# Patient Record
Sex: FEMALE | Race: WHITE | NOT HISPANIC OR LATINO | Employment: OTHER
[De-identification: names, ages, dates, MRNs, and addresses within clinical notes are randomized per-mention and may not be internally consistent; named-entity substitution may affect disease eponyms.]

---

## 2017-02-15 ENCOUNTER — RECORDS - HEALTHEAST (OUTPATIENT)
Dept: ADMINISTRATIVE | Facility: OTHER | Age: 72
End: 2017-02-15

## 2017-02-16 ENCOUNTER — HOSPITAL ENCOUNTER (OUTPATIENT)
Dept: INTERVENTIONAL RADIOLOGY/VASCULAR | Facility: HOSPITAL | Age: 72
Discharge: HOME | End: 2017-02-16

## 2017-02-16 DIAGNOSIS — C64.9 RENAL CANCER (H): ICD-10-CM

## 2017-02-16 ASSESSMENT — MIFFLIN-ST. JEOR: SCORE: 1283.79

## 2017-02-17 ENCOUNTER — COMMUNICATION - HEALTHEAST (OUTPATIENT)
Dept: INTERVENTIONAL RADIOLOGY/VASCULAR | Facility: HOSPITAL | Age: 72
End: 2017-02-17

## 2017-03-10 ENCOUNTER — COMMUNICATION - HEALTHEAST (OUTPATIENT)
Dept: ADMINISTRATIVE | Facility: CLINIC | Age: 72
End: 2017-03-10

## 2017-04-05 ENCOUNTER — AMBULATORY - HEALTHEAST (OUTPATIENT)
Dept: CARDIOLOGY | Facility: CLINIC | Age: 72
End: 2017-04-05

## 2017-04-05 ENCOUNTER — OFFICE VISIT - HEALTHEAST (OUTPATIENT)
Dept: CARDIOLOGY | Facility: CLINIC | Age: 72
End: 2017-04-05

## 2017-04-05 DIAGNOSIS — I48.0 PAROXYSMAL ATRIAL FIBRILLATION (H): ICD-10-CM

## 2017-04-05 DIAGNOSIS — I50.33 ACUTE ON CHRONIC DIASTOLIC CHF (CONGESTIVE HEART FAILURE), NYHA CLASS 3 (H): ICD-10-CM

## 2017-04-05 LAB
ATRIAL RATE - MUSE: NORMAL BPM
DIASTOLIC BLOOD PRESSURE - MUSE: NORMAL MMHG
INTERPRETATION ECG - MUSE: NORMAL
P AXIS - MUSE: NORMAL DEGREES
PR INTERVAL - MUSE: NORMAL MS
QRS DURATION - MUSE: 92 MS
QT - MUSE: 386 MS
QTC - MUSE: 500 MS
R AXIS - MUSE: 23 DEGREES
SYSTOLIC BLOOD PRESSURE - MUSE: NORMAL MMHG
T AXIS - MUSE: 130 DEGREES
VENTRICULAR RATE- MUSE: 101 BPM

## 2017-04-05 ASSESSMENT — MIFFLIN-ST. JEOR: SCORE: 1395.38

## 2017-04-14 ENCOUNTER — OFFICE VISIT - HEALTHEAST (OUTPATIENT)
Dept: GERIATRICS | Facility: CLINIC | Age: 72
End: 2017-04-14

## 2017-04-14 DIAGNOSIS — R53.1 GENERAL WEAKNESS: ICD-10-CM

## 2017-04-14 DIAGNOSIS — I50.33 ACUTE ON CHRONIC DIASTOLIC CONGESTIVE HEART FAILURE (H): ICD-10-CM

## 2017-04-14 DIAGNOSIS — G47.33 OSA (OBSTRUCTIVE SLEEP APNEA): ICD-10-CM

## 2017-04-14 DIAGNOSIS — N18.6 ESRD (END STAGE RENAL DISEASE) (H): ICD-10-CM

## 2017-04-14 DIAGNOSIS — K21.9 GERD (GASTROESOPHAGEAL REFLUX DISEASE): ICD-10-CM

## 2017-04-18 ENCOUNTER — OFFICE VISIT - HEALTHEAST (OUTPATIENT)
Dept: GERIATRICS | Facility: CLINIC | Age: 72
End: 2017-04-18

## 2017-04-18 DIAGNOSIS — R53.1 GENERAL WEAKNESS: ICD-10-CM

## 2017-04-18 DIAGNOSIS — G47.33 OSA (OBSTRUCTIVE SLEEP APNEA): ICD-10-CM

## 2017-04-18 DIAGNOSIS — I50.33 ACUTE ON CHRONIC DIASTOLIC CONGESTIVE HEART FAILURE (H): ICD-10-CM

## 2017-04-18 DIAGNOSIS — N18.6 ESRD (END STAGE RENAL DISEASE) (H): ICD-10-CM

## 2017-04-18 DIAGNOSIS — I10 HTN (HYPERTENSION): ICD-10-CM

## 2017-04-18 DIAGNOSIS — R53.1 WEAKNESS: ICD-10-CM

## 2017-04-20 ENCOUNTER — OFFICE VISIT - HEALTHEAST (OUTPATIENT)
Dept: GERIATRICS | Facility: CLINIC | Age: 72
End: 2017-04-20

## 2017-04-20 DIAGNOSIS — I50.33 ACUTE ON CHRONIC DIASTOLIC CONGESTIVE HEART FAILURE (H): ICD-10-CM

## 2017-04-20 DIAGNOSIS — R29.898 LEG WEAKNESS: ICD-10-CM

## 2017-04-20 DIAGNOSIS — I48.91 ATRIAL FIBRILLATION WITH RVR (H): ICD-10-CM

## 2017-04-20 DIAGNOSIS — I10 ESSENTIAL HYPERTENSION: ICD-10-CM

## 2017-04-25 ENCOUNTER — OFFICE VISIT - HEALTHEAST (OUTPATIENT)
Dept: GERIATRICS | Facility: CLINIC | Age: 72
End: 2017-04-25

## 2017-04-25 DIAGNOSIS — I50.33 ACUTE ON CHRONIC DIASTOLIC CONGESTIVE HEART FAILURE (H): ICD-10-CM

## 2017-04-25 DIAGNOSIS — R29.898 LEG WEAKNESS: ICD-10-CM

## 2017-04-25 DIAGNOSIS — I10 ESSENTIAL HYPERTENSION: ICD-10-CM

## 2017-04-25 DIAGNOSIS — I48.91 ATRIAL FIBRILLATION WITH RVR (H): ICD-10-CM

## 2017-04-28 ENCOUNTER — COMMUNICATION - HEALTHEAST (OUTPATIENT)
Dept: ADMINISTRATIVE | Facility: CLINIC | Age: 72
End: 2017-04-28

## 2017-05-02 ENCOUNTER — OFFICE VISIT - HEALTHEAST (OUTPATIENT)
Dept: GERIATRICS | Facility: CLINIC | Age: 72
End: 2017-05-02

## 2017-05-02 DIAGNOSIS — I50.33 ACUTE ON CHRONIC DIASTOLIC CONGESTIVE HEART FAILURE (H): ICD-10-CM

## 2017-05-02 DIAGNOSIS — R29.898 LEG WEAKNESS: ICD-10-CM

## 2017-05-02 DIAGNOSIS — I10 ESSENTIAL HYPERTENSION: ICD-10-CM

## 2017-05-02 DIAGNOSIS — G47.33 OSA (OBSTRUCTIVE SLEEP APNEA): ICD-10-CM

## 2017-05-05 ENCOUNTER — AMBULATORY - HEALTHEAST (OUTPATIENT)
Dept: GERIATRICS | Facility: CLINIC | Age: 72
End: 2017-05-05

## 2017-05-12 ENCOUNTER — AMBULATORY - HEALTHEAST (OUTPATIENT)
Dept: CARDIOLOGY | Facility: CLINIC | Age: 72
End: 2017-05-12

## 2017-05-12 DIAGNOSIS — I50.33 ACUTE ON CHRONIC DIASTOLIC CONGESTIVE HEART FAILURE (H): ICD-10-CM

## 2017-05-15 ENCOUNTER — OFFICE VISIT - HEALTHEAST (OUTPATIENT)
Dept: FAMILY MEDICINE | Facility: CLINIC | Age: 72
End: 2017-05-15

## 2017-05-15 DIAGNOSIS — I50.33 ACUTE ON CHRONIC DIASTOLIC CONGESTIVE HEART FAILURE (H): ICD-10-CM

## 2017-05-15 DIAGNOSIS — D63.1 ANEMIA OF CHRONIC RENAL FAILURE, STAGE 5 (H): ICD-10-CM

## 2017-05-15 DIAGNOSIS — Z79.01 ANTICOAGULATED ON WARFARIN: ICD-10-CM

## 2017-05-15 DIAGNOSIS — R29.898 LEG WEAKNESS, BILATERAL: ICD-10-CM

## 2017-05-15 DIAGNOSIS — N18.6 ESRD (END STAGE RENAL DISEASE) (H): ICD-10-CM

## 2017-05-15 DIAGNOSIS — G47.33 OSA (OBSTRUCTIVE SLEEP APNEA): ICD-10-CM

## 2017-05-15 DIAGNOSIS — M21.371 RIGHT FOOT DROP: ICD-10-CM

## 2017-05-15 DIAGNOSIS — N18.5 ANEMIA OF CHRONIC RENAL FAILURE, STAGE 5 (H): ICD-10-CM

## 2017-05-15 DIAGNOSIS — E78.00 PURE HYPERCHOLESTEROLEMIA: ICD-10-CM

## 2017-05-15 DIAGNOSIS — I48.19 PERSISTENT ATRIAL FIBRILLATION (H): ICD-10-CM

## 2017-05-15 DIAGNOSIS — I71.03 DISSECTION OF THORACOABDOMINAL AORTA (H): ICD-10-CM

## 2017-05-15 DIAGNOSIS — J44.9 CHRONIC OBSTRUCTIVE PULMONARY DISEASE, UNSPECIFIED COPD TYPE (H): ICD-10-CM

## 2017-05-18 ENCOUNTER — AMBULATORY - HEALTHEAST (OUTPATIENT)
Dept: CARDIOLOGY | Facility: CLINIC | Age: 72
End: 2017-05-18

## 2017-05-18 DIAGNOSIS — I50.33 ACUTE ON CHRONIC DIASTOLIC CONGESTIVE HEART FAILURE (H): ICD-10-CM

## 2017-05-19 ENCOUNTER — OFFICE VISIT - HEALTHEAST (OUTPATIENT)
Dept: CARDIOLOGY | Facility: CLINIC | Age: 72
End: 2017-05-19

## 2017-05-19 DIAGNOSIS — I49.5 TACHYCARDIA-BRADYCARDIA SYNDROME (H): ICD-10-CM

## 2017-05-19 DIAGNOSIS — I48.19 PERSISTENT ATRIAL FIBRILLATION (H): ICD-10-CM

## 2017-05-19 DIAGNOSIS — I10 ESSENTIAL HYPERTENSION WITH GOAL BLOOD PRESSURE LESS THAN 140/90: ICD-10-CM

## 2017-05-19 ASSESSMENT — MIFFLIN-ST. JEOR: SCORE: 1357.5

## 2017-05-24 ENCOUNTER — OFFICE VISIT - HEALTHEAST (OUTPATIENT)
Dept: PHYSICAL THERAPY | Facility: REHABILITATION | Age: 72
End: 2017-05-24

## 2017-05-24 DIAGNOSIS — R29.818 DIFFICULTY BALANCING: ICD-10-CM

## 2017-05-24 DIAGNOSIS — M62.81 MUSCLE WEAKNESS (GENERALIZED): ICD-10-CM

## 2017-05-25 ENCOUNTER — AMBULATORY - HEALTHEAST (OUTPATIENT)
Dept: CARDIOLOGY | Facility: CLINIC | Age: 72
End: 2017-05-25

## 2017-05-25 DIAGNOSIS — I50.33 ACUTE ON CHRONIC DIASTOLIC CONGESTIVE HEART FAILURE (H): ICD-10-CM

## 2017-05-26 ENCOUNTER — COMMUNICATION - HEALTHEAST (OUTPATIENT)
Dept: CARDIOLOGY | Facility: CLINIC | Age: 72
End: 2017-05-26

## 2017-05-31 ENCOUNTER — OFFICE VISIT - HEALTHEAST (OUTPATIENT)
Dept: PHYSICAL THERAPY | Facility: REHABILITATION | Age: 72
End: 2017-05-31

## 2017-05-31 ENCOUNTER — COMMUNICATION - HEALTHEAST (OUTPATIENT)
Dept: PHYSICAL THERAPY | Facility: REHABILITATION | Age: 72
End: 2017-05-31

## 2017-05-31 DIAGNOSIS — I50.31 ACUTE DIASTOLIC CHF (CONGESTIVE HEART FAILURE) (H): ICD-10-CM

## 2017-05-31 DIAGNOSIS — M48.061 LUMBAR SPINAL STENOSIS: ICD-10-CM

## 2017-05-31 DIAGNOSIS — I49.5 TACHY-BRADY SYNDROME (H): ICD-10-CM

## 2017-05-31 DIAGNOSIS — I48.0 PAROXYSMAL ATRIAL FIBRILLATION (H): ICD-10-CM

## 2017-05-31 DIAGNOSIS — I71.00 AORTIC DISSECTION (H): ICD-10-CM

## 2017-05-31 DIAGNOSIS — I50.33 ACUTE ON CHRONIC DIASTOLIC CONGESTIVE HEART FAILURE (H): ICD-10-CM

## 2017-05-31 DIAGNOSIS — M48.062 LUMBAR STENOSIS WITH NEUROGENIC CLAUDICATION: ICD-10-CM

## 2017-05-31 DIAGNOSIS — I71.019 CHRONIC THORACIC AORTIC DISSECTION (H): ICD-10-CM

## 2017-05-31 DIAGNOSIS — R29.818 DIFFICULTY BALANCING: ICD-10-CM

## 2017-05-31 DIAGNOSIS — R00.1 SINUS BRADYCARDIA: ICD-10-CM

## 2017-05-31 DIAGNOSIS — M54.16 LUMBAR RADICULITIS: ICD-10-CM

## 2017-05-31 DIAGNOSIS — M62.81 MUSCLE WEAKNESS (GENERALIZED): ICD-10-CM

## 2017-05-31 DIAGNOSIS — N18.6 ESRD (END STAGE RENAL DISEASE) (H): ICD-10-CM

## 2017-05-31 DIAGNOSIS — I71.8: ICD-10-CM

## 2017-06-02 ENCOUNTER — OFFICE VISIT - HEALTHEAST (OUTPATIENT)
Dept: PHYSICAL THERAPY | Facility: REHABILITATION | Age: 72
End: 2017-06-02

## 2017-06-02 DIAGNOSIS — M48.062 LUMBAR STENOSIS WITH NEUROGENIC CLAUDICATION: ICD-10-CM

## 2017-06-02 DIAGNOSIS — I71.00 AORTIC DISSECTION (H): ICD-10-CM

## 2017-06-02 DIAGNOSIS — I71.019 CHRONIC THORACIC AORTIC DISSECTION (H): ICD-10-CM

## 2017-06-02 DIAGNOSIS — M48.061 LUMBAR SPINAL STENOSIS: ICD-10-CM

## 2017-06-02 DIAGNOSIS — M54.16 LUMBAR RADICULITIS: ICD-10-CM

## 2017-06-02 DIAGNOSIS — N18.6 ESRD (END STAGE RENAL DISEASE) (H): ICD-10-CM

## 2017-06-02 DIAGNOSIS — M62.81 MUSCLE WEAKNESS (GENERALIZED): ICD-10-CM

## 2017-06-02 DIAGNOSIS — R00.1 SINUS BRADYCARDIA: ICD-10-CM

## 2017-06-02 DIAGNOSIS — I50.31 ACUTE DIASTOLIC CHF (CONGESTIVE HEART FAILURE) (H): ICD-10-CM

## 2017-06-02 DIAGNOSIS — R29.818 DIFFICULTY BALANCING: ICD-10-CM

## 2017-06-02 DIAGNOSIS — I71.8: ICD-10-CM

## 2017-06-02 DIAGNOSIS — I49.5 TACHY-BRADY SYNDROME (H): ICD-10-CM

## 2017-06-02 DIAGNOSIS — I48.0 PAROXYSMAL ATRIAL FIBRILLATION (H): ICD-10-CM

## 2017-06-02 DIAGNOSIS — I50.33 ACUTE ON CHRONIC DIASTOLIC CONGESTIVE HEART FAILURE (H): ICD-10-CM

## 2017-06-05 ASSESSMENT — MIFFLIN-ST. JEOR
SCORE: 1374.51
SCORE: 1365.44

## 2017-06-07 ENCOUNTER — SURGERY - HEALTHEAST (OUTPATIENT)
Dept: GASTROENTEROLOGY | Facility: CLINIC | Age: 72
End: 2017-06-07

## 2017-06-07 ASSESSMENT — MIFFLIN-ST. JEOR
SCORE: 1372.7
SCORE: 1372.7

## 2017-06-08 ENCOUNTER — HOME CARE/HOSPICE - HEALTHEAST (OUTPATIENT)
Dept: HOME HEALTH SERVICES | Facility: HOME HEALTH | Age: 72
End: 2017-06-08

## 2017-06-08 ASSESSMENT — MIFFLIN-ST. JEOR: SCORE: 1383.58

## 2017-06-10 ENCOUNTER — HOME CARE/HOSPICE - HEALTHEAST (OUTPATIENT)
Dept: HOME HEALTH SERVICES | Facility: HOME HEALTH | Age: 72
End: 2017-06-10

## 2017-06-11 ENCOUNTER — HOME CARE/HOSPICE - HEALTHEAST (OUTPATIENT)
Dept: HOME HEALTH SERVICES | Facility: HOME HEALTH | Age: 72
End: 2017-06-11

## 2017-06-12 ENCOUNTER — COMMUNICATION - HEALTHEAST (OUTPATIENT)
Dept: FAMILY MEDICINE | Facility: CLINIC | Age: 72
End: 2017-06-12

## 2017-06-12 ENCOUNTER — SURGERY - HEALTHEAST (OUTPATIENT)
Dept: CARDIOLOGY | Facility: CLINIC | Age: 72
End: 2017-06-12

## 2017-06-12 ENCOUNTER — AMBULATORY - HEALTHEAST (OUTPATIENT)
Dept: CARDIOLOGY | Facility: CLINIC | Age: 72
End: 2017-06-12

## 2017-06-12 ENCOUNTER — HOME CARE/HOSPICE - HEALTHEAST (OUTPATIENT)
Dept: HOME HEALTH SERVICES | Facility: HOME HEALTH | Age: 72
End: 2017-06-12

## 2017-06-12 DIAGNOSIS — I50.33 ACUTE ON CHRONIC DIASTOLIC CONGESTIVE HEART FAILURE (H): ICD-10-CM

## 2017-06-12 DIAGNOSIS — I49.5 TACHY-BRADY SYNDROME (H): ICD-10-CM

## 2017-06-13 ENCOUNTER — COMMUNICATION - HEALTHEAST (OUTPATIENT)
Dept: HOME HEALTH SERVICES | Facility: HOME HEALTH | Age: 72
End: 2017-06-13

## 2017-06-13 ENCOUNTER — HOME CARE/HOSPICE - HEALTHEAST (OUTPATIENT)
Dept: HOME HEALTH SERVICES | Facility: HOME HEALTH | Age: 72
End: 2017-06-13

## 2017-06-13 DIAGNOSIS — M62.81 MUSCLE WEAKNESS: ICD-10-CM

## 2017-06-13 DIAGNOSIS — R29.898 WEAKNESS OF BOTH LOWER EXTREMITIES: ICD-10-CM

## 2017-06-13 DIAGNOSIS — N18.6 ESRD (END STAGE RENAL DISEASE) ON DIALYSIS (H): ICD-10-CM

## 2017-06-13 DIAGNOSIS — I48.91 ATRIAL FIBRILLATION (H): ICD-10-CM

## 2017-06-13 DIAGNOSIS — Z99.2 ESRD (END STAGE RENAL DISEASE) ON DIALYSIS (H): ICD-10-CM

## 2017-06-15 ENCOUNTER — AMBULATORY - HEALTHEAST (OUTPATIENT)
Dept: CARDIOLOGY | Facility: CLINIC | Age: 72
End: 2017-06-15

## 2017-06-15 ENCOUNTER — HOME CARE/HOSPICE - HEALTHEAST (OUTPATIENT)
Dept: HOME HEALTH SERVICES | Facility: HOME HEALTH | Age: 72
End: 2017-06-15

## 2017-06-15 DIAGNOSIS — I50.33 ACUTE ON CHRONIC DIASTOLIC CONGESTIVE HEART FAILURE (H): ICD-10-CM

## 2017-06-16 ENCOUNTER — OFFICE VISIT - HEALTHEAST (OUTPATIENT)
Dept: FAMILY MEDICINE | Facility: CLINIC | Age: 72
End: 2017-06-16

## 2017-06-16 ENCOUNTER — RECORDS - HEALTHEAST (OUTPATIENT)
Dept: GENERAL RADIOLOGY | Facility: CLINIC | Age: 72
End: 2017-06-16

## 2017-06-16 ENCOUNTER — COMMUNICATION - HEALTHEAST (OUTPATIENT)
Dept: FAMILY MEDICINE | Facility: CLINIC | Age: 72
End: 2017-06-16

## 2017-06-16 DIAGNOSIS — D64.9 CHRONIC ANEMIA: ICD-10-CM

## 2017-06-16 DIAGNOSIS — K57.91 GASTROINTESTINAL HEMORRHAGE ASSOCIATED WITH INTESTINAL DIVERTICULOSIS: ICD-10-CM

## 2017-06-16 DIAGNOSIS — Z79.01 ANTICOAGULANT LONG-TERM USE: ICD-10-CM

## 2017-06-16 DIAGNOSIS — Z87.81 HISTORY OF COMPRESSION FRACTURE OF SPINE: ICD-10-CM

## 2017-06-16 DIAGNOSIS — N18.6 ESRD (END STAGE RENAL DISEASE) (H): ICD-10-CM

## 2017-06-16 DIAGNOSIS — M54.41 LUMBAGO WITH SCIATICA, RIGHT SIDE: ICD-10-CM

## 2017-06-16 DIAGNOSIS — M54.41 ACUTE MIDLINE LOW BACK PAIN WITH RIGHT-SIDED SCIATICA: ICD-10-CM

## 2017-06-19 ENCOUNTER — COMMUNICATION - HEALTHEAST (OUTPATIENT)
Dept: FAMILY MEDICINE | Facility: CLINIC | Age: 72
End: 2017-06-19

## 2017-06-20 ENCOUNTER — AMBULATORY - HEALTHEAST (OUTPATIENT)
Dept: CARDIOLOGY | Facility: CLINIC | Age: 72
End: 2017-06-20

## 2017-06-20 ENCOUNTER — HOME CARE/HOSPICE - HEALTHEAST (OUTPATIENT)
Dept: HOME HEALTH SERVICES | Facility: HOME HEALTH | Age: 72
End: 2017-06-20

## 2017-06-20 DIAGNOSIS — I50.33 ACUTE ON CHRONIC DIASTOLIC CONGESTIVE HEART FAILURE (H): ICD-10-CM

## 2017-06-22 ENCOUNTER — HOME CARE/HOSPICE - HEALTHEAST (OUTPATIENT)
Dept: HOME HEALTH SERVICES | Facility: HOME HEALTH | Age: 72
End: 2017-06-22

## 2017-06-23 ENCOUNTER — OFFICE VISIT - HEALTHEAST (OUTPATIENT)
Dept: FAMILY MEDICINE | Facility: CLINIC | Age: 72
End: 2017-06-23

## 2017-06-23 ENCOUNTER — COMMUNICATION - HEALTHEAST (OUTPATIENT)
Dept: CARDIOLOGY | Facility: CLINIC | Age: 72
End: 2017-06-23

## 2017-06-23 ENCOUNTER — COMMUNICATION - HEALTHEAST (OUTPATIENT)
Dept: HOME HEALTH SERVICES | Facility: HOME HEALTH | Age: 72
End: 2017-06-23

## 2017-06-23 ENCOUNTER — AMBULATORY - HEALTHEAST (OUTPATIENT)
Dept: CARDIOLOGY | Facility: CLINIC | Age: 72
End: 2017-06-23

## 2017-06-23 DIAGNOSIS — Z79.01 ANTICOAGULANT LONG-TERM USE: ICD-10-CM

## 2017-06-23 DIAGNOSIS — Z01.818 PRE-OP EVALUATION: ICD-10-CM

## 2017-06-23 DIAGNOSIS — I50.33 ACUTE ON CHRONIC DIASTOLIC CONGESTIVE HEART FAILURE (H): ICD-10-CM

## 2017-06-23 DIAGNOSIS — D64.9 CHRONIC ANEMIA: ICD-10-CM

## 2017-06-23 DIAGNOSIS — I10 ESSENTIAL HYPERTENSION WITH GOAL BLOOD PRESSURE LESS THAN 140/90: ICD-10-CM

## 2017-06-23 DIAGNOSIS — N18.6 ESRD (END STAGE RENAL DISEASE) (H): ICD-10-CM

## 2017-06-23 DIAGNOSIS — I48.91 ATRIAL FIBRILLATION (H): ICD-10-CM

## 2017-06-23 ASSESSMENT — MIFFLIN-ST. JEOR: SCORE: 1359.54

## 2017-06-27 ENCOUNTER — HOME CARE/HOSPICE - HEALTHEAST (OUTPATIENT)
Dept: HOME HEALTH SERVICES | Facility: HOME HEALTH | Age: 72
End: 2017-06-27

## 2017-06-27 ENCOUNTER — AMBULATORY - HEALTHEAST (OUTPATIENT)
Dept: CARDIOLOGY | Facility: CLINIC | Age: 72
End: 2017-06-27

## 2017-06-27 DIAGNOSIS — I50.33 ACUTE ON CHRONIC DIASTOLIC CONGESTIVE HEART FAILURE (H): ICD-10-CM

## 2017-06-29 ENCOUNTER — HOME CARE/HOSPICE - HEALTHEAST (OUTPATIENT)
Dept: HOME HEALTH SERVICES | Facility: HOME HEALTH | Age: 72
End: 2017-06-29

## 2017-06-30 ENCOUNTER — AMBULATORY - HEALTHEAST (OUTPATIENT)
Dept: CARDIOLOGY | Facility: CLINIC | Age: 72
End: 2017-06-30

## 2017-06-30 ENCOUNTER — HOME CARE/HOSPICE - HEALTHEAST (OUTPATIENT)
Dept: HOME HEALTH SERVICES | Facility: HOME HEALTH | Age: 72
End: 2017-06-30

## 2017-06-30 DIAGNOSIS — I50.33 ACUTE ON CHRONIC DIASTOLIC CONGESTIVE HEART FAILURE (H): ICD-10-CM

## 2017-07-03 ENCOUNTER — HOME CARE/HOSPICE - HEALTHEAST (OUTPATIENT)
Dept: HOME HEALTH SERVICES | Facility: HOME HEALTH | Age: 72
End: 2017-07-03

## 2017-07-03 ENCOUNTER — AMBULATORY - HEALTHEAST (OUTPATIENT)
Dept: CARDIOLOGY | Facility: CLINIC | Age: 72
End: 2017-07-03

## 2017-07-03 DIAGNOSIS — I50.33 ACUTE ON CHRONIC DIASTOLIC CONGESTIVE HEART FAILURE (H): ICD-10-CM

## 2017-07-05 ENCOUNTER — HOME CARE/HOSPICE - HEALTHEAST (OUTPATIENT)
Dept: HOME HEALTH SERVICES | Facility: HOME HEALTH | Age: 72
End: 2017-07-05

## 2017-07-06 ENCOUNTER — HOME CARE/HOSPICE - HEALTHEAST (OUTPATIENT)
Dept: HOME HEALTH SERVICES | Facility: HOME HEALTH | Age: 72
End: 2017-07-06

## 2017-07-06 ENCOUNTER — COMMUNICATION - HEALTHEAST (OUTPATIENT)
Dept: HOME HEALTH SERVICES | Facility: HOME HEALTH | Age: 72
End: 2017-07-06

## 2017-07-06 ENCOUNTER — AMBULATORY - HEALTHEAST (OUTPATIENT)
Dept: CARDIOLOGY | Facility: CLINIC | Age: 72
End: 2017-07-06

## 2017-07-06 ENCOUNTER — HOSPITAL ENCOUNTER (OUTPATIENT)
Dept: PHYSICAL MEDICINE AND REHAB | Facility: CLINIC | Age: 72
Discharge: HOME | End: 2017-07-06
Attending: NURSE PRACTITIONER

## 2017-07-06 DIAGNOSIS — M48.062 LUMBAR STENOSIS WITH NEUROGENIC CLAUDICATION: ICD-10-CM

## 2017-07-06 DIAGNOSIS — S32.030A COMPRESSION FRACTURE OF L3 LUMBAR VERTEBRA: ICD-10-CM

## 2017-07-06 DIAGNOSIS — I50.33 ACUTE ON CHRONIC DIASTOLIC CONGESTIVE HEART FAILURE (H): ICD-10-CM

## 2017-07-06 DIAGNOSIS — M43.17 SPONDYLOLISTHESIS AT L5-S1 LEVEL: ICD-10-CM

## 2017-07-06 DIAGNOSIS — M54.50 ACUTE MIDLINE LOW BACK PAIN WITHOUT SCIATICA: ICD-10-CM

## 2017-07-06 DIAGNOSIS — N18.6 ESRD (END STAGE RENAL DISEASE) (H): ICD-10-CM

## 2017-07-06 DIAGNOSIS — R26.89 BALANCE PROBLEM: ICD-10-CM

## 2017-07-06 ASSESSMENT — MIFFLIN-ST. JEOR: SCORE: 1379.05

## 2017-07-07 ENCOUNTER — HOME CARE/HOSPICE - HEALTHEAST (OUTPATIENT)
Dept: HOME HEALTH SERVICES | Facility: HOME HEALTH | Age: 72
End: 2017-07-07

## 2017-07-11 ENCOUNTER — HOME CARE/HOSPICE - HEALTHEAST (OUTPATIENT)
Dept: HOME HEALTH SERVICES | Facility: HOME HEALTH | Age: 72
End: 2017-07-11

## 2017-07-12 ENCOUNTER — COMMUNICATION - HEALTHEAST (OUTPATIENT)
Dept: FAMILY MEDICINE | Facility: CLINIC | Age: 72
End: 2017-07-12

## 2017-07-12 ENCOUNTER — HOME CARE/HOSPICE - HEALTHEAST (OUTPATIENT)
Dept: HOME HEALTH SERVICES | Facility: HOME HEALTH | Age: 72
End: 2017-07-12

## 2017-07-13 ENCOUNTER — COMMUNICATION - HEALTHEAST (OUTPATIENT)
Dept: CARDIOLOGY | Facility: CLINIC | Age: 72
End: 2017-07-13

## 2017-07-13 ENCOUNTER — AMBULATORY - HEALTHEAST (OUTPATIENT)
Dept: CARDIOLOGY | Facility: CLINIC | Age: 72
End: 2017-07-13

## 2017-07-13 ENCOUNTER — HOME CARE/HOSPICE - HEALTHEAST (OUTPATIENT)
Dept: HOME HEALTH SERVICES | Facility: HOME HEALTH | Age: 72
End: 2017-07-13

## 2017-07-13 DIAGNOSIS — I50.33 ACUTE ON CHRONIC DIASTOLIC CONGESTIVE HEART FAILURE (H): ICD-10-CM

## 2017-07-13 DIAGNOSIS — N18.6 ESRD (END STAGE RENAL DISEASE) (H): ICD-10-CM

## 2017-07-14 ENCOUNTER — HOSPITAL ENCOUNTER (OUTPATIENT)
Dept: MRI IMAGING | Facility: HOSPITAL | Age: 72
Discharge: HOME | End: 2017-07-14

## 2017-07-14 DIAGNOSIS — M54.50 ACUTE MIDLINE LOW BACK PAIN WITHOUT SCIATICA: ICD-10-CM

## 2017-07-14 DIAGNOSIS — M43.17 SPONDYLOLISTHESIS AT L5-S1 LEVEL: ICD-10-CM

## 2017-07-14 DIAGNOSIS — M48.062 LUMBAR STENOSIS WITH NEUROGENIC CLAUDICATION: ICD-10-CM

## 2017-07-14 DIAGNOSIS — M48.061 SPINAL STENOSIS OF LUMBAR REGION: ICD-10-CM

## 2017-07-15 ENCOUNTER — HOME CARE/HOSPICE - HEALTHEAST (OUTPATIENT)
Dept: HOME HEALTH SERVICES | Facility: HOME HEALTH | Age: 72
End: 2017-07-15

## 2017-07-17 ENCOUNTER — HOSPITAL ENCOUNTER (OUTPATIENT)
Dept: PHYSICAL MEDICINE AND REHAB | Facility: CLINIC | Age: 72
Discharge: HOME | End: 2017-07-17
Attending: NURSE PRACTITIONER

## 2017-07-17 ENCOUNTER — AMBULATORY - HEALTHEAST (OUTPATIENT)
Dept: PHYSICAL MEDICINE AND REHAB | Facility: CLINIC | Age: 72
End: 2017-07-17

## 2017-07-17 DIAGNOSIS — R93.89 ABNORMAL FINDING ON IMAGING: ICD-10-CM

## 2017-07-17 DIAGNOSIS — F40.240 CLAUSTROPHOBIA: ICD-10-CM

## 2017-07-17 DIAGNOSIS — M43.17 SPONDYLOLISTHESIS AT L5-S1 LEVEL: ICD-10-CM

## 2017-07-17 DIAGNOSIS — M48.062 LUMBAR STENOSIS WITH NEUROGENIC CLAUDICATION: ICD-10-CM

## 2017-07-17 DIAGNOSIS — M54.50 ACUTE MIDLINE LOW BACK PAIN WITHOUT SCIATICA: ICD-10-CM

## 2017-07-18 ENCOUNTER — HOME CARE/HOSPICE - HEALTHEAST (OUTPATIENT)
Dept: HOME HEALTH SERVICES | Facility: HOME HEALTH | Age: 72
End: 2017-07-18

## 2017-07-19 ENCOUNTER — HOME CARE/HOSPICE - HEALTHEAST (OUTPATIENT)
Dept: HOME HEALTH SERVICES | Facility: HOME HEALTH | Age: 72
End: 2017-07-19

## 2017-07-19 ENCOUNTER — AMBULATORY - HEALTHEAST (OUTPATIENT)
Dept: CARDIOLOGY | Facility: CLINIC | Age: 72
End: 2017-07-19

## 2017-07-19 DIAGNOSIS — I50.33 ACUTE ON CHRONIC DIASTOLIC CONGESTIVE HEART FAILURE (H): ICD-10-CM

## 2017-07-20 ENCOUNTER — AMBULATORY - HEALTHEAST (OUTPATIENT)
Dept: PHYSICAL MEDICINE AND REHAB | Facility: CLINIC | Age: 72
End: 2017-07-20

## 2017-07-20 ENCOUNTER — COMMUNICATION - HEALTHEAST (OUTPATIENT)
Dept: PHYSICAL MEDICINE AND REHAB | Facility: CLINIC | Age: 72
End: 2017-07-20

## 2017-07-20 ENCOUNTER — HOME CARE/HOSPICE - HEALTHEAST (OUTPATIENT)
Dept: HOME HEALTH SERVICES | Facility: HOME HEALTH | Age: 72
End: 2017-07-20

## 2017-07-20 DIAGNOSIS — M43.17 SPONDYLOLISTHESIS AT L5-S1 LEVEL: ICD-10-CM

## 2017-07-20 DIAGNOSIS — R93.89 ABNORMAL FINDING ON IMAGING: ICD-10-CM

## 2017-07-20 DIAGNOSIS — M48.062 LUMBAR STENOSIS WITH NEUROGENIC CLAUDICATION: ICD-10-CM

## 2017-07-21 ENCOUNTER — COMMUNICATION - HEALTHEAST (OUTPATIENT)
Dept: HOME HEALTH SERVICES | Facility: HOME HEALTH | Age: 72
End: 2017-07-21

## 2017-07-21 ENCOUNTER — COMMUNICATION - HEALTHEAST (OUTPATIENT)
Dept: PHYSICAL MEDICINE AND REHAB | Facility: CLINIC | Age: 72
End: 2017-07-21

## 2017-07-24 ENCOUNTER — COMMUNICATION - HEALTHEAST (OUTPATIENT)
Dept: FAMILY MEDICINE | Facility: CLINIC | Age: 72
End: 2017-07-24

## 2017-07-24 DIAGNOSIS — M10.9 GOUT: ICD-10-CM

## 2017-07-25 ENCOUNTER — HOSPITAL ENCOUNTER (OUTPATIENT)
Dept: NUCLEAR MEDICINE | Facility: HOSPITAL | Age: 72
Discharge: HOME | End: 2017-07-25

## 2017-07-25 ENCOUNTER — AMBULATORY - HEALTHEAST (OUTPATIENT)
Dept: LAB | Facility: HOSPITAL | Age: 72
End: 2017-07-25

## 2017-07-25 DIAGNOSIS — R93.89 ABNORMAL FINDING ON IMAGING: ICD-10-CM

## 2017-07-25 DIAGNOSIS — M43.17 SPONDYLOLISTHESIS AT L5-S1 LEVEL: ICD-10-CM

## 2017-07-25 DIAGNOSIS — M54.50 ACUTE MIDLINE LOW BACK PAIN WITHOUT SCIATICA: ICD-10-CM

## 2017-07-25 DIAGNOSIS — M48.062 LUMBAR STENOSIS WITH NEUROGENIC CLAUDICATION: ICD-10-CM

## 2017-07-25 DIAGNOSIS — M48.061 SPINAL STENOSIS OF LUMBAR REGION: ICD-10-CM

## 2017-07-26 ENCOUNTER — AMBULATORY - HEALTHEAST (OUTPATIENT)
Dept: CARDIOLOGY | Facility: CLINIC | Age: 72
End: 2017-07-26

## 2017-07-26 ENCOUNTER — AMBULATORY - HEALTHEAST (OUTPATIENT)
Dept: PHYSICAL MEDICINE AND REHAB | Facility: CLINIC | Age: 72
End: 2017-07-26

## 2017-07-26 ENCOUNTER — COMMUNICATION - HEALTHEAST (OUTPATIENT)
Dept: PHYSICAL MEDICINE AND REHAB | Facility: CLINIC | Age: 72
End: 2017-07-26

## 2017-07-26 ENCOUNTER — HOME CARE/HOSPICE - HEALTHEAST (OUTPATIENT)
Dept: HOME HEALTH SERVICES | Facility: HOME HEALTH | Age: 72
End: 2017-07-26

## 2017-07-26 DIAGNOSIS — M54.50 ACUTE MIDLINE LOW BACK PAIN WITHOUT SCIATICA: ICD-10-CM

## 2017-07-26 DIAGNOSIS — I50.33 ACUTE ON CHRONIC DIASTOLIC CONGESTIVE HEART FAILURE (H): ICD-10-CM

## 2017-07-26 DIAGNOSIS — M43.17 SPONDYLOLISTHESIS AT L5-S1 LEVEL: ICD-10-CM

## 2017-07-26 DIAGNOSIS — M48.062 LUMBAR STENOSIS WITH NEUROGENIC CLAUDICATION: ICD-10-CM

## 2017-07-26 DIAGNOSIS — M54.16 LUMBAR RADICULITIS: ICD-10-CM

## 2017-07-27 ENCOUNTER — OFFICE VISIT - HEALTHEAST (OUTPATIENT)
Dept: CARDIOLOGY | Facility: CLINIC | Age: 72
End: 2017-07-27

## 2017-07-27 ENCOUNTER — COMMUNICATION - HEALTHEAST (OUTPATIENT)
Dept: FAMILY MEDICINE | Facility: CLINIC | Age: 72
End: 2017-07-27

## 2017-07-27 DIAGNOSIS — M54.9 BACK PAIN: ICD-10-CM

## 2017-07-27 DIAGNOSIS — I48.19 PERSISTENT ATRIAL FIBRILLATION (H): ICD-10-CM

## 2017-07-27 DIAGNOSIS — E78.5 HYPERLIPIDEMIA: ICD-10-CM

## 2017-07-27 DIAGNOSIS — I49.5 TACHYCARDIA-BRADYCARDIA SYNDROME (H): ICD-10-CM

## 2017-07-27 DIAGNOSIS — D64.9 CHRONIC ANEMIA: ICD-10-CM

## 2017-07-27 DIAGNOSIS — Z87.81 HISTORY OF COMPRESSION FRACTURE OF SPINE: ICD-10-CM

## 2017-07-27 DIAGNOSIS — N18.6 ESRD (END STAGE RENAL DISEASE) (H): ICD-10-CM

## 2017-07-27 ASSESSMENT — MIFFLIN-ST. JEOR: SCORE: 1388.12

## 2017-07-28 ENCOUNTER — COMMUNICATION - HEALTHEAST (OUTPATIENT)
Dept: FAMILY MEDICINE | Facility: CLINIC | Age: 72
End: 2017-07-28

## 2017-07-28 ENCOUNTER — HOSPITAL ENCOUNTER (OUTPATIENT)
Dept: CARDIOLOGY | Facility: HOSPITAL | Age: 72
Discharge: HOME | End: 2017-07-28
Attending: INTERNAL MEDICINE

## 2017-07-28 DIAGNOSIS — M54.9 BACK PAIN: ICD-10-CM

## 2017-07-31 ENCOUNTER — OFFICE VISIT - HEALTHEAST (OUTPATIENT)
Dept: FAMILY MEDICINE | Facility: CLINIC | Age: 72
End: 2017-07-31

## 2017-07-31 DIAGNOSIS — L02.91 ABSCESS: ICD-10-CM

## 2017-07-31 DIAGNOSIS — L03.90 CELLULITIS: ICD-10-CM

## 2017-08-01 ENCOUNTER — AMBULATORY - HEALTHEAST (OUTPATIENT)
Dept: CARDIOLOGY | Facility: CLINIC | Age: 72
End: 2017-08-01

## 2017-08-01 DIAGNOSIS — I50.33 ACUTE ON CHRONIC DIASTOLIC CONGESTIVE HEART FAILURE (H): ICD-10-CM

## 2017-08-02 ENCOUNTER — OFFICE VISIT - HEALTHEAST (OUTPATIENT)
Dept: INTERNAL MEDICINE | Facility: CLINIC | Age: 72
End: 2017-08-02

## 2017-08-02 DIAGNOSIS — L02.411 ABSCESS OF RIGHT AXILLA: ICD-10-CM

## 2017-08-04 ENCOUNTER — COMMUNICATION - HEALTHEAST (OUTPATIENT)
Dept: CARDIOLOGY | Facility: CLINIC | Age: 72
End: 2017-08-04

## 2017-08-04 ENCOUNTER — HOME CARE/HOSPICE - HEALTHEAST (OUTPATIENT)
Dept: HOME HEALTH SERVICES | Facility: HOME HEALTH | Age: 72
End: 2017-08-04

## 2017-08-07 ENCOUNTER — HOME CARE/HOSPICE - HEALTHEAST (OUTPATIENT)
Dept: ADMINISTRATIVE | Facility: OTHER | Age: 72
End: 2017-08-07

## 2017-08-08 ENCOUNTER — AMBULATORY - HEALTHEAST (OUTPATIENT)
Dept: CARDIOLOGY | Facility: CLINIC | Age: 72
End: 2017-08-08

## 2017-08-08 DIAGNOSIS — I50.33 ACUTE ON CHRONIC DIASTOLIC CONGESTIVE HEART FAILURE (H): ICD-10-CM

## 2017-08-15 ENCOUNTER — COMMUNICATION - HEALTHEAST (OUTPATIENT)
Dept: FAMILY MEDICINE | Facility: CLINIC | Age: 72
End: 2017-08-15

## 2017-08-15 DIAGNOSIS — N18.6 ESRD (END STAGE RENAL DISEASE) (H): ICD-10-CM

## 2017-08-17 ENCOUNTER — COMMUNICATION - HEALTHEAST (OUTPATIENT)
Dept: CARDIOLOGY | Facility: CLINIC | Age: 72
End: 2017-08-17

## 2017-08-17 DIAGNOSIS — N18.6 ESRD (END STAGE RENAL DISEASE) (H): ICD-10-CM

## 2017-08-17 DIAGNOSIS — I50.33 ACUTE ON CHRONIC DIASTOLIC CONGESTIVE HEART FAILURE (H): ICD-10-CM

## 2017-08-18 ENCOUNTER — COMMUNICATION - HEALTHEAST (OUTPATIENT)
Dept: CARDIOLOGY | Facility: CLINIC | Age: 72
End: 2017-08-18

## 2017-08-18 DIAGNOSIS — I48.91 A-FIB (H): ICD-10-CM

## 2017-08-22 ENCOUNTER — AMBULATORY - HEALTHEAST (OUTPATIENT)
Dept: CARDIOLOGY | Facility: CLINIC | Age: 72
End: 2017-08-22

## 2017-08-22 DIAGNOSIS — I50.33 ACUTE ON CHRONIC DIASTOLIC CONGESTIVE HEART FAILURE (H): ICD-10-CM

## 2017-08-25 ENCOUNTER — AMBULATORY - HEALTHEAST (OUTPATIENT)
Dept: CARDIOLOGY | Facility: CLINIC | Age: 72
End: 2017-08-25

## 2017-09-05 ENCOUNTER — AMBULATORY - HEALTHEAST (OUTPATIENT)
Dept: CARDIOLOGY | Facility: CLINIC | Age: 72
End: 2017-09-05

## 2017-09-05 DIAGNOSIS — I50.33 ACUTE ON CHRONIC DIASTOLIC CONGESTIVE HEART FAILURE (H): ICD-10-CM

## 2017-09-12 ENCOUNTER — AMBULATORY - HEALTHEAST (OUTPATIENT)
Dept: CARDIOLOGY | Facility: CLINIC | Age: 72
End: 2017-09-12

## 2017-09-12 DIAGNOSIS — I50.33 ACUTE ON CHRONIC DIASTOLIC CONGESTIVE HEART FAILURE (H): ICD-10-CM

## 2017-09-19 ENCOUNTER — AMBULATORY - HEALTHEAST (OUTPATIENT)
Dept: CARDIOLOGY | Facility: CLINIC | Age: 72
End: 2017-09-19

## 2017-09-19 DIAGNOSIS — I50.33 ACUTE ON CHRONIC DIASTOLIC CONGESTIVE HEART FAILURE (H): ICD-10-CM

## 2017-09-25 ENCOUNTER — OFFICE VISIT - HEALTHEAST (OUTPATIENT)
Dept: PODIATRY | Facility: CLINIC | Age: 72
End: 2017-09-25

## 2017-09-25 DIAGNOSIS — L60.2 ONYCHAUXIS: ICD-10-CM

## 2017-09-25 DIAGNOSIS — B35.1 NAIL FUNGUS: ICD-10-CM

## 2017-09-27 ENCOUNTER — AMBULATORY - HEALTHEAST (OUTPATIENT)
Dept: CARDIOLOGY | Facility: CLINIC | Age: 72
End: 2017-09-27

## 2017-09-27 DIAGNOSIS — I50.33 ACUTE ON CHRONIC DIASTOLIC CONGESTIVE HEART FAILURE (H): ICD-10-CM

## 2017-10-03 ENCOUNTER — AMBULATORY - HEALTHEAST (OUTPATIENT)
Dept: CARDIOLOGY | Facility: CLINIC | Age: 72
End: 2017-10-03

## 2017-10-03 DIAGNOSIS — I50.33 ACUTE ON CHRONIC DIASTOLIC CONGESTIVE HEART FAILURE (H): ICD-10-CM

## 2017-10-13 ENCOUNTER — COMMUNICATION - HEALTHEAST (OUTPATIENT)
Dept: CARDIOLOGY | Facility: CLINIC | Age: 72
End: 2017-10-13

## 2017-10-24 ENCOUNTER — AMBULATORY - HEALTHEAST (OUTPATIENT)
Dept: CARDIOLOGY | Facility: CLINIC | Age: 72
End: 2017-10-24

## 2017-10-27 ENCOUNTER — OFFICE VISIT - HEALTHEAST (OUTPATIENT)
Dept: FAMILY MEDICINE | Facility: CLINIC | Age: 72
End: 2017-10-27

## 2017-10-27 DIAGNOSIS — N18.6 ESRD (END STAGE RENAL DISEASE) ON DIALYSIS (H): ICD-10-CM

## 2017-10-27 DIAGNOSIS — I10 ESSENTIAL HYPERTENSION WITH GOAL BLOOD PRESSURE LESS THAN 140/90: ICD-10-CM

## 2017-10-27 DIAGNOSIS — I49.5 TACHYCARDIA-BRADYCARDIA SYNDROME (H): ICD-10-CM

## 2017-10-27 DIAGNOSIS — Z12.31 VISIT FOR SCREENING MAMMOGRAM: ICD-10-CM

## 2017-10-27 DIAGNOSIS — Z99.2 ESRD (END STAGE RENAL DISEASE) ON DIALYSIS (H): ICD-10-CM

## 2017-10-27 DIAGNOSIS — Z23 NEED FOR TD VACCINE: ICD-10-CM

## 2017-10-27 DIAGNOSIS — I48.19 PERSISTENT ATRIAL FIBRILLATION (H): ICD-10-CM

## 2017-10-27 DIAGNOSIS — Z53.20 MEDICATION THERAPY MANAGEMENT RECOMMENDATION REFUSED BY PATIENT: ICD-10-CM

## 2017-10-27 DIAGNOSIS — Z23 NEED FOR VACCINATION: ICD-10-CM

## 2017-10-27 ASSESSMENT — MIFFLIN-ST. JEOR: SCORE: 1354.55

## 2017-11-01 ENCOUNTER — COMMUNICATION - HEALTHEAST (OUTPATIENT)
Dept: FAMILY MEDICINE | Facility: CLINIC | Age: 72
End: 2017-11-01

## 2017-11-01 DIAGNOSIS — I48.19 PERSISTENT ATRIAL FIBRILLATION (H): ICD-10-CM

## 2017-11-02 ENCOUNTER — OFFICE VISIT - HEALTHEAST (OUTPATIENT)
Dept: FAMILY MEDICINE | Facility: CLINIC | Age: 72
End: 2017-11-02

## 2017-11-02 DIAGNOSIS — I48.19 PERSISTENT ATRIAL FIBRILLATION (H): ICD-10-CM

## 2017-11-02 DIAGNOSIS — Z79.01 ANTICOAGULANT LONG-TERM USE: ICD-10-CM

## 2017-11-02 DIAGNOSIS — N18.6 ESRD ON DIALYSIS (H): ICD-10-CM

## 2017-11-02 DIAGNOSIS — Z99.2 ESRD ON DIALYSIS (H): ICD-10-CM

## 2017-11-02 ASSESSMENT — MIFFLIN-ST. JEOR: SCORE: 1353.65

## 2017-11-06 ENCOUNTER — COMMUNICATION - HEALTHEAST (OUTPATIENT)
Dept: FAMILY MEDICINE | Facility: CLINIC | Age: 72
End: 2017-11-06

## 2017-11-07 ENCOUNTER — RECORDS - HEALTHEAST (OUTPATIENT)
Dept: ADMINISTRATIVE | Facility: OTHER | Age: 72
End: 2017-11-07

## 2017-11-14 ENCOUNTER — RECORDS - HEALTHEAST (OUTPATIENT)
Dept: MAMMOGRAPHY | Facility: CLINIC | Age: 72
End: 2017-11-14

## 2017-11-14 DIAGNOSIS — Z12.31 ENCOUNTER FOR SCREENING MAMMOGRAM FOR MALIGNANT NEOPLASM OF BREAST: ICD-10-CM

## 2017-11-15 ENCOUNTER — RECORDS - HEALTHEAST (OUTPATIENT)
Dept: ADMINISTRATIVE | Facility: OTHER | Age: 72
End: 2017-11-15

## 2017-12-06 ENCOUNTER — COMMUNICATION - HEALTHEAST (OUTPATIENT)
Dept: FAMILY MEDICINE | Facility: CLINIC | Age: 72
End: 2017-12-06

## 2017-12-06 DIAGNOSIS — N18.6 ESRD (END STAGE RENAL DISEASE) (H): ICD-10-CM

## 2017-12-12 ENCOUNTER — COMMUNICATION - HEALTHEAST (OUTPATIENT)
Dept: CARDIOLOGY | Facility: CLINIC | Age: 72
End: 2017-12-12

## 2017-12-14 ENCOUNTER — SURGERY - HEALTHEAST (OUTPATIENT)
Dept: CARDIOLOGY | Facility: CLINIC | Age: 72
End: 2017-12-14

## 2017-12-14 ENCOUNTER — AMBULATORY - HEALTHEAST (OUTPATIENT)
Dept: CARDIOLOGY | Facility: CLINIC | Age: 72
End: 2017-12-14

## 2017-12-14 DIAGNOSIS — I48.91 A-FIB (H): ICD-10-CM

## 2017-12-15 ENCOUNTER — AMBULATORY - HEALTHEAST (OUTPATIENT)
Dept: CARDIOLOGY | Facility: CLINIC | Age: 72
End: 2017-12-15

## 2017-12-15 DIAGNOSIS — I48.91 A-FIB (H): ICD-10-CM

## 2017-12-20 ENCOUNTER — OFFICE VISIT - HEALTHEAST (OUTPATIENT)
Dept: INTERNAL MEDICINE | Facility: CLINIC | Age: 72
End: 2017-12-20

## 2017-12-20 DIAGNOSIS — K57.91 GASTROINTESTINAL HEMORRHAGE ASSOCIATED WITH INTESTINAL DIVERTICULOSIS: ICD-10-CM

## 2017-12-20 DIAGNOSIS — M21.371 RIGHT FOOT DROP: ICD-10-CM

## 2017-12-20 DIAGNOSIS — N18.6 ESRD (END STAGE RENAL DISEASE) (H): ICD-10-CM

## 2017-12-20 DIAGNOSIS — N18.5 ANEMIA OF CHRONIC RENAL FAILURE, STAGE 5 (H): ICD-10-CM

## 2017-12-20 DIAGNOSIS — D63.1 ANEMIA OF CHRONIC RENAL FAILURE, STAGE 5 (H): ICD-10-CM

## 2017-12-20 DIAGNOSIS — I48.19 PERSISTENT ATRIAL FIBRILLATION (H): ICD-10-CM

## 2017-12-20 DIAGNOSIS — I10 ESSENTIAL HYPERTENSION: ICD-10-CM

## 2018-01-04 ENCOUNTER — COMMUNICATION - HEALTHEAST (OUTPATIENT)
Dept: CARDIOLOGY | Facility: CLINIC | Age: 73
End: 2018-01-04

## 2018-01-04 ENCOUNTER — AMBULATORY - HEALTHEAST (OUTPATIENT)
Dept: CARDIOLOGY | Facility: CLINIC | Age: 73
End: 2018-01-04

## 2018-01-04 ENCOUNTER — OFFICE VISIT - HEALTHEAST (OUTPATIENT)
Dept: CARDIOLOGY | Facility: CLINIC | Age: 73
End: 2018-01-04

## 2018-01-04 DIAGNOSIS — I48.19 PERSISTENT ATRIAL FIBRILLATION (H): ICD-10-CM

## 2018-01-04 DIAGNOSIS — I50.33 ACUTE ON CHRONIC DIASTOLIC CONGESTIVE HEART FAILURE (H): ICD-10-CM

## 2018-01-04 DIAGNOSIS — I10 ESSENTIAL HYPERTENSION WITH GOAL BLOOD PRESSURE LESS THAN 140/90: ICD-10-CM

## 2018-01-04 DIAGNOSIS — N18.6 ESRD (END STAGE RENAL DISEASE) (H): ICD-10-CM

## 2018-01-04 ASSESSMENT — MIFFLIN-ST. JEOR: SCORE: 1375.87

## 2018-01-05 ENCOUNTER — COMMUNICATION - HEALTHEAST (OUTPATIENT)
Dept: CARDIOLOGY | Facility: CLINIC | Age: 73
End: 2018-01-05

## 2018-01-16 ENCOUNTER — COMMUNICATION - HEALTHEAST (OUTPATIENT)
Dept: CARDIOLOGY | Facility: CLINIC | Age: 73
End: 2018-01-16

## 2018-01-16 DIAGNOSIS — I48.91 A-FIB (H): ICD-10-CM

## 2018-01-31 ENCOUNTER — COMMUNICATION - HEALTHEAST (OUTPATIENT)
Dept: CARDIOLOGY | Facility: CLINIC | Age: 73
End: 2018-01-31

## 2018-01-31 ENCOUNTER — COMMUNICATION - HEALTHEAST (OUTPATIENT)
Dept: SLEEP MEDICINE | Facility: CLINIC | Age: 73
End: 2018-01-31

## 2018-02-01 ENCOUNTER — SURGERY - HEALTHEAST (OUTPATIENT)
Dept: CARDIOLOGY | Facility: CLINIC | Age: 73
End: 2018-02-01

## 2018-02-01 ENCOUNTER — AMBULATORY - HEALTHEAST (OUTPATIENT)
Dept: OTHER | Facility: CLINIC | Age: 73
End: 2018-02-01

## 2018-02-01 ENCOUNTER — AMBULATORY - HEALTHEAST (OUTPATIENT)
Dept: CARDIOLOGY | Facility: CLINIC | Age: 73
End: 2018-02-01

## 2018-02-01 DIAGNOSIS — I48.91 A-FIB (H): ICD-10-CM

## 2018-02-02 ENCOUNTER — AMBULATORY - HEALTHEAST (OUTPATIENT)
Dept: CARDIOLOGY | Facility: CLINIC | Age: 73
End: 2018-02-02

## 2018-02-06 ENCOUNTER — SURGERY - HEALTHEAST (OUTPATIENT)
Dept: CARDIOLOGY | Facility: CLINIC | Age: 73
End: 2018-02-06

## 2018-02-10 ENCOUNTER — COMMUNICATION - HEALTHEAST (OUTPATIENT)
Dept: SCHEDULING | Facility: CLINIC | Age: 73
End: 2018-02-10

## 2018-02-12 ENCOUNTER — OFFICE VISIT - HEALTHEAST (OUTPATIENT)
Dept: GERIATRICS | Facility: CLINIC | Age: 73
End: 2018-02-12

## 2018-02-12 DIAGNOSIS — D64.9 CHRONIC ANEMIA: ICD-10-CM

## 2018-02-12 DIAGNOSIS — D63.1 ANEMIA OF CHRONIC RENAL FAILURE, STAGE 5 (H): ICD-10-CM

## 2018-02-12 DIAGNOSIS — E87.70 HYPERVOLEMIA, UNSPECIFIED HYPERVOLEMIA TYPE: ICD-10-CM

## 2018-02-12 DIAGNOSIS — J96.02 ACUTE RESPIRATORY FAILURE WITH HYPOXIA AND HYPERCAPNIA (H): ICD-10-CM

## 2018-02-12 DIAGNOSIS — I10 ESSENTIAL HYPERTENSION WITH GOAL BLOOD PRESSURE LESS THAN 140/90: ICD-10-CM

## 2018-02-12 DIAGNOSIS — N18.5 ANEMIA OF CHRONIC RENAL FAILURE, STAGE 5 (H): ICD-10-CM

## 2018-02-12 DIAGNOSIS — I48.19 PERSISTENT ATRIAL FIBRILLATION (H): ICD-10-CM

## 2018-02-12 DIAGNOSIS — G47.33 OSA (OBSTRUCTIVE SLEEP APNEA): ICD-10-CM

## 2018-02-12 DIAGNOSIS — N18.6 ESRD (END STAGE RENAL DISEASE) (H): ICD-10-CM

## 2018-02-12 DIAGNOSIS — J96.01 ACUTE RESPIRATORY FAILURE WITH HYPOXIA AND HYPERCAPNIA (H): ICD-10-CM

## 2018-02-13 ENCOUNTER — COMMUNICATION - HEALTHEAST (OUTPATIENT)
Dept: GERIATRICS | Facility: CLINIC | Age: 73
End: 2018-02-13

## 2018-02-13 DIAGNOSIS — R52 PAIN: ICD-10-CM

## 2018-02-19 ENCOUNTER — COMMUNICATION - HEALTHEAST (OUTPATIENT)
Dept: GERIATRICS | Facility: CLINIC | Age: 73
End: 2018-02-19

## 2018-02-19 ENCOUNTER — OFFICE VISIT - HEALTHEAST (OUTPATIENT)
Dept: GERIATRICS | Facility: CLINIC | Age: 73
End: 2018-02-19

## 2018-02-19 DIAGNOSIS — N18.5 ANEMIA OF CHRONIC RENAL FAILURE, STAGE 5 (H): ICD-10-CM

## 2018-02-19 DIAGNOSIS — N18.6 ESRD (END STAGE RENAL DISEASE) (H): ICD-10-CM

## 2018-02-19 DIAGNOSIS — I10 ESSENTIAL HYPERTENSION WITH GOAL BLOOD PRESSURE LESS THAN 140/90: ICD-10-CM

## 2018-02-19 DIAGNOSIS — Z87.09 HISTORY OF ACUTE RESPIRATORY FAILURE: ICD-10-CM

## 2018-02-19 DIAGNOSIS — I48.19 PERSISTENT ATRIAL FIBRILLATION (H): ICD-10-CM

## 2018-02-19 DIAGNOSIS — R52 PAIN: ICD-10-CM

## 2018-02-19 DIAGNOSIS — J43.9 PULMONARY EMPHYSEMA, UNSPECIFIED EMPHYSEMA TYPE (H): ICD-10-CM

## 2018-02-19 DIAGNOSIS — D63.1 ANEMIA OF CHRONIC RENAL FAILURE, STAGE 5 (H): ICD-10-CM

## 2018-02-19 ASSESSMENT — MIFFLIN-ST. JEOR: SCORE: 1339.13

## 2018-02-22 ENCOUNTER — OFFICE VISIT - HEALTHEAST (OUTPATIENT)
Dept: GERIATRICS | Facility: CLINIC | Age: 73
End: 2018-02-22

## 2018-02-22 DIAGNOSIS — D63.1 ANEMIA OF CHRONIC RENAL FAILURE, STAGE 5 (H): ICD-10-CM

## 2018-02-22 DIAGNOSIS — I48.19 PERSISTENT ATRIAL FIBRILLATION (H): ICD-10-CM

## 2018-02-22 DIAGNOSIS — J43.9 PULMONARY EMPHYSEMA, UNSPECIFIED EMPHYSEMA TYPE (H): ICD-10-CM

## 2018-02-22 DIAGNOSIS — N18.6 ESRD (END STAGE RENAL DISEASE) (H): ICD-10-CM

## 2018-02-22 DIAGNOSIS — Z87.09 HISTORY OF ACUTE RESPIRATORY FAILURE: ICD-10-CM

## 2018-02-22 DIAGNOSIS — I10 ESSENTIAL HYPERTENSION WITH GOAL BLOOD PRESSURE LESS THAN 140/90: ICD-10-CM

## 2018-02-22 DIAGNOSIS — N18.5 ANEMIA OF CHRONIC RENAL FAILURE, STAGE 5 (H): ICD-10-CM

## 2018-02-22 ASSESSMENT — MIFFLIN-ST. JEOR: SCORE: 1342.76

## 2018-02-26 ENCOUNTER — COMMUNICATION - HEALTHEAST (OUTPATIENT)
Dept: ADMINISTRATIVE | Facility: CLINIC | Age: 73
End: 2018-02-26

## 2018-02-26 ENCOUNTER — OFFICE VISIT - HEALTHEAST (OUTPATIENT)
Dept: GERIATRICS | Facility: CLINIC | Age: 73
End: 2018-02-26

## 2018-02-26 DIAGNOSIS — D63.1 ANEMIA OF CHRONIC RENAL FAILURE, STAGE 5 (H): ICD-10-CM

## 2018-02-26 DIAGNOSIS — I48.19 PERSISTENT ATRIAL FIBRILLATION (H): ICD-10-CM

## 2018-02-26 DIAGNOSIS — J43.9 PULMONARY EMPHYSEMA, UNSPECIFIED EMPHYSEMA TYPE (H): ICD-10-CM

## 2018-02-26 DIAGNOSIS — I10 ESSENTIAL HYPERTENSION WITH GOAL BLOOD PRESSURE LESS THAN 140/90: ICD-10-CM

## 2018-02-26 DIAGNOSIS — Z87.09 HISTORY OF ACUTE RESPIRATORY FAILURE: ICD-10-CM

## 2018-02-26 DIAGNOSIS — N18.5 ANEMIA OF CHRONIC RENAL FAILURE, STAGE 5 (H): ICD-10-CM

## 2018-02-26 DIAGNOSIS — N18.6 ESRD (END STAGE RENAL DISEASE) (H): ICD-10-CM

## 2018-02-26 ASSESSMENT — MIFFLIN-ST. JEOR: SCORE: 1330.06

## 2018-03-01 ENCOUNTER — OFFICE VISIT - HEALTHEAST (OUTPATIENT)
Dept: GERIATRICS | Facility: CLINIC | Age: 73
End: 2018-03-01

## 2018-03-01 DIAGNOSIS — I10 ESSENTIAL HYPERTENSION WITH GOAL BLOOD PRESSURE LESS THAN 140/90: ICD-10-CM

## 2018-03-01 DIAGNOSIS — I48.19 PERSISTENT ATRIAL FIBRILLATION (H): ICD-10-CM

## 2018-03-01 DIAGNOSIS — D63.1 ANEMIA OF CHRONIC RENAL FAILURE, STAGE 5 (H): ICD-10-CM

## 2018-03-01 DIAGNOSIS — N18.5 ANEMIA OF CHRONIC RENAL FAILURE, STAGE 5 (H): ICD-10-CM

## 2018-03-01 DIAGNOSIS — J43.9 PULMONARY EMPHYSEMA, UNSPECIFIED EMPHYSEMA TYPE (H): ICD-10-CM

## 2018-03-01 DIAGNOSIS — G47.33 OSA (OBSTRUCTIVE SLEEP APNEA): ICD-10-CM

## 2018-03-01 DIAGNOSIS — J96.01 ACUTE RESPIRATORY FAILURE WITH HYPOXIA (H): ICD-10-CM

## 2018-03-02 ENCOUNTER — OFFICE VISIT - HEALTHEAST (OUTPATIENT)
Dept: GERIATRICS | Facility: CLINIC | Age: 73
End: 2018-03-02

## 2018-03-02 DIAGNOSIS — J96.11 CHRONIC RESPIRATORY FAILURE WITH HYPOXIA (H): ICD-10-CM

## 2018-03-02 DIAGNOSIS — N18.6 ESRD (END STAGE RENAL DISEASE) (H): ICD-10-CM

## 2018-03-02 DIAGNOSIS — I48.19 PERSISTENT ATRIAL FIBRILLATION (H): ICD-10-CM

## 2018-03-02 DIAGNOSIS — J43.9 PULMONARY EMPHYSEMA, UNSPECIFIED EMPHYSEMA TYPE (H): ICD-10-CM

## 2018-03-02 DIAGNOSIS — G47.33 OSA (OBSTRUCTIVE SLEEP APNEA): ICD-10-CM

## 2018-03-02 DIAGNOSIS — N18.5 ANEMIA OF CHRONIC RENAL FAILURE, STAGE 5 (H): ICD-10-CM

## 2018-03-02 DIAGNOSIS — D63.1 ANEMIA OF CHRONIC RENAL FAILURE, STAGE 5 (H): ICD-10-CM

## 2018-03-02 DIAGNOSIS — I10 ESSENTIAL HYPERTENSION WITH GOAL BLOOD PRESSURE LESS THAN 140/90: ICD-10-CM

## 2018-03-02 ASSESSMENT — MIFFLIN-ST. JEOR: SCORE: 1320.08

## 2018-03-05 ENCOUNTER — COMMUNICATION - HEALTHEAST (OUTPATIENT)
Dept: GERIATRICS | Facility: CLINIC | Age: 73
End: 2018-03-05

## 2018-03-05 ENCOUNTER — AMBULATORY - HEALTHEAST (OUTPATIENT)
Dept: GERIATRICS | Facility: CLINIC | Age: 73
End: 2018-03-05

## 2018-03-05 ENCOUNTER — COMMUNICATION - HEALTHEAST (OUTPATIENT)
Dept: CARDIOLOGY | Facility: CLINIC | Age: 73
End: 2018-03-05

## 2018-03-07 ENCOUNTER — HOSPITAL ENCOUNTER (OUTPATIENT)
Dept: LAB | Age: 73
Setting detail: SPECIMEN
Discharge: HOME | End: 2018-03-07

## 2018-03-07 ENCOUNTER — OFFICE VISIT - HEALTHEAST (OUTPATIENT)
Dept: INTERNAL MEDICINE | Facility: CLINIC | Age: 73
End: 2018-03-07

## 2018-03-07 DIAGNOSIS — I48.19 PERSISTENT ATRIAL FIBRILLATION (H): ICD-10-CM

## 2018-03-07 DIAGNOSIS — N18.6 ESRD (END STAGE RENAL DISEASE) (H): ICD-10-CM

## 2018-03-07 DIAGNOSIS — M10.9 GOUT: ICD-10-CM

## 2018-03-07 DIAGNOSIS — J96.02 ACUTE RESPIRATORY FAILURE WITH HYPOXIA AND HYPERCAPNIA (H): ICD-10-CM

## 2018-03-07 DIAGNOSIS — Z01.818 ENCOUNTER FOR PREOPERATIVE EXAMINATION FOR GENERAL SURGICAL PROCEDURE: ICD-10-CM

## 2018-03-07 DIAGNOSIS — J96.01 ACUTE RESPIRATORY FAILURE WITH HYPOXIA AND HYPERCAPNIA (H): ICD-10-CM

## 2018-03-07 LAB
ANION GAP SERPL CALCULATED.3IONS-SCNC: 10 MMOL/L (ref 5–18)
BUN SERPL-MCNC: 12 MG/DL (ref 8–28)
CALCIUM SERPL-MCNC: 8.9 MG/DL (ref 8.5–10.5)
CHLORIDE BLD-SCNC: 100 MMOL/L (ref 98–107)
CO2 SERPL-SCNC: 27 MMOL/L (ref 22–31)
CREAT SERPL-MCNC: 3.64 MG/DL (ref 0.6–1.1)
ERYTHROCYTE [DISTWIDTH] IN BLOOD BY AUTOMATED COUNT: 13.1 % (ref 11–14.5)
GFR SERPL CREATININE-BSD FRML MDRD: 12 ML/MIN/1.73M2
GLUCOSE BLD-MCNC: 122 MG/DL (ref 70–125)
HCT VFR BLD AUTO: 29.7 % (ref 35–47)
HGB BLD-MCNC: 10.3 G/DL (ref 12–16)
MCH RBC QN AUTO: 33.7 PG (ref 27–34)
MCHC RBC AUTO-ENTMCNC: 34.7 G/DL (ref 32–36)
MCV RBC AUTO: 97 FL (ref 80–100)
PLATELET # BLD AUTO: 143 THOU/UL (ref 140–440)
PMV BLD AUTO: 6.9 FL (ref 7–10)
POTASSIUM BLD-SCNC: 4.2 MMOL/L (ref 3.5–5)
RBC # BLD AUTO: 3.06 MILL/UL (ref 3.8–5.4)
SODIUM SERPL-SCNC: 137 MMOL/L (ref 136–145)
WBC: 7.5 THOU/UL (ref 4–11)

## 2018-03-08 ENCOUNTER — COMMUNICATION - HEALTHEAST (OUTPATIENT)
Dept: INTERNAL MEDICINE | Facility: CLINIC | Age: 73
End: 2018-03-08

## 2018-03-20 ENCOUNTER — COMMUNICATION - HEALTHEAST (OUTPATIENT)
Dept: INTERNAL MEDICINE | Facility: CLINIC | Age: 73
End: 2018-03-20

## 2018-03-20 DIAGNOSIS — I49.5 TACHYCARDIA-BRADYCARDIA SYNDROME (H): ICD-10-CM

## 2018-03-20 DIAGNOSIS — I48.19 PERSISTENT ATRIAL FIBRILLATION (H): ICD-10-CM

## 2018-03-27 ENCOUNTER — AMBULATORY - HEALTHEAST (OUTPATIENT)
Dept: CARDIOLOGY | Facility: CLINIC | Age: 73
End: 2018-03-27

## 2018-03-27 ENCOUNTER — AMBULATORY - HEALTHEAST (OUTPATIENT)
Dept: OTHER | Facility: CLINIC | Age: 73
End: 2018-03-27

## 2018-03-27 ENCOUNTER — SURGERY - HEALTHEAST (OUTPATIENT)
Dept: CARDIOLOGY | Facility: CLINIC | Age: 73
End: 2018-03-27

## 2018-03-27 DIAGNOSIS — I48.91 A-FIB (H): ICD-10-CM

## 2018-04-03 ENCOUNTER — AMBULATORY - HEALTHEAST (OUTPATIENT)
Dept: CARDIOLOGY | Facility: CLINIC | Age: 73
End: 2018-04-03

## 2018-04-03 DIAGNOSIS — I48.91 ATRIAL FIBRILLATION (H): ICD-10-CM

## 2018-04-03 LAB — INTERNATIONAL NORMALIZATION RATIO, POC - HISTORICAL: 2.1

## 2018-04-05 ENCOUNTER — SURGERY - HEALTHEAST (OUTPATIENT)
Dept: CARDIOLOGY | Facility: CLINIC | Age: 73
End: 2018-04-05

## 2018-04-05 ENCOUNTER — ANESTHESIA - HEALTHEAST (OUTPATIENT)
Dept: CARDIOLOGY | Facility: CLINIC | Age: 73
End: 2018-04-05

## 2018-04-05 ASSESSMENT — MIFFLIN-ST. JEOR
SCORE: 1341.75
SCORE: 1343.75

## 2018-04-06 ASSESSMENT — MIFFLIN-ST. JEOR
SCORE: 1305.75
SCORE: 1305.75
SCORE: 1285.75

## 2018-04-10 ENCOUNTER — AMBULATORY - HEALTHEAST (OUTPATIENT)
Dept: CARDIOLOGY | Facility: CLINIC | Age: 73
End: 2018-04-10

## 2018-04-10 ENCOUNTER — OFFICE VISIT - HEALTHEAST (OUTPATIENT)
Dept: CARDIOLOGY | Facility: CLINIC | Age: 73
End: 2018-04-10

## 2018-04-10 DIAGNOSIS — I48.91 ATRIAL FIBRILLATION (H): ICD-10-CM

## 2018-04-10 DIAGNOSIS — I71.03 DISSECTION OF THORACOABDOMINAL AORTA (H): ICD-10-CM

## 2018-04-10 DIAGNOSIS — I48.19 PERSISTENT ATRIAL FIBRILLATION (H): ICD-10-CM

## 2018-04-10 DIAGNOSIS — Z95.9 CARDIAC DEVICE IN SITU: ICD-10-CM

## 2018-04-10 LAB — INTERNATIONAL NORMALIZATION RATIO, POC - HISTORICAL: 2.1

## 2018-04-10 ASSESSMENT — MIFFLIN-ST. JEOR
SCORE: 1356.37
SCORE: 1360.9

## 2018-04-11 ENCOUNTER — COMMUNICATION - HEALTHEAST (OUTPATIENT)
Dept: INTERNAL MEDICINE | Facility: CLINIC | Age: 73
End: 2018-04-11

## 2018-04-11 ENCOUNTER — SURGERY - HEALTHEAST (OUTPATIENT)
Dept: CARDIOLOGY | Facility: CLINIC | Age: 73
End: 2018-04-11

## 2018-04-18 ENCOUNTER — AMBULATORY - HEALTHEAST (OUTPATIENT)
Dept: CARDIOLOGY | Facility: CLINIC | Age: 73
End: 2018-04-18

## 2018-04-18 DIAGNOSIS — I48.91 ATRIAL FIBRILLATION (H): ICD-10-CM

## 2018-04-18 LAB — INR PPP: 3.6 (ref 0.9–1.1)

## 2018-04-24 ENCOUNTER — AMBULATORY - HEALTHEAST (OUTPATIENT)
Dept: CARDIOLOGY | Facility: CLINIC | Age: 73
End: 2018-04-24

## 2018-04-24 DIAGNOSIS — I48.91 ATRIAL FIBRILLATION (H): ICD-10-CM

## 2018-04-24 LAB — INR PPP: 2.6 (ref 0.9–1.1)

## 2018-05-02 ENCOUNTER — AMBULATORY - HEALTHEAST (OUTPATIENT)
Dept: CARDIOLOGY | Facility: CLINIC | Age: 73
End: 2018-05-02

## 2018-05-02 DIAGNOSIS — I48.91 ATRIAL FIBRILLATION (H): ICD-10-CM

## 2018-05-02 LAB — INR PPP: 2.2 (ref 0.9–1.1)

## 2018-05-08 ENCOUNTER — AMBULATORY - HEALTHEAST (OUTPATIENT)
Dept: CARDIOLOGY | Facility: CLINIC | Age: 73
End: 2018-05-08

## 2018-05-08 DIAGNOSIS — I48.91 A-FIB (H): ICD-10-CM

## 2018-05-08 DIAGNOSIS — I48.91 ATRIAL FIBRILLATION (H): ICD-10-CM

## 2018-05-08 LAB — INR PPP: 3.1 (ref 0.9–1.1)

## 2018-05-15 ENCOUNTER — AMBULATORY - HEALTHEAST (OUTPATIENT)
Dept: CARDIOLOGY | Facility: CLINIC | Age: 73
End: 2018-05-15

## 2018-05-15 ENCOUNTER — OFFICE VISIT - HEALTHEAST (OUTPATIENT)
Dept: CARDIOLOGY | Facility: CLINIC | Age: 73
End: 2018-05-15

## 2018-05-15 ENCOUNTER — COMMUNICATION - HEALTHEAST (OUTPATIENT)
Dept: CARDIOLOGY | Facility: CLINIC | Age: 73
End: 2018-05-15

## 2018-05-15 DIAGNOSIS — Z95.9 CARDIAC DEVICE IN SITU: ICD-10-CM

## 2018-05-15 DIAGNOSIS — N18.6 ESRD (END STAGE RENAL DISEASE) (H): ICD-10-CM

## 2018-05-15 DIAGNOSIS — I10 ESSENTIAL HYPERTENSION WITH GOAL BLOOD PRESSURE LESS THAN 140/90: ICD-10-CM

## 2018-05-15 DIAGNOSIS — I48.19 PERSISTENT ATRIAL FIBRILLATION (H): ICD-10-CM

## 2018-05-15 ASSESSMENT — MIFFLIN-ST. JEOR: SCORE: 1365.44

## 2018-05-24 ENCOUNTER — COMMUNICATION - HEALTHEAST (OUTPATIENT)
Dept: CARDIOLOGY | Facility: CLINIC | Age: 73
End: 2018-05-24

## 2018-05-24 DIAGNOSIS — I48.91 A-FIB (H): ICD-10-CM

## 2018-06-14 ENCOUNTER — COMMUNICATION - HEALTHEAST (OUTPATIENT)
Dept: INTERNAL MEDICINE | Facility: CLINIC | Age: 73
End: 2018-06-14

## 2018-06-14 ENCOUNTER — OFFICE VISIT - HEALTHEAST (OUTPATIENT)
Dept: PODIATRY | Facility: CLINIC | Age: 73
End: 2018-06-14

## 2018-06-14 DIAGNOSIS — B35.1 NAIL FUNGUS: ICD-10-CM

## 2018-06-14 DIAGNOSIS — I49.5 TACHYCARDIA-BRADYCARDIA SYNDROME (H): ICD-10-CM

## 2018-06-14 DIAGNOSIS — L60.2 ONYCHAUXIS: ICD-10-CM

## 2018-06-14 DIAGNOSIS — I48.19 PERSISTENT ATRIAL FIBRILLATION (H): ICD-10-CM

## 2018-07-19 ENCOUNTER — AMBULATORY - HEALTHEAST (OUTPATIENT)
Dept: CARDIOLOGY | Facility: CLINIC | Age: 73
End: 2018-07-19

## 2018-07-19 DIAGNOSIS — I48.19 PERSISTENT ATRIAL FIBRILLATION (H): ICD-10-CM

## 2018-07-20 ENCOUNTER — COMMUNICATION - HEALTHEAST (OUTPATIENT)
Dept: ADMINISTRATIVE | Facility: CLINIC | Age: 73
End: 2018-07-20

## 2018-07-23 ENCOUNTER — AMBULATORY - HEALTHEAST (OUTPATIENT)
Dept: OTHER | Facility: CLINIC | Age: 73
End: 2018-07-23

## 2018-08-10 ENCOUNTER — COMMUNICATION - HEALTHEAST (OUTPATIENT)
Dept: INTERNAL MEDICINE | Facility: CLINIC | Age: 73
End: 2018-08-10

## 2018-08-14 ENCOUNTER — COMMUNICATION - HEALTHEAST (OUTPATIENT)
Dept: CARE COORDINATION | Facility: CLINIC | Age: 73
End: 2018-08-14

## 2018-08-15 ENCOUNTER — OFFICE VISIT - HEALTHEAST (OUTPATIENT)
Dept: INTERNAL MEDICINE | Facility: CLINIC | Age: 73
End: 2018-08-15

## 2018-08-15 DIAGNOSIS — N18.6 ESRD ON DIALYSIS (H): ICD-10-CM

## 2018-08-15 DIAGNOSIS — I10 ESSENTIAL HYPERTENSION WITH GOAL BLOOD PRESSURE LESS THAN 140/90: ICD-10-CM

## 2018-08-15 DIAGNOSIS — R13.19 OTHER DYSPHAGIA: ICD-10-CM

## 2018-08-15 DIAGNOSIS — G47.33 OSA (OBSTRUCTIVE SLEEP APNEA): ICD-10-CM

## 2018-08-15 DIAGNOSIS — E21.3 HYPERPARATHYROIDISM (H): ICD-10-CM

## 2018-08-15 DIAGNOSIS — Z99.2 ESRD ON DIALYSIS (H): ICD-10-CM

## 2018-08-15 ASSESSMENT — MIFFLIN-ST. JEOR: SCORE: 1208.95

## 2018-08-16 ENCOUNTER — COMMUNICATION - HEALTHEAST (OUTPATIENT)
Dept: FAMILY MEDICINE | Facility: CLINIC | Age: 73
End: 2018-08-16

## 2018-08-16 ENCOUNTER — AMBULATORY - HEALTHEAST (OUTPATIENT)
Dept: OTHER | Facility: CLINIC | Age: 73
End: 2018-08-16

## 2018-08-21 ENCOUNTER — HOSPITAL ENCOUNTER (OUTPATIENT)
Dept: CARDIOLOGY | Facility: HOSPITAL | Age: 73
Discharge: HOME | End: 2018-08-21
Attending: INTERNAL MEDICINE

## 2018-08-21 DIAGNOSIS — I48.19 PERSISTENT ATRIAL FIBRILLATION (H): ICD-10-CM

## 2018-08-31 ENCOUNTER — OFFICE VISIT - HEALTHEAST (OUTPATIENT)
Dept: CARDIOLOGY | Facility: CLINIC | Age: 73
End: 2018-08-31

## 2018-08-31 DIAGNOSIS — I48.19 PERSISTENT ATRIAL FIBRILLATION (H): ICD-10-CM

## 2018-08-31 DIAGNOSIS — R06.02 SOB (SHORTNESS OF BREATH): ICD-10-CM

## 2018-08-31 DIAGNOSIS — R06.03 RESPIRATORY DISTRESS: ICD-10-CM

## 2018-08-31 DIAGNOSIS — N18.6 ESRD (END STAGE RENAL DISEASE) (H): ICD-10-CM

## 2018-08-31 ASSESSMENT — MIFFLIN-ST. JEOR: SCORE: 1294.56

## 2018-09-02 ENCOUNTER — COMMUNICATION - HEALTHEAST (OUTPATIENT)
Dept: INTERNAL MEDICINE | Facility: CLINIC | Age: 73
End: 2018-09-02

## 2018-09-02 ENCOUNTER — COMMUNICATION - HEALTHEAST (OUTPATIENT)
Dept: FAMILY MEDICINE | Facility: CLINIC | Age: 73
End: 2018-09-02

## 2018-09-02 DIAGNOSIS — I48.19 PERSISTENT ATRIAL FIBRILLATION (H): ICD-10-CM

## 2018-09-02 DIAGNOSIS — I49.5 TACHYCARDIA-BRADYCARDIA SYNDROME (H): ICD-10-CM

## 2018-09-03 ENCOUNTER — COMMUNICATION - HEALTHEAST (OUTPATIENT)
Dept: INTERNAL MEDICINE | Facility: CLINIC | Age: 73
End: 2018-09-03

## 2018-09-12 ENCOUNTER — COMMUNICATION - HEALTHEAST (OUTPATIENT)
Dept: PHARMACY | Facility: CLINIC | Age: 73
End: 2018-09-12

## 2018-09-13 ENCOUNTER — OFFICE VISIT - HEALTHEAST (OUTPATIENT)
Dept: GERIATRICS | Facility: CLINIC | Age: 73
End: 2018-09-13

## 2018-09-13 DIAGNOSIS — J96.01 ACUTE RESPIRATORY FAILURE WITH HYPOXIA AND HYPERCAPNIA (H): ICD-10-CM

## 2018-09-13 DIAGNOSIS — J81.1 CHRONIC PULMONARY EDEMA: ICD-10-CM

## 2018-09-13 DIAGNOSIS — N18.6 ESRD ON DIALYSIS (H): ICD-10-CM

## 2018-09-13 DIAGNOSIS — J43.9 PULMONARY EMPHYSEMA, UNSPECIFIED EMPHYSEMA TYPE (H): ICD-10-CM

## 2018-09-13 DIAGNOSIS — D64.9 CHRONIC ANEMIA: ICD-10-CM

## 2018-09-13 DIAGNOSIS — I50.33 ACUTE ON CHRONIC DIASTOLIC CONGESTIVE HEART FAILURE (H): ICD-10-CM

## 2018-09-13 DIAGNOSIS — J96.02 ACUTE RESPIRATORY FAILURE WITH HYPOXIA AND HYPERCAPNIA (H): ICD-10-CM

## 2018-09-13 DIAGNOSIS — I48.91 ATRIAL FIBRILLATION WITH RVR (H): ICD-10-CM

## 2018-09-13 DIAGNOSIS — Z99.2 ESRD ON DIALYSIS (H): ICD-10-CM

## 2018-09-13 DIAGNOSIS — T82.7XXD INFECTION OF HEMODIALYSIS CATHETER, SUBSEQUENT ENCOUNTER: ICD-10-CM

## 2018-09-19 ENCOUNTER — COMMUNICATION - HEALTHEAST (OUTPATIENT)
Dept: GERIATRICS | Facility: CLINIC | Age: 73
End: 2018-09-19

## 2018-09-20 ENCOUNTER — OFFICE VISIT - HEALTHEAST (OUTPATIENT)
Dept: GERIATRICS | Facility: CLINIC | Age: 73
End: 2018-09-20

## 2018-09-20 DIAGNOSIS — Z99.2 ESRD ON DIALYSIS (H): ICD-10-CM

## 2018-09-20 DIAGNOSIS — J81.1 CHRONIC PULMONARY EDEMA: ICD-10-CM

## 2018-09-20 DIAGNOSIS — I48.91 ATRIAL FIBRILLATION WITH RVR (H): ICD-10-CM

## 2018-09-20 DIAGNOSIS — N18.6 ESRD ON DIALYSIS (H): ICD-10-CM

## 2018-09-20 DIAGNOSIS — I50.33 ACUTE ON CHRONIC DIASTOLIC CONGESTIVE HEART FAILURE (H): ICD-10-CM

## 2018-09-24 ENCOUNTER — AMBULATORY - HEALTHEAST (OUTPATIENT)
Dept: GERIATRICS | Facility: CLINIC | Age: 73
End: 2018-09-24

## 2018-09-27 ENCOUNTER — OFFICE VISIT - HEALTHEAST (OUTPATIENT)
Dept: GERIATRICS | Facility: CLINIC | Age: 73
End: 2018-09-27

## 2018-09-27 DIAGNOSIS — J43.9 PULMONARY EMPHYSEMA, UNSPECIFIED EMPHYSEMA TYPE (H): ICD-10-CM

## 2018-09-27 DIAGNOSIS — Z99.2 ESRD ON DIALYSIS (H): ICD-10-CM

## 2018-09-27 DIAGNOSIS — N18.6 ESRD ON DIALYSIS (H): ICD-10-CM

## 2018-09-27 DIAGNOSIS — I50.33 ACUTE ON CHRONIC DIASTOLIC CONGESTIVE HEART FAILURE (H): ICD-10-CM

## 2018-09-27 DIAGNOSIS — I48.91 ATRIAL FIBRILLATION WITH RVR (H): ICD-10-CM

## 2018-10-01 ENCOUNTER — COMMUNICATION - HEALTHEAST (OUTPATIENT)
Dept: GERIATRICS | Facility: CLINIC | Age: 73
End: 2018-10-01

## 2018-10-02 ENCOUNTER — OFFICE VISIT - HEALTHEAST (OUTPATIENT)
Dept: GERIATRICS | Facility: CLINIC | Age: 73
End: 2018-10-02

## 2018-10-02 DIAGNOSIS — J44.9 COPD (CHRONIC OBSTRUCTIVE PULMONARY DISEASE) (H): ICD-10-CM

## 2018-10-02 DIAGNOSIS — D63.1 ANEMIA OF CHRONIC RENAL FAILURE, STAGE 5 (H): ICD-10-CM

## 2018-10-02 DIAGNOSIS — I50.33 ACUTE ON CHRONIC DIASTOLIC CONGESTIVE HEART FAILURE (H): ICD-10-CM

## 2018-10-02 DIAGNOSIS — I48.19 PERSISTENT ATRIAL FIBRILLATION (H): ICD-10-CM

## 2018-10-02 DIAGNOSIS — Z99.2 ESRD ON DIALYSIS (H): ICD-10-CM

## 2018-10-02 DIAGNOSIS — N18.6 ESRD ON DIALYSIS (H): ICD-10-CM

## 2018-10-02 DIAGNOSIS — J96.01 ACUTE RESPIRATORY FAILURE WITH HYPOXIA (H): ICD-10-CM

## 2018-10-02 DIAGNOSIS — M21.371 RIGHT FOOT DROP: ICD-10-CM

## 2018-10-02 DIAGNOSIS — G47.33 OSA (OBSTRUCTIVE SLEEP APNEA): ICD-10-CM

## 2018-10-02 DIAGNOSIS — N18.5 ANEMIA OF CHRONIC RENAL FAILURE, STAGE 5 (H): ICD-10-CM

## 2018-10-02 DIAGNOSIS — I48.91 ATRIAL FIBRILLATION WITH RVR (H): ICD-10-CM

## 2018-10-04 ENCOUNTER — OFFICE VISIT - HEALTHEAST (OUTPATIENT)
Dept: GERIATRICS | Facility: CLINIC | Age: 73
End: 2018-10-04

## 2018-10-04 DIAGNOSIS — I50.33 ACUTE ON CHRONIC DIASTOLIC CONGESTIVE HEART FAILURE (H): ICD-10-CM

## 2018-10-04 DIAGNOSIS — Z99.2 ESRD ON DIALYSIS (H): ICD-10-CM

## 2018-10-04 DIAGNOSIS — J43.9 PULMONARY EMPHYSEMA, UNSPECIFIED EMPHYSEMA TYPE (H): ICD-10-CM

## 2018-10-04 DIAGNOSIS — N18.6 ESRD ON DIALYSIS (H): ICD-10-CM

## 2018-10-04 DIAGNOSIS — I10 ESSENTIAL HYPERTENSION WITH GOAL BLOOD PRESSURE LESS THAN 140/90: ICD-10-CM

## 2018-10-04 DIAGNOSIS — D64.9 CHRONIC ANEMIA: ICD-10-CM

## 2018-10-11 ENCOUNTER — OFFICE VISIT - HEALTHEAST (OUTPATIENT)
Dept: GERIATRICS | Facility: CLINIC | Age: 73
End: 2018-10-11

## 2018-10-11 DIAGNOSIS — J43.9 PULMONARY EMPHYSEMA, UNSPECIFIED EMPHYSEMA TYPE (H): ICD-10-CM

## 2018-10-11 DIAGNOSIS — N18.5 ANEMIA OF CHRONIC RENAL FAILURE, STAGE 5 (H): ICD-10-CM

## 2018-10-11 DIAGNOSIS — N18.6 ESRD ON DIALYSIS (H): ICD-10-CM

## 2018-10-11 DIAGNOSIS — I50.33 ACUTE ON CHRONIC DIASTOLIC CONGESTIVE HEART FAILURE (H): ICD-10-CM

## 2018-10-11 DIAGNOSIS — D63.1 ANEMIA OF CHRONIC RENAL FAILURE, STAGE 5 (H): ICD-10-CM

## 2018-10-11 DIAGNOSIS — R53.1 WEAKNESS: ICD-10-CM

## 2018-10-11 DIAGNOSIS — Z99.2 ESRD ON DIALYSIS (H): ICD-10-CM

## 2018-10-16 ENCOUNTER — OFFICE VISIT - HEALTHEAST (OUTPATIENT)
Dept: GERIATRICS | Facility: CLINIC | Age: 73
End: 2018-10-16

## 2018-10-16 DIAGNOSIS — I10 ESSENTIAL HYPERTENSION WITH GOAL BLOOD PRESSURE LESS THAN 140/90: ICD-10-CM

## 2018-10-16 DIAGNOSIS — M21.371 RIGHT FOOT DROP: ICD-10-CM

## 2018-10-16 DIAGNOSIS — J44.9 COPD (CHRONIC OBSTRUCTIVE PULMONARY DISEASE) (H): ICD-10-CM

## 2018-10-16 DIAGNOSIS — Z99.2 ESRD ON DIALYSIS (H): ICD-10-CM

## 2018-10-16 DIAGNOSIS — I48.19 PERSISTENT ATRIAL FIBRILLATION (H): ICD-10-CM

## 2018-10-16 DIAGNOSIS — N18.6 ESRD ON DIALYSIS (H): ICD-10-CM

## 2018-10-16 DIAGNOSIS — I50.33 ACUTE ON CHRONIC DIASTOLIC CONGESTIVE HEART FAILURE (H): ICD-10-CM

## 2018-10-17 ENCOUNTER — RECORDS - HEALTHEAST (OUTPATIENT)
Dept: ADMINISTRATIVE | Facility: OTHER | Age: 73
End: 2018-10-17

## 2018-10-19 ENCOUNTER — AMBULATORY - HEALTHEAST (OUTPATIENT)
Dept: GERIATRICS | Facility: CLINIC | Age: 73
End: 2018-10-19

## 2018-10-19 ENCOUNTER — COMMUNICATION - HEALTHEAST (OUTPATIENT)
Dept: INTERNAL MEDICINE | Facility: CLINIC | Age: 73
End: 2018-10-19

## 2018-10-19 DIAGNOSIS — N18.6 ESRD (END STAGE RENAL DISEASE) (H): ICD-10-CM

## 2018-10-22 ENCOUNTER — COMMUNICATION - HEALTHEAST (OUTPATIENT)
Dept: CARE COORDINATION | Facility: CLINIC | Age: 73
End: 2018-10-22

## 2018-10-30 ENCOUNTER — OFFICE VISIT - HEALTHEAST (OUTPATIENT)
Dept: CARDIOLOGY | Facility: CLINIC | Age: 73
End: 2018-10-30

## 2018-10-30 ENCOUNTER — AMBULATORY - HEALTHEAST (OUTPATIENT)
Dept: CARDIOLOGY | Facility: CLINIC | Age: 73
End: 2018-10-30

## 2018-10-30 DIAGNOSIS — Z99.2 ESRD ON DIALYSIS (H): ICD-10-CM

## 2018-10-30 DIAGNOSIS — Z95.9 CARDIAC DEVICE IN SITU: ICD-10-CM

## 2018-10-30 DIAGNOSIS — N18.6 ESRD ON DIALYSIS (H): ICD-10-CM

## 2018-10-30 DIAGNOSIS — I48.19 PERSISTENT ATRIAL FIBRILLATION (H): ICD-10-CM

## 2018-10-30 ASSESSMENT — MIFFLIN-ST. JEOR: SCORE: 1288.33

## 2018-11-14 ENCOUNTER — AMBULATORY - HEALTHEAST (OUTPATIENT)
Dept: CARDIOLOGY | Facility: CLINIC | Age: 73
End: 2018-11-14

## 2018-11-14 ENCOUNTER — OFFICE VISIT - HEALTHEAST (OUTPATIENT)
Dept: CARDIOLOGY | Facility: CLINIC | Age: 73
End: 2018-11-14

## 2018-11-14 DIAGNOSIS — G47.33 OSA (OBSTRUCTIVE SLEEP APNEA): ICD-10-CM

## 2018-11-14 DIAGNOSIS — N18.6 ESRD ON DIALYSIS (H): ICD-10-CM

## 2018-11-14 DIAGNOSIS — Z99.2 ESRD ON DIALYSIS (H): ICD-10-CM

## 2018-11-14 DIAGNOSIS — I50.32 CHRONIC DIASTOLIC HEART FAILURE (H): ICD-10-CM

## 2018-11-14 DIAGNOSIS — I10 ESSENTIAL HYPERTENSION WITH GOAL BLOOD PRESSURE LESS THAN 140/90: ICD-10-CM

## 2018-11-14 ASSESSMENT — MIFFLIN-ST. JEOR: SCORE: 1275.85

## 2018-11-20 ENCOUNTER — HOSPITAL ENCOUNTER (OUTPATIENT)
Dept: INTERVENTIONAL RADIOLOGY/VASCULAR | Facility: CLINIC | Age: 73
Discharge: HOME | End: 2018-11-20
Attending: RADIOLOGY

## 2018-11-20 ENCOUNTER — HOSPITAL ENCOUNTER (OUTPATIENT)
Dept: INTERVENTIONAL RADIOLOGY/VASCULAR | Facility: CLINIC | Age: 73
Discharge: HOME | End: 2018-11-20
Attending: INTERNAL MEDICINE | Admitting: RADIOLOGY

## 2018-11-20 DIAGNOSIS — T82.9XXA DIALYSIS COMPLICATION: ICD-10-CM

## 2018-11-20 DIAGNOSIS — N18.6 ESRD (END STAGE RENAL DISEASE) (H): ICD-10-CM

## 2018-11-20 LAB
CREAT SERPL-MCNC: 8.16 MG/DL (ref 0.6–1.1)
GFR SERPL CREATININE-BSD FRML MDRD: 5 ML/MIN/1.73M2
HGB BLD-MCNC: 12.2 G/DL (ref 12–16)
INR PPP: 1.09 (ref 0.9–1.1)
PLATELET # BLD AUTO: 117 THOU/UL (ref 140–440)
POTASSIUM BLD-SCNC: 5.3 MMOL/L (ref 3.5–5)

## 2018-11-20 ASSESSMENT — MIFFLIN-ST. JEOR: SCORE: 1268.77

## 2019-01-02 ENCOUNTER — COMMUNICATION - HEALTHEAST (OUTPATIENT)
Dept: INTERNAL MEDICINE | Facility: CLINIC | Age: 74
End: 2019-01-02

## 2019-01-02 DIAGNOSIS — I48.19 PERSISTENT ATRIAL FIBRILLATION (H): ICD-10-CM

## 2019-01-02 DIAGNOSIS — I49.5 TACHYCARDIA-BRADYCARDIA SYNDROME (H): ICD-10-CM

## 2019-01-09 ENCOUNTER — COMMUNICATION - HEALTHEAST (OUTPATIENT)
Dept: CARE COORDINATION | Facility: CLINIC | Age: 74
End: 2019-01-09

## 2019-01-10 ENCOUNTER — RECORDS - HEALTHEAST (OUTPATIENT)
Dept: ADMINISTRATIVE | Facility: OTHER | Age: 74
End: 2019-01-10

## 2019-01-10 ENCOUNTER — COMMUNICATION - HEALTHEAST (OUTPATIENT)
Dept: PHARMACY | Facility: CLINIC | Age: 74
End: 2019-01-10

## 2019-01-10 DIAGNOSIS — J44.1 COPD EXACERBATION (H): ICD-10-CM

## 2019-01-10 DIAGNOSIS — Z99.2 ESRD ON DIALYSIS (H): ICD-10-CM

## 2019-01-10 DIAGNOSIS — N18.6 ESRD ON DIALYSIS (H): ICD-10-CM

## 2019-01-14 ENCOUNTER — OFFICE VISIT - HEALTHEAST (OUTPATIENT)
Dept: INTERNAL MEDICINE | Facility: CLINIC | Age: 74
End: 2019-01-14

## 2019-01-14 DIAGNOSIS — Z99.2 ESRD ON DIALYSIS (H): ICD-10-CM

## 2019-01-14 DIAGNOSIS — J43.9 PULMONARY EMPHYSEMA, UNSPECIFIED EMPHYSEMA TYPE (H): ICD-10-CM

## 2019-01-14 DIAGNOSIS — N18.6 ESRD ON DIALYSIS (H): ICD-10-CM

## 2019-01-14 DIAGNOSIS — J44.1 COPD EXACERBATION (H): ICD-10-CM

## 2019-01-14 ASSESSMENT — MIFFLIN-ST. JEOR: SCORE: 1260.66

## 2019-01-23 ENCOUNTER — OFFICE VISIT - HEALTHEAST (OUTPATIENT)
Dept: CARDIOLOGY | Facility: CLINIC | Age: 74
End: 2019-01-23

## 2019-01-23 DIAGNOSIS — Z99.2 ESRD ON DIALYSIS (H): ICD-10-CM

## 2019-01-23 DIAGNOSIS — G47.33 OSA (OBSTRUCTIVE SLEEP APNEA): ICD-10-CM

## 2019-01-23 DIAGNOSIS — I50.32 CHRONIC DIASTOLIC CONGESTIVE HEART FAILURE (H): ICD-10-CM

## 2019-01-23 DIAGNOSIS — I48.20 CHRONIC ATRIAL FIBRILLATION (H): ICD-10-CM

## 2019-01-23 DIAGNOSIS — N18.6 ESRD ON DIALYSIS (H): ICD-10-CM

## 2019-01-23 DIAGNOSIS — Z95.9 CARDIAC DEVICE IN SITU: ICD-10-CM

## 2019-01-23 DIAGNOSIS — I10 ESSENTIAL HYPERTENSION WITH GOAL BLOOD PRESSURE LESS THAN 140/90: ICD-10-CM

## 2019-01-23 ASSESSMENT — MIFFLIN-ST. JEOR: SCORE: 1249.2

## 2019-02-03 ENCOUNTER — COMMUNICATION - HEALTHEAST (OUTPATIENT)
Dept: SCHEDULING | Facility: CLINIC | Age: 74
End: 2019-02-03

## 2019-02-04 ENCOUNTER — COMMUNICATION - HEALTHEAST (OUTPATIENT)
Dept: NURSING | Facility: CLINIC | Age: 74
End: 2019-02-04

## 2019-02-04 ENCOUNTER — COMMUNICATION - HEALTHEAST (OUTPATIENT)
Dept: CARDIOLOGY | Facility: CLINIC | Age: 74
End: 2019-02-04

## 2019-02-04 ENCOUNTER — AMBULATORY - HEALTHEAST (OUTPATIENT)
Dept: CARE COORDINATION | Facility: CLINIC | Age: 74
End: 2019-02-04

## 2019-02-04 DIAGNOSIS — R00.1 BRADYCARDIA: ICD-10-CM

## 2019-02-04 DIAGNOSIS — D63.1 ANEMIA OF CHRONIC RENAL FAILURE, STAGE 5 (H): ICD-10-CM

## 2019-02-04 DIAGNOSIS — I48.20 CHRONIC ATRIAL FIBRILLATION (H): ICD-10-CM

## 2019-02-04 DIAGNOSIS — N18.5 ANEMIA OF CHRONIC RENAL FAILURE, STAGE 5 (H): ICD-10-CM

## 2019-02-04 DIAGNOSIS — I10 ESSENTIAL HYPERTENSION WITH GOAL BLOOD PRESSURE LESS THAN 140/90: ICD-10-CM

## 2019-02-04 DIAGNOSIS — I50.31 ACUTE DIASTOLIC CHF (CONGESTIVE HEART FAILURE) (H): ICD-10-CM

## 2019-02-05 ENCOUNTER — COMMUNICATION - HEALTHEAST (OUTPATIENT)
Dept: CARE COORDINATION | Facility: CLINIC | Age: 74
End: 2019-02-05

## 2019-02-11 ENCOUNTER — OFFICE VISIT - HEALTHEAST (OUTPATIENT)
Dept: INTERNAL MEDICINE | Facility: CLINIC | Age: 74
End: 2019-02-11

## 2019-02-11 ENCOUNTER — COMMUNICATION - HEALTHEAST (OUTPATIENT)
Dept: INTERNAL MEDICINE | Facility: CLINIC | Age: 74
End: 2019-02-11

## 2019-02-11 DIAGNOSIS — D63.1 ANEMIA OF CHRONIC RENAL FAILURE, STAGE 5 (H): ICD-10-CM

## 2019-02-11 DIAGNOSIS — N18.5 ANEMIA OF CHRONIC RENAL FAILURE, STAGE 5 (H): ICD-10-CM

## 2019-02-11 DIAGNOSIS — I48.20 CHRONIC ATRIAL FIBRILLATION (H): ICD-10-CM

## 2019-02-11 DIAGNOSIS — M54.41 ACUTE MIDLINE LOW BACK PAIN WITH RIGHT-SIDED SCIATICA: ICD-10-CM

## 2019-02-11 DIAGNOSIS — I50.32 CHRONIC DIASTOLIC CONGESTIVE HEART FAILURE (H): ICD-10-CM

## 2019-02-11 ASSESSMENT — MIFFLIN-ST. JEOR: SCORE: 1259.3

## 2019-02-12 ENCOUNTER — HOSPITAL ENCOUNTER (OUTPATIENT)
Dept: CARDIOLOGY | Facility: HOSPITAL | Age: 74
Discharge: HOME | End: 2019-02-12
Attending: INTERNAL MEDICINE

## 2019-02-12 DIAGNOSIS — I48.20 CHRONIC ATRIAL FIBRILLATION (H): ICD-10-CM

## 2019-02-12 DIAGNOSIS — R00.1 BRADYCARDIA: ICD-10-CM

## 2019-02-19 ENCOUNTER — OFFICE VISIT - HEALTHEAST (OUTPATIENT)
Dept: CARDIOLOGY | Facility: CLINIC | Age: 74
End: 2019-02-19

## 2019-02-19 DIAGNOSIS — N18.6 ESRD ON DIALYSIS (H): ICD-10-CM

## 2019-02-19 DIAGNOSIS — I50.32 CHRONIC DIASTOLIC CONGESTIVE HEART FAILURE (H): ICD-10-CM

## 2019-02-19 DIAGNOSIS — Z99.2 ESRD ON DIALYSIS (H): ICD-10-CM

## 2019-02-19 DIAGNOSIS — I48.20 CHRONIC ATRIAL FIBRILLATION WITH RVR (H): ICD-10-CM

## 2019-02-19 DIAGNOSIS — I10 ESSENTIAL HYPERTENSION WITH GOAL BLOOD PRESSURE LESS THAN 140/90: ICD-10-CM

## 2019-03-03 ASSESSMENT — MIFFLIN-ST. JEOR
SCORE: 1273.37
SCORE: 1235.88
SCORE: 1273.37

## 2019-03-04 ASSESSMENT — MIFFLIN-ST. JEOR
SCORE: 1273.82
SCORE: 1273.88
SCORE: 1243.88

## 2019-03-05 ASSESSMENT — MIFFLIN-ST. JEOR: SCORE: 1253.86

## 2019-03-06 ASSESSMENT — MIFFLIN-ST. JEOR: SCORE: 1261.12

## 2019-03-07 ENCOUNTER — SURGERY - HEALTHEAST (OUTPATIENT)
Dept: CARDIOLOGY | Facility: CLINIC | Age: 74
End: 2019-03-07

## 2019-03-07 ASSESSMENT — MIFFLIN-ST. JEOR: SCORE: 1216.21

## 2019-03-08 ASSESSMENT — MIFFLIN-ST. JEOR: SCORE: 1238.44

## 2019-03-12 ENCOUNTER — OFFICE VISIT - HEALTHEAST (OUTPATIENT)
Dept: GERIATRICS | Facility: CLINIC | Age: 74
End: 2019-03-12

## 2019-03-12 DIAGNOSIS — I50.32 CHRONIC DIASTOLIC CONGESTIVE HEART FAILURE (H): ICD-10-CM

## 2019-03-12 DIAGNOSIS — N18.6 ESRD (END STAGE RENAL DISEASE) ON DIALYSIS (H): ICD-10-CM

## 2019-03-12 DIAGNOSIS — Z99.2 ESRD (END STAGE RENAL DISEASE) ON DIALYSIS (H): ICD-10-CM

## 2019-03-12 DIAGNOSIS — I48.20 CHRONIC ATRIAL FIBRILLATION (H): ICD-10-CM

## 2019-03-15 ENCOUNTER — OFFICE VISIT - HEALTHEAST (OUTPATIENT)
Dept: GERIATRICS | Facility: CLINIC | Age: 74
End: 2019-03-15

## 2019-03-15 DIAGNOSIS — I50.32 CHRONIC DIASTOLIC CONGESTIVE HEART FAILURE (H): ICD-10-CM

## 2019-03-15 DIAGNOSIS — Z99.2 ESRD (END STAGE RENAL DISEASE) ON DIALYSIS (H): ICD-10-CM

## 2019-03-15 DIAGNOSIS — N18.6 ESRD (END STAGE RENAL DISEASE) ON DIALYSIS (H): ICD-10-CM

## 2019-03-15 DIAGNOSIS — I48.20 CHRONIC ATRIAL FIBRILLATION (H): ICD-10-CM

## 2019-03-19 ENCOUNTER — OFFICE VISIT - HEALTHEAST (OUTPATIENT)
Dept: GERIATRICS | Facility: CLINIC | Age: 74
End: 2019-03-19

## 2019-03-19 ENCOUNTER — AMBULATORY - HEALTHEAST (OUTPATIENT)
Dept: CARDIOLOGY | Facility: CLINIC | Age: 74
End: 2019-03-19

## 2019-03-19 DIAGNOSIS — I48.20 CHRONIC ATRIAL FIBRILLATION (H): ICD-10-CM

## 2019-03-19 DIAGNOSIS — Z95.0 CARDIAC PACEMAKER IN SITU: ICD-10-CM

## 2019-03-19 DIAGNOSIS — I50.32 CHRONIC DIASTOLIC CONGESTIVE HEART FAILURE (H): ICD-10-CM

## 2019-03-19 DIAGNOSIS — N18.6 ESRD (END STAGE RENAL DISEASE) ON DIALYSIS (H): ICD-10-CM

## 2019-03-19 DIAGNOSIS — J43.9 PULMONARY EMPHYSEMA, UNSPECIFIED EMPHYSEMA TYPE (H): ICD-10-CM

## 2019-03-19 DIAGNOSIS — Z99.2 ESRD (END STAGE RENAL DISEASE) ON DIALYSIS (H): ICD-10-CM

## 2019-03-19 LAB
HCC DEVICE COMMENTS: NORMAL
HCC DEVICE IMPLANTING PROVIDER: NORMAL
HCC DEVICE MANUFACTURE: NORMAL
HCC DEVICE MODEL: NORMAL
HCC DEVICE SERIAL NUMBER: NORMAL
HCC DEVICE TYPE: NORMAL

## 2019-03-19 ASSESSMENT — MIFFLIN-ST. JEOR: SCORE: 1254.32

## 2019-03-22 ENCOUNTER — OFFICE VISIT - HEALTHEAST (OUTPATIENT)
Dept: GERIATRICS | Facility: CLINIC | Age: 74
End: 2019-03-22

## 2019-03-22 DIAGNOSIS — J44.1 CHRONIC OBSTRUCTIVE PULMONARY DISEASE WITH ACUTE EXACERBATION (H): ICD-10-CM

## 2019-03-22 DIAGNOSIS — J96.01 ACUTE RESPIRATORY FAILURE WITH HYPOXIA (H): ICD-10-CM

## 2019-03-22 DIAGNOSIS — I10 ESSENTIAL HYPERTENSION WITH GOAL BLOOD PRESSURE LESS THAN 140/90: ICD-10-CM

## 2019-03-22 DIAGNOSIS — I48.20 CHRONIC ATRIAL FIBRILLATION (H): ICD-10-CM

## 2019-03-22 DIAGNOSIS — I50.33 ACUTE ON CHRONIC DIASTOLIC CONGESTIVE HEART FAILURE (H): ICD-10-CM

## 2019-03-22 DIAGNOSIS — N18.6 ESRD (END STAGE RENAL DISEASE) ON DIALYSIS (H): ICD-10-CM

## 2019-03-22 DIAGNOSIS — Z99.2 ESRD ON DIALYSIS (H): ICD-10-CM

## 2019-03-22 DIAGNOSIS — N18.5 ANEMIA OF CHRONIC RENAL FAILURE, STAGE 5 (H): ICD-10-CM

## 2019-03-22 DIAGNOSIS — J43.9 PULMONARY EMPHYSEMA, UNSPECIFIED EMPHYSEMA TYPE (H): ICD-10-CM

## 2019-03-22 DIAGNOSIS — Z99.2 ESRD (END STAGE RENAL DISEASE) ON DIALYSIS (H): ICD-10-CM

## 2019-03-22 DIAGNOSIS — I50.32 CHRONIC DIASTOLIC CONGESTIVE HEART FAILURE (H): ICD-10-CM

## 2019-03-22 DIAGNOSIS — I48.19 PERSISTENT ATRIAL FIBRILLATION (H): ICD-10-CM

## 2019-03-22 DIAGNOSIS — J81.1 CHRONIC PULMONARY EDEMA: ICD-10-CM

## 2019-03-22 DIAGNOSIS — G47.33 OSA (OBSTRUCTIVE SLEEP APNEA): ICD-10-CM

## 2019-03-22 DIAGNOSIS — N18.6 ESRD ON DIALYSIS (H): ICD-10-CM

## 2019-03-22 DIAGNOSIS — D63.1 ANEMIA OF CHRONIC RENAL FAILURE, STAGE 5 (H): ICD-10-CM

## 2019-03-25 ENCOUNTER — AMBULATORY - HEALTHEAST (OUTPATIENT)
Dept: GERIATRICS | Facility: CLINIC | Age: 74
End: 2019-03-25

## 2019-03-25 ENCOUNTER — COMMUNICATION - HEALTHEAST (OUTPATIENT)
Dept: GERIATRICS | Facility: CLINIC | Age: 74
End: 2019-03-25

## 2019-03-26 ENCOUNTER — OFFICE VISIT - HEALTHEAST (OUTPATIENT)
Dept: INTERNAL MEDICINE | Facility: CLINIC | Age: 74
End: 2019-03-26

## 2019-03-26 DIAGNOSIS — I49.5 TACHYCARDIA-BRADYCARDIA SYNDROME (H): ICD-10-CM

## 2019-03-26 DIAGNOSIS — Z53.09 CONTRAINDICATION TO ANTICOAGULATION THERAPY: ICD-10-CM

## 2019-03-26 DIAGNOSIS — I48.91 ATRIAL FIBRILLATION, UNSPECIFIED TYPE (H): ICD-10-CM

## 2019-03-26 DIAGNOSIS — F51.01 PRIMARY INSOMNIA: ICD-10-CM

## 2019-03-26 DIAGNOSIS — Z95.818 PRESENCE OF LEFT ATRIAL APPENDAGE CLOSURE DEVICE COMPOSED OF NICKEL-TITANIUM ALLOY WITH POLYETHYLENE TEREPHTHALATE MEMBRANE: ICD-10-CM

## 2019-03-26 DIAGNOSIS — I50.32 CHRONIC DIASTOLIC CONGESTIVE HEART FAILURE (H): ICD-10-CM

## 2019-03-26 ASSESSMENT — MIFFLIN-ST. JEOR: SCORE: 1253.41

## 2019-03-28 ENCOUNTER — COMMUNICATION - HEALTHEAST (OUTPATIENT)
Dept: INTERNAL MEDICINE | Facility: CLINIC | Age: 74
End: 2019-03-28

## 2019-03-28 DIAGNOSIS — N18.6 ESRD (END STAGE RENAL DISEASE) (H): ICD-10-CM

## 2019-04-02 ENCOUNTER — AMBULATORY - HEALTHEAST (OUTPATIENT)
Dept: CARDIOLOGY | Facility: CLINIC | Age: 74
End: 2019-04-02

## 2019-04-02 DIAGNOSIS — Z95.0 CARDIAC PACEMAKER IN SITU: ICD-10-CM

## 2019-04-03 ENCOUNTER — COMMUNICATION - HEALTHEAST (OUTPATIENT)
Dept: CARDIOLOGY | Facility: CLINIC | Age: 74
End: 2019-04-03

## 2019-04-03 ENCOUNTER — AMBULATORY - HEALTHEAST (OUTPATIENT)
Dept: CARDIOLOGY | Facility: CLINIC | Age: 74
End: 2019-04-03

## 2019-04-03 DIAGNOSIS — Z95.0 CARDIAC PACEMAKER IN SITU: ICD-10-CM

## 2019-04-03 ASSESSMENT — MIFFLIN-ST. JEOR: SCORE: 1258.85

## 2019-04-11 ENCOUNTER — AMBULATORY - HEALTHEAST (OUTPATIENT)
Dept: CARDIOLOGY | Facility: CLINIC | Age: 74
End: 2019-04-11

## 2019-04-11 DIAGNOSIS — Z95.0 CARDIAC PACEMAKER IN SITU: ICD-10-CM

## 2019-04-12 ENCOUNTER — AMBULATORY - HEALTHEAST (OUTPATIENT)
Dept: CARDIOLOGY | Facility: CLINIC | Age: 74
End: 2019-04-12

## 2019-04-12 ENCOUNTER — OFFICE VISIT - HEALTHEAST (OUTPATIENT)
Dept: CARDIOLOGY | Facility: CLINIC | Age: 74
End: 2019-04-12

## 2019-04-12 DIAGNOSIS — N18.6 ESRD ON DIALYSIS (H): ICD-10-CM

## 2019-04-12 DIAGNOSIS — I48.20 CHRONIC ATRIAL FIBRILLATION (H): ICD-10-CM

## 2019-04-12 DIAGNOSIS — Z99.2 ESRD ON DIALYSIS (H): ICD-10-CM

## 2019-04-12 DIAGNOSIS — Z95.818 PRESENCE OF LEFT ATRIAL APPENDAGE CLOSURE DEVICE COMPOSED OF NICKEL-TITANIUM ALLOY WITH POLYETHYLENE TEREPHTHALATE MEMBRANE: ICD-10-CM

## 2019-04-12 ASSESSMENT — MIFFLIN-ST. JEOR: SCORE: 1258.85

## 2019-04-23 ENCOUNTER — OFFICE VISIT - HEALTHEAST (OUTPATIENT)
Dept: INTERNAL MEDICINE | Facility: CLINIC | Age: 74
End: 2019-04-23

## 2019-04-23 DIAGNOSIS — I48.20 CHRONIC ATRIAL FIBRILLATION (H): ICD-10-CM

## 2019-04-23 DIAGNOSIS — N18.6 ESRD ON DIALYSIS (H): ICD-10-CM

## 2019-04-23 DIAGNOSIS — Z99.2 ESRD ON DIALYSIS (H): ICD-10-CM

## 2019-04-23 DIAGNOSIS — I10 ESSENTIAL HYPERTENSION WITH GOAL BLOOD PRESSURE LESS THAN 140/90: ICD-10-CM

## 2019-04-23 DIAGNOSIS — I49.5 TACHYCARDIA-BRADYCARDIA SYNDROME (H): ICD-10-CM

## 2019-04-23 DIAGNOSIS — Z95.818 PRESENCE OF LEFT ATRIAL APPENDAGE CLOSURE DEVICE COMPOSED OF NICKEL-TITANIUM ALLOY WITH POLYETHYLENE TEREPHTHALATE MEMBRANE: ICD-10-CM

## 2019-04-23 ASSESSMENT — MIFFLIN-ST. JEOR: SCORE: 1274.27

## 2019-04-26 ENCOUNTER — COMMUNICATION - HEALTHEAST (OUTPATIENT)
Dept: ANTICOAGULATION | Facility: CLINIC | Age: 74
End: 2019-04-26

## 2019-05-16 ENCOUNTER — RECORDS - HEALTHEAST (OUTPATIENT)
Dept: ADMINISTRATIVE | Facility: OTHER | Age: 74
End: 2019-05-16

## 2019-06-14 ENCOUNTER — AMBULATORY - HEALTHEAST (OUTPATIENT)
Dept: CARDIOLOGY | Facility: CLINIC | Age: 74
End: 2019-06-14

## 2019-06-14 ENCOUNTER — OFFICE VISIT - HEALTHEAST (OUTPATIENT)
Dept: CARDIOLOGY | Facility: CLINIC | Age: 74
End: 2019-06-14

## 2019-06-14 DIAGNOSIS — Z95.0 CARDIAC PACEMAKER IN SITU: ICD-10-CM

## 2019-06-14 DIAGNOSIS — I48.21 PERMANENT ATRIAL FIBRILLATION (H): ICD-10-CM

## 2019-06-14 ASSESSMENT — MIFFLIN-ST. JEOR: SCORE: 1245.24

## 2019-06-18 ENCOUNTER — COMMUNICATION - HEALTHEAST (OUTPATIENT)
Dept: CARDIOLOGY | Facility: CLINIC | Age: 74
End: 2019-06-18

## 2019-06-18 ENCOUNTER — AMBULATORY - HEALTHEAST (OUTPATIENT)
Dept: CARDIOLOGY | Facility: CLINIC | Age: 74
End: 2019-06-18

## 2019-06-18 DIAGNOSIS — Z95.0 CARDIAC PACEMAKER IN SITU: ICD-10-CM

## 2019-06-22 ASSESSMENT — MIFFLIN-ST. JEOR: SCORE: 1254.32

## 2019-06-23 ENCOUNTER — ANESTHESIA - HEALTHEAST (OUTPATIENT)
Dept: SURGERY | Facility: CLINIC | Age: 74
End: 2019-06-23

## 2019-06-23 ENCOUNTER — SURGERY - HEALTHEAST (OUTPATIENT)
Dept: SURGERY | Facility: CLINIC | Age: 74
End: 2019-06-23

## 2019-06-25 ASSESSMENT — MIFFLIN-ST. JEOR: SCORE: 1296.95

## 2019-06-26 ASSESSMENT — MIFFLIN-ST. JEOR
SCORE: 1273.88
SCORE: 1274.27
SCORE: 1260.88

## 2019-06-27 ASSESSMENT — MIFFLIN-ST. JEOR: SCORE: 1276.99

## 2019-06-28 ENCOUNTER — OFFICE VISIT - HEALTHEAST (OUTPATIENT)
Dept: GERIATRICS | Facility: CLINIC | Age: 74
End: 2019-06-28

## 2019-06-28 DIAGNOSIS — I48.20 CHRONIC ATRIAL FIBRILLATION (H): ICD-10-CM

## 2019-06-28 DIAGNOSIS — Z99.2 ESRD (END STAGE RENAL DISEASE) ON DIALYSIS (H): ICD-10-CM

## 2019-06-28 DIAGNOSIS — S72.002D CLOSED FRACTURE OF LEFT HIP WITH ROUTINE HEALING, SUBSEQUENT ENCOUNTER: ICD-10-CM

## 2019-06-28 DIAGNOSIS — N18.6 ESRD (END STAGE RENAL DISEASE) ON DIALYSIS (H): ICD-10-CM

## 2019-06-28 DIAGNOSIS — D64.9 CHRONIC ANEMIA: ICD-10-CM

## 2019-07-01 ENCOUNTER — OFFICE VISIT - HEALTHEAST (OUTPATIENT)
Dept: GERIATRICS | Facility: CLINIC | Age: 74
End: 2019-07-01

## 2019-07-01 DIAGNOSIS — N18.6 ESRD (END STAGE RENAL DISEASE) ON DIALYSIS (H): ICD-10-CM

## 2019-07-01 DIAGNOSIS — R52 PAIN MANAGEMENT: ICD-10-CM

## 2019-07-01 DIAGNOSIS — I50.32 CHRONIC DIASTOLIC CONGESTIVE HEART FAILURE (H): ICD-10-CM

## 2019-07-01 DIAGNOSIS — Z98.890 S/P ORIF (OPEN REDUCTION INTERNAL FIXATION) FRACTURE: ICD-10-CM

## 2019-07-01 DIAGNOSIS — Z99.2 ESRD (END STAGE RENAL DISEASE) ON DIALYSIS (H): ICD-10-CM

## 2019-07-01 DIAGNOSIS — D64.9 CHRONIC ANEMIA: ICD-10-CM

## 2019-07-01 DIAGNOSIS — Z87.81 S/P ORIF (OPEN REDUCTION INTERNAL FIXATION) FRACTURE: ICD-10-CM

## 2019-07-01 DIAGNOSIS — J42 CHRONIC BRONCHITIS, UNSPECIFIED CHRONIC BRONCHITIS TYPE (H): ICD-10-CM

## 2019-07-02 ENCOUNTER — OFFICE VISIT - HEALTHEAST (OUTPATIENT)
Dept: GERIATRICS | Facility: CLINIC | Age: 74
End: 2019-07-02

## 2019-07-02 ENCOUNTER — RECORDS - HEALTHEAST (OUTPATIENT)
Dept: LAB | Facility: CLINIC | Age: 74
End: 2019-07-02

## 2019-07-02 DIAGNOSIS — I10 ESSENTIAL HYPERTENSION: ICD-10-CM

## 2019-07-02 DIAGNOSIS — D64.9 ANEMIA, UNSPECIFIED TYPE: ICD-10-CM

## 2019-07-02 DIAGNOSIS — N18.6 ESRD (END STAGE RENAL DISEASE) ON DIALYSIS (H): ICD-10-CM

## 2019-07-02 DIAGNOSIS — S72.142D CLOSED DISPLACED INTERTROCHANTERIC FRACTURE OF LEFT FEMUR WITH ROUTINE HEALING, SUBSEQUENT ENCOUNTER: ICD-10-CM

## 2019-07-02 DIAGNOSIS — Z99.2 ESRD (END STAGE RENAL DISEASE) ON DIALYSIS (H): ICD-10-CM

## 2019-07-02 LAB
ERYTHROCYTE [DISTWIDTH] IN BLOOD BY AUTOMATED COUNT: 21.1 % (ref 11–14.5)
HCT VFR BLD AUTO: 24 % (ref 35–47)
HGB BLD-MCNC: 7.4 G/DL (ref 12–16)
MCH RBC QN AUTO: 32.2 PG (ref 27–34)
MCHC RBC AUTO-ENTMCNC: 30.8 G/DL (ref 32–36)
MCV RBC AUTO: 104 FL (ref 80–100)
PLATELET # BLD AUTO: 139 THOU/UL (ref 140–440)
PMV BLD AUTO: 11.5 FL (ref 8.5–12.5)
RBC # BLD AUTO: 2.3 MILL/UL (ref 3.8–5.4)
WBC: 3.9 THOU/UL (ref 4–11)

## 2019-07-05 ENCOUNTER — OFFICE VISIT - HEALTHEAST (OUTPATIENT)
Dept: GERIATRICS | Facility: CLINIC | Age: 74
End: 2019-07-05

## 2019-07-05 ENCOUNTER — RECORDS - HEALTHEAST (OUTPATIENT)
Dept: LAB | Facility: CLINIC | Age: 74
End: 2019-07-05

## 2019-07-05 DIAGNOSIS — Z98.890 S/P ORIF (OPEN REDUCTION INTERNAL FIXATION) FRACTURE: ICD-10-CM

## 2019-07-05 DIAGNOSIS — R52 PAIN MANAGEMENT: ICD-10-CM

## 2019-07-05 DIAGNOSIS — D64.9 ANEMIA, UNSPECIFIED TYPE: ICD-10-CM

## 2019-07-05 DIAGNOSIS — Z87.81 S/P ORIF (OPEN REDUCTION INTERNAL FIXATION) FRACTURE: ICD-10-CM

## 2019-07-05 DIAGNOSIS — K62.5 BRBPR (BRIGHT RED BLOOD PER RECTUM): ICD-10-CM

## 2019-07-05 LAB
ERYTHROCYTE [DISTWIDTH] IN BLOOD BY AUTOMATED COUNT: 21.8 % (ref 11–14.5)
HCT VFR BLD AUTO: 28.3 % (ref 35–47)
HGB BLD-MCNC: 8.9 G/DL (ref 12–16)
MCH RBC QN AUTO: 32.5 PG (ref 27–34)
MCHC RBC AUTO-ENTMCNC: 31.4 G/DL (ref 32–36)
MCV RBC AUTO: 103 FL (ref 80–100)
PLATELET # BLD AUTO: 208 THOU/UL (ref 140–440)
PMV BLD AUTO: 9.5 FL (ref 8.5–12.5)
RBC # BLD AUTO: 2.74 MILL/UL (ref 3.8–5.4)
WBC: 5.5 THOU/UL (ref 4–11)

## 2019-07-08 ENCOUNTER — RECORDS - HEALTHEAST (OUTPATIENT)
Dept: LAB | Facility: CLINIC | Age: 74
End: 2019-07-08

## 2019-07-08 ENCOUNTER — OFFICE VISIT - HEALTHEAST (OUTPATIENT)
Dept: GERIATRICS | Facility: CLINIC | Age: 74
End: 2019-07-08

## 2019-07-08 DIAGNOSIS — I10 ESSENTIAL HYPERTENSION: ICD-10-CM

## 2019-07-08 DIAGNOSIS — N18.6 ESRD (END STAGE RENAL DISEASE) ON DIALYSIS (H): ICD-10-CM

## 2019-07-08 DIAGNOSIS — Z87.81 S/P ORIF (OPEN REDUCTION INTERNAL FIXATION) FRACTURE: ICD-10-CM

## 2019-07-08 DIAGNOSIS — Z99.2 ESRD (END STAGE RENAL DISEASE) ON DIALYSIS (H): ICD-10-CM

## 2019-07-08 DIAGNOSIS — I48.20 CHRONIC ATRIAL FIBRILLATION (H): ICD-10-CM

## 2019-07-08 DIAGNOSIS — Z98.890 S/P ORIF (OPEN REDUCTION INTERNAL FIXATION) FRACTURE: ICD-10-CM

## 2019-07-08 LAB
ERYTHROCYTE [DISTWIDTH] IN BLOOD BY AUTOMATED COUNT: 22.6 % (ref 11–14.5)
HCT VFR BLD AUTO: 25.8 % (ref 35–47)
HGB BLD-MCNC: 7.9 G/DL (ref 12–16)
MCH RBC QN AUTO: 32.9 PG (ref 27–34)
MCHC RBC AUTO-ENTMCNC: 30.6 G/DL (ref 32–36)
MCV RBC AUTO: 108 FL (ref 80–100)
PLATELET # BLD AUTO: 188 THOU/UL (ref 140–440)
PMV BLD AUTO: 9.9 FL (ref 8.5–12.5)
RBC # BLD AUTO: 2.4 MILL/UL (ref 3.8–5.4)
WBC: 4.5 THOU/UL (ref 4–11)

## 2019-07-09 ENCOUNTER — RECORDS - HEALTHEAST (OUTPATIENT)
Dept: LAB | Facility: CLINIC | Age: 74
End: 2019-07-09

## 2019-07-09 LAB — HEMOCCULT STL QL: NEGATIVE

## 2019-07-12 ENCOUNTER — RECORDS - HEALTHEAST (OUTPATIENT)
Dept: LAB | Facility: CLINIC | Age: 74
End: 2019-07-12

## 2019-07-12 LAB
HEMOCCULT STL QL: NEGATIVE
HEMOCCULT STL QL: NEGATIVE

## 2019-07-15 ENCOUNTER — OFFICE VISIT - HEALTHEAST (OUTPATIENT)
Dept: GERIATRICS | Facility: CLINIC | Age: 74
End: 2019-07-15

## 2019-07-15 ENCOUNTER — RECORDS - HEALTHEAST (OUTPATIENT)
Dept: LAB | Facility: CLINIC | Age: 74
End: 2019-07-15

## 2019-07-15 DIAGNOSIS — I10 ESSENTIAL HYPERTENSION: ICD-10-CM

## 2019-07-15 DIAGNOSIS — R52 PAIN MANAGEMENT: ICD-10-CM

## 2019-07-15 DIAGNOSIS — S72.142D CLOSED DISPLACED INTERTROCHANTERIC FRACTURE OF LEFT FEMUR WITH ROUTINE HEALING, SUBSEQUENT ENCOUNTER: ICD-10-CM

## 2019-07-15 DIAGNOSIS — I48.20 CHRONIC ATRIAL FIBRILLATION (H): ICD-10-CM

## 2019-07-15 LAB — HGB BLD-MCNC: 8.9 G/DL (ref 12–16)

## 2019-07-16 ENCOUNTER — OFFICE VISIT - HEALTHEAST (OUTPATIENT)
Dept: GERIATRICS | Facility: CLINIC | Age: 74
End: 2019-07-16

## 2019-07-16 DIAGNOSIS — K62.5 RECTAL BLEEDING: ICD-10-CM

## 2019-07-17 ENCOUNTER — RECORDS - HEALTHEAST (OUTPATIENT)
Dept: LAB | Facility: CLINIC | Age: 74
End: 2019-07-17

## 2019-07-18 ENCOUNTER — OFFICE VISIT - HEALTHEAST (OUTPATIENT)
Dept: GERIATRICS | Facility: CLINIC | Age: 74
End: 2019-07-18

## 2019-07-18 DIAGNOSIS — S72.142D CLOSED DISPLACED INTERTROCHANTERIC FRACTURE OF LEFT FEMUR WITH ROUTINE HEALING, SUBSEQUENT ENCOUNTER: ICD-10-CM

## 2019-07-18 DIAGNOSIS — Z79.01 ANTICOAGULATION GOAL OF INR 2 TO 3: ICD-10-CM

## 2019-07-18 DIAGNOSIS — Z51.81 ANTICOAGULATION GOAL OF INR 2 TO 3: ICD-10-CM

## 2019-07-18 DIAGNOSIS — N18.6 ESRD (END STAGE RENAL DISEASE) (H): ICD-10-CM

## 2019-07-18 LAB — INR PPP: 1.57 (ref 0.9–1.1)

## 2019-07-19 ENCOUNTER — RECORDS - HEALTHEAST (OUTPATIENT)
Dept: LAB | Facility: CLINIC | Age: 74
End: 2019-07-19

## 2019-07-22 ENCOUNTER — OFFICE VISIT - HEALTHEAST (OUTPATIENT)
Dept: GERIATRICS | Facility: CLINIC | Age: 74
End: 2019-07-22

## 2019-07-22 DIAGNOSIS — N18.6 ESRD (END STAGE RENAL DISEASE) (H): ICD-10-CM

## 2019-07-22 LAB — INR PPP: 4.46 (ref 0.9–1.1)

## 2019-07-23 ENCOUNTER — RECORDS - HEALTHEAST (OUTPATIENT)
Dept: LAB | Facility: CLINIC | Age: 74
End: 2019-07-23

## 2019-07-23 ENCOUNTER — OFFICE VISIT - HEALTHEAST (OUTPATIENT)
Dept: GERIATRICS | Facility: CLINIC | Age: 74
End: 2019-07-23

## 2019-07-23 ENCOUNTER — COMMUNICATION - HEALTHEAST (OUTPATIENT)
Dept: GERIATRICS | Facility: CLINIC | Age: 74
End: 2019-07-23

## 2019-07-23 DIAGNOSIS — N18.6 ESRD (END STAGE RENAL DISEASE) (H): ICD-10-CM

## 2019-07-23 DIAGNOSIS — I10 ESSENTIAL HYPERTENSION: ICD-10-CM

## 2019-07-23 DIAGNOSIS — S72.142D CLOSED DISPLACED INTERTROCHANTERIC FRACTURE OF LEFT FEMUR WITH ROUTINE HEALING, SUBSEQUENT ENCOUNTER: ICD-10-CM

## 2019-07-23 DIAGNOSIS — S72.142A CLOSED INTERTROCHANTERIC FRACTURE OF HIP, LEFT, INITIAL ENCOUNTER (H): ICD-10-CM

## 2019-07-23 DIAGNOSIS — R52 PAIN MANAGEMENT: ICD-10-CM

## 2019-07-23 LAB
HGB BLD-MCNC: 8.9 G/DL (ref 12–16)
INR PPP: 3.45 (ref 0.9–1.1)
INR PPP: 3.45 (ref 0.9–1.1)

## 2019-07-24 ENCOUNTER — RECORDS - HEALTHEAST (OUTPATIENT)
Dept: LAB | Facility: CLINIC | Age: 74
End: 2019-07-24

## 2019-07-24 LAB — INR PPP: 2.44 (ref 0.9–1.1)

## 2019-07-25 ENCOUNTER — RECORDS - HEALTHEAST (OUTPATIENT)
Dept: LAB | Facility: CLINIC | Age: 74
End: 2019-07-25

## 2019-07-25 ENCOUNTER — RECORDS - HEALTHEAST (OUTPATIENT)
Dept: ADMINISTRATIVE | Facility: OTHER | Age: 74
End: 2019-07-25

## 2019-07-26 LAB — INR PPP: 2.11 (ref 0.9–1.1)

## 2019-07-30 ENCOUNTER — OFFICE VISIT - HEALTHEAST (OUTPATIENT)
Dept: GERIATRICS | Facility: CLINIC | Age: 74
End: 2019-07-30

## 2019-07-30 DIAGNOSIS — S72.142D CLOSED DISPLACED INTERTROCHANTERIC FRACTURE OF LEFT FEMUR WITH ROUTINE HEALING, SUBSEQUENT ENCOUNTER: ICD-10-CM

## 2019-07-30 DIAGNOSIS — Z51.81 ANTICOAGULATION GOAL OF INR 2 TO 3: ICD-10-CM

## 2019-07-30 DIAGNOSIS — R52 PAIN MANAGEMENT: ICD-10-CM

## 2019-07-30 DIAGNOSIS — N18.6 ESRD (END STAGE RENAL DISEASE) (H): ICD-10-CM

## 2019-07-30 DIAGNOSIS — Z79.01 ANTICOAGULATION GOAL OF INR 2 TO 3: ICD-10-CM

## 2019-07-31 ENCOUNTER — RECORDS - HEALTHEAST (OUTPATIENT)
Dept: LAB | Facility: CLINIC | Age: 74
End: 2019-07-31

## 2019-08-01 LAB — INR PPP: 2.29 (ref 0.9–1.1)

## 2019-08-07 ENCOUNTER — RECORDS - HEALTHEAST (OUTPATIENT)
Dept: LAB | Facility: CLINIC | Age: 74
End: 2019-08-07

## 2019-08-08 ENCOUNTER — OFFICE VISIT - HEALTHEAST (OUTPATIENT)
Dept: GERIATRICS | Facility: CLINIC | Age: 74
End: 2019-08-08

## 2019-08-08 DIAGNOSIS — Z87.81 HISTORY OF FEMUR FRACTURE: ICD-10-CM

## 2019-08-08 DIAGNOSIS — Z99.2 ESRD (END STAGE RENAL DISEASE) ON DIALYSIS (H): ICD-10-CM

## 2019-08-08 DIAGNOSIS — N18.6 ESRD (END STAGE RENAL DISEASE) ON DIALYSIS (H): ICD-10-CM

## 2019-08-08 DIAGNOSIS — I10 ESSENTIAL HYPERTENSION: ICD-10-CM

## 2019-08-08 DIAGNOSIS — D64.9 ANEMIA, UNSPECIFIED TYPE: ICD-10-CM

## 2019-08-08 LAB — INR PPP: 2.06 (ref 0.9–1.1)

## 2019-08-13 ENCOUNTER — OFFICE VISIT - HEALTHEAST (OUTPATIENT)
Dept: GERIATRICS | Facility: CLINIC | Age: 74
End: 2019-08-13

## 2019-08-13 DIAGNOSIS — Z99.2 ESRD (END STAGE RENAL DISEASE) ON DIALYSIS (H): ICD-10-CM

## 2019-08-13 DIAGNOSIS — N18.6 ESRD (END STAGE RENAL DISEASE) ON DIALYSIS (H): ICD-10-CM

## 2019-08-13 DIAGNOSIS — D64.9 CHRONIC ANEMIA: ICD-10-CM

## 2019-08-13 DIAGNOSIS — S72.142D CLOSED DISPLACED INTERTROCHANTERIC FRACTURE OF LEFT FEMUR WITH ROUTINE HEALING, SUBSEQUENT ENCOUNTER: ICD-10-CM

## 2019-08-13 DIAGNOSIS — R52 PAIN MANAGEMENT: ICD-10-CM

## 2019-08-14 ENCOUNTER — RECORDS - HEALTHEAST (OUTPATIENT)
Dept: LAB | Facility: CLINIC | Age: 74
End: 2019-08-14

## 2019-08-15 LAB — INR PPP: 1.8 (ref 0.9–1.1)

## 2019-08-19 ENCOUNTER — OFFICE VISIT - HEALTHEAST (OUTPATIENT)
Dept: GERIATRICS | Facility: CLINIC | Age: 74
End: 2019-08-19

## 2019-08-19 ENCOUNTER — RECORDS - HEALTHEAST (OUTPATIENT)
Dept: LAB | Facility: CLINIC | Age: 74
End: 2019-08-19

## 2019-08-19 DIAGNOSIS — Z99.2 ESRD (END STAGE RENAL DISEASE) ON DIALYSIS (H): ICD-10-CM

## 2019-08-19 DIAGNOSIS — N18.6 ESRD (END STAGE RENAL DISEASE) ON DIALYSIS (H): ICD-10-CM

## 2019-08-19 DIAGNOSIS — D64.9 CHRONIC ANEMIA: ICD-10-CM

## 2019-08-19 DIAGNOSIS — R52 PAIN MANAGEMENT: ICD-10-CM

## 2019-08-19 DIAGNOSIS — I50.32 CHRONIC DIASTOLIC CONGESTIVE HEART FAILURE (H): ICD-10-CM

## 2019-08-20 LAB — INR PPP: 2.57 (ref 0.9–1.1)

## 2019-08-21 ENCOUNTER — COMMUNICATION - HEALTHEAST (OUTPATIENT)
Dept: GERIATRICS | Facility: CLINIC | Age: 74
End: 2019-08-21

## 2019-08-21 ENCOUNTER — AMBULATORY - HEALTHEAST (OUTPATIENT)
Dept: GERIATRICS | Facility: CLINIC | Age: 74
End: 2019-08-21

## 2019-08-22 ENCOUNTER — COMMUNICATION - HEALTHEAST (OUTPATIENT)
Dept: INTERNAL MEDICINE | Facility: CLINIC | Age: 74
End: 2019-08-22

## 2019-08-22 DIAGNOSIS — I82.409 DVT (DEEP VENOUS THROMBOSIS) (H): ICD-10-CM

## 2019-08-22 LAB — INR PPP: 1.4 (ref 0.9–1.1)

## 2019-08-23 ENCOUNTER — COMMUNICATION - HEALTHEAST (OUTPATIENT)
Dept: ANTICOAGULATION | Facility: CLINIC | Age: 74
End: 2019-08-23

## 2019-08-23 ENCOUNTER — COMMUNICATION - HEALTHEAST (OUTPATIENT)
Dept: INTERNAL MEDICINE | Facility: CLINIC | Age: 74
End: 2019-08-23

## 2019-08-23 DIAGNOSIS — I82.409 DVT (DEEP VENOUS THROMBOSIS) (H): ICD-10-CM

## 2019-08-26 ENCOUNTER — COMMUNICATION - HEALTHEAST (OUTPATIENT)
Dept: INTERNAL MEDICINE | Facility: CLINIC | Age: 74
End: 2019-08-26

## 2019-08-26 DIAGNOSIS — I82.409 DVT (DEEP VENOUS THROMBOSIS) (H): ICD-10-CM

## 2019-08-26 LAB — INR PPP: 2.4 (ref 0.9–1.1)

## 2019-08-27 ENCOUNTER — COMMUNICATION - HEALTHEAST (OUTPATIENT)
Dept: ANTICOAGULATION | Facility: CLINIC | Age: 74
End: 2019-08-27

## 2019-08-27 DIAGNOSIS — I82.409 DVT (DEEP VENOUS THROMBOSIS) (H): ICD-10-CM

## 2019-08-28 ENCOUNTER — COMMUNICATION - HEALTHEAST (OUTPATIENT)
Dept: ANTICOAGULATION | Facility: CLINIC | Age: 74
End: 2019-08-28

## 2019-08-28 ENCOUNTER — RECORDS - HEALTHEAST (OUTPATIENT)
Dept: ADMINISTRATIVE | Facility: OTHER | Age: 74
End: 2019-08-28

## 2019-08-28 DIAGNOSIS — I82.409 DVT (DEEP VENOUS THROMBOSIS) (H): ICD-10-CM

## 2019-08-28 LAB — INR PPP: 2.5 (ref 0.9–1.1)

## 2019-08-30 ENCOUNTER — OFFICE VISIT - HEALTHEAST (OUTPATIENT)
Dept: GERIATRICS | Facility: CLINIC | Age: 74
End: 2019-08-30

## 2019-08-30 DIAGNOSIS — I10 ESSENTIAL HYPERTENSION: ICD-10-CM

## 2019-08-30 DIAGNOSIS — D64.9 CHRONIC ANEMIA: ICD-10-CM

## 2019-08-30 DIAGNOSIS — N18.6 END STAGE KIDNEY DISEASE (H): ICD-10-CM

## 2019-08-30 DIAGNOSIS — M25.552 LEFT HIP PAIN: ICD-10-CM

## 2019-08-30 DIAGNOSIS — I50.32 CHRONIC DIASTOLIC CONGESTIVE HEART FAILURE (H): ICD-10-CM

## 2019-08-30 DIAGNOSIS — R52 PAIN MANAGEMENT: ICD-10-CM
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
## 2019-09-02 ENCOUNTER — OFFICE VISIT - HEALTHEAST (OUTPATIENT)
Dept: GERIATRICS | Facility: CLINIC | Age: 74
End: 2019-09-02

## 2019-09-02 DIAGNOSIS — I10 ESSENTIAL HYPERTENSION WITH GOAL BLOOD PRESSURE LESS THAN 140/90: ICD-10-CM

## 2019-09-02 DIAGNOSIS — N18.6 ESRD ON DIALYSIS (H): ICD-10-CM

## 2019-09-02 DIAGNOSIS — S72.002D CLOSED FRACTURE OF LEFT HIP WITH ROUTINE HEALING, SUBSEQUENT ENCOUNTER: ICD-10-CM

## 2019-09-02 DIAGNOSIS — Z99.2 ESRD ON DIALYSIS (H): ICD-10-CM

## 2019-09-02 DIAGNOSIS — I50.32 CHRONIC DIASTOLIC CONGESTIVE HEART FAILURE (H): ICD-10-CM

## 2019-09-04 ENCOUNTER — COMMUNICATION - HEALTHEAST (OUTPATIENT)
Dept: ANTICOAGULATION | Facility: CLINIC | Age: 74
End: 2019-09-04

## 2019-09-04 ENCOUNTER — OFFICE VISIT - HEALTHEAST (OUTPATIENT)
Dept: GERIATRICS | Facility: CLINIC | Age: 74
End: 2019-09-04

## 2019-09-04 DIAGNOSIS — I82.409 DVT (DEEP VENOUS THROMBOSIS) (H): ICD-10-CM

## 2019-09-04 DIAGNOSIS — N18.6 ESRD ON DIALYSIS (H): ICD-10-CM

## 2019-09-04 DIAGNOSIS — I50.32 CHRONIC DIASTOLIC CONGESTIVE HEART FAILURE (H): ICD-10-CM

## 2019-09-04 DIAGNOSIS — Z99.2 ESRD ON DIALYSIS (H): ICD-10-CM

## 2019-09-04 DIAGNOSIS — S72.142D CLOSED DISPLACED INTERTROCHANTERIC FRACTURE OF LEFT FEMUR WITH ROUTINE HEALING, SUBSEQUENT ENCOUNTER: ICD-10-CM

## 2019-09-04 DIAGNOSIS — I10 ESSENTIAL HYPERTENSION WITH GOAL BLOOD PRESSURE LESS THAN 140/90: ICD-10-CM

## 2019-09-04 LAB — INR PPP: 3.1 (ref 0.9–1.1)

## 2019-09-09 ENCOUNTER — OFFICE VISIT - HEALTHEAST (OUTPATIENT)
Dept: GERIATRICS | Facility: CLINIC | Age: 74
End: 2019-09-09

## 2019-09-09 DIAGNOSIS — J43.9 PULMONARY EMPHYSEMA, UNSPECIFIED EMPHYSEMA TYPE (H): ICD-10-CM

## 2019-09-09 DIAGNOSIS — S72.142D CLOSED DISPLACED INTERTROCHANTERIC FRACTURE OF LEFT FEMUR WITH ROUTINE HEALING, SUBSEQUENT ENCOUNTER: ICD-10-CM

## 2019-09-09 DIAGNOSIS — I10 ESSENTIAL HYPERTENSION WITH GOAL BLOOD PRESSURE LESS THAN 140/90: ICD-10-CM

## 2019-09-11 ENCOUNTER — OFFICE VISIT - HEALTHEAST (OUTPATIENT)
Dept: GERIATRICS | Facility: CLINIC | Age: 74
End: 2019-09-11

## 2019-09-11 ENCOUNTER — COMMUNICATION - HEALTHEAST (OUTPATIENT)
Dept: ANTICOAGULATION | Facility: CLINIC | Age: 74
End: 2019-09-11

## 2019-09-11 DIAGNOSIS — I82.409 DVT (DEEP VENOUS THROMBOSIS) (H): ICD-10-CM

## 2019-09-11 DIAGNOSIS — N18.6 ESRD ON DIALYSIS (H): ICD-10-CM

## 2019-09-11 DIAGNOSIS — G47.33 OSA (OBSTRUCTIVE SLEEP APNEA): ICD-10-CM

## 2019-09-11 DIAGNOSIS — I10 ESSENTIAL HYPERTENSION WITH GOAL BLOOD PRESSURE LESS THAN 140/90: ICD-10-CM

## 2019-09-11 DIAGNOSIS — R29.898 LEG WEAKNESS, BILATERAL: ICD-10-CM

## 2019-09-11 DIAGNOSIS — Z99.2 ESRD ON DIALYSIS (H): ICD-10-CM

## 2019-09-11 LAB — INR PPP: 2 (ref 0.9–1.1)

## 2019-09-13 ENCOUNTER — OFFICE VISIT - HEALTHEAST (OUTPATIENT)
Dept: GERIATRICS | Facility: CLINIC | Age: 74
End: 2019-09-13

## 2019-09-13 ENCOUNTER — AMBULATORY - HEALTHEAST (OUTPATIENT)
Dept: GERIATRICS | Facility: CLINIC | Age: 74
End: 2019-09-13

## 2019-09-13 DIAGNOSIS — Z99.2 ESRD ON DIALYSIS (H): ICD-10-CM

## 2019-09-13 DIAGNOSIS — J43.9 PULMONARY EMPHYSEMA, UNSPECIFIED EMPHYSEMA TYPE (H): ICD-10-CM

## 2019-09-13 DIAGNOSIS — S72.142D CLOSED DISPLACED INTERTROCHANTERIC FRACTURE OF LEFT FEMUR WITH ROUTINE HEALING, SUBSEQUENT ENCOUNTER: ICD-10-CM

## 2019-09-13 DIAGNOSIS — N18.6 ESRD ON DIALYSIS (H): ICD-10-CM

## 2019-09-13 DIAGNOSIS — E21.3 HYPERPARATHYROIDISM (H): ICD-10-CM

## 2019-09-16 ENCOUNTER — COMMUNICATION - HEALTHEAST (OUTPATIENT)
Dept: GERIATRICS | Facility: CLINIC | Age: 74
End: 2019-09-16

## 2019-09-16 ENCOUNTER — COMMUNICATION - HEALTHEAST (OUTPATIENT)
Dept: INTERNAL MEDICINE | Facility: CLINIC | Age: 74
End: 2019-09-16

## 2019-09-17 ENCOUNTER — RECORDS - HEALTHEAST (OUTPATIENT)
Dept: ADMINISTRATIVE | Facility: OTHER | Age: 74
End: 2019-09-17

## 2019-09-18 ENCOUNTER — COMMUNICATION - HEALTHEAST (OUTPATIENT)
Dept: INTERNAL MEDICINE | Facility: CLINIC | Age: 74
End: 2019-09-18

## 2019-09-18 ENCOUNTER — OFFICE VISIT - HEALTHEAST (OUTPATIENT)
Dept: INTERNAL MEDICINE | Facility: CLINIC | Age: 74
End: 2019-09-18

## 2019-09-18 DIAGNOSIS — Z98.890 S/P AV (ATRIOVENTRICULAR) NODAL ABLATION: ICD-10-CM

## 2019-09-18 DIAGNOSIS — Z99.2 ESRD ON DIALYSIS (H): ICD-10-CM

## 2019-09-18 DIAGNOSIS — N18.6 ESRD ON DIALYSIS (H): ICD-10-CM

## 2019-09-18 DIAGNOSIS — Z12.31 VISIT FOR SCREENING MAMMOGRAM: ICD-10-CM

## 2019-09-18 DIAGNOSIS — I82.409 DVT (DEEP VENOUS THROMBOSIS) (H): ICD-10-CM

## 2019-09-18 DIAGNOSIS — S72.142D CLOSED DISPLACED INTERTROCHANTERIC FRACTURE OF LEFT FEMUR WITH ROUTINE HEALING, SUBSEQUENT ENCOUNTER: ICD-10-CM

## 2019-09-18 DIAGNOSIS — Z95.818 PRESENCE OF WATCHMAN LEFT ATRIAL APPENDAGE CLOSURE DEVICE: ICD-10-CM

## 2019-09-18 DIAGNOSIS — Z96.649 S/P TOTAL HIP ARTHROPLASTY: ICD-10-CM

## 2019-09-18 LAB — INR PPP: 1.8 (ref 0.9–1.1)

## 2019-09-19 ENCOUNTER — COMMUNICATION - HEALTHEAST (OUTPATIENT)
Dept: INTERNAL MEDICINE | Facility: CLINIC | Age: 74
End: 2019-09-19

## 2019-09-19 ENCOUNTER — AMBULATORY - HEALTHEAST (OUTPATIENT)
Dept: CARDIOLOGY | Facility: CLINIC | Age: 74
End: 2019-09-19

## 2019-09-19 DIAGNOSIS — I10 ESSENTIAL HYPERTENSION WITH GOAL BLOOD PRESSURE LESS THAN 140/90: ICD-10-CM

## 2019-09-19 DIAGNOSIS — Z95.0 CARDIAC PACEMAKER IN SITU: ICD-10-CM

## 2019-09-19 DIAGNOSIS — I48.20 CHRONIC ATRIAL FIBRILLATION (H): ICD-10-CM

## 2019-09-19 ASSESSMENT — MIFFLIN-ST. JEOR: SCORE: 1231.64

## 2019-09-26 ENCOUNTER — COMMUNICATION - HEALTHEAST (OUTPATIENT)
Dept: INTERNAL MEDICINE | Facility: CLINIC | Age: 74
End: 2019-09-26

## 2019-09-26 DIAGNOSIS — I82.409 DVT (DEEP VENOUS THROMBOSIS) (H): ICD-10-CM

## 2019-09-26 LAB — INR PPP: 1.6 (ref 0.9–1.1)

## 2019-10-03 ENCOUNTER — COMMUNICATION - HEALTHEAST (OUTPATIENT)
Dept: INTERNAL MEDICINE | Facility: CLINIC | Age: 74
End: 2019-10-03

## 2019-10-03 ENCOUNTER — COMMUNICATION - HEALTHEAST (OUTPATIENT)
Dept: ANTICOAGULATION | Facility: CLINIC | Age: 74
End: 2019-10-03

## 2019-10-03 DIAGNOSIS — I82.409 DVT (DEEP VENOUS THROMBOSIS) (H): ICD-10-CM

## 2019-10-03 LAB — INR PPP: 3 (ref 0.9–1.1)

## 2019-10-09 ENCOUNTER — RECORDS - HEALTHEAST (OUTPATIENT)
Dept: ANTICOAGULATION | Facility: CLINIC | Age: 74
End: 2019-10-09

## 2019-10-17 ENCOUNTER — COMMUNICATION - HEALTHEAST (OUTPATIENT)
Dept: INTERNAL MEDICINE | Facility: CLINIC | Age: 74
End: 2019-10-17

## 2019-10-17 DIAGNOSIS — G47.00 INSOMNIA, UNSPECIFIED TYPE: ICD-10-CM

## 2019-10-22 ENCOUNTER — COMMUNICATION - HEALTHEAST (OUTPATIENT)
Dept: INTERNAL MEDICINE | Facility: CLINIC | Age: 74
End: 2019-10-22

## 2019-10-22 DIAGNOSIS — R26.81 GAIT INSTABILITY: ICD-10-CM

## 2019-10-24 ENCOUNTER — COMMUNICATION - HEALTHEAST (OUTPATIENT)
Dept: INTERNAL MEDICINE | Facility: CLINIC | Age: 74
End: 2019-10-24

## 2019-10-28 ENCOUNTER — COMMUNICATION - HEALTHEAST (OUTPATIENT)
Dept: INTERNAL MEDICINE | Facility: CLINIC | Age: 74
End: 2019-10-28

## 2019-11-06 ENCOUNTER — COMMUNICATION - HEALTHEAST (OUTPATIENT)
Dept: INTERNAL MEDICINE | Facility: CLINIC | Age: 74
End: 2019-11-06

## 2019-11-06 DIAGNOSIS — I48.20 CHRONIC ATRIAL FIBRILLATION (H): ICD-10-CM

## 2019-11-19 ENCOUNTER — OFFICE VISIT - HEALTHEAST (OUTPATIENT)
Dept: INTERNAL MEDICINE | Facility: CLINIC | Age: 74
End: 2019-11-19

## 2019-11-19 DIAGNOSIS — K21.9 GASTROESOPHAGEAL REFLUX DISEASE WITHOUT ESOPHAGITIS: ICD-10-CM

## 2019-11-19 DIAGNOSIS — E78.00 PURE HYPERCHOLESTEROLEMIA: ICD-10-CM

## 2019-11-19 DIAGNOSIS — Z12.31 VISIT FOR SCREENING MAMMOGRAM: ICD-10-CM

## 2019-11-19 LAB
CHOLEST SERPL-MCNC: 116 MG/DL
FASTING STATUS PATIENT QL REPORTED: NO
HDLC SERPL-MCNC: 57 MG/DL
LDLC SERPL CALC-MCNC: 35 MG/DL
TRIGL SERPL-MCNC: 122 MG/DL

## 2019-11-19 ASSESSMENT — MIFFLIN-ST. JEOR: SCORE: 1215.88

## 2019-11-20 ENCOUNTER — COMMUNICATION - HEALTHEAST (OUTPATIENT)
Dept: INTERNAL MEDICINE | Facility: CLINIC | Age: 74
End: 2019-11-20

## 2019-11-26 ENCOUNTER — COMMUNICATION - HEALTHEAST (OUTPATIENT)
Dept: INTERNAL MEDICINE | Facility: CLINIC | Age: 74
End: 2019-11-26

## 2019-11-26 DIAGNOSIS — G47.00 INSOMNIA, UNSPECIFIED TYPE: ICD-10-CM

## 2019-12-17 ENCOUNTER — AMBULATORY - HEALTHEAST (OUTPATIENT)
Dept: CARDIOLOGY | Facility: CLINIC | Age: 74
End: 2019-12-17

## 2019-12-17 DIAGNOSIS — Z95.0 CARDIAC PACEMAKER IN SITU: ICD-10-CM

## 2019-12-17 DIAGNOSIS — I48.20 CHRONIC ATRIAL FIBRILLATION (H): ICD-10-CM

## 2019-12-20 ENCOUNTER — RECORDS - HEALTHEAST (OUTPATIENT)
Dept: MAMMOGRAPHY | Facility: CLINIC | Age: 74
End: 2019-12-20

## 2019-12-20 DIAGNOSIS — Z12.31 ENCOUNTER FOR SCREENING MAMMOGRAM FOR MALIGNANT NEOPLASM OF BREAST: ICD-10-CM

## 2020-02-04 ENCOUNTER — OFFICE VISIT - HEALTHEAST (OUTPATIENT)
Dept: INTERNAL MEDICINE | Facility: CLINIC | Age: 75
End: 2020-02-04

## 2020-02-04 ENCOUNTER — COMMUNICATION - HEALTHEAST (OUTPATIENT)
Dept: INTERNAL MEDICINE | Facility: CLINIC | Age: 75
End: 2020-02-04

## 2020-02-04 ENCOUNTER — COMMUNICATION - HEALTHEAST (OUTPATIENT)
Dept: SCHEDULING | Facility: CLINIC | Age: 75
End: 2020-02-04

## 2020-02-04 DIAGNOSIS — R05.9 COUGH: ICD-10-CM

## 2020-02-04 DIAGNOSIS — D69.6 THROMBOCYTOPENIA (H): ICD-10-CM

## 2020-02-04 DIAGNOSIS — E21.3 HYPERPARATHYROIDISM (H): ICD-10-CM

## 2020-02-04 DIAGNOSIS — I71.019 DISSECTION OF THORACIC AORTA (H): ICD-10-CM

## 2020-02-04 DIAGNOSIS — I48.20 CHRONIC ATRIAL FIBRILLATION (H): ICD-10-CM

## 2020-02-04 DIAGNOSIS — I50.32 CHRONIC DIASTOLIC CONGESTIVE HEART FAILURE (H): ICD-10-CM

## 2020-02-04 DIAGNOSIS — J20.9 ACUTE BRONCHITIS, UNSPECIFIED ORGANISM: ICD-10-CM

## 2020-02-04 DIAGNOSIS — Z99.2 ESRD ON DIALYSIS (H): ICD-10-CM

## 2020-02-04 DIAGNOSIS — N18.6 ESRD ON DIALYSIS (H): ICD-10-CM

## 2020-02-04 DIAGNOSIS — J43.9 PULMONARY EMPHYSEMA, UNSPECIFIED EMPHYSEMA TYPE (H): ICD-10-CM

## 2020-02-04 DIAGNOSIS — G56.02 CARPAL TUNNEL SYNDROME OF LEFT WRIST: ICD-10-CM

## 2020-02-04 DIAGNOSIS — J81.0 ACUTE PULMONARY EDEMA (H): ICD-10-CM

## 2020-02-04 ASSESSMENT — MIFFLIN-ST. JEOR: SCORE: 1213.49

## 2020-02-10 ENCOUNTER — OFFICE VISIT - HEALTHEAST (OUTPATIENT)
Dept: INTERNAL MEDICINE | Facility: CLINIC | Age: 75
End: 2020-02-10

## 2020-02-10 DIAGNOSIS — R05.9 COUGH: ICD-10-CM

## 2020-02-10 DIAGNOSIS — J43.9 PULMONARY EMPHYSEMA, UNSPECIFIED EMPHYSEMA TYPE (H): ICD-10-CM

## 2020-02-10 DIAGNOSIS — I50.20 SYSTOLIC CONGESTIVE HEART FAILURE, UNSPECIFIED HF CHRONICITY (H): ICD-10-CM

## 2020-02-10 DIAGNOSIS — R09.82 POST-NASAL DRAINAGE: ICD-10-CM

## 2020-02-10 ASSESSMENT — MIFFLIN-ST. JEOR: SCORE: 1219.88

## 2020-02-11 ENCOUNTER — COMMUNICATION - HEALTHEAST (OUTPATIENT)
Dept: INTERNAL MEDICINE | Facility: CLINIC | Age: 75
End: 2020-02-11

## 2020-02-13 ENCOUNTER — COMMUNICATION - HEALTHEAST (OUTPATIENT)
Dept: SCHEDULING | Facility: CLINIC | Age: 75
End: 2020-02-13

## 2020-03-11 ENCOUNTER — RECORDS - HEALTHEAST (OUTPATIENT)
Dept: ADMINISTRATIVE | Facility: OTHER | Age: 75
End: 2020-03-11

## 2020-03-18 ENCOUNTER — AMBULATORY - HEALTHEAST (OUTPATIENT)
Dept: CARDIOLOGY | Facility: CLINIC | Age: 75
End: 2020-03-18

## 2020-03-18 DIAGNOSIS — Z95.0 CARDIAC PACEMAKER IN SITU: ICD-10-CM

## 2020-03-18 DIAGNOSIS — I48.20 CHRONIC ATRIAL FIBRILLATION (H): ICD-10-CM

## 2020-03-23 ENCOUNTER — RECORDS - HEALTHEAST (OUTPATIENT)
Dept: ADMINISTRATIVE | Facility: OTHER | Age: 75
End: 2020-03-23

## 2020-04-20 ENCOUNTER — RECORDS - HEALTHEAST (OUTPATIENT)
Dept: ADMINISTRATIVE | Facility: OTHER | Age: 75
End: 2020-04-20

## 2020-04-26 ENCOUNTER — COMMUNICATION - HEALTHEAST (OUTPATIENT)
Dept: INTERNAL MEDICINE | Facility: CLINIC | Age: 75
End: 2020-04-26

## 2020-04-26 DIAGNOSIS — I10 HYPERTENSION, UNSPECIFIED TYPE: ICD-10-CM

## 2020-05-10 ENCOUNTER — COMMUNICATION - HEALTHEAST (OUTPATIENT)
Dept: INTERNAL MEDICINE | Facility: CLINIC | Age: 75
End: 2020-05-10

## 2020-05-10 DIAGNOSIS — R52 PAIN: ICD-10-CM

## 2020-06-08 ENCOUNTER — COMMUNICATION - HEALTHEAST (OUTPATIENT)
Dept: INTERNAL MEDICINE | Facility: CLINIC | Age: 75
End: 2020-06-08

## 2020-06-08 ENCOUNTER — OFFICE VISIT - HEALTHEAST (OUTPATIENT)
Dept: INTERNAL MEDICINE | Facility: CLINIC | Age: 75
End: 2020-06-08

## 2020-06-08 ENCOUNTER — RECORDS - HEALTHEAST (OUTPATIENT)
Dept: ADMINISTRATIVE | Facility: OTHER | Age: 75
End: 2020-06-08

## 2020-06-08 DIAGNOSIS — R63.4 ABNORMAL WEIGHT LOSS: ICD-10-CM

## 2020-06-08 DIAGNOSIS — I10 ESSENTIAL HYPERTENSION WITH GOAL BLOOD PRESSURE LESS THAN 140/90: ICD-10-CM

## 2020-06-08 DIAGNOSIS — R53.83 FATIGUE, UNSPECIFIED TYPE: ICD-10-CM

## 2020-06-08 DIAGNOSIS — R68.89 COLD INTOLERANCE: ICD-10-CM

## 2020-06-12 ENCOUNTER — RECORDS - HEALTHEAST (OUTPATIENT)
Dept: ADMINISTRATIVE | Facility: OTHER | Age: 75
End: 2020-06-12

## 2020-06-17 ENCOUNTER — COMMUNICATION - HEALTHEAST (OUTPATIENT)
Dept: INTERNAL MEDICINE | Facility: CLINIC | Age: 75
End: 2020-06-17

## 2020-06-17 ENCOUNTER — AMBULATORY - HEALTHEAST (OUTPATIENT)
Dept: CARDIOLOGY | Facility: CLINIC | Age: 75
End: 2020-06-17

## 2020-06-17 DIAGNOSIS — I49.5 TACHYCARDIA-BRADYCARDIA SYNDROME (H): ICD-10-CM

## 2020-06-17 DIAGNOSIS — I48.20 CHRONIC ATRIAL FIBRILLATION (H): ICD-10-CM

## 2020-06-17 DIAGNOSIS — Z95.0 CARDIAC PACEMAKER IN SITU: ICD-10-CM

## 2020-07-22 ENCOUNTER — RECORDS - HEALTHEAST (OUTPATIENT)
Dept: LAB | Facility: CLINIC | Age: 75
End: 2020-07-22

## 2020-07-23 ENCOUNTER — HOME CARE/HOSPICE - HEALTHEAST (OUTPATIENT)
Dept: HOSPICE | Facility: HOSPICE | Age: 75
End: 2020-07-23

## 2020-07-23 ENCOUNTER — OFFICE VISIT - HEALTHEAST (OUTPATIENT)
Dept: GERIATRICS | Facility: CLINIC | Age: 75
End: 2020-07-23

## 2020-07-23 DIAGNOSIS — I50.33 ACUTE ON CHRONIC DIASTOLIC CONGESTIVE HEART FAILURE (H): ICD-10-CM

## 2020-07-23 DIAGNOSIS — R53.81 PHYSICAL DECONDITIONING: ICD-10-CM

## 2020-07-23 DIAGNOSIS — M79.2 NEUROPATHIC PAIN: ICD-10-CM

## 2020-07-23 DIAGNOSIS — Z99.2 ESRD ON DIALYSIS (H): ICD-10-CM

## 2020-07-23 DIAGNOSIS — N18.6 ESRD ON DIALYSIS (H): ICD-10-CM

## 2020-07-23 LAB
ANION GAP SERPL CALCULATED.3IONS-SCNC: 9 MMOL/L (ref 5–18)
BUN SERPL-MCNC: 17 MG/DL (ref 8–28)
CALCIUM SERPL-MCNC: 9.6 MG/DL (ref 8.5–10.5)
CHLORIDE BLD-SCNC: 105 MMOL/L (ref 98–107)
CO2 SERPL-SCNC: 21 MMOL/L (ref 22–31)
CREAT SERPL-MCNC: 3.89 MG/DL (ref 0.6–1.1)
ERYTHROCYTE [DISTWIDTH] IN BLOOD BY AUTOMATED COUNT: 14.5 % (ref 11–14.5)
GFR SERPL CREATININE-BSD FRML MDRD: 11 ML/MIN/1.73M2
GLUCOSE BLD-MCNC: 95 MG/DL (ref 70–125)
HCT VFR BLD AUTO: 36.8 % (ref 35–47)
HGB BLD-MCNC: 10.5 G/DL (ref 12–16)
MAGNESIUM SERPL-MCNC: 2.1 MG/DL (ref 1.8–2.6)
MCH RBC QN AUTO: 33.8 PG (ref 27–34)
MCHC RBC AUTO-ENTMCNC: 28.5 G/DL (ref 32–36)
MCV RBC AUTO: 118 FL (ref 80–100)
PLATELET # BLD AUTO: 122 THOU/UL (ref 140–440)
PMV BLD AUTO: 11.5 FL (ref 8.5–12.5)
POTASSIUM BLD-SCNC: 4.2 MMOL/L (ref 3.5–5)
RBC # BLD AUTO: 3.11 MILL/UL (ref 3.8–5.4)
SODIUM SERPL-SCNC: 135 MMOL/L (ref 136–145)
WBC: 3.8 THOU/UL (ref 4–11)

## 2020-07-24 ENCOUNTER — HOME CARE/HOSPICE - HEALTHEAST (OUTPATIENT)
Dept: HOSPICE | Facility: HOSPICE | Age: 75
End: 2020-07-24

## 2020-07-27 ENCOUNTER — OFFICE VISIT - HEALTHEAST (OUTPATIENT)
Dept: GERIATRICS | Facility: CLINIC | Age: 75
End: 2020-07-27

## 2020-07-27 ENCOUNTER — HOME CARE/HOSPICE - HEALTHEAST (OUTPATIENT)
Dept: HOSPICE | Facility: HOSPICE | Age: 75
End: 2020-07-27

## 2020-07-27 DIAGNOSIS — R53.81 PHYSICAL DECONDITIONING: ICD-10-CM

## 2020-07-27 DIAGNOSIS — F43.21 ADJUSTMENT DISORDER WITH DEPRESSED MOOD: ICD-10-CM

## 2020-07-27 DIAGNOSIS — M79.2 NEUROPATHIC PAIN: ICD-10-CM

## 2020-07-27 DIAGNOSIS — Z51.5 PALLIATIVE CARE ENCOUNTER: ICD-10-CM

## 2020-07-27 DIAGNOSIS — R05.9 COUGH: ICD-10-CM

## 2020-07-28 ENCOUNTER — AMBULATORY - HEALTHEAST (OUTPATIENT)
Dept: GERIATRICS | Facility: CLINIC | Age: 75
End: 2020-07-28

## 2020-07-28 ENCOUNTER — OFFICE VISIT - HEALTHEAST (OUTPATIENT)
Dept: GERIATRICS | Facility: CLINIC | Age: 75
End: 2020-07-28

## 2020-07-28 DIAGNOSIS — Z99.2 ESRD ON DIALYSIS (H): ICD-10-CM

## 2020-07-28 DIAGNOSIS — I50.33 ACUTE ON CHRONIC DIASTOLIC CONGESTIVE HEART FAILURE (H): ICD-10-CM

## 2020-07-28 DIAGNOSIS — I71.03 DISSECTION OF THORACOABDOMINAL AORTA (H): ICD-10-CM

## 2020-07-28 DIAGNOSIS — Z95.0 CARDIAC PACEMAKER IN SITU: ICD-10-CM

## 2020-07-28 DIAGNOSIS — N18.6 ESRD ON DIALYSIS (H): ICD-10-CM

## 2020-07-28 DIAGNOSIS — J43.9 PULMONARY EMPHYSEMA, UNSPECIFIED EMPHYSEMA TYPE (H): ICD-10-CM

## 2020-07-30 ENCOUNTER — OFFICE VISIT - HEALTHEAST (OUTPATIENT)
Dept: GERIATRICS | Facility: CLINIC | Age: 75
End: 2020-07-30

## 2020-07-30 DIAGNOSIS — R05.9 COUGH: ICD-10-CM

## 2020-07-30 DIAGNOSIS — M79.2 NEUROPATHIC PAIN: ICD-10-CM

## 2020-07-30 DIAGNOSIS — R53.81 PHYSICAL DECONDITIONING: ICD-10-CM

## 2020-08-04 ENCOUNTER — OFFICE VISIT - HEALTHEAST (OUTPATIENT)
Dept: GERIATRICS | Facility: CLINIC | Age: 75
End: 2020-08-04

## 2020-08-04 DIAGNOSIS — M10.9 GOUT, UNSPECIFIED CAUSE, UNSPECIFIED CHRONICITY, UNSPECIFIED SITE: ICD-10-CM

## 2020-08-04 DIAGNOSIS — J81.0 ACUTE PULMONARY EDEMA (H): ICD-10-CM

## 2020-08-04 DIAGNOSIS — I50.33 ACUTE ON CHRONIC DIASTOLIC CONGESTIVE HEART FAILURE (H): ICD-10-CM

## 2020-08-04 DIAGNOSIS — Z95.818 PRESENCE OF WATCHMAN LEFT ATRIAL APPENDAGE CLOSURE DEVICE: ICD-10-CM

## 2020-08-04 DIAGNOSIS — J43.9 PULMONARY EMPHYSEMA, UNSPECIFIED EMPHYSEMA TYPE (H): ICD-10-CM

## 2020-08-06 ENCOUNTER — COMMUNICATION - HEALTHEAST (OUTPATIENT)
Dept: GERIATRICS | Facility: CLINIC | Age: 75
End: 2020-08-06

## 2020-08-10 ENCOUNTER — OFFICE VISIT - HEALTHEAST (OUTPATIENT)
Dept: GERIATRICS | Facility: CLINIC | Age: 75
End: 2020-08-10

## 2020-08-10 DIAGNOSIS — M79.2 NEUROPATHIC PAIN: ICD-10-CM

## 2020-08-10 DIAGNOSIS — R53.81 PHYSICAL DECONDITIONING: ICD-10-CM

## 2020-08-10 DIAGNOSIS — R05.9 COUGH: ICD-10-CM

## 2020-08-13 ENCOUNTER — HOME CARE/HOSPICE - HEALTHEAST (OUTPATIENT)
Dept: HOSPICE | Facility: HOSPICE | Age: 75
End: 2020-08-13

## 2020-08-18 ENCOUNTER — COMMUNICATION - HEALTHEAST (OUTPATIENT)
Dept: INTERNAL MEDICINE | Facility: CLINIC | Age: 75
End: 2020-08-18

## 2020-08-18 ENCOUNTER — OFFICE VISIT - HEALTHEAST (OUTPATIENT)
Dept: GERIATRICS | Facility: CLINIC | Age: 75
End: 2020-08-18

## 2020-08-18 DIAGNOSIS — N18.6 ESRD ON DIALYSIS (H): ICD-10-CM

## 2020-08-18 DIAGNOSIS — Z99.2 ESRD ON DIALYSIS (H): ICD-10-CM

## 2020-08-24 ENCOUNTER — COMMUNICATION - HEALTHEAST (OUTPATIENT)
Dept: GERIATRICS | Facility: CLINIC | Age: 75
End: 2020-08-24

## 2020-08-24 ENCOUNTER — AMBULATORY - HEALTHEAST (OUTPATIENT)
Dept: GERIATRICS | Facility: CLINIC | Age: 75
End: 2020-08-24

## 2020-09-14 ENCOUNTER — OFFICE VISIT - HEALTHEAST (OUTPATIENT)
Dept: PEDIATRICS | Facility: CLINIC | Age: 75
End: 2020-09-14

## 2020-09-14 DIAGNOSIS — G47.33 OSA (OBSTRUCTIVE SLEEP APNEA): ICD-10-CM

## 2020-09-14 DIAGNOSIS — I48.20 CHRONIC ATRIAL FIBRILLATION (H): ICD-10-CM

## 2020-09-14 DIAGNOSIS — Z23 ENCOUNTER FOR IMMUNIZATION: ICD-10-CM

## 2020-09-14 DIAGNOSIS — Z99.2 ESRD ON DIALYSIS (H): ICD-10-CM

## 2020-09-14 DIAGNOSIS — I50.9 CONGESTIVE HEART FAILURE, UNSPECIFIED HF CHRONICITY, UNSPECIFIED HEART FAILURE TYPE (H): ICD-10-CM

## 2020-09-14 DIAGNOSIS — F32.9 MAJOR DEPRESSIVE DISORDER, REMISSION STATUS UNSPECIFIED, UNSPECIFIED WHETHER RECURRENT: ICD-10-CM

## 2020-09-14 DIAGNOSIS — N18.5 ANEMIA OF CHRONIC RENAL FAILURE, STAGE 5 (H): ICD-10-CM

## 2020-09-14 DIAGNOSIS — N18.6 ESRD ON DIALYSIS (H): ICD-10-CM

## 2020-09-14 DIAGNOSIS — D63.1 ANEMIA OF CHRONIC RENAL FAILURE, STAGE 5 (H): ICD-10-CM

## 2020-09-14 DIAGNOSIS — Z95.818 PRESENCE OF WATCHMAN LEFT ATRIAL APPENDAGE CLOSURE DEVICE: ICD-10-CM

## 2020-09-14 ASSESSMENT — MIFFLIN-ST. JEOR: SCORE: 1175.18

## 2020-10-05 ENCOUNTER — DOCUMENTATION ONLY (OUTPATIENT)
Dept: OTHER | Facility: CLINIC | Age: 75
End: 2020-10-05

## 2020-10-05 ENCOUNTER — RECORDS - HEALTHEAST (OUTPATIENT)
Dept: ADMINISTRATIVE | Facility: OTHER | Age: 75
End: 2020-10-05

## 2020-10-05 ENCOUNTER — AMBULATORY - HEALTHEAST (OUTPATIENT)
Dept: OTHER | Facility: CLINIC | Age: 75
End: 2020-10-05

## 2020-10-05 ENCOUNTER — COMMUNICATION - HEALTHEAST (OUTPATIENT)
Dept: INTERNAL MEDICINE | Facility: CLINIC | Age: 75
End: 2020-10-05

## 2020-10-06 ENCOUNTER — COMMUNICATION - HEALTHEAST (OUTPATIENT)
Dept: SCHEDULING | Facility: CLINIC | Age: 75
End: 2020-10-06

## 2020-10-15 ENCOUNTER — OFFICE VISIT - HEALTHEAST (OUTPATIENT)
Dept: GERIATRICS | Facility: CLINIC | Age: 75
End: 2020-10-15

## 2020-10-15 DIAGNOSIS — N18.6 ESRD ON DIALYSIS (H): ICD-10-CM

## 2020-10-15 DIAGNOSIS — R53.81 PHYSICAL DECONDITIONING: ICD-10-CM

## 2020-10-15 DIAGNOSIS — Z95.818 PRESENCE OF WATCHMAN LEFT ATRIAL APPENDAGE CLOSURE DEVICE: ICD-10-CM

## 2020-10-15 DIAGNOSIS — J43.9 PULMONARY EMPHYSEMA, UNSPECIFIED EMPHYSEMA TYPE (H): ICD-10-CM

## 2020-10-15 DIAGNOSIS — I50.33 ACUTE ON CHRONIC DIASTOLIC CONGESTIVE HEART FAILURE (H): ICD-10-CM

## 2020-10-15 DIAGNOSIS — M79.2 NEUROPATHIC PAIN: ICD-10-CM

## 2020-10-15 DIAGNOSIS — Z99.2 ESRD ON DIALYSIS (H): ICD-10-CM

## 2020-10-15 DIAGNOSIS — J81.0 ACUTE PULMONARY EDEMA (H): ICD-10-CM

## 2020-10-16 ENCOUNTER — COMMUNICATION - HEALTHEAST (OUTPATIENT)
Dept: VASCULAR SURGERY | Facility: CLINIC | Age: 75
End: 2020-10-16

## 2020-10-20 ENCOUNTER — OFFICE VISIT - HEALTHEAST (OUTPATIENT)
Dept: GERIATRICS | Facility: CLINIC | Age: 75
End: 2020-10-20

## 2020-10-20 DIAGNOSIS — R53.81 PHYSICAL DECONDITIONING: ICD-10-CM

## 2020-10-20 DIAGNOSIS — M79.2 NEUROPATHIC PAIN: ICD-10-CM

## 2020-10-20 DIAGNOSIS — J81.0 ACUTE PULMONARY EDEMA (H): ICD-10-CM

## 2020-10-20 DIAGNOSIS — Z99.2 ESRD ON DIALYSIS (H): ICD-10-CM

## 2020-10-20 DIAGNOSIS — M54.50 SEVERE LOW BACK PAIN: ICD-10-CM

## 2020-10-20 DIAGNOSIS — N18.6 ESRD ON DIALYSIS (H): ICD-10-CM

## 2020-10-20 DIAGNOSIS — J43.9 PULMONARY EMPHYSEMA, UNSPECIFIED EMPHYSEMA TYPE (H): ICD-10-CM

## 2020-10-21 ENCOUNTER — COMMUNICATION - HEALTHEAST (OUTPATIENT)
Dept: INTERNAL MEDICINE | Facility: CLINIC | Age: 75
End: 2020-10-21

## 2020-10-21 ENCOUNTER — OFFICE VISIT - HEALTHEAST (OUTPATIENT)
Dept: GERIATRICS | Facility: CLINIC | Age: 75
End: 2020-10-21

## 2020-10-21 DIAGNOSIS — Z95.818 PRESENCE OF WATCHMAN LEFT ATRIAL APPENDAGE CLOSURE DEVICE: ICD-10-CM

## 2020-10-21 DIAGNOSIS — Z95.0 CARDIAC PACEMAKER IN SITU: ICD-10-CM

## 2020-10-21 DIAGNOSIS — Z99.2 ESRD ON DIALYSIS (H): ICD-10-CM

## 2020-10-21 DIAGNOSIS — I73.9 PERIPHERAL ARTERIAL DISEASE (H): ICD-10-CM

## 2020-10-21 DIAGNOSIS — N18.6 ESRD ON DIALYSIS (H): ICD-10-CM

## 2020-10-21 DIAGNOSIS — I50.33 ACUTE ON CHRONIC DIASTOLIC CONGESTIVE HEART FAILURE (H): ICD-10-CM

## 2020-10-21 DIAGNOSIS — J43.9 PULMONARY EMPHYSEMA, UNSPECIFIED EMPHYSEMA TYPE (H): ICD-10-CM

## 2020-10-21 DIAGNOSIS — R53.81 PHYSICAL DECONDITIONING: ICD-10-CM

## 2020-10-21 DIAGNOSIS — M54.50 SEVERE LOW BACK PAIN: ICD-10-CM

## 2020-10-21 DIAGNOSIS — M79.2 NEUROPATHIC PAIN: ICD-10-CM

## 2020-10-23 ENCOUNTER — HOSPITAL ENCOUNTER (OUTPATIENT)
Dept: INTERVENTIONAL RADIOLOGY/VASCULAR | Facility: HOSPITAL | Age: 75
Discharge: HOME | End: 2020-10-23
Attending: INTERNAL MEDICINE | Admitting: RADIOLOGY

## 2020-10-23 ENCOUNTER — AMBULATORY - HEALTHEAST (OUTPATIENT)
Dept: GERIATRICS | Facility: CLINIC | Age: 75
End: 2020-10-23

## 2020-10-23 ENCOUNTER — COMMUNICATION - HEALTHEAST (OUTPATIENT)
Dept: GERIATRICS | Facility: CLINIC | Age: 75
End: 2020-10-23

## 2020-10-23 DIAGNOSIS — E87.5 HYPERKALEMIA: ICD-10-CM

## 2020-10-23 DIAGNOSIS — N18.6 END STAGE RENAL DISEASE (H): ICD-10-CM

## 2020-10-23 ASSESSMENT — MIFFLIN-ST. JEOR: SCORE: 1188.54

## 2020-10-27 ENCOUNTER — COMMUNICATION - HEALTHEAST (OUTPATIENT)
Dept: INTERNAL MEDICINE | Facility: CLINIC | Age: 75
End: 2020-10-27

## 2020-11-04 ENCOUNTER — COMMUNICATION - HEALTHEAST (OUTPATIENT)
Dept: SCHEDULING | Facility: CLINIC | Age: 75
End: 2020-11-04

## 2020-11-12 ENCOUNTER — OFFICE VISIT - HEALTHEAST (OUTPATIENT)
Dept: GERIATRICS | Facility: CLINIC | Age: 75
End: 2020-11-12

## 2020-11-12 DIAGNOSIS — N18.6 ESRD ON DIALYSIS (H): ICD-10-CM

## 2020-11-12 DIAGNOSIS — J43.9 PULMONARY EMPHYSEMA, UNSPECIFIED EMPHYSEMA TYPE (H): ICD-10-CM

## 2020-11-12 DIAGNOSIS — Z99.2 ESRD ON DIALYSIS (H): ICD-10-CM

## 2020-11-12 DIAGNOSIS — I48.0 PAROXYSMAL ATRIAL FIBRILLATION (H): ICD-10-CM

## 2020-11-12 DIAGNOSIS — M79.2 NEUROPATHIC PAIN: ICD-10-CM

## 2020-11-12 DIAGNOSIS — M54.50 SEVERE LOW BACK PAIN: ICD-10-CM

## 2020-11-12 DIAGNOSIS — I50.33 ACUTE ON CHRONIC DIASTOLIC CONGESTIVE HEART FAILURE (H): ICD-10-CM

## 2020-11-16 ENCOUNTER — RECORDS - HEALTHEAST (OUTPATIENT)
Dept: ADMINISTRATIVE | Facility: OTHER | Age: 75
End: 2020-11-16

## 2020-11-17 ENCOUNTER — OFFICE VISIT - HEALTHEAST (OUTPATIENT)
Dept: GERIATRICS | Facility: CLINIC | Age: 75
End: 2020-11-17

## 2020-11-17 DIAGNOSIS — N18.6 ESRD ON DIALYSIS (H): ICD-10-CM

## 2020-11-17 DIAGNOSIS — R53.81 PHYSICAL DECONDITIONING: ICD-10-CM

## 2020-11-17 DIAGNOSIS — M54.50 SEVERE LOW BACK PAIN: ICD-10-CM

## 2020-11-17 DIAGNOSIS — I50.33 ACUTE ON CHRONIC DIASTOLIC CONGESTIVE HEART FAILURE (H): ICD-10-CM

## 2020-11-17 DIAGNOSIS — Z95.818 PRESENCE OF WATCHMAN LEFT ATRIAL APPENDAGE CLOSURE DEVICE: ICD-10-CM

## 2020-11-17 DIAGNOSIS — M79.2 NEUROPATHIC PAIN: ICD-10-CM

## 2020-11-17 DIAGNOSIS — Z99.2 ESRD ON DIALYSIS (H): ICD-10-CM

## 2020-11-17 DIAGNOSIS — J43.9 PULMONARY EMPHYSEMA, UNSPECIFIED EMPHYSEMA TYPE (H): ICD-10-CM

## 2020-11-17 DIAGNOSIS — I48.0 PAROXYSMAL ATRIAL FIBRILLATION (H): ICD-10-CM

## 2020-11-19 ENCOUNTER — OFFICE VISIT - HEALTHEAST (OUTPATIENT)
Dept: GERIATRICS | Facility: CLINIC | Age: 75
End: 2020-11-19

## 2020-11-19 DIAGNOSIS — Z99.2 ESRD ON DIALYSIS (H): ICD-10-CM

## 2020-11-19 DIAGNOSIS — I48.0 PAROXYSMAL ATRIAL FIBRILLATION (H): ICD-10-CM

## 2020-11-19 DIAGNOSIS — J43.9 PULMONARY EMPHYSEMA, UNSPECIFIED EMPHYSEMA TYPE (H): ICD-10-CM

## 2020-11-19 DIAGNOSIS — N18.6 ESRD ON DIALYSIS (H): ICD-10-CM

## 2020-11-19 DIAGNOSIS — M54.50 SEVERE LOW BACK PAIN: ICD-10-CM

## 2020-11-19 DIAGNOSIS — I50.33 ACUTE ON CHRONIC DIASTOLIC CONGESTIVE HEART FAILURE (H): ICD-10-CM

## 2020-11-22 ENCOUNTER — RECORDS - HEALTHEAST (OUTPATIENT)
Dept: LAB | Facility: CLINIC | Age: 75
End: 2020-11-22

## 2020-11-23 ENCOUNTER — RECORDS - HEALTHEAST (OUTPATIENT)
Dept: LAB | Facility: CLINIC | Age: 75
End: 2020-11-23

## 2020-11-23 LAB
ANION GAP SERPL CALCULATED.3IONS-SCNC: 10 MMOL/L (ref 5–18)
BUN SERPL-MCNC: 52 MG/DL (ref 8–28)
CALCIUM SERPL-MCNC: 8.7 MG/DL (ref 8.5–10.5)
CHLORIDE BLD-SCNC: 105 MMOL/L (ref 98–107)
CO2 SERPL-SCNC: 17 MMOL/L (ref 22–31)
CREAT SERPL-MCNC: 6.83 MG/DL (ref 0.6–1.1)
ERYTHROCYTE [DISTWIDTH] IN BLOOD BY AUTOMATED COUNT: 14.6 % (ref 11–14.5)
GFR SERPL CREATININE-BSD FRML MDRD: 6 ML/MIN/1.73M2
GLUCOSE BLD-MCNC: 76 MG/DL (ref 70–125)
HCT VFR BLD AUTO: 26.5 % (ref 35–47)
HGB BLD-MCNC: 8.1 G/DL (ref 12–16)
MCH RBC QN AUTO: 34 PG (ref 27–34)
MCHC RBC AUTO-ENTMCNC: 30.6 G/DL (ref 32–36)
MCV RBC AUTO: 111 FL (ref 80–100)
PLATELET # BLD AUTO: 75 THOU/UL (ref 140–440)
PMV BLD AUTO: 12 FL (ref 8.5–12.5)
POTASSIUM BLD-SCNC: 5.3 MMOL/L (ref 3.5–5)
POTASSIUM BLD-SCNC: 6.7 MMOL/L (ref 3.5–5)
RBC # BLD AUTO: 2.38 MILL/UL (ref 3.8–5.4)
SODIUM SERPL-SCNC: 132 MMOL/L (ref 136–145)
WBC: 4.4 THOU/UL (ref 4–11)

## 2020-11-24 ENCOUNTER — OFFICE VISIT - HEALTHEAST (OUTPATIENT)
Dept: GERIATRICS | Facility: CLINIC | Age: 75
End: 2020-11-24

## 2020-11-24 DIAGNOSIS — M54.50 SEVERE LOW BACK PAIN: ICD-10-CM

## 2020-11-24 DIAGNOSIS — N18.6 ESRD ON DIALYSIS (H): ICD-10-CM

## 2020-11-24 DIAGNOSIS — Z99.2 ESRD ON DIALYSIS (H): ICD-10-CM

## 2020-11-24 DIAGNOSIS — I50.33 ACUTE ON CHRONIC DIASTOLIC CONGESTIVE HEART FAILURE (H): ICD-10-CM

## 2020-11-24 DIAGNOSIS — R60.0 EDEMA OF RIGHT LOWER EXTREMITY: ICD-10-CM

## 2020-11-24 DIAGNOSIS — M79.2 NEUROPATHIC PAIN: ICD-10-CM

## 2020-11-24 DIAGNOSIS — I48.0 PAROXYSMAL ATRIAL FIBRILLATION (H): ICD-10-CM

## 2020-11-24 DIAGNOSIS — J43.9 PULMONARY EMPHYSEMA, UNSPECIFIED EMPHYSEMA TYPE (H): ICD-10-CM

## 2020-11-26 ENCOUNTER — OFFICE VISIT - HEALTHEAST (OUTPATIENT)
Dept: GERIATRICS | Facility: CLINIC | Age: 75
End: 2020-11-26

## 2020-11-26 DIAGNOSIS — I48.0 PAROXYSMAL ATRIAL FIBRILLATION (H): ICD-10-CM

## 2020-11-26 DIAGNOSIS — M79.2 NEUROPATHIC PAIN: ICD-10-CM

## 2020-11-26 DIAGNOSIS — Z99.2 ESRD ON DIALYSIS (H): ICD-10-CM

## 2020-11-26 DIAGNOSIS — I73.9 PERIPHERAL ARTERIAL DISEASE (H): ICD-10-CM

## 2020-11-26 DIAGNOSIS — N18.6 ESRD ON DIALYSIS (H): ICD-10-CM

## 2020-11-26 DIAGNOSIS — J43.9 PULMONARY EMPHYSEMA, UNSPECIFIED EMPHYSEMA TYPE (H): ICD-10-CM

## 2020-11-26 DIAGNOSIS — R60.0 EDEMA OF RIGHT LOWER EXTREMITY: ICD-10-CM

## 2020-11-26 DIAGNOSIS — I50.33 ACUTE ON CHRONIC DIASTOLIC CONGESTIVE HEART FAILURE (H): ICD-10-CM

## 2020-11-30 ENCOUNTER — AMBULATORY - HEALTHEAST (OUTPATIENT)
Dept: GERIATRICS | Facility: CLINIC | Age: 75
End: 2020-11-30

## 2020-12-01 ENCOUNTER — COMMUNICATION - HEALTHEAST (OUTPATIENT)
Dept: GERIATRICS | Facility: CLINIC | Age: 75
End: 2020-12-01

## 2020-12-10 ENCOUNTER — COMMUNICATION - HEALTHEAST (OUTPATIENT)
Dept: SCHEDULING | Facility: CLINIC | Age: 75
End: 2020-12-10

## 2020-12-14 ENCOUNTER — COMMUNICATION - HEALTHEAST (OUTPATIENT)
Dept: SCHEDULING | Facility: CLINIC | Age: 75
End: 2020-12-14

## 2020-12-14 ENCOUNTER — COMMUNICATION - HEALTHEAST (OUTPATIENT)
Dept: NURSING | Facility: CLINIC | Age: 75
End: 2020-12-14

## 2020-12-17 ENCOUNTER — OFFICE VISIT - HEALTHEAST (OUTPATIENT)
Dept: INTERNAL MEDICINE | Facility: CLINIC | Age: 75
End: 2020-12-17

## 2020-12-17 DIAGNOSIS — R52 PAIN: ICD-10-CM

## 2020-12-17 DIAGNOSIS — Z99.2 ESRD ON HEMODIALYSIS (H): ICD-10-CM

## 2020-12-17 DIAGNOSIS — N18.6 ESRD ON HEMODIALYSIS (H): ICD-10-CM

## 2020-12-17 DIAGNOSIS — I73.9 PVD (PERIPHERAL VASCULAR DISEASE) (H): ICD-10-CM

## 2020-12-17 DIAGNOSIS — J41.1 MUCOPURULENT CHRONIC BRONCHITIS (H): ICD-10-CM

## 2020-12-17 DIAGNOSIS — Z09 HOSPITAL DISCHARGE FOLLOW-UP: ICD-10-CM

## 2021-01-01 ENCOUNTER — APPOINTMENT (OUTPATIENT)
Dept: RADIOLOGY | Facility: HOSPITAL | Age: 76
DRG: 640 | End: 2021-01-01
Attending: EMERGENCY MEDICINE
Payer: MEDICARE

## 2021-01-01 ENCOUNTER — RECORDS - HEALTHEAST (OUTPATIENT)
Dept: ADMINISTRATIVE | Facility: CLINIC | Age: 76
End: 2021-01-01

## 2021-01-01 ENCOUNTER — COMMUNICATION - HEALTHEAST (OUTPATIENT)
Dept: NURSING | Facility: CLINIC | Age: 76
End: 2021-01-01

## 2021-01-01 ENCOUNTER — AMBULATORY - HEALTHEAST (OUTPATIENT)
Dept: GERIATRICS | Facility: CLINIC | Age: 76
End: 2021-01-01

## 2021-01-01 ENCOUNTER — APPOINTMENT (OUTPATIENT)
Dept: PHYSICAL THERAPY | Facility: CLINIC | Age: 76
DRG: 291 | End: 2021-01-01
Attending: PHYSICIAN ASSISTANT
Payer: MEDICARE

## 2021-01-01 ENCOUNTER — ANCILLARY PROCEDURE (OUTPATIENT)
Dept: CARDIOLOGY | Facility: CLINIC | Age: 76
End: 2021-01-01
Attending: INTERNAL MEDICINE
Payer: MEDICARE

## 2021-01-01 ENCOUNTER — COMMUNICATION - HEALTHEAST (OUTPATIENT)
Dept: CARDIOLOGY | Facility: CLINIC | Age: 76
End: 2021-01-01

## 2021-01-01 ENCOUNTER — COMMUNICATION - HEALTHEAST (OUTPATIENT)
Dept: GERIATRICS | Facility: CLINIC | Age: 76
End: 2021-01-01

## 2021-01-01 ENCOUNTER — COMMUNICATION - HEALTHEAST (OUTPATIENT)
Dept: INTERNAL MEDICINE | Facility: CLINIC | Age: 76
End: 2021-01-01

## 2021-01-01 ENCOUNTER — COMMUNICATION - HEALTHEAST (OUTPATIENT)
Dept: SCHEDULING | Facility: CLINIC | Age: 76
End: 2021-01-01

## 2021-01-01 ENCOUNTER — HOSPITAL ENCOUNTER (EMERGENCY)
Facility: HOSPITAL | Age: 76
Discharge: HOME | End: 2021-07-31
Attending: EMERGENCY MEDICINE | Admitting: EMERGENCY MEDICINE
Payer: MEDICARE

## 2021-01-01 ENCOUNTER — TELEPHONE (OUTPATIENT)
Dept: INTERNAL MEDICINE | Facility: CLINIC | Age: 76
End: 2021-01-01

## 2021-01-01 ENCOUNTER — OFFICE VISIT - HEALTHEAST (OUTPATIENT)
Dept: GERIATRICS | Facility: CLINIC | Age: 76
End: 2021-01-01

## 2021-01-01 ENCOUNTER — AMBULATORY - HEALTHEAST (OUTPATIENT)
Dept: CARDIOLOGY | Facility: CLINIC | Age: 76
End: 2021-01-01

## 2021-01-01 ENCOUNTER — APPOINTMENT (OUTPATIENT)
Dept: CT IMAGING | Facility: HOSPITAL | Age: 76
DRG: 640 | End: 2021-01-01
Attending: FAMILY MEDICINE
Payer: MEDICARE

## 2021-01-01 ENCOUNTER — COMMUNICATION - HEALTHEAST (OUTPATIENT)
Dept: ADMINISTRATIVE | Facility: CLINIC | Age: 76
End: 2021-01-01

## 2021-01-01 ENCOUNTER — PATIENT OUTREACH (OUTPATIENT)
Dept: CARE COORDINATION | Facility: CLINIC | Age: 76
End: 2021-01-01

## 2021-01-01 ENCOUNTER — HOSPITAL ENCOUNTER (INPATIENT)
Dept: INTENSIVE CARE | Facility: CLINIC | Age: 76
LOS: 4 days | Discharge: HOME HEALTH | DRG: 202 | End: 2021-07-15
Attending: INTERNAL MEDICINE | Admitting: INTERNAL MEDICINE
Payer: MEDICARE

## 2021-01-01 ENCOUNTER — RECORDS - HEALTHEAST (OUTPATIENT)
Dept: ADMINISTRATIVE | Facility: OTHER | Age: 76
End: 2021-01-01

## 2021-01-01 ENCOUNTER — DOCUMENTATION ONLY (OUTPATIENT)
Dept: OTHER | Facility: CLINIC | Age: 76
End: 2021-01-01

## 2021-01-01 ENCOUNTER — OFFICE VISIT (OUTPATIENT)
Dept: INTERNAL MEDICINE | Facility: CLINIC | Age: 76
End: 2021-01-01
Payer: MEDICARE

## 2021-01-01 ENCOUNTER — APPOINTMENT (OUTPATIENT)
Dept: PHYSICAL THERAPY | Facility: CLINIC | Age: 76
DRG: 202 | End: 2021-01-01
Attending: INTERNAL MEDICINE
Payer: MEDICARE

## 2021-01-01 ENCOUNTER — MEDICAL CORRESPONDENCE (OUTPATIENT)
Dept: HEALTH INFORMATION MANAGEMENT | Facility: CLINIC | Age: 76
End: 2021-01-01

## 2021-01-01 ENCOUNTER — AMBULATORY - HEALTHEAST (OUTPATIENT)
Dept: CARE COORDINATION | Facility: CLINIC | Age: 76
End: 2021-01-01

## 2021-01-01 ENCOUNTER — MEDICAL CORRESPONDENCE (OUTPATIENT)
Dept: HEALTH INFORMATION MANAGEMENT | Facility: CLINIC | Age: 76
End: 2021-01-01
Payer: MEDICARE

## 2021-01-01 ENCOUNTER — TELEPHONE (OUTPATIENT)
Dept: INTERNAL MEDICINE | Facility: CLINIC | Age: 76
End: 2021-01-01
Payer: MEDICARE

## 2021-01-01 ENCOUNTER — HOSPITAL ENCOUNTER (OUTPATIENT)
Facility: HOSPITAL | Age: 76
Discharge: HOME | End: 2021-09-10
Attending: EMERGENCY MEDICINE | Admitting: INTERNAL MEDICINE
Payer: MEDICARE

## 2021-01-01 ENCOUNTER — APPOINTMENT (OUTPATIENT)
Dept: OCCUPATIONAL THERAPY | Facility: CLINIC | Age: 76
DRG: 202 | End: 2021-01-01
Attending: INTERNAL MEDICINE
Payer: MEDICARE

## 2021-01-01 ENCOUNTER — HOSPITAL ENCOUNTER (INPATIENT)
Facility: CLINIC | Age: 76
LOS: 2 days | Discharge: HOME HEALTH | DRG: 291 | End: 2021-04-02
Attending: INTERNAL MEDICINE | Admitting: INTERNAL MEDICINE
Payer: MEDICARE

## 2021-01-01 ENCOUNTER — HOSPITAL ENCOUNTER (INPATIENT)
Facility: HOSPITAL | Age: 76
LOS: 4 days | Discharge: HOME | DRG: 640 | End: 2021-08-20
Attending: EMERGENCY MEDICINE | Admitting: NEUROLOGICAL SURGERY
Payer: MEDICARE

## 2021-01-01 ENCOUNTER — OFFICE VISIT - HEALTHEAST (OUTPATIENT)
Dept: PHARMACY | Facility: CLINIC | Age: 76
End: 2021-01-01

## 2021-01-01 ENCOUNTER — OFFICE VISIT - HEALTHEAST (OUTPATIENT)
Dept: INTERNAL MEDICINE | Facility: CLINIC | Age: 76
End: 2021-01-01

## 2021-01-01 ENCOUNTER — APPOINTMENT (OUTPATIENT)
Dept: PHYSICAL THERAPY | Facility: CLINIC | Age: 76
DRG: 291 | End: 2021-01-01
Attending: INTERNAL MEDICINE
Payer: MEDICARE

## 2021-01-01 ENCOUNTER — TELEPHONE (OUTPATIENT)
Dept: PHARMACY | Facility: OTHER | Age: 76
End: 2021-01-01

## 2021-01-01 ENCOUNTER — APPOINTMENT (OUTPATIENT)
Dept: RADIOLOGY | Facility: HOSPITAL | Age: 76
End: 2021-01-01
Attending: EMERGENCY MEDICINE
Payer: MEDICARE

## 2021-01-01 VITALS
SYSTOLIC BLOOD PRESSURE: 96 MMHG | TEMPERATURE: 97.6 F | HEART RATE: 78 BPM | WEIGHT: 148.9 LBS | OXYGEN SATURATION: 94 % | BODY MASS INDEX: 25.42 KG/M2 | RESPIRATION RATE: 18 BRPM | DIASTOLIC BLOOD PRESSURE: 53 MMHG | HEIGHT: 64 IN

## 2021-01-01 VITALS — WEIGHT: 162.6 LBS | BODY MASS INDEX: 26.13 KG/M2 | HEIGHT: 66 IN

## 2021-01-01 VITALS
OXYGEN SATURATION: 94 % | RESPIRATION RATE: 18 BRPM | SYSTOLIC BLOOD PRESSURE: 98 MMHG | TEMPERATURE: 98.5 F | DIASTOLIC BLOOD PRESSURE: 56 MMHG | HEART RATE: 71 BPM

## 2021-01-01 VITALS
DIASTOLIC BLOOD PRESSURE: 65 MMHG | OXYGEN SATURATION: 97 % | SYSTOLIC BLOOD PRESSURE: 99 MMHG | TEMPERATURE: 97.2 F | RESPIRATION RATE: 16 BRPM | HEART RATE: 67 BPM

## 2021-01-01 VITALS
WEIGHT: 158.8 LBS | OXYGEN SATURATION: 98 % | TEMPERATURE: 98 F | BODY MASS INDEX: 25.63 KG/M2 | HEART RATE: 80 BPM | SYSTOLIC BLOOD PRESSURE: 110 MMHG | RESPIRATION RATE: 18 BRPM | DIASTOLIC BLOOD PRESSURE: 70 MMHG

## 2021-01-01 VITALS — HEIGHT: 66 IN | BODY MASS INDEX: 30.37 KG/M2 | WEIGHT: 189 LBS

## 2021-01-01 VITALS — WEIGHT: 163 LBS | BODY MASS INDEX: 27.12 KG/M2

## 2021-01-01 VITALS
BODY MASS INDEX: 24.58 KG/M2 | HEIGHT: 65 IN | OXYGEN SATURATION: 94 % | DIASTOLIC BLOOD PRESSURE: 58 MMHG | HEART RATE: 73 BPM | RESPIRATION RATE: 16 BRPM | WEIGHT: 147.55 LBS | SYSTOLIC BLOOD PRESSURE: 90 MMHG

## 2021-01-01 VITALS
OXYGEN SATURATION: 97 % | HEART RATE: 72 BPM | SYSTOLIC BLOOD PRESSURE: 122 MMHG | DIASTOLIC BLOOD PRESSURE: 71 MMHG | RESPIRATION RATE: 20 BRPM | TEMPERATURE: 97.1 F

## 2021-01-01 VITALS
DIASTOLIC BLOOD PRESSURE: 62 MMHG | HEART RATE: 76 BPM | WEIGHT: 147 LBS | BODY MASS INDEX: 25.1 KG/M2 | OXYGEN SATURATION: 97 % | HEIGHT: 64 IN | RESPIRATION RATE: 16 BRPM | TEMPERATURE: 98.3 F | SYSTOLIC BLOOD PRESSURE: 97 MMHG

## 2021-01-01 VITALS
HEART RATE: 75 BPM | DIASTOLIC BLOOD PRESSURE: 56 MMHG | SYSTOLIC BLOOD PRESSURE: 88 MMHG | TEMPERATURE: 98.3 F | OXYGEN SATURATION: 96 % | RESPIRATION RATE: 16 BRPM | BODY MASS INDEX: 25.31 KG/M2 | WEIGHT: 156.8 LBS

## 2021-01-01 VITALS
HEART RATE: 70 BPM | SYSTOLIC BLOOD PRESSURE: 130 MMHG | OXYGEN SATURATION: 93 % | DIASTOLIC BLOOD PRESSURE: 62 MMHG | TEMPERATURE: 98.4 F

## 2021-01-01 VITALS — BODY MASS INDEX: 28.12 KG/M2 | WEIGHT: 169 LBS

## 2021-01-01 VITALS
HEART RATE: 78 BPM | WEIGHT: 177.1 LBS | BODY MASS INDEX: 29.47 KG/M2 | OXYGEN SATURATION: 95 % | TEMPERATURE: 97.7 F | RESPIRATION RATE: 18 BRPM | DIASTOLIC BLOOD PRESSURE: 65 MMHG | SYSTOLIC BLOOD PRESSURE: 109 MMHG

## 2021-01-01 VITALS — OXYGEN SATURATION: 95 % | SYSTOLIC BLOOD PRESSURE: 94 MMHG | DIASTOLIC BLOOD PRESSURE: 56 MMHG | HEART RATE: 72 BPM

## 2021-01-01 VITALS — WEIGHT: 186 LBS | BODY MASS INDEX: 30.02 KG/M2

## 2021-01-01 VITALS — HEIGHT: 66 IN | WEIGHT: 185 LBS | BODY MASS INDEX: 29.73 KG/M2

## 2021-01-01 VITALS — WEIGHT: 169 LBS | BODY MASS INDEX: 27.28 KG/M2

## 2021-01-01 VITALS — HEIGHT: 65 IN | BODY MASS INDEX: 27.66 KG/M2 | WEIGHT: 166 LBS

## 2021-01-01 VITALS
TEMPERATURE: 97.8 F | WEIGHT: 152.12 LBS | HEART RATE: 78 BPM | BODY MASS INDEX: 25.97 KG/M2 | HEIGHT: 64 IN | SYSTOLIC BLOOD PRESSURE: 96 MMHG | DIASTOLIC BLOOD PRESSURE: 56 MMHG

## 2021-01-01 VITALS
TEMPERATURE: 98.3 F | BODY MASS INDEX: 23.73 KG/M2 | SYSTOLIC BLOOD PRESSURE: 86 MMHG | DIASTOLIC BLOOD PRESSURE: 54 MMHG | HEART RATE: 70 BPM | OXYGEN SATURATION: 99 % | WEIGHT: 147 LBS | RESPIRATION RATE: 16 BRPM

## 2021-01-01 VITALS
SYSTOLIC BLOOD PRESSURE: 102 MMHG | HEIGHT: 65 IN | WEIGHT: 156.53 LBS | HEART RATE: 70 BPM | DIASTOLIC BLOOD PRESSURE: 54 MMHG | BODY MASS INDEX: 26.08 KG/M2 | OXYGEN SATURATION: 92 %

## 2021-01-01 VITALS
RESPIRATION RATE: 18 BRPM | HEART RATE: 70 BPM | OXYGEN SATURATION: 98 % | BODY MASS INDEX: 26.92 KG/M2 | SYSTOLIC BLOOD PRESSURE: 108 MMHG | WEIGHT: 161.8 LBS | DIASTOLIC BLOOD PRESSURE: 68 MMHG | TEMPERATURE: 97.9 F

## 2021-01-01 VITALS
WEIGHT: 177.1 LBS | HEART RATE: 71 BPM | DIASTOLIC BLOOD PRESSURE: 64 MMHG | OXYGEN SATURATION: 97 % | RESPIRATION RATE: 16 BRPM | BODY MASS INDEX: 29.47 KG/M2 | SYSTOLIC BLOOD PRESSURE: 106 MMHG | TEMPERATURE: 97.3 F

## 2021-01-01 VITALS
TEMPERATURE: 97.6 F | WEIGHT: 160 LBS | HEART RATE: 72 BPM | RESPIRATION RATE: 18 BRPM | BODY MASS INDEX: 25.82 KG/M2 | OXYGEN SATURATION: 94 % | SYSTOLIC BLOOD PRESSURE: 101 MMHG | DIASTOLIC BLOOD PRESSURE: 60 MMHG

## 2021-01-01 VITALS
RESPIRATION RATE: 18 BRPM | HEART RATE: 70 BPM | TEMPERATURE: 98.2 F | DIASTOLIC BLOOD PRESSURE: 54 MMHG | SYSTOLIC BLOOD PRESSURE: 92 MMHG | WEIGHT: 153.6 LBS | OXYGEN SATURATION: 98 % | BODY MASS INDEX: 24.79 KG/M2

## 2021-01-01 VITALS — BODY MASS INDEX: 28.02 KG/M2 | WEIGHT: 168.19 LBS | HEIGHT: 65 IN

## 2021-01-01 VITALS — WEIGHT: 176.6 LBS | BODY MASS INDEX: 29.39 KG/M2

## 2021-01-01 VITALS — BODY MASS INDEX: 27.74 KG/M2 | WEIGHT: 166.7 LBS

## 2021-01-01 VITALS — BODY MASS INDEX: 30.57 KG/M2 | WEIGHT: 189.4 LBS

## 2021-01-01 VITALS
DIASTOLIC BLOOD PRESSURE: 54 MMHG | SYSTOLIC BLOOD PRESSURE: 118 MMHG | HEART RATE: 66 BPM | OXYGEN SATURATION: 95 % | WEIGHT: 151.31 LBS | BODY MASS INDEX: 25.18 KG/M2

## 2021-01-01 VITALS — WEIGHT: 171 LBS | BODY MASS INDEX: 28.46 KG/M2

## 2021-01-01 VITALS
HEART RATE: 70 BPM | BODY MASS INDEX: 26.06 KG/M2 | RESPIRATION RATE: 18 BRPM | TEMPERATURE: 97.4 F | WEIGHT: 156.6 LBS | OXYGEN SATURATION: 100 % | DIASTOLIC BLOOD PRESSURE: 68 MMHG | SYSTOLIC BLOOD PRESSURE: 113 MMHG

## 2021-01-01 VITALS — WEIGHT: 165 LBS | BODY MASS INDEX: 27.49 KG/M2 | BODY MASS INDEX: 26.79 KG/M2 | WEIGHT: 161 LBS | HEIGHT: 65 IN

## 2021-01-01 VITALS
HEART RATE: 82 BPM | SYSTOLIC BLOOD PRESSURE: 120 MMHG | OXYGEN SATURATION: 98 % | DIASTOLIC BLOOD PRESSURE: 70 MMHG | RESPIRATION RATE: 16 BRPM | TEMPERATURE: 97.7 F

## 2021-01-01 VITALS
HEART RATE: 76 BPM | SYSTOLIC BLOOD PRESSURE: 96 MMHG | TEMPERATURE: 98.6 F | DIASTOLIC BLOOD PRESSURE: 56 MMHG | OXYGEN SATURATION: 93 %

## 2021-01-01 VITALS
WEIGHT: 157 LBS | HEART RATE: 79 BPM | SYSTOLIC BLOOD PRESSURE: 96 MMHG | BODY MASS INDEX: 25.34 KG/M2 | DIASTOLIC BLOOD PRESSURE: 55 MMHG | OXYGEN SATURATION: 95 % | TEMPERATURE: 98.2 F | RESPIRATION RATE: 18 BRPM

## 2021-01-01 VITALS
DIASTOLIC BLOOD PRESSURE: 60 MMHG | TEMPERATURE: 97.7 F | SYSTOLIC BLOOD PRESSURE: 116 MMHG | HEIGHT: 65 IN | OXYGEN SATURATION: 96 % | BODY MASS INDEX: 26.23 KG/M2 | WEIGHT: 157.41 LBS | HEART RATE: 70 BPM

## 2021-01-01 VITALS — HEIGHT: 66 IN | WEIGHT: 169 LBS | BODY MASS INDEX: 27.16 KG/M2

## 2021-01-01 VITALS — BODY MASS INDEX: 30.18 KG/M2 | WEIGHT: 187 LBS

## 2021-01-01 VITALS
SYSTOLIC BLOOD PRESSURE: 150 MMHG | HEART RATE: 60 BPM | BODY MASS INDEX: 26.66 KG/M2 | RESPIRATION RATE: 14 BRPM | DIASTOLIC BLOOD PRESSURE: 76 MMHG | HEIGHT: 65 IN | WEIGHT: 160 LBS

## 2021-01-01 VITALS
RESPIRATION RATE: 18 BRPM | SYSTOLIC BLOOD PRESSURE: 99 MMHG | TEMPERATURE: 98.3 F | BODY MASS INDEX: 24.53 KG/M2 | DIASTOLIC BLOOD PRESSURE: 56 MMHG | WEIGHT: 152 LBS | OXYGEN SATURATION: 94 % | HEART RATE: 74 BPM

## 2021-01-01 VITALS — WEIGHT: 164.5 LBS | BODY MASS INDEX: 27.37 KG/M2

## 2021-01-01 VITALS
WEIGHT: 157 LBS | TEMPERATURE: 98.2 F | BODY MASS INDEX: 28.96 KG/M2 | DIASTOLIC BLOOD PRESSURE: 73 MMHG | BODY MASS INDEX: 25.34 KG/M2 | SYSTOLIC BLOOD PRESSURE: 117 MMHG | WEIGHT: 174 LBS | OXYGEN SATURATION: 97 % | RESPIRATION RATE: 16 BRPM | HEART RATE: 76 BPM

## 2021-01-01 VITALS
SYSTOLIC BLOOD PRESSURE: 92 MMHG | BODY MASS INDEX: 25.99 KG/M2 | DIASTOLIC BLOOD PRESSURE: 58 MMHG | HEIGHT: 65 IN | OXYGEN SATURATION: 91 % | HEART RATE: 72 BPM | WEIGHT: 156 LBS

## 2021-01-01 VITALS — BODY MASS INDEX: 28.96 KG/M2 | WEIGHT: 174 LBS

## 2021-01-01 VITALS
SYSTOLIC BLOOD PRESSURE: 116 MMHG | BODY MASS INDEX: 24.55 KG/M2 | DIASTOLIC BLOOD PRESSURE: 64 MMHG | OXYGEN SATURATION: 96 % | WEIGHT: 143 LBS | HEART RATE: 70 BPM

## 2021-01-01 VITALS
RESPIRATION RATE: 16 BRPM | WEIGHT: 153.8 LBS | DIASTOLIC BLOOD PRESSURE: 59 MMHG | HEIGHT: 64 IN | BODY MASS INDEX: 26.26 KG/M2 | HEART RATE: 70 BPM | OXYGEN SATURATION: 99 % | TEMPERATURE: 98.1 F | SYSTOLIC BLOOD PRESSURE: 106 MMHG

## 2021-01-01 VITALS
DIASTOLIC BLOOD PRESSURE: 68 MMHG | WEIGHT: 156.6 LBS | SYSTOLIC BLOOD PRESSURE: 113 MMHG | OXYGEN SATURATION: 100 % | RESPIRATION RATE: 18 BRPM | TEMPERATURE: 97.4 F | BODY MASS INDEX: 26.06 KG/M2 | HEART RATE: 70 BPM

## 2021-01-01 VITALS — BODY MASS INDEX: 28.61 KG/M2 | WEIGHT: 171.9 LBS

## 2021-01-01 VITALS — HEIGHT: 66 IN | BODY MASS INDEX: 29.47 KG/M2 | WEIGHT: 183.4 LBS

## 2021-01-01 VITALS — WEIGHT: 154 LBS | HEIGHT: 64 IN | BODY MASS INDEX: 26.29 KG/M2

## 2021-01-01 VITALS — HEIGHT: 65 IN | WEIGHT: 164.8 LBS | BODY MASS INDEX: 27.46 KG/M2

## 2021-01-01 VITALS
TEMPERATURE: 97.3 F | OXYGEN SATURATION: 97 % | HEART RATE: 74 BPM | DIASTOLIC BLOOD PRESSURE: 64 MMHG | HEIGHT: 64 IN | WEIGHT: 160.5 LBS | BODY MASS INDEX: 27.4 KG/M2 | RESPIRATION RATE: 18 BRPM | SYSTOLIC BLOOD PRESSURE: 116 MMHG

## 2021-01-01 VITALS — HEIGHT: 65 IN | BODY MASS INDEX: 27.16 KG/M2 | WEIGHT: 163 LBS

## 2021-01-01 VITALS — BODY MASS INDEX: 28.64 KG/M2 | HEIGHT: 66 IN | WEIGHT: 178.2 LBS

## 2021-01-01 VITALS — WEIGHT: 168 LBS | HEIGHT: 66 IN | BODY MASS INDEX: 27 KG/M2

## 2021-01-01 VITALS — BODY MASS INDEX: 28.29 KG/M2 | WEIGHT: 170 LBS

## 2021-01-01 VITALS — WEIGHT: 192.6 LBS | BODY MASS INDEX: 30.95 KG/M2 | HEIGHT: 66 IN

## 2021-01-01 VITALS
DIASTOLIC BLOOD PRESSURE: 68 MMHG | SYSTOLIC BLOOD PRESSURE: 113 MMHG | HEART RATE: 70 BPM | OXYGEN SATURATION: 100 % | RESPIRATION RATE: 18 BRPM | WEIGHT: 156.6 LBS | BODY MASS INDEX: 26.06 KG/M2 | TEMPERATURE: 97.4 F

## 2021-01-01 VITALS
OXYGEN SATURATION: 95 % | RESPIRATION RATE: 16 BRPM | HEART RATE: 73 BPM | DIASTOLIC BLOOD PRESSURE: 73 MMHG | TEMPERATURE: 97 F | SYSTOLIC BLOOD PRESSURE: 128 MMHG

## 2021-01-01 VITALS — BODY MASS INDEX: 29.59 KG/M2 | WEIGHT: 183.3 LBS

## 2021-01-01 VITALS — WEIGHT: 187 LBS | BODY MASS INDEX: 30.18 KG/M2

## 2021-01-01 VITALS — BODY MASS INDEX: 28.49 KG/M2 | WEIGHT: 171.2 LBS

## 2021-01-01 VITALS — BODY MASS INDEX: 26.41 KG/M2 | HEIGHT: 66 IN | WEIGHT: 164.3 LBS

## 2021-01-01 VITALS — BODY MASS INDEX: 29.09 KG/M2 | WEIGHT: 181 LBS | HEIGHT: 66 IN

## 2021-01-01 VITALS — HEIGHT: 66 IN | WEIGHT: 183.6 LBS | BODY MASS INDEX: 29.51 KG/M2

## 2021-01-01 VITALS — BODY MASS INDEX: 27.07 KG/M2 | WEIGHT: 168.43 LBS | HEIGHT: 66 IN

## 2021-01-01 VITALS — HEIGHT: 66 IN | WEIGHT: 184.7 LBS | BODY MASS INDEX: 29.68 KG/M2

## 2021-01-01 VITALS — HEIGHT: 66 IN | BODY MASS INDEX: 30.26 KG/M2 | WEIGHT: 188.3 LBS

## 2021-01-01 VITALS
SYSTOLIC BLOOD PRESSURE: 98 MMHG | OXYGEN SATURATION: 97 % | BODY MASS INDEX: 28.46 KG/M2 | WEIGHT: 177.1 LBS | TEMPERATURE: 96.9 F | DIASTOLIC BLOOD PRESSURE: 61 MMHG | HEIGHT: 66 IN | RESPIRATION RATE: 16 BRPM | HEART RATE: 69 BPM

## 2021-01-01 VITALS — WEIGHT: 170 LBS | BODY MASS INDEX: 28.32 KG/M2 | HEIGHT: 65 IN

## 2021-01-01 VITALS — HEIGHT: 65 IN | BODY MASS INDEX: 28.82 KG/M2 | WEIGHT: 173 LBS

## 2021-01-01 VITALS — WEIGHT: 168 LBS | BODY MASS INDEX: 27 KG/M2 | HEIGHT: 66 IN

## 2021-01-01 VITALS — BODY MASS INDEX: 28.96 KG/M2 | HEIGHT: 66 IN | WEIGHT: 180.2 LBS

## 2021-01-01 VITALS — BODY MASS INDEX: 28.22 KG/M2 | HEIGHT: 65 IN | WEIGHT: 169.4 LBS

## 2021-01-01 VITALS — WEIGHT: 186 LBS | HEIGHT: 66 IN | BODY MASS INDEX: 29.89 KG/M2

## 2021-01-01 VITALS — WEIGHT: 177 LBS | BODY MASS INDEX: 29.45 KG/M2

## 2021-01-01 VITALS — WEIGHT: 176 LBS | HEIGHT: 66 IN | BODY MASS INDEX: 28.28 KG/M2

## 2021-01-01 VITALS — HEIGHT: 66 IN | WEIGHT: 184 LBS | BODY MASS INDEX: 29.57 KG/M2

## 2021-01-01 VITALS — WEIGHT: 172.5 LBS | BODY MASS INDEX: 33.86 KG/M2 | HEIGHT: 60 IN

## 2021-01-01 VITALS — HEIGHT: 66 IN | WEIGHT: 191 LBS | BODY MASS INDEX: 30.7 KG/M2

## 2021-01-01 VITALS — BODY MASS INDEX: 27.06 KG/M2 | WEIGHT: 162.6 LBS

## 2021-01-01 VITALS — BODY MASS INDEX: 26.91 KG/M2 | HEIGHT: 65 IN | WEIGHT: 161.5 LBS

## 2021-01-01 VITALS — WEIGHT: 190 LBS | HEIGHT: 66 IN | BODY MASS INDEX: 30.53 KG/M2

## 2021-01-01 VITALS — WEIGHT: 172.1 LBS | BODY MASS INDEX: 28.64 KG/M2

## 2021-01-01 VITALS — WEIGHT: 180.38 LBS | BODY MASS INDEX: 29.11 KG/M2

## 2021-01-01 DIAGNOSIS — Z95.0 CARDIAC PACEMAKER IN SITU: ICD-10-CM

## 2021-01-01 DIAGNOSIS — J43.9 PULMONARY EMPHYSEMA, UNSPECIFIED EMPHYSEMA TYPE (H): ICD-10-CM

## 2021-01-01 DIAGNOSIS — J96.11 CHRONIC RESPIRATORY FAILURE WITH HYPOXIA (H): ICD-10-CM

## 2021-01-01 DIAGNOSIS — D63.1 ANEMIA OF CHRONIC RENAL FAILURE, STAGE 5 (H): ICD-10-CM

## 2021-01-01 DIAGNOSIS — F33.0 MILD EPISODE OF RECURRENT MAJOR DEPRESSIVE DISORDER (H): ICD-10-CM

## 2021-01-01 DIAGNOSIS — J44.1 COPD EXACERBATION (H): ICD-10-CM

## 2021-01-01 DIAGNOSIS — N18.6 ESRD ON HEMODIALYSIS (H): ICD-10-CM

## 2021-01-01 DIAGNOSIS — J96.01 ACUTE RESPIRATORY FAILURE WITH HYPOXIA AND HYPERCARBIA (H): ICD-10-CM

## 2021-01-01 DIAGNOSIS — E87.70 HYPERVOLEMIA, UNSPECIFIED HYPERVOLEMIA TYPE: ICD-10-CM

## 2021-01-01 DIAGNOSIS — F51.01 PRIMARY INSOMNIA: ICD-10-CM

## 2021-01-01 DIAGNOSIS — J44.1 COPD EXACERBATION (H): Primary | ICD-10-CM

## 2021-01-01 DIAGNOSIS — G47.00 INSOMNIA, UNSPECIFIED TYPE: ICD-10-CM

## 2021-01-01 DIAGNOSIS — E86.1 HYPOTENSION DUE TO HYPOVOLEMIA: ICD-10-CM

## 2021-01-01 DIAGNOSIS — I49.5 SICK SINUS SYNDROME (H): ICD-10-CM

## 2021-01-01 DIAGNOSIS — G62.9 NEUROPATHY: ICD-10-CM

## 2021-01-01 DIAGNOSIS — I48.20 CHRONIC ATRIAL FIBRILLATION (H): ICD-10-CM

## 2021-01-01 DIAGNOSIS — Z71.89 ENCOUNTER FOR HERB AND VITAMIN SUPPLEMENT MANAGEMENT: ICD-10-CM

## 2021-01-01 DIAGNOSIS — K21.9 GASTROESOPHAGEAL REFLUX DISEASE WITHOUT ESOPHAGITIS: ICD-10-CM

## 2021-01-01 DIAGNOSIS — R29.0 CARPOPEDAL SPASM: ICD-10-CM

## 2021-01-01 DIAGNOSIS — Z99.2 ESRD ON HEMODIALYSIS (H): ICD-10-CM

## 2021-01-01 DIAGNOSIS — J96.21 ACUTE ON CHRONIC RESPIRATORY FAILURE WITH HYPOXIA (H): ICD-10-CM

## 2021-01-01 DIAGNOSIS — G43.809 OTHER MIGRAINE WITHOUT STATUS MIGRAINOSUS, NOT INTRACTABLE: ICD-10-CM

## 2021-01-01 DIAGNOSIS — J96.02 ACUTE RESPIRATORY FAILURE WITH HYPOXIA AND HYPERCARBIA (H): ICD-10-CM

## 2021-01-01 DIAGNOSIS — I89.0 CHRONIC ACQUIRED LYMPHEDEMA: ICD-10-CM

## 2021-01-01 DIAGNOSIS — J30.1 SEASONAL ALLERGIC RHINITIS DUE TO POLLEN: ICD-10-CM

## 2021-01-01 DIAGNOSIS — Z95.818 PRESENCE OF WATCHMAN LEFT ATRIAL APPENDAGE CLOSURE DEVICE: ICD-10-CM

## 2021-01-01 DIAGNOSIS — I50.32 CHRONIC DIASTOLIC HEART FAILURE (H): ICD-10-CM

## 2021-01-01 DIAGNOSIS — Z09 HOSPITAL DISCHARGE FOLLOW-UP: Primary | ICD-10-CM

## 2021-01-01 DIAGNOSIS — J96.22 ACUTE ON CHRONIC RESPIRATORY FAILURE WITH HYPERCAPNIA (H): ICD-10-CM

## 2021-01-01 DIAGNOSIS — N18.5 ANEMIA OF CHRONIC RENAL FAILURE, STAGE 5 (H): ICD-10-CM

## 2021-01-01 DIAGNOSIS — J96.21 ACUTE ON CHRONIC RESPIRATORY FAILURE WITH HYPOXIA (H): Primary | ICD-10-CM

## 2021-01-01 DIAGNOSIS — D69.6 THROMBOCYTOPENIA (H): ICD-10-CM

## 2021-01-01 DIAGNOSIS — N18.6 ESRD (END STAGE RENAL DISEASE) ON DIALYSIS (H): ICD-10-CM

## 2021-01-01 DIAGNOSIS — Z71.89 OTHER SPECIFIED COUNSELING: ICD-10-CM

## 2021-01-01 DIAGNOSIS — M79.632 PAIN OF LEFT FOREARM: ICD-10-CM

## 2021-01-01 DIAGNOSIS — Z99.2 ESRD (END STAGE RENAL DISEASE) ON DIALYSIS (H): ICD-10-CM

## 2021-01-01 DIAGNOSIS — I73.9 PVD (PERIPHERAL VASCULAR DISEASE) (H): ICD-10-CM

## 2021-01-01 DIAGNOSIS — B35.6 TINEA CRURIS: ICD-10-CM

## 2021-01-01 DIAGNOSIS — I10 ESSENTIAL HYPERTENSION: ICD-10-CM

## 2021-01-01 DIAGNOSIS — Z99.2 ESRD (END STAGE RENAL DISEASE) ON DIALYSIS (H): Primary | ICD-10-CM

## 2021-01-01 DIAGNOSIS — J44.9 CHRONIC OBSTRUCTIVE PULMONARY DISEASE, UNSPECIFIED COPD TYPE (H): ICD-10-CM

## 2021-01-01 DIAGNOSIS — K59.00 CONSTIPATION, UNSPECIFIED CONSTIPATION TYPE: ICD-10-CM

## 2021-01-01 DIAGNOSIS — E78.00 PURE HYPERCHOLESTEROLEMIA: ICD-10-CM

## 2021-01-01 DIAGNOSIS — Z95.0 PACEMAKER: ICD-10-CM

## 2021-01-01 DIAGNOSIS — Z53.9 DIAGNOSIS NOT YET DEFINED: Primary | ICD-10-CM

## 2021-01-01 DIAGNOSIS — F32.A DEPRESSION, UNSPECIFIED DEPRESSION TYPE: ICD-10-CM

## 2021-01-01 DIAGNOSIS — K21.9 GASTROESOPHAGEAL REFLUX DISEASE, UNSPECIFIED WHETHER ESOPHAGITIS PRESENT: Primary | ICD-10-CM

## 2021-01-01 DIAGNOSIS — Z99.2 ESRD ON DIALYSIS (H): ICD-10-CM

## 2021-01-01 DIAGNOSIS — N18.6 ESRD (END STAGE RENAL DISEASE) ON DIALYSIS (H): Primary | ICD-10-CM

## 2021-01-01 DIAGNOSIS — R07.9 CHEST PAIN, UNSPECIFIED TYPE: ICD-10-CM

## 2021-01-01 DIAGNOSIS — N18.6 ESRD ON DIALYSIS (H): ICD-10-CM

## 2021-01-01 DIAGNOSIS — E21.3 HYPERPARATHYROIDISM (H): ICD-10-CM

## 2021-01-01 DIAGNOSIS — Z71.89 GOALS OF CARE, COUNSELING/DISCUSSION: ICD-10-CM

## 2021-01-01 LAB
ALBUMIN SERPL-MCNC: 3.3 G/DL (ref 3.5–5)
ALBUMIN SERPL-MCNC: 3.3 G/DL (ref 3.5–5)
ALP SERPL-CCNC: 107 U/L (ref 45–120)
ALP SERPL-CCNC: 88 U/L (ref 45–120)
ALT SERPL W P-5'-P-CCNC: 11 U/L (ref 0–45)
ALT SERPL W P-5'-P-CCNC: 9 U/L (ref 0–45)
ANION GAP SERPL CALCULATED.3IONS-SCNC: 10 MMOL/L (ref 5–18)
ANION GAP SERPL CALCULATED.3IONS-SCNC: 12 MMOL/L (ref 5–18)
ANION GAP SERPL CALCULATED.3IONS-SCNC: 5 MMOL/L (ref 3–14)
ANION GAP SERPL CALCULATED.3IONS-SCNC: 6 MMOL/L (ref 3–14)
ANION GAP SERPL CALCULATED.3IONS-SCNC: 8 MMOL/L (ref 5–18)
ANION GAP SERPL CALCULATED.3IONS-SCNC: 9 MMOL/L (ref 5–18)
AST SERPL W P-5'-P-CCNC: 12 U/L (ref 0–40)
AST SERPL W P-5'-P-CCNC: 15 U/L (ref 0–40)
ATRIAL RATE - MUSE: 40 BPM
ATRIAL RATE - MUSE: 59 BPM
ATRIAL RATE - MUSE: 94 BPM
BASE EXCESS BLDV CALC-SCNC: -3.1 MMOL/L
BASE EXCESS BLDV CALC-SCNC: 2.9 MMOL/L
BASE EXCESS BLDV CALC-SCNC: 4.7 MMOL/L
BASE EXCESS BLDV CALC-SCNC: 5.6 MMOL/L
BASOPHILS # BLD AUTO: 0 10E3/UL (ref 0–0.2)
BASOPHILS # BLD AUTO: 0 10E3/UL (ref 0–0.2)
BASOPHILS # BLD AUTO: 0 THOU/UL (ref 0–0.2)
BASOPHILS NFR BLD AUTO: 0 % (ref 0–2)
BASOPHILS NFR BLD AUTO: 1 %
BASOPHILS NFR BLD AUTO: 1 %
BILIRUB SERPL-MCNC: 0.3 MG/DL (ref 0–1)
BILIRUB SERPL-MCNC: 0.4 MG/DL (ref 0–1)
BNP SERPL-MCNC: 630 PG/ML (ref 0–133)
BNP SERPL-MCNC: 960 PG/ML (ref 0–133)
BUN SERPL-MCNC: 12 MG/DL (ref 8–28)
BUN SERPL-MCNC: 13 MG/DL (ref 8–28)
BUN SERPL-MCNC: 16 MG/DL (ref 8–28)
BUN SERPL-MCNC: 17 MG/DL (ref 8–28)
BUN SERPL-MCNC: 17 MG/DL (ref 8–28)
BUN SERPL-MCNC: 19 MG/DL (ref 8–28)
BUN SERPL-MCNC: 20 MG/DL (ref 7–30)
BUN SERPL-MCNC: 30 MG/DL (ref 8–28)
BUN SERPL-MCNC: 40 MG/DL (ref 7–30)
BUN SERPL-MCNC: 42 MG/DL (ref 8–28)
BUN SERPL-MCNC: 42 MG/DL (ref 8–28)
BUN SERPL-MCNC: 8 MG/DL (ref 8–28)
CALCIUM SERPL-MCNC: 8 MG/DL (ref 8.5–10.5)
CALCIUM SERPL-MCNC: 8.3 MG/DL (ref 8.5–10.5)
CALCIUM SERPL-MCNC: 8.6 MG/DL (ref 8.5–10.5)
CALCIUM SERPL-MCNC: 8.8 MG/DL (ref 8.5–10.1)
CALCIUM SERPL-MCNC: 8.9 MG/DL (ref 8.5–10.5)
CALCIUM SERPL-MCNC: 8.9 MG/DL (ref 8.5–10.5)
CALCIUM SERPL-MCNC: 9 MG/DL (ref 8.5–10.1)
CALCIUM SERPL-MCNC: 9.1 MG/DL (ref 8.5–10.5)
CHLORIDE BLD-SCNC: 101 MMOL/L (ref 98–107)
CHLORIDE BLD-SCNC: 101 MMOL/L (ref 98–107)
CHLORIDE BLD-SCNC: 102 MMOL/L (ref 98–107)
CHLORIDE BLD-SCNC: 103 MMOL/L (ref 98–107)
CHLORIDE BLD-SCNC: 98 MMOL/L (ref 98–107)
CHLORIDE BLD-SCNC: 98 MMOL/L (ref 98–107)
CHLORIDE BLD-SCNC: 99 MMOL/L (ref 98–107)
CHLORIDE BLD-SCNC: 99 MMOL/L (ref 98–107)
CHLORIDE SERPL-SCNC: 104 MMOL/L (ref 94–109)
CHLORIDE SERPL-SCNC: 105 MMOL/L (ref 94–109)
CO2 SERPL-SCNC: 20 MMOL/L (ref 22–31)
CO2 SERPL-SCNC: 25 MMOL/L (ref 22–31)
CO2 SERPL-SCNC: 26 MMOL/L (ref 22–31)
CO2 SERPL-SCNC: 26 MMOL/L (ref 22–31)
CO2 SERPL-SCNC: 27 MMOL/L (ref 22–31)
CO2 SERPL-SCNC: 28 MMOL/L (ref 20–32)
CO2 SERPL-SCNC: 29 MMOL/L (ref 20–32)
CO2 SERPL-SCNC: 29 MMOL/L (ref 22–31)
CO2 SERPL-SCNC: 29 MMOL/L (ref 22–31)
CREAT SERPL-MCNC: 2.12 MG/DL (ref 0.6–1.1)
CREAT SERPL-MCNC: 2.16 MG/DL (ref 0.6–1.1)
CREAT SERPL-MCNC: 2.49 MG/DL (ref 0.6–1.1)
CREAT SERPL-MCNC: 2.85 MG/DL (ref 0.6–1.1)
CREAT SERPL-MCNC: 3.17 MG/DL (ref 0.6–1.1)
CREAT SERPL-MCNC: 3.69 MG/DL (ref 0.52–1.04)
CREAT SERPL-MCNC: 3.87 MG/DL (ref 0.6–1.1)
CREAT SERPL-MCNC: 4.15 MG/DL (ref 0.6–1.1)
CREAT SERPL-MCNC: 4.4 MG/DL (ref 0.6–1.1)
CREAT SERPL-MCNC: 4.45 MG/DL (ref 0.6–1.1)
CREAT SERPL-MCNC: 5.02 MG/DL (ref 0.6–1.1)
CREAT SERPL-MCNC: 5.08 MG/DL (ref 0.52–1.04)
DIASTOLIC BLOOD PRESSURE - MUSE: 47 MMHG
DIASTOLIC BLOOD PRESSURE - MUSE: NORMAL MMHG
DIASTOLIC BLOOD PRESSURE - MUSE: NORMAL MMHG
EOSINOPHIL # BLD AUTO: 0.2 10E3/UL (ref 0–0.7)
EOSINOPHIL # BLD AUTO: 0.2 10E3/UL (ref 0–0.7)
EOSINOPHIL COUNT (ABSOLUTE): 0 THOU/UL (ref 0–0.4)
EOSINOPHIL NFR BLD AUTO: 0 % (ref 0–6)
EOSINOPHIL NFR BLD AUTO: 3 %
EOSINOPHIL NFR BLD AUTO: 3 %
ERYTHROCYTE [DISTWIDTH] IN BLOOD BY AUTOMATED COUNT: 14.5 % (ref 10–15)
ERYTHROCYTE [DISTWIDTH] IN BLOOD BY AUTOMATED COUNT: 14.6 % (ref 10–15)
ERYTHROCYTE [DISTWIDTH] IN BLOOD BY AUTOMATED COUNT: 15.1 % (ref 10–15)
ERYTHROCYTE [DISTWIDTH] IN BLOOD BY AUTOMATED COUNT: 15.3 % (ref 10–15)
ERYTHROCYTE [DISTWIDTH] IN BLOOD BY AUTOMATED COUNT: 15.3 % (ref 10–15)
ERYTHROCYTE [DISTWIDTH] IN BLOOD BY AUTOMATED COUNT: 15.4 % (ref 10–15)
ERYTHROCYTE [DISTWIDTH] IN BLOOD BY AUTOMATED COUNT: 15.6 % (ref 11–14.5)
ERYTHROCYTE [DISTWIDTH] IN BLOOD BY AUTOMATED COUNT: 16.4 % (ref 10–15)
ERYTHROCYTE [DISTWIDTH] IN BLOOD BY AUTOMATED COUNT: 16.5 % (ref 10–15)
ERYTHROCYTE [DISTWIDTH] IN BLOOD BY AUTOMATED COUNT: 16.8 % (ref 10–15)
ERYTHROCYTE [DISTWIDTH] IN BLOOD BY AUTOMATED COUNT: 16.9 % (ref 10–15)
GFR SERPL CREATININE-BSD FRML MDRD: 10 ML/MIN/1.73M2
GFR SERPL CREATININE-BSD FRML MDRD: 11 ML/MIN/1.73M2
GFR SERPL CREATININE-BSD FRML MDRD: 11 ML/MIN/{1.73_M2}
GFR SERPL CREATININE-BSD FRML MDRD: 14 ML/MIN/1.73M2
GFR SERPL CREATININE-BSD FRML MDRD: 16 ML/MIN/1.73M2
GFR SERPL CREATININE-BSD FRML MDRD: 18 ML/MIN/1.73M2
GFR SERPL CREATININE-BSD FRML MDRD: 22 ML/MIN/1.73M2
GFR SERPL CREATININE-BSD FRML MDRD: 22 ML/MIN/1.73M2
GFR SERPL CREATININE-BSD FRML MDRD: 8 ML/MIN/1.73M2
GFR SERPL CREATININE-BSD FRML MDRD: 8 ML/MIN/{1.73_M2}
GFR SERPL CREATININE-BSD FRML MDRD: 9 ML/MIN/1.73M2
GFR SERPL CREATININE-BSD FRML MDRD: 9 ML/MIN/1.73M2
GLUCOSE BLD-MCNC: 107 MG/DL (ref 70–125)
GLUCOSE BLD-MCNC: 115 MG/DL (ref 70–125)
GLUCOSE BLD-MCNC: 125 MG/DL (ref 70–125)
GLUCOSE BLD-MCNC: 170 MG/DL (ref 70–125)
GLUCOSE BLD-MCNC: 79 MG/DL (ref 70–125)
GLUCOSE BLD-MCNC: 84 MG/DL (ref 70–125)
GLUCOSE BLD-MCNC: 89 MG/DL (ref 70–125)
GLUCOSE BLD-MCNC: 93 MG/DL (ref 70–125)
GLUCOSE BLD-MCNC: 96 MG/DL (ref 70–125)
GLUCOSE BLD-MCNC: 97 MG/DL (ref 70–125)
GLUCOSE SERPL-MCNC: 92 MG/DL (ref 70–99)
GLUCOSE SERPL-MCNC: 96 MG/DL (ref 70–99)
HBV SURFACE AB SERPL IA-ACNC: 0.08 M[IU]/ML
HBV SURFACE AB SERPLBLD QL IA.RAPID: NEGATIVE
HBV SURFACE AG SERPL QL IA: NONREACTIVE
HBV SURFACE AG SERPL QL IA: NONREACTIVE
HCC DEVICE COMMENTS: NORMAL
HCC DEVICE IMPLANTING PROVIDER: NORMAL
HCC DEVICE MANUFACTURE: NORMAL
HCC DEVICE MODEL: NORMAL
HCC DEVICE SERIAL NUMBER: NORMAL
HCC DEVICE TYPE: NORMAL
HCO3 BLDV-SCNC: 26 MMOL/L (ref 24–30)
HCO3 BLDV-SCNC: 27 MMOL/L (ref 24–30)
HCO3, VENOUS, CALC - HISTORICAL: 19.7 MMOL/L (ref 24–30)
HCO3, VENOUS, CALC - HISTORICAL: 26.6 MMOL/L (ref 24–30)
HCT VFR BLD AUTO: 32.1 % (ref 35–47)
HCT VFR BLD AUTO: 32.7 % (ref 35–47)
HCT VFR BLD AUTO: 33.1 % (ref 35–47)
HCT VFR BLD AUTO: 33.2 % (ref 35–47)
HCT VFR BLD AUTO: 33.5 % (ref 35–47)
HCT VFR BLD AUTO: 35.6 % (ref 35–47)
HCT VFR BLD AUTO: 36.3 % (ref 35–47)
HCT VFR BLD AUTO: 37.2 % (ref 35–47)
HCT VFR BLD AUTO: 38.4 % (ref 35–47)
HCT VFR BLD AUTO: 39.4 % (ref 35–47)
HCT VFR BLD AUTO: 40.8 % (ref 35–47)
HGB BLD-MCNC: 10 G/DL (ref 11.7–15.7)
HGB BLD-MCNC: 10.6 G/DL (ref 11.7–15.7)
HGB BLD-MCNC: 10.8 G/DL (ref 11.7–15.7)
HGB BLD-MCNC: 11.4 G/DL (ref 11.7–15.7)
HGB BLD-MCNC: 11.5 G/DL (ref 11.7–15.7)
HGB BLD-MCNC: 11.8 G/DL (ref 12–16)
HGB BLD-MCNC: 12.3 G/DL (ref 11.7–15.7)
HGB BLD-MCNC: 9.6 G/DL (ref 11.7–15.7)
HGB BLD-MCNC: 9.7 G/DL (ref 11.7–15.7)
HGB BLD-MCNC: 9.8 G/DL (ref 11.7–15.7)
HGB BLD-MCNC: 9.8 G/DL (ref 11.7–15.7)
IMM GRANULOCYTES # BLD: 0.1 10E3/UL
IMM GRANULOCYTES # BLD: 0.1 10E3/UL
IMM GRANULOCYTES NFR BLD: 1 %
IMM GRANULOCYTES NFR BLD: 1 %
IMMATURE GRANULOCYTE % - MAN (DIFF): 1 %
IMMATURE GRANULOCYTE ABSOLUTE - MAN (DIFF): 0 THOU/UL
INR PPP: 1.02 (ref 0.9–1.15)
INTERPRETATION ECG - MUSE: NORMAL
LACTATE SERPL-SCNC: 3.5 MMOL/L (ref 0.7–2)
LYMPHOCYTES # BLD AUTO: 0.3 THOU/UL (ref 0.8–4.4)
LYMPHOCYTES # BLD AUTO: 0.5 10E3/UL (ref 0.8–5.3)
LYMPHOCYTES # BLD AUTO: 0.6 10E3/UL (ref 0.8–5.3)
LYMPHOCYTES NFR BLD AUTO: 12 %
LYMPHOCYTES NFR BLD AUTO: 13 % (ref 20–40)
LYMPHOCYTES NFR BLD AUTO: 9 %
MAGNESIUM SERPL-MCNC: 1.8 MG/DL (ref 1.8–2.6)
MAGNESIUM SERPL-MCNC: 1.9 MG/DL (ref 1.8–2.6)
MCH RBC QN AUTO: 32.6 PG (ref 26.5–33)
MCH RBC QN AUTO: 32.8 PG (ref 26.5–33)
MCH RBC QN AUTO: 32.9 PG (ref 26.5–33)
MCH RBC QN AUTO: 32.9 PG (ref 26.5–33)
MCH RBC QN AUTO: 33 PG (ref 26.5–33)
MCH RBC QN AUTO: 33.1 PG (ref 26.5–33)
MCH RBC QN AUTO: 33.1 PG (ref 26.5–33)
MCH RBC QN AUTO: 33.2 PG (ref 26.5–33)
MCH RBC QN AUTO: 33.5 PG (ref 26.5–33)
MCH RBC QN AUTO: 33.6 PG (ref 26.5–33)
MCH RBC QN AUTO: 33.8 PG (ref 27–34)
MCHC RBC AUTO-ENTMCNC: 29.2 G/DL (ref 31.5–36.5)
MCHC RBC AUTO-ENTMCNC: 29.4 G/DL (ref 31.5–36.5)
MCHC RBC AUTO-ENTMCNC: 29.6 G/DL (ref 31.5–36.5)
MCHC RBC AUTO-ENTMCNC: 29.8 G/DL (ref 31.5–36.5)
MCHC RBC AUTO-ENTMCNC: 29.8 G/DL (ref 31.5–36.5)
MCHC RBC AUTO-ENTMCNC: 29.9 G/DL (ref 31.5–36.5)
MCHC RBC AUTO-ENTMCNC: 29.9 G/DL (ref 31.5–36.5)
MCHC RBC AUTO-ENTMCNC: 29.9 G/DL (ref 32–36)
MCHC RBC AUTO-ENTMCNC: 30.1 G/DL (ref 31.5–36.5)
MCHC RBC AUTO-ENTMCNC: 30.5 G/DL (ref 31.5–36.5)
MCHC RBC AUTO-ENTMCNC: 30.6 G/DL (ref 31.5–36.5)
MCV RBC AUTO: 108 FL (ref 78–100)
MCV RBC AUTO: 110 FL (ref 78–100)
MCV RBC AUTO: 111 FL (ref 78–100)
MCV RBC AUTO: 112 FL (ref 78–100)
MCV RBC AUTO: 113 FL (ref 80–100)
MCV RBC AUTO: 114 FL (ref 78–100)
MDC_IDC_EPISODE_DTM: NORMAL
MDC_IDC_EPISODE_ID: NORMAL
MDC_IDC_EPISODE_TYPE: NORMAL
MDC_IDC_LEAD_IMPLANT_DT: NORMAL
MDC_IDC_LEAD_IMPLANT_DT: NORMAL
MDC_IDC_LEAD_LOCATION: NORMAL
MDC_IDC_LEAD_LOCATION: NORMAL
MDC_IDC_LEAD_LOCATION_DETAIL_1: NORMAL
MDC_IDC_LEAD_LOCATION_DETAIL_1: NORMAL
MDC_IDC_LEAD_MFG: NORMAL
MDC_IDC_LEAD_MFG: NORMAL
MDC_IDC_LEAD_MODEL: NORMAL
MDC_IDC_LEAD_MODEL: NORMAL
MDC_IDC_LEAD_POLARITY_TYPE: NORMAL
MDC_IDC_LEAD_POLARITY_TYPE: NORMAL
MDC_IDC_LEAD_SERIAL: NORMAL
MDC_IDC_LEAD_SERIAL: NORMAL
MDC_IDC_MSMT_BATTERY_DTM: NORMAL
MDC_IDC_MSMT_BATTERY_DTM: NORMAL
MDC_IDC_MSMT_BATTERY_REMAINING_LONGEVITY: 84 MO
MDC_IDC_MSMT_BATTERY_REMAINING_LONGEVITY: 84 MO
MDC_IDC_MSMT_BATTERY_REMAINING_PERCENTAGE: 100 %
MDC_IDC_MSMT_BATTERY_REMAINING_PERCENTAGE: 100 %
MDC_IDC_MSMT_BATTERY_STATUS: NORMAL
MDC_IDC_MSMT_BATTERY_STATUS: NORMAL
MDC_IDC_MSMT_LEADCHNL_RV_IMPEDANCE_VALUE: 539 OHM
MDC_IDC_MSMT_LEADCHNL_RV_IMPEDANCE_VALUE: 549 OHM
MDC_IDC_MSMT_LEADCHNL_RV_PACING_THRESHOLD_AMPLITUDE: 0.5 V
MDC_IDC_MSMT_LEADCHNL_RV_PACING_THRESHOLD_AMPLITUDE: 0.5 V
MDC_IDC_MSMT_LEADCHNL_RV_PACING_THRESHOLD_PULSEWIDTH: 0.4 MS
MDC_IDC_MSMT_LEADCHNL_RV_PACING_THRESHOLD_PULSEWIDTH: 0.4 MS
MDC_IDC_PG_IMPLANT_DTM: NORMAL
MDC_IDC_PG_IMPLANT_DTM: NORMAL
MDC_IDC_PG_MFG: NORMAL
MDC_IDC_PG_MFG: NORMAL
MDC_IDC_PG_MODEL: NORMAL
MDC_IDC_PG_MODEL: NORMAL
MDC_IDC_PG_SERIAL: NORMAL
MDC_IDC_PG_SERIAL: NORMAL
MDC_IDC_PG_TYPE: NORMAL
MDC_IDC_PG_TYPE: NORMAL
MDC_IDC_SESS_CLINIC_NAME: NORMAL
MDC_IDC_SESS_CLINIC_NAME: NORMAL
MDC_IDC_SESS_DTM: NORMAL
MDC_IDC_SESS_DTM: NORMAL
MDC_IDC_SESS_TYPE: NORMAL
MDC_IDC_SESS_TYPE: NORMAL
MDC_IDC_SET_BRADY_LOWRATE: 70 {BEATS}/MIN
MDC_IDC_SET_BRADY_LOWRATE: 70 {BEATS}/MIN
MDC_IDC_SET_BRADY_MAX_SENSOR_RATE: 130 {BEATS}/MIN
MDC_IDC_SET_BRADY_MAX_SENSOR_RATE: 130 {BEATS}/MIN
MDC_IDC_SET_BRADY_MODE: NORMAL
MDC_IDC_SET_BRADY_MODE: NORMAL
MDC_IDC_SET_LEADCHNL_RV_PACING_AMPLITUDE: 1 V
MDC_IDC_SET_LEADCHNL_RV_PACING_AMPLITUDE: 1.1 V
MDC_IDC_SET_LEADCHNL_RV_PACING_CAPTURE_MODE: NORMAL
MDC_IDC_SET_LEADCHNL_RV_PACING_CAPTURE_MODE: NORMAL
MDC_IDC_SET_LEADCHNL_RV_PACING_POLARITY: NORMAL
MDC_IDC_SET_LEADCHNL_RV_PACING_POLARITY: NORMAL
MDC_IDC_SET_LEADCHNL_RV_PACING_PULSEWIDTH: 0.4 MS
MDC_IDC_SET_LEADCHNL_RV_PACING_PULSEWIDTH: 0.4 MS
MDC_IDC_SET_LEADCHNL_RV_SENSING_ADAPTATION_MODE: NORMAL
MDC_IDC_SET_LEADCHNL_RV_SENSING_ADAPTATION_MODE: NORMAL
MDC_IDC_SET_LEADCHNL_RV_SENSING_POLARITY: NORMAL
MDC_IDC_SET_LEADCHNL_RV_SENSING_POLARITY: NORMAL
MDC_IDC_SET_LEADCHNL_RV_SENSING_SENSITIVITY: 1.5 MV
MDC_IDC_SET_LEADCHNL_RV_SENSING_SENSITIVITY: 1.5 MV
MDC_IDC_SET_ZONE_DETECTION_INTERVAL: 375 MS
MDC_IDC_SET_ZONE_DETECTION_INTERVAL: 375 MS
MDC_IDC_SET_ZONE_TYPE: NORMAL
MDC_IDC_SET_ZONE_TYPE: NORMAL
MDC_IDC_SET_ZONE_VENDOR_TYPE: NORMAL
MDC_IDC_SET_ZONE_VENDOR_TYPE: NORMAL
MDC_IDC_STAT_BRADY_DTM_END: NORMAL
MDC_IDC_STAT_BRADY_DTM_END: NORMAL
MDC_IDC_STAT_BRADY_DTM_START: NORMAL
MDC_IDC_STAT_BRADY_DTM_START: NORMAL
MDC_IDC_STAT_BRADY_RV_PERCENT_PACED: 100 %
MDC_IDC_STAT_BRADY_RV_PERCENT_PACED: 100 %
MDC_IDC_STAT_EPISODE_RECENT_COUNT: 0
MDC_IDC_STAT_EPISODE_RECENT_COUNT_DTM_END: NORMAL
MDC_IDC_STAT_EPISODE_RECENT_COUNT_DTM_START: NORMAL
MDC_IDC_STAT_EPISODE_TYPE: NORMAL
MDC_IDC_STAT_EPISODE_VENDOR_TYPE: NORMAL
MONOCYTES # BLD AUTO: 0 THOU/UL (ref 0–0.9)
MONOCYTES # BLD AUTO: 0.5 10E3/UL (ref 0–1.3)
MONOCYTES # BLD AUTO: 0.7 10E3/UL (ref 0–1.3)
MONOCYTES NFR BLD AUTO: 1 % (ref 2–10)
MONOCYTES NFR BLD AUTO: 12 %
MONOCYTES NFR BLD AUTO: 12 %
MYELOCYTES (ABSOLUTE): 0 THOU/UL
MYELOCYTES NFR BLD MANUAL: 1 %
NEUTROPHILS # BLD AUTO: 3.4 10E3/UL (ref 1.6–8.3)
NEUTROPHILS # BLD AUTO: 4 10E3/UL (ref 1.6–8.3)
NEUTROPHILS NFR BLD AUTO: 71 %
NEUTROPHILS NFR BLD AUTO: 74 %
NRBC # BLD AUTO: 0 10E3/UL
NRBC # BLD AUTO: 0 10E3/UL
NRBC BLD AUTO-RTO: 0 /100
NRBC BLD AUTO-RTO: 0 /100
OVALOCYTES: ABNORMAL
OXYHEMOGLOBIN (VBG) - HISTORICAL: 26 % (ref 70–75)
OXYHEMOGLOBIN (VBG) - HISTORICAL: 54 % (ref 70–75)
OXYHGB MFR BLDV: 26.5 % (ref 70–75)
OXYHGB MFR BLDV: 43.1 % (ref 70–75)
OXYHGB MFR BLDV: 55.1 % (ref 70–75)
OXYHGB MFR BLDV: 68.5 % (ref 70–75)
P AXIS - MUSE: NORMAL DEGREES
PCO2 BLDV: 52 MM HG (ref 35–50)
PCO2 BLDV: 61 MM HG (ref 35–50)
PCO2 BLDV: 64 MM HG (ref 35–50)
PCO2 BLDV: 75 MM HG (ref 35–50)
PH BLDV: 7.2 [PH] (ref 7.35–7.45)
PH BLDV: 7.25 [PH] (ref 7.35–7.45)
PH BLDV: 7.29 [PH] (ref 7.35–7.45)
PH BLDV: 7.37 [PH] (ref 7.35–7.45)
PLAT MORPH BLD: ABNORMAL
PLATELET # BLD AUTO: 102 10E9/L (ref 150–450)
PLATELET # BLD AUTO: 109 10E9/L (ref 150–450)
PLATELET # BLD AUTO: 115 10E3/UL (ref 150–450)
PLATELET # BLD AUTO: 129 10E3/UL (ref 150–450)
PLATELET # BLD AUTO: 142 10E3/UL (ref 150–450)
PLATELET # BLD AUTO: 145 10E3/UL (ref 150–450)
PLATELET # BLD AUTO: 167 10E3/UL (ref 150–450)
PLATELET # BLD AUTO: 74 THOU/UL (ref 140–440)
PLATELET # BLD AUTO: 90 10E3/UL (ref 150–450)
PLATELET # BLD AUTO: 96 10E3/UL (ref 150–450)
PLATELET # BLD AUTO: 96 10E3/UL (ref 150–450)
PMV BLD AUTO: 10.6 FL (ref 8.5–12.5)
PO2 BLDV: 20 MM HG (ref 25–47)
PO2 BLDV: 26 MM HG (ref 25–47)
PO2 BLDV: 28 MM HG (ref 25–47)
PO2 BLDV: 36 MM HG (ref 25–47)
POC TROPONIN I - HISTORICAL: 0.04 NG/ML
POTASSIUM BLD-SCNC: 3.6 MMOL/L (ref 3.5–5)
POTASSIUM BLD-SCNC: 3.8 MMOL/L (ref 3.5–5)
POTASSIUM BLD-SCNC: 3.9 MMOL/L (ref 3.5–5)
POTASSIUM BLD-SCNC: 4 MMOL/L (ref 3.5–5)
POTASSIUM BLD-SCNC: 4.1 MMOL/L (ref 3.5–5)
POTASSIUM BLD-SCNC: 4.6 MMOL/L (ref 3.5–5)
POTASSIUM BLD-SCNC: 4.7 MMOL/L (ref 3.5–5)
POTASSIUM BLD-SCNC: 4.8 MMOL/L (ref 3.5–5)
POTASSIUM BLD-SCNC: 5 MMOL/L (ref 3.5–5)
POTASSIUM BLD-SCNC: 5.7 MMOL/L (ref 3.5–5)
POTASSIUM SERPL-SCNC: 4.5 MMOL/L (ref 3.4–5.3)
POTASSIUM SERPL-SCNC: 4.7 MMOL/L (ref 3.4–5.3)
PR INTERVAL - MUSE: NORMAL MS
PROCALCITONIN SERPL-MCNC: 0.27 NG/ML (ref 0–0.49)
PROT SERPL-MCNC: 5.3 G/DL (ref 6–8)
PROT SERPL-MCNC: 5.7 G/DL (ref 6–8)
QRS DURATION - MUSE: 130 MS
QRS DURATION - MUSE: 130 MS
QRS DURATION - MUSE: 138 MS
QT - MUSE: 418 MS
QT - MUSE: 428 MS
QT - MUSE: 454 MS
QTC - MUSE: 451 MS
QTC - MUSE: 462 MS
QTC - MUSE: 490 MS
R AXIS - MUSE: 105 DEGREES
R AXIS - MUSE: 117 DEGREES
R AXIS - MUSE: 96 DEGREES
RBC # BLD AUTO: 2.92 10E12/L (ref 3.8–5.2)
RBC # BLD AUTO: 2.92 10E6/UL (ref 3.8–5.2)
RBC # BLD AUTO: 2.96 10E6/UL (ref 3.8–5.2)
RBC # BLD AUTO: 2.99 10E12/L (ref 3.8–5.2)
RBC # BLD AUTO: 3.04 10E6/UL (ref 3.8–5.2)
RBC # BLD AUTO: 3.25 10E6/UL (ref 3.8–5.2)
RBC # BLD AUTO: 3.26 10E6/UL (ref 3.8–5.2)
RBC # BLD AUTO: 3.39 10E6/UL (ref 3.8–5.2)
RBC # BLD AUTO: 3.48 10E6/UL (ref 3.8–5.2)
RBC # BLD AUTO: 3.49 MILL/UL (ref 3.8–5.4)
RBC # BLD AUTO: 3.67 10E6/UL (ref 3.8–5.2)
SAO2 % BLDV: 44.1 % (ref 70–75)
SAO2 % BLDV: 70 % (ref 70–75)
SARS-COV-2 RNA RESP QL NAA+PROBE: NEGATIVE
SARS-COV-2 RNA RESP QL NAA+PROBE: NEGATIVE
SODIUM SERPL-SCNC: 133 MMOL/L (ref 136–145)
SODIUM SERPL-SCNC: 134 MMOL/L (ref 136–145)
SODIUM SERPL-SCNC: 135 MMOL/L (ref 136–145)
SODIUM SERPL-SCNC: 135 MMOL/L (ref 136–145)
SODIUM SERPL-SCNC: 136 MMOL/L (ref 136–145)
SODIUM SERPL-SCNC: 136 MMOL/L (ref 136–145)
SODIUM SERPL-SCNC: 137 MMOL/L (ref 136–145)
SODIUM SERPL-SCNC: 137 MMOL/L (ref 136–145)
SODIUM SERPL-SCNC: 138 MMOL/L (ref 133–144)
SODIUM SERPL-SCNC: 139 MMOL/L (ref 133–144)
SODIUM SERPL-SCNC: 139 MMOL/L (ref 136–145)
SODIUM SERPL-SCNC: 141 MMOL/L (ref 136–145)
SYSTOLIC BLOOD PRESSURE - MUSE: 92 MMHG
SYSTOLIC BLOOD PRESSURE - MUSE: NORMAL MMHG
SYSTOLIC BLOOD PRESSURE - MUSE: NORMAL MMHG
T AXIS - MUSE: 86 DEGREES
T AXIS - MUSE: 90 DEGREES
T AXIS - MUSE: 91 DEGREES
TEAR DROP: ABNORMAL
TOTAL NEUTROPHILS-ABS(DIFF): 1.8 THOU/UL (ref 2–7.7)
TOTAL NEUTROPHILS-REL(DIFF): 85 % (ref 50–70)
TROPONIN I SERPL-MCNC: 0.03 NG/ML (ref 0–0.29)
TROPONIN I SERPL-MCNC: 0.03 NG/ML (ref 0–0.29)
TROPONIN I SERPL-MCNC: 0.04 NG/ML (ref 0–0.29)
TROPONIN I SERPL-MCNC: 0.04 NG/ML (ref 0–0.29)
TROPONIN I SERPL-MCNC: 0.05 NG/ML (ref 0–0.29)
TROPONIN I SERPL-MCNC: 0.05 NG/ML (ref 0–0.29)
VENTRICULAR RATE- MUSE: 70 BPM
WBC # BLD AUTO: 3.6 10E3/UL (ref 4–11)
WBC # BLD AUTO: 3.7 10E3/UL (ref 4–11)
WBC # BLD AUTO: 3.8 10E3/UL (ref 4–11)
WBC # BLD AUTO: 3.9 10E3/UL (ref 4–11)
WBC # BLD AUTO: 4 10E3/UL (ref 4–11)
WBC # BLD AUTO: 4.2 10E3/UL (ref 4–11)
WBC # BLD AUTO: 4.3 10E9/L (ref 4–11)
WBC # BLD AUTO: 4.6 10E3/UL (ref 4–11)
WBC # BLD AUTO: 4.9 10E9/L (ref 4–11)
WBC # BLD AUTO: 5.4 10E3/UL (ref 4–11)
WBC: 2.1 THOU/UL (ref 4–11)

## 2021-01-01 PROCEDURE — 99232 SBSQ HOSP IP/OBS MODERATE 35: CPT | Performed by: PHYSICIAN ASSISTANT

## 2021-01-01 PROCEDURE — 90937 HEMODIALYSIS REPEATED EVAL: CPT

## 2021-01-01 PROCEDURE — 85027 COMPLETE CBC AUTOMATED: CPT | Performed by: FAMILY MEDICINE

## 2021-01-01 PROCEDURE — 99233 SBSQ HOSP IP/OBS HIGH 50: CPT | Performed by: FAMILY MEDICINE

## 2021-01-01 PROCEDURE — 999N000074 HC STATISTIC HEMODIALYSIS > 4 HR PER HR

## 2021-01-01 PROCEDURE — 250N000011 HC RX IP 250 OP 636: Performed by: INTERNAL MEDICINE

## 2021-01-01 PROCEDURE — G0378 HOSPITAL OBSERVATION PER HR: HCPCS

## 2021-01-01 PROCEDURE — 96361 HYDRATE IV INFUSION ADD-ON: CPT

## 2021-01-01 PROCEDURE — C9803 HOPD COVID-19 SPEC COLLECT: HCPCS

## 2021-01-01 PROCEDURE — 80048 BASIC METABOLIC PNL TOTAL CA: CPT | Performed by: FAMILY MEDICINE

## 2021-01-01 PROCEDURE — 99223 1ST HOSP IP/OBS HIGH 75: CPT | Performed by: INTERNAL MEDICINE

## 2021-01-01 PROCEDURE — 94640 AIRWAY INHALATION TREATMENT: CPT

## 2021-01-01 PROCEDURE — 36415 COLL VENOUS BLD VENIPUNCTURE: CPT | Performed by: EMERGENCY MEDICINE

## 2021-01-01 PROCEDURE — 99239 HOSP IP/OBS DSCHRG MGMT >30: CPT | Performed by: INTERNAL MEDICINE

## 2021-01-01 PROCEDURE — 999N000157 HC STATISTIC RCP TIME EA 10 MIN

## 2021-01-01 PROCEDURE — 634N000001 HC RX 634: Performed by: INTERNAL MEDICINE

## 2021-01-01 PROCEDURE — 83880 ASSAY OF NATRIURETIC PEPTIDE: CPT | Performed by: EMERGENCY MEDICINE

## 2021-01-01 PROCEDURE — 82805 BLOOD GASES W/O2 SATURATION: CPT | Performed by: FAMILY MEDICINE

## 2021-01-01 PROCEDURE — 80053 COMPREHEN METABOLIC PANEL: CPT | Performed by: INTERNAL MEDICINE

## 2021-01-01 PROCEDURE — 80053 COMPREHEN METABOLIC PANEL: CPT

## 2021-01-01 PROCEDURE — 250N000013 HC RX MED GY IP 250 OP 250 PS 637

## 2021-01-01 PROCEDURE — 250N000013 HC RX MED GY IP 250 OP 250 PS 637: Performed by: INTERNAL MEDICINE

## 2021-01-01 PROCEDURE — 99232 SBSQ HOSP IP/OBS MODERATE 35: CPT | Mod: GC | Performed by: INTERNAL MEDICINE

## 2021-01-01 PROCEDURE — G0463 HOSPITAL OUTPT CLINIC VISIT: HCPCS

## 2021-01-01 PROCEDURE — 250N000009 HC RX 250

## 2021-01-01 PROCEDURE — 80048 BASIC METABOLIC PNL TOTAL CA: CPT | Performed by: PHYSICIAN ASSISTANT

## 2021-01-01 PROCEDURE — G0180 MD CERTIFICATION HHA PATIENT: HCPCS | Performed by: PHARMACIST

## 2021-01-01 PROCEDURE — 99233 SBSQ HOSP IP/OBS HIGH 50: CPT | Performed by: INTERNAL MEDICINE

## 2021-01-01 PROCEDURE — 250N000013 HC RX MED GY IP 250 OP 250 PS 637: Performed by: FAMILY MEDICINE

## 2021-01-01 PROCEDURE — 250N000009 HC RX 250: Performed by: EMERGENCY MEDICINE

## 2021-01-01 PROCEDURE — 250N000013 HC RX MED GY IP 250 OP 250 PS 637: Performed by: EMERGENCY MEDICINE

## 2021-01-01 PROCEDURE — 93296 REM INTERROG EVL PM/IDS: CPT | Performed by: INTERNAL MEDICINE

## 2021-01-01 PROCEDURE — 120N000001 HC R&B MED SURG/OB

## 2021-01-01 PROCEDURE — 99223 1ST HOSP IP/OBS HIGH 75: CPT | Performed by: NURSE PRACTITIONER

## 2021-01-01 PROCEDURE — 94640 AIRWAY INHALATION TREATMENT: CPT | Mod: 76

## 2021-01-01 PROCEDURE — 99232 SBSQ HOSP IP/OBS MODERATE 35: CPT | Performed by: NURSE PRACTITIONER

## 2021-01-01 PROCEDURE — 36415 COLL VENOUS BLD VENIPUNCTURE: CPT

## 2021-01-01 PROCEDURE — 36415 COLL VENOUS BLD VENIPUNCTURE: CPT | Performed by: INTERNAL MEDICINE

## 2021-01-01 PROCEDURE — 250N000009 HC RX 250: Performed by: INTERNAL MEDICINE

## 2021-01-01 PROCEDURE — 71045 X-RAY EXAM CHEST 1 VIEW: CPT

## 2021-01-01 PROCEDURE — 200N000001 HC R&B ICU

## 2021-01-01 PROCEDURE — 99207 PR CDG-CODE CATEGORY CHANGED: CPT | Performed by: INTERNAL MEDICINE

## 2021-01-01 PROCEDURE — 258N000003 HC RX IP 258 OP 636: Performed by: INTERNAL MEDICINE

## 2021-01-01 PROCEDURE — 250N000009 HC RX 250: Performed by: PHYSICIAN ASSISTANT

## 2021-01-01 PROCEDURE — 85027 COMPLETE CBC AUTOMATED: CPT | Performed by: INTERNAL MEDICINE

## 2021-01-01 PROCEDURE — 85610 PROTHROMBIN TIME: CPT | Performed by: EMERGENCY MEDICINE

## 2021-01-01 PROCEDURE — 82805 BLOOD GASES W/O2 SATURATION: CPT | Performed by: EMERGENCY MEDICINE

## 2021-01-01 PROCEDURE — 86706 HEP B SURFACE ANTIBODY: CPT | Performed by: INTERNAL MEDICINE

## 2021-01-01 PROCEDURE — 85025 COMPLETE CBC W/AUTO DIFF WBC: CPT | Performed by: EMERGENCY MEDICINE

## 2021-01-01 PROCEDURE — 83735 ASSAY OF MAGNESIUM: CPT | Performed by: EMERGENCY MEDICINE

## 2021-01-01 PROCEDURE — 84484 ASSAY OF TROPONIN QUANT: CPT | Performed by: EMERGENCY MEDICINE

## 2021-01-01 PROCEDURE — 84484 ASSAY OF TROPONIN QUANT: CPT

## 2021-01-01 PROCEDURE — 84145 PROCALCITONIN (PCT): CPT

## 2021-01-01 PROCEDURE — 250N000012 HC RX MED GY IP 250 OP 636 PS 637: Performed by: INTERNAL MEDICINE

## 2021-01-01 PROCEDURE — 99233 SBSQ HOSP IP/OBS HIGH 50: CPT | Performed by: NURSE PRACTITIONER

## 2021-01-01 PROCEDURE — 36415 COLL VENOUS BLD VENIPUNCTURE: CPT | Performed by: FAMILY MEDICINE

## 2021-01-01 PROCEDURE — 99232 SBSQ HOSP IP/OBS MODERATE 35: CPT | Performed by: INTERNAL MEDICINE

## 2021-01-01 PROCEDURE — 210N000001 HC R&B IMCU HEART CARE

## 2021-01-01 PROCEDURE — 250N000011 HC RX IP 250 OP 636: Performed by: NURSE PRACTITIONER

## 2021-01-01 PROCEDURE — 85025 COMPLETE CBC W/AUTO DIFF WBC: CPT

## 2021-01-01 PROCEDURE — 250N000011 HC RX IP 250 OP 636

## 2021-01-01 PROCEDURE — 999N001212 HC REV CODE 9999 CHARGE CONVERSION OPNP: Mod: XU

## 2021-01-01 PROCEDURE — 99207 PR CDG-CUT & PASTE-POTENTIAL IMPACT ON LEVEL: CPT | Performed by: FAMILY MEDICINE

## 2021-01-01 PROCEDURE — 96374 THER/PROPH/DIAG INJ IV PUSH: CPT | Mod: XU

## 2021-01-01 PROCEDURE — 99223 1ST HOSP IP/OBS HIGH 75: CPT | Mod: AI | Performed by: INTERNAL MEDICINE

## 2021-01-01 PROCEDURE — 70450 CT HEAD/BRAIN W/O DYE: CPT

## 2021-01-01 PROCEDURE — 87340 HEPATITIS B SURFACE AG IA: CPT | Mod: GZ | Performed by: INTERNAL MEDICINE

## 2021-01-01 PROCEDURE — 90935 HEMODIALYSIS ONE EVALUATION: CPT | Performed by: INTERNAL MEDICINE

## 2021-01-01 PROCEDURE — 93294 REM INTERROG EVL PM/LDLS PM: CPT | Performed by: INTERNAL MEDICINE

## 2021-01-01 PROCEDURE — 36415 COLL VENOUS BLD VENIPUNCTURE: CPT | Performed by: PHYSICIAN ASSISTANT

## 2021-01-01 PROCEDURE — 5A1D70Z PERFORMANCE OF URINARY FILTRATION, INTERMITTENT, LESS THAN 6 HOURS PER DAY: ICD-10-PCS | Performed by: NURSE PRACTITIONER

## 2021-01-01 PROCEDURE — 250N000009 HC RX 250: Performed by: FAMILY MEDICINE

## 2021-01-01 PROCEDURE — 80048 BASIC METABOLIC PNL TOTAL CA: CPT | Performed by: EMERGENCY MEDICINE

## 2021-01-01 PROCEDURE — 97166 OT EVAL MOD COMPLEX 45 MIN: CPT | Mod: GO

## 2021-01-01 PROCEDURE — 82805 BLOOD GASES W/O2 SATURATION: CPT

## 2021-01-01 PROCEDURE — 99605 MTMS BY PHARM NP 15 MIN: CPT | Performed by: PHARMACIST

## 2021-01-01 PROCEDURE — 96374 THER/PROPH/DIAG INJ IV PUSH: CPT

## 2021-01-01 PROCEDURE — 99226 PR SUBSEQUENT OBSERVATION CARE,LEVEL III: CPT | Performed by: INTERNAL MEDICINE

## 2021-01-01 PROCEDURE — 99291 CRITICAL CARE FIRST HOUR: CPT | Mod: 25

## 2021-01-01 PROCEDURE — 99225 PR SUBSEQUENT OBSERVATION CARE,LEVEL II: CPT | Performed by: INTERNAL MEDICINE

## 2021-01-01 PROCEDURE — 99495 TRANSJ CARE MGMT MOD F2F 14D: CPT | Performed by: NURSE PRACTITIONER

## 2021-01-01 PROCEDURE — 94761 N-INVAS EAR/PLS OXIMETRY MLT: CPT

## 2021-01-01 PROCEDURE — G0257 UNSCHED DIALYSIS ESRD PT HOS: HCPCS

## 2021-01-01 PROCEDURE — 250N000013 HC RX MED GY IP 250 OP 250 PS 637: Performed by: PHYSICIAN ASSISTANT

## 2021-01-01 PROCEDURE — 96375 TX/PRO/DX INJ NEW DRUG ADDON: CPT | Mod: XU

## 2021-01-01 PROCEDURE — 93005 ELECTROCARDIOGRAM TRACING: CPT | Performed by: EMERGENCY MEDICINE

## 2021-01-01 PROCEDURE — 99285 EMERGENCY DEPT VISIT HI MDM: CPT | Mod: 25

## 2021-01-01 PROCEDURE — 97530 THERAPEUTIC ACTIVITIES: CPT | Mod: GP | Performed by: PHYSICAL THERAPIST

## 2021-01-01 PROCEDURE — 99239 HOSP IP/OBS DSCHRG MGMT >30: CPT | Performed by: FAMILY MEDICINE

## 2021-01-01 PROCEDURE — 99214 OFFICE O/P EST MOD 30 MIN: CPT | Performed by: NURSE PRACTITIONER

## 2021-01-01 PROCEDURE — 5A1D70Z PERFORMANCE OF URINARY FILTRATION, INTERMITTENT, LESS THAN 6 HOURS PER DAY: ICD-10-PCS | Performed by: INTERNAL MEDICINE

## 2021-01-01 PROCEDURE — G0379 DIRECT REFER HOSPITAL OBSERV: HCPCS

## 2021-01-01 PROCEDURE — 250N000012 HC RX MED GY IP 250 OP 636 PS 637: Mod: GY | Performed by: INTERNAL MEDICINE

## 2021-01-01 PROCEDURE — 97116 GAIT TRAINING THERAPY: CPT | Mod: GP

## 2021-01-01 PROCEDURE — 85027 COMPLETE CBC AUTOMATED: CPT | Performed by: PHYSICIAN ASSISTANT

## 2021-01-01 PROCEDURE — 99284 EMERGENCY DEPT VISIT MOD MDM: CPT

## 2021-01-01 PROCEDURE — 250N000013 HC RX MED GY IP 250 OP 250 PS 637: Mod: GY | Performed by: INTERNAL MEDICINE

## 2021-01-01 PROCEDURE — 99233 SBSQ HOSP IP/OBS HIGH 50: CPT | Performed by: PHYSICIAN ASSISTANT

## 2021-01-01 PROCEDURE — 83605 ASSAY OF LACTIC ACID: CPT

## 2021-01-01 PROCEDURE — 99220 PR INITIAL OBSERVATION CARE,LEVEL III: CPT | Performed by: INTERNAL MEDICINE

## 2021-01-01 PROCEDURE — 999N000156 HC STATISTIC RCP CONSULT EA 30 MIN

## 2021-01-01 PROCEDURE — 96372 THER/PROPH/DIAG INJ SC/IM: CPT | Performed by: INTERNAL MEDICINE

## 2021-01-01 PROCEDURE — 85004 AUTOMATED DIFF WBC COUNT: CPT | Performed by: EMERGENCY MEDICINE

## 2021-01-01 PROCEDURE — 87635 SARS-COV-2 COVID-19 AMP PRB: CPT | Performed by: FAMILY MEDICINE

## 2021-01-01 PROCEDURE — 250N000011 HC RX IP 250 OP 636: Performed by: EMERGENCY MEDICINE

## 2021-01-01 PROCEDURE — 93005 ELECTROCARDIOGRAM TRACING: CPT

## 2021-01-01 PROCEDURE — 87635 SARS-COV-2 COVID-19 AMP PRB: CPT | Performed by: EMERGENCY MEDICINE

## 2021-01-01 PROCEDURE — 99217 PR OBSERVATION CARE DISCHARGE: CPT | Performed by: INTERNAL MEDICINE

## 2021-01-01 PROCEDURE — 97535 SELF CARE MNGMENT TRAINING: CPT | Mod: GO

## 2021-01-01 PROCEDURE — 999N000127 HC STATISTIC PERIPHERAL IV START W US GUIDANCE

## 2021-01-01 PROCEDURE — 97161 PT EVAL LOW COMPLEX 20 MIN: CPT | Mod: GP | Performed by: PHYSICAL THERAPIST

## 2021-01-01 PROCEDURE — 80048 BASIC METABOLIC PNL TOTAL CA: CPT | Performed by: INTERNAL MEDICINE

## 2021-01-01 PROCEDURE — 99607 MTMS BY PHARM ADDL 15 MIN: CPT | Performed by: PHARMACIST

## 2021-01-01 PROCEDURE — 97162 PT EVAL MOD COMPLEX 30 MIN: CPT | Mod: GP

## 2021-01-01 PROCEDURE — 83735 ASSAY OF MAGNESIUM: CPT | Performed by: FAMILY MEDICINE

## 2021-01-01 PROCEDURE — 87340 HEPATITIS B SURFACE AG IA: CPT | Performed by: INTERNAL MEDICINE

## 2021-01-01 PROCEDURE — 99207 PR CONSULT E&M CHANGED TO INITIAL LEVEL: CPT | Performed by: NURSE PRACTITIONER

## 2021-01-01 RX ORDER — LATANOPROST 50 UG/ML
1 SOLUTION/ DROPS OPHTHALMIC AT BEDTIME
COMMUNITY
Start: 2020-08-10 | End: 2021-01-01

## 2021-01-01 RX ORDER — OXYCODONE HYDROCHLORIDE 5 MG/1
5 TABLET ORAL EVERY 6 HOURS PRN
Qty: 8 TABLET | Refills: 0 | Status: SHIPPED | OUTPATIENT
Start: 2021-01-01 | End: 2022-01-01

## 2021-01-01 RX ORDER — ACETAMINOPHEN 325 MG/1
650 TABLET ORAL ONCE
Status: COMPLETED | OUTPATIENT
Start: 2021-01-01 | End: 2021-01-01

## 2021-01-01 RX ORDER — MIDODRINE HYDROCHLORIDE 5 MG/1
10 TABLET ORAL
Status: DISCONTINUED | OUTPATIENT
Start: 2021-01-01 | End: 2021-01-01 | Stop reason: HOSPADM

## 2021-01-01 RX ORDER — ONDANSETRON 4 MG/1
4 TABLET, FILM COATED ORAL EVERY 8 HOURS PRN
COMMUNITY
End: 2022-01-01

## 2021-01-01 RX ORDER — LANOLIN ALCOHOL/MO/W.PET/CERES
3 CREAM (GRAM) TOPICAL AT BEDTIME
Status: DISCONTINUED | OUTPATIENT
Start: 2021-01-01 | End: 2021-01-01 | Stop reason: HOSPADM

## 2021-01-01 RX ORDER — DOXYCYCLINE 100 MG/10ML
100 INJECTION, POWDER, LYOPHILIZED, FOR SOLUTION INTRAVENOUS EVERY 12 HOURS
Status: DISCONTINUED | OUTPATIENT
Start: 2021-01-01 | End: 2021-01-01 | Stop reason: ALTCHOICE

## 2021-01-01 RX ORDER — ATORVASTATIN CALCIUM 10 MG/1
10 TABLET, FILM COATED ORAL AT BEDTIME
Status: DISCONTINUED | OUTPATIENT
Start: 2021-01-01 | End: 2021-01-01 | Stop reason: HOSPADM

## 2021-01-01 RX ORDER — MULTIVIT-MIN/IRON/FOLIC ACID/K 18-600-40
2000 CAPSULE ORAL
COMMUNITY

## 2021-01-01 RX ORDER — ACETAMINOPHEN 500 MG
500-1000 TABLET ORAL EVERY 4 HOURS PRN
Status: DISCONTINUED | OUTPATIENT
Start: 2021-01-01 | End: 2021-01-01 | Stop reason: HOSPADM

## 2021-01-01 RX ORDER — NEOMYCIN/BACITRACIN/POLYMYXINB 3.5-400-5K
OINTMENT (GRAM) TOPICAL EVERY 6 HOURS PRN
Status: DISCONTINUED | OUTPATIENT
Start: 2021-01-01 | End: 2021-01-01 | Stop reason: HOSPADM

## 2021-01-01 RX ORDER — CINACALCET 30 MG/1
30 TABLET, FILM COATED ORAL
Status: DISCONTINUED | OUTPATIENT
Start: 2021-01-01 | End: 2021-01-01 | Stop reason: HOSPADM

## 2021-01-01 RX ORDER — MIDODRINE HYDROCHLORIDE 5 MG/1
15 TABLET ORAL ONCE
Status: COMPLETED | OUTPATIENT
Start: 2021-01-01 | End: 2021-01-01

## 2021-01-01 RX ORDER — BUDESONIDE AND FORMOTEROL FUMARATE DIHYDRATE 80; 4.5 UG/1; UG/1
2 AEROSOL RESPIRATORY (INHALATION) 2 TIMES DAILY
Status: DISCONTINUED | OUTPATIENT
Start: 2021-01-01 | End: 2021-01-01 | Stop reason: HOSPADM

## 2021-01-01 RX ORDER — ALBUTEROL SULFATE 5 MG/ML
2.5 SOLUTION RESPIRATORY (INHALATION)
Status: DISCONTINUED | OUTPATIENT
Start: 2021-01-01 | End: 2021-01-01 | Stop reason: HOSPADM

## 2021-01-01 RX ORDER — ACETAMINOPHEN 650 MG/1
650 SUPPOSITORY RECTAL EVERY 6 HOURS PRN
Status: DISCONTINUED | OUTPATIENT
Start: 2021-01-01 | End: 2021-01-01 | Stop reason: HOSPADM

## 2021-01-01 RX ORDER — DOXYCYCLINE 50 MG/1
100 CAPSULE ORAL 2 TIMES DAILY
Qty: 8 CAPSULE | Refills: 0 | Status: SHIPPED | OUTPATIENT
Start: 2021-01-01 | End: 2021-01-01

## 2021-01-01 RX ORDER — CINACALCET 30 MG/1
30 TABLET, FILM COATED ORAL
COMMUNITY

## 2021-01-01 RX ORDER — LACTOBACILLUS RHAMNOSUS GG 10B CELL
1 CAPSULE ORAL
Status: DISCONTINUED | OUTPATIENT
Start: 2021-01-01 | End: 2021-01-01 | Stop reason: HOSPADM

## 2021-01-01 RX ORDER — ALBUTEROL SULFATE 0.83 MG/ML
2.5 SOLUTION RESPIRATORY (INHALATION) EVERY 4 HOURS PRN
Status: DISCONTINUED | OUTPATIENT
Start: 2021-01-01 | End: 2021-01-01 | Stop reason: HOSPADM

## 2021-01-01 RX ORDER — DOXYCYCLINE HYCLATE 20 MG
20 TABLET ORAL EVERY 12 HOURS SCHEDULED
Status: DISCONTINUED | OUTPATIENT
Start: 2021-01-01 | End: 2021-01-01 | Stop reason: DRUGHIGH

## 2021-01-01 RX ORDER — OXYCODONE HYDROCHLORIDE 5 MG/1
5 TABLET ORAL EVERY 6 HOURS PRN
Qty: 8 TABLET | Refills: 0 | Status: SHIPPED | OUTPATIENT
Start: 2021-01-01 | End: 2021-01-01

## 2021-01-01 RX ORDER — OXYCODONE HYDROCHLORIDE 5 MG/1
5 TABLET ORAL EVERY 6 HOURS PRN
Status: DISCONTINUED | OUTPATIENT
Start: 2021-01-01 | End: 2021-01-01 | Stop reason: HOSPADM

## 2021-01-01 RX ORDER — MIDODRINE HYDROCHLORIDE 10 MG/1
15 TABLET ORAL
COMMUNITY

## 2021-01-01 RX ORDER — METOPROLOL SUCCINATE 25 MG/1
25 TABLET, EXTENDED RELEASE ORAL DAILY
Status: ON HOLD | COMMUNITY
End: 2021-01-01

## 2021-01-01 RX ORDER — GABAPENTIN 100 MG/1
100 CAPSULE ORAL 3 TIMES DAILY
Status: DISCONTINUED | OUTPATIENT
Start: 2021-01-01 | End: 2021-01-01 | Stop reason: HOSPADM

## 2021-01-01 RX ORDER — ALBUTEROL SULFATE 0.83 MG/ML
2.5 SOLUTION RESPIRATORY (INHALATION) 4 TIMES DAILY
Status: DISCONTINUED | OUTPATIENT
Start: 2021-01-01 | End: 2021-01-01 | Stop reason: HOSPADM

## 2021-01-01 RX ORDER — TRAZODONE HYDROCHLORIDE 50 MG/1
50 TABLET, FILM COATED ORAL
Status: DISCONTINUED | OUTPATIENT
Start: 2021-01-01 | End: 2021-01-01 | Stop reason: HOSPADM

## 2021-01-01 RX ORDER — NALOXONE HYDROCHLORIDE 0.4 MG/ML
0.4 INJECTION, SOLUTION INTRAMUSCULAR; INTRAVENOUS; SUBCUTANEOUS
Status: DISCONTINUED | OUTPATIENT
Start: 2021-01-01 | End: 2021-01-01 | Stop reason: HOSPADM

## 2021-01-01 RX ORDER — SEVELAMER CARBONATE 800 MG/1
1600 TABLET, FILM COATED ORAL 2 TIMES DAILY PRN
Status: DISCONTINUED | OUTPATIENT
Start: 2021-01-01 | End: 2021-01-01

## 2021-01-01 RX ORDER — MIDODRINE HYDROCHLORIDE 10 MG/1
20 TABLET ORAL
Status: ON HOLD | COMMUNITY
End: 2021-01-01

## 2021-01-01 RX ORDER — ALBUTEROL SULFATE 90 UG/1
2 AEROSOL, METERED RESPIRATORY (INHALATION) EVERY 6 HOURS PRN
Qty: 6.7 G | Refills: 0 | Status: SHIPPED | OUTPATIENT
Start: 2021-01-01 | End: 2021-01-01

## 2021-01-01 RX ORDER — SIMETHICONE 80 MG
80 TABLET,CHEWABLE ORAL EVERY 6 HOURS PRN
Status: DISCONTINUED | OUTPATIENT
Start: 2021-01-01 | End: 2021-01-01 | Stop reason: HOSPADM

## 2021-01-01 RX ORDER — GABAPENTIN 100 MG/1
100 CAPSULE ORAL 2 TIMES DAILY
Status: DISCONTINUED | OUTPATIENT
Start: 2021-01-01 | End: 2021-01-01

## 2021-01-01 RX ORDER — TRAZODONE HYDROCHLORIDE 150 MG/1
150 TABLET ORAL AT BEDTIME
Status: DISCONTINUED | OUTPATIENT
Start: 2021-01-01 | End: 2021-01-01 | Stop reason: HOSPADM

## 2021-01-01 RX ORDER — ALBUTEROL SULFATE 5 MG/ML
2.5 SOLUTION RESPIRATORY (INHALATION) EVERY 6 HOURS PRN
Status: DISCONTINUED | OUTPATIENT
Start: 2021-01-01 | End: 2021-01-01

## 2021-01-01 RX ORDER — VIT B COMP NO.3/FOLIC/C/BIOTIN 1 MG-60 MG
1 TABLET ORAL
Status: DISCONTINUED | OUTPATIENT
Start: 2021-01-01 | End: 2021-01-01 | Stop reason: HOSPADM

## 2021-01-01 RX ORDER — DOXYCYCLINE 50 MG/1
100 CAPSULE ORAL 2 TIMES DAILY
Qty: 4 CAPSULE | Refills: 0 | Status: SHIPPED | OUTPATIENT
Start: 2021-01-01 | End: 2021-01-01

## 2021-01-01 RX ORDER — IPRATROPIUM BROMIDE AND ALBUTEROL SULFATE 2.5; .5 MG/3ML; MG/3ML
3 SOLUTION RESPIRATORY (INHALATION) EVERY 4 HOURS PRN
Status: DISCONTINUED | OUTPATIENT
Start: 2021-01-01 | End: 2021-01-01 | Stop reason: HOSPADM

## 2021-01-01 RX ORDER — ACETAMINOPHEN 500 MG
1000 TABLET ORAL EVERY 6 HOURS PRN
Status: DISCONTINUED | OUTPATIENT
Start: 2021-01-01 | End: 2021-01-01

## 2021-01-01 RX ORDER — LANOLIN ALCOHOL/MO/W.PET/CERES
3 CREAM (GRAM) TOPICAL AT BEDTIME
COMMUNITY

## 2021-01-01 RX ORDER — CINACALCET 30 MG/1
30 TABLET, FILM COATED ORAL PRN
COMMUNITY
Start: 2020-08-12 | End: 2021-01-01

## 2021-01-01 RX ORDER — ALBUTEROL SULFATE 0.83 MG/ML
2.5 SOLUTION RESPIRATORY (INHALATION) 4 TIMES DAILY
Status: DISCONTINUED | OUTPATIENT
Start: 2021-01-01 | End: 2021-01-01

## 2021-01-01 RX ORDER — BUPROPION HYDROCHLORIDE 150 MG/1
150 TABLET ORAL
Status: DISCONTINUED | OUTPATIENT
Start: 2021-01-01 | End: 2021-01-01 | Stop reason: HOSPADM

## 2021-01-01 RX ORDER — VITAMIN B COMPLEX
2000 TABLET ORAL
Status: DISCONTINUED | OUTPATIENT
Start: 2021-01-01 | End: 2021-01-01 | Stop reason: HOSPADM

## 2021-01-01 RX ORDER — TIMOLOL MALEATE 5 MG/ML
1 SOLUTION/ DROPS OPHTHALMIC 2 TIMES DAILY
Status: DISCONTINUED | OUTPATIENT
Start: 2021-01-01 | End: 2021-01-01 | Stop reason: HOSPADM

## 2021-01-01 RX ORDER — ALBUTEROL SULFATE 90 UG/1
2 AEROSOL, METERED RESPIRATORY (INHALATION) EVERY 4 HOURS PRN
Status: DISCONTINUED | OUTPATIENT
Start: 2021-01-01 | End: 2021-01-01 | Stop reason: HOSPADM

## 2021-01-01 RX ORDER — LANOLIN ALCOHOL/MO/W.PET/CERES
3 CREAM (GRAM) TOPICAL
Status: DISCONTINUED | OUTPATIENT
Start: 2021-01-01 | End: 2021-01-01 | Stop reason: HOSPADM

## 2021-01-01 RX ORDER — SUMATRIPTAN 25 MG/1
25 TABLET, FILM COATED ORAL ONCE
Status: COMPLETED | OUTPATIENT
Start: 2021-01-01 | End: 2021-01-01

## 2021-01-01 RX ORDER — LIDOCAINE 40 MG/G
CREAM TOPICAL
Status: DISCONTINUED | OUTPATIENT
Start: 2021-01-01 | End: 2021-01-01 | Stop reason: HOSPADM

## 2021-01-01 RX ORDER — AMOXICILLIN 250 MG
1 CAPSULE ORAL EVERY OTHER DAY
Status: DISCONTINUED | OUTPATIENT
Start: 2021-01-01 | End: 2021-01-01 | Stop reason: HOSPADM

## 2021-01-01 RX ORDER — MIDODRINE HYDROCHLORIDE 5 MG/1
10 TABLET ORAL EVERY 6 HOURS PRN
Status: DISCONTINUED | OUTPATIENT
Start: 2021-01-01 | End: 2021-01-01 | Stop reason: HOSPADM

## 2021-01-01 RX ORDER — DEXAMETHASONE SODIUM PHOSPHATE 10 MG/ML
10 INJECTION, SOLUTION INTRAMUSCULAR; INTRAVENOUS ONCE
Status: COMPLETED | OUTPATIENT
Start: 2021-01-01 | End: 2021-01-01

## 2021-01-01 RX ORDER — HEPARIN SODIUM 1000 [USP'U]/ML
500 INJECTION, SOLUTION INTRAVENOUS; SUBCUTANEOUS CONTINUOUS
Status: DISCONTINUED | OUTPATIENT
Start: 2021-01-01 | End: 2021-01-01

## 2021-01-01 RX ORDER — PREDNISONE 20 MG/1
40 TABLET ORAL DAILY
Status: DISCONTINUED | OUTPATIENT
Start: 2021-01-01 | End: 2021-01-01 | Stop reason: HOSPADM

## 2021-01-01 RX ORDER — FAMOTIDINE 10 MG
10 TABLET ORAL 2 TIMES DAILY
COMMUNITY
Start: 2020-07-23 | End: 2021-01-01

## 2021-01-01 RX ORDER — ALBUTEROL SULFATE 0.83 MG/ML
2.5 SOLUTION RESPIRATORY (INHALATION) EVERY 4 HOURS PRN
Status: ON HOLD | COMMUNITY
End: 2021-01-01

## 2021-01-01 RX ORDER — POLYVINYL ALCOHOL 14 MG/ML
1 SOLUTION/ DROPS OPHTHALMIC
Status: DISCONTINUED | OUTPATIENT
Start: 2021-01-01 | End: 2021-01-01 | Stop reason: HOSPADM

## 2021-01-01 RX ORDER — MIDODRINE HYDROCHLORIDE 5 MG/1
5 TABLET ORAL
Status: COMPLETED | OUTPATIENT
Start: 2021-01-01 | End: 2021-01-01

## 2021-01-01 RX ORDER — SEVELAMER CARBONATE 800 MG/1
1600 TABLET, FILM COATED ORAL 3 TIMES DAILY
Status: DISCONTINUED | OUTPATIENT
Start: 2021-01-01 | End: 2021-01-01 | Stop reason: HOSPADM

## 2021-01-01 RX ORDER — BUDESONIDE AND FORMOTEROL FUMARATE DIHYDRATE 80; 4.5 UG/1; UG/1
2 AEROSOL RESPIRATORY (INHALATION) 2 TIMES DAILY
Status: ON HOLD | COMMUNITY
End: 2021-01-01

## 2021-01-01 RX ORDER — ACETAMINOPHEN 325 MG/1
650 TABLET ORAL EVERY 6 HOURS PRN
Status: DISCONTINUED | OUTPATIENT
Start: 2021-01-01 | End: 2021-01-01 | Stop reason: HOSPADM

## 2021-01-01 RX ORDER — ALBUMIN (HUMAN) 12.5 G/50ML
50 SOLUTION INTRAVENOUS
Status: DISCONTINUED | OUTPATIENT
Start: 2021-01-01 | End: 2021-01-01

## 2021-01-01 RX ORDER — BUPRENORPHINE 5 UG/H
1 PATCH TRANSDERMAL
Qty: 4 PATCH | Refills: 1 | Status: SHIPPED | OUTPATIENT
Start: 2021-01-01 | End: 2022-01-01

## 2021-01-01 RX ORDER — ALBUTEROL SULFATE 0.83 MG/ML
2.5 SOLUTION RESPIRATORY (INHALATION) 3 TIMES DAILY
COMMUNITY
End: 2021-01-01

## 2021-01-01 RX ORDER — FAMOTIDINE 20 MG/1
20 TABLET, FILM COATED ORAL AT BEDTIME
Status: DISCONTINUED | OUTPATIENT
Start: 2021-01-01 | End: 2021-01-01 | Stop reason: HOSPADM

## 2021-01-01 RX ORDER — CHLORPHENIRAMINE MALEATE AND DEXTROMETHORPHAN HYDROBROMIDE 4; 30 MG/1; MG/1
1 TABLET, FILM COATED ORAL EVERY 6 HOURS PRN
COMMUNITY
End: 2022-01-01

## 2021-01-01 RX ORDER — LORATADINE 10 MG/1
10 TABLET ORAL DAILY PRN
Status: DISCONTINUED | OUTPATIENT
Start: 2021-01-01 | End: 2021-01-01 | Stop reason: HOSPADM

## 2021-01-01 RX ORDER — MIDODRINE HYDROCHLORIDE 5 MG/1
20 TABLET ORAL
Status: DISCONTINUED | OUTPATIENT
Start: 2021-01-01 | End: 2021-01-01

## 2021-01-01 RX ORDER — LORATADINE 10 MG/1
10 CAPSULE, LIQUID FILLED ORAL PRN
COMMUNITY
Start: 2020-07-20 | End: 2022-01-01

## 2021-01-01 RX ORDER — HEPARIN SODIUM 1000 [USP'U]/ML
500 INJECTION, SOLUTION INTRAVENOUS; SUBCUTANEOUS
Status: DISCONTINUED | OUTPATIENT
Start: 2021-01-01 | End: 2021-01-01

## 2021-01-01 RX ORDER — ASPIRIN 81 MG/1
81 TABLET ORAL
Status: DISCONTINUED | OUTPATIENT
Start: 2021-01-01 | End: 2021-01-01 | Stop reason: HOSPADM

## 2021-01-01 RX ORDER — HEPARIN SODIUM 5000 [USP'U]/.5ML
5000 INJECTION, SOLUTION INTRAVENOUS; SUBCUTANEOUS EVERY 12 HOURS
Status: DISCONTINUED | OUTPATIENT
Start: 2021-01-01 | End: 2021-01-01 | Stop reason: HOSPADM

## 2021-01-01 RX ORDER — HEPARIN SODIUM 1000 [USP'U]/ML
3 INJECTION, SOLUTION INTRAVENOUS; SUBCUTANEOUS
Status: DISCONTINUED | OUTPATIENT
Start: 2021-01-01 | End: 2021-01-01 | Stop reason: HOSPADM

## 2021-01-01 RX ORDER — ALBUTEROL SULFATE 0.83 MG/ML
SOLUTION RESPIRATORY (INHALATION)
Qty: 90 ML | Refills: 0 | Status: SHIPPED | OUTPATIENT
Start: 2021-01-01 | End: 2022-01-01

## 2021-01-01 RX ORDER — GABAPENTIN 100 MG/1
100 CAPSULE ORAL DAILY
COMMUNITY
End: 2022-01-01

## 2021-01-01 RX ORDER — ALBUTEROL SULFATE 5 MG/ML
2.5 SOLUTION RESPIRATORY (INHALATION) EVERY 6 HOURS PRN
Status: DISCONTINUED | OUTPATIENT
Start: 2021-01-01 | End: 2021-01-01 | Stop reason: HOSPADM

## 2021-01-01 RX ORDER — TIMOLOL MALEATE 5 MG/ML
1 SOLUTION/ DROPS OPHTHALMIC 2 TIMES DAILY
Status: DISCONTINUED | OUTPATIENT
Start: 2021-01-01 | End: 2021-01-01

## 2021-01-01 RX ORDER — PREDNISONE 20 MG/1
40 TABLET ORAL DAILY
Qty: 6 TABLET | Refills: 0 | Status: SHIPPED | OUTPATIENT
Start: 2021-01-01 | End: 2021-01-01

## 2021-01-01 RX ORDER — FOLIC ACID 1 MG/1
1 TABLET ORAL
Status: DISCONTINUED | OUTPATIENT
Start: 2021-01-01 | End: 2021-01-01 | Stop reason: HOSPADM

## 2021-01-01 RX ORDER — LORATADINE 10 MG/1
10 TABLET ORAL DAILY PRN
COMMUNITY
End: 2021-01-01

## 2021-01-01 RX ORDER — FAMOTIDINE 20 MG/1
20 TABLET, FILM COATED ORAL AT BEDTIME
COMMUNITY
End: 2022-01-01

## 2021-01-01 RX ORDER — ASCORBIC ACID, THIAMINE, RIBOFLAVIN, NIACINAMIDE, PYRIDOXINE, FOLIC ACID, COBALAMIN, BIOTIN, PANTOTHENIC ACID 100; 1.5; 1.7; 20; 10; 1; 6; 300; 1 MG/1; MG/1; MG/1; MG/1; MG/1; MG/1; UG/1; UG/1; MG/1
1 TABLET, COATED ORAL DAILY
Qty: 90 TABLET | Refills: 3 | Status: SHIPPED | OUTPATIENT
Start: 2021-01-01

## 2021-01-01 RX ORDER — ALBUTEROL SULFATE 90 UG/1
2 AEROSOL, METERED RESPIRATORY (INHALATION) 4 TIMES DAILY PRN
Status: DISCONTINUED | OUTPATIENT
Start: 2021-01-01 | End: 2021-01-01 | Stop reason: HOSPADM

## 2021-01-01 RX ORDER — FOLIC ACID 1 MG/1
1 TABLET ORAL
Status: ON HOLD | COMMUNITY
End: 2021-01-01

## 2021-01-01 RX ORDER — LACTOBACILLUS RHAMNOSUS GG 10B CELL
1 CAPSULE ORAL
COMMUNITY
End: 2021-01-01

## 2021-01-01 RX ORDER — ALBUTEROL SULFATE 90 UG/1
2 AEROSOL, METERED RESPIRATORY (INHALATION) 4 TIMES DAILY
Status: DISCONTINUED | OUTPATIENT
Start: 2021-01-01 | End: 2021-01-01 | Stop reason: HOSPADM

## 2021-01-01 RX ORDER — ASPIRIN 81 MG/1
81 TABLET ORAL
COMMUNITY

## 2021-01-01 RX ORDER — BISACODYL 10 MG
10 SUPPOSITORY, RECTAL RECTAL DAILY PRN
Status: DISCONTINUED | OUTPATIENT
Start: 2021-01-01 | End: 2021-01-01 | Stop reason: HOSPADM

## 2021-01-01 RX ORDER — METOPROLOL SUCCINATE 25 MG/1
1 TABLET, EXTENDED RELEASE ORAL DAILY
COMMUNITY
Start: 2021-02-21 | End: 2021-01-01

## 2021-01-01 RX ORDER — OXYCODONE HYDROCHLORIDE 5 MG/1
5 TABLET ORAL EVERY 6 HOURS PRN
Qty: 20 TABLET | Refills: 0 | Status: ON HOLD | OUTPATIENT
Start: 2021-01-01 | End: 2021-01-01

## 2021-01-01 RX ORDER — SUMATRIPTAN 50 MG/1
50 TABLET, FILM COATED ORAL DAILY PRN
Status: DISCONTINUED | OUTPATIENT
Start: 2021-01-01 | End: 2021-01-01 | Stop reason: HOSPADM

## 2021-01-01 RX ORDER — AMOXICILLIN 250 MG
1 CAPSULE ORAL 2 TIMES DAILY
COMMUNITY

## 2021-01-01 RX ORDER — MIDODRINE HYDROCHLORIDE 10 MG/1
10 TABLET ORAL WEEKLY
COMMUNITY

## 2021-01-01 RX ORDER — ASPIRIN 81 MG/1
81 TABLET ORAL DAILY
Status: DISCONTINUED | OUTPATIENT
Start: 2021-01-01 | End: 2021-01-01 | Stop reason: HOSPADM

## 2021-01-01 RX ORDER — ACETAMINOPHEN 500 MG
1000 TABLET ORAL 3 TIMES DAILY PRN
COMMUNITY

## 2021-01-01 RX ORDER — DOXERCALCIFEROL 4 UG/2ML
4 INJECTION INTRAVENOUS
Status: COMPLETED | OUTPATIENT
Start: 2021-01-01 | End: 2021-01-01

## 2021-01-01 RX ORDER — AMOXICILLIN 250 MG
1 CAPSULE ORAL 2 TIMES DAILY PRN
Status: DISCONTINUED | OUTPATIENT
Start: 2021-01-01 | End: 2021-01-01 | Stop reason: HOSPADM

## 2021-01-01 RX ORDER — SEVELAMER CARBONATE 800 MG/1
1600 TABLET, FILM COATED ORAL
Status: DISCONTINUED | OUTPATIENT
Start: 2021-01-01 | End: 2021-01-01 | Stop reason: HOSPADM

## 2021-01-01 RX ORDER — METOPROLOL SUCCINATE 25 MG/1
25 TABLET, EXTENDED RELEASE ORAL DAILY
Status: DISCONTINUED | OUTPATIENT
Start: 2021-01-01 | End: 2021-01-01 | Stop reason: HOSPADM

## 2021-01-01 RX ORDER — MIDODRINE HYDROCHLORIDE 5 MG/1
5 TABLET ORAL DAILY PRN
Status: DISCONTINUED | OUTPATIENT
Start: 2021-01-01 | End: 2021-01-01 | Stop reason: HOSPADM

## 2021-01-01 RX ORDER — SEVELAMER CARBONATE 800 MG/1
800 TABLET, FILM COATED ORAL
COMMUNITY

## 2021-01-01 RX ORDER — SEVELAMER CARBONATE 800 MG/1
1600 TABLET, FILM COATED ORAL 2 TIMES DAILY PRN
Status: DISCONTINUED | OUTPATIENT
Start: 2021-01-01 | End: 2021-01-01 | Stop reason: HOSPADM

## 2021-01-01 RX ORDER — ATORVASTATIN CALCIUM 10 MG/1
10 TABLET, FILM COATED ORAL AT BEDTIME
COMMUNITY

## 2021-01-01 RX ORDER — ACETAMINOPHEN 325 MG/1
650 TABLET ORAL EVERY 4 HOURS PRN
Status: DISCONTINUED | OUTPATIENT
Start: 2021-01-01 | End: 2021-01-01 | Stop reason: HOSPADM

## 2021-01-01 RX ORDER — BUPROPION HYDROCHLORIDE 150 MG/1
150 TABLET ORAL
COMMUNITY
End: 2022-01-01

## 2021-01-01 RX ORDER — POLYVINYL ALCOHOL 14 MG/ML
1 SOLUTION/ DROPS OPHTHALMIC 3 TIMES DAILY
COMMUNITY

## 2021-01-01 RX ORDER — PREDNISONE 10 MG/1
TABLET ORAL
Qty: 22 TABLET | Refills: 0 | Status: SHIPPED | OUTPATIENT
Start: 2021-01-01 | End: 2021-01-01

## 2021-01-01 RX ORDER — NALOXONE HYDROCHLORIDE 0.4 MG/ML
0.2 INJECTION, SOLUTION INTRAMUSCULAR; INTRAVENOUS; SUBCUTANEOUS
Status: DISCONTINUED | OUTPATIENT
Start: 2021-01-01 | End: 2021-01-01 | Stop reason: HOSPADM

## 2021-01-01 RX ORDER — SIMETHICONE 80 MG
80 TABLET,CHEWABLE ORAL EVERY 6 HOURS PRN
Qty: 30 TABLET | Refills: 0 | Status: SHIPPED | OUTPATIENT
Start: 2021-01-01 | End: 2022-01-01

## 2021-01-01 RX ORDER — CEFTRIAXONE 1 G/1
1 INJECTION, POWDER, FOR SOLUTION INTRAMUSCULAR; INTRAVENOUS EVERY 24 HOURS
Status: DISCONTINUED | OUTPATIENT
Start: 2021-01-01 | End: 2021-01-01 | Stop reason: ALTCHOICE

## 2021-01-01 RX ORDER — MIDODRINE HYDROCHLORIDE 5 MG/1
15 TABLET ORAL DAILY PRN
Status: DISCONTINUED | OUTPATIENT
Start: 2021-01-01 | End: 2021-01-01 | Stop reason: HOSPADM

## 2021-01-01 RX ORDER — DOXYCYCLINE 50 MG/1
100 CAPSULE ORAL EVERY 12 HOURS SCHEDULED
Status: DISCONTINUED | OUTPATIENT
Start: 2021-01-01 | End: 2021-01-01 | Stop reason: HOSPADM

## 2021-01-01 RX ORDER — MIDODRINE HYDROCHLORIDE 5 MG/1
20 TABLET ORAL
Status: DISCONTINUED | OUTPATIENT
Start: 2021-01-01 | End: 2021-01-01 | Stop reason: HOSPADM

## 2021-01-01 RX ORDER — MINERAL OIL/HYDROPHIL PETROLAT
OINTMENT (GRAM) TOPICAL
Status: DISCONTINUED | OUTPATIENT
Start: 2021-01-01 | End: 2021-01-01 | Stop reason: HOSPADM

## 2021-01-01 RX ORDER — SUMATRIPTAN 50 MG/1
50 TABLET, FILM COATED ORAL DAILY PRN
Qty: 30 TABLET | Refills: 0 | Status: SHIPPED | OUTPATIENT
Start: 2021-01-01 | End: 2022-01-01

## 2021-01-01 RX ORDER — ONDANSETRON 4 MG/1
4 TABLET, ORALLY DISINTEGRATING ORAL EVERY 6 HOURS PRN
Status: DISCONTINUED | OUTPATIENT
Start: 2021-01-01 | End: 2021-01-01 | Stop reason: HOSPADM

## 2021-01-01 RX ORDER — ONDANSETRON 2 MG/ML
4 INJECTION INTRAMUSCULAR; INTRAVENOUS EVERY 6 HOURS PRN
Status: DISCONTINUED | OUTPATIENT
Start: 2021-01-01 | End: 2021-01-01 | Stop reason: HOSPADM

## 2021-01-01 RX ORDER — SEVELAMER CARBONATE 800 MG/1
1600 TABLET, FILM COATED ORAL
COMMUNITY
Start: 2020-07-22

## 2021-01-01 RX ORDER — AMOXICILLIN 250 MG
1 CAPSULE ORAL AT BEDTIME
Status: DISCONTINUED | OUTPATIENT
Start: 2021-01-01 | End: 2021-01-01 | Stop reason: HOSPADM

## 2021-01-01 RX ORDER — AMOXICILLIN 250 MG
2 CAPSULE ORAL 2 TIMES DAILY PRN
Status: DISCONTINUED | OUTPATIENT
Start: 2021-01-01 | End: 2021-01-01 | Stop reason: HOSPADM

## 2021-01-01 RX ORDER — SEVELAMER CARBONATE 800 MG/1
800 TABLET, FILM COATED ORAL
Status: DISCONTINUED | OUTPATIENT
Start: 2021-01-01 | End: 2021-01-01 | Stop reason: HOSPADM

## 2021-01-01 RX ORDER — DOXYCYCLINE 100 MG/1
1 CAPSULE ORAL 2 TIMES DAILY
COMMUNITY
Start: 2021-01-01 | End: 2021-01-01

## 2021-01-01 RX ORDER — CARBOXYMETHYLCELLULOSE SODIUM 10 MG/ML
1 GEL OPHTHALMIC
Status: DISCONTINUED | OUTPATIENT
Start: 2021-01-01 | End: 2021-01-01 | Stop reason: HOSPADM

## 2021-01-01 RX ORDER — OXYCODONE HYDROCHLORIDE 5 MG/1
5 TABLET ORAL EVERY 6 HOURS PRN
Status: ON HOLD | COMMUNITY
End: 2021-01-01

## 2021-01-01 RX ORDER — TRAZODONE HYDROCHLORIDE 50 MG/1
150 TABLET, FILM COATED ORAL AT BEDTIME
Status: DISCONTINUED | OUTPATIENT
Start: 2021-01-01 | End: 2021-01-01 | Stop reason: HOSPADM

## 2021-01-01 RX ORDER — GABAPENTIN 100 MG/1
300 CAPSULE ORAL 3 TIMES DAILY
COMMUNITY
Start: 2020-07-23 | End: 2021-01-01

## 2021-01-01 RX ORDER — AMOXICILLIN 250 MG
1 CAPSULE ORAL EVERY OTHER DAY
Status: DISCONTINUED | OUTPATIENT
Start: 2021-01-01 | End: 2021-01-01

## 2021-01-01 RX ORDER — SUMATRIPTAN 20 MG/1
20 SPRAY NASAL ONCE
Status: DISCONTINUED | OUTPATIENT
Start: 2021-01-01 | End: 2021-01-01

## 2021-01-01 RX ORDER — MIDODRINE HYDROCHLORIDE 5 MG/1
15 TABLET ORAL
Status: DISCONTINUED | OUTPATIENT
Start: 2021-01-01 | End: 2021-01-01 | Stop reason: HOSPADM

## 2021-01-01 RX ORDER — TRAZODONE HYDROCHLORIDE 150 MG/1
150 TABLET ORAL AT BEDTIME
COMMUNITY
End: 2021-01-01

## 2021-01-01 RX ORDER — SEVELAMER CARBONATE 800 MG/1
2400 TABLET, FILM COATED ORAL 3 TIMES DAILY
Status: DISCONTINUED | OUTPATIENT
Start: 2021-01-01 | End: 2021-01-01

## 2021-01-01 RX ORDER — ACETAMINOPHEN 650 MG/1
650 SUPPOSITORY RECTAL EVERY 4 HOURS PRN
Status: DISCONTINUED | OUTPATIENT
Start: 2021-01-01 | End: 2021-01-01 | Stop reason: HOSPADM

## 2021-01-01 RX ORDER — OXYCODONE HYDROCHLORIDE 5 MG/1
5 TABLET ORAL EVERY 6 HOURS PRN
Qty: 20 TABLET | Refills: 0 | Status: SHIPPED | OUTPATIENT
Start: 2021-01-01 | End: 2021-01-01

## 2021-01-01 RX ORDER — POLYETHYLENE GLYCOL 3350 17 G/17G
17 POWDER, FOR SOLUTION ORAL DAILY PRN
Status: DISCONTINUED | OUTPATIENT
Start: 2021-01-01 | End: 2021-01-01 | Stop reason: HOSPADM

## 2021-01-01 RX ORDER — SEVELAMER CARBONATE 800 MG/1
1600 TABLET, FILM COATED ORAL
Status: DISCONTINUED | OUTPATIENT
Start: 2021-01-01 | End: 2021-01-01

## 2021-01-01 RX ORDER — ALBUTEROL SULFATE 0.83 MG/ML
2.5 SOLUTION RESPIRATORY (INHALATION) 4 TIMES DAILY
Qty: 300 ML | Refills: 1 | Status: SHIPPED | OUTPATIENT
Start: 2021-01-01 | End: 2021-01-01

## 2021-01-01 RX ORDER — NYSTATIN 100000 [USP'U]/G
POWDER TOPICAL 2 TIMES DAILY
Qty: 60 G | Refills: 0 | Status: SHIPPED | OUTPATIENT
Start: 2021-01-01 | End: 2022-01-01

## 2021-01-01 RX ORDER — BUDESONIDE AND FORMOTEROL FUMARATE DIHYDRATE 80; 4.5 UG/1; UG/1
2 AEROSOL RESPIRATORY (INHALATION) 2 TIMES DAILY
Qty: 10.2 G | Refills: 1 | Status: SHIPPED | OUTPATIENT
Start: 2021-01-01 | End: 2022-01-01

## 2021-01-01 RX ORDER — ALBUTEROL SULFATE 0.83 MG/ML
2.5 SOLUTION RESPIRATORY (INHALATION)
Status: DISCONTINUED | OUTPATIENT
Start: 2021-01-01 | End: 2021-01-01

## 2021-01-01 RX ORDER — VIT B COMP NO.3/FOLIC/C/BIOTIN 1 MG-60 MG
1 TABLET ORAL DAILY
Status: DISCONTINUED | OUTPATIENT
Start: 2021-01-01 | End: 2021-01-01

## 2021-01-01 RX ORDER — TRAZODONE HYDROCHLORIDE 150 MG/1
150 TABLET ORAL AT BEDTIME
Qty: 90 TABLET | Refills: 3 | Status: SHIPPED | OUTPATIENT
Start: 2021-01-01 | End: 2022-01-01

## 2021-01-01 RX ORDER — SEVELAMER CARBONATE 800 MG/1
1600 TABLET, FILM COATED ORAL
COMMUNITY
End: 2021-01-01

## 2021-01-01 RX ORDER — TIMOLOL MALEATE 5 MG/ML
1 SOLUTION/ DROPS OPHTHALMIC 2 TIMES DAILY
COMMUNITY

## 2021-01-01 RX ORDER — ALBUTEROL SULFATE 0.83 MG/ML
2.5 SOLUTION RESPIRATORY (INHALATION) 3 TIMES DAILY
Status: DISCONTINUED | OUTPATIENT
Start: 2021-01-01 | End: 2021-01-01

## 2021-01-01 RX ORDER — MIDODRINE HYDROCHLORIDE 5 MG/1
15 TABLET ORAL
Status: DISCONTINUED | OUTPATIENT
Start: 2021-01-01 | End: 2021-01-01

## 2021-01-01 RX ORDER — AMOXICILLIN 250 MG
1 CAPSULE ORAL
Status: DISCONTINUED | OUTPATIENT
Start: 2021-01-01 | End: 2021-01-01 | Stop reason: HOSPADM

## 2021-01-01 RX ORDER — FOLIC ACID 1 MG/1
1 TABLET ORAL DAILY
Status: DISCONTINUED | OUTPATIENT
Start: 2021-01-01 | End: 2021-01-01 | Stop reason: HOSPADM

## 2021-01-01 RX ORDER — ASPIRIN 81 MG/1
162 TABLET, CHEWABLE ORAL ONCE
Status: COMPLETED | OUTPATIENT
Start: 2021-01-01 | End: 2021-01-01

## 2021-01-01 RX ADMIN — ONDANSETRON 4 MG: 4 TABLET, ORALLY DISINTEGRATING ORAL at 11:13

## 2021-01-01 RX ADMIN — OXYCODONE HYDROCHLORIDE 5 MG: 5 TABLET ORAL at 15:48

## 2021-01-01 RX ADMIN — PREDNISONE 40 MG: 20 TABLET ORAL at 09:04

## 2021-01-01 RX ADMIN — SEVELAMER CARBONATE 1600 MG: 800 TABLET, FILM COATED ORAL at 17:52

## 2021-01-01 RX ADMIN — HEPARIN SODIUM 3000 UNITS: 1000 INJECTION INTRAVENOUS; SUBCUTANEOUS at 11:48

## 2021-01-01 RX ADMIN — ACETAMINOPHEN 650 MG: 325 TABLET ORAL at 08:19

## 2021-01-01 RX ADMIN — ALBUTEROL SULFATE 2 PUFF: 90 AEROSOL, METERED RESPIRATORY (INHALATION) at 21:47

## 2021-01-01 RX ADMIN — Medication 1 MG: at 21:34

## 2021-01-01 RX ADMIN — TRAZODONE HYDROCHLORIDE 150 MG: 50 TABLET ORAL at 21:39

## 2021-01-01 RX ADMIN — ALBUTEROL SULFATE 2 PUFF: 90 AEROSOL, METERED RESPIRATORY (INHALATION) at 13:27

## 2021-01-01 RX ADMIN — ALBUTEROL SULFATE 2 PUFF: 90 AEROSOL, METERED RESPIRATORY (INHALATION) at 17:12

## 2021-01-01 RX ADMIN — BUDESONIDE AND FORMOTEROL FUMARATE DIHYDRATE 2 PUFF: 80; 4.5 AEROSOL RESPIRATORY (INHALATION) at 10:30

## 2021-01-01 RX ADMIN — SODIUM CHLORIDE 250 ML: 9 INJECTION, SOLUTION INTRAVENOUS at 08:34

## 2021-01-01 RX ADMIN — ATORVASTATIN CALCIUM 10 MG: 10 TABLET, FILM COATED ORAL at 20:37

## 2021-01-01 RX ADMIN — BUDESONIDE AND FORMOTEROL FUMARATE DIHYDRATE 2 PUFF: 80; 4.5 AEROSOL RESPIRATORY (INHALATION) at 14:46

## 2021-01-01 RX ADMIN — Medication 1 CAPSULE: at 18:23

## 2021-01-01 RX ADMIN — HEPARIN SODIUM 2100 UNITS: 1000 INJECTION, SOLUTION INTRAVENOUS; SUBCUTANEOUS at 15:23

## 2021-01-01 RX ADMIN — ATORVASTATIN CALCIUM 10 MG: 10 TABLET, FILM COATED ORAL at 20:44

## 2021-01-01 RX ADMIN — ALBUTEROL SULFATE 2 PUFF: 90 AEROSOL, METERED RESPIRATORY (INHALATION) at 09:54

## 2021-01-01 RX ADMIN — GABAPENTIN 100 MG: 100 CAPSULE ORAL at 22:17

## 2021-01-01 RX ADMIN — SEVELAMER CARBONATE 1600 MG: 800 TABLET, FILM COATED ORAL at 16:04

## 2021-01-01 RX ADMIN — SEVELAMER CARBONATE 1600 MG: 800 TABLET, FILM COATED ORAL at 11:13

## 2021-01-01 RX ADMIN — Medication 2000 UNITS: at 13:23

## 2021-01-01 RX ADMIN — OXYCODONE HYDROCHLORIDE 5 MG: 5 TABLET ORAL at 19:57

## 2021-01-01 RX ADMIN — Medication 2000 UNITS: at 13:00

## 2021-01-01 RX ADMIN — HEPARIN SODIUM 4500 UNITS: 1000 INJECTION, SOLUTION INTRAVENOUS; SUBCUTANEOUS at 12:51

## 2021-01-01 RX ADMIN — SEVELAMER CARBONATE 1600 MG: 800 TABLET, FILM COATED ORAL at 08:13

## 2021-01-01 RX ADMIN — DOXYCYCLINE 100 MG: 50 CAPSULE ORAL at 09:21

## 2021-01-01 RX ADMIN — SEVELAMER CARBONATE 1600 MG: 800 TABLET, FILM COATED ORAL at 09:19

## 2021-01-01 RX ADMIN — Medication 2000 UNITS: at 13:18

## 2021-01-01 RX ADMIN — BUDESONIDE AND FORMOTEROL FUMARATE DIHYDRATE 2 PUFF: 80; 4.5 AEROSOL RESPIRATORY (INHALATION) at 22:20

## 2021-01-01 RX ADMIN — Medication 2.5 MG: at 23:51

## 2021-01-01 RX ADMIN — ACETAMINOPHEN 650 MG: 325 TABLET ORAL at 03:59

## 2021-01-01 RX ADMIN — HEPARIN SODIUM 2200 UNITS: 1000 INJECTION, SOLUTION INTRAVENOUS; SUBCUTANEOUS at 15:23

## 2021-01-01 RX ADMIN — FOLIC ACID 1 MG: 1 TABLET ORAL at 13:07

## 2021-01-01 RX ADMIN — TIMOLOL MALEATE 1 DROP: 5 SOLUTION OPHTHALMIC at 08:44

## 2021-01-01 RX ADMIN — Medication 3 MG: at 20:34

## 2021-01-01 RX ADMIN — TIMOLOL MALEATE 1 DROP: 5 SOLUTION/ DROPS OPHTHALMIC at 20:25

## 2021-01-01 RX ADMIN — OXYCODONE HYDROCHLORIDE 5 MG: 5 TABLET ORAL at 22:18

## 2021-01-01 RX ADMIN — Medication 2000 UNITS: at 12:19

## 2021-01-01 RX ADMIN — SEVELAMER CARBONATE 1600 MG: 800 TABLET, FILM COATED ORAL at 13:06

## 2021-01-01 RX ADMIN — ALBUTEROL SULFATE 2 PUFF: 90 AEROSOL, METERED RESPIRATORY (INHALATION) at 19:32

## 2021-01-01 RX ADMIN — ACETAMINOPHEN 650 MG: 325 TABLET ORAL at 15:56

## 2021-01-01 RX ADMIN — HEPARIN SODIUM 500 UNITS/HR: 1000 INJECTION INTRAVENOUS; SUBCUTANEOUS at 17:23

## 2021-01-01 RX ADMIN — SEVELAMER CARBONATE 1600 MG: 800 TABLET, FILM COATED ORAL at 17:09

## 2021-01-01 RX ADMIN — OXYCODONE HYDROCHLORIDE 5 MG: 5 TABLET ORAL at 08:36

## 2021-01-01 RX ADMIN — ASPIRIN 81 MG: 81 TABLET ORAL at 18:23

## 2021-01-01 RX ADMIN — GABAPENTIN 100 MG: 100 CAPSULE ORAL at 08:24

## 2021-01-01 RX ADMIN — MIDODRINE HYDROCHLORIDE 10 MG: 5 TABLET ORAL at 00:12

## 2021-01-01 RX ADMIN — ALBUTEROL SULFATE 2.5 MG: 2.5 SOLUTION RESPIRATORY (INHALATION) at 15:46

## 2021-01-01 RX ADMIN — BUPROPION HYDROCHLORIDE 150 MG: 150 TABLET, EXTENDED RELEASE ORAL at 09:19

## 2021-01-01 RX ADMIN — SEVELAMER CARBONATE 1600 MG: 800 TABLET, FILM COATED ORAL at 12:58

## 2021-01-01 RX ADMIN — SODIUM CHLORIDE 250 ML: 9 INJECTION, SOLUTION INTRAVENOUS at 12:43

## 2021-01-01 RX ADMIN — TRAZODONE HYDROCHLORIDE 150 MG: 50 TABLET ORAL at 22:12

## 2021-01-01 RX ADMIN — SEVELAMER CARBONATE 1600 MG: 800 TABLET, FILM COATED ORAL at 17:10

## 2021-01-01 RX ADMIN — FOLIC ACID 1 MG: 1 TABLET ORAL at 14:39

## 2021-01-01 RX ADMIN — ASPIRIN 81 MG: 81 TABLET, COATED ORAL at 13:22

## 2021-01-01 RX ADMIN — ALBUTEROL SULFATE 2.5 MG: 2.5 SOLUTION RESPIRATORY (INHALATION) at 11:50

## 2021-01-01 RX ADMIN — ACETAMINOPHEN 650 MG: 325 TABLET, FILM COATED ORAL at 08:24

## 2021-01-01 RX ADMIN — ASPIRIN 81 MG: 81 TABLET, COATED ORAL at 18:27

## 2021-01-01 RX ADMIN — METOPROLOL SUCCINATE 25 MG: 25 TABLET, EXTENDED RELEASE ORAL at 13:06

## 2021-01-01 RX ADMIN — ATORVASTATIN CALCIUM 10 MG: 10 TABLET, FILM COATED ORAL at 21:39

## 2021-01-01 RX ADMIN — GABAPENTIN 100 MG: 100 CAPSULE ORAL at 16:50

## 2021-01-01 RX ADMIN — TIMOLOL MALEATE 1 DROP: 5 SOLUTION OPHTHALMIC at 07:48

## 2021-01-01 RX ADMIN — GABAPENTIN 100 MG: 100 CAPSULE ORAL at 14:24

## 2021-01-01 RX ADMIN — PREDNISONE 40 MG: 20 TABLET ORAL at 13:24

## 2021-01-01 RX ADMIN — OXYCODONE HYDROCHLORIDE 5 MG: 5 TABLET ORAL at 13:27

## 2021-01-01 RX ADMIN — Medication 1 CAPSULE: at 13:20

## 2021-01-01 RX ADMIN — GABAPENTIN 100 MG: 100 CAPSULE ORAL at 22:58

## 2021-01-01 RX ADMIN — TIMOLOL MALEATE 1 DROP: 5 SOLUTION OPHTHALMIC at 08:42

## 2021-01-01 RX ADMIN — GABAPENTIN 100 MG: 100 CAPSULE ORAL at 08:52

## 2021-01-01 RX ADMIN — ALBUTEROL SULFATE 2.5 MG: 2.5 SOLUTION RESPIRATORY (INHALATION) at 12:21

## 2021-01-01 RX ADMIN — SEVELAMER CARBONATE 1600 MG: 800 TABLET, FILM COATED ORAL at 13:01

## 2021-01-01 RX ADMIN — GABAPENTIN 100 MG: 100 CAPSULE ORAL at 09:45

## 2021-01-01 RX ADMIN — TIMOLOL MALEATE 1 DROP: 5 SOLUTION OPHTHALMIC at 12:58

## 2021-01-01 RX ADMIN — SEVELAMER CARBONATE 1600 MG: 800 TABLET, FILM COATED ORAL at 08:12

## 2021-01-01 RX ADMIN — BUDESONIDE AND FORMOTEROL FUMARATE DIHYDRATE 2 PUFF: 80; 4.5 AEROSOL RESPIRATORY (INHALATION) at 21:48

## 2021-01-01 RX ADMIN — FOLIC ACID 1 MG: 1 TABLET ORAL at 08:38

## 2021-01-01 RX ADMIN — OXYCODONE HYDROCHLORIDE 5 MG: 5 TABLET ORAL at 05:41

## 2021-01-01 RX ADMIN — SEVELAMER CARBONATE 1600 MG: 800 TABLET, FILM COATED ORAL at 13:19

## 2021-01-01 RX ADMIN — Medication 2000 UNITS: at 14:20

## 2021-01-01 RX ADMIN — GABAPENTIN 100 MG: 100 CAPSULE ORAL at 09:19

## 2021-01-01 RX ADMIN — ALBUTEROL SULFATE 2 PUFF: 90 AEROSOL, METERED RESPIRATORY (INHALATION) at 17:03

## 2021-01-01 RX ADMIN — PREDNISONE 40 MG: 20 TABLET ORAL at 09:17

## 2021-01-01 RX ADMIN — DOXYCYCLINE 100 MG: 50 CAPSULE ORAL at 12:53

## 2021-01-01 RX ADMIN — TIMOLOL MALEATE 1 DROP: 5 SOLUTION OPHTHALMIC at 22:20

## 2021-01-01 RX ADMIN — PREDNISONE 40 MG: 20 TABLET ORAL at 08:09

## 2021-01-01 RX ADMIN — GABAPENTIN 100 MG: 100 CAPSULE ORAL at 14:21

## 2021-01-01 RX ADMIN — ACETAMINOPHEN 650 MG: 325 TABLET ORAL at 04:32

## 2021-01-01 RX ADMIN — DOXERCALCIFEROL 4 MCG: 4 INJECTION INTRAVENOUS at 11:47

## 2021-01-01 RX ADMIN — Medication 1000 UNITS: at 13:22

## 2021-01-01 RX ADMIN — FOLIC ACID 1 MG: 1 TABLET ORAL at 13:20

## 2021-01-01 RX ADMIN — MIDODRINE HYDROCHLORIDE 15 MG: 5 TABLET ORAL at 12:08

## 2021-01-01 RX ADMIN — ASPIRIN 81 MG: 81 TABLET ORAL at 13:07

## 2021-01-01 RX ADMIN — TIMOLOL MALEATE 1 DROP: 5 SOLUTION/ DROPS OPHTHALMIC at 20:33

## 2021-01-01 RX ADMIN — BUDESONIDE AND FORMOTEROL FUMARATE DIHYDRATE 2 PUFF: 80; 4.5 AEROSOL RESPIRATORY (INHALATION) at 10:39

## 2021-01-01 RX ADMIN — ALBUTEROL SULFATE 2 PUFF: 90 AEROSOL, METERED RESPIRATORY (INHALATION) at 16:04

## 2021-01-01 RX ADMIN — FAMOTIDINE 20 MG: 20 TABLET ORAL at 20:54

## 2021-01-01 RX ADMIN — GABAPENTIN 100 MG: 100 CAPSULE ORAL at 15:00

## 2021-01-01 RX ADMIN — ALBUTEROL SULFATE 2.5 MG: 2.5 SOLUTION RESPIRATORY (INHALATION) at 20:22

## 2021-01-01 RX ADMIN — ALBUTEROL SULFATE 2.5 MG: 2.5 SOLUTION RESPIRATORY (INHALATION) at 07:32

## 2021-01-01 RX ADMIN — BUDESONIDE AND FORMOTEROL FUMARATE DIHYDRATE 2 PUFF: 80; 4.5 AEROSOL RESPIRATORY (INHALATION) at 19:59

## 2021-01-01 RX ADMIN — GABAPENTIN 100 MG: 100 CAPSULE ORAL at 18:15

## 2021-01-01 RX ADMIN — SEVELAMER CARBONATE 1600 MG: 800 TABLET, FILM COATED ORAL at 13:20

## 2021-01-01 RX ADMIN — BUDESONIDE AND FORMOTEROL FUMARATE DIHYDRATE 2 PUFF: 80; 4.5 AEROSOL RESPIRATORY (INHALATION) at 21:42

## 2021-01-01 RX ADMIN — MIDODRINE HYDROCHLORIDE 5 MG: 5 TABLET ORAL at 23:10

## 2021-01-01 RX ADMIN — TIMOLOL MALEATE 1 DROP: 5 SOLUTION/ DROPS OPHTHALMIC at 20:39

## 2021-01-01 RX ADMIN — OXYCODONE HYDROCHLORIDE 5 MG: 5 TABLET ORAL at 21:42

## 2021-01-01 RX ADMIN — ALBUTEROL SULFATE 2.5 MG: 2.5 SOLUTION RESPIRATORY (INHALATION) at 17:17

## 2021-01-01 RX ADMIN — Medication 2.5 MG: at 13:07

## 2021-01-01 RX ADMIN — MIDODRINE HYDROCHLORIDE 10 MG: 5 TABLET ORAL at 10:46

## 2021-01-01 RX ADMIN — SODIUM CHLORIDE 300 ML: 9 INJECTION, SOLUTION INTRAVENOUS at 17:24

## 2021-01-01 RX ADMIN — SEVELAMER CARBONATE 1600 MG: 800 TABLET, FILM COATED ORAL at 13:18

## 2021-01-01 RX ADMIN — TIMOLOL MALEATE 1 DROP: 5 SOLUTION OPHTHALMIC at 20:44

## 2021-01-01 RX ADMIN — ASPIRIN 81 MG: 81 TABLET, COATED ORAL at 12:19

## 2021-01-01 RX ADMIN — TIMOLOL MALEATE 1 DROP: 5 SOLUTION/ DROPS OPHTHALMIC at 09:21

## 2021-01-01 RX ADMIN — GABAPENTIN 100 MG: 100 CAPSULE ORAL at 09:05

## 2021-01-01 RX ADMIN — CINACALCET 30 MG: 30 TABLET, FILM COATED ORAL at 14:13

## 2021-01-01 RX ADMIN — GABAPENTIN 100 MG: 100 CAPSULE ORAL at 13:22

## 2021-01-01 RX ADMIN — TRAZODONE HYDROCHLORIDE 150 MG: 100 TABLET ORAL at 21:57

## 2021-01-01 RX ADMIN — TIMOLOL MALEATE 1 DROP: 5 SOLUTION OPHTHALMIC at 08:09

## 2021-01-01 RX ADMIN — OXYCODONE HYDROCHLORIDE 5 MG: 5 TABLET ORAL at 13:20

## 2021-01-01 RX ADMIN — SEVELAMER CARBONATE 1600 MG: 800 TABLET, FILM COATED ORAL at 18:24

## 2021-01-01 RX ADMIN — GABAPENTIN 100 MG: 100 CAPSULE ORAL at 14:52

## 2021-01-01 RX ADMIN — DOXYCYCLINE 100 MG: 50 CAPSULE ORAL at 08:51

## 2021-01-01 RX ADMIN — HEPARIN SODIUM 3000 UNITS: 1000 INJECTION, SOLUTION INTRAVENOUS; SUBCUTANEOUS at 14:27

## 2021-01-01 RX ADMIN — TIMOLOL MALEATE 1 DROP: 5 SOLUTION/ DROPS OPHTHALMIC at 21:50

## 2021-01-01 RX ADMIN — MIDODRINE HYDROCHLORIDE 10 MG: 5 TABLET ORAL at 13:19

## 2021-01-01 RX ADMIN — Medication 1 CAPSULE: at 11:12

## 2021-01-01 RX ADMIN — GABAPENTIN 100 MG: 100 CAPSULE ORAL at 18:27

## 2021-01-01 RX ADMIN — ACETAMINOPHEN 1000 MG: 500 TABLET, FILM COATED ORAL at 08:34

## 2021-01-01 RX ADMIN — DOXYCYCLINE 100 MG: 50 CAPSULE ORAL at 20:34

## 2021-01-01 RX ADMIN — ASPIRIN 81 MG: 81 TABLET, DELAYED RELEASE ORAL at 12:17

## 2021-01-01 RX ADMIN — ASPIRIN 81 MG: 81 TABLET, COATED ORAL at 14:40

## 2021-01-01 RX ADMIN — FOLIC ACID 1 MG: 1 TABLET ORAL at 18:23

## 2021-01-01 RX ADMIN — TIMOLOL MALEATE 1 DROP: 5 SOLUTION OPHTHALMIC at 21:48

## 2021-01-01 RX ADMIN — HEPARIN SODIUM 3000 UNITS: 1000 INJECTION INTRAVENOUS; SUBCUTANEOUS at 17:24

## 2021-01-01 RX ADMIN — TRAZODONE HYDROCHLORIDE 150 MG: 100 TABLET ORAL at 21:51

## 2021-01-01 RX ADMIN — ALBUTEROL SULFATE 2.5 MG: 2.5 SOLUTION RESPIRATORY (INHALATION) at 08:28

## 2021-01-01 RX ADMIN — Medication 1 CAPSULE: at 13:06

## 2021-01-01 RX ADMIN — SODIUM CHLORIDE 250 ML: 9 INJECTION, SOLUTION INTRAVENOUS at 17:25

## 2021-01-01 RX ADMIN — ACETAMINOPHEN 650 MG: 325 TABLET ORAL at 18:16

## 2021-01-01 RX ADMIN — OXYCODONE HYDROCHLORIDE 5 MG: 5 TABLET ORAL at 12:45

## 2021-01-01 RX ADMIN — SEVELAMER CARBONATE 1600 MG: 800 TABLET, FILM COATED ORAL at 09:40

## 2021-01-01 RX ADMIN — ACETAMINOPHEN 650 MG: 325 TABLET ORAL at 08:01

## 2021-01-01 RX ADMIN — FAMOTIDINE 20 MG: 20 TABLET, FILM COATED ORAL at 22:12

## 2021-01-01 RX ADMIN — FAMOTIDINE 20 MG: 20 TABLET, FILM COATED ORAL at 22:17

## 2021-01-01 RX ADMIN — BUDESONIDE AND FORMOTEROL FUMARATE DIHYDRATE 2 PUFF: 80; 4.5 AEROSOL RESPIRATORY (INHALATION) at 11:57

## 2021-01-01 RX ADMIN — ALBUTEROL SULFATE 2.5 MG: 2.5 SOLUTION RESPIRATORY (INHALATION) at 12:23

## 2021-01-01 RX ADMIN — SEVELAMER CARBONATE 1600 MG: 800 TABLET, FILM COATED ORAL at 13:23

## 2021-01-01 RX ADMIN — GABAPENTIN 100 MG: 100 CAPSULE ORAL at 21:32

## 2021-01-01 RX ADMIN — ACETAMINOPHEN 650 MG: 325 TABLET ORAL at 16:07

## 2021-01-01 RX ADMIN — TIMOLOL MALEATE 1 DROP: 5 SOLUTION OPHTHALMIC at 08:15

## 2021-01-01 RX ADMIN — Medication 2000 UNITS: at 13:01

## 2021-01-01 RX ADMIN — TIMOLOL MALEATE 1 DROP: 5 SOLUTION OPHTHALMIC at 22:15

## 2021-01-01 RX ADMIN — Medication 1 MG: at 21:43

## 2021-01-01 RX ADMIN — FOLIC ACID 1 MG: 1 TABLET ORAL at 13:00

## 2021-01-01 RX ADMIN — TRAZODONE HYDROCHLORIDE 150 MG: 150 TABLET ORAL at 20:54

## 2021-01-01 RX ADMIN — ALBUTEROL SULFATE 2.5 MG: 2.5 SOLUTION RESPIRATORY (INHALATION) at 17:00

## 2021-01-01 RX ADMIN — ALBUTEROL SULFATE 2.5 MG: 2.5 SOLUTION RESPIRATORY (INHALATION) at 19:24

## 2021-01-01 RX ADMIN — PREDNISONE 40 MG: 20 TABLET ORAL at 08:39

## 2021-01-01 RX ADMIN — OXYCODONE HYDROCHLORIDE 5 MG: 5 TABLET ORAL at 12:57

## 2021-01-01 RX ADMIN — FAMOTIDINE 20 MG: 20 TABLET, FILM COATED ORAL at 21:34

## 2021-01-01 RX ADMIN — B-COMPLEX W/ C & FOLIC ACID TAB 1 MG 1 TABLET: 1 TAB at 18:16

## 2021-01-01 RX ADMIN — GABAPENTIN 100 MG: 100 CAPSULE ORAL at 21:43

## 2021-01-01 RX ADMIN — OXYCODONE HYDROCHLORIDE 5 MG: 5 TABLET ORAL at 03:07

## 2021-01-01 RX ADMIN — OXYCODONE HYDROCHLORIDE 5 MG: 5 TABLET ORAL at 17:37

## 2021-01-01 RX ADMIN — GABAPENTIN 100 MG: 100 CAPSULE ORAL at 22:12

## 2021-01-01 RX ADMIN — FOLIC ACID 1 MG: 1 TABLET ORAL at 18:28

## 2021-01-01 RX ADMIN — GABAPENTIN 100 MG: 100 CAPSULE ORAL at 12:16

## 2021-01-01 RX ADMIN — PREDNISONE 40 MG: 20 TABLET ORAL at 12:58

## 2021-01-01 RX ADMIN — ALBUTEROL SULFATE 2.5 MG: 2.5 SOLUTION RESPIRATORY (INHALATION) at 12:24

## 2021-01-01 RX ADMIN — TIMOLOL MALEATE 1 DROP: 5 SOLUTION/ DROPS OPHTHALMIC at 08:27

## 2021-01-01 RX ADMIN — GABAPENTIN 100 MG: 100 CAPSULE ORAL at 13:35

## 2021-01-01 RX ADMIN — Medication 2000 UNITS: at 12:53

## 2021-01-01 RX ADMIN — SEVELAMER CARBONATE 1600 MG: 800 TABLET, FILM COATED ORAL at 19:34

## 2021-01-01 RX ADMIN — FAMOTIDINE 20 MG: 20 TABLET ORAL at 20:37

## 2021-01-01 RX ADMIN — ALBUTEROL SULFATE 2.5 MG: 2.5 SOLUTION RESPIRATORY (INHALATION) at 13:19

## 2021-01-01 RX ADMIN — ATORVASTATIN CALCIUM 10 MG: 10 TABLET, FILM COATED ORAL at 22:12

## 2021-01-01 RX ADMIN — ATORVASTATIN CALCIUM 10 MG: 10 TABLET, FILM COATED ORAL at 20:54

## 2021-01-01 RX ADMIN — Medication 3 MG: at 20:54

## 2021-01-01 RX ADMIN — SEVELAMER CARBONATE 1600 MG: 800 TABLET, FILM COATED ORAL at 08:52

## 2021-01-01 RX ADMIN — FOLIC ACID 1 MG: 1 TABLET ORAL at 08:58

## 2021-01-01 RX ADMIN — ONDANSETRON 4 MG: 4 TABLET, ORALLY DISINTEGRATING ORAL at 22:18

## 2021-01-01 RX ADMIN — FOLIC ACID 1 MG: 1 TABLET ORAL at 11:12

## 2021-01-01 RX ADMIN — TIMOLOL MALEATE 1 DROP: 5 SOLUTION OPHTHALMIC at 21:35

## 2021-01-01 RX ADMIN — OXYCODONE HYDROCHLORIDE 5 MG: 5 TABLET ORAL at 21:20

## 2021-01-01 RX ADMIN — FOLIC ACID 1 MG: 1 TABLET ORAL at 13:24

## 2021-01-01 RX ADMIN — BUDESONIDE AND FORMOTEROL FUMARATE DIHYDRATE 2 PUFF: 80; 4.5 AEROSOL RESPIRATORY (INHALATION) at 20:54

## 2021-01-01 RX ADMIN — Medication 2000 UNITS: at 14:40

## 2021-01-01 RX ADMIN — TRAZODONE HYDROCHLORIDE 150 MG: 150 TABLET ORAL at 21:31

## 2021-01-01 RX ADMIN — GABAPENTIN 100 MG: 100 CAPSULE ORAL at 14:40

## 2021-01-01 RX ADMIN — MIDODRINE HYDROCHLORIDE 15 MG: 5 TABLET ORAL at 08:42

## 2021-01-01 RX ADMIN — ALBUTEROL SULFATE 2 PUFF: 90 AEROSOL, METERED RESPIRATORY (INHALATION) at 09:21

## 2021-01-01 RX ADMIN — IPRATROPIUM BROMIDE AND ALBUTEROL SULFATE 3 ML: .5; 3 SOLUTION RESPIRATORY (INHALATION) at 13:59

## 2021-01-01 RX ADMIN — SEVELAMER CARBONATE 1600 MG: 800 TABLET, FILM COATED ORAL at 09:02

## 2021-01-01 RX ADMIN — OXYCODONE HYDROCHLORIDE 5 MG: 5 TABLET ORAL at 04:56

## 2021-01-01 RX ADMIN — GABAPENTIN 100 MG: 100 CAPSULE ORAL at 09:01

## 2021-01-01 RX ADMIN — GABAPENTIN 100 MG: 100 CAPSULE ORAL at 21:35

## 2021-01-01 RX ADMIN — Medication 2.5 MG: at 21:52

## 2021-01-01 RX ADMIN — DOCUSATE SODIUM 50 MG AND SENNOSIDES 8.6 MG 1 TABLET: 8.6; 5 TABLET, FILM COATED ORAL at 21:57

## 2021-01-01 RX ADMIN — BUDESONIDE AND FORMOTEROL FUMARATE DIHYDRATE 2 PUFF: 80; 4.5 AEROSOL RESPIRATORY (INHALATION) at 14:10

## 2021-01-01 RX ADMIN — SEVELAMER CARBONATE 1600 MG: 800 TABLET, FILM COATED ORAL at 18:15

## 2021-01-01 RX ADMIN — GABAPENTIN 100 MG: 100 CAPSULE ORAL at 19:58

## 2021-01-01 RX ADMIN — ATORVASTATIN CALCIUM 10 MG: 10 TABLET, FILM COATED ORAL at 22:58

## 2021-01-01 RX ADMIN — BUDESONIDE AND FORMOTEROL FUMARATE DIHYDRATE 2 PUFF: 80; 4.5 AEROSOL RESPIRATORY (INHALATION) at 20:44

## 2021-01-01 RX ADMIN — BUDESONIDE AND FORMOTEROL FUMARATE DIHYDRATE 2 PUFF: 80; 4.5 AEROSOL RESPIRATORY (INHALATION) at 07:53

## 2021-01-01 RX ADMIN — MIDODRINE HYDROCHLORIDE 5 MG: 5 TABLET ORAL at 14:36

## 2021-01-01 RX ADMIN — TRAZODONE HYDROCHLORIDE 150 MG: 50 TABLET ORAL at 21:42

## 2021-01-01 RX ADMIN — MIDODRINE HYDROCHLORIDE 15 MG: 5 TABLET ORAL at 10:56

## 2021-01-01 RX ADMIN — ATORVASTATIN CALCIUM 10 MG: 10 TABLET, FILM COATED ORAL at 22:22

## 2021-01-01 RX ADMIN — ALBUTEROL SULFATE 2.5 MG: 2.5 SOLUTION RESPIRATORY (INHALATION) at 15:59

## 2021-01-01 RX ADMIN — TRAZODONE HYDROCHLORIDE 150 MG: 50 TABLET ORAL at 21:34

## 2021-01-01 RX ADMIN — ACETAMINOPHEN 650 MG: 325 TABLET, FILM COATED ORAL at 17:00

## 2021-01-01 RX ADMIN — ACETAMINOPHEN 650 MG: 325 TABLET, FILM COATED ORAL at 18:22

## 2021-01-01 RX ADMIN — HEPARIN SODIUM 3000 UNITS: 1000 INJECTION INTRAVENOUS; SUBCUTANEOUS at 17:23

## 2021-01-01 RX ADMIN — SEVELAMER CARBONATE 1600 MG: 800 TABLET, FILM COATED ORAL at 18:23

## 2021-01-01 RX ADMIN — GABAPENTIN 100 MG: 100 CAPSULE ORAL at 21:58

## 2021-01-01 RX ADMIN — ASPIRIN 81 MG: 81 TABLET, COATED ORAL at 13:00

## 2021-01-01 RX ADMIN — BUPROPION HYDROCHLORIDE 150 MG: 150 TABLET, EXTENDED RELEASE ORAL at 09:04

## 2021-01-01 RX ADMIN — DOXYCYCLINE 100 MG: 50 CAPSULE ORAL at 21:42

## 2021-01-01 RX ADMIN — ATORVASTATIN CALCIUM 10 MG: 10 TABLET, FILM COATED ORAL at 22:17

## 2021-01-01 RX ADMIN — GABAPENTIN 100 MG: 100 CAPSULE ORAL at 08:39

## 2021-01-01 RX ADMIN — GABAPENTIN 100 MG: 100 CAPSULE ORAL at 09:17

## 2021-01-01 RX ADMIN — GABAPENTIN 100 MG: 100 CAPSULE ORAL at 20:37

## 2021-01-01 RX ADMIN — MIDODRINE HYDROCHLORIDE 15 MG: 5 TABLET ORAL at 08:34

## 2021-01-01 RX ADMIN — Medication 3 MG: at 21:31

## 2021-01-01 RX ADMIN — CINACALCET 30 MG: 30 TABLET ORAL at 18:22

## 2021-01-01 RX ADMIN — ASPIRIN 162 MG: 81 TABLET, CHEWABLE ORAL at 00:50

## 2021-01-01 RX ADMIN — ATORVASTATIN CALCIUM 10 MG: 10 TABLET, FILM COATED ORAL at 21:43

## 2021-01-01 RX ADMIN — ATORVASTATIN CALCIUM 10 MG: 10 TABLET, FILM COATED ORAL at 20:33

## 2021-01-01 RX ADMIN — SEVELAMER CARBONATE 1600 MG: 800 TABLET, FILM COATED ORAL at 14:08

## 2021-01-01 RX ADMIN — DOCUSATE SODIUM 50MG AND SENNOSIDES 8.6MG 1 TABLET: 8.6; 5 TABLET, FILM COATED ORAL at 12:53

## 2021-01-01 RX ADMIN — ALBUTEROL SULFATE 2.5 MG: 2.5 SOLUTION RESPIRATORY (INHALATION) at 21:49

## 2021-01-01 RX ADMIN — SUMATRIPTAN SUCCINATE 25 MG: 25 TABLET ORAL at 20:14

## 2021-01-01 RX ADMIN — GABAPENTIN 100 MG: 100 CAPSULE ORAL at 08:15

## 2021-01-01 RX ADMIN — ALBUTEROL SULFATE 2.5 MG: 2.5 SOLUTION RESPIRATORY (INHALATION) at 16:20

## 2021-01-01 RX ADMIN — Medication 3 MG: at 21:42

## 2021-01-01 RX ADMIN — TIMOLOL MALEATE 1 DROP: 5 SOLUTION OPHTHALMIC at 09:18

## 2021-01-01 RX ADMIN — SEVELAMER CARBONATE 1600 MG: 800 TABLET, FILM COATED ORAL at 07:47

## 2021-01-01 RX ADMIN — MIDODRINE HYDROCHLORIDE 15 MG: 5 TABLET ORAL at 09:48

## 2021-01-01 RX ADMIN — TIMOLOL MALEATE 1 DROP: 5 SOLUTION OPHTHALMIC at 21:37

## 2021-01-01 RX ADMIN — FAMOTIDINE 20 MG: 20 TABLET ORAL at 21:52

## 2021-01-01 RX ADMIN — BUDESONIDE AND FORMOTEROL FUMARATE DIHYDRATE 2 PUFF: 80; 4.5 AEROSOL RESPIRATORY (INHALATION) at 08:05

## 2021-01-01 RX ADMIN — SODIUM CHLORIDE 300 ML: 9 INJECTION, SOLUTION INTRAVENOUS at 14:30

## 2021-01-01 RX ADMIN — IPRATROPIUM BROMIDE AND ALBUTEROL SULFATE 3 ML: .5; 3 SOLUTION RESPIRATORY (INHALATION) at 19:36

## 2021-01-01 RX ADMIN — DOXYCYCLINE 100 MG: 50 CAPSULE ORAL at 21:30

## 2021-01-01 RX ADMIN — TRAZODONE HYDROCHLORIDE 150 MG: 50 TABLET ORAL at 22:17

## 2021-01-01 RX ADMIN — TIMOLOL MALEATE 1 DROP: 5 SOLUTION/ DROPS OPHTHALMIC at 21:32

## 2021-01-01 RX ADMIN — ALBUTEROL SULFATE 2.5 MG: 2.5 SOLUTION RESPIRATORY (INHALATION) at 08:44

## 2021-01-01 RX ADMIN — ATORVASTATIN CALCIUM 10 MG: 10 TABLET, FILM COATED ORAL at 21:52

## 2021-01-01 RX ADMIN — ACETAMINOPHEN 650 MG: 325 TABLET ORAL at 13:37

## 2021-01-01 RX ADMIN — TIMOLOL MALEATE 1 DROP: 5 SOLUTION/ DROPS OPHTHALMIC at 08:34

## 2021-01-01 RX ADMIN — SEVELAMER CARBONATE 1600 MG: 800 TABLET, FILM COATED ORAL at 18:48

## 2021-01-01 RX ADMIN — SEVELAMER CARBONATE 1600 MG: 800 TABLET, FILM COATED ORAL at 09:54

## 2021-01-01 RX ADMIN — OXYCODONE HYDROCHLORIDE 5 MG: 5 TABLET ORAL at 20:35

## 2021-01-01 RX ADMIN — ALBUTEROL SULFATE 2 PUFF: 90 AEROSOL, METERED RESPIRATORY (INHALATION) at 09:23

## 2021-01-01 RX ADMIN — OXYCODONE HYDROCHLORIDE 5 MG: 5 TABLET ORAL at 22:22

## 2021-01-01 RX ADMIN — DOCUSATE SODIUM 50 MG AND SENNOSIDES 8.6 MG 1 TABLET: 8.6; 5 TABLET, FILM COATED ORAL at 21:52

## 2021-01-01 RX ADMIN — OXYCODONE HYDROCHLORIDE 5 MG: 5 TABLET ORAL at 04:00

## 2021-01-01 RX ADMIN — GABAPENTIN 100 MG: 100 CAPSULE ORAL at 08:13

## 2021-01-01 RX ADMIN — EPOETIN ALFA-EPBX 4000 UNITS: 4000 INJECTION, SOLUTION INTRAVENOUS; SUBCUTANEOUS at 17:25

## 2021-01-01 RX ADMIN — MIDODRINE HYDROCHLORIDE 20 MG: 5 TABLET ORAL at 13:49

## 2021-01-01 RX ADMIN — MIDODRINE HYDROCHLORIDE 15 MG: 5 TABLET ORAL at 08:30

## 2021-01-01 RX ADMIN — TIMOLOL MALEATE 1 DROP: 5 SOLUTION/ DROPS OPHTHALMIC at 10:40

## 2021-01-01 RX ADMIN — ACETAMINOPHEN 650 MG: 325 TABLET ORAL at 09:17

## 2021-01-01 RX ADMIN — GABAPENTIN 100 MG: 100 CAPSULE ORAL at 20:54

## 2021-01-01 RX ADMIN — ALBUTEROL SULFATE 2.5 MG: 2.5 SOLUTION RESPIRATORY (INHALATION) at 20:42

## 2021-01-01 RX ADMIN — PREDNISONE 40 MG: 20 TABLET ORAL at 08:42

## 2021-01-01 RX ADMIN — MIDODRINE HYDROCHLORIDE 15 MG: 5 TABLET ORAL at 14:48

## 2021-01-01 RX ADMIN — TIMOLOL MALEATE 1 DROP: 5 SOLUTION/ DROPS OPHTHALMIC at 14:10

## 2021-01-01 RX ADMIN — ASPIRIN 81 MG: 81 TABLET, COATED ORAL at 11:13

## 2021-01-01 RX ADMIN — ALBUTEROL SULFATE 2.5 MG: 2.5 SOLUTION RESPIRATORY (INHALATION) at 12:02

## 2021-01-01 RX ADMIN — BUDESONIDE AND FORMOTEROL FUMARATE DIHYDRATE 2 PUFF: 80; 4.5 AEROSOL RESPIRATORY (INHALATION) at 09:23

## 2021-01-01 RX ADMIN — CINACALCET 30 MG: 30 TABLET, FILM COATED ORAL at 14:39

## 2021-01-01 RX ADMIN — OXYCODONE HYDROCHLORIDE 5 MG: 5 TABLET ORAL at 08:50

## 2021-01-01 RX ADMIN — SEVELAMER CARBONATE 1600 MG: 800 TABLET, FILM COATED ORAL at 12:18

## 2021-01-01 RX ADMIN — Medication 1 MG: at 22:25

## 2021-01-01 RX ADMIN — GABAPENTIN 100 MG: 100 CAPSULE ORAL at 08:43

## 2021-01-01 RX ADMIN — Medication 1 CAPSULE: at 13:07

## 2021-01-01 RX ADMIN — ALBUTEROL SULFATE 2 PUFF: 90 AEROSOL, METERED RESPIRATORY (INHALATION) at 08:16

## 2021-01-01 RX ADMIN — Medication 2000 UNITS: at 14:08

## 2021-01-01 RX ADMIN — ASPIRIN 81 MG: 81 TABLET ORAL at 08:21

## 2021-01-01 RX ADMIN — ASPIRIN 81 MG: 81 TABLET, DELAYED RELEASE ORAL at 14:09

## 2021-01-01 RX ADMIN — FAMOTIDINE 20 MG: 20 TABLET ORAL at 21:58

## 2021-01-01 RX ADMIN — GABAPENTIN 100 MG: 100 CAPSULE ORAL at 21:52

## 2021-01-01 RX ADMIN — Medication 2000 UNITS: at 18:16

## 2021-01-01 RX ADMIN — B-COMPLEX W/ C & FOLIC ACID TAB 1 MG 1 TABLET: 1 TAB at 21:39

## 2021-01-01 RX ADMIN — TRAZODONE HYDROCHLORIDE 150 MG: 150 TABLET ORAL at 21:42

## 2021-01-01 RX ADMIN — Medication 2000 UNITS: at 08:38

## 2021-01-01 RX ADMIN — OXYCODONE HYDROCHLORIDE 5 MG: 5 TABLET ORAL at 22:58

## 2021-01-01 RX ADMIN — DOXERCALCIFEROL 4 MCG: 4 INJECTION INTRAVENOUS at 17:26

## 2021-01-01 RX ADMIN — Medication 1 CAPSULE: at 08:21

## 2021-01-01 RX ADMIN — ALBUTEROL SULFATE 2.5 MG: 2.5 SOLUTION RESPIRATORY (INHALATION) at 11:40

## 2021-01-01 RX ADMIN — FAMOTIDINE 20 MG: 20 TABLET, FILM COATED ORAL at 22:58

## 2021-01-01 RX ADMIN — BUDESONIDE AND FORMOTEROL FUMARATE DIHYDRATE 2 PUFF: 80; 4.5 AEROSOL RESPIRATORY (INHALATION) at 08:16

## 2021-01-01 RX ADMIN — HEPARIN SODIUM 500 UNITS/HR: 1000 INJECTION INTRAVENOUS; SUBCUTANEOUS at 11:48

## 2021-01-01 RX ADMIN — ALBUTEROL SULFATE 2 PUFF: 90 AEROSOL, METERED RESPIRATORY (INHALATION) at 07:51

## 2021-01-01 RX ADMIN — B-COMPLEX W/ C & FOLIC ACID TAB 1 MG 1 TABLET: 1 TAB at 18:24

## 2021-01-01 RX ADMIN — SEVELAMER CARBONATE 1600 MG: 800 TABLET, FILM COATED ORAL at 18:07

## 2021-01-01 RX ADMIN — ALBUTEROL SULFATE 2.5 MG: 2.5 SOLUTION RESPIRATORY (INHALATION) at 08:40

## 2021-01-01 RX ADMIN — GABAPENTIN 100 MG: 100 CAPSULE ORAL at 08:01

## 2021-01-01 RX ADMIN — OXYCODONE HYDROCHLORIDE 5 MG: 5 TABLET ORAL at 05:04

## 2021-01-01 RX ADMIN — MIDODRINE HYDROCHLORIDE 10 MG: 5 TABLET ORAL at 09:27

## 2021-01-01 RX ADMIN — MIDODRINE HYDROCHLORIDE 15 MG: 5 TABLET ORAL at 10:48

## 2021-01-01 RX ADMIN — BUDESONIDE AND FORMOTEROL FUMARATE DIHYDRATE 2 PUFF: 80; 4.5 AEROSOL RESPIRATORY (INHALATION) at 09:19

## 2021-01-01 RX ADMIN — OXYCODONE HYDROCHLORIDE 5 MG: 5 TABLET ORAL at 20:43

## 2021-01-01 RX ADMIN — TIMOLOL MALEATE 1 DROP: 5 SOLUTION/ DROPS OPHTHALMIC at 08:52

## 2021-01-01 RX ADMIN — BUDESONIDE AND FORMOTEROL FUMARATE DIHYDRATE 2 PUFF: 80; 4.5 AEROSOL RESPIRATORY (INHALATION) at 08:47

## 2021-01-01 RX ADMIN — FAMOTIDINE 20 MG: 20 TABLET, FILM COATED ORAL at 21:39

## 2021-01-01 RX ADMIN — ACETAMINOPHEN 650 MG: 325 TABLET ORAL at 16:09

## 2021-01-01 RX ADMIN — GABAPENTIN 100 MG: 100 CAPSULE ORAL at 08:21

## 2021-01-01 RX ADMIN — ALBUTEROL SULFATE 2.5 MG: 2.5 SOLUTION RESPIRATORY (INHALATION) at 07:57

## 2021-01-01 RX ADMIN — SEVELAMER CARBONATE 1600 MG: 800 TABLET, FILM COATED ORAL at 08:15

## 2021-01-01 RX ADMIN — TRAZODONE HYDROCHLORIDE 150 MG: 50 TABLET ORAL at 20:44

## 2021-01-01 RX ADMIN — CINACALCET 30 MG: 30 TABLET, FILM COATED ORAL at 08:42

## 2021-01-01 RX ADMIN — OXYCODONE HYDROCHLORIDE 5 MG: 5 TABLET ORAL at 18:15

## 2021-01-01 RX ADMIN — BUDESONIDE AND FORMOTEROL FUMARATE DIHYDRATE 2 PUFF: 80; 4.5 AEROSOL RESPIRATORY (INHALATION) at 09:55

## 2021-01-01 RX ADMIN — SEVELAMER CARBONATE 1600 MG: 800 TABLET, FILM COATED ORAL at 09:45

## 2021-01-01 RX ADMIN — FAMOTIDINE 20 MG: 20 TABLET ORAL at 21:32

## 2021-01-01 RX ADMIN — SEVELAMER CARBONATE 1600 MG: 800 TABLET, FILM COATED ORAL at 12:15

## 2021-01-01 RX ADMIN — SEVELAMER CARBONATE 1600 MG: 800 TABLET, FILM COATED ORAL at 09:55

## 2021-01-01 RX ADMIN — GABAPENTIN 100 MG: 100 CAPSULE ORAL at 08:10

## 2021-01-01 RX ADMIN — SEVELAMER CARBONATE 1600 MG: 800 TABLET, FILM COATED ORAL at 13:27

## 2021-01-01 RX ADMIN — MIDODRINE HYDROCHLORIDE 15 MG: 5 TABLET ORAL at 10:33

## 2021-01-01 RX ADMIN — SEVELAMER CARBONATE 1600 MG: 800 TABLET, FILM COATED ORAL at 08:43

## 2021-01-01 RX ADMIN — SEVELAMER CARBONATE 1600 MG: 800 TABLET, FILM COATED ORAL at 09:17

## 2021-01-01 RX ADMIN — ALBUTEROL SULFATE 2.5 MG: 2.5 SOLUTION RESPIRATORY (INHALATION) at 06:09

## 2021-01-01 RX ADMIN — Medication 1 CAPSULE: at 18:14

## 2021-01-01 RX ADMIN — ATORVASTATIN CALCIUM 10 MG: 10 TABLET, FILM COATED ORAL at 21:31

## 2021-01-01 RX ADMIN — SODIUM CHLORIDE 300 ML: 9 INJECTION, SOLUTION INTRAVENOUS at 15:21

## 2021-01-01 RX ADMIN — GABAPENTIN 100 MG: 100 CAPSULE ORAL at 14:33

## 2021-01-01 RX ADMIN — BUDESONIDE AND FORMOTEROL FUMARATE DIHYDRATE 2 PUFF: 80; 4.5 AEROSOL RESPIRATORY (INHALATION) at 21:38

## 2021-01-01 RX ADMIN — METOPROLOL SUCCINATE 25 MG: 25 TABLET, EXTENDED RELEASE ORAL at 18:22

## 2021-01-01 RX ADMIN — CINACALCET 30 MG: 30 TABLET, FILM COATED ORAL at 12:59

## 2021-01-01 RX ADMIN — GABAPENTIN 100 MG: 100 CAPSULE ORAL at 08:34

## 2021-01-01 RX ADMIN — TIMOLOL MALEATE 1 DROP: 5 SOLUTION/ DROPS OPHTHALMIC at 20:54

## 2021-01-01 RX ADMIN — TRAZODONE HYDROCHLORIDE 150 MG: 150 TABLET ORAL at 20:33

## 2021-01-01 RX ADMIN — ALBUTEROL SULFATE 2.5 MG: 2.5 SOLUTION RESPIRATORY (INHALATION) at 07:09

## 2021-01-01 RX ADMIN — DOXYCYCLINE 100 MG: 50 CAPSULE ORAL at 20:53

## 2021-01-01 RX ADMIN — DEXAMETHASONE SODIUM PHOSPHATE 10 MG: 10 INJECTION, SOLUTION INTRAMUSCULAR; INTRAVENOUS at 15:55

## 2021-01-01 RX ADMIN — Medication 2000 UNITS: at 18:23

## 2021-01-01 RX ADMIN — ASPIRIN 81 MG: 81 TABLET, DELAYED RELEASE ORAL at 13:18

## 2021-01-01 RX ADMIN — ALBUTEROL SULFATE 2.5 MG: 2.5 SOLUTION RESPIRATORY (INHALATION) at 15:15

## 2021-01-01 RX ADMIN — ALBUTEROL SULFATE 2.5 MG: 2.5 SOLUTION RESPIRATORY (INHALATION) at 16:44

## 2021-01-01 RX ADMIN — BUPROPION HYDROCHLORIDE 150 MG: 150 TABLET, EXTENDED RELEASE ORAL at 21:38

## 2021-01-01 RX ADMIN — Medication 1 CAPSULE: at 14:39

## 2021-01-01 RX ADMIN — MIDODRINE HYDROCHLORIDE 15 MG: 5 TABLET ORAL at 08:41

## 2021-01-01 RX ADMIN — EPOETIN ALFA-EPBX 4000 UNITS: 4000 INJECTION, SOLUTION INTRAVENOUS; SUBCUTANEOUS at 11:47

## 2021-01-01 RX ADMIN — GABAPENTIN 100 MG: 100 CAPSULE ORAL at 20:44

## 2021-01-01 RX ADMIN — TRAZODONE HYDROCHLORIDE 150 MG: 50 TABLET ORAL at 22:58

## 2021-01-01 RX ADMIN — HEPARIN SODIUM 5000 UNITS: 5000 INJECTION, SOLUTION INTRAVENOUS; SUBCUTANEOUS at 12:55

## 2021-01-01 RX ADMIN — OXYCODONE HYDROCHLORIDE 5 MG: 5 TABLET ORAL at 01:09

## 2021-01-01 RX ADMIN — ONDANSETRON 4 MG: 2 INJECTION INTRAMUSCULAR; INTRAVENOUS at 16:29

## 2021-01-01 RX ADMIN — BUDESONIDE AND FORMOTEROL FUMARATE DIHYDRATE 2 PUFF: 80; 4.5 AEROSOL RESPIRATORY (INHALATION) at 08:11

## 2021-01-01 RX ADMIN — SODIUM CHLORIDE 300 ML: 9 INJECTION, SOLUTION INTRAVENOUS at 23:49

## 2021-01-01 RX ADMIN — FAMOTIDINE 20 MG: 20 TABLET ORAL at 20:33

## 2021-01-01 RX ADMIN — ALBUTEROL SULFATE 2.5 MG: 2.5 SOLUTION RESPIRATORY (INHALATION) at 20:09

## 2021-01-01 RX ADMIN — CINACALCET 30 MG: 30 TABLET, FILM COATED ORAL at 13:24

## 2021-01-01 RX ADMIN — ASPIRIN 81 MG: 81 TABLET, COATED ORAL at 13:01

## 2021-01-01 RX ADMIN — ACETAMINOPHEN 1000 MG: 500 TABLET, FILM COATED ORAL at 12:53

## 2021-01-01 RX ADMIN — BUDESONIDE AND FORMOTEROL FUMARATE DIHYDRATE 2 PUFF: 80; 4.5 AEROSOL RESPIRATORY (INHALATION) at 08:52

## 2021-01-01 RX ADMIN — PREDNISONE 40 MG: 20 TABLET ORAL at 09:01

## 2021-01-01 RX ADMIN — SODIUM CHLORIDE 250 ML: 9 INJECTION, SOLUTION INTRAVENOUS at 11:49

## 2021-01-01 RX ADMIN — FAMOTIDINE 20 MG: 20 TABLET, FILM COATED ORAL at 20:44

## 2021-01-01 RX ADMIN — OXYCODONE HYDROCHLORIDE 5 MG: 5 TABLET ORAL at 00:42

## 2021-01-01 RX ADMIN — ALBUTEROL SULFATE 2.5 MG: 2.5 SOLUTION RESPIRATORY (INHALATION) at 12:47

## 2021-01-01 RX ADMIN — GABAPENTIN 100 MG: 100 CAPSULE ORAL at 14:46

## 2021-01-01 RX ADMIN — ALBUTEROL SULFATE 2 PUFF: 90 AEROSOL, METERED RESPIRATORY (INHALATION) at 09:22

## 2021-01-01 RX ADMIN — ALBUTEROL SULFATE 2.5 MG: 2.5 SOLUTION RESPIRATORY (INHALATION) at 15:08

## 2021-01-01 RX ADMIN — ASPIRIN 81 MG: 81 TABLET, DELAYED RELEASE ORAL at 12:53

## 2021-01-01 RX ADMIN — BUDESONIDE AND FORMOTEROL FUMARATE DIHYDRATE 2 PUFF: 80; 4.5 AEROSOL RESPIRATORY (INHALATION) at 22:11

## 2021-01-01 RX ADMIN — SEVELAMER CARBONATE 1600 MG: 800 TABLET, FILM COATED ORAL at 18:43

## 2021-01-01 RX ADMIN — ALBUTEROL SULFATE 2.5 MG: 2.5 SOLUTION RESPIRATORY (INHALATION) at 07:07

## 2021-01-01 RX ADMIN — SODIUM CHLORIDE 300 ML: 9 INJECTION, SOLUTION INTRAVENOUS at 11:49

## 2021-01-01 RX ADMIN — GABAPENTIN 100 MG: 100 CAPSULE ORAL at 14:08

## 2021-01-01 RX ADMIN — Medication 2.5 MG: at 09:44

## 2021-01-01 RX ADMIN — FAMOTIDINE 20 MG: 20 TABLET, FILM COATED ORAL at 21:43

## 2021-01-01 RX ADMIN — ATORVASTATIN CALCIUM 10 MG: 10 TABLET, FILM COATED ORAL at 21:57

## 2021-01-01 RX ADMIN — HEPARIN SODIUM 5000 UNITS: 10000 INJECTION, SOLUTION INTRAVENOUS; SUBCUTANEOUS at 13:25

## 2021-01-01 RX ADMIN — MIDODRINE HYDROCHLORIDE 20 MG: 5 TABLET ORAL at 08:24

## 2021-01-01 RX ADMIN — GABAPENTIN 100 MG: 100 CAPSULE ORAL at 20:33

## 2021-01-01 RX ADMIN — BUPROPION HYDROCHLORIDE 150 MG: 150 TABLET, FILM COATED, EXTENDED RELEASE ORAL at 08:27

## 2021-01-01 RX ADMIN — Medication 1 CAPSULE: at 18:22

## 2021-01-01 RX ADMIN — Medication 2.5 MG: at 08:27

## 2021-01-01 RX ADMIN — Medication 2000 UNITS: at 13:07

## 2021-01-01 RX ADMIN — SEVELAMER CARBONATE 1600 MG: 800 TABLET, FILM COATED ORAL at 09:01

## 2021-01-01 RX ADMIN — ALBUTEROL SULFATE 2 PUFF: 90 AEROSOL, METERED RESPIRATORY (INHALATION) at 21:36

## 2021-01-01 RX ADMIN — ALBUTEROL SULFATE 2.5 MG: 2.5 SOLUTION RESPIRATORY (INHALATION) at 20:10

## 2021-01-01 RX ADMIN — DOXYCYCLINE 100 MG: 100 INJECTION, POWDER, LYOPHILIZED, FOR SOLUTION INTRAVENOUS at 14:17

## 2021-01-01 ASSESSMENT — ACTIVITIES OF DAILY LIVING (ADL)
ADLS_ACUITY_SCORE: 17
DEPENDENT_IADLS:: LAUNDRY;SHOPPING;TRANSPORTATION
ADLS_ACUITY_SCORE: 17
ADLS_ACUITY_SCORE: 19
ADLS_ACUITY_SCORE: 17
ADLS_ACUITY_SCORE: 19
ADLS_ACUITY_SCORE: 17
ADLS_ACUITY_SCORE: 19
ADLS_ACUITY_SCORE: 17
ADLS_ACUITY_SCORE: 19
ADLS_ACUITY_SCORE: 17
ADLS_ACUITY_SCORE: 19

## 2021-01-01 ASSESSMENT — MIFFLIN-ST. JEOR
SCORE: 1210.31
SCORE: 1182.63
SCORE: 1215.76
SCORE: 1203
SCORE: 1248.87
SCORE: 1151.79
SCORE: 1210
SCORE: 1176
SCORE: 1215.76
SCORE: 1131.38
SCORE: 1165.41
SCORE: 1180
SCORE: 1218
SCORE: 1167.66
SCORE: 1159
SCORE: 1133.19
SCORE: 1218.02
SCORE: 1185.36
SCORE: 1235.25
SCORE: 1206.23
SCORE: 1206.22
SCORE: 1206.22
SCORE: 1160
SCORE: 1184

## 2021-01-26 ENCOUNTER — OFFICE VISIT - HEALTHEAST (OUTPATIENT)
Dept: INTERNAL MEDICINE | Facility: CLINIC | Age: 76
End: 2021-01-26

## 2021-01-26 ENCOUNTER — AMBULATORY - HEALTHEAST (OUTPATIENT)
Dept: CARDIOLOGY | Facility: CLINIC | Age: 76
End: 2021-01-26

## 2021-01-26 DIAGNOSIS — R06.02 SHORTNESS OF BREATH: ICD-10-CM

## 2021-01-26 DIAGNOSIS — I48.20 CHRONIC ATRIAL FIBRILLATION (H): ICD-10-CM

## 2021-01-26 DIAGNOSIS — Z95.0 CARDIAC PACEMAKER IN SITU: ICD-10-CM

## 2021-01-27 ENCOUNTER — COMMUNICATION - HEALTHEAST (OUTPATIENT)
Dept: SCHEDULING | Facility: CLINIC | Age: 76
End: 2021-01-27

## 2021-02-01 ENCOUNTER — COMMUNICATION - HEALTHEAST (OUTPATIENT)
Dept: NURSING | Facility: CLINIC | Age: 76
End: 2021-02-01

## 2021-02-02 ENCOUNTER — OFFICE VISIT - HEALTHEAST (OUTPATIENT)
Dept: GERIATRICS | Facility: CLINIC | Age: 76
End: 2021-02-02

## 2021-02-02 DIAGNOSIS — J42 CHRONIC BRONCHITIS, UNSPECIFIED CHRONIC BRONCHITIS TYPE (H): ICD-10-CM

## 2021-02-02 DIAGNOSIS — R60.0 EDEMA OF RIGHT LOWER EXTREMITY: ICD-10-CM

## 2021-02-02 DIAGNOSIS — E87.70 HYPERVOLEMIA, UNSPECIFIED HYPERVOLEMIA TYPE: ICD-10-CM

## 2021-02-02 DIAGNOSIS — N18.6 ESRD ON DIALYSIS (H): ICD-10-CM

## 2021-02-02 DIAGNOSIS — R53.81 PHYSICAL DECONDITIONING: ICD-10-CM

## 2021-02-02 DIAGNOSIS — I50.33 ACUTE ON CHRONIC DIASTOLIC CONGESTIVE HEART FAILURE (H): ICD-10-CM

## 2021-02-02 DIAGNOSIS — Z99.2 ESRD ON DIALYSIS (H): ICD-10-CM

## 2021-02-02 DIAGNOSIS — M79.2 NEUROPATHIC PAIN: ICD-10-CM

## 2021-02-04 ENCOUNTER — OFFICE VISIT - HEALTHEAST (OUTPATIENT)
Dept: GERIATRICS | Facility: CLINIC | Age: 76
End: 2021-02-04

## 2021-02-04 DIAGNOSIS — I48.0 PAROXYSMAL ATRIAL FIBRILLATION (H): ICD-10-CM

## 2021-02-04 DIAGNOSIS — M79.2 NEUROPATHIC PAIN: ICD-10-CM

## 2021-02-04 DIAGNOSIS — N18.6 ESRD ON DIALYSIS (H): ICD-10-CM

## 2021-02-04 DIAGNOSIS — I50.33 ACUTE ON CHRONIC DIASTOLIC CONGESTIVE HEART FAILURE (H): ICD-10-CM

## 2021-02-04 DIAGNOSIS — Z99.2 ESRD ON DIALYSIS (H): ICD-10-CM

## 2021-02-04 DIAGNOSIS — M54.50 SEVERE LOW BACK PAIN: ICD-10-CM

## 2021-02-09 ENCOUNTER — OFFICE VISIT - HEALTHEAST (OUTPATIENT)
Dept: GERIATRICS | Facility: CLINIC | Age: 76
End: 2021-02-09

## 2021-02-09 DIAGNOSIS — M79.2 NEUROPATHIC PAIN: ICD-10-CM

## 2021-02-09 DIAGNOSIS — M54.50 SEVERE LOW BACK PAIN: ICD-10-CM

## 2021-02-09 DIAGNOSIS — N18.6 ESRD ON DIALYSIS (H): ICD-10-CM

## 2021-02-09 DIAGNOSIS — Z99.2 ESRD ON DIALYSIS (H): ICD-10-CM

## 2021-02-09 DIAGNOSIS — I48.0 PAROXYSMAL ATRIAL FIBRILLATION (H): ICD-10-CM

## 2021-02-09 DIAGNOSIS — I50.33 ACUTE ON CHRONIC DIASTOLIC CONGESTIVE HEART FAILURE (H): ICD-10-CM

## 2021-02-11 ENCOUNTER — OFFICE VISIT - HEALTHEAST (OUTPATIENT)
Dept: GERIATRICS | Facility: CLINIC | Age: 76
End: 2021-02-11

## 2021-02-11 DIAGNOSIS — J43.9 PULMONARY EMPHYSEMA, UNSPECIFIED EMPHYSEMA TYPE (H): ICD-10-CM

## 2021-02-11 DIAGNOSIS — E87.70 HYPERVOLEMIA, UNSPECIFIED HYPERVOLEMIA TYPE: ICD-10-CM

## 2021-02-11 DIAGNOSIS — I48.0 PAROXYSMAL ATRIAL FIBRILLATION (H): ICD-10-CM

## 2021-02-11 DIAGNOSIS — N18.6 ESRD ON DIALYSIS (H): ICD-10-CM

## 2021-02-11 DIAGNOSIS — M79.2 NEUROPATHIC PAIN: ICD-10-CM

## 2021-02-11 DIAGNOSIS — I50.33 ACUTE ON CHRONIC DIASTOLIC CONGESTIVE HEART FAILURE (H): ICD-10-CM

## 2021-02-11 DIAGNOSIS — Z99.2 ESRD ON DIALYSIS (H): ICD-10-CM

## 2021-02-11 DIAGNOSIS — M54.50 SEVERE LOW BACK PAIN: ICD-10-CM

## 2021-02-15 ENCOUNTER — RECORDS - HEALTHEAST (OUTPATIENT)
Dept: LAB | Facility: CLINIC | Age: 76
End: 2021-02-15

## 2021-02-15 LAB — URATE SERPL-MCNC: 2.7 MG/DL (ref 2–7.5)

## 2021-02-16 ENCOUNTER — OFFICE VISIT - HEALTHEAST (OUTPATIENT)
Dept: GERIATRICS | Facility: CLINIC | Age: 76
End: 2021-02-16

## 2021-02-16 ENCOUNTER — RECORDS - HEALTHEAST (OUTPATIENT)
Dept: LAB | Facility: CLINIC | Age: 76
End: 2021-02-16

## 2021-02-16 DIAGNOSIS — M79.2 NEUROPATHIC PAIN: ICD-10-CM

## 2021-02-16 DIAGNOSIS — Z99.2 ESRD ON DIALYSIS (H): ICD-10-CM

## 2021-02-16 DIAGNOSIS — N18.6 ESRD ON DIALYSIS (H): ICD-10-CM

## 2021-02-16 DIAGNOSIS — I48.0 PAROXYSMAL ATRIAL FIBRILLATION (H): ICD-10-CM

## 2021-02-16 DIAGNOSIS — I50.33 ACUTE ON CHRONIC DIASTOLIC CONGESTIVE HEART FAILURE (H): ICD-10-CM

## 2021-02-16 DIAGNOSIS — J43.9 PULMONARY EMPHYSEMA, UNSPECIFIED EMPHYSEMA TYPE (H): ICD-10-CM

## 2021-02-16 LAB
SARS-COV-2 PCR COMMENT: NORMAL
SARS-COV-2 RNA SPEC QL NAA+PROBE: NEGATIVE
SARS-COV-2 VIRUS SPECIMEN SOURCE: NORMAL

## 2021-02-18 ENCOUNTER — COMMUNICATION - HEALTHEAST (OUTPATIENT)
Dept: SCHEDULING | Facility: CLINIC | Age: 76
End: 2021-02-18

## 2021-02-18 ENCOUNTER — AMBULATORY - HEALTHEAST (OUTPATIENT)
Dept: GERIATRICS | Facility: CLINIC | Age: 76
End: 2021-02-18

## 2021-02-22 ENCOUNTER — OFFICE VISIT - HEALTHEAST (OUTPATIENT)
Dept: GERIATRICS | Facility: CLINIC | Age: 76
End: 2021-02-22

## 2021-02-22 DIAGNOSIS — Z99.2 ESRD ON HEMODIALYSIS (H): ICD-10-CM

## 2021-02-22 DIAGNOSIS — I10 ESSENTIAL HYPERTENSION: ICD-10-CM

## 2021-02-22 DIAGNOSIS — N18.5 ANEMIA OF CHRONIC RENAL FAILURE, STAGE 5 (H): ICD-10-CM

## 2021-02-22 DIAGNOSIS — I48.20 CHRONIC ATRIAL FIBRILLATION (H): ICD-10-CM

## 2021-02-22 DIAGNOSIS — N18.6 ESRD ON HEMODIALYSIS (H): ICD-10-CM

## 2021-02-22 DIAGNOSIS — J43.9 PULMONARY EMPHYSEMA, UNSPECIFIED EMPHYSEMA TYPE (H): ICD-10-CM

## 2021-02-22 DIAGNOSIS — J44.1 COPD EXACERBATION (H): ICD-10-CM

## 2021-02-22 DIAGNOSIS — D63.1 ANEMIA OF CHRONIC RENAL FAILURE, STAGE 5 (H): ICD-10-CM

## 2021-02-22 DIAGNOSIS — J96.11 CHRONIC RESPIRATORY FAILURE WITH HYPOXIA (H): ICD-10-CM

## 2021-02-23 ENCOUNTER — COMMUNICATION - HEALTHEAST (OUTPATIENT)
Dept: NURSING | Facility: CLINIC | Age: 76
End: 2021-02-23

## 2021-02-24 ENCOUNTER — OFFICE VISIT - HEALTHEAST (OUTPATIENT)
Dept: GERIATRICS | Facility: CLINIC | Age: 76
End: 2021-02-24

## 2021-02-24 DIAGNOSIS — I89.0 CHRONIC ACQUIRED LYMPHEDEMA: ICD-10-CM

## 2021-02-24 DIAGNOSIS — I48.20 CHRONIC ATRIAL FIBRILLATION (H): ICD-10-CM

## 2021-02-24 DIAGNOSIS — N18.6 ESRD ON HEMODIALYSIS (H): ICD-10-CM

## 2021-02-24 DIAGNOSIS — J96.11 CHRONIC RESPIRATORY FAILURE WITH HYPOXIA (H): ICD-10-CM

## 2021-02-24 DIAGNOSIS — J44.1 COPD EXACERBATION (H): ICD-10-CM

## 2021-02-24 DIAGNOSIS — D63.1 ANEMIA OF CHRONIC RENAL FAILURE, STAGE 5 (H): ICD-10-CM

## 2021-02-24 DIAGNOSIS — J43.9 PULMONARY EMPHYSEMA, UNSPECIFIED EMPHYSEMA TYPE (H): ICD-10-CM

## 2021-02-24 DIAGNOSIS — N18.5 ANEMIA OF CHRONIC RENAL FAILURE, STAGE 5 (H): ICD-10-CM

## 2021-02-24 DIAGNOSIS — Z99.2 ESRD ON HEMODIALYSIS (H): ICD-10-CM

## 2021-02-24 DIAGNOSIS — I10 ESSENTIAL HYPERTENSION: ICD-10-CM

## 2021-02-25 ENCOUNTER — OFFICE VISIT - HEALTHEAST (OUTPATIENT)
Dept: GERIATRICS | Facility: CLINIC | Age: 76
End: 2021-02-25

## 2021-02-25 DIAGNOSIS — D63.1 ANEMIA OF CHRONIC RENAL FAILURE, STAGE 5 (H): ICD-10-CM

## 2021-02-25 DIAGNOSIS — Z99.2 ESRD ON HEMODIALYSIS (H): ICD-10-CM

## 2021-02-25 DIAGNOSIS — I48.20 CHRONIC ATRIAL FIBRILLATION (H): ICD-10-CM

## 2021-02-25 DIAGNOSIS — I10 ESSENTIAL HYPERTENSION: ICD-10-CM

## 2021-02-25 DIAGNOSIS — N18.6 ESRD ON HEMODIALYSIS (H): ICD-10-CM

## 2021-02-25 DIAGNOSIS — I89.0 CHRONIC ACQUIRED LYMPHEDEMA: ICD-10-CM

## 2021-02-25 DIAGNOSIS — J96.11 CHRONIC RESPIRATORY FAILURE WITH HYPOXIA (H): ICD-10-CM

## 2021-02-25 DIAGNOSIS — J43.9 PULMONARY EMPHYSEMA, UNSPECIFIED EMPHYSEMA TYPE (H): ICD-10-CM

## 2021-02-25 DIAGNOSIS — N18.5 ANEMIA OF CHRONIC RENAL FAILURE, STAGE 5 (H): ICD-10-CM

## 2021-02-25 ASSESSMENT — MIFFLIN-ST. JEOR: SCORE: 1325.07

## 2021-02-26 ENCOUNTER — COMMUNICATION - HEALTHEAST (OUTPATIENT)
Dept: GERIATRICS | Facility: CLINIC | Age: 76
End: 2021-02-26

## 2021-02-26 DIAGNOSIS — J44.1 COPD EXACERBATION (H): ICD-10-CM

## 2021-03-01 ENCOUNTER — OFFICE VISIT - HEALTHEAST (OUTPATIENT)
Dept: GERIATRICS | Facility: CLINIC | Age: 76
End: 2021-03-01

## 2021-03-01 DIAGNOSIS — N18.6 ESRD ON HEMODIALYSIS (H): ICD-10-CM

## 2021-03-01 DIAGNOSIS — Z99.2 ESRD ON HEMODIALYSIS (H): ICD-10-CM

## 2021-03-01 DIAGNOSIS — I50.32 CHRONIC DIASTOLIC HEART FAILURE (H): ICD-10-CM

## 2021-03-01 DIAGNOSIS — E86.1 HYPOTENSION DUE TO HYPOVOLEMIA: ICD-10-CM

## 2021-03-01 DIAGNOSIS — K21.9 GASTROESOPHAGEAL REFLUX DISEASE WITHOUT ESOPHAGITIS: ICD-10-CM

## 2021-03-01 DIAGNOSIS — J96.11 CHRONIC RESPIRATORY FAILURE WITH HYPOXIA (H): ICD-10-CM

## 2021-03-31 PROBLEM — J44.1 COPD EXACERBATION (H): Status: ACTIVE | Noted: 2021-01-01

## 2021-03-31 NOTE — PLAN OF CARE
Direct admit from [facility] this a.m. On her baseline 2.5L oxygen per nasal canula. Crackles left lower. Enc IS and CDB. Wheelchair bound at baseline and able to pivot transfer well and using the ilda steady. Foot drop on right. Skin intact. Having her dialysis run this afternoon.

## 2021-03-31 NOTE — PHARMACY-ADMISSION MEDICATION HISTORY
Admission medication history interview status for this patient is complete. See [facility] admission navigator for allergy information, prior to admission medications and immunization status.     Medication history interview done, indicate source(s): Patient  Medication history resources (including written lists, pill bottles, clinic record):Care everywhere list  Pharmacy: [facility] in [place]    Changes made to PTA medication list:  Added: All  Deleted:    Changed:      Pt states hasn't taken metoprolol for 2 weeks, has more issues with low BP; pharmacy states last filled metoprolol April 2020.  Oxycodone last filled in December for 10 tablets.    Actions taken by pharmacist (provider contacted, etc): Called Shelia     Additional medication history information:None    Medication reconciliation/reorder completed by provider prior to medication history?  Y   (Y/N)         Prior to Admission medications    Medication Sig Last Dose Taking? Auth Provider   acetaminophen (TYLENOL) 500 MG tablet Take 1,000 mg by mouth every 6 hours as needed for mild pain 3/30/2021 at Unknown time Yes Unknown, Entered By History   albuterol (PROVENTIL) (2.5 MG/3ML) 0.083% neb solution Take 2.5 mg by nebulization every 4 hours as needed for wheezing  Yes Unknown, Entered By History   aspirin 81 MG EC tablet Take 81 mg by mouth daily 3/30/2021 at Unknown time Yes Unknown, Entered By History   atorvastatin (LIPITOR) 10 MG tablet Take 10 mg by mouth At Bedtime 3/29/2021 at Unknown time Yes Unknown, Entered By History   B Complex-C-Folic Acid (DIALYVITE PO) Take 1 tablet by mouth daily. 3/29/2021 at Unknown time Yes Unknown, Entered By History   buPROPion (WELLBUTRIN XL) 150 MG 24 hr tablet Take 150 mg by mouth twice a week Tue & Sat 3/27/2021 at Unknown time Yes Unknown, Entered By History   Chlorpheniramine-DM (CORICIDIN HBP COUGH/COLD PO) Take by mouth as needed 3/30/2021 at AM Yes Unknown, Entered By History   cinacalcet (SENSIPAR) 30 MG tablet  Take 30 mg by mouth Every Mon, Wed, Fri Morning . At dialysis. 3/29/2021 at Unknown time Yes Unknown, Entered By History   famotidine (PEPCID) 20 MG tablet Take 20 mg by mouth At Bedtime 3/29/2021 at HS Yes Unknown, Entered By History   folic acid (FOLVITE) 1 MG tablet Take 1 mg by mouth daily (with lunch) 3/29/2021 at Unknown time Yes Unknown, Entered By History   gabapentin (NEURONTIN) 100 MG capsule Take 100 mg by mouth 3 times daily 3/29/2021 at Unknown time Yes Unknown, Entered By History   lactobacillus rhamnosus, GG, (CULTURELL) capsule Take 1 capsule by mouth daily (with lunch) 3/29/2021 at Unknown time Yes Unknown, Entered By History   loratadine (CLARITIN) 10 MG tablet Take 10 mg by mouth daily as needed for allergies  Yes Unknown, Entered By History   metoprolol succinate ER (TOPROL-XL) 25 MG 24 hr tablet Take 25 mg by mouth daily Past Month at Unknown time Yes Unknown, Entered By History   midodrine (PROAMATINE) 10 MG tablet Take 20 mg by mouth Every Mon, Wed, Fri Morning . Before dialysis. 3/29/2021 at Unknown time Yes Unknown, Entered By History   midodrine (PROAMATINE) 10 MG tablet Take 10 mg by mouth See Admin Instructions Give additional 10 mg dose PRN at dialylsis.  Yes Unknown, Entered By History   midodrine (PROAMATINE) 10 MG tablet Take 15 mg by mouth four times a week Take once daily on non-dialysis days ([facility]) IF NEEDED for BP < 106.  Yes Unknown, Entered By History   oxyCODONE (ROXICODONE) 5 MG tablet Take 2.5 mg by mouth every 6 hours as needed for severe pain  Yes Unknown, Entered By History   polyvinyl alcohol (LIQUIFILM TEARS) 1.4 % ophthalmic solution Place 1 drop into both eyes as needed for dry eyes  Yes Unknown, Entered By History   senna-docusate (SENOKOT-S/PERICOLACE) 8.6-50 MG tablet Take 1 tablet by mouth At Bedtime 3/29/2021 at Unknown time Yes Unknown, Entered By History   sevelamer carbonate (RENVELA) 800 MG tablet Take 1,600 mg by mouth 3 times daily (with meals) 3/30/2021  at Unknown time Yes Unknown, Entered By History   sevelamer carbonate (RENVELA) 800 MG tablet Take 1600 mg by mouth 2 times daily as needed With snacks.  Yes Unknown, Entered By History   sodium-potassium bicarbonate (EDI-SELTZER GOLD) TBEF solu-tab Take 1 tablet by mouth daily as needed for heartburn  Yes Unknown, Entered By History   timolol maleate (TIMOPTIC) 0.5 % ophthalmic solution Place 1 drop into both eyes 2 times daily 3/30/2021 at Unknown time Yes Unknown, Entered By History   traZODone (DESYREL) 150 MG tablet Take 150 mg by mouth At Bedtime 3/29/2021 at Unknown time Yes Unknown, Entered By History   Vitamin D, Cholecalciferol, 25 MCG (1000 UT) TABS Take 2,000 Units by mouth daily (with lunch) 3/29/2021 at Unknown time Yes Unknown, Entered By History

## 2021-03-31 NOTE — PLAN OF CARE
Arrived to floor at 0655; direct admit from [place]. AxOx4. VSS; 2 L O2 NC. Denies CP, SOB, pain, N/V, dizziness. Cristopher cath to R. Chest; dressing CDI. Plan for nephrology to see pt today to determine HD schedule. Alarms on for safety. Will continue with POC.

## 2021-03-31 NOTE — CONSULTS
Care Management Initial Consult    General Information  Assessment completed with: Patient,    Type of CM/SW Visit: Initial Assessment    Primary Care Provider verified and updated as needed: Yes   Readmission within the last 30 days: no previous admission in last 30 days      Reason for Consult: care coordination/care conference, discharge planning  Advance Care Planning:  DNR/DNI status, bedside RN looking into HCD          Communication Assessment  Patient's communication style: spoken language (English or Bilingual)    Hearing Difficulty or Deaf: no   Wear Glasses or Blind: yes    Cognitive  Cognitive/Neuro/Behavioral: WDL                      Living Environment:   People in home: spouse     Current living Arrangements: house      Able to return to prior arrangements: yes       Family/Social Support:  Care provided by:  self  Provides care for:  No one  Marital Status:   , Children          Description of Support System: Supportive, Involved    Support Assessment: Adequate family and caregiver support    Current Resources:   Patient receiving home care services:  Pt was supposed to start [facility]  RN/PT/OT/SW services     Community Resources:  Home O2 through [facility]  Equipment currently used at home: walker, standard, walker, rolling, wheelchair, manual  Supplies currently used at home:  Nebulizer          Lifestyle & Psychosocial Needs:        Socioeconomic History     Marital status:      Spouse name: Not on file     Number of children: Not on file     Years of education: Not on file     Highest education level: Not on file            Additional Information:  SW consulted for discharge planning. Pt was admitted yesterday for COPD exacerbation. She has had frequent hospital and TCU stays since the beginning of the year. She was hospitalized at [facility] from 1/26-1/29 and discharged to [facility] TCU. She was again admitted to [facility] from 2/18-2/20 and discharged to [facility] TCU. She  was discharged home with her spouse from the TCU on 3/30. Met with pt at bedside to discuss plan of care. She states she got home from the TCU with a new nebulizer machine. She became very short of breath and anxious and was not able to put together her neb machine and called 911. She states the ambulance staff were able to put together her own nebulizer and helped her use it. She was still SOB and then was admitted to the hospital. She is being treated with O2, nebs and steroids.     She verifies that she lives at home with her spouse in [place]. She uses home O2 through [facility]. She had been mainly using it only at night at 2-2.5L but the last 6 weeks has been needing it continuously. She gets her concentrator and portable tanks from [facility] but also pays privately for another concentrator at home. She was supposed to start home care services through [facility] for RN/PT/OT/SW. Call placed to [facility] [phone] to verify services. They stated that pt had not yet established services prior to her admission so if she needs HC on discharge it would be a new referral and they will need to be called to see if they will be able to accept her for services. Pt has a stair lift at home and sleeps in her lift recliner chair at night. She states she has a supportive  and equipment she needs at home. She is wheelchair bound and pivot transfers. She hopes to discharge back home with home care services on discharge. She feels she will be able to manage her nebulizer since it is now assembled.     Pt goes to [facility] in [place] on MWF schedule from 930-1330. Her  transports her to dialysis.     She would like assistance with scheduling follow up with her PCP Dr Kali Galan at [facility] on discharge. [facility] request sent for scheduling follow up. Due to her dialysis schedule, pt will need her appt on Tues/Thurs or after 1500 on MWF, this was indicated in the appt request.     Pt would  benefit from PT/OT recommendations during hospitalization. Her  will be available to transport her home.     Will cont to follow for discharge recommendations and plan of care.         Trupti Cuenca RN BSN CM  Inpatient Care Coordination  [facility]  [phone]

## 2021-03-31 NOTE — PROGRESS NOTES
SPIRITUAL HEALTH SERVICES  Progress Note  [facility]  303    Attempted visit 3 times per  request but pt was not in room.        Katty Guallpa    Pager [phone]

## 2021-03-31 NOTE — H&P
History and Physical     Belia Rodriguez MRN# [id number]   YOB: 1945 Age: 75 year old      Date of Admission:  3/31/2021    Primary care provider: Giovanni Lindsey          Assessment and Plan:   Belia Rodriguez is a 75 year old female with a PMH significant for end stage renal disease on dialysis MWF, COPD and CHF chronically on 2 litres, ZULEIKA intolerant to CPAP, a fib and SSS with pacemaker in place and watchmen device, CVA, gastrointestinal hemorrhage, chronic anemia, chronic pain, HLP, HTN who presents with acute onset of shortness of breath.     Patient was discussed with ED provider at [facility] by Dr. Franklin. Chart and work up was reviewed by myself.     Multiple hospital admissions and TCU stays since January  -[facility] from 1/26/2021-1/29/2021 with acute on chronic respiratory failure due to fluid overload and COPD exacerbation  -[facility] TCU from 1/29/2021 until 2/16/2021  -[facility] 2/18/2021 until 2/20/2021 with acute on chronic respiratory failure due to combination of fluid overload and COPD exacerbation  -[facility] TCU 2/20/2021 through 3/30/2021    #Acute on chronic respiratory failure likely due to COPD exacerbation and fluid overload  Discharge from rehab on 3/30 feeling well without respiratory complaints but acutely developed shortness of breath and wheezing in the early hours of 3/31.  Unfortunately was unable to put together a new nebulizer and EMS called.  Received 3 nebulizer treatments, Solu-Medrol and magnesium sulfate with improvement of respiratory status.  Work-up shows slightly elevated BNP and possibly pulmonary congestion.  No evidence of infection with normal WBC and absence of fever.  On exam she is breathing well on her chronic 2 L without wheezes or rhonchi.  -We will avoid further steroids at this point  -No signs of infection so we will not start antibiotics  -Continue duo nebs as needed  -Will need  dialysis treatment today which I suspect will improve her breathing status further  -Continue her baseline oxygen support  -Has sleep apnea but is intolerant to CPAP  -Previous notes has declined any further BiPAP but would need to discuss further if this was needed.    #End-stage renal disease on dialysis  Dialyzes Mondays, Wednesdays and Fridays.  Does not create any urine  -Nephrology consult  -Continue Sensipar    #Hyperkalemia  K 5.5 at emergency room.  ECG is nonconcerning.  She does not create urine.  Will need dialysis today.  -Monitor on telemetry  -Dialysis likely today    #Acute on chronic diastolic congestive heart failure  Chest x-ray shows some signs of pulmonary congestion with BNP over 1000.  -Dialysis today    #Anemia of chronic disease  #History of gastrointestinal bleeding  No active signs of bleeding  -Continue Pepcid    #Chronic thrombocytopenia  -Continue to monitor counts with no active bleeding    #Paroxysmal atrial fibrillation and sick sinus syndrome  Not on anticoagulation but does have watchman device in place and history of pacemaker placement.    #Lymphedema  We will order PT for lymphedema wraps    #Goals of care  Patient is not ready for hospice as she would have to give up dialysis and likely die in a short period of time.  She has been following with palliative care while in rehab but is uncertain if this will continue as an outpatient.  She did find this to be very helpful.  -Likely need discharge with home services  -Confirm that she has plans to meet with palliative care as outpatient              Social: Lives at home with   Code: Discussed with patient and they have chosen DNR/DNI  VTE prophylaxis: Heparin SQ  Disposition: Inpatient                    Chief Complaint:   Shortness of breath         History of Present Illness:   Belia Rodriguez is a 75 year old female who presents from [facility] with acute onset of shortness of breath.  She just returned home  from a prolonged stay at [facility] on 3/30.  She reports that she was doing well at home and went out to lunch with her  and had a good dinner with an alcoholic beverage which she was craving.  That night she went to bed at approximately 830 because she was feeling tired and then woke up just after midnight with acute onset of shortness of breath.  She had been discharged with a new nebulizer that she did not know how to assemble and given her acute symptoms felt that EMS needed to be called.  After receiving 3 nebulizer treatments and steroids she is feeling almost back to baseline but now feels tired and slightly short of breath.             Past Medical History:   CHF, atrial fibrillation, sick sinus syndrome, chronic anemia, end-stage renal disease on dialysis, COPD, CVA, HLP, HTN, gastrointestinal hemorrhage, L3 vertebral fracture, obstructive sleep apnea          Past Surgical History:   History of back surgery, history of AV node ablation, watchman device placement, pacemaker insertion,            Social History:     Social History     Socioeconomic History     Marital status:      Spouse name: Not on file     Number of children: Not on file     Years of education: Not on file     Highest education level: Not on file   Occupational History     Not on file   Social Needs     Financial resource strain: Not on file     Food insecurity     Worry: Not on file     Inability: Not on file     Transportation needs     Medical: Not on file     Non-medical: Not on file   Tobacco Use     Smoking status: Not on file   Substance and Sexual Activity     Alcohol use: Not on file     Drug use: Not on file     Sexual activity: Not on file   Lifestyle     Physical activity     Days per week: Not on file     Minutes per session: Not on file     Stress: Not on file   Relationships     Social connections     Talks on phone: Not on file     Gets together: Not on file     Attends Sikh service: Not on file     Active  "member of club or organization: Not on file     Attends meetings of clubs or organizations: Not on file     Relationship status: Not on file     Intimate partner violence     Fear of current or ex partner: Not on file     Emotionally abused: Not on file     Physically abused: Not on file     Forced sexual activity: Not on file   Other Topics Concern     Not on file   Social History Narrative     Not on file               Family History:   No family history on file.  Family history reviewed and is non contributory         Allergies:      Allergies   Allergen Reactions     Ace Inhibitors                Medications:     Prior to Admission medications    Not on File              Review of Systems:   A Comprehensive greater than 10 system review of systems was carried out.  Pertinent positives and negatives are noted above.  Otherwise negative for contributory information.            Physical Exam:   Blood pressure 120/81, pulse 74, temperature 98.2  F (36.8  C), temperature source Oral, resp. rate 20, height 1.626 m (5' 4\"), weight 72.6 kg (160 lb), SpO2 93 %.  Exam:  GENERAL:  Comfortable.  PSYCH: pleasant, oriented, No acute distress.  HEENT:  PERRLA. Normal conjunctiva, normal hearing, nasal mucosa and Oropharynx are normal.  NECK:  Supple, no neck vein distention, adenopathy or bruits, normal thyroid.  HEART:  Normal S1, S2 with no murmur, no pericardial rub, gallops or S3 or S4.  LUNGS:  Poor air movement bilaterally but no wheezes or rhonchi  ABDOMEN:  Soft, no hepatosplenomegaly, normal bowel sounds. Non-tender, non distended.   EXTREMITIES:  Bilateral pedal edema 1-2 +  SKIN:  Dry to touch, No rash, wound or ulcerations.  NEUROLOGIC:  CN 2-12 intact,  sensation is intact with no focal deficits.               Data:     Lab work:  --Sodium 138, potassium 5.5, magnesium 3.3, chloride 101, CO2 23, anion gap 14, glucose 110, calcium 9.4, BUN 28, creatinine 5.92.  --Troponin 0.0 3, BNP 1013, lactic acid 1.1, INR " 1.05.  --WBC 6.7, hemoglobin 10.7, platelets 106    Covid negative    ECG is read as a rate of 70 bpm with nonspecific intraventricular conduction block.      Imaging:   Chest x-ray shows stable cardiomediastinal contours with calcified thoracic aorta and left atrial appendage occlusion device.  Left-sided single lead cardiac pacemaker.  Right IJ dual lumen central venous catheter in stable position.  Increased prominence of the interstitium could represent fluid overload or inflammatory change.  No focal airspace infiltrate.  No visible pneumothorax or pleural effusion.      Ericka Benítez PA-C    This patient was seen and discussed with Dr. Yanez who agrees with the current plans as outlined above.

## 2021-03-31 NOTE — PROGRESS NOTES
Potassium   Date Value Ref Range Status   06/29/2014 5.4 (H) 3.4 - 5.3 mmol/L Final     Hemoglobin   Date Value Ref Range Status   06/29/2014 9.7 (L) 11.7 - 15.7 g/dL Final     Creatinine   Date Value Ref Range Status   06/29/2014 4.60 (H) 0.52 - 1.04 mg/dL Final     Urea Nitrogen   Date Value Ref Range Status   06/29/2014 37 (H) 7 - 30 mg/dL Final     Sodium   Date Value Ref Range Status   06/29/2014 137 133 - 144 mmol/L Final     No results found for: INR    DIALYSIS PROCEDURE NOTE  Hepatitis status of previous patient on machine log was checked and verified ok to use with this patients hepatitis status.  Patient dialyzed for 3.15 hrs. on a 2 K bath with a net fluid removal of  2L.  A BFR of 400 ml/min was obtained via a right tunneled catheter.      The treatment plan was discussed with Dr. Crockett during the treatment.    Total heparin received during the treatment: 2200 units.     Line flushed, clamped and capped with heparin 1:1000 2.3 mL (2300 units) per lumen    Meds  given: Epogen and Hectoral  Complications: none      Person educated: patient. Knowledge base substantial. Barriers to learning: none. Educated on procedure via verbal mode. Patient verbalized understanding. Pt prefers verbal education style.     ICEBOAT? Timeout performed pre-treatment  I: Patient was identified using 2 identifiers  C:  Consent Signed Yes  E: Equipment preventative maintenance is current and dialysis delivery system OK to use  B: Hepatitis B Surface Antigen: unknown; Draw Date: 3/31/21      Hepatitis B Surface Antibody: unknown; Draw Date: 3/31/21  O: Dialysis orders present and complete prior to treatment  A: Vascular access verified and assessed prior to treatment  T: Treatment was performed at a clinically appropriate time  ?: Patient was allowed to ask questions and address concerns prior to treatment  See flowsheet in EPIC for further details and post assessment.  Machine water alarm in place and functioning. Transducer  pods intact and checked every 15min.   Pt returned via wheelchair  Chlorine/Chloramine water system checked every 4 hours.  Outpatient Dialysis at [facility]

## 2021-03-31 NOTE — CONSULTS
"         Assessment and Plan:   ESRD: Runs at the [facility] MWF. Seen today on dialysis. Planning R CVC with 400 BFR. 2L UF, low dose heparin. 2K 35 HCO3 140 Na, Hectorol and EPO on the run. Will run for 3.25 h.     She is on sensipar and renvela for MBD. We will use midodrine on the run for low BP.             Interval History:   COPD: ZULEIKA. Frequent adm for SOB.   CHF: pacer, afib  CVA  GI bleed  Anemia due to GI bleed and ESRD.                  Review of Systems:   No recent problems on dialysis except for hypotension.          Medications:       cinacalcet  30 mg Oral Q  AM     gabapentin  100 mg Oral BID     midodrine  20 mg Oral Q  AM     sevelamer carbonate  1,600 mg Oral TID w/meals     sodium chloride (PF)  3 mL Intracatheter Q8H         Current active medications and PTA medications reviewed, see medication list for details.            Physical Exam:   Vitals were reviewed  Patient Vitals for the past 24 hrs:   BP Temp Temp src Pulse Resp SpO2 Height Weight   21 0736 -- -- -- -- -- 93 % -- --   21 0656 120/81 98.2  F (36.8  C) Oral 74 20 95 % 1.626 m (5' 4\") 72.6 kg (160 lb)       Temp:  [98.2  F (36.8  C)] 98.2  F (36.8  C)  Pulse:  [74] 74  Resp:  [20] 20  BP: (120)/(81) 120/81  SpO2:  [93 %-95 %] 93 %    Temperatures:  Current - Temp: 98.2  F (36.8  C); Max - Temp  Av.2  F (36.8  C)  Min: 98.2  F (36.8  C)  Max: 98.2  F (36.8  C)  Respiration range: Resp  Av  Min: 20  Max: 20  Pulse range: Pulse  Av  Min: 74  Max: 74  Blood pressure range: Systolic (24hrs), Av , Min:120 , Max:120   ; Diastolic (24hrs), Av, Min:81, Max:81    Pulse oximetry range: SpO2  Av %  Min: 93 %  Max: 95 %    No intake/output data recorded.    No intake or output data in the 24 hours ending 21 1054    Alert and responsive, NC O2  Skin with multiple ecchymosis  Lungs faint bibasilar rales, no wheezing  Cor RRR nl S1 S2 no M, Pacer in left " infraclavicular region  LE 1+ edema       Wt Readings from Last 4 Encounters:   03/31/21 72.6 kg (160 lb)          Data:          Lab Results   Component Value Date     06/29/2014    Lab Results   Component Value Date    CHLORIDE 100 06/29/2014    Lab Results   Component Value Date    BUN 37 06/29/2014      Lab Results   Component Value Date    POTASSIUM 5.4 06/29/2014    Lab Results   Component Value Date    CO2 21 06/29/2014    Lab Results   Component Value Date    CR 4.60 06/29/2014        Recent Labs   Lab Test 06/29/14  1310   WBC 10.3   HGB 9.7*   HCT 31.0*   *        Recent Labs   Lab Test 06/29/14  1310   AST 20   ALT 23   ALKPHOS 110   BILITOTAL 0.5       No results for input(s): MAG in the last 73147 hours.  No results for input(s): PHOS in the last 15695 hours.  Recent Labs   Lab Test 06/29/14  1310   RONALDO 8.9       Lab Results   Component Value Date    RONALDO 8.9 06/29/2014     Lab Results   Component Value Date    WBC 10.3 06/29/2014    HGB 9.7 (L) 06/29/2014    HCT 31.0 (L) 06/29/2014     (H) 06/29/2014     06/29/2014     Lab Results   Component Value Date     06/29/2014    POTASSIUM 5.4 (H) 06/29/2014    CHLORIDE 100 06/29/2014    CO2 21 06/29/2014     (H) 06/29/2014     Lab Results   Component Value Date    BUN 37 (H) 06/29/2014    CR 4.60 (H) 06/29/2014     No results found for: MAG  No results found for: PHOS    Creatinine   Date Value Ref Range Status   06/29/2014 4.60 (H) 0.52 - 1.04 mg/dL Final       Attestation:  I have reviewed today's vital signs, notes, medications, labs and imaging.  Seen on dialysis.      Bhupendra Crockett MD

## 2021-03-31 NOTE — PROGRESS NOTES
3-Hospitalist Huddle Documentation    Acuity/Preferred Bed Type: telemetry  Infection Concerns: none  Additional Specialist Needed Or Specialist Already Contacted:   None  Timely Treatments Needed: Yes: nebs  Important things to know/address during hospitalization: sitter not needed and none     Signout received from Dr. Hernandez at [facility].  Requested cardiac telemetry inpatient bed for COPD exacerbation and respiratory failure.    Patient has a history of COPD and is chronically on 2 L O2.  She also has ESRD dialyzing every MWF with last run on Monday, DVT, HTN, osteoporosis with previous compression fractures, lower GI bleed, aortic dissection, anemia, gout, glaucoma, GERD, ZULEIKA, tachybradycardia syndrome, SSS, A. fib now s/p AV node ablation with pacemaker placement and watchman device who was just hospitalized for COPD exacerbation and discharged to TCU who then returned home yesterday on 3/30.  In the evening she developed severe sudden onset shortness of breath.  EMS was called.  She was hypoxic into the 70s and appeared tachypneic for EMS.  She received a neb treatment in route.  In the ER she received 3 albuterol nebs, ipratropium neb, 2 g magnesium, and methylprednisolone.  Respiratory status looks much improved per Dr. Hernandez and she is back on 2 L via nasal cannula.      Influenza/COVID-19 PCR negative  WC 6.7, hemoglobin 10.7, platelet count 106  Creatinine at 5 potassium is 5.5  BNP is 1013.    Chest x-ray shows interstitial prominence, but no focal pneumonia    Assessment/plan:  Admit inpatient cardiac telemetry once arrives.  Consult nephrology for hemodialysis today.  IV Solu-Medrol and neb treatment.  Consideration for procalcitonin and Z-James versus doxycycline, depending on if she got either at her more recent hospitalization.  Need to obtain further records.

## 2021-04-01 NOTE — PLAN OF CARE
Please see flowsheets for detailed vital signs and assessments.   Neuro: A/O x 4.   Vital Signs: stable on 2L   Pain: Complained of bilateral foot pain rated 9/10. PRN oxycodone 2.5 mg at 2152. Pt sleeping for pain reassessment. [place] heating device over feet, per pt's request.   Tele: A-fib, CVR, PVC's.  Respiratory: BLS: diminished. Pt states she has SOB at rest.   GI: WDL  : Does not void. Pt on hemodialysis. No AV fistula or graft. Has CVC dialysis access. Dressing CDI.   IVF: saline locked  Consults: PT  Discharge: TBD. Will continue with POC.

## 2021-04-01 NOTE — UTILIZATION REVIEW
Admission Status; Secondary Review Determination       Under the authority of the Utilization Management Committee, the utilization review process indicated a secondary review on the above patient. The review outcome is based on review of the medical records, discussions with staff, and applying clinical experience noted on the date of the review.     (x) Inpatient Status Appropriate - This patient's medical care is consistent with medical management for inpatient care and reasonable inpatient medical practice.     RATIONALE FOR DETERMINATION   75 year old female with complicated medical history such as ESRD on maintenance hemodialysis, CHF, COPD, chronic hypoxia on 2 L of oxygen by nasal cannula, prior A. fib with GI bleeding, not on chronic anticoagulation with the watchman device, CVA, chronic anemia, hypertension, dyslipidemia, chronic pain who was discharged recently from a TCU setting as she has been staying in and out of the hospital in TCU setting at least for the last several months and asked to be admitted on 3/31/2021 with increasing shortness of breath.   1. Acute on chronic hypoxic respiratory failure secondary to COPD exacerbation, versus of flash pulmonary edema  2. ESRD on hemodialysis  3. Hyperkalemia-resolved  4. Acute on chronic diastolic congestive heart failure in mild exacerbation  5. Stable anemia of chronic disease with prior history of GI bleeding  6. History of lymphedema  7. Paroxysmal A. fib with history of SSS prior history of pacer and watchman device, not on chronic anticoagulation  The expected length of stay at the time of admission was more than 2 nights because of the severity of illness, intensity of service provided, and risk for adverse outcome. Inpatient admission is appropriate.     This document was produced using voice recognition software       The information on this document is developed by the utilization review team in order for the business office to ensure  compliance. This only denotes the appropriateness of proper admission status and does not reflect the quality of care rendered.   The definitions of Inpatient Status and Observation Status used in making the determination above are those provided in the CMS Coverage Manual, Chapter 1 and Chapter 6, section 70.4.   Sincerely,   DOMINICK BROWN MD   System Medical Director   Utilization Management   [facility].

## 2021-04-01 NOTE — PROGRESS NOTES
[facility]  Hospitalist Progress Note  Enrike Yanez MD, MD 04/01/2021  (Text Page)  Reason for Stay (Diagnosis): Acute on chronic respiratory failure secondary to mild COPD exacerbation and fluid overload         Assessment and Plan:      Summary of Stay: Belia Rodriguez is a 75 year old female with complicated medical history such as ESRD on maintenance hemodialysis, CHF, COPD, chronic hypoxia on 2 L of oxygen by nasal cannula, prior A. fib with GI bleeding, not on chronic anticoagulation with the watchman device, CVA, chronic anemia, hypertension, dyslipidemia, chronic pain who was discharged recently from a TCU setting as she has been staying in and out of the hospital in TCU setting at least for the last several months and asked to be admitted on 3/31/2021 with increasing shortness of breath.    Problem List:   1. Acute on chronic hypoxic respiratory failure secondary to COPD exacerbation, versus of flash pulmonary edema  2. ESRD on hemodialysis  3. Hyperkalemia-resolved  4. Acute on chronic diastolic congestive heart failure in mild exacerbation  5. Stable anemia of chronic disease with prior history of GI bleeding  6. History of lymphedema  7. Paroxysmal A. fib with history of SSS prior history of pacer and watchman device, not on chronic anticoagulation    Continue current care.  Currently on oxygen supplementation as per home settings.  Patient still little bit anxious regarding the hospital disposition as she was just discharged from a TCU and stayed at home for less than a day and has not become mean in the emergency room for shortness of breath.  We will continue to monitor.  I do not see any signs and evidence of ongoing infectious process.  She tolerated hemodialysis earlier.  Significantly improve clinically and currently tolerating oral diet.  However she wants to be discharged going home when she is medically optimized.  As she has been tired of being in the hospital in TCU  "setting basically the whole of 2021.    DVT Prophylaxis: Pneumatic Compression Devices  Code Status: DNR with prearrest intubation okay  Discharge Dispo: home  Estimated Disch Date / # of Days until Disch: 24 hours        Interval History (Subjective):      Continuing care today.  Seen and examined.  Chart reviewed.  Case discussed with nursing service.  I found Ms. Robbins and good spirits as she is feeling much better, no further recurrence of any shortness of breath overnight.  Tolerated hemodialysis yesterday with no complications of significant hypotension.  Remained afebrile.  Still on oxygen as per home settings.  Tolerating oral diet.     # Pain Assessment:  Current Pain Score 3/31/2021   Patient currently in pain? sleeping, patient not able to self report   Pain score (0-10) -   Belia bishop pain level was assessed and she currently denies pain.                  Physical Exam:      Last Vital Signs:  BP 91/56 (BP Location: Left arm)   Pulse 75   Temp 98.1  F (36.7  C) (Oral)   Resp 20   Ht 1.626 m (5' 4\")   Wt 75.9 kg (167 lb 4.8 oz)   SpO2 96%   BMI 28.72 kg/m      I/O last 3 completed shifts:  In: 643 [P.O.:640; I.V.:3]  Out: 2000 [Other:2000]  Wt Readings from Last 1 Encounters:   04/01/21 75.9 kg (167 lb 4.8 oz)     Vitals:    03/31/21 0656 03/31/21 1400 04/01/21 0644   Weight: 72.6 kg (160 lb) 72.6 kg (160 lb) 75.9 kg (167 lb 4.8 oz)       Constitutional: Awake, alert, cooperative, no apparent distress   Respiratory: Clear to auscultation bilaterally, no crackles or wheezing   Cardiovascular: Regular rate and rhythm, normal S1 and S2, and no murmur noted   Abdomen: Normal bowel sounds, soft, non-distended, non-tender   Skin: No rashes, no cyanosis, dry to touch   Neuro: Alert and oriented x3, no weakness, spontaneous and coherent speech   Extremities: Non pitting bilateral lower extremity edema, normal range of motion   Other(s): Euthymic mood, not agitated       All other systems: Negative         "  Medications:      All current medications were reviewed with changes reflected in problem list.         Data:      All new lab and imaging data was reviewed.   Labs:  No results for input(s): CULT in the last 168 hours.  Recent Labs   Lab 04/01/21  0654   WBC 4.3   HGB 9.8*   HCT 33.1*   *   *     Recent Labs   Lab 04/01/21  0654      POTASSIUM 4.5   CHLORIDE 105   CO2 29   ANIONGAP 5   GLC 96   BUN 20   CR 3.69*   GFRESTIMATED 11*   GFRESTBLACK 13*   RONALDO 9.0     No results for input(s): SED, CRP in the last 168 hours.  Recent Labs   Lab 04/01/21  0654   GLC 96     No results for input(s): INR in the last 168 hours.  No results for input(s): TROPONIN, TROPI, TROPR in the last 168 hours.    Invalid input(s): TROP, TROPONINIES  No results for input(s): COLOR, APPEARANCE, URINEGLC, URINEBILI, URINEKETONE, SG, UBLD, URINEPH, PROTEIN, UROBILINOGEN, NITRITE, LEUKEST, RBCU, WBCU in the last 168 hours.   Imaging:   Results for orders placed or performed during the hospital encounter of 06/29/14   Chest  XR, 1 view portable    Narrative    XR CHEST PORT 1 VW  6/29/2014 1:23 PM    HISTORY:  Shortness of breath.    COMPARISON:  None.      Impression    IMPRESSION:  Right IJ line tip at the cavoatrial junction region.  Patchy right lower lung infiltrate.     OSVALDO LOPEZ MD

## 2021-04-01 NOTE — PROGRESS NOTES
Assessment and Plan:   ESRD: HD yesterday with 3.25 h, R CVC, 2K, 2L UF.  Labs reviewed and orders place for 4/2 run.   On sensipar, vit D and renvela. Midodrine pre-run.     Plans dialysis in am and then discharge per IM.            Interval History:   Resp distress: COPD, ? Fluid overload        CHF: pacer, afib  CVA  GI bleed  Anemia due to GI bleed and ESRD.            Review of Systems:   Some SOB with exertion. Walking in halls. Comfortable at rest. On O2 NC.           Medications:       - MEDICATION INSTRUCTIONS for Dialysis Patients -   Does not apply See Admin Instructions     aspirin  81 mg Oral Daily     atorvastatin  10 mg Oral At Bedtime     buPROPion  150 mg Oral Once per day on Mon Thu     cinacalcet  30 mg Oral Q Mon Wed Fri AM     famotidine  20 mg Oral At Bedtime     folic acid  1 mg Oral Daily with lunch     gabapentin  100 mg Oral TID     lactobacillus rhamnosus (GG)  1 capsule Oral Daily with lunch     metoprolol succinate ER  25 mg Oral Daily     midodrine  20 mg Oral Q Mon Wed Fri AM     multivitamin RENAL  1 capsule Oral Daily     senna-docusate  1 tablet Oral At Bedtime     sevelamer carbonate  1,600 mg Oral TID w/meals     sodium chloride (PF)  3 mL Intracatheter Q8H     timolol maleate  1 drop Both Eyes BID     traZODone  150 mg Oral At Bedtime     Vitamin D3  2,000 Units Oral Daily with lunch         Current active medications and PTA medications reviewed, see medication list for details.            Physical Exam:   Vitals were reviewed  Patient Vitals for the past 24 hrs:   BP Temp Temp src Pulse Resp SpO2 Weight   04/01/21 1037 91/56 -- -- -- -- -- --   04/01/21 0819 100/70 98.1  F (36.7  C) Oral 75 20 96 % --   04/01/21 0644 -- -- -- -- -- -- 75.9 kg (167 lb 4.8 oz)   03/31/21 2335 110/79 97.7  F (36.5  C) Oral 70 18 97 % --   03/31/21 2237 -- -- -- -- 16 -- --   03/31/21 2147 -- -- -- -- 16 -- --   03/31/21 1936 -- -- -- 90 16 100 % --   03/31/21 1822 -- -- -- 69 -- -- --    21 1745 (!) 142/78 98  F (36.7  C) Oral 76 22 100 % --   21 1730 134/82 -- -- 75 18 100 % --   21 1715 123/76 -- -- 78 18 100 % --   21 1700 122/82 -- -- 71 18 100 % --   21 1645 124/78 -- -- 76 18 100 % --   21 1630 115/68 -- -- 74 18 100 % --   21 1615 126/73 -- -- 76 18 100 % --   21 1600 119/68 -- -- 72 18 100 % --   21 1545 114/78 -- -- 77 18 100 % --   21 1530 112/70 -- -- 70 18 100 % --   21 1515 112/63 -- -- 70 18 100 % --   21 1500 117/74 -- -- 70 18 100 % --   21 1445 119/70 -- -- 70 18 100 % --   21 1430 128/73 -- -- 70 18 100 % --   21 1415 124/73 98  F (36.7  C) Oral 72 18 100 % --   21 1400 -- -- -- -- -- -- 72.6 kg (160 lb)   21 1347 120/73 -- -- -- -- 95 % --       Temp:  [97.7  F (36.5  C)-98.1  F (36.7  C)] 98.1  F (36.7  C)  Pulse:  [69-90] 75  Resp:  [16-22] 20  BP: ()/(56-82) 91/56  SpO2:  [95 %-100 %] 96 %    Temperatures:  Current - Temp: 98.1  F (36.7  C); Max - Temp  Av  F (36.7  C)  Min: 97.7  F (36.5  C)  Max: 98.1  F (36.7  C)  Respiration range: Resp  Av  Min: 16  Max: 22  Pulse range: Pulse  Av.7  Min: 69  Max: 90  Blood pressure range: Systolic (24hrs), Av , Min:91 , Max:142   ; Diastolic (24hrs), Av, Min:56, Max:82    Pulse oximetry range: SpO2  Av.4 %  Min: 95 %  Max: 100 %    I/O last 3 completed shifts:  In: 643 [P.O.:640; I.V.:3]  Out: 2000 [Other:2000]      Intake/Output Summary (Last 24 hours) at 2021 1129  Last data filed at 2021 1046  Gross per 24 hour   Intake 843 ml   Output 2000 ml   Net -1157 ml       Alert and responsive  Lungs with diminished BS in bases, R basilar rales  Cor nl S1 S2 no M, + JVD  LE wrapped       Wt Readings from Last 4 Encounters:   21 75.9 kg (167 lb 4.8 oz)          Data:          Lab Results   Component Value Date     2021     2014    Lab Results   Component Value Date     CHLORIDE 105 04/01/2021    CHLORIDE 100 06/29/2014    Lab Results   Component Value Date    BUN 20 04/01/2021    BUN 37 06/29/2014      Lab Results   Component Value Date    POTASSIUM 4.5 04/01/2021    POTASSIUM 5.4 06/29/2014    Lab Results   Component Value Date    CO2 29 04/01/2021    CO2 21 06/29/2014    Lab Results   Component Value Date    CR 3.69 04/01/2021    CR 4.60 06/29/2014        Recent Labs   Lab Test 04/01/21  0654 06/29/14  1310   WBC 4.3 10.3   HGB 9.8* 9.7*   HCT 33.1* 31.0*   * 102*   * 213     Recent Labs   Lab Test 06/29/14  1310   AST 20   ALT 23   ALKPHOS 110   BILITOTAL 0.5       No results for input(s): MAG in the last 69306 hours.  No results for input(s): PHOS in the last 83123 hours.  Recent Labs   Lab Test 04/01/21  0654 06/29/14  1310   RONALDO 9.0 8.9       Lab Results   Component Value Date    RONALDO 9.0 04/01/2021     Lab Results   Component Value Date    WBC 4.3 04/01/2021    HGB 9.8 (L) 04/01/2021    HCT 33.1 (L) 04/01/2021     (H) 04/01/2021     (L) 04/01/2021     Lab Results   Component Value Date     04/01/2021    POTASSIUM 4.5 04/01/2021    CHLORIDE 105 04/01/2021    CO2 29 04/01/2021    GLC 96 04/01/2021     Lab Results   Component Value Date    BUN 20 04/01/2021    CR 3.69 (H) 04/01/2021     No results found for: MAG  No results found for: PHOS    Creatinine   Date Value Ref Range Status   04/01/2021 3.69 (H) 0.52 - 1.04 mg/dL Final   06/29/2014 4.60 (H) 0.52 - 1.04 mg/dL Final       Attestation:  I have reviewed today's vital signs, notes, medications, labs and imaging.     Bhupendra Crockett MD

## 2021-04-01 NOTE — PLAN OF CARE
A/O. Reports intermittent pain to feet, PRN oxy given. 2 LPM nasal cannula. Tele: A-fib, CVR, PVCs. Dialysis diet. Lymph wraps to BLE. Up with A x 1, gait belt and walker.   PRN midodrine given today for lower BP and pt. Request.

## 2021-04-01 NOTE — PLAN OF CARE
DNR with prearrest intubation. VSS, Lung sounds diminished bilaterally. PT IP consult, CHG wipes completed. Compression stockings on. +2 BLE and +1 both ankles. Ric on right chest. Skin- moist and redness to casi-area and scattered bruises.Tele: Afib CVR BBB with demand V pacing. Pt complained to pain to both feet, tylenol given, pain relieved. Plan to discharge after HD run in AM.

## 2021-04-01 NOTE — PROGRESS NOTES
"SPIRITUAL HEALTH SERVICES  SPIRITUAL ASSESSMENT Progress Note  FR Med surg 3    PRIMARY FOCUS:     Symptom/pain management     Emotional/spiritual/Hinduism distress    Support for coping    ILLNESS CIRCUMSTANCES:   Reviewed documentation. Reflective conversation shared with Itzel which integrated elements of illness and family narratives.     Context of Serious Illness/Symptom(s) - Itzel says she anticipates she is going to die soon from her diseases. She expresses several concerns in the face of this EOL.    Resources for Support - Itzel says she has no emotional support from her spouse and feels abandoned. She says he won't even bring her toothbrush to the hospital for her. She denies having any support network.    DISTRESS:     Emotional/Existential/Relational Distress - Itzel names her financial/estate concerns as one of her prime stresses.  She says her  is not prepared to succeed when she dies.    Spiritual/Spiritism Distress - Itzel says she is estranged from the  Yazidi from youth but has picked up a pastoral relationship with a [employer]ational . She names \"being forgiven\" as another of her primary concerns. She welcomed long conversation about this.     Social/Cultural/Economic Distress - Itzel names family stress as the third major concern with communication and personality conflicts causing her anxiety.    SPIRITUAL/Tenriism COPING:     Mosque/Alicia - Itzel does not have a Yazidi home but has had some positive contact with a / at a rehab center she stayed at.    Spiritual Practice(s) - Itzel received a hand out from that / with prayers and verses that she says she finds comfort in.    Emotional/Existential/Relational Connections - Itzel names the relationships with her family as one of the major stresses in her life.    GOALS OF CARE:    Goals of Care - ACD on file. She wants EOL it to be as peaceable and painless as possible    Meaning/Sense-Making - Itzel says " she recognizes she is likely not going to resolve some of the issues causing her the most stress and she must work on accepting this.     PLAN: [facility] remains available for further emotional/spiritual support as needed.      Jaden Clemons   Intern

## 2021-04-01 NOTE — PROGRESS NOTES
04/01/21 0826   Quick Adds   Quick Adds Edema Eval   Type of Visit Initial PT Evaluation   Living Environment   Current Living Arrangements house   Home Accessibility stairs within home;stairs to enter home   Number of Stairs, Main Entrance   (ramp entry)   Number of Stairs, Within Home, Primary other (see comments)   Transportation Anticipated family or friend will provide   Self-Care   Equipment Currently Used at Home walker, standard;walker, rolling;wheelchair, manual;shower chair   Activity/Exercise/Self-Care Comment Pt does own dressing besides compression socks, does own shower besides transfer out of shower has  present. Pt has a lift chair that she sleeps in. Limited ambulation at home, uses 4ww for ambulation   Disability/Function   Fall history within last six months no   General Information   Onset of Illness/Injury or Date of Surgery 03/31/21   Referring Physician Dr. Ericka Benítez   Patient/Family Therapy Goals Statement (PT) Pt would like to DC HOME.   Pertinent History of Current Problem (include personal factors and/or comorbidities that impact the POC) Belia Rodriguez is a 75 year old female with a PMH significant for end stage renal disease on dialysis MWF, COPD and CHF chronically on 2 litres, ZULEIKA intolerant to CPAP, a fib and SSS with pacemaker in place and watchmen device, CVA, gastrointestinal hemorrhage, chronic anemia, chronic pain, HLP, HTN who presents with acute onset of shortness of breath.    Existing Precautions/Restrictions oxygen therapy device and L/min  (at baseline)   Edema General Information   Onset of Edema   (over 20 years)   Affected Body Part(s) Left LE;Right LE   Edema Etiology Chronic Venous Insfficiency  (CVA)   Etiology Comments Pt has decreased DF strength in the R.   Edema Precautions Cardiac Edema/CHF   Edema Precaution Comments Pt has CKD with dialyisis and also has CHF. Both are stable at this time   General Comments/Previous Edema Treatment/Edema  Equipment Pt has compression socks at home that her  assists donning. Pt has not been to a OP edema clinic in past. Pt has had edema wrapping in the past.   Edema Examination/Assessment   Skin Condition Pitting   Ulcerations Yes   Edema Assessment Comments Pt has caullous on base of B hallux R bigger than L   Cognition   Orientation Status (Cognition) oriented x 3   Integumentary/Edema   Integumentary/Edema Comments Edema in B Feet, otherwise WFL   Strength   Strength Comments limited strength in the R DF 2-/5   Bed Mobility   Comment (Bed Mobility) inde   Transfers   Transfer Safety Comments mod I with FWW   Gait/Stairs (Locomotion)   Comment (Gait/Stairs) SBA wiht FWW, increased hip flexion to account for foot drop on the R.    Balance   Balance Comments fall risk due to R foot drop   Clinical Impression   Criteria for Skilled Therapeutic Intervention yes, treatment indicated   PT Diagnosis (PT) increased edema in B feet   Edema: Patient Presentation Edema;Phlebolymphedema   Influenced by the following impairments decreased strength   Functional limitations due to impairments decreased safe ambulation   Clinical Presentation Stable/Uncomplicated   Clinical Presentation Rationale improving   Clinical Decision Making (Complexity) low complexity   Therapy Frequency (PT) Daily   Predicted Duration of Therapy Intervention (days/wks) 2 days   Planned Therapy Interventions (PT) postural re-education;patient/family education;transfer training;gait training   Edema: Planned Interventions Manual lymph drainage;Edema exercises;Education;Precautions to prevent infection/exacerbation;Manual therapy   Risk & Benefits of therapy have been explained evaluation/treatment results reviewed;care plan/treatment goals reviewed;risks/benefits reviewed;current/potential barriers reviewed;participants voiced agreement with care plan;participants included;patient   Clinical Impression Comments Pt would benefit from edema management  at this time due to pitting edema in B feet. Pt swelling is localized to feet. Pt would benefit from trial of spandigrip wihile her to promote good skin health and improve fit of compression socks at home. Pt would also benefit from other alternatives to compression.    PT Discharge Planning    PT Discharge Recommendation (DC Rec) home with assist;home with home care physical therapy   PT Rationale for DC Rec Pt close to baseline with mobility tasks. Pt has  to assist with compressing donning at home and to continue to assist with IADLs and ADLs at home.    PT Brief overview of current status  Pt inde wiht bed mob, mod I with transfers, SBA with ambulation   Total Evaluation Time   Total Evaluation Time (Minutes) 10

## 2021-04-01 NOTE — PLAN OF CARE
"[id number]  HD run today, 2L removed.    VS BP (!) 142/78   Pulse 69   Temp 98  F (36.7  C) (Oral)   Resp 22   Ht 1.626 m (5' 4\")   Wt 72.6 kg (160 lb)   SpO2 100%   BMI 27.46 kg/m    O2 2.5L nasal cannula  Tele SR, demand pacing  Tolerating dialysis diet  IVF saline locked  CMS intact  Activity up with Rebecca Raciel  Pain tylenol given PRN for chronic pain       "

## 2021-04-02 NOTE — PROGRESS NOTES
Potassium   Date Value Ref Range Status   04/02/2021 4.7 3.4 - 5.3 mmol/L Final     Hemoglobin   Date Value Ref Range Status   04/02/2021 9.6 (L) 11.7 - 15.7 g/dL Final     Creatinine   Date Value Ref Range Status   04/02/2021 5.08 (H) 0.52 - 1.04 mg/dL Final     Urea Nitrogen   Date Value Ref Range Status   04/02/2021 40 (H) 7 - 30 mg/dL Final     Sodium   Date Value Ref Range Status   04/02/2021 138 133 - 144 mmol/L Final     No results found for: INR    DIALYSIS PROCEDURE NOTE  Hepatitis status of previous patient on machine log was checked and verified ok to use with this patients hepatitis status.  Patient dialyzed for 3.25 hrs. on a 2 K bath with a net fluid removal of  2.75L.  A BFR of 400 ml/min was obtained via a right tunneled catheter.      The treatment plan was discussed with Dr. Crockett during the treatment.    Total heparin received during the treatment: 2200   units.     Line flushed, clamped and capped with heparin 1:1000 2.2 mL (2200 units) per lumen    Meds  given: Epogen and Hectoral   Complications: none      Person educated: patient. Knowledge base substantial. Barriers to learning: none. Educated on procedure via verbal mode. Patient verbalized understanding. Pt prefers verbal education style.     ICEBOAT? Timeout performed pre-treatment  I: Patient was identified using 2 identifiers  C:  Consent Signed Yes  E: Equipment preventative maintenance is current and dialysis delivery system OK to use  B: Hepatitis B Surface Antigen: negative; Draw Date: 3/31/21      Hepatitis B Surface Antibody: susceptible; Draw Date: 3/31/21  O: Dialysis orders present and complete prior to treatment  A: Vascular access verified and assessed prior to treatment  T: Treatment was performed at a clinically appropriate time  ?: Patient was allowed to ask questions and address concerns prior to treatment  See flowsheet in EPIC for further details and post assessment.  Machine water alarm in place and functioning.  Transducer pods intact and checked every 15min.   Pt returned via wheelchair.  Chlorine/Chloramine water system checked every 4 hours.  Outpatient Dialysis at [facility]

## 2021-04-02 NOTE — PROGRESS NOTES
Assessment and Plan:   ESRD: seen on dialysis. Running 3.25 h, 3 KG UF,  R  BFR. 2K, 33 HCO3, 140 Na. Hectorol and EPO on the run. Low dose heparin. She is discharging today and can go back to her home outpt unit.             Interval History:   Resp distress: COPD, will UF today to see if improves.      CHF: pacer, afib  CVA  GI bleed  Anemia due to GI bleed and ESRD.                        Review of Systems:   C/O SOB, WAGNER. No cramps or dizziness on dialysis.           Medications:       - MEDICATION INSTRUCTIONS for Dialysis Patients -   Does not apply See Admin Instructions     sodium chloride 0.9%  250 mL Intravenous Once in dialysis     sodium chloride 0.9%  300 mL Hemodialysis Machine Once     aspirin  81 mg Oral Daily     atorvastatin  10 mg Oral At Bedtime     buPROPion  150 mg Oral Once per day on Mon Thu     cinacalcet  30 mg Oral Q Mon Wed Fri AM     doxercalciferol  4 mcg Intravenous Once in dialysis     epoetin yehuda-epbx (RETACRIT) inj ESRD  4,000 Units Intravenous Once in dialysis     famotidine  20 mg Oral At Bedtime     folic acid  1 mg Oral Daily with lunch     gabapentin  100 mg Oral TID     heparin (porcine)  500 Units Hemodialysis Machine Once in dialysis     heparin  3 mL Intracatheter During Hemodialysis (from stock)     heparin  3 mL Intracatheter During Hemodialysis (from stock)     lactobacillus rhamnosus (GG)  1 capsule Oral Daily with lunch     metoprolol succinate ER  25 mg Oral Daily     midodrine  20 mg Oral Q Mon Wed Fri AM     multivitamin RENAL  1 capsule Oral Daily     senna-docusate  1 tablet Oral At Bedtime     sevelamer carbonate  1,600 mg Oral TID w/meals     sodium chloride (PF)  3 mL Intracatheter Q8H     timolol maleate  1 drop Both Eyes BID     traZODone  150 mg Oral At Bedtime     Vitamin D3  2,000 Units Oral Daily with lunch       heparin (porcine)       Current active medications and PTA medications reviewed, see medication list for details.             Physical Exam:   Vitals were reviewed  Patient Vitals for the past 24 hrs:   BP Temp Temp src Pulse Resp SpO2 Weight   21 1030 124/76 -- -- 72 20 96 % --   21 1015 116/62 -- -- 70 18 96 % --   21 1000 119/69 -- -- 71 18 97 % --   21 0945 118/72 -- -- 70 -- 98 % --   21 0930 115/67 -- -- 71 20 98 % --   21 0915 119/80 -- -- 75 20 97 % --   21 0900 117/82 98.4  F (36.9  C) Oral 74 20 95 % 72.8 kg (160 lb 7.9 oz)   21 0828 116/65 97.9  F (36.6  C) Oral 76 16 90 % --   21 0607 -- -- -- -- -- -- 72.8 kg (160 lb 8 oz)   21 0041 -- -- -- -- 18 -- --   21 2350 -- -- -- -- 20 -- --   21 2334 107/63 97.3  F (36.3  C) Oral 70 20 100 % --   21 1649 -- -- -- -- -- 99 % --   21 1541 106/69 98.3  F (36.8  C) Oral 71 20 99 % --   21 1237 107/69 -- -- 70 20 97 % --   21 1037 91/56 -- -- -- -- -- --       Temp:  [97.3  F (36.3  C)-98.4  F (36.9  C)] 98.4  F (36.9  C)  Pulse:  [70-76] 72  Resp:  [16-20] 20  BP: ()/(56-82) 124/76  SpO2:  [90 %-100 %] 96 %    Temperatures:  Current - Temp: 98.4  F (36.9  C); Max - Temp  Av  F (36.7  C)  Min: 97.3  F (36.3  C)  Max: 98.4  F (36.9  C)  Respiration range: Resp  Av.2  Min: 16  Max: 20  Pulse range: Pulse  Av.8  Min: 70  Max: 76  Blood pressure range: Systolic (24hrs), Av , Min:91 , Max:124   ; Diastolic (24hrs), Av, Min:56, Max:82    Pulse oximetry range: SpO2  Av.8 %  Min: 90 %  Max: 100 %    I/O last 3 completed shifts:  In: 1170 [P.O.:1160; I.V.:10]  Out: -       Intake/Output Summary (Last 24 hours) at 2021 1033  Last data filed at 2021 1843  Gross per 24 hour   Intake 1170 ml   Output --   Net 1170 ml       Alert, no distress, NC O2  Lungs with clear ant BS  Cor RRR nl S1 S2 no M  LE no edema  R CVC with no redness or tenderness       Wt Readings from Last 4 Encounters:   21 72.8 kg (160 lb 7.9 oz)          Data:          Lab Results    Component Value Date     04/02/2021     04/01/2021     06/29/2014      Lab Results   Component Value Date    CHLORIDE 104 04/02/2021    CHLORIDE 105 04/01/2021    CHLORIDE 100 06/29/2014    Lab Results   Component Value Date    BUN 40 04/02/2021    BUN 20 04/01/2021    BUN 37 06/29/2014      Lab Results   Component Value Date    POTASSIUM 4.7 04/02/2021    POTASSIUM 4.5 04/01/2021    POTASSIUM 5.4 06/29/2014    Lab Results   Component Value Date    CO2 28 04/02/2021    CO2 29 04/01/2021    CO2 21 06/29/2014    Lab Results   Component Value Date    CR 5.08 04/02/2021    CR 3.69 04/01/2021    CR 4.60 06/29/2014        Recent Labs   Lab Test 04/02/21  0707 04/01/21  0654 06/29/14  1310   WBC 4.9 4.3 10.3   HGB 9.6* 9.8* 9.7*   HCT 32.7* 33.1* 31.0*   * 111* 102*   * 102* 213     Recent Labs   Lab Test 06/29/14  1310   AST 20   ALT 23   ALKPHOS 110   BILITOTAL 0.5       No results for input(s): MAG in the last 54556 hours.  No results for input(s): PHOS in the last 76825 hours.  Recent Labs   Lab Test 04/02/21  0707 04/01/21  0654 06/29/14  1310   RONALDO 8.8 9.0 8.9     Lab Results   Component Value Date    RONALDO 8.8 04/02/2021     Lab Results   Component Value Date    WBC 4.9 04/02/2021    HGB 9.6 (L) 04/02/2021    HCT 32.7 (L) 04/02/2021     (H) 04/02/2021     (L) 04/02/2021     Lab Results   Component Value Date     04/02/2021    POTASSIUM 4.7 04/02/2021    CHLORIDE 104 04/02/2021    CO2 28 04/02/2021    GLC 92 04/02/2021     Lab Results   Component Value Date    BUN 40 (H) 04/02/2021    CR 5.08 (H) 04/02/2021     No results found for: MAG  No results found for: PHOS    Creatinine   Date Value Ref Range Status   04/02/2021 5.08 (H) 0.52 - 1.04 mg/dL Final   04/01/2021 3.69 (H) 0.52 - 1.04 mg/dL Final   06/29/2014 4.60 (H) 0.52 - 1.04 mg/dL Final       Attestation:  I have reviewed today's vital signs, notes, medications, labs and imaging.  Seen on dialysis.       Bhupendra Crockett MD

## 2021-04-02 NOTE — PROGRESS NOTES
Care Management Discharge Note    Discharge Date: 04/02/21       Discharge Disposition: Home, Home Care    Discharge Services: [facility] in [place]     Discharge DME: Oxygen thru [facility]    Discharge Transportation: family or friend will provide    Private pay costs discussed: Not applicable    PAS Confirmation Code:    Patient/family educated on Medicare website which has current facility and service quality ratings: no    Education Provided on the Discharge Plan:  yes  Persons Notified of Discharge Plans: patient  Patient/Family in Agreement with the Plan:  yes    Additional Information:  Patient has discharge orders for home with home care services. Patient was to be started with [facility] prior to this admission. Patient is agreeable to continuing with [facility]. Spoke with Raysa at [facility] ph#[phone], fax#[phone]. [facility] HC referral sent to [facility] states they would like to review referral and will call CC back if they are able to accept patient.     [facility], Raysa, called back stating they can accept patient for HC services RN PT OT SW. Discharge instructions sent to [facility] to follow when discharged.     Spoke with Marjan, [facility] in [place] ph#[phone], fax#[phone], notified of patient discharging to follow for outpt dialysis.     Patient home oxygen and nebulizer supplied by [facility].  Patient requesting more information on using home nebulizer. CC called DME provider[facility]anti at [facility] ph#[phone], fax#[phone]. Discharge O2 orders faxed to [facility] to follow for home DME education and continue support.    Pt family will provide transportation at discharge.     Cassandra Shaikh RN Case Manager  Inpatient Care Coordination   [facility]   [phone]    Cassandra Shaikh RN

## 2021-04-02 NOTE — PLAN OF CARE
Physical Therapy Discharge Summary    Reason for therapy discharge:    Discharged to home with home therapy.    Progress towards therapy goal(s). See goals on Care Plan in [facility] electronic health record for goal details.  Goals met    Therapy recommendation(s):    Continued therapy is recommended.  Rationale/Recommendations: Pt is below baseline for functional mobility, ROM and strength. Pt would benefit from continued skilled therapy to address these deficits.

## 2021-04-02 NOTE — PLAN OF CARE
VSS on 2 LPM baseline, A/OX4, WAGNER, LS dim w/ fine crackles, +1/+2 BLE edema, A-Fib CVR w/ demand V-Pacing, tylenol and oxy both given once for bilateral foot pain 6/10, pt had dialysis this morning, pt will discharge this afternoon once  arrives, reviewed paperwork w/ pt, pt verbalized understanding.

## 2021-04-02 NOTE — DISCHARGE INSTRUCTIONS
Your home care referral was sent to [facility]  If you haven't heard from them within the next 24-48 hours,  Please call them at [phone].

## 2021-04-02 NOTE — DISCHARGE SUMMARY
[facility]  Discharge Summary  Name: Belia Rodriguez    MRN: [id number]  YOB: 1945    Age: 75 year old  Date of Discharge:  4/2/2021  Date of Admission: 3/31/2021  Primary Care Provider: Giovanni Lindsey  Discharge Physician:  Enrike Yanez MD  Discharging Service:  Hospitalist      Discharge Diagnosis:  1. Acute on chronic hypoxic respiratory failure secondary to COPD exacerbation, versus of flash pulmonary edema  2. ESRD on hemodialysis  3. Hyperkalemia-resolved  4. Acute on chronic diastolic congestive heart failure in mild exacerbation  5. Stable anemia of chronic disease with prior history of GI bleeding  6. History of lymphedema  7. Paroxysmal A. fib with history of SSS prior history of pacer and watchman device, not on chronic anticoagulation       Oxygen Documentation:   I certify that this patient, Belia Rodriguze has been under my care (or a nurse practitioner or physican's assistant working with me). This is the face-to-face encounter for oxygen medical necessity.      Belia Rodriguez is now in a chronic stable state and continues to require supplemental oxygen. Patient has continued oxygen desaturation due to Chronic Respiratory Failure with Hypoxia J93.11.    Alternative treatment(s) tried or considered and deemed clinically infective for treatment of Chronic Respiratory Failure with Hypoxia J93.11 include inhalers and pulmonary toileting.  If portability is ordered, is the patient mobile within the home? yes    **Patients who qualify for home O2 coverage under the CMS guidelines require ABG tests or O2 sat readings obtained closest to, but no earlier than 2 days prior to the discharge, as evidence of the need for home oxygen therapy. Testing must be performed while patient is in the chronic stable state. See notes for O2 sats.**            Other Diagnosis:  As listed above     Discharge Disposition:  Discharged to home     Allergies:  Allergies   Allergen Reactions      Ace Inhibitors Cough     Atrovent [Ipratropium] Headache     Fosinopril Cough     Zolpidem Other (See Comments)     Hallucinations        Discharge Medications:   Current Discharge Medication List      CONTINUE these medications which have NOT CHANGED    Details   acetaminophen (TYLENOL) 500 MG tablet Take 1,000 mg by mouth every 6 hours as needed for mild pain      albuterol (PROVENTIL) (2.5 MG/3ML) 0.083% neb solution Take 2.5 mg by nebulization every 4 hours as needed for wheezing      aspirin 81 MG EC tablet Take 81 mg by mouth daily      atorvastatin (LIPITOR) 10 MG tablet Take 10 mg by mouth At Bedtime      B Complex-C-Folic Acid (DIALYVITE PO) Take 1 tablet by mouth daily       buPROPion (WELLBUTRIN XL) 150 MG 24 hr tablet Take 150 mg by mouth twice a week Tue & Sat      Chlorpheniramine-DM (CORICIDIN HBP COUGH/COLD PO) Take by mouth as needed      cinacalcet (SENSIPAR) 30 MG tablet Take 30 mg by mouth Every Mon, Wed, Fri Morning . At dialysis.      famotidine (PEPCID) 20 MG tablet Take 20 mg by mouth At Bedtime      folic acid (FOLVITE) 1 MG tablet Take 1 mg by mouth daily (with lunch)      gabapentin (NEURONTIN) 100 MG capsule Take 100 mg by mouth 3 times daily      lactobacillus rhamnosus, GG, (CULTURELL) capsule Take 1 capsule by mouth daily (with lunch)      loratadine (CLARITIN) 10 MG tablet Take 10 mg by mouth daily as needed for allergies      metoprolol succinate ER (TOPROL-XL) 25 MG 24 hr tablet Take 25 mg by mouth daily      !! midodrine (PROAMATINE) 10 MG tablet Take 20 mg by mouth Every Mon, Wed, Fri Morning . Before dialysis.      !! midodrine (PROAMATINE) 10 MG tablet Take 10 mg by mouth See Admin Instructions Give additional 10 mg dose PRN at dialylsis.      !! midodrine (PROAMATINE) 10 MG tablet Take 15 mg by mouth as needed Take once daily prn on non-dialysis days (TuThSaSu) IF NEEDED for BP < 106.      oxyCODONE (ROXICODONE) 5 MG tablet Take 2.5 mg by mouth every 6 hours as needed for  "severe pain      polyvinyl alcohol (LIQUIFILM TEARS) 1.4 % ophthalmic solution Place 1 drop into both eyes as needed for dry eyes      senna-docusate (SENOKOT-S/PERICOLACE) 8.6-50 MG tablet Take 1 tablet by mouth At Bedtime      !! sevelamer carbonate (RENVELA) 800 MG tablet Take 1,600 mg by mouth 3 times daily (with meals)      !! sevelamer carbonate (RENVELA) 800 MG tablet Take 1,600 mg by mouth 2 times daily as needed With snacks.       sodium-potassium bicarbonate (EDI-SELTZER GOLD) TBEF solu-tab Take 1 tablet by mouth daily as needed for heartburn      timolol maleate (TIMOPTIC) 0.5 % ophthalmic solution Place 1 drop into both eyes 2 times daily      traZODone (DESYREL) 150 MG tablet Take 150 mg by mouth At Bedtime      Vitamin D, Cholecalciferol, 25 MCG (1000 UT) TABS Take 2,000 Units by mouth daily (with lunch)       !! - Potential duplicate medications found. Please discuss with provider.           Condition on Discharge:  Discharge condition: Stable   Discharge vitals: Blood pressure 116/65, pulse 76, temperature 97.9  F (36.6  C), temperature source Oral, resp. rate 16, height 1.626 m (5' 4\"), weight 72.8 kg (160 lb 8 oz), SpO2 90 %.   Code status on discharge: DNR with prearrest intubation okay     History of Present Illness:  See detailed admission note for full details.        Significant Physical Exam Findings Day of Discharge:  HEENT; Atraumatic, normocephalic, pinkish conjuctiva, pupils bilateral reactive   Skin: warm and moist, no rashes  Lungs: equal chest expansion, clear to auscultation, no wheezes, no stridor, no crackles,   Heart: normal rate, normal rhythm, no rubs or gallops.   Abdomen: normal bowel sounds, no tenderness, no peritoneal signs, no guarding  Extremities: no deformities, lateral lower extremity edema with wraps  Neuro; follow commands, alert and oriented x3, spontaneous speech, coherent, moves all extremities spontaneously  Psych; no hallucination, euthymic mood, not " agitated        Procedures other than Imaging:  None, continued on maintenance hemodialysis here     Imaging:  Results for orders placed or performed during the hospital encounter of 06/29/14   Chest  XR, 1 view portable    Narrative    XR CHEST PORT 1 VW  6/29/2014 1:23 PM    HISTORY:  Shortness of breath.    COMPARISON:  None.      Impression    IMPRESSION:  Right IJ line tip at the cavoatrial junction region.  Patchy right lower lung infiltrate.     OSVALDO LOPEZ MD        Consultations:  Consultation during this admission received from nephrology.     Recent Lab Results:  Recent Labs   Lab 04/02/21  0707 04/01/21  0654   WBC 4.9 4.3   HGB 9.6* 9.8*   HCT 32.7* 33.1*   * 111*   * 102*     No results for input(s): CULT in the last 168 hours.  Recent Labs   Lab 04/02/21  0707 04/01/21  0654    139   POTASSIUM 4.7 4.5   CHLORIDE 104 105   CO2 28 29   ANIONGAP 6 5   GLC 92 96   BUN 40* 20   CR 5.08* 3.69*   GFRESTIMATED 8* 11*   GFRESTBLACK 9* 13*   RONALDO 8.8 9.0     Recent Labs   Lab 04/02/21  0707 04/01/21  0654   GLC 92 96     No results for input(s): LACT in the last 168 hours.  No results for input(s): TROPONIN, TROPI, TROPR in the last 168 hours.    Invalid input(s): TROP, TROPONINIES  No results for input(s): COLOR, APPEARANCE, URINEGLC, URINEBILI, URINEKETONE, SG, UBLD, URINEPH, PROTEIN, UROBILINOGEN, NITRITE, LEUKEST, RBCU, WBCU in the last 168 hours.       Pending Results:    Unresulted Labs Ordered in the Past 30 Days of this Admission     No orders found from 3/1/2021 to 4/1/2021.           Discharge Instructions and Follow-Up:   Discharge diet: Orders Placed This Encounter      Combination Diet Dialysis Diet     Discharge activity: Activity as tolerated   Discharge follow-up: 1-2 weeks with PCP  Nephrology for hemodialysis   Outpatient therapy: None    Other instructions: None      Hospital Course:  Deeming care for this a very pleasant 75-year-old  lady who initially  presented here with increasing sensation of shortness of breath.  Itzel benavidez has a complicated medical history and was recently discharged from the TCU setting and has been in and out of medical setting for hospitalization and TCU optimization at least in the past 3 to 4 months now.  After being discharged to TCU she only stayed at home less than a day as she has to presents back in the emergency room for sensation of increasing shortness of breath.  Fortunately she had no obvious signs and symptoms of underlying infectious process.  No clear evidence of infiltrates or pneumonia on her imaging.  She remained afebrile with no leukocytosis.  Felt like that she might had a flash pulmonary edema that led to that sensation of shortness of breath.  No overt wheezing and no corticosteroids was started.  She was closely monitored and followed here with continuation of her home oxygen support settings.  Underwent hemodialysis session that also improved her symptomatology.  No significant reported events overnight.  Feels much better.  Tolerating oral diet.  Slept decent last night.  No mental status changes.  Plans for home discharge later in the day.    I will refer you to excerpts of my prior progress notes as listed below for other details of her hospitalization.    Summary of Stay: Belia Rodriguez is a 75 year old female with complicated medical history such as ESRD on maintenance hemodialysis, CHF, COPD, chronic hypoxia on 2 L of oxygen by nasal cannula, prior A. fib with GI bleeding, not on chronic anticoagulation with the watchman device, CVA, chronic anemia, hypertension, dyslipidemia, chronic pain who was discharged recently from a TCU setting as she has been staying in and out of the hospital in TCU setting at least for the last several months and asked to be admitted on 3/31/2021 with increasing shortness of breath.     Problem List:   Acute on chronic hypoxic respiratory failure secondary to COPD  exacerbation, versus of flash pulmonary edema  ESRD on hemodialysis  Hyperkalemia-resolved  Acute on chronic diastolic congestive heart failure in mild exacerbation  Stable anemia of chronic disease with prior history of GI bleeding  History of lymphedema  Paroxysmal A. fib with history of SSS prior history of pacer and watchman device, not on chronic anticoagulation     Continue current care.  Currently on oxygen supplementation as per home settings.  Patient still little bit anxious regarding the hospital disposition as she was just discharged from a TCU and stayed at home for less than a day and has not become mean in the emergency room for shortness of breath.  We will continue to monitor.  I do not see any signs and evidence of ongoing infectious process.  She tolerated hemodialysis earlier.  Significantly improve clinically and currently tolerating oral diet.  However she wants to be discharged going home when she is medically optimized.  As she has been tired of being in the hospital in TCU setting basically the whole of 2021.     DVT Prophylaxis: Pneumatic Compression Devices  Code Status: DNR with prearrest intubation okay  Discharge Dispo: home  Estimated Disch Date / # of Days until Disch: 24 hours        Total time spent in face to face contact with the patient and coordinating discharge was:  > 30 Minutes.

## 2021-04-02 NOTE — PLAN OF CARE
A/O x 4. VSS on 2 L. Tele: A-fib, CVR, demand v-paced. Dressing to right CVC is CDI. Complained of bilateral foot aching rated 9/10. PRN oxycodone given at 2351. Sleeping for pain reassessment. Plan for discharge today after dialysis.

## 2021-05-26 NOTE — PROGRESS NOTES
Code Status:  FULL CODE  Visit Type: Discharge Summary     Facility:  [facility] [[id number]]          PCP:  John Escoto DO  [phone]       Admission Date to our Facility: March 8, 2019 Hospital on March 3, 2019.  Discharge Date from our Facility: March 23, 2019    Discharge Diagnosis:    1. Chronic atrial fibrillation (H)     2. Chronic diastolic congestive heart failure (H)     3. ESRD (end stage renal disease) on dialysis (H)     4. Pulmonary emphysema, unspecified emphysema type (H)     5. Acute on chronic diastolic congestive heart failure (H)     6. ESRD on dialysis (H)     7. Essential hypertension with goal blood pressure less than 140/90     8. Persistent atrial fibrillation (H)     9. Chronic obstructive pulmonary disease with acute exacerbation (H)     10. Anemia of chronic renal failure, stage 5 (H)     11. ZULEIKA (obstructive sleep apnea), severe     12. Acute respiratory failure with hypoxia (H)     13. Chronic pulmonary edema          History of Present Illness: Belia Rodriguez is a 73 y.o. female     Skilled Nursing Facility Course: Patient initially is a dialysis patient with underlying COPD and heart failure with preserved ejection fraction atrial fibrillation who had had a L AAA O in April 2018 and this year AV jonn ablation and pacemaker placement who has underlying both obstructive sleep apnea and has been intolerant of CPAP and BiPAP.  She was hospitalized for dyspnea is found to be in atrial fibrillation with rapid ventricular response.  This was treated with IV diltiazem.  On the seventh this was when she had her AV jonn ablation and single-chamber right ventricular pacemaker placed.  Otherwise she came to us if her rehabilitation.    Our facility course she made a gradual improvement and at the time of discharge she is up to her ambulatory activity that that she had to achieve day prior to the hospitalization.  She is greater overall capacity can walk to  therapy and back independently on her own.  She has been following up regularly with the heart clinic and had a specific instructions for that.  During her stay we focused on therapeutic interventions and there were no med changes during this time.    Discharge Medications: No changes to above list  Current Outpatient Medications   Medication Sig Dispense Refill     acetaminophen (TYLENOL) 500 MG tablet Take 500 mg by mouth every 6 (six) hours as needed for pain.       albuterol (PROAIR HFA;PROVENTIL HFA;VENTOLIN HFA) 90 mcg/actuation inhaler Inhale 2 puffs every 4 (four) hours as needed for wheezing. 8 g 5     albuterol (PROVENTIL) 2.5 mg /3 mL (0.083 %) nebulizer solution Take 3 mL (2.5 mg total) by nebulization every 4 (four) hours as needed for wheezing or shortness of breath. 30 vial 5     aspirin 81 MG EC tablet Take 81 mg by mouth daily with lunch.        atorvastatin (LIPITOR) 10 MG tablet Take 10 mg by mouth at bedtime.       calcium, as carbonate, (TUMS) 200 mg calcium (500 mg) chewable tablet Chew 1 tablet (200 mg total) 4 (four) times a day as needed.  0     CHLORPHENIRAMINE/DEXTROMETHORP (CORICIDIN HBP COUGH AND COLD ORAL) Take 1 tablet by mouth daily as needed.        cholecalciferol, vitamin D3, 2,000 unit cap Take 2,000 Units by mouth daily with lunch.        cinacalcet (SENSIPAR) 30 MG tablet Take 30 mg by mouth 3 times weekly with dialysis             DIALYVITE 100-1 mg Tab TAKE ONE TABLET BY MOUTH DAILY IN THE EVENING 90 tablet 0     folic acid (FOLVITE) 1 MG tablet TAKE ONE TABLET BY MOUTH ONCE DAILY 90 tablet 3     gabapentin (NEURONTIN) 100 MG capsule TAKE 1 CAPSULE BY MOUTH THREE TIMES DAILY 270 capsule 3     Lactobacillus rhamnosus GG (CULTURELLE) 10-15 Billion cell capsule Take 1 capsule by mouth daily with lunch.       metoprolol succinate (TOPROL XL) 25 MG Take 1 tablet (25 mg total) by mouth daily. 90 tablet 3     midodrine (PROAMATINE) 5 MG tablet Take 3 tablets (15 mg total) by mouth 3  (three) times a week. Take every Monday, Wednesday, and Friday in the morning. 36 tablet 11     polyvinyl alcohol (LIQUIFILM TEARS) 1.4 % ophthalmic solution Apply 1 drop to eye as needed for dry eyes.       ranitidine (ZANTAC) 150 MG tablet Take 150 mg by mouth at bedtime.       senna-docusate (SENNOSIDES-DOCUSATE SODIUM) 8.6-50 mg tablet Take 1 tablet by mouth at bedtime.        sevelamer carbonate (RENVELA) 800 mg tablet Take 1,600 mg by mouth 3 (three) times a day with meals.       sevelamer carbonate (RENVELA) 800 mg tablet Take 1,600 mg by mouth 2 (two) times a day as needed (with snacks).       timolol maleate (TIMOPTIC) 0.5 % ophthalmic solution Administer 1 drop to both eyes 2 (two) times a day.        traZODone (DESYREL) 50 MG tablet Take 1 tablet (50 mg total) by mouth at bedtime as needed for sleep.  0     No current facility-administered medications for this visit.        For most current and accurate medication list, please contact the skilled nursing facility that this patient visit took place at.      Discharge Plan: Patient is to go home on March 23 independent living no him home care services  Review of Systems     Physical Exam   Constitutional:   Patient doing fabulous with good attitude O2 sats normal and no heart rate elevations with her ambulatory activities   Vitals reviewed.      Labs:  All labs reviewed in the nursing home record.    Assessment:  1. Chronic atrial fibrillation (H)     2. Chronic diastolic congestive heart failure (H)     3. ESRD (end stage renal disease) on dialysis (H)     4. Pulmonary emphysema, unspecified emphysema type (H)     5. Acute on chronic diastolic congestive heart failure (H)     6. ESRD on dialysis (H)     7. Essential hypertension with goal blood pressure less than 140/90     8. Persistent atrial fibrillation (H)     9. Chronic obstructive pulmonary disease with acute exacerbation (H)     10. Anemia of chronic renal failure, stage 5 (H)     11. ZULEIKA  (obstructive sleep apnea), severe     12. Acute respiratory failure with hypoxia (H)     13. Chronic pulmonary edema         MEDICAL EQUIPMENT NEEDS:  None      DISCHARGE PLAN/FACE TO FACE:  I certify that services are/were furnished while this patient was under the care of a physician and that a physician or an allowed non-physician practitioner (NPP), had a face-to-face encounter that meets the physician face-to-face encounter requirements. The encounter was in whole, or in part, related to the primary reason for home health. The patient is confined to his/her home and needs intermittent skilled nursing, physical therapy, speech-language pathology, or the continued need for occupational therapy. A plan of care has been established by a physician and is periodically reviewed by a physician.    I certify that this patient is under my care and that I, or a nurse practitioner or physician's assistant working with me, had a face-to-face encounter that meets the physician face-to-face encounter requirements with this patient.   Date of Face-to-Face Encounter: March 22, 2019    I certify that, based on my findings, the following services are medically necessary home health services: No services needed as she is independent at this juncture    My clinical findings support the need for the above skilled services because: (Please write a brief narrative summary that describes what the RN, PT, SLP, or other services will be doing in the home. A list of diagnoses in this section does not meet the CMS requirements.)     This patient is homebound because: (Please write a brief narrative summary describing the functional limitations as to why this patient is homebound and specifically what makes this patient homebound.)     The patient is, or has been, under my care and I have initiated the establishment of the plan of care. This patient will be followed by a physician who will periodically review the plan of care.    45 minutes  total time of which 65% was in face to face communication with patient about above plan of care.    Electronically signed by: Giovanni Santamaria MD

## 2021-05-27 NOTE — PROGRESS NOTES
[facility] Note    Belia Rodrgiuez   73 y.o. female    Date of Visit: 3/26/2019  Chief Complaint   Patient presents with     Hospital Visit Follow Up     [facility]'s       ASSESSMENT/PLAN  1. Tachycardia-bradycardia syndrome (H)     2. left atrial appendage closure (WATCHMAN)     3. Atrial fibrillation, unspecified type (H)     4. Chronic diastolic congestive heart failure (H)     5. Primary insomnia  traZODone (DESYREL) 50 MG tablet   6. Contraindication to anticoagulation therapy       ---------------------------------------------    1.  Tachycardia/bradycardia syndrome status post AV jonn ablation and placement of permanent pacemaker.  She has noticed an increase in her energy level, seems to be tolerating this new method of pacing for her.  She was put on metoprolol 25 mg in the succinate formulation at the time of discharge, primarily for blood pressure management.  Blood pressure acceptably controlled currently.  Wound healing well    2.  Noted presence of watchman device, does not need anticoagulation    3.  See above    4.  Well compensated, dry weight is about 73 kg, managed primarily through fluid and sodium restriction as well as hemodialysis on Tuesday, Thursday, Saturday    5.  Refill trazodone, this seems to work well for her when taken on occasion    6.  See #2    Return in about 1 month (around 4/26/2019) for Recheck.      SUBJECTIVE  Belia Rodriguez is a 73-year-old woman with history of diastolic congestive heart failure, chronotropic insufficiency, atrial fibrillation status post watchman device, and end-stage renal disease on hemodialysis who presents for TCU follow-up after hospitalization from March 3-8.    She was admitted for atrial fibrillation with rapid ventricular response.  After consulting with cardiology, decision was made to proceed with AV ablation and pacemaker placement.  She was having episodes of tachycardia and bradycardia, which led to this decision.    Ever  since she has had this done, she has felt more energy.  She alludes to times where she was sleeping all day or had to crawl up the stairs because she had such little energy.    She was discharged from the TCU 4 days ago, she was at [facility].  She slowly improved her strength to the point where she was able to return home.    She is here with her  any.  He is happy with how she is doing as well.    Her dry weight is about 72-73 kg.  She is at this weight and is maintaining this.  She is careful about the low-sodium diet and fluid restriction.  She has dialysis on Tuesday, Thursday, Saturday.  She is not having her symptoms oforthopnea.    ROS A comprehensive review of systems was performed and was otherwise negative    All medications before and after hospitalization including the changes were reconciled and reviewed with the patient.       Medications, allergies, and problem list were reviewed and updated    Patient Active Problem List   Diagnosis     Tachycardia-bradycardia syndrome (H)     Essential hypertension with goal blood pressure less than 140/90     Hyperlipidemia     ZULEIKA (obstructive sleep apnea), severe     Anemia of chronic renal failure, stage 5 (H)     Dissection of thoracoabdominal aorta (H)     Gout     GERD (gastroesophageal reflux disease)     Right foot drop     Acute midline low back pain with right-sided sciatica     History of compression fracture of spine     Pulmonary emphysema (H)     left atrial appendage closure (WATCHMAN)     Other dysphagia     Borderline glaucoma with ocular hypertension     Hyperparathyroidism (H)     Osteoporosis     Venous tributary (branch) occlusion of retina     ESRD on dialysis (H)     Chronic atrial fibrillation (H)     Chronic diastolic congestive heart failure (H)     Thrombocytopenia (H)     Contraindication to anticoagulation therapy     Past Medical History:   Diagnosis Date     Arthritis      CHF (congestive heart failure) (H)      Chronic  anemia 6/1/2014     Chronic kidney disease      Chronic thoracic aortic dissection (H) 10/7/2015    Descending thoracic aorta; treated medically per notes of Dragan Singh and Jennifer.     COPD (chronic obstructive pulmonary disease) (H)      CVA (cerebral infarction)      Disease of thyroid gland      Dyslipidemia      ESRD (end stage renal disease) (H) 06/03/2009    on dialysis with Dr. Mitchell     Essential hypertension 6/30/2014     Gastrointestinal hemorrhage, unspecified gastrointestinal hemorrhage type 6/5/2017     GI (gastrointestinal bleed)      GI bleeding 6/5/2017     Gout      L3 vertebral fracture (H) 11/16/2015     Left Atrial Appendage Occlusion (WATCHMAN) 4/5/2018    LAAO April 5, 2018 (30 mm WATCHMAN)     Obesity      ZULEIKA (obstructive sleep apnea), severe, intolerant of CPAP 10/22/2015     Pneumonia 9-7-2015     Right foot drop      Spinal stenosis 3/28/2016     Stroke (H) 3/24/2016     Past Surgical History:   Procedure Laterality Date     BACK SURGERY      [facility]     COLONOSCOPY N/A 3/23/2016    Procedure: COLONOSCOPY;  Surgeon: Ruddy Tejada MD;  Location: [facility];  Service:      DILATION AND CURETTAGE OF UTERUS       EP ABLATION AV NODE N/A 3/7/2019    Procedure: EP Ablation AV Node;  Surgeon: Derick Duarte MD;  Location: [facility];  Service: Cardiology     EP NEGRA CLOSURE N/A 4/5/2018    Procedure: EP NEGRA Closure;  Surgeon: Derick Duarte MD;  Location: [facility];  Service:      EP PACEMAKER INSERT N/A 3/7/2019    Procedure: EP Pacemaker Insertion;  Surgeon: Derick Duarte MD;  Location: [facility];  Service: Cardiology     EYE SURGERY       HERNIA REPAIR       IR TUNNELED CATHETER INSERT  11/20/2018     IR TUNNELED CATHETER REMOVAL  11/20/2018     NM COLSC FLEXIBLE W/CONTROL BLEEDING ANY METHOD N/A 6/7/2017    Procedure: COLONOSCOPY;  Surgeon: Luis Mckeon MD;  Location: [facility];  Service: Gastroenterology     TONSILLECTOMY        Social History     Socioeconomic History     Marital status:      Spouse name: Cayden     Number of children: 2     Years of education: Not on file     Highest education level: Not on file   Occupational History     Employer: RETIRED   Social Needs     Financial resource strain: Not on file     Food insecurity:     Worry: Not on file     Inability: Not on file     Transportation needs:     Medical: Not on file     Non-medical: Not on file   Tobacco Use     Smoking status: Former Smoker     Packs/day: 1.50     Years: 37.00     Pack years: 55.50     Types: Cigarettes     Last attempt to quit: 1/1/2009     Years since quitting: 10.2     Smokeless tobacco: Never Used   Substance and Sexual Activity     Alcohol use: No     Alcohol/week: 7.0 oz     Types: 14 Standard drinks or equivalent per week     Comment: 14 mixed drinks per week     Drug use: No     Sexual activity: No     Partners: Male   Lifestyle     Physical activity:     Days per week: Not on file     Minutes per session: Not on file     Stress: Not on file   Relationships     Social connections:     Talks on phone: Not on file     Gets together: Not on file     Attends [employer] service: Not on file     Active member of club or organization: Not on file     Attends meetings of clubs or organizations: Not on file     Relationship status: Not on file     Intimate partner violence:     Fear of current or ex partner: Not on file     Emotionally abused: Not on file     Physically abused: Not on file     Forced sexual activity: Not on file   Other Topics Concern     Not on file   Social History Narrative    Lives with her . Daughter in [place] and daughter in [place].     Family History   Problem Relation Age of Onset     Dementia Mother      Diabetes Mother      Arthritis Mother      Cancer Mother      Depression Mother      Heart disease Mother      Vision loss Mother      Stroke Father      Heart disease Father      Breast cancer Neg Hx         Current Outpatient Medications   Medication Sig Dispense Refill     acetaminophen (TYLENOL) 500 MG tablet Take 500 mg by mouth every 6 (six) hours as needed for pain.       aspirin 81 MG EC tablet Take 81 mg by mouth daily with lunch.        atorvastatin (LIPITOR) 10 MG tablet Take 10 mg by mouth at bedtime.       calcium, as carbonate, (TUMS) 200 mg calcium (500 mg) chewable tablet Chew 1 tablet (200 mg total) 4 (four) times a day as needed.  0     CHLORPHENIRAMINE/DEXTROMETHORP (CORICIDIN HBP COUGH AND COLD ORAL) Take 1 tablet by mouth daily as needed.        cholecalciferol, vitamin D3, 2,000 unit cap Take 2,000 Units by mouth daily with lunch.        cinacalcet (SENSIPAR) 30 MG tablet Take 30 mg by mouth 3 times weekly with dialysis             DIALYVITE 100-1 mg Tab TAKE ONE TABLET BY MOUTH DAILY IN THE EVENING 90 tablet 0     folic acid (FOLVITE) 1 MG tablet TAKE ONE TABLET BY MOUTH ONCE DAILY 90 tablet 3     gabapentin (NEURONTIN) 100 MG capsule TAKE 1 CAPSULE BY MOUTH THREE TIMES DAILY 270 capsule 3     Lactobacillus rhamnosus GG (CULTURELLE) 10-15 Billion cell capsule Take 1 capsule by mouth daily with lunch.       metoprolol succinate (TOPROL XL) 25 MG Take 1 tablet (25 mg total) by mouth daily. 90 tablet 3     midodrine (PROAMATINE) 5 MG tablet Take 3 tablets (15 mg total) by mouth 3 (three) times a week. Take every Monday, Wednesday, and Friday in the morning. 36 tablet 11     polyvinyl alcohol (LIQUIFILM TEARS) 1.4 % ophthalmic solution Apply 1 drop to eye as needed for dry eyes.       ranitidine (ZANTAC) 150 MG tablet Take 150 mg by mouth at bedtime.       senna-docusate (SENNOSIDES-DOCUSATE SODIUM) 8.6-50 mg tablet Take 1 tablet by mouth at bedtime.        sevelamer carbonate (RENVELA) 800 mg tablet Take 1,600 mg by mouth 3 (three) times a day with meals.       sevelamer carbonate (RENVELA) 800 mg tablet Take 1,600 mg by mouth 2 (two) times a day as needed (with snacks).       timolol maleate  "(TIMOPTIC) 0.5 % ophthalmic solution Administer 1 drop to both eyes 2 (two) times a day.        traZODone (DESYREL) 50 MG tablet Take 1 tablet (50 mg total) by mouth at bedtime as needed for sleep. 30 tablet 11     No current facility-administered medications for this visit.        Allergies   Allergen Reactions     Ace Inhibitors Cough     Atrovent [Ipratropium Bromide] Headache     Fosinopril Sodium Cough     Zolpidem      hallucinations       EXAM  Vitals:    03/26/19 1452   BP: 138/74   Patient Site: Left Arm   Patient Position: Sitting   Cuff Size: Adult Regular   Pulse: 71   SpO2: (!) 88%   Weight: 164 lb 12.8 oz (74.8 kg)   Height: 5' 5\" (1.651 m)         General: alert, no distress  HEENT: sclerae anicteric, moist oral mucosa  Heart: Regular rate and rhythm, no murmurs.  Mild pedal edema.  Warm extremities  Lungs: Clear to auscultation bilat, distant breath sounds, trace crackle at right lower lung which is baseline  Gastrointestinal: abdomen is soft, non-tender, non-distended.    Skin: warm/dry, no rashes, scar over left upper chest healing well  Neuro: no gross abnormalities  Psychiatric: Pleasant affect      RESULTS REVIEWED:     ANALYSIS AND SUMMARY OF OLD RECORDS, NOTES AND CONSULTS (2): Viewed hospital discharge summary, summarized above    RECORDS REQUESTED (1): None.     OTHER HISTORY SUMMARIZED (from nursing staff, family, friends) (2):     RADIOLOGY TESTS SUMMARIZED or REQUESTED (XRAY/CT/MRI/DXA) (1):   Xr Chest 2 Views    Result Date: 3/8/2019  XR CHEST 2 VIEWS 3/8/2019 7:06 AM INDICATION: To assess lead position COMPARISON: 03/03/2019 and older studies FINDINGS: Patient has had interval placement of a single lead left subclavian venous pacer. Right IJ dialysis catheter is unchanged. Left atrial occlusion device again noted. No pneumothorax. Cardiomegaly, trace effusion and mild changes of failure are unchanged. Curvilinear C-shaped calcification in the right medial costophrenic angle reflects a " portion of the calcified, distal thoracic aorta aneurysm.    Xr Chest 2 Views    Result Date: 3/3/2019  XR CHEST 2 VIEWS 3/3/2019 1:39 PM INDICATION: Shortness of breath. COMPARISON: 1/29/2019 FINDINGS: Right IJ tunnel dialysis catheter tip in high right atrium. Metallic cage device left atrial appendage redemonstrated. Stable cardiomegaly. Vascular congestion with interstitial prominence and trace effusions suggesting fluid overload/mild congestive heart failure. No focal infiltrate nor pneumothorax.      MEDICINE TESTS SUMMARIZED or REQUESTED (EKG/ECHO/COLONOSCOPY/EGD) (1): None    INDEPENDENT REVIEW OF EKG OR X-RAY (2): None.    Lab Results   Component Value Date    WBC 4.3 03/03/2019    HGB 10.1 (L) 03/08/2019    HCT 30.1 (L) 03/03/2019     (H) 03/03/2019     (L) 03/03/2019        Data points  4     John Escoto DO  Internal Medicine  [facility]

## 2021-05-27 NOTE — TELEPHONE ENCOUNTER
RN cannot approve Refill Request    RN can NOT refill this medication med is not covered by policy/route to provider.     Last office visit: 3/26/2019 John Escoto DO Last Physical: Visit date not found Last MTM visit: Visit date not found Last visit same specialty: 3/26/2019 John Escoto DO.  Next visit within 3 mo: Visit date not found  Next physical within 3 mo: Visit date not found      Ileana Victor, [facility] Triage/Med Refill 3/28/2019    Requested Prescriptions   Pending Prescriptions Disp Refills     midodrine (PROAMATINE) 5 MG tablet [Pharmacy Med Name: MIDODRINE HCL 5 MG TABLET] 36 tablet 0     Sig: TAKE 3 TABLETS BY MOUTH EVERY MON, WED, AND FRI MORNING    There is no refill protocol information for this order

## 2021-05-27 NOTE — PROGRESS NOTES
"In clinic device check.  Please see link for full device report.  Patient was informed of results and next follow up during today's visit.       Patient reports severe fatigue since pacemaker programming changes that occurred at 1 week PO visit. Programming changes made at that time are as follow: LRL decreased to 70bpm from 80bpm per AVNA protocol. Mode programmed to VVIR from VVI. MV programmed to sedentary from active. Patient reports that she was feeling a lot more \"active\" prior to programming changes.  Patient was adamant about increasing her LRL back to 80bpm; this was completed in clinic today. Will update Dr. Duarte.     Nina Bai, RN BSN  Device Nurse    "

## 2021-05-27 NOTE — PATIENT INSTRUCTIONS - HE
Belia Rodriguez,    It was a pleasure to see you today at the [facility].     My recommendations after this visit include:    - no medications changes at this time.     - I'll see you in another year    - you should see Dr. Bentíez for regular follow up in the next 1-2 months      Alyson Hernandez PA-C, MPAS      If you have questions or concerns, please call using the numbers below:    Danielle Levine RN  Watchman coordinator  [phone]    After Hours/Scheduling  [phone]

## 2021-05-27 NOTE — PROGRESS NOTES
[facility] Patient Outreach:     Discharge Date::  3/23/19      Reason for TCU stay (discharge diagnosis)::  A-fib, CHF, ESRD, pulmonary emphysema, COPD      Are you feeling better, the same or worse since your discharge?:  Patient is feeling better          As part of your discharge plan, did they discuss home care with you?: No            Did you receive any new medications, or was there a change to your medications?: No (same meds per TCU)            Do you have any follow up visits scheduled with your PCP or Specialist?:  Yes, with PCP      (RN) Is it scheduled soon enough (3-5 days)?: Yes

## 2021-05-27 NOTE — PROGRESS NOTES
MODIFIED KENIA SCALE   Follow up (post Watchman Implant)  Timepoint: 1 year    Previous score: 0    Score Description   0 No symptoms at all   1 No significant disability despite symptoms; able to carry out all usual duties and activities   2 Slight disability; unable to carry out all previous activities, but able to look after own affairs without assistance   3 Moderate disability; requiring some help, but able to walk without assistance   4 Moderately severe disability; unable to walk without assistance and unable to attend to own bodily needs without assistance   5 Severe disability; bedridden, incontinent and requiring constant nursing care and attention   6 Dead    Total score (0 - 6):  0  Change in score? none  If yes, notify implanting cardiologist.    Danielle Levine RN

## 2021-05-28 NOTE — TELEPHONE ENCOUNTER
Device check reviewed.  Reprogramming of the patient's lower pacing rate to 80 bpm is reasonable given the patient's symptoms.  We will ask patient to follow-up with the EP nurse practitioner in the device clinic in 3 months.

## 2021-05-28 NOTE — PROGRESS NOTES
"[facility] Note    Belia Rodriguez   73 y.o. female    Date of Visit: 4/23/2019  Chief Complaint   Patient presents with     Follow-up       ASSESSMENT/PLAN  1. ESRD on dialysis (H)     2. Essential hypertension with goal blood pressure less than 140/90     3. Tachycardia-bradycardia syndrome (H)     4. left atrial appendage closure (WATCHMAN)     5. Chronic atrial fibrillation (H)       ---------------------------------------------    1.  Continue regular dialysis, her weight is up but this might be increase in muscle/fat as opposed to fluid.  She should continue to be very cautious with sodium intake.  Eating take out can be hazardous due to the sodium content.    2.  Blood pressure well controlled, no changes    3.  Heart rate set at 80, which seems to be a good balance for her    4.  No longer needing anticoagulation.  Okay to hold aspirin 1 week prior to blepharoplasty as requested by ophthalmology.    5.  Noted    Return in about 4 months (around 8/23/2019) for Recheck.      SUBJECTIVE    Belia Rodriguez \"Itzel\" is a 73-year-old woman with history of end-stage renal disease dialysis here with her  Prashant for follow-up.    I wanted to follow-up with her in a month because of her string of recent hospitalizations.    Since she had the pacemaker placed for tachycardia/bradycardia syndrome, she has noticed much more energy, she is cooking which she has not done in years, and then he notices that she is back to her normal self.  The heart rate was decreased from 80 down to 70 bpm at 1 of the pacemaker follow-up sessions after the hospital, but she had a lot more fatigue after that, and is doing better after it was increased to 80 again.    Her weight is up to presently to 76 kg or so, up from dry weight of about 73 kg.  She has not noticed any orthopnea/PND.  She still feels very well.  She thinks she is eating better since she has not been in the hospital and the weight might not be fluid " weight.  She typically goes to hemodialysis on Tuesday, Thursday, and Saturday.  Sessions have been going well.    She has ordered takeout with her  a few times, acknowledges the increased sodium content.  She is following the 24 ounce fluid limit which is about 700 mL/day.  She often does not get to this fluid intake, however.    She still has a runny nose, got a headache with Atrovent, so she is just dealing with it for now.    She has follow-up with cardiology in June.    She is thankful for having the watchman device, she does not miss being off of anticoagulation.  She asked if she could come off of aspirin in 1 week for a blepharoplasty due to droopy eyelids impairing her vision.      ROS:   Per HPI, all other systems negative     Medications, allergies, and problem list were reviewed and updated    Patient Active Problem List   Diagnosis     Tachycardia-bradycardia syndrome (H)     Essential hypertension with goal blood pressure less than 140/90     Hyperlipidemia     ZULEIKA (obstructive sleep apnea), severe     Anemia of chronic renal failure, stage 5 (H)     Dissection of thoracoabdominal aorta (H)     Gout     GERD (gastroesophageal reflux disease)     Right foot drop     Acute midline low back pain with right-sided sciatica     History of compression fracture of spine     Pulmonary emphysema (H)     left atrial appendage closure (WATCHMAN)     Other dysphagia     Borderline glaucoma with ocular hypertension     Hyperparathyroidism (H)     Osteoporosis     Venous tributary (branch) occlusion of retina     ESRD on dialysis (H)     Chronic atrial fibrillation (H)     Chronic diastolic congestive heart failure (H)     Thrombocytopenia (H)     Contraindication to anticoagulation therapy     Past Medical History:   Diagnosis Date     Arthritis      CHF (congestive heart failure) (H)      Chronic anemia 6/1/2014     Chronic kidney disease      Chronic thoracic aortic dissection (H) 10/7/2015    Descending  thoracic aorta; treated medically per notes of Dragan Singh and Jennifer.     COPD (chronic obstructive pulmonary disease) (H)      CVA (cerebral infarction)      Disease of thyroid gland      Dyslipidemia      ESRD (end stage renal disease) (H) 06/03/2009    on dialysis with Dr. Mitchell     Essential hypertension 6/30/2014     Gastrointestinal hemorrhage, unspecified gastrointestinal hemorrhage type 6/5/2017     GI (gastrointestinal bleed)      GI bleeding 6/5/2017     Gout      L3 vertebral fracture (H) 11/16/2015     Left Atrial Appendage Occlusion (WATCHMAN) 4/5/2018    LAAO April 5, 2018 (30 mm WATCHMAN)     Obesity      ZULEIKA (obstructive sleep apnea), severe, intolerant of CPAP 10/22/2015     Pneumonia 9-7-2015     Right foot drop      Spinal stenosis 3/28/2016     Stroke (H) 3/24/2016     Current Outpatient Medications   Medication Sig Dispense Refill     acetaminophen (TYLENOL) 500 MG tablet Take 500 mg by mouth every 6 (six) hours as needed for pain.       aspirin 81 MG EC tablet Take 81 mg by mouth daily with lunch.        atorvastatin (LIPITOR) 10 MG tablet Take 10 mg by mouth at bedtime.       cholecalciferol, vitamin D3, 2,000 unit cap Take 2,000 Units by mouth daily with lunch.        cinacalcet (SENSIPAR) 30 MG tablet Take 30 mg by mouth 3 times weekly with dialysis             DIALYVITE 100-1 mg Tab TAKE ONE TABLET BY MOUTH DAILY IN THE EVENING 90 tablet 0     folic acid (FOLVITE) 1 MG tablet TAKE ONE TABLET BY MOUTH ONCE DAILY 90 tablet 3     gabapentin (NEURONTIN) 100 MG capsule TAKE 1 CAPSULE BY MOUTH THREE TIMES DAILY 270 capsule 3     Lactobacillus rhamnosus GG (CULTURELLE) 10-15 Billion cell capsule Take 1 capsule by mouth daily with lunch.       metoprolol succinate (TOPROL XL) 25 MG Take 1 tablet (25 mg total) by mouth daily. 90 tablet 3     midodrine (PROAMATINE) 5 MG tablet TAKE 3 TABLETS BY MOUTH EVERY MON, WED, AND FRI MORNING 36 tablet 0     polyvinyl alcohol (LIQUIFILM TEARS) 1.4 %  "ophthalmic solution Apply 1 drop to eye as needed for dry eyes.       ranitidine (ZANTAC) 150 MG tablet Take 150 mg by mouth at bedtime.       senna-docusate (SENNOSIDES-DOCUSATE SODIUM) 8.6-50 mg tablet Take 1 tablet by mouth at bedtime.        sevelamer carbonate (RENVELA) 800 mg tablet Take 1,600 mg by mouth 3 (three) times a day with meals.       timolol maleate (TIMOPTIC) 0.5 % ophthalmic solution Administer 1 drop to both eyes 2 (two) times a day.        traZODone (DESYREL) 50 MG tablet Take 1 tablet (50 mg total) by mouth at bedtime as needed for sleep. 30 tablet 11     No current facility-administered medications for this visit.      Allergies   Allergen Reactions     Ace Inhibitors Cough     Atrovent [Ipratropium Bromide] Headache     Fosinopril Sodium Cough     Zolpidem      hallucinations       EXAM  Vitals:    04/23/19 1151   BP: 116/66   Patient Site: Left Arm   Patient Position: Sitting   Cuff Size: Adult Regular   Pulse: 86   SpO2: 90%   Weight: 169 lb 6.4 oz (76.8 kg)   Height: 5' 5\" (1.651 m)         General: Alert, no distress  Psychiatric: Pleasant affect  Neurologic: No gross abnormalities    Results reviewed: Reviewed recent cardiology notes and the adjustment of the back of heart rate    Data points: 2    John Escoto, DO  Internal Medicine  [facility]    "

## 2021-05-29 NOTE — TELEPHONE ENCOUNTER
Pt contacted with recommendation to hold her Plavix for 5 days prior to her surgery and to resume following procedure asap.

## 2021-05-29 NOTE — PROGRESS NOTES
In clinic device check with Dr. Benítez.  Please see link for full device report.  Patient was informed of results and next follow up during today's visit.

## 2021-05-29 NOTE — ANESTHESIA PREPROCEDURE EVALUATION
"Anesthesia Evaluation      Patient summary reviewed   No history of anesthetic complications     Airway   Mallampati: III  Neck ROM: full   Pulmonary - normal exam   (+) COPD (Emphysema, patient says this is mild, no treatment), shortness of breath (Chronic with exertion), sleep apnea (uses O2 nasal cannula during sleep, does not tolerate CPAP), a smoker (Former, 55 pack years)                         Cardiovascular - normal exam  Exercise tolerance: > or = 4 METS  (+) pacemaker (   [employer] pacemaker, SSS, interogated 6/18/19, normal battery and magnet function, Magnet  mode 100 bpm, ventricular paced at 70), hypertension, dysrhythmias (a.fib, s/p Watchman device to NEGRA, SSS, tachy-amanda syndrome), CHF (Diastolic), , hypercholesterolemia, PVD (Chronic thoracic aortic dissection, medically managed)    (-) angina  ECG reviewed     ROS comment: Able to walk very slowly up a flight of stairs without shortness of breath, but does get shortness of breath with exertion     Neuro/Psych    (+) CVA (mild, no residuals) ,     Endo/Other       Comments: Chronic anemia  Glaucoma  Hyperparathyroidism  Thrombocytopenia  Platelet dysfunction, pt states that she tends to have difficulty clotting.    GI/Hepatic/Renal    (+) GERD,   chronic renal disease ESRD and dialysis, last dialysis date: 6/21/2019,      Other findings: 1/5/19 Echo  Summary       When compared to the previous study dated 5/24/2018, no significant change.    Normal left ventricular size, wall motion and function. Significantly elevated left ventricular filling pressure E/e\" >15. Left ventricle ejection fraction is normal. The calculated left ventricular ejection fraction is 62%.    Mildly enlarged right ventricle with mildly reduced right ventricular systolic performance.    Moderately enlarged left atrium.    No obvious valvular disease          Dental    (+) poor dentition    Comment: Missing several teeth                       Anesthesia Plan  Planned " anesthetic: general endotracheal  Because of platelet dysfunction, thrombocytopenia and poor clotting by h/o, plan GETA.     Ketamine 25 mcg  Phenylephrine inline for induction    Patient was transfused one 6 pack of platelets today      ASA 4   Induction: intravenous   Anesthetic plan and risks discussed with: patient  Anesthesia plan special considerations: antiemetics,   Post-op plan: routine recovery

## 2021-05-29 NOTE — TELEPHONE ENCOUNTER
----- Message from Derick Duarte MD sent at 6/18/2019  7:36 AM CDT -----  Regarding: RE: ASA hold   Danielle,  Okay to hold ASA x5 days.  Patient to resume ASA 81 mg daily postoperatively.    ----- Message -----  From: Danielle Levine RN  Sent: 6/17/2019  10:14 AM  To: Derick Duarte MD, #  Subject: ASA hold                                         Good Morning,    Pt now 1 year post LAAO implant.  Having eyelid surgery in the future. They are requesting a 5 day hold on her 81 mg ASA.  Please advise?      Thank you

## 2021-05-29 NOTE — ANESTHESIA POSTPROCEDURE EVALUATION
Patient: Belia Rodriguez  INTERNAL FIXATION, FRACTURE, TROCHANTERIC, HIP, USING INTERMEDULLARY NAIL  Anesthesia type: general    Patient location: PACU  Last vitals:   Vitals Value Taken Time   /64 6/24/2019 12:00 AM   Temp 37.1  C (98.8  F) 6/23/2019 11:38 PM   Pulse 70 6/24/2019 12:09 AM   Resp 20 6/24/2019 12:09 AM   SpO2 96 % 6/24/2019 12:09 AM   Vitals shown include unvalidated device data.  Post vital signs: stable  Level of consciousness: awake and responds to simple questions  Post-anesthesia pain: pain controlled  Post-anesthesia nausea and vomiting: no  Pulmonary: unassisted, face mask  Cardiovascular: stable and blood pressure at baseline  Hydration: adequate  Anesthetic events: no    QCDR Measures:  ASA# 11 - Kaylan-op Cardiac Arrest: ASA11B - Patient did NOT experience unanticipated cardiac arrest  ASA# 12 - Kaylan-op Mortality Rate: ASA12B - Patient did NOT die  ASA# 13 - PACU Re-Intubation Rate: ASA13B - Patient did NOT require a new airway mgmt  ASA# 10 - Composite Anes Safety: ASA10A - No serious adverse event    Additional Notes:

## 2021-05-29 NOTE — ANESTHESIA CARE TRANSFER NOTE
Last vitals:   Vitals:    06/23/19 2338   BP: 124/60   Pulse: 80   Resp: 16   Temp:    SpO2: 100%     Patient's level of consciousness is drowsy  Spontaneous respirations: yes  Maintains airway independently: yes  Dentition unchanged: yes  Oropharynx: oropharynx clear of all foreign objects    QCDR Measures:  ASA# 20 - Surgical Safety Checklist: WHO surgical safety checklist completed prior to induction    PQRS# 430 - Adult PONV Prevention: 4558F - Pt received => 2 anti-emetic agents (different classes) preop & intraop  ASA# 8 - Peds PONV Prevention: NA - Not pediatric patient, not GA or 2 or more risk factors NOT present  PQRS# 424 - Kaylan-op Temp Management: 4559F - At least one body temp DOCUMENTED => 35.5C or 95.9F within required timeframe  PQRS# 426 - PACU Transfer Protocol: - Transfer of care checklist used  ASA# 14 - Acute Post-op Pain: ASA14B - Patient did NOT experience pain >= 7 out of 10

## 2021-05-30 NOTE — PROGRESS NOTES
[facility]    Facility:   [facility] [[id number]]   Code Status: FULL CODE      CHIEF COMPLAINT/REASON FOR VISIT:  Chief Complaint   Patient presents with     Review Of Multiple Medical Conditions       HISTORY:      HPI: Belia is a 73 y.o. female undergoing physical and  occupational therapy at [facility]. with history of ESRD on hemodialysis M/W/F, chronic thrombocytopenia, chronic atrial fibrillation, sick sinus syndrome, status post watchman device, status post pacemaker, diastolic CHF, hypertension, ZULEIKA, anemia of chronic kidney disease, dyslipidemia who was brought to the emergency department for evaluation after a fall.  The patient uses a walker for ambulation due to right foot drop and while at home tripped on the kitchen floor mat when she turned and fell.  She denies head trauma or loss of consciousness but complained of left hip pain.  In the ED, she was hemodynamically stable.  X-rays showed a displaced, comminuted intertrochanteric fracture of left femur. She underwent a left intramedullary nailing.     Today she is seen for a routine  visit to review multiple medical issues. She denied CP. She does have shortness of breath with activity. She is on 2 L NC at night due to unable to tolerate C- Pap after many tries She occasionally will wear 1L NC  during the day.  She  is with right foot drop with shoes on order per her report. . Her HGB was noted to be 7.4 on 7/2, came up to  8.9 on 7/5 and on 7/8 dropped to 7.9.   She denied any active bleeding but is with multiple bruising bilateral upper extremities. However last 3 occult stools were negative but she had a large amount of bleeding that was noted by staff in the toilet. Her weight is up 3 pounds and she will have a dialysis run today.     Past Medical History:   Diagnosis Date     Arthritis      CHF (congestive heart failure) (H)      Chronic anemia 6/1/2014     Chronic kidney  disease      Chronic thoracic aortic dissection (H) 10/7/2015    Descending thoracic aorta; treated medically per notes of Dragan Singh and Jennifer.     COPD (chronic obstructive pulmonary disease) (H)      CVA (cerebral infarction)      Disease of thyroid gland      Dyslipidemia      ESRD (end stage renal disease) (H) 06/03/2009    on dialysis with Dr. Mitchell     Essential hypertension 6/30/2014     Gastrointestinal hemorrhage, unspecified gastrointestinal hemorrhage type 6/5/2017     GI (gastrointestinal bleed)      GI bleeding 6/5/2017     Gout      L3 vertebral fracture (H) 11/16/2015     Left Atrial Appendage Occlusion (WATCHMAN) 4/5/2018    LAAO April 5, 2018 (30 mm WATCHMAN)     Obesity      ZULEIKA (obstructive sleep apnea), severe, intolerant of CPAP 10/22/2015     Pneumonia 9-7-2015     Right foot drop      Spinal stenosis 3/28/2016     Stroke (H) 3/24/2016             Family History   Problem Relation Age of Onset     Dementia Mother      Diabetes Mother      Arthritis Mother      Cancer Mother      Depression Mother      Heart disease Mother      Vision loss Mother      Stroke Father      Heart disease Father      Breast cancer Neg Hx      Social History     Socioeconomic History     Marital status:      Spouse name: Cayden     Number of children: 2     Years of education: Not on file     Highest education level: Not on file   Occupational History     Employer: RETIRED   Social Needs     Financial resource strain: Not on file     Food insecurity:     Worry: Not on file     Inability: Not on file     Transportation needs:     Medical: Not on file     Non-medical: Not on file   Tobacco Use     Smoking status: Former Smoker     Packs/day: 1.50     Years: 37.00     Pack years: 55.50     Types: Cigarettes     Last attempt to quit: 1/1/2009     Years since quitting: 10.5     Smokeless tobacco: Never Used   Substance and Sexual Activity     Alcohol use: No     Alcohol/week: 7.0 oz     Types: 14 Standard  "drinks or equivalent per week     Comment: 14 mixed drinks per week     Drug use: No     Sexual activity: Never     Partners: Male   Lifestyle     Physical activity:     Days per week: Not on file     Minutes per session: Not on file     Stress: Not on file   Relationships     Social connections:     Talks on phone: Not on file     Gets together: Not on file     Attends Worship service: Not on file     Active member of club or organization: Not on file     Attends meetings of clubs or organizations: Not on file     Relationship status: Not on file     Intimate partner violence:     Fear of current or ex partner: Not on file     Emotionally abused: Not on file     Physically abused: Not on file     Forced sexual activity: Not on file   Other Topics Concern     Not on file   Social History Narrative    Lives with her . Daughter in [place] and daughter in [place].         Review of Systems   Constitutional: Negative for activity change, appetite change, fatigue and fever.   HENT: Negative for congestion.    Respiratory: Positive for shortness of breath. Negative for cough and wheezing.         Dialysis port R upper chest    Cardiovascular: Negative for chest pain and leg swelling.   Gastrointestinal: Negative for abdominal distention, abdominal pain, constipation, diarrhea and nausea.   Genitourinary: Negative for dysuria.        She reported she voids, \"a little, enough to keep me from getting a UTI.\"   Musculoskeletal: Negative for arthralgias and back pain.   Skin: Positive for wound. Negative for color change.   Neurological: Negative for dizziness.   Psychiatric/Behavioral: Positive for sleep disturbance. Negative for agitation, behavioral problems and confusion.         On trazodone        .  Vitals:    07/15/19 0822   BP: 109/64   Pulse: 82   Resp: 20   Temp: 97.7  F (36.5  C)   SpO2: 96%   Weight: 177 lb (80.3 kg)       Physical Exam   Constitutional: She is oriented to person, place, and time. She " appears well-developed and well-nourished.   pleasant woman in no acute distress   HENT:   Head: Normocephalic and atraumatic.   Eyes: Pupils are equal, round, and reactive to light. Conjunctivae are normal.   Neck: Normal range of motion. Neck supple.   Cardiovascular: Normal rate, regular rhythm and normal heart sounds.   No murmur heard.  Pulmonary/Chest: Effort normal and breath sounds normal. She has no wheezes. She has no rales.   Abdominal: Soft. Bowel sounds are normal. She exhibits no distension. There is no tenderness.   Musculoskeletal: Normal range of motion. She exhibits edema.   Right foot drop  2+ edema left leg.    Neurological: She is alert and oriented to person, place, and time.   Neuropathy right foot and decreased sensation bottom of left foot.     Skin: Skin is warm and dry.   Left hip wound    Psychiatric: She has a normal mood and affect. Her behavior is normal.         LABS:   Recent Results (from the past 240 hour(s))   HM2(CBC w/o Differential)   Result Value Ref Range    WBC 5.5 4.0 - 11.0 thou/uL    RBC 2.74 (L) 3.80 - 5.40 mill/uL    Hemoglobin 8.9 (L) 12.0 - 16.0 g/dL    Hematocrit 28.3 (L) 35.0 - 47.0 %     (H) 80 - 100 fL    MCH 32.5 27.0 - 34.0 pg    MCHC 31.4 (L) 32.0 - 36.0 g/dL    RDW 21.8 (H) 11.0 - 14.5 %    Platelets 208 140 - 440 thou/uL    MPV 9.5 8.5 - 12.5 fL   HM2(CBC w/o Differential)   Result Value Ref Range    WBC 4.5 4.0 - 11.0 thou/uL    RBC 2.40 (L) 3.80 - 5.40 mill/uL    Hemoglobin 7.9 (L) 12.0 - 16.0 g/dL    Hematocrit 25.8 (L) 35.0 - 47.0 %     (H) 80 - 100 fL    MCH 32.9 27.0 - 34.0 pg    MCHC 30.6 (L) 32.0 - 36.0 g/dL    RDW 22.6 (H) 11.0 - 14.5 %    Platelets 188 140 - 440 thou/uL    MPV 9.9 8.5 - 12.5 fL   Occult Blood, Fecal   Result Value Ref Range    Occult Blood, Stool #1 Negative Negative   Occult Blood, Fecal   Result Value Ref Range    Occult Blood, Stool #1 Negative Negative   Occult Blood, Fecal   Result Value Ref Range    Occult Blood,  Stool #1 Negative Negative     Current Outpatient Medications   Medication Sig Note     acetaminophen (TYLENOL) 500 MG tablet Take 2 tablets (1,000 mg total) by mouth 3 (three) times a day.      aspirin 325 MG tablet Take 1 tablet (325 mg total) by mouth 2 (two) times a day.      atorvastatin (LIPITOR) 10 MG tablet Take 10 mg by mouth at bedtime.      B complex-vitamin C-folic acid (DIALYVITE) 100-1 mg Tab Take 1 tablet by mouth daily.      chlorpheniramine/dextromethorp (CORICIDIN HBP COUGH AND COLD ORAL) Take 1 tablet by mouth every 6 (six) hours as needed.      cholecalciferol, vitamin D3, 2,000 unit cap Take 2,000 Units by mouth daily with lunch.       cinacalcet (SENSIPAR) 30 MG tablet Take 30 mg by mouth 3 times weekly with dialysis            diphenhydrAMINE (BENADRYL) 25 mg tablet Take 50 mg by mouth at bedtime as needed for sleep.      folic acid (FOLVITE) 1 MG tablet Take 1 mg by mouth daily.      gabapentin (NEURONTIN) 100 MG capsule Take 100 mg by mouth 3 (three) times a day.      Lactobacillus rhamnosus GG (CULTURELLE) 10-15 Billion cell capsule Take 1 capsule by mouth daily with lunch.      metoprolol succinate (TOPROL-XL) 25 MG Take 25 mg by mouth daily.      midodrine HCl (MIDODRINE ORAL) Take 15 mg by mouth 3 (three) times a week. Three times weekly before dialysis.             oxyCODONE (ROXICODONE) 5 MG immediate release tablet Take 0.5-1 tablets (2.5-5 mg total) by mouth every 4 (four) hours as needed.      polyvinyl alcohol (LIQUIFILM TEARS) 1.4 % ophthalmic solution Apply 1 drop to eye as needed for dry eyes.      ranitidine (ZANTAC) 150 MG tablet Take 150 mg by mouth at bedtime.      senna-docusate (SENNOSIDES-DOCUSATE SODIUM) 8.6-50 mg tablet Take 1 tablet by mouth at bedtime.       sevelamer carbonate (RENVELA) 800 mg tablet Take 2,400 mg by mouth 3 (three) times a day with meals.             timolol maleate (TIMOPTIC) 0.5 % ophthalmic solution Administer 1 drop to both eyes 2 (two) times a  day.       traZODone (DESYREL) 50 MG tablet Take 50 mg by mouth at bedtime. 6/23/2019: Patient states she uses this almost every night.      ASSESSMENT:      ICD-10-CM    1. Chronic atrial fibrillation (H) I48.2    2. Essential hypertension I10    3. Pain management R52    4. Closed displaced intertrochanteric fracture of left femur with routine healing, subsequent encounter S72.142D        PLAN:    S/P internal fixation using intramedullary nail. PT/OT/pain  Control, monitor incision for S/S infection   End-stage renal disease. Dialysis as scheduled.  M/W/F   Hypertension on Metoprolol Succinate   Chronic atrial fibrillation- on coumadin , metoprolol  Pain control Tylenol and Oxycodone as scheduled  check hip  x-ray due to increased pain.   Hyperkalemia.  Potassium was occasionally greater than 6 and was treated with dialysis 6/24 potassium 4.5  Anemia.  Chronic anemia was worsened with acute blood loss.  She received 1 unit of packed red blood cells HGB 7.9 on 6/27/19, dropped to 7.4 came up to 8.9 and 7.9 on 7/8. Monitor labs. Check occult stool  Insomnia-trazadone  75 mg HS  Constipation resolved continue  senna s to 2 tabs two times a day, Biscodyl suppository 10 mg IA q 3 days PRN if no BM   GI bleeding - occults negative, will check a stat HGB when pt returns from appointment. Currently she is on  mg two times a day            Electronically signed by: Le Flynn CNP

## 2021-05-30 NOTE — PROGRESS NOTES
Code Status:  FULL CODE  Visit Type: Problem Visit     Facility:  [facility] [[id number]]        Facility Type: SNF (Skilled Nursing Facility, TCU)    History of Present Illness: Belia Rodriguez is a 73 y.o. female seen for TCU follow-up visit today.  She has a past medical history for ESRD on HD Monday Wednesday Friday, chronic thrombocytopenia, atrial fib, sick sinus syndrome, status post watchman, status post pacer, diastolic CHF, hypertension, ZULEIKA, anemia, CKD, HLD, with chronic right foot drop.  She is here for rehab after hospitalization following a fall in which he sustained a displaced comminuted intertrochanteric fracture of her left femur.  She underwent left intramedullary nailing.  She did have trouble with hypoxia especially at night and she cannot tolerate CPAP and so she was discharged with nasal cannula oxygen 2 L at night.    Today, nursing requests that I see her due to increased knee pain and LE edema.  They also report that she has had 2 incidences of blood in stool.  They are unable to tell me if the blood was in the stool, toilet or just on the toilet paper. She does have a history of a low hgb with last hgb in the 7s.  She denies any dizziness, increased fatigue or shortness of breath. She reports here stools require her to strain and she does have some pain that resolves after the bowel movement.     The patient states that she is having more pain in her left leg.  Her lower extremities are wraps with ace wraps and she has 1+ pitting edema in feet but lower legs have no edema.  It appears that there are 2 aces on each leg and the swelling is below the cut off from the lower ace and the start of the upper ace.  She is only using oxycodone 2x/day.  And she reports she was not aware of how often she was taking it.        Review of Systems   Patient denies fever, chills, headache, lightheadedness, dizziness, rhinorrhea, cough, congestion, shortness of breath, chest pain,  palpitations, abdominal pain, n/v, diarrhea, constipation, change in appetite, dysuria, frequency, burning or pain with urination.  Other than stated in HPI all other review of systems is negative.         Physical Exam   Vital signs: VSS reviewed in the TCU record  GENERAL APPEARANCE: Well developed, well nourished, in no acute distress.  HEENT: normocephalic, atraumatic  PERRL, sclerae anicteric, conjunctivae clear and moist, EOM intact  LUNGS: Lung sounds CTA, no adventitious sounds, respiratory effort decreased  CARD: RRR, S1, S2, without murmurs, gallops, rubs, no JVD  ABD: Soft and nontender with normal bowel sounds.   MSK: Muscle strength and tone were normal.  EXTREMITIES:+1 pitting edema to feet bilaterally  NEURO: Alert and oriented x 3.  Face is symmetric.  SKIN: Inspection of the skin reveals no rashes, ulcerations or petechiae.  PSYCH: euthymic          Labs:    Recent Results (from the past 240 hour(s))   Hemoglobin   Result Value Ref Range    Hemoglobin 8.0 (L) 12.0 - 16.0 g/dL   Crossmatch   Result Value Ref Range    Crossmatch COMPATIBLE     Blood Expiration Date [id number]     Unit Type O Pos     Unit Number [id number]     Status Transfused     Component Red Blood Cells     PRODUCT CODE F4732V04     Issue Date and Time [id number]     Blood Type 5100     CODING SYSTEM [id number]    Crossmatch   Result Value Ref Range    Crossmatch COMPATIBLE     Blood Expiration Date [id number]     Unit Type O Pos     Unit Number [id number]     Status Released     Component Red Blood Cells     PRODUCT CODE R8736V11     Blood Type 5100     CODING SYSTEM [id number]    APTT  (if on Coumadin)   Result Value Ref Range    PTT 52 (H) 24 - 37 seconds   Hemoglobin   Result Value Ref Range    Hemoglobin 7.9 (L) 12.0 - 16.0 g/dL   HM2(CBC w/o Differential)   Result Value Ref Range    WBC 3.9 (L) 4.0 - 11.0 thou/uL    RBC 2.30 (L) 3.80 - 5.40 mill/uL    Hemoglobin 7.4 (L) 12.0 - 16.0 g/dL    Hematocrit 24.0 (L) 35.0  - 47.0 %     (H) 80 - 100 fL    MCH 32.2 27.0 - 34.0 pg    MCHC 30.8 (L) 32.0 - 36.0 g/dL    RDW 21.1 (H) 11.0 - 14.5 %    Platelets 139 (L) 140 - 440 thou/uL    MPV 11.5 8.5 - 12.5 fL   HM2(CBC w/o Differential)   Result Value Ref Range    WBC 5.5 4.0 - 11.0 thou/uL    RBC 2.74 (L) 3.80 - 5.40 mill/uL    Hemoglobin 8.9 (L) 12.0 - 16.0 g/dL    Hematocrit 28.3 (L) 35.0 - 47.0 %     (H) 80 - 100 fL    MCH 32.5 27.0 - 34.0 pg    MCHC 31.4 (L) 32.0 - 36.0 g/dL    RDW 21.8 (H) 11.0 - 14.5 %    Platelets 208 140 - 440 thou/uL    MPV 9.5 8.5 - 12.5 fL         Assessment:  1. BRBPR (bright red blood per rectum)     2. Anemia, unspecified type     3. S/P ORIF (open reduction internal fixation) fracture     4. Pain management         Plan:  Rectal bleeding: GI bleed vs hemorrhoids.  Will check CBC STAT if hgb greater than 7.0 then will start on hemorrhoidal suppositories and observe for other bleeding symptoms.  If less then 7.0 then will send into the ER for further evaluation of bleeding and the need for transfusion.      Update:  Hgb returned and was 8.9 up from 7.4 earlier this week.  Will start hemorrhoid suppositories and observe.  Nursing to be descriptive in assessing any ongoing bleeding.  If continues will consider abdominal CT.  If becomes symptomatic will send to ER. Coordinated plan with nursing regarding treatment and plan for the weekend.     Anemia: improving, continue to monitor    S/P ORIF: continue with therapy, will have therapy provide US and warm packs with stretching for pain. Counseled patient to use oxycodone more often to get ahead of her pain and when she is more stretched out then could back off on pain medications.               Electronically signed by: Andria Woods, AYAH

## 2021-05-30 NOTE — PROGRESS NOTES
[facility]    Facility:   [facility] [[id number]]   Code Status: FULL CODE      CHIEF COMPLAINT/REASON FOR VISIT:  Chief Complaint   Patient presents with     Review Of Multiple Medical Conditions       HISTORY:      HPI: Belia is a 73 y.o. female undergoing physical and  occupational therapy at [facility]. with history of ESRD on hemodialysis M/W/F, chronic thrombocytopenia, chronic atrial fibrillation, sick sinus syndrome, status post watchman device, status post pacemaker, diastolic CHF, hypertension, ZULEIKA, anemia of chronic kidney disease, dyslipidemia who was brought to the emergency department for evaluation after a fall.  The patient uses a walker for ambulation due to right foot drop and while at home tripped on the kitchen floor mat when she turned and fell.  She denies head trauma or loss of consciousness but complained of left hip pain.  In the ED, she was hemodynamically stable.  X-rays showed a displaced, comminuted intertrochanteric fracture of left femur. She underwent a left intramedullary nailing.     Today she is seen for a routine  visit to review multiple medical issues. She denied CP. She does have shortness of breath with activity. She is on 2 L NC at night due to unable to tolerate C- Pap after many tries She occasionally will wear 1L NC  during the day.  She  is with right foot drop and previously has told me  she has shoes ordered and in the process of getting a brace. Her HGB was noted to be 7.4 on 7/2, came up to  8.9 on 7/5 and on 7/8 dropped to 7.9.   She denied any active bleeding but is with multiple bruising bilateral upper extremities. I will check occult stool. Her left hip incision is with staples C/D/I. She did report increased pain due to bending over during the weekend and unsure if she heard a pop. She did not appear to be in any acute  distress and I will check an x-ray. She tells me she has been sleeping  "well and has been able to stand 5\" on the parallel bars and took 2 steps. Her weight is up 3 pounds and she will have a dialysis run today.     Past Medical History:   Diagnosis Date     Arthritis      CHF (congestive heart failure) (H)      Chronic anemia 6/1/2014     Chronic kidney disease      Chronic thoracic aortic dissection (H) 10/7/2015    Descending thoracic aorta; treated medically per notes of Dragan Singh and Jennifer.     COPD (chronic obstructive pulmonary disease) (H)      CVA (cerebral infarction)      Disease of thyroid gland      Dyslipidemia      ESRD (end stage renal disease) (H) 06/03/2009    on dialysis with Dr. Mitchell     Essential hypertension 6/30/2014     Gastrointestinal hemorrhage, unspecified gastrointestinal hemorrhage type 6/5/2017     GI (gastrointestinal bleed)      GI bleeding 6/5/2017     Gout      L3 vertebral fracture (H) 11/16/2015     Left Atrial Appendage Occlusion (WATCHMAN) 4/5/2018    LAAO April 5, 2018 (30 mm WATCHMAN)     Obesity      ZULEIKA (obstructive sleep apnea), severe, intolerant of CPAP 10/22/2015     Pneumonia 9-7-2015     Right foot drop      Spinal stenosis 3/28/2016     Stroke (H) 3/24/2016             Family History   Problem Relation Age of Onset     Dementia Mother      Diabetes Mother      Arthritis Mother      Cancer Mother      Depression Mother      Heart disease Mother      Vision loss Mother      Stroke Father      Heart disease Father      Breast cancer Neg Hx      Social History     Socioeconomic History     Marital status:      Spouse name: Cayden     Number of children: 2     Years of education: Not on file     Highest education level: Not on file   Occupational History     Employer: RETIRED   Social Needs     Financial resource strain: Not on file     Food insecurity:     Worry: Not on file     Inability: Not on file     Transportation needs:     Medical: Not on file     Non-medical: Not on file   Tobacco Use     Smoking status: Former Smoker "     Packs/day: 1.50     Years: 37.00     Pack years: 55.50     Types: Cigarettes     Last attempt to quit: 1/1/2009     Years since quitting: 10.5     Smokeless tobacco: Never Used   Substance and Sexual Activity     Alcohol use: No     Alcohol/week: 7.0 oz     Types: 14 Standard drinks or equivalent per week     Comment: 14 mixed drinks per week     Drug use: No     Sexual activity: Never     Partners: Male   Lifestyle     Physical activity:     Days per week: Not on file     Minutes per session: Not on file     Stress: Not on file   Relationships     Social connections:     Talks on phone: Not on file     Gets together: Not on file     Attends Mandaeism service: Not on file     Active member of club or organization: Not on file     Attends meetings of clubs or organizations: Not on file     Relationship status: Not on file     Intimate partner violence:     Fear of current or ex partner: Not on file     Emotionally abused: Not on file     Physically abused: Not on file     Forced sexual activity: Not on file   Other Topics Concern     Not on file   Social History Narrative    Lives with her . Daughter in [place] and daughter in [place].         Review of Systems   Constitutional: Negative for activity change, appetite change, fatigue and fever.   HENT: Negative for congestion.    Respiratory: Positive for shortness of breath. Negative for cough and wheezing.         Dialysis port R upper chest    Cardiovascular: Negative for chest pain and leg swelling.   Gastrointestinal: Negative for abdominal distention, abdominal pain, constipation, diarrhea and nausea.   Genitourinary: Negative for dysuria.   Musculoskeletal: Negative for arthralgias and back pain.   Skin: Positive for wound. Negative for color change.   Neurological: Negative for dizziness.   Psychiatric/Behavioral: Positive for sleep disturbance. Negative for agitation, behavioral problems and confusion.         On trazodone        .  Vitals:     07/08/19 0840   BP: 122/68   Pulse: 85   Resp: 18   Temp: 97.9  F (36.6  C)   SpO2: 95%   Weight: 171 lb (77.6 kg)       Physical Exam   Constitutional: She is oriented to person, place, and time. She appears well-developed and well-nourished.   pleasant woman in no acute distress   HENT:   Head: Normocephalic and atraumatic.   Eyes: Pupils are equal, round, and reactive to light. Conjunctivae are normal.   Neck: Normal range of motion. Neck supple.   Cardiovascular: Normal rate, regular rhythm and normal heart sounds.   No murmur heard.  Pulmonary/Chest: Effort normal and breath sounds normal. She has no wheezes. She has no rales.   Abdominal: Soft. Bowel sounds are normal. She exhibits no distension. There is no tenderness.   Musculoskeletal: Normal range of motion. She exhibits no edema.   Right foot drop   Neurological: She is alert and oriented to person, place, and time.   Neuropathy right foot and decreased sensation bottom of left foot.     Skin: Skin is warm and dry.   Left hip wound with staples C/D/I   Psychiatric: She has a normal mood and affect. Her behavior is normal.         LABS:   Recent Results (from the past 240 hour(s))   HM2(CBC w/o Differential)   Result Value Ref Range    WBC 3.9 (L) 4.0 - 11.0 thou/uL    RBC 2.30 (L) 3.80 - 5.40 mill/uL    Hemoglobin 7.4 (L) 12.0 - 16.0 g/dL    Hematocrit 24.0 (L) 35.0 - 47.0 %     (H) 80 - 100 fL    MCH 32.2 27.0 - 34.0 pg    MCHC 30.8 (L) 32.0 - 36.0 g/dL    RDW 21.1 (H) 11.0 - 14.5 %    Platelets 139 (L) 140 - 440 thou/uL    MPV 11.5 8.5 - 12.5 fL   HM2(CBC w/o Differential)   Result Value Ref Range    WBC 5.5 4.0 - 11.0 thou/uL    RBC 2.74 (L) 3.80 - 5.40 mill/uL    Hemoglobin 8.9 (L) 12.0 - 16.0 g/dL    Hematocrit 28.3 (L) 35.0 - 47.0 %     (H) 80 - 100 fL    MCH 32.5 27.0 - 34.0 pg    MCHC 31.4 (L) 32.0 - 36.0 g/dL    RDW 21.8 (H) 11.0 - 14.5 %    Platelets 208 140 - 440 thou/uL    MPV 9.5 8.5 - 12.5 fL     Current Outpatient Medications    Medication Sig Note     acetaminophen (TYLENOL) 500 MG tablet Take 2 tablets (1,000 mg total) by mouth 3 (three) times a day.      aspirin 325 MG tablet Take 1 tablet (325 mg total) by mouth 2 (two) times a day.      atorvastatin (LIPITOR) 10 MG tablet Take 10 mg by mouth at bedtime.      B complex-vitamin C-folic acid (DIALYVITE) 100-1 mg Tab Take 1 tablet by mouth daily.      chlorpheniramine/dextromethorp (CORICIDIN HBP COUGH AND COLD ORAL) Take 1 tablet by mouth every 6 (six) hours as needed.      cholecalciferol, vitamin D3, 2,000 unit cap Take 2,000 Units by mouth daily with lunch.       cinacalcet (SENSIPAR) 30 MG tablet Take 30 mg by mouth 3 times weekly with dialysis            diphenhydrAMINE (BENADRYL) 25 mg tablet Take 50 mg by mouth at bedtime as needed for sleep.      folic acid (FOLVITE) 1 MG tablet Take 1 mg by mouth daily.      gabapentin (NEURONTIN) 100 MG capsule Take 100 mg by mouth 3 (three) times a day.      Lactobacillus rhamnosus GG (CULTURELLE) 10-15 Billion cell capsule Take 1 capsule by mouth daily with lunch.      metoprolol succinate (TOPROL-XL) 25 MG Take 25 mg by mouth daily.      midodrine HCl (MIDODRINE ORAL) Take 15 mg by mouth 3 (three) times a week. Three times weekly before dialysis.             oxyCODONE (ROXICODONE) 5 MG immediate release tablet Take 0.5-1 tablets (2.5-5 mg total) by mouth every 4 (four) hours as needed.      polyvinyl alcohol (LIQUIFILM TEARS) 1.4 % ophthalmic solution Apply 1 drop to eye as needed for dry eyes.      ranitidine (ZANTAC) 150 MG tablet Take 150 mg by mouth at bedtime.      senna-docusate (SENNOSIDES-DOCUSATE SODIUM) 8.6-50 mg tablet Take 1 tablet by mouth at bedtime.       sevelamer carbonate (RENVELA) 800 mg tablet Take 2,400 mg by mouth 3 (three) times a day with meals.             timolol maleate (TIMOPTIC) 0.5 % ophthalmic solution Administer 1 drop to both eyes 2 (two) times a day.       traZODone (DESYREL) 50 MG tablet Take 50 mg by  mouth at bedtime. 6/23/2019: Patient states she uses this almost every night.      ASSESSMENT:      ICD-10-CM    1. S/P ORIF (open reduction internal fixation) fracture Z96.7     Z87.81    2. Essential hypertension I10    3. Chronic atrial fibrillation (H) I48.2    4. ESRD (end stage renal disease) on dialysis (H) N18.6     Z99.2        PLAN:    S/P internal fixation using intramedullary nail. PT/OT/pain  Control, monitor incision for S/S infection   End-stage renal disease. Dialysis as scheduled.  M/W/F   Hypertension on Metoprolol Succinate   Chronic atrial fibrillation- on coumadin , metoprolol  Pain control Tylenol and Oxycodone as scheduled  check hip  x-ray due to increased pain.   Hyperkalemia.  Potassium was occasionally greater than 6 and was treated with dialysis 6/24 potassium 4.5  Anemia.  Chronic anemia was worsened with acute blood loss.  She received 1 unit of packed red blood cells HGB 7.9 on 6/27/19, dropped to 7.4 came up to 8.9 and 7.9 on 7/8. Monitor labs. Check occult stool  Insomnia-trazadone  75 mg HS  Constipation resolved continue  senna s to 2 tabs two times a day, Biscodyl suppository 10 mg MI q 3 days PRN if no BM            Electronically signed by: Le Flynn CNP

## 2021-05-30 NOTE — PROGRESS NOTES
[facility]    Facility:   [facility] [[id number]]   Code Status: FULL CODE      CHIEF COMPLAINT/REASON FOR VISIT:  Chief Complaint   Patient presents with     Problem Visit     INR review       HISTORY:      HPI: Belia is a 73 y.o. female undergoing physical and  occupational therapy at [facility]. with history of ESRD on hemodialysis M/W/F, chronic thrombocytopenia, chronic atrial fibrillation, sick sinus syndrome, status post watchman device, status post pacemaker, diastolic CHF, hypertension, ZULEIKA, anemia of chronic kidney disease, dyslipidemia who was brought to the emergency department for evaluation after a fall.  The patient uses a walker for ambulation due to right foot drop and while at home tripped on the kitchen floor mat when she turned and fell.  She denies head trauma or loss of consciousness but complained of left hip pain.  In the ED, she was hemodynamically stable.  X-rays showed a displaced, comminuted intertrochanteric fracture of left femur. She underwent a left intramedullary nailing.     Today she is seen for review of first scheduled INR due to a new DVT left leg. She denied CP. She does have shortness of breath with activity. She is on 2 L NC at night due to unable to tolerate C- Pap after many tries She occasionally will wear 1L NC  during the day.   She had a care Care conference  7/16/19. INR subtherapeutic.coumadin increased and next INR 7/22/19    Past Medical History:   Diagnosis Date     Arthritis      CHF (congestive heart failure) (H)      Chronic anemia 6/1/2014     Chronic kidney disease      Chronic thoracic aortic dissection (H) 10/7/2015    Descending thoracic aorta; treated medically per notes of Dragan Singh and Jennifer.     COPD (chronic obstructive pulmonary disease) (H)      CVA (cerebral infarction)      Disease of thyroid gland      Dyslipidemia      ESRD (end stage renal disease) (H) 06/03/2009    on  dialysis with Dr. Mitchell     Essential hypertension 6/30/2014     Gastrointestinal hemorrhage, unspecified gastrointestinal hemorrhage type 6/5/2017     GI (gastrointestinal bleed)      GI bleeding 6/5/2017     Gout      L3 vertebral fracture (H) 11/16/2015     Left Atrial Appendage Occlusion (WATCHMAN) 4/5/2018    LAAO April 5, 2018 (30 mm WATCHMAN)     Obesity      ZLUEIKA (obstructive sleep apnea), severe, intolerant of CPAP 10/22/2015     Pneumonia 9-7-2015     Right foot drop      Spinal stenosis 3/28/2016     Stroke (H) 3/24/2016             Family History   Problem Relation Age of Onset     Dementia Mother      Diabetes Mother      Arthritis Mother      Cancer Mother      Depression Mother      Heart disease Mother      Vision loss Mother      Stroke Father      Heart disease Father      Breast cancer Neg Hx      Social History     Socioeconomic History     Marital status:      Spouse name: Cayden     Number of children: 2     Years of education: Not on file     Highest education level: Not on file   Occupational History     Employer: RETIRED   Social Needs     Financial resource strain: Not on file     Food insecurity:     Worry: Not on file     Inability: Not on file     Transportation needs:     Medical: Not on file     Non-medical: Not on file   Tobacco Use     Smoking status: Former Smoker     Packs/day: 1.50     Years: 37.00     Pack years: 55.50     Types: Cigarettes     Last attempt to quit: 1/1/2009     Years since quitting: 10.5     Smokeless tobacco: Never Used   Substance and Sexual Activity     Alcohol use: No     Alcohol/week: 7.0 oz     Types: 14 Standard drinks or equivalent per week     Comment: 14 mixed drinks per week     Drug use: No     Sexual activity: Never     Partners: Male   Lifestyle     Physical activity:     Days per week: Not on file     Minutes per session: Not on file     Stress: Not on file   Relationships     Social connections:     Talks on phone: Not on file     Gets  "together: Not on file     Attends Confucianism service: Not on file     Active member of club or organization: Not on file     Attends meetings of clubs or organizations: Not on file     Relationship status: Not on file     Intimate partner violence:     Fear of current or ex partner: Not on file     Emotionally abused: Not on file     Physically abused: Not on file     Forced sexual activity: Not on file   Other Topics Concern     Not on file   Social History Narrative    Lives with her . Daughter in [place] and daughter in [place].         Review of Systems   Constitutional: Negative for activity change, appetite change, fatigue and fever.   HENT: Negative for congestion.    Respiratory: Positive for shortness of breath. Negative for cough and wheezing.         Dialysis port R upper chest    Cardiovascular: Negative for chest pain and leg swelling.   Gastrointestinal: Negative for abdominal distention, abdominal pain, constipation, diarrhea and nausea.   Genitourinary: Negative for dysuria.        Voids \"a little per her report\"   Musculoskeletal: Negative for arthralgias and back pain.   Skin: Positive for wound. Negative for color change.   Neurological: Negative for dizziness.   Psychiatric/Behavioral: Positive for sleep disturbance. Negative for agitation, behavioral problems and confusion.         On trazodone        .  Vitals:    07/18/19 0851   BP: 133/58   Pulse: 72   Resp: 18   Temp: 98.1  F (36.7  C)   SpO2: 93%   Weight: 172 lb 1.6 oz (78.1 kg)       Physical Exam   Constitutional: She is oriented to person, place, and time. She appears well-developed and well-nourished.   pleasant woman in no acute distress   HENT:   Head: Normocephalic and atraumatic.   Eyes: Pupils are equal, round, and reactive to light. Conjunctivae are normal.   Neck: Normal range of motion. Neck supple.   Cardiovascular: Normal rate, regular rhythm and normal heart sounds.   No murmur heard.  Pulmonary/Chest: Effort normal " and breath sounds normal. She has no wheezes. She has no rales.   Abdominal: Soft. Bowel sounds are normal. She exhibits no distension. There is no tenderness.   Musculoskeletal: Normal range of motion. She exhibits edema.   Right foot drop  2+ edema left leg.    Neurological: She is alert and oriented to person, place, and time.   Neuropathy right foot and decreased sensation bottom of left foot.     Skin: Skin is warm and dry.   Left hip wound    Psychiatric: She has a normal mood and affect. Her behavior is normal.         LABS:   Recent Results (from the past 240 hour(s))   Occult Blood, Fecal   Result Value Ref Range    Occult Blood, Stool #1 Negative Negative   Occult Blood, Fecal   Result Value Ref Range    Occult Blood, Stool #1 Negative Negative   Occult Blood, Fecal   Result Value Ref Range    Occult Blood, Stool #1 Negative Negative   Hemoglobin   Result Value Ref Range    Hemoglobin 8.9 (L) 12.0 - 16.0 g/dL     Current Outpatient Medications   Medication Sig Note     acetaminophen (TYLENOL) 500 MG tablet Take 2 tablets (1,000 mg total) by mouth 3 (three) times a day.      aspirin 325 MG tablet Take 1 tablet (325 mg total) by mouth 2 (two) times a day.      atorvastatin (LIPITOR) 10 MG tablet Take 10 mg by mouth at bedtime.      B complex-vitamin C-folic acid (DIALYVITE) 100-1 mg Tab Take 1 tablet by mouth daily.      chlorpheniramine/dextromethorp (CORICIDIN HBP COUGH AND COLD ORAL) Take 1 tablet by mouth every 6 (six) hours as needed.      cholecalciferol, vitamin D3, 2,000 unit cap Take 2,000 Units by mouth daily with lunch.       cinacalcet (SENSIPAR) 30 MG tablet Take 30 mg by mouth 3 times weekly with dialysis            diphenhydrAMINE (BENADRYL) 25 mg tablet Take 50 mg by mouth at bedtime as needed for sleep.      folic acid (FOLVITE) 1 MG tablet Take 1 mg by mouth daily.      gabapentin (NEURONTIN) 100 MG capsule Take 100 mg by mouth 3 (three) times a day.      Lactobacillus rhamnosus GG  (CULTURELLE) 10-15 Billion cell capsule Take 1 capsule by mouth daily with lunch.      metoprolol succinate (TOPROL-XL) 25 MG Take 25 mg by mouth daily.      midodrine HCl (MIDODRINE ORAL) Take 15 mg by mouth 3 (three) times a week. Three times weekly before dialysis.             oxyCODONE (ROXICODONE) 5 MG immediate release tablet Take 0.5-1 tablets (2.5-5 mg total) by mouth every 4 (four) hours as needed.      polyvinyl alcohol (LIQUIFILM TEARS) 1.4 % ophthalmic solution Apply 1 drop to eye as needed for dry eyes.      ranitidine (ZANTAC) 150 MG tablet Take 150 mg by mouth at bedtime.      senna-docusate (SENNOSIDES-DOCUSATE SODIUM) 8.6-50 mg tablet Take 1 tablet by mouth at bedtime.       sevelamer carbonate (RENVELA) 800 mg tablet Take 2,400 mg by mouth 3 (three) times a day with meals.             timolol maleate (TIMOPTIC) 0.5 % ophthalmic solution Administer 1 drop to both eyes 2 (two) times a day.       traZODone (DESYREL) 50 MG tablet Take 50 mg by mouth at bedtime. 6/23/2019: Patient states she uses this almost every night.      ASSESSMENT:      ICD-10-CM    1. Closed displaced intertrochanteric fracture of left femur with routine healing, subsequent encounter S72.142D    2. ESRD (end stage renal disease) (H) N18.6    3. Anticoagulation goal of INR 2 to 3 Z51.81     Z79.01        PLAN:    S/P internal fixation using intramedullary nail. PT/OT/pain  Control, monitor incision for S/S infection   End-stage renal disease. Dialysis as scheduled.  M/W/F   Hypertension on Metoprolol Succinate   Chronic atrial fibrillation- on coumadin , metoprolol  Pain control Tylenol and Oxycodone as scheduled  check hip  x-ray due to increased pain.   Hyperkalemia.  Potassium was occasionally greater than 6 and was treated with dialysis 6/24 potassium 4.5  Anemia.  Chronic anemia was worsened with acute blood loss.  She received 1 unit of packed red blood cells HGB 7.9 on 6/27/19, dropped to 7.4 came up to 8.9 and 7.9 on 7/8.   And lastly 8.9 on 7/15/19. Monitor labs.  occult stools x 3 negative despite rectal bleeding GI consult.   Insomnia-trazadone  75 mg HS  Constipation resolved continue  senna s to 2 tabs two times a day, Biscodyl suppository 10 mg AL q 3 days PRN if no BM   GI bleeding - occults negative,  stat HGB 8.9 which is up. From 7.9- GI consult.   Anticoagulation therapy - recently started on coumadin, adjust per INR's.       Electronically signed by: Le Flynn CNP

## 2021-05-30 NOTE — PROGRESS NOTES
"[facility]    Facility:   [facility] [[id number]]   Code Status: FULL CODE      CHIEF COMPLAINT/REASON FOR VISIT:  Chief Complaint   Patient presents with     Review Of Multiple Medical Conditions       HISTORY:      HPI: Belia is a 73 y.o. female undergoing physical and  occupational therapy at [facility]. with history of ESRD on hemodialysis M/W/F, chronic thrombocytopenia, chronic atrial fibrillation, sick sinus syndrome, status post watchman device, status post pacemaker, diastolic CHF, hypertension, ZULEIKA, anemia of chronic kidney disease, dyslipidemia who was brought to the emergency department for evaluation after a fall.  The patient uses a walker for ambulation due to right foot drop and while at home tripped on the kitchen floor mat when she turned and fell.  She denies head trauma or loss of consciousness but complained of left hip pain.  In the ED, she was hemodynamically stable.  X-rays showed a displaced, comminuted intertrochanteric fracture of left femur. She underwent a left intramedullary nailing.     Today she is seen for a routine first visit to review multiple medical issues. She denied CP. She does have shortness of breath with activity. She is on 2 L NC at night due to unable to tolerate C- Pap after many tries. She reports she will wear  it during the day  occassionally if she gets stressed and hyperventilates.  She  is with right foot drop and tells me she has shoes ordered and in the process of getting a brace.No BM x 6 days, abdomen is soft and she os passing \"a little gas\". No abdominal tenderness with palpation.  She was also seen for benadryl use and this was stopped and trazodone increased to 75 mg.  She reports neuropathy in her right foot and cant feel the bottom of her left foot. She reports the surgeon is aware and it is slowly coming back. She was able to flex and extend the foot, wiggle her toes and had  a 3+ " pedal pulse.     Past Medical History:   Diagnosis Date     Arthritis      CHF (congestive heart failure) (H)      Chronic anemia 6/1/2014     Chronic kidney disease      Chronic thoracic aortic dissection (H) 10/7/2015    Descending thoracic aorta; treated medically per notes of Dragan Singh and Jennifer.     COPD (chronic obstructive pulmonary disease) (H)      CVA (cerebral infarction)      Disease of thyroid gland      Dyslipidemia      ESRD (end stage renal disease) (H) 06/03/2009    on dialysis with Dr. Mitchell     Essential hypertension 6/30/2014     Gastrointestinal hemorrhage, unspecified gastrointestinal hemorrhage type 6/5/2017     GI (gastrointestinal bleed)      GI bleeding 6/5/2017     Gout      L3 vertebral fracture (H) 11/16/2015     Left Atrial Appendage Occlusion (WATCHMAN) 4/5/2018    LAAO April 5, 2018 (30 mm WATCHMAN)     Obesity      ZULEIKA (obstructive sleep apnea), severe, intolerant of CPAP 10/22/2015     Pneumonia 9-7-2015     Right foot drop      Spinal stenosis 3/28/2016     Stroke (H) 3/24/2016             Family History   Problem Relation Age of Onset     Dementia Mother      Diabetes Mother      Arthritis Mother      Cancer Mother      Depression Mother      Heart disease Mother      Vision loss Mother      Stroke Father      Heart disease Father      Breast cancer Neg Hx      Social History     Socioeconomic History     Marital status:      Spouse name: Cayden     Number of children: 2     Years of education: Not on file     Highest education level: Not on file   Occupational History     Employer: RETIRED   Social Needs     Financial resource strain: Not on file     Food insecurity:     Worry: Not on file     Inability: Not on file     Transportation needs:     Medical: Not on file     Non-medical: Not on file   Tobacco Use     Smoking status: Former Smoker     Packs/day: 1.50     Years: 37.00     Pack years: 55.50     Types: Cigarettes     Last attempt to quit: 1/1/2009     Years  since quitting: 10.4     Smokeless tobacco: Never Used   Substance and Sexual Activity     Alcohol use: No     Alcohol/week: 7.0 oz     Types: 14 Standard drinks or equivalent per week     Comment: 14 mixed drinks per week     Drug use: No     Sexual activity: Never     Partners: Male   Lifestyle     Physical activity:     Days per week: Not on file     Minutes per session: Not on file     Stress: Not on file   Relationships     Social connections:     Talks on phone: Not on file     Gets together: Not on file     Attends Hoahaoism service: Not on file     Active member of club or organization: Not on file     Attends meetings of clubs or organizations: Not on file     Relationship status: Not on file     Intimate partner violence:     Fear of current or ex partner: Not on file     Emotionally abused: Not on file     Physically abused: Not on file     Forced sexual activity: Not on file   Other Topics Concern     Not on file   Social History Narrative    Lives with her . Daughter in [place] and daughter in [place].         Review of Systems   Constitutional: Negative for activity change, appetite change, fatigue and fever.   HENT: Negative for congestion.    Respiratory: Positive for shortness of breath. Negative for cough and wheezing.         Dialysis port R upper chest    Cardiovascular: Negative for chest pain and leg swelling.   Gastrointestinal: Negative for abdominal distention, abdominal pain, constipation, diarrhea and nausea.   Genitourinary: Negative for dysuria.   Musculoskeletal: Negative for arthralgias and back pain.   Skin: Positive for wound. Negative for color change.   Neurological: Negative for dizziness.   Psychiatric/Behavioral: Positive for sleep disturbance. Negative for agitation, behavioral problems and confusion.        Trazodone increased and benadryl stopped        .  Vitals:    06/28/19 0638   BP: 117/56   Pulse: 71   Resp: 16   Temp: 97.4  F (36.3  C)   SpO2: 91%        Physical Exam   Constitutional: She is oriented to person, place, and time. She appears well-developed and well-nourished.   pleasant woman in no acute distress   HENT:   Head: Normocephalic and atraumatic.   Eyes: Pupils are equal, round, and reactive to light. Conjunctivae are normal.   Neck: Normal range of motion. Neck supple.   Cardiovascular: Normal rate, regular rhythm and normal heart sounds.   No murmur heard.  Pulmonary/Chest: Effort normal and breath sounds normal. She has no wheezes. She has no rales.   Abdominal: Soft. Bowel sounds are normal. She exhibits no distension. There is no tenderness.   Musculoskeletal: Normal range of motion. She exhibits no edema.   Right foot drop   Neurological: She is alert and oriented to person, place, and time.   Neuropathy right foot and decreased sensation bottom of left foot.     Skin: Skin is warm and dry.   Left hip wound with 2 Aquacel dressing with drainage    Psychiatric: She has a normal mood and affect. Her behavior is normal.         LABS:   Recent Results (from the past 240 hour(s))   Remote Device Check   Result Value Ref Range    Device Manufacture Integromics     Device Model L310 ACCOLADE MRI     Device Serial Number [id number]     Device Type Pacemaker     Device Implanting Provider MARISA Duarte II     Comments/Summary       Type: Add-on remote pacemaker transmission to assess histograms per Pilar STOKES   Presenting Rhythm: Ventricular pacing, rate 90s.   Lead/Battery status: Stable.   Arrhythmias: Since 6/14/19, presumed permanent atrial fibrillation (RV lead only), overall ventricular rates >/=120 bpm 0%. No ventricular arrhythmias.   Anticoagulant: LAAO device.   Comments: Normal magnet and pacemaker function. Routed to Nilam STOKES for review. KLP. ADD: Pt called to discuss some dizziness when she is at rest. Sensor adjustments made last week. Reviewed transmission and histograms. Good variability noted. Doubtful   that the device  is causing the issue. Pt is on dialysis. Advised that she talk with a nurse at her runs for BP/fluid issues. Pt also notes that she has some allergies that could be contributing. Advised that she continue to monitor and will defer on   changing any device settings. Pt verbalized understanding and in agreement. BK     ECG 12 lead nursing unit performed   Result Value Ref Range    SYSTOLIC BLOOD PRESSURE 161 mmHg    DIASTOLIC BLOOD PRESSURE 75 mmHg    VENTRICULAR RATE 84 BPM    ATRIAL RATE 85 BPM    P-R INTERVAL  ms    QRS DURATION 146 ms    Q-T INTERVAL 440 ms    QTC CALCULATION (BEZET) 519 ms    P Axis  degrees    R AXIS 97 degrees    T AXIS 58 degrees    MUSE DIAGNOSIS       Electronic ventricular pacemaker  Atrial fibrillation  Abnormal ECG  When compared with ECG of 18-MAY-2019 09:10,  No significant change was found  Confirmed by JENNY ARIAS MD LOC: ([id number]) on 6/23/2019 4:05:09 PM     Comprehensive Metabolic Panel   Result Value Ref Range    Sodium 134 (L) 136 - 145 mmol/L    Potassium 4.5 3.5 - 5.0 mmol/L    Chloride 100 98 - 107 mmol/L    CO2 20 (L) 22 - 31 mmol/L    Anion Gap, Calculation 14 5 - 18 mmol/L    Glucose 105 70 - 125 mg/dL    BUN 34 (H) 8 - 28 mg/dL    Creatinine 5.61 (H) 0.60 - 1.10 mg/dL    GFR MDRD Af Amer 9 (L) >60 mL/min/1.73m2    GFR MDRD Non Af Amer 7 (L) >60 mL/min/1.73m2    Bilirubin, Total 0.4 0.0 - 1.0 mg/dL    Calcium 8.8 8.5 - 10.5 mg/dL    Protein, Total 6.2 6.0 - 8.0 g/dL    Albumin 3.5 3.5 - 5.0 g/dL    Alkaline Phosphatase 101 45 - 120 U/L    AST 16 0 - 40 U/L    ALT 15 0 - 45 U/L   INR   Result Value Ref Range    INR 1.02 0.90 - 1.10   APTT   Result Value Ref Range    PTT 30 24 - 37 seconds   HM1 (CBC with Diff)   Result Value Ref Range    WBC 5.9 4.0 - 11.0 thou/uL    RBC 2.78 (L) 3.80 - 5.40 mill/uL    Hemoglobin 9.4 (L) 12.0 - 16.0 g/dL    Hematocrit 29.0 (L) 35.0 - 47.0 %     (H) 80 - 100 fL    MCH 33.8 27.0 - 34.0 pg    MCHC 32.4 32.0 - 36.0 g/dL    RDW 14.6 (H)  11.0 - 14.5 %    Platelets 88 (L) 140 - 440 thou/uL    MPV 9.5 8.5 - 12.5 fL    Neutrophils % 80 (H) 50 - 70 %    Lymphocytes % 11 (L) 20 - 40 %    Monocytes % 7 2 - 10 %    Eosinophils % 1 0 - 6 %    Basophils % 0 0 - 2 %    Neutrophils Absolute 4.7 2.0 - 7.7 thou/uL    Lymphocytes Absolute 0.7 (L) 0.8 - 4.4 thou/uL    Monocytes Absolute 0.4 0.0 - 0.9 thou/uL    Eosinophils Absolute 0.1 0.0 - 0.4 thou/uL    Basophils Absolute 0.0 0.0 - 0.2 thou/uL   Antibody Identification   Result Value Ref Range    Unidentified Antibody       POS WITH 3/10 CELLS;NONSPECIFIC;NO SPECIFICITY FOUND;REFERRED FOR I.D.   Reference Lab Workup   Result Value Ref Range    Antibody ID See scanned report.    Type and Screen   Result Value Ref Range    ABORh O POS     Antibody Screen Weak Positive (!) Negative   Protime-INR (if on Coumadin)   Result Value Ref Range    INR 1.05 0.90 - 1.10   APTT  (if on Coumadin)   Result Value Ref Range    PTT 30 24 - 37 seconds   Platelet Function Test   Result Value Ref Range    PFA-COL/ (H) 1 - 180 sec   Platelet Function Confirmation   Result Value Ref Range    PFA-COL/ (H) 1 - 110 sec   Protime-INR (if on Coumadin)   Result Value Ref Range    INR 1.12 (H) 0.90 - 1.10   Platelet Product Information   Result Value Ref Range    Unit Type O Pos     Blood Expiration Date [id number]     Unit Number [id number]     Status Transfused     Component Platelets     PRODUCT CODE [id number]     Issue Date and Time [id number]     Blood Type 5100     CODING SYSTEM [id number]    Potassium   Result Value Ref Range    Potassium 5.8 (H) 3.5 - 5.0 mmol/L   Protime-INR (if on Coumadin)   Result Value Ref Range    INR 1.15 (H) 0.90 - 1.10   APTT  (if on Coumadin)   Result Value Ref Range    PTT 32 24 - 37 seconds   Potassium   Result Value Ref Range    Potassium 6.8 (HH) 3.5 - 5.0 mmol/L   HGB   Result Value Ref Range    Hemoglobin 6.9 (LL) 12.0 - 16.0 g/dL   POCT Glucose   Result Value Ref Range    Glucose  147 (H) 70 - 139 mg/dL   Potassium   Result Value Ref Range    Potassium 6.5 (HH) 3.5 - 5.0 mmol/L   ECG 12 lead with MUSE, verify if completed in ER   Result Value Ref Range    SYSTOLIC BLOOD PRESSURE  mmHg    DIASTOLIC BLOOD PRESSURE  mmHg    VENTRICULAR RATE 70 BPM    ATRIAL RATE 34 BPM    P-R INTERVAL  ms    QRS DURATION 142 ms    Q-T INTERVAL 456 ms    QTC CALCULATION (BEZET) 492 ms    P Axis  degrees    R AXIS 43 degrees    T AXIS 58 degrees    MUSE DIAGNOSIS       Electronic ventricular pacemaker Atrial fibrillation  When compared with ECG of 23-JUN-2019 01:18,  No significant change was found  Confirmed by STEVEN ZAPATA MD LOC:SJ ([id number]) on 6/24/2019 4:16:06 PM     POCT Glucose   Result Value Ref Range    Glucose 135 70 - 139 mg/dL   POCT Glucose   Result Value Ref Range    Glucose 177 (H) 70 - 139 mg/dL   POCT Glucose   Result Value Ref Range    Glucose 142 (H) 70 - 139 mg/dL   POCT Glucose   Result Value Ref Range    Glucose 124 70 - 139 mg/dL   POCT Glucose - for patients with diabetes   Result Value Ref Range    Glucose 123 70 - 139 mg/dL   POCT Glucose - for patients with diabetes   Result Value Ref Range    Glucose 111 70 - 139 mg/dL   Potassium   Result Value Ref Range    Potassium 4.5 3.5 - 5.0 mmol/L   POCT Glucose   Result Value Ref Range    Glucose 122 70 - 139 mg/dL   POCT Glucose - for patients with diabetes   Result Value Ref Range    Glucose 118 70 - 139 mg/dL   Hemoglobin   Result Value Ref Range    Hemoglobin 7.5 (L) 12.0 - 16.0 g/dL   Crossmatch   Result Value Ref Range    Crossmatch COMPATIBLE     Blood Expiration Date [id number]     Unit Type O Neg     Unit Number [id number]     Status Transfused     Component Red Blood Cells     PRODUCT CODE [id number]     Issue Date and Time [id number]     Blood Type 9500     CODING SYSTEM [id number]    APTT  (if on Coumadin)   Result Value Ref Range    PTT 33 24 - 37 seconds   HM2(CBC w/o Differential)   Result Value Ref Range    WBC 3.6 (L)  4.0 - 11.0 thou/uL    RBC 1.96 (L) 3.80 - 5.40 mill/uL    Hemoglobin 6.3 (LL) 12.0 - 16.0 g/dL    Hematocrit 19.4 (L) 35.0 - 47.0 %    MCV 99 80 - 100 fL    MCH 32.1 27.0 - 34.0 pg    MCHC 32.5 32.0 - 36.0 g/dL    RDW 20.5 (H) 11.0 - 14.5 %    Platelets 67 (L) 140 - 440 thou/uL    MPV 9.9 8.5 - 12.5 fL   Hepatitis B Surface Antigen (HBsAG)   Result Value Ref Range    Hepatitis B Surface Ag Negative Negative   Blood Gases, Arterial   Result Value Ref Range    pH, Arterial 7.39 7.37 - 7.44    pCO2, Arterial 50 (H) 35 - 45 mm Hg    pO2, Arterial 77 75 - 85 mm Hg    Bicarbonate, Arterial Calc 28.7 23.0 - 29.0 mmol/L    O2 Sat, Arterial 99.4 (H) 95.0 - 96.0 %    Oxyhemoglobin 95.4 95.0 - 96.0 %    Base Excess, Arterial Calc 4.8 mmol/L    Ventilation Mode Nasal cannula     Flow 4.0 LPM    Sample Stabilized Temperature 37.0 degrees C   Hemoglobin   Result Value Ref Range    Hemoglobin 7.4 (L) 12.0 - 16.0 g/dL   Crossmatch   Result Value Ref Range    Crossmatch COMPATIBLE     Blood Expiration Date [id number]     Unit Type O Neg     Unit Number [id number]     Status Transfused     Component Red Blood Cells     PRODUCT CODE X9655Y58     Issue Date and Time [id number]     Blood Type 9500     CODING SYSTEM [id number]    APTT  (if on Coumadin)   Result Value Ref Range    PTT 43 (H) 24 - 37 seconds   Hemoglobin   Result Value Ref Range    Hemoglobin 6.7 (LL) 12.0 - 16.0 g/dL   Hemoglobin   Result Value Ref Range    Hemoglobin 8.0 (L) 12.0 - 16.0 g/dL   Crossmatch   Result Value Ref Range    Crossmatch COMPATIBLE     Blood Expiration Date [id number]     Unit Type O Pos     Unit Number [id number]     Status Transfused     Component Red Blood Cells     PRODUCT CODE R4180B23     Issue Date and Time [id number]     Blood Type 5100     CODING SYSTEM [id number]    Crossmatch   Result Value Ref Range    Crossmatch COMPATIBLE     Blood Expiration Date [id number]     Unit Type O Pos     Unit Number [id number]      Status Released     Component Red Blood Cells     PRODUCT CODE [id number]     Blood Type 5100     CODING SYSTEM [id number]    APTT  (if on Coumadin)   Result Value Ref Range    PTT 52 (H) 24 - 37 seconds   Hemoglobin   Result Value Ref Range    Hemoglobin 7.9 (L) 12.0 - 16.0 g/dL     Current Outpatient Medications   Medication Sig Note     acetaminophen (TYLENOL) 500 MG tablet Take 2 tablets (1,000 mg total) by mouth 3 (three) times a day.      aspirin 325 MG tablet Take 1 tablet (325 mg total) by mouth 2 (two) times a day.      atorvastatin (LIPITOR) 10 MG tablet Take 10 mg by mouth at bedtime.      B complex-vitamin C-folic acid (DIALYVITE) 100-1 mg Tab Take 1 tablet by mouth daily.      chlorpheniramine/dextromethorp (CORICIDIN HBP COUGH AND COLD ORAL) Take 1 tablet by mouth every 6 (six) hours as needed.      cholecalciferol, vitamin D3, 2,000 unit cap Take 2,000 Units by mouth daily with lunch.       cinacalcet (SENSIPAR) 30 MG tablet Take 30 mg by mouth 3 times weekly with dialysis            diphenhydrAMINE (BENADRYL) 25 mg tablet Take 50 mg by mouth at bedtime as needed for sleep.      folic acid (FOLVITE) 1 MG tablet Take 1 mg by mouth daily.      gabapentin (NEURONTIN) 100 MG capsule Take 100 mg by mouth 3 (three) times a day.      Lactobacillus rhamnosus GG (CULTURELLE) 10-15 Billion cell capsule Take 1 capsule by mouth daily with lunch.      metoprolol succinate (TOPROL-XL) 25 MG Take 25 mg by mouth daily.      midodrine HCl (MIDODRINE ORAL) Take 15 mg by mouth 3 (three) times a week. Three times weekly before dialysis.             oxyCODONE (ROXICODONE) 5 MG immediate release tablet Take 0.5-1 tablets (2.5-5 mg total) by mouth every 4 (four) hours as needed.      polyvinyl alcohol (LIQUIFILM TEARS) 1.4 % ophthalmic solution Apply 1 drop to eye as needed for dry eyes.      ranitidine (ZANTAC) 150 MG tablet Take 150 mg by mouth at bedtime.      senna-docusate (SENNOSIDES-DOCUSATE SODIUM) 8.6-50 mg tablet Take  1 tablet by mouth at bedtime.       sevelamer carbonate (RENVELA) 800 mg tablet Take 2,400 mg by mouth 3 (three) times a day with meals.             timolol maleate (TIMOPTIC) 0.5 % ophthalmic solution Administer 1 drop to both eyes 2 (two) times a day.       traZODone (DESYREL) 50 MG tablet Take 50 mg by mouth at bedtime. 6/23/2019: Patient states she uses this almost every night.      ASSESSMENT:      ICD-10-CM    1. Chronic atrial fibrillation (H) I48.2    2. ESRD (end stage renal disease) on dialysis (H) N18.6     Z99.2    3. Chronic anemia D64.9    4. Closed fracture of left hip with routine healing, subsequent encounter S72.002D     left intramedullary nailing        PLAN:    S/P internal fixation using intramedullary nail. PT/OT/pain  Control, monitor incision for S/S infection   End-stage renal disease. Dialysis as scheduled.  M/W/F   Hypertension on Metoprolol Succinate   Chronic atrial fibrillation- on coumadin , metoprolol  Pain control Tylenol and Oxycodone as scheduled    Hyperkalemia.  Potassium was occasionally greater than 6 and was treated with dialysis 6/24 potassium 4.5  Anemia.  Chronic anemia was worsened with acute blood loss.  She received 1 unit of packed red blood cells HGB 7.9 on 6/27/19  Insomnia- stop benadryl and increase trazadone to 75 mg HS  Constipation increase senna s to 2 tabs two times a day, Biscodyl suppository 10 mg [place] q 3 days PRN if no BM            Electronically signed by: Le Flynn CNP

## 2021-05-30 NOTE — PROGRESS NOTES
[facility]    Facility:   [facility] [[id number]]   Code Status: FULL CODE      CHIEF COMPLAINT/REASON FOR VISIT:  Chief Complaint   Patient presents with     Problem Visit     rectal bleeding        HISTORY:      HPI: Belia is a 73 y.o. female undergoing physical and  occupational therapy at [facility]. with history of ESRD on hemodialysis M/W/F, chronic thrombocytopenia, chronic atrial fibrillation, sick sinus syndrome, status post watchman device, status post pacemaker, diastolic CHF, hypertension, ZULEIKA, anemia of chronic kidney disease, dyslipidemia who was brought to the emergency department for evaluation after a fall.  The patient uses a walker for ambulation due to right foot drop and while at home tripped on the kitchen floor mat when she turned and fell.  She denies head trauma or loss of consciousness but complained of left hip pain.  In the ED, she was hemodynamically stable.  X-rays showed a displaced, comminuted intertrochanteric fracture of left femur. She underwent a left intramedullary nailing.     Today she is seen for review of a stat HGB, rectal bleeding weight review.  Her HGB has increased to 8.9,  Occult stools have been negative.  However last 3 occult stools were negative but she had a large amount of bleeding  Noted in the toilet. She reports a HX of diverticula  In the past requiring blood transfusions.  Her weights were reviewed and she was up 11 pounds and then down 12 pounds within the last week. She reports she lasix does not work for her and when she gains weight she does an extra run in which she did an extra run last week. She denied CP. She does have shortness of breath with activity. She is on 2 L NC at night due to unable to tolerate C- Pap after many tries She occasionally will wear 1L NC  during the day.  Care conference scheduled for today 7/16/19.    Past Medical History:   Diagnosis Date     Arthritis       CHF (congestive heart failure) (H)      Chronic anemia 6/1/2014     Chronic kidney disease      Chronic thoracic aortic dissection (H) 10/7/2015    Descending thoracic aorta; treated medically per notes of Dragan Singh and Jennifer.     COPD (chronic obstructive pulmonary disease) (H)      CVA (cerebral infarction)      Disease of thyroid gland      Dyslipidemia      ESRD (end stage renal disease) (H) 06/03/2009    on dialysis with Dr. Mitchell     Essential hypertension 6/30/2014     Gastrointestinal hemorrhage, unspecified gastrointestinal hemorrhage type 6/5/2017     GI (gastrointestinal bleed)      GI bleeding 6/5/2017     Gout      L3 vertebral fracture (H) 11/16/2015     Left Atrial Appendage Occlusion (WATCHMAN) 4/5/2018    LAAO April 5, 2018 (30 mm WATCHMAN)     Obesity      ZULEIKA (obstructive sleep apnea), severe, intolerant of CPAP 10/22/2015     Pneumonia 9-7-2015     Right foot drop      Spinal stenosis 3/28/2016     Stroke (H) 3/24/2016             Family History   Problem Relation Age of Onset     Dementia Mother      Diabetes Mother      Arthritis Mother      Cancer Mother      Depression Mother      Heart disease Mother      Vision loss Mother      Stroke Father      Heart disease Father      Breast cancer Neg Hx      Social History     Socioeconomic History     Marital status:      Spouse name: Cayden     Number of children: 2     Years of education: Not on file     Highest education level: Not on file   Occupational History     Employer: RETIRED   Social Needs     Financial resource strain: Not on file     Food insecurity:     Worry: Not on file     Inability: Not on file     Transportation needs:     Medical: Not on file     Non-medical: Not on file   Tobacco Use     Smoking status: Former Smoker     Packs/day: 1.50     Years: 37.00     Pack years: 55.50     Types: Cigarettes     Last attempt to quit: 1/1/2009     Years since quitting: 10.5     Smokeless tobacco: Never Used   Substance and  "Sexual Activity     Alcohol use: No     Alcohol/week: 7.0 oz     Types: 14 Standard drinks or equivalent per week     Comment: 14 mixed drinks per week     Drug use: No     Sexual activity: Never     Partners: Male   Lifestyle     Physical activity:     Days per week: Not on file     Minutes per session: Not on file     Stress: Not on file   Relationships     Social connections:     Talks on phone: Not on file     Gets together: Not on file     Attends Baptism service: Not on file     Active member of club or organization: Not on file     Attends meetings of clubs or organizations: Not on file     Relationship status: Not on file     Intimate partner violence:     Fear of current or ex partner: Not on file     Emotionally abused: Not on file     Physically abused: Not on file     Forced sexual activity: Not on file   Other Topics Concern     Not on file   Social History Narrative    Lives with her . Daughter in [place] and daughter in [place].         Review of Systems   Constitutional: Negative for activity change, appetite change, fatigue and fever.   HENT: Negative for congestion.    Respiratory: Positive for shortness of breath. Negative for cough and wheezing.         Dialysis port R upper chest    Cardiovascular: Negative for chest pain and leg swelling.   Gastrointestinal: Negative for abdominal distention, abdominal pain, constipation, diarrhea and nausea.   Genitourinary: Negative for dysuria.        Voids \"a little per her report\"   Musculoskeletal: Negative for arthralgias and back pain.   Skin: Positive for wound. Negative for color change.   Neurological: Negative for dizziness.   Psychiatric/Behavioral: Positive for sleep disturbance. Negative for agitation, behavioral problems and confusion.         On trazodone        .  Vitals:    07/16/19 1326   BP: 119/58   Pulse: 70   Resp: 18   Temp: 98.3  F (36.8  C)   SpO2: 96%   Weight: 171 lb 3.2 oz (77.7 kg)       Physical Exam   Constitutional: " She is oriented to person, place, and time. She appears well-developed and well-nourished.   pleasant woman in no acute distress   HENT:   Head: Normocephalic and atraumatic.   Eyes: Pupils are equal, round, and reactive to light. Conjunctivae are normal.   Neck: Normal range of motion. Neck supple.   Cardiovascular: Normal rate, regular rhythm and normal heart sounds.   No murmur heard.  Pulmonary/Chest: Effort normal and breath sounds normal. She has no wheezes. She has no rales.   Abdominal: Soft. Bowel sounds are normal. She exhibits no distension. There is no tenderness.   Musculoskeletal: Normal range of motion. She exhibits edema.   Right foot drop  2+ edema left leg.    Neurological: She is alert and oriented to person, place, and time.   Neuropathy right foot and decreased sensation bottom of left foot.     Skin: Skin is warm and dry.   Left hip wound    Psychiatric: She has a normal mood and affect. Her behavior is normal.         LABS:   Recent Results (from the past 240 hour(s))   HM2(CBC w/o Differential)   Result Value Ref Range    WBC 4.5 4.0 - 11.0 thou/uL    RBC 2.40 (L) 3.80 - 5.40 mill/uL    Hemoglobin 7.9 (L) 12.0 - 16.0 g/dL    Hematocrit 25.8 (L) 35.0 - 47.0 %     (H) 80 - 100 fL    MCH 32.9 27.0 - 34.0 pg    MCHC 30.6 (L) 32.0 - 36.0 g/dL    RDW 22.6 (H) 11.0 - 14.5 %    Platelets 188 140 - 440 thou/uL    MPV 9.9 8.5 - 12.5 fL   Occult Blood, Fecal   Result Value Ref Range    Occult Blood, Stool #1 Negative Negative   Occult Blood, Fecal   Result Value Ref Range    Occult Blood, Stool #1 Negative Negative   Occult Blood, Fecal   Result Value Ref Range    Occult Blood, Stool #1 Negative Negative   Hemoglobin   Result Value Ref Range    Hemoglobin 8.9 (L) 12.0 - 16.0 g/dL     Current Outpatient Medications   Medication Sig Note     acetaminophen (TYLENOL) 500 MG tablet Take 2 tablets (1,000 mg total) by mouth 3 (three) times a day.      aspirin 325 MG tablet Take 1 tablet (325 mg total)  by mouth 2 (two) times a day.      atorvastatin (LIPITOR) 10 MG tablet Take 10 mg by mouth at bedtime.      B complex-vitamin C-folic acid (DIALYVITE) 100-1 mg Tab Take 1 tablet by mouth daily.      chlorpheniramine/dextromethorp (CORICIDIN HBP COUGH AND COLD ORAL) Take 1 tablet by mouth every 6 (six) hours as needed.      cholecalciferol, vitamin D3, 2,000 unit cap Take 2,000 Units by mouth daily with lunch.       cinacalcet (SENSIPAR) 30 MG tablet Take 30 mg by mouth 3 times weekly with dialysis            diphenhydrAMINE (BENADRYL) 25 mg tablet Take 50 mg by mouth at bedtime as needed for sleep.      folic acid (FOLVITE) 1 MG tablet Take 1 mg by mouth daily.      gabapentin (NEURONTIN) 100 MG capsule Take 100 mg by mouth 3 (three) times a day.      Lactobacillus rhamnosus GG (CULTURELLE) 10-15 Billion cell capsule Take 1 capsule by mouth daily with lunch.      metoprolol succinate (TOPROL-XL) 25 MG Take 25 mg by mouth daily.      midodrine HCl (MIDODRINE ORAL) Take 15 mg by mouth 3 (three) times a week. Three times weekly before dialysis.             oxyCODONE (ROXICODONE) 5 MG immediate release tablet Take 0.5-1 tablets (2.5-5 mg total) by mouth every 4 (four) hours as needed.      polyvinyl alcohol (LIQUIFILM TEARS) 1.4 % ophthalmic solution Apply 1 drop to eye as needed for dry eyes.      ranitidine (ZANTAC) 150 MG tablet Take 150 mg by mouth at bedtime.      senna-docusate (SENNOSIDES-DOCUSATE SODIUM) 8.6-50 mg tablet Take 1 tablet by mouth at bedtime.       sevelamer carbonate (RENVELA) 800 mg tablet Take 2,400 mg by mouth 3 (three) times a day with meals.             timolol maleate (TIMOPTIC) 0.5 % ophthalmic solution Administer 1 drop to both eyes 2 (two) times a day.       traZODone (DESYREL) 50 MG tablet Take 50 mg by mouth at bedtime. 6/23/2019: Patient states she uses this almost every night.      ASSESSMENT:      ICD-10-CM    1. Rectal bleeding K62.5        PLAN:    S/P internal fixation using  intramedullary nail. PT/OT/pain  Control, monitor incision for S/S infection   End-stage renal disease. Dialysis as scheduled.  M/W/F   Hypertension on Metoprolol Succinate   Chronic atrial fibrillation- on coumadin , metoprolol  Pain control Tylenol and Oxycodone as scheduled  check hip  x-ray due to increased pain.   Hyperkalemia.  Potassium was occasionally greater than 6 and was treated with dialysis 6/24 potassium 4.5  Anemia.  Chronic anemia was worsened with acute blood loss.  She received 1 unit of packed red blood cells HGB 7.9 on 6/27/19, dropped to 7.4 came up to 8.9 and 7.9 on 7/8.  And lastly 8.9 on 7/15/19. Monitor labs.  occult stools x 3 negative despite rectal bleeding   Insomnia-trazadone  75 mg HS  Constipation resolved continue  senna s to 2 tabs two times a day, Biscodyl suppository 10 mg MN q 3 days PRN if no BM   GI bleeding - occults negative,  stat HGB 8.9 which is up. From 7.9- GI consult.       Electronically signed by: Le Flynn, CNP

## 2021-05-30 NOTE — PROGRESS NOTES
[facility]    Facility:   [facility] [[id number]]   Code Status: FULL CODE      CHIEF COMPLAINT/REASON FOR VISIT:  Chief Complaint   Patient presents with     Review Of Multiple Medical Conditions       HISTORY:      HPI: Belia is a 73 y.o. female undergoing physical and  occupational therapy at [facility]. with history of ESRD on hemodialysis M/W/F, chronic thrombocytopenia, chronic atrial fibrillation, sick sinus syndrome, status post watchman device, status post pacemaker, diastolic CHF, hypertension, ZULEIKA, anemia of chronic kidney disease, dyslipidemia who was brought to the emergency department for evaluation after a fall.  The patient uses a walker for ambulation due to right foot drop and while at home tripped on the kitchen floor mat when she turned and fell.  She denies head trauma or loss of consciousness but complained of left hip pain.  In the ED, she was hemodynamically stable.  X-rays showed a displaced, comminuted intertrochanteric fracture of left femur. She underwent a left intramedullary nailing.     Today she is seen for review of multiple medical issues. . She denied CP. She does have shortness of breath with activity. She is on 2 L NC at night due to unable to tolerate C- Pap after many tries She occasionally will wear oxygen  during the day.   Her first  INR was  subtherapeutic.coumadin increased and repeat INR elevated at 4.46. She did have prior rectal bleeding when she was subtherapeutic. When seen with the increased INR she tells me she has not had any noticeable bleeding x 2 days.  A prior  GI consult was ordered. Her weights have been labile and she tells me diuretics do not work for her and she will call for an extra  run when she feels she needs it. HGB on 7/15 was 8.9. HGB ordered for 7/23 and remained at 8.9    Past Medical History:   Diagnosis Date     Arthritis      CHF (congestive heart failure) (H)      Chronic  anemia 6/1/2014     Chronic kidney disease      Chronic thoracic aortic dissection (H) 10/7/2015    Descending thoracic aorta; treated medically per notes of Dragan Singh and Jennifer.     COPD (chronic obstructive pulmonary disease) (H)      CVA (cerebral infarction)      Disease of thyroid gland      Dyslipidemia      ESRD (end stage renal disease) (H) 06/03/2009    on dialysis with Dr. Mitchell     Essential hypertension 6/30/2014     Gastrointestinal hemorrhage, unspecified gastrointestinal hemorrhage type 6/5/2017     GI (gastrointestinal bleed)      GI bleeding 6/5/2017     Gout      L3 vertebral fracture (H) 11/16/2015     Left Atrial Appendage Occlusion (WATCHMAN) 4/5/2018    LAAO April 5, 2018 (30 mm WATCHMAN)     Obesity      ZULEIKA (obstructive sleep apnea), severe, intolerant of CPAP 10/22/2015     Pneumonia 9-7-2015     Right foot drop      Spinal stenosis 3/28/2016     Stroke (H) 3/24/2016             Family History   Problem Relation Age of Onset     Dementia Mother      Diabetes Mother      Arthritis Mother      Cancer Mother      Depression Mother      Heart disease Mother      Vision loss Mother      Stroke Father      Heart disease Father      Breast cancer Neg Hx      Social History     Socioeconomic History     Marital status:      Spouse name: Cayden     Number of children: 2     Years of education: Not on file     Highest education level: Not on file   Occupational History     Employer: RETIRED   Social Needs     Financial resource strain: Not on file     Food insecurity:     Worry: Not on file     Inability: Not on file     Transportation needs:     Medical: Not on file     Non-medical: Not on file   Tobacco Use     Smoking status: Former Smoker     Packs/day: 1.50     Years: 37.00     Pack years: 55.50     Types: Cigarettes     Last attempt to quit: 1/1/2009     Years since quitting: 10.5     Smokeless tobacco: Never Used   Substance and Sexual Activity     Alcohol use: No      "Alcohol/week: 7.0 oz     Types: 14 Standard drinks or equivalent per week     Comment: 14 mixed drinks per week     Drug use: No     Sexual activity: Never     Partners: Male   Lifestyle     Physical activity:     Days per week: Not on file     Minutes per session: Not on file     Stress: Not on file   Relationships     Social connections:     Talks on phone: Not on file     Gets together: Not on file     Attends Yazidi service: Not on file     Active member of club or organization: Not on file     Attends meetings of clubs or organizations: Not on file     Relationship status: Not on file     Intimate partner violence:     Fear of current or ex partner: Not on file     Emotionally abused: Not on file     Physically abused: Not on file     Forced sexual activity: Not on file   Other Topics Concern     Not on file   Social History Narrative    Lives with her . Daughter in [place] and daughter in [place].         Review of Systems   Constitutional: Negative for activity change, appetite change, fatigue and fever.   HENT: Negative for congestion.    Respiratory: Positive for shortness of breath. Negative for cough and wheezing.         Dialysis port R upper chest    Cardiovascular: Negative for chest pain and leg swelling.   Gastrointestinal: Negative for abdominal distention, abdominal pain, constipation, diarrhea and nausea.   Genitourinary: Negative for dysuria.        Voids \"a little per her report\"   Musculoskeletal: Negative for arthralgias and back pain.   Skin: Positive for wound. Negative for color change.   Neurological: Negative for dizziness.   Psychiatric/Behavioral: Positive for sleep disturbance. Negative for agitation, behavioral problems and confusion.         On trazodone        .  Vitals:    07/23/19 0748   BP: 120/60   Pulse: 70   Resp: 16   Temp: 97.5  F (36.4  C)   SpO2: 95%   Weight: 174 lb (78.9 kg)       Physical Exam   Constitutional: She is oriented to person, place, and time. She " appears well-developed and well-nourished.   pleasant woman in no acute distress   HENT:   Head: Normocephalic and atraumatic.   Eyes: Pupils are equal, round, and reactive to light. Conjunctivae are normal.   Neck: Normal range of motion. Neck supple.   Cardiovascular: Normal rate, regular rhythm and normal heart sounds.   No murmur heard.  Pulmonary/Chest: Effort normal and breath sounds normal. She has no wheezes. She has no rales.   Abdominal: Soft. Bowel sounds are normal. She exhibits no distension. There is no tenderness.   Musculoskeletal: Normal range of motion. She exhibits edema.   Right foot drop  2+ edema left leg.    Neurological: She is alert and oriented to person, place, and time.   Neuropathy right foot and decreased sensation bottom of left foot.     Skin: Skin is warm and dry.   Left hip wound    Psychiatric: She has a normal mood and affect. Her behavior is normal.         LABS:   Recent Results (from the past 240 hour(s))   Hemoglobin   Result Value Ref Range    Hemoglobin 8.9 (L) 12.0 - 16.0 g/dL   INR   Result Value Ref Range    INR 1.57 (H) 0.90 - 1.10   INR   Result Value Ref Range    INR 4.46 (H) 0.90 - 1.10     Current Outpatient Medications   Medication Sig Note     acetaminophen (TYLENOL) 500 MG tablet Take 2 tablets (1,000 mg total) by mouth 3 (three) times a day.      aspirin 325 MG tablet Take 1 tablet (325 mg total) by mouth 2 (two) times a day.      atorvastatin (LIPITOR) 10 MG tablet Take 10 mg by mouth at bedtime.      B complex-vitamin C-folic acid (DIALYVITE) 100-1 mg Tab Take 1 tablet by mouth daily.      chlorpheniramine/dextromethorp (CORICIDIN HBP COUGH AND COLD ORAL) Take 1 tablet by mouth every 6 (six) hours as needed.      cholecalciferol, vitamin D3, 2,000 unit cap Take 2,000 Units by mouth daily with lunch.       cinacalcet (SENSIPAR) 30 MG tablet Take 30 mg by mouth 3 times weekly with dialysis            diphenhydrAMINE (BENADRYL) 25 mg tablet Take 50 mg by mouth at  bedtime as needed for sleep.      folic acid (FOLVITE) 1 MG tablet Take 1 mg by mouth daily.      gabapentin (NEURONTIN) 100 MG capsule Take 100 mg by mouth 3 (three) times a day.      Lactobacillus rhamnosus GG (CULTURELLE) 10-15 Billion cell capsule Take 1 capsule by mouth daily with lunch.      metoprolol succinate (TOPROL-XL) 25 MG Take 25 mg by mouth daily.      midodrine HCl (MIDODRINE ORAL) Take 15 mg by mouth 3 (three) times a week. Three times weekly before dialysis.             oxyCODONE (ROXICODONE) 5 MG immediate release tablet Take 0.5-1 tablets (2.5-5 mg total) by mouth every 4 (four) hours as needed.      polyvinyl alcohol (LIQUIFILM TEARS) 1.4 % ophthalmic solution Apply 1 drop to eye as needed for dry eyes.      ranitidine (ZANTAC) 150 MG tablet Take 150 mg by mouth at bedtime.      senna-docusate (SENNOSIDES-DOCUSATE SODIUM) 8.6-50 mg tablet Take 1 tablet by mouth at bedtime.       sevelamer carbonate (RENVELA) 800 mg tablet Take 2,400 mg by mouth 3 (three) times a day with meals.             timolol maleate (TIMOPTIC) 0.5 % ophthalmic solution Administer 1 drop to both eyes 2 (two) times a day.       traZODone (DESYREL) 50 MG tablet Take 50 mg by mouth at bedtime. 6/23/2019: Patient states she uses this almost every night.      ASSESSMENT:      ICD-10-CM    1. ESRD (end stage renal disease) (H) N18.6    2. Closed displaced intertrochanteric fracture of left femur with routine healing, subsequent encounter S72.142D    3. Essential hypertension I10    4. Pain management R52        PLAN:    S/P internal fixation using intramedullary nail. PT/OT/pain  Control, monitor incision for S/S infection   End-stage renal disease. Dialysis as scheduled.  M/W/F   Hypertension on Metoprolol Succinate   Chronic atrial fibrillation- on coumadin , metoprolol  Pain control Tylenol and Oxycodone as scheduled  check hip  x-ray due to increased pain.   Anemia.  Chronic anemia was worsened with acute blood loss.  She  received 1 unit of packed red blood cells HGB 7.9 on 6/27/19, dropped to 7.4 came up to 8.9 and 7.9 on 7/8.  And lastly 8.9 on 7/15/19. Monitor labs.  occult stools x 3 negative despite rectal bleeding GI consult.   Insomnia-trazadone  75 mg HS  Constipation resolved continue  senna s to 2 tabs two times a day, Biscodyl suppository 10 mg SD q 3 days PRN if no BM   GI bleeding - occults negative,  stat HGB 8.9 which is up. From 7.9- GI consult.   Anticoagulation therapy - recently started on coumadin, adjust per INR's.       Electronically signed by: Le Flynn CNP

## 2021-05-30 NOTE — PROGRESS NOTES
Code Status:  FULL CODE  Visit Type: Follow Up     Facility:  [facility] [[id number]]        Facility Type: SNF (Skilled Nursing Facility, TCU)    History of Present Illness: Belia Rodriguez is a 73 y.o. female seen for TCU follow-up visit today.  She has a past medical history for ESRD on HD Monday Wednesday Friday, chronic thrombocytopenia, atrial fib, sick sinus syndrome, status post watchman, status post pacer, diastolic CHF, hypertension, ZULEIKA, anemia, CKD, HLD, with chronic right foot drop.  She is here for rehab after hospitalization following a fall in which he sustained a displaced comminuted intertrochanteric fracture of her left femur.  She underwent left intramedullary nailing.  She did have trouble with hypoxia especially at night and she cannot tolerate CPAP and so she was discharged with nasal cannula oxygen 2 L at night.    Today, nursing reports that oxycodone did not cover her pain as easily.  In discussion with the patient she is concerned that her oxycodone does get dialyzed off during dialysis.  I did explain that she is scheduled to take oxycodone every 4 hours and so taking it prior to dialysis and when she gets back from dialysis is okay.  She does have scheduled acetaminophen in which she feels helps to maintain her pain management.  She is using oxygen more during the day and feels a little more short of breath however her lung sounds are clear and her weight is stable at 171.  Her fluid is managed by her dialysis 3 times a week.  She does have soft nonpitting edema of her lower extremities and is wearing CLARITA hose.  Her blood pressures are well managed with most SBP's in the 110-120s.  She reports issues with constipation however she is on senna S twice daily.  She reports she is sleeping well after the increase in trazodone last week.    Review of Systems   Patient denies fever, chills, headache, lightheadedness, dizziness, rhinorrhea, cough, congestion, shortness  of breath, chest pain, palpitations, abdominal pain, n/v, diarrhea, constipation, change in appetite, dysuria, frequency, burning or pain with urination.  Other than stated in HPI all other review of systems is negative.         Physical Exam   Vital signs: /65, heart rate 95, respiratory 18, temp 97.7.  GENERAL APPEARANCE: Well developed, well nourished, in no acute distress.  HEENT: normocephalic, atraumatic  PERRL, sclerae anicteric, conjunctivae clear and moist, EOM intact  LUNGS: Lung sounds CTA, no adventitious sounds, respiratory effort decreased  CARD: RRR, S1, S2, without murmurs, gallops, rubs, no JVD  ABD: Soft and nontender with normal bowel sounds.   MSK: Muscle strength and tone were normal.  EXTREMITIES: Soft nonpitting lower extremity edema bilaterally  NEURO: Alert and oriented x 3.  Face is symmetric.  SKIN: Inspection of the skin reveals no rashes, ulcerations or petechiae.  PSYCH: euthymic          Labs:    Recent Results (from the past 240 hour(s))   ECG 12 lead nursing unit performed   Result Value Ref Range    SYSTOLIC BLOOD PRESSURE 161 mmHg    DIASTOLIC BLOOD PRESSURE 75 mmHg    VENTRICULAR RATE 84 BPM    ATRIAL RATE 85 BPM    P-R INTERVAL  ms    QRS DURATION 146 ms    Q-T INTERVAL 440 ms    QTC CALCULATION (BEZET) 519 ms    P Axis  degrees    R AXIS 97 degrees    T AXIS 58 degrees    MUSE DIAGNOSIS       Electronic ventricular pacemaker  Atrial fibrillation  Abnormal ECG  When compared with ECG of 18-MAY-2019 09:10,  No significant change was found  Confirmed by JENNY ARIAS MD LOC: ([id number]) on 6/23/2019 4:05:09 PM     Comprehensive Metabolic Panel   Result Value Ref Range    Sodium 134 (L) 136 - 145 mmol/L    Potassium 4.5 3.5 - 5.0 mmol/L    Chloride 100 98 - 107 mmol/L    CO2 20 (L) 22 - 31 mmol/L    Anion Gap, Calculation 14 5 - 18 mmol/L    Glucose 105 70 - 125 mg/dL    BUN 34 (H) 8 - 28 mg/dL    Creatinine 5.61 (H) 0.60 - 1.10 mg/dL    GFR MDRD Af Amer 9 (L) >60 mL/min/1.73m2     GFR MDRD Non Af Amer 7 (L) >60 mL/min/1.73m2    Bilirubin, Total 0.4 0.0 - 1.0 mg/dL    Calcium 8.8 8.5 - 10.5 mg/dL    Protein, Total 6.2 6.0 - 8.0 g/dL    Albumin 3.5 3.5 - 5.0 g/dL    Alkaline Phosphatase 101 45 - 120 U/L    AST 16 0 - 40 U/L    ALT 15 0 - 45 U/L   INR   Result Value Ref Range    INR 1.02 0.90 - 1.10   APTT   Result Value Ref Range    PTT 30 24 - 37 seconds   HM1 (CBC with Diff)   Result Value Ref Range    WBC 5.9 4.0 - 11.0 thou/uL    RBC 2.78 (L) 3.80 - 5.40 mill/uL    Hemoglobin 9.4 (L) 12.0 - 16.0 g/dL    Hematocrit 29.0 (L) 35.0 - 47.0 %     (H) 80 - 100 fL    MCH 33.8 27.0 - 34.0 pg    MCHC 32.4 32.0 - 36.0 g/dL    RDW 14.6 (H) 11.0 - 14.5 %    Platelets 88 (L) 140 - 440 thou/uL    MPV 9.5 8.5 - 12.5 fL    Neutrophils % 80 (H) 50 - 70 %    Lymphocytes % 11 (L) 20 - 40 %    Monocytes % 7 2 - 10 %    Eosinophils % 1 0 - 6 %    Basophils % 0 0 - 2 %    Neutrophils Absolute 4.7 2.0 - 7.7 thou/uL    Lymphocytes Absolute 0.7 (L) 0.8 - 4.4 thou/uL    Monocytes Absolute 0.4 0.0 - 0.9 thou/uL    Eosinophils Absolute 0.1 0.0 - 0.4 thou/uL    Basophils Absolute 0.0 0.0 - 0.2 thou/uL   Antibody Identification   Result Value Ref Range    Unidentified Antibody       POS WITH 3/10 CELLS;NONSPECIFIC;NO SPECIFICITY FOUND;REFERRED FOR I.D.   Reference Lab Workup   Result Value Ref Range    Antibody ID See scanned report.    Type and Screen   Result Value Ref Range    ABORh O POS     Antibody Screen Weak Positive (!) Negative   Protime-INR (if on Coumadin)   Result Value Ref Range    INR 1.05 0.90 - 1.10   APTT  (if on Coumadin)   Result Value Ref Range    PTT 30 24 - 37 seconds   Platelet Function Test   Result Value Ref Range    PFA-COL/ (H) 1 - 180 sec   Platelet Function Confirmation   Result Value Ref Range    PFA-COL/ (H) 1 - 110 sec   Protime-INR (if on Coumadin)   Result Value Ref Range    INR 1.12 (H) 0.90 - 1.10   Platelet Product Information   Result Value Ref Range    Unit  Type O Pos     Blood Expiration Date 09775433590101     Unit Number [id number]     Status Transfused     Component Platelets     PRODUCT CODE [id number]     Issue Date and Time [id number]     Blood Type 5100     CODING SYSTEM [id number]    Potassium   Result Value Ref Range    Potassium 5.8 (H) 3.5 - 5.0 mmol/L   Protime-INR (if on Coumadin)   Result Value Ref Range    INR 1.15 (H) 0.90 - 1.10   APTT  (if on Coumadin)   Result Value Ref Range    PTT 32 24 - 37 seconds   Potassium   Result Value Ref Range    Potassium 6.8 (HH) 3.5 - 5.0 mmol/L   HGB   Result Value Ref Range    Hemoglobin 6.9 (LL) 12.0 - 16.0 g/dL   POCT Glucose   Result Value Ref Range    Glucose 147 (H) 70 - 139 mg/dL   Potassium   Result Value Ref Range    Potassium 6.5 (HH) 3.5 - 5.0 mmol/L   ECG 12 lead with MUSE, verify if completed in ER   Result Value Ref Range    SYSTOLIC BLOOD PRESSURE  mmHg    DIASTOLIC BLOOD PRESSURE  mmHg    VENTRICULAR RATE 70 BPM    ATRIAL RATE 34 BPM    P-R INTERVAL  ms    QRS DURATION 142 ms    Q-T INTERVAL 456 ms    QTC CALCULATION (BEZET) 492 ms    P Axis  degrees    R AXIS 43 degrees    T AXIS 58 degrees    MUSE DIAGNOSIS       Electronic ventricular pacemaker Atrial fibrillation  When compared with ECG of 23-JUN-2019 01:18,  No significant change was found  Confirmed by STEVEN ZAPATA MD LOC:SJ ([id number]) on 6/24/2019 4:16:06 PM     POCT Glucose   Result Value Ref Range    Glucose 135 70 - 139 mg/dL   POCT Glucose   Result Value Ref Range    Glucose 177 (H) 70 - 139 mg/dL   POCT Glucose   Result Value Ref Range    Glucose 142 (H) 70 - 139 mg/dL   POCT Glucose   Result Value Ref Range    Glucose 124 70 - 139 mg/dL   POCT Glucose - for patients with diabetes   Result Value Ref Range    Glucose 123 70 - 139 mg/dL   POCT Glucose - for patients with diabetes   Result Value Ref Range    Glucose 111 70 - 139 mg/dL   Potassium   Result Value Ref Range    Potassium 4.5 3.5 - 5.0 mmol/L   POCT Glucose   Result Value Ref  Range    Glucose 122 70 - 139 mg/dL   POCT Glucose - for patients with diabetes   Result Value Ref Range    Glucose 118 70 - 139 mg/dL   Hemoglobin   Result Value Ref Range    Hemoglobin 7.5 (L) 12.0 - 16.0 g/dL   Crossmatch   Result Value Ref Range    Crossmatch COMPATIBLE     Blood Expiration Date [id number]     Unit Type O Neg     Unit Number [id number]     Status Transfused     Component Red Blood Cells     PRODUCT CODE [id number]     Issue Date and Time [id number]     Blood Type 9500     CODING SYSTEM [id number]    APTT  (if on Coumadin)   Result Value Ref Range    PTT 33 24 - 37 seconds   HM2(CBC w/o Differential)   Result Value Ref Range    WBC 3.6 (L) 4.0 - 11.0 thou/uL    RBC 1.96 (L) 3.80 - 5.40 mill/uL    Hemoglobin 6.3 (LL) 12.0 - 16.0 g/dL    Hematocrit 19.4 (L) 35.0 - 47.0 %    MCV 99 80 - 100 fL    MCH 32.1 27.0 - 34.0 pg    MCHC 32.5 32.0 - 36.0 g/dL    RDW 20.5 (H) 11.0 - 14.5 %    Platelets 67 (L) 140 - 440 thou/uL    MPV 9.9 8.5 - 12.5 fL   Hepatitis B Surface Antigen (HBsAG)   Result Value Ref Range    Hepatitis B Surface Ag Negative Negative   Blood Gases, Arterial   Result Value Ref Range    pH, Arterial 7.39 7.37 - 7.44    pCO2, Arterial 50 (H) 35 - 45 mm Hg    pO2, Arterial 77 75 - 85 mm Hg    Bicarbonate, Arterial Calc 28.7 23.0 - 29.0 mmol/L    O2 Sat, Arterial 99.4 (H) 95.0 - 96.0 %    Oxyhemoglobin 95.4 95.0 - 96.0 %    Base Excess, Arterial Calc 4.8 mmol/L    Ventilation Mode Nasal cannula     Flow 4.0 LPM    Sample Stabilized Temperature 37.0 degrees C   Hemoglobin   Result Value Ref Range    Hemoglobin 7.4 (L) 12.0 - 16.0 g/dL   Crossmatch   Result Value Ref Range    Crossmatch COMPATIBLE     Blood Expiration Date [id number]     Unit Type O Neg     Unit Number [id number]     Status Transfused     Component Red Blood Cells     PRODUCT CODE [id number]     Issue Date and Time [id number]     Blood Type 9500     CODING SYSTEM [id number]    APTT  (if on Coumadin)   Result Value  Ref Range    PTT 43 (H) 24 - 37 seconds   Hemoglobin   Result Value Ref Range    Hemoglobin 6.7 (LL) 12.0 - 16.0 g/dL   Hemoglobin   Result Value Ref Range    Hemoglobin 8.0 (L) 12.0 - 16.0 g/dL   Crossmatch   Result Value Ref Range    Crossmatch COMPATIBLE     Blood Expiration Date [id number]     Unit Type O Pos     Unit Number [id number]     Status Transfused     Component Red Blood Cells     PRODUCT CODE D6207Q09     Issue Date and Time [id number]     Blood Type 5100     CODING SYSTEM BSXZ308    Crossmatch   Result Value Ref Range    Crossmatch COMPATIBLE     Blood Expiration Date [id number]     Unit Type O Pos     Unit Number [id number]     Status Released     Component Red Blood Cells     PRODUCT CODE N8913U15     Blood Type 5100     CODING SYSTEM [id number]    APTT  (if on Coumadin)   Result Value Ref Range    PTT 52 (H) 24 - 37 seconds   Hemoglobin   Result Value Ref Range    Hemoglobin 7.9 (L) 12.0 - 16.0 g/dL         Assessment:  1. S/P ORIF (open reduction internal fixation) fracture     2. Pain management     3. ESRD (end stage renal disease) on dialysis (H)     4. Chronic anemia     5. Chronic diastolic congestive heart failure (H)     6. Chronic bronchitis, unspecified chronic bronchitis type (H)         Plan:   post intramedullary nailing: Continue with therapies, ice often.    Judgment: Stable as long as she uses her oxycodone timely which has good results.  Continue with oxycodone 2.5 mg to 5 mg every 4 hours as needed for pain, scheduled acetaminophen thousand milligrams 3 times daily.    ESRD: Stable continue dialysis Monday Wednesday Friday    Chronic anemia: Dialysis draws labs every Monday Wednesday Friday we will continue to follow along.    CHF: Continue to monitor daily weights, continue with dialysis.  Wean O2 as able keeping sats above 90%.  Okay for oxygen during the day.    COPD: No current medications for COPD continue with oxygen to keep O2 sats above  90%.          Electronically signed by: Andria Woods, CNP

## 2021-05-30 NOTE — PROGRESS NOTES
[facility]    Facility:   [facility] [[id number]]   Code Status: FULL CODE      CHIEF COMPLAINT/REASON FOR VISIT:  Chief Complaint   Patient presents with     Review Of Multiple Medical Conditions       HISTORY:      HPI: Belia is a 73 y.o. female undergoing physical and  occupational therapy at [facility]. with history of ESRD on hemodialysis M/W/F, chronic thrombocytopenia, chronic atrial fibrillation, sick sinus syndrome, status post watchman device, status post pacemaker, diastolic CHF, hypertension, ZULEIKA, anemia of chronic kidney disease, dyslipidemia who was brought to the emergency department for evaluation after a fall.  The patient uses a walker for ambulation due to right foot drop and while at home tripped on the kitchen floor mat when she turned and fell.  She denies head trauma or loss of consciousness but complained of left hip pain.  In the ED, she was hemodynamically stable.  X-rays showed a displaced, comminuted intertrochanteric fracture of left femur. She underwent a left intramedullary nailing.     Today she is seen for review of multiple medical issues. . She denied CP. She does have shortness of breath with activity. She is on 2 L NC at night due to unable to tolerate C- Pap after many tries She occasionally will wear oxygen  during the day.   She was recently seen by GI for negative Occults x 3 but had a  moderate amount of visible blood in the toilet.  Her consult paperwork has not been received by the facility yet but the patient tells me she has large internal hemorrhoids and when she is off the coumadin it is recommended she have them banded,. Her weight is up 5  pounds this week and she has not been able to tolerate a full dialysis run due to low BP's. She takes meclizine to help but reports they have tried to give it to her at all different times and have not found the right time.   She tells me diuretics do work for  her and today she will have an extra run. Hgb 7/23 was 8.9. She has not had any further rectal bleeding over the last week.     Past Medical History:   Diagnosis Date     Arthritis      CHF (congestive heart failure) (H)      Chronic anemia 6/1/2014     Chronic kidney disease      Chronic thoracic aortic dissection (H) 10/7/2015    Descending thoracic aorta; treated medically per notes of Dragan Singh and Jennifer.     COPD (chronic obstructive pulmonary disease) (H)      CVA (cerebral infarction)      Disease of thyroid gland      Dyslipidemia      ESRD (end stage renal disease) (H) 06/03/2009    on dialysis with Dr. Mitchell     Essential hypertension 6/30/2014     Gastrointestinal hemorrhage, unspecified gastrointestinal hemorrhage type 6/5/2017     GI (gastrointestinal bleed)      GI bleeding 6/5/2017     Gout      L3 vertebral fracture (H) 11/16/2015     Left Atrial Appendage Occlusion (WATCHMAN) 4/5/2018    LAAO April 5, 2018 (30 mm WATCHMAN)     Obesity      ZULEIKA (obstructive sleep apnea), severe, intolerant of CPAP 10/22/2015     Pneumonia 9-7-2015     Right foot drop      Spinal stenosis 3/28/2016     Stroke (H) 3/24/2016             Family History   Problem Relation Age of Onset     Dementia Mother      Diabetes Mother      Arthritis Mother      Cancer Mother      Depression Mother      Heart disease Mother      Vision loss Mother      Stroke Father      Heart disease Father      Breast cancer Neg Hx      Social History     Socioeconomic History     Marital status:      Spouse name: Cayden     Number of children: 2     Years of education: Not on file     Highest education level: Not on file   Occupational History     Employer: RETIRED   Social Needs     Financial resource strain: Not on file     Food insecurity:     Worry: Not on file     Inability: Not on file     Transportation needs:     Medical: Not on file     Non-medical: Not on file   Tobacco Use     Smoking status: Former Smoker     Packs/day:  "1.50     Years: 37.00     Pack years: 55.50     Types: Cigarettes     Last attempt to quit: 1/1/2009     Years since quitting: 10.5     Smokeless tobacco: Never Used   Substance and Sexual Activity     Alcohol use: No     Alcohol/week: 7.0 oz     Types: 14 Standard drinks or equivalent per week     Comment: 14 mixed drinks per week     Drug use: No     Sexual activity: Never     Partners: Male   Lifestyle     Physical activity:     Days per week: Not on file     Minutes per session: Not on file     Stress: Not on file   Relationships     Social connections:     Talks on phone: Not on file     Gets together: Not on file     Attends Yarsanism service: Not on file     Active member of club or organization: Not on file     Attends meetings of clubs or organizations: Not on file     Relationship status: Not on file     Intimate partner violence:     Fear of current or ex partner: Not on file     Emotionally abused: Not on file     Physically abused: Not on file     Forced sexual activity: Not on file   Other Topics Concern     Not on file   Social History Narrative    Lives with her . Daughter in [place] and daughter in [place].         Review of Systems   Constitutional: Negative for activity change, appetite change, fatigue and fever.   HENT: Negative for congestion.    Respiratory: Positive for shortness of breath. Negative for cough and wheezing.         Dialysis port R upper chest    Cardiovascular: Negative for chest pain and leg swelling.   Gastrointestinal: Negative for abdominal distention, abdominal pain, constipation, diarrhea and nausea.   Genitourinary: Negative for dysuria.        Voids \"a little per her report\"   Musculoskeletal: Negative for arthralgias and back pain.   Skin: Positive for wound. Negative for color change.   Neurological: Negative for dizziness.   Psychiatric/Behavioral: Positive for sleep disturbance. Negative for agitation, behavioral problems and confusion.         On trazodone "        .  Vitals:    07/30/19 0708   BP: 139/81   Pulse: 71   Resp: 18   Temp: 97.3  F (36.3  C)   SpO2: 92%   Weight: 176 lb 9.6 oz (80.1 kg)       Physical Exam   Constitutional: She is oriented to person, place, and time. She appears well-developed and well-nourished.   pleasant woman in no acute distress   HENT:   Head: Normocephalic and atraumatic.   Eyes: Pupils are equal, round, and reactive to light. Conjunctivae are normal.   Neck: Normal range of motion. Neck supple.   Cardiovascular: Normal rate, regular rhythm and normal heart sounds.   No murmur heard.  Pulmonary/Chest: Effort normal. She has no wheezes. She has rales.   Bilateral lower lobes    Abdominal: Soft. Bowel sounds are normal. She exhibits no distension. There is no tenderness.   Musculoskeletal: Normal range of motion. She exhibits edema.   Right foot drop  2+ edema left leg.    Neurological: She is alert and oriented to person, place, and time.   Neuropathy right foot and decreased sensation bottom of left foot.     Skin: Skin is warm and dry.   Left hip wound healed    Psychiatric: She has a normal mood and affect. Her behavior is normal.         LABS:   Recent Results (from the past 240 hour(s))   INR   Result Value Ref Range    INR 4.46 (H) 0.90 - 1.10   INR   Result Value Ref Range    INR 2.44 (H) 0.90 - 1.10   INR   Result Value Ref Range    INR 2.11 (H) 0.90 - 1.10     Current Outpatient Medications   Medication Sig Note     acetaminophen (TYLENOL) 500 MG tablet Take 2 tablets (1,000 mg total) by mouth 3 (three) times a day.      aspirin 325 MG tablet Take 1 tablet (325 mg total) by mouth 2 (two) times a day.      atorvastatin (LIPITOR) 10 MG tablet Take 10 mg by mouth at bedtime.      B complex-vitamin C-folic acid (DIALYVITE) 100-1 mg Tab Take 1 tablet by mouth daily.      chlorpheniramine/dextromethorp (CORICIDIN HBP COUGH AND COLD ORAL) Take 1 tablet by mouth every 6 (six) hours as needed.      cholecalciferol, vitamin D3, 2,000  unit cap Take 2,000 Units by mouth daily with lunch.       cinacalcet (SENSIPAR) 30 MG tablet Take 30 mg by mouth 3 times weekly with dialysis            diphenhydrAMINE (BENADRYL) 25 mg tablet Take 50 mg by mouth at bedtime as needed for sleep.      folic acid (FOLVITE) 1 MG tablet Take 1 mg by mouth daily.      gabapentin (NEURONTIN) 100 MG capsule Take 100 mg by mouth 3 (three) times a day.      Lactobacillus rhamnosus GG (CULTURELLE) 10-15 Billion cell capsule Take 1 capsule by mouth daily with lunch.      metoprolol succinate (TOPROL-XL) 25 MG Take 25 mg by mouth daily.      midodrine HCl (MIDODRINE ORAL) Take 15 mg by mouth 3 (three) times a week. Three times weekly before dialysis.             oxyCODONE (ROXICODONE) 5 MG immediate release tablet Take 0.5-1 tablets (2.5-5 mg total) by mouth every 4 (four) hours as needed.      polyvinyl alcohol (LIQUIFILM TEARS) 1.4 % ophthalmic solution Apply 1 drop to eye as needed for dry eyes.      ranitidine (ZANTAC) 150 MG tablet Take 150 mg by mouth at bedtime.      senna-docusate (SENNOSIDES-DOCUSATE SODIUM) 8.6-50 mg tablet Take 1 tablet by mouth at bedtime.       sevelamer carbonate (RENVELA) 800 mg tablet Take 2,400 mg by mouth 3 (three) times a day with meals.             timolol maleate (TIMOPTIC) 0.5 % ophthalmic solution Administer 1 drop to both eyes 2 (two) times a day.       traZODone (DESYREL) 50 MG tablet Take 50 mg by mouth at bedtime. 6/23/2019: Patient states she uses this almost every night.      ASSESSMENT:      ICD-10-CM    1. ESRD (end stage renal disease) (H) N18.6    2. Closed displaced intertrochanteric fracture of left femur with routine healing, subsequent encounter S72.142D    3. Pain management R52    4. Anticoagulation goal of INR 2 to 3 Z51.81     Z79.01        PLAN:    S/P internal fixation using intramedullary nail. PT/OT/pain  Control, monitor incision for S/S infection. Incision healed.    End-stage renal disease. Dialysis as scheduled.   M/W/F Pt will have an extra run today 7/30/19  Hypertension on Metoprolol Succinate   Chronic atrial fibrillation- on coumadin , metoprolol  Pain control Tylenol and Oxycodone as scheduled     Anemia.  Chronic anemia. Last HGB 8.9.    Insomnia-trazadone  75 mg HS  Constipation resolved continue  senna s to 2 tabs two times a day, Biscodyl suppository 10 mg SC q 3 days PRN if no BM   GI bleeding - occults negative,  stat HGB 8.9 which is up. From 7.9- Pt seen by GI awaiting report.   Anticoagulation therapy - recently started on coumadin, adjust per INR's.       Electronically signed by: Le Flynn CNP

## 2021-05-30 NOTE — PROGRESS NOTES
[facility]      Facility:    [facility] [[id number]]  Code Status: FULL CODE       Chief Complaint/Reason for Visit:  Chief Complaint   Patient presents with     H & P     Admit note to TCU after left femur fracture.        HPI:   Belia is a 73 y.o. female who was admitted to the hospital on 6/22/19 after a fall. Her hospital info is partially excerpted below.    Hospital HPI:  Belia Rodriguez is a 73 y.o. old female with history of ESRD on hemodialysis M/W/F, chronic thrombocytopenia, chronic atrial fibrillation, sick sinus syndrome, status post watchman device, status post pacemaker, diastolic CHF, hypertension, ZULEIKA, anemia of chronic kidney disease, dyslipidemia who was brought to the emergency department for evaluation after a fall.  The patient uses a walker for ambulation due to right foot drop and while at home tripped on the kitchen floor mat when she turned and fell.  She denies head trauma or loss of consciousness but complained of left hip pain.  In the ED, she was hemodynamically stable.  X-rays showed a displaced, comminuted intertrochanteric fracture of left femur.  She has been admitted for repair.     Hospital Summary:   Belia Rodriguez is a 73 y.o. female  admitted for Displaced intertrochanteric fracture of left femur, initial encounter for closed fracture (H) [S72.142A]     1.  Hip fracture, intertrochanteric (H)    she was admitted with a broken hip.  She was seen by Ortho and went to the operating room on June 23 where she underwent internal fixation using intramedullary nail.  Post procedure she slowly improved and will be discharged to a TCU     2.   End-stage renal disease.  She has chronic renal failure on dialysis.  She was seen by nephrology and underwent dialysis on her normal schedule.  She will continue dialysis at the TCU     3.   Hypertension.  Blood pressure remains stable.  No changes were made to her medication     4.    Chronic atrial fibrillation.  She remains on chronic warfarin     5.   Disposition.  She will be discharged to a TCU where she will remain for approximately 2 weeks then return home     6.  Hyperkalemia.  Potassium was occasionally greater than 6 and was treated with dialysis     7.  Anemia.  Chronic anemia was worsened with acute blood loss.  She received 1 unit of packed red blood cells     Overall stabilized and discharged to TCU on 6/27/19 for PT, OT, nursing cares, medical management and monitoring.     Today:  Discharge summary indicates she is on warfarin at home but she is not. In fact, states she had some sort of bleeding event when she was previously on it. Currently on aspirin for DVT prevention. Complaining of some stomach upset today, poor appetite. Omeprazole added, she is already anemic, will need to watch closely, labs and GI sx, may need another transfusion. Nephrology will be updated, question need for IV iron or epo as well. She is on oxygen, uses at home as needed. Denies shortness of breath at rest but overall very tired and fatigued. No chest pain. No cough or fever. Using IS. No diarrhea, Feels somewhat constipated, likely from narcotic pain meds. Left hip pain, allowed WBAT, needs follow up appt with ortho. No new vision or hearing problems.       Past Medical History:  Past Medical History:   Diagnosis Date     Arthritis      CHF (congestive heart failure) (H)      Chronic anemia 6/1/2014     Chronic kidney disease      Chronic thoracic aortic dissection (H) 10/7/2015    Descending thoracic aorta; treated medically per notes of Dragan Singh and Jennifer.     COPD (chronic obstructive pulmonary disease) (H)      CVA (cerebral infarction)      Disease of thyroid gland      Dyslipidemia      ESRD (end stage renal disease) (H) 06/03/2009    on dialysis with Dr. Mitchell     Essential hypertension 6/30/2014     Gastrointestinal hemorrhage, unspecified gastrointestinal hemorrhage type 6/5/2017      GI (gastrointestinal bleed)      GI bleeding 6/5/2017     Gout      L3 vertebral fracture (H) 11/16/2015     Left Atrial Appendage Occlusion (WATCHMAN) 4/5/2018    LAAO April 5, 2018 (30 mm WATCHMAN)     Obesity      ZULEIKA (obstructive sleep apnea), severe, intolerant of CPAP 10/22/2015     Pneumonia 9-7-2015     Right foot drop      Spinal stenosis 3/28/2016     Stroke (H) 3/24/2016           Surgical History:  Past Surgical History:   Procedure Laterality Date     BACK SURGERY      [facility]     COLONOSCOPY N/A 3/23/2016    Procedure: COLONOSCOPY;  Surgeon: Ruddy Tejada MD;  Location: [facility] GI;  Service:      DILATION AND CURETTAGE OF UTERUS       EP ABLATION AV NODE N/A 3/7/2019    Procedure: EP Ablation AV Node;  Surgeon: Derick Duarte MD;  Location: [facility] Cath Lab;  Service: Cardiology     EP NEGRA CLOSURE N/A 4/5/2018    Procedure: EP NEGRA Closure;  Surgeon: Derick Duarte MD;  Location: [facility] Cath Lab;  Service:      EP PACEMAKER INSERT N/A 3/7/2019    Procedure: EP Pacemaker Insertion;  Surgeon: Derick Duarte MD;  Location: [facility] Cath Lab;  Service: Cardiology     EYE SURGERY       HERNIA REPAIR       IR TUNNELED CATHETER INSERT  11/20/2018     IR TUNNELED CATHETER REMOVAL  11/20/2018     MD COLSC FLEXIBLE W/CONTROL BLEEDING ANY METHOD N/A 6/7/2017    Procedure: COLONOSCOPY;  Surgeon: Luis Mckeon MD;  Location: [facility] GI;  Service: Gastroenterology     MD OPEN FIX INTER/SUBTROCH FX,IMPLNT Left 6/23/2019    Procedure: INTERNAL FIXATION, FRACTURE, TROCHANTERIC, HIP, USING INTERMEDULLARY NAIL;  Surgeon: Bennie Marsh DO;  Location: [facility] Main OR;  Service: Orthopedics     TONSILLECTOMY         Family History:   Family History   Problem Relation Age of Onset     Dementia Mother      Diabetes Mother      Arthritis Mother      Cancer Mother      Depression Mother      Heart disease Mother      Vision loss Mother      Stroke Father      Heart disease Father       Breast cancer Neg Hx        Social History:    Social History     Socioeconomic History     Marital status:      Spouse name: Cayden     Number of children: 2     Years of education: Not on file     Highest education level: Not on file   Occupational History     Employer: RETIRED   Social Needs     Financial resource strain: Not on file     Food insecurity:     Worry: Not on file     Inability: Not on file     Transportation needs:     Medical: Not on file     Non-medical: Not on file   Tobacco Use     Smoking status: Former Smoker     Packs/day: 1.50     Years: 37.00     Pack years: 55.50     Types: Cigarettes     Last attempt to quit: 1/1/2009     Years since quitting: 10.5     Smokeless tobacco: Never Used   Substance and Sexual Activity     Alcohol use: No     Alcohol/week: 7.0 oz     Types: 14 Standard drinks or equivalent per week     Comment: 14 mixed drinks per week     Drug use: No     Sexual activity: Never     Partners: Male   Lifestyle     Physical activity:     Days per week: Not on file     Minutes per session: Not on file     Stress: Not on file   Relationships     Social connections:     Talks on phone: Not on file     Gets together: Not on file     Attends Hinduism service: Not on file     Active member of club or organization: Not on file     Attends meetings of clubs or organizations: Not on file     Relationship status: Not on file     Intimate partner violence:     Fear of current or ex partner: Not on file     Emotionally abused: Not on file     Physically abused: Not on file     Forced sexual activity: Not on file   Other Topics Concern     Not on file   Social History Narrative    Lives with her . Daughter in [place] and daughter in [place].       Medications:  Current Outpatient Medications   Medication Sig Note     acetaminophen (TYLENOL) 500 MG tablet Take 2 tablets (1,000 mg total) by mouth 3 (three) times a day.      aspirin 325 MG tablet Take 1 tablet (325 mg total)  by mouth 2 (two) times a day.      atorvastatin (LIPITOR) 10 MG tablet Take 10 mg by mouth at bedtime.      B complex-vitamin C-folic acid (DIALYVITE) 100-1 mg Tab Take 1 tablet by mouth daily.      chlorpheniramine/dextromethorp (CORICIDIN HBP COUGH AND COLD ORAL) Take 1 tablet by mouth every 6 (six) hours as needed.      cholecalciferol, vitamin D3, 2,000 unit cap Take 2,000 Units by mouth daily with lunch.       cinacalcet (SENSIPAR) 30 MG tablet Take 30 mg by mouth 3 times weekly with dialysis            diphenhydrAMINE (BENADRYL) 25 mg tablet Take 50 mg by mouth at bedtime as needed for sleep.      folic acid (FOLVITE) 1 MG tablet Take 1 mg by mouth daily.      gabapentin (NEURONTIN) 100 MG capsule Take 100 mg by mouth 3 (three) times a day.      Lactobacillus rhamnosus GG (CULTURELLE) 10-15 Billion cell capsule Take 1 capsule by mouth daily with lunch.      metoprolol succinate (TOPROL-XL) 25 MG Take 25 mg by mouth daily.      midodrine HCl (MIDODRINE ORAL) Take 15 mg by mouth 3 (three) times a week. Three times weekly before dialysis.             oxyCODONE (ROXICODONE) 5 MG immediate release tablet Take 0.5-1 tablets (2.5-5 mg total) by mouth every 4 (four) hours as needed.      polyvinyl alcohol (LIQUIFILM TEARS) 1.4 % ophthalmic solution Apply 1 drop to eye as needed for dry eyes.      ranitidine (ZANTAC) 150 MG tablet Take 150 mg by mouth at bedtime.      senna-docusate (SENNOSIDES-DOCUSATE SODIUM) 8.6-50 mg tablet Take 1 tablet by mouth at bedtime.       sevelamer carbonate (RENVELA) 800 mg tablet Take 2,400 mg by mouth 3 (three) times a day with meals.             timolol maleate (TIMOPTIC) 0.5 % ophthalmic solution Administer 1 drop to both eyes 2 (two) times a day.       traZODone (DESYREL) 50 MG tablet Take 50 mg by mouth at bedtime. 6/23/2019: Patient states she uses this almost every night.        Allergies:  Allergies   Allergen Reactions     Ace Inhibitors Cough     Atrovent [Ipratropium Bromide]  Headache     Fosinopril Sodium Cough     Zolpidem      hallucinations        Review of Systems:  Pertinent items are noted in HPI.      Physical Exam:   General: Patient is alert, pale female, no distress.   Vitals: /67, Temp 97.5, Pulse 73, RR 18, O2 sat 96% on O2.  HEENT: Head is NCAT. Eyes show no injection or icterus. Nares negative. Oropharynx well hydrated.  Neck: Supple. No tenderness or adenopathy. No JVD. Dialysis access right neck.  Lungs: Clear bilaterally. No wheezes.  Cardiovascular: Regular rate and rhythm, normal S1, S2.  Back: No spinal or CVA tenderness.  Abdomen: Soft, no tenderness on exam. Bowel sounds present. No guarding rebound or rigidity.  : Deferred.  Extremities: Mild LE edema is noted.  Musculoskeletal: Bandage left femur incisions.  Skin: Incisions covered.  Psych: Mood appears good.      Labs:  Lab Results   Component Value Date    WBC 3.9 (L) 07/02/2019    HGB 7.4 (L) 07/02/2019    HCT 24.0 (L) 07/02/2019     (H) 07/02/2019     (L) 07/02/2019     Results for orders placed or performed during the hospital encounter of 05/18/19   Basic Metabolic Panel   Result Value Ref Range    Sodium 137 136 - 145 mmol/L    Potassium 5.0 3.5 - 5.0 mmol/L    Chloride 100 98 - 107 mmol/L    CO2 26 22 - 31 mmol/L    Anion Gap, Calculation 11 5 - 18 mmol/L    Glucose 81 70 - 125 mg/dL    Calcium 9.5 8.5 - 10.5 mg/dL    BUN 29 (H) 8 - 28 mg/dL    Creatinine 4.91 (H) 0.60 - 1.10 mg/dL    GFR MDRD Af Amer 11 (L) >60 mL/min/1.73m2    GFR MDRD Non Af Amer 9 (L) >60 mL/min/1.73m2       Assessment/Plan:  1. Left femur fracture. Sustained in a fall. S/p ORIF with nail on 6/23/19. WBAT, working with therapy. Follow up with ortho.  2. ESRD. On dialysis 3 x weekly. Follow up per protocol.  3. Anemia. ABLA from surgery on baseline chronic anemia. Hgb today at 7.4. Did receive transfusion post op in the hospital. Will update nephrology, question if she needs IV iron or transfusion soon, unsure if  she gets epo with dialysis. Check and follow labs, transfuse if needed.   4. HTN. Continue metoprolol though takes midodrine on dialysis days.  5. Afib. DC summary says she is on warfarin but she is not chronically on warfarin. She is s/p watchman and pacer. Rate controlled.  6. DVT prevention. On aspirin two times a day per direction of ortho.  7. GI upset. She is on home ranitidine. On aspirin for DVT prevention so Omeprazole added for GI protection.   8. COPD. ZULEIKA. She reports prn oxygen use at home, currently using at TCU. Encourage IS.  9. Chronic HF. Appears stable. Monitor weights, edema, clinical status.  10. Code status is full code.      Total time greater than 60 minutes, greater than 50% counseling and coordination of care, time spent in interview and examination of patient, review of records, discussion with nursing staff. CC includes hospital consultant, lab and med review, discussion with patient for DVT prevention, GI protection, Orthopedic progress, maintenance of dialysis, update nephrology, follow up lab monitoring. She understands and agrees.           Electronically signed by: Saige Ott MD

## 2021-05-31 NOTE — PROGRESS NOTES
[facility]    Facility:   [facility] [[id number]]   Code Status: FULL CODE      CHIEF COMPLAINT/REASON FOR VISIT:  Chief Complaint   Patient presents with     Review Of Multiple Medical Conditions       HISTORY:      HPI: Belia is a 74 y.o. female undergoing physical and  occupational therapy at [facility]. with history of ESRD on hemodialysis M/W/F, chronic thrombocytopenia, chronic atrial fibrillation, sick sinus syndrome, status post watchman device, status post pacemaker, diastolic CHF, hypertension, ZULEIKA, anemia of chronic kidney disease, dyslipidemia who was brought to the emergency department for evaluation after a fall.  The patient uses a walker for ambulation due to right foot drop and while at home tripped on the kitchen floor mat when she turned and fell.  She denies head trauma or loss of consciousness but complained of left hip pain.  In the ED, she was hemodynamically stable.  X-rays showed a displaced, comminuted intertrochanteric fracture of left femur. She underwent a left intramedullary nailing.     Today she is seen for review of multiple medical issues. . She denied CP. She does have shortness of breath with activity. She is on 2 L NC at night due to unable to tolerate C- Pap after many tries She occasionally will wear oxygen  during the day. She tel;ls me today she wears the oxygen at dialysis because it helps her through the run but not because she  has increased shortness of breath. She is now able to complete her runs and the dizziness is better. She tells me she is voiding daily now and before it was only every few days.    She was recently seen by GI for negative Occults x 3 but had a  moderate amount of visible blood in the toilet.  It appears she has  large internal hemorrhoids and when she is off the coumadin it is recommended she have them banded,. Her weight is up 4.5  pounds this week.   Hgb 7/15 was 8.9. She has  not had any further rectal bleeding in quite some time. She is moving her bowels. Next INR due 8/15    Past Medical History:   Diagnosis Date     Arthritis      CHF (congestive heart failure) (H)      Chronic anemia 6/1/2014     Chronic kidney disease      Chronic thoracic aortic dissection (H) 10/7/2015    Descending thoracic aorta; treated medically per notes of Dragan Singh and Jennifer.     COPD (chronic obstructive pulmonary disease) (H)      CVA (cerebral infarction)      Disease of thyroid gland      Dyslipidemia      ESRD (end stage renal disease) (H) 06/03/2009    on dialysis with Dr. Mitchell     Essential hypertension 6/30/2014     Gastrointestinal hemorrhage, unspecified gastrointestinal hemorrhage type 6/5/2017     GI (gastrointestinal bleed)      GI bleeding 6/5/2017     Gout      L3 vertebral fracture (H) 11/16/2015     Left Atrial Appendage Occlusion (WATCHMAN) 4/5/2018    LAAO April 5, 2018 (30 mm WATCHMAN)     Obesity      ZULEIKA (obstructive sleep apnea), severe, intolerant of CPAP 10/22/2015     Pneumonia 9-7-2015     Right foot drop      Spinal stenosis 3/28/2016     Stroke (H) 3/24/2016             Family History   Problem Relation Age of Onset     Dementia Mother      Diabetes Mother      Arthritis Mother      Cancer Mother      Depression Mother      Heart disease Mother      Vision loss Mother      Stroke Father      Heart disease Father      Breast cancer Neg Hx      Social History     Socioeconomic History     Marital status:      Spouse name: Cayden     Number of children: 2     Years of education: Not on file     Highest education level: Not on file   Occupational History     Employer: RETIRED   Social Needs     Financial resource strain: Not on file     Food insecurity:     Worry: Not on file     Inability: Not on file     Transportation needs:     Medical: Not on file     Non-medical: Not on file   Tobacco Use     Smoking status: Former Smoker     Packs/day: 1.50     Years: 37.00      Pack years: 55.50     Types: Cigarettes     Last attempt to quit: 1/1/2009     Years since quitting: 10.6     Smokeless tobacco: Never Used   Substance and Sexual Activity     Alcohol use: No     Alcohol/week: 7.0 oz     Types: 14 Standard drinks or equivalent per week     Comment: 14 mixed drinks per week     Drug use: No     Sexual activity: Never     Partners: Male   Lifestyle     Physical activity:     Days per week: Not on file     Minutes per session: Not on file     Stress: Not on file   Relationships     Social connections:     Talks on phone: Not on file     Gets together: Not on file     Attends Confucianist service: Not on file     Active member of club or organization: Not on file     Attends meetings of clubs or organizations: Not on file     Relationship status: Not on file     Intimate partner violence:     Fear of current or ex partner: Not on file     Emotionally abused: Not on file     Physically abused: Not on file     Forced sexual activity: Not on file   Other Topics Concern     Not on file   Social History Narrative    Lives with her . Daughter in [place] and daughter in [place].         Review of Systems   Constitutional: Negative for activity change, appetite change, fatigue and fever.   HENT: Negative for congestion.    Respiratory: Positive for shortness of breath. Negative for cough and wheezing.         Dialysis port R upper chest    Cardiovascular: Negative for chest pain and leg swelling.   Gastrointestinal: Negative for abdominal distention, abdominal pain, constipation, diarrhea and nausea.   Genitourinary: Negative for dysuria.        Reports she is now voiding daily   Musculoskeletal: Negative for arthralgias and back pain.   Skin: Positive for wound. Negative for color change.   Neurological: Negative for dizziness.   Psychiatric/Behavioral: Positive for sleep disturbance. Negative for agitation, behavioral problems and confusion.         On trazodone        .  Vitals:     08/13/19 0649   BP: 100/55   Pulse: 71   Resp: 18   Temp: 97.2  F (36.2  C)   SpO2: 95%   Weight: 169 lb (76.7 kg)       Physical Exam   Constitutional: She is oriented to person, place, and time. She appears well-developed and well-nourished.   pleasant woman in no acute distress   HENT:   Head: Normocephalic and atraumatic.   Eyes: Pupils are equal, round, and reactive to light. Conjunctivae are normal.   Neck: Normal range of motion. Neck supple.   Cardiovascular: Normal rate, regular rhythm and normal heart sounds.   No murmur heard.  Pulmonary/Chest: Effort normal. She has no wheezes. She has rales.   Bilateral lower lobes    Abdominal: Soft. Bowel sounds are normal. She exhibits no distension. There is no tenderness.   Musculoskeletal: Normal range of motion. She exhibits edema.   Right foot drop  2+ edema left foot- no leg swelling .    Neurological: She is alert and oriented to person, place, and time.   Neuropathy right foot and decreased sensation bottom of left foot.     Skin: Skin is warm and dry.   Left hip wound healed    Psychiatric: She has a normal mood and affect. Her behavior is normal.         LABS:   Recent Results (from the past 240 hour(s))   INR   Result Value Ref Range    INR 2.06 (H) 0.90 - 1.10     Current Outpatient Medications   Medication Sig Note     acetaminophen (TYLENOL) 500 MG tablet Take 2 tablets (1,000 mg total) by mouth 3 (three) times a day.      aspirin 325 MG tablet Take 1 tablet (325 mg total) by mouth 2 (two) times a day.      atorvastatin (LIPITOR) 10 MG tablet Take 10 mg by mouth at bedtime.      B complex-vitamin C-folic acid (DIALYVITE) 100-1 mg Tab Take 1 tablet by mouth daily.      chlorpheniramine/dextromethorp (CORICIDIN HBP COUGH AND COLD ORAL) Take 1 tablet by mouth every 6 (six) hours as needed.      cholecalciferol, vitamin D3, 2,000 unit cap Take 2,000 Units by mouth daily with lunch.       cinacalcet (SENSIPAR) 30 MG tablet Take 30 mg by mouth 3 times weekly  with dialysis            diphenhydrAMINE (BENADRYL) 25 mg tablet Take 50 mg by mouth at bedtime as needed for sleep.      folic acid (FOLVITE) 1 MG tablet Take 1 mg by mouth daily.      gabapentin (NEURONTIN) 100 MG capsule Take 100 mg by mouth 3 (three) times a day.      Lactobacillus rhamnosus GG (CULTURELLE) 10-15 Billion cell capsule Take 1 capsule by mouth daily with lunch.      metoprolol succinate (TOPROL-XL) 25 MG Take 25 mg by mouth daily.      midodrine HCl (MIDODRINE ORAL) Take 15 mg by mouth 3 (three) times a week. Three times weekly before dialysis.             oxyCODONE (ROXICODONE) 5 MG immediate release tablet Take 0.5-1 tablets (2.5-5 mg total) by mouth every 4 (four) hours as needed.      polyvinyl alcohol (LIQUIFILM TEARS) 1.4 % ophthalmic solution Apply 1 drop to eye as needed for dry eyes.      ranitidine (ZANTAC) 150 MG tablet Take 150 mg by mouth at bedtime.      senna-docusate (SENNOSIDES-DOCUSATE SODIUM) 8.6-50 mg tablet Take 1 tablet by mouth at bedtime.       sevelamer carbonate (RENVELA) 800 mg tablet Take 2,400 mg by mouth 3 (three) times a day with meals.             timolol maleate (TIMOPTIC) 0.5 % ophthalmic solution Administer 1 drop to both eyes 2 (two) times a day.       traZODone (DESYREL) 50 MG tablet Take 50 mg by mouth at bedtime. 6/23/2019: Patient states she uses this almost every night.      ASSESSMENT:      ICD-10-CM    1. ESRD (end stage renal disease) on dialysis (H) N18.6     Z99.2    2. Chronic anemia D64.9    3. Pain management R52    4. Closed displaced intertrochanteric fracture of left femur with routine healing, subsequent encounter S72.477X        PLAN:    S/P internal fixation using intramedullary nail. PT/OT/pain  Control, monitor incision for S/S infection. Incision healed.    End-stage renal disease. Dialysis as scheduled.  M/W/F Pt does extra runs when she feels a weight gain  Hypertension on Metoprolol Succinate   Chronic atrial fibrillation- on coumadin ,  metoprolol  Pain control Tylenol and Oxycodone as scheduled     Anemia.  Chronic anemia. Last HGB 8.9.    Insomnia-trazadone  75 mg HS  Constipation resolved continue  senna s to 2 tabs two times a day, Biscodyl suppository 10 mg ME q 3 days PRN if no BM   GI bleeding - occults negative,  No recent bleeding , seen bu GI and recommended hemorrhoid banding when off the coumadin.  Anticoagulation therapy - recently started on coumadin, adjust per INR's.       Electronically signed by: Le Flynn CNP

## 2021-05-31 NOTE — TELEPHONE ENCOUNTER
Called and spoke with MERARI Alicea with home care. Gave instructions to have Itzel take a boost dose of Warfarin 4.5 mg tonight (8/22/19). ACN will follow up with nurse tomorrow to give dosing over the weekend. Deanne is going to fax INR flow  sheet from the TCU to Attn: JENIFFER Navarro 8/23/19. After reviewing dosing history, I will call nurse to give more instructions. Also ACN will complete enrollment 8/23/19.    Melanie Fregoso RN, ACN  [facility] Anticoagulation Nurse  [phone]

## 2021-05-31 NOTE — PROGRESS NOTES
[facility]      Facility:    [facility] [[id number]]  Code Status: FULL CODE      Chief Complaint/Reason for Visit:  Chief Complaint   Patient presents with     H & P     Fall at home with left hip pain.  Previously before this fall and ER visit patient had a left intertrochanteric hip fracture using intramedullary nail in June.  10 days ago she was discharged from the TCU she is getting out of her bed using a walker tripped and fell.  There are no presyncopal symptoms there may have been some head trauma.  She was brought to the emergency room there is no other signs of trauma she was neurovascularly intact pain control was given in the ED and was decided she is a good        HPI:   Belia is a 74 y.o. female who was brought to the emergency room on 8/27/2019 secondary to fall she sustained.  Previously she had been recently released about 10 days before this time from the TCU secondary to intertrochanteric hip fracture with intramedullary nailing.  She says her fall was mechanical and was no prodrome of syncope near syncope chest pain shortness of breath dizziness.  She is brought to the hospital and work-up did not show any acute fracture.  She is on Coumadin for DVT she claims she did hit her head but she remained neurovascularly intact.  She is unable to ambulate without assistive to and it was recommended that she stay in observation and go to TCU and was transferred here to the [facility] [place] [facility] in stable condition.    Patient does have hemodialysis secondary to end-stage kidney disease.  She also has history of CHF.  She does get back from dialysis and she feels pretty good at this time.  She seems euvolemic by exam she has no chest pain or shortness of breath.  She says her hip pain is getting much better and she does still make some urine and she is moving her bowels without difficulty.  She has no other issues at this time.    Past Medical History:  Past  Medical History:   Diagnosis Date     Arthritis      CHF (congestive heart failure) (H)      Chronic anemia 6/1/2014     Chronic kidney disease      Chronic thoracic aortic dissection (H) 10/7/2015    Descending thoracic aorta; treated medically per notes of Dragan Singh and Jennifer.     COPD (chronic obstructive pulmonary disease) (H)      CVA (cerebral infarction)      Disease of thyroid gland      Dyslipidemia      ESRD (end stage renal disease) (H) 06/03/2009    on dialysis with Dr. Mitchell     Essential hypertension 6/30/2014     Gastrointestinal hemorrhage, unspecified gastrointestinal hemorrhage type 6/5/2017     GI (gastrointestinal bleed)      GI bleeding 6/5/2017     Gout      L3 vertebral fracture (H) 11/16/2015     Left Atrial Appendage Occlusion (WATCHMAN) 4/5/2018    LAAO April 5, 2018 (30 mm WATCHMAN)     Obesity      ZULEIKA (obstructive sleep apnea), severe, intolerant of CPAP 10/22/2015     Pneumonia 9-7-2015     Right foot drop      Spinal stenosis 3/28/2016     Stroke (H) 3/24/2016           Surgical History:  Past Surgical History:   Procedure Laterality Date     BACK SURGERY      [facility]     COLONOSCOPY N/A 3/23/2016    Procedure: COLONOSCOPY;  Surgeon: Ruddy Tejada MD;  Location: [facility];  Service:      DILATION AND CURETTAGE OF UTERUS       EP ABLATION AV NODE N/A 3/7/2019    Procedure: EP Ablation AV Node;  Surgeon: Derick Duarte MD;  Location: [facility] Lab;  Service: Cardiology     EP NEGRA CLOSURE N/A 4/5/2018    Procedure: EP NEGRA Closure;  Surgeon: Derick Duarte MD;  Location: [facility];  Service:      EP PACEMAKER INSERT N/A 3/7/2019    Procedure: EP Pacemaker Insertion;  Surgeon: Derick Duarte MD;  Location: [facility];  Service: Cardiology     EYE SURGERY       HERNIA REPAIR       IR TUNNELED CATHETER INSERT  11/20/2018     IR TUNNELED CATHETER REMOVAL  11/20/2018     TN COLSC FLEXIBLE W/CONTROL BLEEDING ANY METHOD N/A 6/7/2017     Procedure: COLONOSCOPY;  Surgeon: Luis Mckeon MD;  Location: [facility] GI;  Service: Gastroenterology     NH OPEN FIX INTER/SUBTROCH FX,IMPLNT Left 6/23/2019    Procedure: INTERNAL FIXATION, FRACTURE, TROCHANTERIC, HIP, USING INTERMEDULLARY NAIL;  Surgeon: Bennie Marsh DO;  Location: [facility] Main OR;  Service: Orthopedics     TONSILLECTOMY         Family History:   Family History   Problem Relation Age of Onset     Dementia Mother      Diabetes Mother      Arthritis Mother      Cancer Mother      Depression Mother      Heart disease Mother      Vision loss Mother      Stroke Father      Heart disease Father      Breast cancer Neg Hx        Social History:    Social History     Socioeconomic History     Marital status:      Spouse name: Cayden     Number of children: 2     Years of education: None     Highest education level: None   Occupational History     Employer: RETIRED   Social Needs     Financial resource strain: None     Food insecurity:     Worry: None     Inability: None     Transportation needs:     Medical: None     Non-medical: None   Tobacco Use     Smoking status: Former Smoker     Packs/day: 1.50     Years: 37.00     Pack years: 55.50     Types: Cigarettes     Last attempt to quit: 1/1/2009     Years since quitting: 10.6     Smokeless tobacco: Never Used   Substance and Sexual Activity     Alcohol use: No     Alcohol/week: 7.0 oz     Types: 14 Standard drinks or equivalent per week     Comment: 14 mixed drinks per week     Drug use: No     Sexual activity: Never     Partners: Male   Lifestyle     Physical activity:     Days per week: None     Minutes per session: None     Stress: None   Relationships     Social connections:     Talks on phone: None     Gets together: None     Attends Sabianist service: None     Active member of club or organization: None     Attends meetings of clubs or organizations: None     Relationship status: None     Intimate partner violence:     Fear  of current or ex partner: None     Emotionally abused: None     Physically abused: None     Forced sexual activity: None   Other Topics Concern     None   Social History Narrative    Lives with her . Daughter in [place] and daughter in [place].          Review of Systems   Constitutional:        Patient denies any pain fevers chills nausea vomiting diarrhea change in vision hearing taste smell weakness one-sided chest mentions of breath.  She denies any congeners stool polyphagia polydipsia polyuria depression or anxiety and the remainder the review of systems negative.  She still makes a little bit a urine but not very much.       Vitals:    08/30/19 1355   BP: 131/71   Pulse: 75   Resp: 21   Temp: 97.2  F (36.2  C)   SpO2: 95%       Physical Exam   Constitutional: No distress.   HENT:   Head: Normocephalic and atraumatic.   Nose: Nose normal.   Eyes: Conjunctivae are normal. Right eye exhibits no discharge. Left eye exhibits no discharge.   Neck: Neck supple. No thyromegaly present.   Cardiovascular: Normal rate and regular rhythm.   Pulmonary/Chest: Effort normal and breath sounds normal. No respiratory distress. She exhibits no tenderness.   Abdominal: Soft. Bowel sounds are normal. She exhibits distension. There is no tenderness. There is no rebound and no guarding.   Musculoskeletal: She exhibits edema.   Patient is only slightly tender over the left trochanteric area.   Lymphadenopathy:     She has no cervical adenopathy.   Skin: Skin is warm and dry. She is not diaphoretic.   Psychiatric: She has a normal mood and affect. Her behavior is normal.       Medication List:  Current Outpatient Medications   Medication Sig     acetaminophen (TYLENOL) 500 MG tablet Take 1,000 mg by mouth 3 (three) times a day as needed.     atorvastatin (LIPITOR) 10 MG tablet Take 10 mg by mouth at bedtime.     B complex-vitamin C-folic acid (DIALYVITE) 100-1 mg Tab Take 1 tablet by mouth daily with lunch.             chlorpheniramine/dextromethorp (CORICIDIN HBP COUGH AND COLD ORAL) Take 1 tablet by mouth every 6 (six) hours as needed.     cholecalciferol, vitamin D3, (VITAMIN D3) 2,000 unit Tab Take 2,000 Units by mouth daily with lunch.            cinacalcet (SENSIPAR) 30 MG tablet Take 30 mg by mouth see administration instructions. Take three times weekly with dialysis (on Mondays, Wednesdays, and Fridays).           folic acid (FOLVITE) 1 MG tablet Take 1 mg by mouth daily with lunch.            gabapentin (NEURONTIN) 100 MG capsule Take 100 mg by mouth 3 (three) times a day.     Lactobacillus rhamnosus GG (CULTURELLE) 10-15 Billion cell capsule Take 1 capsule by mouth daily with lunch.     metoprolol succinate (TOPROL-XL) 25 MG Take 25 mg by mouth daily with lunch.            midodrine HCl (MIDODRINE ORAL) Take 10 mg by mouth see administration instructions. Take 10 mg at the beginning of dialysis and 10 mg assisted through dialysis three days weekly on dialysis days (Mondays, Wednesdays, and Fridays).           omeprazole (PRILOSEC) 20 MG capsule Take 20 mg by mouth 2 (two) times daily before lunch and supper.            oxyCODONE (ROXICODONE) 5 MG immediate release tablet Take 0.5-1 tablets (2.5-5 mg total) by mouth every 4 (four) hours as needed.     polyvinyl alcohol (LIQUIFILM TEARS) 1.4 % ophthalmic solution Apply 1 drop to eye as needed for dry eyes.     pot bicarb-sod bicarb-cit ac (EDI-YUMIKO GOLD) 344-1,050-1,000 mg TbEF Take 1 tablet by mouth every 4 (four) hours as needed.     ranitidine (ZANTAC) 150 MG tablet Take 150 mg by mouth at bedtime.     senna-docusate (SENNOSIDES-DOCUSATE SODIUM) 8.6-50 mg tablet Take 1 tablet by mouth every evening.            sucroferric oxyhydroxide (VELPHORO) 500 mg Chew chewable tablet Chew 500 mg 3 (three) times a day with meals.     timolol maleate (TIMOPTIC) 0.5 % ophthalmic solution Administer 1 drop to both eyes 2 (two) times a day.      traZODone (DESYREL) 150 MG tablet  Take 75 mg by mouth at bedtime.     warfarin (COUMADIN/JANTOVEN) 3 MG tablet Take 3-4.5 mg by mouth See Admin Instructions. Take 4.5 mg two days weekly (on Tuesdays and Fridays) and 3 mg five days weekly (on all other days of the week). Adjust dose based on INR results as directed.       Labs:      Assessment:    ICD-10-CM    1. Fall, subsequent encounter W19.XXXD    2. Left hip pain M25.552    3. End stage kidney disease (H) N18.6    4. Chronic diastolic congestive heart failure (H) I50.32    5. Chronic anemia D64.9    6. Pain management R52    7. Essential hypertension I10        Plan: Plan at this time will check weights here and get labs from dialysis.  She says her hemoglobin is been good so no reason to check her hemoglobin at this time and we will no changes in pain medications.  Chart was reviewed and med reconciliation was done and no other changes to care plan we will continue to monitor above medical problems.        Electronically signed by: Dl Nolan DO

## 2021-05-31 NOTE — TELEPHONE ENCOUNTER
ANTICOAGULATION  MANAGEMENT: Discharge Continuity of Care Review    Emergency room visit on  8/27 for fall.    Discharge disposition: TCU [facility]    INR Results:       Recent labs: (last 7 days)     08/22/19 08/26/19   INR 1.40* 2.40*       Warfarin inpatient management: Home regimen continued    Warfarin discharge instructions: home regimen continued     Medication Changes Affecting Anticoagulation: No    Additional Factors Affecting Anticoagulation: No    Plan         ACM will resume monitoring upon discharge from TCU    Anticoagulation calendar updated    Brendon Bowser RN

## 2021-05-31 NOTE — TELEPHONE ENCOUNTER
Orders being requested:   1. Skilled Nursing      1x a week for 1 week      2x a week for 2 weeks      1x a week for 2 weeks    2. Home Health Aid      1x a week for 4 weeks    3. Physical Therapy Eval and Treat    4. Occupational Therapy Eval and Treat    Reason service is needed/diagnosis:  1. Fracture Healing      Pain Management      Medication Management    2. Bathing Assistance    3. Fracture Healing    4. Fracture Healing        When are orders needed by: End of Day today 8/22/19.  Where to send Orders: Phone:  Verbal orders to: Danielle with [facility], [phone]  Okay to leave detailed message?  Yes

## 2021-05-31 NOTE — PROGRESS NOTES
[facility]    Facility:   [facility] [[id number]]   Code Status: FULL CODE  PCP: John Escoto DO   Phone: [phone]   Fax: [phone]      CHIEF COMPLAINT/REASON FOR VISIT:  Chief Complaint   Patient presents with     Discharge Summary       HISTORY COURSE:  Belia is a 74 y.o. female undergoing physical and  occupational therapy at [facility]. with history of ESRD on hemodialysis M/W/F, chronic thrombocytopenia, chronic atrial fibrillation, sick sinus syndrome, status post watchman device, status post pacemaker, diastolic CHF, hypertension, ZULEIKA, anemia of chronic kidney disease, dyslipidemia who was brought to the emergency department for evaluation after a fall.  The patient uses a walker for ambulation due to right foot drop and while at home tripped on the kitchen floor mat when she turned and fell.  She denies head trauma or loss of consciousness but complained of left hip pain.  In the ED, she was hemodynamically stable.  X-rays showed a displaced, comminuted intertrochanteric fracture of left femur. She underwent a left intramedullary nailing.      Today she is seen for a face-to-face for discharge and also for a wheelchair request..  She will discharge to home on 8/20/2019 with current medications and treatments.  She will have [facility] home care services.  Her INR is scheduled for 8/20/2019 her dosing will be done prior to discharge if able, if not it will be called in and home care RN will continue INRs further. She denied CP. She does have shortness of breath with activity. She is on 2 L NC at night due to unable to tolerate C- Pap after many tries She occasionally will wear oxygen  during the day.  She was recently seen by GI for negative Occults x 3 but had a  moderate amount of visible blood in the toilet.  It appears she has  large internal hemorrhoids and when she is off the coumadin it is recommended she have them  banded,. Her weight have been up and down 2-3pounds over the last week.  She has not had any further rectal bleeding in quite some time. She is moving her bowels.     Face-to-face for wheelchair    My patient Belia Larose date of birth 1945 will need a wheelchair for discharge due to the diagnosis of displaced intertrochanteric fracture of left femur, CHF, COPD, right foot drop, and end-stage renal disease by patient has the following limitations of mobility to participate in  ADL less such as food prep, dressing, transferring, and ambulation.  The patient's mobility limitations cannot be sufficiently and safely resolved by use of a cane or walker.  Her current home is adequate space for maneuvering a manual wheelchair, the patient and caregiver able to safely use a manual wheelchair, the patient will use the wheelchair daily, use of the wheelchair will improve the participation in MR ADL less for the patient.  My patient will require benefit from an 18 inch x 18 inch standard manual wheelchair with bilateral elevated foot rest, bilateral full-length armrests and 18 use of 18 inch cushion.  A cushion is necessary since my patient sits in a wheelchair for greater than 8 hours/day placing her at high risk for skin breakdown.  This equipment is necessary for the duration of patient's lifetime.      Review of Systems  Constitutional: Negative for activity change, appetite change, fatigue and fever.   HENT: Negative for congestion.    Respiratory: Positive for shortness of breath. Negative for cough and wheezing.         Dialysis port R upper chest    Cardiovascular: Negative for chest pain and leg swelling.   Gastrointestinal: Negative for abdominal distention, abdominal pain, constipation, diarrhea and nausea.   Genitourinary: Negative for dysuria.        Reports she is now voiding daily   Musculoskeletal: Negative for arthralgias and back pain.   Skin: Positive for wound. Negative for color change.    Neurological: Negative for dizziness.   Psychiatric/Behavioral: Positive for sleep disturbance. Negative for agitation, behavioral problems and confusion.         On trazodone    Vitals:    08/19/19 0752   BP: 139/79   Pulse: 77   Resp: 16   Temp: 97.7  F (36.5  C)   SpO2: 95%   Weight: 170 lb (77.1 kg)       Physical Exam  Constitutional: She is oriented to person, place, and time. She appears well-developed and well-nourished.   pleasant woman in no acute distress   HENT:   Head: Normocephalic and atraumatic.   Eyes: Pupils are equal, round, and reactive to light. Conjunctivae are normal.   Neck: Normal range of motion. Neck supple.   Cardiovascular: Normal rate, regular rhythm and normal heart sounds.   No murmur heard.  Pulmonary/Chest: Effort normal. She has no wheezes. She has rales.   Bilateral lower lobes    Abdominal: Soft. Bowel sounds are normal. She exhibits no distension. There is no tenderness.   Musculoskeletal: Normal range of motion. She exhibits edema.   Right foot drop  2+ edema left foot- no leg swelling .    Neurological: She is alert and oriented to person, place, and time.   Neuropathy right foot and decreased sensation bottom of left foot.     Skin: Skin is warm and dry.   Left hip wound healed    Psychiatric: She has a normal mood and affect. Her behavior is normal.      MEDICATION LIST:  Current Outpatient Medications   Medication Sig     acetaminophen (TYLENOL) 500 MG tablet Take 2 tablets (1,000 mg total) by mouth 3 (three) times a day.     aluminum-magnesium hydroxide-simethicone (MAALOX ADVANCED) 200-200-20 mg/5 mL Susp Take 30 mL by mouth 4 (four) times a day as needed.     atorvastatin (LIPITOR) 10 MG tablet Take 10 mg by mouth at bedtime.     B complex-vitamin C-folic acid (DIALYVITE) 100-1 mg Tab Take 1 tablet by mouth daily.     chlorpheniramine/dextromethorp (CORICIDIN HBP COUGH AND COLD ORAL) Take 1 tablet by mouth every 6 (six) hours as needed.     cholecalciferol, vitamin D3,  2,000 unit cap Take 2,000 Units by mouth daily with lunch.      cinacalcet (SENSIPAR) 30 MG tablet Take 30 mg by mouth 3 times weekly with dialysis           folic acid (FOLVITE) 1 MG tablet Take 1 mg by mouth daily.     gabapentin (NEURONTIN) 100 MG capsule Take 100 mg by mouth 3 (three) times a day.     Lactobacillus rhamnosus GG (CULTURELLE) 10-15 Billion cell capsule Take 1 capsule by mouth daily with lunch.     metoprolol succinate (TOPROL-XL) 25 MG Take 25 mg by mouth daily.     midodrine HCl (MIDODRINE ORAL) Take 10 mg by mouth 3 (three) times a week. Three times weekly before dialysis. 10 mg two times a day 3 times weekly           omeprazole (PRILOSEC) 20 MG capsule Take 20 mg by mouth 2 (two) times a day before meals.     oxyCODONE (ROXICODONE) 5 MG immediate release tablet Take 0.5-1 tablets (2.5-5 mg total) by mouth every 4 (four) hours as needed.     polyvinyl alcohol (LIQUIFILM TEARS) 1.4 % ophthalmic solution Apply 1 drop to eye as needed for dry eyes.     pot bicarb-sod bicarb-cit ac (EDI-SELTZER GOLD) 344-1,050-1,000 mg TbEF Take 1 tablet by mouth every 4 (four) hours as needed.     ranitidine (ZANTAC) 150 MG tablet Take 150 mg by mouth at bedtime.     senna-docusate (SENNOSIDES-DOCUSATE SODIUM) 8.6-50 mg tablet Take 2 tablets by mouth 2 (two) times a day.            sucroferric oxyhydroxide (VELPHORO) 500 mg Chew chewable tablet Chew 500 mg 3 (three) times a day with meals.     timolol maleate (TIMOPTIC) 0.5 % ophthalmic solution Administer 1 drop to both eyes 2 (two) times a day.      traZODone (DESYREL) 50 MG tablet Take 75 mg by mouth at bedtime.              DISCHARGE DIAGNOSIS:    ICD-10-CM    1. ESRD (end stage renal disease) on dialysis (H) N18.6     Z99.2    2. Chronic anemia D64.9    3. Pain management R52    4. Chronic diastolic congestive heart failure (H) I50.32        MEDICAL EQUIPMENT NEEDS:  Wheelchair ordered     DISCHARGE PLAN/FACE TO FACE:  I certify that services are/were  furnished while this patient was under the care of a physician and that a physician or an allowed non-physician practitioner (NPP), had a face-to-face encounter that meets the physician face-to-face encounter requirements. The encounter was in whole, or in part, related to the primary reason for home health. The patient is confined to his/her home and needs intermittent skilled nursing, physical therapy, speech-language pathology, or the continued need for occupational therapy. A plan of care has been established by a physician and is periodically reviewed by a physician.  Date of Face-to-Face Encounter: 8/19/19    I certify that, based on my findings, the following services are medically necessary home health services: [facility] home [facility] PT OT home health aide and RN    My clinical findings support the need for the above skilled services because: PT OT for continued strength and endurance, home health aide to assist with activities of daily living, RN for vital signs, medication management and INR checks    This patient is homebound because: She is deconditioned and easily fatigued.  She is end-stage renal disease with dialysis 3 times a week and as needed for fluid retention she also requires the use of adaptive equipment    The patient is, or has been, under my care and I have initiated the establishment of the plan of care. This patient will be followed by a physician who will periodically review the plan of care.    Schedule follow up visit with primary care provider within 7 days to reestablish care.    Electronically signed by: Le Flynn CNP     Electronically signed by: Saige Ott MD

## 2021-05-31 NOTE — TELEPHONE ENCOUNTER
Spoke with Haily to further clarify.  Reports pt had a DVT at TCU, discharged her on coumadin. Currently taking 3.5 mg daily.  Checked INR on Tuesday, INR 2.57. Today 1.4.  Pt had only taken 3mg of Coumadin on Wednesday and Tuesday-pt had been confused on dose.      Last documentation regarding pt's DVT from TCU on 8/8/19 by Dr. Ott:    She is now back on warfarin due to left LE post op DVT diagnosed in the TCU. Tolerating without bleeding problems.

## 2021-05-31 NOTE — TELEPHONE ENCOUNTER
Refill Request  Did you contact pharmacy: No  Medication name: warfarin (COUMADIN/JANTOVEN) 3 MG tablet  Requested Prescriptions      No prescriptions requested or ordered in this encounter     Who prescribed the medication: (historical)  Pharmacy Name and Location: [facility] - [address]  Is patient out of medication: unsure  Patient notified refills processed in 72 hours:  no  Okay to leave a detailed message: yes

## 2021-05-31 NOTE — TELEPHONE ENCOUNTER
Orders being requested: OT 1 x/wk for 1 wk, then 2 x/wk for 3 wks.  Reason service is needed/diagnosis: ADL's, walker safety, balance and strengthening.  When are orders needed by: asap  Where to send Orders: Phone:  Adrianna at [phone]  Okay to leave detailed message?  Yes

## 2021-05-31 NOTE — TELEPHONE ENCOUNTER
ANTICOAGULATION  MANAGEMENT- Home Care/Care Facility Result    Assessment     8/22/19 INR result of 1.40 is Subtherapeutic (goal INR of 2.0-3.0)      New enrollment to [facility]. Patient has a Watchman implant for her A-fib. Anticoagulation therapy is for a post op LLE DVT that was found while at the TCU. Duration of therapy 3-6 months per PCP. Home care services with [facility]. Nurse is Danielle Mota, [phone]. Patient has 3 mg tablets and a few 1 mg tablets on hand. ACN mailed Warfarin education folder to the home on 8/23/19.      Less warfarin taken than instructed which may be affecting INR. At discharge TCU instructed patient to take 3.5 mg on 8/20 and 8/21. Patient reported that she only took 3 mg each day.    No new diet changes affecting INR    No new medication/supplements affecting INR    Continues to tolerate warfarin with no reported s/s of bleeding or thromboembolism     Previous INR was Therapeutic     ACN updated [facility] calendar with all dosing for the last month.    Plan:     Spoke with MERARI Santos with [facility] discussed INR result and instructed:     Warfarin Dosing Instructions: Patient took boost dose of 4.5 mg on 8/22/19 as instructed; then change warfarin dose to 4.5 mg daily on TUE / FRI; and 3 mg daily rest of week  (2 % change)    Next INR to be drawn: Monday 8/26/19    Education provided: target INR goal and significance of current INR result, importance of notifying clinic for changes in medications, importance of notifying clinic for diarrhea, nausea/vomiting, reduced intake and/or illness, importance of notifying clinic of upcoming surgeries and procedures 2 weeks in advance and my Guide to Warfarin Therapy provided     MERARI Santos verbalizes understanding and agrees to warfarin dosing plan.   ?   Melanie Fregoso RN    Subjective/Objective:      Belia Rodriguez, a 74 y.o. female is established on warfarin.     Home care/care facility RN's report of Belia BARILLAS, recent  warfarin dosing, diet changes, medication changes, and symptoms is documented below.    Additional findings: none    Anticoagulation Episode Summary     Current INR goal:   2.0-3.0   TTR:   --   Next INR check:   8/26/2019   INR from last check:   3.45! (7/23/2019)   Weekly max warfarin dose:      Target end date:   10/16/2019   INR check location:      Preferred lab:      Send INR reminders to:   [facility]    Indications    DVT (deep venous thrombosis) (H) [I82.409]           Comments:   Duration 3-6 months per PCP         Anticoagulation Care Providers     Provider Role Specialty Phone number    John Escoto DO Referring Internal Medicine [phone]

## 2021-05-31 NOTE — TELEPHONE ENCOUNTER
Left detailed message giving verbal okay for orders.  Advised to call back with further questions.

## 2021-05-31 NOTE — TELEPHONE ENCOUNTER
INR result is   INR   Date Value Ref Range Status   08/22/2019 1.40 (!) 0.9 - 1.1 Final     8/26/19          2.4      Will the patient be seen, or did they already see, MD or CNP today? No    Most Recent Warfarin dose day/week  Sunday Monday Tuesday Wednesday Thursday Friday Saturday     3.5 mg 3.5 MG 4.5 mg 4.5 mg 3 mg     Sunday Monday Tuesday Wednesday Thursday Friday Saturday   3 m g             Has the patient missed any doses of Coumadin, Warfarin, Jantoven in the past 7 days? No    Has the patients medications changed since the last visit? No    Has the patient experienced any bleeding recently? No    Has the patient experienced any injuries or illness recently? No    Has the patient experienced any 'new' shortness of breath, severe headaches, or changes in vision recently? No    Has the patient had any changes in their diet, or alcohol consumption? No    Is the patient here today to prepare for any type of upcoming surgery, procedure, or for a cardioversion procedure? No    What phone number can we reach the patient at today? Danielle [facility]  [phone].

## 2021-05-31 NOTE — TELEPHONE ENCOUNTER
Who is calling:  magdalena  Reason for Call:  Refill  Date of last appointment with primary care: 4/23/19  Okay to leave a detailed message: Yes      Refill Request  Did you contact pharmacy: No  Medication name: warfarin (COUMADIN/JANTOVEN) 3 MG tablet   Medication   Date: 8/23/2019 Department: [facility] INTERNAL MEDICINE Ordering/Authorizing: John Escoto DO   Order Providers     Prescribing Provider Encounter Provider   John Escoto DO Hansen, Brian Joseph, DO   Outpatient Medication Detail      Disp Refills Start End    warfarin (COUMADIN/JANTOVEN) 3 MG tablet 45 tablet 2 8/23/2019     Sig: Take 1 to 1.5 tablets (3 - 4.5 mg) daily as directed. Dose adjusted according to INR result.    Sent to pharmacy as: warfarin (COUMADIN/JANTOVEN) 3 MG tablet    Notes to Pharmacy: 30 day supply    Cosign for Ordering: Required by John Escoto DO    E-Prescribing Status: Receipt confirmed by pharmacy (8/23/2019  2:42 PM CDT)    Associated Diagnoses     DVT (deep venous thrombosis) (H)  - Primary       Pharmacy     [facility] - [address]       Requested Prescriptions      No prescriptions requested or ordered in this encounter     Who prescribed the medication: Dr Escoto  Pharmacy Name and Location: [facility]  Is patient out of medication: No.  2 days days left, patient was sent home from TCU with 3 mg tablets & 1 mg tablets cut in half, patient has about 7 mg left  Patient notified refills processed in 72 hours:  yes  Okay to leave a detailed message: yes, please call Magdalena at [phone]

## 2021-05-31 NOTE — TELEPHONE ENCOUNTER
ANTICOAGULATION  MANAGEMENT    Assessment     Today's INR result of 2.5 is Therapeutic (goal INR of 2.0-3.0)        Previous INR was Therapeutic    Warfarin given as previously instructed    No new health/diet changes affecting INR    No new medication/supplements affecting INR    Continues to tolerate warfarin with no reported s/s of bleeding or thromboembolism       Plan:     Warfarin Dosing Orders:  Continue current warfarin dose 4.5 mg daily on Tue, Fri; and 3 mg daily rest of week  (0 % change)    Next INR: 1 week    Telephone orders given to nurseRosanne.  Orders read back correctly.     Maranda Yin RN    Subjective/Objective:      Belia Rodriguez, apolonia 74 y.o. female is on warfarin recently admitted to TCU under care of [facility].  Chart reviewed:    Outpatient anticoagulation information:     Anticoagulation management provider: [facility]    Reason for anticoagulation: DVT    Home INR goal: 2-3    Home warfarin dose:  4.5 mg daily on Tue, fri; and 3 mg daily rest of week     Recent hospitalization review:    Reason for hospitalization prior to TCU admission: fall, weakness    Hospital warfarin management: Home regimen continued    Hospital medication changes pertinent to anticoagulation: No    Health changes pertinent to anticoagulation during hospitalization: No      TCU Facility nurse report since admission:    Other anticoagulants: No    Medication changes: No    Missed warfarin doses since last INR: No     Abnormal bleeding since last INR: No    New symptoms, injury or illness: No     Upcoming surgery, procedure or cardioversion: No      Recent INR Results:    Lab Results   Component Value Date    INR 2.50 (!) 08/28/2019    INR 2.40 (!) 08/26/2019    INR 1.40 (!) 08/22/2019       Anticoagulation Episode Summary     Current INR goal:   2.0-3.0   TTR:   --   Next INR check:   9/4/2019   INR from last check:   2.50 (8/28/2019)   Weekly max warfarin dose:       Target end date:   10/16/2019   INR check location:      Preferred lab:      Send INR reminders to:   [facility] (TCU/LTC/[facility])    Indications    DVT (deep venous thrombosis) (H) [I82.409]           Comments:   Duration 3-6 months per PCP         Anticoagulation Care Providers     Provider Role Specialty Phone number    John Escoto DO Referring Internal Medicine [phone]

## 2021-05-31 NOTE — TELEPHONE ENCOUNTER
At the last discharge (June) she was not on warfarin.  She has a watchman device and does not need it for atrial fibrillation/stroke prevention.  Did she get a DVT or PE in the meantime?  In other words-- is she on warfarin and why?

## 2021-05-31 NOTE — TELEPHONE ENCOUNTER
Orders being requested: Physical therapy home visits for one visit a week for one week then two visits a week or two weeks then one visit a week for one week.   Reason service is needed/diagnosis: post hip fracture  Working on gait. Strength and balance  When are orders needed by: ASAP  Where to send Orders: Phone:  [facility]  [phone]  Okay to leave detailed message?  Yes

## 2021-05-31 NOTE — PROGRESS NOTES
[facility] Patient Outreach:     Discharge Date::  8/20/19      Reason for TCU stay (discharge diagnosis)::  Fall with left femur fx      Are you feeling better, the same or worse since your discharge?:  Patient is feeling the same (still limited mobility)          As part of your discharge plan, did they discuss home care with you?: Yes        Have your seen them yet, or are they scheduled to visit?: Yes                Do you have any follow up visits scheduled with your PCP or Specialist?:  Yes, with PCP      (RN) Is it scheduled soon enough (3-5 days)?: No        (RN) Is the patient okay with moving appointment up (if RN feels appropriate)?: No    I'm glad to hear you're doing well and we want you to continue to do well. Your PCP would like to see you for a follow-up visit. Can we help set that up for your today?: No        (RN) Provided patient the PCP's phone number to call if they have any questions or concerns?: No (Patient's  knows the number and has an appt set up with PCP for 9/5/19.  )

## 2021-05-31 NOTE — TELEPHONE ENCOUNTER
Discussed sending refill for medication with Brenda Mora, Pharm D. Pharmacist gave approval.    Sent refill for Warfarin 3 mg tablets, 45 count with 2 refill. (30-day supply)    Lab Results   Component Value Date    INR 1.40 (!) 08/22/2019    INR 2.57 (H) 08/20/2019    INR 1.80 (H) 08/15/2019       Melanie Fregoso, RN  Anticoagulation Nurse  [phone]

## 2021-05-31 NOTE — TELEPHONE ENCOUNTER
ANTICOAGULATION  MANAGEMENT- Home Care/Care Facility Result    Assessment     Today's INR result of 2.4 is Therapeutic (goal INR of 2.0-3.0)        Warfarin taken as previously instructed    No new diet changes affecting INR    No new medication/supplements affecting INR    Continues to tolerate warfarin with no reported s/s of bleeding or thromboembolism     Previous INR was Subtherapeutic    Plan:     Spoke with nurse Slimiscussed INR result and instructed:     Warfarin Dosing Instructions: Continue current warfarin dose 4.5 mg daily on tue/fri; and 3 mg daily rest of week  (0 % change)    Next INR to be drawn: one week      Danielle verbalizes understanding and agrees to warfarin dosing plan.   ?   Brendon Bowser RN    Subjective/Objective:      Belia Rodriguez, a 74 y.o. female is established on warfarin.     Home care/care facility RN's report of Belia INR, recent warfarin dosing, diet changes, medication changes, and symptoms is documented below.    Additional findings: none    Anticoagulation Episode Summary     Current INR goal:   2.0-3.0   TTR:   --   Next INR check:   9/3/2019   INR from last check:   2.40 (8/26/2019)   Weekly max warfarin dose:      Target end date:   10/16/2019   INR check location:      Preferred lab:      Send INR reminders to:   [facility]    Indications    DVT (deep venous thrombosis) (H) [I82.409]           Comments:   Duration 3-6 months per PCP         Anticoagulation Care Providers     Provider Role Specialty Phone number    John Escoto DO Referring Internal Medicine [phone]

## 2021-05-31 NOTE — TELEPHONE ENCOUNTER
Who is calling:  Haily   Reason for Call:  Calling regarding patient doing at home testing for her INR's  Date of last appointment with primary care: 4/23/19   Okay to leave a detailed message: Yes

## 2021-05-31 NOTE — TELEPHONE ENCOUNTER
7/18 leg DVT noted, discharged on warfarin but not on med list.  I have not seen her yet and am trying to piece together the story.      Does not need warfarin for Afib (watchman).     Tentative plan for 3-6 months warfarin then stop unless other indication

## 2021-05-31 NOTE — TELEPHONE ENCOUNTER
Left detailed message for Adrianna giving verbal okay for orders.  Advised Adrianna to call back with further questions.

## 2021-06-01 NOTE — TELEPHONE ENCOUNTER
INR result is 1.8  INR   Date Value Ref Range Status   09/11/2019 2.00 (!) 0.9 - 1.1 Final       Will the patient be seen, or did they already see, MD or CNP today? Yes PCP today    Most Recent Warfarin dose day/week  Sunday Monday Tuesday Wednesday Thursday Friday Saturday      3 mg 4.5 mg 3 mg 3 mg     Sunday Monday Tuesday Wednesday Thursday Friday Saturday   3 mg 3 mg 3 mg           Has the patient missed any doses of Coumadin, Warfarin, Jantoven in the past 7 days? No    Has the patients medications changed since the last visit? No    Has the patient experienced any bleeding recently? No    Has the patient experienced any injuries or illness recently? No    Has the patient experienced any 'new' shortness of breath, severe headaches, or changes in vision recently? No    Has the patient had any changes in their diet, or alcohol consumption? No    Is the patient here today to prepare for any type of upcoming surgery, procedure, or for a cardioversion procedure? No    What phone number can we reach the patient at today? [phone] Laly.

## 2021-06-01 NOTE — TELEPHONE ENCOUNTER
ANTICOAGULATION  MANAGEMENT- Home Care/Care Facility Result    Assessment     Today's INR result of 1.6 is Subtherapeutic (goal INR of 2.0-3.0)        More warfarin taken than instructed which may be affecting INR    No new diet changes affecting INR    No new medication/supplements affecting INR    Continues to tolerate warfarin with no reported s/s of bleeding or thromboembolism     Previous INR was Subtherapeutic    Plan:     Spoke with home care nurse Laly discussed INR result and instructed:     Warfarin Dosing Instructions: Change warfarin dose to 3 mg daily on Mon, Fri; and 4.5 mg daily rest of week  (11 % change from what pt took the past 7 days)    Next INR to be drawn: 1 week.     Education provided: importance of taking warfarin as instructed    Laly verbalizes understanding and agrees to warfarin dosing plan.   ?   Jose Ruano RN    Subjective/Objective:      Belia MOJICA Sukhjinderzara, a 74 y.o. female is established on warfarin.     Home care/care facility RN's report of Belia INR, recent warfarin dosing, diet changes, medication changes, and symptoms is documented below.    Additional findings: verbally confirmed home dose with Laly and updated on anticoagulation calendar    Anticoagulation Episode Summary     Current INR goal:   2.0-3.0   TTR:   28.6 %   Next INR check:   10/3/2019   INR from last check:   1.60! (9/26/2019)   Weekly max warfarin dose:      Target end date:   10/16/2019   INR check location:      Preferred lab:      Send INR reminders to:   [facility]    Indications    DVT (deep venous thrombosis) (H) [I82.409]           Comments:   Duration 3-6 months per PCP         Anticoagulation Care Providers     Provider Role Specialty Phone number    John Escoto DO Referring Internal Medicine [phone]

## 2021-06-01 NOTE — PROGRESS NOTES
[facility]    Facility:   [facility] [[id number]]   Code Status: FULL CODE      CHIEF COMPLAINT/REASON FOR VISIT:  Chief Complaint   Patient presents with     Follow Up     rehab, esrd hip       HISTORY:      HPI: Belia is a 74 y.o. female who I had a chance and pleasure to revisit with secondary to her hospitalization August 27 secondary to a fall with a history of gait instability and she did have a status post anterior trochanteric fixator in June 2019.  She also has a history of end-stage renal disease does attend dialysis 3 times weekly.  She is able to ambulate up and down the hallway with her walker the pain has decreased.  She can have oxycodone as needed she usually takes about 2 or 3 doses per day can also Tylenol as needed rarely takes a Tylenol.  She is also being followed managed by the Coumadin clinic.  She has been normotensive and afebrile.  No heartburn or reflux.  Take trazodone for sleep.  Appetite good.  She feels like she is about 70% better.    Past Medical History:   Diagnosis Date     Arthritis      CHF (congestive heart failure) (H)      Chronic anemia 6/1/2014     Chronic kidney disease      Chronic thoracic aortic dissection (H) 10/7/2015    Descending thoracic aorta; treated medically per notes of Dragan Singh and Jennifer.     COPD (chronic obstructive pulmonary disease) (H)      CVA (cerebral infarction)      Disease of thyroid gland      Dyslipidemia      ESRD (end stage renal disease) (H) 06/03/2009    on dialysis with Dr. Mitchell     Essential hypertension 6/30/2014     Gastrointestinal hemorrhage, unspecified gastrointestinal hemorrhage type 6/5/2017     GI (gastrointestinal bleed)      GI bleeding 6/5/2017     Gout      L3 vertebral fracture (H) 11/16/2015     Left Atrial Appendage Occlusion (WATCHMAN) 4/5/2018    LAAO April 5, 2018 (30 mm WATCHMAN)     Obesity      ZULEIKA (obstructive sleep apnea), severe, intolerant of CPAP 10/22/2015      Pneumonia 9-7-2015     Right foot drop      Spinal stenosis 3/28/2016     Stroke (H) 3/24/2016             Family History   Problem Relation Age of Onset     Dementia Mother      Diabetes Mother      Arthritis Mother      Cancer Mother      Depression Mother      Heart disease Mother      Vision loss Mother      Stroke Father      Heart disease Father      Breast cancer Neg Hx      Social History     Socioeconomic History     Marital status:      Spouse name: Cayden     Number of children: 2     Years of education: Not on file     Highest education level: Not on file   Occupational History     Employer: RETIRED   Social Needs     Financial resource strain: Not on file     Food insecurity:     Worry: Not on file     Inability: Not on file     Transportation needs:     Medical: Not on file     Non-medical: Not on file   Tobacco Use     Smoking status: Former Smoker     Packs/day: 1.50     Years: 37.00     Pack years: 55.50     Types: Cigarettes     Last attempt to quit: 1/1/2009     Years since quitting: 10.6     Smokeless tobacco: Never Used   Substance and Sexual Activity     Alcohol use: No     Alcohol/week: 7.0 oz     Types: 14 Standard drinks or equivalent per week     Comment: 14 mixed drinks per week     Drug use: No     Sexual activity: Never     Partners: Male   Lifestyle     Physical activity:     Days per week: Not on file     Minutes per session: Not on file     Stress: Not on file   Relationships     Social connections:     Talks on phone: Not on file     Gets together: Not on file     Attends [employer] service: Not on file     Active member of club or organization: Not on file     Attends meetings of clubs or organizations: Not on file     Relationship status: Not on file     Intimate partner violence:     Fear of current or ex partner: Not on file     Emotionally abused: Not on file     Physically abused: Not on file     Forced sexual activity: Not on file   Other Topics Concern     Not on file    Social History Narrative    Lives with her . Daughter in [place] and daughter in [place].         Review of Systems  Currently she denies any chills and fever coughing wheezing chest pain dizziness or vertigo nausea vomiting diarrhea dysuria headache stiff neck swollen glands rashes or sores.  History of end-stage renal disease on dialysis along with GERD hypertension CHF pacemaker chronic low back pain sleep apnea intolerant to CPAP and a right foot drop.        Current Outpatient Medications:      acetaminophen (TYLENOL) 500 MG tablet, Take 1,000 mg by mouth 3 (three) times a day as needed., Disp: , Rfl:      atorvastatin (LIPITOR) 10 MG tablet, Take 10 mg by mouth at bedtime., Disp: , Rfl:      B complex-vitamin C-folic acid (DIALYVITE) 100-1 mg Tab, Take 1 tablet by mouth daily with lunch.    , Disp: , Rfl:      chlorpheniramine/dextromethorp (CORICIDIN HBP COUGH AND COLD ORAL), Take 1 tablet by mouth every 6 (six) hours as needed., Disp: , Rfl:      cholecalciferol, vitamin D3, (VITAMIN D3) 2,000 unit Tab, Take 2,000 Units by mouth daily with lunch.    , Disp: , Rfl:      cinacalcet (SENSIPAR) 30 MG tablet, Take 30 mg by mouth see administration instructions. Take three times weekly with dialysis (on Mondays, Wednesdays, and Fridays).   , Disp: , Rfl:      folic acid (FOLVITE) 1 MG tablet, Take 1 mg by mouth daily with lunch.    , Disp: , Rfl:      gabapentin (NEURONTIN) 100 MG capsule, Take 100 mg by mouth 3 (three) times a day., Disp: , Rfl:      Lactobacillus rhamnosus GG (CULTURELLE) 10-15 Billion cell capsule, Take 1 capsule by mouth daily with lunch., Disp: , Rfl:      metoprolol succinate (TOPROL-XL) 25 MG, Take 25 mg by mouth daily with lunch.    , Disp: , Rfl:      midodrine HCl (MIDODRINE ORAL), Take 10 mg by mouth see administration instructions. Take 10 mg at the beginning of dialysis and 10 mg senior living through dialysis three days weekly on dialysis days (Mondays, Wednesdays, and Fridays).    , Disp: , Rfl:      omeprazole (PRILOSEC) 20 MG capsule, Take 20 mg by mouth 2 (two) times daily before lunch and supper.    , Disp: , Rfl:      oxyCODONE (ROXICODONE) 5 MG immediate release tablet, Take 0.5-1 tablets (2.5-5 mg total) by mouth every 4 (four) hours as needed., Disp: 40 tablet, Rfl: 0     polyvinyl alcohol (LIQUIFILM TEARS) 1.4 % ophthalmic solution, Apply 1 drop to eye as needed for dry eyes., Disp: , Rfl:      pot bicarb-sod bicarb-cit ac (EDI-SELTZER GOLD) 344-1,050-1,000 mg TbEF, Take 1 tablet by mouth every 4 (four) hours as needed., Disp: , Rfl:      ranitidine (ZANTAC) 150 MG tablet, Take 150 mg by mouth at bedtime., Disp: , Rfl:      senna-docusate (SENNOSIDES-DOCUSATE SODIUM) 8.6-50 mg tablet, Take 1 tablet by mouth every evening.    , Disp: , Rfl:      sucroferric oxyhydroxide (VELPHORO) 500 mg Chew chewable tablet, Chew 500 mg 3 (three) times a day with meals., Disp: , Rfl:      timolol maleate (TIMOPTIC) 0.5 % ophthalmic solution, Administer 1 drop to both eyes 2 (two) times a day. , Disp: , Rfl:      traZODone (DESYREL) 150 MG tablet, Take 75 mg by mouth at bedtime., Disp: , Rfl:      warfarin (COUMADIN/JANTOVEN) 3 MG tablet, Take 3-4.5 mg by mouth See Admin Instructions. Take 4.5 mg two days weekly (on Tuesdays and Fridays) and 3 mg five days weekly (on all other days of the week). Adjust dose based on INR results as directed., Disp: , Rfl:   .There were no vitals filed for this visit.  Blood pressure 128/86 pulse 81 temperature 98.6 saturation room air 95%  Physical Exam  Constitutional: No distress.   HENT:   Cardiovascular: Normal rate, regular rhythm and normal heart sounds.   Pacemaker.  Dialysis port right upper thorax   Pulmonary/Chest: Breath sounds normal.   History of sleep apnea intolerant to CPAP.   Abdominal: Bowel sounds are normal. There is no tenderness. There is no guarding.   Musculoskeletal:   History of right hip intertrochanteric fracture status post nail Muna  2019.  History of falls.   Neurological: She is alert.   Skin: Skin is warm and dry. No rash noted.   Psychiatric: Her behavior is normal.     LABS:   Lab Results   Component Value Date    WBC 4.5 07/08/2019    HGB 8.9 (L) 07/15/2019    HCT 25.8 (L) 07/08/2019     (H) 07/08/2019     07/08/2019         ASSESSMENT:      ICD-10-CM    1. Closed displaced intertrochanteric fracture of left femur with routine healing, subsequent encounter S72.142D    2. Essential hypertension with goal blood pressure less than 140/90 I10    3. Pulmonary emphysema, unspecified emphysema type (H) J43.9        PLAN:    She does attend dialysis 3 times weekly she is about 70% better.  She is made pretty good progress overall.  I do not know on her next for her first care conferences been scheduled.  Otherwise she does feel like she is pretty much close to being done with the rehabilitation process.      Electronically signed by: Michael Duane Johnson, CNP

## 2021-06-01 NOTE — TELEPHONE ENCOUNTER
ANTICOAGULATION  MANAGEMENT    Assessment     Today's INR result of 3.1 is Supratherapeutic (goal INR of 2.0-3.0)        Previous INR was Therapeutic    Warfarin given as previously instructed    No new health/diet changes affecting INR    No new medication/supplements affecting INR    Continues to tolerate warfarin with no reported s/s of bleeding or thromboembolism       Plan:     Warfarin Dosing Orders:  Change warfarin dose to 4.5 mg daily on Tues; and 3 mg daily rest of week  (6.3 % change)    Next INR: 1 week    Telephone orders given to nurseSlim.  Orders read back correctly.     Jose Ruano RN    Subjective/Objective:      Belia Rodriguez, a 74 y.o. female is on warfarin. Facility nurse reports for Belia:    Other anticoagulants: No    Medication changes: No     Missed warfarin doses since last INR: No     Abnormal bleeding since last INR: No    New symptoms, injury or illness: No     Upcoming surgery, procedure or cardioversion: No    Recent INR Results:    Lab Results   Component Value Date    INR 3.10 (!) 09/04/2019    INR 2.50 (!) 08/28/2019    INR 2.40 (!) 08/26/2019       Anticoagulation Episode Summary     Current INR goal:   2.0-3.0   TTR:   --   Next INR check:   9/11/2019   INR from last check:   3.10! (9/4/2019)   Weekly max warfarin dose:      Target end date:   10/16/2019   INR check location:      Preferred lab:      Send INR reminders to:   [facility] (TCU/LTC/intermediate)    Indications    DVT (deep venous thrombosis) (H) [I82.409]           Comments:   Duration 3-6 months per PCP         Anticoagulation Care Providers     Provider Role Specialty Phone number    John Escoto DO Referring Internal Medicine [phone]

## 2021-06-01 NOTE — TELEPHONE ENCOUNTER
Who is calling:  Maame  Reason for Call:  Patient refused INR on 9/25/19. A new order is needed for the patient to have her INR done today, 9/26/19. She requested a callback ASAP.  Date of last appointment with primary care: 9/18/19   Okay to leave a detailed message: Yes

## 2021-06-01 NOTE — TELEPHONE ENCOUNTER
INR result is   INR   Date Value Ref Range Status   09/18/2019 1.80 (!) 0.9 - 1.1 Final       9/26/19               1.6    Will the patient be seen, or did they already see, MD or CNP today? No    Most Recent Warfarin dose day/week  Sunday Monday Tuesday Wednesday Thursday Friday Saturday       4.5 mg 3 MG 4.5 MG     Sunday Monday Tuesday Wednesday Thursday Friday Saturday   3 MG 3 MG 3 MG 4.5 MG          Has the patient missed any doses of Coumadin, Warfarin, Jantoven in the past 7 days? No    Has the patients medications changed since the last visit? No    Has the patient experienced any bleeding recently? No    Has the patient experienced any injuries or illness recently? No    Has the patient experienced any 'new' shortness of breath, severe headaches, or changes in vision recently? No    Has the patient had any changes in their diet, or alcohol consumption? No    Is the patient here today to prepare for any type of upcoming surgery, procedure, or for a cardioversion procedure? No    What phone number can we reach the patient at today? [facility] [phone].

## 2021-06-01 NOTE — PROGRESS NOTES
[facility] Patient Outreach:     Discharge Date::  9/13/19      Reason for TCU stay (discharge diagnosis)::  Fall with left hip pain, ESRD      Are you feeling better, the same or worse since your discharge?:  Patient is feeling the same (pretty much wheelchair bound, says she's sleeping ok.  Sleeping in recliner downstairs.  )          As part of your discharge plan, did they discuss home care with you?: Yes        Have your seen them yet, or are they scheduled to visit?: No        Did you receive any new medications, or was there a change to your medications?: No            Do you have any follow up visits scheduled with your PCP or Specialist?:  No          I'm glad to hear you're doing well and we want you to continue to do well. Your PCP would like to see you for a follow-up visit. Can we help set that up for your today?: No        (RN) Provided patient the PCP's phone number to call if they have any questions or concerns?: No (Patient's  will have patient call to set up an appt with PCP.  )

## 2021-06-01 NOTE — TELEPHONE ENCOUNTER
Refill Request  Did you contact pharmacy: no   Medication name:   Warfarin (coumadin/jantoven) tablet 3 mg      Patient states at her visit on 09/1819 pcp told her she could stop taking the blood thinner in October 2019. Per encounter note date listed is 10/06/19.    Patient states she will now be short a few days and would need some to cover the 3 mg and 4.5 mg dosing     Who prescribed the medication: Dr. John Escoto  Pharmacy Name and Location: [facility] [place] [phone]   Is patient out of medication: No patient states she will be short only a few days.    Patient notified refills processed in 72 hours:    Okay to leave a detailed message: yes

## 2021-06-01 NOTE — PATIENT INSTRUCTIONS - HE
Belia Rodriguez,    It was a pleasure to see you today at the [facility].     My recommendations after this visit include:    Continue current medications.    Follow up in 1 year, or sooner if needed.    Fideila Ly, [facility], Electrophysiology  [phone]   nurses [phone]

## 2021-06-01 NOTE — PROGRESS NOTES
[facility]    Facility:   [facility] [[id number]]   Code Status: FULL CODE      CHIEF COMPLAINT/REASON FOR VISIT:  Chief Complaint   Patient presents with     Follow Up     rehab, esrd, htn, gait       HISTORY:      HPI: Belia is a 74 y.o. female who I had the opportunity to revisit with secondary to her hospitalization August 27 secondary to fall and did have a status post anterior trochanteric fixator from June 2019.  Does attend dialysis 3 times weekly secondary end-stage renal disease.  She does feel that her gait has improved she is increasing her distance as well as competence with her front wheel walker.  Regarding her pain she does take oxycodone as needed usually about 2 doses per day.  She does have some allergies would like to renew her Claritin she is taking that at home.  No heartburn or reflux is on omeprazole.  Also does wear CLARITA hose.  For sleep on trazodone 75 mg.  Also she is being followed and managed by the Coumadin clinic    Past Medical History:   Diagnosis Date     Arthritis      CHF (congestive heart failure) (H)      Chronic anemia 6/1/2014     Chronic kidney disease      Chronic thoracic aortic dissection (H) 10/7/2015    Descending thoracic aorta; treated medically per notes of Dragan Singh and Jennifer.     COPD (chronic obstructive pulmonary disease) (H)      CVA (cerebral infarction)      Disease of thyroid gland      Dyslipidemia      ESRD (end stage renal disease) (H) 06/03/2009    on dialysis with Dr. Mitchell     Essential hypertension 6/30/2014     Gastrointestinal hemorrhage, unspecified gastrointestinal hemorrhage type 6/5/2017     GI (gastrointestinal bleed)      GI bleeding 6/5/2017     Gout      L3 vertebral fracture (H) 11/16/2015     Left Atrial Appendage Occlusion (WATCHMAN) 4/5/2018    LAAO April 5, 2018 (30 mm WATCHMAN)     Obesity      ZULEIKA (obstructive sleep apnea), severe, intolerant of CPAP 10/22/2015     Pneumonia 9-7-2015      Right foot drop      Spinal stenosis 3/28/2016     Stroke (H) 3/24/2016             Family History   Problem Relation Age of Onset     Dementia Mother      Diabetes Mother      Arthritis Mother      Cancer Mother      Depression Mother      Heart disease Mother      Vision loss Mother      Stroke Father      Heart disease Father      Breast cancer Neg Hx      Social History     Socioeconomic History     Marital status:      Spouse name: Cayden     Number of children: 2     Years of education: Not on file     Highest education level: Not on file   Occupational History     Employer: RETIRED   Social Needs     Financial resource strain: Not on file     Food insecurity:     Worry: Not on file     Inability: Not on file     Transportation needs:     Medical: Not on file     Non-medical: Not on file   Tobacco Use     Smoking status: Former Smoker     Packs/day: 1.50     Years: 37.00     Pack years: 55.50     Types: Cigarettes     Last attempt to quit: 1/1/2009     Years since quitting: 10.6     Smokeless tobacco: Never Used   Substance and Sexual Activity     Alcohol use: No     Alcohol/week: 7.0 oz     Types: 14 Standard drinks or equivalent per week     Comment: 14 mixed drinks per week     Drug use: No     Sexual activity: Never     Partners: Male   Lifestyle     Physical activity:     Days per week: Not on file     Minutes per session: Not on file     Stress: Not on file   Relationships     Social connections:     Talks on phone: Not on file     Gets together: Not on file     Attends [employer] service: Not on file     Active member of club or organization: Not on file     Attends meetings of clubs or organizations: Not on file     Relationship status: Not on file     Intimate partner violence:     Fear of current or ex partner: Not on file     Emotionally abused: Not on file     Physically abused: Not on file     Forced sexual activity: Not on file   Other Topics Concern     Not on file   Social History  Narrative    Lives with her . Daughter in [place] and daughter in [place].         Review of Systems  Currently she denies any chills and fever coughing wheezing chest pain dizziness or vertigo nausea vomiting diarrhea dysuria headache stiff neck swollen glands rashes or sores.  History of end-stage renal disease on dialysis along with GERD hypertension CHF pacemaker chronic low back pain sleep apnea intolerant to CPAP and a right foot drop.     Current Outpatient Medications   Medication Sig Note     loratadine (CLARITIN) 10 mg tablet Take 10 mg by mouth daily.      acetaminophen (TYLENOL) 500 MG tablet Take 1,000 mg by mouth 3 (three) times a day as needed.      atorvastatin (LIPITOR) 10 MG tablet Take 10 mg by mouth at bedtime.      B complex-vitamin C-folic acid (DIALYVITE) 100-1 mg Tab Take 1 tablet by mouth daily with lunch.             chlorpheniramine/dextromethorp (CORICIDIN HBP COUGH AND COLD ORAL) Take 1 tablet by mouth every 6 (six) hours as needed.      cholecalciferol, vitamin D3, (VITAMIN D3) 2,000 unit Tab Take 2,000 Units by mouth daily with lunch.             cinacalcet (SENSIPAR) 30 MG tablet Take 30 mg by mouth see administration instructions. Take three times weekly with dialysis (on Mondays, Wednesdays, and Fridays).            folic acid (FOLVITE) 1 MG tablet Take 1 mg by mouth daily with lunch.             gabapentin (NEURONTIN) 100 MG capsule Take 100 mg by mouth 3 (three) times a day.      Lactobacillus rhamnosus GG (CULTURELLE) 10-15 Billion cell capsule Take 1 capsule by mouth daily with lunch.      metoprolol succinate (TOPROL-XL) 25 MG Take 25 mg by mouth daily with lunch.             midodrine HCl (MIDODRINE ORAL) Take 10 mg by mouth see administration instructions. Take 10 mg at the beginning of dialysis and 10 mg [facility]DC through dialysis three days weekly on dialysis days (Mondays, Wednesdays, and Fridays).            omeprazole (PRILOSEC) 20 MG capsule Take 20 mg by mouth 2  (two) times daily before lunch and supper.             oxyCODONE (ROXICODONE) 5 MG immediate release tablet Take 0.5-1 tablets (2.5-5 mg total) by mouth every 4 (four) hours as needed.      polyvinyl alcohol (LIQUIFILM TEARS) 1.4 % ophthalmic solution Apply 1 drop to eye as needed for dry eyes.      pot bicarb-sod bicarb-cit ac (EDI-SELTZER GOLD) 344-1,050-1,000 mg TbEF Take 1 tablet by mouth every 4 (four) hours as needed.      ranitidine (ZANTAC) 150 MG tablet Take 150 mg by mouth at bedtime.      senna-docusate (SENNOSIDES-DOCUSATE SODIUM) 8.6-50 mg tablet Take 1 tablet by mouth every evening.             sucroferric oxyhydroxide (VELPHORO) 500 mg Chew chewable tablet Chew 500 mg 3 (three) times a day with meals.      timolol maleate (TIMOPTIC) 0.5 % ophthalmic solution Administer 1 drop to both eyes 2 (two) times a day.       traZODone (DESYREL) 150 MG tablet Take 75 mg by mouth at bedtime. 8/27/2019: 8/27/2019: Per the patient, she has been taking 150 mg at bedtime. Please address.     warfarin (COUMADIN/JANTOVEN) 3 MG tablet Take 3-4.5 mg by mouth See Admin Instructions. Take 4.5 mg two days weekly (on Tuesdays and Fridays) and 3 mg five days weekly (on all other days of the week). Adjust dose based on INR results as directed.        .There were no vitals filed for this visit.  Blood pressure 131/73 pulse 80 temperature 98.6 saturation room air 98%  Physical Exam  Constitutional: No distress.   HENT:   Cardiovascular: Normal rate, regular rhythm and normal heart sounds.   Pacemaker.  Dialysis port right upper thorax   Pulmonary/Chest: Breath sounds normal.   History of sleep apnea intolerant to CPAP.   Abdominal: Bowel sounds are normal. There is no tenderness. There is no guarding.   Musculoskeletal:   History of right hip intertrochanteric fracture status post nail June 2019.  History of falls.   Skin: Skin is warm and dry. No rash noted.   Psychiatric: Her behavior is normal.       LABS:   Lab Results    Component Value Date    WBC 4.5 07/08/2019    HGB 8.9 (L) 07/15/2019    HCT 25.8 (L) 07/08/2019     (H) 07/08/2019     07/08/2019         ASSESSMENT:      ICD-10-CM    1. ZULEIKA (obstructive sleep apnea), severe G47.33    2. Essential hypertension with goal blood pressure less than 140/90 I10    3. ESRD on dialysis (H) N18.6     Z99.2    4. Leg weakness, bilateral R29.898        PLAN:    She is making pretty good progress and increasing her distance.  It looks like there is a potential discharge for sometime in the near future and at least by the weekend.  We will give her the Claritin per her request because she is myopic for secondary to allergies.  She did not have any other questions.      Electronically signed by: Michael Duane Johnson, CNP

## 2021-06-01 NOTE — TELEPHONE ENCOUNTER
Verbal okay given to check INR today.    Home care nurse will call back today with result (will keep this encounter open for that result)

## 2021-06-01 NOTE — TELEPHONE ENCOUNTER
Left detailed message for Home Health nurse giving verbal okay for orders.  Advised April to call back with further questions.

## 2021-06-01 NOTE — PROGRESS NOTES
In clinic device check with Device RN and Fidelia Ly CNP.  Please see link for full device report.  Patient was informed of results and next follow up during today's visit.

## 2021-06-01 NOTE — PROGRESS NOTES
Former[facility] Clinic Note    Belia Rodriguez   74 y.o. female    Date of Visit: 9/18/2019  Chief Complaint   Patient presents with     Follow-up       ASSESSMENT/PLAN  1. Closed displaced intertrochanteric fracture of left femur with routine healing, subsequent encounter     2. S/P total hip arthroplasty     3. DVT (deep venous thrombosis) (H)     4. Presence of Watchman left atrial appendage closure device     5. ESRD on dialysis (H)     6. S/P AV (atrioventricular) jonn ablation     7. Visit for screening mammogram       ---------------------------------------------  1. Mammogram will defer until she is feeling better and more mobile.     2. Left Hip Arthroplasty: She is improving slowly and PT will likely help her regain her mobility. Advised her to be mindful of her activity to avoid another fall.     3. DVT: Discussed warfarin use and given that her DVT occurred in a provoked setting of surgery I anticipate that she will only need to be on warfarin 3-6 months. Given her bleeding risk we will plan on stopping warfarin October 6th which is 3 months from initial onset and then return to the aspirin. Advised her that if swelling returns after discontinuing warfarin then will have to follow up and may have to return to warfarin.     Return in about 2 months (around 11/18/2019).      SUBJECTIVE  Belia Rodriguez is a 75 yo female who presents in a wheelchair to follow up from TCU. She had left hip arthroplasty in June and was in TCU then discharged home. However within 8 days of being at home she fell at home on her hip again and went to the ER and then was readmitted to TCU.   While in the first care center they discovered a DVT in her ankle and thus she was put on warfarin.     She states other than tired she is doing pretty well. She has not been using a walker out and about for fear that the walker will get away from her, but she is using a walker to get around at home. She is working to get out of the  wheelchair. Her  states she is very unsteady on her feet.   She starts PT soon and is looking forward to that.     ROS A comprehensive review of systems was performed and was otherwise negative    Medications, allergies, and problem list were reviewed and updated    Patient Active Problem List   Diagnosis     Tachycardia-bradycardia syndrome (H)     Hyperkalemia     Essential hypertension with goal blood pressure less than 140/90     Hyperlipidemia     ZULEIKA (obstructive sleep apnea), severe     Anemia of chronic renal failure, stage 5 (H)     Dissection of thoracoabdominal aorta (H)     Gout     GERD (gastroesophageal reflux disease)     Right foot drop     Acute midline low back pain with right-sided sciatica     History of compression fracture of spine     Pulmonary emphysema (H)     Presence of Watchman left atrial appendage closure device     Other dysphagia     Borderline glaucoma with ocular hypertension     Hyperparathyroidism (H)     Osteoporosis     Venous tributary (branch) occlusion of retina     ESRD on dialysis (H)     Chronic atrial fibrillation (H)     Chronic diastolic congestive heart failure (H)     Thrombocytopenia (H)     Contraindication to anticoagulation therapy     Dyspnea     Cardiac pacemaker in situ     S/P AV (atrioventricular) jonn ablation     Hip fracture, intertrochanteric (H)     DVT (deep venous thrombosis) (H)     Past Medical History:   Diagnosis Date     Arthritis      CHF (congestive heart failure) (H)      Chronic anemia 6/1/2014     Chronic kidney disease      Chronic thoracic aortic dissection (H) 10/7/2015    Descending thoracic aorta; treated medically per notes of Dragan Singh and Jennifer.     COPD (chronic obstructive pulmonary disease) (H)      CVA (cerebral infarction)      Disease of thyroid gland      Dyslipidemia      ESRD (end stage renal disease) (H) 06/03/2009    on dialysis with Dr. Mitchell     Essential hypertension 6/30/2014     Gastrointestinal  hemorrhage, unspecified gastrointestinal hemorrhage type 6/5/2017     GI (gastrointestinal bleed)      GI bleeding 6/5/2017     Gout      L3 vertebral fracture (H) 11/16/2015     Left Atrial Appendage Occlusion (WATCHMAN) 4/5/2018    LAAO April 5, 2018 (30 mm WATCHMAN)     Obesity      ZULEIKA (obstructive sleep apnea), severe, intolerant of CPAP 10/22/2015     Pneumonia 9-7-2015     Right foot drop      Spinal stenosis 3/28/2016     Stroke (H) 3/24/2016     Past Surgical History:   Procedure Laterality Date     BACK SURGERY      [facility]     COLONOSCOPY N/A 3/23/2016    Procedure: COLONOSCOPY;  Surgeon: Ruddy Tejada MD;  Location: [facility] GI;  Service:      DILATION AND CURETTAGE OF UTERUS       EP ABLATION AV NODE N/A 3/7/2019    Procedure: EP Ablation AV Node;  Surgeon: Derick Duarte MD;  Location: [facility] Cath Lab;  Service: Cardiology     EP NEGRA CLOSURE N/A 4/5/2018    Procedure: EP NEGRA Closure;  Surgeon: Derick Duarte MD;  Location: [facility] Cath Lab;  Service:      EP PACEMAKER INSERT N/A 3/7/2019    Procedure: EP Pacemaker Insertion;  Surgeon: Derick Duarte MD;  Location: [facility] Cath Lab;  Service: Cardiology     EYE SURGERY       HERNIA REPAIR       IR TUNNELED CATHETER INSERT  11/20/2018     IR TUNNELED CATHETER REMOVAL  11/20/2018     VT COLSC FLEXIBLE W/CONTROL BLEEDING ANY METHOD N/A 6/7/2017    Procedure: COLONOSCOPY;  Surgeon: Luis Mckeon MD;  Location: [facility] GI;  Service: Gastroenterology     VT OPEN FIX INTER/SUBTROCH FX,IMPLNT Left 6/23/2019    Procedure: INTERNAL FIXATION, FRACTURE, TROCHANTERIC, HIP, USING INTERMEDULLARY NAIL;  Surgeon: Bennie Marsh DO;  Location: [facility] Main OR;  Service: Orthopedics     TONSILLECTOMY       Social History     Socioeconomic History     Marital status:      Spouse name: Cayden     Number of children: 2     Years of education: Not on file     Highest education level: Not on file   Occupational History      Employer: RETIRED   Social Needs     Financial resource strain: Not on file     Food insecurity:     Worry: Not on file     Inability: Not on file     Transportation needs:     Medical: Not on file     Non-medical: Not on file   Tobacco Use     Smoking status: Former Smoker     Packs/day: 1.50     Years: 37.00     Pack years: 55.50     Types: Cigarettes     Last attempt to quit: 1/1/2009     Years since quitting: 10.7     Smokeless tobacco: Never Used   Substance and Sexual Activity     Alcohol use: No     Alcohol/week: 7.0 oz     Types: 14 Standard drinks or equivalent per week     Comment: 14 mixed drinks per week     Drug use: No     Sexual activity: Never     Partners: Male   Lifestyle     Physical activity:     Days per week: Not on file     Minutes per session: Not on file     Stress: Not on file   Relationships     Social connections:     Talks on phone: Not on file     Gets together: Not on file     Attends Sabianism service: Not on file     Active member of club or organization: Not on file     Attends meetings of clubs or organizations: Not on file     Relationship status: Not on file     Intimate partner violence:     Fear of current or ex partner: Not on file     Emotionally abused: Not on file     Physically abused: Not on file     Forced sexual activity: Not on file   Other Topics Concern     Not on file   Social History Narrative    Lives with her . Daughter in [place] and daughter in [place].     Family History   Problem Relation Age of Onset     Dementia Mother      Diabetes Mother      Arthritis Mother      Cancer Mother      Depression Mother      Heart disease Mother      Vision loss Mother      Stroke Father      Heart disease Father      Breast cancer Neg Hx        Current Outpatient Medications   Medication Sig Dispense Refill     acetaminophen (TYLENOL) 500 MG tablet Take 1,000 mg by mouth 3 (three) times a day as needed.       atorvastatin (LIPITOR) 10 MG tablet Take 10 mg by mouth  at bedtime.       B complex-vitamin C-folic acid (DIALYVITE) 100-1 mg Tab Take 1 tablet by mouth daily with lunch.              chlorpheniramine/dextromethorp (CORICIDIN HBP COUGH AND COLD ORAL) Take 1 tablet by mouth every 6 (six) hours as needed.       cholecalciferol, vitamin D3, (VITAMIN D3) 2,000 unit Tab Take 2,000 Units by mouth daily with lunch.              cinacalcet (SENSIPAR) 30 MG tablet Take 30 mg by mouth see administration instructions. Take three times weekly with dialysis (on Mondays, Wednesdays, and Fridays).             folic acid (FOLVITE) 1 MG tablet Take 1 mg by mouth daily with lunch.              gabapentin (NEURONTIN) 100 MG capsule Take 100 mg by mouth 3 (three) times a day.       Lactobacillus rhamnosus GG (CULTURELLE) 10-15 Billion cell capsule Take 1 capsule by mouth daily with lunch.       loratadine (CLARITIN) 10 mg tablet Take 10 mg by mouth daily.       metoprolol succinate (TOPROL-XL) 25 MG Take 25 mg by mouth daily with lunch.              midodrine HCl (MIDODRINE ORAL) Take 10 mg by mouth see administration instructions. Take 10 mg at the beginning of dialysis and 10 mg long-term through dialysis three days weekly on dialysis days (Mondays, Wednesdays, and Fridays).             multivitamin (DAILY-OLGA) per tablet Take 1 tablet by mouth daily.       omeprazole (PRILOSEC) 20 MG capsule Take 20 mg by mouth 2 (two) times daily before lunch and supper.              oxyCODONE (ROXICODONE) 5 MG immediate release tablet Take 0.5-1 tablets (2.5-5 mg total) by mouth every 4 (four) hours as needed. 40 tablet 0     polyvinyl alcohol (LIQUIFILM TEARS) 1.4 % ophthalmic solution Apply 1 drop to eye as needed for dry eyes.       pot bicarb-sod bicarb-cit ac (EDI-SELTINOER GOLD) 344-1,050-1,000 mg TbEF Take 1 tablet by mouth every 4 (four) hours as needed.       ranitidine (ZANTAC) 150 MG tablet Take 150 mg by mouth at bedtime.       senna-docusate (SENNOSIDES-DOCUSATE SODIUM) 8.6-50 mg tablet Take  1 tablet by mouth every evening.              sucroferric oxyhydroxide (VELPHORO) 500 mg Chew chewable tablet Chew 500 mg 3 (three) times a day with meals.       timolol maleate (TIMOPTIC) 0.5 % ophthalmic solution Administer 1 drop to both eyes 2 (two) times a day.        traZODone (DESYREL) 150 MG tablet Take 75 mg by mouth at bedtime.       warfarin (COUMADIN/JANTOVEN) 3 MG tablet Take 1 to 1.5 tablets (3 to 4.5 mg) by mouth daily. Adjust dose per INR results as instructed. 100 tablet 1     No current facility-administered medications for this visit.        Allergies   Allergen Reactions     Ace Inhibitors Cough     Atrovent [Ipratropium Bromide] Headache     Fosinopril Sodium Cough     Zolpidem      hallucinations       EXAM  Vitals:    09/18/19 1610   BP: 118/54   Patient Site: Left Arm   Patient Position: Sitting   Cuff Size: Adult Regular   Pulse: 66   SpO2: 95%   Weight: 151 lb 5 oz (68.6 kg)     General: alert, oriented, no acute distress   Heart: RRR, no murmur   Lungs: CTA, no increase work of breathing   Lower extremity: mild bilateral lower extremity edema     RECORDS REQUESTED (1): None.     Record Reviewed: from TCU, had DVT in early July    RADIOLOGY TESTS SUMMARIZED or REQUESTED (XRAY/CT/MRI/DXA) (1):   Xr Femur Left 2 Vws    Result Date: 8/27/2019  EXAM: XR FEMUR LEFT 2 VWS LOCATION: [facility] DATE/TIME: 8/27/2019 8:03 AM INDICATION: fall/left leg & hip pain COMPARISON: None. FINDINGS: Prior hip ORIF. Remainder femur intact. No acute abnormality.    Xr Pelvis W 2 Vw Hip Left    Result Date: 8/27/2019  EXAM: XR PELVIS W 2 VW HIP LEFT LOCATION: [facility] DATE/TIME: 8/27/2019 7:56 AM INDICATION: fall COMPARISON: 06/22/2019 FINDINGS: Interval hip fracture repair with orthopedic hardware appearing in good position across the healing low intertrochanteric fracture. No acute fracture evident. Diffuse osteopenia.      MEDICINE TESTS SUMMARIZED or REQUESTED (EKG/ECHO/COLONOSCOPY/EGD)  (1): None    INDEPENDENT REVIEW OF EKG OR X-RAY (2): None.    Lab Results   Component Value Date    WBC 4.5 07/08/2019    HGB 8.9 (L) 07/15/2019    HCT 25.8 (L) 07/08/2019     (H) 07/08/2019     07/08/2019        Addendum:   Attest above note by Della Dalal, MS3   Belia Rodriguez is a 74-year-old chronically ill woman on dialysis for 10+ years, who presents for prolonged TCU stay which stemmed from a hip fracture back in June, and ongoing weakness which were required to TCU stays.  She is still weak, got a good report from orthopedics, but working on home therapy right now.  She did suffer a DVT in early June, given her issues with bleeding in the past (she received watchman to get her off warfarin) and given this DVT was clearly provoked, we will limit this to 3-month course of anticoagulation, which would allow her to stop this on October 6.  I would like to see her in early November and follow-up.  She will continue 3 times a week hemodialysis through the catheter.    General: alert, no distress  HEENT: sclerae anicteric, moist oral mucosa  Heart: Regular rate and rhythm, no murmurs.  No pretibial edema.    Lungs: Clear to auscultation bilat  Gastrointestinal: abdomen is non-distended.    Skin: warm/dry, no rashes  Neuro: no gross abnormalities  Psychiatric: Pleasant affect        John Escoto,   Internal Medicine  [facility]

## 2021-06-01 NOTE — PROGRESS NOTES
[facility]    Facility:   [facility] [[id number]]   Code Status: FULL CODE  PCP: John Escoto DO   Phone: [phone]   Fax: [phone]      CHIEF COMPLAINT/REASON FOR VISIT:  Chief Complaint   Patient presents with     Discharge Summary       HISTORY COURSE:  Belia is a 74 y.o. female who was seen secondary to her hospitalization August 27, 2019 secondary to a fall sustaining left hip discomfort.  She is a status post internal fixation of a left intertrochanteric hip fracture with biliary nail in June 2019.  She eventually was discharged from the transitional care unit and tripped and fell.  Therefore after further evaluation she was sent back to transitional care unit to work on her strength and balance and conditioning.  She does have a history of end-stage renal disease and does attend dialysis 3 times weekly.  She has been normotensive and afebrile.  Has a pacemaker from March 2019.  Does wear a right leg AFO secondary to foot drop.  History of chronic back pain.  She has been on the transitional care unit and even despite her being in dialysis 3 times weekly she does feel that she is made some progress with her ambulation along with that has increased competence with her ambulation is able to use her 2 wheeled walker up and down the hallway.  She will also receive some home services upon discharge.  Her appetite is good sleeping well at night she does have chronic pain she does take oxycodone as needed usually 2 or 3 doses extra per day she can also have Tylenol as needed she takes 1 dose per day.  She is also on warfarin being followed by the Coumadin clinic.  Is on a number of multivitamins and minerals.  She has been normotensive and afebrile and also on room air during the day.  No heartburn or reflux.  Bowels are regular sleeping well at night is on trazodone 75 mg.  We did a chance to talk about her discharge and she does feel comfortable upon  going home managing her medications but also receiving various services.  Review of Systems  Currently she denies any chills and fever coughing wheezing chest pain dizziness or vertigo nausea vomiting diarrhea dysuria headache stiff neck swollen glands rashes or sores.  History of end-stage renal disease on dialysis along with GERD hypertension CHF pacemaker chronic low back pain sleep apnea intolerant to CPAP and a right foot drop.  There were no vitals filed for this visit.  Blood pressure 103/62 pulse 73 temperature 98.2 saturation room air 96%  Physical Exam  Constitutional: No distress.   HENT:   Cardiovascular: Normal rate, regular rhythm and normal heart sounds.   Pacemaker.  Dialysis port right upper thorax   Pulmonary/Chest: Breath sounds normal.   History of sleep apnea intolerant to CPAP.   Musculoskeletal:   History of right hip intertrochanteric fracture status post nail June 2019.  History of falls.   Psychiatric: Her behavior is normal.       Lab Results   Component Value Date    CHOL 102 03/25/2016     Lab Results   Component Value Date    HDL 43 (L) 03/25/2016     Lab Results   Component Value Date    LDLCALC 47 03/25/2016     Lab Results   Component Value Date    TRIG 60 03/25/2016     No components found for: CHOLHDL  Lab Results   Component Value Date    WBC 4.5 07/08/2019    HGB 8.9 (L) 07/15/2019    HCT 25.8 (L) 07/08/2019     (H) 07/08/2019     07/08/2019     Results for orders placed or performed during the hospital encounter of 05/18/19   Basic Metabolic Panel   Result Value Ref Range    Sodium 137 136 - 145 mmol/L    Potassium 5.0 3.5 - 5.0 mmol/L    Chloride 100 98 - 107 mmol/L    CO2 26 22 - 31 mmol/L    Anion Gap, Calculation 11 5 - 18 mmol/L    Glucose 81 70 - 125 mg/dL    Calcium 9.5 8.5 - 10.5 mg/dL    BUN 29 (H) 8 - 28 mg/dL    Creatinine 4.91 (H) 0.60 - 1.10 mg/dL    GFR MDRD Af Amer 11 (L) >60 mL/min/1.73m2    GFR MDRD Non Af Amer 9 (L) >60 mL/min/1.73m2       Lab  Results   Component Value Date    ALT 15 06/23/2019    AST 16 06/23/2019    ALKPHOS 101 06/23/2019    BILITOT 0.4 06/23/2019       MEDICATION LIST:  Current Outpatient Medications   Medication Sig     acetaminophen (TYLENOL) 500 MG tablet Take 1,000 mg by mouth 3 (three) times a day as needed.     atorvastatin (LIPITOR) 10 MG tablet Take 10 mg by mouth at bedtime.     B complex-vitamin C-folic acid (DIALYVITE) 100-1 mg Tab Take 1 tablet by mouth daily with lunch.            chlorpheniramine/dextromethorp (CORICIDIN HBP COUGH AND COLD ORAL) Take 1 tablet by mouth every 6 (six) hours as needed.     cholecalciferol, vitamin D3, (VITAMIN D3) 2,000 unit Tab Take 2,000 Units by mouth daily with lunch.            cinacalcet (SENSIPAR) 30 MG tablet Take 30 mg by mouth see administration instructions. Take three times weekly with dialysis (on Mondays, Wednesdays, and Fridays).           folic acid (FOLVITE) 1 MG tablet Take 1 mg by mouth daily with lunch.            gabapentin (NEURONTIN) 100 MG capsule Take 100 mg by mouth 3 (three) times a day.     Lactobacillus rhamnosus GG (CULTURELLE) 10-15 Billion cell capsule Take 1 capsule by mouth daily with lunch.     loratadine (CLARITIN) 10 mg tablet Take 10 mg by mouth daily.     metoprolol succinate (TOPROL-XL) 25 MG Take 25 mg by mouth daily with lunch.            midodrine HCl (MIDODRINE ORAL) Take 10 mg by mouth see administration instructions. Take 10 mg at the beginning of dialysis and 10 mg shelter through dialysis three days weekly on dialysis days (Mondays, Wednesdays, and Fridays).           omeprazole (PRILOSEC) 20 MG capsule Take 20 mg by mouth 2 (two) times daily before lunch and supper.            oxyCODONE (ROXICODONE) 5 MG immediate release tablet Take 0.5-1 tablets (2.5-5 mg total) by mouth every 4 (four) hours as needed.     polyvinyl alcohol (LIQUIFILM TEARS) 1.4 % ophthalmic solution Apply 1 drop to eye as needed for dry eyes.     pot bicarb-sod bicarb-cit ac  (EDI-NISREENTZER GOLD) 344-1,050-1,000 mg TbEF Take 1 tablet by mouth every 4 (four) hours as needed.     ranitidine (ZANTAC) 150 MG tablet Take 150 mg by mouth at bedtime.     senna-docusate (SENNOSIDES-DOCUSATE SODIUM) 8.6-50 mg tablet Take 1 tablet by mouth every evening.            sucroferric oxyhydroxide (VELPHORO) 500 mg Chew chewable tablet Chew 500 mg 3 (three) times a day with meals.     timolol maleate (TIMOPTIC) 0.5 % ophthalmic solution Administer 1 drop to both eyes 2 (two) times a day.      traZODone (DESYREL) 150 MG tablet Take 75 mg by mouth at bedtime.     warfarin (COUMADIN/JANTOVEN) 3 MG tablet Take 3-4.5 mg by mouth See Admin Instructions. Take 4.5 mg two days weekly (on Tuesdays and Fridays) and 3 mg five days weekly (on all other days of the week). Adjust dose based on INR results as directed.       DISCHARGE DIAGNOSIS:    ICD-10-CM    1. Pulmonary emphysema, unspecified emphysema type (H) J43.9    2. Hyperparathyroidism (H) E21.3    3. Closed displaced intertrochanteric fracture of left femur with routine healing, subsequent encounter S72.142D    4. ESRD on dialysis (H) N18.6     Z99.2        MEDICAL EQUIPMENT NEEDS:  None    DISCHARGE PLAN/FACE TO FACE:  I certify that services are/were furnished while this patient was under the care of a physician and that a physician or an allowed non-physician practitioner (NPP), had a face-to-face encounter that meets the physician face-to-face encounter requirements. The encounter was in whole, or in part, related to the primary reason for home health. The patient is confined to his/her home and needs intermittent skilled nursing, physical therapy, speech-language pathology, or the continued need for occupational therapy. A plan of care has been established by a physician and is periodically reviewed by a physician.  Date of Face-to-Face Encounter: September 13, 2019    I certify that, based on my findings, the following services are medically necessary  home health services: She will be discharging to home with an anticipated date of September 13, 2019 with current medications and narcotics will also have physical and occupational therapy home health aide and nursing.  The home care agency has not yet been identified.    My clinical findings support the need for the above skilled services because: (Please write a brief narrative summary that describes what the RN, PT, SLP, or other services will be doing in the home. A list of diagnoses in this section does not meet the CMS requirements.)  She will require the skilled services secondary to her chronic medical conditions including her left hip fracture from June 2019 along with end-stage renal disease safety transfers self-care deficits and medication management.    This patient is homebound because: (Please write a brief narrative summary describing the functional limitations as to why this patient is homebound and specifically what makes this patient homebound.)  Secondary to multiple chronic medical conditions including home safety transfers various exercises self-care deficits and medication management.    The patient is, or has been, under my care and I have initiated the establishment of the plan of care. This patient will be followed by a physician who will periodically review the plan of care.    Schedule follow up visit with primary care provider within 7 days to reestablish care.  She will continue with dialysis 3 times weekly.  She will also follow-up with any specialist as previously determined.  She will also follow-up with her primary care doctor regarding medication management of her chronic medical conditions.  She feels comfortable with the information as well as her stay on the transitional care unit.  Did not have any other further questions.    Discharge coordination care greater than 30 minutes  Electronically signed by: Michael Duane Johnson, CNP

## 2021-06-01 NOTE — TELEPHONE ENCOUNTER
Orders being requested: requesting provider to follow via phone and fax for home care  Reason service is needed/diagnosis: recent TCU discharge  When are orders needed by: asap  Where to send Orders: Phone:  [phone]  Okay to leave detailed message?  Yes

## 2021-06-01 NOTE — PROGRESS NOTES
[facility]    Facility:   [facility], [place] [[id number]]   Code Status: FULL CODE      CHIEF COMPLAINT/REASON FOR VISIT:  Chief Complaint   Patient presents with     Follow Up     rehab, esrd       HISTORY:      HPI: Belia is a 74 y.o. female who I had the pleasure to revisit with secondary to her hospitalization August 27, 2019 secondary to a fall sustaining left hip discomfort.  She is a post internal fixation of left intertrochanteric fracture from June 2019.  She also does have end-stage renal disease does attend dialysis 3 times weekly.  She has been normotensive and afebrile and also on room air.  For pain she can have oxycodone as needed usually takes 2 or 3 doses per day.  Also being followed and managed by the Coumadin clinic.  Had a chance to talk about her stay in the transitional care and she does feel that she is about 60% better.  No heartburn or reflux.  Appetite is good.  Sleeping well at night is on trazodone.  Is also on a number of other multivitamins and minerals.  No blood pressure issues.  No other particular complaints.    Past Medical History:   Diagnosis Date     Arthritis      CHF (congestive heart failure) (H)      Chronic anemia 6/1/2014     Chronic kidney disease      Chronic thoracic aortic dissection (H) 10/7/2015    Descending thoracic aorta; treated medically per notes of Dragan Singh and Jennifer.     COPD (chronic obstructive pulmonary disease) (H)      CVA (cerebral infarction)      Disease of thyroid gland      Dyslipidemia      ESRD (end stage renal disease) (H) 06/03/2009    on dialysis with Dr. Mitchell     Essential hypertension 6/30/2014     Gastrointestinal hemorrhage, unspecified gastrointestinal hemorrhage type 6/5/2017     GI (gastrointestinal bleed)      GI bleeding 6/5/2017     Gout      L3 vertebral fracture (H) 11/16/2015     Left Atrial Appendage Occlusion (WATCHMAN) 4/5/2018    LAAO April 5, 2018 (30 mm WATCHMAN)     Obesity       ZULEIKA (obstructive sleep apnea), severe, intolerant of CPAP 10/22/2015     Pneumonia 9-7-2015     Right foot drop      Spinal stenosis 3/28/2016     Stroke (H) 3/24/2016             Family History   Problem Relation Age of Onset     Dementia Mother      Diabetes Mother      Arthritis Mother      Cancer Mother      Depression Mother      Heart disease Mother      Vision loss Mother      Stroke Father      Heart disease Father      Breast cancer Neg Hx      Social History     Socioeconomic History     Marital status:      Spouse name: Cayden     Number of children: 2     Years of education: Not on file     Highest education level: Not on file   Occupational History     Employer: RETIRED   Social Needs     Financial resource strain: Not on file     Food insecurity:     Worry: Not on file     Inability: Not on file     Transportation needs:     Medical: Not on file     Non-medical: Not on file   Tobacco Use     Smoking status: Former Smoker     Packs/day: 1.50     Years: 37.00     Pack years: 55.50     Types: Cigarettes     Last attempt to quit: 1/1/2009     Years since quitting: 10.6     Smokeless tobacco: Never Used   Substance and Sexual Activity     Alcohol use: No     Alcohol/week: 7.0 oz     Types: 14 Standard drinks or equivalent per week     Comment: 14 mixed drinks per week     Drug use: No     Sexual activity: Never     Partners: Male   Lifestyle     Physical activity:     Days per week: Not on file     Minutes per session: Not on file     Stress: Not on file   Relationships     Social connections:     Talks on phone: Not on file     Gets together: Not on file     Attends [employer] service: Not on file     Active member of club or organization: Not on file     Attends meetings of clubs or organizations: Not on file     Relationship status: Not on file     Intimate partner violence:     Fear of current or ex partner: Not on file     Emotionally abused: Not on file     Physically abused: Not on file      Forced sexual activity: Not on file   Other Topics Concern     Not on file   Social History Narrative    Lives with her . Daughter in [place] and daughter in [place].         Review of Systems  Currently she denies any chills and fever coughing wheezing chest pain dizziness or vertigo nausea vomiting diarrhea dysuria headache stiff neck swollen glands rashes or sores.  History of end-stage renal disease on dialysis along with GERD hypertension CHF pacemaker chronic low back pain sleep apnea intolerant to CPAP and a right foot drop.      Current Outpatient Medications:      acetaminophen (TYLENOL) 500 MG tablet, Take 1,000 mg by mouth 3 (three) times a day as needed., Disp: , Rfl:      atorvastatin (LIPITOR) 10 MG tablet, Take 10 mg by mouth at bedtime., Disp: , Rfl:      B complex-vitamin C-folic acid (DIALYVITE) 100-1 mg Tab, Take 1 tablet by mouth daily with lunch.    , Disp: , Rfl:      chlorpheniramine/dextromethorp (CORICIDIN HBP COUGH AND COLD ORAL), Take 1 tablet by mouth every 6 (six) hours as needed., Disp: , Rfl:      cholecalciferol, vitamin D3, (VITAMIN D3) 2,000 unit Tab, Take 2,000 Units by mouth daily with lunch.    , Disp: , Rfl:      cinacalcet (SENSIPAR) 30 MG tablet, Take 30 mg by mouth see administration instructions. Take three times weekly with dialysis (on Mondays, Wednesdays, and Fridays).   , Disp: , Rfl:      folic acid (FOLVITE) 1 MG tablet, Take 1 mg by mouth daily with lunch.    , Disp: , Rfl:      gabapentin (NEURONTIN) 100 MG capsule, Take 100 mg by mouth 3 (three) times a day., Disp: , Rfl:      Lactobacillus rhamnosus GG (CULTURELLE) 10-15 Billion cell capsule, Take 1 capsule by mouth daily with lunch., Disp: , Rfl:      metoprolol succinate (TOPROL-XL) 25 MG, Take 25 mg by mouth daily with lunch.    , Disp: , Rfl:      midodrine HCl (MIDODRINE ORAL), Take 10 mg by mouth see administration instructions. Take 10 mg at the beginning of dialysis and 10 mg longterm through  dialysis three days weekly on dialysis days (Mondays, Wednesdays, and Fridays).   , Disp: , Rfl:      omeprazole (PRILOSEC) 20 MG capsule, Take 20 mg by mouth 2 (two) times daily before lunch and supper.    , Disp: , Rfl:      oxyCODONE (ROXICODONE) 5 MG immediate release tablet, Take 0.5-1 tablets (2.5-5 mg total) by mouth every 4 (four) hours as needed., Disp: 40 tablet, Rfl: 0     polyvinyl alcohol (LIQUIFILM TEARS) 1.4 % ophthalmic solution, Apply 1 drop to eye as needed for dry eyes., Disp: , Rfl:      pot bicarb-sod bicarb-cit ac (EDI-SELTZER GOLD) 344-1,050-1,000 mg TbEF, Take 1 tablet by mouth every 4 (four) hours as needed., Disp: , Rfl:      ranitidine (ZANTAC) 150 MG tablet, Take 150 mg by mouth at bedtime., Disp: , Rfl:      senna-docusate (SENNOSIDES-DOCUSATE SODIUM) 8.6-50 mg tablet, Take 1 tablet by mouth every evening.    , Disp: , Rfl:      sucroferric oxyhydroxide (VELPHORO) 500 mg Chew chewable tablet, Chew 500 mg 3 (three) times a day with meals., Disp: , Rfl:      timolol maleate (TIMOPTIC) 0.5 % ophthalmic solution, Administer 1 drop to both eyes 2 (two) times a day. , Disp: , Rfl:      traZODone (DESYREL) 150 MG tablet, Take 75 mg by mouth at bedtime., Disp: , Rfl:      warfarin (COUMADIN/JANTOVEN) 3 MG tablet, Take 3-4.5 mg by mouth See Admin Instructions. Take 4.5 mg two days weekly (on Tuesdays and Fridays) and 3 mg five days weekly (on all other days of the week). Adjust dose based on INR results as directed., Disp: , Rfl:     .There were no vitals filed for this visit.  Blood pressure 145/84 pulse 79 temperature 97.8 saturation room air 96%  Physical Exam   Constitutional: No distress.   HENT:   Neck: Neck supple. No thyromegaly present.   Cardiovascular: Normal rate, regular rhythm and normal heart sounds.   Pacemaker.  Dialysis port right upper thorax   Pulmonary/Chest: Breath sounds normal.   History of sleep apnea intolerant to CPAP.   Abdominal: Bowel sounds are normal. There is no  tenderness. There is no guarding.   Musculoskeletal:   History of right hip intertrochanteric fracture status post nail June 2019.  History of falls.   Lymphadenopathy:     She has no cervical adenopathy.   Neurological: She is alert.   Skin: Skin is warm and dry. No rash noted.   Psychiatric: Her behavior is normal.    LABS:   Lab Results   Component Value Date    WBC 4.5 07/08/2019    HGB 8.9 (L) 07/15/2019    HCT 25.8 (L) 07/08/2019     (H) 07/08/2019     07/08/2019         ASSESSMENT:      ICD-10-CM    1. Closed displaced intertrochanteric fracture of left femur with routine healing, subsequent encounter S72.142D    2. ESRD on dialysis (H) N18.6     Z99.2    3. Essential hypertension with goal blood pressure less than 140/90 I10    4. Chronic diastolic congestive heart failure (H) I50.32        PLAN:    No other changes.  Continue with dialysis 3 times weekly.  Is about 60% better.  She did not have any other questions.      Electronically signed by: Michael Duane Johnson, CNP

## 2021-06-01 NOTE — TELEPHONE ENCOUNTER
ANTICOAGULATION  MANAGEMENT    Assessment     Today's INR result of 2.0 is Therapeutic (goal INR of 2.0-3.0)        Previous INR was Supratherapeutic    Warfarin given as previously instructed    No new health/diet changes affecting INR    No new medication/supplements affecting INR    Continues to tolerate warfarin with no reported s/s of bleeding or thromboembolism       Plan:     Warfarin Dosing Orders:  Continue current warfarin dose    4.5 mg every Tue; 3 mg all other days         Next INR: 1 week.    Telephone orders given to nurseYolie.  Orders read back correctly.     Jose Ruano RN    Subjective/Objective:      Belia Rodriguez, apolonia 74 y.o. female is on warfarin. Facility nurse reports for Belia:    Other anticoagulants: No    Medication changes: No     Missed warfarin doses since last INR: No     Abnormal bleeding since last INR: No    New symptoms, injury or illness: No     Upcoming surgery, procedure or cardioversion: No    Recent INR Results:    Lab Results   Component Value Date    INR 2.00 (!) 09/11/2019    INR 3.10 (!) 09/04/2019    INR 2.50 (!) 08/28/2019       Anticoagulation Episode Summary     Current INR goal:   2.0-3.0   TTR:   100.0 %   Next INR check:   9/18/2019   INR from last check:   2.00 (9/11/2019)   Weekly max warfarin dose:      Target end date:   10/16/2019   INR check location:      Preferred lab:      Send INR reminders to:   [facility] (TCU[facility]LTC[facility]it[facility])    Indications    DVT (deep venous thrombosis) (H) [I82.409]           Comments:   Duration 3-6 months per PCP         Anticoagulation Care Providers     Provider Role Specialty Phone number    John Escoto DO Referring Internal Medicine [phone]

## 2021-06-01 NOTE — TELEPHONE ENCOUNTER
ACN called back and spoke with pt. Pt actually has a lot of 3 mg warfarin tablets. So she should have plenty.     Pt said her Cardiologist instructed that she can discontinue warfarin in Oct 2019.

## 2021-06-01 NOTE — TELEPHONE ENCOUNTER
ANTICOAGULATION  MANAGEMENT- Home Care/Care Facility Result    Assessment     Today's INR result of 1.8 is Subtherapeutic (goal INR of 2.0-3.0)        Warfarin taken differently than instructed, but no impact to total weekly dose     Discharged from TCU on last week.     No new diet changes affecting INR    No new medication/supplements affecting INR    Continues to tolerate warfarin with no reported s/s of bleeding or thromboembolism     Previous INR was Therapeutic    Plan:     Spoke with home care nurse Laly discussed INR result and instructed:     Warfarin Dosing Instructions: Change warfarin dose to 4.5 mg daily on Wed, Sat; and 3 mg daily rest of week  (6.7 % change)    Next INR to be drawn: 1 week.    Education provided: importance of taking warfarin as instructed    Laly verbalizes understanding and agrees to warfarin dosing plan.   ?   Jose Ruano RN    Subjective/Objective:      Belia Rodriguez, a 74 y.o. female is established on warfarin.     Home care/care facility RN's report of Belia INR, recent warfarin dosing, diet changes, medication changes, and symptoms is documented below.    Additional findings: verbally confirmed home dose with Laly and updated on anticoagulation calendar    Anticoagulation Episode Summary     Current INR goal:   2.0-3.0   TTR:   46.2 %   Next INR check:   9/25/2019   INR from last check:   1.80! (9/18/2019)   Weekly max warfarin dose:      Target end date:   10/16/2019   INR check location:      Preferred lab:      Send INR reminders to:   [facility]    Indications    DVT (deep venous thrombosis) (H) [I82.409]           Comments:   Duration 3-6 months per PCP         Anticoagulation Care Providers     Provider Role Specialty Phone number    John Escoto DO Referring Internal Medicine [phone]

## 2021-06-02 NOTE — TELEPHONE ENCOUNTER
INR result is 3  INR   Date Value Ref Range Status   09/26/2019 1.60 (!) 0.9 - 1.1 Final       Will the patient be seen, or did they already see, MD or CNP today? No    Most Recent Warfarin dose day/week  Sunday Monday Tuesday Wednesday Thursday Friday Saturday       4.5 4.5 4.5     Sunday Monday Tuesday Wednesday Thursday Friday Saturday   4.5 3 4.5 4.5          Has the patient missed any doses of Coumadin, Warfarin, Jantoven in the past 7 days? No    Has the patients medications changed since the last visit? No    Has the patient experienced any bleeding recently? Yes Patient states it is normal    Has the patient experienced any injuries or illness recently? No    Has the patient experienced any 'new' shortness of breath, severe headaches, or changes in vision recently? No    Has the patient had any changes in their diet, or alcohol consumption? No    Is the patient here today to prepare for any type of upcoming surgery, procedure, or for a cardioversion procedure? No    What phone number can we reach the patient at today? Lety [phone]

## 2021-06-02 NOTE — TELEPHONE ENCOUNTER
ANTICOAGULATION  MANAGEMENT PROGRAM    Belia Rodriguez is being discharged from the [facility] (AC).    Reason for discharge: warfarin therapy completed    ACM referral closed, anticoagulation episode resolved and INR standing order discontinued.     If Belia needs warfarin management in the future, please send a new referral.    Brendon Bowser RN

## 2021-06-02 NOTE — TELEPHONE ENCOUNTER
RN cannot approve Refill Request    RN can NOT refill this medication historical medication requested. Last office visit: 9/18/2019 John Escoto DO Last Physical: Visit date not found Last MTM visit: Visit date not found Last visit same specialty: 9/18/2019 John Escoto DO.  Next visit within 3 mo: Visit date not found  Next physical within 3 mo: Visit date not found      Hallie Crandall, [facility] Connection Triage/Med Refill 10/18/2019    Requested Prescriptions   Pending Prescriptions Disp Refills     traZODone (DESYREL) 50 MG tablet [Pharmacy Med Name: TRAZODONE 50 MG TABLET] 45 tablet 0     Sig: TAKE 1&1/2 TABLETS (75MG) BY MOUTH AT BEDTIME.       Tricyclics/Misc Antidepressant/Antianxiety Meds Refill Protocol Passed - 10/17/2019  1:30 PM        Passed - PCP or prescribing provider visit in last year     Last office visit with prescriber/PCP: 9/18/2019 John Escoto DO OR same dept: 9/18/2019 John Escoto DO OR same specialty: 9/18/2019 John Escoto DO  Last physical: Visit date not found Last MTM visit: Visit date not found   Next visit within 3 mo: Visit date not found  Next physical within 3 mo: Visit date not found  Prescriber OR PCP: John Escoto DO  Last diagnosis associated with med order: There are no diagnoses linked to this encounter.  If protocol passes may refill for 12 months if within 3 months of last provider visit (or a total of 15 months).

## 2021-06-02 NOTE — TELEPHONE ENCOUNTER
FYI - Status Update  Who is Calling: Home Care  Update: Caller stated that the patient has reached her home care goal and was discharged today. Please call MERARI Brooks from [facility] if there are any further questions or concerns.  Okay to leave a detailed message?:  Yes  [phone]

## 2021-06-02 NOTE — TELEPHONE ENCOUNTER
Orders being requested: to extend physical therapy as an evaluation and treat.  Reason service is needed/diagnosis: to assist with walking and stamina.  When are orders needed by: ASAP  Where to send Orders: [facility]. please fax orders to [phone]  Okay to leave detailed message?  Yes

## 2021-06-02 NOTE — TELEPHONE ENCOUNTER
ANTICOAGULATION  MANAGEMENT- Home Care/Care Facility Result    Assessment     Today's INR result of 3.0 is Therapeutic (goal INR of 2.0-3.0)        Warfarin taken as previously instructed    No new diet changes affecting INR    No new medication/supplements affecting INR    Continues to tolerate warfarin with no reported s/s of bleeding or thromboembolism     Previous INR was Subtherapeutic     Per Dr Escoto ov 9/18 end of warfarin therapy 10/6,  Has watchman     Plan:     Spoke with nurse Lety discussed INR result and instructed:     Warfarin Dosing Instructions: Continue current warfarin dose until Sunday, then stop       Next INR to be drawn: not needed      Itzel/Lety verbalizes understanding and agrees to warfarin dosing plan.   ?   Brendon Bowser RN    Subjective/Objective:      Belia Rodriguez, a 74 y.o. female is established on warfarin.     Home care/care facility RN's report of Belia INR, recent warfarin dosing, diet changes, medication changes, and symptoms is documented below.    Additional findings: verbally confirmed home dose with Itzel and updated on anticoagulation calendar

## 2021-06-03 NOTE — TELEPHONE ENCOUNTER
Refill Approved    Rx renewed per Medication Renewal Policy. Medication was last renewed on 10/18/19.    Rosa Lopez, [facility] Connection Triage/Med Refill 11/28/2019     Requested Prescriptions   Pending Prescriptions Disp Refills     traZODone (DESYREL) 50 MG tablet [Pharmacy Med Name: TRAZODONE 50 MG TABLET] 45 tablet 0     Sig: TAKE 1&1/2 TABLETS (75MG) BY MOUTH AT BEDTIME.       Tricyclics/Misc Antidepressant/Antianxiety Meds Refill Protocol Passed - 11/26/2019  8:45 AM        Passed - PCP or prescribing provider visit in last year     Last office visit with prescriber/PCP: 11/19/2019 John Escoto DO OR same dept: 11/19/2019 John Escoto DO OR same specialty: 11/19/2019 John Escoto DO  Last physical: Visit date not found Last MTM visit: Visit date not found   Next visit within 3 mo: Visit date not found  Next physical within 3 mo: Visit date not found  Prescriber OR PCP: John Escoto DO  Last diagnosis associated with med order: 1. Insomnia, unspecified type  - traZODone (DESYREL) 50 MG tablet [Pharmacy Med Name: TRAZODONE 50 MG TABLET]; TAKE 1&1/2 TABLETS (75MG) BY MOUTH AT BEDTIME.  Dispense: 45 tablet; Refill: 0    If protocol passes may refill for 12 months if within 3 months of last provider visit (or a total of 15 months).

## 2021-06-03 NOTE — PROGRESS NOTES
[facility] Note    Belia Rodriguez   74 y.o. female    Date of Visit: 11/19/2019  Chief Complaint   Patient presents with     Follow-up     Mammogram     patient due, orders pending       ASSESSMENT/PLAN  1. Gastroesophageal reflux disease without esophagitis  famotidine (PEPCID) 20 MG tablet   2. Visit for screening mammogram  Mammo Screening Bilateral   3. Pure hypercholesterolemia  Lipid Cascade     ---------------------------------------------    1.  Zantac was recalled, so we will replace with Pepcid.  She takes this fairly regularly in the evenings.    2.  Update mammogram    3.  Check lipid profile, has been several years since this has been checked.    In general, she will focus on rehabilitation, gaining her strength and balance back after falling and breaking the left hip earlier this year.  She also finished taking warfarin after the 3-month course following the provoked DVT after surgery.    Return in about 5 months (around 4/19/2020) for Recheck.      RISHI Robbins is a 74-year-old woman with history of end-stage renal disease on hemodialysis, COPD, atrial fibrillation status post watchman device, and recent DVT this summer, who presents for follow-up.  I saw her back in September.    The DVT was likely provoked because she had left hip arthroplasty in June.  We talked about only needing 3 to 6 months of treatment.  Because of her high risk of bleeding, we anticipated stopping warfarin on October 6.    She still attends dialysis Monday/Wednesday/Friday.  She is growing a little weary of having to go to dialysis so often.  She likes to knit during dialysis sessions.    Her  Prashant is also here today.  They are working to get more grab bars so she can come in through the garage.    She is still cautious when walking, going to PT at [facility] twice a week.   She still has a walker, she has a wheelchair that she can uses a walker with handles in the back, this is what she brings  to the clinic today.  They have a ramp going up to the house, she worries about falling in the snow.    She has a ankle brace (AFO) that is molded to a shoe, but the shoe does not have adequate  for the snow, even when it is not icy.  They are working on getting a replacement shoe that has better .    She had an AV jnon ablation and notes that she feels better with heart rate of 70 as opposed to 60.  She has been working with the EP clinic on this.    ROS:   Per HPI, all other systems negative     Medications, allergies, and problem list were reviewed and updated    Patient Active Problem List   Diagnosis     Tachycardia-bradycardia syndrome (H)     Hyperkalemia     Essential hypertension with goal blood pressure less than 140/90     Hyperlipidemia     ZULEIKA (obstructive sleep apnea), severe     Anemia of chronic renal failure, stage 5 (H)     Dissection of thoracoabdominal aorta (H)     Gout     GERD (gastroesophageal reflux disease)     Right foot drop     Acute midline low back pain with right-sided sciatica     History of compression fracture of spine     Pulmonary emphysema (H)     Presence of Watchman left atrial appendage closure device     Other dysphagia     Borderline glaucoma with ocular hypertension     Hyperparathyroidism (H)     Osteoporosis     Venous tributary (branch) occlusion of retina     ESRD on dialysis (H)     Chronic atrial fibrillation     Chronic diastolic congestive heart failure (H)     Thrombocytopenia (H)     Contraindication to anticoagulation therapy     Dyspnea     Cardiac pacemaker in situ     S/P AV (atrioventricular) jonn ablation     Hip fracture, intertrochanteric (H)     DVT (deep venous thrombosis) (H)     Past Medical History:   Diagnosis Date     Arthritis      CHF (congestive heart failure) (H)      Chronic anemia 6/1/2014     Chronic kidney disease      Chronic thoracic aortic dissection (H) 10/7/2015    Descending thoracic aorta; treated medically per notes of  Dragan Singh and Jennifer.     COPD (chronic obstructive pulmonary disease) (H)      CVA (cerebral infarction)      Disease of thyroid gland      Dyslipidemia      ESRD (end stage renal disease) (H) 06/03/2009    on dialysis with Dr. Mitchell     Essential hypertension 6/30/2014     Gastrointestinal hemorrhage, unspecified gastrointestinal hemorrhage type 6/5/2017     GI (gastrointestinal bleed)      GI bleeding 6/5/2017     Gout      L3 vertebral fracture (H) 11/16/2015     Left Atrial Appendage Occlusion (WATCHMAN) 4/5/2018    LAAO April 5, 2018 (30 mm WATCHMAN)     Obesity      ZULEIKA (obstructive sleep apnea), severe, intolerant of CPAP 10/22/2015     Pneumonia 9-7-2015     Right foot drop      Spinal stenosis 3/28/2016     Stroke (H) 3/24/2016     Current Outpatient Medications   Medication Sig Dispense Refill     acetaminophen (TYLENOL) 500 MG tablet Take 1,000 mg by mouth 3 (three) times a day as needed.       aspirin 81 MG EC tablet TAKE 1 TABLET ORALLY DAILY AT NOON. DISCONTINUE WHEN INR IS GREATER THAN OR EQAUL TO 2 30 tablet 0     atorvastatin (LIPITOR) 10 MG tablet Take 10 mg by mouth at bedtime.       B complex-vitamin C-folic acid (DIALYVITE) 100-1 mg Tab Take 1 tablet by mouth daily with lunch.              cholecalciferol, vitamin D3, (VITAMIN D3) 2,000 unit Tab Take 2,000 Units by mouth daily with lunch.              cinacalcet (SENSIPAR) 30 MG tablet Take 30 mg by mouth see administration instructions. Take three times weekly with dialysis (on Mondays, Wednesdays, and Fridays).             folic acid (FOLVITE) 1 MG tablet Take 1 mg by mouth daily with lunch.              gabapentin (NEURONTIN) 100 MG capsule Take 100 mg by mouth 3 (three) times a day.       Lactobacillus rhamnosus GG (CULTURELLE) 10-15 Billion cell capsule Take 1 capsule by mouth daily with lunch.       loratadine (CLARITIN) 10 mg tablet Take 10 mg by mouth daily.       metoprolol succinate (TOPROL-XL) 25 MG Take 25 mg by mouth daily  "with lunch.              midodrine HCl (MIDODRINE ORAL) Take 10 mg by mouth see administration instructions. Take 10 mg at the beginning of dialysis and 10 mg snf through dialysis three days weekly on dialysis days (Mondays, Wednesdays, and Fridays).             multivitamin (DAILY-OLGA) per tablet Take 1 tablet by mouth daily.       omeprazole (PRILOSEC) 20 MG capsule Take 20 mg by mouth 2 (two) times daily before lunch and supper.              polyvinyl alcohol (LIQUIFILM TEARS) 1.4 % ophthalmic solution Apply 1 drop to eye as needed for dry eyes.       pot bicarb-sod bicarb-cit ac (EDI-SELTZER GOLD) 344-1,050-1,000 mg TbEF Take 1 tablet by mouth every 4 (four) hours as needed.       senna-docusate (SENNOSIDES-DOCUSATE SODIUM) 8.6-50 mg tablet Take 1 tablet by mouth every evening.              sucroferric oxyhydroxide (VELPHORO) 500 mg Chew chewable tablet Chew 500 mg 3 (three) times a day with meals.       timolol maleate (TIMOPTIC) 0.5 % ophthalmic solution Administer 1 drop to both eyes 2 (two) times a day.        traZODone (DESYREL) 50 MG tablet TAKE 1&1/2 TABLETS (75MG) BY MOUTH AT BEDTIME. 45 tablet 0     famotidine (PEPCID) 20 MG tablet Take 1 tablet (20 mg total) by mouth 2 (two) times a day as needed for heartburn. 180 tablet 2     No current facility-administered medications for this visit.      Allergies   Allergen Reactions     Ace Inhibitors Cough     Atrovent [Ipratropium Bromide] Headache     Fosinopril Sodium Cough     Zolpidem      hallucinations       EXAM  Vitals:    11/19/19 1147   BP: 102/54   Patient Site: Left Arm   Patient Position: Sitting   Cuff Size: Adult Regular   Pulse: 70   SpO2: 92%   Weight: 156 lb 8.4 oz (71 kg)   Height: 5' 5\" (1.651 m)         General: Alert, no distress  Heart: Regular rate and rhythm  Lungs: Clear to auscultation bilaterally  Lower extremities: No edema      John Escoto, DO  Internal Medicine  [facility]    "

## 2021-06-05 NOTE — TELEPHONE ENCOUNTER
Informed patient. She will arrive at the clinic at 3:45 today.  Routing message to PCP pool.      Danitza Castro RN  [facility] Triage Nurse  12:29 PM  2/4/2020

## 2021-06-05 NOTE — PATIENT INSTRUCTIONS - HE
Azithromycin and prednisone for bronchitis    Ok to use over the counter cough medication with dextomethorphan or guaifenesin (Tussin or Mucinex).  Avoid decongestants added to the cough syrup such as phenylephrine or pseudoephedrine    I think you may have carpal tunnel syndrome (left hand).  Wear wrist brace at night    Let me know who is doing the eye surgery and I can send a letter

## 2021-06-05 NOTE — TELEPHONE ENCOUNTER
Sure- there are no spots open but if she comes in around 3:45pm I can see her-- unclear if it will be closer to 4 or 4:30 but we can at least check an x-ray if she is waiting for me

## 2021-06-05 NOTE — TELEPHONE ENCOUNTER
"RN triage call from patient  She is reporting coughing for 2 weeks that has turned \"phlegmy and gunky\"  Patient states she does not know color of sputum. She does not think she coughs blood.    does feel wheezing and shortness of breath as well  Patient is on dialysis and states she thinks it could be from fluid related to that. Last dialysis was yesterday.  No chest pain  Patient interrupted triaging and wanted to know if there was an urgent care she could go to . Did discuss WIC, patient would prefer PCP appointment    Patient would like appointment today with PCP.   Per scheduling associate no appointments available with PCP  Please have PCP review and advise.  Reviewed signs and symptoms of when to call back with patient. She stated understanding.      Reason for Disposition    Wheezing is present    Protocols used: COUGH-A-OH      "

## 2021-06-05 NOTE — PROGRESS NOTES
[facility] Note    Belia Rodriguez   74 y.o. female    Date of Visit: 2/4/2020  Chief Complaint   Patient presents with     Cough       ASSESSMENT/PLAN  1. Acute bronchitis, unspecified organism  azithromycin (ZITHROMAX) 250 MG tablet    predniSONE (DELTASONE) 20 MG tablet   2. Cough  XR Chest 2 Views   3. ESRD on dialysis (H)     4. Dissection of thoracic aorta (H)     5. Chronic diastolic congestive heart failure (H)     6. Pulmonary emphysema, unspecified emphysema type (H)     7. Acute pulmonary edema (H)     8. Chronic atrial fibrillation     9. Hyperparathyroidism (H)     10. Thrombocytopenia (H)     11. Carpal tunnel syndrome of left wrist       ---------------------------------------------    1.  Symptoms of several days of heavy cough productive of thick green/brown sputum consistent with acute bronchitis.  Given history of COPD, will treat with azithromycin and prednisone combination.  Also on the differential with her symptoms for volume overload (has orthopnea, 7 increase in pulmonary vasculature).  She has dialysis tomorrow.  I am not sure if she is far off from her dry weight, since she states that she currently weighs the same as her dry weight.  I did not feel that this alone warranted hospital admission.  She does not have pneumonia, is not febrile, does not needing supplemental oxygen, and is not in any respiratory distress.  Hopefully dialysis can address any volume issues in the upcoming days.   -ok to use OTC cough meds without decongestants added    2.  Cough as reason for x-ray, noted above    3.  Dialysis Monday/Wednesday/Friday, is adherent to low-sodium diet, and does not miss dialysis.    4-7. See #1    8.  Noted that she has watchman device, is off of anticoagulation.  He was anticoagulated for a brief period last year following DVT.  She does not tolerate anticoagulation well    9.  She has secondary hyperparathyroidism related to renal disease.  Defer to  nephrology.    10 .  Resolved    11.  I suspect some of the left hand weakness is related to carpal tunnel syndrome and weakness in the thenar eminence.  I suggested that she use a wrist brace whenever she sleeps, she already has 1 of these.  Is reasonable to follow through with the neurologist.  Ordinarily, at this point I would refer to orthopedic surgery, but she is frail, needs to recover from current illness first, and may benefit from wrist bracing first.    Return in about 1 month (around 3/4/2020) for Recheck.      SUBJECTIVE    Beliastewart Rodriguez is here with Prashant  She has had cough for 5 days, productive and deemed bad, hard to sleep at night. + chills, no fever, no hemoptysis  She has been wearing her jacket in the house.   No other symptoms  She sleeps at a 90 degree angle.  Before she was at a 20 degree angle.      All together she has had 2 weeks of coughing.    HD is M/W/F  She fell this morning, butt is sore. This happened while getting from recliner to wheelchair (using easy chair).  Knees just buckled.      She has trid coricidin for cough.  Robitussin usually helps.     There is an eyelid reduction surgery coming up Feb 28th.  She needs a letter stating she can be of aspirin.     She has been dropping things with her left hand.  She raised this concern with nephrology and referred to neuro in March.  Sometimes the hand twitches    ROS:   Per HPI, all other systems negative     Medications, allergies, and problem list were reviewed and updated    Patient Active Problem List   Diagnosis     Tachycardia-bradycardia syndrome (H)     Hyperkalemia     Essential hypertension with goal blood pressure less than 140/90     Hyperlipidemia     ZULEIKA (obstructive sleep apnea), severe     Anemia of chronic renal failure, stage 5 (H)     Dissection of thoracoabdominal aorta (H)     Gout     GERD (gastroesophageal reflux disease)     Right foot drop     Acute midline low back pain with right-sided sciatica      History of compression fracture of spine     Pulmonary emphysema (H)     Presence of Watchman left atrial appendage closure device     Other dysphagia     Borderline glaucoma with ocular hypertension     Hyperparathyroidism (H)     Osteoporosis     Venous tributary (branch) occlusion of retina     ESRD on dialysis (H)     Acute pulmonary edema (H)     Chronic atrial fibrillation     Chronic diastolic congestive heart failure (H)     Thrombocytopenia (H)     Contraindication to anticoagulation therapy     Dyspnea     Cardiac pacemaker in situ     S/P AV (atrioventricular) jonn ablation     Hip fracture, intertrochanteric (H)     DVT (deep venous thrombosis) (H)     Carpal tunnel syndrome of left wrist     Past Medical History:   Diagnosis Date     Arthritis      CHF (congestive heart failure) (H)      Chronic anemia 6/1/2014     Chronic kidney disease      Chronic thoracic aortic dissection (H) 10/7/2015    Descending thoracic aorta; treated medically per notes of Dragan Singh and Jennifer.     COPD (chronic obstructive pulmonary disease) (H)      CVA (cerebral infarction)      Disease of thyroid gland      Dyslipidemia      ESRD (end stage renal disease) (H) 06/03/2009    on dialysis with Dr. Mitchell     Essential hypertension 6/30/2014     Gastrointestinal hemorrhage, unspecified gastrointestinal hemorrhage type 6/5/2017     GI (gastrointestinal bleed)      GI bleeding 6/5/2017     Gout      L3 vertebral fracture (H) 11/16/2015     Left Atrial Appendage Occlusion (WATCHMAN) 4/5/2018    LAAO April 5, 2018 (30 mm WATCHMAN)     Obesity      ZULEIKA (obstructive sleep apnea), severe, intolerant of CPAP 10/22/2015     Pneumonia 9-7-2015     Right foot drop      Spinal stenosis 3/28/2016     Stroke (H) 3/24/2016     Current Outpatient Medications   Medication Sig Dispense Refill     acetaminophen (TYLENOL) 500 MG tablet Take 1,000 mg by mouth 3 (three) times a day as needed.       aspirin 81 MG EC tablet TAKE 1 TABLET ORALLY  DAILY AT NOON. DISCONTINUE WHEN INR IS GREATER THAN OR EQAUL TO 2 30 tablet 0     atorvastatin (LIPITOR) 10 MG tablet Take 10 mg by mouth at bedtime.       B complex-vitamin C-folic acid (DIALYVITE) 100-1 mg Tab Take 1 tablet by mouth daily with lunch.              cholecalciferol, vitamin D3, (VITAMIN D3) 2,000 unit Tab Take 2,000 Units by mouth daily with lunch.              cinacalcet (SENSIPAR) 30 MG tablet Take 30 mg by mouth see administration instructions. Take three times weekly with dialysis (on Mondays, Wednesdays, and Fridays).             famotidine (PEPCID) 20 MG tablet Take 1 tablet (20 mg total) by mouth 2 (two) times a day as needed for heartburn. 180 tablet 2     folic acid (FOLVITE) 1 MG tablet Take 1 mg by mouth daily with lunch.              gabapentin (NEURONTIN) 100 MG capsule Take 100 mg by mouth 3 (three) times a day.       Lactobacillus rhamnosus GG (CULTURELLE) 10-15 Billion cell capsule Take 1 capsule by mouth daily with lunch.       loratadine (CLARITIN) 10 mg tablet Take 10 mg by mouth daily.       metoprolol succinate (TOPROL-XL) 25 MG Take 25 mg by mouth daily with lunch.              midodrine HCl (MIDODRINE ORAL) Take 10 mg by mouth see administration instructions. Take 10 mg at the beginning of dialysis and 10 mg alf through dialysis three days weekly on dialysis days (Mondays, Wednesdays, and Fridays).             multivitamin (DAILY-OLGA) per tablet Take 1 tablet by mouth daily.       omeprazole (PRILOSEC) 20 MG capsule Take 20 mg by mouth 2 (two) times daily before lunch and supper.              polyvinyl alcohol (LIQUIFILM TEARS) 1.4 % ophthalmic solution Apply 1 drop to eye as needed for dry eyes.       pot bicarb-sod bicarb-cit ac (EDI-SELTINOER GOLD) 344-1,050-1,000 mg TbEF Take 1 tablet by mouth every 4 (four) hours as needed.       senna-docusate (SENNOSIDES-DOCUSATE SODIUM) 8.6-50 mg tablet Take 1 tablet by mouth every evening.              sucroferric oxyhydroxide  "(VELPHORO) 500 mg Chew chewable tablet Chew 500 mg 3 (three) times a day with meals.       timolol maleate (TIMOPTIC) 0.5 % ophthalmic solution Administer 1 drop to both eyes 2 (two) times a day.        traZODone (DESYREL) 50 MG tablet TAKE 1&1/2 TABLETS (75MG) BY MOUTH AT BEDTIME. (Patient taking differently: 100 mg. ) 135 tablet 3     azithromycin (ZITHROMAX) 250 MG tablet Take 500mg (2 tablets) day one, and then 250mg (1 tablet) days 2-5 6 tablet 0     predniSONE (DELTASONE) 20 MG tablet Take 20 mg by mouth daily for 5 days. 5 tablet 0     No current facility-administered medications for this visit.      Allergies   Allergen Reactions     Ace Inhibitors Cough     Atrovent [Ipratropium Bromide] Headache     Fosinopril Sodium Cough     Zolpidem      hallucinations       EXAM  Vitals:    02/04/20 1607   BP: 92/58   Patient Site: Left Arm   Patient Position: Sitting   Cuff Size: Adult Regular   Pulse: 72   SpO2: 91%   Weight: 156 lb (70.8 kg)   Height: 5' 5\" (1.651 m)         General: alert, no distress  HEENT: sclerae anicteric, moist oral mucosa  Heart: irreg irreg.  1+ pretibial edema.    Lungs: Clear to auscultation bilat, nonlabored breathing  Gastrointestinal: abdomen is non-distended.    Skin: warm/dry, no rashes  Neuro: no gross abnormalities, decreased strength with opposition of the left thumb compared to right  MSK: phalen negative, but caused left hand to twitch  Psychiatric: Pleasant affect     Results reviewed:   X-ray requested    EXAM: XR CHEST 2 VIEWS  LOCATION: [facility]  DATE/TIME: 2/4/2020 4:01 PM     INDICATION: ongoing cough 2 weeks, history of ESRD on HD  COMPARISON: 6/25/2019, 3/8/2019     IMPRESSION:   Right internal jugular catheter tip projects over the right atrium. Single lead left subclavian pacemaker tip projects over the ventricle. Stable enlarged cardiac silhouette. Increased pulmonary vascular prominence suggests CHF. No effusion.      Lab Results   Component Value Date    HGBA1C " 5.7 06/01/2014      Reviewed 9/13/19 discharge summary from TCU-- had L hip fracture surgery 6/2019.  Documentation unclear on warfarin plan other than to continue it.     Reviewed my own note 9/18/19 with plan to limit course of warfarin to 3-6 mo.      John Escoto,   Internal Medicine  [facility]

## 2021-06-05 NOTE — TELEPHONE ENCOUNTER
New Appointment Needed  What is the reason for the visit:    Same Date/Next Day Appt Request  What is the reason for your visit?: Cough x 2 weeks, shortness of breath, Triaged     Provider Preference: PCP only  How soon do you need to be seen?: today  Waitlist offered?: No  Okay to leave a detailed message:  Yes

## 2021-06-06 NOTE — TELEPHONE ENCOUNTER
Ok to stop or otherwise not take Medrol.  Doxycycline can at times upset the stomach.  Since it appears to cause the vomiting, I would suggest stopping this. I would observe how she feels off these for a couple of days

## 2021-06-06 NOTE — PATIENT INSTRUCTIONS - HE
1. For cough: cough be from congestive heart failure and ongoing bronchitis from bronchial infection.     Will try Another round of antibiotic and steroids and will talk to your nephrologist to see if more fluid can be taken out.

## 2021-06-06 NOTE — TELEPHONE ENCOUNTER
Spoke with Dr Kinney, pt's nephrologist, patient with persistent cough and bronchitis and mild vascular congestion on chest x-ray 1 week ago.  Weight has been stable on our scale but she does have chronic lower extremity edema.  Discussed that she has been having more orthopnea which could be a combination of bronchitis and mild fluid overload.    Dr. Kinney did mention that in the past they have had difficulty removing more fluid due to drops in blood pressure and cramping and patient unwillingness  to do extra fluid removal.  They will try to squeeze her in for an extra ultra filtration this week.  We did talk about possibility of it on her visit yesterday and she was agreeable to do it if that would help her breathing.    MAGO Lopez

## 2021-06-06 NOTE — PROGRESS NOTES
[facility] Follow Up Note    Assessment/Plan:    1. Cough/bronchitis  Patient was treated for bronchitis with Z-James and Medrol Dosepak and currently 40% better.  She still continues to have symptoms.  She reports that it was easier for her to expectorate after she took steroid.  Currently will extend her treatment with antibiotic and a Medrol Dosepak for 1 more week due to history of COPD and mucus entrapment.  I also suspect that she is a degree of mild congestive heart failure.  Discussed that we will call her nephrologist in the morning to see if they may do additional ultrafiltration for her.  Chest x-ray did show vascular congestion.  - methylPREDNISolone (MEDROL DOSEPACK) 4 mg tablet; follow package directions  Dispense: 21 tablet; Refill: 0  - doxycycline (MONODOX) 100 MG capsule; Take 1 capsule (100 mg total) by mouth 2 (two) times a day for 7 days.  Dispense: 14 capsule; Refill: 0    2. Pulmonary emphysema, unspecified emphysema type (H)  Patient currently not on any inhalers.  She does have history of smoking and abnormal PFTs as per HPI.  If taking additional fluid out at hemodialysis does not help we can consider putting her on inhalers at that time.  - methylPREDNISolone (MEDROL DOSEPACK) 4 mg tablet; follow package directions  Dispense: 21 tablet; Refill: 0  - doxycycline (MONODOX) 100 MG capsule; Take 1 capsule (100 mg total) by mouth 2 (two) times a day for 7 days.  Dispense: 14 capsule; Refill: 0    3. Systolic congestive heart failure, unspecified HF chronicity (H)  She does have maker and was seen in cardiac clinic in September.  Echo done in January 2019 showed ejection fraction of 62% and mildly reduced right ventricular systolic performance and moderately enlarged left atrium without significant valvular disease.  Recent chest x-ray did show pulmonary congestion and will see if dialysis can take of slightly more fluid  - methylPREDNISolone (MEDROL DOSEPACK) 4 mg tablet; follow  "package directions  Dispense: 21 tablet; Refill: 0  - doxycycline (MONODOX) 100 MG capsule; Take 1 capsule (100 mg total) by mouth 2 (two) times a day for 7 days.  Dispense: 14 capsule; Refill: 0    4. Post-nasal drainage  - fluticasone propionate (FLONASE) 50 mcg/actuation nasal spray; One spray each nostril once a day  Dispense: 16 g; Refill: 12      Kristin Sanz MD    Chief Complaint:  Chief Complaint   Patient presents with     Follow-up     still having cough, \"feels my lung are congestive \"denied chest pain and SOB       History of Present Illness:  Belia is a 74 y.o. female , pt of DR Velasquez, who is currently here for follow-up due to persistent cough.  Patient does have end-stage kidney disease on hemodialysis Monday, Wednesday and Friday, history of diastolic dysfunction and congestive heart failure, emphysema, atrial fibrillation.    Patient was seen in the clinic a week ago due to cough.  She was diagnosed with bronchitis and prescribed azithromycin and prednisone.  Chest x-ray showed no pneumonia but some vascular congestive reminiscent of CHF.    Currently patient reports that she is feeling 40% better but still has a lot of cough throughout the day and night.  Initially when she took prednisone it was more productive and currently less productive but it still fairly wet.  Color of mucus has been clearing.  At night she has needed more oxygen due to mild shortness of breath and orthopnea.  On exam today her oxygen is normal on room air at rest.    She is on hemodialysis 3 days a week on Monday, Wednesday and Friday.  She does not consume excessive salt.  Usually on Mondays more weight is taking off due to the weekend at 3.2 kg and during the week it is around 1.5-2 kg.  Overall since September it seems that she has gained weight from 151 pounds to 157.  She has chronic lower extremity edema but reports that it is not worse from before.     Pulmonary emphysema is on her problem list but she " is currently not on any inhalers.  In the past reports that she was discharged from the hospital and was recommended to stop it.  She is unable to use albuterol inhaler but used to have nebulizer in the past.  PFT done in 2014 both consistent with combination of obstructive and restrictive disease without reversibility.    Review of Systems:  A comprehensive review of systems was performed and was otherwise negative    PFSH:  Social History: Reviewed  Social History     Tobacco Use   Smoking Status Former Smoker     Packs/day: 1.50     Years: 37.00     Pack years: 55.50     Types: Cigarettes     Last attempt to quit: 2009     Years since quittin.1   Smokeless Tobacco Never Used     Social History     Social History Narrative    Lives with her . Daughter in [place] and daughter in [place].       Past History: Viewed  Current Outpatient Medications   Medication Sig Dispense Refill     acetaminophen (TYLENOL) 500 MG tablet Take 1,000 mg by mouth 3 (three) times a day as needed.       aspirin 81 MG EC tablet TAKE 1 TABLET ORALLY DAILY AT NOON. DISCONTINUE WHEN INR IS GREATER THAN OR EQAUL TO 2 30 tablet 0     atorvastatin (LIPITOR) 10 MG tablet Take 10 mg by mouth at bedtime.       azithromycin (ZITHROMAX) 250 MG tablet Take 500mg (2 tablets) day one, and then 250mg (1 tablet) days 2-5 6 tablet 0     B complex-vitamin C-folic acid (DIALYVITE) 100-1 mg Tab Take 1 tablet by mouth daily with lunch.              cholecalciferol, vitamin D3, (VITAMIN D3) 2,000 unit Tab Take 2,000 Units by mouth daily with lunch.              cinacalcet (SENSIPAR) 30 MG tablet Take 30 mg by mouth see administration instructions. Take three times weekly with dialysis (on , , and ).             famotidine (PEPCID) 20 MG tablet Take 1 tablet (20 mg total) by mouth 2 (two) times a day as needed for heartburn. 180 tablet 2     folic acid (FOLVITE) 1 MG tablet Take 1 mg by mouth daily with lunch.               gabapentin (NEURONTIN) 100 MG capsule Take 100 mg by mouth 3 (three) times a day.       Lactobacillus rhamnosus GG (CULTURELLE) 10-15 Billion cell capsule Take 1 capsule by mouth daily with lunch.       loratadine (CLARITIN) 10 mg tablet Take 10 mg by mouth daily.       metoprolol succinate (TOPROL-XL) 25 MG Take 25 mg by mouth daily with lunch.              midodrine HCl (MIDODRINE ORAL) Take 10 mg by mouth see administration instructions. Take 10 mg at the beginning of dialysis and 10 mg half-way through dialysis three days weekly on dialysis days (Mondays, Wednesdays, and Fridays).             multivitamin (DAILY-OLGA) per tablet Take 1 tablet by mouth daily.       omeprazole (PRILOSEC) 20 MG capsule Take 20 mg by mouth 2 (two) times daily before lunch and supper.              polyvinyl alcohol (LIQUIFILM TEARS) 1.4 % ophthalmic solution Apply 1 drop to eye as needed for dry eyes.       pot bicarb-sod bicarb-cit ac (EDI-SELTINOER GOLD) 344-1,050-1,000 mg TbEF Take 1 tablet by mouth every 4 (four) hours as needed.       senna-docusate (SENNOSIDES-DOCUSATE SODIUM) 8.6-50 mg tablet Take 1 tablet by mouth every evening.              sucroferric oxyhydroxide (VELPHORO) 500 mg Chew chewable tablet Chew 500 mg 3 (three) times a day with meals.       timolol maleate (TIMOPTIC) 0.5 % ophthalmic solution Administer 1 drop to both eyes 2 (two) times a day.        traZODone (DESYREL) 50 MG tablet TAKE 1&1/2 TABLETS (75MG) BY MOUTH AT BEDTIME. (Patient taking differently: 100 mg. ) 135 tablet 3     doxycycline (MONODOX) 100 MG capsule Take 1 capsule (100 mg total) by mouth 2 (two) times a day for 7 days. 14 capsule 0     fluticasone propionate (FLONASE) 50 mcg/actuation nasal spray One spray each nostril once a day 16 g 12     methylPREDNISolone (MEDROL DOSEPACK) 4 mg tablet follow package directions 21 tablet 0     No current facility-administered medications for this visit.        Family History: Reviewed    Physical  "Exam:    Vitals:    02/10/20 1437   BP: 116/60   Patient Site: Left Arm   Patient Position: Sitting   Cuff Size: Adult Regular   Pulse: 70   Temp: 97.7  F (36.5  C)   TempSrc: Oral   SpO2: 96%   Weight: 157 lb 6.5 oz (71.4 kg)   Height: 5' 5\" (1.651 m)     Wt Readings from Last 3 Encounters:   02/10/20 157 lb 6.5 oz (71.4 kg)   02/04/20 156 lb (70.8 kg)   11/19/19 156 lb 8.4 oz (71 kg)     Body mass index is 26.19 kg/m .    Constitutional:  Reveals a pleasant frail female a wheelchair accompanied by her .  Vitals:  Per nursing notes.  HEENT:No cervical LAD, no thyromegaly,  conjunctiva is pink, no scleral icterus, TMs are visualized and normal bl, oropharynx is clear, no exudates,   Cardiac:  Regular rate and rhythm,no murmurs, rubs, or gallops.Legs with 2+ edema in the right foot and 1+ edema in the left which is chronic per patient. Palpation of the radial pulse regular.  Lungs:  Respiratory effort normal.  She does have a lot of chest congestion and mucus entrapment but able to bring it up.  She has coarse breath sounds but no significant wheezing,  Abdomen:positive BS, soft, nontender, nondistended.  No hepato-splenomagaly  Skin:   Without rash, bruise, or palpable lesions.  Rheumatologic: Normal joints and nails of the hands.  Neurologic:  Cranial nerves II-XII intact.     Psychiatric: affect appropriate, memory intact.     Data Review:    Analysis and Summary of Old Records (2): yes      Records Requested (1): no      Other History Summarized (from other people in the room) (2):     Radiology Tests Summarized (XRAY/CT/MRI/DXA) (1): cxr    Labs Reviewed (1): yes    Medicine Tests Reviewed (EKG/ECHO/COLONOSCOPY/EGD) (1): no    Independent Review of EKG or X-RAY (2): no      "

## 2021-06-06 NOTE — TELEPHONE ENCOUNTER
Triage call:   Seen a couple days ago- [facility] 2/10/2020  Steroids prescribed- on dialysis   Reports that she is afraid to take the steroids   Doxycycline - making her throw up - taking medication with food- took for 3 days- yesterday started vomiting    Medrol dose pack - unable to take as ordered due to dialysis- she is worried about the side effects listed for those with Kidney issues- not taken any steroids    Pulled extra fluid yesterday- recommended in office visit    She states that overall she is very tired   First round of steroids she states was different and was on a Zpack with last round - no problems with these medications.     Advised pushing fluids (within restrictions with dialysis)     She states that she doesn't want to continue with the Medrol dose or the antibiotic. Please advise on changes in treatment.     Rosa Lopez RN [facility] Triage/Med Refill 2/13/2020 11:21 AM    Reason for Disposition    Caller has URGENT medication question about med that PCP prescribed and triager unable to answer question    Protocols used: MEDICATION QUESTION CALL-A-

## 2021-06-07 NOTE — TELEPHONE ENCOUNTER
RN cannot approve Refill Request    RN can NOT refill this medication historical medication requested.       Leandra Hensley, [facility] Triage/Med Refill 4/28/2020    Requested Prescriptions   Pending Prescriptions Disp Refills     metoprolol succinate (TOPROL-XL) 25 MG [Pharmacy Med Name: METOPROLOL SUCC ER 25 MG TAB] 90 tablet 0     Sig: TAKE 1 TABLET BY MOUTH DAILY       Beta-Blockers Refill Protocol Passed - 4/26/2020 10:21 AM        Passed - PCP or prescribing provider visit in past 12 months or next 3 months     Last office visit with prescriber/PCP: 2/4/2020 John Escoto DO OR same dept: 2/10/2020 Kristin Sanz MD OR same specialty: 2/10/2020 Kristin Sanz MD  Last physical: Visit date not found Last MTM visit: Visit date not found   Next visit within 3 mo: Visit date not found  Next physical within 3 mo: Visit date not found  Prescriber OR PCP: John Escoto DO  Last diagnosis associated with med order: There are no diagnoses linked to this encounter.  If protocol passes may refill for 12 months if within 3 months of last provider visit (or a total of 15 months).             Passed - Blood pressure filed in past 12 months     BP Readings from Last 1 Encounters:   02/10/20 116/60                folic acid (FOLVITE) 1 MG tablet [Pharmacy Med Name: FOLIC ACID 1 MG TABLET] 90 tablet 0     Sig: TAKE ONE TABLET BY MOUTH ONCE DAILY       There is no refill protocol information for this order

## 2021-06-08 NOTE — PROGRESS NOTES
Patient would like to be contacted via the following phone number [phone]    ASSESSMENT:  1. Essential hypertension with goal blood pressure less than 140/90  Blood pressure has been low during dialysis, recommend discontinuation of her only remaining antihypertensive in the form of metoprolol succinate 25 mg daily.  She has a history of atrial fibrillation and tachycardia/bradycardia syndrome, but because of severe difficulties managing this, she had AV node ablation and permanent pacemaker placement, so she is dependent on the pacemaker.  I do not see a further role for metoprolol if her blood pressure is this low, which could also be limiting her dialysis run.    2. Cold intolerance  She has a constellation of symptoms including severe cold intolerance, which is worse than usual, fatigue, sleeping sometimes more than 24 hours (she tells me this is not from the trazodone), and weight loss.  Except for the weight loss, the symptoms are consistent with hypoactive thyroid.  If this is negative, I want her to discuss the symptoms with her nephrologist as well, to see if there are any technical changes that can be made to her dialysis run to make it better tolerated.  I am not sure if this is part of a disequilibrium syndrome.  She has been on dialysis for 11 years now.  She does have Midrin to take for blood pressure to boosted if it does go low.  - Thyroid Stimulating Hormone (TSH); Future  - T4, Free; Future    3. Abnormal weight loss  Unclear what is causing the abnormal weight loss, though malignancy could certainly be on the differential.  She does not have any focal symptoms anywhere.  I would like to investigate metabolic causes such as hypothyroidism before launching into what may be an unfounded investigation into possible malignancy.  It does appear she has age-appropriate cancer screening completed.  - Thyroid Stimulating Hormone (TSH); Future    4. Fatigue, unspecified type  See above  - Thyroid  Stimulating Hormone (TSH); Future      PLAN:  There are no Patient Instructions on file for this visit.    Orders Placed This Encounter   Procedures     Thyroid Stimulating Hormone (TSH)     Standing Status:   Future     Standing Expiration Date:   2021     T4, Free     Standing Status:   Future     Standing Expiration Date:   2021       No follow-ups on file.    CHIEF COMPLAINT:  Chief Complaint   Patient presents with     Fatigue     Follow-up     low blood pressure       HISTORY OF PRESENT ILLNESS:  Belia is a 74 y.o. female calling in to the clinic today   She has a history of end-stage renal disease and has been on hemodialysis on Monday, Wednesday, Friday for the last 11 years ([facility] in [place]).  Today at dialysis, SBP was 88, has been running low at HD, which is managed with midodrine at times.  Weight has been dropping, currently 151 pounds, which is on the lower end of what she usually is.    She is very cold, worse than usual.     She has returned home around 1400 and would sleep until 1830 the following day.  This happened again after dialysis last Friday, her  woke her up at 1300 hrs. the next day.    The breathing has been a bit bad but COPD possibly getting worse.  Lately she is using o2 during day (usually only at night).     She gets a monthly hemoglobin check.        REVIEW OF SYSTEMS:   All other systems are negative.    PFSH:      TOBACCO USE:  Social History     Tobacco Use   Smoking Status Former Smoker     Packs/day: 1.50     Years: 37.00     Pack years: 55.50     Types: Cigarettes     Last attempt to quit: 2009     Years since quittin.4   Smokeless Tobacco Never Used       VITALS:  Stated Blood Pressure  106/61  Stated Pulse  74  Stated Weight 69 kg (151.8 lbs)    PHYSICAL EXAM:  (observations via phone)    Alert, no distress      ADDITIONAL HISTORY SUMMARIZED (2):   CARE EVERYWHERE/ EXTRA INFORMATION (1): Care everywhere accessed  RADIOLOGY TESTS (1):    LABS (1): Lab ordered, reviewed hemoglobin of 10.3 done earlier this month  CARDIOLOGY/MEDICINE TESTS   INDEPENDENT REVIEW (2 each):     Total data points:  2    The visit lasted a total of 20 minutes   CA intake time  3 minutes    Telephone Consent was completed by  staff and confirmed by SA    I have reviewed and updated the patient's Past Medical History, Social History, Family History as appropriate.    MEDICATIONS: Reviewed and updated per CA and MD  Current Outpatient Medications   Medication Sig Dispense Refill     acetaminophen (TYLENOL) 500 MG tablet Take 1,000 mg by mouth 3 (three) times a day as needed.       aspirin 81 MG EC tablet TAKE 1 TABLET ORALLY DAILY AT NOON. DISCONTINUE WHEN INR IS GREATER THAN OR EQAUL TO 2 30 tablet 0     atorvastatin (LIPITOR) 10 MG tablet Take 10 mg by mouth at bedtime.       azithromycin (ZITHROMAX) 250 MG tablet Take 500mg (2 tablets) day one, and then 250mg (1 tablet) days 2-5 6 tablet 0     B complex-vitamin C-folic acid (DIALYVITE) 100-1 mg Tab Take 1 tablet by mouth daily with lunch.              cholecalciferol, vitamin D3, (VITAMIN D3) 2,000 unit Tab Take 2,000 Units by mouth daily with lunch.              cinacalcet (SENSIPAR) 30 MG tablet Take 30 mg by mouth see administration instructions. Take three times weekly with dialysis (on Mondays, Wednesdays, and Fridays).             famotidine (PEPCID) 20 MG tablet Take 1 tablet (20 mg total) by mouth 2 (two) times a day as needed for heartburn. 180 tablet 2     fluticasone propionate (FLONASE) 50 mcg/actuation nasal spray One spray each nostril once a day 16 g 12     folic acid (FOLVITE) 1 MG tablet TAKE ONE TABLET BY MOUTH ONCE DAILY 90 tablet 4     gabapentin (NEURONTIN) 100 MG capsule TAKE 1 CAPSULE BY MOUTH THREE TIMES DAILY 270 capsule 3     Lactobacillus rhamnosus GG (CULTURELLE) 10-15 Billion cell capsule Take 1 capsule by mouth daily with lunch.       loratadine (CLARITIN) 10 mg tablet Take 10 mg by  "mouth daily.       midodrine HCl (MIDODRINE ORAL) Take 10 mg by mouth see administration instructions. Take 10 mg at the beginning of dialysis and 10 mg detention through dialysis three days weekly on dialysis days (Mondays, Wednesdays, and Fridays).             multivitamin (DAILY-OLGA) per tablet Take 1 tablet by mouth daily.       omeprazole (PRILOSEC) 20 MG capsule Take 20 mg by mouth 2 (two) times daily before lunch and supper.              polyvinyl alcohol (LIQUIFILM TEARS) 1.4 % ophthalmic solution Apply 1 drop to eye as needed for dry eyes.       pot bicarb-sod bicarb-cit ac (EDI-SELTZER GOLD) 344-1,050-1,000 mg TbEF Take 1 tablet by mouth every 4 (four) hours as needed.       senna-docusate (SENNOSIDES-DOCUSATE SODIUM) 8.6-50 mg tablet Take 1 tablet by mouth every evening.              sucroferric oxyhydroxide (VELPHORO) 500 mg Chew chewable tablet Chew 500 mg 3 (three) times a day with meals.       timolol maleate (TIMOPTIC) 0.5 % ophthalmic solution Administer 1 drop to both eyes 2 (two) times a day.        traZODone (DESYREL) 50 MG tablet TAKE 1&1/2 TABLETS (75MG) BY MOUTH AT BEDTIME. (Patient taking differently: 100 mg. ) 135 tablet 3     No current facility-administered medications for this visit.            The patient has been notified of following:     \"This telephone visit will be conducted via a call between you and your physician/provider. We have found that certain health care needs can be provided without the need for a physical exam.  This service lets us provide the care you need with a short phone conversation.  If a prescription is necessary we can send it directly to your pharmacy.  If lab work is needed we can place an order for that and you can then stop by our lab to have the test done at a later time.    If during the course of the call the physician/provider feels a telephone visit is not appropriate, you will not be charged for this service.\"    "

## 2021-06-08 NOTE — TELEPHONE ENCOUNTER
RN cannot approve Refill Request    RN can NOT refill this medication historical medication requested.       Leandra Hensley, [facility] Triage/Med Refill 5/12/2020    Requested Prescriptions   Pending Prescriptions Disp Refills     gabapentin (NEURONTIN) 100 MG capsule [Pharmacy Med Name: GABAPENTIN 100 MG CAPSULE] 270 capsule 0     Sig: TAKE 1 CAPSULE BY MOUTH THREE TIMES DAILY       Gabapentin/Levetiracetam/Tiagabine Refill Protocol  Passed - 5/10/2020  2:09 PM        Passed - PCP or prescribing provider visit in past 12 months or next 3 months     Last office visit with prescriber/PCP: 2/4/2020 John Escoto DO OR same dept: 2/10/2020 Kristin Sanz MD OR same specialty: 2/10/2020 Kristin Sanz MD  Last physical: Visit date not found Last MTM visit: Visit date not found   Next visit within 3 mo: Visit date not found  Next physical within 3 mo: Visit date not found  Prescriber OR PCP: John Escoto DO  Last diagnosis associated with med order: There are no diagnoses linked to this encounter.  If protocol passes may refill for 12 months if within 3 months of last provider visit (or a total of 15 months).

## 2021-06-08 NOTE — TELEPHONE ENCOUNTER
----- Message from John Escoto DO sent at 6/17/2020  2:51 PM CDT -----  Please call Itzel and let her know the thyroid test was normal

## 2021-06-08 NOTE — H&P
[facility] Radiology History and Physical Note  Date/Time: 2/16/2017/8:46 AM    Procedure Requested: Tunneled dialysis catheter exchange  Requesting Provider: Dr. MICHAELA Kinney    HPI: Belia Rodriguez is a 71 y.o. female with HD dependent renal failure, here for tunneled dialysis catheter exchange for report of poor flow with high pressures during HD. Pt dialyzes on M/W/F via right IJ tunneled dialysis catheter. Reports she dialyzed yesterday but doesn't feel it was a full run d/t problems with the catheter.   Pt reports mild SOB. Denies chest pain, fevers, chills.     NPO Status: Since MN   Anticoagulation/Antiplatelets/Bleeding tendencies: ASA 81 mg, Plavix 75 mg   Antibiotics: No home antibiotics. Ancef 2 g IV x 1 ordered for procedure    PAST MEDICAL HISTORY:   Past Medical History:   Diagnosis Date     Arthritis      Chronic anemia 6/1/2014     Chronic kidney disease      Chronic thoracic aortic dissection 10/7/2015    Descending thoracic aorta; treated medically per notes of Dragan Singh and Jennifer.     COPD (chronic obstructive pulmonary disease)      CVA (cerebral infarction)      Disease of thyroid gland      Dyslipidemia      ESRD (end stage renal disease) 06/03/2009    on dialysis with Dr. Mitchell     Essential hypertension 6/30/2014     GI (gastrointestinal bleed)      Gout      L3 vertebral fracture 11/16/2015     Obesity      ZULEIKA (obstructive sleep apnea), severe, intolerant of CPAP 10/22/2015     Pneumonia 9-7-2015     Right foot drop      Spinal stenosis 3/28/2016     Stroke 3/24/2016       PAST SURGICAL HISTORY:   Past Surgical History:   Procedure Laterality Date     BACK SURGERY      [facility]     COLONOSCOPY N/A 3/23/2016    Procedure: COLONOSCOPY;  Surgeon: Ruddy Tejada MD;  Location: [facility];  Service:      EYE SURGERY       HERNIA REPAIR       TONSILLECTOMY         ALLERGIES:  Ace inhibitors and Fosinopril sodium    MEDICATIONS:  Current Outpatient Prescriptions   Medication Sig  Dispense Refill     acetaminophen (TYLENOL) 500 MG tablet Take 1,000 mg by mouth every 6 (six) hours as needed for pain.       allopurinol (ZYLOPRIM) 100 MG tablet Take 100 mg by mouth daily with lunch.        amiodarone (PACERONE) 200 MG tablet Take 1 tablet (200 mg total) by mouth daily. 90 tablet 3     aspirin 81 MG EC tablet Take 1 tablet (81 mg total) by mouth daily.  0     atorvastatin (LIPITOR) 10 MG tablet Take 10 mg by mouth bedtime.        B complex-vitamin C-folic acid 100-1 mg Tab Take 1 tablet by mouth daily.       calcium acetate (PHOSLO) 667 mg capsule Take 667 mg by mouth 3 (three) times a day with meals.        cholecalciferol, vitamin D3, 2,000 unit cap Take 2,000 Units by mouth daily with lunch.        clopidogrel (PLAVIX) 75 mg tablet Take 1 tablet (75 mg total) by mouth daily. 30 tablet 0     diphenhydrAMINE (BENADRYL) 50 MG tablet Take 50 mg by mouth bedtime as needed for itching.       folic acid (FOLVITE) 1 MG tablet Take 1 mg by mouth daily with lunch.        gabapentin (NEURONTIN) 100 MG capsule Take 100 mg by mouth 3 (three) times a day.       Lactobacillus rhamnosus GG (CULTURELLE) 10-15 Billion cell capsule Take 1 capsule by mouth daily with lunch.       metoprolol tartrate (LOPRESSOR) 50 MG tablet Take 1 tablet (50 mg total) by mouth 2 (two) times a day. 60 tablet 0     midodrine (PROAMATINE) 5 MG tablet Take 1 tablet (5 mg total) by mouth 3 (three) times a week. 30 minutes prior to dialysis (Patient taking differently: Take 5 mg by mouth 3 (three) times a week. 30 minutes prior to dialysis, Mon,Wed and [place]) 30 tablet 0     omeprazole (PRILOSEC) 20 MG capsule Take 20 mg by mouth daily with lunch.        ranitidine (ZANTAC) 150 MG tablet Take 150 mg by mouth bedtime.        senna-docusate (SENNOSIDES-DOCUSATE SODIUM) 8.6-50 mg tablet Take 1 tablet by mouth bedtime.       timolol maleate (TIMOPTIC) 0.5 % ophthalmic solution Administer 1 drop to the right eye 2 (two) times a day.        "UNABLE TO FIND 1 tablet as needed. Med Name: CORICIDIN       LABS:  INR (no units)   Date Value   02/16/2017 1.03     HEMOGLOBIN (g/dL)   Date Value   02/16/2017 10.6 (L)     PLATELETS (thou/uL)   Date Value   02/16/2017 145     POTASSIUM (mmol/L)   Date Value   02/16/2017 5.2 (H)       EXAM:  Visit Vitals     Pulse 79     Temp 97.9  F (36.6  C) (Oral)     Resp 18     Ht 5' 6\" (1.676 m)     Wt 168 lb (76.2 kg)     SpO2 96%     BMI 27.12 kg/m2     General: Stable. In no acute distress.  Neuro: A&O x 3. Moves all extremities equally.  Resp: Lungs clear to auscultation bilaterally.  Cardio: S1S2 and irregular.   Abdomen: Soft, non-distended, non-tender, positive bowel sounds.  Skin: Without excoriations, ecchymosis, erythema, lesions or open sores over bilateral upper chest and neck.  MSK: No gross motor weakness. Sensation intact. Proprioception intact. Wearing LE compression stockings.     Pre-Sedation Assessment:  Mallampati Airway Classification: Class 2: upper half of tonsil fossa visible  Previous reaction to anesthesia/sedation: no  Sedation plan based on assessment: Moderate  Sleep Apnea: yes  Dentures: no  COPD: yes  ASA Classification: ASA 3 - Patient with moderate systemic disease with functional limitations  Comments: No hx of asthma     ASSESSMENT: 71 yr old female with HD dependent renal failure and report of poor flow and high pressures when dialyzing through right IJ tunneled dialysis catheter.     PLAN: Tunneled dialysis catheter exchange with sedation as needed.     The procedure, risks and moderate sedation were discussed with the patient, all questions answered and patient agrees to proceed with the procedure. Written consent obtained.    Ignacio Guadalupe, MARK, CNP  [facility]: Interventional Radiology   [phone]  "

## 2021-06-08 NOTE — TELEPHONE ENCOUNTER
Please send the TSH and T4 orders to [facility] in [place] (she has dialysis Mon/Wed/Fri and wanted to see if they can draw this test when she goes)

## 2021-06-09 NOTE — PROGRESS NOTES
[facility] Heart Care Note    Assessment:      Tachybradycardia syndrome    Pauses following termination of atrial fibrillation with element of sinus node dysfunction    Patient has been considered for pacemaker but risk of infection and access seen excess to benefit   Paroxysmal atrial fibrillation with elevated ventricular response.  Persistent AF with RVR; 3-2017  Amiodarone therapy 08/2014; she appears to be tolerating amiodarone quite well.   3. Preserved left ventricular function with suggestion of diastolic dysfunction.        Echocardiogram 3-2016; EF=60%  4. End-stage renal failure, on chronic hemodialysis. 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 Now has RIJ Port A Cath  5. History of recurrent bacteremia -- E Coli  6. History of extensive aortic dissection from the ascending to the   descending aorta leading to compromise of renal artery and leading to renal failure  COPD: Diffusion capacity 68% -pre amiodarone  Extensive ecchymosis with warfarin-anticoagulation; subsequently stopped  Sleep study shows likely obstructive sleep apnea; patient uses nocturnal oxygen supplements; unable to tolerate CPAP    ECG shows atrial fibrillation with elevated ventricular response-101 beats per minute  Home monitors have shown heart rate up to 130    Plan:    Start warfarin at 2.5 milligrams daily and have INR checked on a weekly basis. Patient is very reluctant to start anticoagulation since she had a hematoma of her right leg and swelling the last time she took warfarin; and lots of echymosis  Since she has end-stage renal failure, she is not a candidate for alternative anticoagulants  Because her heart rate is elevated, start increase metoprolol from 50 milligrams twice daily  To 100 mg twice daily. I'm concerned this may affect her blood  pressure and cause hypotension  Stop amiodarone  She may be a candidate for left atrial occlusion device-because of anticoagulation concerns  I don't think she is a very good candidate for atrial fibrillation ablation  In the future she may be a candidate for pace ablate-avn strategy. Considering her recurrent infections, she might be best suited for a lead less pacemaker if pacemaker strategy is chosen; Medtronic Micra-especially if unable to control VR without hypotension from anti-dromotrophic medications  Follow-up with Cy Powers in 2-3 weeks to further discuss atrial fib management and Watchman Left atrial occlusion device        Subjective:    I had the opportunity to see.Belia Rodriguez , who is a 71 y.o. female with a known history of paroxysmal atrial fibrillation  Her atrial fibrillation she has elevated ventricular response. Upon termination of atrial fibrillation long sinus pauses have been identified  She has no awareness of palpitations cannot feel atrial fibrillation but during dialysis and while on monitor they have noticed that she has been in a persistent atrial fibrillation-perhaps upper more than a month  She saw Cy Powers NP for consideration of atrial fibrillation. She was told she could not have an ablation unless she was on warfarin for some time and would require warfarin following the procedure should also require general anesthesia  She was on warfarin for about 4-6 weeks. She developed ecchymosis and a hematoma of her right leg and has been opposed to restarting warfarin for fear of bleeding  We again discussed the importance of anticoagulation. She wouldn't be a poor candidate for normal anticoagulants with her dialysis, end-stage renal failure and would be a candidate for warfarin  We could not do a cardioversion unless she would be willing to take warfarin  If we accept chronic atrial fibrillation, we could stop amiodarone and adjust medicines for ventricular rate  control    Belia denies chest pain, breathing is satisfactory and fluid level seen controlled without edema            Problem List:  Patient Active Problem List   Diagnosis     ESRD (end stage renal disease)     Chronic anemia     Paroxysmal atrial fibrillation     Tachycardia-bradycardia syndrome     Essential hypertension with goal blood pressure less than 140/90     Hyperlipidemia     Acute respiratory failure with hypoxia     Persistent atrial fibrillation     Bacteremia     ZULEIKA (obstructive sleep apnea), severe, intolerant of CPAP     Anemia of chronic renal failure, stage 5     Right knee pain     Dyspnea     Volume overload     Acute bronchitis due to infection     Acute bacterial conjunctivitis of both eyes     Acute CHF (congestive heart failure)     Acute on chronic diastolic CHF (congestive heart failure), NYHA class 3     Hypervolemia, unspecified hypervolemia type     Dissection of thoracoabdominal aorta     Medical History:  Past Medical History:   Diagnosis Date     Arthritis      Chronic anemia 6/1/2014     Chronic kidney disease      Chronic thoracic aortic dissection 10/7/2015    Descending thoracic aorta; treated medically per notes of Dragan Singh and Jennifer.     COPD (chronic obstructive pulmonary disease)      CVA (cerebral infarction)      Disease of thyroid gland      Dyslipidemia      ESRD (end stage renal disease) 06/03/2009    on dialysis with Dr. Mitchell     Essential hypertension 6/30/2014     GI (gastrointestinal bleed)      Gout      L3 vertebral fracture 11/16/2015     Obesity      ZULEIKA (obstructive sleep apnea), severe, intolerant of CPAP 10/22/2015     Pneumonia 9-7-2015     Right foot drop      Spinal stenosis 3/28/2016     Stroke 3/24/2016     Surgical History:  Past Surgical History:   Procedure Laterality Date     BACK SURGERY      [facility]     COLONOSCOPY N/A 3/23/2016    Procedure: COLONOSCOPY;  Surgeon: Ruddy Tejada MD;  Location: [facility];  Service:       EYE SURGERY       HERNIA REPAIR       TONSILLECTOMY       Social History:  Social History     Social History     Marital status:      Spouse name: Cayden     Number of children: 2     Years of education: N/A     Occupational History      Retired     Social History Main Topics     Smoking status: Former Smoker     Packs/day: 1.50     Years: 37.00     Types: Cigarettes     Quit date: 1/1/2009     Smokeless tobacco: Never Used     Alcohol use 7.0 oz/week     14 Standard drinks or equivalent per week      Comment: 14 mixed drinks per week     Drug use: No     Sexual activity: No     Other Topics Concern     Not on file     Social History Narrative    Lives with her . Daughter in [place] and daughter in [place].       Review of Systems:      General: WNL  Eyes: WNL  Ears/Nose/Throat: WNL  Lungs: WNL  Heart: WNL  Stomach: WNL  Bladder: WNL  Muscle/Joints: WNL  Skin: WNL  Nervous System: WNL  Mental Health: WNL     Blood: WNL        Family History:  Family History   Problem Relation Age of Onset     Dementia Mother      Diabetes Mother      Arthritis Mother      Cancer Mother      Depression Mother      Heart disease Mother      Vision loss Mother      Stroke Father      Heart disease Father          Allergies:  Allergies   Allergen Reactions     Ace Inhibitors Cough     Fosinopril Sodium Cough       Medications:  Current Outpatient Prescriptions   Medication Sig Dispense Refill     acetaminophen (TYLENOL) 500 MG tablet Take 1,000 mg by mouth every 6 (six) hours as needed for pain.       allopurinol (ZYLOPRIM) 100 MG tablet Take 100 mg by mouth daily with lunch.        amiodarone (PACERONE) 200 MG tablet Take 1 tablet (200 mg total) by mouth daily. 90 tablet 3     aspirin 81 MG EC tablet Take 1 tablet (81 mg total) by mouth daily.  0     atorvastatin (LIPITOR) 10 MG tablet Take 10 mg by mouth bedtime.        B complex-vitamin C-folic acid 100-1 mg Tab Take 1 tablet by mouth daily.       cholecalciferol,  "vitamin D3, 2,000 unit cap Take 2,000 Units by mouth daily with lunch.        clopidogrel (PLAVIX) 75 mg tablet Take 1 tablet (75 mg total) by mouth daily. 30 tablet 0     diphenhydrAMINE (BENADRYL) 50 MG tablet Take 50 mg by mouth bedtime as needed for itching.       folic acid (FOLVITE) 1 MG tablet Take 1 mg by mouth daily with lunch.        gabapentin (NEURONTIN) 100 MG capsule Take 100 mg by mouth 3 (three) times a day.       Lactobacillus rhamnosus GG (CULTURELLE) 10-15 Billion cell capsule Take 1 capsule by mouth daily with lunch.       metoprolol tartrate (LOPRESSOR) 50 MG tablet Take 1 tablet (50 mg total) by mouth 2 (two) times a day. 60 tablet 0     midodrine (PROAMATINE) 5 MG tablet Take 1 tablet (5 mg total) by mouth 3 (three) times a week. 30 minutes prior to dialysis (Patient taking differently: Take 5 mg by mouth 3 (three) times a week. 30 minutes prior to dialysis, Mon,Wed and [place]) 30 tablet 0     omeprazole (PRILOSEC) 20 MG capsule Take 20 mg by mouth daily with lunch.        ranitidine (ZANTAC) 150 MG tablet Take 150 mg by mouth bedtime.        senna-docusate (SENNOSIDES-DOCUSATE SODIUM) 8.6-50 mg tablet Take 1 tablet by mouth bedtime.       timolol maleate (TIMOPTIC) 0.5 % ophthalmic solution Administer 1 drop to the right eye 2 (two) times a day.       UNABLE TO FIND 1 tablet as needed. Med Name: CORICIDIN       calcium acetate (PHOSLO) 667 mg capsule Take 667 mg by mouth 3 (three) times a day with meals.        No current facility-administered medications for this visit.        Objective:   Vital signs:  /72 (Patient Site: Right Arm, Patient Position: Sitting, Cuff Size: Adult Regular)  Pulse 92 Comment: irregular  Resp 20  Ht 5' 6\" (1.676 m)  Wt 192 lb 9.6 oz (87.4 kg)  BMI 31.09 kg/m2      Physical Exam:  Heart rate about 100 and irregular, consistent with atrial fibrillation  Bandage site to the right subclavian region at Port-A-Cath site: Patient has no AV fistulas  For HD " access    GENERAL APPEARANCE: Alert, cooperative and in no acute distress.  HEENT: No scleral icterus. No Xanthelasma. Oral mucous membranes pink and moist.  NECK: JVP . No Hepatojugular reflux. Thyroid not  Palpable  CHEST: clear to auscultation and percussion  CARDIOVASCULAR: S1, S2 without murmur    Brachial, radial  pulses are intact and symmetric.   No carotid bruits noted.  ABDOMEN: Non tender. BS+. No bruits.  EXTREMITIES: No cyanosis, clubbing or edema.    Lab Results:  LIPIDS:  Lab Results   Component Value Date    CHOL 102 03/25/2016     Lab Results   Component Value Date    HDL 43 (L) 03/25/2016     Lab Results   Component Value Date    LDLCALC 47 03/25/2016     Lab Results   Component Value Date    TRIG 60 03/25/2016     No components found for: CHOLHDL    BMP:  Lab Results   Component Value Date    CREATININE 5.74 (H) 10/12/2016    BUN 65 (H) 10/12/2016     10/12/2016    K 5.2 (H) 02/16/2017     10/12/2016    CO2 25 10/12/2016         This note has been dictated using voice recognition software. Any grammatical or context distortions are unintentional and inherent to the software.  Elie Rodriguez MD  [facility]  [phone]

## 2021-06-09 NOTE — PROGRESS NOTES
Assessment:     1. Acute on chronic diastolic CHF (congestive heart failure), NYHA class 3       Belia is here Pre-Cardioversion? No  Past Medical History:   Diagnosis Date     Arthritis      Chronic anemia 6/1/2014     Chronic kidney disease      Chronic thoracic aortic dissection 10/7/2015    Descending thoracic aorta; treated medically per notes of Dragan Singh and Jennifer.     COPD (chronic obstructive pulmonary disease)      CVA (cerebral infarction)      Disease of thyroid gland      Dyslipidemia      ESRD (end stage renal disease) 06/03/2009    on dialysis with Dr. Mitchell     Essential hypertension 6/30/2014     GI (gastrointestinal bleed)      Gout      L3 vertebral fracture 11/16/2015     Obesity      ZULEIKA (obstructive sleep apnea), severe, intolerant of CPAP 10/22/2015     Pneumonia 9-7-2015     Right foot drop      Spinal stenosis 3/28/2016     Stroke 3/24/2016       Plan:     Written dosage and follow-up instructions were provided in the After Visit Summary    Anticoagulation Episode Summary     Current INR goal 2.0-3.0   Next INR check 4/12/2017   INR from last check No new INR was available at last check   Weekly max dose    Target end date    INR check location    Preferred lab    Send INR reminders to  HEART CARE CLINIC SUPPORT POOL    Indications   Acute on chronic diastolic CHF (congestive heart failure)  NYHA class 3 [I50.33]           Comments          Anticoagulation Care Providers     Provider Role Specialty Phone number    Elie Rodriguez MD  Cardiology [phone]          Discussed the following:  Education provided in regards to  Diet, Bleeding signs and symptoms, Medication changes and Travel safety, Importance of consistent vitamin K intake , High Vitamin K foods discussed in detail , Low Vitamin K foods discussed in detail , Importance of therapeutic range , Potential interaction between warfarin and alcohol discussed in detail , Importance of taking medication as instructed ,  Allowing no longer than 4-6  weeks between INR monitoring, New patient education packet reviewed in  and My Guide to Warfarin Therapy   Belia displays a  good warfarin knowledge base. There were   no barriers to education     Leandra West        Subjective/Objective:      ANTICOAGULATION MANAGEMENT    Belia YUNIOR Rodriguez is  a new warfarin patient    Giovanni Lindsey MD

## 2021-06-10 NOTE — PROGRESS NOTES
"[facility]    Facility:   [facility] [[id number]]   Code Status: DNR/DNI  PCP: John Escoto DO   Phone: [phone]   Fax: [phone]      CHIEF COMPLAINT/REASON FOR VISIT:  Chief Complaint   Patient presents with     Discharge Summary       HISTORY COURSE:    Belia Rodriguez  is a 74 year old female with history of end-stage renal disease on CCR T, tachybradycardia syndrome/AV jonn ablation/permanent pacemaker, atrial fibrillation status post watchman device, abdominal aortic aneurysm dissection, hypertension, COPD, chronic hypoxic respiratory failure, ZULEIKA, gout, GERD, glaucoma on timolol, depression seen for admission to TCU on 8/4/2020     Hospital Course: Patient was hospitalized between July 13 and July 20 presenting with edema and shortness of breath despite not missing her scheduled outpatient dialysis days.  She was diagnosed with acute on chronic diastolic heart failure and was diuresed 11 L by nephrology/dialysis on successive days.     Her dialysis is complicated by severe hypotension despite midodrine therapy.  It hypotension resulted in discontinuation of her metoprolol.  Her in dialysis weight target is 70.5 kg.     Due to the severity of her heart failure/hypertension with complicated dialysis, comfort care/hospice approach has been discussed with patient and she is considering it though she remained full code up until to my visit here with her today, she is now DNR/DNI per her request.     Patient did have an echocardiogram this hospitalization which showed no LV dysfunction and no valvular dysfunction.  She had normal thyroid function testing.  She had \"equivocal cortisol\" but in the end adrenal insufficiency was not suspected as a cause of her hypotension.         End-stage renal disease  CC RT  Severe hypotension              Dialysis related hypotension is of limiting factor.  She had to stop her dialysis run a little bit early yesterday.  " She is already on Midrin.  We discussed Florinef but we will leave that up to her nephrologist.  She plans to continue 3 times weekly dialysis.  -Patient has declined hospice involvement at this time  -Her metoprolol has been discontinued due to the severity of the hypotension     -I asked the TCU staff to call her ophthalmology clinic to see if her ophthalmologist would be okay with going off her timolol eyedrops or substituting an alternative.  However she remains on them at the time of discharge.  -Sevelamer  -Cinacalcet per nephrology     Acute diastolic heart failure              Patient is dialyzed with attention to fluid. Her weight is been stable here at 155.  -Ongoing fluid management with nephrology/dialysis  Estimated dry weight 70.5 kg  -Continue low-salt diet      Glaucoma              See timolol discussion above.     A. Fib  Watchman device  Permanent pacemaker (AV jonn ablation/tachybradycardia syndrome)              Watchman device in place.  Has permanent pacemaker.  Not on any rate controlling agent.  Pulses look okay.     Chronic hypoxic respiratory failure  COPD  Heart failure              Patient just had a PE run in the hospital:Study Result  EXAM: CTA CHEST PE RUN  LOCATION: [facility]  DATE/TIME: 7/18/2020 4:50 PM    INDICATION: Positive d-dimer, hypoxia.  COMPARISON: CT chest exam 07/13/2020 and CT chest exam 09/21/2018  TECHNIQUE: CT chest pulmonary angiogram during arterial phase injection of IV contrast. Multiplanar reformats and MIP reconstructions were performed. Dose reduction techniques were used.   CONTRAST: Iohexol (Omni) 75mL    FINDINGS:  ANGIOGRAM CHEST: Pulmonary arteries are normal caliber and negative for pulmonary emboli. Thoracic aorta is negative for dissection. No CT evidence of right heart strain.    LUNGS AND PLEURA: Small bilateral pleural effusions and bibasilar atelectasis or infiltrates have developed since the previous exam. Interstitial septal  thickening persists suggesting edema.    MEDIASTINUM/AXILLAE: Mild cardiac enlargement. Low-attenuation lobulated mass in the lower left mediastinum adjacent to the spine measures 4.2 x 2.6 cm on image 102 of series 4, unchanged. Scattered subcentimeter lymph nodes elsewhere in the mediastinum  Atrial appendage occlusion device. Atherosclerotic disease thoracic aorta. Pacemaker anterior left chest wall with lead tips right atrium and ventricle. Right IJ dialysis catheter extends into the right atrium.    UPPER ABDOMEN: 4.3 x 4.0 cm aneurysm of upper abdomen is unchanged. Extensive plaque within the superior mesenteric artery. Bilateral renal cortical atrophy and multiple mixed high and low attenuation cysts, unchanged. Diverticulosis throughout the   visualized colon.    MUSCULOSKELETAL: Degenerative changes thoracic spine.    IMPRESSION:   1. No evidence for pulmonary emboli.  2. Mild cardiac enlargement with new, small bilateral pleural effusions and subsegmental atelectasis both lower lobes. There is also septal thickening both lungs suggesting edema and heart failure.   3. Low-attenuation mass within the left posterior inferior mediastinum is unchanged since 09/21/2018 and presumed benign.  4. Advanced atherosclerotic disease with stable 4.3 x 4.0 cm upper abdominal aortic aneurysm.  5. Bilateral renal cortical atrophy and mixed attenuation cysts.  6. Colonic diverticulosis.        Patient had a one-time incidence of shortness of breath with physical therapy but that was early in the stay and did quite well thereafter.  No additional work-up was performed. However this was a one-time incident and I am not sure of the significance.  Her weight is stable.  She is not showing any symptoms of respiratory infection.    -Continue supplemental oxygen  -Bronchodilators as need be     Depression              Wellbutrin plus trazodone as current     Peripheral neuropathy              Continue gabapentin at renal dosing.    " Lidocaine.  Acetaminophen.     CODE STATUS              See discussion above.  Patient just met with hospice consultants yesterday.  She has not signed onto the program, at least not yet.  She says she has discussed it with her spouse food tells her to \"do what I want\".  Patient changes to DNR/DNI.  She will continue discussions regarding if and or when hospice is appropriate but plans to continue dialysis at this time.     GERD              Pepcid renally dosed 10 mg daily as needed     Chronic cough  Rhinorrhea/postnasal drip, chronic              After several iterations of treatment, we went with patient's long-term home treatment which she was using prior to admission.  -She prefers guaifenesin 100 mg per 5 cc which she has used at home.  She will take 5 cc every 4 hours as needed  - Coricidin, and her usual over-the-counter preparation, combination of chlorpheniramine and dextromethorphan is ordered 1 4 times daily as needed  -Patient strongly wishes to avoid any inhaled nasal preparation/medications such as ipratropium or fluticasone/equivalents     Peripheral vascular disease              Status post rupture of AAA.  No longer on metoprolol due to hypertensive issues.  No longer on statins.     Other chronic problems: Include but not limited to ZULEIKA, gout, diverticulosis, hip/femur fractures, back surgery, constipation    Subjective/review of systems    Patient is anxious about going home.  She is also anxious about how long she has to live as she feels she has to teach her  so much about running the household.  I remind her to focus on her own health.  Patient is frustrated about her dialysis hypotension and continues to work with her nephrologist on that issue with her midodrine.    She denies headaches change in vision speaking swallowing chest pain nausea vomiting diarrhea falls injuries fever sweats chills remainder negative      Current Vitals   BP: 103/72 mmHg  8/18/2020 16:07    Temp:97.5 "  F  8/18/2020 22:21  Pulse: 74 bpm  8/17/2020 16:45    Weight: 154.7 Lbs  8/18/2020 11:20  Resp: 18 Breaths/min  8/16/2020 09:38  BS:  O2: 100 %  8/18/2020 22:22  Pain: 1  8/18/2020 22:01  Physical Exam  Patient is alert oriented pleasant sitting up in her chair finishing lunch.  She is with fluent speech answers all questions appropriately she is breathing comfort without accessory muscle use or tachypnea.  She appears nonedematous skin appears clear in visualized areas.      MEDICATION LIST:  Current Outpatient Medications   Medication Sig     acetaminophen (TYLENOL) 500 MG tablet Take 1,000 mg by mouth 3 (three) times a day as needed.     artificial tears,hypromellose, (GENTEAL; SYSTANE) 0.3 % Gel Administer 1 drop to both eyes as needed.     aspirin 81 MG EC tablet Take 1 tablet (81 mg total) by mouth daily.     benzocaine-menthoL (CEPACOL) 15-3.6 mg Take 1 lozenge by mouth every hour as needed.     buPROPion (WELLBUTRIN XL) 150 MG 24 hr tablet Take 1 tablet (150 mg total) by mouth 2 (two) times a week. Tuesday and saturday     chlorpheniramine/dextromethorp (CORICIDIN HBP COUGH AND COLD ORAL) Take by mouth. Take 1 tablet 4 times daily as needed nasal drainage     cholecalciferol, vitamin D3, 1,000 unit (25 mcg) tablet Take 2,000 Units by mouth daily.     cinacalcet (SENSIPAR) 30 MG tablet Take 30 mg by mouth see administration instructions. Take three times weekly with dialysis (on Mondays, Wednesdays, and Fridays).           famotidine (FOR PEPCID) 10 MG tablet Take 10 mg by mouth daily as needed for heartburn.     folic acid (FOLVITE) 1 MG tablet TAKE ONE TABLET BY MOUTH ONCE DAILY     gabapentin (NEURONTIN) 100 MG capsule TAKE 1 CAPSULE BY MOUTH THREE TIMES DAILY     Lactobacillus rhamnosus GG (CULTURELLE) 10-15 Billion cell capsule Take 1 capsule by mouth daily with lunch.     lidocaine (LMX) 4 % cream Apply topically 3 (three) times a day.     loratadine (CLARITIN) 10 mg tablet Take 10 mg by mouth daily as  needed.      midodrine (PROAMATINE) 10 MG tablet Take 1.5 tablets (15 mg total) by mouth 3 (three) times a day with meals. (Patient taking differently: Take 15 mg by mouth 3 (three) times a day with meals. Hold for SBP>105  Patient reports that she usually gets 10 mg before dialysis and another 10 mg shelter through dialysis as well)     multivitamin (DAILY-OLGA) per tablet Take 1 tablet by mouth daily.     senna-docusate (SENNOSIDES-DOCUSATE SODIUM) 8.6-50 mg tablet Take 1 tablet by mouth daily as needed for constipation.      sevelamer carbonate (RENVELA) 800 mg tablet Take 1 tablet (800 mg total) by mouth 2 (two) times a day as needed (FOr snacks.).     sevelamer HCL (RENAGEL) 800 MG tablet Take 2,400 mg by mouth 3 (three) times a day with meals. And take 2 tablets with snacks     timolol maleate (TIMOPTIC) 0.5 % ophthalmic solution Administer 1 drop to both eyes 2 (two) times a day.      traZODone (DESYREL) 50 MG tablet TAKE 1&1/2 TABLETS (75MG) BY MOUTH AT BEDTIME. (Patient taking differently: Take 150 mg by mouth at bedtime. )     UNABLE TO FIND Med Name: guaifenesin 100/5cc  Take 5cc q 4 prn       DISCHARGE DIAGNOSIS:    ICD-10-CM    1. ESRD on dialysis (H)  N18.6     Z99.2        MEDICAL EQUIPMENT NEEDS:      DISCHARGE PLAN/FACE TO FACE:  I certify that services are/were furnished while this patient was under the care of a physician and that a physician or an allowed non-physician practitioner (NPP), had a face-to-face encounter that meets the physician face-to-face encounter requirements. The encounter was in whole, or in part, related to the primary reason for home health. The patient is confined to his/her home and needs intermittent skilled nursing, physical therapy, speech-language pathology, or the continued need for occupational therapy. A plan of care has been established by a physician and is periodically reviewed by a physician.  Date of Face-to-Face Encounter: 8/18/2020      I certify that, based  on my findings, the following services are medically necessary home health services: PT, OT, RN, home health aide    My clinical findings support the need for the above skilled services because: End-stage renal disease with severe hypotension and inability to leave home without assistance    This patient is homebound because: Inability leave home without assistance of another person and assistive mobility device.    The patient is, or has been, under my care and I have initiated the establishment of the plan of care. This patient will be followed by a physician who will periodically review the plan of care.    Schedule follow up visit with primary care provider within 7 days to reestablish care.    Electronically signed by: Derick San MD

## 2021-06-10 NOTE — PROGRESS NOTES
[facility] Patient Outreach:     Discharge Date::  8/21/20      Reason for TCU stay (discharge diagnosis)::  Acute on chronic CHF, ESRD, hypotension      Are you feeling better, the same or worse since your discharge?:  Patient is feeling better          As part of your discharge plan, did they discuss home care with you?: Yes        Have your seen them yet, or are they scheduled to visit?: No        Did you receive any new medications, or was there a change to your medications?: No (not seen home care yet.  )            Do you have any follow up visits scheduled with your PCP or Specialist?:  No          I'm glad to hear you're doing well and we want you to continue to do well. Your PCP would like to see you for a follow-up visit. Can we help set that up for your today?: No        (RN) Provided patient the PCP's phone number to call if they have any questions or concerns?: No (Patient's  knows the number and will contact patient's new PCP.  )

## 2021-06-10 NOTE — TELEPHONE ENCOUNTER
[facility] Nurse Triage Telephone Note      Provider: MARIO Young  Facility: [facility]     Facility Type: LTC    Caller: Ellie  Call Back Number:  [phone]    Allergies: Ace inhibitors; Atrovent [ipratropium bromide]; Fosinopril sodium; and Zolpidem    Reason for call: On Midodrine 15mg three times a day with meals to increase her BP.   Her BP now prior to supper is 155/78, re-check manually 121/84.    Verbal Order/Direction given by Provider: Add paramater to hold if SBP >105.    Provider giving order: Derick San MD     Verbal order given to: Ellie Barba RN

## 2021-06-10 NOTE — PROGRESS NOTES
[facility]    Facility:   [facility] [[id number]]   Code Status: FULL CODE      CHIEF COMPLAINT/REASON FOR VISIT:  Chief Complaint   Patient presents with     Problem Visit     cough       HISTORY:      HPI: Belia is a 75 y.o. female who was admitted to [facility] from 7/13/20-7/20/20 for acute on chronic CHF that was managed with daily HD runs.  She is on HD for ESRD with MWF schedule.  It was recommended that she consider hospice services during her hospitalization with progression of CHF, ESRD, and COPD.  Itzel  has a past medical history of Arthritis, CHF (congestive heart failure) (H), Chronic anemia (6/1/2014), Chronic kidney disease, Chronic thoracic aortic dissection (H) (10/7/2015), COPD (chronic obstructive pulmonary disease) (H), CVA (cerebral infarction), Disease of thyroid gland, Dyslipidemia, ESRD (end stage renal disease) (H) (06/03/2009), Essential hypertension (6/30/2014), Gastrointestinal hemorrhage, unspecified gastrointestinal hemorrhage type (6/5/2017), GI (gastrointestinal bleed), GI bleeding (6/5/2017), Gout, L3 vertebral fracture (H) (11/16/2015), Left Atrial Appendage Occlusion (WATCHMAN) (4/5/2018), Obesity, ZULEIKA (obstructive sleep apnea), severe, intolerant of CPAP (10/22/2015), Oxygen dependent, Pneumonia (9-7-2015), Right foot drop, Spinal stenosis (3/28/2016), and Stroke (H) (3/24/2016).   The patient is currently at [facility] s/p hospitalization for acute rehabilitation.      Today Ms. Rodriguez is being evaluated for cough.  Itzel reported that her cough has resolved with Coricidin PRN at bedtime for congested cough.  Itzel said that her current plan is that she would like to discharge home in a couple weeks with home therapy services.  She said that she no longer wants to pursue hospice services because she does not feel ready to stop HD at this time.  She said that her  has been having a hard time with accepting her  decline and she does not feel that he would be open to having hospice come into their home for services.  She asked if someone would tell her when it is the time to sign up for hospice services.  We discussed that her frequent hospitalizations and continued decline with her ESRD, CHF, and COPD that she would be appropriate for hospice services at this time if that is something that she is interested in.  She said that she would like to spend more time with her family before she feels ready to start hospice services at this time.  She also asked for further information about payment for long-term care and was referred to  for further information.  She denied pain at this visit and feels that gabapentin and lidocaine ointment have been effective for management of her neuropathic pain that is worse at bedtime.  The patient denied changes in mood or appetite, sleep disturbances, fever, chills, diaphoresis, lightheadedness, dizziness, breathing difficulty, chest pain, palpitations, constipation, urinary symptoms, numbness or tingling in extremities, and pain.  Nursing staff denied any new concerns for the patient at this visit.    Past Medical History:   Diagnosis Date     Arthritis      CHF (congestive heart failure) (H)      Chronic anemia 6/1/2014     Chronic kidney disease      Chronic thoracic aortic dissection (H) 10/7/2015    Descending thoracic aorta; treated medically per notes of Dragan Singh and Jennifer.     COPD (chronic obstructive pulmonary disease) (H)      CVA (cerebral infarction)      Disease of thyroid gland      Dyslipidemia      ESRD (end stage renal disease) (H) 06/03/2009    on dialysis with Dr. Mitchell     Essential hypertension 6/30/2014     Gastrointestinal hemorrhage, unspecified gastrointestinal hemorrhage type 6/5/2017     GI (gastrointestinal bleed)      GI bleeding 6/5/2017     Gout      L3 vertebral fracture (H) 11/16/2015     Left Atrial Appendage Occlusion (WATCHMAN) 4/5/2018    LAAO  2018 (30 mm WATCHMAN)     Obesity      ZULEIKA (obstructive sleep apnea), severe, intolerant of CPAP 10/22/2015     Oxygen dependent     3L nc     Pneumonia 2015     Right foot drop      Spinal stenosis 3/28/2016     Stroke (H) 3/24/2016             Family History   Problem Relation Age of Onset     Dementia Mother      Diabetes Mother      Arthritis Mother      Cancer Mother      Depression Mother      Heart disease Mother      Vision loss Mother      Stroke Father      Heart disease Father      Breast cancer Neg Hx      Social History     Socioeconomic History     Marital status:      Spouse name: Cayden     Number of children: 2     Years of education: Not on file     Highest education level: Not on file   Occupational History     Employer: RETIRED   Social Needs     Financial resource strain: Not on file     Food insecurity     Worry: Not on file     Inability: Not on file     Transportation needs     Medical: Not on file     Non-medical: Not on file   Tobacco Use     Smoking status: Former Smoker     Packs/day: 1.50     Years: 37.00     Pack years: 55.50     Types: Cigarettes     Last attempt to quit: 2009     Years since quittin.6     Smokeless tobacco: Never Used   Substance and Sexual Activity     Alcohol use: No     Alcohol/week: 11.7 standard drinks     Types: 14 Standard drinks or equivalent per week     Comment: 14 mixed drinks per week     Drug use: No     Sexual activity: Never     Partners: Male   Lifestyle     Physical activity     Days per week: Not on file     Minutes per session: Not on file     Stress: Not on file   Relationships     Social connections     Talks on phone: Not on file     Gets together: Not on file     Attends Yarsanism service: Not on file     Active member of club or organization: Not on file     Attends meetings of clubs or organizations: Not on file     Relationship status: Not on file     Intimate partner violence     Fear of current or ex partner:  Not on file     Emotionally abused: Not on file     Physically abused: Not on file     Forced sexual activity: Not on file   Other Topics Concern     Not on file   Social History Narrative    Lives with her . Daughter in [place] and daughter in [place].       REVIEW OF SYSTEM:   Pertinent items are noted in HPI.  A 12 point comprehensive review of systems was negative except as noted.    PHYSICAL EXAM:     General Appearance:    Alert, cooperative, no distress, appears stated age   Head:    Normocephalic, without obvious abnormality, atraumatic   Eyes:    PERRL, conjunctiva/corneas clear, both eyes; wears glasses   Ears:    Normal external ear canals, both ears   Nose:   Nares normal, septum midline, mucosa normal, moderate clear nasal drainage or sinus tenderness   Throat:   Lips, mucosa, and tongue normal; teeth and gums normal   Neck:   Supple, symmetrical, trachea midline, no adenopathy;     thyroid:  no enlargement/tenderness/nodules; no carotid    bruit or JVD   Back:     Symmetric, no curvature, ROM normal, no CVA tenderness   Lungs:     Clear to auscultation bilaterally, respirations unlabored; on room air   Chest Wall:    No tenderness or deformity    Heart:    Regular rate and rhythm, S1 and S2 normal, no murmur, rub   or gallop   Abdomen:     Soft, non-tender, bowel sounds active all four quadrants,     no masses, no organomegaly   Extremities:   Extremities normal, atraumatic, no cyanosis or edema   Pulses:   2+ and symmetric all extremities   Skin:   Skin color, texture, turgor normal, no rashes or lesions   Neurologic:   Oriented to person, place, time, and situation; exhibits logical thought processes; generalized weakness; sitting in wheelchair during this visit         LABS:   Lab Results   Component Value Date    WBC 3.8 (L) 07/23/2020    HGB 10.5 (L) 07/23/2020    HCT 36.8 07/23/2020     (L) 07/23/2020    CHOL 116 11/19/2019    TRIG 122 11/19/2019    HDL 57 11/19/2019    ALT 14  07/13/2020    ALT 14 07/13/2020    AST 25 07/13/2020    AST 25 07/13/2020     (L) 07/23/2020    K 4.2 07/23/2020     07/23/2020    CREATININE 3.89 (H) 07/23/2020    BUN 17 07/23/2020    CO2 21 (L) 07/23/2020    TSH 3.89 07/18/2020    INR 1.10 07/17/2020    HGBA1C 5.7 06/01/2014        ASSESSMENT:      ICD-10-CM    1. Physical deconditioning  R53.81    2. Cough  R05    3. Neuropathic pain  M79.2        PLAN:      Continue PT/OT as recommended for physical deconditioning with plan for discharge home in 1-2 weeks with home health services.  Continue HD on MWF per nephrology recommendations for ESRD.  Continue to monitor weight daily for acute on chronic CHF.  Continue Coricidin PRN for chronic cough.  Continue lidocaine 4% ointment two times a day for neuropathic pain.  Continue gabapentin with bedtime dosing 300 mg daily at  for neuropathic pain.  SW to work with patient on LTC options per patient request.  Otherwise continue current care plan for all other chronic medical conditions, as they are stable. Encouraged patient to engage in healthy lifestyle behaviors such as engaging in social activities, exercising (PT/OT), eating well, and following care plan. Follow up for routine check-up, or sooner if needed. Will continue to monitor patient and work with nursing staff collaboratively to work toward positive patient outcomes.     Electronically signed by: Angie Gamez CNP     Total floor/unit time was 60 minutes and 40 minutes was spent in counseling patient on end-of-life planning, pain management, cough management, and discharge planning and coordination of care with nursing staff and SW.

## 2021-06-10 NOTE — PROGRESS NOTES
[facility]    Facility:   [facility] [[id number]]   Code Status: FULL CODE      CHIEF COMPLAINT/REASON FOR VISIT:  Chief Complaint   Patient presents with     Problem Visit     CHF exacerbation       HISTORY:      HPI: Belia is a 74 y.o. female who was admitted to [facility] from 7/13/20-7/20/20 for acute on chronic CHF that was managed with daily HD runs.  She is on HD for ESRD with MWF schedule.  It was recommended that she consider hospice services during her hospitalization with progression of CHF, ESRD, and COPD.  Itzel  has a past medical history of Arthritis, CHF (congestive heart failure) (H), Chronic anemia (6/1/2014), Chronic kidney disease, Chronic thoracic aortic dissection (H) (10/7/2015), COPD (chronic obstructive pulmonary disease) (H), CVA (cerebral infarction), Disease of thyroid gland, Dyslipidemia, ESRD (end stage renal disease) (H) (06/03/2009), Essential hypertension (6/30/2014), Gastrointestinal hemorrhage, unspecified gastrointestinal hemorrhage type (6/5/2017), GI (gastrointestinal bleed), GI bleeding (6/5/2017), Gout, L3 vertebral fracture (H) (11/16/2015), Left Atrial Appendage Occlusion (WATCHMAN) (4/5/2018), Obesity, ZULEIKA (obstructive sleep apnea), severe, intolerant of CPAP (10/22/2015), Oxygen dependent, Pneumonia (9-7-2015), Right foot drop, Spinal stenosis (3/28/2016), and Stroke (H) (3/24/2016).   The patient is currently at [facility] s/p hospitalization for acute rehabilitation.      Today Ms. Rodriguez is being evaluated for acute on chronic CHF.  Her blood pressure has been running low since admission to TCU with SBP 80-90s which is at the patient's baseline.  She is on midodrine PRN for hypotension per nephrology recommendation.  She feels that her breathing has improved with decreased dyspnea at rest and audible wheezing resolved.  She is currently on 2 LPM oxygen via nasal cannula which is her baseline per patient  report.  Her main concern at this visit is that she has been having increased neuropathic pain at bedtime and would like something to help take the edge off so that she can sleep better.  She was agreeable to use capsaicin ointment as pain adjunct with recommendation to order gabapentin for bedtime dosing per renal guidelines from facility pharmacist.  She denied pain at this visit.  We discussed hospice recommendations per patient request and she said that she feels ready to consult hospice at this time but she is very worried about how her  will handle her going on hospice services.  We discussed her prognosis as days to hours if she were to discontinue hemodialysis with her CHF, COPD, and ESRD.  She had questions about the hospice process and if she could remain at her current facility for treatment that were answered.  She feels that she has good support from her two children but they do not live nearby and she does not want to burden them.  Plan for consult to hospice services for informational meeting next week per patient request.  The patient denied sleep disturbances, changes in mood or appetite, lightheadedness, dizziness, breathing difficulty, chest pain, palpitations, constipation, and urinary symptoms.      Past Medical History:   Diagnosis Date     Arthritis      CHF (congestive heart failure) (H)      Chronic anemia 6/1/2014     Chronic kidney disease      Chronic thoracic aortic dissection (H) 10/7/2015    Descending thoracic aorta; treated medically per notes of Dragan Singh and Jennifer.     COPD (chronic obstructive pulmonary disease) (H)      CVA (cerebral infarction)      Disease of thyroid gland      Dyslipidemia      ESRD (end stage renal disease) (H) 06/03/2009    on dialysis with Dr. Mitchell     Essential hypertension 6/30/2014     Gastrointestinal hemorrhage, unspecified gastrointestinal hemorrhage type 6/5/2017     GI (gastrointestinal bleed)      GI bleeding 6/5/2017     Gout       L3 vertebral fracture (H) 2015     Left Atrial Appendage Occlusion (WATCHMAN) 2018    LAAO 2018 (30 mm WATCHMAN)     Obesity      ZULEIKA (obstructive sleep apnea), severe, intolerant of CPAP 10/22/2015     Oxygen dependent     3L nc     Pneumonia 2015     Right foot drop      Spinal stenosis 3/28/2016     Stroke (H) 3/24/2016             Family History   Problem Relation Age of Onset     Dementia Mother      Diabetes Mother      Arthritis Mother      Cancer Mother      Depression Mother      Heart disease Mother      Vision loss Mother      Stroke Father      Heart disease Father      Breast cancer Neg Hx      Social History     Socioeconomic History     Marital status:      Spouse name: Cayden     Number of children: 2     Years of education: Not on file     Highest education level: Not on file   Occupational History     Employer: RETIRED   Social Needs     Financial resource strain: Not on file     Food insecurity     Worry: Not on file     Inability: Not on file     Transportation needs     Medical: Not on file     Non-medical: Not on file   Tobacco Use     Smoking status: Former Smoker     Packs/day: 1.50     Years: 37.00     Pack years: 55.50     Types: Cigarettes     Last attempt to quit: 2009     Years since quittin.6     Smokeless tobacco: Never Used   Substance and Sexual Activity     Alcohol use: No     Alcohol/week: 11.7 standard drinks     Types: 14 Standard drinks or equivalent per week     Comment: 14 mixed drinks per week     Drug use: No     Sexual activity: Never     Partners: Male   Lifestyle     Physical activity     Days per week: Not on file     Minutes per session: Not on file     Stress: Not on file   Relationships     Social connections     Talks on phone: Not on file     Gets together: Not on file     Attends [employer] service: Not on file     Active member of club or organization: Not on file     Attends meetings of clubs or organizations: Not on file      Relationship status: Not on file     Intimate partner violence     Fear of current or ex partner: Not on file     Emotionally abused: Not on file     Physically abused: Not on file     Forced sexual activity: Not on file   Other Topics Concern     Not on file   Social History Narrative    Lives with her . Daughter in [place] and daughter in [place].       REVIEW OF SYSTEM:   Pertinent items are noted in HPI.  A 12 point comprehensive review of systems was negative except as noted.    PHYSICAL EXAM:     General Appearance:    Alert, cooperative, no distress, appears stated age   Head:    Normocephalic, without obvious abnormality, atraumatic   Eyes:    PERRL, conjunctiva/corneas clear, both eyes; wears glasses   Ears:    Normal external ear canals, both ears   Nose:   Nares normal, septum midline, mucosa normal, no drainage    or sinus tenderness   Throat:   Lips, mucosa, and tongue normal; teeth and gums normal   Neck:   Supple, symmetrical, trachea midline, no adenopathy;     thyroid:  no enlargement/tenderness/nodules; no carotid    bruit or JVD   Back:     Symmetric, no curvature, ROM normal, no CVA tenderness   Lungs:     Clear to auscultation bilaterally, respirations unlabored; using 2 LPM oxygen via NC   Chest Wall:    No tenderness or deformity    Heart:    Regular rate and rhythm, S1 and S2 normal, no murmur, rub   or gallop   Abdomen:     Soft, non-tender, bowel sounds active all four quadrants,     no masses, no organomegaly   Extremities:   Extremities normal, atraumatic, no cyanosis or edema   Pulses:   2+ and symmetric all extremities   Skin:   Skin color, texture, turgor normal, no rashes or lesions   Neurologic:   Oriented to person, place, time, and situation; exhibits logical thought processes; generalized weakness; sitting in wheelchair during this visit         LABS:   None ordered at this visit.    ASSESSMENT:      ICD-10-CM    1. Acute on chronic diastolic congestive heart failure (H)   I50.33    2. ESRD on dialysis (H)  N18.6     Z99.2    3. Physical deconditioning  R53.81    4. Neuropathic pain  M79.2        PLAN:      Continue PT/OT as recommended for physical deconditioning.  Continue HD on MWF per nephrology recommendations for ESRD.  Continue to monitor weight daily for acute on chronic CHF.  Continue oxygen 0-4 LPM as needed titrate to SpO2> 90% with COPD.  START capsaicin 0.075% ointment three times a day for neuropathic pain.  Continue gabapentin with bedtime dosing 300 mg daily at HS for neuropathic pain.  Plan for HE hospice informational session next week per patient request.  SW to work with patient on LTC options per patient request.  Otherwise continue current care plan for all other chronic medical conditions, as they are stable. Encouraged patient to engage in healthy lifestyle behaviors such as engaging in social activities, exercising (PT/OT), eating well, and following care plan. Follow up for routine check-up, or sooner if needed. Will continue to monitor patient and work with nursing staff collaboratively to work toward positive patient outcomes.     Electronically signed by: Angie Gamez CNP     Total floor/unit time was 60 minutes and 50 minutes was spent in counseling patient on CHF management, pain management, hospice philosophy, ESRD prognosis, and acute rehabilitation and coordination of care with nursing staff, SW, and therapy services.

## 2021-06-10 NOTE — PROGRESS NOTES
[facility]    Facility:   [facility] [[id number]]   Code Status: FULL CODE      CHIEF COMPLAINT/REASON FOR VISIT:  Chief Complaint   Patient presents with     Problem Visit     cough       HISTORY:      HPI: Belia is a 74 y.o. female who was admitted to [facility] from 7/13/20-7/20/20 for acute on chronic CHF that was managed with daily HD runs.  She is on HD for ESRD with MWF schedule.  It was recommended that she consider hospice services during her hospitalization with progression of CHF, ESRD, and COPD.  Itzel  has a past medical history of Arthritis, CHF (congestive heart failure) (H), Chronic anemia (6/1/2014), Chronic kidney disease, Chronic thoracic aortic dissection (H) (10/7/2015), COPD (chronic obstructive pulmonary disease) (H), CVA (cerebral infarction), Disease of thyroid gland, Dyslipidemia, ESRD (end stage renal disease) (H) (06/03/2009), Essential hypertension (6/30/2014), Gastrointestinal hemorrhage, unspecified gastrointestinal hemorrhage type (6/5/2017), GI (gastrointestinal bleed), GI bleeding (6/5/2017), Gout, L3 vertebral fracture (H) (11/16/2015), Left Atrial Appendage Occlusion (WATCHMAN) (4/5/2018), Obesity, ZULEIKA (obstructive sleep apnea), severe, intolerant of CPAP (10/22/2015), Oxygen dependent, Pneumonia (9-7-2015), Right foot drop, Spinal stenosis (3/28/2016), and Stroke (H) (3/24/2016).   The patient is currently at [facility] s/p hospitalization for acute rehabilitation.      Today Ms. Rodriguez is being evaluated for cough.  Nursing staff reported that the patient has had increased coughing over the past few days and requested cough medicine.  Itzel reported that she has had a dry cough for years and she uses cough medicine PRN at home for management.  She denied increased dyspnea, wheezing, or peripheral edema at this time.  She continues to use her baseline 2 LPM oxygen via nasal cannula for chronic hypoxia.  She was  agreeable to trying Mucinex for management of her cough at this time.  She said that the capsaicin ointment that was started at her last visit has been refused because she does not like the tingling feeling that the medication gives her and the pain has remained the same with no pain at this time.  She was agreeable to plan to trial lidocaine 4% ointment instead of capsaicin ointment for management of her nighttime neuropathic pain.  She asked if she could meet with hospice services at this visit and plan for consult today for ESRD on HD.  She also asked that she could meet with in-house psychology for depressed mood due to frustrations with her family and managing chronic illnesses.  The patient denied changes in sleep or appetite, lightheadedness, dizziness, breathing difficulty, chest pain, palpitations, constipation, urinary symptoms, numbness or tingling in extremities, and pain.     Past Medical History:   Diagnosis Date     Arthritis      CHF (congestive heart failure) (H)      Chronic anemia 6/1/2014     Chronic kidney disease      Chronic thoracic aortic dissection (H) 10/7/2015    Descending thoracic aorta; treated medically per notes of Dragan Singh and Jennifer.     COPD (chronic obstructive pulmonary disease) (H)      CVA (cerebral infarction)      Disease of thyroid gland      Dyslipidemia      ESRD (end stage renal disease) (H) 06/03/2009    on dialysis with Dr. Mitchell     Essential hypertension 6/30/2014     Gastrointestinal hemorrhage, unspecified gastrointestinal hemorrhage type 6/5/2017     GI (gastrointestinal bleed)      GI bleeding 6/5/2017     Gout      L3 vertebral fracture (H) 11/16/2015     Left Atrial Appendage Occlusion (WATCHMAN) 4/5/2018    LAAO April 5, 2018 (30 mm WATCHMAN)     Obesity      ZLUEIKA (obstructive sleep apnea), severe, intolerant of CPAP 10/22/2015     Oxygen dependent     3L nc     Pneumonia 9-7-2015     Right foot drop      Spinal stenosis 3/28/2016     Stroke (H)  3/24/2016             Family History   Problem Relation Age of Onset     Dementia Mother      Diabetes Mother      Arthritis Mother      Cancer Mother      Depression Mother      Heart disease Mother      Vision loss Mother      Stroke Father      Heart disease Father      Breast cancer Neg Hx      Social History     Socioeconomic History     Marital status:      Spouse name: Cayden     Number of children: 2     Years of education: Not on file     Highest education level: Not on file   Occupational History     Employer: RETIRED   Social Needs     Financial resource strain: Not on file     Food insecurity     Worry: Not on file     Inability: Not on file     Transportation needs     Medical: Not on file     Non-medical: Not on file   Tobacco Use     Smoking status: Former Smoker     Packs/day: 1.50     Years: 37.00     Pack years: 55.50     Types: Cigarettes     Last attempt to quit: 2009     Years since quittin.6     Smokeless tobacco: Never Used   Substance and Sexual Activity     Alcohol use: No     Alcohol/week: 11.7 standard drinks     Types: 14 Standard drinks or equivalent per week     Comment: 14 mixed drinks per week     Drug use: No     Sexual activity: Never     Partners: Male   Lifestyle     Physical activity     Days per week: Not on file     Minutes per session: Not on file     Stress: Not on file   Relationships     Social connections     Talks on phone: Not on file     Gets together: Not on file     Attends Christianity service: Not on file     Active member of club or organization: Not on file     Attends meetings of clubs or organizations: Not on file     Relationship status: Not on file     Intimate partner violence     Fear of current or ex partner: Not on file     Emotionally abused: Not on file     Physically abused: Not on file     Forced sexual activity: Not on file   Other Topics Concern     Not on file   Social History Narrative    Lives with her . Daughter in [place]  and daughter in [place].       REVIEW OF SYSTEM:   Pertinent items are noted in HPI.  A 12 point comprehensive review of systems was negative except as noted.    PHYSICAL EXAM:     General Appearance:    Alert, cooperative, no distress, appears stated age   Head:    Normocephalic, without obvious abnormality, atraumatic   Eyes:    PERRL, conjunctiva/corneas clear, both eyes; wears glasses   Ears:    Normal external ear canals, both ears   Nose:   Nares normal, septum midline, mucosa normal, no drainage    or sinus tenderness   Throat:   Lips, mucosa, and tongue normal; teeth and gums normal   Neck:   Supple, symmetrical, trachea midline, no adenopathy;     thyroid:  no enlargement/tenderness/nodules; no carotid    bruit or JVD   Back:     Symmetric, no curvature, ROM normal, no CVA tenderness   Lungs:     Clear to auscultation bilaterally, respirations unlabored; using 2 LPM oxygen via NC   Chest Wall:    No tenderness or deformity    Heart:    Regular rate and rhythm, S1 and S2 normal, no murmur, rub   or gallop   Abdomen:     Soft, non-tender, bowel sounds active all four quadrants,     no masses, no organomegaly   Extremities:   Extremities normal, atraumatic, no cyanosis or edema   Pulses:   2+ and symmetric all extremities   Skin:   Skin color, texture, turgor normal, no rashes or lesions   Neurologic:   Oriented to person, place, time, and situation; exhibits logical thought processes; generalized weakness; sitting in wheelchair during this visit         LABS:   Lab Results   Component Value Date    WBC 3.8 (L) 07/23/2020    HGB 10.5 (L) 07/23/2020    HCT 36.8 07/23/2020     (L) 07/23/2020    CHOL 116 11/19/2019    TRIG 122 11/19/2019    HDL 57 11/19/2019    ALT 14 07/13/2020    ALT 14 07/13/2020    AST 25 07/13/2020    AST 25 07/13/2020     (L) 07/23/2020    K 4.2 07/23/2020     07/23/2020    CREATININE 3.89 (H) 07/23/2020    BUN 17 07/23/2020    CO2 21 (L) 07/23/2020    TSH 3.89 07/18/2020     INR 1.10 07/17/2020    HGBA1C 5.7 06/01/2014        ASSESSMENT:      ICD-10-CM    1. Physical deconditioning  R53.81    2. Palliative care encounter  Z51.5    3. Neuropathic pain  M79.2    4. Cough  R05    5. Adjustment disorder with depressed mood  F43.21        PLAN:      Continue PT/OT as recommended for physical deconditioning.  Continue HD on MWF per nephrology recommendations for ESRD.  Continue to monitor weight daily for acute on chronic CHF.  Continue oxygen 0-4 LPM as needed titrate to SpO2> 90% with COPD.  START Mucinex 600 mg ER daily for chronic cough.  DISCONTINUE capsaicin 0.075% ointment three times a day for neuropathic pain.  START lidocaine 4% ointment two times a day for neuropathic pain.  Continue gabapentin with bedtime dosing 300 mg daily at HS for neuropathic pain.  Consult for HE hospice informational session for ESRD on HD.  Consult to in-house psychology services for adjustment disorder with depressed mood.  SW to work with patient on LTC options per patient request.  Otherwise continue current care plan for all other chronic medical conditions, as they are stable. Encouraged patient to engage in healthy lifestyle behaviors such as engaging in social activities, exercising (PT/OT), eating well, and following care plan. Follow up for routine check-up, or sooner if needed. Will continue to monitor patient and work with nursing staff collaboratively to work toward positive patient outcomes.     Electronically signed by: Angie Gamez CNP     Total floor/unit time was 60 minutes and 40 minutes was spent in counseling patient on pain management, hospice philosophy, mood disorder management, and acute rehabilitation and coordination of care with nursing staff, SW, and therapy services.

## 2021-06-10 NOTE — PROGRESS NOTES
[facility]      Facility:    [facility] [[id number]]  Code Status: UNKNOWN      Chief Complaint/Reason for Visit:  Chief Complaint   Patient presents with     H & P     Acute on chronic diastolic congestive heart failure requiring hospitalization, generalized weakness, end-stage renal disease, GERD, obstructive sleep apnea, oxygen dependency.       HPI:   Belia is a 71 y.o. female who was recently admitted to the hospital on April 9, 2017 with volume overload consistent with diastolic congestive heart failure exacerbation.  She had fallen twice at home and feel lightheaded and dizzy.  There were no visual changes and she presented to the hospital with shortness of breath.  She is usually on 2 L of oxygen at night and does not use her CPAP secondary to she is intolerant.  She is on dialysis 3 times weekly and and her hospitalization her CT scan of the head did not show any signs of bleeding.  Chest x-ray showed vascular congestion.  And she has been hospitalized many times for decompensated heart failure.  Her brain natruretic peptide was elevated at 1241.  She was admitted to the hospital and I do not have a discharge summary she did get better with dialysis as well as diuresis and she was then transferred here to the TCU at [facility] in stable condition.  She does need follow-up with cardiology and I will get a discharge summary for further review.    Today patient feels well she is currently walking the length of the hallway which is over 100 feet and she gets a little bit short of breath but not bad.  Her pain is under good control at this time and she denies any other issues.  She seems euvolemic by exam and she denies any other issues at this time.    Past Medical History:  Past Medical History:   Diagnosis Date     Arthritis      CHF (congestive heart failure)      Chronic anemia 6/1/2014     Chronic kidney disease      Chronic thoracic aortic dissection  10/7/2015    Descending thoracic aorta; treated medically per notes of Dragan Singh and Jennifer.     COPD (chronic obstructive pulmonary disease)      CVA (cerebral infarction)      Disease of thyroid gland      Dyslipidemia      ESRD (end stage renal disease) 06/03/2009    on dialysis with Dr. Mitchell     Essential hypertension 6/30/2014     GI (gastrointestinal bleed)      Gout      L3 vertebral fracture 11/16/2015     Obesity      ZULEIKA (obstructive sleep apnea), severe, intolerant of CPAP 10/22/2015     Pneumonia 9-7-2015     Right foot drop      Spinal stenosis 3/28/2016     Stroke 3/24/2016           Surgical History:  Past Surgical History:   Procedure Laterality Date     BACK SURGERY      [facility]     COLONOSCOPY N/A 3/23/2016    Procedure: COLONOSCOPY;  Surgeon: Ruddy Tejada MD;  Location: [facility];  Service:      DILATION AND CURETTAGE OF UTERUS       EYE SURGERY       HERNIA REPAIR       TONSILLECTOMY         Family History:   Family History   Problem Relation Age of Onset     Dementia Mother      Diabetes Mother      Arthritis Mother      Cancer Mother      Depression Mother      Heart disease Mother      Vision loss Mother      Stroke Father      Heart disease Father        Social History:    Social History     Social History     Marital status:      Spouse name: Cayden     Number of children: 2     Years of education: N/A     Occupational History      Retired     Social History Main Topics     Smoking status: Former Smoker     Packs/day: 1.50     Years: 37.00     Types: Cigarettes     Quit date: 1/1/2009     Smokeless tobacco: Never Used     Alcohol use 7.0 oz/week     14 Standard drinks or equivalent per week      Comment: 14 mixed drinks per week     Drug use: No     Sexual activity: No     Other Topics Concern     None     Social History Narrative    Lives with her . Daughter in [place] and daughter in [place].          Review of Systems   Constitutional: Negative for  activity change, chills and fever.   HENT: Negative for hearing loss.    Eyes: Negative for visual disturbance.   Respiratory: Negative for apnea, chest tightness and shortness of breath.    Cardiovascular: Negative for chest pain and palpitations.   Gastrointestinal: Negative for abdominal pain, constipation, nausea and vomiting.   Endocrine: Negative for polydipsia, polyphagia and polyuria.   Genitourinary: Negative for decreased urine volume and urgency.   Musculoskeletal: Negative for neck pain and neck stiffness.   Skin: Negative for rash.   Hematological: Does not bruise/bleed easily.   Psychiatric/Behavioral: Negative for agitation and behavioral problems.       Vitals:    04/18/17 0828   BP: 109/74   Pulse: 83   Resp: 15   Temp: 96.7  F (35.9  C)   SpO2: 92%       Physical Exam   Constitutional: She is oriented to person, place, and time. She appears well-developed and well-nourished. No distress.   HENT:   Head: Normocephalic and atraumatic.   Nose: Nose normal.   Mouth/Throat: Oropharynx is clear and moist. No oropharyngeal exudate.   Eyes: Right eye exhibits no discharge. Left eye exhibits no discharge. No scleral icterus.   Neck: Neck supple. No tracheal deviation present. No thyromegaly present.   Cardiovascular:   Patient's heart sounds are irregularly irregular with adequate rate control.   Pulmonary/Chest: Effort normal. No respiratory distress. She has no wheezes.   Occasional crackles at the bases.   Abdominal: Soft. Bowel sounds are normal. She exhibits no distension and no mass. There is no tenderness. There is no rebound and no guarding.   Musculoskeletal: Normal range of motion. She exhibits no edema or tenderness.   Lymphadenopathy:     She has no cervical adenopathy.   Neurological: She is alert and oriented to person, place, and time. No cranial nerve deficit. Coordination normal.   Skin: Skin is warm and dry. No rash noted. She is not diaphoretic. No erythema. No pallor.   Psychiatric: She  has a normal mood and affect. Her behavior is normal.       Medication List:  Current Outpatient Prescriptions   Medication Sig     acetaminophen (TYLENOL) 500 MG tablet Take 500 mg by mouth every 6 (six) hours as needed for pain.     allopurinol (ZYLOPRIM) 100 MG tablet Take 100 mg by mouth daily with lunch.      aspirin 81 MG EC tablet Take 1 tablet (81 mg total) by mouth daily. (Patient taking differently: Take 81 mg by mouth daily with lunch. )     atorvastatin (LIPITOR) 10 MG tablet Take 10 mg by mouth bedtime.      B complex-vitamin C-folic acid 100-1 mg Tab Take 1 tablet by mouth at bedtime.      cholecalciferol, vitamin D3, 2,000 unit cap Take 2,000 Units by mouth daily with lunch.      diphenhydrAMINE (BENADRYL) 50 MG tablet Take 50 mg by mouth at bedtime as needed for sleep.      folic acid (FOLVITE) 1 MG tablet Take 1 mg by mouth daily with lunch.      gabapentin (NEURONTIN) 100 MG capsule Take 100 mg by mouth 3 (three) times a day.     Lactobacillus rhamnosus GG (CULTURELLE) 10-15 Billion cell capsule Take 1 capsule by mouth daily with lunch.     metoprolol tartrate (LOPRESSOR) 100 MG tablet Take 1 tablet (100 mg total) by mouth 2 (two) times a day. (Patient taking differently: Take 100 mg by mouth 2 (two) times a day. Takes at noon and bedtime)     midodrine (PROAMATINE) 5 MG tablet Take 1 tablet (5 mg total) by mouth 3 (three) times a week. 30 minutes prior to dialysis (Patient taking differently: Take 5-10 mg by mouth 3 (three) times a week. 30 minutes prior to dialysis, Mon,Wed and Fridau)     omeprazole (PRILOSEC) 20 MG capsule Take 20 mg by mouth daily with lunch.      senna-docusate (SENNOSIDES-DOCUSATE SODIUM) 8.6-50 mg tablet Take 1 tablet by mouth at bedtime as needed for constipation.      sevelamer carbonate (RENVELA) 800 mg tablet Take 1,600 mg by mouth 3 (three) times a day with meals.     timolol maleate (TIMOPTIC) 0.5 % ophthalmic solution Administer 1 drop to both eyes 2 (two) times a  day.      traMADol (ULTRAM) 50 mg tablet Take 1 tablet (50 mg total) by mouth every 8 (eight) hours as needed for pain.     warfarin (COUMADIN) 2.5 MG tablet Take 2.5 mg daily     digoxin (LANOXIN) 125 mcg tablet Take 1 tablet (125 mcg total) by mouth 3 (three) times a week.       Labs: Hospital labs are as follows; sodium was 136, potassium was 4.9, calcium was 9.1, BUN was 51, creatinine was 6.60, GFR was 6.  White count was 5.2, hemoglobin was 11.8, platelets 62,000.  Alk phosphatase was 77 bilirubin was within normal limits and ALT as well as AST were within normal limits.      Assessment:    ICD-10-CM    1. Acute on chronic diastolic congestive heart failure I50.33    2. General weakness R53.1    3. ESRD (end stage renal disease) N18.6    4. Weakness R53.1    5. HTN (hypertension) I10    6. ZULEIKA (obstructive sleep apnea) G47.33        Plan: Plan at this time is to get before and after weights from dialysis and continue with physical and Occupational Therapy.  She will continue dialysis 3 times weekly and no other changes to care plan at this time.  For her anticoagulation management I will give Coumadin 1.5 mg today and tomorrow and recheck the INR on Thursday for an INR of 1.6.  Patient is currently on slightly less than 1.5 mg daily on the average.  I will continue to monitor above medical problems and no other changes to care plan at this time.        Electronically signed by: Dl Nolan DO

## 2021-06-10 NOTE — TELEPHONE ENCOUNTER
New Appointment Needed  What is the reason for the visit:    Inpatient/ED Follow Up Appt Request  At what hospital or facility were you seen?: [facility]  What is the reason you were seen?: chronic congestive heart failure   What date were you admitted?: date: 7/20  What date were you discharged?: date: 8/21  What was the recommended timeframe for your follow up appointment?: 7 days   Provider Preference: Former patient of Dr. John Escoto   How soon do you need to be seen?: 7 days   Waitlist offered?: No  Okay to leave a detailed message:  Yes

## 2021-06-10 NOTE — PROGRESS NOTES
[facility]    Facility:   [facility] [[id number]]   Code Status: FULL CODE      CHIEF COMPLAINT/REASON FOR VISIT:  Chief Complaint   Patient presents with     Follow Up     rehab, htn,        HISTORY:      HPI: Belia is a 71 y.o. female who was seen secondary to follow-up of chronic medical conditions including her hospitalization April 9 for congestive heart failure.  History of diastolic heart failure.  Currently is also attending dialysis 3 times weekly.  No current complications.  Her blood pressures systolically have been running anywhere from  and she does take Midrin on the days dialysis 3 times a week.  Her heart rate has been running anywhere from 60-80.  We had a chance to address the blood pressures it is not inhibiting her therapy she is able to continue to progress.  But she is concerned because of the blood pressures have been low and sometimes at dialysis and also bottoms out so in looking through her medications she is on metoprolol 100 mg twice a day we will now decrease it to 50 mg twice a day and continue to monitor.  Regarding therapy she is able to ambulate down the hallway is about 1 rest stop before she gets to the therapy department.  She is trying to work on her leg strength and trying to work on climbing some stairs.  She does have a number of stairs at home and we did talk about home safety and that she probably will need a handrail when she gets the top of the stairs she will work with home care group prior to that.  At night she does use oxygen.  No current respiratory problems.  Her pain seems to be managed.  Is on tramadol as needed.  Appetite good.  Try to get solid diet.    Past Medical History:   Diagnosis Date     Arthritis      CHF (congestive heart failure)      Chronic anemia 6/1/2014     Chronic kidney disease      Chronic thoracic aortic dissection 10/7/2015    Descending thoracic aorta; treated medically per notes of Dragan Singh  pravin Rodriguez.     COPD (chronic obstructive pulmonary disease)      CVA (cerebral infarction)      Disease of thyroid gland      Dyslipidemia      ESRD (end stage renal disease) 06/03/2009    on dialysis with Dr. Mitchell     Essential hypertension 6/30/2014     GI (gastrointestinal bleed)      Gout      L3 vertebral fracture 11/16/2015     Obesity      ZULEIKA (obstructive sleep apnea), severe, intolerant of CPAP 10/22/2015     Pneumonia 9-7-2015     Right foot drop      Spinal stenosis 3/28/2016     Stroke 3/24/2016             Family History   Problem Relation Age of Onset     Dementia Mother      Diabetes Mother      Arthritis Mother      Cancer Mother      Depression Mother      Heart disease Mother      Vision loss Mother      Stroke Father      Heart disease Father      Social History     Social History     Marital status:      Spouse name: Cayden     Number of children: 2     Years of education: N/A     Occupational History      Retired     Social History Main Topics     Smoking status: Former Smoker     Packs/day: 1.50     Years: 37.00     Types: Cigarettes     Quit date: 1/1/2009     Smokeless tobacco: Never Used     Alcohol use 7.0 oz/week     14 Standard drinks or equivalent per week      Comment: 14 mixed drinks per week     Drug use: No     Sexual activity: No     Other Topics Concern     Not on file     Social History Narrative    Lives with her . Daughter in [place] and daughter in [place].         Review of Systems  Currently she denies any chills fever coughing wheezing chest pain dizziness or vertigo nausea vomiting diarrhea dysuria rashes sore stiff neck swollen glands or headaches. Does have a history of diastolic failure end-stage renal disease chronic anemia tachybradycardia syndrome    Scheduled Meds:  Continuous Infusions:  PRN Meds:.  Current Outpatient Prescriptions   Medication Sig     acetaminophen (TYLENOL) 500 MG tablet Take 500 mg by mouth every 6 (six) hours as needed for  pain.     allopurinol (ZYLOPRIM) 100 MG tablet Take 100 mg by mouth daily with lunch.      aspirin 81 MG EC tablet Take 1 tablet (81 mg total) by mouth daily. (Patient taking differently: Take 81 mg by mouth daily with lunch. )     atorvastatin (LIPITOR) 10 MG tablet Take 10 mg by mouth bedtime.      B complex-vitamin C-folic acid 100-1 mg Tab Take 1 tablet by mouth at bedtime.      cholecalciferol, vitamin D3, 2,000 unit cap Take 2,000 Units by mouth daily with lunch.      digoxin (LANOXIN) 125 mcg tablet Take 1 tablet (125 mcg total) by mouth 3 (three) times a week.     diphenhydrAMINE (BENADRYL) 50 MG tablet Take 50 mg by mouth at bedtime as needed for sleep.      folic acid (FOLVITE) 1 MG tablet Take 1 mg by mouth daily with lunch.      gabapentin (NEURONTIN) 100 MG capsule Take 100 mg by mouth 3 (three) times a day.     Lactobacillus rhamnosus GG (CULTURELLE) 10-15 Billion cell capsule Take 1 capsule by mouth daily with lunch.     metoprolol tartrate (LOPRESSOR) 100 MG tablet Take 1 tablet (100 mg total) by mouth 2 (two) times a day. (Patient taking differently: Take 50 mg by mouth 2 (two) times a day. Takes at noon and bedtime)     midodrine (PROAMATINE) 5 MG tablet Take 1 tablet (5 mg total) by mouth 3 (three) times a week. 30 minutes prior to dialysis (Patient taking differently: Take 5-10 mg by mouth 3 (three) times a week. 30 minutes prior to dialysis, Mon,Wed and Fridau)     omeprazole (PRILOSEC) 20 MG capsule Take 20 mg by mouth daily with lunch.      senna-docusate (SENNOSIDES-DOCUSATE SODIUM) 8.6-50 mg tablet Take 1 tablet by mouth at bedtime as needed for constipation.      sevelamer carbonate (RENVELA) 800 mg tablet Take 1,600 mg by mouth 3 (three) times a day with meals.     timolol maleate (TIMOPTIC) 0.5 % ophthalmic solution Administer 1 drop to both eyes 2 (two) times a day.      traMADol (ULTRAM) 50 mg tablet Take 1 tablet (50 mg total) by mouth every 8 (eight) hours as needed for pain.      warfarin (COUMADIN) 2.5 MG tablet Take 2.5 mg daily       .  Vitals:    04/20/17 1444   BP: 98/66   Pulse: 72   Resp: 19   Temp: 96.9  F (36.1  C)       Physical Exam  Constitutional: She is oriented to person, place, and time. No distress.   HENT:   Head: Normocephalic.   Eyes: Pupils are equal, round, and reactive to light.   Neck: Neck supple. No thyromegaly present.   Cardiovascular:   Irregularity. No lower extremity edema.   Pulmonary/Chest: Breath sounds normal.   Sleep apnea/ZULEIKA. Intolerant of CPAP. COPD. History of tobacco abuse   Abdominal: Bowel sounds are normal. There is no tenderness. There is no guarding.   Genitourinary:   Genitourinary Comments: End-stage renal disease.   Musculoskeletal:   Generalized weakness. Deconditioning.   Lymphadenopathy:   She has no cervical adenopathy.   Neurological: She is oriented to person, place, and time.   Skin: Skin is warm and dry. No rash noted.   Psychiatric: Her behavior is normal.     LABS:   No results found for: DIGOXIN  Lab Results   Component Value Date    WBC 5.2 04/10/2017    HGB 11.8 (L) 04/10/2017    HCT 39.4 04/10/2017     (H) 04/10/2017    PLT 62 (L) 04/10/2017     Results for orders placed or performed during the hospital encounter of 04/08/17   Basic metabolic panel   Result Value Ref Range    Sodium 136 136 - 145 mmol/L    Potassium 4.9 3.5 - 5.0 mmol/L    Chloride 101 98 - 107 mmol/L    CO2 21 (L) 22 - 31 mmol/L    Anion Gap, Calculation 14 5 - 18 mmol/L    Glucose 96 70 - 125 mg/dL    Calcium 9.1 8.5 - 10.5 mg/dL    BUN 51 (H) 8 - 22 mg/dL    Creatinine 6.60 (HH) 0.60 - 1.10 mg/dL    GFR MDRD Af Amer 8 (L) >60 mL/min/1.73m2    GFR MDRD Non Af Amer 6 (L) >60 mL/min/1.73m2       Lab Results   Component Value Date    ALBUMIN 3.4 (L) 04/12/2017         ASSESSMENT:      ICD-10-CM    1. Atrial fibrillation with RVR I48.91    2. Acute on chronic diastolic congestive heart failure I50.33    3. Leg weakness M62.81    4. Essential hypertension I10         PLAN:    She was supposed to get a digoxin level while at dialysis today the results not yet back.  Also decrease the metoprolol 50 mg twice a day and monitor the blood pressures.  She will continue to work hard in therapy she does want to be able to go home but does need to climb stairs.      Electronically signed by: Michael Duane Johnson, CNP

## 2021-06-10 NOTE — PROGRESS NOTES
[facility]    Facility:   [facility] [[id number]]   Code Status: FULL CODE      CHIEF COMPLAINT/REASON FOR VISIT:  Chief Complaint   Patient presents with     Problem Visit     asked to see       HISTORY:      HPI: Belia is a 71 y.o. female who I was asked to see on the transitional care unit secondary to her hospitalization April 9, 2017 for congestive heart failure.  She does have a history of diastolic heart failure and she was in her usual state of health until 7 PM the night of admission when she fell.  She actually fell twice the first time she was grabbing some dishes from the cupboard turned around and fell backwards she was unsteady on her feet denies any lightheadedness dizziness or visual changes.  Then she eventually was walking up the stairs and then her knees gave out fell forward onto her knees and then eventually called the paramedics.    She initially did present with shortness of breath which is a chronic issue for her.  She is on oxygen 2 L at bedtime.  Is intolerant of CPAP although does have sleep apnea.  She also does attend dialysis 3 times weekly Monday Wednesday and Friday.  A history of dissection 8 years ago to which she eventually did develop renal failure.  Produces minimal urine even while on all her diuretics.  She was admitted also to hypervolemia secondary to end-stage renal disease and underlying diastolic heart failure.  Her metoprolol dose was increased from 50 mg twice daily to 100 mg twice daily eventually the amiodarone was discontinued.  She has not missed any dialysis dates but during dialysis she would have palpitations.  During her hospitalization the CT of the head did not show any bleed chest x-ray did show cardiomegaly and vascular congestion.    She has had multiple prior hospitalizations for decompensated heart failure.  Her BNP was elevated 1241.  Echocardiogram showed ejection fraction 63%.  She did have a visit with the  [facility] and they do admit that she has had issues with fluid management with her end-stage renal disease and dialysis has hampered secondary to her elevated heart rates and atrial fibrillation and hypotension therefore they did increase her beta-blocker and also is considering a left atrial appendage occlusion device or WATHCMAN. she also has a history of unsteady gait and falls..  She does have a chads vascular score of 5 and restarted on anticoagulation with warfarin.  She does have COPD no current inhalers.  She does have heartburn and reflux on omeprazole and ranitidine.  She used to be a tobacco use smoker quit about 10 years ago.  A history of normocytic anemia and they do follow-up her hemoglobin is at dialysis.  We had a pleasant visit today discussing her chronic medical conditions.  She has been at [facility] in the past.  She currently has been normotensive and afebrile.  She does have dialysis today.  Her appetite is slowly improving.  She does have chronic pain.  Gabapentin seems to be helpful in 100 mg 3 times a day does take tramadol is as needed usually about 2 doses a day.  No current edema.  We will continue to monitor her diuretics.  She is not having any other particular pain particularly from the fall as well as her knees.  She does plan on participating in therapy and she like to be discharged as soon as possible.  She also will have a follow-up with cardiology in the near future regarding a potential for ablation.  It is also been addressed at one point. a consideration for a pacemaker.      Past Medical History:   Diagnosis Date     Arthritis      CHF (congestive heart failure)      Chronic anemia 6/1/2014     Chronic kidney disease      Chronic thoracic aortic dissection 10/7/2015    Descending thoracic aorta; treated medically per notes of Dragan Singh and Jennifer.     COPD (chronic obstructive pulmonary disease)      CVA (cerebral infarction)      Disease of thyroid  gland      Dyslipidemia      ESRD (end stage renal disease) 06/03/2009    on dialysis with Dr. Mitchell     Essential hypertension 6/30/2014     GI (gastrointestinal bleed)      Gout      L3 vertebral fracture 11/16/2015     Obesity      ZULEIKA (obstructive sleep apnea), severe, intolerant of CPAP 10/22/2015     Pneumonia 9-7-2015     Right foot drop      Spinal stenosis 3/28/2016     Stroke 3/24/2016             Family History   Problem Relation Age of Onset     Dementia Mother      Diabetes Mother      Arthritis Mother      Cancer Mother      Depression Mother      Heart disease Mother      Vision loss Mother      Stroke Father      Heart disease Father      Social History     Social History     Marital status:      Spouse name: Cayden     Number of children: 2     Years of education: N/A     Occupational History      Retired     Social History Main Topics     Smoking status: Former Smoker     Packs/day: 1.50     Years: 37.00     Types: Cigarettes     Quit date: 1/1/2009     Smokeless tobacco: Never Used     Alcohol use 7.0 oz/week     14 Standard drinks or equivalent per week      Comment: 14 mixed drinks per week     Drug use: No     Sexual activity: No     Other Topics Concern     Not on file     Social History Narrative    Lives with her . Daughter in [place] and daughter in [place].         Review of Systems  Currently she denies any chills fever coughing wheezing chest pain dizziness or vertigo nausea vomiting diarrhea dysuria rashes sore stiff neck swollen glands or headaches.  Does have a history of diastolic failure end-stage renal disease chronic anemia tachybradycardia syndrome    Current Outpatient Prescriptions   Medication Sig     acetaminophen (TYLENOL) 500 MG tablet Take 500 mg by mouth every 6 (six) hours as needed for pain.     allopurinol (ZYLOPRIM) 100 MG tablet Take 100 mg by mouth daily with lunch.      aspirin 81 MG EC tablet Take 1 tablet (81 mg total) by mouth daily. (Patient  taking differently: Take 81 mg by mouth daily with lunch. )     atorvastatin (LIPITOR) 10 MG tablet Take 10 mg by mouth bedtime.      B complex-vitamin C-folic acid 100-1 mg Tab Take 1 tablet by mouth at bedtime.      cholecalciferol, vitamin D3, 2,000 unit cap Take 2,000 Units by mouth daily with lunch.      digoxin (LANOXIN) 125 mcg tablet Take 1 tablet (125 mcg total) by mouth 3 (three) times a week.     diphenhydrAMINE (BENADRYL) 50 MG tablet Take 50 mg by mouth at bedtime as needed for sleep.      folic acid (FOLVITE) 1 MG tablet Take 1 mg by mouth daily with lunch.      gabapentin (NEURONTIN) 100 MG capsule Take 100 mg by mouth 3 (three) times a day.     Lactobacillus rhamnosus GG (CULTURELLE) 10-15 Billion cell capsule Take 1 capsule by mouth daily with lunch.     metoprolol tartrate (LOPRESSOR) 100 MG tablet Take 1 tablet (100 mg total) by mouth 2 (two) times a day. (Patient taking differently: Take 100 mg by mouth 2 (two) times a day. Takes at noon and bedtime)     midodrine (PROAMATINE) 5 MG tablet Take 1 tablet (5 mg total) by mouth 3 (three) times a week. 30 minutes prior to dialysis (Patient taking differently: Take 5-10 mg by mouth 3 (three) times a week. 30 minutes prior to dialysis, Mon,Wed and Fridau)     omeprazole (PRILOSEC) 20 MG capsule Take 20 mg by mouth daily with lunch.      senna-docusate (SENNOSIDES-DOCUSATE SODIUM) 8.6-50 mg tablet Take 1 tablet by mouth at bedtime as needed for constipation.      sevelamer carbonate (RENVELA) 800 mg tablet Take 1,600 mg by mouth 3 (three) times a day with meals.     timolol maleate (TIMOPTIC) 0.5 % ophthalmic solution Administer 1 drop to both eyes 2 (two) times a day.      traMADol (ULTRAM) 50 mg tablet Take 1 tablet (50 mg total) by mouth every 8 (eight) hours as needed for pain.     warfarin (COUMADIN) 2.5 MG tablet Take 2.5 mg daily       .  Vitals:    04/14/17 1346   BP: 100/68   Pulse: 74   Resp: 19   Temp: 97.2  F (36.2  C)       Physical Exam    Constitutional: She is oriented to person, place, and time. No distress.   HENT:   Head: Normocephalic.   Eyes: Pupils are equal, round, and reactive to light.   Neck: Neck supple. No thyromegaly present.   Cardiovascular:   Irregularity.  No lower extremity edema.   Pulmonary/Chest: Breath sounds normal.   Sleep apnea/ZULEIKA.  Intolerant of CPAP.  COPD.  History of tobacco abuse   Abdominal: Bowel sounds are normal. There is no tenderness. There is no guarding.   Genitourinary:   Genitourinary Comments: End-stage renal disease.   Musculoskeletal:   Generalized weakness.  Deconditioning.   Lymphadenopathy:     She has no cervical adenopathy.   Neurological: She is oriented to person, place, and time.   Skin: Skin is warm and dry. No rash noted.   Psychiatric: Her behavior is normal.         LABS:   Results for orders placed or performed during the hospital encounter of 04/08/17   Basic metabolic panel   Result Value Ref Range    Sodium 136 136 - 145 mmol/L    Potassium 4.9 3.5 - 5.0 mmol/L    Chloride 101 98 - 107 mmol/L    CO2 21 (L) 22 - 31 mmol/L    Anion Gap, Calculation 14 5 - 18 mmol/L    Glucose 96 70 - 125 mg/dL    Calcium 9.1 8.5 - 10.5 mg/dL    BUN 51 (H) 8 - 22 mg/dL    Creatinine 6.60 (HH) 0.60 - 1.10 mg/dL    GFR MDRD Af Amer 8 (L) >60 mL/min/1.73m2    GFR MDRD Non Af Amer 6 (L) >60 mL/min/1.73m2       Lab Results   Component Value Date    WBC 5.2 04/10/2017    HGB 11.8 (L) 04/10/2017    HCT 39.4 04/10/2017     (H) 04/10/2017    PLT 62 (L) 04/10/2017     Lab Results   Component Value Date    ALT 15 04/09/2017    AST 23 04/09/2017    ALKPHOS 77 04/09/2017    BILITOT 0.4 04/09/2017         ASSESSMENT:      ICD-10-CM    1. Acute on chronic diastolic congestive heart failure I50.33    2. General weakness R53.1    3. ESRD (end stage renal disease) N18.6    4. GERD (gastroesophageal reflux disease) K21.9    5. ZULEIKA (obstructive sleep apnea) G47.33        PLAN:    Continue with Coumadin alternating 2 and  1 mg next check on Tuesday the 18th.  Continue with dialysis 3 times weekly.  Continue the oxygen at night.  Work with therapy.  She like to be discharged soon but she realizes that she needs to gain her strength balance and conditioning.  She is familiar with transitional care units.  New    For documentation purposes total visit was over 40 minutes to which over 50% was spent with the patient going over her care her medications her hospitalization transitional care unit and coordination of care efforts.      Electronically signed by: Michael Duane Johnson, CNP

## 2021-06-10 NOTE — PROGRESS NOTES
[facility]    Facility:   [facility] [[id number]]   Code Status: FULL CODE  PCP: Giovanni Lindsey MD   Phone: [phone]   Fax: [phone]      CHIEF COMPLAINT/REASON FOR VISIT:  Chief Complaint   Patient presents with     Discharge Summary       HISTORY COURSE:  Belia is a 71 y.o. female who was recently admitted to the hospital on April 9, 2017 with volume overload consistent with diastolic congestive heart failure exacerbation. She had fallen twice at home and feel lightheaded and dizzy. There were no visual changes and she presented to the hospital with shortness of breath. She is usually on 2 L of oxygen at night and does not use her CPAP secondary to she is intolerant. She is on dialysis 3 times weekly and and her hospitalization her CT scan of the head did not show any signs of bleeding. Chest x-ray showed vascular congestion. And she has been hospitalized many times for decompensated heart failure. Her brain natruretic peptide was elevated at 1241. She was admitted to the hospital and I do not have a discharge summary she did get better with dialysis as well as diuresis and she was then transferred here to the TCU at [facility] in stable condition. She does need follow-up with cardiology.  She has done quite well and very pleased with her self regarding therapy and has improved with her leg strengthening and various exercises.  She is able to use a 4 wheeled walker.  She has been normotensive and afebrile.  She did have bouts of hypotension particularly at dialysis eventually took her metoprolol down to 25 mg twice daily.  For Coumadin she is on 3 mg.  For sleep apnea intolerant to CPAP machine she does use oxygen at night.  Review of Systems  Currently she denies any chills fever coughing wheezing chest pain dizziness or vertigo nausea vomiting diarrhea dysuria rashes sore stiff neck swollen glands or headaches. Does have a history of diastolic  failure end-stage renal disease chronic anemia tachybradycardia syndrome  Vitals:    05/02/17 1936   BP: 119/76   Pulse: 78   Resp: 20   Temp: 98.6  F (37  C)       Physical Exam  Constitutional: She is oriented to person, place, and time. No distress.   HENT:   Head: Normocephalic.   Eyes: Pupils are equal, round, and reactive to light.   Neck: Neck supple. No thyromegaly present.   Cardiovascular:   Irregularity. No lower extremity edema.   Pulmonary/Chest: Breath sounds normal.   Sleep apnea/ZULEIKA. Intolerant of CPAP. COPD. History of tobacco abuse   Abdominal: Bowel sounds are normal. There is no tenderness. There is no guarding.   Genitourinary:   Genitourinary Comments: End-stage renal disease.   Musculoskeletal:   Generalized weakness. Deconditioning.   Lymphadenopathy:   She has no cervical adenopathy.   Neurological: She is oriented to person, place, and time.   Skin: Skin is warm and dry. No rash noted.   Psychiatric: Her behavior is normal.      Lab Results   Component Value Date    DIGOXIN 0.7 04/20/2017     Lab Results   Component Value Date    WBC 5.2 04/10/2017    HGB 11.8 (L) 04/10/2017    HCT 39.4 04/10/2017     (H) 04/10/2017    PLT 62 (L) 04/10/2017     Results for orders placed or performed during the hospital encounter of 04/08/17   Basic metabolic panel   Result Value Ref Range    Sodium 136 136 - 145 mmol/L    Potassium 4.9 3.5 - 5.0 mmol/L    Chloride 101 98 - 107 mmol/L    CO2 21 (L) 22 - 31 mmol/L    Anion Gap, Calculation 14 5 - 18 mmol/L    Glucose 96 70 - 125 mg/dL    Calcium 9.1 8.5 - 10.5 mg/dL    BUN 51 (H) 8 - 22 mg/dL    Creatinine 6.60 (HH) 0.60 - 1.10 mg/dL    GFR MDRD Af Amer 8 (L) >60 mL/min/1.73m2    GFR MDRD Non Af Amer 6 (L) >60 mL/min/1.73m2         MEDICATION LIST:  Current Outpatient Prescriptions   Medication Sig     acetaminophen (TYLENOL) 500 MG tablet Take 500 mg by mouth every 6 (six) hours as needed for pain.     allopurinol (ZYLOPRIM) 100 MG tablet Take 100 mg  by mouth daily with lunch.      aspirin 81 MG EC tablet Take 1 tablet (81 mg total) by mouth daily. (Patient taking differently: Take 81 mg by mouth daily with lunch. )     atorvastatin (LIPITOR) 10 MG tablet Take 10 mg by mouth bedtime.      B complex-vitamin C-folic acid 100-1 mg Tab Take 1 tablet by mouth at bedtime.      cholecalciferol, vitamin D3, 2,000 unit cap Take 2,000 Units by mouth daily with lunch.      digoxin (LANOXIN) 125 mcg tablet Take 1 tablet (125 mcg total) by mouth 3 (three) times a week.     diphenhydrAMINE (BENADRYL) 50 MG tablet Take 50 mg by mouth at bedtime as needed for sleep.      folic acid (FOLVITE) 1 MG tablet Take 1 mg by mouth daily with lunch.      gabapentin (NEURONTIN) 100 MG capsule Take 100 mg by mouth 3 (three) times a day.     Lactobacillus rhamnosus GG (CULTURELLE) 10-15 Billion cell capsule Take 1 capsule by mouth daily with lunch.     metoprolol tartrate (LOPRESSOR) 100 MG tablet Take 1 tablet (100 mg total) by mouth 2 (two) times a day. (Patient taking differently: Take 25 mg by mouth 2 (two) times a day. Takes at noon and bedtime)     midodrine (PROAMATINE) 5 MG tablet Take 1 tablet (5 mg total) by mouth 3 (three) times a week. 30 minutes prior to dialysis (Patient taking differently: Take 5-10 mg by mouth 3 (three) times a week. 30 minutes prior to dialysis, Mon,Wed and Fridau)     omeprazole (PRILOSEC) 20 MG capsule Take 20 mg by mouth daily with lunch.      senna-docusate (SENNOSIDES-DOCUSATE SODIUM) 8.6-50 mg tablet Take 1 tablet by mouth at bedtime as needed for constipation.      sevelamer carbonate (RENVELA) 800 mg tablet Take 1,600 mg by mouth 3 (three) times a day with meals.     timolol maleate (TIMOPTIC) 0.5 % ophthalmic solution Administer 1 drop to both eyes 2 (two) times a day.      traMADol (ULTRAM) 50 mg tablet Take 1 tablet (50 mg total) by mouth every 8 (eight) hours as needed for pain.     warfarin (COUMADIN) 2.5 MG tablet Take 2.5 mg daily        DISCHARGE DIAGNOSIS:    ICD-10-CM    1. Essential hypertension I10    2. Acute on chronic diastolic congestive heart failure I50.33    3. Leg weakness M62.81    4. ZULEIKA (obstructive sleep apnea), severe, intolerant of CPAP G47.33        MEDICAL EQUIPMENT NEEDS:  Ailyn Pardo    DISCHARGE PLAN/FACE TO FACE:  I certify that services are/were furnished while this patient was under the care of a physician and that a physician or an allowed non-physician practitioner (NPP), had a face-to-face encounter that meets the physician face-to-face encounter requirements. The encounter was in whole, or in part, related to the primary reason for home health. The patient is confined to his/her home and needs intermittent skilled nursing, physical therapy, speech-language pathology, or the continued need for occupational therapy. A plan of care has been established by a physician and is periodically reviewed by a physician.  Date of Face-to-Face Encounter: May 2, 2017    I certify that, based on my findings, the following services are medically necessary home health services: Discharging to home with current medications with outpatient physical therapy     My clinical findings support the need for the above skilled services because: (Please write a brief narrative summary that describes what the RN, PT, SLP, or other services will be doing in the home. A list of diagnoses in this section does not meet the CMS requirements.)  She will not require any skilled services     This patient is homebound because: (Please write a brief narrative summary describing the functional limitations as to why this patient is homebound and specifically what makes this patient homebound.)  She will not be homebound.     The patient is, or has been, under my care and I have initiated the establishment of the plan of care. This patient will be followed by a physician who will periodically review the plan of care.    She will follow-up with cardiology  and continue with dialysis 3 times weekly.  She will also require home oxygen due to intolerance of CPAP machine for her sleep apnea.  Continue to monitor her blood pressures.  She will also follow-up with her primary care doctor regarding medication management.  Discharge coordination of care greater than 30 minutes.  Electronically signed by: Michael Duane Johnson, CNP

## 2021-06-10 NOTE — PROGRESS NOTES
[facility]    Facility:   [facility] [[id number]]   Code Status: FULL CODE      CHIEF COMPLAINT/REASON FOR VISIT:  Chief Complaint   Patient presents with     Follow Up     chf, rehab       HISTORY:      HPI: Belia is a 71 y.o. female who I had the pleasure to visit with today secondary to chronic medical conditions including hospitalization April 9 for congestive heart failure with a history of diastolic heart failure.  She has been hypotensive.  Recently decrease metoprolol 50 mg twice daily rather than 100 mg twice daily most of the systolic blood pressure still less than 118 will now decrease metoprolol 25 mg twice daily.  She is aware of this change and does like to change otherwise she is on Midrin prior to her dialysis.  Also is on Coumadin the levels have been a little bit low we will now increase from 3 and now put her at 5 mg recheck again this Friday the 28th.  She is still doing therapy the legs are slowly improving and using a 4 wheeled walker.  She is now working on her arms to improve her arm strength.  Appetite is good.  No pain.  Attends dialysis 3 times weekly.  We did have a conversation about quilting today.    Past Medical History:   Diagnosis Date     Arthritis      CHF (congestive heart failure)      Chronic anemia 6/1/2014     Chronic kidney disease      Chronic thoracic aortic dissection 10/7/2015    Descending thoracic aorta; treated medically per notes of Dragan Singh and Jennifer.     COPD (chronic obstructive pulmonary disease)      CVA (cerebral infarction)      Disease of thyroid gland      Dyslipidemia      ESRD (end stage renal disease) 06/03/2009    on dialysis with Dr. Mitchell     Essential hypertension 6/30/2014     GI (gastrointestinal bleed)      Gout      L3 vertebral fracture 11/16/2015     Obesity      ZULEIKA (obstructive sleep apnea), severe, intolerant of CPAP 10/22/2015     Pneumonia 9-7-2015     Right foot drop      Spinal stenosis  3/28/2016     Stroke 3/24/2016             Family History   Problem Relation Age of Onset     Dementia Mother      Diabetes Mother      Arthritis Mother      Cancer Mother      Depression Mother      Heart disease Mother      Vision loss Mother      Stroke Father      Heart disease Father      Social History     Social History     Marital status:      Spouse name: Cayden     Number of children: 2     Years of education: N/A     Occupational History      Retired     Social History Main Topics     Smoking status: Former Smoker     Packs/day: 1.50     Years: 37.00     Types: Cigarettes     Quit date: 1/1/2009     Smokeless tobacco: Never Used     Alcohol use 7.0 oz/week     14 Standard drinks or equivalent per week      Comment: 14 mixed drinks per week     Drug use: No     Sexual activity: No     Other Topics Concern     Not on file     Social History Narrative    Lives with her . Daughter in [place] and daughter in [place].         Review of Systems  Currently she denies any chills fever coughing wheezing chest pain dizziness or vertigo nausea vomiting diarrhea dysuria rashes sore stiff neck swollen glands or headaches. Does have a history of diastolic failure end-stage renal disease chronic anemia tachybradycardia syndrome    Current Outpatient Prescriptions:      acetaminophen (TYLENOL) 500 MG tablet, Take 500 mg by mouth every 6 (six) hours as needed for pain., Disp: , Rfl:      allopurinol (ZYLOPRIM) 100 MG tablet, Take 100 mg by mouth daily with lunch. , Disp: , Rfl:      aspirin 81 MG EC tablet, Take 1 tablet (81 mg total) by mouth daily. (Patient taking differently: Take 81 mg by mouth daily with lunch. ), Disp: , Rfl: 0     atorvastatin (LIPITOR) 10 MG tablet, Take 10 mg by mouth bedtime. , Disp: , Rfl:      B complex-vitamin C-folic acid 100-1 mg Tab, Take 1 tablet by mouth at bedtime. , Disp: , Rfl:      cholecalciferol, vitamin D3, 2,000 unit cap, Take 2,000 Units by mouth daily with lunch.  , Disp: , Rfl:      digoxin (LANOXIN) 125 mcg tablet, Take 1 tablet (125 mcg total) by mouth 3 (three) times a week., Disp: 30 tablet, Rfl: 1     diphenhydrAMINE (BENADRYL) 50 MG tablet, Take 50 mg by mouth at bedtime as needed for sleep. , Disp: , Rfl:      folic acid (FOLVITE) 1 MG tablet, Take 1 mg by mouth daily with lunch. , Disp: , Rfl:      gabapentin (NEURONTIN) 100 MG capsule, Take 100 mg by mouth 3 (three) times a day., Disp: , Rfl:      Lactobacillus rhamnosus GG (CULTURELLE) 10-15 Billion cell capsule, Take 1 capsule by mouth daily with lunch., Disp: , Rfl:      metoprolol tartrate (LOPRESSOR) 100 MG tablet, Take 1 tablet (100 mg total) by mouth 2 (two) times a day. (Patient taking differently: Take 25 mg by mouth 2 (two) times a day. Takes at noon and bedtime), Disp: 180 tablet, Rfl: 5     midodrine (PROAMATINE) 5 MG tablet, Take 1 tablet (5 mg total) by mouth 3 (three) times a week. 30 minutes prior to dialysis (Patient taking differently: Take 5-10 mg by mouth 3 (three) times a week. 30 minutes prior to dialysis, Mon,Wed and Fridau), Disp: 30 tablet, Rfl: 0     omeprazole (PRILOSEC) 20 MG capsule, Take 20 mg by mouth daily with lunch. , Disp: , Rfl:      senna-docusate (SENNOSIDES-DOCUSATE SODIUM) 8.6-50 mg tablet, Take 1 tablet by mouth at bedtime as needed for constipation. , Disp: , Rfl:      sevelamer carbonate (RENVELA) 800 mg tablet, Take 1,600 mg by mouth 3 (three) times a day with meals., Disp: , Rfl:      timolol maleate (TIMOPTIC) 0.5 % ophthalmic solution, Administer 1 drop to both eyes 2 (two) times a day. , Disp: , Rfl:      traMADol (ULTRAM) 50 mg tablet, Take 1 tablet (50 mg total) by mouth every 8 (eight) hours as needed for pain., Disp: 20 tablet, Rfl: 0     warfarin (COUMADIN) 2.5 MG tablet, Take 2.5 mg daily, Disp: 90 tablet, Rfl: 5    .  Blood pressure 111/70 pulse 72 respirations 20 temperature 98.0    Physical Exam    Constitutional: She is oriented to person, place, and time. No  distress.   HENT:   Head: Normocephalic.   Eyes: Pupils are equal, round, and reactive to light.   Neck: Neck supple. No thyromegaly present.   Cardiovascular:   Irregularity. No lower extremity edema.   Pulmonary/Chest: Breath sounds normal.   Sleep apnea/ZULEIKA. Intolerant of CPAP. COPD. History of tobacco abuse   Abdominal: Bowel sounds are normal. There is no tenderness. There is no guarding.   Genitourinary:   Genitourinary Comments: End-stage renal disease.   Musculoskeletal:   Generalized weakness. Deconditioning.   Lymphadenopathy:   She has no cervical adenopathy.   Neurological: She is oriented to person, place, and time.   Skin: Skin is warm and dry. No rash noted.   Psychiatric: Her behavior is normal.    LABS:   Results for orders placed or performed during the hospital encounter of 04/08/17   Basic metabolic panel   Result Value Ref Range    Sodium 136 136 - 145 mmol/L    Potassium 4.9 3.5 - 5.0 mmol/L    Chloride 101 98 - 107 mmol/L    CO2 21 (L) 22 - 31 mmol/L    Anion Gap, Calculation 14 5 - 18 mmol/L    Glucose 96 70 - 125 mg/dL    Calcium 9.1 8.5 - 10.5 mg/dL    BUN 51 (H) 8 - 22 mg/dL    Creatinine 6.60 (HH) 0.60 - 1.10 mg/dL    GFR MDRD Af Amer 8 (L) >60 mL/min/1.73m2    GFR MDRD Non Af Amer 6 (L) >60 mL/min/1.73m2       Lab Results   Component Value Date    WBC 5.2 04/10/2017    HGB 11.8 (L) 04/10/2017    HCT 39.4 04/10/2017     (H) 04/10/2017    PLT 62 (L) 04/10/2017     Decreasing metoprolol 25 mg twice daily.  Increase in the Coumadin recheck pro time on the 28th.  Working on arm strength at this time.    ASSESSMENT:      ICD-10-CM    1. Essential hypertension I10    2. Atrial fibrillation with RVR I48.91    3. Acute on chronic diastolic congestive heart failure I50.33    4. Leg weakness M62.81        PLAN:    Decreasing the metoprolol 25 mg twice daily rather than 50 mg twice daily.  Increase the Coumadin 5 mg recheck pro time on the 28th.  Working on her arm strength and  therapy.    Electronically signed by: Michael Duane Johnson, CNP

## 2021-06-10 NOTE — PROGRESS NOTES
[facility] Certification Request    May 24, 2017      Patient: Belia Rodriguez  MR Number: [id number]  YOB: 1945  Date of Visit: 5/24/2017      Dear Dr. Kitchen:    Thank you for this referral.   We are seeing Belia Rodriguez for Physical Therapy.    Medicare and/or Medicaid requires physician review and approval of the treatment plan. Please review the plan of care and verify that you agree with the therapy plan of care by co-signing this note.      Plan of Care  Authorization / Certification Start Date: 05/24/17  Authorization / Certification End Date: 08/22/17  Authorization / Certification Number of Visits: 10  Times per Week: 2  Number of Weeks: 6  Number of Visits: 12   Therapeutic Exercise: ROM;Stretching;Strengthening  Neuromuscular Reeducation: core;posture;balance/proprioception  Manual Therapy: soft tissue mobilization;myofascial release;joint mobilization;muscle energy (if needed)    Goals:  Pt. will be independent with home exercise program in : 6 weeks  Pt. will improve gait speed : > 1.0m/s;for decreased risk of falls;in 6 weeks  Patient will ascend / descend: stairs;with railing;without assistive device;with less difficulty;in 6 weeks  Patient will increase : Lopez score;for improved quality of function;in 6 weeks;by _ points  by ___ points: 10   Patient will decrease : TUG score;for improved quality of function;in 6 weeks      If you have any questions or concerns, please don't hesitate to call.    Sincerely,      Elza Elizabeth, PT        Physician recommendation:     ___ Follow therapist's recommendation        ___ Modify therapy      *Physician co-signature indicates they certify the need for these services furnished within this plan and while under their care.        [facility]   Balance Initial Evaluation    Patient Name: Belia Rodriguez  Date of evaluation: 5/24/2017  Referral Diagnosis: right foot drop, leg weakness, bilateral   Referring provider:  Raysa Kitchen C*  Visit Diagnosis:     ICD-10-CM    1. Muscle weakness (generalized) M62.81    2. Difficulty balancing R29.818        Assessment:   Belia Rodriguez is a 71 y.o. female who presents to therapy today with chief complaints of LE weakness and poor endurance with multiple medial co-morbidities. Patient tested with 7 reps on 30 sec sit to stand below the falls risk level and her age gender norm. On average her TUG score was 26 seconds with the use of a 4WW placing her at a greater risk for falls. Patient also scored a 29 on the Lopez Balance assessment increasing the likelihood of a fall. Patient will benefit from skilled PT intervention to increase strength and endurance to decrease her risk for falling and increase her overall functional mobility and independence in her home and community.    Patient will benefit from 1:1 skilled PT services to address the above limitations.     Goals:  Pt. will be independent with home exercise program in : 6 weeks  Pt. will improve gait speed : > 1.0m/s;for decreased risk of falls;in 6 weeks  Patient will ascend / descend: stairs;with railing;without assistive device;with less difficulty;in 6 weeks  Patient will increase : Lopez score;for improved quality of function;in 6 weeks;by _ points  by ___ points: 10   Patient will decrease : TUG score;for improved quality of function;in 6 weeks    Patient's expectations/goals are realistic.    Barriers to Learning or Achieving Goals:  Chronicity of the problem.  Co-morbidities or other medical factors.  many, see snap shot for details       Plan / Patient Instructions:        Plan of Care:   Authorization / Certification Start Date: 05/24/17  Authorization / Certification End Date: 08/22/17  Authorization / Certification Number of Visits: 10  Times per Week: 2  Number of Weeks: 6  Number of Visits: 12   Therapeutic Exercise: ROM;Stretching;Strengthening  Neuromuscular Reeducation:  core;posture;balance/proprioception  Manual Therapy: soft tissue mobilization;myofascial release;joint mobilization;muscle energy (if needed)    Plan for next visit: gait speed, 2 minute walk test, NuStep. Begin standing LE strengthening exercises      Subjective:         Diagnosis: right foot drop, B LE weakness s/p stroke   Date of diagnosis:   Mechanism of Injury:   Past medical history/precautions: cardiac, dialysis for renal disease     Living Situation:Home and stairs at home  Caregiver Support:    Equipment: 4WW, quad cane  Prior Functional Status: independent   Current Activity Level: independent   Chief Complaint: Patient is coming to therapy with complaints of right foot drop that has been there for more than 20 years following a pinched nerve in her back and by the time it was addressed it was too late to be corrected. She does have a AFO for her foot drop, but she doesn't wear it all of the time but feels maybe she should be wearing it more. She states she was told at one of her hospital stays in October she had had a stroke but she didn't have much information about this. She states recently she fell at home when she was working in the kitchen and fell, had to have paramedics come to get her up, then she fell again on the steps going up and was not able to get up again, went to ER via ambulance again, she is worried about another possible stroke or TIA but she doesn't know. Her biggest complaint is the steps.   Might be having a pacemaker placed in the next 4-6 weeks   PMHx: extensive for cardiology, renal function on dialysis 3 days a week, stroke see snap shot for more details.     Patient's Functional Goal: to get strong enough to go up the steps    Fall history:Rare: Date of last fall: 2017      Pain  Pain Ratin  Pain rating at best: 6  Pain rating at worst: 9  Pain description: aching, pain and hurts bad pain         Objective:      Note: Items left blank indicates the item was not  performed or not indicated at the time of the evaluation.    Balance Examination  1. Muscle weakness (generalized)     2. Difficulty balancing       Involved side: Bilateral     Assistive Device: 4WW    Strength    Strength   L / R ROM   L / R Comments   Seated Hip Flexion 4/4      Knee Extension 4+/4+      Dorsi Flexion 4+/trace              Timed Up and Go (TUG): Trial 1: 27.04 seconds Trial 2: 25.57 seconds  Trial 3:  seconds  Average: 26.31 seconds  Age gender norm: 7-9 seconds  AD used? 4WW    TUG Motor:  seconds  TUG Cognitive: 29.88 seconds  Task: count down from 100 by 3's - 100 down to 82 accurately     30 second sit<>stand: 7 reps, using both hand for assist and does not take them off of chair for balance   UE support? B UEs on chair  Age gender norm: 16    Balance:    Firm Surface (seconds) Unstable Surface (seconds)    EO EC EO EC   Romberg (feet together)       Sharpened Romberg (tandem)       Semi-Romberg (small step)           Other Balance Test: (Lopez, Tinettti, FGA, Mini-BESTest)    Lopez Balance Scale:  1) Sitting to standing (3)   2) Standing unsupported (3)  3) Sitting with back unsupported but feet supported on floor (4)  4) Standing to sitting (3)  5) Transfer(3)  6) Standing unsupported with eyes closed (0)  7) Standing unsupported with feet together (2)  8) Reaching forward with outstretched arm while standing (2)  9)  object from the floor from a standing position (1)  10) Turning to look behind left and right shoulder while standing (3)  11) Turn 360 degrees (2)  12) Place alternate foot on step/stool while standing unsupported (0)  13) Standing unsupported one foot in front (2)  14) Standing on one leg (1)    Total score: 29/56 (<45/56 falls risk)     Gait speed (10 meter walk test):  Comfortable gait speed: did not test today will test at next appointment  Age gender norm: 1.44 m/sec +/- 0.25  AD used? 4WW    Fast gait speed: not tested today  Age gender norm: 1.87 m/sec +/-  0.30  AD used? 4WW    2 minute walk test to be completed at the next session  Age/Gender norm: 155.2 meters      Treatment Today   5/24/2017  TREATMENT MINUTES COMMENTS   Evaluation 60 Mod complex weakness evaluation due to medical history    Self-care/ Home management     Manual therapy     Neuromuscular Re-education     Therapeutic Activity     Therapeutic Exercises  Sit to stands x 8 - 10 reps at home 2 times per day   Gait training     Modality__________________                Total 60     Blank areas are intentional and mean the treatment did not include these items.   PT Evaluation Code: (Please list factors)  Patient History/Comorbidities: chronic renal disease with dialysis, extensive cardiac disease with needing a pacemaker placed in near future   Examination: see above   Clinical Presentation: changing or evolving condition with needing pacemaker placement and the cardiac tachycardia condition unchecked as well as needing dialysis 3 times per week would effect her overall strength and endurance in a changing way  Clinical Decision Making: moderate     Patient History/  Comorbidities Examination  (body structures and functions, activity limitations, and/or participation restrictions) Clinical Presentation Clinical Decision Making (Complexity)   No documented Comorbidities or personal factors 1-2 Elements Stable and/or uncomplicated Low   1-2 documented comorbidities or personal factor 3 Elements Evolving clinical presentation with changing characteristics Moderate   3-4 documented comorbidities or personal factors 4 or more Unstable and unpredictable High     Elza Elizabeth, PT, DPT  5/24/2017  2:24 PM

## 2021-06-10 NOTE — PROGRESS NOTES
INR 2.5 will continue on current dose of 5 mg daily retest in 1 weeks. Pt is on dialysis. Has history of bleeding. After talking with pt and discussing history of greens/salads and medication change. Pt will  continue  with current diet and dosing of Warfarin.  Continue with moderation of Vit K and green leafy vegetables. Cautioned to call with increase bruising or bleeding. Reminded to call with medication change especially antibiotic. Call with any questions or concerns or any up coming procedures. Cautioned about using Herbal medication.

## 2021-06-10 NOTE — PROGRESS NOTES
[facility] Care for [facility]/ Geriatrics    Facility:  [facility] [[id number]]    Code Status:  DNR/DNI    Chief Complaint   Patient presents with     Review Of Multiple Medical Conditions   :                    Patient Active Problem List   Diagnosis     Tachycardia-bradycardia syndrome (H)     Hyperkalemia     Essential hypertension with goal blood pressure less than 140/90     Hyperlipidemia     ZULEIKA (obstructive sleep apnea), severe     Anemia of chronic renal failure, stage 5 (H)     Dissection of thoracoabdominal aorta (H)     Gout     GERD (gastroesophageal reflux disease)     Right foot drop     Acute midline low back pain with right-sided sciatica     History of compression fracture of spine     Pulmonary emphysema (H)     Presence of Watchman left atrial appendage closure device     Other dysphagia     Borderline glaucoma with ocular hypertension     Hyperparathyroidism (H)     Osteoporosis     Venous tributary (branch) occlusion of retina     ESRD on dialysis (H)     Acute pulmonary edema (H)     Chronic atrial fibrillation (H)     Acute on chronic diastolic congestive heart failure (H)     Thrombocytopenia (H)     Contraindication to anticoagulation therapy     Dyspnea     Cardiac pacemaker in situ     S/P AV (atrioventricular) jonn ablation     Hip fracture, intertrochanteric (H)     DVT (deep venous thrombosis) (H)     Carpal tunnel syndrome of left wrist     SOB (shortness of breath)     Hypotension, unspecified hypotension type       History:  Belia Rodriguez  is a 74 year old female with history of end-stage renal disease on CCR T, tachybradycardia syndrome/AV jonn ablation/permanent pacemaker, atrial fibrillation status post watchman device, abdominal aortic aneurysm dissection, hypertension, COPD, chronic hypoxic respiratory failure, ZULEIKA, gout, GERD, glaucoma on timolol, depression seen for admission to TCU on 8/4/2020    Hospital Course: Patient was hospitalized between July 13  "and July 20 presenting with edema and shortness of breath despite not missing her scheduled outpatient dialysis days.  She was diagnosed with acute on chronic diastolic heart failure and was diuresed 11 L by nephrology/dialysis on successive days.    Her dialysis is complicated by severe hypotension despite midodrine therapy.  It hypotension resulted in discontinuation of her metoprolol.  Her in dialysis weight target is 70.5 kg.    Due to the severity of her heart failure/hypertension with complicated dialysis, comfort care/hospice approach has been discussed with patient and she is considering it though she remained full code up until to my visit here with her today, she is now DNR/DNI per her request.    Patient did have an echocardiogram this hospitalization which showed no LV dysfunction and no valvular dysfunction.  She had normal thyroid function testing.  She had \"equivocal cortisol\" but in the end adrenal insufficiency was not suspected as a cause of her hypotension.    Subjective/ROS:    -augmented by discussion with facility staff involved in direct care    -Patient is wondering what happened to the orders we discussed last week.  It turns out that all of the orders in the facility that I wrote last week were lost.  No explanation has been discovered by investigation to this point.  I apologized to her, and said if more is learned about what happened I would share with her.  As result she never got her guaifenesin up.  In the meanwhile Ms. Gamez appropriately gave her Liquibid but she does not like the pill.  She wants her syrup back even though it is a much lesser dose she likes it better and there may be a psychological benefit to her from the syrup form.    -Patient also requests a refill of her Coricidin which she has taken with her for years.  Chlorpheniramine 4 mg is the primary ingredient there is also some dextromethorphan.  She says she uses it for her runny nose which is \"running like a faucet\" " "and has been a chronic problem for her.  It was bad enough earlier this week that it plugged her nasal cannula.    -My DNR/DNI order was not transcribed with lost orders last week, fortunately Ms. Gamez followed up on that discussion and has enacted the wishes of the patient.  I reviewed those with her today and she remains settled with the current orders.    -She forgot to ask her nephrology team about the Florinef.  She does say that she used to be quite symptomatic with her orthostatic hypotension but the symptoms have basically gone away even though she will be hypotensive especially during dialysis.  She is taking the Midrin 15 3 times daily except on dialysis days which they gave her an extra 10 before dialysis and 10 long term through dialysis.      -Patient reports that she continues to feel better although she has not had steady progress in therapy.  She reports there was a day last week that she walked around the entire [place] without resting and this time she had to stop 4 times because of shortness of breath but she has not had fever sweats chills denies orthopnea PND (but does have postnasal drip), occasional cough unchanged.  Her appetite is good.  He is moving her bowels usually has no bladder issues.  She says she is sleeping \"like a rock\".  She thinks the trazodone is the key for that.  She does take 150 mg daily.  Peripheral neuropathy is unchanged.      - Patient says she is feeling better.  She is less short of breath think she is probably back to her chronic baseline.  She denies chest pain orthopnea PND.  She says her edema is back to baseline, she has chronic edema in the right foot area but very little elsewhere.  She was requiring 3 L oxygen at discharge.    - Patient  sees Dr. Dl Kinney from nephrology, was a long-term patient of Dr. Mitchell prior to that.        Past Medical History:   Diagnosis Date     Arthritis      CHF (congestive heart failure) (H)      Chronic anemia 6/1/2014     " Chronic kidney disease      Chronic thoracic aortic dissection (H) 10/7/2015    Descending thoracic aorta; treated medically per notes of Dragan Singh and Jennifer.     COPD (chronic obstructive pulmonary disease) (H)      CVA (cerebral infarction)      Disease of thyroid gland      Dyslipidemia      ESRD (end stage renal disease) (H) 06/03/2009    on dialysis with Dr. Mitchell     Essential hypertension 6/30/2014     Gastrointestinal hemorrhage, unspecified gastrointestinal hemorrhage type 6/5/2017     GI (gastrointestinal bleed)      GI bleeding 6/5/2017     Gout      L3 vertebral fracture (H) 11/16/2015     Left Atrial Appendage Occlusion (WATCHMAN) 4/5/2018    LAAO April 5, 2018 (30 mm WATCHMAN)     Obesity      ZULEIKA (obstructive sleep apnea), severe, intolerant of CPAP 10/22/2015     Oxygen dependent     3L nc     Pneumonia 9-7-2015     Right foot drop      Spinal stenosis 3/28/2016     Stroke (H) 3/24/2016     Past Surgical History:   Procedure Laterality Date     BACK SURGERY      [facility]     COLONOSCOPY N/A 3/23/2016    Procedure: COLONOSCOPY;  Surgeon: Ruddy Tejada MD;  Location: [facility] GI;  Service:      DILATION AND CURETTAGE OF UTERUS       EP ABLATION AV NODE N/A 3/7/2019    Procedure: EP Ablation AV Node;  Surgeon: Derick Duarte MD;  Location: [facility];  Service: Cardiology     EP NEGRA CLOSURE N/A 4/5/2018    Procedure: EP NEGRA Closure;  Surgeon: Derick Duarte MD;  Location: [facility];  Service:      EP PACEMAKER INSERT N/A 3/7/2019    Procedure: EP Pacemaker Insertion;  Surgeon: Derick Duarte MD;  Location: [facility];  Service: Cardiology     EYE SURGERY       HERNIA REPAIR       IR TUNNELED CATHETER INSERT  11/20/2018     IR TUNNELED CATHETER REMOVAL  11/20/2018     ND COLSC FLEXIBLE W/CONTROL BLEEDING ANY METHOD N/A 6/7/2017    Procedure: COLONOSCOPY;  Surgeon: Luis Mckeon MD;  Location: [facility];  Service: Gastroenterology     ND OPEN  FIX INTER/SUBTROCH FX,IMPLNT Left 2019    Procedure: INTERNAL FIXATION, FRACTURE, TROCHANTERIC, HIP, USING INTERMEDULLARY NAIL;  Surgeon: Bennie Marsh DO;  Location: [facility];  Service: Orthopedics     TONSILLECTOMY            Family History   Problem Relation Age of Onset     Dementia Mother      Diabetes Mother      Arthritis Mother      Cancer Mother      Depression Mother      Heart disease Mother      Vision loss Mother      Stroke Father      Heart disease Father      Breast cancer Neg Hx    :   Her mom had glaucoma and dementia  Her dad had COPD and asbestosis as well she is an only child.    Social History     Socioeconomic History     Marital status:      Spouse name: Cayden     Number of children: 2     Years of education: Not on file     Highest education level: Not on file   Occupational History     Employer: RETIRED   Social Needs     Financial resource strain: Not on file     Food insecurity     Worry: Not on file     Inability: Not on file     Transportation needs     Medical: Not on file     Non-medical: Not on file   Tobacco Use     Smoking status: Former Smoker     Packs/day: 1.50     Years: 37.00     Pack years: 55.50     Types: Cigarettes     Last attempt to quit: 2009     Years since quittin.5     Smokeless tobacco: Never Used   Substance and Sexual Activity     Alcohol use: No     Alcohol/week: 11.7 standard drinks     Types: 14 Standard drinks or equivalent per week     Comment: 14 mixed drinks per week     Drug use: No     Sexual activity: Never     Partners: Male   Lifestyle     Physical activity     Days per week: Not on file     Minutes per session: Not on file     Stress: Not on file   Relationships     Social connections     Talks on phone: Not on file     Gets together: Not on file     Attends Mandaen service: Not on file     Active member of club or organization: Not on file     Attends meetings of clubs or organizations: Not on file      Relationship status: Not on file     Intimate partner violence     Fear of current or ex partner: Not on file     Emotionally abused: Not on file     Physically abused: Not on file     Forced sexual activity: Not on file   Other Topics Concern     Not on file   Social History Narrative    Lives with her . Daughter in [place] and daughter in [place].   :    Patient and her spouse live in [facility].  They have been in the house for about 30 years.    Current Outpatient Medications on File Prior to Visit   Medication Sig Dispense Refill     acetaminophen (TYLENOL) 500 MG tablet Take 1,000 mg by mouth 3 (three) times a day as needed.       artificial tears,hypromellose, (GENTEAL; SYSTANE) 0.3 % Gel Administer 1 drop to both eyes as needed.       aspirin 81 MG EC tablet Take 1 tablet (81 mg total) by mouth daily.  0     benzocaine-menthoL (CEPACOL) 15-3.6 mg Take 1 lozenge by mouth every hour as needed.       buPROPion (WELLBUTRIN XL) 150 MG 24 hr tablet Take 1 tablet (150 mg total) by mouth 2 (two) times a week. Tuesday and saturday  0     chlorpheniramine/dextromethorp (CORICIDIN HBP COUGH AND COLD ORAL) Take by mouth. Take 1 tablet 4 times daily as needed nasal drainage       cholecalciferol, vitamin D3, 1,000 unit (25 mcg) tablet Take 2,000 Units by mouth daily.       cinacalcet (SENSIPAR) 30 MG tablet Take 30 mg by mouth see administration instructions. Take three times weekly with dialysis (on Mondays, Wednesdays, and Fridays).             famotidine (FOR PEPCID) 10 MG tablet Take 10 mg by mouth daily as needed for heartburn.       folic acid (FOLVITE) 1 MG tablet TAKE ONE TABLET BY MOUTH ONCE DAILY 90 tablet 4     gabapentin (NEURONTIN) 100 MG capsule TAKE 1 CAPSULE BY MOUTH THREE TIMES DAILY 270 capsule 3     Lactobacillus rhamnosus GG (CULTURELLE) 10-15 Billion cell capsule Take 1 capsule by mouth daily with lunch.       lidocaine (LMX) 4 % cream Apply topically 3 (three) times a day.        loratadine (CLARITIN) 10 mg tablet Take 10 mg by mouth daily as needed.        midodrine (PROAMATINE) 10 MG tablet Take 1.5 tablets (15 mg total) by mouth 3 (three) times a day with meals. (Patient taking differently: Take 15 mg by mouth 3 (three) times a day with meals. Patient reports that she usually gets 10 mg before dialysis and another 10 mg [facility] through dialysis as well)       multivitamin (DAILY-OLGA) per tablet Take 1 tablet by mouth daily.       senna-docusate (SENNOSIDES-DOCUSATE SODIUM) 8.6-50 mg tablet Take 1 tablet by mouth daily as needed for constipation.        sevelamer carbonate (RENVELA) 800 mg tablet Take 1 tablet (800 mg total) by mouth 2 (two) times a day as needed (FOr snacks.).  0     sevelamer HCL (RENAGEL) 800 MG tablet Take 2,400 mg by mouth 3 (three) times a day with meals. And take 2 tablets with snacks       timolol maleate (TIMOPTIC) 0.5 % ophthalmic solution Administer 1 drop to both eyes 2 (two) times a day.        traZODone (DESYREL) 50 MG tablet TAKE 1&1/2 TABLETS (75MG) BY MOUTH AT BEDTIME. (Patient taking differently: Take 150 mg by mouth at bedtime. ) 135 tablet 3     UNABLE TO FIND Med Name: guaifenesin 100/5cc  Take 5cc q 4 prn       [DISCONTINUED] famotidine (PEPCID) 20 MG tablet Take 1 tablet (20 mg total) by mouth 2 (two) times a day as needed for heartburn. (Patient taking differently: Take 10 mg by mouth daily as needed for heartburn. ) 180 tablet 2     [DISCONTINUED] B complex-vitamin C-folic acid (DIALYVITE) 100-1 mg Tab Take 1 tablet by mouth daily with lunch.              No current facility-administered medications on file prior to visit.    :      ALLERGIES:  Ace inhibitors; Atrovent [ipratropium bromide]; Fosinopril sodium; and Zolpidem    Vitals:      Current Vitals   BP: 96/60 mmHg  8/4/2020 08:47    Temp:97.1  F  8/4/2020 08:47  Pulse: 71 bpm  8/4/2020 10:34    Weight: 157.9 Lbs  8/4/2020 09:48  Resp: 18 Breaths/min  8/2/2020 10:59  BS:  O2: 94 %  8/4/2020  10:48  Pain: 0  8/3/2020 20:56  Physical exam:    General: Patient is again alert oriented x3 quite sharp and a good historian.  She is normally conversant.  Lungs reveal decreased breath sounds in the bases posteriorly but is moving air easily without rales wheezing or accessory muscle use.  Heart is regular S1-S2 without murmur gallop or rub abdomen is soft.  Extremities show that she continues to have some edema in her right foot which is chronic but otherwise look looking good.  rsum foot which is chronic she reports.      Due to the 2020 Covid 19 pandemic, except as noted above, the patient was visually observed at a 6 foot plus distance.  An observational exam was performed in an effort to keep patient safe from Covid 19 and other communicable diseases.   Labs:  Lab Results   Component Value Date    WBC 3.8 (L) 07/23/2020    HGB 10.5 (L) 07/23/2020    HCT 36.8 07/23/2020     (H) 07/23/2020     (L) 07/23/2020     Results for orders placed or performed in visit on 07/23/20   Basic Metabolic Panel   Result Value Ref Range    Sodium 135 (L) 136 - 145 mmol/L    Potassium 4.2 3.5 - 5.0 mmol/L    Chloride 105 98 - 107 mmol/L    CO2 21 (L) 22 - 31 mmol/L    Anion Gap, Calculation 9 5 - 18 mmol/L    Glucose 95 70 - 125 mg/dL    Calcium 9.6 8.5 - 10.5 mg/dL    BUN 17 8 - 28 mg/dL    Creatinine 3.89 (H) 0.60 - 1.10 mg/dL    GFR MDRD Af Amer 14 (L) >60 mL/min/1.73m2    GFR MDRD Non Af Amer 11 (L) >60 mL/min/1.73m2         Lab Results   Component Value Date    TSH 3.89 07/18/2020     Lab Results   Component Value Date    HGBA1C 5.7 06/01/2014     [unfilled]  Lab Results   Component Value Date    YQYGATLL91 592 10/09/2015     Lab Results   Component Value Date     (H) 07/13/2020     [unfilled]  Most Recent EKG     Units 07/13/20  1501   VENTRATE BPM 70   ATRIALRATE BPM 68   QRSDURATION ms 140   QTINTERVAL ms 448   QTCCALC ms 483   RAXIS degrees 211   TAXIS degrees 88   MUSEDX  ** Suspect arm lead  reversal, interpretation assumes no reversal  Wide QRS rhythm  Non-specific intra-ventricular conduction block  Anterolateral infarct , age undetermined  Abnormal ECG  When compared with ECG of 13-JUL-2020 14:40,  No significant change was found  Confirmed by SEE ED PROVIDER NOTE FOR, ECG INTERPRETATION (4000),  ADIA PEACOCK (162) on 7/13/2020 3:18:25 PM           Assessment/Plan:      ICD-10-CM    1. Pulmonary emphysema, unspecified emphysema type (H)  J43.9    2. Gout, unspecified cause, unspecified chronicity, unspecified site  M10.9    3. Presence of Watchman left atrial appendage closure device  Z95.818    4. Acute pulmonary edema (H)  J81.0    5. Acute on chronic diastolic congestive heart failure (H)  I50.33        End-stage renal disease  CC RT  Severe hypotension   Patient's hypotension is a bit less severe this past week.  She is worse at dialysis she reports and is given extra midodrine there.  She forgot to ask about the Florinef--- she still has the notes that I gave her last week and she will ask them when she goes in for dialysis tomorrow.  Again I assume they have considered this, and I remind her that her nephrology team is the expert team on managing hypotension especially associated with dialysis.  she says she intends to discuss it with the dialysis staff tomorrow.  -See my previous note regarding her hospice consideration.  Does not hold for the moment.  She now plans to return home and continue on with dialysis and other current cares for the foreseeable future.  -Her metoprolol has been discontinued due to the severity of the hypotension    -I asked the TCU staff to call her ophthalmology clinic to see if her ophthalmologist would be okay with going off her timolol eyedrops or substituting an alternative.  It may have little to no effect on her systemic blood pressure I realize, but we are looking for anything that might contribute positively here.  [place] is continued for the  meanwhile though.  -Sevelamer  -Cinacalcet per nephrology    Acute diastolic heart failure   Patient reports that she was dialyzed for time us last week to manage fluid.  Her weight is been stable here at 155.  -Ongoing fluid management with nephrology/dialysis  Estimated dry weight 70.5 kg  -Continue low-salt diet     Glaucoma   See timolol discussion above.    A. Fib  Watchman device  Permanent pacemaker (AV jonn ablation/tachybradycardia syndrome)   Watchman device in place.  Has permanent pacemaker.  Not on any rate controlling agent.  Pulses look okay.    Chronic hypoxic respiratory failure  COPD  Heart failure   Patient just had a PE run in the hospital:Study Result  EXAM: CTA CHEST PE RUN  LOCATION: [facility]  DATE/TIME: 7/18/2020 4:50 PM    INDICATION: Positive d-dimer, hypoxia.  COMPARISON: CT chest exam 07/13/2020 and CT chest exam 09/21/2018  TECHNIQUE: CT chest pulmonary angiogram during arterial phase injection of IV contrast. Multiplanar reformats and MIP reconstructions were performed. Dose reduction techniques were used.   CONTRAST: Iohexol (Omni) 75mL    FINDINGS:  ANGIOGRAM CHEST: Pulmonary arteries are normal caliber and negative for pulmonary emboli. Thoracic aorta is negative for dissection. No CT evidence of right heart strain.    LUNGS AND PLEURA: Small bilateral pleural effusions and bibasilar atelectasis or infiltrates have developed since the previous exam. Interstitial septal thickening persists suggesting edema.    MEDIASTINUM/AXILLAE: Mild cardiac enlargement. Low-attenuation lobulated mass in the lower left mediastinum adjacent to the spine measures 4.2 x 2.6 cm on image 102 of series 4, unchanged. Scattered subcentimeter lymph nodes elsewhere in the mediastinum  Atrial appendage occlusion device. Atherosclerotic disease thoracic aorta. Pacemaker anterior left chest wall with lead tips right atrium and ventricle. Right IJ dialysis catheter extends into the right  "atrium.    UPPER ABDOMEN: 4.3 x 4.0 cm aneurysm of upper abdomen is unchanged. Extensive plaque within the superior mesenteric artery. Bilateral renal cortical atrophy and multiple mixed high and low attenuation cysts, unchanged. Diverticulosis throughout the   visualized colon.    MUSCULOSKELETAL: Degenerative changes thoracic spine.    IMPRESSION:   1. No evidence for pulmonary emboli.  2. Mild cardiac enlargement with new, small bilateral pleural effusions and subsegmental atelectasis both lower lobes. There is also septal thickening both lungs suggesting edema and heart failure.   3. Low-attenuation mass within the left posterior inferior mediastinum is unchanged since 09/21/2018 and presumed benign.  4. Advanced atherosclerotic disease with stable 4.3 x 4.0 cm upper abdominal aortic aneurysm.  5. Bilateral renal cortical atrophy and mixed attenuation cysts.  6. Colonic diverticulosis.      Patient reports that she is doing well here.  She did have the episode of shortness of breath needing to rest with therapy after doing better days ago.  However this was a one-time incident and I am not sure of the significance.  Her weight is stable.  She is not showing any symptoms of respiratory infection.    -Continue supplemental oxygen  -Bronchodilators as need be    Depression   Wellbutrin plus trazodone as current    Peripheral neuropathy   Continue gabapentin at renal dosing.  Discontinue capsaicin per Ms. Gamez and added 4% lidocaine topically which she likes so far.    CODE STATUS   See discussion above.  Patient just met with hospice consultants yesterday.  She has not signed onto the program, at least not yet.  She says she has discussed it with her spouse food tells her to \"do what I want\".  Patient changes to DNR/DNI.  She will continue discussions regarding if and or when hospice is appropriate but plans to continue dialysis at this time.    GERD   Pepcid renally dosed 10 mg daily as needed    Chronic " cough  Rhinorrhea/postnasal drip, chronic   Patient is frustrated that she cannot be on her home medication program that has worked so well.  We talked this through and I think we will try to replicate it since she has tolerated it  -Discontinue Liquibid tablets, even though she is getting more medication and one would think this would be superior for her.  -She prefers guaifenesin 100 mg per 5 cc which she has used at home.  She will take 5 cc every 4 hours as needed  - Coricidin, and her usual over-the-counter preparation, combination of chlorpheniramine and dextromethorphan is ordered 1 4 times daily as needed  -Patient strongly wishes to avoid any inhaled nasal preparation/medications such as ipratropium or fluticasone/equivalents    Peripheral vascular disease   Status post rupture of AAA.  No longer on metoprolol due to hypertensive issues.  No longer on statins.    Other chronic problems: Include but not limited to ZULEIKA, gout, diverticulosis, hip/femur fractures, back surgery, constipation       Case discussed with:    Facility staff           Derick San MD

## 2021-06-10 NOTE — PROGRESS NOTES
[facility]    Facility:   [facility] [[id number]]   Code Status: FULL CODE      CHIEF COMPLAINT/REASON FOR VISIT:  Chief Complaint   Patient presents with     Problem Visit     cough       HISTORY:      HPI: Belia is a 74 y.o. female who was admitted to [facility] from 7/13/20-7/20/20 for acute on chronic CHF that was managed with daily HD runs.  She is on HD for ESRD with MWF schedule.  It was recommended that she consider hospice services during her hospitalization with progression of CHF, ESRD, and COPD.  Itzel  has a past medical history of Arthritis, CHF (congestive heart failure) (H), Chronic anemia (6/1/2014), Chronic kidney disease, Chronic thoracic aortic dissection (H) (10/7/2015), COPD (chronic obstructive pulmonary disease) (H), CVA (cerebral infarction), Disease of thyroid gland, Dyslipidemia, ESRD (end stage renal disease) (H) (06/03/2009), Essential hypertension (6/30/2014), Gastrointestinal hemorrhage, unspecified gastrointestinal hemorrhage type (6/5/2017), GI (gastrointestinal bleed), GI bleeding (6/5/2017), Gout, L3 vertebral fracture (H) (11/16/2015), Left Atrial Appendage Occlusion (WATCHMAN) (4/5/2018), Obesity, ZULEIKA (obstructive sleep apnea), severe, intolerant of CPAP (10/22/2015), Oxygen dependent, Pneumonia (9-7-2015), Right foot drop, Spinal stenosis (3/28/2016), and Stroke (H) (3/24/2016).   The patient is currently at [facility] s/p hospitalization for acute rehabilitation.      Today Ms. Rodriguez is being evaluated for cough.  Itzel reported that she felt that her hospice meeting went well and she feels ready to discuss hospice services with her family.  She said that she is between going to a hospice home versus staying at her current facility for hospice services.  She would like to continue therapy services at this time and continue to work on processing her end-of-life options.  She said that she continues to have cough and  clear nasal drainage and would like to stop taking Mucinex as she does not feel that this has been helpful for her.  She was agreeable to trying loratadine as she has used this in the past for seasonal allergies.  Her breathing has been stable and she denied increased dyspnea, wheezing, or edema at this time.  She continues to use her oxygen at 2 LPM via nasal cannula at this visit.  She reported that her pain has been well-managed with gabapentin at HS and lidocaine ointment with improved sleep since starting the lidocaine ointment at her last visit.  The patient denied changes in sleep or appetite, lightheadedness, dizziness, breathing difficulty, chest pain, palpitations, constipation, urinary symptoms, numbness or tingling in extremities, and pain.      Past Medical History:   Diagnosis Date     Arthritis      CHF (congestive heart failure) (H)      Chronic anemia 6/1/2014     Chronic kidney disease      Chronic thoracic aortic dissection (H) 10/7/2015    Descending thoracic aorta; treated medically per notes of Dragan Singh and Jennifer.     COPD (chronic obstructive pulmonary disease) (H)      CVA (cerebral infarction)      Disease of thyroid gland      Dyslipidemia      ESRD (end stage renal disease) (H) 06/03/2009    on dialysis with Dr. Mitchell     Essential hypertension 6/30/2014     Gastrointestinal hemorrhage, unspecified gastrointestinal hemorrhage type 6/5/2017     GI (gastrointestinal bleed)      GI bleeding 6/5/2017     Gout      L3 vertebral fracture (H) 11/16/2015     Left Atrial Appendage Occlusion (WATCHMAN) 4/5/2018    LAAO April 5, 2018 (30 mm WATCHMAN)     Obesity      ZULEIKA (obstructive sleep apnea), severe, intolerant of CPAP 10/22/2015     Oxygen dependent     3L nc     Pneumonia 9-7-2015     Right foot drop      Spinal stenosis 3/28/2016     Stroke (H) 3/24/2016             Family History   Problem Relation Age of Onset     Dementia Mother      Diabetes Mother      Arthritis Mother       Cancer Mother      Depression Mother      Heart disease Mother      Vision loss Mother      Stroke Father      Heart disease Father      Breast cancer Neg Hx      Social History     Socioeconomic History     Marital status:      Spouse name: Cayden     Number of children: 2     Years of education: Not on file     Highest education level: Not on file   Occupational History     Employer: RETIRED   Social Needs     Financial resource strain: Not on file     Food insecurity     Worry: Not on file     Inability: Not on file     Transportation needs     Medical: Not on file     Non-medical: Not on file   Tobacco Use     Smoking status: Former Smoker     Packs/day: 1.50     Years: 37.00     Pack years: 55.50     Types: Cigarettes     Last attempt to quit: 2009     Years since quittin.6     Smokeless tobacco: Never Used   Substance and Sexual Activity     Alcohol use: No     Alcohol/week: 11.7 standard drinks     Types: 14 Standard drinks or equivalent per week     Comment: 14 mixed drinks per week     Drug use: No     Sexual activity: Never     Partners: Male   Lifestyle     Physical activity     Days per week: Not on file     Minutes per session: Not on file     Stress: Not on file   Relationships     Social connections     Talks on phone: Not on file     Gets together: Not on file     Attends [employer]alian service: Not on file     Active member of club or organization: Not on file     Attends meetings of clubs or organizations: Not on file     Relationship status: Not on file     Intimate partner violence     Fear of current or ex partner: Not on file     Emotionally abused: Not on file     Physically abused: Not on file     Forced sexual activity: Not on file   Other Topics Concern     Not on file   Social History Narrative    Lives with her . Daughter in [place] and daughter in [place].       REVIEW OF SYSTEM:   Pertinent items are noted in HPI.  A 12 point comprehensive review of systems was  negative except as noted.    PHYSICAL EXAM:     General Appearance:    Alert, cooperative, no distress, appears stated age   Head:    Normocephalic, without obvious abnormality, atraumatic   Eyes:    PERRL, conjunctiva/corneas clear, both eyes; wears glasses   Ears:    Normal external ear canals, both ears   Nose:   Nares normal, septum midline, mucosa normal, moderate clear nasal drainage or sinus tenderness   Throat:   Lips, mucosa, and tongue normal; teeth and gums normal   Neck:   Supple, symmetrical, trachea midline, no adenopathy;     thyroid:  no enlargement/tenderness/nodules; no carotid    bruit or JVD   Back:     Symmetric, no curvature, ROM normal, no CVA tenderness   Lungs:     Clear to auscultation bilaterally, respirations unlabored; using 2 LPM oxygen via NC   Chest Wall:    No tenderness or deformity    Heart:    Regular rate and rhythm, S1 and S2 normal, no murmur, rub   or gallop   Abdomen:     Soft, non-tender, bowel sounds active all four quadrants,     no masses, no organomegaly   Extremities:   Extremities normal, atraumatic, no cyanosis or edema   Pulses:   2+ and symmetric all extremities   Skin:   Skin color, texture, turgor normal, no rashes or lesions   Neurologic:   Oriented to person, place, time, and situation; exhibits logical thought processes; generalized weakness; sitting in wheelchair during this visit         LABS:   Lab Results   Component Value Date    WBC 3.8 (L) 07/23/2020    HGB 10.5 (L) 07/23/2020    HCT 36.8 07/23/2020     (L) 07/23/2020    CHOL 116 11/19/2019    TRIG 122 11/19/2019    HDL 57 11/19/2019    ALT 14 07/13/2020    ALT 14 07/13/2020    AST 25 07/13/2020    AST 25 07/13/2020     (L) 07/23/2020    K 4.2 07/23/2020     07/23/2020    CREATININE 3.89 (H) 07/23/2020    BUN 17 07/23/2020    CO2 21 (L) 07/23/2020    TSH 3.89 07/18/2020    INR 1.10 07/17/2020    HGBA1C 5.7 06/01/2014        ASSESSMENT:      ICD-10-CM    1. Physical deconditioning   R53.81    2. Cough  R05    3. Neuropathic pain  M79.2        PLAN:      Continue PT/OT as recommended for physical deconditioning.  Continue HD on MWF per nephrology recommendations for ESRD.  Continue to monitor weight daily for acute on chronic CHF.  Continue oxygen 0-4 LPM as needed titrate to SpO2> 90% with COPD.  DISCONTINUE Mucinex 600 mg ER daily for chronic cough.  START loratadine 10 mg daily for nasal drainage and cough.  Continue lidocaine 4% ointment two times a day for neuropathic pain.  Continue gabapentin with bedtime dosing 300 mg daily at  for neuropathic pain.  Consult for [facility] hospice informational session for ESRD on HD.  Consult to in-house psychology services for adjustment disorder with depressed mood.  SW to work with patient on LTC options per patient request.  Otherwise continue current care plan for all other chronic medical conditions, as they are stable. Encouraged patient to engage in healthy lifestyle behaviors such as engaging in social activities, exercising (PT/OT), eating well, and following care plan. Follow up for routine check-up, or sooner if needed. Will continue to monitor patient and work with nursing staff collaboratively to work toward positive patient outcomes.     Electronically signed by: Angie Gamez, CNP

## 2021-06-10 NOTE — PROGRESS NOTES
INR at 1.1 done at . Pt has been in TCU and is now home. She has 3 mg tablets. Will have her take 6 mg with [facility] instructing and then retest in 1 week. Left  message with return phone number for questions and concerns. [phone]

## 2021-06-10 NOTE — PROGRESS NOTES
[facility]   Arrhythmia Clinic    Assessment/Plan:  Diagnoses and all orders for this visit:    Persistent atrial fibrillation and off amiodarone and in A. fib all the time now from questioning.  Itzel's metoprolol dose has been significantly reduced from when she was in the hospital as she was on metoprolol tartrate 100 mg twice daily and now on only 25 mg twice daily.  Dose was reduced when she was at transitional care for a few weeks after hospitalization.  She has sinus pauses on conversion from A. fib to sinus rhythm.  Her heart rates vary quite widely.  She is reporting that when she is checking her heart rate on oximeter that is frequently 110s-130s at home.  On evaluation today her heart rate is irregular and 70s on physical exam.  I had her walk with walker 200 feet  and heart rate elevated to 110 at highest.  She currently has good rate control on metoprolol tartrate 25 mg p.o. twice daily from what I can see in clinic.  Obviously 24-hour Holter may be more helpful in the future.  I recommended that she check pulse longer on oximeter as her HR is quite variable from beat to beat in afib and may be causing high heart rate seen on oximeter with small sampling.  She is asking about lead less pacemaker which was discussed during hospitalization and wants to know if she can proceed with that.  I talked to Dr. Rodriguez during the visit and he gave approval to proceed.  I will send this note to him for review of current status.  On questioning Itzel seems to be asymptomatic for the most part in A. Fib.  She was hospitalized on April 8 for fall at home and today does not recall symptoms that led to the fall.  She is unsteady on her feet.  She has end-stage renal disease and on dialysis Monday Wednesday Friday mornings.  They are giving her Midrin during dialysis run now I believe for hypotension.  They do not talk about her heart rate so she is not sure what her rates are during dialysis.  She feels  weak after day of dialysis but otherwise doing okay.  She does a lot of bruising on both arms secondary to warfarin.  [id number] score of 5 and fearful of stroke.  On digoxin 3 times a week and takes most meds other than gabapentin and metoprolol after dialysis.  She denies any bradycardic symptoms since home from TCU.  To follow-up with Dr. Rodriguez in 6 weeks.    Essential hypertension with goal blood pressure less than 140/90 and well-controlled.    Tachycardia-bradycardia syndrome and reason for pacemaker .  See Dr. Rodriguez's note from 4/11/2017 for more details.    Other orders  -     metoprolol tartrate (LOPRESSOR) 25 MG tablet; Take 25 mg by mouth 2 (two) times a day.    ______________________________________________________________________    Subjective:    I had the opportunity to see Belia Rodriguez at the [facility]. Belia Rodriguez is a 71 y.o. female and here for EP follow-up for persistent atrial fib and tachybradycardia syndrome.    Belia Rodriguez has a known history of persistent A. fib and had been on amiodarone but was taken off of this with hospitalization.  She fell at home and this resulted in hospitalization.  She is questioning if she had another stroke at that time but also discussed it could be due to bradycardia and has had sinus pauses with conversion from A. fib to sinus rhythm.  Because she is on dialysis discussed with her that she has increased risk for device infection with the leads in the heart.  She is telling me that Dr. Rodriguez wanted to do a lead less pacemaker as best I can figure out from her description.  See above for more details.  She is now home.  She is stable on dialysis from what she describes.  She is eating okay.  She is back to most of her normal activities at home.  ______________________________________________________________________    Problem List:  Patient Active Problem List   Diagnosis     Chronic obstructive pulmonary disease,  unspecified COPD type     ESRD (end stage renal disease)     Chronic anemia     Tachycardia-bradycardia syndrome     Essential hypertension with goal blood pressure less than 140/90     Hyperlipidemia     Acute respiratory failure with hypoxia     Persistent atrial fibrillation     Bacteremia     ZULEIKA (obstructive sleep apnea), severe, intolerant of CPAP     Anemia of chronic renal failure, stage 5     Right knee pain     Dyspnea     Volume overload     Acute bronchitis due to infection     Acute bacterial conjunctivitis of both eyes     Acute CHF (congestive heart failure)     Acute on chronic diastolic congestive heart failure     Hypervolemia, unspecified hypervolemia type     Dissection of thoracoabdominal aorta     CHF (congestive heart failure)     Fall, initial encounter     Pure hypercholesterolemia     Gastroesophageal reflux disease without esophagitis     Gout     GERD (gastroesophageal reflux disease)     Leg weakness     Essential hypertension     Right foot drop     Leg weakness, bilateral     Anticoagulated on warfarin     Medical History:  Past Medical History:   Diagnosis Date     Arthritis      CHF (congestive heart failure)      Chronic anemia 6/1/2014     Chronic kidney disease      Chronic thoracic aortic dissection 10/7/2015    Descending thoracic aorta; treated medically per notes of Dragan Singh and Jennifer.     COPD (chronic obstructive pulmonary disease)      CVA (cerebral infarction)      Disease of thyroid gland      Dyslipidemia      ESRD (end stage renal disease) 06/03/2009    on dialysis with Dr. Mitchell     Essential hypertension 6/30/2014     GI (gastrointestinal bleed)      Gout      L3 vertebral fracture 11/16/2015     Obesity      ZULEIKA (obstructive sleep apnea), severe, intolerant of CPAP 10/22/2015     Pneumonia 9-7-2015     Right foot drop      Spinal stenosis 3/28/2016     Stroke 3/24/2016     Surgical History:  Past Surgical History:   Procedure Laterality Date     BACK SURGERY       [facility]     COLONOSCOPY N/A 3/23/2016    Procedure: COLONOSCOPY;  Surgeon: Ruddy Tejada MD;  Location: [facility];  Service:      DILATION AND CURETTAGE OF UTERUS       EYE SURGERY       HERNIA REPAIR       TONSILLECTOMY       Social History:  Social History   Substance Use Topics     Smoking status: Former Smoker     Packs/day: 1.50     Years: 37.00     Types: Cigarettes     Quit date: 1/1/2009     Smokeless tobacco: Never Used     Alcohol use 7.0 oz/week     14 Standard drinks or equivalent per week      Comment: 14 mixed drinks per week        Review of Systems: Review of Systems:   General: WNL  Eyes: WNL  Ears/Nose/Throat: WNL  Lungs: WNL  Heart: WNL  Stomach: WNL  Bladder: WNL  Muscle/Joints: WNL  Skin: WNL  Nervous System: WNL  Mental Health: WNL     Blood: WNL      Family History:  Family History   Problem Relation Age of Onset     Dementia Mother      Diabetes Mother      Arthritis Mother      Cancer Mother      Depression Mother      Heart disease Mother      Vision loss Mother      Stroke Father      Heart disease Father          Allergies:  Allergies   Allergen Reactions     Ace Inhibitors Cough     Fosinopril Sodium Cough     Medications:  Current Outpatient Prescriptions   Medication Sig Dispense Refill     acetaminophen (TYLENOL) 500 MG tablet Take 500 mg by mouth every 6 (six) hours as needed for pain.       allopurinol (ZYLOPRIM) 100 MG tablet Take 100 mg by mouth daily with lunch.        aspirin 81 MG EC tablet Take 1 tablet (81 mg total) by mouth daily.  0     atorvastatin (LIPITOR) 10 MG tablet Take 10 mg by mouth bedtime.        B complex-vitamin C-folic acid 100-1 mg Tab Take 1 tablet by mouth at bedtime.        cholecalciferol, vitamin D3, 2,000 unit cap Take 2,000 Units by mouth daily with lunch.        digoxin (LANOXIN) 125 mcg tablet Take 1 tablet (125 mcg total) by mouth 3 (three) times a week. 30 tablet 1     diphenhydrAMINE (BENADRYL) 50 MG tablet Take 50 mg by mouth  "at bedtime as needed for sleep.        folic acid (FOLVITE) 1 MG tablet Take 1 mg by mouth daily with lunch.        gabapentin (NEURONTIN) 100 MG capsule Take 100 mg by mouth 3 (three) times a day.       Lactobacillus rhamnosus GG (CULTURELLE) 10-15 Billion cell capsule Take 1 capsule by mouth daily with lunch.       metoprolol tartrate (LOPRESSOR) 25 MG tablet Take 25 mg by mouth 2 (two) times a day.       midodrine (PROAMATINE) 5 MG tablet Take 1 tablet (5 mg total) by mouth 3 (three) times a week. 30 minutes prior to dialysis (Patient taking differently: Take 5-10 mg by mouth 3 (three) times a week. 30 minutes prior to dialysis, Mon,Wed and Fridau) 30 tablet 0     omeprazole (PRILOSEC) 20 MG capsule Take 20 mg by mouth daily with lunch.        senna-docusate (SENNOSIDES-DOCUSATE SODIUM) 8.6-50 mg tablet Take 1 tablet by mouth at bedtime as needed for constipation.        sevelamer carbonate (RENVELA) 800 mg tablet Take 1,600 mg by mouth 3 (three) times a day with meals.       timolol maleate (TIMOPTIC) 0.5 % ophthalmic solution Administer 1 drop to both eyes 2 (two) times a day.        traMADol (ULTRAM) 50 mg tablet Take 1 tablet (50 mg total) by mouth every 8 (eight) hours as needed for pain. 20 tablet 0     warfarin (COUMADIN) 2.5 MG tablet Take 2.5 mg daily 90 tablet 5     metoprolol tartrate (LOPRESSOR) 100 MG tablet Take 1 tablet (100 mg total) by mouth 2 (two) times a day. 180 tablet 5     No current facility-administered medications for this visit.        Objective:   Vital signs:  /58 (Patient Site: Right Arm, Patient Position: Sitting, Cuff Size: Adult Regular)  Pulse 76  Resp 16  Ht 5' 5.5\" (1.664 m)  Wt 186 lb (84.4 kg)  BMI 30.48 kg/m2      Physical Exam:    GENERAL APPEARANCE: Alert, cooperative and in no acute distress.  HEENT: No scleral icterus. No Xanthelasma. Oral mucuos membranes pink and moist.  NECK: JVP Nl.   CHEST: clear to auscultation  CARDIOVASCULAR: S1, S2 without murmur " ,clicks or rubs. Irregular, irregular.  Radial and posterior tibial pulses are intact and symetric.   EXTREMITIES: No cyanosis, clubbing.  1+ bilateral ankle and pedal edema.    Results personally reviewed:  Results for orders placed during the hospital encounter of 10/09/16   Echo Complete [ECH10] 10/09/2016    Narrative      Summary   1.Normal left ventricular size with overall normal systolic function.   2.No significant valvular abnormalities other then the minimal regurgitant   lesions listed below.   3.Mild pulmonary hypertension is suggested.   4.When compared to the previous echocardiogram dated 3/24/2016 , the   pericardial effusion is gone.      Findings      Left Ventricle   Normal left ventricular size.   Borderline concentric left ventricular hypertrophy.   Left ventricular ejection fraction is normal.   Left ventricular ejection fraction is calculated to be 63%.   Severe (grade 3) diastolic dysfunction with restrictive filling pattern.                       Results for orders placed or performed in visit on 04/05/17   ECG 12 lead with MUSE   Result Value Ref Range    SYSTOLIC BLOOD PRESSURE  mmHg    DIASTOLIC BLOOD PRESSURE  mmHg    VENTRICULAR RATE 101 BPM    ATRIAL RATE  BPM    P-R INTERVAL  ms    QRS DURATION 92 ms    Q-T INTERVAL 386 ms    QTC CALCULATION (BEZET) 500 ms    P Axis  degrees    R AXIS 23 degrees    T AXIS 130 degrees    MUSE DIAGNOSIS       Atrial fibrillation with rapid ventricular response  Nonspecific ST and T wave abnormality  Abnormal ECG  When compared with ECG of 09-OCT-2016 03:30,  Atrial fibrillation has replaced Sinus rhythm  T wave inversion now evident in Lateral leads  Confirmed by BRANDI DISLA MD LOC:[facility] ([id number]) on 4/5/2017 3:56:17 PM         TSH:   Lab Results   Component Value Date    TSH 5.50 (H) 04/09/2017     BNP:   Lab Results   Component Value Date    BNP 1241 (H) 04/09/2017     BMP:  Lab Results   Component Value Date    CREATININE 4.89 (H) 04/12/2017    BUN 25  (H) 04/12/2017     04/12/2017    K 4.7 04/12/2017     04/12/2017    CO2 22 04/12/2017       This note has been dictated using voice recognition software. Any grammatical or context distortions are unintentional and inherent to the software.    RENO SHAFFER RN, [facility]  [phone]

## 2021-06-10 NOTE — PROGRESS NOTES
[facility]/ [facility]iatric[facility]    Facility:  [facility] [[id number]]    Code Status:  DNR/DNI    Chief Complaint   Patient presents with     H & P   :                    Patient Active Problem List   Diagnosis     Tachycardia-bradycardia syndrome (H)     Hyperkalemia     Essential hypertension with goal blood pressure less than 140/90     Hyperlipidemia     ZULEIKA (obstructive sleep apnea), severe     Anemia of chronic renal failure, stage 5 (H)     Dissection of thoracoabdominal aorta (H)     Gout     GERD (gastroesophageal reflux disease)     Right foot drop     Acute midline low back pain with right-sided sciatica     History of compression fracture of spine     Pulmonary emphysema (H)     Presence of Watchman left atrial appendage closure device     Other dysphagia     Borderline glaucoma with ocular hypertension     Hyperparathyroidism (H)     Osteoporosis     Venous tributary (branch) occlusion of retina     ESRD on dialysis (H)     Acute pulmonary edema (H)     Chronic atrial fibrillation (H)     Acute on chronic diastolic congestive heart failure (H)     Thrombocytopenia (H)     Contraindication to anticoagulation therapy     Dyspnea     Cardiac pacemaker in situ     S/P AV (atrioventricular) jonn ablation     Hip fracture, intertrochanteric (H)     DVT (deep venous thrombosis) (H)     Carpal tunnel syndrome of left wrist     SOB (shortness of breath)     Hypotension, unspecified hypotension type       History:  Belia Rodriguez  is a 74 year old female with history of end-stage renal disease on CCR T, tachybradycardia syndrome/AV jonn ablation/permanent pacemaker, atrial fibrillation status post watchman device, abdominal aortic aneurysm dissection, hypertension, COPD, chronic hypoxic respiratory failure, ZULEIKA, gout, GERD, glaucoma on timolol, depression seen for admission to TCU on 7/28/2020    Hospital Course: Patient was hospitalized between July 13 and July 20 presenting with  "edema and shortness of breath despite not missing her scheduled outpatient dialysis days.  She was diagnosed with acute on chronic diastolic heart failure and was diuresed 11 L by nephrology/dialysis on successive days.    Her dialysis is complicated by severe hypotension despite midodrine therapy.  It hypotension resulted in discontinuation of her metoprolol.  Her in dialysis weight target is 70.5 kg.    Due to the severity of her heart failure/hypertension with complicated dialysis, comfort care/hospice approach has been discussed with patient and she is considering it though she remained full code up until to my visit here with her today, she is now DNR/DNI per her request.    Patient did have an echocardiogram this hospitalization which showed no LV dysfunction and no valvular dysfunction.  She had normal thyroid function testing.  She had \"equivocal cortisol\" but in the end adrenal insufficiency was not suspected as a cause of her hypotension.    Subjective/ROS:    -augmented by discussion with facility staff involved in direct care      - Patient says she is feeling better.  She is less short of breath think she is probably back to her chronic baseline.  She denies chest pain orthopnea PND.  She says her edema is back to baseline, she has chronic edema in the right foot area but very little elsewhere.  She was requiring 3 L oxygen at discharge.    - Patient says she sees Dr. Dl Kinney from nephrology.  She says she was a long-term patient of Dr. Mitchell prior to that.    - Patient's main complaint is her peripheral neuropathy.  She is on renal dosing of gabapentin 100 mg 3 times daily.  She has been tried on various topicals, capsaicin think to make things worse.  She was just started on lidocaine gel this morning for the first time she says that the initial result was promising.    -Patient otherwise reports that she is not having headaches change in vision speaking swallowing hearing falls injuries nausea " "vomiting diarrhea melena bright red blood per rectum.  She says she does not feel depressed, she wishes she \"was not so broken\" but understands that some of her issues cannot be cured at this point.    - We discussed her CODE STATUS wishes.  She has remained full code.  However if we discussed it today she says that she has seriously been considering hospice and if she were to become seriously ill now she would not want CPR, shocking, code medications, intubation or mechanical ventilation.  I asked her if she would like mechanical ventilation in a prearrest situation such as if she contracted COVID-19 and have progressive respiratory failure.  She says she would not want intubation or mechanical ventilation.  She requests DNR/DNI states a good understanding of it and is able to teach it back to me.  It is so ordered.    Past Medical History:   Diagnosis Date     Arthritis      CHF (congestive heart failure) (H)      Chronic anemia 6/1/2014     Chronic kidney disease      Chronic thoracic aortic dissection (H) 10/7/2015    Descending thoracic aorta; treated medically per notes of Dragan Singh and Jennifer.     COPD (chronic obstructive pulmonary disease) (H)      CVA (cerebral infarction)      Disease of thyroid gland      Dyslipidemia      ESRD (end stage renal disease) (H) 06/03/2009    on dialysis with Dr. Mitchell     Essential hypertension 6/30/2014     Gastrointestinal hemorrhage, unspecified gastrointestinal hemorrhage type 6/5/2017     GI (gastrointestinal bleed)      GI bleeding 6/5/2017     Gout      L3 vertebral fracture (H) 11/16/2015     Left Atrial Appendage Occlusion (WATCHMAN) 4/5/2018    LAAO April 5, 2018 (30 mm WATCHMAN)     Obesity      ZULEIKA (obstructive sleep apnea), severe, intolerant of CPAP 10/22/2015     Oxygen dependent     3L nc     Pneumonia 9-7-2015     Right foot drop      Spinal stenosis 3/28/2016     Stroke (H) 3/24/2016     Past Surgical History:   Procedure Laterality Date     BACK " SURGERY      [facility]     COLONOSCOPY N/A 3/23/2016    Procedure: COLONOSCOPY;  Surgeon: Ruddy Tejada MD;  Location: [facility] GI;  Service:      DILATION AND CURETTAGE OF UTERUS       EP ABLATION AV NODE N/A 3/7/2019    Procedure: EP Ablation AV Node;  Surgeon: Derick Duarte MD;  Location: [facility] Cath Lab;  Service: Cardiology     EP NEGRA CLOSURE N/A 4/5/2018    Procedure: EP NEGRA Closure;  Surgeon: Derick Duarte MD;  Location: [facility] Cath Lab;  Service:      EP PACEMAKER INSERT N/A 3/7/2019    Procedure: EP Pacemaker Insertion;  Surgeon: Derick Duarte MD;  Location: [facility] Cath Lab;  Service: Cardiology     EYE SURGERY       HERNIA REPAIR       IR TUNNELED CATHETER INSERT  11/20/2018     IR TUNNELED CATHETER REMOVAL  11/20/2018     AZ COLSC FLEXIBLE W/CONTROL BLEEDING ANY METHOD N/A 6/7/2017    Procedure: COLONOSCOPY;  Surgeon: Luis Mckeon MD;  Location: [facility] GI;  Service: Gastroenterology     AZ OPEN FIX INTER/SUBTROCH FX,IMPLNT Left 6/23/2019    Procedure: INTERNAL FIXATION, FRACTURE, TROCHANTERIC, HIP, USING INTERMEDULLARY NAIL;  Surgeon: Bennie Marsh DO;  Location: [facility] Main OR;  Service: Orthopedics     TONSILLECTOMY            Family History   Problem Relation Age of Onset     Dementia Mother      Diabetes Mother      Arthritis Mother      Cancer Mother      Depression Mother      Heart disease Mother      Vision loss Mother      Stroke Father      Heart disease Father      Breast cancer Neg Hx    :   Her mom had glaucoma and dementia  Her dad had COPD and asbestosis as well she is an only child.    Social History     Socioeconomic History     Marital status:      Spouse name: Cayden     Number of children: 2     Years of education: Not on file     Highest education level: Not on file   Occupational History     Employer: RETIRED   Social Needs     Financial resource strain: Not on file     Food insecurity     Worry: Not on file     Inability:  Not on file     Transportation needs     Medical: Not on file     Non-medical: Not on file   Tobacco Use     Smoking status: Former Smoker     Packs/day: 1.50     Years: 37.00     Pack years: 55.50     Types: Cigarettes     Last attempt to quit: 2009     Years since quittin.5     Smokeless tobacco: Never Used   Substance and Sexual Activity     Alcohol use: No     Alcohol/week: 11.7 standard drinks     Types: 14 Standard drinks or equivalent per week     Comment: 14 mixed drinks per week     Drug use: No     Sexual activity: Never     Partners: Male   Lifestyle     Physical activity     Days per week: Not on file     Minutes per session: Not on file     Stress: Not on file   Relationships     Social connections     Talks on phone: Not on file     Gets together: Not on file     Attends Shinto service: Not on file     Active member of club or organization: Not on file     Attends meetings of clubs or organizations: Not on file     Relationship status: Not on file     Intimate partner violence     Fear of current or ex partner: Not on file     Emotionally abused: Not on file     Physically abused: Not on file     Forced sexual activity: Not on file   Other Topics Concern     Not on file   Social History Narrative    Lives with her . Daughter in [place] and daughter in [place].   :    Patient and her spouse live in [facility].  They have been in the house for about 30 years.    Current Outpatient Medications on File Prior to Visit   Medication Sig Dispense Refill     acetaminophen (TYLENOL) 500 MG tablet Take 1,000 mg by mouth 3 (three) times a day as needed.       artificial tears,hypromellose, (GENTEAL; SYSTANE) 0.3 % Gel Administer 1 drop to both eyes as needed.       aspirin 81 MG EC tablet Take 1 tablet (81 mg total) by mouth daily.  0     B complex-vitamin C-folic acid (DIALYVITE) 100-1 mg Tab Take 1 tablet by mouth daily with lunch.              buPROPion (WELLBUTRIN XL) 150 MG 24 hr  tablet Take 1 tablet (150 mg total) by mouth 2 (two) times a week. Tuesday and saturday  0     cholecalciferol, vitamin D3, 1,000 unit (25 mcg) tablet Take 2,000 Units by mouth daily.       cinacalcet (SENSIPAR) 30 MG tablet Take 30 mg by mouth see administration instructions. Take three times weekly with dialysis (on Mondays, Wednesdays, and Fridays).             famotidine (PEPCID) 20 MG tablet Take 1 tablet (20 mg total) by mouth 2 (two) times a day as needed for heartburn. 180 tablet 2     folic acid (FOLVITE) 1 MG tablet TAKE ONE TABLET BY MOUTH ONCE DAILY 90 tablet 4     gabapentin (NEURONTIN) 100 MG capsule TAKE 1 CAPSULE BY MOUTH THREE TIMES DAILY 270 capsule 3     Lactobacillus rhamnosus GG (CULTURELLE) 10-15 Billion cell capsule Take 1 capsule by mouth daily with lunch.       lidocaine (LMX) 4 % cream Apply topically 3 (three) times a day.       loratadine (CLARITIN) 10 mg tablet Take 10 mg by mouth daily as needed.        midodrine (PROAMATINE) 10 MG tablet Take 1.5 tablets (15 mg total) by mouth 3 (three) times a day with meals. (Patient taking differently: Take 15 mg by mouth 3 (three) times a day with meals. Patient reports that she usually gets 10 mg before dialysis and another 10 mg alf through dialysis as well)       multivitamin (DAILY-OLGA) per tablet Take 1 tablet by mouth daily.       senna-docusate (SENNOSIDES-DOCUSATE SODIUM) 8.6-50 mg tablet Take 1 tablet by mouth daily as needed for constipation.        sevelamer carbonate (RENVELA) 800 mg tablet Take 1 tablet (800 mg total) by mouth 2 (two) times a day as needed (FOr snacks.).  0     sevelamer HCL (RENAGEL) 800 MG tablet Take 2,400 mg by mouth 3 (three) times a day with meals. And take 2 tablets with snacks       timolol maleate (TIMOPTIC) 0.5 % ophthalmic solution Administer 1 drop to both eyes 2 (two) times a day.        traZODone (DESYREL) 50 MG tablet TAKE 1&1/2 TABLETS (75MG) BY MOUTH AT BEDTIME. (Patient taking differently: Take  150 mg by mouth at bedtime. ) 135 tablet 3     [DISCONTINUED] polyvinyl alcohol (LIQUIFILM TEARS) 1.4 % ophthalmic solution Apply 1 drop to eye as needed for dry eyes.       No current facility-administered medications on file prior to visit.    :      ALLERGIES:  Ace inhibitors; Atrovent [ipratropium bromide]; Fosinopril sodium; and Zolpidem    Vitals:    Current Vitals   BP: 125/78 mmHg  7/28/2020 16:03    Temp:97.5  F  7/28/2020 16:03  Pulse: 73 bpm  7/28/2020 16:03  Weight: 158.9 Lbs  7/28/2020 09:03  Resp: 18 Breaths/min  7/28/2020 16:03  BS:  O2: 94 %  7/28/2020 11:41  Pain: 2  7/26/2020 23:00    Physical exam:    General:  Alert  oriented x3 though she says that she was recently confused in the hospital she feels like she is back to her best baseline.  Appears in no distress    Patient is pleasant and normally conversant.  She is working on coloring projects but glad when the activities staff appears with a possible for her.  She says she enjoys putting puzzles together.  She is also glad that the weekend is over so she is getting more tension now from the therapists.    She is breathing comfortably without accessory muscle use or tachypnea.  Right chest cath without infection.  Heart is regular 70 distant S1-S2 decreased breath sounds in the bases but moving air adequately with occasional rhonchi during expiration edema right dorsum foot which is chronic she reports.      Due to the 2020 Covid 19 pandemic, except as noted above, the patient was visually observed at a 6 foot plus distance.  An observational exam was performed in an effort to keep patient safe from Covid 19 and other communicable diseases.   Labs:  Lab Results   Component Value Date    WBC 3.8 (L) 07/23/2020    HGB 10.5 (L) 07/23/2020    HCT 36.8 07/23/2020     (H) 07/23/2020     (L) 07/23/2020     Results for orders placed or performed in visit on 07/23/20   Basic Metabolic Panel   Result Value Ref Range    Sodium 135 (L) 136 -  145 mmol/L    Potassium 4.2 3.5 - 5.0 mmol/L    Chloride 105 98 - 107 mmol/L    CO2 21 (L) 22 - 31 mmol/L    Anion Gap, Calculation 9 5 - 18 mmol/L    Glucose 95 70 - 125 mg/dL    Calcium 9.6 8.5 - 10.5 mg/dL    BUN 17 8 - 28 mg/dL    Creatinine 3.89 (H) 0.60 - 1.10 mg/dL    GFR MDRD Af Amer 14 (L) >60 mL/min/1.73m2    GFR MDRD Non Af Amer 11 (L) >60 mL/min/1.73m2         Lab Results   Component Value Date    TSH 3.89 07/18/2020     Lab Results   Component Value Date    HGBA1C 5.7 06/01/2014     [unfilled]  Lab Results   Component Value Date    BHQYHLHO29 592 10/09/2015     Lab Results   Component Value Date     (H) 07/13/2020     [unfilled]  Most Recent EKG     Units 07/13/20  1501   VENTRATE BPM 70   ATRIALRATE BPM 68   QRSDURATION ms 140   QTINTERVAL ms 448   QTCCALC ms 483   RAXIS degrees 211   TAXIS degrees 88   MUSEDX  ** Suspect arm lead reversal, interpretation assumes no reversal  Wide QRS rhythm  Non-specific intra-ventricular conduction block  Anterolateral infarct , age undetermined  Abnormal ECG  When compared with ECG of 13-JUL-2020 14:40,  No significant change was found  Confirmed by SEE ED PROVIDER NOTE FOR, ECG INTERPRETATION (4000),  ADIA PEACOCK (162) on 7/13/2020 3:18:25 PM           Assessment/Plan:      ICD-10-CM    1. Cardiac pacemaker in situ  Z95.0    2. ESRD on dialysis (H)  N18.6     Z99.2    3. Dissection of thoracoabdominal aorta (H)  I71.03    4. Pulmonary emphysema, unspecified emphysema type (H)  J43.9    5. Acute on chronic diastolic congestive heart failure (H)  I50.33        End-stage renal disease  CC RT  Severe hypotension   Patient has been hypotensive in the facility down to a systolic of 80.  However her worst hypotension is in the setting of dialysis.  She has been started on midodrine but says it is not working out well.  Sounds like she is never been on Florinef and she asks me to write that down for her.  I tell her that her nephrologist is well  "familiar with that and if he thought that was an alternative he would discuss it with her.  However she says she intends to discuss it with the staff when she next has dialysis.  -Patient is considering comfort/hospice approach but she is not ready to commit to that yet.  She says she really like to be in a hospice facility.  She does not want home hospice.  She does not want to move to a long-term care center to have hospice.  She might be able to be on a waiting list while still getting dialysis and\" choose for time\" to discontinue it.  -Her metoprolol has been discontinued due to the severity of the hypotension  -Midodrine is continued, patient intends to ask her nephrology team about Florinef though it may have been tried before or not as effective in the situation?  -I asked the TCU staff to call her ophthalmology clinic to see if her ophthalmologist would be okay with going off her timolol eyedrops.  It may have no effect on her blood pressure I realize, but we are looking for anything that might contribute positively here.  [place] is continued for the meanwhile though.  -Sevelamer  -Cinacalcet per nephrology    Acute diastolic heart failure   Patient reports that 11 kg were dialyzed off of her this last day.  It looked to me like it was less than that, admitted at 76.7 kg with an estimated dry weight of 70.5 kg.  At any rate she is feeling much better back to her baseline.  -Low-salt diet  -Treatment complicated by hypotension as noted above.    Glaucoma   See atenolol discussion above    A. Fib  Watchman device  Permanent pacemaker (AV jonn ablation/tachybradycardia syndrome)   Watchman device in place.  Has permanent pacemaker.  Not on any rate controlling agent.    Chronic hypoxic respiratory failure  COPD  Heart failure   Patient just had a PE run in the hospital:Study Result  EXAM: CTA CHEST PE RUN  LOCATION: [facility]  DATE/TIME: 7/18/2020 4:50 PM    INDICATION: Positive d-dimer, " hypoxia.  COMPARISON: CT chest exam 07/13/2020 and CT chest exam 09/21/2018  TECHNIQUE: CT chest pulmonary angiogram during arterial phase injection of IV contrast. Multiplanar reformats and MIP reconstructions were performed. Dose reduction techniques were used.   CONTRAST: Iohexol (Omni) 75mL    FINDINGS:  ANGIOGRAM CHEST: Pulmonary arteries are normal caliber and negative for pulmonary emboli. Thoracic aorta is negative for dissection. No CT evidence of right heart strain.    LUNGS AND PLEURA: Small bilateral pleural effusions and bibasilar atelectasis or infiltrates have developed since the previous exam. Interstitial septal thickening persists suggesting edema.    MEDIASTINUM/AXILLAE: Mild cardiac enlargement. Low-attenuation lobulated mass in the lower left mediastinum adjacent to the spine measures 4.2 x 2.6 cm on image 102 of series 4, unchanged. Scattered subcentimeter lymph nodes elsewhere in the mediastinum  Atrial appendage occlusion device. Atherosclerotic disease thoracic aorta. Pacemaker anterior left chest wall with lead tips right atrium and ventricle. Right IJ dialysis catheter extends into the right atrium.    UPPER ABDOMEN: 4.3 x 4.0 cm aneurysm of upper abdomen is unchanged. Extensive plaque within the superior mesenteric artery. Bilateral renal cortical atrophy and multiple mixed high and low attenuation cysts, unchanged. Diverticulosis throughout the   visualized colon.    MUSCULOSKELETAL: Degenerative changes thoracic spine.    IMPRESSION:   1. No evidence for pulmonary emboli.  2. Mild cardiac enlargement with new, small bilateral pleural effusions and subsegmental atelectasis both lower lobes. There is also septal thickening both lungs suggesting edema and heart failure.   3. Low-attenuation mass within the left posterior inferior mediastinum is unchanged since 09/21/2018 and presumed benign.  4. Advanced atherosclerotic disease with stable 4.3 x 4.0 cm upper abdominal aortic aneurysm.  5.  "Bilateral renal cortical atrophy and mixed attenuation cysts.  6. Colonic diverticulosis.      -Continue supplemental oxygen  -Bronchodilators as need be    Depression   Wellbutrin plus trazodone as current    Peripheral neuropathy   Continue gabapentin at renal dosing.  Discontinue capsaicin per Ms. Gamez and added 4% lidocaine topically which she likes so far.    CODE STATUS   See discussion above.  Patient just met with hospice consultants yesterday.  She has not signed onto the program, at least not yet.  She says she has discussed it with her spouse food tells her to \"do what I want\".  Patient changes to DNR/DNI.  She will continue discussions regarding if and or when hospice is appropriate but plans to continue dialysis at this time.    GERD   Pepcid    Chronic cough   Patient requests guaifenesin instead of Cepacol drops so I added guaifenesin syrup for her today    Peripheral vascular disease   Status post rupture of AAA.  No longer on metoprolol due to hypertensive issues.  No longer on statins.    Multiple other medical issues reviewed without change in therapy today.  Please include but are not limited to ZULEIKA, gout, diverticulosis, hip/femur fractures, back surgery, constipation       Case discussed with:    Facility staff     Time:  57 minutes with > 50% in counseling and coordination of care as noted above         Derick San MD    "

## 2021-06-10 NOTE — PROGRESS NOTES
Assessment/Plan:       1. Chronic obstructive pulmonary disease, unspecified COPD type  Patient could to continue with current medication regimen.  She is not currently on any inhalers at this time she will continue to be monitored for worsening lung function.  She will be referred to pulmonology if needed.  At this time she appears to be doing well.    2. Persistent atrial fibrillation  History of persistent atrial fibrillation/paroxysmal atrial fibrillation.  She had previously not been on warfarin.  This occasion was restarted on her in the beginning of April.  Goal INR is 2-3.  Currently being managed by cardiology however if needed I can take this over.  She is currently also being managed by the anticoagulation nurses.  Her last INR on 5/12/17 was 1.1.  The INR nurses are adjusting her dose appropriately.  Except for occasional shortness of breath she does not have any other signs or symptoms that are bothersome for her.  She is also on digoxin to help with heart rate control.    3. Dissection of thoracoabdominal aorta  History of a dissection of her drastic aorta.  This is being monitored by cardiology at this time.    4. Acute on chronic diastolic congestive heart failure  She is currently on metoprolol and digoxin being managed closely with cardiology.  Overall she is a complicated patient secondary to her dissecting aorta as well as her chronic kidney disease on dialysis.    5. Right foot drop  History of right foot drop secondary to her stroke.  She has a foot brace that she has been instructed to wear at a regular basis however she does not wear this.  I will refer her to physical therapy to assist her with strengthening of her lower legs as well as assist with her foot drop.  I will continue to renew her physical therapy as needed.  - Ambulatory referral to PT/OT    6. Leg weakness, bilateral  Has complaints of lower extremity weakness that has been secondary to falling with an unsteady gait.  I am  suspicious that her falls are also secondary to her foot drop.  I would like her to participate in physical therapy to increase her strength and improve her mobility.  - Ambulatory referral to PT/OT    7. Anemia of chronic renal failure, stage 5  Last hemoglobin on 4/10/17 was 11.8.  Mostly a normocytic anemia likely secondary to anemia of chronic disease as well as chronic renal failure.  Her hemoglobin will continue to be monitored.    8. ZULEIKA (obstructive sleep apnea), severe, intolerant of CPAP  She currently utilizes 2 L of oxygen at bedtime secondary to intolerance of CPAP.  She was encouraged to continue to utilize the oxygen at bedtime.    9. Pure hypercholesterolemia  Patient is on low-dose atorvastatin to help decrease her risk of heart disease.  I reviewed her last lipid panel that I was able to identify in care everywhere and overall this was not severely abnormal.  I would like to check a lipid cascade at her next office visit.    10. Anticoagulated on warfarin  Currently anticoagulated on warfarin.  Goal INR is 2-3.  Currently being managed by cardiology.  I am more than willing to take this over if cardiology does not wish to continue to monitor this.       11. ESRD (end stage renal disease)  Currently on dialysis Monday, Wednesday, Friday.  She takes midodrine to help decrease risk of hypotension postdialysis.  She currently goes to [facility] for her dialysis sessions.  She sees the nephrologist on a monthly basis when she has her dialysis runs.  She overall is doing well with her dialysis.  She continues to tolerate the runs relatively well.  She has a dialysis port in her right IJ.    Overall I would like patient to follow-up in the clinic at minimum every 6 months.  She may certainly return to the clinic sooner for other concerns.  She will let me know which medications she will need refills of.  She currently does not need refills of her medications at this time.      Subjective:       Belia Rodriguez is a 71 y.o. female who presents to [facility].  Attending this appointment with her today is her .  She was previously a patient through [facility] for her primary care.  She is switching to [facility] as her specialists are all through [facility] currently.  She has a complex medical history which includes end-stage renal disease which she is on dialysis for 3 days per week, anemia of chronic disease, paroxysmal atrial fibrillation, hypertension, hyperlipidemia, obstructive sleep apnea which she is intolerant of wearing CPAP and utilizes 2 L of oxygen at night.  She also has a past medical history of COPD, right foot drop secondary to a stroke, gout, chronic thoracic aortic dissection, spinal stenosis, and CHF.  In the last year she has had 5 hospitalizations secondary to complications of her chronic diseases.  Her most recent admission was 4/8/17-4/12/17.  She had a discharge diagnosis of shortness of breath, acute hypoxic respiratory failure, CHF exacerbation, fall, end-stage renal disease, proximal atrial fibrillation, in addition to her other chronic illnesses.  At that time she was discharged to [facility] which she has been to several times for TCU.  She was discharged from the TCU on 5/2/17.  She had improved significantly regarding self-care as well as increasing her strength secondary to her hospitalization.  She was discharged to home with current medications that we have listed as well as outpatient physical therapy.  She requires a physical therapy order today and would like to pursue this through [facility].  She was discharged to continue with her follow-up to cardiology as well as 3 times per week dialysis.  She continues on her home oxygen at 2 L at bedtime she was advised to either reestablish with a primary care follow-up with her previous primary care for medication management.  At this time she reports that she does not need any medication  refills.  She has newly on warfarin as her atrial fibrillation was contributing to her fluid overload.  She was restarted on warfarin at 2-1/2 mg and is currently taking 5 mg with a goal INR of 2-3.  Currently the cardiologist is managing this.  If they wish for myself to manage this I can take this over.  Another change that appears occurred after her hospitalization/during hospitalization was the switch from amiodarone to digoxin to help rate control.  Her metoprolol was also increased from 50 mg to 100 mg.  She was provided with midodrine to take during dialysis if her blood pressure drops significantly where she becomes symptomatic.      Overall her largest concern is her with leg weakness and been able to maintain her ability to take care of herself at home and to not experience more falls.  Ready well-established with her nephrologist as well as cardiologist.  She has follow-up appointments with these providers are ready scheduled.  She denies any current chest pain.  She reports that she does have shortness of breath with ambulation and exertion.  This is not a new symptom for her and she feels that this is well controlled at this time.  She had dialysis today prior to this appointment and her vital signs are stable and she is feeling well at her baseline.    The following portions of the patient's history were reviewed and updated as appropriate: allergies, current medications, past family history, past medical history, past social history, past surgical history and problem list.    Past Medical History:   Diagnosis Date     Arthritis      CHF (congestive heart failure)      Chronic anemia 6/1/2014     Chronic kidney disease      Chronic thoracic aortic dissection 10/7/2015    Descending thoracic aorta; treated medically per notes of Dragan Singh and Jennifer.     COPD (chronic obstructive pulmonary disease)      CVA (cerebral infarction)      Disease of thyroid gland      Dyslipidemia      ESRD (end stage  renal disease) 06/03/2009    on dialysis with Dr. Mitchell     Essential hypertension 6/30/2014     GI (gastrointestinal bleed)      Gout      L3 vertebral fracture 11/16/2015     Obesity      ZULEIKA (obstructive sleep apnea), severe, intolerant of CPAP 10/22/2015     Pneumonia 9-7-2015     Right foot drop      Spinal stenosis 3/28/2016     Stroke 3/24/2016     Past Surgical History:   Procedure Laterality Date     BACK SURGERY      [facility]     COLONOSCOPY N/A 3/23/2016    Procedure: COLONOSCOPY;  Surgeon: Ruddy Tejada MD;  Location: [facility];  Service:      DILATION AND CURETTAGE OF UTERUS       EYE SURGERY       HERNIA REPAIR       TONSILLECTOMY       Current Outpatient Prescriptions   Medication Sig     acetaminophen (TYLENOL) 500 MG tablet Take 500 mg by mouth every 6 (six) hours as needed for pain.     allopurinol (ZYLOPRIM) 100 MG tablet Take 100 mg by mouth daily with lunch.      aspirin 81 MG EC tablet Take 1 tablet (81 mg total) by mouth daily. (Patient taking differently: Take 81 mg by mouth daily with lunch. )     atorvastatin (LIPITOR) 10 MG tablet Take 10 mg by mouth bedtime.      B complex-vitamin C-folic acid 100-1 mg Tab Take 1 tablet by mouth at bedtime.      cholecalciferol, vitamin D3, 2,000 unit cap Take 2,000 Units by mouth daily with lunch.      digoxin (LANOXIN) 125 mcg tablet Take 1 tablet (125 mcg total) by mouth 3 (three) times a week.     diphenhydrAMINE (BENADRYL) 50 MG tablet Take 50 mg by mouth at bedtime as needed for sleep.      folic acid (FOLVITE) 1 MG tablet Take 1 mg by mouth daily with lunch.      gabapentin (NEURONTIN) 100 MG capsule Take 100 mg by mouth 3 (three) times a day.     Lactobacillus rhamnosus GG (CULTURELLE) 10-15 Billion cell capsule Take 1 capsule by mouth daily with lunch.     metoprolol tartrate (LOPRESSOR) 100 MG tablet Take 1 tablet (100 mg total) by mouth 2 (two) times a day. (Patient taking differently: Take 25 mg by mouth 2 (two) times a day.  Takes at noon and bedtime)     midodrine (PROAMATINE) 5 MG tablet Take 1 tablet (5 mg total) by mouth 3 (three) times a week. 30 minutes prior to dialysis (Patient taking differently: Take 5-10 mg by mouth 3 (three) times a week. 30 minutes prior to dialysis, Mon,Wed and Fridau)     omeprazole (PRILOSEC) 20 MG capsule Take 20 mg by mouth daily with lunch.      senna-docusate (SENNOSIDES-DOCUSATE SODIUM) 8.6-50 mg tablet Take 1 tablet by mouth at bedtime as needed for constipation.      sevelamer carbonate (RENVELA) 800 mg tablet Take 1,600 mg by mouth 3 (three) times a day with meals.     timolol maleate (TIMOPTIC) 0.5 % ophthalmic solution Administer 1 drop to both eyes 2 (two) times a day.      traMADol (ULTRAM) 50 mg tablet Take 1 tablet (50 mg total) by mouth every 8 (eight) hours as needed for pain.     warfarin (COUMADIN) 2.5 MG tablet Take 2.5 mg daily     Family History   Problem Relation Age of Onset     Dementia Mother      Diabetes Mother      Arthritis Mother      Cancer Mother      Depression Mother      Heart disease Mother      Vision loss Mother      Stroke Father      Heart disease Father      Social History     Social History     Marital status:      Spouse name: Cayden     Number of children: 2     Years of education: N/A     Occupational History      Retired     Social History Main Topics     Smoking status: Former Smoker     Packs/day: 1.50     Years: 37.00     Types: Cigarettes     Quit date: 1/1/2009     Smokeless tobacco: Never Used     Alcohol use 7.0 oz/week     14 Standard drinks or equivalent per week      Comment: 14 mixed drinks per week     Drug use: No     Sexual activity: No     Other Topics Concern     Not on file     Social History Narrative    Lives with her . Daughter in [place] and daughter in [place].       Review of Systems   A 12 point comprehensive review of systems was negative except as noted.    She utilizes Timolol for low-grade glaucoma.  Gabapentin for  chronic low back pain, intermittent tramadol to help with pain at bedtime, and a variety of supplements to take before dialysis replacement.  She takes omeprazole for GERD.  Allopurinol to prevent gout attacks.  She also has intermittent concerns with constipation which she utilizes Senokot for.  She wears compression stockings on bilateral lower extremities up to her knees.     Objective:      /88 (Patient Site: Right Arm, Patient Position: Sitting, Cuff Size: Adult Regular)  Pulse 68  Resp 20  Wt 186 lb (84.4 kg)  BMI 30.02 kg/m2    General appearance: alert, appears stated age, cooperative, mildly obese and pale. Chronically ill appearing.   Head: Normocephalic, without obvious abnormality, atraumatic  Eyes: conjunctivae/corneas clear. PERRL, EOM's intact. Fundi benign.  Throat: lips, mucosa, and tongue normal; teeth and gums normal  Neck: no adenopathy, no carotid bruit, no JVD, supple, symmetrical, trachea midline and thyroid not enlarged, symmetric, no tenderness/mass/nodules  Back: symmetric, no curvature. ROM normal. No CVA tenderness., mild kyphosis may be present.   Lungs: diminished breath sounds all lung fields, no shortness of breath or labored breathing,   Heart: irregularly irregular rhythm, no S3 or S4, no click, no rub and no murmur  Extremities: extremities normal, atraumatic, no cyanosis or edema and compression stockings in place.   Pulses: 2+ and symmetric  Skin: Skin color, texture, turgor normal. No rashes or lesions.  Bruising noted on hands likely secondary to warfarin use.  Neurologic: Grossly normal, walks with a 4 wheeled walker.     Results for orders placed or performed in visit on 05/12/17   POCT INR   Result Value Ref Range    INR 1.1 0.9 - 1.1     60 minutes spent together with the patient today, more than 50% spent in counseling, discussing the above topics.

## 2021-06-11 NOTE — PROGRESS NOTES
Pt will continue dose of warfarin listed above, pt will recheck INR as indicated above, pt denies s/s of bleeding, pt has clear understanding of warfarin management and does not need further education at this time, pt understands to contact the clinic with s/s of bleeding and pt verbalized understanding  PC was from [facility] RN, pts INR reported to be 2.2, dosing information was given to RN to communicate to pt  Vianca

## 2021-06-11 NOTE — PROGRESS NOTES
[facility]  Internal Medicine - Office Visit    Patient: Belia Rodriguez   MRN: [id number]   Date of Service: 09/14/20   Patient Care Team:  Neil Galan MD as PCP - General (Pediatrics)  Ignacio Love, PharmD as Pharmacist (Pharmacist)  John Escoto DO as Assigned PCP    ASSESSMENT/PLAN     Belia Rodriguez is a 74 y/o with multiple medical problems here for hospital/TCU follow up. She was hospitalized in July for CHF exacerbation, then remained in TCU up until 8/18.     Diagnoses and all orders for this visit:    Encounter for immunization  -     Influenza,Quad,High Dose,PF 65 YR+    Congestive heart failure, unspecified HF chronicity, unspecified heart failure type (H)  Dry weight reportedly 70.5 kg, today is below that at 67 kg. Appears euvolemic today. S/p dialysis run today. Symptomatic with ~10 feet. Has declined hospice in past but is DNR/DNI.  -     Ambulatory referral to Home Health    Chronic atrial fibrillation (H)  S/p watchman. Not on anticoagulation due to bleeding risk.     ZULEIKA (obstructive sleep apnea), severe  Doesn't tolerate ZULEIKA. Wears 3-4L at nighttime and on hot/cold days.    ESRD on dialysis (H)  Dialyzes MWF. On midodrine due to hypotension. On cinacalcet and sevelamer.     Presence of Watchman left atrial appendage closure device    Anemia of chronic renal failure, stage 5 (H)    Major depressive disorder, remission status unspecified, unspecified whether recurrent  On wellbutrin and trazodone.     Return to clinic in 3 months.    Neil Galan MD  Internal Medicine and Pediatrics  [facility]  Pager [phone]    SUBJECTIVE     Belia Rodriguez is here today with her  for follow up.    She was hospitalized 7/13-7/20 for CHF exacerbation. She was diuresed by nephrology via dialysis. She had issues with hypotension during that hospitalization. She then was discharged to TCU where she has been up until 8/18.  She lives with her  who is here today with her.     Due to her CHF and ESRD, they have discussed hospice. She does not want to do hospice currently, but is DNR/DNI. She needs to take care of some financial aspects of her life before she would be ready to do home hospice.    She is still getting dialysis MWF. She has issues with hypotension and takes midodrine before and during dialysis. If BP is really low at home, she has been instructed to use it which she does 1-2 times per week.    Her weight was ~155 int he TCU. Dry weight is near 70.5 kg. She watches what she eats. She has noticed that her appetite isn't that great at home. She tries to eat protein bars. No nausea/abdominal pain/diarrhea. She just doesn't feel like eating. She has no LE edema. She has some shortness of breath with walking back and forth in a room, but that is her baseline.    She has ZULEIKA but cannot tolerate CPAP. She uses 3-4 liters at nighttime and on a really cold or hot day she will use it.     She has atrial fibrillation and is s/p watchman device and permanent pacemaker.     She is on wellbutrin and trazodone for depression. She feels she still struggles with her depression.     She is on gabapentin for neuropathy.     Hx of AAA rupture. Not on metoprolol anymore because she was having severe hypotension. Also not on statins.         Review of Systems  Pertinent items are noted in HPI    Family History  Pertinent items are noted in HPI     Social History  Pertinent items are noted in HPI    Immunizations  Reviewed.    Past Medical/Surgical History  Reviewed and updated as appropriate    Medications    Current Outpatient Medications:      acetaminophen (TYLENOL) 500 MG tablet, Take 1,000 mg by mouth 3 (three) times a day as needed., Disp: , Rfl:      artificial tears,hypromellose, (GENTEAL; SYSTANE) 0.3 % Gel, Administer 1 drop to both eyes as needed., Disp: , Rfl:      aspirin 81 MG EC tablet, Take 1 tablet (81 mg total) by mouth  daily., Disp:  , Rfl: 0     benzocaine-menthoL (CEPACOL) 15-3.6 mg, Take 1 lozenge by mouth every hour as needed., Disp: , Rfl:      buPROPion (WELLBUTRIN XL) 150 MG 24 hr tablet, Take 1 tablet (150 mg total) by mouth 2 (two) times a week. Tuesday and saturday, Disp: , Rfl: 0     chlorpheniramine/dextromethorp (CORICIDIN HBP COUGH AND COLD ORAL), Take by mouth. Take 1 tablet 4 times daily as needed nasal drainage, Disp: , Rfl:      cholecalciferol, vitamin D3, 1,000 unit (25 mcg) tablet, Take 2,000 Units by mouth daily., Disp: , Rfl:      cinacalcet (SENSIPAR) 30 MG tablet, Take 30 mg by mouth see administration instructions. Take three times weekly with dialysis (on Mondays, Wednesdays, and Fridays).   , Disp: , Rfl:      famotidine (FOR PEPCID) 10 MG tablet, Take 10 mg by mouth daily as needed for heartburn., Disp: , Rfl:      folic acid (FOLVITE) 1 MG tablet, TAKE ONE TABLET BY MOUTH ONCE DAILY, Disp: 90 tablet, Rfl: 4     gabapentin (NEURONTIN) 100 MG capsule, TAKE 1 CAPSULE BY MOUTH THREE TIMES DAILY, Disp: 270 capsule, Rfl: 3     Lactobacillus rhamnosus GG (CULTURELLE) 10-15 Billion cell capsule, Take 1 capsule by mouth daily with lunch., Disp: , Rfl:      lidocaine (LMX) 4 % cream, Apply topically 3 (three) times a day., Disp: , Rfl:      loratadine (CLARITIN) 10 mg tablet, Take 10 mg by mouth daily as needed. , Disp: , Rfl:      midodrine (PROAMATINE) 10 MG tablet, Take 1.5 tablets (15 mg total) by mouth 3 (three) times a day with meals. (Patient taking differently: Take 15 mg by mouth 3 (three) times a day with meals. Hold for SBP>105 Patient reports that she usually gets 10 mg before dialysis and another 10 mg residential through dialysis as well), Disp: , Rfl:      multivitamin (DAILY-OLGA) per tablet, Take 1 tablet by mouth daily., Disp: , Rfl:      senna-docusate (SENNOSIDES-DOCUSATE SODIUM) 8.6-50 mg tablet, Take 1 tablet by mouth daily as needed for constipation. , Disp: , Rfl:      sevelamer carbonate  "(RENVELA) 800 mg tablet, Take 1 tablet (800 mg total) by mouth 2 (two) times a day as needed (FOr snacks.)., Disp:  , Rfl: 0     sevelamer HCL (RENAGEL) 800 MG tablet, Take 2,400 mg by mouth 3 (three) times a day with meals. And take 2 tablets with snacks, Disp: , Rfl:      timolol maleate (TIMOPTIC) 0.5 % ophthalmic solution, Administer 1 drop to both eyes 2 (two) times a day. , Disp: , Rfl:      traZODone (DESYREL) 50 MG tablet, TAKE 1&1/2 TABLETS (75MG) BY MOUTH AT BEDTIME. (Patient taking differently: Take 150 mg by mouth at bedtime. ), Disp: 135 tablet, Rfl: 3     UNABLE TO FIND, Med Name: guaifenesin 100/5cc  Take 5cc q 4 prn, Disp: , Rfl:     Allergies  Allergies   Allergen Reactions     Ace Inhibitors Cough     Atrovent [Ipratropium Bromide] Headache     Fosinopril Sodium Cough     Zolpidem      hallucinations            OBJECTIVE       BP 90/58   Pulse 73   Resp 16   Ht 5' 5\" (1.651 m)   Wt 147 lb 8.9 oz (66.9 kg)   SpO2 94%   BMI 24.55 kg/m      General Appearance:   Alert, cooperative, sitting in wheelchair, speaking in full sentences, appears frail   Lungs:     Clear to auscultation bilaterally, respirations unlabored    Heart:    Regular rate and rhythm, S1 and S2 normal, no murmur, rub    or gallop   Extremities:   Extremities normal, atraumatic, no cyanosis or edema   Skin:   Skin color, texture, turgor normal, no rashes or lesions       Labs/imaging/studies:  See above        "

## 2021-06-11 NOTE — PROGRESS NOTES
Assessment/Plan:     1. Pre-op evaluation  Patient is low risk for complications related to planned procedure, would recommend proceeding as scheduled without further clinical clarification. EKG shows atrial fibrillation. Last checked on 6/5/17.  Patient is asymptomatic in terms of chest pain or shortness of breath. She has a significant history of end-stage renal disease, anemia secondary to chronic renal failure, COPD, tachybradycardia syndrome, persistent atrial fibrillation, hypertension, ZULEIKA, and hyperlipidemia.  Labs that were previously completed in the hospital are available to review in epic.  In addition she is high risk for complications secondary to surgery due to her chronic diseases as well as this being a cardiac procedure.  However she has been cleared for surgery by a cardiologist who is proceeding with the procedure.  Cardiologist should review prior to surgery and evaluate before proceeding.    2. Atrial fibrillation  History of atrial fibrillation.  She is on warfarin at this time to help decrease risk of blood clots.  INR will be rechecked today.  - INR    3. Anticoagulant long-term use  She is managed by the [facility] nurses.  She will be stopping the warfarin after her Monday dose prior to surgery.  Advised the patient to stop taking the aspirin and that she should not resume this medication until she is completely off of the warfarin.  - INR    4. ESRD (end stage renal disease)  Monday Wednesday Friday dialysis.  Patient tolerates these runs well.  Midodrine taken prior to dialysis to help support blood pressure.  - midodrine (PROAMATINE) 5 MG tablet; Take 1-2 tablets (5-10 mg total) by mouth 3 (three) times a week. 30 minutes prior to dialysis, Mon,Wed and Friday  Dispense: 30 tablet; Refill: 0    5. Essential hypertension with goal blood pressure less than 140/90  Blood pressure is well controlled at this time on current medication regimen.    6. Chronic anemia      Subjective:     Scheduled  Procedure: Wireless Pacemaker insertion  Surgery Date:  6/29/17  Surgery Location:  [facility]  Surgeon:  Dr Garrett    HPI: History of Afib she has been on Warfarin now for about 1-2 months. She has had one episode of high INR that landed her in the Hospital due to rectal bleeding. She is not having any bleeding currently. She is noticing worsening bruising and she is concerned for her INR. She would like this checked today. She is concerned that it is climbing to quickly. It was discovered today that she is also currently taking a baby aspirin. She has had AFib on and off for many years. She is currently on digoxin for rate control.   ESRD, dialysis is on M/W/F which occurred after an aortic dissection in March 2009, she has been on dialysis since this time. She will take midodrine prior to dialysis to avoid her pressure dropping to low.       Current Outpatient Prescriptions   Medication Sig Dispense Refill     acetaminophen (TYLENOL) 500 MG tablet Take 500 mg by mouth every 6 (six) hours as needed for pain.       allopurinol (ZYLOPRIM) 100 MG tablet Take 1 tablet (100 mg total) by mouth daily. 14 tablet 0     amLODIPine (NORVASC) 2.5 MG tablet Take 2.5 mg by mouth daily.       aspirin 81 MG EC tablet Take 1 tablet (81 mg total) by mouth daily.  0     atorvastatin (LIPITOR) 10 MG tablet Take 10 mg by mouth bedtime.        cholecalciferol, vitamin D3, 2,000 unit cap Take 2,000 Units by mouth daily with lunch.        digoxin (LANOXIN) 125 mcg tablet Take 1 tablet (125 mcg total) by mouth 3 (three) times a week. 30 tablet 1     diphenhydrAMINE (BENADRYL) 50 MG tablet Take 50 mg by mouth at bedtime as needed for sleep.        folic acid (FOLVITE) 1 MG tablet Take 1 mg by mouth daily with lunch.        gabapentin (NEURONTIN) 100 MG capsule Take 100 mg by mouth 3 (three) times a day.       Lactobacillus rhamnosus GG (CULTURELLE) 10-15 Billion cell capsule Take 1 capsule by mouth daily with lunch.       metoprolol  tartrate (LOPRESSOR) 25 MG tablet Take 25 mg by mouth 2 (two) times a day.       midodrine (PROAMATINE) 5 MG tablet Take 1 tablet (5 mg total) by mouth 3 (three) times a week. 30 minutes prior to dialysis (Patient taking differently: Take 5-10 mg by mouth 3 (three) times a week. 30 minutes prior to dialysis, Mon,Wed and Friday) 30 tablet 0     omeprazole (PRILOSEC) 20 MG capsule Take 20 mg by mouth daily with lunch.        OXYGEN-AIR DELIVERY SYSTEMS MISC 2 L/min into each nostril daily as needed (SHORTNESS OF BREATH). Indications: shortness of breath       ranitidine (ZANTAC) 150 MG tablet Take 1 tablet (150 mg total) by mouth daily. 14 tablet 0     senna-docusate (SENNOSIDES-DOCUSATE SODIUM) 8.6-50 mg tablet Take 1 tablet by mouth at bedtime as needed for constipation.        sevelamer carbonate (RENVELA) 800 mg tablet Take 1,600 mg by mouth 3 (three) times a day with meals.       timolol maleate (TIMOPTIC) 0.5 % ophthalmic solution Administer 1 drop to both eyes 2 (two) times a day.        traMADol (ULTRAM) 50 mg tablet Take 1 tablet (50 mg total) by mouth every 8 (eight) hours as needed for pain. 20 tablet 0     vit B comp no.3-folic-C-biotin (NEPHRO-OLGA) 1- mg-mg-mcg Tab tablet Take 1 tablet by mouth daily.       warfarin (COUMADIN) 2 MG tablet Take coumadin 2 mg oral 1 dose on 06/ 08 /17. Check INR on  06/09/17  .   Call PCP for further instructions on coumadin dosing, to target INR 2-3 for h/o a fib  . 10 tablet 0     No current facility-administered medications for this visit.        Allergies   Allergen Reactions     Ace Inhibitors Cough     Fosinopril Sodium Cough       Immunization History   Administered Date(s) Administered     Influenza high dose, seasonal 10/06/2014, 10/03/2016     Influenza, Seasonal, Inj PF 10/06/2014     Influenza, inj, historic 12/04/2003, 10/06/2009, 01/06/2010, 10/27/2010, 09/18/2015     Pneumo Polysac 23-V 05/18/2014     Td, historic 05/30/2000     ZOSTER 07/28/2009        Patient Active Problem List   Diagnosis     Chronic obstructive pulmonary disease, unspecified COPD type     ESRD (end stage renal disease)     Chronic anemia     Tachycardia-bradycardia syndrome     Essential hypertension with goal blood pressure less than 140/90     Hyperlipidemia     Acute respiratory failure with hypoxia     Persistent atrial fibrillation     Bacteremia     ZULEIKA (obstructive sleep apnea), severe, intolerant of CPAP     Anemia of chronic renal failure, stage 5     Right knee pain     Dyspnea     Volume overload     Acute bronchitis due to infection     Acute bacterial conjunctivitis of both eyes     Acute CHF (congestive heart failure)     Acute on chronic diastolic congestive heart failure     Hypervolemia, unspecified hypervolemia type     Dissection of thoracoabdominal aorta     CHF (congestive heart failure)     Fall, initial encounter     Pure hypercholesterolemia     Gastroesophageal reflux disease without esophagitis     Gout     GERD (gastroesophageal reflux disease)     Leg weakness     Essential hypertension     Right foot drop     Leg weakness, bilateral     Anticoagulated on warfarin     GI bleeding     Gastrointestinal hemorrhage with hematemesis     Anticoagulant long-term use     Acute midline low back pain with right-sided sciatica     History of compression fracture of spine       Past Medical History:   Diagnosis Date     Arthritis      CHF (congestive heart failure)      Chronic anemia 6/1/2014     Chronic kidney disease      Chronic thoracic aortic dissection 10/7/2015    Descending thoracic aorta; treated medically per notes of Dragan Singh and Jennifer.     COPD (chronic obstructive pulmonary disease)      CVA (cerebral infarction)      Disease of thyroid gland      Dyslipidemia      ESRD (end stage renal disease) 06/03/2009    on dialysis with Dr. Mitchell     Essential hypertension 6/30/2014     GI (gastrointestinal bleed)      Gout      L3 vertebral fracture 11/16/2015      Obesity      ZULEIKA (obstructive sleep apnea), severe, intolerant of CPAP 10/22/2015     Pneumonia 9-7-2015     Right foot drop      Spinal stenosis 3/28/2016     Stroke 3/24/2016       Social History     Social History     Marital status:      Spouse name: Cayden     Number of children: 2     Years of education: N/A     Occupational History      Retired     Social History Main Topics     Smoking status: Former Smoker     Packs/day: 1.50     Years: 37.00     Types: Cigarettes     Quit date: 1/1/2009     Smokeless tobacco: Never Used     Alcohol use 7.0 oz/week     14 Standard drinks or equivalent per week      Comment: 14 mixed drinks per week     Drug use: No     Sexual activity: No     Other Topics Concern     Not on file     Social History Narrative    Lives with her . Daughter in [place] and daughter in [place].       Past Surgical History:   Procedure Laterality Date     BACK SURGERY      [facility]     COLONOSCOPY N/A 3/23/2016    Procedure: COLONOSCOPY;  Surgeon: Ruddy Tejada MD;  Location: [facility];  Service:      DILATION AND CURETTAGE OF UTERUS       EYE SURGERY       HERNIA REPAIR       SC COLSC FLEXIBLE W/CONTROL BLEEDING ANY METHOD N/A 6/7/2017    Procedure: COLONOSCOPY;  Surgeon: Luis Mckeon MD;  Location: [facility];  Service: Gastroenterology     TONSILLECTOMY         History of Present Illness  Recent Health  Fever: no  Chills: no  Fatigue: no  Chest Pain: no  Cough: no  Dyspnea: yes  Urinary Frequency: no  Nausea: no  Vomiting: no  Diarrhea: no  Abdominal Pain: no  Easy Bruising: yes  Lower Extremity Swelling: yes  Poor Exercise Tolerance: yes    Most recent Health Maintenance Visit:  unknown she is seen on a regular basis.     Pertinent History  Prior Anesthesia: yes  Previous Anesthesia Reaction:  no  Diabetes: no  Cardiovascular Disease: yes, hx of aortic dissection, CAD, HTN, ESRD.   Pulmonary Disease: no  Renal Disease: yes  GI Disease: no  Sleep Apnea:  "no  Thromboembolic Problems: no  Clotting Disorder: no  Bleeding Disorder: no, currently on warfarin  Transfusion Reaction: no  Impaired Immunity: no  Steroid use in the last 6 months: no  Frequent Aspirin use: yes    Family history of Stroke in Father.     Social history of patient does not wear denture or partial plates, there is no transfusion refusal and there are no concerns regarding care after surgery. She was advised to stop her Aspirin.     After surgery, the patient plans to recover at home with family.    Review of Systems  A 12 point comprehensive review of systems was negative except as noted.          Objective:         Vitals:    06/23/17 1522   BP: 110/60   Pulse: 80   Resp: 20   Weight: 184 lb 11.2 oz (83.8 kg)   Height: 5' 6\" (1.676 m)       Physical Exam:  General Appearance: Alert, cooperative, no distress, appears older than stated age, chronically ill appearing.   Head: Normocephalic, without obvious abnormality, atraumatic  Eyes: PERRL, conjunctiva/corneas clear, EOM's intact  Ears: Normal TM's and external ear canals, both ears  Nose: Nares normal, septum midline,mucosa normal, no drainage  Throat: Lips, mucosa, and tongue normal; teeth and gums normal  Neck: Supple, symmetrical, trachea midline, no adenopathy;  thyroid: not enlarged, symmetric, no tenderness/mass/nodules; no carotid bruit or JVD  Back: Symmetric, no curvature, ROM normal, no CVA tenderness  Lungs: Clear to auscultation bilaterally, respirations unlabored  Breasts: not examined  Heart: irregular rate and rhythm, S1 and S2 normal, no murmur, rub, or gallop  Abdomen: Soft, non-tender, bowel sounds active all four quadrants,  no masses, no organomegaly  Pelvic:Not examined  Extremities: Extremities normal, atraumatic, no cyanosis or edema  Skin: Skin color, texture, turgor normal, no rashes or lesions  Lymph nodes: Cervical, supraclavicular, and axillary nodes normal  Neurologic: Normal      "

## 2021-06-11 NOTE — PROGRESS NOTES
INR 1.9. Spoke with home health RN and will dose with 2.5 mg Fri and Sun and 5 mg Sat. Retest on Monday. After talking with pt and discussing history of greens/salads and medication change. Pt will  continue  with current diet and dosing of Warfarin.  Continue with moderation of Vit K and green leafy vegetables. Cautioned to call with increase bruising or bleeding. Reminded to call with medication change especially antibiotic. Call with any questions or concerns or any up coming procedures. Cautioned about using Herbal medication.

## 2021-06-11 NOTE — PROGRESS NOTES
INR per home health 1.1. Will increase dose to 5 mg daily and retest on 7/18. RN will watch pt put pill in box. After talking with pt and discussing history of greens/salads and medication change. Pt will  continue  with current diet and dosing of Warfarin.  Continue with moderation of Vit K and green leafy vegetables. Cautioned to call with increase bruising or bleeding. Reminded to call with medication change especially antibiotic. Call with any questions or concerns or any up coming procedures. Cautioned about using Herbal medication.

## 2021-06-11 NOTE — PROGRESS NOTES
INR 2.1. Will dose 2.5 mg T,thur and Sat and 5 mg all other days. Retest in 3 days.  After talking with pt and discussing history of greens/salads and medication change. Pt will  continue  with current diet and dosing of Warfarin.  Continue with moderation of Vit K and green leafy vegetables. Cautioned to call with increase bruising or bleeding. Reminded to call with medication change especially antibiotic. Call with any questions or concerns or any up coming procedures. Cautioned about using Herbal medication.

## 2021-06-11 NOTE — PROGRESS NOTES
INR 4.3 will hold Warfarin for 2 days and then 5 mg daily. Retest in one week. After talking with pt and discussing history of greens/salads and medication change. Pt will  continue  with current diet and dosing of Warfarin.  Continue with moderation of Vit K and green leafy vegetables. Cautioned to call with increase bruising or bleeding. Reminded to call with medication change especially antibiotic. Call with any questions or concerns or any up coming procedures. Cautioned about using Herbal medication.

## 2021-06-11 NOTE — PROGRESS NOTES
INR 1.1 per [facility]. Will increase Warfarin to 5 mg daily and retest 6/20. After talking with pt and discussing history of greens/salads and medication change. Pt will  continue  with current diet and dosing of Warfarin.  Continue with moderation of Vit K and green leafy vegetables. Cautioned to call with increase bruising or bleeding. Reminded to call with medication change especially antibiotic. Call with any questions or concerns or any up coming procedures. Cautioned about using Herbal medication.

## 2021-06-11 NOTE — PROGRESS NOTES
1945  [phone] (home)  [phone] (mobile)    +++Important patient information for [facility]/Cath Lab staff : None+++    [facility] EP Cath Lab Procedure Order     Device Implant/Revision:  Procedure: New Implant  Device Type: Leadless Pacemaker  Device Company/Device Rep Needed for Procedure: [employer]    Diagnosis:  Tachy-Vincent Syndrome  Anticipated Case Duration:  2  Scheduling Needs/Timeframe:  Next Available  Anesthesia:  General-Whole Case  Research Protocol:  No    [facility] EP Cath Lab Prep   Ordering Provider: Dr Rodriguez  Ordering Date: 6/12/2017  Orders Status: Intial order placed and Order set placed  EP NC Contact: Vianca Torres RN    H&P:  PMD to Complete  PCP: Raysa Kitchen [facility], [phone]    Pre-op Labs: Ordered AM of procedure    Medical Records Pertinent for Procedure:  Holter 6/4/15 EPIC, Echo 10/19/16 EPIC and EKG 6/5/17 EPIC    Patient Education:    Teach with Patient: Completed via phone prior to procedure    Risks Reviewed:     Pacemaker Insertion    <1% for each of the following:  infection, bleeding, hematoma, pneumothorax, subclavian vein thrombosis, cardiac perforation, cardiac tamponade, arrhythmias, pectoral or diaphragmatic stimulation, air embolus, pocket erosion, device interactions.    <4% lead dislodgment, <1% lead fracture or generator  malfunction.    <0.5% CVA or MI.    <0.1% death.    If external defibrillation or CV is needed, 25% risk for superficial burn.  For patients on anticoagulation, the risk of bleeding, hematoma and tamponade are increased.    Consent: Will be obtained in [facility] day of procedure    Pre-Procedure Instructions that were Reviewed with Patient:  NPO after midnight, Notified patient of time and date of procedure by CV , Transportation arrangements needed s/p procedure, Post-procedure follow up process, Sedation plan/orders and Pre-procedure letter was sent to pt by CV     Pre-Procedure Medication Instructions:  Instructions given to pt  regarding anticoagulants: Coumadin- to hold 2 days prior to procedure  Instructions given to pt regarding antiarrhythmic medication: N/A; N/A  Instructions for medication, other than anticoagulants/antiarrhythmics listed above, given to pt: to hold all morning medications      Allergies   Allergen Reactions     Ace Inhibitors Cough     Fosinopril Sodium Cough       Current Outpatient Prescriptions:      acetaminophen (TYLENOL) 500 MG tablet, Take 500 mg by mouth every 6 (six) hours as needed for pain., Disp: , Rfl:      allopurinol (ZYLOPRIM) 100 MG tablet, Take 100 mg by mouth daily with lunch. , Disp: , Rfl:      amLODIPine (NORVASC) 2.5 MG tablet, Take 2.5 mg by mouth daily., Disp: , Rfl:      aspirin 81 MG EC tablet, Take 1 tablet (81 mg total) by mouth daily., Disp: , Rfl: 0     atorvastatin (LIPITOR) 10 MG tablet, Take 10 mg by mouth bedtime. , Disp: , Rfl:      cholecalciferol, vitamin D3, 2,000 unit cap, Take 2,000 Units by mouth daily with lunch. , Disp: , Rfl:      digoxin (LANOXIN) 125 mcg tablet, Take 1 tablet (125 mcg total) by mouth 3 (three) times a week., Disp: 30 tablet, Rfl: 1     diphenhydrAMINE (BENADRYL) 50 MG tablet, Take 50 mg by mouth at bedtime as needed for sleep. , Disp: , Rfl:      folic acid (FOLVITE) 1 MG tablet, Take 1 mg by mouth daily with lunch. , Disp: , Rfl:      gabapentin (NEURONTIN) 100 MG capsule, Take 100 mg by mouth 3 (three) times a day., Disp: , Rfl:      Lactobacillus rhamnosus GG (CULTURELLE) 10-15 Billion cell capsule, Take 1 capsule by mouth daily with lunch., Disp: , Rfl:      metoprolol tartrate (LOPRESSOR) 25 MG tablet, Take 25 mg by mouth 2 (two) times a day., Disp: , Rfl:      midodrine (PROAMATINE) 5 MG tablet, Take 1 tablet (5 mg total) by mouth 3 (three) times a week. 30 minutes prior to dialysis (Patient taking differently: Take 5-10 mg by mouth 3 (three) times a week. 30 minutes prior to dialysis, Mon,Wed and Friday), Disp: 30 tablet, Rfl: 0     omeprazole  (PRILOSEC) 20 MG capsule, Take 20 mg by mouth daily with lunch. , Disp: , Rfl:      OXYGEN-AIR DELIVERY SYSTEMS MISC, 2 L/min into each nostril daily as needed (SHORTNESS OF BREATH). Indications: shortness of breath, Disp: , Rfl:      ranitidine (ZANTAC) 150 MG tablet, Take 150 mg by mouth 2 (two) times a day as needed for heartburn., Disp: , Rfl:      senna-docusate (SENNOSIDES-DOCUSATE SODIUM) 8.6-50 mg tablet, Take 1 tablet by mouth at bedtime as needed for constipation. , Disp: , Rfl:      sevelamer carbonate (RENVELA) 800 mg tablet, Take 1,600 mg by mouth 3 (three) times a day with meals., Disp: , Rfl:      timolol maleate (TIMOPTIC) 0.5 % ophthalmic solution, Administer 1 drop to both eyes 2 (two) times a day. , Disp: , Rfl:      traMADol (ULTRAM) 50 mg tablet, Take 1 tablet (50 mg total) by mouth every 8 (eight) hours as needed for pain., Disp: 20 tablet, Rfl: 0     vit B comp no.3-folic-C-biotin (NEPHRO-OLGA) 1- mg-mg-mcg Tab tablet, Take 1 tablet by mouth daily., Disp: , Rfl:      warfarin (COUMADIN) 2 MG tablet, Take coumadin 2 mg oral 1 dose on 06/ 08 /17. Check INR on  06/09/17  .   Call PCP for further instructions on coumadin dosing, to target INR 2-3 for h/o a fib  ., Disp: 10 tablet, Rfl: 0

## 2021-06-11 NOTE — PROGRESS NOTES
INR 1.4 will increase dose to 7.5 mg T,Th, Sat and 5 mg all other days. Retest in one week. Pt is having pacer wire adjustment next week and will call Dr. Rodriguez Rn to question what INR should be. After talking with pt and discussing history of greens/salads and medication change. Pt will  continue  with current diet and dosing of Warfarin.  Continue with moderation of Vit K and green leafy vegetables. Cautioned to call with increase bruising or bleeding. Reminded to call with medication change especially antibiotic. Call with any questions or concerns or any up coming procedures. Cautioned about using Herbal medication.

## 2021-06-11 NOTE — PROGRESS NOTES
ASSESSMENT: Belia Rodriguez is a 71 y.o. female who presents for consultation at the request of [facility] PCP Raysa Kitchen CNP, with a past medical history significant for End-Stage Renal Disease stage V (with HD MWF 9am-1pm), chronic anemia, ZULEIKA, GERD, gout, congestive heart failure, COPD, persistent atrial fibrillation on Coumadin, history gastrointestinal hemorrhage, history of compression fracture L3 2015-now stable, hyperlipidemia, hypertension, who presents today for new patient evaluation of acute midline low back pain that is worse at the lumbosacral junction ongoing for the last 2-3 months status post fall without radicular pain.    Patient has chronic right foot drop (right EHL and dorsi/plantar flexors) from lumbar surgery 1998.  She is otherwise neurologically intact on exam.    Note patient is awaiting pacemaker placement however it has been on hold at this time.    JENNA Score: 48    WHO 5: 7 7, patient not interested in behavioral health services.    PHQ-2: 1    Diagnoses and all orders for this visit:    Spondylolisthesis at L5-S1 level  -     MR Lumbar Spine Without Contrast; Future; Expected date: 7/6/17    Lumbar stenosis with neurogenic claudication  -     MR Lumbar Spine Without Contrast; Future; Expected date: 7/6/17    Compression fracture of L3 lumbar vertebra    Acute midline low back pain without sciatica  -     MR Lumbar Spine Without Contrast; Future; Expected date: 7/6/17  -     traMADol (ULTRAM) 50 mg tablet; Take 0.5-1 tablets (25-50 mg total) by mouth 2 (two) times a day as needed for pain or severe pain (7-10).  Dispense: 28 tablet; Refill: 0    ESRD (end stage renal disease)      PLAN:  Reviewed spine anatomy and disease process. Discussed diagnosis and treatment options with the patient today. A shared decision making model was used.  The patient's values and choices were respected. The following represents what was discussed and decided upon by the provider and the patient.       -DIAGNOSTIC TESTS:  Images were personally reviewed and interpreted.    --Updated lumbar spine MRI to elevate lumbar stenosis and for possible acute compression fracture.  --Patient is awaiting pacemaker placement, therefore can still have MRIs at this time however once pacemaker is placed, a CT scan would be recommended ongoing.  --Lumbar spine x-ray 6/16/2017 does reveal 6 mm anterolisthesis L5-S1.    Chronic compression fracture 50% at L3 appears to be since prior MRI in 2015, and mild L2 that appears to be stable since prior abdominal CT scan 3/20/2016.  Significant degenerative disc disease L3-4 and degenerative changes of bilateral SI joint.  Moderate to marked lumbar facet arthropathy lower lumbar spine.    -PHYSICAL THERAPY: Can discuss further physical therapy pending MRI review, she did therapy in 2015 with some relief back pain.  Discussed the importance of core strengthening, ROM, stretching exercises with the patient and how each of these entities is important in decreasing pain.  Explained to the patient that the purpose of physical therapy is to teach the patient a home exercise program.  These exercises need to be performed every day in order to decrease pain and prevent future occurrences of pain.        -MEDICATIONS: Prescribed tramadol 80 mg, 1/2-1 tablet twice daily as needed for severe breakthrough pain, #28 given for 2 weeks worth.  She has gotten this medication prior April 2017, takes it infrequently however does feel it helps her current more severe pain.  -MN  checked. Discussed the risks (eg, addiction, overdose, worsening pain) verses benefit of opioid use with patient today. Explained that this medication will not be a long term solution to ongoing pain. Discussed using lowest effective dose and the importance of other measures for pain management including PT, other non-opioid medications, behavioral treatments, and other procedure options.   Discussed side effects of medications  and proper use. Patient verbalized understanding.    -INTERVENTIONS: Patient is hopeful for injection at this time due to her chronic pain, did have an L4-5 interlaminar JAYJAY in 2015 with significant relief, however will further evaluate pending updated MRI.    **Note she is on Coumadin therefore if recommend interlaminar we would have to okay hold for Coumadin ×7 days from her cardiologist.    -PATIENT EDUCATION:  45 minutes of total visit time was spent face to face with the patient today, 60 % of the visit was spent on counseling, education, and coordinating care.     -FOLLOW-UP:   Follow-up after obtaining updated lumbar spine MRI.    Advised pt to call the [facility] if symptoms worsen or you have problems controlling bladder and bowel function.   ______________________________________________________________________    SUBJECTIVE:  HPI:  Belia Rodriguez  Is a 71 y.o. female who presents today for new patient evaluation of low back pain midline across the lumbosacral junction as well as L3-4 and L4-5 level that is less severe ongoing since a fall 2-3 months ago where she landed on her bottom.  She reports that the pain did not start initially however worsened over that next week and currently her pain is a constant 6/10 up to an 8/10 at its worst.  More bothersome with transitioning from sitting to stand as well as sitting and bending.  Typically her pain with walking and standing is okay however she reports that she does not do these for a long period of time ongoing due to her more chronic low back pain as well as ongoing generalized weakness.    She denies any radicular leg symptoms, does report ongoing chronic foot drop on the right from surgery 1998 that is unchanged, does have numbness and tingling in that right lower extremity as well that is chronic however no new numbness or tingling.  She denies bowel or bladder dysfunction.    Chronic Balance Changes, uses walker ongoing.     *Patient was  evaluated by Dr. Matta 12/1/15 for osteoporotic L3 compression fracture and central stenosis from epidural lipomatosis. No surgery recommended due to multiple co-morbidities. She did order L4-5 IL JAYJAY at that time for significant L3-4 stenosis.     Treatment to Date: Lumbar surgery 1998 for severe right lumbar radiculitis with relief.  Physical therapy and chiropractic care for low back pain 2015.  Current physical therapy for balance only.    L4-5 interlaminar injection [facility] 12/2/2015 with relief.    Medications:  Gabapentin 100 mg, 1 tablet 3 times a day  Tramadol 50 mg prescribed last 4/11/2017  Tylenol 500 mg 1 tablet every 6 hours as needed  Benadryl 50 mg bedtime as needed    **Coumadin patient ongoing, managed by Cardiology.    Current Outpatient Prescriptions on File Prior to Encounter   Medication Sig Dispense Refill     acetaminophen (TYLENOL) 500 MG tablet Take 500 mg by mouth every 6 (six) hours as needed for pain.       allopurinol (ZYLOPRIM) 100 MG tablet Take 1 tablet (100 mg total) by mouth daily. 14 tablet 0     atorvastatin (LIPITOR) 10 MG tablet Take 10 mg by mouth bedtime.        cholecalciferol, vitamin D3, 2,000 unit cap Take 2,000 Units by mouth daily with lunch.        digoxin (LANOXIN) 125 mcg tablet Take 1 tablet (125 mcg total) by mouth 3 (three) times a week. 30 tablet 1     diphenhydrAMINE (BENADRYL) 50 MG tablet Take 50 mg by mouth at bedtime as needed for sleep.        folic acid (FOLVITE) 1 MG tablet Take 1 mg by mouth daily with lunch.        gabapentin (NEURONTIN) 100 MG capsule Take 100 mg by mouth 3 (three) times a day.       Lactobacillus rhamnosus GG (CULTURELLE) 10-15 Billion cell capsule Take 1 capsule by mouth daily with lunch.       metoprolol tartrate (LOPRESSOR) 25 MG tablet Take 25 mg by mouth 2 (two) times a day.       midodrine (PROAMATINE) 5 MG tablet Take 1-2 tablets (5-10 mg total) by mouth 3 (three) times a week. 30 minutes prior to dialysis, Mon,Wed  and Friday 30 tablet 0     omeprazole (PRILOSEC) 20 MG capsule Take 20 mg by mouth daily with lunch.        OXYGEN-AIR DELIVERY SYSTEMS MISC 2 L/min into each nostril daily as needed (SHORTNESS OF BREATH). Indications: shortness of breath       ranitidine (ZANTAC) 150 MG tablet Take 1 tablet (150 mg total) by mouth daily. 14 tablet 0     senna-docusate (SENNOSIDES-DOCUSATE SODIUM) 8.6-50 mg tablet Take 1 tablet by mouth at bedtime as needed for constipation.        sevelamer carbonate (RENVELA) 800 mg tablet Take 1,600 mg by mouth 3 (three) times a day with meals.       timolol maleate (TIMOPTIC) 0.5 % ophthalmic solution Administer 1 drop to both eyes 2 (two) times a day.        vit B comp no.3-folic-C-biotin (NEPHRO-OLGA) 1- mg-mg-mcg Tab tablet Take 1 tablet by mouth daily.       warfarin (COUMADIN) 2 MG tablet Take coumadin 2 mg oral 1 dose on 06/ 08 /17. Check INR on  06/09/17  .   Call PCP for further instructions on coumadin dosing, to target INR 2-3 for h/o a fib  . 10 tablet 0     [DISCONTINUED] traMADol (ULTRAM) 50 mg tablet Take 1 tablet (50 mg total) by mouth every 8 (eight) hours as needed for pain. 20 tablet 0     [DISCONTINUED] amLODIPine (NORVASC) 2.5 MG tablet Take 2.5 mg by mouth daily.       [DISCONTINUED] aspirin 81 MG EC tablet Take 1 tablet (81 mg total) by mouth daily.  0     No current facility-administered medications on file prior to encounter.        Allergies   Allergen Reactions     Ace Inhibitors Cough     Fosinopril Sodium Cough       Past Medical History:   Diagnosis Date     Arthritis      CHF (congestive heart failure)      Chronic anemia 6/1/2014     Chronic kidney disease      Chronic thoracic aortic dissection 10/7/2015    Descending thoracic aorta; treated medically per notes of Dragan Singh and Jennifer.     COPD (chronic obstructive pulmonary disease)      CVA (cerebral infarction)      Disease of thyroid gland      Dyslipidemia      ESRD (end stage renal disease)  06/03/2009    on dialysis with Dr. iMtchell     Essential hypertension 6/30/2014     GI (gastrointestinal bleed)      Gout      L3 vertebral fracture 11/16/2015     Obesity      ZULEIKA (obstructive sleep apnea), severe, intolerant of CPAP 10/22/2015     Pneumonia 9-7-2015     Right foot drop      Spinal stenosis 3/28/2016     Stroke 3/24/2016        Patient Active Problem List   Diagnosis     Chronic obstructive pulmonary disease, unspecified COPD type     ESRD (end stage renal disease)     Chronic anemia     Tachycardia-bradycardia syndrome     Essential hypertension with goal blood pressure less than 140/90     Hyperlipidemia     Acute respiratory failure with hypoxia     Persistent atrial fibrillation     Bacteremia     ZULEIKA (obstructive sleep apnea), severe, intolerant of CPAP     Anemia of chronic renal failure, stage 5     Right knee pain     Dyspnea     Volume overload     Acute bronchitis due to infection     Acute bacterial conjunctivitis of both eyes     Acute CHF (congestive heart failure)     Acute on chronic diastolic congestive heart failure     Hypervolemia, unspecified hypervolemia type     Dissection of thoracoabdominal aorta     CHF (congestive heart failure)     Fall, initial encounter     Pure hypercholesterolemia     Gastroesophageal reflux disease without esophagitis     Gout     GERD (gastroesophageal reflux disease)     Leg weakness     Essential hypertension     Right foot drop     Leg weakness, bilateral     Anticoagulated on warfarin     GI bleeding     Gastrointestinal hemorrhage with hematemesis     Anticoagulant long-term use     Acute midline low back pain with right-sided sciatica     History of compression fracture of spine       Past Surgical History:   Procedure Laterality Date     BACK SURGERY      [facility]     COLONOSCOPY N/A 3/23/2016    Procedure: COLONOSCOPY;  Surgeon: Ruddy Tejada MD;  Location: [facility];  Service:      DILATION AND CURETTAGE OF UTERUS       EYE  "SURGERY       HERNIA REPAIR       HI COLSC FLEXIBLE W/CONTROL BLEEDING ANY METHOD N/A 6/7/2017    Procedure: COLONOSCOPY;  Surgeon: Luis Mckeon MD;  Location: [facility];  Service: Gastroenterology     TONSILLECTOMY         Family History   Problem Relation Age of Onset     Dementia Mother      Diabetes Mother      Arthritis Mother      Cancer Mother      Depression Mother      Heart disease Mother      Vision loss Mother      Stroke Father      Heart disease Father        SOCIAL HX: Patient is , here with her . Denies smoking history, currently drinks alcohol however denies being  A heavy drinker, denies recreational drug use.     ROS: Positive for chronic right foot drop, numbness and tingling, back pain, poor sleep quality, indigestion,  SOB, chronic balance changes. Specifically negative for bowel/bladder dysfunction, headache, dizziness, fevers, chills, appetite changes, nausea/vomiting, unexplained weight loss. Otherwise 13 systems reviewed are negative. Please see the patient's intake questionnaire from today for details.    OBJECTIVE:  /68 (Patient Site: Left Arm, Patient Position: Sitting)  Pulse 83  Temp 98.2  F (36.8  C) (Oral)   Ht 5' 6\" (1.676 m)  Wt 189 lb (85.7 kg)  BMI 30.51 kg/m2    PHYSICAL EXAMINATION:    --CONSTITUTIONAL:  Vital signs as above.  No acute distress.  The patient is well nourished and well groomed.  --PSYCHIATRIC:  Appropriate mood and affect. The patient is awake, alert, oriented to person, place, time and answering questions appropriately with clear speech.    --SKIN:  Skin over the face, bilateral lower extremities, and posterior torso is clean, dry, intact without rashes.    --RESPIRATORY: Normal rhythm and effort. No abnormal accessory muscle breathing patterns noted.   --STANDING EXAMINATION:  Normal lumbar lordosis noted, no lateral shift.  --MUSCULOSKELETAL: Lumbar spine inspection reveals no evidence of deformity. Range of motion is not " limited in lumbar flexion, extension. Significant tenderness to palpation lumbar sacral junction midline, moderate tenderness L3 and L4. Straight leg raising in the supine position is negative to radicular pain. Sciatic notch non-tender.  --GROSS MOTOR: Gait is non-antalgic. Easily arises from a seated position. Toe walking and heel walking are normal without significant difficulty.    --LOWER EXTREMITY MOTOR TESTING:  Plantar flexion left 5/5, right 5/5   Dorsiflexion left 5/5, right 5/5   Great toe MTP extension left 5/5, right 5/5  Knee flexion left 5/5, right 5/5  Knee extension left 5/5, right 5/5   Hip flexion left 5/5, right 5/5  Hip abduction left 5/5, right 5/5  Hip adduction left 5/5, right 5/5     --NEUROLOGICAL:  1/4 patellar, and absent achilles reflexes bilaterally.  Sensation to light touch is intact in the bilateral L4, L5, and S1 dermatomes. Babinski is negative. Negative Villareal reflex.    --VASCULAR:  2/4 dorsalis pedis and posterior tibialsi pulses bilaterally.  Bilateral lower extremities are warm.  There is 3+ right and 2+ left pitting edema of the bilateral lower extremities.    RESULTS: Prior medical records from 6/16/2017 to current were reviewed today.    Imaging: MRI of the lumbar spine was personally reviewed and interpreted today. The images were shown to the patient and the findings were explained using a spine model.      Xr Lumbar Spine 2 Or 3 Vws  Result Date: 6/19/2017  INDICATION: Low back pain midline. Sciatica right side. COMPARISON: 12/01/2015, 11/30/2015 and CT abdomen and pelvis of 6/5/2017.   FINDINGS: 5 lumbar-type vertebral bodies.   6 mm anterolisthesis L5 on S1.   Mild to moderate chronic compression fracture deformity of L3 with approximately 50% loss of vertebral body height unchanged compared to the prior CT and dating back to 12/1/2015.   No acute compression fracture deformity.   Mild age-indeterminate height loss at L2 is stable since the CT of 6/5/2017.   Marked  degenerative disc disease from L3-L4 caudally. Mild changes elsewhere. Moderate to marked lower lumbar facet arthropathy. Vascular calcifications.   Degenerative change about the sacroiliac joints.       [facility]  MR LUMBAR SPINE WO CONTRAST  11/16/2015   INDICATION: Bilateral leg weakness for 3 weeks. No trauma. No incontinence. No cancer. History of back surgery 20 years prior. Chronic right foot tingling. History of dialysis since 2009. History of abdominal aortic dissection.  TECHNIQUE: Performed without IV contrast.  SEDATION: None.  COMPARISON: CT examination of the abdomen of 11/02/2014. CTA exam of 10/21/2013.  FINDINGS: Nomenclature is based on 5 lumbar type vertebral bodies. There is chronic wedge deformity of L1 of approximately 50% loss of anterior to posterior vertebral body height. There is diffuse decreased T1 and STIR signal intensity involving the mid   to superior portion of the L3 vertebral body. There is mild loss of anterior to superior vertebral body height of approximately 25% with fluid cleft involving the superior endplate of L3. There is surrounding paravertebral soft tissue edema. This is   favored to represent a subacute fracture in the setting of discogenic marrow signal endplate change. There are scattered Schmorl's nodes. No pars defects. There is sclerosis about the left greater than right L5 pars interarticularis level without   discrete defect. This was present on the prior CTA exam of 10/21/2013. The conus tip is identified at L1. There is symmetric paraspinal muscular atrophy. There is bilateral renal cortical atrophy and renal cysts. Infrarenal aorta is only partially   included and is ectatic. The visualized bony pelvis and proximal femora are grossly normal.  T12-L1: Moderate disc desiccation and disc height loss. Mild broad-based disc protrusion with partial effacement of the ventral thecal sac. Mild facet hypertrophy. No spinal canal stenosis. No significant  neural foraminal stenosis.  L1-L2: Moderate disc desiccation and dorsal loss of disc height. Mild dorsal disc osteophyte complex with partial effacement of the ventral thecal sac. Mild facet hypertrophy. Mild spinal canal stenosis. Mild bilateral neural foraminal stenosis.  L2-L3: Mild disc desiccation without disc height loss. Mild dorsal disc osteophyte complex. Mild facet arthropathy. There is moderate spinal canal stenosis with effacement of CSF about the cauda equina at the disc osteophyte level. Mild bilateral neural   foraminal stenosis.  L3-L4: Moderate disc desiccation and disc height loss. Mild dorsal disc osteophyte complex. Mild facet arthropathy. There is mild prominence of the dorsal epidural fat. There is effacement of CSF about the cauda equina with severe spinal canal stenosis.   Thecal sac is narrowed to approximately 4 to 5 mm AP. There is mild buckling of the cauda equina roots proximal to this level. There is moderate left greater than right neural foraminal stenosis.  L4-L5: Diffuse disc desiccation and disc height loss with near-complete obliteration of the disc space. Old bilateral hemilaminectomies are suggested. Mild bilateral facet hypertrophy. No spinal canal stenosis. Mild bilateral neural foraminal stenoses.  L5-S1: Moderate disc desiccation and disc height loss. Mild anterolisthesis L5 on S1 with partial unroofing of the disc. Mild broad-based disc protrusion. Prominent facet arthropathy. Mild right neural foraminal stenosis. No left neural foraminal   stenosis. No spinal canal stenosis.  CONCLUSION:  1. Abnormal appearance of L3. This is favored to represent a subacute fracture of the superior endplate with fluid cleft superimposed upon advanced discogenic marrow signal endplate changes with surrounding paravertebral edema. As clinically indicated,   consideration could be made for nuclear medicine bone scan for assessment of acuity if therapeutic intervention is considered.  2.  Chronic fracture deformity of L1.  3. Advanced spondylotic changes lumbar spine.  4. There is moderate spinal canal stenosis at L2-L3 and severe at L3-L4.  5. Multilevel neural foraminal stenoses.  6. Bilateral renal cortical atrophy and bilateral renal cysts.

## 2021-06-11 NOTE — PROGRESS NOTES
Pt had INR completed at PMD office  INR 2.9  Pt voiced concerns at PMD office that last time she increased her warfarin, it was 9  She has concerns with the recent increased dose, and increase in her INR she is going to be supratheraputic again  Pt was called, unable to reach pt via phone  Left  msg for pt as clinic was closing with instructions to take 5mg daily, except wed 7.5mg  Pt also was instructed to follow holding instructions after Monday 6/26/17, with plan for Micra implant  Contact information was left of pt has further concerns/questions  Vianca

## 2021-06-11 NOTE — PROGRESS NOTES
INR 1.2 restarted Warfarin on 6/10, will take 2.5 mg daily of Warfarin and retest 6/15 with home health and calling [facility] to [facility]. [phone].

## 2021-06-11 NOTE — PROGRESS NOTES
Assessment/Plan:         1. ESRD (end stage renal disease)  HM2(CBC w/o Differential)   2. Chronic anemia  HM2(CBC w/o Differential)   3. Acute midline low back pain with right-sided sciatica  XR Lumbar Spine 2 or 3 VWS    Ambulatory referral to Spine Care   4. History of compression fracture of spine  Ambulatory referral to Spine Care   5. Gastrointestinal hemorrhage associated with intestinal diverticulosis     6. Anticoagulant long-term use       Patient on chronic dialysis for end-stage renal disease.  She was hospitalized for acute gastrointestinal hemorrhage secondary to anticoagulation for atrial fibrillation.  Today she appears stable.  CBC was rechecked and her hemoglobin was 9.8.  This is significantly improved from 1 week ago.  Given her low back pain that she is experiencing in order placed for spine care today as well as an x-ray was completed.  X-ray was clinically difficult to evaluate.  Will continue to wait on official radiology read.  He does appear to be significant arthritis present.  Unable to assess if there is a compression fraction present.  Continue on warfarin currently.  She is being managed by the anticoagulation nurses and cardiology.  She will follow up for a pre-op exam. Otherwise plan follow up every 3 months.     Subjective:      Belia Rodriguez is a 71 y.o. female who presents for follow up from hospitalization. She had a supratherapuetic INR with a level near 10.0. She had rectal bleeding and was found to have diverticulosis and internal hemorrhoids and the bleeding occurred to secondary to the supratherapeutic INR.  She was admitted on Monday 6/5 and discharged on Thursday 6/8. She incidently had another episode of bleeding on Friday was denied a dialysis run.  She represented back to the emergency department and was evaluated for 4 hours and it was determined that she was stable.  She was not admitted after the second bleed.  Her INR is now stable and below goal.  Her goal is  to have her INR range be between 2 and 3.  She successfully had dialysis Monday, Wednesday, and Friday of this week.  She just finished a dialysis run this morning prior to this appointment.  She had been significantly anemic secondary to the bleeding.  She was infused to packed RBCs during a dialysis run while hospitalized.  Her last hemoglobin was 8.4.  The last basic metabolic panel that she had drawn on Friday was a creatinine of 6.36 however she had not had dialysis this day.  Otherwise electrolytes were stable.  Another concern she has today is regarding low back pain.  She reports that the pain started approximately 1.5 weeks ago.  This occurred before she was admitted.  She reports that she has had compression fractures in the past and she feels that the pain is similar to when she had these fractures.  She reports that she had a fall back in April however only neck and head imaging was completed at that time.  She denied having any back pain at the time of that fall.    And finally she reports that she needs to have a preoperative exam completed.  She is having a new leadless pacemaker placed in the Cath Lab on 6/29.  She reports that she will schedule a preoperative exam in the next week.    The following portions of the patient's history were reviewed and updated as appropriate: allergies, current medications and problem list.    Patient Active Problem List   Diagnosis     Chronic obstructive pulmonary disease, unspecified COPD type     ESRD (end stage renal disease)     Chronic anemia     Tachycardia-bradycardia syndrome     Essential hypertension with goal blood pressure less than 140/90     Hyperlipidemia     Acute respiratory failure with hypoxia     Persistent atrial fibrillation     Bacteremia     ZULEIKA (obstructive sleep apnea), severe, intolerant of CPAP     Anemia of chronic renal failure, stage 5     Right knee pain     Dyspnea     Volume overload     Acute bronchitis due to infection     Acute  bacterial conjunctivitis of both eyes     Acute CHF (congestive heart failure)     Acute on chronic diastolic congestive heart failure     Hypervolemia, unspecified hypervolemia type     Dissection of thoracoabdominal aorta     CHF (congestive heart failure)     Fall, initial encounter     Pure hypercholesterolemia     Gastroesophageal reflux disease without esophagitis     Gout     GERD (gastroesophageal reflux disease)     Leg weakness     Essential hypertension     Right foot drop     Leg weakness, bilateral     Anticoagulated on warfarin     GI bleeding     Gastrointestinal hemorrhage with hematemesis     Current Outpatient Prescriptions   Medication Sig     acetaminophen (TYLENOL) 500 MG tablet Take 500 mg by mouth every 6 (six) hours as needed for pain.     allopurinol (ZYLOPRIM) 100 MG tablet Take 100 mg by mouth daily with lunch.      amLODIPine (NORVASC) 2.5 MG tablet Take 2.5 mg by mouth daily.     aspirin 81 MG EC tablet Take 1 tablet (81 mg total) by mouth daily.     atorvastatin (LIPITOR) 10 MG tablet Take 10 mg by mouth bedtime.      cholecalciferol, vitamin D3, 2,000 unit cap Take 2,000 Units by mouth daily with lunch.      digoxin (LANOXIN) 125 mcg tablet Take 1 tablet (125 mcg total) by mouth 3 (three) times a week.     diphenhydrAMINE (BENADRYL) 50 MG tablet Take 50 mg by mouth at bedtime as needed for sleep.      folic acid (FOLVITE) 1 MG tablet Take 1 mg by mouth daily with lunch.      gabapentin (NEURONTIN) 100 MG capsule Take 100 mg by mouth 3 (three) times a day.     Lactobacillus rhamnosus GG (CULTURELLE) 10-15 Billion cell capsule Take 1 capsule by mouth daily with lunch.     metoprolol tartrate (LOPRESSOR) 25 MG tablet Take 25 mg by mouth 2 (two) times a day.     midodrine (PROAMATINE) 5 MG tablet Take 1 tablet (5 mg total) by mouth 3 (three) times a week. 30 minutes prior to dialysis (Patient taking differently: Take 5-10 mg by mouth 3 (three) times a week. 30 minutes prior to dialysis,  Mon,Wed and Friday)     omeprazole (PRILOSEC) 20 MG capsule Take 20 mg by mouth daily with lunch.      OXYGEN-AIR DELIVERY SYSTEMS MISC 2 L/min into each nostril daily as needed (SHORTNESS OF BREATH). Indications: shortness of breath     ranitidine (ZANTAC) 150 MG tablet Take 150 mg by mouth 2 (two) times a day as needed for heartburn.     senna-docusate (SENNOSIDES-DOCUSATE SODIUM) 8.6-50 mg tablet Take 1 tablet by mouth at bedtime as needed for constipation.      sevelamer carbonate (RENVELA) 800 mg tablet Take 1,600 mg by mouth 3 (three) times a day with meals.     timolol maleate (TIMOPTIC) 0.5 % ophthalmic solution Administer 1 drop to both eyes 2 (two) times a day.      traMADol (ULTRAM) 50 mg tablet Take 1 tablet (50 mg total) by mouth every 8 (eight) hours as needed for pain.     vit B comp no.3-folic-C-biotin (NEPHRO-OLGA) 1- mg-mg-mcg Tab tablet Take 1 tablet by mouth daily.     warfarin (COUMADIN) 2 MG tablet Take coumadin 2 mg oral 1 dose on 06/ 08 /17. Check INR on  06/09/17  .   Call PCP for further instructions on coumadin dosing, to target INR 2-3 for h/o a fib  .       Review of Systems   Pertinent items are noted in HPI.      Objective:      /72 (Patient Site: Right Arm, Patient Position: Sitting, Cuff Size: Adult Regular)  Pulse 72  Resp 20  Wt 187 lb (84.8 kg)  BMI 30.18 kg/m2    General appearance: alert, appears older than stated age, cooperative and chronically ill appearing  Head: Normocephalic, without obvious abnormality, atraumatic  Lungs: clear to auscultation bilaterally and slight rales in left lower lung   Heart: regular rate and rhythm, S1, S2 normal, no murmur, click, rub or gallop  Extremities: Bruising on all extremities secondary to recent hospitalization.  Dependent edema +1 present in bilateral lower extremities.  Compression stockings in place.  Pulses: 2+ and symmetric  Skin: Skin color, texture, turgor normal. No rashes or lesions or Positive for ecchymosis  areas on upper extremities secondary to IV insertions and frequent blood draws while hospitalized.  Skin is overall thin.  Neurologic: Grossly normal gait is antalgic and slow.  Patient uses a 4 wheeled walker to assist with ambulation.    40 minutes spent together with the patient today, more than 50% spent in counseling, discussing the above topics.    Results for orders placed or performed in visit on 06/16/17   HM2(CBC w/o Differential)   Result Value Ref Range    WBC 6.5 4.0 - 11.0 thou/uL    RBC 2.95 (L) 3.80 - 5.40 mill/uL    Hemoglobin 9.8 (L) 12.0 - 16.0 g/dL    Hematocrit 30.2 (L) 35.0 - 47.0 %     (H) 80 - 100 fL    MCH 33.2 27.0 - 34.0 pg    MCHC 32.5 32.0 - 36.0 g/dL    RDW 16.3 (H) 11.0 - 14.5 %    Platelets 91 (L) 140 - 440 thou/uL    MPV 8.3 7.0 - 10.0 fL     CXR: Clinically difficult to read the x-ray given the chronicity of her back pain as well as appearance of stenosis and arthritis.  Awaiting on official radiology read.  I also placed a referral for spine care as this is likely a chronic ongoing issue for her and she would benefit from most likely cortisone injections.  If compression fraction is present patient may benefit from a procedure such as a vertebroplasty.

## 2021-06-11 NOTE — PROGRESS NOTES
INR 2.6 will continue on 5 mg daily and retest in 2 weeks. After talking with pt and discussing history of greens/salads and medication change. Pt will  continue  with current diet and dosing of Warfarin.  Continue with moderation of Vit K and green leafy vegetables. Cautioned to call with increase bruising or bleeding. Reminded to call with medication change especially antibiotic. Call with any questions or concerns or any up coming procedures. Cautioned about using Herbal medication.

## 2021-06-11 NOTE — PROGRESS NOTES
Assessment:     Diagnoses and all orders for this visit:    Spondylolisthesis at L5-S1 level    Lumbar stenosis with neurogenic claudication    Acute midline low back pain without sciatica  -     MR Lumbar Spine With Contrast; Future; Expected date: 7/17/17  -     C-Reactive Protein; Future  -     Erythrocyte Sedimentation Rate; Future  -     CBC and differential; Future  -     LORazepam (ATIVAN) 0.5 MG tablet; 1 tab PO 90 minutes before the procedure, than 1 tab 30 minutes before procedure.  Dispense: 2 tablet; Refill: 0    Abnormal finding on imaging  -     MR Lumbar Spine With Contrast; Future; Expected date: 7/17/17  -     C-Reactive Protein; Future  -     Erythrocyte Sedimentation Rate; Future  -     CBC and differential; Future    Claustrophobia  -     LORazepam (ATIVAN) 0.5 MG tablet; 1 tab PO 90 minutes before the procedure, than 1 tab 30 minutes before procedure.  Dispense: 2 tablet; Refill: 0       Belia Rodriguez is a 71 y.o. y.o. female with past medical history significant for End-Stage Renal Disease stage V (with HD MWF 9am-1pm), chronic anemia, ZULEIKA, GERD, gout, congestive heart failure, COPD, persistent atrial fibrillation on Coumadin, history gastrointestinal hemorrhage, history of compression fracture L3 2015-now stable, hyperlipidemia, hypertension who presents today for follow-up regarding acute midline low back pain that is worse at the lumbosacral junction ongoing for the last 2-3 months status post fall, without radicular pain.     Patient has chronic right foot drop (right EHL and dorsi/plantar flexors) from lumbar surgery 1998.  She is otherwise neurologically intact on exam.     Note patient is awaiting pacemaker placement however it has been on hold at this time.     Plan:     A shared decision making plan was used. The patient's values and choices were respected. Prior medical records from 7/6/2017 were reviewed today. The following represents what was discussed and decided upon by the  provider and the patient.        -DIAGNOSTIC TESTS: Images were personally reviewed and interpreted.   --Order was placed for sacral MRI with and without contrast as well as lumbar spine MRI with contrast to both evaluate hyperintense lesion right iliac bone as well as T3 disc space.  --Also ordered CRP, ESR, as well as WBC eluate inflammation and rule out infection.  --Updated lumbar spine MRI does reveal chronic appearing compression fracture L1, L2, L3.  There is a chronic L5-S1 left paracentral disc osteophyte complex impinging the left S1 nerve root however this appears similar to 2015.  Schmorl node noted L5 vertebral body.  There is a new left paracentral disc extrusion at L3-4 compressing the left L4 nerve root.  Incidentally found T1 hyperintense lesion in the right iliac bone that was not noted on 6/5/2017 CT abdomen and pelvis.  Radiologist recommended dedicated MRI of the sacrum with and without contrast.      -INTERVENTIONS: No recommendations for injections at this time until completed MRIs.    -MEDICATIONS: Advised patient to continue tramadol 50 mg, 1/2-1 tablet as needed daily for severe breakthrough pain.  --Prescribed Ativan 0.5 mg 2 tablets prior to MRI due to claustrophobia.  Discussed side effects of medications and proper use. Patient verbalized understanding.    -PHYSICAL THERAPY: Can discuss further physical therapy pending MRI review she did do physical therapy 2015 with some relief back pain.  Discussed the importance of core strengthening, ROM, stretching exercises with the patient and how each of these entities is important in decreasing pain.  Explained to the patient that the purpose of physical therapy is to teach the patient a home exercise program.  These exercises need to be performed every day in order to decrease pain and prevent future occurrences of pain.        -PATIENT EDUCATION:  15 minutes of total visit time was spent face to face with the patient today, 60 % of the visit  was spent on counseling, education, and coordinating care.   -5 minutes spent outside of visit time, non-face-to-face time, reviewing chart.    -FOLLOW UP: Follow-up MRI and then  Advised to contact clinic if symptoms worsen or change.    Subjective:     Belia Rodriguez is a 71 y.o. female who presents today for follow-up regarding ongoing low back pain across midline lumbosacral junction as well as L3-4 and L4-5 disc space that is severe ongoing since fall 2-3 months ago, overall her pain is a 7/10, a Ford its best but up to an 8-1/2 at its worst.    Currently she denies radicular pain however she does report ongoing chronic foot drop on the right from surgery 1998 that is unchanged.  She does have numbness and tingling that right lower extremity as well that is chronic, no new numbness or tingling noted today.  Patient denies bowel or bladder dysfunction.  Denies night sweats, denies appetite change.    Chronic Balance Changes, uses walker ongoing. She is here to review MRI.     *Patient was evaluated by Dr. Matta 12/1/15 for osteoporotic L3 compression fracture and central stenosis from epidural lipomatosis. No surgery recommended due to multiple co-morbidities. She did order L4-5 IL JAYJAY at that time for significant L3-4 stenosis.      Treatment to Date: Lumbar surgery 1998 for severe right lumbar radiculitis with relief.  Physical therapy and chiropractic care for low back pain 2015.  Current physical therapy for balance only.     L4-5 interlaminar injection [place] [facility] 12/2/2015 with relief.     Medications:  Gabapentin 100 mg, 1 tablet 3 times a day  Tramadol 50 mg prescribed last 4/11/2017  Tylenol 500 mg 1 tablet every 6 hours as needed  Benadryl 50 mg bedtime as needed     **Coumadin patient ongoing, managed by Cardiology.    Patient Active Problem List   Diagnosis     Chronic obstructive pulmonary disease, unspecified COPD type     ESRD (end stage renal disease)     Chronic anemia      Tachycardia-bradycardia syndrome     Essential hypertension with goal blood pressure less than 140/90     Hyperlipidemia     Acute respiratory failure with hypoxia     Persistent atrial fibrillation     Bacteremia     ZULEIKA (obstructive sleep apnea), severe, intolerant of CPAP     Anemia of chronic renal failure, stage 5     Right knee pain     Dyspnea     Volume overload     Acute bronchitis due to infection     Acute bacterial conjunctivitis of both eyes     Acute CHF (congestive heart failure)     Acute on chronic diastolic congestive heart failure     Hypervolemia, unspecified hypervolemia type     Dissection of thoracoabdominal aorta     CHF (congestive heart failure)     Fall, initial encounter     Pure hypercholesterolemia     Gastroesophageal reflux disease without esophagitis     Gout     GERD (gastroesophageal reflux disease)     Leg weakness     Essential hypertension     Right foot drop     Leg weakness, bilateral     Anticoagulated on warfarin     GI bleeding     Gastrointestinal hemorrhage with hematemesis     Anticoagulant long-term use     Acute midline low back pain with right-sided sciatica     History of compression fracture of spine       Current Outpatient Prescriptions on File Prior to Encounter   Medication Sig Dispense Refill     acetaminophen (TYLENOL) 500 MG tablet Take 500 mg by mouth every 6 (six) hours as needed for pain.       allopurinol (ZYLOPRIM) 100 MG tablet Take 1 tablet (100 mg total) by mouth daily. 14 tablet 0     atorvastatin (LIPITOR) 10 MG tablet Take 10 mg by mouth bedtime.        cholecalciferol, vitamin D3, 2,000 unit cap Take 2,000 Units by mouth daily with lunch.        digoxin (LANOXIN) 125 mcg tablet Take 1 tablet (125 mcg total) by mouth 3 (three) times a week. 30 tablet 1     diphenhydrAMINE (BENADRYL) 50 MG tablet Take 50 mg by mouth at bedtime as needed for sleep.        folic acid (FOLVITE) 1 MG tablet Take 1 mg by mouth daily with lunch.        gabapentin  (NEURONTIN) 100 MG capsule Take 100 mg by mouth 3 (three) times a day.       Lactobacillus rhamnosus GG (CULTURELLE) 10-15 Billion cell capsule Take 1 capsule by mouth daily with lunch.       metoprolol tartrate (LOPRESSOR) 25 MG tablet Take 25 mg by mouth 2 (two) times a day.       midodrine (PROAMATINE) 5 MG tablet Take 1-2 tablets (5-10 mg total) by mouth 3 (three) times a week. 30 minutes prior to dialysis, Mon,Wed and Friday 30 tablet 0     omeprazole (PRILOSEC) 20 MG capsule Take 20 mg by mouth daily with lunch.        OXYGEN-AIR DELIVERY SYSTEMS MISC 2 L/min into each nostril daily as needed (SHORTNESS OF BREATH). Indications: shortness of breath       ranitidine (ZANTAC) 150 MG tablet Take 1 tablet (150 mg total) by mouth daily. 14 tablet 0     senna-docusate (SENNOSIDES-DOCUSATE SODIUM) 8.6-50 mg tablet Take 1 tablet by mouth at bedtime as needed for constipation.        sevelamer carbonate (RENVELA) 800 mg tablet Take 1,600 mg by mouth 3 (three) times a day with meals.       timolol maleate (TIMOPTIC) 0.5 % ophthalmic solution Administer 1 drop to both eyes 2 (two) times a day.        traMADol (ULTRAM) 50 mg tablet Take 0.5-1 tablets (25-50 mg total) by mouth 2 (two) times a day as needed for pain or severe pain (7-10). 28 tablet 0     vit B comp no.3-folic-C-biotin (NEPHRO-OLGA) 1- mg-mg-mcg Tab tablet Take 1 tablet by mouth daily.       warfarin (COUMADIN) 2 MG tablet Take coumadin 2 mg and 3 mg to make 5 mg daily. 30 tablet 0     warfarin (COUMADIN) 3 MG tablet Take 3 and 2 mg to make 5 mg daily. 30 tablet 0     No current facility-administered medications on file prior to encounter.        Allergies   Allergen Reactions     Ace Inhibitors Cough     Fosinopril Sodium Cough       Past Medical History:   Diagnosis Date     Arthritis      CHF (congestive heart failure)      Chronic anemia 6/1/2014     Chronic kidney disease      Chronic thoracic aortic dissection 10/7/2015    Descending thoracic  aorta; treated medically per notes of Dragan Singh and Jennifer.     COPD (chronic obstructive pulmonary disease)      CVA (cerebral infarction)      Disease of thyroid gland      Dyslipidemia      ESRD (end stage renal disease) 06/03/2009    on dialysis with Dr. Mitchell     Essential hypertension 6/30/2014     GI (gastrointestinal bleed)      Gout      L3 vertebral fracture 11/16/2015     Obesity      ZULEIKA (obstructive sleep apnea), severe, intolerant of CPAP 10/22/2015     Pneumonia 9-7-2015     Right foot drop      Spinal stenosis 3/28/2016     Stroke 3/24/2016        Review of Systems  ROS: Positive for numbness and tingling, chronic foot drop.  Specifically negative for bowel/bladder dysfunction, balance changes, headache, dizziness, fevers, chills, appetite changes, nausea/vomiting, unexplained weight loss. Otherwise 13 systems reviewed are negative. Please see the patient's intake questionnaire from today for details.    Reviewed Social, Family, Past Medical and Past Surgical history with patient, no significant changes noted since prior visit.     Objective:     /69 (Patient Site: Right Arm, Patient Position: Sitting)    PHYSICAL EXAMINATION:    --CONSTITUTIONAL: Well developed, well nourished, healthy appearing individual.  --PSYCHIATRIC: Appropriate mood and affect. No difficulty interacting due to temper, social withdrawal, or memory issues.  --SKIN: Lumbar region is dry and intact. Sensation to light touch is intact in the bilateral L4, L5, and S1 dermatomes.  --RESPIRATORY: Normal rhythm and effort. No abnormal accessory muscle breathing patterns noted.   --MUSCULOSKELETAL:  Normal lumbar lordosis noted, no lateral shift.  --GROSS MOTOR: Easily arises from a seated position.   --LUMBAR SPINE:  Inspection reveals no evidence of deformity. Range of motion is not limited in lumbar flexion, extension, or lateral rotation.  Significant tenderness to palpation lumbosacral junction midline, moderate  tenderness L3 and L4. Straight leg raising in the supine position is negative to radicular pain. Sciatic notch and coccyx non-tender.   --LOWER EXTREMITY MOTOR TESTING:  Plantar flexion left 5/5, right 5/5   Dorsiflexion left 5/5, right 5/5   Great toe MTP extension left 5/5, right 5/5  Knee flexion left 5/5, right 5/5  Knee extension left 5/5, right 5/5   Hip flexion left 5/5, right 5/5  Hip abduction left 5/5, right 5/5  Hip adduction left 5/5, right 5/5   --HIPS: Full range of motion bilaterally. Negative FABERs on the involved lower extremity.   --NEUROLOGIC: Bilateral patellar and achilles reflexes are physiologic and symmetric. Lower extremities are intact to light touch.     RESULTS:   Imaging: MRI of the lumbar spine was reviewed today. The images were shown to the patient and the findings were explained using a spine model.      Mr Lumbar Spine Without Contrast  Addendum Date: 7/15/2017    Addendum: There is a T1 hyperintense lesion within the right iliac bone on axial T1 image 1 measuring 1.1 x 1.5 cm in the oblique transverse by oblique AP dimensions. This abnormality was not present on previous MRI of the lumbar spine dated November 16, 2015. No bony destructive changes seen at this location on previous CT abdomen and pelvis examination from 6/5/2017. Lesion remains indeterminate and dedicated MRI of the sacrum with and without contrast is recommended for further evaluation.  Result Date: 7/15/2017  [facility] MR LUMBAR SPINE WITHOUT CONTRAST 07/14/2017, 4:26 PM INDICATION: Spinal stenosis, lumbar. TECHNIQUE: Routine. CONTRAST: None. COMPARISON: CT abdomen and pelvis without contrast 06/05/2017. MRI of lumbar spine without contrast 11/16/2015. FINDINGS: Nomenclature is based on five lumbar type vertebral bodies. Mild rightward convex curvature of the lumbar spine with the apex at L3. Lumbar spine lordosis is maintained. Mild to moderate superior endplate compression fracture deformity of the L3  vertebral body with approximately 50% height loss. Minimal fluid along the superior L3 endplate. Chronic compression fracture deformity of the L2 vertebral body with approximately 40% height loss on the left. Chronic appearing depression of the inferior endplate of the L1 vertebral body. L4 and L5 vertebral body heights are maintained. Modic type II degenerative endplate changes at L4-L5 and L5-S1. No pars defect. The conus tip is identified at L1-L2. Multiple cystic lesions demonstrated in the  bilateral kidneys, several of which are T1 hyperintense. Enlarged AP diameter of the infrarenal abdominal aorta measuring up to 2.5 cm. The visualized portions of the bony pelvis are normal for age. T12-L1: Disc desiccation. Mild loss of disc space height. Mild annular bulge. No protrusion or extrusion. Minimal facet arthropathy. No spinal canal stenosis. No right neural foraminal stenosis. No left neural foraminal stenosis. T12-L1: Disc desiccation and mild loss of disc space height. Mild annular bulge. Mild facet arthropathy. No spinal canal stenosis. No right neural foraminal stenosis. No left neural foraminal stenosis. L1-L2: Disc desiccation. Ballooning of the intervertebral disc space. Mild circumferential disc osteophyte complex. No facet arthropathy. No spinal canal stenosis. Mild right neural foraminal stenosis. Mild left neural foraminal stenosis. L2-L3: Ballooning of the intervertebral disc space. Increased T2 signal within the disc space. Mild circumferential disc osteophyte complex. Mild facet arthropathy. Mild spinal canal stenosis. No right neural foraminal stenosis. Mild left neural foraminal stenosis. L3-L4: Disc desiccation and mild loss of disc space height. Mild annular bulge with shallow superimposed slightly caudally directed left central disc extrusion. No facet arthropathy. Narrowing of the left lateral recess with mild mass effect on the descending/traversing left L4 nerve roots. Mild spinal canal  stenosis. No right neural foraminal stenosis. Mild to moderate left neural foraminal stenosis. L4-L5: Disc desiccation and advanced loss of intervertebral disc space height. Mild circumferential disc osteophyte complex. Previous right hemilaminectomy. Mild facet arthropathy. No spinal canal stenosis. Mild right neural foraminal stenosis. No left neural foraminal stenosis. L5-S1: Disc desiccation and mild loss of disc space height. Left paracentral disc protrusion narrows the left lateral recess and impinges the descending/traversing left S1 nerve roots. Fusion of the bilateral facet joints. Mild right and left neural foraminal stenosis.   CONCLUSION:   1.  Chronic compression fracture deformities of the L1, L2, and L3 vertebral bodies.   2.  At L5-S1, there is a left paracentral disc osteophyte complex narrowing the left lateral recess and impinging the descending/traversing left S1 nerve root. Imaging appearance is similar to previous MRI of the lumbar spine from 11/16/2015.   3.  At L3-L4, there is a left paracentral slightly caudally directed disc extrusion narrowing the left lateral recess and mildly impinging the descending/traversing left L4 nerve roots.       Xr Lumbar Spine 2 Or 3 Vws  Result Date: 6/19/2017  INDICATION: Low back pain midline. Sciatica right side. COMPARISON: 12/01/2015, 11/30/2015 and CT abdomen and pelvis of 6/5/2017.   FINDINGS: 5 lumbar-type vertebral bodies.   6 mm anterolisthesis L5 on S1.   Mild to moderate chronic compression fracture deformity of L3 with approximately 50% loss of vertebral body height unchanged compared to the prior CT and dating back to 12/1/2015.   No acute compression fracture deformity.   Mild age-indeterminate height loss at L2 is stable since the CT of 6/5/2017.   Marked degenerative disc disease from L3-L4 caudally. Mild changes elsewhere. Moderate to marked lower lumbar facet arthropathy. Vascular calcifications.   Degenerative change about the sacroiliac  joints.         [facility]  MR LUMBAR SPINE WO CONTRAST  11/16/2015   INDICATION: Bilateral leg weakness for 3 weeks. No trauma. No incontinence. No cancer. History of back surgery 20 years prior. Chronic right foot tingling. History of dialysis since 2009. History of abdominal aortic dissection.  TECHNIQUE: Performed without IV contrast.  SEDATION: None.  COMPARISON: CT examination of the abdomen of 11/02/2014. CTA exam of 10/21/2013.  FINDINGS: Nomenclature is based on 5 lumbar type vertebral bodies. There is chronic wedge deformity of L1 of approximately 50% loss of anterior to posterior vertebral body height. There is diffuse decreased T1 and STIR signal intensity involving the mid   to superior portion of the L3 vertebral body. There is mild loss of anterior to superior vertebral body height of approximately 25% with fluid cleft involving the superior endplate of L3. There is surrounding paravertebral soft tissue edema. This is   favored to represent a subacute fracture in the setting of discogenic marrow signal endplate change. There are scattered Schmorl's nodes. No pars defects. There is sclerosis about the left greater than right L5 pars interarticularis level without   discrete defect. This was present on the prior CTA exam of 10/21/2013. The conus tip is identified at L1. There is symmetric paraspinal muscular atrophy. There is bilateral renal cortical atrophy and renal cysts. Infrarenal aorta is only partially   included and is ectatic. The visualized bony pelvis and proximal femora are grossly normal.  T12-L1: Moderate disc desiccation and disc height loss. Mild broad-based disc protrusion with partial effacement of the ventral thecal sac. Mild facet hypertrophy. No spinal canal stenosis. No significant neural foraminal stenosis.  L1-L2: Moderate disc desiccation and dorsal loss of disc height. Mild dorsal disc osteophyte complex with partial effacement of the ventral thecal sac. Mild facet  hypertrophy. Mild spinal canal stenosis. Mild bilateral neural foraminal stenosis.  L2-L3: Mild disc desiccation without disc height loss. Mild dorsal disc osteophyte complex. Mild facet arthropathy. There is moderate spinal canal stenosis with effacement of CSF about the cauda equina at the disc osteophyte level. Mild bilateral neural   foraminal stenosis.  L3-L4: Moderate disc desiccation and disc height loss. Mild dorsal disc osteophyte complex. Mild facet arthropathy. There is mild prominence of the dorsal epidural fat. There is effacement of CSF about the cauda equina with severe spinal canal stenosis.   Thecal sac is narrowed to approximately 4 to 5 mm AP. There is mild buckling of the cauda equina roots proximal to this level. There is moderate left greater than right neural foraminal stenosis.  L4-L5: Diffuse disc desiccation and disc height loss with near-complete obliteration of the disc space. Old bilateral hemilaminectomies are suggested. Mild bilateral facet hypertrophy. No spinal canal stenosis. Mild bilateral neural foraminal stenoses.  L5-S1: Moderate disc desiccation and disc height loss. Mild anterolisthesis L5 on S1 with partial unroofing of the disc. Mild broad-based disc protrusion. Prominent facet arthropathy. Mild right neural foraminal stenosis. No left neural foraminal   stenosis. No spinal canal stenosis.  CONCLUSION:  1. Abnormal appearance of L3. This is favored to represent a subacute fracture of the superior endplate with fluid cleft superimposed upon advanced discogenic marrow signal endplate changes with surrounding paravertebral edema. As clinically indicated,   consideration could be made for nuclear medicine bone scan for assessment of acuity if therapeutic intervention is considered.  2. Chronic fracture deformity of L1.  3. Advanced spondylotic changes lumbar spine.  4. There is moderate spinal canal stenosis at L2-L3 and severe at L3-L4.  5. Multilevel neural foraminal  stenoses.  6. Bilateral renal cortical atrophy and bilateral renal cysts.

## 2021-06-12 NOTE — PROGRESS NOTES
INR 3.0 has started home monitor. Will decrease dose to 5 mg M ,w,F and 2.5 mg all other days. After talking with pt and discussing history of greens/salads and medication change. Pt will  continue  with current diet and dosing of Warfarin.  Continue with moderation of Vit K and green leafy vegetables. Cautioned to call with increase bruising or bleeding. Reminded to call with medication change especially antibiotic. Call with any questions or concerns or any up coming procedures. Cautioned about using Herbal medication.

## 2021-06-12 NOTE — PROGRESS NOTES
[facility] Note  Patient Name: Belia Rodriguez  Patient Age: 71 y.o.  YOB: 1945  MRN: [id number]  ?  Date of Visit: 8/2/2017  Reason for Office Visit:   Chief Complaint   Patient presents with     Follow-up     growth under Rt arm, getting better     HPI: Belia Rodriguez 71 y.o. female with a history of ESRD, COPD, afib on coumadin who presents to clinic for f/u for right axilla I&D. She was seen at [facility] on 7/31 for abscess in right axilla. It was excised and culture sent, which grew only normal skin syed. She was prescribed keflex as precaution given her immunocompromised state.      Review of Systems: As noted in HPI     Current Scheduled Meds:  Outpatient Encounter Prescriptions as of 8/2/2017   Medication Sig Dispense Refill     acetaminophen (TYLENOL) 500 MG tablet Take 500 mg by mouth every 6 (six) hours as needed for pain.       allopurinol (ZYLOPRIM) 100 MG tablet TAKE ONE TABLET BY MOUTH DAILY 90 tablet 2     atorvastatin (LIPITOR) 10 MG tablet TAKE 1 TABLET BY MOUTH DAILY 90 tablet 2     cephalexin (KEFLEX) 500 MG capsule Take 1 capsule (500 mg total) by mouth 3 (three) times a day for 5 days. 15 capsule 0     CHLORPHENIRAMINE/DEXTROMETHORP (CORICIDIN HBP COUGH AND COLD ORAL) Take by mouth as needed.       cholecalciferol, vitamin D3, 2,000 unit cap Take 2,000 Units by mouth daily with lunch.        digoxin (LANOXIN) 125 mcg tablet TAKE ONE TABLET BY MOUTH 3 TIMES A WEEK 30 tablet 0     diphenhydrAMINE (BENADRYL) 50 MG tablet Take 50 mg by mouth at bedtime as needed for sleep.        folic acid (FOLVITE) 1 MG tablet TAKE ONE TABLET BY MOUTH DAILY 90 tablet 2     gabapentin (NEURONTIN) 100 MG capsule TAKE 1 CAPSULE BY MOUTH THREE TIMES DAILY 270 capsule 1     Lactobacillus rhamnosus GG (CULTURELLE) 10-15 Billion cell capsule Take 1 capsule by mouth daily with lunch.       LORazepam (ATIVAN) 0.5 MG tablet 1 tab PO 90 minutes before the procedure, than 1 tab 30 minutes  before procedure. 2 tablet 0     metoprolol tartrate (LOPRESSOR) 25 MG tablet Take 25 mg by mouth 2 (two) times a day.       midodrine (PROAMATINE) 5 MG tablet Take 1-2 tablets (5-10 mg total) by mouth 3 (three) times a week. 30 minutes prior to dialysis, Mon,Wed and Friday 30 tablet 0     omeprazole (PRILOSEC) 20 MG capsule Take 20 mg by mouth daily with lunch.        OXYGEN-AIR DELIVERY SYSTEMS MISC 2 L/min into each nostril daily as needed (SHORTNESS OF BREATH). Indications: shortness of breath       ranitidine (ZANTAC) 150 MG tablet Take 1 tablet (150 mg total) by mouth daily. 14 tablet 0     senna-docusate (SENNOSIDES-DOCUSATE SODIUM) 8.6-50 mg tablet Take 1 tablet by mouth at bedtime as needed for constipation.        sevelamer carbonate (RENVELA) 800 mg tablet Take 1,600 mg by mouth 3 (three) times a day with meals.       timolol maleate (TIMOPTIC) 0.5 % ophthalmic solution Administer 1 drop to both eyes 2 (two) times a day.        vit B comp no.3-folic-C-biotin (NEPHRO-OLGA) 1- mg-mg-mcg Tab tablet Take 1 tablet by mouth daily.       warfarin (COUMADIN) 2 MG tablet Take coumadin 2 mg and 3 mg to make 5 mg daily. 30 tablet 0     warfarin (COUMADIN) 3 MG tablet Take 3 and 2 mg to make 5 mg daily. 30 tablet 0     No facility-administered encounter medications on file as of 8/2/2017.        Objective / Physical Examination:  /60  Pulse 71  Wt 187 lb (84.8 kg)  BMI 30.18 kg/m2  Wt Readings from Last 3 Encounters:   08/02/17 187 lb (84.8 kg)   07/31/17 189 lb 6.4 oz (85.9 kg)   07/27/17 191 lb (86.6 kg)     Body mass index is 30.18 kg/(m^2). (>25?)    General Appearance: Alert and oriented in no acute distress  Integumentary: left axilla, packing removed and wound appears to healing nicely, no active drainage, small area of induration proximal to wound, non tender, no fluctuance or surrounding erythema. Wound was dressed with bacitracin ointment.     Assessment / Plan / Medical Decision  Making:      Encounter Diagnoses   Name Primary?     Abscess of right axilla Yes        1. Abscess of right axilla    S/p I&D. Packing removed. No drainage expressed from wound. Will continue with oral abx until she finishes course. Continue to keep covered with gauze and keep clean. Allow to heal. Watch for signs of infection, return if needed    Total time spent with patient was 15 minutes with >50% of time spent in face-to-face counseling regarding the above plan     Carlton Martin MD  [facility]

## 2021-06-12 NOTE — PROGRESS NOTES
[facility]    Facility:   [facility] [[id number]]   Code Status: UNKNOWN      CHIEF COMPLAINT/REASON FOR VISIT:  Chief Complaint   Patient presents with     Review Of Multiple Medical Conditions     Stage kidney disease currently on hemodialysis, low back pain intractable at this time.  Still has some shortness of breath with exertion, history of hypoxia which is resolved.       HISTORY:      HPI: Belia is a 75 y.o. female resides here at the [facility] undergoing physical and occupational therapy following hospitalization for hypoxemia and she is already end-stage renal disease currently on hemodialysis.  Acute exacerbation of CHF which is improving and she does have underlying COPD.  It says that she is on 3 L of O2 at baseline but she has not needed any oxygen since she has been in the  U.  She was treated for pneumonia in the hospital and the suspicion for pneumonia was very low.  Low suspicion for COPD exacerbation at this time and she is DNR/DNI and palliative care was consulted and hospice evaluated patient and she wants to continue restorative cares at this time.  COVID-19 was negative.  She has been attending hemodialysis and there are no issues with the exception of low back pain.    Today the only complaint she has a low back pain her lower extremity edema and fluid overload seems to have been improving at this time and she has no other concerns.  Her back pain is intractable at this time and is causing her some problems we did a long discussion about treatment and we decided on very low-dose oxycodone at this time to give her some comfort.    Past Medical History:   Diagnosis Date     Arthritis      CHF (congestive heart failure) (H)      Chronic anemia 6/1/2014     Chronic kidney disease      Chronic thoracic aortic dissection (H) 10/7/2015    Descending thoracic aorta; treated medically per notes of Dragan Singh and Jennifer.     COPD  (chronic obstructive pulmonary disease) (H)      CVA (cerebral infarction)      Disease of thyroid gland      Dyslipidemia      ESRD (end stage renal disease) (H) 2009    on dialysis with Dr. Mitchell     Essential hypertension 2014     Gastrointestinal hemorrhage, unspecified gastrointestinal hemorrhage type 2017     GI (gastrointestinal bleed)      GI bleeding 2017     Gout      L3 vertebral fracture (H) 2015     Left Atrial Appendage Occlusion (WATCHMAN) 2018    LAAO 2018 (30 mm WATCHMAN)     Obesity      ZULEIKA (obstructive sleep apnea), severe, intolerant of CPAP 10/22/2015     Oxygen dependent     3L nc     Pneumonia 2015     Right foot drop      Spinal stenosis 3/28/2016     Stroke (H) 3/24/2016             Family History   Problem Relation Age of Onset     Dementia Mother      Diabetes Mother      Arthritis Mother      Cancer Mother      Depression Mother      Heart disease Mother      Vision loss Mother      Stroke Father      Heart disease Father      Breast cancer Neg Hx      Social History     Socioeconomic History     Marital status:      Spouse name: Cayden     Number of children: 2     Years of education: None     Highest education level: None   Occupational History     Employer: RETIRED   Social Needs     Financial resource strain: None     Food insecurity     Worry: None     Inability: None     Transportation needs     Medical: None     Non-medical: None   Tobacco Use     Smoking status: Former Smoker     Packs/day: 1.50     Years: 37.00     Pack years: 55.50     Types: Cigarettes     Quit date: 2009     Years since quittin.8     Smokeless tobacco: Never Used   Substance and Sexual Activity     Alcohol use: No     Alcohol/week: 11.7 standard drinks     Types: 14 Standard drinks or equivalent per week     Comment: 14 mixed drinks per week     Drug use: No     Sexual activity: Never     Partners: Male   Lifestyle     Physical activity     Days  per week: None     Minutes per session: None     Stress: None   Relationships     Social connections     Talks on phone: None     Gets together: None     Attends Rastafari service: None     Active member of club or organization: None     Attends meetings of clubs or organizations: None     Relationship status: None     Intimate partner violence     Fear of current or ex partner: None     Emotionally abused: None     Physically abused: None     Forced sexual activity: None   Other Topics Concern     None   Social History Narrative    Lives with her . Daughter in [place] and daughter in [place].         Review of Systems   Constitutional:        Review of system positive for low back pain but denies any fevers chills nausea vomit diarrhea change in vision hearing taste or smell weakness one-sided of the chest pain.  She still has shortness of breath with exertion but that is about baseline for her and it seems to be improving and mostly likely to deconditioning.  Her lungs have been clear.  The remainder the view of systems is negative.       Vitals:    10/20/20 2005   BP: 122/71   Pulse: 72   Resp: 20   Temp: 97.1  F (36.2  C)   SpO2: 97%       Physical Exam  Constitutional:       General: She is not in acute distress.  HENT:      Head: Normocephalic and atraumatic.      Nose: Nose normal. No congestion.   Eyes:      General:         Right eye: No discharge.         Left eye: No discharge.   Cardiovascular:      Rate and Rhythm: Normal rate and regular rhythm.   Pulmonary:      Effort: Pulmonary effort is normal. No respiratory distress.      Breath sounds: No wheezing or rales.   Musculoskeletal:      Comments: Very minimal lower extremity edema on this exam.   Skin:     General: Skin is warm and dry.   Neurological:      Mental Status: She is alert. Mental status is at baseline.   Psychiatric:         Mood and Affect: Mood normal.         Behavior: Behavior normal.           LABS:  Reviewed.      ASSESSMENT:      ICD-10-CM    1. ESRD on dialysis (H)  N18.6     Z99.2    2. Pulmonary emphysema, unspecified emphysema type (H)  J43.9    3. Neuropathic pain  M79.2    4. Physical deconditioning  R53.81    5. Acute pulmonary edema (H)  J81.0    6. Severe low back pain  M54.5        PLAN: Plan at this time after talking with her in detail about her low back pain and the pulled muscles as him back in the excruciating nature of it.  I do believe that she is having pain but I did talk to her about oxycodone she says she is taking this in the past without any problems.  We did agree to a low dose and extended every 8 hours.  It will be 2.5 mg every 8 hours as needed and pain assessments around the clock.  We will continue with hemodialysis at this time and continue with physical and occupational therapy    As far as a fluid overload she seems euvolemic by exam at this time.  She is not on any oxygen.      Total  minutes of which % was spent counseling and coordination of care of the above plan.    Electronically signed by: Dl Nolan DO

## 2021-06-12 NOTE — TELEPHONE ENCOUNTER
Orders being requested: Home health aide 1 time for 3 weeks   Reason service is needed/diagnosis: personal care, weakness, shortness of breath.     Orders being requested: Physical therapy   Reason service is needed/diagnosis: evaluation and treat.       Orders being requested: skill nursing 1 time for 3 weeks and 2 prn   Reason service is needed/diagnosis: cardiac assessment and shortness of breath.   When are orders needed by: as soon as possible   Where to send Orders: Phone:  [phone]  Okay to leave detailed message?  Yes

## 2021-06-12 NOTE — PROGRESS NOTES
[facility]      Facility:    [facility] [[id number]]  Code Status: UNKNOWN      Chief Complaint/Reason for Visit:  Chief Complaint   Patient presents with     H & P     Hypoxia, end-stage renal disease currently on hemodialysis, palliative care, underlying CHF, COPD on 2 L of oxygen at baseline,.       HPI:   Belia is a 75 y.o. female who was recently admitted to the hospital on 10/5/2020.  She does have COPD is at baseline oxygen 3 L at home.  And does have chronic anemia.  She came in with a chief complaint of worsening shortness of breath by 2 days.  It was likely was related to acute exacerbation of CHF and mild pulmonary edema.  She is currently end-stage renal disease does not make urine and troponins x3 were negative.  And no signs of acute coronary syndrome.  Brain natruretic peptide was higher than baseline at that time and renal was consulted and she did have extra runs of hemodialysis.  She essentially needed BiPAP initially now not on baseline O2 3 L.  She was placed on ceftriaxone doxycycline for possible pneumonia and procalcitonin was negative.  They continue the doxycycline for 5-day course and likely this could been exacerbated by COPD exacerbation.  She is wheelchair-bound at this time and there was no signs of any PE.  She is DNR/DNI and hospice evaluated patient and she wants to continue restorative cares and she is not ready for hospice at this time.  COVID-19 was negative.  She was treated appropriately and transferred here to the TCU at [facility] in stable condition.    I did discuss with patient about her O2 she was not on O2 in the notes it did said she was on 3 L chronically at home.  She did tell me that she is only on it at night but not on it during the day she is not on it at this time and she is satting okay without any signs of respiratory distress.  She does have a purple purplish color right leg with some swelling however  this is not new for her.  She says it was worse yesterday better today but it was worse than she seen it yesterday.  But his always been like that.  The feet are warm to touch and do not appear to be infected.  But there is a definitely a purplish reddish color to them.  She does have sensation some sensation in her feet and congenic each can feel me touch and they are warm to touch.  Not hot warm they are not cold at all.  She denies any fevers chills nausea vomiting diarrhea cough shortness of breath.  She does have low back pain right-sided back pain palpated lower lumbar right.  There is some pain in her upper thoracic area which I may get an x-ray for at this time.    Past Medical History:  Past Medical History:   Diagnosis Date     Arthritis      CHF (congestive heart failure) (H)      Chronic anemia 6/1/2014     Chronic kidney disease      Chronic thoracic aortic dissection (H) 10/7/2015    Descending thoracic aorta; treated medically per notes of Dragan Singh and Jennifer.     COPD (chronic obstructive pulmonary disease) (H)      CVA (cerebral infarction)      Disease of thyroid gland      Dyslipidemia      ESRD (end stage renal disease) (H) 06/03/2009    on dialysis with Dr. Mitchell     Essential hypertension 6/30/2014     Gastrointestinal hemorrhage, unspecified gastrointestinal hemorrhage type 6/5/2017     GI (gastrointestinal bleed)      GI bleeding 6/5/2017     Gout      L3 vertebral fracture (H) 11/16/2015     Left Atrial Appendage Occlusion (WATCHMAN) 4/5/2018    LAAO April 5, 2018 (30 mm WATCHMAN)     Obesity      ZULEIKA (obstructive sleep apnea), severe, intolerant of CPAP 10/22/2015     Oxygen dependent     3L nc     Pneumonia 9-7-2015     Right foot drop      Spinal stenosis 3/28/2016     Stroke (H) 3/24/2016           Surgical History:  Past Surgical History:   Procedure Laterality Date     BACK SURGERY      [facility]     COLONOSCOPY N/A 3/23/2016    Procedure: COLONOSCOPY;  Surgeon: Ruddy STEVENS  MD Mallory;  Location: [facility] GI;  Service:      DILATION AND CURETTAGE OF UTERUS       EP ABLATION AV NODE N/A 3/7/2019    Procedure: EP Ablation AV Node;  Surgeon: Derick Duarte MD;  Location: [facility] Lab;  Service: Cardiology     EP NEGRA CLOSURE N/A 4/5/2018    Procedure: EP NEGRA Closure;  Surgeon: Derick Duarte MD;  Location: [facility];  Service:      EP PACEMAKER INSERT N/A 3/7/2019    Procedure: EP Pacemaker Insertion;  Surgeon: Derick Duarte MD;  Location: [facility];  Service: Cardiology     EYE SURGERY       HERNIA REPAIR       IR TUNNELED CATHETER INSERT  11/20/2018     IR TUNNELED CATHETER REMOVAL  11/20/2018     MO COLSC FLEXIBLE W/CONTROL BLEEDING ANY METHOD N/A 6/7/2017    Procedure: COLONOSCOPY;  Surgeon: Luis Mckeon MD;  Location: [facility];  Service: Gastroenterology     MO OPEN FIX INTER/SUBTROCH FX,IMPLNT Left 6/23/2019    Procedure: INTERNAL FIXATION, FRACTURE, TROCHANTERIC, HIP, USING INTERMEDULLARY NAIL;  Surgeon: Bennie Marsh DO;  Location: [facility] Main OR;  Service: Orthopedics     TONSILLECTOMY         Family History:   Family History   Problem Relation Age of Onset     Dementia Mother      Diabetes Mother      Arthritis Mother      Cancer Mother      Depression Mother      Heart disease Mother      Vision loss Mother      Stroke Father      Heart disease Father      Breast cancer Neg Hx        Social History:    Social History     Socioeconomic History     Marital status:      Spouse name: Cayden     Number of children: 2     Years of education: None     Highest education level: None   Occupational History     Employer: RETIRED   Social Needs     Financial resource strain: None     Food insecurity     Worry: None     Inability: None     Transportation needs     Medical: None     Non-medical: None   Tobacco Use     Smoking status: Former Smoker     Packs/day: 1.50     Years: 37.00     Pack years: 55.50     Types: Cigarettes      Quit date: 2009     Years since quittin.7     Smokeless tobacco: Never Used   Substance and Sexual Activity     Alcohol use: No     Alcohol/week: 11.7 standard drinks     Types: 14 Standard drinks or equivalent per week     Comment: 14 mixed drinks per week     Drug use: No     Sexual activity: Never     Partners: Male   Lifestyle     Physical activity     Days per week: None     Minutes per session: None     Stress: None   Relationships     Social connections     Talks on phone: None     Gets together: None     Attends Jewish service: None     Active member of club or organization: None     Attends meetings of clubs or organizations: None     Relationship status: None     Intimate partner violence     Fear of current or ex partner: None     Emotionally abused: None     Physically abused: None     Forced sexual activity: None   Other Topics Concern     None   Social History Narrative    Lives with her . Daughter in [place] and daughter in [place].          Review of Systems   Constitutional:        Mid to low right back pain.  Red swollen and purplish right foot.  No fevers chills nausea vomiting diarrhea change in vision hearing taste or smell weakness 1 side the other chest pain shortness of breath.  She does not make urine at this time she is on hemodialysis.  She is moving her bowels without difficulty and outside her back her pain is being managed okay.  The remainder of the review of systems is negative.       Vitals:    10/15/20 0916   BP: 128/73   Pulse: 73   Resp: 16   Temp: 97  F (36.1  C)   SpO2: 95%       Physical Exam  Constitutional:       General: She is not in acute distress.  HENT:      Head: Normocephalic and atraumatic.      Nose: Nose normal.      Mouth/Throat:      Mouth: Mucous membranes are moist.      Pharynx: Oropharynx is clear.   Eyes:      General:         Right eye: No discharge.         Left eye: No discharge.   Cardiovascular:      Rate and Rhythm: Normal rate  and regular rhythm.   Pulmonary:      Effort: Pulmonary effort is normal. No respiratory distress.   Musculoskeletal:      Comments: Right leg with swelling mostly on the top of the foot without any palpatory tenderness.  The purplish pinkish color did gaby at this time and they were not cold.  The appropriate temperature was palpated and there were not overly warm either.  She did have a movement.  Pulses difficult to palpate secondary to the swelling.  Patient did claim that this is not new for her.   Skin:     General: Skin is warm and dry.   Neurological:      Mental Status: She is alert. Mental status is at baseline.   Psychiatric:         Mood and Affect: Mood normal.         Behavior: Behavior normal.         Medication List:  Current Outpatient Medications   Medication Sig     acetaminophen (TYLENOL) 500 MG tablet Take 1,000 mg by mouth 3 (three) times a day as needed.     artificial tears,hypromellose, (GENTEAL; SYSTANE) 0.3 % Gel Administer 1 drop to both eyes as needed.     aspirin 81 MG EC tablet Take 1 tablet (81 mg total) by mouth daily.     buPROPion (WELLBUTRIN XL) 150 MG 24 hr tablet Take 1 tablet (150 mg total) by mouth 2 (two) times a week. Tuesday and saturday     cholecalciferol, vitamin D3, 1,000 unit (25 mcg) tablet Take 2,000 Units by mouth daily.     cinacalcet (SENSIPAR) 30 MG tablet Take 30 mg by mouth see administration instructions. Take three times weekly with dialysis (on Mondays, Wednesdays, and Fridays).           famotidine (PEPCID) 20 MG tablet Take 20 mg by mouth 2 (two) times a day.     folic acid (FOLVITE) 1 MG tablet TAKE ONE TABLET BY MOUTH ONCE DAILY     gabapentin (NEURONTIN) 100 MG capsule TAKE 1 CAPSULE BY MOUTH THREE TIMES DAILY     ipratropium-albuteroL (COMBIVENT RESPIMAT)  mcg/actuation Mist inhaler Inhale 1 puff 4 (four) times a day.     Lactobacillus rhamnosus GG (CULTURELLE) 10-15 Billion cell capsule Take 1 capsule by mouth daily with lunch.     loratadine  (CLARITIN) 10 mg tablet Take 10 mg by mouth daily as needed.      midodrine (PROAMATINE) 10 MG tablet Take 1.5 tablets (15 mg total) by mouth 3 (three) times a day with meals. (Patient taking differently: Take 15 mg by mouth 3 (three) times a day with meals. Hold for SBP>105  Patient reports that she usually gets 10 mg before dialysis and another 10 mg jail through dialysis as well)     multivitamin (DAILY-OLGA) per tablet Take 1 tablet by mouth daily.     predniSONE (DELTASONE) 20 MG tablet Take 20 mg by mouth daily with breakfast.     senna-docusate (SENNOSIDES-DOCUSATE SODIUM) 8.6-50 mg tablet Take 1 tablet by mouth daily as needed for constipation.      sevelamer carbonate (RENVELA) 800 mg tablet Take 1 tablet (800 mg total) by mouth 2 (two) times a day as needed (FOr snacks.).     sevelamer HCL (RENAGEL) 800 MG tablet Take 2,400 mg by mouth 3 (three) times a day with meals. And take 2 tablets with snacks     timolol maleate (TIMOPTIC) 0.5 % ophthalmic solution Administer 1 drop to both eyes 2 (two) times a day.      traZODone (DESYREL) 50 MG tablet TAKE 1&1/2 TABLETS (75MG) BY MOUTH AT BEDTIME. (Patient taking differently: Take 150 mg by mouth at bedtime. )       Labs: Hospital labs are as follows; on 10/7/2020 white count was 2.8, hemoglobin was 9.7, hematocrit was 31.7, platelets were 94,000.  Procalcitonin 0.19, troponin I 0.03, sodium is 132, potassium 4.4, CO2 is 30, BUN was 21, creatinine 3.79, bilirubin 0.3, calcium 8.4, alk phosphatase of 78, AST was 10, ALT was 10.  White count could down his low was 1.7, but hemoglobin stayed in the 9 range.      Assessment:    ICD-10-CM    1. ESRD on dialysis (H)  N18.6     Z99.2    2. Pulmonary emphysema, unspecified emphysema type (H)  J43.9    3. Acute on chronic diastolic congestive heart failure (H)  I50.33    4. Physical deconditioning  R53.81    5. Presence of Watchman left atrial appendage closure device  Z95.818    6. Acute pulmonary edema (H)  J81.0     7. Neuropathic pain  M79.2        Plan: Plan at this time arterial Doppler ultrasound to right lower extremity secondary to ischemia.  Will cool pack to low back continuously and change every 2 hours.  Physical therapy will assess for back pain and we will continue to monitor above medical problems.    I did assess the leg it did not seem like it was ischemic at this time but rule out is necessary.  It does not appear to be infectious because is his normal temperature and it is more purpleish.  This is not new for her at this time and we will continue to monitor very closely and keep legs elevated at rest.  I will continue to monitor above medical problems and no other changes to care plan at this time.            Electronically signed by: Dl Nolan,

## 2021-06-12 NOTE — TELEPHONE ENCOUNTER
Orders being requested: skill nurse, physical therapy and occupational therapy   Reason service is needed/diagnosis: evaluation and  treat   When are orders needed by: as soon as possible   Where to send Orders: Phone:  [phone]  Okay to leave detailed message?  Yes

## 2021-06-12 NOTE — PROGRESS NOTES
INR 3.9 spoke with pt and she will hold today's dose and decrease to 2.5 mg daily. She denies being ill, or new medication. Will increase greens and retest in one week. After talking with pt and discussing history of greens/salads and medication change. Pt will  continue  with current diet and dosing of Warfarin.  Continue with moderation of Vit K and green leafy vegetables. Cautioned to call with increase bruising or bleeding. Reminded to call with medication change especially antibiotic. Call with any questions or concerns or any up coming procedures. Cautioned about using Herbal medication.

## 2021-06-12 NOTE — PROGRESS NOTES
[facility] Heart Care Note    Assessment:       Tachybradycardia syndrome    Pauses following termination of atrial fibrillation with element of sinus node dysfunction    Patient has been considered for pacemaker but risk of infection and access seemeen excess to benefit     Paroxysmal atrial fibrillation with elevated ventricular response.  Persistent AF with RVR; 3-2017  Amiodarone therapy 08/2014;stopped with persistent  AF.   3. Preserved left ventricular function with suggestion of diastolic dysfunction.        Echocardiogram 3-2016; EF=60%  4. End-stage renal failure, on chronic hemodialysis. 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 Now has RIJ Port A Cath  5. History of recurrent bacteremia -- E Coli  6. History of extensive aortic dissection from the ascending to the   descending aorta leading to compromise of renal artery and leading to renal failure  COPD: Diffusion capacity 68% -pre amiodarone  Extensive ecchymosis with warfarin-anticoagulation;  Chronic anticoagulation with warfarin; difficulty with INR management  Sleep study shows likely obstructive sleep apnea; patient uses nocturnal oxygen supplements; unable to tolerate CPAP        Plan:  You currently are in persistent atrial fibrillation, ventricular rate seems well controlled  One option would be just excepting atrial fibrillation, continue with anticoagulation/warfarin  I do not think you would have a successful ablation to cure atrial fibrillation; pulmonary vein isolation/atrial fibrillation ablation-because you have such complex condition and have persistent atrial fibrillation  Ventricular rate is too fast, we could consider AV node ablation with a lead- less Micra -pacemaker  Obtain 24 hour Holter monitor  Continue current medications  With fluid overload, probably  need to find a new dry weight target      Subjective:    I had the opportunity to see.Belia Rodriguez , who is a 71 y.o. female with a known history of tachybradycardia syndrome now persistent atrial fibrillation  She has been tired, has retained fluid and has pedal edema  She is tolerating hemodialysis  Apparently her heart rate has been satisfactory  He is back on warfarin, bruises easily and INRs have been variable with recent INR equals 4.9, she tells me in the past, she had an  INR= 10  Except for ecchymosis, she has not had any major bleeds  Several months ago she had a spell that may have been syncope.  She is reaching up for something in the top shelf when she collapsed  She is not very active, becomes short of breath on minimal exertion, uses a wheelchair if she has to go any distance  She does not feel palpitations, no awareness of tachycardia      Problem List:  Patient Active Problem List   Diagnosis     Chronic obstructive pulmonary disease, unspecified COPD type     ESRD (end stage renal disease)     Chronic anemia     Tachycardia-bradycardia syndrome     Essential hypertension with goal blood pressure less than 140/90     Hyperlipidemia     Acute respiratory failure with hypoxia     Persistent atrial fibrillation     Bacteremia     ZULEIKA (obstructive sleep apnea), severe, intolerant of CPAP     Anemia of chronic renal failure, stage 5     Right knee pain     Dyspnea     Volume overload     Acute bronchitis due to infection     Acute bacterial conjunctivitis of both eyes     Acute CHF (congestive heart failure)     Acute on chronic diastolic congestive heart failure     Hypervolemia, unspecified hypervolemia type     Dissection of thoracoabdominal aorta     CHF (congestive heart failure)     Fall, initial encounter     Pure hypercholesterolemia     Gastroesophageal reflux disease without esophagitis     Gout     GERD (gastroesophageal reflux disease)     Leg weakness     Essential hypertension      Right foot drop     Leg weakness, bilateral     Anticoagulated on warfarin     GI bleeding     Gastrointestinal hemorrhage with hematemesis     Anticoagulant long-term use     Acute midline low back pain with right-sided sciatica     History of compression fracture of spine     Medical History:  Past Medical History:   Diagnosis Date     Arthritis      CHF (congestive heart failure)      Chronic anemia 6/1/2014     Chronic kidney disease      Chronic thoracic aortic dissection 10/7/2015    Descending thoracic aorta; treated medically per notes of Dragan Singh and Jennifer.     COPD (chronic obstructive pulmonary disease)      CVA (cerebral infarction)      Disease of thyroid gland      Dyslipidemia      ESRD (end stage renal disease) 06/03/2009    on dialysis with Dr. Mitchell     Essential hypertension 6/30/2014     GI (gastrointestinal bleed)      Gout      L3 vertebral fracture 11/16/2015     Obesity      ZULEIKA (obstructive sleep apnea), severe, intolerant of CPAP 10/22/2015     Pneumonia 9-7-2015     Right foot drop      Spinal stenosis 3/28/2016     Stroke 3/24/2016     Surgical History:  Past Surgical History:   Procedure Laterality Date     BACK SURGERY      [facility]     COLONOSCOPY N/A 3/23/2016    Procedure: COLONOSCOPY;  Surgeon: Ruddy Tejada MD;  Location: [facility];  Service:      DILATION AND CURETTAGE OF UTERUS       EYE SURGERY       HERNIA REPAIR       KS COLSC FLEXIBLE W/CONTROL BLEEDING ANY METHOD N/A 6/7/2017    Procedure: COLONOSCOPY;  Surgeon: Luis Mckeon MD;  Location: [facility];  Service: Gastroenterology     TONSILLECTOMY       Social History:  Social History     Social History     Marital status:      Spouse name: Cayden     Number of children: 2     Years of education: N/A     Occupational History      Retired     Social History Main Topics     Smoking status: Former Smoker     Packs/day: 1.50     Years: 37.00     Types: Cigarettes     Quit date: 1/1/2009      Smokeless tobacco: Never Used     Alcohol use 7.0 oz/week     14 Standard drinks or equivalent per week      Comment: 14 mixed drinks per week     Drug use: No     Sexual activity: No     Other Topics Concern     Not on file     Social History Narrative    Lives with her . Daughter in [place] and daughter in [place].       Review of Systems:      General: WNL  Eyes: WNL  Ears/Nose/Throat: WNL  Lungs: Shortness of Breath  Heart: WNL  Stomach: WNL  Bladder: WNL  Muscle/Joints: WNL  Skin: WNL  Nervous System: WNL  Mental Health: WNL     Blood: WNL        Family History:  Family History   Problem Relation Age of Onset     Dementia Mother      Diabetes Mother      Arthritis Mother      Cancer Mother      Depression Mother      Heart disease Mother      Vision loss Mother      Stroke Father      Heart disease Father          Allergies:  Allergies   Allergen Reactions     Ace Inhibitors Cough     Fosinopril Sodium Cough       Medications:  Current Outpatient Prescriptions   Medication Sig Dispense Refill     acetaminophen (TYLENOL) 500 MG tablet Take 500 mg by mouth every 6 (six) hours as needed for pain.       allopurinol (ZYLOPRIM) 100 MG tablet TAKE ONE TABLET BY MOUTH DAILY 90 tablet 2     atorvastatin (LIPITOR) 10 MG tablet Take 10 mg by mouth bedtime.        CHLORPHENIRAMINE/DEXTROMETHORP (CORICIDIN HBP COUGH AND COLD ORAL) Take by mouth as needed.       cholecalciferol, vitamin D3, 2,000 unit cap Take 2,000 Units by mouth daily with lunch.        digoxin (LANOXIN) 125 mcg tablet Take 1 tablet (125 mcg total) by mouth 3 (three) times a week. 30 tablet 1     diphenhydrAMINE (BENADRYL) 50 MG tablet Take 50 mg by mouth at bedtime as needed for sleep.        folic acid (FOLVITE) 1 MG tablet Take 1 mg by mouth daily with lunch.        gabapentin (NEURONTIN) 100 MG capsule Take 100 mg by mouth 3 (three) times a day.       Lactobacillus rhamnosus GG (CULTURELLE) 10-15 Billion cell capsule Take 1 capsule by mouth  "daily with lunch.       metoprolol tartrate (LOPRESSOR) 25 MG tablet Take 25 mg by mouth 2 (two) times a day.       midodrine (PROAMATINE) 5 MG tablet Take 1-2 tablets (5-10 mg total) by mouth 3 (three) times a week. 30 minutes prior to dialysis, Mon,Wed and Friday 30 tablet 0     omeprazole (PRILOSEC) 20 MG capsule Take 20 mg by mouth daily with lunch.        OXYGEN-AIR DELIVERY SYSTEMS MISC 2 L/min into each nostril daily as needed (SHORTNESS OF BREATH). Indications: shortness of breath       ranitidine (ZANTAC) 150 MG tablet Take 1 tablet (150 mg total) by mouth daily. 14 tablet 0     senna-docusate (SENNOSIDES-DOCUSATE SODIUM) 8.6-50 mg tablet Take 1 tablet by mouth at bedtime as needed for constipation.        sevelamer carbonate (RENVELA) 800 mg tablet Take 1,600 mg by mouth 3 (three) times a day with meals.       timolol maleate (TIMOPTIC) 0.5 % ophthalmic solution Administer 1 drop to both eyes 2 (two) times a day.        vit B comp no.3-folic-C-biotin (NEPHRO-OLGA) 1- mg-mg-mcg Tab tablet Take 1 tablet by mouth daily.       warfarin (COUMADIN) 2 MG tablet Take coumadin 2 mg and 3 mg to make 5 mg daily. 30 tablet 0     warfarin (COUMADIN) 3 MG tablet Take 3 and 2 mg to make 5 mg daily. 30 tablet 0     LORazepam (ATIVAN) 0.5 MG tablet 1 tab PO 90 minutes before the procedure, than 1 tab 30 minutes before procedure. 2 tablet 0     No current facility-administered medications for this visit.        Objective:   Vital signs:  /72 (Patient Site: Left Arm, Patient Position: Sitting, Cuff Size: Adult Regular)  Pulse 64  Resp 16  Ht 5' 6\" (1.676 m)  Wt 191 lb (86.6 kg)  BMI 30.83 kg/m2      Physical Exam:    Pressure 110 sitting, 120 standing  Heart rate is 90 and irregular consistent with atrial fibrillation  Overall heart rate seems controlled    GENERAL APPEARANCE: Alert, cooperative and in no acute distress.  HEENT: No scleral icterus. No Xanthelasma. Oral mucous membranes pink and moist.  NECK: " JVP cm. No Hepatojugular reflux. Thyroid not  Palpable  CHEST: clear to auscultation and percussion  CARDIOVASCULAR: S1, S2 without murmur      No carotid bruits noted.  ABDOMEN: Non tender. BS+. No bruits.  EXTREMITIES: 3+ bilateral pedal edema .    Lab Results:  LIPIDS:  Lab Results   Component Value Date    CHOL 102 03/25/2016     Lab Results   Component Value Date    HDL 43 (L) 03/25/2016     Lab Results   Component Value Date    LDLCALC 47 03/25/2016     Lab Results   Component Value Date    TRIG 60 03/25/2016     No components found for: CHOLHDL    BMP:  Lab Results   Component Value Date    CREATININE 6.36 (HH) 06/09/2017    BUN 29 (H) 06/09/2017     06/09/2017    K 4.3 06/09/2017     06/09/2017    CO2 26 06/09/2017         This note has been dictated using voice recognition software. Any grammatical or context distortions are unintentional and inherent to the software.  Elie Rodriguez MD  [facility]  [phone]

## 2021-06-12 NOTE — PROGRESS NOTES
"ASSESSMENT/PLAN:   1. Abscess  cephalexin (KEFLEX) 500 MG capsule    Culture, Wound    Incision and drainage   2. Cellulitis  cephalexin (KEFLEX) 500 MG capsule     Patient presents with an abscess in the right axilla. I drained abscess as below after informed verbal consent. Patient tolerated the procedure well. Given all her comorbidities, I did prescribe Keflex to cover for cellulitis as well, though abscess was drained quite completely, with copious thick, purulent discharge, some partially solidified. Also collected wound culture to ensure no MRSA. We will change antibiotic if indicated.   Wound is packed with 1/2inch gauze, and she understands that she will need to follow up with primary care or walk in clinic for removal of packing and wound check in 2 days.  Discussed red flags for immediate return to clinic/ER. Patient understood and agreed to plan. Patient was stable for discharge.        Patient Instructions:  Patient Instructions   Please keep wound covered.  Do not pull out the packing material.  Please follow-up with your primary care provider in 2 days to have packing removed.    Please take cephalexin 3 times a day for 5 days.    We will call if wound culture shows that we need to change antibiotic for a resistant infection.    Come back immediately or go to ER if developing fever, spreading redness, increasing swelling, or any other new, concerning symptoms.                    SUBJECTIVE:   Belia Rodriguez is a 71 y.o. female with a history including ESRD, COPD, persistent atrial fibrillation, anemia, who presents today for evaluation of a mass under her right armpit x 3 days. She noticed it when applying deodorant. She says she bumped it an it hurt. She is not sure if it is red. No drainage from it. It feels \"squishy\" and painful. She has never had anything like this before.   She does not shave her armpits. She is not diabetic.      Past Medical History:  Patient Active Problem List   Diagnosis "     Chronic obstructive pulmonary disease, unspecified COPD type     ESRD (end stage renal disease)     Chronic anemia     Tachycardia-bradycardia syndrome     Essential hypertension with goal blood pressure less than 140/90     Hyperlipidemia     Acute respiratory failure with hypoxia     Persistent atrial fibrillation     Bacteremia     ZULEIKA (obstructive sleep apnea), severe, intolerant of CPAP     Anemia of chronic renal failure, stage 5     Right knee pain     Dyspnea     Volume overload     Acute bronchitis due to infection     Acute bacterial conjunctivitis of both eyes     Acute CHF (congestive heart failure)     Acute on chronic diastolic congestive heart failure     Hypervolemia, unspecified hypervolemia type     Dissection of thoracoabdominal aorta     CHF (congestive heart failure)     Fall, initial encounter     Pure hypercholesterolemia     Gastroesophageal reflux disease without esophagitis     Gout     GERD (gastroesophageal reflux disease)     Leg weakness     Essential hypertension     Right foot drop     Leg weakness, bilateral     Anticoagulated on warfarin     GI bleeding     Gastrointestinal hemorrhage with hematemesis     Anticoagulant long-term use     Acute midline low back pain with right-sided sciatica     History of compression fracture of spine       Surgical History:  Past Surgical History:   Procedure Laterality Date     BACK SURGERY      [facility]     COLONOSCOPY N/A 3/23/2016    Procedure: COLONOSCOPY;  Surgeon: Ruddy Tejada MD;  Location: [facility];  Service:      DILATION AND CURETTAGE OF UTERUS       EYE SURGERY       HERNIA REPAIR       [place] COLSC FLEXIBLE W/CONTROL BLEEDING ANY METHOD N/A 6/7/2017    Procedure: COLONOSCOPY;  Surgeon: Luis Mckeon MD;  Location: [facility];  Service: Gastroenterology     TONSILLECTOMY             Family History:  Family History   Problem Relation Age of Onset     Dementia Mother      Diabetes Mother      Arthritis Mother       Cancer Mother      Depression Mother      Heart disease Mother      Vision loss Mother      Stroke Father      Heart disease Father      Reviewed; Non-contributory      Social History:    History   Smoking Status     Former Smoker     Packs/day: 1.50     Years: 37.00     Types: Cigarettes     Quit date: 1/1/2009   Smokeless Tobacco     Never Used     Smoking: none  Alcohol use: 1-2 drinks/night  Other drug use: none  Retired      Current Medications:  Current Outpatient Prescriptions on File Prior to Visit   Medication Sig Dispense Refill     acetaminophen (TYLENOL) 500 MG tablet Take 500 mg by mouth every 6 (six) hours as needed for pain.       allopurinol (ZYLOPRIM) 100 MG tablet TAKE ONE TABLET BY MOUTH DAILY 90 tablet 2     atorvastatin (LIPITOR) 10 MG tablet TAKE 1 TABLET BY MOUTH DAILY 90 tablet 2     CHLORPHENIRAMINE/DEXTROMETHORP (CORICIDIN HBP COUGH AND COLD ORAL) Take by mouth as needed.       cholecalciferol, vitamin D3, 2,000 unit cap Take 2,000 Units by mouth daily with lunch.        digoxin (LANOXIN) 125 mcg tablet TAKE ONE TABLET BY MOUTH 3 TIMES A WEEK 30 tablet 0     diphenhydrAMINE (BENADRYL) 50 MG tablet Take 50 mg by mouth at bedtime as needed for sleep.        folic acid (FOLVITE) 1 MG tablet TAKE ONE TABLET BY MOUTH DAILY 90 tablet 2     gabapentin (NEURONTIN) 100 MG capsule TAKE 1 CAPSULE BY MOUTH THREE TIMES DAILY 270 capsule 1     Lactobacillus rhamnosus GG (CULTURELLE) 10-15 Billion cell capsule Take 1 capsule by mouth daily with lunch.       LORazepam (ATIVAN) 0.5 MG tablet 1 tab PO 90 minutes before the procedure, than 1 tab 30 minutes before procedure. 2 tablet 0     metoprolol tartrate (LOPRESSOR) 25 MG tablet Take 25 mg by mouth 2 (two) times a day.       midodrine (PROAMATINE) 5 MG tablet Take 1-2 tablets (5-10 mg total) by mouth 3 (three) times a week. 30 minutes prior to dialysis, Mon,Wed and Friday 30 tablet 0     omeprazole (PRILOSEC) 20 MG capsule Take 20 mg by mouth daily with  lunch.        OXYGEN-AIR DELIVERY SYSTEMS MISC 2 L/min into each nostril daily as needed (SHORTNESS OF BREATH). Indications: shortness of breath       ranitidine (ZANTAC) 150 MG tablet Take 1 tablet (150 mg total) by mouth daily. 14 tablet 0     senna-docusate (SENNOSIDES-DOCUSATE SODIUM) 8.6-50 mg tablet Take 1 tablet by mouth at bedtime as needed for constipation.        sevelamer carbonate (RENVELA) 800 mg tablet Take 1,600 mg by mouth 3 (three) times a day with meals.       timolol maleate (TIMOPTIC) 0.5 % ophthalmic solution Administer 1 drop to both eyes 2 (two) times a day.        vit B comp no.3-folic-C-biotin (NEPHRO-OLGA) 1- mg-mg-mcg Tab tablet Take 1 tablet by mouth daily.       warfarin (COUMADIN) 2 MG tablet Take coumadin 2 mg and 3 mg to make 5 mg daily. 30 tablet 0     warfarin (COUMADIN) 3 MG tablet Take 3 and 2 mg to make 5 mg daily. 30 tablet 0     No current facility-administered medications on file prior to visit.        Allergies:   Allergies   Allergen Reactions     Ace Inhibitors Cough     Fosinopril Sodium Cough       I personally reviewed patient's past medical, surgical, social, family history and allergies.    ROS:  Review of Systems  See HPI for 3pt ROS, otherwise negative      OBJECTIVE:   Vitals:    07/31/17 1411   BP: 134/60   Pulse: 64   Temp: 98.1  F (36.7  C)   TempSrc: Oral   SpO2: 92%   Weight: 189 lb 6.4 oz (85.9 kg)           General Appearance:  Alert, nontoxic appearing elderly female in NAD. Afebrile. Talkative, appears comfortable.   Integument: in right axilla there is a peach-pit-sized erythematous, fluctuant, tender mass with puncta on superior aspect. Minimal surrounding erythema or induration.  Neck: Supple  Respiratory: No distress.       Incision and drainage  Date/Time: 7/31/2017 4:53 PM  Performed by: MARIBELL KNIGHT  Authorized by: MARIBELL NKIGHT   Type: abscess  Body area: trunk (right axilla)  Anesthesia: local  infiltration    Anesthesia:  Local Anesthetic: lidocaine 1% without epinephrine    Sedation:  Patient sedated: no  Risk factor: underlying major nerve  Scalpel size: 11  Incision type: T-shaped.  Incision depth: dermal  Complexity: complex  Drainage: purulent  Drainage amount: copious  Wound treatment: wound left open  Packing material: 1/2 in iodoform gauze  Patient tolerance: Patient tolerated the procedure well with no immediate complications

## 2021-06-12 NOTE — TELEPHONE ENCOUNTER
FYI - Status Update  Who is Calling: Home Care [facility] Nurse From [facility]   Update: Home care nurse stated that patient is going to discharge tomorrow from TCU . Patient called and canceled Home care services with [place] .  Okay to leave a detailed message?:  No

## 2021-06-12 NOTE — PROGRESS NOTES
Code Status:  DNR  Visit Type: Discharge Summary     Facility:  [facility] [[id number]]             History of Present Illness: Belia Rodriguez is a 75 y.o. female  Who I am seeing today for discharge from the TCU.  Patient initially hospitalized on 10/5/2020 secondary to increasing weakness and hypoxia.  Past medical history includes end-stage renal disease on dialysis 3 times weekly, acute hypoxic respiratory failure secondary to CHF as well as COPD.  She does wear O2 at 3 L at baseline at home at night.  Patient was treated for both her CHF exacerbation as well as pneumonia.  Chest x-ray showed bilateral mild pulmonary edema.  She does make urine.  Troponins were negative.  MI was ruled out.  BNP higher than baseline.  Patient did undergo some extra runs of dialysis.  Symptoms did improve.  She did initially require BiPAP however did not tolerate this well.  She was treated with ceftriaxone and doxycycline for possible pneumonia.  Her procalcitonin was negative however she did continue the 5-day course of doxycycline.  She continued on prednisone 20 mg for 3 days.  CTA of the chest negative for PE.  Did show emphysema with pulmonary hypertension.  She is wheelchair-bound secondary to history of neuropathy in the lower extremities.  A palliative care consult was ordered.  COVID-19 was negative.  Respiratory panel unremarkable.  She does continue with Midrin on her days of dialysis secondary to hypotension.  Continues on cinacalet and Sensipar.  Chronic anemia secondary to end-stage renal disease.  History of atrial fib rate controlled.  History of AV node ablation with pacemaker placement.  She does have a watchman device in place.  Patient also seen by hospice however stated she was not ready for hospice at this time.  During her TCU patient was evaluated the ER for some coloration to her feet would diminished pulses and coolness.  This is chronic.  She was found to have no sensation and no  dorsi flexion on the right foot however stated that was chronic.  Sensation is also decreased in the left.  Pulses were identified per Doppler.  Arterial ultrasound was obtained and showed no occlusion with two-vessel runoff in the foot.  She was found to have some atherosclerosis but no obvious occlusion.  Work-up for acute ischemia was negative and suggested for follow-up outpatient was recommended.  Patient has had some ongoing thoracic and back pain during her TCU stay.  Oxycodone was ordered per Dr. Rocha.  She is taking this about once daily.      Active Ambulatory Problems     Diagnosis Date Noted     Tachycardia-bradycardia syndrome (H) 06/12/2014     Hyperkalemia 06/30/2014     Essential hypertension with goal blood pressure less than 140/90 06/30/2014     Hyperlipidemia 06/30/2014     ZULEIKA (obstructive sleep apnea), severe 10/22/2015     Anemia of chronic renal failure, stage 5 (H)      Dissection of thoracoabdominal aorta (H)      Gout      GERD (gastroesophageal reflux disease) 04/14/2017     Right foot drop 05/16/2017     Acute midline low back pain with right-sided sciatica 06/16/2017     History of compression fracture of spine 06/16/2017     Pulmonary emphysema (H) 02/19/2018     Presence of Watchman left atrial appendage closure device 04/05/2018     Other dysphagia 08/10/2018     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     Hyperparathyroidism (H) 03/24/2009     Osteoporosis 03/24/2009     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     ESRD (end stage renal disease) on dialysis (H) 08/15/2018     Acute pulmonary edema (H) 09/07/2018     Chronic atrial fibrillation (H)      Acute on chronic diastolic congestive heart failure (H) 01/23/2019     Thrombocytopenia (H)      Contraindication to anticoagulation therapy 03/26/2019     Dyspnea 05/18/2019     Cardiac pacemaker in situ 03/20/2019     S/P AV (atrioventricular) jonn ablation 03/20/2019     Hip fracture,  intertrochanteric (H) 06/23/2019     DVT (deep venous thrombosis) (H) 08/22/2019     Carpal tunnel syndrome of left wrist 02/04/2020     SOB (shortness of breath) 07/13/2020     Hypotension, unspecified hypotension type      Major depressive disorder, remission status unspecified, unspecified whether recurrent 09/15/2020     Hypoxia 10/05/2020     Encounter for palliative care      Resolved Ambulatory Problems     Diagnosis Date Noted     Hypotension 06/01/2014     Fever 06/07/2014     Aortic aneurysm rupture (H)      Bacteremia due to Escherichia coli 06/08/2014     Acute respiratory failure with hypoxia (H) 06/30/2014     Severe tobacco use disorder 06/30/2014     Persistent atrial fibrillation (H) 06/30/2014     Bradycardia, sinus 06/30/2014     Volume overload 06/30/2014     Hypomagnesemia 06/30/2014     Cellulitis 08/25/2014     Hematochezia 11/02/2014     BRBPR (bright red blood per rectum) 11/02/2014     Hematuria 04/01/2015     Aortic dissection (H) 04/01/2015     Pneumonia 09/07/2015     Hypoxia 09/23/2015     SOB (shortness of breath) 09/23/2015     Acute on chronic diastolic heart failure (H) 09/27/2015     Bacteremia 09/27/2015     Dyspnea 10/07/2015     Fluid overload 10/07/2015     Chronic thoracic aortic dissection (H) 10/07/2015     L3 vertebral fracture (H) 11/16/2015     GI bleeding 03/20/2016     Chronic systolic congestive heart failure (H)      Acute lower GI bleeding 03/21/2016     Bilateral anterior& Lt Occipital embolic Infarction 03/24/2016     Spinal stenosis 03/28/2016     Back pain 03/28/2016     Tremor 03/28/2016     SOB (shortness of breath) 05/03/2016     CVA (cerebral infarction) 05/04/2016     Respiratory distress 05/09/2016     CHF (congestive heart failure) (H) 05/09/2016     Right knee pain      Dyspnea 10/02/2016     Volume overload 10/02/2016     Acute bronchitis due to infection 10/02/2016     Acute bacterial conjunctivitis of both eyes 10/02/2016     Acute CHF (congestive  heart failure) (H) 10/09/2016     Hypervolemia, unspecified hypervolemia type      Pure hypercholesterolemia      Gastroesophageal reflux disease without esophagitis      Leg weakness 04/20/2017     Anticoagulated on warfarin 05/16/2017     Gastrointestinal hemorrhage, unspecified gastrointestinal hemorrhage type 06/05/2017     Anticoagulant long-term use 06/16/2017     Acute GI bleeding 12/09/2017     SOB (shortness of breath) 01/26/2018     Acute bronchitis with bronchospasm      Acute respiratory failure with hypoxia and hypercapnia (H)      Acute on chronic respiratory failure with hypoxia (H)      History of acute respiratory failure 02/19/2018     Chronic respiratory failure with hypoxia (H) 03/02/2018     Dyspnea 08/09/2018     Chest pain 08/10/2018     Hemodialysis catheter infection (H)      Hyperkalemia 10/18/2018     COPD exacerbation (H) 01/04/2019     Atrial fibrillation with RVR (H) 03/03/2019     Intertrochanteric fracture of left hip, closed, initial encounter (H) 06/23/2019     Closed intertrochanteric fracture of hip, left, initial encounter (H) 06/22/2019     Past Medical History:   Diagnosis Date     Arthritis      Chronic anemia 6/1/2014     Chronic kidney disease      COPD (chronic obstructive pulmonary disease) (H)      CVA (cerebral infarction)      Disease of thyroid gland      Dyslipidemia      ESRD (end stage renal disease) (H) 06/03/2009     Essential hypertension 6/30/2014     GI (gastrointestinal bleed)      Left Atrial Appendage Occlusion (WATCHMAN) 4/5/2018     Obesity      Oxygen dependent        Current Outpatient Medications   Medication Sig     oxyCODONE (ROXICODONE) 5 MG immediate release tablet Take 5 mg by mouth every 4 (four) hours as needed for pain.     acetaminophen (TYLENOL) 500 MG tablet Take 1,000 mg by mouth 3 (three) times a day as needed.     artificial tears,hypromellose, (GENTEAL; SYSTANE) 0.3 % Gel Administer 1 drop to both eyes as needed.     aspirin 81 MG EC  tablet Take 1 tablet (81 mg total) by mouth daily.     buPROPion (WELLBUTRIN XL) 150 MG 24 hr tablet Take 1 tablet (150 mg total) by mouth 2 (two) times a week. Tuesday and saturday     cholecalciferol, vitamin D3, 1,000 unit (25 mcg) tablet Take 2,000 Units by mouth daily.     cinacalcet (SENSIPAR) 30 MG tablet Take 30 mg by mouth see administration instructions. Take three times weekly with dialysis (on Mondays, Wednesdays, and Fridays).           famotidine (PEPCID) 20 MG tablet Take 20 mg by mouth 2 (two) times a day.     folic acid (FOLVITE) 1 MG tablet TAKE ONE TABLET BY MOUTH ONCE DAILY     gabapentin (NEURONTIN) 100 MG capsule TAKE 1 CAPSULE BY MOUTH THREE TIMES DAILY     ipratropium-albuteroL (COMBIVENT RESPIMAT)  mcg/actuation Mist inhaler Inhale 1 puff 4 (four) times a day.     Lactobacillus rhamnosus GG (CULTURELLE) 10-15 Billion cell capsule Take 1 capsule by mouth daily with lunch.     loratadine (CLARITIN) 10 mg tablet Take 10 mg by mouth daily as needed.      midodrine (PROAMATINE) 10 MG tablet Take 1.5 tablets (15 mg total) by mouth 3 (three) times a day with meals. (Patient taking differently: Take 15 mg by mouth 3 (three) times a day with meals. Hold for SBP>105  Patient reports that she usually gets 10 mg before dialysis and another 10 mg prison through dialysis as well)     multivitamin (DAILY-OLGA) per tablet Take 1 tablet by mouth daily.     predniSONE (DELTASONE) 20 MG tablet Take 20 mg by mouth daily with breakfast.     senna-docusate (SENNOSIDES-DOCUSATE SODIUM) 8.6-50 mg tablet Take 1 tablet by mouth daily as needed for constipation.      sevelamer carbonate (RENVELA) 800 mg tablet Take 1 tablet (800 mg total) by mouth 2 (two) times a day as needed (FOr snacks.).     sevelamer HCL (RENAGEL) 800 MG tablet Take 2,400 mg by mouth 3 (three) times a day with meals. And take 2 tablets with snacks     timolol maleate (TIMOPTIC) 0.5 % ophthalmic solution Administer 1 drop to both eyes 2  (two) times a day.      traZODone (DESYREL) 50 MG tablet TAKE 1&1/2 TABLETS (75MG) BY MOUTH AT BEDTIME. (Patient taking differently: Take 150 mg by mouth at bedtime. )       Allergies   Allergen Reactions     Ace Inhibitors Cough     Atrovent [Ipratropium Bromide] Headache     Fosinopril Sodium Cough     Zolpidem      hallucinations         Review of Systems  No fevers or chills. No headache, lightheadedness or dizziness.  Chronic SOB, patient wears oxygen at 2 L at night, no chest pains or palpitations. Appetite is good. No nausea, vomiting, constipation or diarrhea. No dysuria, frequency, burning or pain with urination. + weakness. + chronic back pain. Continues on oxycodone. Otherwise review of systems are negative.     Physical Exam  PHYSICAL EXAMINATION:  Vital signs: /88, pulse 70, respirations 16, temperature 96.9, O2 sats 99% on room air.  Weight 154 pounds.  General: Awake, Alert, oriented x3, appropriately, follows simple commands, conversant  HEENT: Pink conjunctiva, anicteric sclerae, moist oral mucosa  NECK: Supple, without any lymphadenopathy, or masses  CVS:  S1  S2, without murmur or gallop.  Dialysis catheter in the right chest wall.  LUNG: Clear to auscultation, No wheezes, rales or rhonci.  No dyspnea at rest.  BACK: No kyphosis of the thoracic spine  ABDOMEN: Soft, nontender to palpation, with positive bowel sounds  EXTREMITIES: Moves both upper and lower extremities with generalized weakness, trace pedal edema, no calf tenderness  SKIN: Warm and dry, no rashes or erythema noted  NEUROLOGIC: Intact, pulses palpable  PSYCHIATRIC: Cognition intact.  Very chatty.  Pleasant affect.      Labs:    Lab Results   Component Value Date    WBC 2.8 (L) 10/07/2020    HGB 9.6 (L) 10/07/2020    HGB 9.7 (L) 10/07/2020    HCT 31.7 (L) 10/07/2020     (H) 10/07/2020    PLT 93 (L) 10/07/2020    PLT 94 (L) 10/07/2020     Results for orders placed or performed during the hospital encounter of 10/05/20    Basic Metabolic Panel   Result Value Ref Range    Sodium 133 (L) 136 - 145 mmol/L    Potassium 3.8 3.5 - 5.0 mmol/L    Chloride 93 (L) 98 - 107 mmol/L    CO2 29 22 - 31 mmol/L    Anion Gap, Calculation 11 5 - 18 mmol/L    Glucose 106 70 - 125 mg/dL    Calcium 8.0 (L) 8.5 - 10.5 mg/dL    BUN 16 8 - 28 mg/dL    Creatinine 3.01 (H) 0.60 - 1.10 mg/dL    GFR MDRD Af Amer 18 (L) >60 mL/min/1.73m2    GFR MDRD Non Af Amer 15 (L) >60 mL/min/1.73m2           Assessment/Plan:  1. ESRD on dialysis (H)   continues on dialysis 3 times weekly.  Follow-up with nephrology outpatient.   2. Pulmonary emphysema, unspecified emphysema type (H)   patient continues on O2 at 2 L at night.  She was unable to tolerate CPAP secondary to claustrophobia.  She has been weaned off of her oxygen during the day.  Patient continues on prednisone 20 mg daily.    Patient continues on home nebs.     3. Acute on chronic diastolic congestive heart failure (H)   fluid managed per hemodialysis rounds.  Follow-up with cardiology outpatient.   4. Neuropathic pain   patient continues on gabapentin 3 times daily.  Lower extremity weakness.   5. Severe low back pain   patient started on oxycodone 2.5 mg every 8 hours as needed during her TCU stay.  I will send her with 15 tabs.  She will need to follow-up with primary for further pain management.   6. Physical deconditioning   continues with extreme weakness.  Hospice consult ordered during hospitalization and patient declined.   7. Presence of Watchman left atrial appendage closure device     8. Cardiac pacemaker in situ     9. Peripheral arterial disease (H)   no recent occlusion noted.  However diminished pulses and sensation with atherosclerotic disease noted.  Recommended follow-up outpatient.     Okay to DC home with current meds and treatments as well as 15 tabs of oxycodone.  Follow-up with primary care provider in 1 week.  Follow-up with nephrology outpatient.  Follow-up with cardiology outpatient.   Home PT, OT, home health aide and RN for pain management.      DISCHARGE PLAN/FACE TO FACE:  I certify that this patient is under my care and that I, or a nurse practitioner or physician's assistant working with me, had a face-to-face encounter that meets the physician face-to-face encounter requirements with this patient.       I certify that, based on my findings, the following services are medically necessary home health services.    My clinical findings support the need for the above skilled services.    This patient is homebound because: Recent COPD and CHF exacerbation.    The patient is, or has been, under my care and I have initiated the establishment of the plan of care. This patient will be followed by a physician who will periodically review the plan of care.      45 minutes spent of which greater than 50% was face to face communication with the patient regarding discharge medications, follow-ups and home care services.  We did again review possible hospice.  Patient is unsure if she is like to proceed with hospice at this time however will consider outpatient work-up.    This note has been dictated using voice recognition software. Any grammatical or context distortions are unintentional and inherent to the software    Electronically signed by: June Kingston, CNP

## 2021-06-12 NOTE — PROGRESS NOTES
INR 2.0 at home. Will increase Warfarin to 2.5 mg sun, tue and thur and 5 mg all other days. Spoke with pt and she will retest in one week. After talking with pt and discussing history of greens/salads and medication change. Pt will  continue  with current diet and dosing of Warfarin.  Continue with moderation of Vit K and green leafy vegetables. Cautioned to call with increase bruising or bleeding. Reminded to call with medication change especially antibiotic. Call with any questions or concerns or any up coming procedures. Cautioned about using Herbal medication.

## 2021-06-12 NOTE — TELEPHONE ENCOUNTER
Left message for Alyse to discuss with patient if she would like to be seen, she refused consult while at the hospital.

## 2021-06-12 NOTE — PROGRESS NOTES
INR 4.5 with home health. Will hold today dose and then 2.5 mg T,th,Sat and 5 mg all other days. Increase greens and green tea. After talking with pt and discussing history of greens/salads and medication change. Pt will  continue  with current diet and dosing of Warfarin.  Continue with moderation of Vit K and green leafy vegetables. Cautioned to call with increase bruising or bleeding. Reminded to call with medication change especially antibiotic. Call with any questions or concerns or any up coming procedures. Cautioned about using Herbal medication.

## 2021-06-12 NOTE — PROGRESS NOTES
INR 2.4 on home monitor. Will continue on current dose and retest in 2 weeks. Continue current management dosing of Warfarin. Continue  diet of moderate Vitamin K intake. Discussed with pt the need to call with questions or concerns or any change in medication especially herbal medication or OTC. Call with increased bleeding or bruising or any upcoming procedures.

## 2021-06-12 NOTE — TELEPHONE ENCOUNTER
Please review imaging and treatment from recent hospitalization. Tata called to schedule a vascular follow up but was unclear on the specific reason for follow up. Please advise scheduling pool which provider and if additional imaging is required.

## 2021-06-12 NOTE — PROGRESS NOTES
INR 1.9 at home. Left VM message with return phone number for questions and concerns. [phone]  Take 2.5 mg daily and retest INR in one week. After talking with pt and discussing history of greens/salads and medication change. Pt will  continue  with current diet and dosing of Warfarin.  Continue with moderation of Vit K and green leafy vegetables. Cautioned to call with increase bruising or bleeding. Reminded to call with medication change especially antibiotic. Call with any questions or concerns or any up coming procedures. Cautioned about using Herbal medication.

## 2021-06-12 NOTE — PROCEDURES
[facility]    Procedure: IR Procedure Note    Date/Time: 10/23/2020 4:56 PM  Performed by: Jesús Fountain MD  Authorized by: Jesús Fountain MD       Universal Protocol    Site marked: Yes    Prior images obtained and reviewed: Yes    Required items: required blood products, implants, devices, and special equipment available    Patient identity confirmed: verbally with patient    Reevaluation: Patient was reevaluated immediately before administering moderate or deep sedation or anesthesia    Confirmation checklist: patient's identity using two indicators, relevant allergies, procedure was appropriate and matched the consent or emergent situation and correct equipment/implants were available    Time out: Immediately prior to procedure a time out was called to verify the correct patient, procedure, equipment, support staff and site/side marked as required    Universal Protocol: Joint Commission Universal Protocol was followed    Preparation: Patient was prepped and draped in the usual sterile fashion    ESBL (mL): 2    Anesthesia    Local anesthesia used?: Yes    Local anesthetic: lidocaine 1% without epinephrine    Anesthetic total (mL): 10    Sedation    Patient sedation: Yes    Sedation type: moderate (conscious) sedation    Vital signs: Vital signs monitored during sedation  Specimens: none  Complications: None  Condition: Stable  Plan: Successful exchange of right tunneled dialysis catheter.  OK to use catheter.    Post-procedure    Patient tolerance: Patient tolerated the procedure well with no immediate complications   Length of time physician present for 1:1 monitoring during sedation: 30

## 2021-06-12 NOTE — PRE-PROCEDURE
Procedure Name: tunneled dialysis catheter exchange   Written consent obtained?: Yes  Risks and benefits: Risks, benefits and alternatives were discussed  Consent given by: patient  Expected level of sedation: moderate  ASA Class: Class 3- Severe systemic disease, definite functional limitations  Mallampati: Grade 2- soft palate, base of uvula, tonsillar pillars, and portion of posterior pharyngeal wall visible  Patient states understanding of procedure being performed: Yes  Patient's understanding of procedure matches consent: Yes  Procedure consent matches procedure scheduled: Yes  Appropriately NPO: yes  Lungs: lungs clear with good breath sounds bilaterally  Heart: normal heart sounds and rate  History & Physical reviewed: Abbreviated history and physical done prior to moderate sedation  Statement of review: I have reviewed the lab findings, diagnostic data, medications, and the plan for sedation

## 2021-06-12 NOTE — PROGRESS NOTES
[facility] Patient Outreach:     Discharge Date::  10/22/20      Reason for TCU stay (discharge diagnosis)::  Weakness, hypoxia, pneumonia, CHF, ESRD      Are you feeling better, the same or worse since your discharge?:  Patient is feeling better          As part of your discharge plan, did they discuss home care with you?: Yes        Have your seen them yet, or are they scheduled to visit?: No        Did you receive any new medications, or was there a change to your medications?: No (same meds as TCU.  )            Do you have any follow up visits scheduled with your PCP or Specialist?:  No          I'm glad to hear you're doing well and we want you to continue to do well. Your PCP would like to see you for a follow-up visit. Can we help set that up for your today?: No        (RN) Provided patient the PCP's phone number to call if they have any questions or concerns?: No (Patient's  will tell patient to make an appt with PCP.  )

## 2021-06-12 NOTE — TELEPHONE ENCOUNTER
Called and gave okay verbal orders for Home care Skilled nursing, Physical therapy and Occupational therapy as requested.    For home care, assisted living and nursing home needs for initiation of orders that pertain to nursing, physical therapy, occupational therapy and social work.  Established patient, seen by [facility] care provider within the last 2 years (9/14/20)

## 2021-06-13 NOTE — TELEPHONE ENCOUNTER
Who is calling:  Patient   Reason for Call:  See other encounter. Please confirm if we should schedule with someone other than Dr. Rai.     Okay to leave a detailed message: Yes

## 2021-06-13 NOTE — PROGRESS NOTES
The clinic Community Health Worker talked with the patient today at the request of the PCP to discuss possible Clinic Care Coordination enrollment.  The service was described to the patient and immediate needs were discussed.  The patient declined enrollement at this time.  The PCP is encouraged to refer in the future if the patient's needs change.

## 2021-06-13 NOTE — PROGRESS NOTES
[facility]      Facility:    [facility] [[id number]]  Code Status: DNR      Chief Complaint/Reason for Visit:  Chief Complaint   Patient presents with     H & P     Acute hypoxic respiratory failure, chronic respiratory failure, acute COPD with exacerbation, acute and chronic diastolic heart failure, lower extremity edema, end-stage renal disease on hemodialysis, obstructive sleep apnea, low back pain, pain management.       HPI:   Belia is a 75 y.o. female who was recently admitted to the hospital 11/2/2020 11/7/2020.  She is currently has a history of end-stage renal disease on dialysis is currently on chronic O2 for COPD as well as congestive heart failure.  She was then admitted admitted to the hospital with acute hypoxic respiratory failure.  She was worked up thoroughly and likely COPD exacerbation with acute diastolic heart failure.  She did start prednisone in the hospital completed a course of antibiotics including ceftriaxone/azithromycin.  Covid was negative.  Ultrasound of legs were negative and she continue with dialysis.  Some fluid was taken off and she did have hyperkalemia which did improve and I do not have the lab results from dialysis.  I did review the nurse practitioner's last note of from the 12th and apparently this has improved.  Patient was treated appropriately and transferred here to the TCU at [facility] in stable condition.    Patient claims her pain is under adequate control but her feet still hurt her.  It is status quo at this time and has not changed or gotten worse.  Her back pain is doing pretty good at this time.  We did have a long discussion about possible hospice and she is continue to discuss it with the family but she is a candidate at this time secondary to her end-stage COPD and also her renal failure when I did discuss with her she afraid to go she said not at all.  She is more concerned about her  at this  time and she is likely ready at this time.  I will discuss with nurse manager today to have this discussion with witness to get a hospice consult.    Otherwise she is doing well at this time and her breathing is okay at this time.  It is about status quo.    Past Medical History:  Past Medical History:   Diagnosis Date     Arthritis      CHF (congestive heart failure) (H)      Chronic anemia 6/1/2014     Chronic kidney disease      Chronic thoracic aortic dissection (H) 10/7/2015    Descending thoracic aorta; treated medically per notes of Dragan Singh and Jennifer.     COPD (chronic obstructive pulmonary disease) (H)      CVA (cerebral infarction)      Disease of thyroid gland      Dyslipidemia      ESRD (end stage renal disease) (H) 06/03/2009    on dialysis with Dr. Mitchell     Essential hypertension 6/30/2014     Gastrointestinal hemorrhage, unspecified gastrointestinal hemorrhage type 6/5/2017     GI (gastrointestinal bleed)      GI bleeding 6/5/2017     Gout      L3 vertebral fracture (H) 11/16/2015     Left Atrial Appendage Occlusion (WATCHMAN) 4/5/2018    LAAO April 5, 2018 (30 mm WATCHMAN)     Obesity      ZULEIKA (obstructive sleep apnea), severe, intolerant of CPAP 10/22/2015     Oxygen dependent     3L nc     Pneumonia 9-7-2015     Right foot drop      Spinal stenosis 3/28/2016     Stroke (H) 3/24/2016           Surgical History:  Past Surgical History:   Procedure Laterality Date     BACK SURGERY      [facility]     COLONOSCOPY N/A 3/23/2016    Procedure: COLONOSCOPY;  Surgeon: Ruddy Tejada MD;  Location: [facility];  Service:      DILATION AND CURETTAGE OF UTERUS       EP ABLATION AV NODE N/A 3/7/2019    Procedure: EP Ablation AV Node;  Surgeon: Derick Duarte MD;  Location: [facility] Cath Lab;  Service: Cardiology     EP NEGRA CLOSURE N/A 4/5/2018    Procedure: EP NEGRA Closure;  Surgeon: Derick Duarte MD;  Location: [facility] Cath Lab;  Service:      EP PACEMAKER INSERT N/A 3/7/2019     Procedure: EP Pacemaker Insertion;  Surgeon: Derick Duarte MD;  Location: [facility] Cath Lab;  Service: Cardiology     EYE SURGERY       HERNIA REPAIR       IR TUNNELED CATHETER COMPLETE REPLACEMENT  10/23/2020     IR TUNNELED CATHETER INSERT  2018     IR TUNNELED CATHETER REMOVAL  2018     MN COLSC FLEXIBLE W/CONTROL BLEEDING ANY METHOD N/A 2017    Procedure: COLONOSCOPY;  Surgeon: Luis Mckeon MD;  Location: [facility] GI;  Service: Gastroenterology     MN OPEN FIX INTER/SUBTROCH FX,IMPLNT Left 2019    Procedure: INTERNAL FIXATION, FRACTURE, TROCHANTERIC, HIP, USING INTERMEDULLARY NAIL;  Surgeon: Bennie Marsh DO;  Location: [facility] Main OR;  Service: Orthopedics     TONSILLECTOMY         Family History:   Family History   Problem Relation Age of Onset     Dementia Mother      Diabetes Mother      Arthritis Mother      Cancer Mother      Depression Mother      Heart disease Mother      Vision loss Mother      Stroke Father      Heart disease Father      Breast cancer Neg Hx        Social History:    Social History     Socioeconomic History     Marital status:      Spouse name: Cayden     Number of children: 2     Years of education: None     Highest education level: None   Occupational History     Employer: RETIRED   Social Needs     Financial resource strain: None     Food insecurity     Worry: None     Inability: None     Transportation needs     Medical: None     Non-medical: None   Tobacco Use     Smoking status: Former Smoker     Packs/day: 1.50     Years: 37.00     Pack years: 55.50     Types: Cigarettes     Quit date: 2009     Years since quittin.8     Smokeless tobacco: Never Used   Substance and Sexual Activity     Alcohol use: No     Alcohol/week: 11.7 standard drinks     Types: 14 Standard drinks or equivalent per week     Comment: 14 mixed drinks per week     Drug use: No     Sexual activity: Never     Partners: Male   Lifestyle     Physical  activity     Days per week: None     Minutes per session: None     Stress: None   Relationships     Social connections     Talks on phone: None     Gets together: None     Attends [employer] service: None     Active member of club or organization: None     Attends meetings of clubs or organizations: None     Relationship status: None     Intimate partner violence     Fear of current or ex partner: None     Emotionally abused: None     Physically abused: None     Forced sexual activity: None   Other Topics Concern     None   Social History Narrative    Lives with her . Daughter in [place] and daughter in [place].          Review of Systems   Constitutional:        Patient claims her pain is under adequate control but denies any fevers chills nausea vomiting diarrhea change in vision hearing taste or smell weakness one-sided chest pain or shortness of breath is about baseline at this time and she is rather comfortable.  Pain is well controlled.  And the remainder of the review of systems is negative.       Vitals:    11/17/20 0856   BP: 120/70   Pulse: 82   Resp: 16   Temp: 97.7  F (36.5  C)   SpO2: 98%       Physical Exam  Constitutional:       General: She is not in acute distress.  HENT:      Head: Normocephalic and atraumatic.      Nose: Nose normal.      Mouth/Throat:      Mouth: Mucous membranes are moist.      Pharynx: Oropharynx is clear.   Cardiovascular:      Rate and Rhythm: Normal rate.      Comments: Patient rhythm auscultates is pretty regular at this time.  I did not  any atrial fibrillation by 30 seconds of auscultation.  Pulmonary:      Comments: Patient does have decreased inspiratory volume but no crackles rales or wheezes were heard with auscultation.  Abdominal:      General: Bowel sounds are normal. There is no distension.      Tenderness: There is no abdominal tenderness.   Musculoskeletal:      Right lower leg: Edema present.      Left lower leg: Edema present.   Skin:      General: Skin is warm and dry.   Neurological:      Mental Status: She is alert. Mental status is at baseline.   Psychiatric:         Mood and Affect: Mood normal.         Behavior: Behavior normal.         Medication List:  Current Outpatient Medications   Medication Sig     acetaminophen (TYLENOL) 500 MG tablet Take 2 tablets (1,000 mg total) by mouth every 6 (six) hours as needed.     albuterol (PROAIR HFA;PROVENTIL HFA;VENTOLIN HFA) 90 mcg/actuation inhaler Inhale 2 puffs 4 (four) times a day as needed for wheezing or shortness of breath.     aspirin 81 MG EC tablet Take 1 tablet (81 mg total) by mouth daily. (Patient taking differently: Take 81 mg by mouth daily. )     B complex 11-folic-C-biot-zinc (DIALYVITE) 2-702-007-50 mg-mg-mcg-mg Tab Take 1 tablet by mouth daily with supper. (Dialyvite)      buPROPion (WELLBUTRIN XL) 150 MG 24 hr tablet Take 1 tablet (150 mg total) by mouth 2 (two) times a week. Tuesday and saturday (Patient taking differently: Take 150 mg by mouth 2 (two) times a week. Tuesday and saturday)     cholecalciferol, vitamin D3, 1,000 unit (25 mcg) tablet Take 2,000 Units by mouth daily.      cinacalcet (SENSIPAR) 30 MG tablet Take 30 mg by mouth see administration instructions. Take three times weekly with dialysis (on Mondays, Wednesdays, and Fridays).     famotidine (PEPCID) 20 MG tablet Take 1 tablet (20 mg total) by mouth at bedtime.     folic acid (FOLVITE) 1 MG tablet TAKE ONE TABLET BY MOUTH ONCE DAILY (Patient taking differently: No sig reported)     gabapentin (NEURONTIN) 100 MG capsule TAKE 1 CAPSULE BY MOUTH THREE TIMES DAILY (Patient taking differently: No sig reported)     Lactobacillus rhamnosus GG (CULTURELLE) 10-15 Billion cell capsule Take 1 capsule by mouth daily with lunch.      metoprolol succinate (TOPROL-XL) 25 MG Take 1 tablet (25 mg total) by mouth daily with lunch. Hold for SBP<110 or hr<60     midodrine (PROAMATINE) 10 MG tablet Take 10-20 mg by mouth 3 (three)  times a week. On dialysis days. Hold for SBP>105  Patient reports that she usually gets 10 mg before dialysis and another 10 mg USP through dialysis as well      oxyCODONE (ROXICODONE) 5 MG immediate release tablet Take 0.5 tablets (2.5 mg total) by mouth every 6 (six) hours as needed for pain.     polyvinyl alcohol (LIQUIFILM TEARS) 1.4 % ophthalmic solution Administer 1 drop to both eyes as needed for dry eyes.     senna-docusate (SENNOSIDES-DOCUSATE SODIUM) 8.6-50 mg tablet Take 1 tablet by mouth at bedtime.      sevelamer carbonate (RENVELA) 800 mg tablet Take 1 tablet (800 mg total) by mouth 2 (two) times a day as needed (FOr snacks.). (Patient taking differently: Take 800 mg by mouth 2 (two) times a day as needed (FOr snacks.). )     sevelamer HCL (RENAGEL) 800 MG tablet Take 1,600 mg by mouth 3 (three) times a day with meals.      timolol maleate (TIMOPTIC) 0.5 % ophthalmic solution Administer 1 drop to both eyes 2 (two) times a day.      traZODone (DESYREL) 150 MG tablet Take 150 mg by mouth at bedtime.        Labs: Hospital labs are as follows; Covid in the hospital was negative, troponins were 0.04, EKG showed wide QRS rhythm, the remainder the labs recently have been reviewed.      Assessment:    ICD-10-CM    1. Acute on chronic diastolic congestive heart failure (H)  I50.33    2. Pulmonary emphysema, unspecified emphysema type (H)  J43.9    3. ESRD on dialysis (H)  N18.6     Z99.2    4. Severe low back pain  M54.5    5. Physical deconditioning  R53.81    6. Paroxysmal atrial fibrillation (H)  I48.0    7. Neuropathic pain  M79.2    8. Presence of Watchman left atrial appendage closure device  Z95.818        Plan: Plan at this time will get labs sent over from dialysis and posted in matrix for my review today.    As far as a low back pain no changes in medications at this time we will continue with physical and Occupational Therapy.    Her paroxysmal atrial fibrillation there will be no medication  changes at this time and we will continue to monitor above medical problems.  She does a watchman device in at this time.  Her neuropathic pain is about baseline at this time and no new changes.  Her acute on chronic respiratory failure seems to be stable at this time and no other changes at this time.        Electronically signed by: Dl Nolan DO

## 2021-06-13 NOTE — PROGRESS NOTES
INR 1.7 at home will increase dose of Warfarin to 6 mg Wed and Sat and 3 mg all other days. Retest in 1 week. Left VM message with return phone number for questions and concerns. [phone]  After talking with pt and discussing history of greens/salads and medication change. Pt will  continue  with current diet and dosing of Warfarin.  Continue with moderation of Vit K and green leafy vegetables. Cautioned to call with increase bruising or bleeding. Reminded to call with medication change especially antibiotic. Call with any questions or concerns or any up coming procedures. Cautioned about using Herbal medication.

## 2021-06-13 NOTE — PROGRESS NOTES
Code Status:  DNR  Visit Type: Discharge Summary     Facility:  [facility] [[id number]]              History of Present Illness: Belia Rodriguez is a 75 y.o. female  who I am seeing today for discharge from the TCU.  Past medical history includes end-stage renal disease on dialysis 3 times weekly, acute hypoxic respiratory failure secondary to CHF as well as COPD.  Patient recently hospitalized on 11/2/2020 secondary to acute on chronic respiratory failure secondary to acute COPD and acute diastolic heart failure.  Patient with underlying COPD.  She is dependent on oxygen at 2 to 3 L.  She continues on home nebs.  She was treated with prednisone.  She also completed a 5-day course of ceftriaxone/azithromycin.  Fluid overload improved with daily dialysis.  She has resumed her 3 times weekly schedule.  Hyperkalemia improved with dialysis.  Leg swelling.  Ultrasound negative.  Obstructive sleep apnea on CPAP.  Patient with a history of neuropathy in the lower extremities.  History of atrial fib.  She does have a watchman device in place.  Chronic anemia secondary to end-stage renal disease.  Hypotension on dialysis days.  She continues on Midodrine.  Patient was seen by hospice during her previous hospitalization on 10/5/2020 however this was declined.    Today patient sitting up in wheelchair.  Patient with end-stage renal disease on dialysis 3 times weekly.  She is having some increased lower extremity edema as greater on the right.  She tells me she is asked dialysis to go 4 times weekly instead of 3 however they do not have room at her current dialysis.  I do feel this is going to be a barrier to discharge.  Underlying CAD.  She off-and-on has increased swelling pain and redness to the right lower extremity. Patient did have a venous Doppler on 11/3 during hospitalization which was negative for DVT.  She had a arterial Doppler on 10/15/2020 which showed some mild atherosclerotic irregularity but  no significant stenotic or occlusive changes. Two-vessel runoff at the ankle noted.patient continues on Tylenol and oxycodone for pain.  We did talk about attempting to wean off when she goes home.  I will send her with a weeks worth.  She does have underlying COPD and end-stage heart disease dependent on oxygen.  Shortness of breath continues with exertion.  Blood pressure continues on the soft side.  She is receiving midodrine.  Patient with chronic back pain well controlled with oxycodone.  She continues with some hyperkalemia. Potassium was 5.3. Creatinine about 6.83.        Active Ambulatory Problems     Diagnosis Date Noted     Tachycardia-bradycardia syndrome (H) 06/12/2014     Hyperkalemia 06/30/2014     Essential hypertension with goal blood pressure less than 140/90 06/30/2014     Hyperlipidemia 06/30/2014     Acute respiratory failure with hypoxia (H) 06/30/2014     ZULEIKA (obstructive sleep apnea), severe 10/22/2015     Anemia of chronic renal failure, stage 5 (H)      Dissection of thoracoabdominal aorta (H)      Gout      GERD (gastroesophageal reflux disease) 04/14/2017     Right foot drop 05/16/2017     Acute midline low back pain with right-sided sciatica 06/16/2017     History of compression fracture of spine 06/16/2017     Pulmonary emphysema (H) 02/19/2018     Presence of Watchman left atrial appendage closure device 04/05/2018     Other dysphagia 08/10/2018     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     Hyperparathyroidism (H) 03/24/2009     Osteoporosis 03/24/2009     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     ESRD on hemodialysis (H) 08/15/2018     Acute pulmonary edema (H) 09/07/2018     Chronic atrial fibrillation (H)      COPD exacerbation (H) 01/04/2019     Acute on chronic diastolic congestive heart failure (H) 01/23/2019     Thrombocytopenia (H)      Contraindication to anticoagulation therapy 03/26/2019     Dyspnea 05/18/2019     Cardiac pacemaker in situ  03/20/2019     S/P AV (atrioventricular) jonn ablation 03/20/2019     Hip fracture, intertrochanteric (H) 06/23/2019     DVT (deep venous thrombosis) (H) 08/22/2019     Carpal tunnel syndrome of left wrist 02/04/2020     SOB (shortness of breath) 07/13/2020     Hypotension, unspecified hypotension type      Major depressive disorder, remission status unspecified, unspecified whether recurrent 09/15/2020     Hypoxia 10/05/2020     Encounter for palliative care      Resolved Ambulatory Problems     Diagnosis Date Noted     Hypotension 06/01/2014     Fever 06/07/2014     Aortic aneurysm rupture (H)      Bacteremia due to Escherichia coli 06/08/2014     Severe tobacco use disorder 06/30/2014     Persistent atrial fibrillation (H) 06/30/2014     Bradycardia, sinus 06/30/2014     Volume overload 06/30/2014     Hypomagnesemia 06/30/2014     Cellulitis 08/25/2014     Hematochezia 11/02/2014     BRBPR (bright red blood per rectum) 11/02/2014     Hematuria 04/01/2015     Aortic dissection (H) 04/01/2015     Pneumonia 09/07/2015     Hypoxia 09/23/2015     SOB (shortness of breath) 09/23/2015     Acute on chronic diastolic heart failure (H) 09/27/2015     Bacteremia 09/27/2015     Dyspnea 10/07/2015     Fluid overload 10/07/2015     Chronic thoracic aortic dissection (H) 10/07/2015     L3 vertebral fracture (H) 11/16/2015     GI bleeding 03/20/2016     Chronic systolic congestive heart failure (H)      Acute lower GI bleeding 03/21/2016     Bilateral anterior& Lt Occipital embolic Infarction 03/24/2016     Spinal stenosis 03/28/2016     Back pain 03/28/2016     Tremor 03/28/2016     SOB (shortness of breath) 05/03/2016     CVA (cerebral infarction) 05/04/2016     Respiratory distress 05/09/2016     CHF (congestive heart failure) (H) 05/09/2016     Right knee pain      Dyspnea 10/02/2016     Volume overload 10/02/2016     Acute bronchitis due to infection 10/02/2016     Acute bacterial conjunctivitis of both eyes 10/02/2016      Acute CHF (congestive heart failure) (H) 10/09/2016     Hypervolemia, unspecified hypervolemia type      Pure hypercholesterolemia      Gastroesophageal reflux disease without esophagitis      Leg weakness 04/20/2017     Anticoagulated on warfarin 05/16/2017     Gastrointestinal hemorrhage, unspecified gastrointestinal hemorrhage type 06/05/2017     Anticoagulant long-term use 06/16/2017     Acute GI bleeding 12/09/2017     SOB (shortness of breath) 01/26/2018     Acute bronchitis with bronchospasm      Acute respiratory failure with hypoxia and hypercapnia (H)      Acute on chronic respiratory failure with hypoxia (H)      History of acute respiratory failure 02/19/2018     Chronic respiratory failure with hypoxia (H) 03/02/2018     Dyspnea 08/09/2018     Chest pain 08/10/2018     Hemodialysis catheter infection (H)      Hyperkalemia 10/18/2018     Atrial fibrillation with RVR (H) 03/03/2019     Intertrochanteric fracture of left hip, closed, initial encounter (H) 06/23/2019     Closed intertrochanteric fracture of hip, left, initial encounter (H) 06/22/2019     Past Medical History:   Diagnosis Date     Arthritis      Chronic anemia 6/1/2014     Chronic kidney disease      COPD (chronic obstructive pulmonary disease) (H)      CVA (cerebral infarction)      Disease of thyroid gland      Dyslipidemia      ESRD (end stage renal disease) (H) 06/03/2009     Essential hypertension 6/30/2014     GI (gastrointestinal bleed)      Left Atrial Appendage Occlusion (WATCHMAN) 4/5/2018     Obesity      Oxygen dependent        Current Outpatient Medications   Medication Sig Note     acetaminophen (TYLENOL) 500 MG tablet Take 2 tablets (1,000 mg total) by mouth every 6 (six) hours as needed.      albuterol (PROAIR HFA;PROVENTIL HFA;VENTOLIN HFA) 90 mcg/actuation inhaler Inhale 2 puffs 4 (four) times a day as needed for wheezing or shortness of breath.      aspirin 81 MG EC tablet Take 1 tablet (81 mg total) by mouth daily.  (Patient taking differently: Take 81 mg by mouth daily. )      B complex 11-folic-C-biot-zinc (DIALYVITE) 4-821-899-50 mg-mg-mcg-mg Tab Take 1 tablet by mouth daily with supper. (Dialyvite)       buPROPion (WELLBUTRIN XL) 150 MG 24 hr tablet Take 1 tablet (150 mg total) by mouth 2 (two) times a week. Tuesday and saturday (Patient taking differently: Take 150 mg by mouth 2 (two) times a week. Tuesday and saturday)      cholecalciferol, vitamin D3, 1,000 unit (25 mcg) tablet Take 2,000 Units by mouth daily.       cinacalcet (SENSIPAR) 30 MG tablet Take 30 mg by mouth see administration instructions. Take three times weekly with dialysis (on Mondays, Wednesdays, and Fridays).      famotidine (PEPCID) 20 MG tablet Take 1 tablet (20 mg total) by mouth at bedtime.      folic acid (FOLVITE) 1 MG tablet TAKE ONE TABLET BY MOUTH ONCE DAILY (Patient taking differently: No sig reported)      gabapentin (NEURONTIN) 100 MG capsule TAKE 1 CAPSULE BY MOUTH THREE TIMES DAILY (Patient taking differently: No sig reported)      Lactobacillus rhamnosus GG (CULTURELLE) 10-15 Billion cell capsule Take 1 capsule by mouth daily with lunch.       metoprolol succinate (TOPROL-XL) 25 MG Take 1 tablet (25 mg total) by mouth daily with lunch. Hold for SBP<110 or hr<60      midodrine (PROAMATINE) 10 MG tablet Take 10-20 mg by mouth 3 (three) times a week. On dialysis days. Hold for SBP>105  Patient reports that she usually gets 10 mg before dialysis and another 10 mg detention through dialysis as well       oxyCODONE (ROXICODONE) 5 MG immediate release tablet Take 0.5 tablets (2.5 mg total) by mouth every 6 (six) hours as needed for pain.      polyvinyl alcohol (LIQUIFILM TEARS) 1.4 % ophthalmic solution Administer 1 drop to both eyes as needed for dry eyes.      senna-docusate (SENNOSIDES-DOCUSATE SODIUM) 8.6-50 mg tablet Take 1 tablet by mouth at bedtime.       sevelamer carbonate (RENVELA) 800 mg tablet Take 1 tablet (800 mg total) by mouth 2  (two) times a day as needed (FOr snacks.). (Patient taking differently: Take 800 mg by mouth 2 (two) times a day as needed (FOr snacks.). )      sevelamer HCL (RENAGEL) 800 MG tablet Take 1,600 mg by mouth 3 (three) times a day with meals.  11/2/2020: For the past week, she was instructed to take 2 tablets instead of the usual 3 tablets to see if that is enough     timolol maleate (TIMOPTIC) 0.5 % ophthalmic solution Administer 1 drop to both eyes 2 (two) times a day.       traZODone (DESYREL) 150 MG tablet Take 150 mg by mouth at bedtime.         Allergies   Allergen Reactions     Ace Inhibitors Cough     Atrovent [Ipratropium Bromide] Headache     Fosinopril Sodium Cough     Zolpidem      hallucinations         Review of Systems  No fevers or chills. No headache, lightheadedness or dizziness.  Chronic SOB, patient wears oxygen at 2 to 3 L.  Was only wearing oxygen at 2 L at night previously, no chest pains or palpitations. Appetite is good. No nausea, vomiting, constipation or diarrhea. No dysuria, frequency, burning or pain with urination. + weakness. + chronic back pain. Continues on oxycodone. Otherwise review of systems are negative.     Physical Exam  PHYSICAL EXAMINATION:  Vital signs: BP 90/60, pulse 70, respirations 16, temperature 97.5,, O2 sats 99% on 2L.  Weight 158 pounds.  General: Awake, sitting up in wheelchair, alert, oriented x3, appropriately, follows simple commands, conversant  HEENT: Pink conjunctiva, anicteric sclerae, moist oral mucosa.  NECK: Supple, no lymphadenopathy no masses.  CVS: Dialysis catheter in the right chest wall.  Regular rate and rhythm on exam.  LUNG: Slight crackles in the lower lobes.  No cough.  O2 on at 1 L nasal cannula.  EXTREMITIES: Moves both upper and lower extremities with generalized weakness.  3-4+ edema in the right foot.  No redness to the right lower extremity.  She does have bruising to the forefoot and ankle.  PSYCHIATRIC: Cognition intact.       Labs:     Lab Results   Component Value Date    WBC 4.4 11/23/2020    HGB 8.1 (L) 11/23/2020    HCT 26.5 (L) 11/23/2020     (H) 11/23/2020    PLT 75 (L) 11/23/2020     Results for orders placed or performed in visit on 11/23/20   Basic Metabolic Panel   Result Value Ref Range    Sodium 132 (L) 136 - 145 mmol/L    Potassium 6.7 (HH) 3.5 - 5.0 mmol/L    Chloride 105 98 - 107 mmol/L    CO2 17 (L) 22 - 31 mmol/L    Anion Gap, Calculation 10 5 - 18 mmol/L    Glucose 76 70 - 125 mg/dL    Calcium 8.7 8.5 - 10.5 mg/dL    BUN 52 (H) 8 - 28 mg/dL    Creatinine 6.83 (HH) 0.60 - 1.10 mg/dL    GFR MDRD Af Amer 7 (L) >60 mL/min/1.73m2    GFR MDRD Non Af Amer 6 (L) >60 mL/min/1.73m2           Assessment/Plan:    1.    Right lower extremity edema  Doppler obtained to rule out DVT.  This was negative.  Xray of ankle and tibia negative.   Encourage elevation and ice.  Patient continues on dialysis for fluid management.   2. Acute on chronic diastolic congestive heart failure (H)   fluids managed per dialysis.  Weights up about 6 pounds.   2. Pulmonary emphysema, unspecified emphysema type (H)   currently on O2 at 1 L during the day.  Continues on 3 L at night. Continues on home nebs.  Continues on prednisone taper.   3. ESRD on dialysis (H)   continues 3 times weekly.     4.  Hyperkalemia  potassium 5.3 due to end-stage renal disease.  Managed per nephrology.   5. Severe low back pain   continues on oxycodone, gabapentin and Tylenol.  Wean off oxycodone at home.    6. Neuropathic pain   continues on gabapentin.   7. Paroxysmal atrial fibrillation (H)   currently on metoprolol and aspirin.     Okay to DC home with current meds and treatments including 1 weeks worth of oxycodone.  She should wean off this at home.  Follow-up with primary care provider in 1 week.  Follow-up with nephrology outpatient.  This will be a barrier to discharge given her chronic kidney disease and multiple hospitalizations.  Okay for home PT, OT, home health  aide and nurse for medication administration and monitoring of fluid.    DISCHARGE PLAN/FACE TO FACE:  I certify that this patient is under my care and that I, or a nurse practitioner or physician's assistant working with me, had a face-to-face encounter that meets the physician face-to-face encounter requirements with this patient.       I certify that, based on my findings, the following services are medically necessary home health services.    My clinical findings support the need for the above skilled services.    This patient is homebound because: End-stage renal disease on dialysis 3 times weekly with recent acute respiratory failure secondary to fluid overload.    The patient is, or has been, under my care and I have initiated the establishment of the plan of care. This patient will be followed by a physician who will periodically review the plan of care.      This note has been dictated using voice recognition software. Any grammatical or context distortions are unintentional and inherent to the software    Electronically signed by: June Kingston CNP

## 2021-06-13 NOTE — PROGRESS NOTES
[facility] Discharge Summary  Patient Name: Belia Rodriguez  Date: 9/27/2017  Referral Diagnosis: right foot drop, leg weakness, bilateral   Referring provider: Raysa Kitchen C*  Visit Diagnosis:   1. Acute on chronic diastolic congestive heart failure     2. Difficulty balancing     3. Lumbar radiculitis     4. Muscle weakness (generalized)     5. Lumbar stenosis with neurogenic claudication     6. Lumbar spinal stenosis     7. Acute diastolic CHF (congestive heart failure)     8. Sinus bradycardia     9. Paroxysmal atrial fibrillation     10. Chronic thoracic aortic dissection     11. ESRD (end stage renal disease)     12. Aortic dissection     13. Aortic aneurysm rupture     14. Tachy-amanda syndrome         Goals:  Pt. will be independent with home exercise program in : 6 weeks  Pt. will improve gait speed : > 1.0m/s;for decreased risk of falls;in 6 weeks  Patient will ascend / descend: stairs;with railing;without assistive device;with less difficulty;in 6 weeks  Patient will increase : Lopez score;for improved quality of function;in 6 weeks;by _ points  by ___ points: 10   Patient will decrease : TUG score;for improved quality of function;in 6 weeks    Patient was seen for 3 visits from 5/24/17 to 6/2/16 with 2 missed appointments.  Patient following the last session here ended in the hospital for an unrelated medical concern, she followed with home care and her primary MDs per chart review. she has not returned to outpatient therapy and will now be dsicharged from therapy with a change in medical status. see last not for last objective measures taken.     Therapy will be discontinued at this time.  The patient will need a new referral to resume.    Thank you for your referral.  Elza Elizabeth  9/27/2017  7:49 AM

## 2021-06-13 NOTE — PROGRESS NOTES
"Code Status:  DNR  Visit Type: Problem Visit (RLE edema )     Facility:  [facility] [[id number]]        \"This video visit will be conducted via a call between you and your physician/provider. We have found that certain health care needs can be provided without the need for an in-person physical exam.  This service lets us provide the care you need with a video conversation.  If a prescription is necessary we can send it to the facility team.  If lab work is needed we can place an order through the facility team to have that test done at a later time.     If during the course of the call the physician/provider feels a video visit is not appropriate, you will not be charged for this service.\"      Physician/provider has received verbal consent for a Video Visit from the patient? Yes         Video Start Time: 3:00 pm               History of Present Illness: Belia Rodriguez is a 75 y.o. female  who I am seeing today at the request of the nursing staff regarding increased swelling to RLE.  Past medical history includes end-stage renal disease on dialysis 3 times weekly, acute hypoxic respiratory failure secondary to CHF as well as COPD.  Patient recently hospitalized on 11/2/2020 secondary to acute on chronic respiratory failure secondary to acute COPD and acute diastolic heart failure.  Patient with underlying COPD.  She is dependent on oxygen at 2 to 3 L.  She continues on home nebs.  She was treated with prednisone.  She also completed a 5-day course of ceftriaxone/azithromycin.  Fluid overload improved with daily dialysis.  She has resumed her 3 times weekly schedule.  Hyperkalemia improved with dialysis.  Leg swelling.  Ultrasound negative.  Obstructive sleep apnea on CPAP.  Patient with a history of neuropathy in the lower extremities.  History of atrial fib.  She does have a watchman device in place.  Chronic anemia secondary to end-stage renal disease.  Hypotension on dialysis days.  She " continues on Midodrine.  Patient was seen by hospice during her previous hospitalization on 10/5/2020 however this was declined.    Today patient sitting up in wheelchair. Nurse present on exam. Earlier this am I was phoned in regards to increased swelling, pain and redness in the RLE. Pt with hx of ESRD on dialysis 3/weekly. VS doppler ordered earlier today prior to visit. I am awaiting results. Upon exam she has 3-4+ edema of the right foot. Nurse tells me she is unable to feel a pedal or post tibial pulse. She can feel a popliteal pulse.  Patient did have a venous Doppler on 11/3 during hospitalization which was negative for DVT.  She had a arterial Doppler on 10/15/2020 which showed some mild atherosclerotic irregularity but no significant stenotic or occlusive changes. Two-vessel runoff at the ankle noted.  She does complain of pain on today's visit.  I did have the nurses carri the area on today's visit however with looking at her leg it appears more bruised down around the ankle and along the tibia shaft.  She initially denied any injury.  She did report that she hit her leg on her wheelchair during a transfer, with further questioning.  She currently is afebrile.  She does have underlying COPD and end-stage heart disease dependent on oxygen.  Shortness of breath continues with exertion.  Blood pressure continues on the soft side.  She is receiving midodrine.  Patient with chronic back pain well controlled with oxycodone.  She continues with some hyperkalemia.  Yesterday received initially a potassium of 6.7.  I am not sure who ordered this because she receives labs at dialysis.  She did undergo stat redraw and potassium was 5.3.  Her nephrologist was updated.  No new orders.  Creatinine about 6.83.        Active Ambulatory Problems     Diagnosis Date Noted     Tachycardia-bradycardia syndrome (H) 06/12/2014     Hyperkalemia 06/30/2014     Essential hypertension with goal blood pressure less than 140/90  06/30/2014     Hyperlipidemia 06/30/2014     Acute respiratory failure with hypoxia (H) 06/30/2014     ZULEIKA (obstructive sleep apnea), severe 10/22/2015     Anemia of chronic renal failure, stage 5 (H)      Dissection of thoracoabdominal aorta (H)      Gout      GERD (gastroesophageal reflux disease) 04/14/2017     Right foot drop 05/16/2017     Acute midline low back pain with right-sided sciatica 06/16/2017     History of compression fracture of spine 06/16/2017     Pulmonary emphysema (H) 02/19/2018     Presence of Watchman left atrial appendage closure device 04/05/2018     Other dysphagia 08/10/2018     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     Hyperparathyroidism (H) 03/24/2009     Osteoporosis 03/24/2009     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     ESRD on hemodialysis (H) 08/15/2018     Acute pulmonary edema (H) 09/07/2018     Chronic atrial fibrillation (H)      COPD exacerbation (H) 01/04/2019     Acute on chronic diastolic congestive heart failure (H) 01/23/2019     Thrombocytopenia (H)      Contraindication to anticoagulation therapy 03/26/2019     Dyspnea 05/18/2019     Cardiac pacemaker in situ 03/20/2019     S/P AV (atrioventricular) jonn ablation 03/20/2019     Hip fracture, intertrochanteric (H) 06/23/2019     DVT (deep venous thrombosis) (H) 08/22/2019     Carpal tunnel syndrome of left wrist 02/04/2020     SOB (shortness of breath) 07/13/2020     Hypotension, unspecified hypotension type      Major depressive disorder, remission status unspecified, unspecified whether recurrent 09/15/2020     Hypoxia 10/05/2020     Encounter for palliative care      Resolved Ambulatory Problems     Diagnosis Date Noted     Hypotension 06/01/2014     Fever 06/07/2014     Aortic aneurysm rupture (H)      Bacteremia due to Escherichia coli 06/08/2014     Severe tobacco use disorder 06/30/2014     Persistent atrial fibrillation (H) 06/30/2014     Bradycardia, sinus 06/30/2014      Volume overload 06/30/2014     Hypomagnesemia 06/30/2014     Cellulitis 08/25/2014     Hematochezia 11/02/2014     BRBPR (bright red blood per rectum) 11/02/2014     Hematuria 04/01/2015     Aortic dissection (H) 04/01/2015     Pneumonia 09/07/2015     Hypoxia 09/23/2015     SOB (shortness of breath) 09/23/2015     Acute on chronic diastolic heart failure (H) 09/27/2015     Bacteremia 09/27/2015     Dyspnea 10/07/2015     Fluid overload 10/07/2015     Chronic thoracic aortic dissection (H) 10/07/2015     L3 vertebral fracture (H) 11/16/2015     GI bleeding 03/20/2016     Chronic systolic congestive heart failure (H)      Acute lower GI bleeding 03/21/2016     Bilateral anterior& Lt Occipital embolic Infarction 03/24/2016     Spinal stenosis 03/28/2016     Back pain 03/28/2016     Tremor 03/28/2016     SOB (shortness of breath) 05/03/2016     CVA (cerebral infarction) 05/04/2016     Respiratory distress 05/09/2016     CHF (congestive heart failure) (H) 05/09/2016     Right knee pain      Dyspnea 10/02/2016     Volume overload 10/02/2016     Acute bronchitis due to infection 10/02/2016     Acute bacterial conjunctivitis of both eyes 10/02/2016     Acute CHF (congestive heart failure) (H) 10/09/2016     Hypervolemia, unspecified hypervolemia type      Pure hypercholesterolemia      Gastroesophageal reflux disease without esophagitis      Leg weakness 04/20/2017     Anticoagulated on warfarin 05/16/2017     Gastrointestinal hemorrhage, unspecified gastrointestinal hemorrhage type 06/05/2017     Anticoagulant long-term use 06/16/2017     Acute GI bleeding 12/09/2017     SOB (shortness of breath) 01/26/2018     Acute bronchitis with bronchospasm      Acute respiratory failure with hypoxia and hypercapnia (H)      Acute on chronic respiratory failure with hypoxia (H)      History of acute respiratory failure 02/19/2018     Chronic respiratory failure with hypoxia (H) 03/02/2018     Dyspnea 08/09/2018     Chest pain  08/10/2018     Hemodialysis catheter infection (H)      Hyperkalemia 10/18/2018     Atrial fibrillation with RVR (H) 03/03/2019     Intertrochanteric fracture of left hip, closed, initial encounter (H) 06/23/2019     Closed intertrochanteric fracture of hip, left, initial encounter (H) 06/22/2019     Past Medical History:   Diagnosis Date     Arthritis      Chronic anemia 6/1/2014     Chronic kidney disease      COPD (chronic obstructive pulmonary disease) (H)      CVA (cerebral infarction)      Disease of thyroid gland      Dyslipidemia      ESRD (end stage renal disease) (H) 06/03/2009     Essential hypertension 6/30/2014     GI (gastrointestinal bleed)      Left Atrial Appendage Occlusion (WATCHMAN) 4/5/2018     Obesity      Oxygen dependent        Current Outpatient Medications   Medication Sig Note     acetaminophen (TYLENOL) 500 MG tablet Take 2 tablets (1,000 mg total) by mouth every 6 (six) hours as needed.      albuterol (PROAIR HFA;PROVENTIL HFA;VENTOLIN HFA) 90 mcg/actuation inhaler Inhale 2 puffs 4 (four) times a day as needed for wheezing or shortness of breath.      aspirin 81 MG EC tablet Take 1 tablet (81 mg total) by mouth daily. (Patient taking differently: Take 81 mg by mouth daily. )      B complex 11-folic-C-biot-zinc (DIALYVITE) [phone] mg-mg-mcg-mg Tab Take 1 tablet by mouth daily with supper. (Dialyvite)       buPROPion (WELLBUTRIN XL) 150 MG 24 hr tablet Take 1 tablet (150 mg total) by mouth 2 (two) times a week. Tuesday and saturday (Patient taking differently: Take 150 mg by mouth 2 (two) times a week. Tuesday and saturday)      cholecalciferol, vitamin D3, 1,000 unit (25 mcg) tablet Take 2,000 Units by mouth daily.       cinacalcet (SENSIPAR) 30 MG tablet Take 30 mg by mouth see administration instructions. Take three times weekly with dialysis (on Mondays, Wednesdays, and Fridays).      famotidine (PEPCID) 20 MG tablet Take 1 tablet (20 mg total) by mouth at bedtime.      folic acid  (FOLVITE) 1 MG tablet TAKE ONE TABLET BY MOUTH ONCE DAILY (Patient taking differently: No sig reported)      gabapentin (NEURONTIN) 100 MG capsule TAKE 1 CAPSULE BY MOUTH THREE TIMES DAILY (Patient taking differently: No sig reported)      Lactobacillus rhamnosus GG (CULTURELLE) 10-15 Billion cell capsule Take 1 capsule by mouth daily with lunch.       metoprolol succinate (TOPROL-XL) 25 MG Take 1 tablet (25 mg total) by mouth daily with lunch. Hold for SBP<110 or hr<60      midodrine (PROAMATINE) 10 MG tablet Take 10-20 mg by mouth 3 (three) times a week. On dialysis days. Hold for SBP>105  Patient reports that she usually gets 10 mg before dialysis and another 10 mg correction through dialysis as well       oxyCODONE (ROXICODONE) 5 MG immediate release tablet Take 0.5 tablets (2.5 mg total) by mouth every 6 (six) hours as needed for pain.      polyvinyl alcohol (LIQUIFILM TEARS) 1.4 % ophthalmic solution Administer 1 drop to both eyes as needed for dry eyes.      senna-docusate (SENNOSIDES-DOCUSATE SODIUM) 8.6-50 mg tablet Take 1 tablet by mouth at bedtime.       sevelamer carbonate (RENVELA) 800 mg tablet Take 1 tablet (800 mg total) by mouth 2 (two) times a day as needed (FOr snacks.). (Patient taking differently: Take 800 mg by mouth 2 (two) times a day as needed (FOr snacks.). )      sevelamer HCL (RENAGEL) 800 MG tablet Take 1,600 mg by mouth 3 (three) times a day with meals.  11/2/2020: For the past week, she was instructed to take 2 tablets instead of the usual 3 tablets to see if that is enough     timolol maleate (TIMOPTIC) 0.5 % ophthalmic solution Administer 1 drop to both eyes 2 (two) times a day.       traZODone (DESYREL) 150 MG tablet Take 150 mg by mouth at bedtime.         Allergies   Allergen Reactions     Ace Inhibitors Cough     Atrovent [Ipratropium Bromide] Headache     Fosinopril Sodium Cough     Zolpidem      hallucinations         Review of Systems  No fevers or chills. No headache,  lightheadedness or dizziness.  Chronic SOB, patient wears oxygen at 2 to 3 L.  Was only wearing oxygen at 2 L at night previously, no chest pains or palpitations. Appetite is good. No nausea, vomiting, constipation or diarrhea. No dysuria, frequency, burning or pain with urination. + weakness. + chronic back pain. Continues on oxycodone. Otherwise review of systems are negative.     Physical Exam  PHYSICAL EXAMINATION:  Vital signs: /65, pulse 71, respirations 17, temperature 97.3, O2 sats 96% on 2L.  Weight 158 pounds.  General: Awake, sitting up in wheelchair, alert, oriented x3, appropriately, follows simple commands, conversant  HEENT: Pink conjunctiva, anicteric sclerae  NECK: Supple  CVS: Dialysis catheter in the right chest wall.  LUNG: O2 on at 1 L nasal cannula.  EXTREMITIES: Moves both upper and lower extremities with generalized weakness.  3-4+ edema in the right foot.  Forefoot is red however she has bruising down around the ankle and up along the tibial shaft.  PSYCHIATRIC: Cognition intact.       Labs:    Lab Results   Component Value Date    WBC 4.4 11/23/2020    HGB 8.1 (L) 11/23/2020    HCT 26.5 (L) 11/23/2020     (H) 11/23/2020    PLT 75 (L) 11/23/2020     Results for orders placed or performed in visit on 11/23/20   Basic Metabolic Panel   Result Value Ref Range    Sodium 132 (L) 136 - 145 mmol/L    Potassium 6.7 (HH) 3.5 - 5.0 mmol/L    Chloride 105 98 - 107 mmol/L    CO2 17 (L) 22 - 31 mmol/L    Anion Gap, Calculation 10 5 - 18 mmol/L    Glucose 76 70 - 125 mg/dL    Calcium 8.7 8.5 - 10.5 mg/dL    BUN 52 (H) 8 - 28 mg/dL    Creatinine 6.83 (HH) 0.60 - 1.10 mg/dL    GFR MDRD Af Amer 7 (L) >60 mL/min/1.73m2    GFR MDRD Non Af Amer 6 (L) >60 mL/min/1.73m2           Assessment/Plan:    1.    Right lower extremity edema  Doppler obtained to rule out DVT.  This was negative.  Obtain x-ray of ankle and tibia.  More bruising than redness present.  Encourage elevation and ice.  Patient  continues on dialysis for fluid management.   2. Acute on chronic diastolic congestive heart failure (H)   fluids managed per dialysis.  Weights up about 6 pounds.   2. Pulmonary emphysema, unspecified emphysema type (H)   currently on O2 at 1 L during the day.  Continues on 3 L at night.  Will attempt to wean off oxygen during the day.  She was only wearing this at night.  Continues on home nebs.  Continues on prednisone taper.   3. ESRD on dialysis (H)   continues 3 times weekly.     4.  Hyperkalemia  potassium 5.3 due to end-stage renal disease.  Managed per nephrology.   5. Severe low back pain   continues on oxycodone, gabapentin and Tylenol.   6. Neuropathic pain   continues on gabapentin.   7. Paroxysmal atrial fibrillation (H)   currently on metoprolol and aspirin.       Video-Visit Details     Type of service:  Video Visit     Video End Time: 3:15 pm     Originating Location (pt. Location):[facility] [[id number]]     Distant Location (provider location):  [facility]      Mode of Communication:  Face time via I phone      This note has been dictated using voice recognition software. Any grammatical or context distortions are unintentional and inherent to the software    Electronically signed by: June Kingston, CNP

## 2021-06-13 NOTE — TELEPHONE ENCOUNTER
Please call patient and assist with INF with [facility] PCP, patient is on dialysis Mondays, Wednesdays and Fridays from 9:00 AM and gets home by 2:00 PM.    No dimidiate helps or resources needed, patient declined to enroll with [facility] and no further outreach is needed.

## 2021-06-13 NOTE — PROGRESS NOTES
Admission History & Physical  Belia Rodriguez, 1945, [id number]    [facility]  Raysa Kitchen, CNP, [phone]    Extended Emergency Contact Information  Primary Emergency Contact: Cayden Rodriguez   [facility]  Home Phone: [phone]  Mobile Phone: [phone]  Relation: Spouse     Assessment and Plan:   Assessment: Onychomycosis, onychauxis  Plan: Debrided nails 1 through 5 both feet  Active Problems:    * No active hospital problems. *      Chief Complaint:  Long thick discolored nails both feet     HPI:    Belia Rodriguez is a 72 y.o. old female who presented to the clinic today complaining of very long and very thick nails both feet.  The patient indicated she is unable to treat her nails.  She has not had her nails treated for several months.  The nails can be irritated by her shoes.  She has not had any redness, swelling, drainage or bleeding surrounding her nails.  She denies any other previous treatment.  History is provided by patient    Medical History  Active Ambulatory (Non-Hospital) Problems    Diagnosis     Anticoagulant long-term use     Acute midline low back pain with right-sided sciatica     History of compression fracture of spine     GI bleeding     Gastrointestinal hemorrhage with hematemesis     Right foot drop     Leg weakness, bilateral     Anticoagulated on warfarin     Leg weakness     Essential hypertension     GERD (gastroesophageal reflux disease)     CHF (congestive heart failure)     Fall, initial encounter     Pure hypercholesterolemia     Gastroesophageal reflux disease without esophagitis     Gout     Acute CHF (congestive heart failure)     Acute on chronic diastolic congestive heart failure     Hypervolemia, unspecified hypervolemia type     Dissection of thoracoabdominal aorta     Dyspnea     Volume overload     Acute bronchitis due to infection     Acute bacterial conjunctivitis of both eyes     Right knee pain     Anemia of chronic renal  failure, stage 5     ZULEIKA (obstructive sleep apnea), severe, intolerant of CPAP     Bacteremia     Essential hypertension with goal blood pressure less than 140/90     Hyperlipidemia     Acute respiratory failure with hypoxia     Persistent atrial fibrillation     Tachycardia-bradycardia syndrome     Chronic obstructive pulmonary disease, unspecified COPD type     ESRD (end stage renal disease)     Chronic anemia     Past Medical History:   Diagnosis Date     Arthritis      CHF (congestive heart failure)      Chronic anemia 6/1/2014     Chronic kidney disease      Chronic thoracic aortic dissection 10/7/2015     COPD (chronic obstructive pulmonary disease)      CVA (cerebral infarction)      Disease of thyroid gland      Dyslipidemia      ESRD (end stage renal disease) 06/03/2009     Essential hypertension 6/30/2014     GI (gastrointestinal bleed)      Gout      L3 vertebral fracture 11/16/2015     Obesity      ZULEIKA (obstructive sleep apnea), severe, intolerant of CPAP 10/22/2015     Pneumonia 9-7-2015     Right foot drop      Spinal stenosis 3/28/2016     Stroke 3/24/2016     Patient Active Problem List    Diagnosis Date Noted     Anticoagulant long-term use 06/16/2017     Acute midline low back pain with right-sided sciatica 06/16/2017     History of compression fracture of spine 06/16/2017     GI bleeding 06/05/2017     Gastrointestinal hemorrhage with hematemesis      Right foot drop 05/16/2017     Leg weakness, bilateral 05/16/2017     Anticoagulated on warfarin 05/16/2017     Leg weakness 04/20/2017     Essential hypertension 04/20/2017     GERD (gastroesophageal reflux disease) 04/14/2017     CHF (congestive heart failure) 04/09/2017     Fall, initial encounter 04/09/2017     Pure hypercholesterolemia      Gastroesophageal reflux disease without esophagitis      Gout      Acute CHF (congestive heart failure) 10/09/2016     Acute on chronic diastolic congestive heart failure      Hypervolemia, unspecified  hypervolemia type      Dissection of thoracoabdominal aorta      Dyspnea 10/02/2016     Volume overload 10/02/2016     Acute bronchitis due to infection 10/02/2016     Acute bacterial conjunctivitis of both eyes 10/02/2016     Right knee pain      Anemia of chronic renal failure, stage 5      ZULEIKA (obstructive sleep apnea), severe, intolerant of CPAP 10/22/2015     Bacteremia 09/27/2015     Essential hypertension with goal blood pressure less than 140/90 06/30/2014     Hyperlipidemia 06/30/2014     Acute respiratory failure with hypoxia 06/30/2014     Persistent atrial fibrillation 06/30/2014     Tachycardia-bradycardia syndrome 06/12/2014     Chronic obstructive pulmonary disease, unspecified COPD type 06/01/2014     ESRD (end stage renal disease) 06/01/2014     Chronic anemia 06/01/2014     Surgical History  She  has a past surgical history that includes Tonsillectomy; Back surgery; Eye surgery; Hernia repair; Colonoscopy (N/A, 3/23/2016); Dilation and curettage of uterus; and colsc flexible w/control bleeding any method (N/A, 6/7/2017).   Past Surgical History:   Procedure Laterality Date     BACK SURGERY      [facility]     COLONOSCOPY N/A 3/23/2016    Procedure: COLONOSCOPY;  Surgeon: Ruddy Tejada MD;  Location: [facility];  Service:      DILATION AND CURETTAGE OF UTERUS       EYE SURGERY       HERNIA REPAIR       [place] COLSC FLEXIBLE W/CONTROL BLEEDING ANY METHOD N/A 6/7/2017    Procedure: COLONOSCOPY;  Surgeon: Luis Mckeon MD;  Location: [facility];  Service: Gastroenterology     TONSILLECTOMY      Social History  Reviewed, and she  reports that she quit smoking about 8 years ago. Her smoking use included Cigarettes. She has a 55.50 pack-year smoking history. She has never used smokeless tobacco. She reports that she drinks about 7.0 oz of alcohol per week  She reports that she does not use illicit drugs.  Social History   Substance Use Topics     Smoking status: Former Smoker     Packs/day:  1.50     Years: 37.00     Types: Cigarettes     Quit date: 1/1/2009     Smokeless tobacco: Never Used     Alcohol use 7.0 oz/week     14 Standard drinks or equivalent per week      Comment: 14 mixed drinks per week      Allergies  Allergies   Allergen Reactions     Ace Inhibitors Cough     Fosinopril Sodium Cough    Family History  Reviewed, and family history includes Arthritis in her mother; Cancer in her mother; Dementia in her mother; Depression in her mother; Diabetes in her mother; Heart disease in her father and mother; Stroke in her father; Vision loss in her mother.   Psychosocial Needs  Social History     Social History Narrative    Lives with her . Daughter in Home and daughter in [place].     Additional psychosocial needs reviewed per nursing assessment.       Prior to Admission Medications     (Not in a hospital admission)        Review of Systems - Negative     /60  Pulse 76  Resp 16  Wt 187 lb (84.8 kg)  SpO2 91%  BMI 30.18 kg/m2    Objective findings: Gen.: The patient is alert and in no acute distress      Integument: Nails bilateral feet are severely elongated and 2-3 times normal thickness.  The nails are also severely discolored.      Vascular: DP and PT pulses +1/4 bilaterally Capillary refill less than 2 seconds bilateral feet.      Neurologic: Negative clonus, negative Babinski bilateral feet.        Musculoskeletal: Range of motion within normal limits. Muscle power + 4 over 5 within all compartments.       Assessment: Onychomycosis, onychauxis         Plan: Debridement nails 1 through 5 both feet today.  I recommended the patient return to the clinic every 3 months for continued foot care

## 2021-06-13 NOTE — PROGRESS NOTES
[facility] Patient Outreach:     Discharge Date::  11/28/20      Reason for TCU stay (discharge diagnosis)::  Acute on chronic respiratory failure secondary to COPD and CHF; ESRD      Are you feeling better, the same or worse since your discharge?:  Patient is feeling better          As part of your discharge plan, did they discuss home care with you?: Yes        Have your seen them yet, or are they scheduled to visit?: Yes                Do you have any follow up visits scheduled with your PCP or Specialist?:  No          I'm glad to hear you're doing well and we want you to continue to do well. Your PCP would like to see you for a follow-up visit. Can we help set that up for your today?: No        (RN) Provided patient the PCP's phone number to call if they have any questions or concerns?: No (Patient knows the number and will f/u with her primary on her own.  )

## 2021-06-13 NOTE — PROGRESS NOTES
Internal Medicine Office Visit  [facility]   Patient Name: Belia Rodriguez  Patient Age: 75 y.o.  YOB: 1945  MRN: [id number]    Date of Visit: 2020  Reason for Office Visit:   Chief Complaint   Patient presents with     Hospital Visit Follow Up       Assessment / Plan / Medical Decision Makin. Hospital discharge follow-up  2. Mucopurulent chronic bronchitis (H)  - inpatient records reviewed  - nebulizer device and solution prescribed today to use PRN for COPD   - She will keep prednisone on hand in the event of exacerbation, she is advised to call the clinic if she is in COPD exacerbation and starts prednisone  - Follow up with PCP for a face-to-face visit in the next 4 weeks     3. PVD (peripheral vascular disease) (H)  4. Pain  - arterial ultrasound reviewed from October, no occlusive changes. She will follow up with PCP regarding this. Could consider vascular consult.   - continue gabapentin  - oxyCODONE (ROXICODONE) 5 MG immediate release tablet; Take 0.5 tablets (2.5 mg total) by mouth every 6 (six) hours as needed for pain.  Dispense: 10 tablet; Refill: 0    5. ESRD on hemodialysis (H)  - continue       Health Maintenance Review  Health Maintenance   Topic Date Due     DEPRESSION ACTION PLAN  1945     HEPATITIS C SCREENING  1945     HEART FAILURE ACTION PLAN  1945     COPD ACTION PLAN  1945     DEXA SCAN  1945     ZOSTER VACCINES (2 of 3) 2009     MEDICARE ANNUAL WELLNESS VISIT  08/10/2012     FALL RISK ASSESSMENT  2020     LIPID  2020     BMP  06/10/2021     ALT  2021     CBC  2021     MAMMOGRAM  2021     ADVANCE CARE PLANNING  10/05/2025     COLORECTAL CANCER SCREENING  2027     TD 18+ HE  10/30/2027     TSH  Completed     SPIROMETRY  Completed     Pneumococcal Vaccine: Pediatrics (0 to 5 Years) and At-Risk Patients (6 to 64 Years)  Completed     Pneumococcal Vaccine: 65+ Years   Completed     INFLUENZA VACCINE RULE BASED  Completed     HEPATITIS B VACCINES  Aged Out         I am having Itzel Rodriguez start on albuterol and predniSONE. I am also having her maintain her timoloL maleate, Lactobacillus rhamnosus GG, senna-docusate, cinacalcet, folic acid, gabapentin, sevelamer HCL, cholecalciferol (vitamin D3), buPROPion, aspirin, Dialyvite, polyvinyl alcohol, midodrine, traZODone, acetaminophen, albuterol, metoprolol succinate, famotidine, sevelamer carbonate, and oxyCODONE.      HPI:  Belia Rodriguez is 75 y.o. year old female with a PMH of ESRD on HD, COPD on home oxygen 3L, CVA,  presents to the office today with her  for follow up of a recent hospitalization for a COPD exacerbation and fluid overload. She was started on prednisone and had an extra run of dialysis during her stay. She discharged on 12/11 (Friday). Today she is feeling a lot better in terms of breathing. She denies fever, chills, wheezing. She has a hard time using the albuterol inhaler, cannot figure out the timing with this device.     She has PVD and states that her feet feel like they are burning and painful. She takes 1/2 oxycodone as needed for pain which helps. She is doing wheelchair exercises that she was taught when in TCU recently.     ESRD reviewed, she continues dialysis on M,W,F.     Review of Systems:  Constitutional: Fever, chills, weight change  Respiratory: shortness of breath, cough, wheezing  Cardiovascular: chest pain, palpitations   Gastrointestinal: abdominal pain, heartburn/indigestion, nausea/vomiting, change in appetite, change in bowel habits, constipation or diarrhea, rectal bleeding/dark stools, difficulty swallowing  Urinary: anuric   Musculoskeletal: backache/back pain (new or increasing), weakness, joint pain/stiffness (new or increasing), muscle cramps, swelling of hands, feet, ankles, leg pain/redness  Hematologic/lymphatic: swollen lymph glands, abnormal  bruising/bleeding  Endocrine: excessive thirst/urination, cold or heat intolerance  Neurologic/emotional: worrisome memory change, numbness/tingling, anxiety, mood swings      Current Scheduled Meds:  Outpatient Encounter Medications as of 12/17/2020   Medication Sig Dispense Refill     acetaminophen (TYLENOL) 500 MG tablet Take 2 tablets (1,000 mg total) by mouth every 6 (six) hours as needed.  0     albuterol (PROAIR HFA;PROVENTIL HFA;VENTOLIN HFA) 90 mcg/actuation inhaler Inhale 2 puffs 4 (four) times a day as needed for wheezing or shortness of breath.  0     aspirin 81 MG EC tablet Take 1 tablet (81 mg total) by mouth daily. (Patient taking differently: Take 81 mg by mouth daily. )  0     B complex 11-folic-C-biot-zinc (DIALYVITE) [phone] mg-mg-mcg-mg Tab Take 1 tablet by mouth daily with supper. (Dialyvite)        buPROPion (WELLBUTRIN XL) 150 MG 24 hr tablet Take 1 tablet (150 mg total) by mouth 2 (two) times a week. Tuesday and saturday (Patient taking differently: Take 150 mg by mouth 2 (two) times a week. Tuesday and saturday)  0     cholecalciferol, vitamin D3, 1,000 unit (25 mcg) tablet Take 2,000 Units by mouth daily.        cinacalcet (SENSIPAR) 30 MG tablet Take 30 mg by mouth see administration instructions. Take three times weekly with dialysis (on Mondays, Wednesdays, and Fridays).       famotidine (PEPCID) 20 MG tablet Take 1 tablet (20 mg total) by mouth at bedtime.  0     folic acid (FOLVITE) 1 MG tablet TAKE ONE TABLET BY MOUTH ONCE DAILY (Patient taking differently: No sig reported) 90 tablet 4     gabapentin (NEURONTIN) 100 MG capsule TAKE 1 CAPSULE BY MOUTH THREE TIMES DAILY (Patient taking differently: No sig reported) 270 capsule 3     Lactobacillus rhamnosus GG (CULTURELLE) 10-15 Billion cell capsule Take 1 capsule by mouth daily with lunch.        metoprolol succinate (TOPROL-XL) 25 MG Take 1 tablet (25 mg total) by mouth daily with lunch. Hold for SBP<110 or hr<60  0     midodrine  (PROAMATINE) 10 MG tablet Take 10-20 mg by mouth 3 (three) times a week. On dialysis days. Hold for SBP>105  Patient reports that she takes 20mg prior to dialysis       oxyCODONE (ROXICODONE) 5 MG immediate release tablet Take 0.5 tablets (2.5 mg total) by mouth every 6 (six) hours as needed for pain. 10 tablet 0     polyvinyl alcohol (LIQUIFILM TEARS) 1.4 % ophthalmic solution Administer 1 drop to both eyes as needed for dry eyes.       senna-docusate (SENNOSIDES-DOCUSATE SODIUM) 8.6-50 mg tablet Take 1 tablet by mouth at bedtime as needed for constipation.        sevelamer carbonate (RENVELA) 800 mg tablet Take 1,600 mg by mouth 2 (two) times a day as needed (snacks).       sevelamer HCL (RENAGEL) 800 MG tablet Take 1,600 mg by mouth 3 (three) times a day with meals.        timolol maleate (TIMOPTIC) 0.5 % ophthalmic solution Administer 1 drop to both eyes 2 (two) times a day.        traZODone (DESYREL) 150 MG tablet Take 150 mg by mouth at bedtime.        [DISCONTINUED] oxyCODONE (ROXICODONE) 5 MG immediate release tablet Take 0.5 tablets (2.5 mg total) by mouth every 6 (six) hours as needed for pain. 8 tablet 0     albuterol (PROVENTIL) 2.5 mg /3 mL (0.083 %) nebulizer solution Take 3 mL (2.5 mg total) by nebulization every 4 (four) hours as needed for wheezing. 25 vial 2     predniSONE (DELTASONE) 20 MG tablet Take 40 mg by mouth daily. 10 tablet 0     No facility-administered encounter medications on file as of 12/17/2020.      Post Discharge Medication Reconciliation Status: discharge medications reconciled, continue medications without change    Past Medical History:   Diagnosis Date     Arthritis      CHF (congestive heart failure) (H)      Chronic anemia 6/1/2014     Chronic kidney disease      Chronic thoracic aortic dissection (H) 10/7/2015    Descending thoracic aorta; treated medically per notes of Dragan Singh and Jennifer.     COPD (chronic obstructive pulmonary disease) (H)      CVA (cerebral  infarction)      Disease of thyroid gland      Dyslipidemia      ESRD (end stage renal disease) (H) 06/03/2009    on dialysis with Dr. Mitchell     Essential hypertension 6/30/2014     Gastrointestinal hemorrhage, unspecified gastrointestinal hemorrhage type 6/5/2017     GI (gastrointestinal bleed)      GI bleeding 6/5/2017     Gout      L3 vertebral fracture (H) 11/16/2015     Left Atrial Appendage Occlusion (WATCHMAN) 4/5/2018    LAAO April 5, 2018 (30 mm WATCHMAN)     Obesity      ZULEIKA (obstructive sleep apnea), severe, intolerant of CPAP 10/22/2015     Oxygen dependent     3L nc     Pneumonia 9-7-2015     Right foot drop      Spinal stenosis 3/28/2016     Stroke (H) 3/24/2016     Past Surgical History:   Procedure Laterality Date     BACK SURGERY      [facility]     COLONOSCOPY N/A 3/23/2016    Procedure: COLONOSCOPY;  Surgeon: Ruddy Tejada MD;  Location: [facility] GI;  Service:      DILATION AND CURETTAGE OF UTERUS       EP ABLATION AV NODE N/A 3/7/2019    Procedure: EP Ablation AV Node;  Surgeon: Derick Duarte MD;  Location: [facility];  Service: Cardiology     EP NEGRA CLOSURE N/A 4/5/2018    Procedure: EP NEGRA Closure;  Surgeon: Derick Duarte MD;  Location: [facility] Lab;  Service:      EP PACEMAKER INSERT N/A 3/7/2019    Procedure: EP Pacemaker Insertion;  Surgeon: Derick Duarte MD;  Location: [facility] Lab;  Service: Cardiology     EYE SURGERY       HERNIA REPAIR       IR TUNNELED CATHETER COMPLETE REPLACEMENT  10/23/2020     IR TUNNELED CATHETER INSERT  11/20/2018     IR TUNNELED CATHETER REMOVAL  11/20/2018     FL COLSC FLEXIBLE W/CONTROL BLEEDING ANY METHOD N/A 6/7/2017    Procedure: COLONOSCOPY;  Surgeon: Luis Mckeon MD;  Location: [facility] GI;  Service: Gastroenterology     FL OPEN FIX INTER/SUBTROCH FX,IMPLNT Left 6/23/2019    Procedure: INTERNAL FIXATION, FRACTURE, TROCHANTERIC, HIP, USING INTERMEDULLARY NAIL;  Surgeon: Bennie Marsh DO;   Location: [facility];  Service: Orthopedics     TONSILLECTOMY       Social History     Tobacco Use     Smoking status: Former Smoker     Packs/day: 1.50     Years: 37.00     Pack years: 55.50     Types: Cigarettes     Quit date: 2009     Years since quittin.9     Smokeless tobacco: Never Used   Substance Use Topics     Alcohol use: No     Alcohol/week: 11.7 standard drinks     Types: 14 Standard drinks or equivalent per week     Comment: 14 mixed drinks per week     Drug use: No       Objective / Physical Examination:  /62   Pulse 70   Temp 98.4  F (36.9  C) (Tympanic)   SpO2 93%     General Appearance:    Alert, cooperative, no distress   Head:    Normocephalic, without obvious abnormality, atraumatic   Eyes:    PERRL, conjunctiva/corneas clear, EOM's intact, fundi     benign, both eyes   Lungs:     Clear to auscultation bilaterally, respirations unlabored   Chest Wall:    No tenderness or deformity    Heart:    Regular rate and rhythm   Abdomen:     Soft, non-tender, bowel sounds active all four quadrants,        Extremities:   Bilateral LE without hair growth, feet are violet in color bilaterally. Capillary refill 5 seconds. Right LE 2+ pitting edema, left 1+   Pulses:   2+ and symmetric all extremities   Skin:   Skin color, texture, turgor normal, no rashes or lesions   Lymph nodes:   Cervical, supraclavicular, and axillary nodes normal   Neurologic:   No dorsiflexion of the right LE, chronic for patient         No orders of the defined types were placed in this encounter.  Followup: Return in about 4 weeks (around 2021) for Recheck with PCP . earlier if needed.

## 2021-06-13 NOTE — PROGRESS NOTES
INR 1.8 at home will increase to 3 mg daily when speaking with pt. She has 3 mg at home. After talking with pt and discussing history of greens/salads and medication change. Pt will  continue  with current diet and dosing of Warfarin.  Continue with moderation of Vit K and green leafy vegetables. Cautioned to call with increase bruising or bleeding. Reminded to call with medication change especially antibiotic. Call with any questions or concerns or any up coming procedures. Cautioned about using Herbal medication.

## 2021-06-13 NOTE — PROGRESS NOTES
Assessment / Impression     1. Persistent atrial fibrillation  INR   2. ESRD on dialysis     3. Anticoagulant long-term use         Plan:     Because patient had previously been on Coumadin, we did recheck INR today, which has now gone back to 1.1.  Again, discussed with patient usual dose of Eliquis is 5 mg twice daily, however given patient's kidney disease, and the risk of bleeding, I discussed with her nephrologist, and we will begin Eliquis 2.5 mg daily.  I discussed with patient the risks of taking the medication, and also that because she is on such a low dose, this may not be adequate anticoagulation, however we could always consider increasing medication if needed.  Would also recommend she stay on her baby aspirin daily.  Discussed with patient signs and symptoms of bleeding, she does note anything abnormal, should be seen immediately for further evaluation.  Follow-up in 1-2 months.    Subjective:      HPI: Belia Rodriguez is a 72 y.o. female with history of persistent atrial fibrillation, hypertension, end-stage renal disease on dialysis,, here today with her  Cayden, who presents for medication management.  Please see my previous note for more details, however, briefly, patient self stopped her Coumadin, which she was taking for thromboembolism/stroke prophylaxis, approximately a month ago because she was insistent that Coumadin was causing her bilateral lower extremity swelling.  This was discussed with the cardiologist, who did not feel this was the case, however patient was insistent on discontinuing her Coumadin and put herself on Plavix and aspirin.  I discussed with patient last visit why this was not appropriate medication for anticoagulation, especially given her high CHADSVASC.  Since that visit, I did speak with her nephrologist as well as our [facility] pharmacist and agreed that we need to take into account patient's ESRD and risk of bleeding and could start patient on Eliquis.         Medical History:     Patient Active Problem List   Diagnosis     Chronic obstructive pulmonary disease, unspecified COPD type     ESRD (end stage renal disease)     Chronic anemia     Tachycardia-bradycardia syndrome     Essential hypertension with goal blood pressure less than 140/90     Hyperlipidemia     Persistent atrial fibrillation     Bacteremia     ZULEIKA (obstructive sleep apnea), severe, intolerant of CPAP     Anemia of chronic renal failure, stage 5     Right knee pain     Dyspnea     Volume overload     Acute bacterial conjunctivitis of both eyes     Hypervolemia, unspecified hypervolemia type     Dissection of thoracoabdominal aorta     CHF (congestive heart failure)     Fall, initial encounter     Pure hypercholesterolemia     Gastroesophageal reflux disease without esophagitis     Gout     GERD (gastroesophageal reflux disease)     Leg weakness     Essential hypertension     Right foot drop     Leg weakness, bilateral     GI bleeding     Gastrointestinal hemorrhage with hematemesis     Anticoagulant long-term use     Acute midline low back pain with right-sided sciatica     History of compression fracture of spine       Past Medical History:   Diagnosis Date     Arthritis      CHF (congestive heart failure)      Chronic anemia 6/1/2014     Chronic kidney disease      Chronic thoracic aortic dissection 10/7/2015    Descending thoracic aorta; treated medically per notes of Dragan Singh and Jennifer.     COPD (chronic obstructive pulmonary disease)      CVA (cerebral infarction)      Disease of thyroid gland      Dyslipidemia      ESRD (end stage renal disease) 06/03/2009    on dialysis with Dr. Mitchell     Essential hypertension 6/30/2014     GI (gastrointestinal bleed)      Gout      L3 vertebral fracture 11/16/2015     Obesity      ZULEIKA (obstructive sleep apnea), severe, intolerant of CPAP 10/22/2015     Pneumonia 9-7-2015     Right foot drop      Spinal stenosis 3/28/2016     Stroke 3/24/2016        Past Surgical History:   Procedure Laterality Date     BACK SURGERY      [facility]     COLONOSCOPY N/A 3/23/2016    Procedure: COLONOSCOPY;  Surgeon: Ruddy Tejada MD;  Location: [facility];  Service:      DILATION AND CURETTAGE OF UTERUS       EYE SURGERY       HERNIA REPAIR       LA COLSC FLEXIBLE W/CONTROL BLEEDING ANY METHOD N/A 6/7/2017    Procedure: COLONOSCOPY;  Surgeon: Luis Mckeon MD;  Location: [facility];  Service: Gastroenterology     TONSILLECTOMY         Current Medications:     Current Outpatient Prescriptions   Medication Sig     acetaminophen (TYLENOL) 500 MG tablet Take 500 mg by mouth every 6 (six) hours as needed for pain.     allopurinol (ZYLOPRIM) 100 MG tablet TAKE ONE TABLET BY MOUTH DAILY     atorvastatin (LIPITOR) 10 MG tablet TAKE 1 TABLET BY MOUTH DAILY     CHLORPHENIRAMINE/DEXTROMETHORP (CORICIDIN HBP COUGH AND COLD ORAL) Take by mouth as needed.     cholecalciferol, vitamin D3, 2,000 unit cap Take 2,000 Units by mouth daily with lunch.      digoxin (LANOXIN) 125 mcg tablet TAKE ONE TABLET BY MOUTH 3 TIMES A WEEK     diphenhydrAMINE (BENADRYL) 50 MG tablet Take 50 mg by mouth at bedtime as needed for sleep.      folic acid (FOLVITE) 1 MG tablet TAKE ONE TABLET BY MOUTH DAILY     gabapentin (NEURONTIN) 100 MG capsule TAKE 1 CAPSULE BY MOUTH THREE TIMES DAILY     Lactobacillus rhamnosus GG (CULTURELLE) 10-15 Billion cell capsule Take 1 capsule by mouth daily with lunch.     metoprolol tartrate (LOPRESSOR) 25 MG tablet Take 25 mg by mouth 2 (two) times a day.     midodrine (PROAMATINE) 5 MG tablet TAKE 1 TO 2 TABLETS ( 5-10 MG TOTAL) BY MOUTH 3 TIMES A WEEK ( MON, WED AND FRI) 30 MINUTES PRIOR TO DIALYSIS.     OXYGEN-AIR DELIVERY SYSTEMS MISC 2 L/min into each nostril daily as needed (SHORTNESS OF BREATH). Indications: shortness of breath     ranitidine (ZANTAC) 150 MG tablet Take 1 tablet (150 mg total) by mouth daily.     senna-docusate (SENNOSIDES-DOCUSATE  "SODIUM) 8.6-50 mg tablet Take 1 tablet by mouth at bedtime as needed for constipation.      sevelamer carbonate (RENVELA) 800 mg tablet Take 1,600 mg by mouth 3 (three) times a day with meals.     timolol maleate (TIMOPTIC) 0.5 % ophthalmic solution Administer 1 drop to both eyes 2 (two) times a day.      vit B comp no.3-folic-C-biotin (NEPHRO-OLGA) 1- mg-mg-mcg Tab tablet Take 1 tablet by mouth daily.     warfarin (COUMADIN) 2.5 MG tablet Take 5 mg M,W,F and 5 mg all other days of the week.     apixaban (ELIQUIS) 2.5 mg Tab tablet Take 1 tablet (2.5 mg total) by mouth daily.       Family History:     Family History   Problem Relation Age of Onset     Dementia Mother      Diabetes Mother      Arthritis Mother      Cancer Mother      Depression Mother      Heart disease Mother      Vision loss Mother      Stroke Father      Heart disease Father        Review of Systems  All other systems reviewed and are negative.         Social History:     History   Smoking Status     Former Smoker     Packs/day: 1.50     Years: 37.00     Types: Cigarettes     Quit date: 1/1/2009   Smokeless Tobacco     Never Used     Social History     Social History Narrative    Lives with her . Daughter in [place] and daughter in [place].         Objective:     /72 (Patient Site: Left Arm, Patient Position: Sitting, Cuff Size: Adult Regular)  Pulse 78  Resp 16  Ht 5' 6\" (1.676 m)  Wt 183 lb 6.4 oz (83.2 kg)  BMI 29.6 kg/m2  Physical Examination: General appearance - alert, well appearing, and in no distress  Eyes: pupils equal and reactive, extraocular eye movements intact  Mouth: mucous membranes moist, pharynx normal without lesions  Neck: supple, no significant adenopathy or thyromegaly  Lungs: distant breath sounds, no wheezing or crackles  Heart: irregularly irregular normal S1, S2, no murmurs.  Abdomen: soft, nontender, nondistended, no masses or organomegaly  Neurological: alert, oriented, normal speech, no focal " findings or movement disorder noted.    Extremities: 2+ b/l lower extremity edema.    Psychiatric: Normal affect. Does not appear anxious or depressed.    Recent Results (from the past 168 hour(s))   INR   Result Value Ref Range    INR 1.10 0.90 - 1.10         Amanda Arellano MD  11/2/2017  4:41 PM

## 2021-06-13 NOTE — TELEPHONE ENCOUNTER
Spoke with patient to schedule her for a hosp follow up. Patient is scheduled for Thursday with Demetra

## 2021-06-13 NOTE — PROGRESS NOTES
1945  [phone] (home)  [phone] (mobile)    +++Important patient information for CSC/Cath Lab staff : None+++    [facility] EP Cath Lab Procedure Order   Left Atrial Appendage Occlusion Device:  NEGRA Occlusion Device Implant    Diagnosis:  AF  Anticipated Case Duration:  Standard  Scheduling Needs/Timeframe:  Next Available    MD Preference: Dr Gerald PETERSON Assist:  Yes Specific MD:  N/A    Current Device: None None  Device Company/Device Rep Needed for Procedure: None    Pre-Procedural Testing needed: TERRANCE  ICE Needed:  Yes  Implanting device company: Research to determine and notify CV , if pt declines research [employer]  Non-Invasive Cardiologist: Whole Case  Anesthesia:  General-Whole Case  Research Protocol:  Research Notified    [facility] EP Cath Lab Prep   Ordering Provider: Dr Duarte  Ordering Date: 10/20/17  Orders Status: Intial order placed and Order set placed  EP NC Contact: Danielle Levine RN    H&P:  EP NP to complete within 30 days prior to scheduled implant  PCP: Raysa Kitchen CNP, [phone]    Pre-op Labs: Done within 7 days    Medical Records Pertinent for Procedure:  Holter 8/1/17 -afib, Echo 10/9/16 -EF 63%, EKG 7/5/17 -afib and N/A    Patient Education:    Teach with Patient: Teach with pt completed same day as pre-op H&P-see additional clinic note    Risks Reviewed:   Specific NEGRA occlusion (WATCHMAN) risks (< 2%):      - device embolization (device moves from intended location)      - air embolism (air bubbles in the blood stream)      - heart perforation (hole in the heart)      - pericardial effusion (fluid collection in the sack around the heart)      - device thrombosis (clot on the device)      - atrial septal defect (hole between upper chambers of the heart)      - stroke or TIA      - death    Risks associated with heart catheterization:      - groin bleeding/swelling      - AV fistula (hole between artery and vein)      - blood loss      - clot in the vein    TERRANCE  risks:      - throat pain, esophageal bleeding/trauma      Consent: Will be obtained in [facility] day of procedure    Pre-Procedure Instructions that were Reviewed with Patient:  NPO after midnight, Notified patient of time and date of procedure by CV , Transportation arrangements needed s/p procedure, Post-procedure follow up process, Sedation plan/orders and Pre-procedure letter was sent to pt by CV     Pre-Procedure Medication Instructions:  Instructions given to pt regarding anticoagulants: Coumadin- instructed to continue anticoagulation uninterrupted through their procedure  Instructions given to pt regarding antiarrhythmic medication: None; Pt instructed to continue medication prior to procedure  Instructions for medication, other than anticoagulants/antiarrhythmics listed above, given to pt: to take all morning medications with small sips of water, with the exception of OTC supplements and MVI      Allergies   Allergen Reactions     Ace Inhibitors Cough     Fosinopril Sodium Cough       Current Outpatient Prescriptions:      acetaminophen (TYLENOL) 500 MG tablet, Take 500 mg by mouth every 6 (six) hours as needed for pain., Disp: , Rfl:      allopurinol (ZYLOPRIM) 100 MG tablet, TAKE ONE TABLET BY MOUTH DAILY, Disp: 90 tablet, Rfl: 2     atorvastatin (LIPITOR) 10 MG tablet, TAKE 1 TABLET BY MOUTH DAILY, Disp: 90 tablet, Rfl: 2     CHLORPHENIRAMINE/DEXTROMETHORP (CORICIDIN HBP COUGH AND COLD ORAL), Take by mouth as needed., Disp: , Rfl:      cholecalciferol, vitamin D3, 2,000 unit cap, Take 2,000 Units by mouth daily with lunch. , Disp: , Rfl:      digoxin (LANOXIN) 125 mcg tablet, TAKE ONE TABLET BY MOUTH 3 TIMES A WEEK, Disp: 30 tablet, Rfl: 0     diphenhydrAMINE (BENADRYL) 50 MG tablet, Take 50 mg by mouth at bedtime as needed for sleep. , Disp: , Rfl:      folic acid (FOLVITE) 1 MG tablet, TAKE ONE TABLET BY MOUTH DAILY, Disp: 90 tablet, Rfl: 2     gabapentin (NEURONTIN) 100 MG capsule, TAKE 1  CAPSULE BY MOUTH THREE TIMES DAILY, Disp: 270 capsule, Rfl: 1     Lactobacillus rhamnosus GG (CULTURELLE) 10-15 Billion cell capsule, Take 1 capsule by mouth daily with lunch., Disp: , Rfl:      LORazepam (ATIVAN) 0.5 MG tablet, 1 tab PO 90 minutes before the procedure, than 1 tab 30 minutes before procedure., Disp: 2 tablet, Rfl: 0     metoprolol tartrate (LOPRESSOR) 25 MG tablet, Take 25 mg by mouth 2 (two) times a day., Disp: , Rfl:      midodrine (PROAMATINE) 5 MG tablet, TAKE 1 TO 2 TABLETS ( 5-10 MG TOTAL) BY MOUTH 3 TIMES A WEEK ( MON, WED AND FRI) 30 MINUTES PRIOR TO DIALYSIS., Disp: 30 tablet, Rfl: 0     omeprazole (PRILOSEC) 20 MG capsule, Take 20 mg by mouth daily with lunch. , Disp: , Rfl:      OXYGEN-AIR DELIVERY SYSTEMS MISC, 2 L/min into each nostril daily as needed (SHORTNESS OF BREATH). Indications: shortness of breath, Disp: , Rfl:      ranitidine (ZANTAC) 150 MG tablet, Take 1 tablet (150 mg total) by mouth daily., Disp: 14 tablet, Rfl: 0     senna-docusate (SENNOSIDES-DOCUSATE SODIUM) 8.6-50 mg tablet, Take 1 tablet by mouth at bedtime as needed for constipation. , Disp: , Rfl:      sevelamer carbonate (RENVELA) 800 mg tablet, Take 1,600 mg by mouth 3 (three) times a day with meals., Disp: , Rfl:      timolol maleate (TIMOPTIC) 0.5 % ophthalmic solution, Administer 1 drop to both eyes 2 (two) times a day. , Disp: , Rfl:      vit B comp no.3-folic-C-biotin (NEPHRO-OLGA) 1- mg-mg-mcg Tab tablet, Take 1 tablet by mouth daily., Disp: , Rfl:      warfarin (COUMADIN) 2.5 MG tablet, Take 5 mg M,W,F and 5 mg all other days of the week., Disp: 135 tablet, Rfl: 0

## 2021-06-13 NOTE — PROGRESS NOTES
INR 2.5 at home. Left VM message with return phone number for questions and concerns. [phone]  Will continue on current dose and retest in one week. Increase greens.

## 2021-06-13 NOTE — PROGRESS NOTES
Code Status:  DNR  Visit Type: Problem Visit (CHF)     Facility:  [facility] [[id number]]             History of Present Illness: Belia Rodriguez is a 75 y.o. female  who I am seeing today for follow-up on the TCU.  Past medical history includes end-stage renal disease on dialysis 3 times weekly, acute hypoxic respiratory failure secondary to CHF as well as COPD.  Patient recently hospitalized on 11/2/2020 secondary to acute on chronic respiratory failure secondary to acute COPD and acute diastolic heart failure.  Patient with underlying COPD.  She is dependent on oxygen at 2 to 3 L.  She continues on home nebs.  She was treated with prednisone.  She also completed a 5-day course of ceftriaxone/azithromycin.  Fluid overload improved with daily dialysis.  She has resumed her 3 times weekly schedule.  Hyperkalemia improved with dialysis.  Leg swelling.  Ultrasound negative.  Obstructive sleep apnea on CPAP.  Patient with a history of neuropathy in the lower extremities.  History of atrial fib.  She does have a watchman device in place.  Chronic anemia secondary to end-stage renal disease.  Hypotension on dialysis days.  She continues on Midodrine.  Patient was seen by hospice during her previous hospitalization on 10/5/2020 however this was declined.    Today patient sitting up in wheelchair.  She is dependent on oxygen at 2 to 3 L.  No cough.  Shortness of breath improving.  She continues with some lower extremity edema.  She does have CLARITA hose on.  She reports she is eating well.  Her bowels are moving regularly.  Continues on dialysis 3 times weekly.  Blood pressures on the soft side.    Active Ambulatory Problems     Diagnosis Date Noted     Tachycardia-bradycardia syndrome (H) 06/12/2014     Hyperkalemia 06/30/2014     Essential hypertension with goal blood pressure less than 140/90 06/30/2014     Hyperlipidemia 06/30/2014     Acute respiratory failure with hypoxia (H) 06/30/2014     ZULEIKA  (obstructive sleep apnea), severe 10/22/2015     Anemia of chronic renal failure, stage 5 (H)      Dissection of thoracoabdominal aorta (H)      Gout      GERD (gastroesophageal reflux disease) 04/14/2017     Right foot drop 05/16/2017     Acute midline low back pain with right-sided sciatica 06/16/2017     History of compression fracture of spine 06/16/2017     Pulmonary emphysema (H) 02/19/2018     Presence of Watchman left atrial appendage closure device 04/05/2018     Other dysphagia 08/10/2018     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     Hyperparathyroidism (H) 03/24/2009     Osteoporosis 03/24/2009     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     ESRD on hemodialysis (H) 08/15/2018     Acute pulmonary edema (H) 09/07/2018     Chronic atrial fibrillation (H)      COPD exacerbation (H) 01/04/2019     Acute on chronic diastolic congestive heart failure (H) 01/23/2019     Thrombocytopenia (H)      Contraindication to anticoagulation therapy 03/26/2019     Dyspnea 05/18/2019     Cardiac pacemaker in situ 03/20/2019     S/P AV (atrioventricular) jonn ablation 03/20/2019     Hip fracture, intertrochanteric (H) 06/23/2019     DVT (deep venous thrombosis) (H) 08/22/2019     Carpal tunnel syndrome of left wrist 02/04/2020     SOB (shortness of breath) 07/13/2020     Hypotension, unspecified hypotension type      Major depressive disorder, remission status unspecified, unspecified whether recurrent 09/15/2020     Hypoxia 10/05/2020     Encounter for palliative care      Resolved Ambulatory Problems     Diagnosis Date Noted     Hypotension 06/01/2014     Fever 06/07/2014     Aortic aneurysm rupture (H)      Bacteremia due to Escherichia coli 06/08/2014     Severe tobacco use disorder 06/30/2014     Persistent atrial fibrillation (H) 06/30/2014     Bradycardia, sinus 06/30/2014     Volume overload 06/30/2014     Hypomagnesemia 06/30/2014     Cellulitis 08/25/2014     Hematochezia  11/02/2014     BRBPR (bright red blood per rectum) 11/02/2014     Hematuria 04/01/2015     Aortic dissection (H) 04/01/2015     Pneumonia 09/07/2015     Hypoxia 09/23/2015     SOB (shortness of breath) 09/23/2015     Acute on chronic diastolic heart failure (H) 09/27/2015     Bacteremia 09/27/2015     Dyspnea 10/07/2015     Fluid overload 10/07/2015     Chronic thoracic aortic dissection (H) 10/07/2015     L3 vertebral fracture (H) 11/16/2015     GI bleeding 03/20/2016     Chronic systolic congestive heart failure (H)      Acute lower GI bleeding 03/21/2016     Bilateral anterior& Lt Occipital embolic Infarction 03/24/2016     Spinal stenosis 03/28/2016     Back pain 03/28/2016     Tremor 03/28/2016     SOB (shortness of breath) 05/03/2016     CVA (cerebral infarction) 05/04/2016     Respiratory distress 05/09/2016     CHF (congestive heart failure) (H) 05/09/2016     Right knee pain      Dyspnea 10/02/2016     Volume overload 10/02/2016     Acute bronchitis due to infection 10/02/2016     Acute bacterial conjunctivitis of both eyes 10/02/2016     Acute CHF (congestive heart failure) (H) 10/09/2016     Hypervolemia, unspecified hypervolemia type      Pure hypercholesterolemia      Gastroesophageal reflux disease without esophagitis      Leg weakness 04/20/2017     Anticoagulated on warfarin 05/16/2017     Gastrointestinal hemorrhage, unspecified gastrointestinal hemorrhage type 06/05/2017     Anticoagulant long-term use 06/16/2017     Acute GI bleeding 12/09/2017     SOB (shortness of breath) 01/26/2018     Acute bronchitis with bronchospasm      Acute respiratory failure with hypoxia and hypercapnia (H)      Acute on chronic respiratory failure with hypoxia (H)      History of acute respiratory failure 02/19/2018     Chronic respiratory failure with hypoxia (H) 03/02/2018     Dyspnea 08/09/2018     Chest pain 08/10/2018     Hemodialysis catheter infection (H)      Hyperkalemia 10/18/2018     Atrial fibrillation  with RVR (H) 03/03/2019     Intertrochanteric fracture of left hip, closed, initial encounter (H) 06/23/2019     Closed intertrochanteric fracture of hip, left, initial encounter (H) 06/22/2019     Past Medical History:   Diagnosis Date     Arthritis      Chronic anemia 6/1/2014     Chronic kidney disease      COPD (chronic obstructive pulmonary disease) (H)      CVA (cerebral infarction)      Disease of thyroid gland      Dyslipidemia      ESRD (end stage renal disease) (H) 06/03/2009     Essential hypertension 6/30/2014     GI (gastrointestinal bleed)      Left Atrial Appendage Occlusion (WATCHMAN) 4/5/2018     Obesity      Oxygen dependent        Current Outpatient Medications   Medication Sig Note     acetaminophen (TYLENOL) 500 MG tablet Take 2 tablets (1,000 mg total) by mouth every 6 (six) hours as needed.      albuterol (PROAIR HFA;PROVENTIL HFA;VENTOLIN HFA) 90 mcg/actuation inhaler Inhale 2 puffs 4 (four) times a day as needed for wheezing or shortness of breath.      aspirin 81 MG EC tablet Take 1 tablet (81 mg total) by mouth daily. (Patient taking differently: Take 81 mg by mouth daily. )      B complex 11-folic-C-biot-zinc (DIALYVITE) [phone] mg-mg-mcg-mg Tab Take 1 tablet by mouth daily with supper. (Dialyvite)       buPROPion (WELLBUTRIN XL) 150 MG 24 hr tablet Take 1 tablet (150 mg total) by mouth 2 (two) times a week. Tuesday and saturday (Patient taking differently: Take 150 mg by mouth 2 (two) times a week. Tuesday and saturday)      cholecalciferol, vitamin D3, 1,000 unit (25 mcg) tablet Take 2,000 Units by mouth daily.       cinacalcet (SENSIPAR) 30 MG tablet Take 30 mg by mouth see administration instructions. Take three times weekly with dialysis (on Mondays, Wednesdays, and Fridays).      famotidine (PEPCID) 20 MG tablet Take 1 tablet (20 mg total) by mouth at bedtime.      folic acid (FOLVITE) 1 MG tablet TAKE ONE TABLET BY MOUTH ONCE DAILY (Patient taking differently: No sig reported)       gabapentin (NEURONTIN) 100 MG capsule TAKE 1 CAPSULE BY MOUTH THREE TIMES DAILY (Patient taking differently: No sig reported)      Lactobacillus rhamnosus GG (CULTURELLE) 10-15 Billion cell capsule Take 1 capsule by mouth daily with lunch.       metoprolol succinate (TOPROL-XL) 25 MG Take 1 tablet (25 mg total) by mouth daily with lunch. Hold for SBP<110 or hr<60      midodrine (PROAMATINE) 10 MG tablet Take 10-20 mg by mouth 3 (three) times a week. On dialysis days. Hold for SBP>105  Patient reports that she usually gets 10 mg before dialysis and another 10 mg FDC through dialysis as well       oxyCODONE (ROXICODONE) 5 MG immediate release tablet Take 0.5 tablets (2.5 mg total) by mouth every 6 (six) hours as needed for pain.      polyvinyl alcohol (LIQUIFILM TEARS) 1.4 % ophthalmic solution Administer 1 drop to both eyes as needed for dry eyes.      predniSONE (DELTASONE) 20 MG tablet Take 40 mg by mouth daily with breakfast for 1 day, THEN 20 mg daily with breakfast for 3 days, THEN 10 mg daily with breakfast for 3 days.      senna-docusate (SENNOSIDES-DOCUSATE SODIUM) 8.6-50 mg tablet Take 1 tablet by mouth at bedtime.       sevelamer carbonate (RENVELA) 800 mg tablet Take 1 tablet (800 mg total) by mouth 2 (two) times a day as needed (FOr snacks.). (Patient taking differently: Take 800 mg by mouth 2 (two) times a day as needed (FOr snacks.). )      sevelamer HCL (RENAGEL) 800 MG tablet Take 1,600 mg by mouth 3 (three) times a day with meals.  11/2/2020: For the past week, she was instructed to take 2 tablets instead of the usual 3 tablets to see if that is enough     timolol maleate (TIMOPTIC) 0.5 % ophthalmic solution Administer 1 drop to both eyes 2 (two) times a day.       traZODone (DESYREL) 150 MG tablet Take 150 mg by mouth at bedtime.         Allergies   Allergen Reactions     Ace Inhibitors Cough     Atrovent [Ipratropium Bromide] Headache     Fosinopril Sodium Cough     Zolpidem       hallucinations         Review of Systems  No fevers or chills. No headache, lightheadedness or dizziness.  Chronic SOB, patient wears oxygen at 2 to 3 L.  Was only wearing oxygen at 2 L at night previously, no chest pains or palpitations. Appetite is good. No nausea, vomiting, constipation or diarrhea. No dysuria, frequency, burning or pain with urination. + weakness. + chronic back pain. Continues on oxycodone. Otherwise review of systems are negative.     Physical Exam  PHYSICAL EXAMINATION:  Vital signs: /67, pulse 70, respirations 16, temperature 97.7, O2 sats 98% on room air.  Weight 140.4 pounds.  General: Awake, Alert, oriented x3, appropriately, follows simple commands, conversant  HEENT: Pink conjunctiva, anicteric sclerae, moist oral mucosa  NECK: Supple, without any lymphadenopathy, or masses  CVS:  S1  S2, without murmur or gallop.  Dialysis catheter in the right chest wall.  LUNG: Clear to auscultation, No wheezes, rales or rhonci.  No dyspnea at rest.  O2 on at 2 L nasal cannula.  BACK: No kyphosis of the thoracic spine  ABDOMEN: Soft, nontender to palpation, with positive bowel sounds  EXTREMITIES: Moves both upper and lower extremities with generalized weakness, 1+ pedal edema, no calf tenderness.  CLARITA hose on.  SKIN: Warm and dry, no rashes or erythema noted  NEUROLOGIC: Intact, pulses palpable  PSYCHIATRIC: Cognition intact. Pleasant affect.      Labs:    Lab Results   Component Value Date    WBC 3.5 (L) 11/03/2020    HGB 8.7 (L) 11/04/2020    HCT 27.7 (L) 11/03/2020     (H) 11/03/2020    PLT 86 (L) 11/03/2020     Results for orders placed or performed during the hospital encounter of 11/02/20   Basic Metabolic Panel   Result Value Ref Range    Sodium 137 136 - 145 mmol/L    Potassium 4.3 3.5 - 5.0 mmol/L    Chloride 98 98 - 107 mmol/L    CO2 28 22 - 31 mmol/L    Anion Gap, Calculation 11 5 - 18 mmol/L    Glucose 88 70 - 125 mg/dL    Calcium 8.3 (L) 8.5 - 10.5 mg/dL    BUN 19 8 - 28  mg/dL    Creatinine 2.93 (H) 0.60 - 1.10 mg/dL    GFR MDRD Af Amer 19 (L) >60 mL/min/1.73m2    GFR MDRD Non Af Amer 16 (L) >60 mL/min/1.73m2           Assessment/Plan:  1. Acute on chronic diastolic congestive heart failure (H)   fluids managed per dialysis.   2. Pulmonary emphysema, unspecified emphysema type (H)   currently on O2 at 2 L.  Will attempt to wean off oxygen during the day.  She was only wearing this at night.  Continues on home nebs.  Continues on prednisone taper.   3. ESRD on dialysis (H)   continues 3 times weekly.   4. Severe low back pain   continues on oxycodone, gabapentin and Tylenol.   5. Neuropathic pain   continues on gabapentin.   6. Paroxysmal atrial fibrillation (H)   currently on metoprolol and aspirin.             1. ESRD on dialysis (H)   continues on dialysis 3 times weekly.  Follow-up with nephrology outpatient.   2. Pulmonary emphysema, unspecified emphysema type (H)   patient continues on O2 at 2 L at night.  She was unable to tolerate CPAP secondary to claustrophobia.  She has been weaned off of her oxygen during the day.  Patient continues on prednisone 20 mg daily.    Patient continues on home nebs.     3. Acute on chronic diastolic congestive heart failure (H)   fluid managed per hemodialysis rounds.  Follow-up with cardiology outpatient.       This note has been dictated using voice recognition software. Any grammatical or context distortions are unintentional and inherent to the software    Electronically signed by: June Kingston, AYAH

## 2021-06-13 NOTE — PROGRESS NOTES
"Code Status:  DNR  Visit Type: Problem Visit (acute on chronic respiratory failure )     Facility:  [facility] [[id number]]        \"This video visit will be conducted via a call between you and your physician/provider. We have found that certain health care needs can be provided without the need for an in-person physical exam.  This service lets us provide the care you need with a video conversation.  If a prescription is necessary we can send it to the facility team.  If lab work is needed we can place an order through the facility team to have that test done at a later time.     If during the course of the call the physician/provider feels a video visit is not appropriate, you will not be charged for this service.\"      Physician/provider has received verbal consent for a Video Visit from the patient? Yes         Video Start Time: 12:46 pm               History of Present Illness: Belia Rodriguez is a 75 y.o. female  who I am seeing today for follow-up on the TCU post acute respiratory failure.  Past medical history includes end-stage renal disease on dialysis 3 times weekly, acute hypoxic respiratory failure secondary to CHF as well as COPD.  Patient recently hospitalized on 11/2/2020 secondary to acute on chronic respiratory failure secondary to acute COPD and acute diastolic heart failure.  Patient with underlying COPD.  She is dependent on oxygen at 2 to 3 L.  She continues on home nebs.  She was treated with prednisone.  She also completed a 5-day course of ceftriaxone/azithromycin.  Fluid overload improved with daily dialysis.  She has resumed her 3 times weekly schedule.  Hyperkalemia improved with dialysis.  Leg swelling.  Ultrasound negative.  Obstructive sleep apnea on CPAP.  Patient with a history of neuropathy in the lower extremities.  History of atrial fib.  She does have a watchman device in place.  Chronic anemia secondary to end-stage renal disease.  Hypotension on dialysis " days.  She continues on Midodrine.  Patient was seen by hospice during her previous hospitalization on 10/5/2020 however this was declined.    Today patient sitting up in wheelchair. Nurse present on exam.  Patient continues to be dependent on oxygen at 1 L.  She does have shortness of breath with exertion.  She was previously using oxygen at 3 L at night.  She denies any cough.  She does continue with some trace lower extremity edema.  She has CLARITA hose on.  She reports that she was not eating well previously however her appetite has improved.  End-stage renal disease on dialysis 3 times weekly.  Blood pressures continue to be on the soft side the days of dialysis.  She does have midodrine in place for dialysis days as well as nondialysis days.  Patient with chronic back pain.  Pain well controlled with oxycodone.      Active Ambulatory Problems     Diagnosis Date Noted     Tachycardia-bradycardia syndrome (H) 06/12/2014     Hyperkalemia 06/30/2014     Essential hypertension with goal blood pressure less than 140/90 06/30/2014     Hyperlipidemia 06/30/2014     Acute respiratory failure with hypoxia (H) 06/30/2014     ZULEIKA (obstructive sleep apnea), severe 10/22/2015     Anemia of chronic renal failure, stage 5 (H)      Dissection of thoracoabdominal aorta (H)      Gout      GERD (gastroesophageal reflux disease) 04/14/2017     Right foot drop 05/16/2017     Acute midline low back pain with right-sided sciatica 06/16/2017     History of compression fracture of spine 06/16/2017     Pulmonary emphysema (H) 02/19/2018     Presence of Watchman left atrial appendage closure device 04/05/2018     Other dysphagia 08/10/2018     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     Hyperparathyroidism (H) 03/24/2009     Osteoporosis 03/24/2009     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     ESRD on hemodialysis (H) 08/15/2018     Acute pulmonary edema (H) 09/07/2018     Chronic atrial fibrillation (H)       COPD exacerbation (H) 01/04/2019     Acute on chronic diastolic congestive heart failure (H) 01/23/2019     Thrombocytopenia (H)      Contraindication to anticoagulation therapy 03/26/2019     Dyspnea 05/18/2019     Cardiac pacemaker in situ 03/20/2019     S/P AV (atrioventricular) jonn ablation 03/20/2019     Hip fracture, intertrochanteric (H) 06/23/2019     DVT (deep venous thrombosis) (H) 08/22/2019     Carpal tunnel syndrome of left wrist 02/04/2020     SOB (shortness of breath) 07/13/2020     Hypotension, unspecified hypotension type      Major depressive disorder, remission status unspecified, unspecified whether recurrent 09/15/2020     Hypoxia 10/05/2020     Encounter for palliative care      Resolved Ambulatory Problems     Diagnosis Date Noted     Hypotension 06/01/2014     Fever 06/07/2014     Aortic aneurysm rupture (H)      Bacteremia due to Escherichia coli 06/08/2014     Severe tobacco use disorder 06/30/2014     Persistent atrial fibrillation (H) 06/30/2014     Bradycardia, sinus 06/30/2014     Volume overload 06/30/2014     Hypomagnesemia 06/30/2014     Cellulitis 08/25/2014     Hematochezia 11/02/2014     BRBPR (bright red blood per rectum) 11/02/2014     Hematuria 04/01/2015     Aortic dissection (H) 04/01/2015     Pneumonia 09/07/2015     Hypoxia 09/23/2015     SOB (shortness of breath) 09/23/2015     Acute on chronic diastolic heart failure (H) 09/27/2015     Bacteremia 09/27/2015     Dyspnea 10/07/2015     Fluid overload 10/07/2015     Chronic thoracic aortic dissection (H) 10/07/2015     L3 vertebral fracture (H) 11/16/2015     GI bleeding 03/20/2016     Chronic systolic congestive heart failure (H)      Acute lower GI bleeding 03/21/2016     Bilateral anterior& Lt Occipital embolic Infarction 03/24/2016     Spinal stenosis 03/28/2016     Back pain 03/28/2016     Tremor 03/28/2016     SOB (shortness of breath) 05/03/2016     CVA (cerebral infarction) 05/04/2016     Respiratory distress  05/09/2016     CHF (congestive heart failure) (H) 05/09/2016     Right knee pain      Dyspnea 10/02/2016     Volume overload 10/02/2016     Acute bronchitis due to infection 10/02/2016     Acute bacterial conjunctivitis of both eyes 10/02/2016     Acute CHF (congestive heart failure) (H) 10/09/2016     Hypervolemia, unspecified hypervolemia type      Pure hypercholesterolemia      Gastroesophageal reflux disease without esophagitis      Leg weakness 04/20/2017     Anticoagulated on warfarin 05/16/2017     Gastrointestinal hemorrhage, unspecified gastrointestinal hemorrhage type 06/05/2017     Anticoagulant long-term use 06/16/2017     Acute GI bleeding 12/09/2017     SOB (shortness of breath) 01/26/2018     Acute bronchitis with bronchospasm      Acute respiratory failure with hypoxia and hypercapnia (H)      Acute on chronic respiratory failure with hypoxia (H)      History of acute respiratory failure 02/19/2018     Chronic respiratory failure with hypoxia (H) 03/02/2018     Dyspnea 08/09/2018     Chest pain 08/10/2018     Hemodialysis catheter infection (H)      Hyperkalemia 10/18/2018     Atrial fibrillation with RVR (H) 03/03/2019     Intertrochanteric fracture of left hip, closed, initial encounter (H) 06/23/2019     Closed intertrochanteric fracture of hip, left, initial encounter (H) 06/22/2019     Past Medical History:   Diagnosis Date     Arthritis      Chronic anemia 6/1/2014     Chronic kidney disease      COPD (chronic obstructive pulmonary disease) (H)      CVA (cerebral infarction)      Disease of thyroid gland      Dyslipidemia      ESRD (end stage renal disease) (H) 06/03/2009     Essential hypertension 6/30/2014     GI (gastrointestinal bleed)      Left Atrial Appendage Occlusion (WATCHMAN) 4/5/2018     Obesity      Oxygen dependent        Current Outpatient Medications   Medication Sig Note     acetaminophen (TYLENOL) 500 MG tablet Take 2 tablets (1,000 mg total) by mouth every 6 (six) hours as  needed.      albuterol (PROAIR HFA;PROVENTIL HFA;VENTOLIN HFA) 90 mcg/actuation inhaler Inhale 2 puffs 4 (four) times a day as needed for wheezing or shortness of breath.      aspirin 81 MG EC tablet Take 1 tablet (81 mg total) by mouth daily. (Patient taking differently: Take 81 mg by mouth daily. )      B complex 11-folic-C-biot-zinc (DIALYVITE) [phone] mg-mg-mcg-mg Tab Take 1 tablet by mouth daily with supper. (Dialyvite)       buPROPion (WELLBUTRIN XL) 150 MG 24 hr tablet Take 1 tablet (150 mg total) by mouth 2 (two) times a week. Tuesday and saturday (Patient taking differently: Take 150 mg by mouth 2 (two) times a week. Tuesday and saturday)      cholecalciferol, vitamin D3, 1,000 unit (25 mcg) tablet Take 2,000 Units by mouth daily.       cinacalcet (SENSIPAR) 30 MG tablet Take 30 mg by mouth see administration instructions. Take three times weekly with dialysis (on Mondays, Wednesdays, and Fridays).      famotidine (PEPCID) 20 MG tablet Take 1 tablet (20 mg total) by mouth at bedtime.      folic acid (FOLVITE) 1 MG tablet TAKE ONE TABLET BY MOUTH ONCE DAILY (Patient taking differently: No sig reported)      gabapentin (NEURONTIN) 100 MG capsule TAKE 1 CAPSULE BY MOUTH THREE TIMES DAILY (Patient taking differently: No sig reported)      Lactobacillus rhamnosus GG (CULTURELLE) 10-15 Billion cell capsule Take 1 capsule by mouth daily with lunch.       metoprolol succinate (TOPROL-XL) 25 MG Take 1 tablet (25 mg total) by mouth daily with lunch. Hold for SBP<110 or hr<60      midodrine (PROAMATINE) 10 MG tablet Take 10-20 mg by mouth 3 (three) times a week. On dialysis days. Hold for SBP>105  Patient reports that she usually gets 10 mg before dialysis and another 10 mg FDC through dialysis as well       oxyCODONE (ROXICODONE) 5 MG immediate release tablet Take 0.5 tablets (2.5 mg total) by mouth every 6 (six) hours as needed for pain.      polyvinyl alcohol (LIQUIFILM TEARS) 1.4 % ophthalmic solution  Administer 1 drop to both eyes as needed for dry eyes.      senna-docusate (SENNOSIDES-DOCUSATE SODIUM) 8.6-50 mg tablet Take 1 tablet by mouth at bedtime.       sevelamer carbonate (RENVELA) 800 mg tablet Take 1 tablet (800 mg total) by mouth 2 (two) times a day as needed (FOr snacks.). (Patient taking differently: Take 800 mg by mouth 2 (two) times a day as needed (FOr snacks.). )      sevelamer HCL (RENAGEL) 800 MG tablet Take 1,600 mg by mouth 3 (three) times a day with meals.  11/2/2020: For the past week, she was instructed to take 2 tablets instead of the usual 3 tablets to see if that is enough     timolol maleate (TIMOPTIC) 0.5 % ophthalmic solution Administer 1 drop to both eyes 2 (two) times a day.       traZODone (DESYREL) 150 MG tablet Take 150 mg by mouth at bedtime.         Allergies   Allergen Reactions     Ace Inhibitors Cough     Atrovent [Ipratropium Bromide] Headache     Fosinopril Sodium Cough     Zolpidem      hallucinations         Review of Systems  No fevers or chills. No headache, lightheadedness or dizziness.  Chronic SOB, patient wears oxygen at 2 to 3 L.  Was only wearing oxygen at 2 L at night previously, no chest pains or palpitations. Appetite is good. No nausea, vomiting, constipation or diarrhea. No dysuria, frequency, burning or pain with urination. + weakness. + chronic back pain. Continues on oxycodone. Otherwise review of systems are negative.     Physical Exam  PHYSICAL EXAMINATION:  Vital signs: /70, pulse 82, respirations 16, temperature 97.5, O2 sats 100% on 2L.  Weight 155.2 pounds.  General: Awake, sitting up in wheelchair, alert, oriented x3, appropriately, follows simple commands, conversant  HEENT: Pink conjunctiva, anicteric sclerae  NECK: Supple  CVS: Dialysis catheter in the right chest wall.  LUNG: O2 on at 1 L nasal cannula.  EXTREMITIES: Moves both upper and lower extremities with generalized weakness.  CLARITA hose on.  PSYCHIATRIC: Cognition intact. Pleasant  affect.      Labs:    Lab Results   Component Value Date    WBC 3.5 (L) 11/03/2020    HGB 8.7 (L) 11/04/2020    HCT 27.7 (L) 11/03/2020     (H) 11/03/2020    PLT 86 (L) 11/03/2020     Results for orders placed or performed during the hospital encounter of 11/02/20   Basic Metabolic Panel   Result Value Ref Range    Sodium 137 136 - 145 mmol/L    Potassium 4.3 3.5 - 5.0 mmol/L    Chloride 98 98 - 107 mmol/L    CO2 28 22 - 31 mmol/L    Anion Gap, Calculation 11 5 - 18 mmol/L    Glucose 88 70 - 125 mg/dL    Calcium 8.3 (L) 8.5 - 10.5 mg/dL    BUN 19 8 - 28 mg/dL    Creatinine 2.93 (H) 0.60 - 1.10 mg/dL    GFR MDRD Af Amer 19 (L) >60 mL/min/1.73m2    GFR MDRD Non Af Amer 16 (L) >60 mL/min/1.73m2           Assessment/Plan:  1. Acute on chronic diastolic congestive heart failure (H)   fluids managed per dialysis.    2. Pulmonary emphysema, unspecified emphysema type (H)   currently on O2 at 1 L during the day.  Continues on 3 L at night.  Will attempt to wean off oxygen during the day.  She was only wearing this at night.  Continues on home nebs.  Continues on prednisone taper.   3. ESRD on dialysis (H)   continues 3 times weekly. Send copy of daily weights to dialysis for comparison.    4. Severe low back pain   continues on oxycodone, gabapentin and Tylenol.   5. Neuropathic pain   continues on gabapentin.   6. Paroxysmal atrial fibrillation (H)   currently on metoprolol and aspirin.       Video-Visit Details     Type of service:  Video Visit     Video End Time: 1:00 pm     Originating Location (pt. Location):[facility] [[id number]]     Distant Location (provider location):  [facility] [place] [facility]      Mode of Communication:  Face time via I phone      This note has been dictated using voice recognition software. Any grammatical or context distortions are unintentional and inherent to the software    Electronically signed by: June Kingston, AYAH

## 2021-06-14 NOTE — PROGRESS NOTES
"[facility]  Internal Medicine - Office Visit    Patient: Belia Rodriguez   MRN: [id number]   Date of Service: 01/26/21   Patient Care Team:  Neil Galan MD as PCP - General (Pediatrics)  Ignacio Love, PharmD as Pharmacist (Pharmacist)  Neil Galan MD as Assigned PCP  Fidelia Ly CNP as Assigned Heart and Vascular Provider    ASSESSMENT/PLAN     Belia Rodriguez is a complex patient with recent treatment for COPD exacerbation. At baseline does require O2, but needing more than baseline and significantly more shortness of breath than usual.    D/w her my recommendation to go to ED given significant shortness of breath and worsening hypoxia from baseline.   will drive her.  Verbal handoff given to ED provider.    Neil Galan MD  Internal Medicine and Pediatrics  [facility]    SUBJECTIVE     Belia Rodriguez is here with .    She was recently treated for COPD exacerbation in December, went to TCU then has been home for several weeks. Reports that since being at home, has been more shortness of breath than usual. Even with simple transfers she gets shortness of breath and now using 3L of O2 at all times throughout the day which is unusual for her. She does't use any nebulizers or inhalers at baseline. For last 4 days she has been using an old prescription of prednisone. She dialyzed just yesterday and isn't feeling better after that dialysis run.    Has her chronic cough, no fever. Feels a \"shock\" in chest occasionally.      OBJECTIVE           /64 (Patient Site: Right Arm, Patient Position: Sitting, Cuff Size: Adult Regular)   Pulse 70   Wt 143 lb (64.9 kg)   SpO2 96% Comment: on 3 Liters O2  BMI 24.55 kg/m    Physical Exam      GENERAL APPEARANCE: frail, elderly woman in wheelchair, shortness of breath noted with change in positioning but at rest and still, speaking in full sentences with no respiratory " distress     RESP: lungs clear to auscultation - no rales, rhonchi or wheezes     CV: regular rates and rhythm, normal S1 S2, no S3 or S4 and no murmur, click or rub    Labs/imaging/studies:  See above

## 2021-06-14 NOTE — PROGRESS NOTES
[facility]      Facility:    [facility] [[id number]]  Code Status: UNKNOWN      Chief Complaint/Reason for Visit:  Chief Complaint   Patient presents with     H & P     Hospitalization for shortness of breath with fluid overload, acute on chronic respiratory failure with hypoxia, end-stage renal disease on hemodialysis 3 times weekly, COPD exacerbation.       HPI:    Belia is a 75 y.o. female who was admitted to the hospital 1/26/2021.  She currently has end-stage renal disease on hemodialysis and COPD and is on home oxygen.  Her chief complaint was shortness of breath.  She was brought in the hospital found to be in fluid overload at this time and she needed increased oxygen.  She was treated for COPD exacerbation she received some Solu-Medrol IV.  And she did receive an extra 1 and hemodialysis on Thursday.  Her shortness of breath improved and her oxygen needs returned to baseline.  Her Solu-Medrol was changed to slow tapering dose of prednisone and she was treated appropriately and transferred here to the TCU in stable condition.    Patient is satting okay with an adequate respiratory rate and no oxygen no working to breathe.  She feels pretty comfortable now and has no issues.  She does get nauseous with food at times but she is eating okay and has no vomiting.  We did have a long discussion about hospice cares and she would like hospice consult this week to talk to them.  She feels like she has had enough and we did discuss CODE STATUS in detail and she wants to be DNR/DNI.        Past Medical History:  Past Medical History:   Diagnosis Date     Arthritis      CHF (congestive heart failure) (H)      Chronic anemia 6/1/2014     Chronic kidney disease      Chronic thoracic aortic dissection (H) 10/7/2015    Descending thoracic aorta; treated medically per notes of Dragan Singh and Jennifer.     COPD (chronic obstructive pulmonary disease) (H)      CVA (cerebral  infarction)      Disease of thyroid gland      Dyslipidemia      ESRD (end stage renal disease) (H) 06/03/2009    on dialysis with Dr. Mitchell     Essential hypertension 6/30/2014     Gastrointestinal hemorrhage, unspecified gastrointestinal hemorrhage type 6/5/2017     GI (gastrointestinal bleed)      GI bleeding 6/5/2017     Gout      L3 vertebral fracture (H) 11/16/2015     Left Atrial Appendage Occlusion (WATCHMAN) 4/5/2018    LAAO April 5, 2018 (30 mm WATCHMAN)     Obesity      ZULEIKA (obstructive sleep apnea), severe, intolerant of CPAP 10/22/2015     Oxygen dependent     3L nc     Pneumonia 9-7-2015     Right foot drop      Spinal stenosis 3/28/2016     Stroke (H) 3/24/2016           Surgical History:  Past Surgical History:   Procedure Laterality Date     BACK SURGERY      [facility]     COLONOSCOPY N/A 3/23/2016    Procedure: COLONOSCOPY;  Surgeon: Ruddy Tejada MD;  Location: [facility] GI;  Service:      DILATION AND CURETTAGE OF UTERUS       EP ABLATION AV NODE N/A 3/7/2019    Procedure: EP Ablation AV Node;  Surgeon: Derick Duarte MD;  Location: [facility] Cath Lab;  Service: Cardiology     EP NEGRA CLOSURE N/A 4/5/2018    Procedure: EP NEGRA Closure;  Surgeon: Derick Duarte MD;  Location: [facility] Cath Lab;  Service:      EP PACEMAKER INSERT N/A 3/7/2019    Procedure: EP Pacemaker Insertion;  Surgeon: Derick Duarte MD;  Location: [facility] Cath Lab;  Service: Cardiology     EYE SURGERY       HERNIA REPAIR       IR TUNNELED CATHETER COMPLETE REPLACEMENT  10/23/2020     IR TUNNELED CATHETER INSERT  11/20/2018     IR TUNNELED CATHETER REMOVAL  11/20/2018     SD COLSC FLEXIBLE W/CONTROL BLEEDING ANY METHOD N/A 6/7/2017    Procedure: COLONOSCOPY;  Surgeon: Luis Mckeon MD;  Location: [facility] GI;  Service: Gastroenterology     SD OPEN FIX INTER/SUBTROCH FX,IMPLNT Left 6/23/2019    Procedure: INTERNAL FIXATION, FRACTURE, TROCHANTERIC, HIP, USING INTERMEDULLARY NAIL;  Surgeon: Maximo  Bennie CERDA DO;  Location: [facility] Main OR;  Service: Orthopedics     TONSILLECTOMY         Family History:   Family History   Problem Relation Age of Onset     Dementia Mother      Diabetes Mother      Arthritis Mother      Cancer Mother      Depression Mother      Heart disease Mother      Vision loss Mother      Stroke Father      Heart disease Father      Breast cancer Neg Hx        Social History:    Social History     Socioeconomic History     Marital status:      Spouse name: Cayden     Number of children: 2     Years of education: None     Highest education level: None   Occupational History     Employer: RETIRED   Social Needs     Financial resource strain: None     Food insecurity     Worry: None     Inability: None     Transportation needs     Medical: None     Non-medical: None   Tobacco Use     Smoking status: Former Smoker     Packs/day: 1.50     Years: 37.00     Pack years: 55.50     Types: Cigarettes     Quit date: 2009     Years since quittin.0     Smokeless tobacco: Never Used   Substance and Sexual Activity     Alcohol use: No     Alcohol/week: 11.7 standard drinks     Types: 14 Standard drinks or equivalent per week     Comment: 14 mixed drinks per week     Drug use: No     Sexual activity: Never     Partners: Male   Lifestyle     Physical activity     Days per week: None     Minutes per session: None     Stress: None   Relationships     Social connections     Talks on phone: None     Gets together: None     Attends Shinto service: None     Active member of club or organization: None     Attends meetings of clubs or organizations: None     Relationship status: None     Intimate partner violence     Fear of current or ex partner: None     Emotionally abused: None     Physically abused: None     Forced sexual activity: None   Other Topics Concern     None   Social History Narrative    Lives with her . Daughter in [place] and daughter in [place].          Review of  Systems   Constitutional:        Patient denies any pain fevers chills vomiting diarrhea change in vision hearing taste or smell weakness one-sided of the chest pain.  She is short of breath with exertion but is nothing new and she is not on oxygen at this time and at this time she is about her baseline.  She denies any depression or anxiety and the remainder review of systems is negative.       Vitals:    02/02/21 0724   BP: 99/65   Pulse: 67   Resp: 16   Temp: 97.2  F (36.2  C)   SpO2: 97%       Physical Exam  Constitutional:       General: She is not in acute distress.  HENT:      Head: Normocephalic and atraumatic.      Nose: Nose normal.      Mouth/Throat:      Mouth: Mucous membranes are moist.   Cardiovascular:      Rate and Rhythm: Normal rate and regular rhythm.   Pulmonary:      Comments: Patient has decreased inspiratory volume but no crackles rales or wheezes were heard.  Abdominal:      General: There is no distension.      Palpations: Abdomen is soft.      Tenderness: There is no abdominal tenderness.   Musculoskeletal:      Right lower leg: Edema present.      Left lower leg: Edema present.   Skin:     General: Skin is warm and dry.   Neurological:      Mental Status: She is alert. Mental status is at baseline.   Psychiatric:         Mood and Affect: Mood normal.         Behavior: Behavior normal.         Medication List:  Current Outpatient Medications   Medication Sig     acetaminophen (TYLENOL) 500 MG tablet Take 2 tablets (1,000 mg total) by mouth every 6 (six) hours as needed.     albuterol (PROVENTIL) 2.5 mg /3 mL (0.083 %) nebulizer solution Take 3 mL (2.5 mg total) by nebulization every 4 (four) hours as needed for wheezing.     aspirin 81 MG EC tablet Take 1 tablet (81 mg total) by mouth daily. (Patient taking differently: Take 81 mg by mouth daily. )     atorvastatin (LIPITOR) 10 MG tablet Take 10 mg by mouth at bedtime.     B complex 11-folic-C-biot-zinc (DIALYVITE) [phone]  mg-mg-mcg-mg Tab Take 1 tablet by mouth daily with supper. (Dialyvite)      buPROPion (WELLBUTRIN XL) 150 MG 24 hr tablet Take 1 tablet (150 mg total) by mouth 2 (two) times a week. Tuesday and saturday (Patient taking differently: Take 150 mg by mouth 2 (two) times a week. Tuesday and saturday)     cholecalciferol, vitamin D3, 1,000 unit (25 mcg) tablet Take 2,000 Units by mouth daily.      cinacalcet (SENSIPAR) 30 MG tablet Take 30 mg by mouth see administration instructions. Take three times weekly with dialysis (on Mondays, Wednesdays, and Fridays).     famotidine (PEPCID) 20 MG tablet Take 1 tablet (20 mg total) by mouth at bedtime.     folic acid (FOLVITE) 1 MG tablet TAKE ONE TABLET BY MOUTH ONCE DAILY (Patient taking differently: No sig reported)     gabapentin (NEURONTIN) 100 MG capsule TAKE 1 CAPSULE BY MOUTH THREE TIMES DAILY (Patient taking differently: No sig reported)     Lactobacillus rhamnosus GG (CULTURELLE) 10-15 Billion cell capsule Take 1 capsule by mouth daily with lunch.      metoprolol succinate (TOPROL-XL) 25 MG Take 1 tablet (25 mg total) by mouth daily with lunch. Hold for SBP<110 or hr<60     midodrine (PROAMATINE) 10 MG tablet Take 20 mg by mouth 3 (three) times a week. On dialysis days. Hold for SBP>105  Patient reports that she takes 20mg prior to dialysis     midodrine (PROAMATINE) 5 MG tablet Take 10 mg by mouth 4 (four) times a week. On non dialysis days if SBP < 106     polyvinyl alcohol (LIQUIFILM TEARS) 1.4 % ophthalmic solution Administer 1 drop to both eyes as needed for dry eyes.     predniSONE (DELTASONE) 10 mg tablet Take 40 mg by mouth daily for 3 days, THEN 20 mg daily for 3 days, THEN 10 mg daily for 3 days, THEN 5 mg daily for 3 days.     senna-docusate (SENNOSIDES-DOCUSATE SODIUM) 8.6-50 mg tablet Take 1 tablet by mouth at bedtime as needed for constipation.      sevelamer carbonate (RENVELA) 800 mg tablet Take 1,600 mg by mouth 2 (two) times a day as needed (snacks).  "    sevelamer HCL (RENAGEL) 800 MG tablet Take 1,600 mg by mouth 3 (three) times a day with meals.      timolol maleate (TIMOPTIC) 0.5 % ophthalmic solution Administer 1 drop to both eyes 2 (two) times a day.      traZODone (DESYREL) 150 MG tablet Take 150 mg by mouth at bedtime.        Labs: Hospital labs are as follows; hemoglobin A1c was 4.2, sodium is 133, potassium 5.8, chloride was 100, CO2 is 21, anion gap was 12, glucose 234, calcium 8.4, creatinine 5.88 with a GFR of 7 and a BUN of 41.  White count was 2.3, hemoglobin 9.6, platelets were 98,000.  Covid was negative.  Troponin was 0.05, TSH was 1.43, brain natruretic peptide was 753.      Assessment:    ICD-10-CM    1. ESRD on dialysis (H)  N18.6     Z99.2    2. Hypervolemia, unspecified hypervolemia type  E87.70    3. Physical deconditioning  R53.81    4. Edema of right lower extremity  R60.0    5. Acute on chronic diastolic congestive heart failure (H)  I50.33    6. Chronic bronchitis, unspecified chronic bronchitis type (H)  J42    7. Neuropathic pain  M79.2        Plan: Plan at this time we will not do any labs with have her labs sent over from dialysis was \"a basic metabolic profile 3 times a week in a weekly hemoglobin.  Will they will put this into matrix for our review.  We will incorporate physical and occupational therapy and weights to be done a daily basis.    For her pain in her feet and neuropathic pain I will increase the a.m. gabapentin to 200 at this time and we did have a long discussion about CODE STATUS and her goals of care.  She is interested in starting hospice care at this time so we will have a consult for her to talk to them this week.  I feel she does realize that she will stop dialysis and she will not last more than 3 to 4 days.  She does realize this and we did discuss this in detail.        Electronically signed by: Dl Nolan, DO  "

## 2021-06-14 NOTE — PROGRESS NOTES
Clinic Care Coordination Contact  [facility]/Voicemail       Clinical Data: Care Coordinator Outreach  Outreach attempted x 2.  Left message on patient's voicemail with call back information and requested return call.  Plan: Care Coordinator sent care coordination introduction letter on 2/3/2021 via mail. Care Coordinator will do no further outreaches at this time.      Hospital discharge follow up call to pt, no answer.  LVMTCB.    Patient was d/c'd on 1/29/21

## 2021-06-14 NOTE — PROGRESS NOTES
[facility] Note    Belia Rodriguez   72 y.o. female    Date of Visit: 12/20/2017  Chief Complaint   Patient presents with     Follow-up     [facility] 12/13     URI     congestion, runny nose, no fever        ASSESSMENT/PLAN  1. Gastrointestinal hemorrhage associated with intestinal diverticulosis     2. ESRD (end stage renal disease)     3. Persistent atrial fibrillation     4. Anemia of chronic renal failure, stage 5     5. Essential hypertension     6. Right foot drop       ---------------------------------------------    1.  History of diverticular v internal hemorrhoidal lower GI bleed on warfarin, most recently on Eliquis.  She is only on aspirin now.  I think that proceeding with watchman device is in her best interest rather than trying an additional anticoagulant.  No recurrence of lower GI bleed.  Hemoglobin was in the 9 range, though this can vary significantly with volume status since she is on dialysis.    2.  Monday/Wednesday/Friday dialysis with Dr. Uziel Kinney, she just had a run earlier today.  I listen to her lungs, it sounded clear, very low suspicion of pneumonia or bacterial infection causing her URI symptoms.  I provided reassurance and gave her knees to look for such as fever    3.  Atrial fibrillation as discussed above, follows at [facility] heart clinic.  Has appointment with Dr. Derick Duarte coming up    4.  Anemia of chronic kidney disease, see #1.  This was checked earlier this week.  I do not feel we need to recheck it again since there have been no findings of clinical lower GI bleed since discharge.    5.  Blood pressure is adequately controlled, no changes    6.  Of note, she wears an AFO usually for right foot drop based on prior back injury.    Return in about 1 month (around 1/20/2018) for Recheck.      SUBJECTIVE  Belia Rodriguez is a 72-year-old woman with history of atrial fibrillation, recurrent diverticular bleeding, end-stage renal disease, presents to  establish care.    She is admitted to [facility] from December 9-13 with bright red blood per rectum, some clots in the context of taking Eliquis for the last 2-3 months.  She also had recurring issues requiring hospitalizations earlier in this year including a hospitalization from June 5-8.  Around that time, she had a colonoscopy, which showed diverticula, but no active bleeding.  During this most recent hospitalization, she did not have any substantial drop in the hemoglobin beyond what she ordinarily has with anemia of chronic kidney disease, but had ongoing bleeding and tagged red blood cell scan failed to identify location.  Eventually it stopped, she was discharged, and it did not recur.  Eliquis was discontinued.      She already had a hemoglobin recheck at dialysis and was in the 9 range.    Given her high chads 2 vascular score, she is being evaluated by cardiology for a watchman device, which would lower her stroke risk and not require anticoagulation to do so.  She is currently on aspirin alone for stroke prophylaxis.    She has dialysis Monday Wednesday and Friday with Dr. Uziel Kinney.  She was feeling a little more short of breath recently, requested additional fluid removal.  She is not wearing her compression stockings, so she is pointing out that her legs are little more swollen today.    Recently, she has been getting symptoms of a cold, no fever, but cough, sinus congestion, etc.  She also feels more tired than usual.    She is here with her  Prashant, who is also looking for a new doctor.  They both came from [facility] to [facility], and wanted to try out the internal medicine clinic for a change.  Someone had suggested trying internal medicine.  They moved from [place] to [facility] because she prefers to go to [facility].  There was an instance that she felt AAA was almost missed at [facility] and does not care to return there.    Prashant tells me that Itzel is just like her  father in the sense that they both have a constant runny nose.  She tried a nose spray several years ago, but it seemed to just give her a headache.  She is willing to try something for this.    She has congestive heart failure, diastolic type, and sees Dr. Rodriguez for this as well as the atrial fibrillation.  Last checked ejection fraction is 63%.    She has history of back injury and pinched nerve causing permanent right foot drop.  She has an AFO, but is not currently using it.    ROS A comprehensive review of systems was performed and was otherwise negative    Medications, allergies, and problem list were reviewed and updated    Patient Active Problem List   Diagnosis     Chronic obstructive pulmonary disease, unspecified COPD type     ESRD (end stage renal disease)     Chronic anemia     Tachycardia-bradycardia syndrome     Essential hypertension with goal blood pressure less than 140/90     Hyperlipidemia     Persistent atrial fibrillation     Bacteremia     ZULEIKA (obstructive sleep apnea), severe, intolerant of CPAP     Anemia of chronic renal failure, stage 5     Right knee pain     Dyspnea     Volume overload     Dissection of thoracoabdominal aorta     CHF (congestive heart failure)     Pure hypercholesterolemia     Gastroesophageal reflux disease without esophagitis     Gout     GERD (gastroesophageal reflux disease)     Leg weakness     Right foot drop     Acute midline low back pain with right-sided sciatica     History of compression fracture of spine     Past Medical History:   Diagnosis Date     Arthritis      CHF (congestive heart failure)      Chronic anemia 6/1/2014     Chronic kidney disease      Chronic thoracic aortic dissection 10/7/2015    Descending thoracic aorta; treated medically per notes of Dragan Singh and Jennifer.     COPD (chronic obstructive pulmonary disease)      CVA (cerebral infarction)      Disease of thyroid gland      Dyslipidemia      ESRD (end stage renal disease) 06/03/2009     on dialysis with Dr. Mitchell     Essential hypertension 6/30/2014     GI (gastrointestinal bleed)      GI bleeding 6/5/2017     Gout      L3 vertebral fracture 11/16/2015     Obesity      ZULEIKA (obstructive sleep apnea), severe, intolerant of CPAP 10/22/2015     Pneumonia 9-7-2015     Right foot drop      Spinal stenosis 3/28/2016     Stroke 3/24/2016     Past Surgical History:   Procedure Laterality Date     BACK SURGERY      [facility]     COLONOSCOPY N/A 3/23/2016    Procedure: COLONOSCOPY;  Surgeon: Ruddy Tejada MD;  Location: [facility];  Service:      DILATION AND CURETTAGE OF UTERUS       EYE SURGERY       HERNIA REPAIR       AK COLSC FLEXIBLE W/CONTROL BLEEDING ANY METHOD N/A 6/7/2017    Procedure: COLONOSCOPY;  Surgeon: Luis Mckeon MD;  Location: [facility];  Service: Gastroenterology     TONSILLECTOMY       Social History     Social History     Marital status:      Spouse name: Cayden     Number of children: 2     Years of education: N/A     Occupational History      Retired     Social History Main Topics     Smoking status: Former Smoker     Packs/day: 1.50     Years: 37.00     Types: Cigarettes     Quit date: 1/1/2009     Smokeless tobacco: Never Used     Alcohol use 7.0 oz/week     14 Standard drinks or equivalent per week      Comment: 14 mixed drinks per week     Drug use: No     Sexual activity: No     Other Topics Concern     Not on file     Social History Narrative    Lives with her . Daughter in [place] and daughter in [place].     Family History   Problem Relation Age of Onset     Dementia Mother      Diabetes Mother      Arthritis Mother      Cancer Mother      Depression Mother      Heart disease Mother      Vision loss Mother      Stroke Father      Heart disease Father      Breast cancer Neg Hx        Current Outpatient Prescriptions   Medication Sig Dispense Refill     acetaminophen (TYLENOL) 500 MG tablet Take 500 mg by mouth every 6 (six) hours as needed  for pain.       allopurinol (ZYLOPRIM) 100 MG tablet Take 100 mg by mouth daily.       aspirin 81 MG EC tablet Take 81 mg by mouth daily.       atorvastatin (LIPITOR) 10 MG tablet Take 10 mg by mouth at bedtime.       CHLORPHENIRAMINE/DEXTROMETHORP (CORICIDIN HBP COUGH AND COLD ORAL) Take 1 tablet by mouth daily as needed.        cholecalciferol, vitamin D3, 2,000 unit cap Take 2,000 Units by mouth daily with lunch.        cinacalcet (SENSIPAR) 30 MG tablet Take 30 mg by mouth daily.       digoxin (LANOXIN) 125 mcg tablet Take 125 mcg by mouth 3 (three) times a week. Take every Monday, Wednesday, and Saturday.       diphenhydrAMINE (BENADRYL) 50 MG tablet Take 50 mg by mouth at bedtime.        folic acid (FOLVITE) 1 MG tablet Take 1 mg by mouth daily.       gabapentin (NEURONTIN) 100 MG capsule Take 100 mg by mouth 3 (three) times a day.       Lactobacillus rhamnosus GG (CULTURELLE) 10-15 Billion cell capsule Take 1 capsule by mouth daily with lunch.       metoprolol tartrate (LOPRESSOR) 25 MG tablet Take 25 mg by mouth 2 (two) times a day.       midodrine (PROAMATINE) 5 MG tablet Take 10 mg by mouth 3 (three) times a week. Take every Monday, Wednesday, and Friday.       OXYGEN-AIR DELIVERY SYSTEMS MISC 2 L/min into each nostril daily as needed (SHORTNESS OF BREATH). Indications: shortness of breath       ranitidine (ZANTAC) 150 MG tablet Take 1 tablet (150 mg total) by mouth daily. 14 tablet 0     senna-docusate (SENNOSIDES-DOCUSATE SODIUM) 8.6-50 mg tablet Take 1 tablet by mouth at bedtime as needed for constipation.        sevelamer carbonate (RENVELA) 800 mg tablet Take 1,600 mg by mouth 3 (three) times a day with meals.       timolol maleate (TIMOPTIC) 0.5 % ophthalmic solution Administer 1 drop to both eyes 2 (two) times a day.        vit B comp no.3-folic-C-biotin (NEPHRO-OLGA) 1- mg-mg-mcg Tab tablet Take 1 tablet by mouth daily.       ipratropium (ATROVENT) 42 mcg (0.06 %) nasal spray 1 spray into  each nostril 3 (three) times a day. 15 mL 2     No current facility-administered medications for this visit.        Allergies   Allergen Reactions     Ace Inhibitors Cough     Fosinopril Sodium Cough       EXAM  Vitals:    12/20/17 1441   BP: 126/76   Patient Site: Left Arm   Patient Position: Sitting   Cuff Size: Adult Regular   Pulse: 72   Weight: 183 lb 4.8 oz (83.1 kg)         General: alert, no distress  HEENT: sclerae anicteric, moist oral mucosa  Heart: Irregularly irregular.  1-2+ pitting edema.  Warm extremities  Lungs: Clear to auscultation bilat, no crackles or wheezes  Gastrointestinal: abdomen is non-distended.    Skin: warm/dry, no rashes does not overtly look pale  Neuro: no gross abnormalities  Psychiatric: Pleasant affect      RESULTS REVIEWED:     ANALYSIS AND SUMMARY OF OLD RECORDS, NOTES AND CONSULTS (2): Reviewed discharge summary from recent hospitalization, also hospitalization in June with colonoscopy report-- 6/7/17 large internal hemorrhoids, large mouth diverticula- not bleeding at time of exam    RECORDS REQUESTED (1): None.     OTHER HISTORY SUMMARIZED (from nursing staff, family, friends) (2): Obtain some history from Prashant, her , summarized in HPI    RADIOLOGY TESTS SUMMARIZED or REQUESTED (XRAY/CT/MRI/DXA) (1):   Xr Ankle Right 3 Or More Vws    Result Date: 12/9/2017  XR ANKLE RIGHT 3 OR MORE VWS 12/9/2017 9:46 AM INDICATION: atraumatic pain inferior to right lateral malleolus radiating up towards the distal 4th and 5th metacarpal COMPARISON: None. FINDINGS: Negative ankle. No fracture or dislocation.    Nm Gi Bleed    Result Date: 12/10/2017  NM GI BLEED 12/10/2017 8:54 PM INDICATION: Gastrointestinal hemorrhage. TECHNIQUE: The patient's red blood cells were labeled with 22.0 mCi of technetium-99m using the in vitro method and then reinjected. Dynamic anterior planar imaging of the abdomen was performed for 60 minutes. COMPARISON: None. FINDINGS: No active gastrointestinal  hemorrhage detected.     CONCLUSION: No active gastrointestinal hemorrhage detected.    Us Arterial Leg Right    Result Date: 12/9/2017  US ARTERIAL LEG RIGHT 12/9/2017 9:40 AM INDICATION: atraumatic pain r foot TECHNIQUE: Gray-scale and duplex including color and spectral imaging.. COMPARISON: None. FINDINGS: Patent right lower extremity arteries from the external iliac artery through the posterior tibial and dorsalis pedis arteries. Normal waveforms and velocities.     CONCLUSION: 1.  No arterial occlusion or significant stenosis in the right lower extremity.      MEDICINE TESTS SUMMARIZED or REQUESTED (EKG/ECHO/COLONOSCOPY/EGD) (1): None    INDEPENDENT REVIEW OF EKG OR X-RAY (2): None.    Lab Results   Component Value Date    WBC 5.4 12/09/2017    HGB 10.6 (L) 12/12/2017    HCT 35.9 12/09/2017     (H) 12/09/2017     (L) 12/09/2017        Data points  6     John Escoto DO  Internal Medicine  [facility]

## 2021-06-15 NOTE — PROGRESS NOTES
Request for Change in Device Follow-up    Belia Rodriguez and  requests to change their device follow-up. Standard of care for device follow-up is an annual in clinic device check and remote transmissions every three months. Belia Rodriguez requests to change their follow-up to remotes only.     Device Data  Pacemaker and Single  : [employer]  Model: L310 Accolade MRI  Implant date: 3/7/2019  Diagnosis: Chronic AF, SSS, s/p AVNA    Chart and Device Review  Patient status information: Pt is in palliative care and it is very difficult for her to physically come to clinic  Device status information: Device is stable, chronic AF, no lead issues    Is the patient pacing dependent? Yes  Does the patient currently do routine remote monitoring? yes  What is the estimated battery longevity? 9.5yrs   Are automatic thresholds available and turned on in the device? Yes, RV auto is on    Device RN recommendation  Remotes only      Routed to physician: Dr. Derick Duarte for approval      Nilam Iqbal RN BSN PHN  Device Nurse   [facility]

## 2021-06-15 NOTE — PROGRESS NOTES
Code Status:  DNR  Visit Type: Problem Visit (ESRD, CHF )     Facility:  [facility] [[id number]]              History of Present Illness: Belia Rodriguez is a 75 y.o. female  who I am seeing today for follow-up on the TCU post recent hospitalization for congestive heart failure and COPD.  Past medical history includes end-stage renal disease on dialysis 3 times weekly, acute hypoxic respiratory failure secondary to CHF as well as COPD.  Patient recently hospitalized on 1/26/2021 secondary to increasing shortness of breath.  She was needing increased oxygen support.  She is chronically on O2 2 to 3 L.  She was treated for COPD exacerbation.  She was given Solu-Medrol IV as well as the nebulizers.  She does have underlying end-stage renal disease and continues on dialysis 3 times weekly.  She also has extra runs on Thursdays.  She was needing extra runs prior to her hospitalization for increased CHF exacerbation.  She did get dialysis daily during her hospitalization and her breathing improved.  She was switched from her Solu-Medrol over to prednisone.  Continues on a prednisone taper.  She also was treated with azithromycin.      Today patient sitting up in wheelchair. Pt with CHF. Pt with weight gain of ~ 10 lbs however down 5 lbs yesterday after dialysis. Pt seen by the RD today to review diet and weight loss. I did reiterate this today with pt. Pt reports usual weight around 155-158 lbs now 160 lbs. Pt now with increased edema to BLE, now 2-3+. Pt with ESRD on dialysis 3/weekly. Chronic neuropathy. Pt continues on gabapentin and tylenol. Occasional prn use of oxycodone.         Active Ambulatory Problems     Diagnosis Date Noted     Tachycardia-bradycardia syndrome (H) 06/12/2014     Hyperkalemia 06/30/2014     Essential hypertension with goal blood pressure less than 140/90 06/30/2014     Hyperlipidemia 06/30/2014     Acute respiratory failure with hypoxia (H) 06/30/2014     Fluid overload  10/07/2015     ZULEIKA (obstructive sleep apnea), severe 10/22/2015     Anemia of chronic renal failure, stage 5 (H)      Dissection of thoracoabdominal aorta (H)      Gout      GERD (gastroesophageal reflux disease) 04/14/2017     Right foot drop 05/16/2017     Acute midline low back pain with right-sided sciatica 06/16/2017     History of compression fracture of spine 06/16/2017     Acute and chronic respiratory failure with hypoxia (H)      Pulmonary emphysema (H) 02/19/2018     Presence of Watchman left atrial appendage closure device 04/05/2018     Other dysphagia 08/10/2018     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     Hyperparathyroidism (H) 03/24/2009     Osteoporosis 03/24/2009     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     ESRD on hemodialysis (H), MWF 08/15/2018     Acute pulmonary edema (H) 09/07/2018     Chronic atrial fibrillation (H)      COPD exacerbation (H) 01/04/2019     Acute on chronic diastolic congestive heart failure (H) 01/23/2019     Thrombocytopenia (H)      Contraindication to anticoagulation therapy 03/26/2019     Dyspnea 05/18/2019     Cardiac pacemaker in situ 03/20/2019     S/P AV (atrioventricular) jonn ablation 03/20/2019     Hip fracture, intertrochanteric (H) 06/23/2019     DVT (deep venous thrombosis) (H) 08/22/2019     Carpal tunnel syndrome of left wrist 02/04/2020     Major depressive disorder, remission status unspecified, unspecified whether recurrent 09/15/2020     Encounter for palliative care      PVD (peripheral vascular disease) (H) 12/18/2020     SOB (shortness of breath) 01/26/2021     Resolved Ambulatory Problems     Diagnosis Date Noted     Hypotension 06/01/2014     Fever 06/07/2014     Aortic aneurysm rupture (H)      Bacteremia due to Escherichia coli 06/08/2014     Severe tobacco use disorder 06/30/2014     Persistent atrial fibrillation (H) 06/30/2014     Bradycardia, sinus 06/30/2014     Volume overload 06/30/2014      Hypomagnesemia 06/30/2014     Cellulitis 08/25/2014     Hematochezia 11/02/2014     BRBPR (bright red blood per rectum) 11/02/2014     Hematuria 04/01/2015     Aortic dissection (H) 04/01/2015     Pneumonia 09/07/2015     Hypoxia 09/23/2015     SOB (shortness of breath) 09/23/2015     Acute on chronic diastolic heart failure (H) 09/27/2015     Bacteremia 09/27/2015     Dyspnea 10/07/2015     Chronic thoracic aortic dissection (H) 10/07/2015     L3 vertebral fracture (H) 11/16/2015     GI bleeding 03/20/2016     Chronic systolic congestive heart failure (H)      Acute lower GI bleeding 03/21/2016     Bilateral anterior& Lt Occipital embolic Infarction 03/24/2016     Spinal stenosis 03/28/2016     Back pain 03/28/2016     Tremor 03/28/2016     SOB (shortness of breath) 05/03/2016     CVA (cerebral infarction) 05/04/2016     Respiratory distress 05/09/2016     CHF (congestive heart failure) (H) 05/09/2016     Right knee pain      Dyspnea 10/02/2016     Volume overload 10/02/2016     Acute bronchitis due to infection 10/02/2016     Acute bacterial conjunctivitis of both eyes 10/02/2016     Acute CHF (congestive heart failure) (H) 10/09/2016     Hypervolemia, unspecified hypervolemia type      Pure hypercholesterolemia      Gastroesophageal reflux disease without esophagitis      Leg weakness 04/20/2017     Anticoagulated on warfarin 05/16/2017     Gastrointestinal hemorrhage, unspecified gastrointestinal hemorrhage type 06/05/2017     Anticoagulant long-term use 06/16/2017     Acute GI bleeding 12/09/2017     SOB (shortness of breath) 01/26/2018     Acute bronchitis with bronchospasm      Acute respiratory failure with hypoxia and hypercapnia (H)      History of acute respiratory failure 02/19/2018     Chronic respiratory failure with hypoxia (H) 03/02/2018     Dyspnea 08/09/2018     Chest pain 08/10/2018     Hemodialysis catheter infection (H)      Hyperkalemia 10/18/2018     Atrial fibrillation with RVR (H) 03/03/2019      Intertrochanteric fracture of left hip, closed, initial encounter (H) 06/23/2019     Closed intertrochanteric fracture of hip, left, initial encounter (H) 06/22/2019     SOB (shortness of breath) 07/13/2020     Hypotension, unspecified hypotension type      Hypoxia 10/05/2020     Hypoxemia 12/08/2020     Past Medical History:   Diagnosis Date     Arthritis      Chronic anemia 6/1/2014     Chronic kidney disease      COPD (chronic obstructive pulmonary disease) (H)      CVA (cerebral infarction)      Disease of thyroid gland      Dyslipidemia      ESRD (end stage renal disease) (H) 06/03/2009     Essential hypertension 6/30/2014     GI (gastrointestinal bleed)      Left Atrial Appendage Occlusion (WATCHMAN) 4/5/2018     Obesity      Oxygen dependent        Current Outpatient Medications   Medication Sig     acetaminophen (TYLENOL) 500 MG tablet Take 2 tablets (1,000 mg total) by mouth every 6 (six) hours as needed.     albuterol (PROVENTIL) 2.5 mg /3 mL (0.083 %) nebulizer solution Take 3 mL (2.5 mg total) by nebulization every 4 (four) hours as needed for wheezing.     aspirin 81 MG EC tablet Take 1 tablet (81 mg total) by mouth daily. (Patient taking differently: Take 81 mg by mouth daily. )     atorvastatin (LIPITOR) 10 MG tablet Take 10 mg by mouth at bedtime.     B complex 11-folic-C-biot-zinc (DIALYVITE) [phone] mg-mg-mcg-mg Tab Take 1 tablet by mouth daily with supper. (Dialyvite)      buPROPion (WELLBUTRIN XL) 150 MG 24 hr tablet Take 1 tablet (150 mg total) by mouth 2 (two) times a week. Tuesday and saturday (Patient taking differently: Take 150 mg by mouth 2 (two) times a week. Tuesday and saturday)     cholecalciferol, vitamin D3, 1,000 unit (25 mcg) tablet Take 2,000 Units by mouth daily.      cinacalcet (SENSIPAR) 30 MG tablet Take 30 mg by mouth see administration instructions. Take three times weekly with dialysis (on Mondays, Wednesdays, and Fridays).     famotidine (PEPCID) 20 MG tablet Take  1 tablet (20 mg total) by mouth at bedtime.     folic acid (FOLVITE) 1 MG tablet TAKE ONE TABLET BY MOUTH ONCE DAILY (Patient taking differently: No sig reported)     gabapentin (NEURONTIN) 100 MG capsule TAKE 1 CAPSULE BY MOUTH THREE TIMES DAILY (Patient taking differently: No sig reported)     Lactobacillus rhamnosus GG (CULTURELLE) 10-15 Billion cell capsule Take 1 capsule by mouth daily with lunch.      metoprolol succinate (TOPROL-XL) 25 MG Take 1 tablet (25 mg total) by mouth daily with lunch. Hold for SBP<110 or hr<60     midodrine (PROAMATINE) 10 MG tablet Take 20 mg by mouth 3 (three) times a week. On dialysis days. Hold for SBP>105  Patient reports that she takes 20mg prior to dialysis     midodrine (PROAMATINE) 5 MG tablet Take 10 mg by mouth 4 (four) times a week. On non dialysis days if SBP < 106     oxyCODONE (ROXICODONE) 5 MG immediate release tablet Take 5 mg by mouth 2 (two) times a day as needed for pain. Give half tablet to equal 2.5 mg twice daily as needed     polyvinyl alcohol (LIQUIFILM TEARS) 1.4 % ophthalmic solution Administer 1 drop to both eyes as needed for dry eyes.     predniSONE (DELTASONE) 10 mg tablet Take 40 mg by mouth daily for 3 days, THEN 20 mg daily for 3 days, THEN 10 mg daily for 3 days, THEN 5 mg daily for 3 days.     senna-docusate (SENNOSIDES-DOCUSATE SODIUM) 8.6-50 mg tablet Take 1 tablet by mouth at bedtime as needed for constipation.      sevelamer carbonate (RENVELA) 800 mg tablet Take 1,600 mg by mouth 2 (two) times a day as needed (snacks).     sevelamer HCL (RENAGEL) 800 MG tablet Take 1,600 mg by mouth 3 (three) times a day with meals.      timolol maleate (TIMOPTIC) 0.5 % ophthalmic solution Administer 1 drop to both eyes 2 (two) times a day.      traZODone (DESYREL) 150 MG tablet Take 150 mg by mouth at bedtime.        Allergies   Allergen Reactions     Ace Inhibitors Cough     Atrovent [Ipratropium Bromide] Headache     Fosinopril Sodium Cough     Zolpidem       hallucinations         Review of Systems  No fevers or chills. No headache, lightheadedness or dizziness.  Chronic SOB, patient wears oxygen at 2 to 3 L, no chest pains or palpitations. Appetite is good. No nausea, vomiting, constipation or diarrhea. No dysuria, frequency, burning or pain with urination. + weakness. + chronic back pain. Continues on gabapentin and tylenol. Otherwise review of systems are negative.     Physical Exam  PHYSICAL EXAMINATION:  Vital signs: /62, pulse 71, respirations 16, temperature 97.3, 98% on 1 L.  Weight 160.8  pounds.    General: Awake, sitting up in wheelchair, alert, oriented x3, appropriately, follows simple commands, conversant  HEENT: Pink conjunctiva, anicteric sclerae, moist oral mucosa.  NECK: Supple, no lymphadenopathy no masses.  CVS: Dialysis catheter in the right chest wall.  Regular rate and rhythm on exam.  LUNG: No wheezes, rales or rhonchi. Somewhat diminished in the bases.  O2 on at 1 L nasal cannula.  ABDOMEN: Soft, non distended with active bowel sounds.   EXTREMITIES: Moves both upper and lower extremities with generalized weakness.  3+ edema in the right foot.  No redness to the right lower extremity. PSYCHIATRIC: Cognition intact.       Labs:    Lab Results   Component Value Date    WBC 2.3 (L) 01/27/2021    HGB 9.6 (L) 01/27/2021    HCT 32.3 (L) 01/27/2021     (H) 01/27/2021    PLT 98 (L) 01/27/2021     Results for orders placed or performed during the hospital encounter of 01/26/21   Basic Metabolic Panel   Result Value Ref Range    Sodium 133 (L) 136 - 145 mmol/L    Potassium 5.8 (H) 3.5 - 5.0 mmol/L    Chloride 100 98 - 107 mmol/L    CO2 21 (L) 22 - 31 mmol/L    Anion Gap, Calculation 12 5 - 18 mmol/L    Glucose 234 (H) 70 - 125 mg/dL    Calcium 8.4 (L) 8.5 - 10.5 mg/dL    BUN 41 (H) 8 - 28 mg/dL    Creatinine 5.88 (H) 0.60 - 1.10 mg/dL    GFR MDRD Af Amer 9 (L) >60 mL/min/1.73m2    GFR MDRD Non Af Amer 7 (L) >60 mL/min/1.73m2            Assessment/Plan:    1. Acute on chronic diastolic congestive heart failure (H)   fluids managed per dialysis.   Weight gain. Pt reeducated on fluid management and dietary guidelines.    2. Pulmonary emphysema, unspecified emphysema type (H)   currently on O2 at 2 L during the day.  Continues on nebs and prednisone taper.   3. ESRD on dialysis (H)   continues 3 times weekly.     4. Severe low back pain   continues on  gabapentin and Tylenol.  She was receiving low-dose oxycodone on her previous day.   Continue oxycodone prn.    6. Neuropathic pain   continues on gabapentin.   7. Paroxysmal atrial fibrillation (H)   currently on metoprolol and aspirin.     This note has been dictated using voice recognition software. Any grammatical or context distortions are unintentional and inherent to the software      Electronically signed by: June Kingston, CNP

## 2021-06-15 NOTE — PROGRESS NOTES
[facility]    Name:   Belia Rodriguez (Itzel)  : 1945  Facility:   [facility] [[id number]]   Room:   Code Status: DNR/DNI and POLST AVAILABLE -   Fac type:   SNF (Skilled Nursing Facility, TCU) -     CHIEF COMPLAINT / REASON FOR VISIT:  Chief Complaint   Patient presents with     H & P     Acute on chronic respiratory failure due to a combination of fluid overload and COPD exacerbation     [facility] from 18 until 18  [facility] from 18 until 18  [facility] from 2021 until 2021 (acute on chronic respiratory failure due to a combination of fluid overload and COPD exacerbation)  [facility] from 2021 until 2021  [facility] from 2021 until 2021 (acute on chronic respiratory failure due to a combination of fluid overload and COPD exacerbation)       HPI: Belia is a 75 y.o. female with known end-stage renal disease (on dialysis), COPD (former smoker, 45-60 pack years),  chronic respiratory failure with hypoxia, obstructive sleep apnea (severe and previously intolerant of CPAP), chronic atrial fibrillation, GERD, essential hypertension, and anemia related to chronic renal failure.    She has had multiple hospitalizations for respiratory failure due to a combination of fluid overload and COPD exacerbation.  This occurred on 2021 and again on 2021.  Her last admission came within hours of [facility].  When last in the TCU at [facility], she was a full code, but she indicated during her last hospitalization that she wants no further BiPAP and is DNR/DNI.  This did improve somewhat with steroids and doxycycline.    While she continues on hemodialysis , , and , she did have some questions regarding hospice but was not sure she  would sign on given her need for dialysis.  She did have a palliative care consult, and she strongly agreed, especially given her readmission to the hospital.      CURRENT/RECENT TCU ISSUES    Disposition: She tells me she plans to go on hospice after discharge.  She wants to go to [facility].  She notes that she will be stopping dialysis and would expect to survive no longer than 1 week.  She has been presented with this suggestion about 6 months ago.  She believes that she is now ready.  Her main concern is not about dying but is getting her paperwork done for her  of 44 years.     She tells me that she is doing much better than she was just a few days ago.  She complains of neuropathy in her feet, Raynaud's syndrome in the feet as well, and some constipation.  She would prefer to have her senna S at bedtime, and we will change this.    She is on a 1500 mL fluid restriction.      ROS: She denies any headaches or chest pains, coughing or congestion at the moment, dizziness, dysuria, diarrhea, difficulty chewing or swallowing.    Past Medical History:   Diagnosis Date     Arthritis      CHF (congestive heart failure) (H)      Chronic anemia 6/1/2014     Chronic kidney disease      Chronic thoracic aortic dissection (H) 10/7/2015    Descending thoracic aorta; treated medically per notes of Dragan Singh and Jennifer.     COPD (chronic obstructive pulmonary disease) (H)      CVA (cerebral infarction)      Disease of thyroid gland      Dyslipidemia      ESRD (end stage renal disease) (H) 06/03/2009    on dialysis with Dr. Mitchell     Essential hypertension 6/30/2014     Gastrointestinal hemorrhage, unspecified gastrointestinal hemorrhage type 6/5/2017     GI (gastrointestinal bleed)      GI bleeding 6/5/2017     Gout      L3 vertebral fracture (H) 11/16/2015     Left Atrial Appendage Occlusion (WATCHMAN) 4/5/2018    LAAO April 5, 2018 (30 mm WATCHMAN)     Obesity      ZULEIKA (obstructive sleep apnea), severe,  intolerant of CPAP 10/22/2015     Oxygen dependent     3L nc     Pneumonia 2015     Right foot drop      Spinal stenosis 3/28/2016     Stroke (H) 3/24/2016              Family History   Problem Relation Age of Onset     Dementia Mother      Diabetes Mother      Arthritis Mother      Cancer Mother      Depression Mother      Heart disease Mother      Vision loss Mother      Stroke Father      Heart disease Father      Breast cancer Neg Hx      Social History     Socioeconomic History     Marital status:      Spouse name: Cayden     Number of children: 2     Years of education: Not on file     Highest education level: Not on file   Occupational History     Employer: RETIRED   Social Needs     Financial resource strain: Not on file     Food insecurity     Worry: Not on file     Inability: Not on file     Transportation needs     Medical: Not on file     Non-medical: Not on file   Tobacco Use     Smoking status: Former Smoker     Packs/day: 1.50     Years: 37.00     Pack years: 55.50     Types: Cigarettes     Quit date: 2009     Years since quittin.1     Smokeless tobacco: Never Used   Substance and Sexual Activity     Alcohol use: No     Alcohol/week: 11.7 standard drinks     Types: 14 Standard drinks or equivalent per week     Comment: 14 mixed drinks per week     Drug use: No     Sexual activity: Never     Partners: Male   Lifestyle     Physical activity     Days per week: Not on file     Minutes per session: Not on file     Stress: Not on file   Relationships     Social connections     Talks on phone: Not on file     Gets together: Not on file     Attends Jew service: Not on file     Active member of club or organization: Not on file     Attends meetings of clubs or organizations: Not on file     Relationship status: Not on file     Intimate partner violence     Fear of current or ex partner: Not on file     Emotionally abused: Not on file     Physically abused: Not on file     Forced  sexual activity: Not on file   Other Topics Concern     Not on file   Social History Narrative    Lives with her . Daughter in [place] and daughter in [place].     MEDICATIONS: Reviewed from the MAR, physician orders, and earlier progress notes.  Post Discharge Medication Reconciliation Status: discharge medications reconciled and changed, per note/orders.  Updated by me today (02/22/2021) with a change in time of senna S reflected below.    Current Outpatient Medications   Medication Sig     acetaminophen (TYLENOL) 500 MG tablet Take 2 tablets (1,000 mg total) by mouth every 6 (six) hours as needed.     albuterol (PROVENTIL) 2.5 mg /3 mL (0.083 %) nebulizer solution Take 3 mL (2.5 mg total) by nebulization every 4 (four) hours as needed for wheezing.     aspirin 81 MG EC tablet Take 1 tablet (81 mg total) by mouth daily.     atorvastatin (LIPITOR) 10 MG tablet Take 10 mg by mouth at bedtime.     B complex 11-folic-C-biot-zinc (DIALYVITE) [phone] mg-mg-mcg-mg Tab Take 1 tablet by mouth daily with supper. (Dialyvite)      buPROPion (WELLBUTRIN XL) 150 MG 24 hr tablet Take 1 tablet (150 mg total) by mouth 2 (two) times a week. Tuesday and saturday (Patient taking differently: Take 150 mg by mouth 2 (two) times a week. Tuesday and saturday)     cholecalciferol, vitamin D3, 1,000 unit (25 mcg) tablet Take 2,000 Units by mouth daily.      cinacalcet (SENSIPAR) 30 MG tablet Take 30 mg by mouth see administration instructions. Take three times weekly with dialysis (on Mondays, Wednesdays, and Fridays).     famotidine (PEPCID) 20 MG tablet Take 1 tablet (20 mg total) by mouth at bedtime.     folic acid (FOLVITE) 1 MG tablet TAKE ONE TABLET BY MOUTH ONCE DAILY (Patient taking differently: Take 1 mg by mouth daily. )     gabapentin (NEURONTIN) 100 MG capsule Take 100 mg by mouth 2 (two) times a day. Leg pain     Lactobacillus rhamnosus GG (CULTURELLE) 10-15 Billion cell capsule Take 1 capsule by mouth daily with  lunch.      metoprolol succinate (TOPROL-XL) 25 MG Take 1 tablet (25 mg total) by mouth daily with lunch. Hold for SBP<110 or hr<60     midodrine (PROAMATINE) 10 MG tablet Take 2 tablets (20 mg total) by mouth 3 (three) times a week. Patient takes prior to dialysis qMWF and PRN if additional dialysis treatments.  Patient reports that she takes 20mg prior to dialysis     midodrine (PROAMATINE) 5 MG tablet Take 10 mg by mouth 4 (four) times a week. On non dialysis days if SBP < 106     oxyCODONE (ROXICODONE) 5 MG immediate release tablet TAKE 1/2 TO 1 TABLET (2.5-5MG) BY MOUTH TWICE DAILY AS NEEDED FOR PAIN     polyvinyl alcohol (LIQUIFILM TEARS) 1.4 % ophthalmic solution Administer 1 drop to both eyes as needed for dry eyes.     senna-docusate (SENNOSIDES-DOCUSATE SODIUM) 8.6-50 mg tablet Take 1 tablet by mouth at bedtime as needed for constipation.      sevelamer carbonate (RENVELA) 800 mg tablet Take 1,600 mg by mouth 2 (two) times a day as needed (snacks).     sevelamer HCL (RENAGEL) 800 MG tablet Take 1,600 mg by mouth 3 (three) times a day with meals.      timolol maleate (TIMOPTIC) 0.5 % ophthalmic solution Administer 1 drop to both eyes 2 (two) times a day.      traZODone (DESYREL) 150 MG tablet Take 150 mg by mouth at bedtime.      ALLERGIES:   Allergies   Allergen Reactions     Ace Inhibitors Cough     Atrovent [Ipratropium Bromide] Headache     Fosinopril Sodium Cough     Zolpidem      hallucinations     DIET: Dialysis diet, regular texture, thin liquids.  1500 mL /24-hour fluid restriction    Vitals:    02/21/21 1150   BP: 106/64   Pulse: 71   Resp: 16   Temp: 97.3  F (36.3  C)   SpO2: 97%   Weight: 177 lb 1.6 oz (80.3 kg)     Body mass index is 29.47 kg/m .    EXAMINATION:   General: Pleasant, alert, and conversant middle-aged female, sitting in her wheelchair, in no apparent distress.  Head: Normocephalic and atraumatic.   Eyes: PERRLA, sclerae clear.  Slight disconjugate gaze.  ENT: Moist oral mucosa.   She has her own teeth.  No rhinorrhea or nasal discharge.  Hearing is unimpaired.  Cardiovascular: Irregular rhythm with a 2/6 systolic ejection murmur at the left sternal border.  Respiratory: Diminished lung sounds bilaterally but otherwise clear.  Abdomen: Soft and nontender.   Musculoskeletal/Extremities: Age-related degenerative joint disease.  Right 3+ pitting pedal and pretibial edema, 2+ on the left.  Integument: No rashes, clinically significant lesions, or skin breakdown.   Cognitive/Psychiatric: Alert and oriented ×4.  Affect is euthymic.    DIAGNOSTICS:   Results for orders placed or performed during the hospital encounter of 02/18/21   Basic Metabolic Panel   Result Value Ref Range    Sodium 136 136 - 145 mmol/L    Potassium 3.7 3.5 - 5.0 mmol/L    Chloride 97 (L) 98 - 107 mmol/L    CO2 29 22 - 31 mmol/L    Anion Gap, Calculation 10 5 - 18 mmol/L    Glucose 95 70 - 125 mg/dL    Calcium 8.7 8.5 - 10.5 mg/dL    BUN 28 8 - 28 mg/dL    Creatinine 3.01 (H) 0.60 - 1.10 mg/dL    GFR MDRD Af Amer 18 (L) >60 mL/min/1.73m2    GFR MDRD Non Af Amer 15 (L) >60 mL/min/1.73m2     Lab Results   Component Value Date    WBC 3.9 (L) 02/20/2021    HGB 10.1 (L) 02/20/2021    HCT 31.8 (L) 02/20/2021     (H) 02/20/2021    PLT 91 (L) 02/20/2021     Estimated Creatinine Clearance: 17.8 mL/min (A) (by C-G formula based on SCr of 3.01 mg/dL (H)).  Lab Results   Component Value Date     (H) 02/18/2021     ASSESSMENT/Plan:      ICD-10-CM    1. Chronic respiratory failure with hypoxia (H)  J96.11    2. COPD exacerbation (H)  J44.1    3. Pulmonary emphysema, unspecified emphysema type (H)  J43.9    4. ESRD on hemodialysis (H), MWF  N18.6     Z99.2    5. Chronic atrial fibrillation (H)  I48.20    6. Anemia of chronic renal failure, stage 5 (H)  N18.5     D63.1    7. Essential hypertension  I10      CHANGES:    1.  Change senna S to bedtime.    CARE PLAN:    The care plan has been reviewed and all orders signed. Changes  to care plan, if any, as noted. Otherwise, continue care plan of care.  Total time spent with this patient was approximately 35 minutes, with greater than 50% spent in counseling and coordination of care that included obtaining data from the patient regarding her multiple recent hospitalizations, not specifically addressed in the most recent discharge summary.    The above has been created using voice recognition software. Please be aware that this may unintentionally  produce inaccuracies and/or nonsensical sentences.      Electronically signed by: Louie Liriano CNP

## 2021-06-15 NOTE — PROGRESS NOTES
[facility]    Name:   Belia Rodriguez (Itzel)  : 1945  Facility:   [facility] [[id number]]   Room:   Code Status: DNR/DNI and POLST AVAILABLE -   Fac type:   SNF (Skilled Nursing Facility, TCU) -     CHIEF COMPLAINT / REASON FOR VISIT:  Chief Complaint   Patient presents with     Follow-up     TCU follow-up: Hospitalization for acute on chronic respiratory failure due to a combination of fluid overload and COPD exacerbation.     Problem Visit     1. Hypotension due to hypovolemia. 2. Depression.     [facility] from 18 until 18  [facility]CU from 18 until 18  [facility] from 2021 until 2021 (acute on chronic respiratory failure due to a combination of fluid overload and COPD exacerbation)  [facility] from 2021 until 2021  [facility] from 2021 until 2021 (acute on chronic respiratory failure due to a combination of fluid overload and COPD exacerbation)     Patient was last seen by me on 2021.      HPI: Belia is a 75 y.o. female with known end-stage renal disease (on dialysis), COPD (former smoker, 45-60 pack years),  chronic respiratory failure with hypoxia, obstructive sleep apnea (severe and previously intolerant of CPAP), chronic atrial fibrillation, GERD, essential hypertension, and anemia related to chronic renal failure.    She has had multiple hospitalizations for respiratory failure due to a combination of fluid overload and COPD exacerbation.  This occurred on 2021 and again on 2021.  Her last admission came within hours of [facility].  When last in the TCU at [facility], she was a full code, but she indicated during her last hospitalization that she wants no further BiPAP and is DNR/DNI.  This did improve somewhat with steroids and  doxycycline.    While she continues on hemodialysis Mondays, Wednesdays, and Fridays, she did have some questions regarding hospice but was not sure she would sign on given her need for dialysis.  She did have a palliative care consult, and she strongly agreed, especially given her readmission to the hospital.      CURRENT/RECENT TCU ISSUES    Disposition: The patient is aware that her health will not improve and is ready for hospice, although she is concerned about having all her paperwork in order.  I had received a request by  that I give an okay for an in-house psych consult.  My initial thought was that the patient has been doing quite well and accepting her situation, and there would be nothing gained from such a consult.  At the time, we spent a good 20 minutes talking about her feelings with regard to hospice, dying, and other related issues, including depression.  She did tell me that she thought she might benefit from a consult and was recently seen via video conference.  She thought it was beneficial.    She was having no particular issues until about 6 weeks ago, not needing oxygen but now expecting a chronic continuous need.  She tells me she plans to go on hospice after discharge.  She wants to go to [facility].  She notes that she will be stopping dialysis and would expect to survive no longer than 1 week.  She has been presented with this suggestion about 6 months ago.  She believes that she is now ready.  Her main concern is not about dying but is getting her paperwork done for her  of 44 years.     She is doing fairly well.  She complains of neuropathy in her feet, Raynaud's phenomenon in the feet as well, and some constipation.    Hypotension: She is on a 1500 mL fluid restriction.  This may be producing hypovolemia.  She does have orders for 20 mg of midodrine to take on dialysis days (Mondays, Wednesdays, and Fridays) and has additional orders for taking this when  "additional dialysis is needed.  However, she has been running significantly low blood pressures.  Systolic blood pressure has been as low as 79. On 03/01/2021, another nurse practitioner ordered 10 mg of midodrine to be given on nondialysis days when the SBP is <106.  An adjustment was recently made at dialysis, and she is now receiving a 20 mg dose there.    Medication changes: At an earlier visit, considering her plans for hospice, we talked about her medications.  It was agreed that atorvastatin was no longer needed, and so we discontinued it.  At the time, she stated, \"anything you can take away from me won't break my heart at all.\"    Discharge planning: She told her  and daughter that she wanted to stay here, but her  became angry.  He has been the one to take her to dialysis and suggest that she would need to arrange her own transportation if she decided to stay.  He also told her he would not be doing housework.  The end result is that the patient will be staying 2 more weeks.      ROS:   Positives: Her feet continue to be painful due to neuropathy.  She has found that lying in bed with her knees up in the air helps. She is on chronic oxygen, and breathing is no better and no worse.      Negatives: Complaining of constipation before, things are going better now.  She denies any headaches or chest pains, coughing or congestion at the moment, dizziness, dysuria, diarrhea, difficulty chewing or swallowing, or problems with sleep (when on 150 mg of trazodone nightly).    Past Medical History:   Diagnosis Date     Arthritis      CHF (congestive heart failure) (H)      Chronic anemia 6/1/2014     Chronic kidney disease      Chronic thoracic aortic dissection (H) 10/7/2015    Descending thoracic aorta; treated medically per notes of Dragan Singh and Jennifer.     COPD (chronic obstructive pulmonary disease) (H)      CVA (cerebral infarction)      Disease of thyroid gland      Dyslipidemia      ESRD (end " stage renal disease) (H) 2009    on dialysis with Dr. Mitchell     Essential hypertension 2014     Gastrointestinal hemorrhage, unspecified gastrointestinal hemorrhage type 2017     GI (gastrointestinal bleed)      GI bleeding 2017     Gout      L3 vertebral fracture (H) 2015     Left Atrial Appendage Occlusion (WATCHMAN) 2018    LAAO 2018 (30 mm WATCHMAN)     Obesity      ZULEIKA (obstructive sleep apnea), severe, intolerant of CPAP 10/22/2015     Oxygen dependent     3L nc     Pneumonia 2015     Right foot drop      Spinal stenosis 3/28/2016     Stroke (H) 3/24/2016              Family History   Problem Relation Age of Onset     Dementia Mother      Diabetes Mother      Arthritis Mother      Cancer Mother      Depression Mother      Heart disease Mother      Vision loss Mother      Stroke Father      Heart disease Father      Breast cancer Neg Hx      Social History     Socioeconomic History     Marital status:      Spouse name: Cayden     Number of children: 2     Years of education: Not on file     Highest education level: Not on file   Occupational History     Employer: RETIRED   Social Needs     Financial resource strain: Not on file     Food insecurity     Worry: Not on file     Inability: Not on file     Transportation needs     Medical: Not on file     Non-medical: Not on file   Tobacco Use     Smoking status: Former Smoker     Packs/day: 1.50     Years: 37.00     Pack years: 55.50     Types: Cigarettes     Quit date: 2009     Years since quittin.2     Smokeless tobacco: Never Used   Substance and Sexual Activity     Alcohol use: No     Alcohol/week: 11.7 standard drinks     Types: 14 Standard drinks or equivalent per week     Comment: 14 mixed drinks per week     Drug use: No     Sexual activity: Never     Partners: Male   Lifestyle     Physical activity     Days per week: Not on file     Minutes per session: Not on file     Stress: Not on file    Relationships     Social connections     Talks on phone: Not on file     Gets together: Not on file     Attends Scientologist service: Not on file     Active member of club or organization: Not on file     Attends meetings of clubs or organizations: Not on file     Relationship status: Not on file     Intimate partner violence     Fear of current or ex partner: Not on file     Emotionally abused: Not on file     Physically abused: Not on file     Forced sexual activity: Not on file   Other Topics Concern     Not on file   Social History Narrative    Lives with her . Daughter in [place] and daughter in [place].     MEDICATIONS: Reviewed from the MAR, physician orders, and earlier progress notes.  Post Discharge Medication Reconciliation Status: medication reconciliation previously completed during another office visit.  Updated by me today (03/11/2021) with recent increase in midodrine at dialysis is noted below.    Current Outpatient Medications   Medication Sig     acetaminophen (TYLENOL) 500 MG tablet Take 2 tablets (1,000 mg total) by mouth every 6 (six) hours as needed.     albuterol (PROVENTIL) 2.5 mg /3 mL (0.083 %) nebulizer solution Take 3 mL (2.5 mg total) by nebulization every 4 (four) hours as needed for wheezing.     aspirin 81 MG EC tablet Take 1 tablet (81 mg total) by mouth daily.     atorvastatin (LIPITOR) 10 MG tablet Take 10 mg by mouth at bedtime.     B complex 11-folic-C-biot-zinc (DIALYVITE) [phone] mg-mg-mcg-mg Tab Take 1 tablet by mouth daily with supper. (Dialyvite)      buPROPion (WELLBUTRIN XL) 150 MG 24 hr tablet Take 1 tablet (150 mg total) by mouth 2 (two) times a week. Tuesday and saturday (Patient taking differently: Take 150 mg by mouth 2 (two) times a week. Tuesday and saturday)     cholecalciferol, vitamin D3, 1,000 unit (25 mcg) tablet Take 2,000 Units by mouth daily.      cinacalcet (SENSIPAR) 30 MG tablet Take 30 mg by mouth see administration instructions. Take three  times weekly with dialysis (on Mondays, Wednesdays, and Fridays).     famotidine (PEPCID) 20 MG tablet Take 1 tablet (20 mg total) by mouth at bedtime.     folic acid (FOLVITE) 1 MG tablet TAKE ONE TABLET BY MOUTH ONCE DAILY (Patient taking differently: Take 1 mg by mouth daily. )     gabapentin (NEURONTIN) 100 MG capsule Take 100 mg by mouth 2 (two) times a day. Leg pain     Lactobacillus rhamnosus GG (CULTURELLE) 10-15 Billion cell capsule Take 1 capsule by mouth daily with lunch.      metoprolol succinate (TOPROL-XL) 25 MG Take 1 tablet (25 mg total) by mouth daily with lunch. Hold for SBP<110 or hr<60     midodrine (PROAMATINE) 10 MG tablet Take 2 tablets (20 mg total) by mouth 3 (three) times a week. Patient takes prior to dialysis qMWF and PRN if additional dialysis treatments.  Patient reports that she takes 20mg prior to dialysis (Patient taking differently: Take 20 mg by mouth 3 (three) times a week. Patient takes prior to dialysis qMWF and PRN if additional dialysis treatments.  Patient reports taking 20mg prior to dialysis    Also, on nondialysis days, give 10 mg daily for SBP <106.)     midodrine (PROAMATINE) 10 MG tablet Take 10 mg by mouth daily as needed. Take on non dialysis days. Has an order for dialysis days.     midodrine (PROAMATINE) 5 MG tablet Take 10 mg by mouth 4 (four) times a week. On non dialysis days if SBP < 106     oxyCODONE (ROXICODONE) 5 MG immediate release tablet TAKE 1/2 TO 1 TABLET (2.5-5MG) BY MOUTH TWICE DAILY AS NEEDED FOR PAIN     polyvinyl alcohol (LIQUIFILM TEARS) 1.4 % ophthalmic solution Administer 1 drop to both eyes as needed for dry eyes.     pot bicarb/sod bicarb/cit ac (EDI-SELTZER GOLD ORAL) Take by mouth daily.     senna-docusate (SENNOSIDES-DOCUSATE SODIUM) 8.6-50 mg tablet Take 1 tablet by mouth at bedtime as needed for constipation.      sevelamer carbonate (RENVELA) 800 mg tablet Take 1,600 mg by mouth 2 (two) times a day as needed (snacks).     sevelamer HCL  (RENAGEL) 800 MG tablet Take 1,600 mg by mouth 3 (three) times a day with meals.      timolol maleate (TIMOPTIC) 0.5 % ophthalmic solution Administer 1 drop to both eyes 2 (two) times a day.      traZODone (DESYREL) 150 MG tablet Take 150 mg by mouth at bedtime.      ALLERGIES:   Allergies   Allergen Reactions     Ace Inhibitors Cough     Atrovent [Ipratropium Bromide] Headache     Fosinopril Sodium Cough     Zolpidem      hallucinations     DIET: Dialysis diet, regular texture, thin liquids.  1500 mL /24-hour fluid restriction    Vitals:    03/11/21 1316   BP: 110/70   Pulse: 80   Resp: 18   Temp: 98  F (36.7  C)   SpO2: 98%   Weight: 158 lb 12.8 oz (72 kg)     Body mass index is 25.63 kg/m .    EXAMINATION:   General: Pleasant, alert, and conversant middle-aged female, sitting on the bed, in no apparent distress.  Head: Normocephalic and atraumatic.   Eyes: PERRLA, sclerae clear.  Slight disconjugate gaze.  ENT: Moist oral mucosa.  She has her own teeth.  She has a bit of a runny nose today.  Hearing is unimpaired.  Cardiovascular: Irregular rhythm with a 2/6 systolic ejection murmur at the left sternal border.  Respiratory: Diminished lung sounds bilaterally but otherwise clear.  Abdomen: Soft and nontender.   Musculoskeletal/Extremities: Age-related degenerative joint disease.  Right 2+ pitting pedal and pretibial edema, 1+ on the left, slightly improved while Ace wrapped.  Integument: No rashes, clinically significant lesions, or skin breakdown.   Cognitive/Psychiatric: Alert and oriented ×4.  Affect is euthymic.    DIAGNOSTICS:   Results for orders placed or performed during the hospital encounter of 02/18/21   Basic Metabolic Panel   Result Value Ref Range    Sodium 136 136 - 145 mmol/L    Potassium 3.7 3.5 - 5.0 mmol/L    Chloride 97 (L) 98 - 107 mmol/L    CO2 29 22 - 31 mmol/L    Anion Gap, Calculation 10 5 - 18 mmol/L    Glucose 95 70 - 125 mg/dL    Calcium 8.7 8.5 - 10.5 mg/dL    BUN 28 8 - 28 mg/dL     Creatinine 3.01 (H) 0.60 - 1.10 mg/dL    GFR MDRD Af Amer 18 (L) >60 mL/min/1.73m2    GFR MDRD Non Af Amer 15 (L) >60 mL/min/1.73m2     Lab Results   Component Value Date    WBC 3.9 (L) 02/20/2021    HGB 10.1 (L) 02/20/2021    HCT 31.8 (L) 02/20/2021     (H) 02/20/2021    PLT 91 (L) 02/20/2021     CrCl cannot be calculated (Patient's most recent lab result is older than the maximum 10 days allowed.).  Lab Results   Component Value Date     (H) 02/18/2021     ASSESSMENT/Plan:      ICD-10-CM    1. Chronic respiratory failure with hypoxia (H)  J96.11    2. ESRD on hemodialysis (H), MWF  N18.6     Z99.2    3. Chronic diastolic heart failure (H)  I50.32    4. Hypotension due to hypovolemia  I95.89     E86.1    5. Chronic atrial fibrillation (H)  I48.20    6. Anemia of chronic renal failure, stage 5 (H)  N18.5     D63.1    7. Pulmonary emphysema, unspecified emphysema type (H)  J43.9    8. Mild episode of recurrent major depressive disorder (H)  F33.0    9. Chronic acquired lymphedema  I89.0    10. Gastroesophageal reflux disease without esophagitis  K21.9      CHANGES:    None.    CARE PLAN:    The care plan has been reviewed and all orders signed. Changes to care plan, if any, as noted. Otherwise, continue care plan of care.      The above has been created using voice recognition software. Please be aware that this may unintentionally  produce inaccuracies and/or nonsensical sentences.      Electronically signed by: Louie Liriano, AYAH

## 2021-06-15 NOTE — PROGRESS NOTES
Code Status:  DNR  Visit Type: Problem Visit (CHF)     Facility:  [facility] [[id number]]              History of Present Illness: Belia Rodriguez is a 75 y.o. female  who I am seeing today for follow-up on the TCU post recent hospitalization for congestive heart failure and COPD.  Past medical history includes end-stage renal disease on dialysis 3 times weekly, acute hypoxic respiratory failure secondary to CHF as well as COPD.  Patient recently hospitalized on 1/26/2021 secondary to increasing shortness of breath.  She was needing increased oxygen support.  She is chronically on O2 2 to 3 L.  She was treated for COPD exacerbation.  She was given Solu-Medrol IV as well as the nebulizers.  She does have underlying end-stage renal disease and continues on dialysis 3 times weekly.  She also has extra runs on Thursdays.  She was needing extra runs prior to her hospitalization for increased CHF exacerbation.  She did get dialysis daily during her hospitalization and her breathing improved.  She was switched from her Solu-Medrol over to prednisone.  Continues on a prednisone taper.  She also was treated with azithromycin.      Today patient sitting up in wheelchair.  Patient with end-stage renal disease on dialysis 3 times weekly.  Patient continues on prednisone taper as well as azithromycin.  She is on O2 at 2 L.  She reports her breathing is improving.  She does have some chronic right lower extremity edema however much improved from when she was at the TCU previously.  She said she was using an Ace wrap at dialysis.  Will continue to do so.  History of atrial fib.  Rate controlled.  Patient continues on midodrine for blood pressure support.  Chronic neuropathy.  Continues on gabapentin and Tylenol.  She did take low-dose oxycodone from her previous TCU stay.  She is requesting this today.        Active Ambulatory Problems     Diagnosis Date Noted     Tachycardia-bradycardia syndrome (H) 06/12/2014      Hyperkalemia 06/30/2014     Essential hypertension with goal blood pressure less than 140/90 06/30/2014     Hyperlipidemia 06/30/2014     Acute respiratory failure with hypoxia (H) 06/30/2014     Fluid overload 10/07/2015     ZULEIKA (obstructive sleep apnea), severe 10/22/2015     Anemia of chronic renal failure, stage 5 (H)      Dissection of thoracoabdominal aorta (H)      Gout      GERD (gastroesophageal reflux disease) 04/14/2017     Right foot drop 05/16/2017     Acute midline low back pain with right-sided sciatica 06/16/2017     History of compression fracture of spine 06/16/2017     Acute and chronic respiratory failure with hypoxia (H)      Pulmonary emphysema (H) 02/19/2018     Presence of Watchman left atrial appendage closure device 04/05/2018     Other dysphagia 08/10/2018     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     Hyperparathyroidism (H) 03/24/2009     Osteoporosis 03/24/2009     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     ESRD on hemodialysis (H), MWF 08/15/2018     Acute pulmonary edema (H) 09/07/2018     Chronic atrial fibrillation (H)      COPD exacerbation (H) 01/04/2019     Acute on chronic diastolic congestive heart failure (H) 01/23/2019     Thrombocytopenia (H)      Contraindication to anticoagulation therapy 03/26/2019     Dyspnea 05/18/2019     Cardiac pacemaker in situ 03/20/2019     S/P AV (atrioventricular) jonn ablation 03/20/2019     Hip fracture, intertrochanteric (H) 06/23/2019     DVT (deep venous thrombosis) (H) 08/22/2019     Carpal tunnel syndrome of left wrist 02/04/2020     Major depressive disorder, remission status unspecified, unspecified whether recurrent 09/15/2020     Encounter for palliative care      PVD (peripheral vascular disease) (H) 12/18/2020     SOB (shortness of breath) 01/26/2021     Resolved Ambulatory Problems     Diagnosis Date Noted     Hypotension 06/01/2014     Fever 06/07/2014     Aortic aneurysm rupture (H)       Bacteremia due to Escherichia coli 06/08/2014     Severe tobacco use disorder 06/30/2014     Persistent atrial fibrillation (H) 06/30/2014     Bradycardia, sinus 06/30/2014     Volume overload 06/30/2014     Hypomagnesemia 06/30/2014     Cellulitis 08/25/2014     Hematochezia 11/02/2014     BRBPR (bright red blood per rectum) 11/02/2014     Hematuria 04/01/2015     Aortic dissection (H) 04/01/2015     Pneumonia 09/07/2015     Hypoxia 09/23/2015     SOB (shortness of breath) 09/23/2015     Acute on chronic diastolic heart failure (H) 09/27/2015     Bacteremia 09/27/2015     Dyspnea 10/07/2015     Chronic thoracic aortic dissection (H) 10/07/2015     L3 vertebral fracture (H) 11/16/2015     GI bleeding 03/20/2016     Chronic systolic congestive heart failure (H)      Acute lower GI bleeding 03/21/2016     Bilateral anterior& Lt Occipital embolic Infarction 03/24/2016     Spinal stenosis 03/28/2016     Back pain 03/28/2016     Tremor 03/28/2016     SOB (shortness of breath) 05/03/2016     CVA (cerebral infarction) 05/04/2016     Respiratory distress 05/09/2016     CHF (congestive heart failure) (H) 05/09/2016     Right knee pain      Dyspnea 10/02/2016     Volume overload 10/02/2016     Acute bronchitis due to infection 10/02/2016     Acute bacterial conjunctivitis of both eyes 10/02/2016     Acute CHF (congestive heart failure) (H) 10/09/2016     Hypervolemia, unspecified hypervolemia type      Pure hypercholesterolemia      Gastroesophageal reflux disease without esophagitis      Leg weakness 04/20/2017     Anticoagulated on warfarin 05/16/2017     Gastrointestinal hemorrhage, unspecified gastrointestinal hemorrhage type 06/05/2017     Anticoagulant long-term use 06/16/2017     Acute GI bleeding 12/09/2017     SOB (shortness of breath) 01/26/2018     Acute bronchitis with bronchospasm      Acute respiratory failure with hypoxia and hypercapnia (H)      History of acute respiratory failure 02/19/2018     Chronic  respiratory failure with hypoxia (H) 03/02/2018     Dyspnea 08/09/2018     Chest pain 08/10/2018     Hemodialysis catheter infection (H)      Hyperkalemia 10/18/2018     Atrial fibrillation with RVR (H) 03/03/2019     Intertrochanteric fracture of left hip, closed, initial encounter (H) 06/23/2019     Closed intertrochanteric fracture of hip, left, initial encounter (H) 06/22/2019     SOB (shortness of breath) 07/13/2020     Hypotension, unspecified hypotension type      Hypoxia 10/05/2020     Hypoxemia 12/08/2020     Past Medical History:   Diagnosis Date     Arthritis      Chronic anemia 6/1/2014     Chronic kidney disease      COPD (chronic obstructive pulmonary disease) (H)      CVA (cerebral infarction)      Disease of thyroid gland      Dyslipidemia      ESRD (end stage renal disease) (H) 06/03/2009     Essential hypertension 6/30/2014     GI (gastrointestinal bleed)      Left Atrial Appendage Occlusion (WATCHMAN) 4/5/2018     Obesity      Oxygen dependent        Current Outpatient Medications   Medication Sig     oxyCODONE (ROXICODONE) 5 MG immediate release tablet Take 5 mg by mouth 2 (two) times a day as needed for pain. Give half tablet to equal 2.5 mg twice daily as needed     acetaminophen (TYLENOL) 500 MG tablet Take 2 tablets (1,000 mg total) by mouth every 6 (six) hours as needed.     albuterol (PROVENTIL) 2.5 mg /3 mL (0.083 %) nebulizer solution Take 3 mL (2.5 mg total) by nebulization every 4 (four) hours as needed for wheezing.     aspirin 81 MG EC tablet Take 1 tablet (81 mg total) by mouth daily. (Patient taking differently: Take 81 mg by mouth daily. )     atorvastatin (LIPITOR) 10 MG tablet Take 10 mg by mouth at bedtime.     B complex 11-folic-C-biot-zinc (DIALYVITE) 7-220-434-50 mg-mg-mcg-mg Tab Take 1 tablet by mouth daily with supper. (Dialyvite)      buPROPion (WELLBUTRIN XL) 150 MG 24 hr tablet Take 1 tablet (150 mg total) by mouth 2 (two) times a week. Tuesday and saturday (Patient  taking differently: Take 150 mg by mouth 2 (two) times a week. Tuesday and saturday)     cholecalciferol, vitamin D3, 1,000 unit (25 mcg) tablet Take 2,000 Units by mouth daily.      cinacalcet (SENSIPAR) 30 MG tablet Take 30 mg by mouth see administration instructions. Take three times weekly with dialysis (on Mondays, Wednesdays, and Fridays).     famotidine (PEPCID) 20 MG tablet Take 1 tablet (20 mg total) by mouth at bedtime.     folic acid (FOLVITE) 1 MG tablet TAKE ONE TABLET BY MOUTH ONCE DAILY (Patient taking differently: No sig reported)     gabapentin (NEURONTIN) 100 MG capsule TAKE 1 CAPSULE BY MOUTH THREE TIMES DAILY (Patient taking differently: No sig reported)     Lactobacillus rhamnosus GG (CULTURELLE) 10-15 Billion cell capsule Take 1 capsule by mouth daily with lunch.      metoprolol succinate (TOPROL-XL) 25 MG Take 1 tablet (25 mg total) by mouth daily with lunch. Hold for SBP<110 or hr<60     midodrine (PROAMATINE) 10 MG tablet Take 20 mg by mouth 3 (three) times a week. On dialysis days. Hold for SBP>105  Patient reports that she takes 20mg prior to dialysis     midodrine (PROAMATINE) 5 MG tablet Take 10 mg by mouth 4 (four) times a week. On non dialysis days if SBP < 106     polyvinyl alcohol (LIQUIFILM TEARS) 1.4 % ophthalmic solution Administer 1 drop to both eyes as needed for dry eyes.     predniSONE (DELTASONE) 10 mg tablet Take 40 mg by mouth daily for 3 days, THEN 20 mg daily for 3 days, THEN 10 mg daily for 3 days, THEN 5 mg daily for 3 days.     senna-docusate (SENNOSIDES-DOCUSATE SODIUM) 8.6-50 mg tablet Take 1 tablet by mouth at bedtime as needed for constipation.      sevelamer carbonate (RENVELA) 800 mg tablet Take 1,600 mg by mouth 2 (two) times a day as needed (snacks).     sevelamer HCL (RENAGEL) 800 MG tablet Take 1,600 mg by mouth 3 (three) times a day with meals.      timolol maleate (TIMOPTIC) 0.5 % ophthalmic solution Administer 1 drop to both eyes 2 (two) times a day.       traZODone (DESYREL) 150 MG tablet Take 150 mg by mouth at bedtime.        Allergies   Allergen Reactions     Ace Inhibitors Cough     Atrovent [Ipratropium Bromide] Headache     Fosinopril Sodium Cough     Zolpidem      hallucinations         Review of Systems  No fevers or chills. No headache, lightheadedness or dizziness.  Chronic SOB, patient wears oxygen at 2 to 3 L, no chest pains or palpitations. Appetite is good. No nausea, vomiting, constipation or diarrhea. No dysuria, frequency, burning or pain with urination. + weakness. + chronic back pain. Continues on gabapentin and tylenol. Otherwise review of systems are negative.     Physical Exam  PHYSICAL EXAMINATION:  Vital signs: /67, pulse 72, respirations 16, temperature 96.9, O2 sats 99% on 2L.  Weight 158 pounds.    General: Awake, sitting up in wheelchair, alert, oriented x3, appropriately, follows simple commands, conversant  HEENT: Pink conjunctiva, anicteric sclerae, moist oral mucosa.  NECK: Supple, no lymphadenopathy no masses.  CVS: Dialysis catheter in the right chest wall.  Regular rate and rhythm on exam.  LUNG: No wheezes, rales or rhonchi. Somewhat diminished in the bases.  O2 on at 1 L nasal cannula.  EXTREMITIES: Moves both upper and lower extremities with generalized weakness.  2+ edema in the right foot.  No redness to the right lower extremity. PSYCHIATRIC: Cognition intact.       Labs:    Lab Results   Component Value Date    WBC 2.3 (L) 01/27/2021    HGB 9.6 (L) 01/27/2021    HCT 32.3 (L) 01/27/2021     (H) 01/27/2021    PLT 98 (L) 01/27/2021     Results for orders placed or performed during the hospital encounter of 01/26/21   Basic Metabolic Panel   Result Value Ref Range    Sodium 133 (L) 136 - 145 mmol/L    Potassium 5.8 (H) 3.5 - 5.0 mmol/L    Chloride 100 98 - 107 mmol/L    CO2 21 (L) 22 - 31 mmol/L    Anion Gap, Calculation 12 5 - 18 mmol/L    Glucose 234 (H) 70 - 125 mg/dL    Calcium 8.4 (L) 8.5 - 10.5 mg/dL    BUN 41  (H) 8 - 28 mg/dL    Creatinine 5.88 (H) 0.60 - 1.10 mg/dL    GFR MDRD Af Amer 9 (L) >60 mL/min/1.73m2    GFR MDRD Non Af Amer 7 (L) >60 mL/min/1.73m2           Assessment/Plan:    1. Acute on chronic diastolic congestive heart failure (H)   fluids managed per dialysis.     2. Pulmonary emphysema, unspecified emphysema type (H)   currently on O2 at 2 L during the day.  Continues on nebs and prednisone taper.   3. ESRD on dialysis (H)   continues 3 times weekly.     4. Severe low back pain   continues on  gabapentin and Tylenol.  She was receiving low-dose oxycodone on her previous day.  We will reinstate oxycodone 2.5 mg p.o. twice daily as needed.   6. Neuropathic pain   continues on gabapentin.   7. Paroxysmal atrial fibrillation (H)   currently on metoprolol and aspirin.     This note has been dictated using voice recognition software. Any grammatical or context distortions are unintentional and inherent to the software      Electronically signed by: June Kingston, CNP

## 2021-06-15 NOTE — PROGRESS NOTES
2/1/18   Saw patient at bedside for set up of NIV. Went over basic equipment usage, how to plug in, turn on and off. Explained the benefits of wearing this for her ZULEIKA, COPD, a-fib, etc. Explained to her how this should be used at night and as needed during the day for recovery breathing. She asked a lot of questions and seemed excited to use this. I also showed her how to use it with her oxygen. She did not want to be set up on it today because we did not bring the humidifier. We will stop back in tomorrow to set this up with humidity. Faxed copy of order to be signed to John Ye MD.

## 2021-06-15 NOTE — PROGRESS NOTES
LAAO device education was completed:     See documented H&P completed by NP in EPIC    Reviewed pre and post op TERRANCE/Watchman procedure with pt, pre-procedure letter reviewed and given to pt- see letter in EPIC under documentation, see additional LAAO devic prep note for details on procedure/prep, risks of TERRANCE/LAAO discussed, answered pt questions and concerns regarding procedure, time spent with pt was >45min and reviewed available pre-op lab results.    Discussed importance of continuing anticoagulation uninterrupted pre and post LAAO, pt denies missing any doses of their anticoagulant, and pt denies issues with wayne placement in the past.  Instructed patient that they will be contacted post TERRANCE for further discussion regarding the Watchman procedure.       MODIFIED KENIA SCALE   No prior CVA    Score Description  0 No symptoms at all  1 No significant disability despite symptoms; able to carry out all usual duties and activities  2 Slight disability; unable to carry out all previous activities, but able to look after own affairs without assistance  3 Moderate disability; requiring some help, but able to walk without assistance  4 Moderately severe disability; unable to walk without assistance and unable to attend to own bodily needs without assistance  5 Severe disability; bedridden, incontinent and requiring constant nursing care and attention  6 Dead     Total score (0 - 6): 0    Pt here with  at visit, very independent. Does use a walker, dialysis M-W-F. Pt goes to [place] every spring to see her son's family.       Please see additional anticoagulation note for INR/Coumadin dosing.    Contact information reviewed and pt states understanding.  .[email or web address]

## 2021-06-15 NOTE — PROGRESS NOTES
[facility]    Name:   Belia Rodriguez (Itzel)  : 1945  Facility:   [facility] [[id number]]   Room:   Code Status: DNR/DNI and POLST AVAILABLE -   Fac type:   SNF (Skilled Nursing Facility, TCU) -     CHIEF COMPLAINT / REASON FOR VISIT:  Chief Complaint   Patient presents with     Follow-up     Acute on chronic respiratory failure due to a combination of fluid overload and COPD exacerbation     [facility] from 18 until 18  [facility]CU from 18 until 18  [facility] from 2021 until 2021 (acute on chronic respiratory failure due to a combination of fluid overload and COPD exacerbation)  [facility] from 2021 until 2021  [facility] from 2021 until 2021 (acute on chronic respiratory failure due to a combination of fluid overload and COPD exacerbation)       HPI: Belia is a 75 y.o. female with known end-stage renal disease (on dialysis), COPD (former smoker, 45-60 pack years),  chronic respiratory failure with hypoxia, obstructive sleep apnea (severe and previously intolerant of CPAP), chronic atrial fibrillation, GERD, essential hypertension, and anemia related to chronic renal failure.    She has had multiple hospitalizations for respiratory failure due to a combination of fluid overload and COPD exacerbation.  This occurred on 2021 and again on 2021.  Her last admission came within hours of her discharge from [facility].  When last in the TCU at [facility], she was a full code, but she indicated during her last hospitalization that she wants no further BiPAP and is DNR/DNI.  This did improve somewhat with steroids and doxycycline.    While she continues on hemodialysis , , and , she did have some questions regarding hospice but was not sure she  "would sign on given her need for dialysis.  She did have a palliative care consult, and she strongly agreed, especially given her readmission to the hospital.      CURRENT/RECENT TCU ISSUES    Disposition: She was having no particular issues until about 6 weeks ago, not needing oxygen but now expecting a chronic continuous need.  She tells me she plans to go on hospice after discharge.  She wants to go to [facility].  She notes that she will be stopping dialysis and would expect to survive no longer than 1 week.  She has been presented with this suggestion about 6 months ago.  She believes that she is now ready.  Her main concern is not about dying but is getting her paperwork done for her  of 46 years.     She tells me that she is doing much better than she was just a few days ago.  She complains of neuropathy in her feet, Raynaud's syndrome in the feet as well, and some constipation.    She is on a 1500 mL fluid restriction.    Medication changes: Considering her plans for hospice, we talked about her medications.  It was agreed that atorvastatin is not needed, and so we are discontinuing that.  She stated, \"anything you can take away from me won't break my heart at all.\"      ROS:   Positives: Her feet really hurt this morning due to her neuropathy.  She finds that lying in bed with her knees up in the air helps.  Breathing, she says, is about the same.  She is on chronic oxygen.      Negatives: Complaining of constipation before, things are going better now.  She denies any headaches or chest pains, coughing or congestion at the moment, dizziness, dysuria, diarrhea, difficulty chewing or swallowing, or problems with sleep (when on 150 mg of trazodone nightly).    Past Medical History:   Diagnosis Date     Arthritis      CHF (congestive heart failure) (H)      Chronic anemia 6/1/2014     Chronic kidney disease      Chronic thoracic aortic dissection (H) 10/7/2015    Descending thoracic aorta; treated " medically per notes of Dragan Singh and Jennifer.     COPD (chronic obstructive pulmonary disease) (H)      CVA (cerebral infarction)      Disease of thyroid gland      Dyslipidemia      ESRD (end stage renal disease) (H) 2009    on dialysis with Dr. Mitchell     Essential hypertension 2014     Gastrointestinal hemorrhage, unspecified gastrointestinal hemorrhage type 2017     GI (gastrointestinal bleed)      GI bleeding 2017     Gout      L3 vertebral fracture (H) 2015     Left Atrial Appendage Occlusion (WATCHMAN) 2018    LAAO 2018 (30 mm WATCHMAN)     Obesity      ZULEIKA (obstructive sleep apnea), severe, intolerant of CPAP 10/22/2015     Oxygen dependent     3L nc     Pneumonia 2015     Right foot drop      Spinal stenosis 3/28/2016     Stroke (H) 3/24/2016              Family History   Problem Relation Age of Onset     Dementia Mother      Diabetes Mother      Arthritis Mother      Cancer Mother      Depression Mother      Heart disease Mother      Vision loss Mother      Stroke Father      Heart disease Father      Breast cancer Neg Hx      Social History     Socioeconomic History     Marital status:      Spouse name: Cayden     Number of children: 2     Years of education: Not on file     Highest education level: Not on file   Occupational History     Employer: RETIRED   Social Needs     Financial resource strain: Not on file     Food insecurity     Worry: Not on file     Inability: Not on file     Transportation needs     Medical: Not on file     Non-medical: Not on file   Tobacco Use     Smoking status: Former Smoker     Packs/day: 1.50     Years: 37.00     Pack years: 55.50     Types: Cigarettes     Quit date: 2009     Years since quittin.1     Smokeless tobacco: Never Used   Substance and Sexual Activity     Alcohol use: No     Alcohol/week: 11.7 standard drinks     Types: 14 Standard drinks or equivalent per week     Comment: 14 mixed drinks per week      Drug use: No     Sexual activity: Never     Partners: Male   Lifestyle     Physical activity     Days per week: Not on file     Minutes per session: Not on file     Stress: Not on file   Relationships     Social connections     Talks on phone: Not on file     Gets together: Not on file     Attends Bahai service: Not on file     Active member of club or organization: Not on file     Attends meetings of clubs or organizations: Not on file     Relationship status: Not on file     Intimate partner violence     Fear of current or ex partner: Not on file     Emotionally abused: Not on file     Physically abused: Not on file     Forced sexual activity: Not on file   Other Topics Concern     Not on file   Social History Narrative    Lives with her . Daughter in [place] and daughter in [place].     MEDICATIONS: Reviewed from the MAR, physician orders, and earlier progress notes.  Post Discharge Medication Reconciliation Status: discharge medications reconciled and changed, per note/orders.  Updated by me today (02/22/2021) with a change in time of senna S reflected below.    Current Outpatient Medications   Medication Sig     acetaminophen (TYLENOL) 500 MG tablet Take 2 tablets (1,000 mg total) by mouth every 6 (six) hours as needed.     albuterol (PROVENTIL) 2.5 mg /3 mL (0.083 %) nebulizer solution Take 3 mL (2.5 mg total) by nebulization every 4 (four) hours as needed for wheezing.     aspirin 81 MG EC tablet Take 1 tablet (81 mg total) by mouth daily.     atorvastatin (LIPITOR) 10 MG tablet Take 10 mg by mouth at bedtime.     B complex 11-folic-C-biot-zinc (DIALYVITE) 3-092-332-50 mg-mg-mcg-mg Tab Take 1 tablet by mouth daily with supper. (Dialyvite)      buPROPion (WELLBUTRIN XL) 150 MG 24 hr tablet Take 1 tablet (150 mg total) by mouth 2 (two) times a week. Tuesday and saturday (Patient taking differently: Take 150 mg by mouth 2 (two) times a week. Tuesday and saturday)     cholecalciferol, vitamin D3,  1,000 unit (25 mcg) tablet Take 2,000 Units by mouth daily.      cinacalcet (SENSIPAR) 30 MG tablet Take 30 mg by mouth see administration instructions. Take three times weekly with dialysis (on Mondays, Wednesdays, and Fridays).     famotidine (PEPCID) 20 MG tablet Take 1 tablet (20 mg total) by mouth at bedtime.     folic acid (FOLVITE) 1 MG tablet TAKE ONE TABLET BY MOUTH ONCE DAILY (Patient taking differently: Take 1 mg by mouth daily. )     gabapentin (NEURONTIN) 100 MG capsule Take 100 mg by mouth 2 (two) times a day. Leg pain     Lactobacillus rhamnosus GG (CULTURELLE) 10-15 Billion cell capsule Take 1 capsule by mouth daily with lunch.      metoprolol succinate (TOPROL-XL) 25 MG Take 1 tablet (25 mg total) by mouth daily with lunch. Hold for SBP<110 or hr<60     midodrine (PROAMATINE) 10 MG tablet Take 2 tablets (20 mg total) by mouth 3 (three) times a week. Patient takes prior to dialysis qMWF and PRN if additional dialysis treatments.  Patient reports that she takes 20mg prior to dialysis     midodrine (PROAMATINE) 5 MG tablet Take 10 mg by mouth 4 (four) times a week. On non dialysis days if SBP < 106     oxyCODONE (ROXICODONE) 5 MG immediate release tablet TAKE 1/2 TO 1 TABLET (2.5-5MG) BY MOUTH TWICE DAILY AS NEEDED FOR PAIN     polyvinyl alcohol (LIQUIFILM TEARS) 1.4 % ophthalmic solution Administer 1 drop to both eyes as needed for dry eyes.     senna-docusate (SENNOSIDES-DOCUSATE SODIUM) 8.6-50 mg tablet Take 1 tablet by mouth at bedtime as needed for constipation.      sevelamer carbonate (RENVELA) 800 mg tablet Take 1,600 mg by mouth 2 (two) times a day as needed (snacks).     sevelamer HCL (RENAGEL) 800 MG tablet Take 1,600 mg by mouth 3 (three) times a day with meals.      timolol maleate (TIMOPTIC) 0.5 % ophthalmic solution Administer 1 drop to both eyes 2 (two) times a day.      traZODone (DESYREL) 150 MG tablet Take 150 mg by mouth at bedtime.      ALLERGIES:   Allergies   Allergen Reactions      Ace Inhibitors Cough     Atrovent [Ipratropium Bromide] Headache     Fosinopril Sodium Cough     Zolpidem      hallucinations     DIET: Dialysis diet, regular texture, thin liquids.  1500 mL /24-hour fluid restriction    Vitals:    02/24/21 1149   BP: 109/65   Pulse: 78   Resp: 18   Temp: 97.7  F (36.5  C)   SpO2: 95%   Weight: 177 lb 1.6 oz (80.3 kg)     Body mass index is 29.47 kg/m .    EXAMINATION:   General: Pleasant, alert, and conversant middle-aged female, sitting in her wheelchair, in no apparent distress.  Head: Normocephalic and atraumatic.   Eyes: PERRLA, sclerae clear.  Slight disconjugate gaze.  ENT: Moist oral mucosa.  She has her own teeth.  No rhinorrhea or nasal discharge.  Hearing is unimpaired.  Cardiovascular: Irregular rhythm with a 2/6 systolic ejection murmur at the left sternal border.  Respiratory: Diminished lung sounds bilaterally but otherwise clear.  Abdomen: Soft and nontender.   Musculoskeletal/Extremities: Age-related degenerative joint disease.  Right 3+ pitting pedal and pretibial edema, 2+ on the left.  Integument: No rashes, clinically significant lesions, or skin breakdown.   Cognitive/Psychiatric: Alert and oriented ×4.  Affect is euthymic.    DIAGNOSTICS:   Results for orders placed or performed during the hospital encounter of 02/18/21   Basic Metabolic Panel   Result Value Ref Range    Sodium 136 136 - 145 mmol/L    Potassium 3.7 3.5 - 5.0 mmol/L    Chloride 97 (L) 98 - 107 mmol/L    CO2 29 22 - 31 mmol/L    Anion Gap, Calculation 10 5 - 18 mmol/L    Glucose 95 70 - 125 mg/dL    Calcium 8.7 8.5 - 10.5 mg/dL    BUN 28 8 - 28 mg/dL    Creatinine 3.01 (H) 0.60 - 1.10 mg/dL    GFR MDRD Af Amer 18 (L) >60 mL/min/1.73m2    GFR MDRD Non Af Amer 15 (L) >60 mL/min/1.73m2     Lab Results   Component Value Date    WBC 3.9 (L) 02/20/2021    HGB 10.1 (L) 02/20/2021    HCT 31.8 (L) 02/20/2021     (H) 02/20/2021    PLT 91 (L) 02/20/2021     Estimated Creatinine Clearance: 17.8  mL/min (A) (by C-G formula based on SCr of 3.01 mg/dL (H)).  Lab Results   Component Value Date     (H) 02/18/2021     ASSESSMENT/Plan:      ICD-10-CM    1. Chronic respiratory failure with hypoxia (H)  J96.11    2. COPD exacerbation (H)  J44.1    3. Pulmonary emphysema, unspecified emphysema type (H)  J43.9    4. ESRD on hemodialysis (H), MWF  N18.6     Z99.2    5. Chronic atrial fibrillation (H)  I48.20    6. Anemia of chronic renal failure, stage 5 (H)  N18.5     D63.1    7. Essential hypertension  I10    8. Chronic acquired lymphedema  I89.0      CHANGES:    1.  Discontinue atorvastatin.    CARE PLAN:    The care plan has been reviewed and all orders signed. Changes to care plan, if any, as noted. Otherwise, continue care plan of care.  Total time spent with this patient was approximately 35 minutes, with greater than 50% spent in counseling and coordination of care that included a review of her medications and, considering her code status and plan of care, initial changes were made.    The above has been created using voice recognition software. Please be aware that this may unintentionally  produce inaccuracies and/or nonsensical sentences.      Electronically signed by: Louie Liriano CNP

## 2021-06-15 NOTE — PROGRESS NOTES
Code Status:  DNR  Visit Type: Discharge Summary     Facility:  [facility] [[id number]]              History of Present Illness: Belia Rodriguez is a 75 y.o. female  who I am seeing today for discharge from the TCU post recent hospitalization for congestive heart failure and COPD.  Past medical history includes end-stage renal disease on dialysis 3 times weekly, acute hypoxic respiratory failure secondary to CHF as well as COPD.  Patient recently hospitalized on 1/26/2021 secondary to increasing shortness of breath.  She was needing increased oxygen support.  She is chronically on O2 2 to 3 L.  She was treated for COPD exacerbation.  She was given Solu-Medrol IV as well as the nebulizers.  She does have underlying end-stage renal disease and continues on dialysis 3 times weekly.  She also has extra runs on Thursdays.  She was needing extra runs prior to her hospitalization for increased CHF exacerbation.  She did get dialysis daily during her hospitalization and her breathing improved.  She was switched from her Solu-Medrol over to prednisone.She also was treated with azithromycin.    Today patient sitting up in wheelchair.  Underlying end-stage renal disease.  Patient continues on dialysis 3 times weekly.  Recent CHF exacerbation.  Patient is up about 3 pounds.  She does have some increased lower extremity edema since admit to the lower extremities.  She continues in CLARITA hose.  Recent uric acid level obtained at the patient's request which was negative.  She had no edema upon admit.  Fluid being managed per dialysis.  She denies any increased shortness of breath.  She does have underlying COPD.  Continues on O2 at 1 L.  She has completed her prednisone taper.  She continues on as needed nebulizer.  She has had some increased pain to the lower extremities.  Known history of neuropathy.  She continues on gabapentin and Tylenol.  She was treated with low-dose of oxycodone 2.5 mg.  I am going to send  her with 10 tabs and she will need to wean herself off.  She is declining home services and opting for outpatient rehab here at the facility.      Active Ambulatory Problems     Diagnosis Date Noted     Tachycardia-bradycardia syndrome (H) 06/12/2014     Hyperkalemia 06/30/2014     Essential hypertension with goal blood pressure less than 140/90 06/30/2014     Hyperlipidemia 06/30/2014     Acute respiratory failure with hypoxia (H) 06/30/2014     Fluid overload 10/07/2015     ZULEIKA (obstructive sleep apnea), severe 10/22/2015     Anemia of chronic renal failure, stage 5 (H)      Dissection of thoracoabdominal aorta (H)      Gout      GERD (gastroesophageal reflux disease) 04/14/2017     Right foot drop 05/16/2017     Acute midline low back pain with right-sided sciatica 06/16/2017     History of compression fracture of spine 06/16/2017     Acute and chronic respiratory failure with hypoxia (H)      Pulmonary emphysema (H) 02/19/2018     Presence of Watchman left atrial appendage closure device 04/05/2018     Other dysphagia 08/10/2018     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     Hyperparathyroidism (H) 03/24/2009     Osteoporosis 03/24/2009     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     ESRD on hemodialysis (H), MWF 08/15/2018     Acute pulmonary edema (H) 09/07/2018     Chronic atrial fibrillation (H)      COPD exacerbation (H) 01/04/2019     Acute on chronic diastolic congestive heart failure (H) 01/23/2019     Thrombocytopenia (H)      Contraindication to anticoagulation therapy 03/26/2019     Dyspnea 05/18/2019     Cardiac pacemaker in situ 03/20/2019     S/P AV (atrioventricular) jonn ablation 03/20/2019     Hip fracture, intertrochanteric (H) 06/23/2019     DVT (deep venous thrombosis) (H) 08/22/2019     Carpal tunnel syndrome of left wrist 02/04/2020     Major depressive disorder, remission status unspecified, unspecified whether recurrent 09/15/2020     Encounter for  palliative care      PVD (peripheral vascular disease) (H) 12/18/2020     SOB (shortness of breath) 01/26/2021     Resolved Ambulatory Problems     Diagnosis Date Noted     Hypotension 06/01/2014     Fever 06/07/2014     Aortic aneurysm rupture (H)      Bacteremia due to Escherichia coli 06/08/2014     Severe tobacco use disorder 06/30/2014     Persistent atrial fibrillation (H) 06/30/2014     Bradycardia, sinus 06/30/2014     Volume overload 06/30/2014     Hypomagnesemia 06/30/2014     Cellulitis 08/25/2014     Hematochezia 11/02/2014     BRBPR (bright red blood per rectum) 11/02/2014     Hematuria 04/01/2015     Aortic dissection (H) 04/01/2015     Pneumonia 09/07/2015     Hypoxia 09/23/2015     SOB (shortness of breath) 09/23/2015     Acute on chronic diastolic heart failure (H) 09/27/2015     Bacteremia 09/27/2015     Dyspnea 10/07/2015     Chronic thoracic aortic dissection (H) 10/07/2015     L3 vertebral fracture (H) 11/16/2015     GI bleeding 03/20/2016     Chronic systolic congestive heart failure (H)      Acute lower GI bleeding 03/21/2016     Bilateral anterior& Lt Occipital embolic Infarction 03/24/2016     Spinal stenosis 03/28/2016     Back pain 03/28/2016     Tremor 03/28/2016     SOB (shortness of breath) 05/03/2016     CVA (cerebral infarction) 05/04/2016     Respiratory distress 05/09/2016     CHF (congestive heart failure) (H) 05/09/2016     Right knee pain      Dyspnea 10/02/2016     Volume overload 10/02/2016     Acute bronchitis due to infection 10/02/2016     Acute bacterial conjunctivitis of both eyes 10/02/2016     Acute CHF (congestive heart failure) (H) 10/09/2016     Hypervolemia, unspecified hypervolemia type      Pure hypercholesterolemia      Gastroesophageal reflux disease without esophagitis      Leg weakness 04/20/2017     Anticoagulated on warfarin 05/16/2017     Gastrointestinal hemorrhage, unspecified gastrointestinal hemorrhage type 06/05/2017     Anticoagulant long-term use  06/16/2017     Acute GI bleeding 12/09/2017     SOB (shortness of breath) 01/26/2018     Acute bronchitis with bronchospasm      Acute respiratory failure with hypoxia and hypercapnia (H)      History of acute respiratory failure 02/19/2018     Chronic respiratory failure with hypoxia (H) 03/02/2018     Dyspnea 08/09/2018     Chest pain 08/10/2018     Hemodialysis catheter infection (H)      Hyperkalemia 10/18/2018     Atrial fibrillation with RVR (H) 03/03/2019     Intertrochanteric fracture of left hip, closed, initial encounter (H) 06/23/2019     Closed intertrochanteric fracture of hip, left, initial encounter (H) 06/22/2019     SOB (shortness of breath) 07/13/2020     Hypotension, unspecified hypotension type      Hypoxia 10/05/2020     Hypoxemia 12/08/2020     Past Medical History:   Diagnosis Date     Arthritis      Chronic anemia 6/1/2014     Chronic kidney disease      COPD (chronic obstructive pulmonary disease) (H)      CVA (cerebral infarction)      Disease of thyroid gland      Dyslipidemia      ESRD (end stage renal disease) (H) 06/03/2009     Essential hypertension 6/30/2014     GI (gastrointestinal bleed)      Left Atrial Appendage Occlusion (WATCHMAN) 4/5/2018     Obesity      Oxygen dependent        Current Outpatient Medications   Medication Sig     acetaminophen (TYLENOL) 500 MG tablet Take 2 tablets (1,000 mg total) by mouth every 6 (six) hours as needed.     albuterol (PROVENTIL) 2.5 mg /3 mL (0.083 %) nebulizer solution Take 3 mL (2.5 mg total) by nebulization every 4 (four) hours as needed for wheezing.     aspirin 81 MG EC tablet Take 1 tablet (81 mg total) by mouth daily. (Patient taking differently: Take 81 mg by mouth daily. )     atorvastatin (LIPITOR) 10 MG tablet Take 10 mg by mouth at bedtime.     B complex 11-folic-C-biot-zinc (DIALYVITE) 7-622-801-50 mg-mg-mcg-mg Tab Take 1 tablet by mouth daily with supper. (Dialyvite)      buPROPion (WELLBUTRIN XL) 150 MG 24 hr tablet Take 1  tablet (150 mg total) by mouth 2 (two) times a week. Tuesday and saturday (Patient taking differently: Take 150 mg by mouth 2 (two) times a week. Tuesday and saturday)     cholecalciferol, vitamin D3, 1,000 unit (25 mcg) tablet Take 2,000 Units by mouth daily.      cinacalcet (SENSIPAR) 30 MG tablet Take 30 mg by mouth see administration instructions. Take three times weekly with dialysis (on Mondays, Wednesdays, and Fridays).     famotidine (PEPCID) 20 MG tablet Take 1 tablet (20 mg total) by mouth at bedtime.     folic acid (FOLVITE) 1 MG tablet TAKE ONE TABLET BY MOUTH ONCE DAILY (Patient taking differently: No sig reported)     gabapentin (NEURONTIN) 100 MG capsule TAKE 1 CAPSULE BY MOUTH THREE TIMES DAILY (Patient taking differently: No sig reported)     Lactobacillus rhamnosus GG (CULTURELLE) 10-15 Billion cell capsule Take 1 capsule by mouth daily with lunch.      metoprolol succinate (TOPROL-XL) 25 MG Take 1 tablet (25 mg total) by mouth daily with lunch. Hold for SBP<110 or hr<60     midodrine (PROAMATINE) 10 MG tablet Take 20 mg by mouth 3 (three) times a week. On dialysis days. Hold for SBP>105  Patient reports that she takes 20mg prior to dialysis     midodrine (PROAMATINE) 5 MG tablet Take 10 mg by mouth 4 (four) times a week. On non dialysis days if SBP < 106     oxyCODONE (ROXICODONE) 5 MG immediate release tablet Take 5 mg by mouth 2 (two) times a day as needed for pain. Give half tablet to equal 2.5 mg twice daily as needed     polyvinyl alcohol (LIQUIFILM TEARS) 1.4 % ophthalmic solution Administer 1 drop to both eyes as needed for dry eyes.     senna-docusate (SENNOSIDES-DOCUSATE SODIUM) 8.6-50 mg tablet Take 1 tablet by mouth at bedtime as needed for constipation.      sevelamer carbonate (RENVELA) 800 mg tablet Take 1,600 mg by mouth 2 (two) times a day as needed (snacks).     sevelamer HCL (RENAGEL) 800 MG tablet Take 1,600 mg by mouth 3 (three) times a day with meals.      timolol maleate  (TIMOPTIC) 0.5 % ophthalmic solution Administer 1 drop to both eyes 2 (two) times a day.      traZODone (DESYREL) 150 MG tablet Take 150 mg by mouth at bedtime.        Allergies   Allergen Reactions     Ace Inhibitors Cough     Atrovent [Ipratropium Bromide] Headache     Fosinopril Sodium Cough     Zolpidem      hallucinations         Review of Systems  No fevers or chills. No headache, lightheadedness or dizziness.  Chronic SOB, patient wears oxygen at 2 to 3 L, no chest pains or palpitations. Appetite is good. No nausea, vomiting, constipation or diarrhea. No dysuria, frequency, burning or pain with urination. + weakness. + chronic back pain. Continues on gabapentin, oxycodone and tylenol. Otherwise review of systems are negative.     Physical Exam  PHYSICAL EXAMINATION:  Vital signs: /63, pulse 83, respirations 16, temperature 97.5, 93% on 1 L.  Weight 163 pounds.    General: Awake, sitting up in wheelchair, alert, oriented x3, appropriately, follows simple commands, conversant  HEENT: Pink conjunctiva, anicteric sclerae, moist oral mucosa.  NECK: Supple, no lymphadenopathy no masses.  CVS: Dialysis catheter in the right chest wall.  Regular rate and rhythm on exam.  LUNG: No wheezes, rales or rhonchi. Somewhat diminished in the bases.  O2 on at 1 L nasal cannula.  ABDOMEN: Soft, non distended with active bowel sounds.   EXTREMITIES: Moves both upper and lower extremities with generalized weakness.  2+ edema in the right foot.  CLARITA hose on.  No redness to the right lower extremity.   PSYCHIATRIC: Cognition intact.       Labs:    Lab Results   Component Value Date    WBC 2.3 (L) 01/27/2021    HGB 9.6 (L) 01/27/2021    HCT 32.3 (L) 01/27/2021     (H) 01/27/2021    PLT 98 (L) 01/27/2021     Results for orders placed or performed during the hospital encounter of 01/26/21   Basic Metabolic Panel   Result Value Ref Range    Sodium 133 (L) 136 - 145 mmol/L    Potassium 5.8 (H) 3.5 - 5.0 mmol/L    Chloride  100 98 - 107 mmol/L    CO2 21 (L) 22 - 31 mmol/L    Anion Gap, Calculation 12 5 - 18 mmol/L    Glucose 234 (H) 70 - 125 mg/dL    Calcium 8.4 (L) 8.5 - 10.5 mg/dL    BUN 41 (H) 8 - 28 mg/dL    Creatinine 5.88 (H) 0.60 - 1.10 mg/dL    GFR MDRD Af Amer 9 (L) >60 mL/min/1.73m2    GFR MDRD Non Af Amer 7 (L) >60 mL/min/1.73m2           Assessment/Plan:    1. Acute on chronic diastolic congestive heart failure (H)   fluids managed per dialysis.   Weight gain of 5 pounds.   2. Pulmonary emphysema, unspecified emphysema type (H)   currently on O2 at 1 L during the day.  She does wear oxygen at night.  She has completed her prednisone taper.  Continues as needed nebs.   3. ESRD on dialysis (H)   continues 3 times weekly.     4. Severe low back pain   continues on  gabapentin, oxycodone and Tylenol.  I will only send with 10 tabs and she will need to wean herself off with no refill.   6. Neuropathic pain   continues on gabapentin.   7. Paroxysmal atrial fibrillation (H)   currently on metoprolol and aspirin.     Okay to DC home with current meds and treatments.  Patient has decided to do outpatient physical therapy here at the facility.  Follow-up with primary care provider in 1 week.  Follow-up with nephrology outpatient.  Follow-up with cardiology outpatient.    Total time spent for this visit was 35 minutes with greater than 50 during the time face-to-face with patient reviewing discharge medications, deciding on whether or not she would have home therapy versus outpatient therapy as well as collaborating with nursing staff and .    This note has been dictated using voice recognition software. Any grammatical or context distortions are unintentional and inherent to the software      Electronically signed by: June Kingston, AYAH

## 2021-06-15 NOTE — PROGRESS NOTES
Pt states SE's from [place]  Pt has CPAP -Home O2  Pt has dialysis on M-W-F - would like PCP to arrange to be done in hospital prior to d/c Friday morning

## 2021-06-15 NOTE — PROGRESS NOTES
Left Atrial Appendage Certification     I have personally reviewed the medical records for Belia Rodriguez as it pertains to her candidacy for left atrial appendage occlusion.     The patient has documented atrial fibrillation and is currently on oral anticoagulant therapy (coumadin).   [id number] - VASc score = 5. The HAS-BLED risk score is 3.   The patients indication to come off anticoagulation: GI bleeding.     The patient has been provided education regarding atrial fibrillation treatment options including the risks and benefits of warfarin, novel oral anticoagulants, and left atrial appendage occlusion.     Summary:    The patient is a good candidate for proceeding with left atrial appendage screening and implant.     Gee Benítez

## 2021-06-15 NOTE — PROGRESS NOTES
[facility]    Name:   Belia Rodriguez (Itzel)  : 1945  Facility:   [facility] [[id number]]   Room:   Code Status: DNR/DNI and POLST AVAILABLE -   Fac type:   SNF (Skilled Nursing Facility, TCU) -     CHIEF COMPLAINT / REASON FOR VISIT:  Chief Complaint   Patient presents with     Follow-up     Acute on chronic respiratory failure due to a combination of fluid overload and COPD exacerbation     Problem Visit     Depression.     [facility] from 18 until 18  [facility] TCU from 18 until 18  [facility] from 2021 until 2021 (acute on chronic respiratory failure due to a combination of fluid overload and COPD exacerbation)  [facility] from 2021 until 2021  [facility] from 2021 until 2021 (acute on chronic respiratory failure due to a combination of fluid overload and COPD exacerbation)       HPI: Belia is a 75 y.o. female with known end-stage renal disease (on dialysis), COPD (former smoker, 45-60 pack years),  chronic respiratory failure with hypoxia, obstructive sleep apnea (severe and previously intolerant of CPAP), chronic atrial fibrillation, GERD, essential hypertension, and anemia related to chronic renal failure.    She has had multiple hospitalizations for respiratory failure due to a combination of fluid overload and COPD exacerbation.  This occurred on 2021 and again on 2021.  Her last admission came within [facility].  When last in the TCU at [facility], she was a full code, but she indicated during her last hospitalization that she wants no further BiPAP and is DNR/DNI.  This did improve somewhat with steroids and doxycycline.    While she continues on hemodialysis , , and , she did have some questions  "regarding hospice but was not sure she would sign on given her need for dialysis.  She did have a palliative care consult, and she strongly agreed, especially given her readmission to the hospital.      CURRENT/RECENT TCU ISSUES    Disposition: Today, the patient is interested in talking about her depression and realization that her health will not improve.  I had received a request by  that I give an okay for an in-house psych consult.  My initial thought was that the patient has been doing quite well and accepting her situation, and there would be nothing gained from such a consult.  We spent a good 20 minutes talking about her feelings with regard to hospice, dying, and other related issues, including depression.  She did tell me that she thought she might benefit from a consult, and we have given the okay.    She was having no particular issues until about 6 weeks ago, not needing oxygen but now expecting a chronic continuous need.  She tells me she plans to go on hospice after discharge.  She wants to go to [facility].  She notes that she will be stopping dialysis and would expect to survive no longer than 1 week.  She has been presented with this suggestion about 6 months ago.  She believes that she is now ready.  Her main concern is not about dying but is getting her paperwork done for her  of 44 years.     She tells me that she is doing much better than she was just a few days ago.  She complains of neuropathy in her feet, Raynaud's syndrome in the feet as well, and some constipation.    She is on a 1500 mL fluid restriction.    Medication changes: At my last visit, considering her plans for hospice, we talked about her medications.  It was agreed that atorvastatin was no longer needed, and so we discontinued it.  At the time, she stated, \"anything you can take away from me won't break my heart at all.\"      ROS:   Positives: Her feet continue to be painful due to neuropathy.  She has " found that lying in bed with her knees up in the air helps. She is on chronic oxygen, and breathing is no better and no worse.      Negatives: Complaining of constipation before, things are going better now.  She denies any headaches or chest pains, coughing or congestion at the moment, dizziness, dysuria, diarrhea, difficulty chewing or swallowing, or problems with sleep (when on 150 mg of trazodone nightly).    Past Medical History:   Diagnosis Date     Arthritis      CHF (congestive heart failure) (H)      Chronic anemia 6/1/2014     Chronic kidney disease      Chronic thoracic aortic dissection (H) 10/7/2015    Descending thoracic aorta; treated medically per notes of Dragan Singh and Jennifer.     COPD (chronic obstructive pulmonary disease) (H)      CVA (cerebral infarction)      Disease of thyroid gland      Dyslipidemia      ESRD (end stage renal disease) (H) 06/03/2009    on dialysis with Dr. Mitchell     Essential hypertension 6/30/2014     Gastrointestinal hemorrhage, unspecified gastrointestinal hemorrhage type 6/5/2017     GI (gastrointestinal bleed)      GI bleeding 6/5/2017     Gout      L3 vertebral fracture (H) 11/16/2015     Left Atrial Appendage Occlusion (WATCHMAN) 4/5/2018    LAAO April 5, 2018 (30 mm WATCHMAN)     Obesity      ZULEIKA (obstructive sleep apnea), severe, intolerant of CPAP 10/22/2015     Oxygen dependent     3L nc     Pneumonia 9-7-2015     Right foot drop      Spinal stenosis 3/28/2016     Stroke (H) 3/24/2016              Family History   Problem Relation Age of Onset     Dementia Mother      Diabetes Mother      Arthritis Mother      Cancer Mother      Depression Mother      Heart disease Mother      Vision loss Mother      Stroke Father      Heart disease Father      Breast cancer Neg Hx      Social History     Socioeconomic History     Marital status:      Spouse name: Cayden     Number of children: 2     Years of education: Not on file     Highest education level: Not on  file   Occupational History     Employer: RETIRED   Social Needs     Financial resource strain: Not on file     Food insecurity     Worry: Not on file     Inability: Not on file     Transportation needs     Medical: Not on file     Non-medical: Not on file   Tobacco Use     Smoking status: Former Smoker     Packs/day: 1.50     Years: 37.00     Pack years: 55.50     Types: Cigarettes     Quit date: 2009     Years since quittin.1     Smokeless tobacco: Never Used   Substance and Sexual Activity     Alcohol use: No     Alcohol/week: 11.7 standard drinks     Types: 14 Standard drinks or equivalent per week     Comment: 14 mixed drinks per week     Drug use: No     Sexual activity: Never     Partners: Male   Lifestyle     Physical activity     Days per week: Not on file     Minutes per session: Not on file     Stress: Not on file   Relationships     Social connections     Talks on phone: Not on file     Gets together: Not on file     Attends Hinduism service: Not on file     Active member of club or organization: Not on file     Attends meetings of clubs or organizations: Not on file     Relationship status: Not on file     Intimate partner violence     Fear of current or ex partner: Not on file     Emotionally abused: Not on file     Physically abused: Not on file     Forced sexual activity: Not on file   Other Topics Concern     Not on file   Social History Narrative    Lives with her . Daughter in [place] and daughter in [place].     MEDICATIONS: Reviewed from the MAR, physician orders, and earlier progress notes.  Post Discharge Medication Reconciliation Status: discharge medications reconciled and changed, per note/orders.  Updated by me today (2021) with a change in time of senna S reflected below.    Current Outpatient Medications   Medication Sig     acetaminophen (TYLENOL) 500 MG tablet Take 2 tablets (1,000 mg total) by mouth every 6 (six) hours as needed.     albuterol (PROVENTIL) 2.5  mg /3 mL (0.083 %) nebulizer solution Take 3 mL (2.5 mg total) by nebulization every 4 (four) hours as needed for wheezing.     aspirin 81 MG EC tablet Take 1 tablet (81 mg total) by mouth daily.     atorvastatin (LIPITOR) 10 MG tablet Take 10 mg by mouth at bedtime.     B complex 11-folic-C-biot-zinc (DIALYVITE) 3-676-567-50 mg-mg-mcg-mg Tab Take 1 tablet by mouth daily with supper. (Dialyvite)      buPROPion (WELLBUTRIN XL) 150 MG 24 hr tablet Take 1 tablet (150 mg total) by mouth 2 (two) times a week. Tuesday and saturday (Patient taking differently: Take 150 mg by mouth 2 (two) times a week. Tuesday and saturday)     cholecalciferol, vitamin D3, 1,000 unit (25 mcg) tablet Take 2,000 Units by mouth daily.      cinacalcet (SENSIPAR) 30 MG tablet Take 30 mg by mouth see administration instructions. Take three times weekly with dialysis (on Mondays, Wednesdays, and Fridays).     famotidine (PEPCID) 20 MG tablet Take 1 tablet (20 mg total) by mouth at bedtime.     folic acid (FOLVITE) 1 MG tablet TAKE ONE TABLET BY MOUTH ONCE DAILY (Patient taking differently: Take 1 mg by mouth daily. )     gabapentin (NEURONTIN) 100 MG capsule Take 100 mg by mouth 2 (two) times a day. Leg pain     Lactobacillus rhamnosus GG (CULTURELLE) 10-15 Billion cell capsule Take 1 capsule by mouth daily with lunch.      metoprolol succinate (TOPROL-XL) 25 MG Take 1 tablet (25 mg total) by mouth daily with lunch. Hold for SBP<110 or hr<60     midodrine (PROAMATINE) 10 MG tablet Take 2 tablets (20 mg total) by mouth 3 (three) times a week. Patient takes prior to dialysis qMWF and PRN if additional dialysis treatments.  Patient reports that she takes 20mg prior to dialysis     midodrine (PROAMATINE) 5 MG tablet Take 10 mg by mouth 4 (four) times a week. On non dialysis days if SBP < 106     oxyCODONE (ROXICODONE) 5 MG immediate release tablet TAKE 1/2 TO 1 TABLET (2.5-5MG) BY MOUTH TWICE DAILY AS NEEDED FOR PAIN     polyvinyl alcohol (LIQUIFILM  "TEARS) 1.4 % ophthalmic solution Administer 1 drop to both eyes as needed for dry eyes.     senna-docusate (SENNOSIDES-DOCUSATE SODIUM) 8.6-50 mg tablet Take 1 tablet by mouth at bedtime as needed for constipation.      sevelamer carbonate (RENVELA) 800 mg tablet Take 1,600 mg by mouth 2 (two) times a day as needed (snacks).     sevelamer HCL (RENAGEL) 800 MG tablet Take 1,600 mg by mouth 3 (three) times a day with meals.      timolol maleate (TIMOPTIC) 0.5 % ophthalmic solution Administer 1 drop to both eyes 2 (two) times a day.      traZODone (DESYREL) 150 MG tablet Take 150 mg by mouth at bedtime.      ALLERGIES:   Allergies   Allergen Reactions     Ace Inhibitors Cough     Atrovent [Ipratropium Bromide] Headache     Fosinopril Sodium Cough     Zolpidem      hallucinations     DIET: Dialysis diet, regular texture, thin liquids.  1500 mL /24-hour fluid restriction    Vitals:    02/25/21 2050   BP: 98/61   Pulse: 69   Resp: 16   Temp: 96.9  F (36.1  C)   SpO2: 97%   Weight: 177 lb 1.6 oz (80.3 kg)   Height: 5' 6\" (1.676 m)     Body mass index is 28.58 kg/m .    EXAMINATION:   General: Pleasant, alert, and conversant middle-aged female, sitting in her wheelchair, in no apparent distress.  Head: Normocephalic and atraumatic.   Eyes: PERRLA, sclerae clear.  Slight disconjugate gaze.  ENT: Moist oral mucosa.  She has her own teeth.  No rhinorrhea or nasal discharge.  Hearing is unimpaired.  Cardiovascular: Irregular rhythm with a 2/6 systolic ejection murmur at the left sternal border.  Respiratory: Diminished lung sounds bilaterally but otherwise clear.  Abdomen: Soft and nontender.   Musculoskeletal/Extremities: Age-related degenerative joint disease.  Right 3+ pitting pedal and pretibial edema, 2+ on the left.  Integument: No rashes, clinically significant lesions, or skin breakdown.   Cognitive/Psychiatric: Alert and oriented ×4.  Affect is euthymic.    DIAGNOSTICS:   Results for orders placed or performed during " the hospital encounter of 02/18/21   Basic Metabolic Panel   Result Value Ref Range    Sodium 136 136 - 145 mmol/L    Potassium 3.7 3.5 - 5.0 mmol/L    Chloride 97 (L) 98 - 107 mmol/L    CO2 29 22 - 31 mmol/L    Anion Gap, Calculation 10 5 - 18 mmol/L    Glucose 95 70 - 125 mg/dL    Calcium 8.7 8.5 - 10.5 mg/dL    BUN 28 8 - 28 mg/dL    Creatinine 3.01 (H) 0.60 - 1.10 mg/dL    GFR MDRD Af Amer 18 (L) >60 mL/min/1.73m2    GFR MDRD Non Af Amer 15 (L) >60 mL/min/1.73m2     Lab Results   Component Value Date    WBC 3.9 (L) 02/20/2021    HGB 10.1 (L) 02/20/2021    HCT 31.8 (L) 02/20/2021     (H) 02/20/2021    PLT 91 (L) 02/20/2021     Estimated Creatinine Clearance: 17.8 mL/min (A) (by C-G formula based on SCr of 3.01 mg/dL (H)).  Lab Results   Component Value Date     (H) 02/18/2021     ASSESSMENT/Plan:      ICD-10-CM    1. Chronic respiratory failure with hypoxia (H)  J96.11    2. Pulmonary emphysema, unspecified emphysema type (H)  J43.9    3. ESRD on hemodialysis (H), MWF  N18.6     Z99.2    4. Chronic atrial fibrillation (H)  I48.20    5. Anemia of chronic renal failure, stage 5 (H)  N18.5     D63.1    6. Essential hypertension  I10    7. Chronic acquired lymphedema  I89.0      CHANGES:    None.    CARE PLAN:    The care plan has been reviewed and all orders signed. Changes to care plan, if any, as noted. Otherwise, continue care plan of care.  Total time spent with this patient was approximately 35 minutes, with greater than 50% spent in counseling and coordination of care that included a lengthy conversation regarding her depression, plans for hospice, and thoughts about death.    The above has been created using voice recognition software. Please be aware that this may unintentionally  produce inaccuracies and/or nonsensical sentences.      Electronically signed by: Louie Liriano CNP

## 2021-06-15 NOTE — PROGRESS NOTES
Code Status:  DNR  Visit Type: Problem Visit (End-stage renal disease, CHF)     Facility:  [facility] [[id number]]              History of Present Illness: Belia Rodriguez is a 75 y.o. female  who I am seeing today for follow-up on the TCU post recent hospitalization for congestive heart failure and COPD.  Past medical history includes end-stage renal disease on dialysis 3 times weekly, acute hypoxic respiratory failure secondary to CHF as well as COPD.  Patient recently hospitalized on 1/26/2021 secondary to increasing shortness of breath.  She was needing increased oxygen support.  She is chronically on O2 2 to 3 L.  She was treated for COPD exacerbation.  She was given Solu-Medrol IV as well as the nebulizers.  She does have underlying end-stage renal disease and continues on dialysis 3 times weekly.  She also has extra runs on Thursdays.  She was needing extra runs prior to her hospitalization for increased CHF exacerbation.  She did get dialysis daily during her hospitalization and her breathing improved.  She was switched from her Solu-Medrol over to prednisone.  Continues on a prednisone taper.  She also was treated with azithromycin.      Today patient sitting up in wheelchair.  Underlying end-stage renal disease.  Patient continues on dialysis 3 times weekly.  Recent CHF exacerbation.  Early in the week it was noted she was up about 10 pounds.  She is now down about 7 pounds.  Hypovolemia managed by dialysis.  She is being more conscious of her dietary restrictions.  Edema down on her feet today 2+.  She was placed in CLARITA hose.  She does have underlying chronic neuropathy.  She continues on gabapentin, Tylenol and oxycodone for pain.  Chronic O2 use at 1 L.  She was treated for recent COPD exacerbation.  She has completed her prednisone taper.  She does continue with as needed nebs.      Active Ambulatory Problems     Diagnosis Date Noted     Tachycardia-bradycardia syndrome (H)  06/12/2014     Hyperkalemia 06/30/2014     Essential hypertension with goal blood pressure less than 140/90 06/30/2014     Hyperlipidemia 06/30/2014     Acute respiratory failure with hypoxia (H) 06/30/2014     Fluid overload 10/07/2015     ZULEIKA (obstructive sleep apnea), severe 10/22/2015     Anemia of chronic renal failure, stage 5 (H)      Dissection of thoracoabdominal aorta (H)      Gout      GERD (gastroesophageal reflux disease) 04/14/2017     Right foot drop 05/16/2017     Acute midline low back pain with right-sided sciatica 06/16/2017     History of compression fracture of spine 06/16/2017     Acute and chronic respiratory failure with hypoxia (H)      Pulmonary emphysema (H) 02/19/2018     Presence of Watchman left atrial appendage closure device 04/05/2018     Other dysphagia 08/10/2018     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     Hyperparathyroidism (H) 03/24/2009     Osteoporosis 03/24/2009     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     ESRD on hemodialysis (H), MWF 08/15/2018     Acute pulmonary edema (H) 09/07/2018     Chronic atrial fibrillation (H)      COPD exacerbation (H) 01/04/2019     Acute on chronic diastolic congestive heart failure (H) 01/23/2019     Thrombocytopenia (H)      Contraindication to anticoagulation therapy 03/26/2019     Dyspnea 05/18/2019     Cardiac pacemaker in situ 03/20/2019     S/P AV (atrioventricular) jonn ablation 03/20/2019     Hip fracture, intertrochanteric (H) 06/23/2019     DVT (deep venous thrombosis) (H) 08/22/2019     Carpal tunnel syndrome of left wrist 02/04/2020     Major depressive disorder, remission status unspecified, unspecified whether recurrent 09/15/2020     Encounter for palliative care      PVD (peripheral vascular disease) (H) 12/18/2020     SOB (shortness of breath) 01/26/2021     Resolved Ambulatory Problems     Diagnosis Date Noted     Hypotension 06/01/2014     Fever 06/07/2014     Aortic aneurysm rupture  (H)      Bacteremia due to Escherichia coli 06/08/2014     Severe tobacco use disorder 06/30/2014     Persistent atrial fibrillation (H) 06/30/2014     Bradycardia, sinus 06/30/2014     Volume overload 06/30/2014     Hypomagnesemia 06/30/2014     Cellulitis 08/25/2014     Hematochezia 11/02/2014     BRBPR (bright red blood per rectum) 11/02/2014     Hematuria 04/01/2015     Aortic dissection (H) 04/01/2015     Pneumonia 09/07/2015     Hypoxia 09/23/2015     SOB (shortness of breath) 09/23/2015     Acute on chronic diastolic heart failure (H) 09/27/2015     Bacteremia 09/27/2015     Dyspnea 10/07/2015     Chronic thoracic aortic dissection (H) 10/07/2015     L3 vertebral fracture (H) 11/16/2015     GI bleeding 03/20/2016     Chronic systolic congestive heart failure (H)      Acute lower GI bleeding 03/21/2016     Bilateral anterior& Lt Occipital embolic Infarction 03/24/2016     Spinal stenosis 03/28/2016     Back pain 03/28/2016     Tremor 03/28/2016     SOB (shortness of breath) 05/03/2016     CVA (cerebral infarction) 05/04/2016     Respiratory distress 05/09/2016     CHF (congestive heart failure) (H) 05/09/2016     Right knee pain      Dyspnea 10/02/2016     Volume overload 10/02/2016     Acute bronchitis due to infection 10/02/2016     Acute bacterial conjunctivitis of both eyes 10/02/2016     Acute CHF (congestive heart failure) (H) 10/09/2016     Hypervolemia, unspecified hypervolemia type      Pure hypercholesterolemia      Gastroesophageal reflux disease without esophagitis      Leg weakness 04/20/2017     Anticoagulated on warfarin 05/16/2017     Gastrointestinal hemorrhage, unspecified gastrointestinal hemorrhage type 06/05/2017     Anticoagulant long-term use 06/16/2017     Acute GI bleeding 12/09/2017     SOB (shortness of breath) 01/26/2018     Acute bronchitis with bronchospasm      Acute respiratory failure with hypoxia and hypercapnia (H)      History of acute respiratory failure 02/19/2018      Chronic respiratory failure with hypoxia (H) 03/02/2018     Dyspnea 08/09/2018     Chest pain 08/10/2018     Hemodialysis catheter infection (H)      Hyperkalemia 10/18/2018     Atrial fibrillation with RVR (H) 03/03/2019     Intertrochanteric fracture of left hip, closed, initial encounter (H) 06/23/2019     Closed intertrochanteric fracture of hip, left, initial encounter (H) 06/22/2019     SOB (shortness of breath) 07/13/2020     Hypotension, unspecified hypotension type      Hypoxia 10/05/2020     Hypoxemia 12/08/2020     Past Medical History:   Diagnosis Date     Arthritis      Chronic anemia 6/1/2014     Chronic kidney disease      COPD (chronic obstructive pulmonary disease) (H)      CVA (cerebral infarction)      Disease of thyroid gland      Dyslipidemia      ESRD (end stage renal disease) (H) 06/03/2009     Essential hypertension 6/30/2014     GI (gastrointestinal bleed)      Left Atrial Appendage Occlusion (WATCHMAN) 4/5/2018     Obesity      Oxygen dependent        Current Outpatient Medications   Medication Sig     acetaminophen (TYLENOL) 500 MG tablet Take 2 tablets (1,000 mg total) by mouth every 6 (six) hours as needed.     albuterol (PROVENTIL) 2.5 mg /3 mL (0.083 %) nebulizer solution Take 3 mL (2.5 mg total) by nebulization every 4 (four) hours as needed for wheezing.     aspirin 81 MG EC tablet Take 1 tablet (81 mg total) by mouth daily. (Patient taking differently: Take 81 mg by mouth daily. )     atorvastatin (LIPITOR) 10 MG tablet Take 10 mg by mouth at bedtime.     B complex 11-folic-C-biot-zinc (DIALYVITE) 0-570-249-50 mg-mg-mcg-mg Tab Take 1 tablet by mouth daily with supper. (Dialyvite)      buPROPion (WELLBUTRIN XL) 150 MG 24 hr tablet Take 1 tablet (150 mg total) by mouth 2 (two) times a week. Tuesday and saturday (Patient taking differently: Take 150 mg by mouth 2 (two) times a week. Tuesday and saturday)     cholecalciferol, vitamin D3, 1,000 unit (25 mcg) tablet Take 2,000 Units by  mouth daily.      cinacalcet (SENSIPAR) 30 MG tablet Take 30 mg by mouth see administration instructions. Take three times weekly with dialysis (on Mondays, Wednesdays, and Fridays).     famotidine (PEPCID) 20 MG tablet Take 1 tablet (20 mg total) by mouth at bedtime.     folic acid (FOLVITE) 1 MG tablet TAKE ONE TABLET BY MOUTH ONCE DAILY (Patient taking differently: No sig reported)     gabapentin (NEURONTIN) 100 MG capsule TAKE 1 CAPSULE BY MOUTH THREE TIMES DAILY (Patient taking differently: No sig reported)     Lactobacillus rhamnosus GG (CULTURELLE) 10-15 Billion cell capsule Take 1 capsule by mouth daily with lunch.      metoprolol succinate (TOPROL-XL) 25 MG Take 1 tablet (25 mg total) by mouth daily with lunch. Hold for SBP<110 or hr<60     midodrine (PROAMATINE) 10 MG tablet Take 20 mg by mouth 3 (three) times a week. On dialysis days. Hold for SBP>105  Patient reports that she takes 20mg prior to dialysis     midodrine (PROAMATINE) 5 MG tablet Take 10 mg by mouth 4 (four) times a week. On non dialysis days if SBP < 106     oxyCODONE (ROXICODONE) 5 MG immediate release tablet Take 5 mg by mouth 2 (two) times a day as needed for pain. Give half tablet to equal 2.5 mg twice daily as needed     polyvinyl alcohol (LIQUIFILM TEARS) 1.4 % ophthalmic solution Administer 1 drop to both eyes as needed for dry eyes.     predniSONE (DELTASONE) 10 mg tablet Take 40 mg by mouth daily for 3 days, THEN 20 mg daily for 3 days, THEN 10 mg daily for 3 days, THEN 5 mg daily for 3 days.     senna-docusate (SENNOSIDES-DOCUSATE SODIUM) 8.6-50 mg tablet Take 1 tablet by mouth at bedtime as needed for constipation.      sevelamer carbonate (RENVELA) 800 mg tablet Take 1,600 mg by mouth 2 (two) times a day as needed (snacks).     sevelamer HCL (RENAGEL) 800 MG tablet Take 1,600 mg by mouth 3 (three) times a day with meals.      timolol maleate (TIMOPTIC) 0.5 % ophthalmic solution Administer 1 drop to both eyes 2 (two) times a day.       traZODone (DESYREL) 150 MG tablet Take 150 mg by mouth at bedtime.        Allergies   Allergen Reactions     Ace Inhibitors Cough     Atrovent [Ipratropium Bromide] Headache     Fosinopril Sodium Cough     Zolpidem      hallucinations         Review of Systems  No fevers or chills. No headache, lightheadedness or dizziness.  Chronic SOB, patient wears oxygen at 2 to 3 L, no chest pains or palpitations. Appetite is good. No nausea, vomiting, constipation or diarrhea. No dysuria, frequency, burning or pain with urination. + weakness. + chronic back pain. Continues on gabapentin, oxycodone and tylenol. Otherwise review of systems are negative.     Physical Exam  PHYSICAL EXAMINATION:  Vital signs: /65, pulse 70, respirations 16, temperature 97.3, 93% on 1 L.  Weight 158.4 pounds.    General: Awake, sitting up in wheelchair, alert, oriented x3, appropriately, follows simple commands, conversant  HEENT: Pink conjunctiva, anicteric sclerae, moist oral mucosa.  NECK: Supple, no lymphadenopathy no masses.  CVS: Dialysis catheter in the right chest wall.  Regular rate and rhythm on exam.  LUNG: No wheezes, rales or rhonchi. Somewhat diminished in the bases.  O2 on at 1 L nasal cannula.  ABDOMEN: Soft, non distended with active bowel sounds.   EXTREMITIES: Moves both upper and lower extremities with generalized weakness.  2+ edema in the right foot.  CLARITA hose on.  No redness to the right lower extremity. PSYCHIATRIC: Cognition intact.       Labs:    Lab Results   Component Value Date    WBC 2.3 (L) 01/27/2021    HGB 9.6 (L) 01/27/2021    HCT 32.3 (L) 01/27/2021     (H) 01/27/2021    PLT 98 (L) 01/27/2021     Results for orders placed or performed during the hospital encounter of 01/26/21   Basic Metabolic Panel   Result Value Ref Range    Sodium 133 (L) 136 - 145 mmol/L    Potassium 5.8 (H) 3.5 - 5.0 mmol/L    Chloride 100 98 - 107 mmol/L    CO2 21 (L) 22 - 31 mmol/L    Anion Gap, Calculation 12 5 - 18  mmol/L    Glucose 234 (H) 70 - 125 mg/dL    Calcium 8.4 (L) 8.5 - 10.5 mg/dL    BUN 41 (H) 8 - 28 mg/dL    Creatinine 5.88 (H) 0.60 - 1.10 mg/dL    GFR MDRD Af Amer 9 (L) >60 mL/min/1.73m2    GFR MDRD Non Af Amer 7 (L) >60 mL/min/1.73m2           Assessment/Plan:    1. Acute on chronic diastolic congestive heart failure (H)   fluids managed per dialysis.   Weight gain. Pt reeducated on fluid management and dietary guidelines.    2. Pulmonary emphysema, unspecified emphysema type (H)   currently on O2 at 2 L during the day.   She has completed her prednisone taper.  Continues as needed nebs.   3. ESRD on dialysis (H)   continues 3 times weekly.     4. Severe low back pain   continues on  gabapentin, oxycodone and Tylenol.     6. Neuropathic pain   continues on gabapentin.   7. Paroxysmal atrial fibrillation (H)   currently on metoprolol and aspirin.     This note has been dictated using voice recognition software. Any grammatical or context distortions are unintentional and inherent to the software      Electronically signed by: June Kingston, CNP

## 2021-06-15 NOTE — PROGRESS NOTES
"Code Status:  DNR/DNI  Visit Type: Follow-up (hypotension, reflux, COPD)     Facility:  [facility] [[id number]]        Facility Type: SNF (Skilled Nursing Facility, TCU)    History of Present Illness: Belia Rodriguez is a 75 y.o. female with a past medical history for ESRD, COPD, ZULEIKA, atrial fibrillation, GERD, HTN, and anemia of chronic disease.      Today, she is satting at 98% on 3L.  She reports her breathing is baseline and denies any congestion.  LS are CTA.  I did turn down her O2 to 2.5 and she was satting 95% after 30 minutes. Her weight is down to 158lbs from 5 days ago at 163lbs.  She has no LE edema.  She reports that she was constipated last week and so she was taking bowel meds and now has diarrhea today.  She denies her stools being watery but rather loose and frequent.  She denies any abdominal pain however has a feeling of upset stomach.  She is requesting if she can have Jolene Andrea Gold for reflux.      She reports that yesterday she has low BP and if her BP is low at home she is told to take 10mg of midodrine.  Order here is for midodrine on her dialysis days.  She states that she was symptomatic in \"feeling sick\".      Chart review notes that she is planning to be discharged on hospice once she is rehabbed.     Review of Systems   Patient denies fever, chills, headache, lightheadedness, dizziness, rhinorrhea, chest pain, palpitations, abdominal pain, n/v, constipation, change in appetite, dysuria, frequency, burning or pain with urination.  Other than stated in HPI all other review of systems is negative.         Physical Exam   In order to maintain social distancing during the COVID pandemic, a visual exam was completed.     Vitals:    03/01/21 1046   BP: 101/60   Pulse: 72   Resp: 18   Temp: 97.6  F (36.4  C)   SpO2: 94%        Patient is well nourished and no acute distress.  Head is AT/NC, EOM intact. Respiratory effort normal, LS CTA. No visible LE edema. Alert and " oriented, face symmetric. No visible skin issues or rashes. Mood euthymic      Labs:    Recent Results (from the past 240 hour(s))   Basic Metabolic Panel   Result Value Ref Range    Sodium 136 136 - 145 mmol/L    Potassium 3.7 3.5 - 5.0 mmol/L    Chloride 97 (L) 98 - 107 mmol/L    CO2 29 22 - 31 mmol/L    Anion Gap, Calculation 10 5 - 18 mmol/L    Glucose 95 70 - 125 mg/dL    Calcium 8.7 8.5 - 10.5 mg/dL    BUN 28 8 - 28 mg/dL    Creatinine 3.01 (H) 0.60 - 1.10 mg/dL    GFR MDRD Af Amer 18 (L) >60 mL/min/1.73m2    GFR MDRD Non Af Amer 15 (L) >60 mL/min/1.73m2   HM1 (CBC with Diff)   Result Value Ref Range    WBC 3.9 (L) 4.0 - 11.0 thou/uL    RBC 2.86 (L) 3.80 - 5.40 mill/uL    Hemoglobin 10.1 (L) 12.0 - 16.0 g/dL    Hematocrit 31.8 (L) 35.0 - 47.0 %     (H) 80 - 100 fL    MCH 35.3 (H) 27.0 - 34.0 pg    MCHC 31.8 (L) 32.0 - 36.0 g/dL    RDW 14.9 (H) 11.0 - 14.5 %    Platelets 91 (L) 140 - 440 thou/uL    MPV 10.0 8.5 - 12.5 fL    Neutrophils % 68 50 - 70 %    Lymphocytes % 15 (L) 20 - 40 %    Monocytes % 15 (H) 2 - 10 %    Eosinophils % 1 0 - 6 %    Basophils % 0 0 - 2 %    Immature Granulocyte % 1 (H) <=0 %    Neutrophils Absolute 2.7 2.0 - 7.7 thou/uL    Lymphocytes Absolute 0.6 (L) 0.8 - 4.4 thou/uL    Monocytes Absolute 0.6 0.0 - 0.9 thou/uL    Eosinophils Absolute 0.0 0.0 - 0.4 thou/uL    Basophils Absolute 0.0 0.0 - 0.2 thou/uL    Immature Granulocyte Absolute 0.0 <=0.0 thou/uL         Assessment:  1. Chronic respiratory failure with hypoxia (H)     2. ESRD on hemodialysis (H), MWF     3. Hypotension due to hypovolemia     4. Chronic diastolic heart failure (H)     5. Gastroesophageal reflux disease without esophagitis         Plan:   Hypoxia: fluid related,  improving, wean O2 as tolerated, continue with prn albuterol for COPD.     ESRD: continue with dialysis.  Midodrine premedication, sensipar and sevelamer    Hypotension: symptomatic, will add midodrine 10mg prn for sBP <110 on non dialysis days.       CHF: BP down likely due to diuresis, down in weight, continue with metoprolol. Fluid restriction, Fluid managed in dialysis.     GERD: intermittent, on famotidine chronically, ordered zana seltzer gold.  Told staff if pharm can not obtain than patient's  will bring in for them to administrer.           Electronically signed by: Andria Woods CNP

## 2021-06-15 NOTE — PROGRESS NOTES
Clinic Care Coordination Contact  [facility]       Clinical Data: Care Coordinator Outreach  Outreach attempted x 2.  Left message on patient's voicemail with call back information and requested return call.  Plan: Care Coordinator sent care coordination introduction letter on 2/25/w2021 via mail. Care Coordinator will do no further outreaches at this time.            Clinic Care Coordination Contact  [facility]       Clinical Data: Care Coordinator Outreach  Outreach attempted x 1.  Left message on patient's voicemail with call back information and requested return call.  Plan: Care Coordinator discuss CC referral    Care Coordinator will try to reach patient again in 1-2 business days.  2/25

## 2021-06-15 NOTE — TELEPHONE ENCOUNTER
Patients  would like to cancel her 3/17 appointment. She is on palliative care. He would like to know what to do with any upcoming appointment.

## 2021-06-16 NOTE — PROGRESS NOTES
[facility]      Facility:    [facility] [[id number]]    Code Status: FULL CODE   [facility] 1/26 -2/9/18        Chief Complaint/Reason for Visit:  Chief Complaint   Patient presents with     H & P     Acute respiratory Failure       HPI:   Belia is a 72 y.o. female with known end-stage renal disease on dialysis,COPD, atrial fibrillation, tobacco use disorder, GERD, essential hypertension, and anemia of chronic renal failure.  About 5 days before admission, she developed increasing cough, then gradual worsening of shortness of breath.  It was particularly severe the day before admission.  She presented to the emergency department.  Her cough is productive of yellow-green phlegm, she had a low-grade temperature.  In the emergency department, she still had respiratory distress with oxygen, nebulizer, and antibiotics.  CT was done showing no obvious pneumonia.  Influenza A/B were negative, white count and PMNs were normal, natruretic protein somewhat elevated at 567, TSH was normal,  Comprehensive metabolic panel was normal except for the expected BUN and creatinine issues.    She still had not improved, she had elevated carbon dioxide and required BiPAP.  Over the course of a few days she improved, wearing oxygen per nasal cannula during the day, BiPAP at night.  She had empiric treatment with ceftriaxone and azithromycin although was not a documented pneumonia.  Pulmonary was consulted, she had mixed obstructive and restrictive pattern on pulmonary function tests.  Was planned that she would need to stay on oxygen into the future.  Was recommended she have an outpatient pulmonary workup including consideration of high-resolution CT of the chest.  There was a CHF component to her failure, she diuresed approximately 17 pounds.  She has chronic atrial fibrillation and is on digoxin and diltiazem.  She had a rapid rate but is not anticoagulated due to recurrent GI bleeding.   She had a watchman device planned for around the time of the admission, it was deferred until April 2018.      Past Medical History:  Past Medical History:   Diagnosis Date     Arthritis      CHF (congestive heart failure)      Chronic anemia 6/1/2014     Chronic kidney disease      Chronic thoracic aortic dissection 10/7/2015    Descending thoracic aorta; treated medically per notes of Dragan Singh and Jennifer.     COPD (chronic obstructive pulmonary disease)      CVA (cerebral infarction)      Disease of thyroid gland      Dyslipidemia      ESRD (end stage renal disease) 06/03/2009    on dialysis with Dr. Mitchell     Essential hypertension 6/30/2014     GI (gastrointestinal bleed)      GI bleeding 6/5/2017     Gout      L3 vertebral fracture 11/16/2015     Obesity      ZULEIKA (obstructive sleep apnea), severe, intolerant of CPAP 10/22/2015     Pneumonia 9-7-2015     Right foot drop      Spinal stenosis 3/28/2016     Stroke 3/24/2016           Surgical History:  Past Surgical History:   Procedure Laterality Date     BACK SURGERY      [facility]     COLONOSCOPY N/A 3/23/2016    Procedure: COLONOSCOPY;  Surgeon: Ruddy Tejada MD;  Location: [facility];  Service:      DILATION AND CURETTAGE OF UTERUS       EYE SURGERY       HERNIA REPAIR       SD COLSC FLEXIBLE W/CONTROL BLEEDING ANY METHOD N/A 6/7/2017    Procedure: COLONOSCOPY;  Surgeon: Luis Mckeon MD;  Location: [facility];  Service: Gastroenterology     TONSILLECTOMY         Family History:   Family History   Problem Relation Age of Onset     Dementia Mother      Diabetes Mother      Arthritis Mother      Cancer Mother      Depression Mother      Heart disease Mother      Vision loss Mother      Stroke Father      Heart disease Father      Breast cancer Neg Hx        Social History:    Social History     Social History     Marital status:      Spouse name: Cayden     Number of children: 2     Years of education: N/A     Occupational History       Retired     Social History Main Topics     Smoking status: Former Smoker     Packs/day: 1.50     Years: 37.00     Types: Cigarettes     Quit date: 1/1/2009     Smokeless tobacco: Never Used     Alcohol use 7.0 oz/week     14 Standard drinks or equivalent per week      Comment: 14 mixed drinks per week     Drug use: No     Sexual activity: No     Other Topics Concern     Not on file     Social History Narrative    Lives with her . Daughter in [place] and daughter in [place].          Review of Systems   Vision is getting blurry from steroids, she wants to get off them as quickly as possible (due to complete them in 2 days)  She has been on dialysis 9 years does not make her feel bad  She was never tolerant of any CPAP for her severe sleep apnea  She uses 2 L of oxygen at home for sleep  She and her  live in the same residence, but her estranged  The remainder of the comprehensive review of systems is negative    Blood pressure 110/63, pulse 62, temperature 97.9  F (36.6  C), resp. rate 20, SpO2 97 %      Physical Exam   Constitutional: She is oriented to person, place, and time. She appears well-nourished. No distress.   HENT:   Nose: Nose normal.   Mouth/Throat: Oropharynx is clear and moist.   Eyes: Conjunctivae and EOM are normal.   Cardiovascular:   No murmur heard.  Irregularly irregular   Pulmonary/Chest: Effort normal and breath sounds normal. She has no wheezes. She has no rales.   Decreased at the bases, prolonged expiration    Dialysis port in the right upper chest   Abdominal: Soft. Bowel sounds are normal. There is no tenderness.   Musculoskeletal: Normal range of motion. She exhibits edema. She exhibits no deformity.   1+ pretibial edema   Lymphadenopathy:     She has no cervical adenopathy.   Neurological: She is alert and oriented to person, place, and time.   Skin: Skin is warm and dry.   Skin tear on her dorsal left hand between the first and second fingers   Psychiatric: She has  a normal mood and affect. Her behavior is normal. Thought content normal.       Medication List:  Current Outpatient Prescriptions   Medication Sig     acetaminophen (TYLENOL) 500 MG tablet Take 500 mg by mouth every 6 (six) hours as needed for pain.     albuterol (PROAIR HFA;PROVENTIL HFA;VENTOLIN HFA) 90 mcg/actuation inhaler Inhale 2 puffs every 4 (four) hours as needed for wheezing.     albuterol (PROVENTIL) 2.5 mg /3 mL (0.083 %) nebulizer solution Take 3 mL (2.5 mg total) by nebulization every 4 (four) hours as needed for wheezing.     allopurinol (ZYLOPRIM) 100 MG tablet Take 100 mg by mouth daily with lunch.      aspirin 81 MG EC tablet Take 81 mg by mouth daily with lunch.      atorvastatin (LIPITOR) 10 MG tablet Take 10 mg by mouth at bedtime.     CHLORPHENIRAMINE/DEXTROMETHORP (CORICIDIN HBP COUGH AND COLD ORAL) Take 1 tablet by mouth daily as needed.      cholecalciferol, vitamin D3, 2,000 unit cap Take 2,000 Units by mouth daily with lunch.      cinacalcet (SENSIPAR) 30 MG tablet Take 30 mg by mouth at bedtime.      digoxin (LANOXIN) 125 mcg tablet Take 125 mcg by mouth 3 (three) times a week. Take every Monday, Wednesday, and Saturday with supper.     diltiazem (CARDIZEM SR) 120 MG 12 hr capsule Take 1 capsule (120 mg total) by mouth 2 (two) times a day.     folic acid (FOLVITE) 1 MG tablet Take 1 mg by mouth daily with lunch.      gabapentin (NEURONTIN) 100 MG capsule Take 100 mg by mouth 3 (three) times a day.     HYDROmorphone (DILAUDID) 2 MG tablet Take 1 tablet (2 mg total) by mouth every 3 (three) hours as needed.     Lactobacillus rhamnosus GG (CULTURELLE) 10-15 Billion cell capsule Take 1 capsule by mouth daily with lunch.     midodrine (PROAMATINE) 5 MG tablet Take 10 mg by mouth 3 (three) times a week. Take every Monday, Wednesday, and Friday in the morning.     polyvinyl alcohol (LIQUIFILM TEARS) 1.4 % ophthalmic solution Apply 1 drop to eye as needed for dry eyes.     predniSONE (DELTASONE)  10 mg tablet Take 2 tabs (20 mg) daily x 2 days, then 10 mg daily x 3 days, then stop     ranitidine (ZANTAC) 150 MG tablet Take 150 mg by mouth at bedtime.     senna-docusate (SENNOSIDES-DOCUSATE SODIUM) 8.6-50 mg tablet Take 1 tablet by mouth at bedtime.      sevelamer carbonate (RENVELA) 800 mg tablet Take 1,600 mg by mouth 3 (three) times a day with meals.     sevelamer carbonate (RENVELA) 800 mg tablet Take 800-1,600 mg by mouth daily as needed (snacks).     timolol maleate (TIMOPTIC) 0.5 % ophthalmic solution Administer 1 drop to both eyes 2 (two) times a day.      traZODone (DESYREL) 100 MG tablet Take 1 tablet (100 mg total) by mouth at bedtime.     vit B comp no.3-folic-C-biotin (NEPHRO-OLGA) 1- mg-mg-mcg Tab tablet Take 1 tablet by mouth daily with supper.        Labs:  Result Value Ref Range   WBC 6.6 4.0 - 11.0 thou/uL   Hemoglobin 10.1 (L) 12.0 - 16.0 g/dL   Platelets 105 (L) 140 - 440 thou/uL   Neutrophils % 64 50 - 70 %   Lymphocytes % 17 (L) 20 - 40 %   Monocytes % 16 (H) 2 - 10 %      (H) 0 - 120 pg/mL       Sodium 136 136 - 145 mmol/L     Potassium 5.0 3.5 - 5.0 mmol/L     Chloride 100 98 - 107 mmol/L     CO2 21 (L) 22 - 31 mmol/L     Anion Gap, Calculation 15 5 - 18 mmol/L     Glucose 103 70 - 125 mg/dL     BUN 46 (H) 8 - 28 mg/dL     Creatinine 7.56 (HH) 0.60 - 1.10 mg/dL     GFR MDRD Af Amer 6 (L) >60 mL/min/1.73m2     GFR MDRD Non Af Amer 5 (L) >60 mL/min/1.73m2     Bilirubin, Total 0.6 0.0 - 1.0 mg/dL     Calcium 9.3 8.5 - 10.5 mg/dL     Protein, Total 6.9 6.0 - 8.0 g/dL     Albumin 3.5 3.5 - 5.0 g/dL     Alkaline Phosphatase 94 45 - 120 U/L     AST 21 0 - 40 U/L     ALT 19 0 - 45 U/L         Assessment:    ICD-10-CM    1. Acute respiratory failure with hypoxia and hypercapnia J96.01     J96.02    2. ZULEIKA (obstructive sleep apnea), severe, intolerant of CPAP G47.33    3. ESRD (end stage renal disease) N18.6    4. Persistent atrial fibrillation I48.1    5. Anemia of chronic renal  failure, stage 5 N18.5     D63.1    6. Chronic anemia D64.9    7. Essential hypertension with goal blood pressure less than 140/90 I10    8. Hypervolemia, unspecified hypervolemia type E87.70          Plan:  I anticipate her blurry vision will improve when she is off the steroids, she is to notify us if this is not the case  OT PT for deconditioning.  It's good idea for her get the outpatient pulmonary workup  Continue dialysis        Electronically signed by: Venecia Colin MD

## 2021-06-16 NOTE — TELEPHONE ENCOUNTER
MAGO for ALMAZ Santos calling from [facility] -OT ordered for patient but due to capacity cannot get out to see her til 4/26, also starting to get RN and PT, will use RN for PT until they can get out there, also states start of care was delayed due to patient request and dialysis

## 2021-06-16 NOTE — TELEPHONE ENCOUNTER
Chayito calling today to report that PT home services were supposed to resume on Monday 4/19/21.  Patient requested that services resume today instead.  Resume date has changed from 4/19/21 to 4/21/21 per patient request.    Any questions please  All Chayito at [phone]

## 2021-06-16 NOTE — PROGRESS NOTES
Internal Medicine Office Visit  [facility]   Patient Name: Belia Rodriguez  Patient Age: 75 y.o.  YOB: 1945  MRN: [id number]    Date of Visit: 4/20/2021  Reason for Office Visit:   Chief Complaint   Patient presents with     Hospital Visit Follow Up           Assessment / Plan / Medical Decision Making:    Problem List Items Addressed This Visit     Chronic respiratory failure with hypoxia (H)     Continue albuterol nebulizer treatments three times a day around-the-clock. May increase to four times a day if needed            ESRD on hemodialysis (H), MWF     Continue dialysis  Check blood pressure and use midodrine PRN as prescribed          Goals of care, counseling/discussion - Primary     - I had a long conversation with patient and her spouse regarding letting relatives know the true status of her health and communicating the urgency of her condition. At the current state, her daughter in [place] doesn't plan to come to visit until the end of next month and I think this is overly optimistic. It is important to her to not die at home or in the hospital, she would like to go to [facility] for hospice care when she is ready. To make this a reality, it will likely entail her making this decision soon so that she does not land in the hospital again.   - Physical therapy has been very taxing to her and doesn't align with her goals of care to conserve her strength for seeing people important to her. She is advised that it is her prerogative to cancel this service if she prefers   - She reports difficulty bathing at home, she has home health agency information at home. She is encouraged to arrange for a home health aid and understands that this would be an out of pocket expense          Hyperparathyroidism (H)     Followed by nephrology          Neuropathy     Gabapentin dose limited by renal function. Will prescribe oxycodone to take sparingly at night for neuropathy pain in  feet          Relevant Medications    oxyCODONE (ROXICODONE) 5 MG immediate release tablet    PVD (peripheral vascular disease) (H)     Continue statin, aspirin for now          Thrombocytopenia (H)     Stable                 I am having Itzel Rodriguez maintain her timoloL maleate, Lactobacillus rhamnosus GG, cinacalcet, folic acid, cholecalciferol (vitamin D3), Dialyvite, polyvinyl alcohol, traZODone, acetaminophen, metoprolol succinate, famotidine, sevelamer carbonate, atorvastatin, pot bicarb/sod bicarb/cit ac (EDI-SELTZER GOLD ORAL), midodrine, nystatin, loratadine, aspirin, gabapentin, midodrine, senna-docusate, albuterol, midodrine, and oxyCODONE.        61 minutes spent on the date of the encounter doing chart review, patient visit, documentation and discussion with family     No orders of the defined types were placed in this encounter.  Followup: Return in about 4 weeks (around 5/18/2021) for PCP , Recheck. earlier if needed.        Demetra Larose, CNP        HPI:  Belia Rodriguez is a 75 y.o. year old female with a PMH of chronic respiratory failure d/t COPD on 3 L NC, ESRD on HD MWF, chronic diastolic HF, PAF, SSS s/p pacemaker who presents to the office today with her , Cayden, after multiple hospitalizations. Most recently she was admitted on 4/11/21 after presenting to the ED with a report of taking her medication and then felt like they were stuck in her throat and felt shortness of breath. She was admitted and required more oxygen, up to 5 L. She was discharged to home on 4/17/21 on 3L O2. Hospice was consulted during her stay and can see her when she is ready. She wanted to say goodbye to some relatives before doing so. She is working with home physical therapy but feels entirely exhausted after the physical therapy. She doesn't think this contributes to her goals of preserving her energy for seeing the people that mean most to her before she dies. She struggles to get to the bathroom in  their home to bath and feels very tired after doing so.     She is doing nebulizer three times a day routinely which has helped with her breathing.     She is having more pain in her feet. She takes oxycodone for this sparingly, she has #6 tablets left.     She reported dysphagia during the hospitalization. Speech was consulted, she did not have clinical dysphagia.     Chronic hypotension treated with midodrine. She admits that she does not check her blood pressure.       Health Maintenance Review  Health Maintenance   Topic Date Due     DEPRESSION ACTION PLAN  Never done     HEPATITIS C SCREENING  Never done     HEART FAILURE ACTION PLAN  Never done     COPD ACTION PLAN  Never done     DEXA SCAN  Never done     COVID-19 Vaccine (1) Never done     ZOSTER VACCINES (2 of 3) 09/22/2009     MEDICARE ANNUAL WELLNESS VISIT  Never done     FALL RISK ASSESSMENT  09/18/2020     LIPID  11/19/2020     BMP  10/16/2021     MAMMOGRAM  12/20/2021     ALT  04/11/2022     CBC  04/11/2022     ADVANCE CARE PLANNING  10/05/2025     COLORECTAL CANCER SCREENING  06/07/2027     TD 18+ HE  10/30/2027     TSH  Completed     SPIROMETRY  Completed     Pneumococcal Vaccine: Pediatrics (0 to 5 Years) and At-Risk Patients (6 to 64 Years)  Completed     Pneumococcal Vaccine: 65+ Years  Completed     INFLUENZA VACCINE RULE BASED  Completed     HEPATITIS B VACCINES  Aged Out       Current Scheduled Meds:  Outpatient Encounter Medications as of 4/20/2021   Medication Sig Dispense Refill     acetaminophen (TYLENOL) 500 MG tablet Take 2 tablets (1,000 mg total) by mouth every 6 (six) hours as needed.  0     albuterol (PROVENTIL) 2.5 mg /3 mL (0.083 %) nebulizer solution Take 3 mL (2.5 mg total) by nebulization 3 (three) times a day. And as needed for shortness of breath 100 mL 4     aspirin 81 MG EC tablet Take 1 tablet (81 mg total) by mouth daily with lunch.       atorvastatin (LIPITOR) 10 MG tablet Take 10 mg by mouth at bedtime.       B complex  11-folic-C-biot-zinc (DIALYVITE) 6-529-644-50 mg-mg-mcg-mg Tab Take 1 tablet by mouth daily with supper. (Dialyvite)        cholecalciferol, vitamin D3, 1,000 unit (25 mcg) tablet Take 2,000 Units by mouth daily.        cinacalcet (SENSIPAR) 30 MG tablet Take 30 mg by mouth see administration instructions. Take three times weekly with dialysis (on Mondays, Wednesdays, and Fridays).       famotidine (PEPCID) 20 MG tablet Take 1 tablet (20 mg total) by mouth at bedtime.  0     folic acid (FOLVITE) 1 MG tablet TAKE ONE TABLET BY MOUTH ONCE DAILY (Patient taking differently: Take 1 mg by mouth daily. ) 90 tablet 4     gabapentin (NEURONTIN) 100 MG capsule Take 100 mg by mouth 3 (three) times a day. Leg pain       Lactobacillus rhamnosus GG (CULTURELLE) 10-15 Billion cell capsule Take 1 capsule by mouth daily with lunch.        loratadine (CLARITIN) 10 mg tablet Take 10 mg by mouth daily as needed for allergies.       metoprolol succinate (TOPROL-XL) 25 MG Take 1 tablet (25 mg total) by mouth daily with lunch. Hold for SBP<110 or hr<60  0     midodrine (PROAMATINE) 10 MG tablet Take 10 mg by mouth 4 (four) times a week. On non-dialysis days if SBP <106 mmHg       midodrine (PROAMATINE) 10 MG tablet Take 1.5 tablets (15 mg total) by mouth 3 (three) times a week. qMWF prior to dialysis 30 tablet 0     midodrine (PROAMATINE) 5 MG tablet Take 1 tablet (5 mg total) by mouth every 6 (six) hours as needed (for SBP <110). 30 tablet 0     nystatin (MYCOSTATIN) powder Apply 1 application topically 2 (two) times a day as needed.       oxyCODONE (ROXICODONE) 5 MG immediate release tablet Take 1 tablet (5 mg total) by mouth every 6 (six) hours as needed. 13 tablet 0     polyvinyl alcohol (LIQUIFILM TEARS) 1.4 % ophthalmic solution Administer 1 drop to both eyes as needed for dry eyes.       pot bicarb/sod bicarb/cit ac (EDI-SELTZER GOLD ORAL) Take by mouth daily.       senna-docusate (SENNOSIDES-DOCUSATE SODIUM) 8.6-50 mg tablet  Take 2 tablets by mouth 2 (two) times a day. Hold for loose stools 60 tablet 0     sevelamer carbonate (RENVELA) 800 mg tablet Take 1,600 mg by mouth 3 (three) times a day with meals.       timolol maleate (TIMOPTIC) 0.5 % ophthalmic solution Administer 1 drop to both eyes 2 (two) times a day.        traZODone (DESYREL) 150 MG tablet Take 150 mg by mouth at bedtime.        [DISCONTINUED] buPROPion (WELLBUTRIN XL) 150 MG 24 hr tablet Take 1 tablet (150 mg total) by mouth 2 (two) times a week. Tuesday and Saturday with supper       [DISCONTINUED] methylcellulose (CITRUCEL) Take 2 g by mouth daily. 454 g 0     [DISCONTINUED] oxyCODONE (ROXICODONE) 5 MG immediate release tablet Take 1 tablet (5 mg total) by mouth every 6 (six) hours as needed.       [DISCONTINUED] polyethylene glycol (MIRALAX) 17 gram packet Take 1 packet (17 g total) by mouth daily as needed (constipation). 30 packet 0     [DISCONTINUED] sevelamer HCL (RENAGEL) 800 MG tablet Take 1,600 mg by mouth 3 (three) times a day with meals.        No facility-administered encounter medications on file as of 4/20/2021.      Post Discharge Medication Reconciliation Status: discharge medications reconciled, continue medications without change      Objective / Physical Examination:  Vitals:    04/20/21 1150   BP: 96/56   Pulse: 76   Temp: 98.6  F (37  C)   TempSrc: Oral   SpO2: 93%     Wt Readings from Last 3 Encounters:   04/15/21 141 lb 14.4 oz (64.4 kg)   03/31/21 157 lb (71.2 kg)   03/29/21 152 lb (68.9 kg)     There is no height or weight on file to calculate BMI.     Constitutional: In no apparent distress. Seated comfortable in wheelchair   Eyes: Non-icteric.   Respiratory: Breath sounds diminished bilaterally but clear. Normal inspiratory and expiratory effort  Cardiovascular: Regular rate and rhythm  Psych: Alert and oriented x3. Sometimes tearful

## 2021-06-16 NOTE — PROGRESS NOTES
[facility]    Name:   Belia Rodriguez (Itzel)  : 1945  Facility:   [facility] [[id number]]   Room:   Code Status: DNR/DNI and POLST AVAILABLE - Palliative Care  Fac type:   SNF (Skilled Nursing Facility, TCU) -     CHIEF COMPLAINT / REASON FOR VISIT:  Chief Complaint   Patient presents with     Follow-up     TCU follow-up: Hospitalization for acute on chronic respiratory failure due to a combination of fluid overload and COPD exacerbation.     Problem Visit     1. Hypotension due to hypovolemia. 2. Depression.     [facility] from 18 until 18  [facility]CU from 18 until 18  [facility] from 2021 until 2021 (acute on chronic respiratory failure due to a combination of fluid overload and COPD exacerbation)  [facility] from 2021 until 2021  [facility] from 2021 until 2021 (acute on chronic respiratory failure due to a combination of fluid overload and COPD exacerbation)     Patient was last seen by me on 2021.      HPI: Belia is a 75 y.o. female with known end-stage renal disease (on dialysis), COPD (former smoker, 45-60 pack years),  chronic respiratory failure with hypoxia, obstructive sleep apnea (severe and previously intolerant of CPAP), chronic atrial fibrillation, GERD, essential hypertension, and anemia related to chronic renal failure.    She has had multiple hospitalizations for respiratory failure due to a combination of fluid overload and COPD exacerbation.  This occurred on 2021 and again on 2021.  Her last admission came within hours of her discharge from [facility].  When last in the TCU at [facility], she was a full code, but she indicated during her last hospitalization that she wants no further BiPAP and is DNR/DNI.  This did improve somewhat  with steroids and doxycycline.    While she continues on hemodialysis Mondays, Wednesdays, and Fridays, she did have some questions regarding hospice but was not sure she would sign on given her need for dialysis.  She did have a palliative care consult, and she strongly agreed, especially given her readmission to the hospital.      CURRENT/RECENT TCU ISSUES    Disposition: The patient is aware that her health will not improve and is ready for hospice, although she is concerned about having all her paperwork in order.  I had received a request by  that I give an okay for an in-house psych consult.  My initial thought was that the patient has been doing quite well and accepting her situation, and there would be nothing gained from such a consult.  At the time, we spent a good 20 minutes talking about her feelings with regard to hospice, dying, and other related issues, including depression.  She did tell me that she thought she might benefit from a consult and was recently seen via video conference.  She thought it was beneficial.    She was having no particular issues until about 6 weeks ago, not needing oxygen but now expecting a chronic continuous need.  She tells me she plans to go on hospice after discharge.  She wants to go to [facility].  She notes that she will be stopping dialysis and would expect to survive no longer than 1 week.  She has been presented with this suggestion about 6 months ago.  She believes that she is now ready.  Her main concern is not about dying but is getting her paperwork done for her  of 44 years.     She is doing fairly well.  She complains of neuropathy in her feet, Raynaud's phenomenon in the feet as well, and some constipation.    Hypotension: She is on a 1500 mL fluid restriction.  This may be producing hypovolemia.  She does have orders for 20 mg of midodrine to take on dialysis days (Mondays, Wednesdays, and Fridays) and has additional orders for taking  "this when additional dialysis is needed.  However, she has been running significantly low blood pressures.  Systolic blood pressure has been as low as 79. On 03/01/2021, another nurse practitioner ordered 10 mg of midodrine to be given on nondialysis days when the SBP is <106.  An adjustment was recently made at dialysis, and she is now receiving a 20 mg dose there as well.  Now, she tells me her systolic blood pressure gets into the high 80s to 90s in the evening.    Medication changes: At an earlier visit, considering her plans for hospice, we talked about her medications.  It was agreed that atorvastatin was no longer needed, and so we discontinued it.  At the time, she stated, \"anything you can take away from me won't break my heart at all.\"    Discharge planning: She told her  and daughter that she wanted to stay here, but her  became angry.  He has been the one to take her to dialysis and suggest that she would need to arrange her own transportation if she decided to stay.  He also told her he would not be doing housework.  The end result was that the patient would be staying 2 more weeks.  Apparently, her  has now finally resigned himself to her being here.  She tells me that she is here now because she has no other place to go, and palliative care is cheaper than hospice.  I told her I had no problem with managing a palliative care plan.  The patient there is a request for physical therapy, and we will give orders for that.      ROS:   Positives: Her feet continue to be painful due to neuropathy.  She has found that lying in bed with her knees up in the air helps. She is on chronic oxygen, and breathing is no better and no worse.      Negatives: Complaining of constipation before, things are going better now.  She denies any headaches or chest pains, coughing or congestion at the moment, dizziness, dysuria, diarrhea, difficulty chewing or swallowing, or problems with sleep (when on 150 " mg of trazodone nightly).    Past Medical History:   Diagnosis Date     Arthritis      CHF (congestive heart failure) (H)      Chronic anemia 6/1/2014     Chronic kidney disease      Chronic thoracic aortic dissection (H) 10/7/2015    Descending thoracic aorta; treated medically per notes of Dragan Singh and Jennifer.     COPD (chronic obstructive pulmonary disease) (H)      CVA (cerebral infarction)      Disease of thyroid gland      Dyslipidemia      ESRD (end stage renal disease) (H) 06/03/2009    on dialysis with Dr. Mitchell     Essential hypertension 6/30/2014     Gastrointestinal hemorrhage, unspecified gastrointestinal hemorrhage type 6/5/2017     GI (gastrointestinal bleed)      GI bleeding 6/5/2017     Gout      L3 vertebral fracture (H) 11/16/2015     Left Atrial Appendage Occlusion (WATCHMAN) 4/5/2018    LAAO April 5, 2018 (30 mm WATCHMAN)     Obesity      ZULEIKA (obstructive sleep apnea), severe, intolerant of CPAP 10/22/2015     Oxygen dependent     3L nc     Pneumonia 9-7-2015     Right foot drop      Spinal stenosis 3/28/2016     Stroke (H) 3/24/2016              Family History   Problem Relation Age of Onset     Dementia Mother      Diabetes Mother      Arthritis Mother      Cancer Mother      Depression Mother      Heart disease Mother      Vision loss Mother      Stroke Father      Heart disease Father      Breast cancer Neg Hx      Social History     Socioeconomic History     Marital status:      Spouse name: Cayden     Number of children: 2     Years of education: Not on file     Highest education level: Not on file   Occupational History     Employer: RETIRED   Social Needs     Financial resource strain: Not on file     Food insecurity     Worry: Not on file     Inability: Not on file     Transportation needs     Medical: Not on file     Non-medical: Not on file   Tobacco Use     Smoking status: Former Smoker     Packs/day: 1.50     Years: 37.00     Pack years: 55.50     Types: Cigarettes      Quit date: 2009     Years since quittin.2     Smokeless tobacco: Never Used   Substance and Sexual Activity     Alcohol use: No     Alcohol/week: 11.7 standard drinks     Types: 14 Standard drinks or equivalent per week     Comment: 14 mixed drinks per week     Drug use: No     Sexual activity: Never     Partners: Male   Lifestyle     Physical activity     Days per week: Not on file     Minutes per session: Not on file     Stress: Not on file   Relationships     Social connections     Talks on phone: Not on file     Gets together: Not on file     Attends Jainism service: Not on file     Active member of club or organization: Not on file     Attends meetings of clubs or organizations: Not on file     Relationship status: Not on file     Intimate partner violence     Fear of current or ex partner: Not on file     Emotionally abused: Not on file     Physically abused: Not on file     Forced sexual activity: Not on file   Other Topics Concern     Not on file   Social History Narrative    Lives with her . Daughter in [place] and daughter in [place].     MEDICATIONS: Reviewed from the MAR, physician orders, and earlier progress notes.  Post Discharge Medication Reconciliation Status: medication reconciliation previously completed during another office visit.  Further clarification has been made to dosing of midodrine.    Current Outpatient Medications   Medication Sig     acetaminophen (TYLENOL) 500 MG tablet Take 2 tablets (1,000 mg total) by mouth every 6 (six) hours as needed.     albuterol (PROVENTIL) 2.5 mg /3 mL (0.083 %) nebulizer solution Take 3 mL (2.5 mg total) by nebulization every 4 (four) hours as needed for wheezing.     aspirin 81 MG EC tablet Take 1 tablet (81 mg total) by mouth daily.     atorvastatin (LIPITOR) 10 MG tablet Take 10 mg by mouth at bedtime.     B complex 11-folic-C-biot-zinc (DIALYVITE) [phone] mg-mg-mcg-mg Tab Take 1 tablet by mouth daily with supper. (Dialyvite)       buPROPion (WELLBUTRIN XL) 150 MG 24 hr tablet Take 1 tablet (150 mg total) by mouth 2 (two) times a week. Tuesday and saturday (Patient taking differently: Take 150 mg by mouth 2 (two) times a week. Tuesday and saturday)     cholecalciferol, vitamin D3, 1,000 unit (25 mcg) tablet Take 2,000 Units by mouth daily.      cinacalcet (SENSIPAR) 30 MG tablet Take 30 mg by mouth see administration instructions. Take three times weekly with dialysis (on Mondays, Wednesdays, and Fridays).     famotidine (PEPCID) 20 MG tablet Take 1 tablet (20 mg total) by mouth at bedtime.     folic acid (FOLVITE) 1 MG tablet TAKE ONE TABLET BY MOUTH ONCE DAILY (Patient taking differently: Take 1 mg by mouth daily. )     gabapentin (NEURONTIN) 100 MG capsule Take 100 mg by mouth 2 (two) times a day. Leg pain     Lactobacillus rhamnosus GG (CULTURELLE) 10-15 Billion cell capsule Take 1 capsule by mouth daily with lunch.      metoprolol succinate (TOPROL-XL) 25 MG Take 1 tablet (25 mg total) by mouth daily with lunch. Hold for SBP<110 or hr<60     midodrine (PROAMATINE) 10 MG tablet Take 2 tablets (20 mg total) by mouth 3 (three) times a week. Patient takes prior to dialysis qMWF and PRN if additional dialysis treatments.  Patient reports that she takes 20mg prior to dialysis (Patient taking differently: Take 20 mg by mouth 3 (three) times a week. Patient takes prior to dialysis qMWF and PRN if additional dialysis treatments.  Patient reports taking 20mg prior to dialysis and is given 20 mg after dialysis as well.    Also, on nondialysis days, give 10 mg daily for SBP <106.)     midodrine (PROAMATINE) 10 MG tablet Take 10 mg by mouth daily as needed. Take on non dialysis days. Has an order for dialysis days.     midodrine (PROAMATINE) 5 MG tablet Take 10 mg by mouth 4 (four) times a week. On non dialysis days if SBP < 106     oxyCODONE (ROXICODONE) 5 MG immediate release tablet TAKE 1/2 TO 1 TABLET (2.5-5MG) BY MOUTH TWICE DAILY AS NEEDED  FOR PAIN     polyvinyl alcohol (LIQUIFILM TEARS) 1.4 % ophthalmic solution Administer 1 drop to both eyes as needed for dry eyes.     pot bicarb/sod bicarb/cit ac (EDI-SELTZER GOLD ORAL) Take by mouth daily.     senna-docusate (SENNOSIDES-DOCUSATE SODIUM) 8.6-50 mg tablet Take 1 tablet by mouth at bedtime as needed for constipation.      sevelamer carbonate (RENVELA) 800 mg tablet Take 1,600 mg by mouth 2 (two) times a day as needed (snacks).     sevelamer HCL (RENAGEL) 800 MG tablet Take 1,600 mg by mouth 3 (three) times a day with meals.      timolol maleate (TIMOPTIC) 0.5 % ophthalmic solution Administer 1 drop to both eyes 2 (two) times a day.      traZODone (DESYREL) 150 MG tablet Take 150 mg by mouth at bedtime.      ALLERGIES:   Allergies   Allergen Reactions     Ace Inhibitors Cough     Atrovent [Ipratropium Bromide] Headache     Fosinopril Sodium Cough     Zolpidem      hallucinations     DIET: Dialysis diet, regular texture, thin liquids.  1500 mL /24-hour fluid restriction    Vitals:    03/15/21 1511   BP: 92/54   Pulse: 70   Resp: 18   Temp: 98.2  F (36.8  C)   SpO2: 98%   Weight: 153 lb 9.6 oz (69.7 kg)     Body mass index is 24.79 kg/m .    EXAMINATION:   General: Pleasant, alert, and conversant middle-aged female, sitting on the bed, in no apparent distress.  Head: Normocephalic and atraumatic.   Eyes: PERRLA, sclerae clear.  Slight disconjugate gaze.  ENT: Moist oral mucosa.  She has her own teeth.  She has a bit of a runny nose today.  Hearing is unimpaired.  Cardiovascular: Irregular rhythm with a 2/6 systolic ejection murmur at the left sternal border.  Respiratory: Diminished lung sounds bilaterally but otherwise clear.  Abdomen: Soft and nontender.   Musculoskeletal/Extremities: Age-related degenerative joint disease.  Right 2+ pitting pedal and pretibial edema, 1+ on the left, slightly improved while Ace wrapped.  Integument: No rashes, clinically significant lesions, or skin breakdown.    Cognitive/Psychiatric: Alert and oriented ×4.  Affect is euthymic.    DIAGNOSTICS:   Results for orders placed or performed during the hospital encounter of 02/18/21   Basic Metabolic Panel   Result Value Ref Range    Sodium 136 136 - 145 mmol/L    Potassium 3.7 3.5 - 5.0 mmol/L    Chloride 97 (L) 98 - 107 mmol/L    CO2 29 22 - 31 mmol/L    Anion Gap, Calculation 10 5 - 18 mmol/L    Glucose 95 70 - 125 mg/dL    Calcium 8.7 8.5 - 10.5 mg/dL    BUN 28 8 - 28 mg/dL    Creatinine 3.01 (H) 0.60 - 1.10 mg/dL    GFR MDRD Af Amer 18 (L) >60 mL/min/1.73m2    GFR MDRD Non Af Amer 15 (L) >60 mL/min/1.73m2     Lab Results   Component Value Date    WBC 3.9 (L) 02/20/2021    HGB 10.1 (L) 02/20/2021    HCT 31.8 (L) 02/20/2021     (H) 02/20/2021    PLT 91 (L) 02/20/2021     CrCl cannot be calculated (Patient's most recent lab result is older than the maximum 10 days allowed.).  Lab Results   Component Value Date     (H) 02/18/2021     ASSESSMENT/Plan:      ICD-10-CM    1. Chronic respiratory failure with hypoxia (H)  J96.11    2. ESRD on hemodialysis (H), MWF  N18.6     Z99.2    3. Chronic diastolic heart failure (H)  I50.32    4. Hypotension due to hypovolemia  I95.89     E86.1    5. Chronic atrial fibrillation (H)  I48.20    6. Anemia of chronic renal failure, stage 5 (H)  N18.5     D63.1    7. Pulmonary emphysema, unspecified emphysema type (H)  J43.9    8. Mild episode of recurrent major depressive disorder (H)  F33.0    9. Chronic acquired lymphedema  I89.0    10. Gastroesophageal reflux disease without esophagitis  K21.9      CHANGES:    None.    CARE PLAN:    The care plan has been reviewed and all orders signed. Changes to care plan, if any, as noted. Otherwise, continue care plan of care.      The above has been created using voice recognition software. Please be aware that this may unintentionally  produce inaccuracies and/or nonsensical sentences.      Electronically signed by: Louie Liriano,  CNP

## 2021-06-16 NOTE — PROGRESS NOTES
[facility]    Name:   Belia Rodriguez (Itzel)  : 1945  Facility:   [facility] [[id number]]   Room:   Code Status: DNR/DNI and POLST AVAILABLE - Palliative Care  Fac type:   SNF (Skilled Nursing Facility, TCU) -     DOS: 2021    PCP: Neil Galan MD      CHIEF COMPLAINT / REASON FOR VISIT:  Chief Complaint   Patient presents with     Follow-up     TCU follow-up: Hospitalization for acute on chronic respiratory failure due to a combination of fluid overload and COPD exacerbation.     Problem Visit     1. Hypotension due to hypovolemia. 2. Depression.      from 18 until 18  [facility] until [facility]  [facility] from 2021 until 2021 (acute on chronic respiratory failure due to a combination of fluid overload and COPD exacerbation)  [facility]  [facility] from 2021 until 2021 (acute on chronic respiratory failure due to a combination of fluid overload and COPD exacerbation)     Patient was last seen by me on 03/15/2021.      HPI: Belia is a 75 y.o. female with known end-stage renal disease (on dialysis), COPD (former smoker, 45-60 pack years),  chronic respiratory failure with hypoxia, obstructive sleep apnea (severe and previously intolerant of CPAP), chronic atrial fibrillation, GERD, essential hypertension, and anemia related to chronic renal failure.    She has had multiple hospitalizations for respiratory failure due to a combination of fluid overload and COPD exacerbation.  This occurred on 2021 and again on 2021.  Her last [facility]  When last in the TCU at [facility], she was a full code, but she indicated during her last hospitalization that she wants no  further BiPAP and is DNR/DNI.  This did improve somewhat with steroids and doxycycline.    While she continues on hemodialysis Mondays, Wednesdays, and Fridays, she did have some questions regarding hospice but was not sure she would sign on given her need for dialysis.  She did have a palliative care consult, and she strongly agreed, especially given her readmission to the hospital.      CURRENT/RECENT TCU ISSUES    Disposition: The patient is aware that her health will not improve and is ready for hospice, although she is concerned about having all her paperwork in order.  I had received a request by social services that I give an okay for an in-house psych consult.  My initial thought was that the patient has been doing quite well and accepting her situation, and there would be nothing gained from such a consult.  At the time, we spent a good 20 minutes talking about her feelings with regard to hospice, dying, and other related issues, including depression.  She did tell me that she thought she might benefit from a consult and was recently seen via video conference.  She thought it was beneficial.    She was having no particular issues until about 6 weeks ago, not needing oxygen but now expecting a chronic continuous need.  She initially told me she planned to go on hospice after discharge.  She expressed a desire to go to [facility].  She noted that she would be stopping dialysis and would expect to survive no longer than 1 week.  She had been presented with this suggestion about 6 months ago.  She believed that she was ready.  Her main concern has not been about dying but  getting her paperwork done for her  of 44 years.     She is doing fairly well.  She complains of neuropathy in her feet, Raynaud's phenomenon in the feet as well, and some constipation.    Hypotension: She is on a 1500 mL fluid restriction.  This may be producing hypovolemia.  She does have orders for 20 mg of midodrine to take on  "dialysis days (Mondays, Wednesdays, and Fridays) and has additional orders for taking this when additional dialysis is needed.  However, she has been running significantly low blood pressures.  Systolic blood pressure has been as low as 79. On 03/01/2021, another nurse practitioner ordered 10 mg of midodrine to be given on nondialysis days when the SBP is <106.  An adjustment was recently made at dialysis, and she is now receiving a 20 mg dose there as well.  Now, she tells me her systolic blood pressure gets into the high 80s to 90s in the evening.    TODAY, blood pressures are still low but not terribly so. They do drop after dialysis. She has, occasionally, been given fluid after dialysis. She now has a Bluetooth heart monitor in her room.    Medication changes: At an earlier visit, considering her plans for hospice, we talked about her medications.  It was agreed that atorvastatin was no longer needed, and so we discontinued it.  At the time, she stated, \"anything you can take away from me won't break my heart at all.\"    Discharge planning: She told her  and daughter that she wanted to stay here, but her  became angry.  He has been the one to take her to dialysis and suggest that she would need to arrange her own transportation if she decided to stay.  He also told her he would not be doing housework.  The end result was that the patient would be staying 2 more weeks.  Apparently, her  has now finally resigned himself to her being here.  She tells me that she is here now because she has no other place to go, and palliative care is cheaper than hospice.  I told her I had no problem with managing a palliative care plan. There was a request for physical therapy, and we gave orders for that. Currently, no discharge date has been set.      ROS:   Positives: Her feet continue to be painful due to neuropathy.  She has found that lying in bed with her knees up in the air helps. She is on chronic " oxygen, and breathing is no better and no worse.      Negatives: Complaining of constipation before, things are going better now.  She denies any headaches or chest pains, coughing or congestion at the moment, dizziness, dysuria, diarrhea, difficulty chewing or swallowing, or problems with sleep (when on 150 mg of trazodone nightly).    Past Medical History:   Diagnosis Date     Arthritis      CHF (congestive heart failure) (H)      Chronic anemia 6/1/2014     Chronic kidney disease      Chronic thoracic aortic dissection (H) 10/7/2015    Descending thoracic aorta; treated medically per notes of Dragan Singh and Jennifer.     COPD (chronic obstructive pulmonary disease) (H)      CVA (cerebral infarction)      Disease of thyroid gland      Dyslipidemia      ESRD (end stage renal disease) (H) 06/03/2009    on dialysis with Dr. Mitchell     Essential hypertension 6/30/2014     Gastrointestinal hemorrhage, unspecified gastrointestinal hemorrhage type 6/5/2017     GI (gastrointestinal bleed)      GI bleeding 6/5/2017     Gout      L3 vertebral fracture (H) 11/16/2015     Left Atrial Appendage Occlusion (WATCHMAN) 4/5/2018    LAAO April 5, 2018 (30 mm WATCHMAN)     Obesity      ZULEIKA (obstructive sleep apnea), severe, intolerant of CPAP 10/22/2015     Oxygen dependent     3L nc     Pneumonia 9-7-2015     Right foot drop      Spinal stenosis 3/28/2016     Stroke (H) 3/24/2016              Family History   Problem Relation Age of Onset     Dementia Mother      Diabetes Mother      Arthritis Mother      Cancer Mother      Depression Mother      Heart disease Mother      Vision loss Mother      Stroke Father      Heart disease Father      Breast cancer Neg Hx      Social History     Socioeconomic History     Marital status:      Spouse name: Cayden     Number of children: 2     Years of education: Not on file     Highest education level: Not on file   Occupational History     Employer: RETIRED   Social Needs     Financial  resource strain: Not on file     Food insecurity     Worry: Not on file     Inability: Not on file     Transportation needs     Medical: Not on file     Non-medical: Not on file   Tobacco Use     Smoking status: Former Smoker     Packs/day: 1.50     Years: 37.00     Pack years: 55.50     Types: Cigarettes     Quit date: 2009     Years since quittin.2     Smokeless tobacco: Never Used   Substance and Sexual Activity     Alcohol use: No     Alcohol/week: 11.7 standard drinks     Types: 14 Standard drinks or equivalent per week     Comment: 14 mixed drinks per week     Drug use: No     Sexual activity: Never     Partners: Male   Lifestyle     Physical activity     Days per week: Not on file     Minutes per session: Not on file     Stress: Not on file   Relationships     Social connections     Talks on phone: Not on file     Gets together: Not on file     Attends Voodoo service: Not on file     Active member of club or organization: Not on file     Attends meetings of clubs or organizations: Not on file     Relationship status: Not on file     Intimate partner violence     Fear of current or ex partner: Not on file     Emotionally abused: Not on file     Physically abused: Not on file     Forced sexual activity: Not on file   Other Topics Concern     Not on file   Social History Narrative    Lives with her . Daughter in [place] and daughter in [place].     MEDICATIONS: Reviewed from the MAR, physician orders, and earlier progress notes.  Post Discharge Medication Reconciliation Status: medication reconciliation previously completed during another office visit.      Current Outpatient Medications   Medication Sig     acetaminophen (TYLENOL) 500 MG tablet Take 2 tablets (1,000 mg total) by mouth every 6 (six) hours as needed.     albuterol (PROVENTIL) 2.5 mg /3 mL (0.083 %) nebulizer solution Take 3 mL (2.5 mg total) by nebulization every 4 (four) hours as needed for wheezing.     aspirin 81 MG EC  tablet Take 1 tablet (81 mg total) by mouth daily.     atorvastatin (LIPITOR) 10 MG tablet Take 10 mg by mouth at bedtime.     B complex 11-folic-C-biot-zinc (DIALYVITE) 3-045-337-50 mg-mg-mcg-mg Tab Take 1 tablet by mouth daily with supper. (Dialyvite)      buPROPion (WELLBUTRIN XL) 150 MG 24 hr tablet Take 1 tablet (150 mg total) by mouth 2 (two) times a week. Tuesday and saturday (Patient taking differently: Take 150 mg by mouth 2 (two) times a week. Tuesday and saturday)     cholecalciferol, vitamin D3, 1,000 unit (25 mcg) tablet Take 2,000 Units by mouth daily.      cinacalcet (SENSIPAR) 30 MG tablet Take 30 mg by mouth see administration instructions. Take three times weekly with dialysis (on Mondays, Wednesdays, and Fridays).     famotidine (PEPCID) 20 MG tablet Take 1 tablet (20 mg total) by mouth at bedtime.     folic acid (FOLVITE) 1 MG tablet TAKE ONE TABLET BY MOUTH ONCE DAILY (Patient taking differently: Take 1 mg by mouth daily. )     gabapentin (NEURONTIN) 100 MG capsule Take 100 mg by mouth 2 (two) times a day. Leg pain     Lactobacillus rhamnosus GG (CULTURELLE) 10-15 Billion cell capsule Take 1 capsule by mouth daily with lunch.      metoprolol succinate (TOPROL-XL) 25 MG Take 1 tablet (25 mg total) by mouth daily with lunch. Hold for SBP<110 or hr<60     midodrine (PROAMATINE) 10 MG tablet Take 2 tablets (20 mg total) by mouth 3 (three) times a week. Patient takes prior to dialysis qMWF and PRN if additional dialysis treatments.  Patient reports that she takes 20mg prior to dialysis (Patient taking differently: Take 20 mg by mouth 3 (three) times a week. Patient takes prior to dialysis qMWF and PRN if additional dialysis treatments.  Patient reports taking 20mg prior to dialysis and is given 20 mg after dialysis as well.    Also, on nondialysis days, give 10 mg daily for SBP <106.)     midodrine (PROAMATINE) 10 MG tablet Take 10 mg by mouth daily as needed. Take on non dialysis days. Has an order  for dialysis days.     midodrine (PROAMATINE) 5 MG tablet Take 10 mg by mouth 4 (four) times a week. On non dialysis days if SBP < 106     oxyCODONE (ROXICODONE) 5 MG immediate release tablet TAKE 1/2 TO 1 TABLET (2.5-5MG) BY MOUTH TWICE DAILY AS NEEDED FOR PAIN     polyvinyl alcohol (LIQUIFILM TEARS) 1.4 % ophthalmic solution Administer 1 drop to both eyes as needed for dry eyes.     pot bicarb/sod bicarb/cit ac (EDI-SELTZER GOLD ORAL) Take by mouth daily.     senna-docusate (SENNOSIDES-DOCUSATE SODIUM) 8.6-50 mg tablet Take 1 tablet by mouth at bedtime as needed for constipation.      sevelamer carbonate (RENVELA) 800 mg tablet Take 1,600 mg by mouth 2 (two) times a day as needed (snacks).     sevelamer HCL (RENAGEL) 800 MG tablet Take 1,600 mg by mouth 3 (three) times a day with meals.      timolol maleate (TIMOPTIC) 0.5 % ophthalmic solution Administer 1 drop to both eyes 2 (two) times a day.      traZODone (DESYREL) 150 MG tablet Take 150 mg by mouth at bedtime.      ALLERGIES:   Allergies   Allergen Reactions     Ace Inhibitors Cough     Atrovent [Ipratropium Bromide] Headache     Fosinopril Sodium Cough     Zolpidem      hallucinations     DIET: Dialysis diet, regular texture, thin liquids.  1500 mL /24-hour fluid restriction    Vitals:    03/18/21 1204   BP: (!) 86/54   Pulse: 70   Resp: 16   Temp: 98.3  F (36.8  C)   SpO2: 99%   Weight: 147 lb (66.7 kg)     Body mass index is 23.73 kg/m .    EXAMINATION:   General: Pleasant, alert, and conversant middle-aged female, sitting on the bed, in no apparent distress.  Head: Normocephalic and atraumatic.   Eyes: PERRLA, sclerae clear.  Slight disconjugate gaze.  ENT: Moist oral mucosa.  She has her own teeth.  She has a bit of a runny nose today.  Hearing is unimpaired.  Cardiovascular: Irregular rhythm with a 2/6 systolic ejection murmur at the left sternal border.  Respiratory: Diminished lung sounds bilaterally but otherwise clear.  Abdomen: Soft and nontender.    Musculoskeletal/Extremities: Age-related degenerative joint disease.  Right 2+ pitting pedal and pretibial edema, 1+ on the left, slightly improved while Ace wrapped.  Integument: No rashes, clinically significant lesions, or skin breakdown.   Cognitive/Psychiatric: Alert and oriented ×4.  Affect is euthymic.    DIAGNOSTICS:   Results for orders placed or performed during the hospital encounter of 02/18/21   Basic Metabolic Panel   Result Value Ref Range    Sodium 136 136 - 145 mmol/L    Potassium 3.7 3.5 - 5.0 mmol/L    Chloride 97 (L) 98 - 107 mmol/L    CO2 29 22 - 31 mmol/L    Anion Gap, Calculation 10 5 - 18 mmol/L    Glucose 95 70 - 125 mg/dL    Calcium 8.7 8.5 - 10.5 mg/dL    BUN 28 8 - 28 mg/dL    Creatinine 3.01 (H) 0.60 - 1.10 mg/dL    GFR MDRD Af Amer 18 (L) >60 mL/min/1.73m2    GFR MDRD Non Af Amer 15 (L) >60 mL/min/1.73m2     Lab Results   Component Value Date    WBC 3.9 (L) 02/20/2021    HGB 10.1 (L) 02/20/2021    HCT 31.8 (L) 02/20/2021     (H) 02/20/2021    PLT 91 (L) 02/20/2021     CrCl cannot be calculated (Patient's most recent lab result is older than the maximum 10 days allowed.).  Lab Results   Component Value Date     (H) 02/18/2021     ASSESSMENT/Plan:      ICD-10-CM    1. Chronic respiratory failure with hypoxia (H)  J96.11    2. ESRD on hemodialysis (H), MWF  N18.6     Z99.2    3. Chronic diastolic heart failure (H)  I50.32    4. Hypotension due to hypovolemia  I95.89     E86.1    5. Chronic atrial fibrillation (H)  I48.20    6. Anemia of chronic renal failure, stage 5 (H)  N18.5     D63.1    7. Pulmonary emphysema, unspecified emphysema type (H)  J43.9    8. Mild episode of recurrent major depressive disorder (H)  F33.0    9. Chronic acquired lymphedema  I89.0    10. Gastroesophageal reflux disease without esophagitis  K21.9      CHANGES:    None.    CARE PLAN:    The care plan has been reviewed and all orders signed. Changes to care plan, if any, as noted. Otherwise,  continue care plan of care.      The above has been created using voice recognition software. Please be aware that this may unintentionally  produce inaccuracies and/or nonsensical sentences.      Electronically signed by: Louie Liriano CNP

## 2021-06-16 NOTE — TELEPHONE ENCOUNTER
Melanie calling from [facility] for verbal orders for the following:    PT 1x per week for 6 weeks for balance, strengthening, gait training, transfers and fall prevention.    Please call Melanie with verbal at:  [phone]. Okay to leave message if needed.

## 2021-06-16 NOTE — PROGRESS NOTES
[facility]    Name:   Belia Rodriguez (Itzel)  : 1945  Facility:   [facility] [[id number]]   Room: TCU3-320  Code Status: FULL CODE -   Fac type:   SNF (Skilled Nursing Facility, TCU) -     CHIEF COMPLAINT / REASON FOR VISIT:  Chief Complaint   Patient presents with     Discharge Summary     TCU discharge after hospitalization with acute hypoxic respiratory failure.   [facility] from 18 until 18  [facility]CU from 18 until 18      HPI: Belia is a 72 y.o. female with known end-stage renal disease (on dialysis),COPD (former smoker, 45-60 pack years),  obstructive sleep apnea (severe and previously intolerant of CPAP), atrial fibrillation, GERD, essential hypertension, and anemia of chronic renal failure.    About 5 days prior to admission, she developed increasing cough, then gradual worsening dyspnea.  It was particularly severe the day before admission.  She presented to the emergency department.  Her cough produced yellow-green phlegm, and she had a low-grade fever.  In the emergency department, she still had respiratory distress with oxygen, nebulizer, and antibiotics.  A CT was done showing no obvious pneumonia, influenza A/B were negative, white count and PMNs were normal, natruretic protein somewhat elevated at 567, TSH was normal.  A comprehensive metabolic panel was normal except for the expected BUN and creatinine issues.     She still had not improved, had a very high CO2 and required BiPAP to promote better ventilation.    She did not need to be mechanically ventilated at any point during the hospitalization.  She did require intravenous steroids (later transitioned to oral steroids and then tapered off) and nebulizers, reaching the point where she was requiring only 4 L of oxygen per nasal cannula (previously required 8 L) during the day and using BiPAP at night to sleep.  She does have a history of severe  "obstructive sleep apnea.  She did not have any active treatment prior to the hospitalization. She had empiric treatment with ceftriaxone and azithromycin, although was not a documented pneumonia patient.  It was more empiric treatment for COPD exacerbation.    Pulmonary was consulted, and she had mixed obstructive and restrictive pattern on pulmonary function tests. It was planned that she would need to stay on oxygen into the future. It was recommended she have an outpatient pulmonary workup, including consideration of high-resolution CT of the chest.     There was a CHF component to her failure, and she diuresed approximately 17 pounds.  She has atrial fibrillation (with RVR) and is on digoxin and diltiazem.  She had a rapid rate but is not anticoagulated due to recurrent GI bleeding.  She had a watchman device planned for around the time of the admission, but it was deferred until April 2018.      CURRENT ISSUES    She is doing quite well and is now able to use CPAP.  Previously, she had felt claustrophobic, but she was introduced to it in the hospital, and she was told it would take 3 months to get accustomed to it.  She previously admitted that she was feeling better and not so tired anymore, although today she tells me that she is \"more tired now than when I wasn't using it.\".  She did want a sleep aid but told me that Ambien, given to her in the hospital, made her hallucinate.  I suggested trazodone, and she was in agreement, but I then discovered that she was already getting 100 mg nightly.  My thought was to give the CPAP more time to work.      She is expected to discharge on 03/03/18.  She has been worried about getting around at home with all the oxygen tubing.  A taper of her oxygen was thought to be going well, and she is now down to 0.5-1.0 L.  There are occasionally times when she can go without.  At my last visit, I spoke with physical therapy, and they planned to try her off of it and monitor her " O2 sats.  Unfortunately, her sats did drop to 82-85% on room air.  She apparently has a hard time deep breathing.  She currently has an oxygen concentrator at home, but it is very heavy, and it cannot be accommodated well on her motorized scooter.  She would benefit greatly from a portable/compact device.  This would make her more mobile and improve quality of life.    She is doing well overall and manages ADLs either independently or with supervision but does not require any help.  Occupational Therapy did note that her house is a two-story and very cluttered.  She can sleep downstairs in a recliner if necessary.    As noted, she attends dialysis 3 times weekly.  She has a history of aortic dissection (currently, there are 2 very tiny aneurysms), and because of this she is not eligible for a kidney transplant.  She is looking forward to getting a watchman implanted.    Medication changes: We talked about the many medications she has been on.  Her last gout flare occurred 15-20 years ago, and at an earlier visit we discontinued her allopurinol.  She tells me she watches what she eats because of dialysis, and therefore she may very well not have another gout flare.    Dr. Kinney increased her midodrine at her dialysis appointment on 02/21/18 from 10 mg to 15 mg.  She takes this only on dialysis days.      ROS: She tells me her right foot hurts, but she does have as needed hydromorphone.  No headaches or chest pains, coughing or congestion, nausea or vomiting, dizziness, dysuria, difficulty chewing or swallowing, or any integumentary issues.    Past Medical History:   Diagnosis Date     Arthritis      CHF (congestive heart failure)      Chronic anemia 6/1/2014     Chronic kidney disease      Chronic thoracic aortic dissection 10/7/2015    Descending thoracic aorta; treated medically per notes of Dragan Singh and Jennifer.     COPD (chronic obstructive pulmonary disease)      CVA (cerebral infarction)      Disease of  thyroid gland      Dyslipidemia      ESRD (end stage renal disease) 06/03/2009    on dialysis with Dr. Mitchell     Essential hypertension 6/30/2014     GI (gastrointestinal bleed)      GI bleeding 6/5/2017     Gout      L3 vertebral fracture 11/16/2015     Obesity      ZULEIKA (obstructive sleep apnea), severe, intolerant of CPAP 10/22/2015     Pneumonia 9-7-2015     Right foot drop      Spinal stenosis 3/28/2016     Stroke 3/24/2016              Family History   Problem Relation Age of Onset     Dementia Mother      Diabetes Mother      Arthritis Mother      Cancer Mother      Depression Mother      Heart disease Mother      Vision loss Mother      Stroke Father      Heart disease Father      Breast cancer Neg Hx      Social History     Social History     Marital status:      Spouse name: Cayden     Number of children: 2     Years of education: N/A     Occupational History      Retired     Social History Main Topics     Smoking status: Former Smoker     Packs/day: 1.50     Years: 37.00     Types: Cigarettes     Quit date: 1/1/2009     Smokeless tobacco: Never Used     Alcohol use 7.0 oz/week     14 Standard drinks or equivalent per week      Comment: 14 mixed drinks per week     Drug use: No     Sexual activity: No     Other Topics Concern     Not on file     Social History Narrative    Lives with her . Daughter in [place] and daughter in [place].     MEDICATIONS: Reviewed from the MAR, physician orders, and earlier progress notes.    Current Outpatient Prescriptions   Medication Sig     acetaminophen (TYLENOL) 500 MG tablet Take 500 mg by mouth every 6 (six) hours as needed for pain.     albuterol (PROAIR HFA;PROVENTIL HFA;VENTOLIN HFA) 90 mcg/actuation inhaler Inhale 2 puffs every 4 (four) hours as needed for wheezing.     albuterol (PROVENTIL) 2.5 mg /3 mL (0.083 %) nebulizer solution Take 3 mL (2.5 mg total) by nebulization every 4 (four) hours as needed for wheezing.     aspirin 81 MG EC tablet  Take 81 mg by mouth daily with lunch.      atorvastatin (LIPITOR) 10 MG tablet Take 10 mg by mouth at bedtime.     CHLORPHENIRAMINE/DEXTROMETHORP (CORICIDIN HBP COUGH AND COLD ORAL) Take 1 tablet by mouth daily as needed.      cholecalciferol, vitamin D3, 2,000 unit cap Take 2,000 Units by mouth daily with lunch.      cinacalcet (SENSIPAR) 30 MG tablet Take 30 mg by mouth at bedtime.      digoxin (LANOXIN) 125 mcg tablet Take 125 mcg by mouth 3 (three) times a week. Take every Monday, Wednesday, and Saturday with supper.     diltiazem (CARDIZEM SR) 120 MG 12 hr capsule Take 1 capsule (120 mg total) by mouth 2 (two) times a day.     folic acid (FOLVITE) 1 MG tablet Take 1 mg by mouth daily with lunch.      gabapentin (NEURONTIN) 100 MG capsule Take 100 mg by mouth 3 (three) times a day.     HYDROmorphone (DILAUDID) 2 MG tablet Take 1 tablet (2 mg total) by mouth every 3 (three) hours as needed.     Lactobacillus rhamnosus GG (CULTURELLE) 10-15 Billion cell capsule Take 1 capsule by mouth daily with lunch.     midodrine (PROAMATINE) 5 MG tablet Take 15 mg by mouth 3 (three) times a week. Take every Monday, Wednesday, and Friday in the morning.     polyvinyl alcohol (LIQUIFILM TEARS) 1.4 % ophthalmic solution Apply 1 drop to eye as needed for dry eyes.     ranitidine (ZANTAC) 150 MG tablet Take 150 mg by mouth at bedtime.     senna-docusate (SENNOSIDES-DOCUSATE SODIUM) 8.6-50 mg tablet Take 1 tablet by mouth at bedtime.      sevelamer carbonate (RENVELA) 800 mg tablet Take 1,600 mg by mouth 3 (three) times a day with meals.     sevelamer carbonate (RENVELA) 800 mg tablet Take 800-1,600 mg by mouth daily as needed (snacks).     timolol maleate (TIMOPTIC) 0.5 % ophthalmic solution Administer 1 drop to both eyes 2 (two) times a day.      traZODone (DESYREL) 100 MG tablet Take 1 tablet (100 mg total) by mouth at bedtime.     vit B comp no.3-folic-C-biotin (NEPHRO-OLGA) 1- mg-mg-mcg Tab tablet Take 1 tablet by mouth  "daily with supper.      ALLERGIES:   Allergies   Allergen Reactions     Ace Inhibitors Cough     Atrovent [Ipratropium Bromide] Headache     Fosinopril Sodium Cough     Zolpidem      hallucinations     DIET: Dialysis diet, regular texture, thin liquids.    Vitals:    03/02/18 1357   BP: 122/70   Pulse: 76   Resp: 18   Temp: 97.8  F (36.6  C)   Weight: 176 lb (79.8 kg)   Height: 5' 6\" (1.676 m)     Body mass index is 28.41 kg/(m^2).    EXAMINATION:   General: Pleasant, alert, and conversant middle-aged female, sitting in her wheelchair, in no apparent distress.  Head: Normocephalic and atraumatic.   Eyes: PERRLA, sclerae clear.   ENT: Moist oral mucosa.  She has her own teeth.  No rhinorrhea or nasal discharge.  Hearing is unimpaired.  Cardiovascular: Irregular rhythm with a 2/6 systolic ejection murmur at the left sternal border.  Respiratory: Diminished lung sounds bilaterally but otherwise clear.  Abdomen: Soft and nontender.   Musculoskeletal/Extremities: Age-related degenerative joint disease.  Right 3+ pitting pedal and pretibial edema, 1+ on the left.  Integument: No rashes, clinically significant lesions, or skin breakdown.   Cognitive/Psychiatric: Alert and oriented ×3.  Affect is euthymic.    DIAGNOSTICS:   Results for orders placed or performed during the hospital encounter of 01/26/18   Basic Metabolic Panel   Result Value Ref Range    Sodium 135 (L) 136 - 145 mmol/L    Potassium 5.9 (H) 3.5 - 5.0 mmol/L    Chloride 97 (L) 98 - 107 mmol/L    CO2 23 22 - 31 mmol/L    Anion Gap, Calculation 15 5 - 18 mmol/L    Glucose 109 70 - 125 mg/dL    Calcium 8.2 (L) 8.5 - 10.5 mg/dL    BUN 84 (H) 8 - 28 mg/dL    Creatinine 8.53 (HH) 0.60 - 1.10 mg/dL    GFR MDRD Af Amer 6 (L) >60 mL/min/1.73m2    GFR MDRD Non Af Amer 5 (L) >60 mL/min/1.73m2     Lab Results   Component Value Date    WBC 6.1 02/02/2018    HGB 12.5 02/02/2018    HCT 39.4 02/02/2018     (H) 02/02/2018     02/02/2018     CrCl cannot be " calculated (Patient's most recent sCr result is older than the maximum 5 days allowed.).  Lab Results   Component Value Date     (H) 01/26/2018     ASSESSMENT/Plan:      ICD-10-CM    1. ESRD (end stage renal disease) N18.6    2. Chronic respiratory failure with hypoxia J96.11    3. ZULEIKA (obstructive sleep apnea), severe G47.33    4. Persistent atrial fibrillation I48.1    5. Anemia of chronic renal failure, stage 5 N18.5     D63.1    6. Essential hypertension with goal blood pressure less than 140/90 I10    7. Pulmonary emphysema, unspecified emphysema type J43.9      CHANGES:    Patient will require portable oxygen concentrator.    CARE PLAN:    The care plan has been reviewed and all orders signed. Changes to care plan, if any, as noted. Otherwise, continue care plan of care.  Time spent with this patient was approximately 35 minutes, with greater than 50% spent in counseling and coordination of care that included a review of her multiple comorbidities and current issues, including time spent with therapy discussing challenges to discharge.  She will be discharging home.    The above has been created using voice recognition software. Please be aware that this may unintentionally  produce inaccuracies and/or nonsensical sentences.      Electronically signed by: Louie Liriano CNP

## 2021-06-16 NOTE — TELEPHONE ENCOUNTER
Called and relayed message to patient. She stated understanding and will take the medication 2 times a week. She plans to call one of the agencies

## 2021-06-16 NOTE — TELEPHONE ENCOUNTER
Let patient know it is okay to increase bupropion to 3 times weekly, I'll update her medication list.     Regarding the home care services, a reminder that she should call one of the the home health agencies from the list she was provided in the hospital to set this up.

## 2021-06-16 NOTE — PROGRESS NOTES
[facility]    Name:   Belia Rodriguez (Itzel)  : 1945  Facility:   [facility] [[id number]]   Room: TCU3-320  Code Status: FULL CODE -   Fac type:   SNF (Skilled Nursing Facility, TCU) -     CHIEF COMPLAINT / REASON FOR VISIT:  Chief Complaint   Patient presents with     Review Of Multiple Medical Conditions     TCU follow-up after hospitalization with acute hypoxic respiratory failure.   [facility] from 18 until 18    Patient was last seen by Dr. Colin for an admission visit on 18.    HPI: Belia is a 72 y.o. female with known end-stage renal disease (on dialysis),COPD (former smoker, 45-60 pack years),  obstructive sleep apnea (severe and previously intolerant of CPAP), atrial fibrillation, GERD, essential hypertension, and anemia of chronic renal failure.    About 5 days prior to admission, she developed increasing cough, then gradual worsening dyspnea.  It was particularly severe the day before admission.  She presented to the emergency department.  Her cough produced yellow-green phlegm, and she had a low-grade fever.  In the emergency department, she still had respiratory distress with oxygen, nebulizer, and antibiotics.  A CT was done showing no obvious pneumonia, influenza A/B were negative, white count and PMNs were normal, natruretic protein somewhat elevated at 567, TSH was normal.  A comprehensive metabolic panel was normal except for the expected BUN and creatinine issues.     She still had not improved, had a very high CO2 and required BiPAP to promote better ventilation.    She did not need to be mechanically ventilated at any point during the hospitalization.  She did require intravenous steroids (later transitioned to oral steroids) and nebulizers, reaching the point where she was requiring only 4 L of oxygen per nasal cannula (previously required 8 L) during the day and using BiPAP at night to sleep.  She does have a  history of severe obstructive sleep apnea.  She did not have any active treatment prior to the hospitalization. She had empiric treatment with ceftriaxone and azithromycin, although was not a documented pneumonia patient.  It was more empiric treatment for COPD exacerbation.    Pulmonary was consulted, and she had mixed obstructive and restrictive pattern on pulmonary function tests. It was planned that she would need to stay on oxygen into the future. It was recommended she have an outpatient pulmonary workup, including consideration of high-resolution CT of the chest.     There was a CHF component to her failure, and she diuresed approximately 17 pounds.  She has atrial fibrillation (with RVR) and is on digoxin and diltiazem.  She had a rapid rate but is not anticoagulated due to recurrent GI bleeding.  She had a watchman device planned for around the time of the admission, but it was deferred until April 2018.      CURRENT ISSUES    She is now able to use CPAP.  Previously, she had felt claustrophobic, but she was introduced within the hospital, and she was told it would take 3 months ago accustomed.  She admits that she is feeling better and is not so tired anymore.  She does want a sleep aid, but she tells me that Ambien, given to her in the hospital, made her hallucinate.  I suggested trazodone, and she was in agreement, but then I discovered that she is already getting 100 mg nightly.  I think I would like to give her CPAP more time to work.    As noted, she attends dialysis 3 times weekly.  She has a history of aortic dissection (currently, there are 2 very tiny aneurysms), and because of this she is not eligible for a kidney transplant.  She is looking forward to getting a watchman implanted.    She is doing well overall and manages ADLs either independently or with supervision but does not require any help.    ROS: She tells me her right foot hurts, but she does have as needed hydromorphone.  No headaches  or chest pains, coughing or congestion, nausea or vomiting, dizziness, dysuria, difficulty chewing or swallowing, or any integumentary issues.    Past Medical History:   Diagnosis Date     Arthritis      CHF (congestive heart failure)      Chronic anemia 6/1/2014     Chronic kidney disease      Chronic thoracic aortic dissection 10/7/2015    Descending thoracic aorta; treated medically per notes of Dragan iSngh and Jennifer.     COPD (chronic obstructive pulmonary disease)      CVA (cerebral infarction)      Disease of thyroid gland      Dyslipidemia      ESRD (end stage renal disease) 06/03/2009    on dialysis with Dr. Mitchell     Essential hypertension 6/30/2014     GI (gastrointestinal bleed)      GI bleeding 6/5/2017     Gout      L3 vertebral fracture 11/16/2015     Obesity      ZULEIKA (obstructive sleep apnea), severe, intolerant of CPAP 10/22/2015     Pneumonia 9-7-2015     Right foot drop      Spinal stenosis 3/28/2016     Stroke 3/24/2016              Family History   Problem Relation Age of Onset     Dementia Mother      Diabetes Mother      Arthritis Mother      Cancer Mother      Depression Mother      Heart disease Mother      Vision loss Mother      Stroke Father      Heart disease Father      Breast cancer Neg Hx      Social History     Social History     Marital status:      Spouse name: Cayden     Number of children: 2     Years of education: N/A     Occupational History      Retired     Social History Main Topics     Smoking status: Former Smoker     Packs/day: 1.50     Years: 37.00     Types: Cigarettes     Quit date: 1/1/2009     Smokeless tobacco: Never Used     Alcohol use 7.0 oz/week     14 Standard drinks or equivalent per week      Comment: 14 mixed drinks per week     Drug use: No     Sexual activity: No     Other Topics Concern     Not on file     Social History Narrative    Lives with her . Daughter in [place] and daughter in [place].     MEDICATIONS: Reviewed from the MAR,  physician orders, and earlier progress notes.  Current Outpatient Prescriptions   Medication Sig     acetaminophen (TYLENOL) 500 MG tablet Take 500 mg by mouth every 6 (six) hours as needed for pain.     albuterol (PROAIR HFA;PROVENTIL HFA;VENTOLIN HFA) 90 mcg/actuation inhaler Inhale 2 puffs every 4 (four) hours as needed for wheezing.     albuterol (PROVENTIL) 2.5 mg /3 mL (0.083 %) nebulizer solution Take 3 mL (2.5 mg total) by nebulization every 4 (four) hours as needed for wheezing.     allopurinol (ZYLOPRIM) 100 MG tablet Take 100 mg by mouth daily with lunch.      aspirin 81 MG EC tablet Take 81 mg by mouth daily with lunch.      atorvastatin (LIPITOR) 10 MG tablet Take 10 mg by mouth at bedtime.     CHLORPHENIRAMINE/DEXTROMETHORP (CORICIDIN HBP COUGH AND COLD ORAL) Take 1 tablet by mouth daily as needed.      cholecalciferol, vitamin D3, 2,000 unit cap Take 2,000 Units by mouth daily with lunch.      cinacalcet (SENSIPAR) 30 MG tablet Take 30 mg by mouth at bedtime.      digoxin (LANOXIN) 125 mcg tablet Take 125 mcg by mouth 3 (three) times a week. Take every Monday, Wednesday, and Saturday with supper.     diltiazem (CARDIZEM SR) 120 MG 12 hr capsule Take 1 capsule (120 mg total) by mouth 2 (two) times a day.     folic acid (FOLVITE) 1 MG tablet Take 1 mg by mouth daily with lunch.      gabapentin (NEURONTIN) 100 MG capsule Take 100 mg by mouth 3 (three) times a day.     HYDROmorphone (DILAUDID) 2 MG tablet Take 1 tablet (2 mg total) by mouth every 3 (three) hours as needed.     Lactobacillus rhamnosus GG (CULTURELLE) 10-15 Billion cell capsule Take 1 capsule by mouth daily with lunch.     midodrine (PROAMATINE) 5 MG tablet Take 10 mg by mouth 3 (three) times a week. Take every Monday, Wednesday, and Friday in the morning.     polyvinyl alcohol (LIQUIFILM TEARS) 1.4 % ophthalmic solution Apply 1 drop to eye as needed for dry eyes.     predniSONE (DELTASONE) 10 mg tablet Take 2 tabs (20 mg) daily x 2 days,  "then 10 mg daily x 3 days, then stop     ranitidine (ZANTAC) 150 MG tablet Take 150 mg by mouth at bedtime.     senna-docusate (SENNOSIDES-DOCUSATE SODIUM) 8.6-50 mg tablet Take 1 tablet by mouth at bedtime.      sevelamer carbonate (RENVELA) 800 mg tablet Take 1,600 mg by mouth 3 (three) times a day with meals.     sevelamer carbonate (RENVELA) 800 mg tablet Take 800-1,600 mg by mouth daily as needed (snacks).     timolol maleate (TIMOPTIC) 0.5 % ophthalmic solution Administer 1 drop to both eyes 2 (two) times a day.      traZODone (DESYREL) 100 MG tablet Take 1 tablet (100 mg total) by mouth at bedtime.     vit B comp no.3-folic-C-biotin (NEPHRO-OLGA) 1- mg-mg-mcg Tab tablet Take 1 tablet by mouth daily with supper.      ALLERGIES:   Allergies   Allergen Reactions     Ace Inhibitors Cough     Atrovent [Ipratropium Bromide] Headache     Fosinopril Sodium Cough     Zolpidem      hallucinations     DIET: Regular, regular texture, thin liquids.    Vitals:    02/19/18 1430   BP: 104/62   Pulse: 78   Resp: 16   Temp: 98.4  F (36.9  C)   Weight: 180 lb 3.2 oz (81.7 kg)   Height: 5' 6\" (1.676 m)     Body mass index is 29.09 kg/(m^2).    EXAMINATION:   General: Pleasant, alert, and conversant middle-aged female, lying in bed, in no apparent distress.  Head: Normocephalic and atraumatic.   Eyes: PERRLA, sclerae clear.   ENT: Moist oral mucosa.  She has her own teeth.  No rhinorrhea or nasal discharge.  Hearing is unimpaired.  Cardiovascular: Irregular rhythm without appreciable murmur.  Respiratory: Diminished lung sounds bilaterally but otherwise clear.  Abdomen: Soft and nontender.   Musculoskeletal/Extremities: Age-related degenerative joint disease.  Bilateral 2-3+ pitting lower extremity edema, worse on the right.  Integument: No rashes, clinically significant lesions, or skin breakdown.   Cognitive/Psychiatric: Alert and oriented ×3.  Affect is euthymic.    DIAGNOSTICS:   Results for orders placed or performed " during the hospital encounter of 01/26/18   Basic Metabolic Panel   Result Value Ref Range    Sodium 135 (L) 136 - 145 mmol/L    Potassium 5.9 (H) 3.5 - 5.0 mmol/L    Chloride 97 (L) 98 - 107 mmol/L    CO2 23 22 - 31 mmol/L    Anion Gap, Calculation 15 5 - 18 mmol/L    Glucose 109 70 - 125 mg/dL    Calcium 8.2 (L) 8.5 - 10.5 mg/dL    BUN 84 (H) 8 - 28 mg/dL    Creatinine 8.53 (HH) 0.60 - 1.10 mg/dL    GFR MDRD Af Amer 6 (L) >60 mL/min/1.73m2    GFR MDRD Non Af Amer 5 (L) >60 mL/min/1.73m2     Lab Results   Component Value Date    WBC 6.1 02/02/2018    HGB 12.5 02/02/2018    HCT 39.4 02/02/2018     (H) 02/02/2018     02/02/2018     CrCl cannot be calculated (Patient's most recent sCr result is older than the maximum 5 days allowed.).  Lab Results   Component Value Date     (H) 01/26/2018     ASSESSMENT/Plan:      ICD-10-CM    1. ESRD (end stage renal disease) N18.6    2. History of acute respiratory failure Z87.09    3. Persistent atrial fibrillation I48.1    4. Anemia of chronic renal failure, stage 5 N18.5     D63.1    5. Essential hypertension with goal blood pressure less than 140/90 I10    6. Pulmonary emphysema, unspecified emphysema type J43.9      CHANGES:    If sleep does not improve, we will add additional trazodone.  Otherwise, we will keep monitoring.    CARE PLAN:    The care plan has been reviewed and all orders signed. Changes to care plan, if any, as noted. Otherwise, continue care plan of care.  Time spent with this patient was approximately 40 minutes, with greater than 50% spent in counseling and coordination of care that included a review of her multiple comorbidities and current issues.  The decision, currently, is to make no changes.    The above has been created using voice recognition software. Please be aware that this may unintentionally  produce inaccuracies and/or nonsensical sentences.      Electronically signed by: Louie Liriano CNP

## 2021-06-16 NOTE — PROGRESS NOTES
[facility]    Name:   Belia Rodriguez (Itzel)  : 1945  Facility:   [facility] [[id number]]   Room: TCU3-320  Code Status: FULL CODE -   Fac type:   SNF (Skilled Nursing Facility, TCU) -     CHIEF COMPLAINT / REASON FOR VISIT:  Chief Complaint   Patient presents with     Review Of Multiple Medical Conditions     TCU follow-up after hospitalization with acute hypoxic respiratory failure.   [facility] from 18 until 18    Patient was last seen by me on 18.    HPI: Belia is a 72 y.o. female with known end-stage renal disease (on dialysis),COPD (former smoker, 45-60 pack years),  obstructive sleep apnea (severe and previously intolerant of CPAP), atrial fibrillation, GERD, essential hypertension, and anemia of chronic renal failure.    About 5 days prior to admission, she developed increasing cough, then gradual worsening dyspnea.  It was particularly severe the day before admission.  She presented to the emergency department.  Her cough produced yellow-green phlegm, and she had a low-grade fever.  In the emergency department, she still had respiratory distress with oxygen, nebulizer, and antibiotics.  A CT was done showing no obvious pneumonia, influenza A/B were negative, white count and PMNs were normal, natruretic protein somewhat elevated at 567, TSH was normal.  A comprehensive metabolic panel was normal except for the expected BUN and creatinine issues.     She still had not improved, had a very high CO2 and required BiPAP to promote better ventilation.    She did not need to be mechanically ventilated at any point during the hospitalization.  She did require intravenous steroids (later transitioned to oral steroids and then tapered off) and nebulizers, reaching the point where she was requiring only 4 L of oxygen per nasal cannula (previously required 8 L) during the day and using BiPAP at night to sleep.  She does have a history of  severe obstructive sleep apnea.  She did not have any active treatment prior to the hospitalization. She had empiric treatment with ceftriaxone and azithromycin, although was not a documented pneumonia patient.  It was more empiric treatment for COPD exacerbation.    Pulmonary was consulted, and she had mixed obstructive and restrictive pattern on pulmonary function tests. It was planned that she would need to stay on oxygen into the future. It was recommended she have an outpatient pulmonary workup, including consideration of high-resolution CT of the chest.     There was a CHF component to her failure, and she diuresed approximately 17 pounds.  She has atrial fibrillation (with RVR) and is on digoxin and diltiazem.  She had a rapid rate but is not anticoagulated due to recurrent GI bleeding.  She had a watchman device planned for around the time of the admission, but it was deferred until April 2018.      CURRENT ISSUES    She is doing quite well.  She is now able to use CPAP.  Previously, she had felt claustrophobic, but she was introduced to it in the hospital, and she was told it would take 3 months to get accustomed to it.  She admits that she is feeling better and is not so tired anymore.  She did want a sleep aid but told me that Ambien, given to her in the hospital, made her hallucinate.  I suggested trazodone, and she was in agreement, but then I discovered that she was already getting 100 mg nightly.  My thought was to give the CPAP more time to work.  Today, she does not mention any difficulty sleeping today.    She is expected to discharge on 03/03/18, which she is worried about getting around at home with all the oxygen tubing.  A taper of her oxygen was thought to be going well, and she is now down to 0.5 L.  There are occasionally times when she can go without.  At my last visit, I spoke with physical therapy, and they planned to try her off of it and monitor her O2 sats.  Unfortunately, her sats did  drop to 82-85% on room air.  She apparently has a hard time deep breathing.      She is doing well overall and manages ADLs either independently or with supervision but does not require any help.  Occupational Therapy tells me that her house is a two-story and very cluttered.  She can sleep downstairs in a recliner if necessary.    As noted, she attends dialysis 3 times weekly.  She has a history of aortic dissection (currently, there are 2 very tiny aneurysms), and because of this she is not eligible for a kidney transplant.  She is looking forward to getting a watchman implanted.    Medication changes: We talked about the many medications she has been on.  Her last gout flare occurred 15-20 years ago, and at my last visit we discontinued her allopurinol.  She tells me she watches what she eats because of dialysis and, because of this, she may very well not have another gout flare.    Dr. Kinney increased her midodrine at her dialysis appointment on 02/21/18 from 10 mg to 15 mg.      ROS: She tells me her right foot hurts, but she does have as needed hydromorphone.  No headaches or chest pains, coughing or congestion, nausea or vomiting, dizziness, dysuria, difficulty chewing or swallowing, or any integumentary issues.    Past Medical History:   Diagnosis Date     Arthritis      CHF (congestive heart failure)      Chronic anemia 6/1/2014     Chronic kidney disease      Chronic thoracic aortic dissection 10/7/2015    Descending thoracic aorta; treated medically per notes of Dragan Singh and Jennifer.     COPD (chronic obstructive pulmonary disease)      CVA (cerebral infarction)      Disease of thyroid gland      Dyslipidemia      ESRD (end stage renal disease) 06/03/2009    on dialysis with Dr. Mitchell     Essential hypertension 6/30/2014     GI (gastrointestinal bleed)      GI bleeding 6/5/2017     Gout      L3 vertebral fracture 11/16/2015     Obesity      ZULEIKA (obstructive sleep apnea), severe, intolerant of CPAP  10/22/2015     Pneumonia 9-7-2015     Right foot drop      Spinal stenosis 3/28/2016     Stroke 3/24/2016              Family History   Problem Relation Age of Onset     Dementia Mother      Diabetes Mother      Arthritis Mother      Cancer Mother      Depression Mother      Heart disease Mother      Vision loss Mother      Stroke Father      Heart disease Father      Breast cancer Neg Hx      Social History     Social History     Marital status:      Spouse name: Cayden     Number of children: 2     Years of education: N/A     Occupational History      Retired     Social History Main Topics     Smoking status: Former Smoker     Packs/day: 1.50     Years: 37.00     Types: Cigarettes     Quit date: 1/1/2009     Smokeless tobacco: Never Used     Alcohol use 7.0 oz/week     14 Standard drinks or equivalent per week      Comment: 14 mixed drinks per week     Drug use: No     Sexual activity: No     Other Topics Concern     Not on file     Social History Narrative    Lives with her . Daughter in [place] and daughter in [place].     MEDICATIONS: Reviewed from the MAR, physician orders, and earlier progress notes.  Updated by me today (02/26/18) with the increase in midodrine ordered by Dr. Kinney reflected below.  Current Outpatient Prescriptions   Medication Sig     acetaminophen (TYLENOL) 500 MG tablet Take 500 mg by mouth every 6 (six) hours as needed for pain.     albuterol (PROAIR HFA;PROVENTIL HFA;VENTOLIN HFA) 90 mcg/actuation inhaler Inhale 2 puffs every 4 (four) hours as needed for wheezing.     albuterol (PROVENTIL) 2.5 mg /3 mL (0.083 %) nebulizer solution Take 3 mL (2.5 mg total) by nebulization every 4 (four) hours as needed for wheezing.     aspirin 81 MG EC tablet Take 81 mg by mouth daily with lunch.      atorvastatin (LIPITOR) 10 MG tablet Take 10 mg by mouth at bedtime.     CHLORPHENIRAMINE/DEXTROMETHORP (CORICIDIN HBP COUGH AND COLD ORAL) Take 1 tablet by mouth daily as needed.       cholecalciferol, vitamin D3, 2,000 unit cap Take 2,000 Units by mouth daily with lunch.      cinacalcet (SENSIPAR) 30 MG tablet Take 30 mg by mouth at bedtime.      digoxin (LANOXIN) 125 mcg tablet Take 125 mcg by mouth 3 (three) times a week. Take every Monday, Wednesday, and Saturday with supper.     diltiazem (CARDIZEM SR) 120 MG 12 hr capsule Take 1 capsule (120 mg total) by mouth 2 (two) times a day.     folic acid (FOLVITE) 1 MG tablet Take 1 mg by mouth daily with lunch.      gabapentin (NEURONTIN) 100 MG capsule Take 100 mg by mouth 3 (three) times a day.     HYDROmorphone (DILAUDID) 2 MG tablet Take 1 tablet (2 mg total) by mouth every 3 (three) hours as needed.     Lactobacillus rhamnosus GG (CULTURELLE) 10-15 Billion cell capsule Take 1 capsule by mouth daily with lunch.     midodrine (PROAMATINE) 5 MG tablet Take 15 mg by mouth 3 (three) times a week. Take every Monday, Wednesday, and Friday in the morning.     polyvinyl alcohol (LIQUIFILM TEARS) 1.4 % ophthalmic solution Apply 1 drop to eye as needed for dry eyes.     ranitidine (ZANTAC) 150 MG tablet Take 150 mg by mouth at bedtime.     senna-docusate (SENNOSIDES-DOCUSATE SODIUM) 8.6-50 mg tablet Take 1 tablet by mouth at bedtime.      sevelamer carbonate (RENVELA) 800 mg tablet Take 1,600 mg by mouth 3 (three) times a day with meals.     sevelamer carbonate (RENVELA) 800 mg tablet Take 800-1,600 mg by mouth daily as needed (snacks).     timolol maleate (TIMOPTIC) 0.5 % ophthalmic solution Administer 1 drop to both eyes 2 (two) times a day.      traZODone (DESYREL) 100 MG tablet Take 1 tablet (100 mg total) by mouth at bedtime.     vit B comp no.3-folic-C-biotin (NEPHRO-OLGA) 1- mg-mg-mcg Tab tablet Take 1 tablet by mouth daily with supper.      ALLERGIES:   Allergies   Allergen Reactions     Ace Inhibitors Cough     Atrovent [Ipratropium Bromide] Headache     Fosinopril Sodium Cough     Zolpidem      hallucinations     DIET: Dialysis diet,  "regular texture, thin liquids.    Vitals:    02/26/18 1327   BP: 114/67   Pulse: 64   Resp: 20   Temp: 97.6  F (36.4  C)   Weight: 178 lb 3.2 oz (80.8 kg)   Height: 5' 6\" (1.676 m)     Body mass index is 28.76 kg/(m^2).    EXAMINATION:   General: Pleasant, alert, and conversant middle-aged female, lying in bed, in no apparent distress.  Head: Normocephalic and atraumatic.   Eyes: PERRLA, sclerae clear.   ENT: Moist oral mucosa.  She has her own teeth.  No rhinorrhea or nasal discharge.  Hearing is unimpaired.  Cardiovascular: Irregular rhythm with a 2/6 systolic ejection murmur at the left sternal border.  Respiratory: Diminished lung sounds bilaterally but otherwise clear.  Abdomen: Soft and nontender.   Musculoskeletal/Extremities: Age-related degenerative joint disease.  Right 3+ pitting pedal and pretibial edema, 1+ on the left.  Integument: No rashes, clinically significant lesions, or skin breakdown.   Cognitive/Psychiatric: Alert and oriented ×3.  Affect is euthymic.    DIAGNOSTICS:   Results for orders placed or performed during the hospital encounter of 01/26/18   Basic Metabolic Panel   Result Value Ref Range    Sodium 135 (L) 136 - 145 mmol/L    Potassium 5.9 (H) 3.5 - 5.0 mmol/L    Chloride 97 (L) 98 - 107 mmol/L    CO2 23 22 - 31 mmol/L    Anion Gap, Calculation 15 5 - 18 mmol/L    Glucose 109 70 - 125 mg/dL    Calcium 8.2 (L) 8.5 - 10.5 mg/dL    BUN 84 (H) 8 - 28 mg/dL    Creatinine 8.53 (HH) 0.60 - 1.10 mg/dL    GFR MDRD Af Amer 6 (L) >60 mL/min/1.73m2    GFR MDRD Non Af Amer 5 (L) >60 mL/min/1.73m2     Lab Results   Component Value Date    WBC 6.1 02/02/2018    HGB 12.5 02/02/2018    HCT 39.4 02/02/2018     (H) 02/02/2018     02/02/2018     CrCl cannot be calculated (Patient's most recent sCr result is older than the maximum 5 days allowed.).  Lab Results   Component Value Date     (H) 01/26/2018     ASSESSMENT/Plan:      ICD-10-CM    1. ESRD (end stage renal disease) N18.6    2. " History of acute respiratory failure Z87.09    3. Persistent atrial fibrillation I48.1    4. Anemia of chronic renal failure, stage 5 N18.5     D63.1    5. Essential hypertension with goal blood pressure less than 140/90 I10    6. Pulmonary emphysema, unspecified emphysema type J43.9      CHANGES:    None.    CARE PLAN:    The care plan has been reviewed and all orders signed. Changes to care plan, if any, as noted. Otherwise, continue care plan of care.  Time spent with this patient was approximately 35 minutes, with greater than 50% spent in counseling and coordination of care that included a review of her multiple comorbidities and current issues, including time spent with therapy discussing challenges to discharge.     The above has been created using voice recognition software. Please be aware that this may unintentionally  produce inaccuracies and/or nonsensical sentences.      Electronically signed by: Louie Liriano CNP

## 2021-06-16 NOTE — PROGRESS NOTES
[facility] Patient Outreach:     Discharge Date::  3/3/18      Reason for TCU stay (discharge diagnosis)::  ESRD, chronic respiratory failure, ZULEIKA, A-fib, pulmonary emphysema      Are you feeling better, the same or worse since your discharge?:  Patient is feeling better          As part of your discharge plan, did they discuss home care with you?: No            Did you receive any new medications, or was there a change to your medications?: No            Do you have any follow up visits scheduled with your PCP or Specialist?:  No          I'm glad to hear you're doing well and we want you to continue to do well. Your PCP would like to see you for a follow-up visit. Can we help set that up for your today?: Yes            (RN) Patient transferred to [facility]? **If immediate concers (e.g. patient is feeling worse and/or not taking new medictations), send in basket message to PCP with quick summary of concern.: Yes

## 2021-06-16 NOTE — TELEPHONE ENCOUNTER
Chayito with [facility]    Pt called to cancel home care services all together.    Pt told Chayito that she is too tired.    Pt did have appt with Hospice while she was in hospital - wondering if that is the needed direction.

## 2021-06-16 NOTE — PATIENT INSTRUCTIONS - HE
- Look through the home health care information that you received in the hospital to set up a home health aid that can help you with bathing

## 2021-06-16 NOTE — TELEPHONE ENCOUNTER
Noted. I can place the order for hospice if/when patient desires. Patient commented that home health care with physical therapy was making her to feel too worn out. She was advised that she could discontinue PT and hire services for things like helping her shower/bath which she was planning to look into.

## 2021-06-16 NOTE — TELEPHONE ENCOUNTER
Denise Weber - [facility]     1 a week for 6 weeks  No prns necessary      If Denise Weber is unable to answer the voice mail is secured to leave message.

## 2021-06-16 NOTE — TELEPHONE ENCOUNTER
I spoke with Itzel today about her medications. Of note, she would like Demetra Larose to be her PCP.    She notes that she is doing PT on her own in her chair. She would like us to refer her to services for assistance around her home for cleaning, shower, etc. She is also not ready for hospice yet.     She was quite depressed today and we talked about her bupropion  mg. She currently takes it twice weekly (Tuesday, Saturday). I mentioned that it can be gradually increased as tolerated to 150 mg/day and she was very interested. Ok to change to three times weekly (Tue/Thur/Sat)? Thanks!     Radha Pena, Pharm.D., BCACP  Medication Therapy Management Pharmacist  [facility]

## 2021-06-16 NOTE — TELEPHONE ENCOUNTER
Could someone call patient back and ask her if she'd like a referral to the hospice program? Unfortunately I haven't seen her in a while, but I know she saw Demetra Larose yesterday and it seems that hospice was brought up and she has been considering it.     Either Demetra or myself could put the hospice referral in. Please let us know.    Dr. Rai

## 2021-06-16 NOTE — PROGRESS NOTES
[facility]      Facility:    [facility] [[id number]]    Code Status: FULL CODE   [facility] 1/26 -2/9/18        Chief Complaint/Reason for Visit:  Chief Complaint   Patient presents with     Review Of Multiple Medical Conditions       HPI:   Belia is a 72 y.o. female with known end-stage renal disease on dialysis,COPD, atrial fibrillation, tobacco use disorder, GERD, essential hypertension, and anemia of chronic renal failure.  About 5 days before admission, she developed increasing cough, then gradual worsening of shortness of breath.  It was particularly severe the day before admission.  She presented to the emergency department.  Her cough is productive of yellow-green phlegm, she had a low-grade temperature.  In the emergency department, she still had respiratory distress with oxygen, nebulizer, and antibiotics.  CT was done showing no obvious pneumonia.  Influenza A/B were negative, white count and PMNs were normal, natruretic protein somewhat elevated at 567, TSH was normal,  Comprehensive metabolic panel was normal except for the expected BUN and creatinine issues.    She still had not improved, she had elevated carbon dioxide and required BiPAP.  Over the course of a few days she improved, wearing oxygen per nasal cannula during the day, BiPAP at night.  She had empiric treatment with ceftriaxone and azithromycin although was not a documented pneumonia.  Pulmonary was consulted, she had mixed obstructive and restrictive pattern on pulmonary function tests.  Was planned that she would need to stay on oxygen into the future.  Was recommended she have an outpatient pulmonary workup including consideration of high-resolution CT of the chest.  There was a CHF component to her respiratory failure, she diuresed approximately 17 pounds.  She has chronic atrial fibrillation and is on digoxin and diltiazem.  She had a rapid rate but is not anticoagulated due to recurrent  GI bleeding.  She had a watchman device planned for around the time of the admission, it was deferred until April 2018.      Past Medical History:  Past Medical History:   Diagnosis Date     Arthritis      CHF (congestive heart failure)      Chronic anemia 6/1/2014     Chronic kidney disease      Chronic thoracic aortic dissection 10/7/2015    Descending thoracic aorta; treated medically per notes of Dragan Singh and Jennifer.     COPD (chronic obstructive pulmonary disease)      CVA (cerebral infarction)      Disease of thyroid gland      Dyslipidemia      ESRD (end stage renal disease) 06/03/2009    on dialysis with Dr. Mitchell     Essential hypertension 6/30/2014     GI (gastrointestinal bleed)      GI bleeding 6/5/2017     Gout      L3 vertebral fracture 11/16/2015     Obesity      ZULEIKA (obstructive sleep apnea), severe, intolerant of CPAP 10/22/2015     Pneumonia 9-7-2015     Right foot drop      Spinal stenosis 3/28/2016     Stroke 3/24/2016                  Review of Systems     Still getting low oxygen especially with movement and activity  Allopurinol was discontinued she is going to see how she does it has been decades since she had an attack      Physical Exam     Constitutional: Very pleasant and relaxed She appears well-nourished. No distress.   Cardiovascular: Irregularly irregular. No murmur heard.   Pulmonary/Chest: Effort normal and breath sounds normal. She has no wheezes. She has no rales. Decreased at the bases, prolonged expiration, no pursed lips  Dialysis port in the right upper chest   Abdominal: Soft. Bowel sounds are normal. There is no tenderness.   Musculoskeletal: Normal range of motion. She exhibits edema.  1+ pretibial edema    Neurological: She is alert and oriented to person, place, and time.   Skin: Skin is warm and dry.   Skin tear on her dorsal left hand between the first and second fingers   Psychiatric: She has a normal mood and affect. Her behavior is normal. Thought content  normal.       Medication List:  Current Outpatient Prescriptions   Medication Sig     acetaminophen (TYLENOL) 500 MG tablet Take 500 mg by mouth every 6 (six) hours as needed for pain.     albuterol (PROAIR HFA;PROVENTIL HFA;VENTOLIN HFA) 90 mcg/actuation inhaler Inhale 2 puffs every 4 (four) hours as needed for wheezing.     albuterol (PROVENTIL) 2.5 mg /3 mL (0.083 %) nebulizer solution Take 3 mL (2.5 mg total) by nebulization every 4 (four) hours as needed for wheezing.     aspirin 81 MG EC tablet Take 81 mg by mouth daily with lunch.      atorvastatin (LIPITOR) 10 MG tablet Take 10 mg by mouth at bedtime.     CHLORPHENIRAMINE/DEXTROMETHORP (CORICIDIN HBP COUGH AND COLD ORAL) Take 1 tablet by mouth daily as needed.      cholecalciferol, vitamin D3, 2,000 unit cap Take 2,000 Units by mouth daily with lunch.      cinacalcet (SENSIPAR) 30 MG tablet Take 30 mg by mouth at bedtime.      digoxin (LANOXIN) 125 mcg tablet Take 125 mcg by mouth 3 (three) times a week. Take every Monday, Wednesday, and Saturday with supper.     diltiazem (CARDIZEM SR) 120 MG 12 hr capsule Take 1 capsule (120 mg total) by mouth 2 (two) times a day.     folic acid (FOLVITE) 1 MG tablet Take 1 mg by mouth daily with lunch.      gabapentin (NEURONTIN) 100 MG capsule Take 100 mg by mouth 3 (three) times a day.     HYDROmorphone (DILAUDID) 2 MG tablet Take 1 tablet (2 mg total) by mouth every 3 (three) hours as needed.     Lactobacillus rhamnosus GG (CULTURELLE) 10-15 Billion cell capsule Take 1 capsule by mouth daily with lunch.     midodrine (PROAMATINE) 5 MG tablet Take 15 mg by mouth 3 (three) times a week. Take every Monday, Wednesday, and Friday in the morning.     polyvinyl alcohol (LIQUIFILM TEARS) 1.4 % ophthalmic solution Apply 1 drop to eye as needed for dry eyes.     ranitidine (ZANTAC) 150 MG tablet Take 150 mg by mouth at bedtime.     senna-docusate (SENNOSIDES-DOCUSATE SODIUM) 8.6-50 mg tablet Take 1 tablet by mouth at bedtime.       sevelamer carbonate (RENVELA) 800 mg tablet Take 1,600 mg by mouth 3 (three) times a day with meals.     sevelamer carbonate (RENVELA) 800 mg tablet Take 800-1,600 mg by mouth daily as needed (snacks).     timolol maleate (TIMOPTIC) 0.5 % ophthalmic solution Administer 1 drop to both eyes 2 (two) times a day.      traZODone (DESYREL) 100 MG tablet Take 1 tablet (100 mg total) by mouth at bedtime.     vit B comp no.3-folic-C-biotin (NEPHRO-OLGA) 1- mg-mg-mcg Tab tablet Take 1 tablet by mouth daily with supper.        Labs:  Result Value Ref Range   WBC 6.6 4.0 - 11.0 thou/uL   Hemoglobin 10.1 (L) 12.0 - 16.0 g/dL   Platelets 105 (L) 140 - 440 thou/uL   Neutrophils % 64 50 - 70 %   Lymphocytes % 17 (L) 20 - 40 %   Monocytes % 16 (H) 2 - 10 %      (H) 0 - 120 pg/mL       Sodium 136 136 - 145 mmol/L     Potassium 5.0 3.5 - 5.0 mmol/L     Chloride 100 98 - 107 mmol/L     CO2 21 (L) 22 - 31 mmol/L     Anion Gap, Calculation 15 5 - 18 mmol/L     Glucose 103 70 - 125 mg/dL     BUN 46 (H) 8 - 28 mg/dL     Creatinine 7.56 (HH) 0.60 - 1.10 mg/dL     GFR MDRD Af Amer 6 (L) >60 mL/min/1.73m2     GFR MDRD Non Af Amer 5 (L) >60 mL/min/1.73m2     Bilirubin, Total 0.6 0.0 - 1.0 mg/dL     Calcium 9.3 8.5 - 10.5 mg/dL     Protein, Total 6.9 6.0 - 8.0 g/dL     Albumin 3.5 3.5 - 5.0 g/dL     Alkaline Phosphatase 94 45 - 120 U/L     AST 21 0 - 40 U/L     ALT 19 0 - 45 U/L         Assessment:    ICD-10-CM    1. Acute respiratory failure with hypoxia J96.01    2. Pulmonary emphysema, unspecified emphysema type J43.9    3. ZULEIKA (obstructive sleep apnea), severe G47.33    4. Anemia of chronic renal failure, stage 5 N18.5     D63.1    5. Persistent atrial fibrillation I48.1    6. Essential hypertension with goal blood pressure less than 140/90 I10        Plan:  Continue O2, monitoring for ability to wean off prior to discharge  It's good idea for her get the outpatient pulmonary workup  Doing well with therapy which is ending  in another day        Electronically signed by: Venecia Colin MD

## 2021-06-16 NOTE — PROGRESS NOTES
Medication Therapy Management (MTM) Encounter    Assessment:                                                      Acute on chronic hypoxic respiratory failure: Patient's breathing is stable.  Encouraged her to continue to schedule albuterol neb.  Discussed that for COPD we typically do not recommend inhaled corticosteroids as monotherapy.  Could consider switching to DuoNeb in the future however.  Will defer to PCP and pulmonology.    ESRD: Patient to continue to follow with nephrology.  I will let PCP know that she is not ready for hospice yet.  Last PTH and vitamin D levels were abnormal, but likely dialysis has more recent values.  Last phosphorus level normal.    Chronic hypotension: Patient to continue to follow with nephrology and closely monitor her blood pressure at home.    Abdominal pain/Constipation: Improved now that she has home.  Agree with continuing senna-docusate but to increase to daily as needed by continuing to closely monitor BMs.    Pain/neuropathy: Patient is managing with gabapentin and oxycodone.  Unfortunately her gabapentin dose is limited due to hemodialysis.    Atrial Fibrillation: Patient is on aspirin and beta-blocker, no anticoagulation since she has a watchman.  Continue to follow with cardiology.    Hyperlipidemia: Patient is on a moderate intensity statin for primary prevention.  Will defer to PCP whether to continue risk versus benefit.    Depression: Uncontrolled, patient is on a very low-dose.  Appears that bupropion should be used with caution, but can be initiated at a low dose and titrated up gradually based on tolerability and response to a max daily dose of 150 mg/day.  Will reach out to PCP about increasing to 3 times a week.  We reviewed side effects to watch for as the dose is increased.    Insomnia: Stable. Recommended to continue current regimen.     GERD: Stable. Recommended to continue current regimen as needed.     Allergies: Stable. Recommended to continue current  regimen.     Supplements: Reviewed today including indications. Discussed that probiotics are typically not used for acid refux, therefore she can stop to lessen her polypharmacy.       Plan:                                                     1. I will talk to Demetra Larose NP about increasing your bupropion.   2. Ok to stop probiotic    Follow Up  As needed with MTM     Subjective & Objective                                                       Belia Rodriguez is a 75 y.o. female called for a transitions of care visit. she was discharged from [facility] on 4/17/21 for respiratory fx and ESRD. Patient was previously in [facility] from 3/31-4/2.     Patient consented to a telehealth visit: Yes  Chief Complaint: transitions of care  Medication Adherence/Access: Patient was familir with her medications. No adherence issues noted. She would like someone to come to her house to help.     Acute on chronic hypoxic respiratory failure: CXR showing worsening interstitial infiltrates. Continues home O2 flow 3L and albuterol neb three times a day. Reports she is feeling pretty good. Tires to do the neb three times a day when she can remember, but notes that she can tell when she needs it again.  She wonders about steroids.  She has been unable to coordinate using an inhaler in the past.  In the morning she coughs up yellow sputum, but no other times a day.    ESRD: Per discharge summary, she reported recently they had to increase her dry weight as she was not tolerating the usual amount of volume removal. She also is not tolerating dialysis as well anymore and she expresses interest in Hospice. It sounds like she is going to continue dialysis for a little longer, but hospice did meet with her here for more information, and she could sign on whenever she is ready.  Today I spoke with her and she is not ready yet.  Dialyzes MWF  Continues Sensipar 30 mg MWF (gets at dialysis), renvela (sevelamer carbonate) 800 mg with snacks  "and 1600 mg three times a day with meals, Dialvite, Vitamin D 2000 units  Last phos = 3.9 on 4/12/21  Last PTH = 276 in 2015  Vitamin D, Total (25-Hydroxy)   Date Value Ref Range Status   11/16/2015 18.5 (L) 30.0 - 80.0 ng/mL Final       Chronic hypotension: On midodrine and gets extra with HD. Nephrology adjusted midodrine dosing and frequency during this admission.  She uses 15 mg Monday Wednesday Friday before dialysis and 5 to 10 mg as needed. Only needed the PRN dose twice since home. BP in the 80s then she will use. 90s often.     Abdominal pain/Constipation: On 4/16, patient associated pain with constipation. ABX 4/16 showed \"Moderate amount of stool in the ascending colon.\" Bowel regimen was adjusted prior to discharge and after patient had BM, abdominal pain resolved. Patient was discharged with starting miralax PRN, start citrucel daily, and to increase pericolace 2 tabs two times a day.  Reports that she is feeling better now that home and moving and eating the foods she likes. She uses the senna every other day and is not using Citrucel or MiraLAX at all since her fluid intake is limited due to HD.    Pain/neuropathy: Continues oxycodone 5 mg every 6 hours PRN, gabapentin 100 mg three times a day, and APAP PRN. She uses the APAP a little. The gabapentin helps except for the foot pain, which is why she takes oxycodone.  Oxycodone is helpful and she use it, as needed she uses it about 2-3 times a week.  Typically her neuropathy is bad when she goes to bed and she needs help for sleep.    Atrial Fibrillation: Currently taking aspirin 81 mg daily and metoprolol succinate 25 mg daily (hold if SBP <110 or HR <60).  Patient has a Watchman 2018. Currently sees a cardiologist.    Hyperlipidemia: Currently taking atorvastatin 10 mg daily. Denies myalgias. Last lipids checked 2019.     Depression: Continues bupropion  mg lunch Tues/Sat. She would like more but reports she is limited due to her renal function. "  She does feel depressed and would appreciate a dose increase.    Insomnia: Continues trazodone 150 mg HS reports that she is a so-so sleeper, but she has been all of her life.    GERD: Continues famotidine 20 mg HS as needed and AlkaSeltzer as needed. She is not using famotidine as much as in the past. She is more careful what she eats due to her past high phosphorus levels.    Allergies: Continues loratadine 10 mg PRN - she uses PRN, this time the pollen makes her sx worse.  Her nose is very runny which worsens her breathing.    Supplements: Continues folic acid 1 mg daily, probiotic daily. She is taking folic acid due to anemia and probiotic for acid reflux.     PMH: reviewed in EPIC   Allergies/ADRs: reviewed in EPIC   Alcohol: reviewed in EPIC  Tobacco:   Social History     Tobacco Use   Smoking Status Former Smoker     Packs/day: 1.50     Years: 37.00     Pack years: 55.50     Types: Cigarettes     Quit date: 2009     Years since quittin.3   Smokeless Tobacco Never Used     Recent Vitals:   BP Readings from Last 3 Encounters:   21 96/56   21 95/50   21 127/61      Wt Readings from Last 3 Encounters:   04/15/21 141 lb 14.4 oz (64.4 kg)   21 157 lb (71.2 kg)   21 152 lb (68.9 kg)     ----------------  Post Discharge Medication Reconciliation Status: discharge medications reconciled and changed, per note/orders    The patient declined an after visit summary. I spent 24 minutes with this patient today;  . All changes were made via collaborative practice agreement with Neil Galan MD. A copy of the visit note was provided to the patient's provider.     Radha Pena, PharmCoraD., BCACP  Medication Therapy Management Pharmacist  [facility]    Telemedicine Visit Details    Type of service:  Telephone     Start Time: 11:04 AM  End Time (time video/phone call stopped): 11:24 AM    Originating Location (pt. Location): Home    Distant Location  (provider location):  [place] [facility]    Mode of Communication:   Telephone     Current Outpatient Medications   Medication Sig Dispense Refill     sevelamer carbonate (RENVELA) 800 mg tablet Take 800 mg by mouth with snacks.       acetaminophen (TYLENOL) 500 MG tablet Take 2 tablets (1,000 mg total) by mouth every 6 (six) hours as needed.  0     albuterol (PROVENTIL) 2.5 mg /3 mL (0.083 %) nebulizer solution Take 3 mL (2.5 mg total) by nebulization 3 (three) times a day. And as needed for shortness of breath 100 mL 4     aspirin 81 MG EC tablet Take 1 tablet (81 mg total) by mouth daily with lunch.       atorvastatin (LIPITOR) 10 MG tablet Take 10 mg by mouth at bedtime.       B complex 11-folic-C-biot-zinc (DIALYVITE) [id number] mg-mg-mcg-mg Tab Take 1 tablet by mouth daily with supper. (Dialyvite)        buPROPion (WELLBUTRIN XL) 150 MG 24 hr tablet Take 1 tablet (150 mg total) by mouth 2 (two) times a week. Tuesday and Saturday with supper       cholecalciferol, vitamin D3, 1,000 unit (25 mcg) tablet Take 2,000 Units by mouth daily.        cinacalcet (SENSIPAR) 30 MG tablet Take 30 mg by mouth see administration instructions. Take three times weekly with dialysis (on Mondays, Wednesdays, and Fridays).       famotidine (PEPCID) 20 MG tablet Take 1 tablet (20 mg total) by mouth at bedtime.  0     folic acid (FOLVITE) 1 MG tablet TAKE ONE TABLET BY MOUTH ONCE DAILY (Patient taking differently: Take 1 mg by mouth daily. ) 90 tablet 4     gabapentin (NEURONTIN) 100 MG capsule Take 100 mg by mouth 3 (three) times a day. Leg pain       Lactobacillus rhamnosus GG (CULTURELLE) 10-15 Billion cell capsule Take 1 capsule by mouth daily with lunch.        loratadine (CLARITIN) 10 mg tablet Take 10 mg by mouth daily as needed for allergies.       metoprolol succinate (TOPROL-XL) 25 MG Take 1 tablet (25 mg total) by mouth daily with lunch. Hold for SBP<110 or hr<60  0     midodrine  (PROAMATINE) 10 MG tablet Take 10 mg by mouth 4 (four) times a week. On non-dialysis days if SBP <106 mmHg       midodrine (PROAMATINE) 10 MG tablet Take 1.5 tablets (15 mg total) by mouth 3 (three) times a week. qMWF prior to dialysis 30 tablet 0     midodrine (PROAMATINE) 5 MG tablet Take 1 tablet (5 mg total) by mouth every 6 (six) hours as needed (for SBP <110). 30 tablet 0     nystatin (MYCOSTATIN) powder Apply 1 application topically 2 (two) times a day as needed.       oxyCODONE (ROXICODONE) 5 MG immediate release tablet Take 1 tablet (5 mg total) by mouth every 6 (six) hours as needed. 13 tablet 0     polyvinyl alcohol (LIQUIFILM TEARS) 1.4 % ophthalmic solution Administer 1 drop to both eyes as needed for dry eyes.       pot bicarb/sod bicarb/cit ac (EDI-SELTZER GOLD ORAL) Take by mouth daily.       senna-docusate (SENNOSIDES-DOCUSATE SODIUM) 8.6-50 mg tablet Take 2 tablets by mouth 2 (two) times a day. Hold for loose stools 60 tablet 0     sevelamer carbonate (RENVELA) 800 mg tablet Take 1,600 mg by mouth 3 (three) times a day with meals.       timolol maleate (TIMOPTIC) 0.5 % ophthalmic solution Administer 1 drop to both eyes 2 (two) times a day.        traZODone (DESYREL) 150 MG tablet Take 150 mg by mouth at bedtime.        No current facility-administered medications for this visit.         Medication Therapy Recommendations  Encounter for herb and vitamin supplement management    Current Medication: Lactobacillus rhamnosus GG (CULTURELLE) 10-15 Billion cell capsule   Rationale: No medical indication at this time - Unnecessary medication therapy - Indication   Recommendation: Discontinue Medication   Status: Accepted - no CPA Needed

## 2021-06-16 NOTE — PROGRESS NOTES
[facility]    Name:   Belia Rodriguez (Itzel)  : 1945  Facility:   [facility] [[id number]]   Room: TCU3-320  Code Status: FULL CODE -   Fac type:   SNF (Skilled Nursing Facility, TCU) -     CHIEF COMPLAINT / REASON FOR VISIT:  Chief Complaint   Patient presents with     Review Of Multiple Medical Conditions     TCU follow-up after hospitalization with acute hypoxic respiratory failure.   [facility] from 18 until 18    Patient was last seen by me on 18.    HPI: Belia is a 72 y.o. female with known end-stage renal disease (on dialysis),COPD (former smoker, 45-60 pack years),  obstructive sleep apnea (severe and previously intolerant of CPAP), atrial fibrillation, GERD, essential hypertension, and anemia of chronic renal failure.    About 5 days prior to admission, she developed increasing cough, then gradual worsening dyspnea.  It was particularly severe the day before admission.  She presented to the emergency department.  Her cough produced yellow-green phlegm, and she had a low-grade fever.  In the emergency department, she still had respiratory distress with oxygen, nebulizer, and antibiotics.  A CT was done showing no obvious pneumonia, influenza A/B were negative, white count and PMNs were normal, natruretic protein somewhat elevated at 567, TSH was normal.  A comprehensive metabolic panel was normal except for the expected BUN and creatinine issues.     She still had not improved, had a very high CO2 and required BiPAP to promote better ventilation.    She did not need to be mechanically ventilated at any point during the hospitalization.  She did require intravenous steroids (later transitioned to oral steroids and then tapered off) and nebulizers, reaching the point where she was requiring only 4 L of oxygen per nasal cannula (previously required 8 L) during the day and using BiPAP at night to sleep.  She does have a history of  severe obstructive sleep apnea.  She did not have any active treatment prior to the hospitalization. She had empiric treatment with ceftriaxone and azithromycin, although was not a documented pneumonia patient.  It was more empiric treatment for COPD exacerbation.    Pulmonary was consulted, and she had mixed obstructive and restrictive pattern on pulmonary function tests. It was planned that she would need to stay on oxygen into the future. It was recommended she have an outpatient pulmonary workup, including consideration of high-resolution CT of the chest.     There was a CHF component to her failure, and she diuresed approximately 17 pounds.  She has atrial fibrillation (with RVR) and is on digoxin and diltiazem.  She had a rapid rate but is not anticoagulated due to recurrent GI bleeding.  She had a watchman device planned for around the time of the admission, but it was deferred until April 2018.      CURRENT ISSUES    She is doing quite well.  She is now able to use CPAP.  Previously, she had felt claustrophobic, but she was introduced within the hospital, and she was told it would take 3 months ago accustomed.  She admits that she is feeling better and is not so tired anymore.  She did want a sleep aid but told me that Ambien, given to her in the hospital, made her hallucinate.  I suggested trazodone, and she was in agreement, but then I discovered that she is already getting 100 mg nightly.  My thought was to give the CPAP more time to work.  Today, she does not mention any difficulty sleeping.    As noted, she attends dialysis 3 times weekly.  She has a history of aortic dissection (currently, there are 2 very tiny aneurysms), and because of this she is not eligible for a kidney transplant.  She is looking forward to getting a watchman implanted.    She is doing well overall and manages ADLs either independently or with supervision but does not require any help.    A taper of her oxygen is going well.  She  is now down to 0.5 L.  I spoke with physical therapy, and they plan to try her off of it and monitor her O2 sats.    Medication changes: We talked about the many medications she is on.  Her last gout flare occurred 15-20 years ago, and we are going to try discontinuing her allopurinol.    ROS: She tells me her right foot hurts, but she does have as needed hydromorphone.  No headaches or chest pains, coughing or congestion, nausea or vomiting, dizziness, dysuria, difficulty chewing or swallowing, or any integumentary issues.    Past Medical History:   Diagnosis Date     Arthritis      CHF (congestive heart failure)      Chronic anemia 6/1/2014     Chronic kidney disease      Chronic thoracic aortic dissection 10/7/2015    Descending thoracic aorta; treated medically per notes of Dragan Singh and Jennifer.     COPD (chronic obstructive pulmonary disease)      CVA (cerebral infarction)      Disease of thyroid gland      Dyslipidemia      ESRD (end stage renal disease) 06/03/2009    on dialysis with Dr. Mitchell     Essential hypertension 6/30/2014     GI (gastrointestinal bleed)      GI bleeding 6/5/2017     Gout      L3 vertebral fracture 11/16/2015     Obesity      ZULEIKA (obstructive sleep apnea), severe, intolerant of CPAP 10/22/2015     Pneumonia 9-7-2015     Right foot drop      Spinal stenosis 3/28/2016     Stroke 3/24/2016              Family History   Problem Relation Age of Onset     Dementia Mother      Diabetes Mother      Arthritis Mother      Cancer Mother      Depression Mother      Heart disease Mother      Vision loss Mother      Stroke Father      Heart disease Father      Breast cancer Neg Hx      Social History     Social History     Marital status:      Spouse name: Cayden     Number of children: 2     Years of education: N/A     Occupational History      Retired     Social History Main Topics     Smoking status: Former Smoker     Packs/day: 1.50     Years: 37.00     Types: Cigarettes     Quit  date: 1/1/2009     Smokeless tobacco: Never Used     Alcohol use 7.0 oz/week     14 Standard drinks or equivalent per week      Comment: 14 mixed drinks per week     Drug use: No     Sexual activity: No     Other Topics Concern     Not on file     Social History Narrative    Lives with her . Daughter in [place] and daughter in [place].     MEDICATIONS: Reviewed from the MAR, physician orders, and earlier progress notes.  Updated by me today (02/22/18) with discontinuation of prednisone and allopurinol reflected below.  Current Outpatient Prescriptions   Medication Sig     acetaminophen (TYLENOL) 500 MG tablet Take 500 mg by mouth every 6 (six) hours as needed for pain.     albuterol (PROAIR HFA;PROVENTIL HFA;VENTOLIN HFA) 90 mcg/actuation inhaler Inhale 2 puffs every 4 (four) hours as needed for wheezing.     albuterol (PROVENTIL) 2.5 mg /3 mL (0.083 %) nebulizer solution Take 3 mL (2.5 mg total) by nebulization every 4 (four) hours as needed for wheezing.     allopurinol (ZYLOPRIM) 100 MG tablet Take 100 mg by mouth daily with lunch.      aspirin 81 MG EC tablet Take 81 mg by mouth daily with lunch.      atorvastatin (LIPITOR) 10 MG tablet Take 10 mg by mouth at bedtime.     CHLORPHENIRAMINE/DEXTROMETHORP (CORICIDIN HBP COUGH AND COLD ORAL) Take 1 tablet by mouth daily as needed.      cholecalciferol, vitamin D3, 2,000 unit cap Take 2,000 Units by mouth daily with lunch.      cinacalcet (SENSIPAR) 30 MG tablet Take 30 mg by mouth at bedtime.      digoxin (LANOXIN) 125 mcg tablet Take 125 mcg by mouth 3 (three) times a week. Take every Monday, Wednesday, and Saturday with supper.     diltiazem (CARDIZEM SR) 120 MG 12 hr capsule Take 1 capsule (120 mg total) by mouth 2 (two) times a day.     folic acid (FOLVITE) 1 MG tablet Take 1 mg by mouth daily with lunch.      gabapentin (NEURONTIN) 100 MG capsule Take 100 mg by mouth 3 (three) times a day.     HYDROmorphone (DILAUDID) 2 MG tablet Take 1 tablet (2 mg  "total) by mouth every 3 (three) hours as needed.     Lactobacillus rhamnosus GG (CULTURELLE) 10-15 Billion cell capsule Take 1 capsule by mouth daily with lunch.     midodrine (PROAMATINE) 5 MG tablet Take 10 mg by mouth 3 (three) times a week. Take every Monday, Wednesday, and Friday in the morning.     polyvinyl alcohol (LIQUIFILM TEARS) 1.4 % ophthalmic solution Apply 1 drop to eye as needed for dry eyes.     ranitidine (ZANTAC) 150 MG tablet Take 150 mg by mouth at bedtime.     senna-docusate (SENNOSIDES-DOCUSATE SODIUM) 8.6-50 mg tablet Take 1 tablet by mouth at bedtime.      sevelamer carbonate (RENVELA) 800 mg tablet Take 1,600 mg by mouth 3 (three) times a day with meals.     sevelamer carbonate (RENVELA) 800 mg tablet Take 800-1,600 mg by mouth daily as needed (snacks).     timolol maleate (TIMOPTIC) 0.5 % ophthalmic solution Administer 1 drop to both eyes 2 (two) times a day.      traZODone (DESYREL) 100 MG tablet Take 1 tablet (100 mg total) by mouth at bedtime.     vit B comp no.3-folic-C-biotin (NEPHRO-OLGA) 1- mg-mg-mcg Tab tablet Take 1 tablet by mouth daily with supper.      ALLERGIES:   Allergies   Allergen Reactions     Ace Inhibitors Cough     Atrovent [Ipratropium Bromide] Headache     Fosinopril Sodium Cough     Zolpidem      hallucinations     DIET: Dialysis diet, regular texture, thin liquids.    Vitals:    02/22/18 1105   BP: 113/68   Pulse: 86   Resp: 20   Temp: 97.4  F (36.3  C)   Weight: 181 lb (82.1 kg)   Height: 5' 6\" (1.676 m)     Body mass index is 29.21 kg/(m^2).    EXAMINATION:   General: Pleasant, alert, and conversant middle-aged female, lying in bed, in no apparent distress.  Head: Normocephalic and atraumatic.   Eyes: PERRLA, sclerae clear.   ENT: Moist oral mucosa.  She has her own teeth.  No rhinorrhea or nasal discharge.  Hearing is unimpaired.  Cardiovascular: Irregular rhythm without appreciable murmur.  Respiratory: Diminished lung sounds bilaterally but otherwise " clear.  Abdomen: Soft and nontender.   Musculoskeletal/Extremities: Age-related degenerative joint disease.  Right 2-3+ pitting pedal and pretibial edema, 1+ on the left.  Integument: No rashes, clinically significant lesions, or skin breakdown.   Cognitive/Psychiatric: Alert and oriented ×3.  Affect is euthymic.    DIAGNOSTICS:   Results for orders placed or performed during the hospital encounter of 01/26/18   Basic Metabolic Panel   Result Value Ref Range    Sodium 135 (L) 136 - 145 mmol/L    Potassium 5.9 (H) 3.5 - 5.0 mmol/L    Chloride 97 (L) 98 - 107 mmol/L    CO2 23 22 - 31 mmol/L    Anion Gap, Calculation 15 5 - 18 mmol/L    Glucose 109 70 - 125 mg/dL    Calcium 8.2 (L) 8.5 - 10.5 mg/dL    BUN 84 (H) 8 - 28 mg/dL    Creatinine 8.53 (HH) 0.60 - 1.10 mg/dL    GFR MDRD Af Amer 6 (L) >60 mL/min/1.73m2    GFR MDRD Non Af Amer 5 (L) >60 mL/min/1.73m2     Lab Results   Component Value Date    WBC 6.1 02/02/2018    HGB 12.5 02/02/2018    HCT 39.4 02/02/2018     (H) 02/02/2018     02/02/2018     CrCl cannot be calculated (Patient's most recent sCr result is older than the maximum 5 days allowed.).  Lab Results   Component Value Date     (H) 01/26/2018     ASSESSMENT/Plan:      ICD-10-CM    1. ESRD (end stage renal disease) N18.6    2. History of acute respiratory failure Z87.09    3. Persistent atrial fibrillation I48.1    4. Anemia of chronic renal failure, stage 5 N18.5     D63.1    5. Essential hypertension with goal blood pressure less than 140/90 I10    6. Pulmonary emphysema, unspecified emphysema type J43.9      CHANGES:    Discontinue allopurinol.    CARE PLAN:    The care plan has been reviewed and all orders signed. Changes to care plan, if any, as noted. Otherwise, continue care plan of care.  Time spent with this patient was approximately 35 minutes, with greater than 50% spent in counseling and coordination of care that included a review of her multiple comorbidities and current  issues.  The decision, currently, is to make no changes.    The above has been created using voice recognition software. Please be aware that this may unintentionally  produce inaccuracies and/or nonsensical sentences.      Electronically signed by: Louie Liriano CNP

## 2021-06-16 NOTE — PROGRESS NOTES
[facility]    Name:   Belia Rodriguez (Itzel)  : 1945  Facility:   [facility] [[id number]]   Room:   Code Status: DNR/DNI and POLST AVAILABLE - Palliative Care  Fac type:   SNF (Skilled Nursing Facility, TCU) -     DOS: 2021    PCP: Neil Galan MD      CHIEF COMPLAINT / REASON FOR VISIT:  Chief Complaint   Patient presents with     Follow-up     TCU follow-up: Hospitalization for acute on chronic respiratory failure due to a combination of fluid overload and COPD exacerbation     Problem Visit     1. Hypotension due to hypovolemia. 2. Depression.     [facility] from 18 until 18  [facility] until [facility]  [facility] from 2021 until 2021 (acute on chronic respiratory failure due to a combination of fluid overload and COPD exacerbation)  [facility] from 2021 until 2021  [facility] from 2021 until 2021 (acute on chronic respiratory failure due to a combination of fluid overload and COPD exacerbation)     Patient was last seen by me on 2021.      HPI: Belia is a 75 y.o. female with known end-stage renal disease (on dialysis), COPD (former smoker, 45-60 pack years),  chronic respiratory failure with hypoxia, obstructive sleep apnea (severe and previously intolerant of CPAP), chronic atrial fibrillation, GERD, essential hypertension, and anemia related to chronic renal failure.    She has had multiple hospitalizations for respiratory failure due to a combination of fluid overload and COPD exacerbation.  This occurred on 2021 and again on 2021.  Her last admission came within [facility] of [facility].  When last in the TCU at [facility], she was a full code, but she indicated during her last hospitalization that she wants no  further BiPAP and is DNR/DNI.  This did improve somewhat with steroids and doxycycline.    While she continues on hemodialysis Mondays, Wednesdays, and Fridays, she did have some questions regarding hospice but was not sure she would sign on given her need for dialysis.  She did have a palliative care consult, and she strongly agreed, especially given her readmission to the hospital.      CURRENT/RECENT TCU ISSUES    Disposition: The patient is aware that her health will not improve and is ready for hospice, although she is concerned about having all her paperwork in order.  I had received a request by social services that I give an okay for an in-house psych consult.  My initial thought was that the patient has been doing quite well and accepting her situation, and there would be nothing gained from such a consult.  At the time, we spent a good 20 minutes talking about her feelings with regard to hospice, dying, and other related issues, including depression.  She did tell me that she thought she might benefit from a consult and was recently seen via video conference.  She thought it was beneficial.    She was having no particular issues until about 6 weeks ago, not needing oxygen but now expecting a chronic continuous need.  She initially told me she planned to go on hospice after discharge.  She expressed a desire to go to [facility].  She noted that she would be stopping dialysis and would expect to survive no longer than 1 week.  She had been presented with this suggestion about 6 months ago.  She believed that she was ready.  Her main concern has not been about dying but  getting her paperwork done for her  of 44 years.     She is doing fairly well.  She complains of neuropathy in her feet, Raynaud's phenomenon in the feet as well, and some constipation.    Hypotension: She is on a 1500 mL fluid restriction.  This may be producing hypovolemia.  She does have orders for 20 mg of midodrine to take on  "dialysis days (Mondays, Wednesdays, and Fridays) and has additional orders for taking this when additional dialysis is needed.  However, she has been running significantly low blood pressures.  Systolic blood pressure has been as low as the 70s. On 03/01/2021, another nurse practitioner ordered 10 mg of midodrine to be given on nondialysis days when the SBP is <106.  An adjustment was recently made at dialysis, and she is now receiving a 20 mg dose there as well if necessary.  They has been pacing her dialysis fluid removal with a dry weight of 67.5 kg.  There have been times when she has required additional fluids following dialysis due to severely low blood pressures.  Very recently, the nephrologist adjusted her dry weight to 70.5 kg, and this has proved to make a considerable difference.  She has been able to skip some postdialysis midodrine doses.  They have not placed the midodrine doses in an envelope and given them to her.  We are going to write orders for 10 mg 3 times daily as needed of midodrine to be given whenever SBPs are <106, regardless of when this occurs.    Medication changes: At an earlier visit, considering her plans for hospice, we talked about her medications.  It was agreed that atorvastatin was no longer needed, and so we discontinued it.  At the time, she stated, \"anything you can take away from me won't break my heart at all.\"    Discharge planning: She told her  and daughter that she wanted to stay here, but her  became angry.  He has been the one to take her to dialysis and suggest that she would need to arrange her own transportation if she decided to stay.  He also told her he would not be doing housework.  The end result was that the patient would be staying 2 more weeks.  Apparently, her  has now finally resigned himself to her being here.  She tells me that she is here now because she has no other place to go, and palliative care is cheaper than hospice.  I told " her I had no problem with managing a palliative care plan. There was a request for physical therapy, and we gave orders for that. Currently, no discharge date has been set, although her family is cleaning up the space where she will be living upon her return home.      ROS:   Positives: Her feet continue to be painful due to neuropathy.  She has found that lying in bed with her knees up in the air helps. She is on chronic oxygen, and breathing is no better and no worse.      Negatives: Complaining of constipation before, things are going better now.  She denies any headaches or chest pains, coughing or congestion at the moment, dizziness, dysuria, diarrhea, difficulty chewing or swallowing, or problems with sleep (when on 150 mg of trazodone nightly).    Past Medical History:   Diagnosis Date     Arthritis      CHF (congestive heart failure) (H)      Chronic anemia 6/1/2014     Chronic kidney disease      Chronic thoracic aortic dissection (H) 10/7/2015    Descending thoracic aorta; treated medically per notes of Dragan Singh and Jennifer.     COPD (chronic obstructive pulmonary disease) (H)      CVA (cerebral infarction)      Disease of thyroid gland      Dyslipidemia      ESRD (end stage renal disease) (H) 06/03/2009    on dialysis with Dr. Mitchell     Essential hypertension 6/30/2014     Gastrointestinal hemorrhage, unspecified gastrointestinal hemorrhage type 6/5/2017     GI (gastrointestinal bleed)      GI bleeding 6/5/2017     Gout      L3 vertebral fracture (H) 11/16/2015     Left Atrial Appendage Occlusion (WATCHMAN) 4/5/2018    LAAO April 5, 2018 (30 mm WATCHMAN)     Obesity      ZULEIKA (obstructive sleep apnea), severe, intolerant of CPAP 10/22/2015     Oxygen dependent     3L nc     Pneumonia 9-7-2015     Right foot drop      Spinal stenosis 3/28/2016     Stroke (H) 3/24/2016              Family History   Problem Relation Age of Onset     Dementia Mother      Diabetes Mother      Arthritis Mother      Cancer  Mother      Depression Mother      Heart disease Mother      Vision loss Mother      Stroke Father      Heart disease Father      Breast cancer Neg Hx      Social History     Socioeconomic History     Marital status:      Spouse name: Cayden     Number of children: 2     Years of education: Not on file     Highest education level: Not on file   Occupational History     Employer: RETIRED   Social Needs     Financial resource strain: Not on file     Food insecurity     Worry: Not on file     Inability: Not on file     Transportation needs     Medical: Not on file     Non-medical: Not on file   Tobacco Use     Smoking status: Former Smoker     Packs/day: 1.50     Years: 37.00     Pack years: 55.50     Types: Cigarettes     Quit date: 2009     Years since quittin.2     Smokeless tobacco: Never Used   Substance and Sexual Activity     Alcohol use: No     Alcohol/week: 11.7 standard drinks     Types: 14 Standard drinks or equivalent per week     Comment: 14 mixed drinks per week     Drug use: No     Sexual activity: Never     Partners: Male   Lifestyle     Physical activity     Days per week: Not on file     Minutes per session: Not on file     Stress: Not on file   Relationships     Social connections     Talks on phone: Not on file     Gets together: Not on file     Attends Latter day service: Not on file     Active member of club or organization: Not on file     Attends meetings of clubs or organizations: Not on file     Relationship status: Not on file     Intimate partner violence     Fear of current or ex partner: Not on file     Emotionally abused: Not on file     Physically abused: Not on file     Forced sexual activity: Not on file   Other Topics Concern     Not on file   Social History Narrative    Lives with her . Daughter in [place] and daughter in [place].     MEDICATIONS: Reviewed from the MAR, physician orders, and earlier progress notes.    Post Discharge Medication Reconciliation  Status: medication reconciliation previously completed during another office visit.    Updated by me today (03/22/2021) with the addition of as needed midodrine administered 3 times daily reflected below.    Current Outpatient Medications   Medication Sig     acetaminophen (TYLENOL) 500 MG tablet Take 2 tablets (1,000 mg total) by mouth every 6 (six) hours as needed.     albuterol (PROVENTIL) 2.5 mg /3 mL (0.083 %) nebulizer solution Take 3 mL (2.5 mg total) by nebulization every 4 (four) hours as needed for wheezing.     aspirin 81 MG EC tablet Take 1 tablet (81 mg total) by mouth daily.     atorvastatin (LIPITOR) 10 MG tablet Take 10 mg by mouth at bedtime.     B complex 11-folic-C-biot-zinc (DIALYVITE) [phone] mg-mg-mcg-mg Tab Take 1 tablet by mouth daily with supper. (Dialyvite)      buPROPion (WELLBUTRIN XL) 150 MG 24 hr tablet Take 1 tablet (150 mg total) by mouth 2 (two) times a week. Tuesday and saturday (Patient taking differently: Take 150 mg by mouth 2 (two) times a week. Tuesday and saturday)     cholecalciferol, vitamin D3, 1,000 unit (25 mcg) tablet Take 2,000 Units by mouth daily.      cinacalcet (SENSIPAR) 30 MG tablet Take 30 mg by mouth see administration instructions. Take three times weekly with dialysis (on Mondays, Wednesdays, and Fridays).     famotidine (PEPCID) 20 MG tablet Take 1 tablet (20 mg total) by mouth at bedtime.     folic acid (FOLVITE) 1 MG tablet TAKE ONE TABLET BY MOUTH ONCE DAILY (Patient taking differently: Take 1 mg by mouth daily. )     gabapentin (NEURONTIN) 100 MG capsule Take 100 mg by mouth 2 (two) times a day. Leg pain     Lactobacillus rhamnosus GG (CULTURELLE) 10-15 Billion cell capsule Take 1 capsule by mouth daily with lunch.      metoprolol succinate (TOPROL-XL) 25 MG Take 1 tablet (25 mg total) by mouth daily with lunch. Hold for SBP<110 or hr<60     midodrine (PROAMATINE) 10 MG tablet Take 2 tablets (20 mg total) by mouth 3 (three) times a week. Patient  takes prior to dialysis qMWF and PRN if additional dialysis treatments.  Patient reports that she takes 20mg prior to dialysis (Patient taking differently: Take 20 mg by mouth 3 (three) times a week. Patient takes prior to dialysis qMWF and PRN if additional dialysis treatments.  Patient reports taking 20mg prior to dialysis and is given 20 mg after dialysis as well.    Also, on nondialysis days, give 10 mg daily for SBP <106.)     midodrine (PROAMATINE) 10 MG tablet Take 10 mg by mouth daily as needed. Whenever SBP is <106 (Check BP three times a day).     midodrine (PROAMATINE) 5 MG tablet Take 10 mg by mouth 4 (four) times a week. On non dialysis days if SBP < 106     oxyCODONE (ROXICODONE) 5 MG immediate release tablet TAKE 1/2 TO 1 TABLET (2.5-5MG) BY MOUTH TWICE DAILY AS NEEDED FOR PAIN     polyvinyl alcohol (LIQUIFILM TEARS) 1.4 % ophthalmic solution Administer 1 drop to both eyes as needed for dry eyes.     pot bicarb/sod bicarb/cit ac (EDI-SELTZER GOLD ORAL) Take by mouth daily.     senna-docusate (SENNOSIDES-DOCUSATE SODIUM) 8.6-50 mg tablet Take 1 tablet by mouth at bedtime as needed for constipation.      sevelamer carbonate (RENVELA) 800 mg tablet Take 1,600 mg by mouth 2 (two) times a day as needed (snacks).     sevelamer HCL (RENAGEL) 800 MG tablet Take 1,600 mg by mouth 3 (three) times a day with meals.      timolol maleate (TIMOPTIC) 0.5 % ophthalmic solution Administer 1 drop to both eyes 2 (two) times a day.      traZODone (DESYREL) 150 MG tablet Take 150 mg by mouth at bedtime.      ALLERGIES:   Allergies   Allergen Reactions     Ace Inhibitors Cough     Atrovent [Ipratropium Bromide] Headache     Fosinopril Sodium Cough     Zolpidem      hallucinations     DIET: Dialysis diet, regular texture, thin liquids.  1500 mL /24-hour fluid restriction    Vitals:    03/22/21 1625   BP: 96/55   Pulse: 79   Resp: 18   Temp: 98.2  F (36.8  C)   SpO2: 95%   Weight: 157 lb (71.2 kg)     Body mass index is  25.34 kg/m .    EXAMINATION:   General: Pleasant, alert, and conversant middle-aged female, sitting on the bed, in no apparent distress.  Head: Normocephalic and atraumatic.   Eyes: PERRLA, sclerae clear.  Slight disconjugate gaze.  ENT: Moist oral mucosa.  She has her own teeth.  She has a bit of a runny nose today.  Hearing is unimpaired.  Cardiovascular: Irregular rhythm with a 2/6 systolic ejection murmur at the left sternal border.  Respiratory: Diminished lung sounds bilaterally but otherwise clear.  Abdomen: Soft and nontender.   Musculoskeletal/Extremities: Age-related degenerative joint disease.  Right 2+ pitting pedal and pretibial edema, 1+ on the left, slightly improved while Ace wrapped.  Integument: No rashes, clinically significant lesions, or skin breakdown.   Cognitive/Psychiatric: Alert and oriented ×4.  Affect is euthymic.    DIAGNOSTICS:   Results for orders placed or performed during the hospital encounter of 02/18/21   Basic Metabolic Panel   Result Value Ref Range    Sodium 136 136 - 145 mmol/L    Potassium 3.7 3.5 - 5.0 mmol/L    Chloride 97 (L) 98 - 107 mmol/L    CO2 29 22 - 31 mmol/L    Anion Gap, Calculation 10 5 - 18 mmol/L    Glucose 95 70 - 125 mg/dL    Calcium 8.7 8.5 - 10.5 mg/dL    BUN 28 8 - 28 mg/dL    Creatinine 3.01 (H) 0.60 - 1.10 mg/dL    GFR MDRD Af Amer 18 (L) >60 mL/min/1.73m2    GFR MDRD Non Af Amer 15 (L) >60 mL/min/1.73m2     Lab Results   Component Value Date    WBC 3.9 (L) 02/20/2021    HGB 10.1 (L) 02/20/2021    HCT 31.8 (L) 02/20/2021     (H) 02/20/2021    PLT 91 (L) 02/20/2021     CrCl cannot be calculated (Patient's most recent lab result is older than the maximum 10 days allowed.).  Lab Results   Component Value Date     (H) 02/18/2021     ASSESSMENT/Plan:      ICD-10-CM    1. Chronic respiratory failure with hypoxia (H)  J96.11    2. ESRD on hemodialysis (H), MWF  N18.6     Z99.2    3. Chronic diastolic heart failure (H)  I50.32    4. Hypotension due  to hypovolemia  I95.89     E86.1    5. Chronic atrial fibrillation (H)  I48.20    6. Anemia of chronic renal failure, stage 5 (H)  N18.5     D63.1    7. Pulmonary emphysema, unspecified emphysema type (H)  J43.9    8. Mild episode of recurrent major depressive disorder (H)  F33.0    9. Chronic acquired lymphedema  I89.0    10. Gastroesophageal reflux disease without esophagitis  K21.9      CHANGES:    Add as needed dose of midodrine 10 mg 3 times daily at any time for SBP <106.    CARE PLAN:    The care plan has been reviewed and all orders signed. Changes to care plan, if any, as noted. Otherwise, continue care plan of care.      The above has been created using voice recognition software. Please be aware that this may unintentionally  produce inaccuracies and/or nonsensical sentences.      Electronically signed by: Louie Liriano CNP

## 2021-06-16 NOTE — TELEPHONE ENCOUNTER
[facility] Nurse Triage Telephone Note      Provider: MARIO Serna  Facility: [facility]    Facility Type: TCU    Caller: Milagros  Call Back Number:  [phone]    Allergies: Ace inhibitors, Atrovent [ipratropium bromide], Fosinopril sodium, and Zolpidem    Reason for call: Nurse calling to request orders per patient request for a palliative care consult.  Patient is looking into better pain control, however is not ready for hospice.       Verbal Order/Direction given by Provider: Ok for palliative care eval and treat.      Provider giving order: MARIO Serna    Verbal order given to: Milagros Ramirez RN

## 2021-06-16 NOTE — PROGRESS NOTES
Preoperative Exam    Scheduled Procedure: Left atrial appendage closure, watchman device  Surgery Date: April 5, 2018  Surgery Location: [facility], fax [phone]    Surgeon: Dr. Swift    Assessment/Plan:     1. Encounter for preoperative examination for general surgical procedure  Medically optimized for watchman device.  Hopefully in the 6 weeks that she will require anticoagulation for, she will not develop any lower GI bleeding.  She has not had any sign of this lately.  She has been instructed to start warfarin 5 days prior to the procedure and continue anticoagulation for at least 6 weeks afterwards until reevaluation can be performed.  Check the below lab tests  - HM2(CBC w/o Differential)  - Basic Metabolic Panel    2. Acute respiratory failure with hypoxia and hypercapnia  Resolved, though she has some element of restriction and obstruction for which she will follow-up with the pulmonologist later this month.    3. Persistent atrial fibrillation  High risk of stroke off anticoagulation, this has been complicated by lower GI bleeding in the recent past.    4. ESRD (end stage renal disease)  Continues hemodialysis 3 times a week    5. Gout  Restart allopurinol, I am not sure how important it is to continue, though we should recheck uric acid after this is initiated as well.        Surgical Procedure Risk: Intermediate (reported cardiac risk generally 1-5%)  Have you had prior anesthesia?: Yes  Have you or any family members had a previous anesthesia reaction:  No  Do you or any family members have a history of a clotting or bleeding disorder?: No  Cardiac Risk Assessment: increased risk for major cardiac complications based on  End-stage renal disease on dialysis, history of stroke    Patient approved for surgery with general or local anesthesia.    Please Note:  Patient uses a CPAP machine.    Functional Status: Independent  Patient plans to recover at home with family.     Subjective:       Belia Rodriguez is a 72 y.o. female who presents for a preoperative consultation.      She is also here for hospital follow-up.  In regards to preop physical, she is having a watchman device placed on April 5.  I do not see this in the medical chart yet in terms of scheduling.  She has history of lower GI bleeding in the presence of anticoagulation, as well as elevated stroke risk and history of atrial fibrillation.    She denies any complications from anesthesia in the past, she has sleep apnea and is adherent to the CPAP/BiPAP, it is unclear which she is on now, because the machine she was discharged with from the hospital to TCU seems to be different than when she is using now.    She would like to restart allopurinol even though off the allopurinol her uric acid level is below 6.  She just wants to avoid the potential for gout in the future.    She is on hemodialysis, Midrin was increased to 15 mg before each dialysis run, this is been going well.    She was discharged on March 2 from [facility] after a stay there and after a prolonged stay at [facility] for acute hypoxic respiratory failure and hypercapnic respiratory failure secondary to severe episode of bronchitis, which required BiPAP, steroids, nebulizers, and was discharged on oxygen, which was a new requirement for her.  She is now off the oxygen completely for several days.  There is a volume component to the respiratory failure, she had about 15 pounds removed through hemodialysis.  She is here with her  Cayden currently.    All other systems reviewed and are negative, other than those listed in the HPI.    Pertinent History  Do you have difficulty breathing or chest pain after walking up a flight of stairs: She is able to walk about 120 feet without rest using the walker  History of obstructive sleep apnea: Yes: Using CPAP, trying to tolerate this better  Steroid use in the last 6 months: No  Frequent Aspirin/NSAID  use: Yes: Has been using aspirin lately  Prior Blood Transfusion: Yes: History of lower GI bleeding  Prior Blood Transfusion Reaction: No  If for some reason prior to, during or after the procedure, if it is medically indicated, would you be willing to have a blood transfusion?:  There is no transfusion refusal.    Current Outpatient Prescriptions   Medication Sig Dispense Refill     acetaminophen (TYLENOL) 500 MG tablet Take 500 mg by mouth every 6 (six) hours as needed for pain.       albuterol (PROAIR HFA;PROVENTIL HFA;VENTOLIN HFA) 90 mcg/actuation inhaler Inhale 2 puffs every 4 (four) hours as needed for wheezing. 1 Inhaler 0     albuterol (PROVENTIL) 2.5 mg /3 mL (0.083 %) nebulizer solution Take 3 mL (2.5 mg total) by nebulization every 4 (four) hours as needed for wheezing. 30 vial 1     aspirin 81 MG EC tablet Take 81 mg by mouth daily with lunch.        atorvastatin (LIPITOR) 10 MG tablet Take 10 mg by mouth at bedtime.       CHLORPHENIRAMINE/DEXTROMETHORP (CORICIDIN HBP COUGH AND COLD ORAL) Take 1 tablet by mouth daily as needed.        cholecalciferol, vitamin D3, 2,000 unit cap Take 2,000 Units by mouth daily with lunch.        cinacalcet (SENSIPAR) 30 MG tablet Take 30 mg by mouth at bedtime.        digoxin (LANOXIN) 125 mcg tablet Take 125 mcg by mouth 3 (three) times a week. Take every Monday, Wednesday, and Saturday with supper.       diltiazem (CARDIZEM SR) 120 MG 12 hr capsule Take 1 capsule (120 mg total) by mouth 2 (two) times a day. 60 capsule 2     folic acid (FOLVITE) 1 MG tablet Take 1 mg by mouth daily with lunch.        gabapentin (NEURONTIN) 100 MG capsule Take 100 mg by mouth 3 (three) times a day.       HYDROmorphone (DILAUDID) 2 MG tablet Take 1 tablet (2 mg total) by mouth every 3 (three) hours as needed. 240 tablet 0     Lactobacillus rhamnosus GG (CULTURELLE) 10-15 Billion cell capsule Take 1 capsule by mouth daily with lunch.       midodrine (PROAMATINE) 5 MG tablet Take 3 tablets  (15 mg total) by mouth 3 (three) times a week. Take every Monday, Wednesday, and Friday in the morning. 36 tablet 11     polyvinyl alcohol (LIQUIFILM TEARS) 1.4 % ophthalmic solution Apply 1 drop to eye as needed for dry eyes.       ranitidine (ZANTAC) 150 MG tablet Take 150 mg by mouth at bedtime.       senna-docusate (SENNOSIDES-DOCUSATE SODIUM) 8.6-50 mg tablet Take 1 tablet by mouth at bedtime.        sevelamer carbonate (RENVELA) 800 mg tablet Take 1,600 mg by mouth 3 (three) times a day with meals.       sevelamer carbonate (RENVELA) 800 mg tablet Take 800-1,600 mg by mouth daily as needed (snacks).       timolol maleate (TIMOPTIC) 0.5 % ophthalmic solution Administer 1 drop to both eyes 2 (two) times a day.        traZODone (DESYREL) 100 MG tablet Take 1 tablet (100 mg total) by mouth at bedtime. 30 tablet 2     vit B comp no.3-folic-C-biotin (NEPHRO-OLGA) 1- mg-mg-mcg Tab tablet Take 1 tablet by mouth daily with supper.        allopurinol (ZYLOPRIM) 100 MG tablet Take 1 tablet (100 mg total) by mouth daily. 90 tablet 3     No current facility-administered medications for this visit.         Allergies   Allergen Reactions     Ace Inhibitors Cough     Atrovent [Ipratropium Bromide] Headache     Fosinopril Sodium Cough     Zolpidem      hallucinations       Patient Active Problem List   Diagnosis     ESRD (end stage renal disease)     Chronic anemia     Tachycardia-bradycardia syndrome     Essential hypertension with goal blood pressure less than 140/90     Hyperlipidemia     Persistent atrial fibrillation     ZULEIKA (obstructive sleep apnea), severe     Anemia of chronic renal failure, stage 5     Right knee pain     Dissection of thoracoabdominal aorta     CHF (congestive heart failure)     Pure hypercholesterolemia     Gout     GERD (gastroesophageal reflux disease)     Right foot drop     Acute midline low back pain with right-sided sciatica     History of compression fracture of spine     A-fib      History of acute respiratory failure     Pulmonary emphysema       Past Medical History:   Diagnosis Date     Arthritis      CHF (congestive heart failure)      Chronic anemia 6/1/2014     Chronic kidney disease      Chronic thoracic aortic dissection 10/7/2015    Descending thoracic aorta; treated medically per notes of Dragan Singh and Jennifer.     COPD (chronic obstructive pulmonary disease)      CVA (cerebral infarction)      Disease of thyroid gland      Dyslipidemia      ESRD (end stage renal disease) 06/03/2009    on dialysis with Dr. Mitchell     Essential hypertension 6/30/2014     GI (gastrointestinal bleed)      GI bleeding 6/5/2017     Gout      L3 vertebral fracture 11/16/2015     Obesity      ZULEIKA (obstructive sleep apnea), severe, intolerant of CPAP 10/22/2015     Pneumonia 9-7-2015     Right foot drop      Spinal stenosis 3/28/2016     Stroke 3/24/2016       Past Surgical History:   Procedure Laterality Date     BACK SURGERY      [facility]     COLONOSCOPY N/A 3/23/2016    Procedure: COLONOSCOPY;  Surgeon: Ruddy Tejada MD;  Location: [facility];  Service:      DILATION AND CURETTAGE OF UTERUS       EYE SURGERY       HERNIA REPAIR       IA COLSC FLEXIBLE W/CONTROL BLEEDING ANY METHOD N/A 6/7/2017    Procedure: COLONOSCOPY;  Surgeon: Luis Mckeon MD;  Location: [facility];  Service: Gastroenterology     TONSILLECTOMY         Social History     Social History     Marital status:      Spouse name: Cayden     Number of children: 2     Years of education: N/A     Occupational History      Retired     Social History Main Topics     Smoking status: Former Smoker     Packs/day: 1.50     Years: 37.00     Types: Cigarettes     Quit date: 1/1/2009     Smokeless tobacco: Never Used     Alcohol use 7.0 oz/week     14 Standard drinks or equivalent per week      Comment: 14 mixed drinks per week     Drug use: No     Sexual activity: No     Other Topics Concern     Not on file     Social  History Narrative    Lives with her . Daughter in [place] and daughter in [place].       Patient Care Team:  John Escoto DO as PCP - General (Internal Medicine)          Objective:     Vitals:    03/07/18 1445   BP: 134/76   Pulse: 79   SpO2: 94%   Weight: 180 lb 6 oz (81.8 kg)         Physical Exam:    General: alert, no distress  HEENT: sclerae anicteric, moist oral mucosa  Heart: Regular rate and rhythm, no murmurs.  No pretibial edema.  Warm extremities  Lungs: Clear to auscultation bilat  Gastrointestinal: abdomen is soft, non-tender, non-distended.    Skin: warm/dry, no rashes  Neuro: no gross abnormalities  Catcher: Pleasant affect       Patient Instructions     Follow your surgeon's direction on when to stop eating and drinking prior to surgery.      EKG: Reviewed EKG from 6/5/17, rhythm is atrial fibrillation, nonspecific T-wave abnormality in inferior and precordial leads.More recent EKG 1/26/18, rhythm was atrial fibrillation with rapid ventricular response, nonspecific T-wave abnormality present as well.    Labs:  Recent Results (from the past 240 hour(s))   HM2(CBC w/o Differential)   Result Value Ref Range    WBC 7.5 4.0 - 11.0 thou/uL    RBC 3.06 (L) 3.80 - 5.40 mill/uL    Hemoglobin 10.3 (L) 12.0 - 16.0 g/dL    Hematocrit 29.7 (L) 35.0 - 47.0 %    MCV 97 80 - 100 fL    MCH 33.7 27.0 - 34.0 pg    MCHC 34.7 32.0 - 36.0 g/dL    RDW 13.1 11.0 - 14.5 %    Platelets 143 140 - 440 thou/uL    MPV 6.9 (L) 7.0 - 10.0 fL        Lab Results   Component Value Date    URICACID 4.7 02/08/2018      Reviewed TCU discharge summary on March 2, 2018, summarized in HPI.      Immunization History   Administered Date(s) Administered     Influenza high dose, seasonal 10/06/2014, 10/03/2016     Influenza, Seasonal, Inj PF IIV3 10/06/2014     Influenza, inj, historic,unspecified 12/04/2003, 10/06/2009, 01/06/2010, 10/27/2010, 09/18/2015     Influenza,seasonal quad, PF, 36+MOS 09/13/2017     Pneumo Conj 13-V  (2010&after) 10/30/2017     Pneumo Polysac 23-V 05/18/2014     Td, Adult, Absorbed 05/30/2000     Td, adult adsorbed, PF 10/30/2017     Td,adult,historic,unspecified 05/30/2000     ZOSTER, LIVE 07/28/2009           Electronically signed by John Escoto DO 03/07/18 3:14 PM

## 2021-06-16 NOTE — PROGRESS NOTES
[facility]    Name:   Belia Rodriguez (Itzel)  : 1945  Facility:   [facility] [[id number]]   Room:   Code Status: DNR/DNI and POLST AVAILABLE - Palliative Care  Fac type:   SNF (Skilled Nursing Facility, TCU) -   DOS: 2021  Last visit: 2021    PCP: Neil Galan MD      CHIEF COMPLAINT / REASON FOR VISIT:  Chief Complaint   Patient presents with     Follow-up     TCU follow-up: Hospitalization for acute on chronic respiratory failure due to a combination of fluid overload and COPD exacerbation     Problem Visit     1. Hypotension due to hypovolemia. 2. Depression.     [facility] from 18 until 18  [facility] 18 until 18  [facility] from 2021 until 2021 (acute on chronic respiratory failure due to a combination of fluid overload and COPD exacerbation)  [facility] from 2021 until 2021  [facility] from 2021 until 2021 (acute on chronic respiratory failure due to a combination of fluid overload and COPD exacerbation)         HPI: Belia is a 75 y.o. female with known end-stage renal disease (on dialysis), COPD (former smoker, 45-60 pack years),  chronic respiratory failure with hypoxia, obstructive sleep apnea (severe and previously intolerant of CPAP), chronic atrial fibrillation, GERD, essential hypertension, and anemia related to chronic renal failure.    She has had multiple hospitalizations for respiratory failure due to a combination of fluid overload and COPD exacerbation.  This occurred on 2021 and again on 2021.  Her last admission came within hours of her discharge from [facility].  When last in the TCU at [facility], she was a full code, but she indicated during her last hospitalization that she wants no further BiPAP and is  DNR/DNI.  This did improve somewhat with steroids and doxycycline.    While she continues on hemodialysis Mondays, Wednesdays, and Fridays, she did have some questions regarding hospice but was not sure she would sign on given her need for dialysis.  She did have a palliative care consult, and she strongly agreed, especially given her readmission to the hospital.      CURRENT/RECENT TCU ISSUES    Disposition: The patient is aware that her health will not improve and is ready for hospice, although she is concerned about having all her paperwork in order.  I had received a request by social services that I give an okay for an in-house psych consult.  My initial thought was that the patient has been doing quite well and accepting her situation, and there would be nothing gained from such a consult.  At the time, we spent a good 20 minutes talking about her feelings with regard to hospice, dying, and other related issues, including depression.  She did tell me that she thought she might benefit from a consult and was recently seen via video conference.  She thought it was beneficial.    She was having no particular issues until about 6 weeks ago, not needing oxygen but now expecting a chronic continuous need.  She initially told me she planned to go on hospice after discharge.  She expressed a desire to go to [facility].  She noted that she would be stopping dialysis and would expect to survive no longer than 1 week.  She had been presented with this suggestion about 6 months ago.  She believed that she was ready.  Her main concern has not been about dying but  getting her paperwork done for her  of 44 years.     She is doing fairly well.  She complains of neuropathy in her feet, Raynaud's phenomenon in the feet as well, and some constipation and low blood pressures as described below.    Her attitude appears to have changed somewhat, and while she remains quite ill, she may hold off going on hospice and  "continuing dialysis.    Hypotension: She is on a 1500 mL fluid restriction.  This may be producing hypovolemia.  She does have orders for 20 mg of midodrine to take on dialysis days (Mondays, Wednesdays, and Fridays) and has additional orders for taking this when additional dialysis is needed.  However, she has been running significantly low blood pressures.  Systolic blood pressure has been as low as the 70s. On 03/01/2021, another nurse practitioner ordered 10 mg of midodrine to be given on nondialysis days when the SBP is <106.  An adjustment was recently made at dialysis, and she is now receiving a 20 mg dose there as well if necessary.  They has been pacing her dialysis fluid removal with a dry weight of 67.5 kg.  There have been times when she has required additional fluids following dialysis due to severely low blood pressures.  Very recently, the nephrologist adjusted her dry weight to 70.5 kg, and this has proved to make a considerable difference.  She has been able to skip some postdialysis midodrine doses.  They have placed the midodrine doses in an envelope and given them to her.  We did write orders for 10 mg 3 times daily as needed of midodrine to be given whenever SBPs are <106, regardless of when this occurs.    Medication changes: At an earlier visit, considering her plans for hospice, we talked about her medications.  It was agreed that atorvastatin was no longer needed, and so we discontinued it.  At the time, she stated, \"anything you can take away from me won't break my heart at all.\"    Discharge planning: She told her  and daughter that she wanted to stay here, but her  became angry.  He has been the one to take her to dialysis and suggest that she would need to arrange her own transportation if she decided to stay.  He also told her he would not be doing housework.  The end result was that the patient would be staying 2 more weeks.  Apparently, her  has now finally " resigned himself to her being here.  She tells me that she is here now because she has no other place to go, and palliative care is cheaper than hospice.  I told her I had no problem with managing a palliative care plan. There was a request for physical therapy, and we gave orders for that. Currently, no specific discharge date has been set, although her family is preparing the space where she will be living upon her return home.      ROS:   Positives: Her feet continue to be painful due to neuropathy.  She has found that lying in bed with her knees up in the air helps. She is on chronic oxygen, and breathing is no better and no worse.      Negatives: Complaining of constipation before, things are going better now.  She denies any headaches or chest pains, coughing or congestion at the moment, dizziness, dysuria, diarrhea, difficulty chewing or swallowing, or problems with sleep (when on 150 mg of trazodone nightly).    Past Medical History:   Diagnosis Date     Arthritis      CHF (congestive heart failure) (H)      Chronic anemia 6/1/2014     Chronic kidney disease      Chronic thoracic aortic dissection (H) 10/7/2015    Descending thoracic aorta; treated medically per notes of Dragan iSngh and Jennifer.     COPD (chronic obstructive pulmonary disease) (H)      CVA (cerebral infarction)      Disease of thyroid gland      Dyslipidemia      ESRD (end stage renal disease) (H) 06/03/2009    on dialysis with Dr. Mitchell     Essential hypertension 6/30/2014     Gastrointestinal hemorrhage, unspecified gastrointestinal hemorrhage type 6/5/2017     GI (gastrointestinal bleed)      GI bleeding 6/5/2017     Gout      L3 vertebral fracture (H) 11/16/2015     Left Atrial Appendage Occlusion (WATCHMAN) 4/5/2018    LAAO April 5, 2018 (30 mm WATCHMAN)     Obesity      ZULEIKA (obstructive sleep apnea), severe, intolerant of CPAP 10/22/2015     Oxygen dependent     3L nc     Pneumonia 9-7-2015     Right foot drop      Spinal stenosis  3/28/2016     Stroke (H) 3/24/2016              Family History   Problem Relation Age of Onset     Dementia Mother      Diabetes Mother      Arthritis Mother      Cancer Mother      Depression Mother      Heart disease Mother      Vision loss Mother      Stroke Father      Heart disease Father      Breast cancer Neg Hx      Social History     Socioeconomic History     Marital status:      Spouse name: Cayden     Number of children: 2     Years of education: Not on file     Highest education level: Not on file   Occupational History     Employer: RETIRED   Social Needs     Financial resource strain: Not on file     Food insecurity     Worry: Not on file     Inability: Not on file     Transportation needs     Medical: Not on file     Non-medical: Not on file   Tobacco Use     Smoking status: Former Smoker     Packs/day: 1.50     Years: 37.00     Pack years: 55.50     Types: Cigarettes     Quit date: 2009     Years since quittin.2     Smokeless tobacco: Never Used   Substance and Sexual Activity     Alcohol use: No     Alcohol/week: 11.7 standard drinks     Types: 14 Standard drinks or equivalent per week     Comment: 14 mixed drinks per week     Drug use: No     Sexual activity: Never     Partners: Male   Lifestyle     Physical activity     Days per week: Not on file     Minutes per session: Not on file     Stress: Not on file   Relationships     Social connections     Talks on phone: Not on file     Gets together: Not on file     Attends Sabianist service: Not on file     Active member of club or organization: Not on file     Attends meetings of clubs or organizations: Not on file     Relationship status: Not on file     Intimate partner violence     Fear of current or ex partner: Not on file     Emotionally abused: Not on file     Physically abused: Not on file     Forced sexual activity: Not on file   Other Topics Concern     Not on file   Social History Narrative    Lives with her . Daughter  in [place] and daughter in [place].     MEDICATIONS: Reviewed from the MAR, physician orders, and earlier progress notes.    Post Discharge Medication Reconciliation Status: medication reconciliation previously completed during another office visit.      Current Outpatient Medications   Medication Sig     acetaminophen (TYLENOL) 500 MG tablet Take 2 tablets (1,000 mg total) by mouth every 6 (six) hours as needed.     albuterol (PROVENTIL) 2.5 mg /3 mL (0.083 %) nebulizer solution Take 3 mL (2.5 mg total) by nebulization every 4 (four) hours as needed for wheezing.     aspirin 81 MG EC tablet Take 1 tablet (81 mg total) by mouth daily.     atorvastatin (LIPITOR) 10 MG tablet Take 10 mg by mouth at bedtime.     B complex 11-folic-C-biot-zinc (DIALYVITE) [phone] mg-mg-mcg-mg Tab Take 1 tablet by mouth daily with supper. (Dialyvite)      buPROPion (WELLBUTRIN XL) 150 MG 24 hr tablet Take 1 tablet (150 mg total) by mouth 2 (two) times a week. Tuesday and saturday (Patient taking differently: Take 150 mg by mouth 2 (two) times a week. Tuesday and saturday)     cholecalciferol, vitamin D3, 1,000 unit (25 mcg) tablet Take 2,000 Units by mouth daily.      cinacalcet (SENSIPAR) 30 MG tablet Take 30 mg by mouth see administration instructions. Take three times weekly with dialysis (on Mondays, Wednesdays, and Fridays).     famotidine (PEPCID) 20 MG tablet Take 1 tablet (20 mg total) by mouth at bedtime.     folic acid (FOLVITE) 1 MG tablet TAKE ONE TABLET BY MOUTH ONCE DAILY (Patient taking differently: Take 1 mg by mouth daily. )     gabapentin (NEURONTIN) 100 MG capsule Take 100 mg by mouth 2 (two) times a day. Leg pain     Lactobacillus rhamnosus GG (CULTURELLE) 10-15 Billion cell capsule Take 1 capsule by mouth daily with lunch.      metoprolol succinate (TOPROL-XL) 25 MG Take 1 tablet (25 mg total) by mouth daily with lunch. Hold for SBP<110 or hr<60     midodrine (PROAMATINE) 10 MG tablet Take 2 tablets (20 mg  total) by mouth 3 (three) times a week. Patient takes prior to dialysis qMWF and PRN if additional dialysis treatments.  Patient reports that she takes 20mg prior to dialysis (Patient taking differently: Take 20 mg by mouth 3 (three) times a week. Patient takes prior to dialysis qMWF and PRN if additional dialysis treatments.  Patient reports taking 20mg prior to dialysis and is given 20 mg after dialysis as well.    Also, on nondialysis days, give 10 mg daily for SBP <106.)     midodrine (PROAMATINE) 10 MG tablet Take 10 mg by mouth daily as needed. Whenever SBP is <106 (Check BP three times a day).     midodrine (PROAMATINE) 5 MG tablet Take 10 mg by mouth 4 (four) times a week. On non dialysis days if SBP < 106     oxyCODONE (ROXICODONE) 5 MG immediate release tablet TAKE 1/2 - 1 TABLET (2.5-5MG) BY MOUTH TWICE DAILY AS NEEDED FOR PAIN     polyvinyl alcohol (LIQUIFILM TEARS) 1.4 % ophthalmic solution Administer 1 drop to both eyes as needed for dry eyes.     pot bicarb/sod bicarb/cit ac (EDI-SELTZER GOLD ORAL) Take by mouth daily.     senna-docusate (SENNOSIDES-DOCUSATE SODIUM) 8.6-50 mg tablet Take 1 tablet by mouth at bedtime as needed for constipation.      sevelamer carbonate (RENVELA) 800 mg tablet Take 1,600 mg by mouth 2 (two) times a day as needed (snacks).     sevelamer HCL (RENAGEL) 800 MG tablet Take 1,600 mg by mouth 3 (three) times a day with meals.      timolol maleate (TIMOPTIC) 0.5 % ophthalmic solution Administer 1 drop to both eyes 2 (two) times a day.      traZODone (DESYREL) 150 MG tablet Take 150 mg by mouth at bedtime.      ALLERGIES:   Allergies   Allergen Reactions     Ace Inhibitors Cough     Atrovent [Ipratropium Bromide] Headache     Fosinopril Sodium Cough     Zolpidem      hallucinations     DIET: Dialysis diet, regular texture, thin liquids.  1500 mL /24-hour fluid restriction    Vitals:    03/25/21 1130   BP: (!) 88/56   Pulse: 75   Resp: 16   Temp: 98.3  F (36.8  C)   SpO2: 96%    Weight: 156 lb 12.8 oz (71.1 kg)     Body mass index is 25.31 kg/m .    EXAMINATION:   General: Pleasant, alert, and conversant middle-aged female, sitting in a wheelchair, in no apparent distress.  Head: Normocephalic and atraumatic.   Eyes: PERRLA, sclerae clear.  Slight disconjugate gaze.  ENT: Moist oral mucosa.  She has her own teeth.  She has a bit of a runny nose today.  Hearing is unimpaired.  Cardiovascular: Irregular rhythm with a 2/6 systolic ejection murmur at the left sternal border.  Respiratory: Diminished lung sounds bilaterally but otherwise clear.  Abdomen: Soft and nontender.   Musculoskeletal/Extremities: Age-related degenerative joint disease.  Right 2+ pitting pedal and pretibial edema, 1+ on the left.  Integument: No rashes, clinically significant lesions, or skin breakdown.   Cognitive/Psychiatric: Alert and oriented ×4.  Affect is euthymic.    DIAGNOSTICS:   Results for orders placed or performed during the hospital encounter of 02/18/21   Basic Metabolic Panel   Result Value Ref Range    Sodium 136 136 - 145 mmol/L    Potassium 3.7 3.5 - 5.0 mmol/L    Chloride 97 (L) 98 - 107 mmol/L    CO2 29 22 - 31 mmol/L    Anion Gap, Calculation 10 5 - 18 mmol/L    Glucose 95 70 - 125 mg/dL    Calcium 8.7 8.5 - 10.5 mg/dL    BUN 28 8 - 28 mg/dL    Creatinine 3.01 (H) 0.60 - 1.10 mg/dL    GFR MDRD Af Amer 18 (L) >60 mL/min/1.73m2    GFR MDRD Non Af Amer 15 (L) >60 mL/min/1.73m2     Lab Results   Component Value Date    WBC 3.9 (L) 02/20/2021    HGB 10.1 (L) 02/20/2021    HCT 31.8 (L) 02/20/2021     (H) 02/20/2021    PLT 91 (L) 02/20/2021     CrCl cannot be calculated (Patient's most recent lab result is older than the maximum 10 days allowed.).  Lab Results   Component Value Date     (H) 02/18/2021     ASSESSMENT/Plan:      ICD-10-CM    1. Chronic respiratory failure with hypoxia (H)  J96.11    2. ESRD on hemodialysis (H), MW  N18.6     Z99.2    3. Chronic diastolic heart failure (H)   I50.32    4. Hypotension due to hypovolemia  I95.89     E86.1    5. Chronic atrial fibrillation (H)  I48.20    6. Anemia of chronic renal failure, stage 5 (H)  N18.5     D63.1    7. Pulmonary emphysema, unspecified emphysema type (H)  J43.9    8. Mild episode of recurrent major depressive disorder (H)  F33.0    9. Chronic acquired lymphedema  I89.0    10. Gastroesophageal reflux disease without esophagitis  K21.9      CHANGES:    None.    CARE PLAN:    The care plan has been reviewed and all orders signed. Changes to care plan, if any, as noted. Otherwise, continue care plan of care.      The above has been created using voice recognition software. Please be aware that this may unintentionally  produce inaccuracies and/or nonsensical sentences.      Electronically signed by: Louie Liriano CNP

## 2021-06-16 NOTE — PROGRESS NOTES
[facility]    Name:   Belia Rodriguez (Itzel)  : 1945  Facility:   [facility] [[id number]]   Room:   Code Status: DNR/DNI and POLST AVAILABLE - Palliative Care  Fac type:   SNF (Skilled Nursing Facility, TCU) -   DOS: 2021  Last visit: 2021    PCP: Neil Galan MD      CHIEF COMPLAINT / REASON FOR VISIT:  Chief Complaint   Patient presents with     Discharge Summary     TCU Discharge: Hospitalization for acute on chronic respiratory failure due to a combination of fluid overload and COPD exacerbation     [facility] from 18 until 18  [facility]CU from 18 until 18  [facility] from 2021 until 2021 (acute on chronic respiratory failure due to a combination of fluid overload and COPD exacerbation)  [facility] from 2021 until 2021  [facility] from 2021 until 2021 (acute on chronic respiratory failure due to a combination of fluid overload and COPD exacerbation)         HPI: Belia is a 75 y.o. female with known end-stage renal disease (on dialysis), COPD (former smoker, 45-60 pack years),  chronic respiratory failure with hypoxia, obstructive sleep apnea (severe and previously intolerant of CPAP), chronic atrial fibrillation, GERD, essential hypertension, and anemia related to chronic renal failure.    She has had multiple hospitalizations for respiratory failure due to a combination of fluid overload and COPD exacerbation.  This occurred on 2021 and again on 2021.  Her last admission came within hours of her discharge from [facility].  When last in the TCU at [facility], she was a full code, but she indicated during her last hospitalization that she wants no further BiPAP and is DNR/DNI.  This did improve somewhat with steroids and  doxycycline.    While she continues on hemodialysis Mondays, Wednesdays, and Fridays, she did have some questions regarding hospice but was not sure she would sign on given her need for dialysis.  She did have a palliative care consult, and she strongly agreed, especially given her readmission to the hospital.      CURRENT/RECENT TCU ISSUES    Disposition: The patient is aware that her health will not improve and is ready for hospice, although she is concerned about having all her paperwork in order.  I had received a request by  that I give an okay for an in-house psych consult.  My initial thought was that the patient has been doing quite well and accepting her situation, and there would be nothing gained from such a consult.  At the time, we spent a good 20 minutes talking about her feelings with regard to hospice, dying, and other related issues, including depression.  She did tell me that she thought she might benefit from a consult and was recently seen via video conference.  She thought it was beneficial.    She was having no particular issues until about 6 weeks ago, not needing oxygen but now expecting a chronic continuous need.  She initially told me she planned to go on hospice after discharge.  [facility]  She noted that she would be stopping dialysis and would expect to survive no longer than 1 week.  She had been presented with this suggestion about 6 months ago.  She believed that she was ready.  Her main concern has not been about dying but  getting her paperwork done for her  of 44 years.     She is doing fairly well.  She complains of neuropathy in her feet, Raynaud's phenomenon in the feet as well, and some constipation and low blood pressures as described below.    Her attitude appears to have changed somewhat, and while she remains quite ill, she may hold off going on hospice and continuing dialysis.    Hypotension: She is on a 1500 mL  "fluid restriction.  This may be producing hypovolemia.  She does have orders for 20 mg of midodrine to take on dialysis days (Mondays, Wednesdays, and Fridays) and has additional orders for taking this when additional dialysis is needed.  However, she has been running significantly low blood pressures.  Systolic blood pressure has been as low as the 70s. On 03/01/2021, another nurse practitioner ordered 10 mg of midodrine to be given on nondialysis days when the SBP is <106.  An adjustment was recently made at dialysis, and she is now receiving a 20 mg dose there as well if necessary.  They has been pacing her dialysis fluid removal with a dry weight of 67.5 kg.  There have been times when she has required additional fluids following dialysis due to severely low blood pressures.  Very recently, the nephrologist adjusted her dry weight to 70.5 kg, and this has proved to make a considerable difference.  She has been able to skip some postdialysis midodrine doses.  They place the midodrine doses in an envelope and give them to her.  We did write orders for 10 mg 3 times daily as needed of midodrine to be given whenever SBPs are <106, regardless of when this occurs.  We are increasing this to 15 mg 3 times daily as needed, preferably with meals.    Miscellaneous medication changes: At an earlier visit, considering her plans for hospice, we talked about her medications.  It was agreed that atorvastatin was no longer needed, and so we discontinued it.  At the time, she stated, \"anything you can take away from me won't break my heart at all.\"    Discharge planning: She told her  and daughter that she wanted to stay here, but her  became angry.  He has been the one to take her to dialysis and suggest that she would need to arrange her own transportation if she decided to stay.  He also told her he would not be doing housework.  The end result was that the patient would be staying 2 more weeks.  Apparently, her "  has now finally resigned himself to her being here.  She tells me that she is here now because she has no other place to go, and palliative care is cheaper than hospice.  I told her I had no problem with managing a palliative care plan. There was a request for physical therapy, and we gave orders for that.     Her discharge date has been set for 03/29/2021.  She will require a  home visit to assist with navigating issues relating to her care, as well as home PT and OT to continue therapies in the home setting.      ROS:   Positives: Her feet continue to be painful due to neuropathy.  She has found that lying in bed with her knees up in the air helps. She is on chronic oxygen, and breathing is no better and no worse.      Negatives: Complaining of constipation before, things are going better now.  She denies any headaches or chest pains, coughing or congestion at the moment, dizziness, dysuria, diarrhea, difficulty chewing or swallowing, or problems with sleep (when on 150 mg of trazodone nightly).    Past Medical History:   Diagnosis Date     Arthritis      CHF (congestive heart failure) (H)      Chronic anemia 6/1/2014     Chronic kidney disease      Chronic thoracic aortic dissection (H) 10/7/2015    Descending thoracic aorta; treated medically per notes of Dragan Singh and Jennifer.     COPD (chronic obstructive pulmonary disease) (H)      CVA (cerebral infarction)      Disease of thyroid gland      Dyslipidemia      ESRD (end stage renal disease) (H) 06/03/2009    on dialysis with Dr. Mitchell     Essential hypertension 6/30/2014     Gastrointestinal hemorrhage, unspecified gastrointestinal hemorrhage type 6/5/2017     GI (gastrointestinal bleed)      GI bleeding 6/5/2017     Gout      L3 vertebral fracture (H) 11/16/2015     Left Atrial Appendage Occlusion (WATCHMAN) 4/5/2018    LAAO April 5, 2018 (30 mm WATCHMAN)     Obesity      ZULEIKA (obstructive sleep apnea), severe, intolerant of CPAP  10/22/2015     Oxygen dependent     3L nc     Pneumonia 2015     Right foot drop      Spinal stenosis 3/28/2016     Stroke (H) 3/24/2016              Family History   Problem Relation Age of Onset     Dementia Mother      Diabetes Mother      Arthritis Mother      Cancer Mother      Depression Mother      Heart disease Mother      Vision loss Mother      Stroke Father      Heart disease Father      Breast cancer Neg Hx      Social History     Socioeconomic History     Marital status:      Spouse name: Cayden     Number of children: 2     Years of education: Not on file     Highest education level: Not on file   Occupational History     Employer: RETIRED   Social Needs     Financial resource strain: Not on file     Food insecurity     Worry: Not on file     Inability: Not on file     Transportation needs     Medical: Not on file     Non-medical: Not on file   Tobacco Use     Smoking status: Former Smoker     Packs/day: 1.50     Years: 37.00     Pack years: 55.50     Types: Cigarettes     Quit date: 2009     Years since quittin.2     Smokeless tobacco: Never Used   Substance and Sexual Activity     Alcohol use: No     Alcohol/week: 11.7 standard drinks     Types: 14 Standard drinks or equivalent per week     Comment: 14 mixed drinks per week     Drug use: No     Sexual activity: Never     Partners: Male   Lifestyle     Physical activity     Days per week: Not on file     Minutes per session: Not on file     Stress: Not on file   Relationships     Social connections     Talks on phone: Not on file     Gets together: Not on file     Attends Sikhism service: Not on file     Active member of club or organization: Not on file     Attends meetings of clubs or organizations: Not on file     Relationship status: Not on file     Intimate partner violence     Fear of current or ex partner: Not on file     Emotionally abused: Not on file     Physically abused: Not on file     Forced sexual activity: Not on  file   Other Topics Concern     Not on file   Social History Narrative    Lives with her . Daughter in [place] and daughter in [place].     MEDICATIONS: Reviewed from the MAR, physician orders, and earlier progress notes.    Post Discharge Medication Reconciliation Status: medication reconciliation previously completed during another office visit.    Updated by me today (03/29/2021) with adjustments to midodrine reflected below.  Current Outpatient Medications   Medication Sig     acetaminophen (TYLENOL) 500 MG tablet Take 2 tablets (1,000 mg total) by mouth every 6 (six) hours as needed.     albuterol (PROVENTIL) 2.5 mg /3 mL (0.083 %) nebulizer solution Take 3 mL (2.5 mg total) by nebulization every 4 (four) hours as needed for wheezing.     aspirin 81 MG EC tablet Take 1 tablet (81 mg total) by mouth daily.     atorvastatin (LIPITOR) 10 MG tablet Take 10 mg by mouth at bedtime.     B complex 11-folic-C-biot-zinc (DIALYVITE) [phone] mg-mg-mcg-mg Tab Take 1 tablet by mouth daily with supper. (Dialyvite)      buPROPion (WELLBUTRIN XL) 150 MG 24 hr tablet Take 1 tablet (150 mg total) by mouth 2 (two) times a week. Tuesday and saturday (Patient taking differently: Take 150 mg by mouth 2 (two) times a week. Tuesday and saturday)     cholecalciferol, vitamin D3, 1,000 unit (25 mcg) tablet Take 2,000 Units by mouth daily.      cinacalcet (SENSIPAR) 30 MG tablet Take 30 mg by mouth see administration instructions. Take three times weekly with dialysis (on Mondays, Wednesdays, and Fridays).     famotidine (PEPCID) 20 MG tablet Take 1 tablet (20 mg total) by mouth at bedtime.     folic acid (FOLVITE) 1 MG tablet TAKE ONE TABLET BY MOUTH ONCE DAILY (Patient taking differently: Take 1 mg by mouth daily. )     gabapentin (NEURONTIN) 100 MG capsule Take 100 mg by mouth 2 (two) times a day. Leg pain     Lactobacillus rhamnosus GG (CULTURELLE) 10-15 Billion cell capsule Take 1 capsule by mouth daily with lunch.       metoprolol succinate (TOPROL-XL) 25 MG Take 1 tablet (25 mg total) by mouth daily with lunch. Hold for SBP<110 or hr<60     midodrine (PROAMATINE) 10 MG tablet Take 2 tablets (20 mg total) by mouth 3 (three) times a week. Patient takes prior to dialysis qMWF and PRN if additional dialysis treatments.  Patient reports that she takes 20mg prior to dialysis (Patient taking differently: Take 20 mg by mouth 3 (three) times a week. Patient takes prior to dialysis qMWF and PRN if additional dialysis treatments.  Patient reports taking 20mg prior to dialysis and is given 10 mg after dialysis as well if needed.    Also, on nondialysis days, give 10 mg 3 times daily for SBP <106.)     midodrine (PROAMATINE) 10 MG tablet Take 15 mg by mouth 3 (three) times a day as needed. 15 mg (1.5 tabs) whenever SBP is <106 (Check BP three times a day).     midodrine (PROAMATINE) 5 MG tablet Take 10 mg by mouth 4 (four) times a week. On non dialysis days if SBP < 106     oxyCODONE (ROXICODONE) 5 MG immediate release tablet TAKE 1/2 - 1 TABLET (2.5-5MG) BY MOUTH TWICE DAILY AS NEEDED FOR PAIN     polyvinyl alcohol (LIQUIFILM TEARS) 1.4 % ophthalmic solution Administer 1 drop to both eyes as needed for dry eyes.     pot bicarb/sod bicarb/cit ac (EDI-SELTZER GOLD ORAL) Take by mouth daily.     senna-docusate (SENNOSIDES-DOCUSATE SODIUM) 8.6-50 mg tablet Take 1 tablet by mouth at bedtime as needed for constipation.      sevelamer carbonate (RENVELA) 800 mg tablet Take 1,600 mg by mouth 2 (two) times a day as needed (snacks).     sevelamer HCL (RENAGEL) 800 MG tablet Take 1,600 mg by mouth 3 (three) times a day with meals.      timolol maleate (TIMOPTIC) 0.5 % ophthalmic solution Administer 1 drop to both eyes 2 (two) times a day.      traZODone (DESYREL) 150 MG tablet Take 150 mg by mouth at bedtime.      ALLERGIES:   Allergies   Allergen Reactions     Ace Inhibitors Cough     Atrovent [Ipratropium Bromide] Headache     Fosinopril Sodium Cough      Zolpidem      hallucinations     DIET: Dialysis diet, regular texture, thin liquids.  1500 mL /24-hour fluid restriction    Vitals:    03/29/21 1433   BP: 99/56   Pulse: 74   Resp: 18   Temp: 98.3  F (36.8  C)   SpO2: 94%   Weight: 152 lb (68.9 kg)     Body mass index is 24.53 kg/m .    EXAMINATION:   General: Pleasant, alert, and conversant middle-aged female, sitting in a wheelchair, in no apparent distress.  Head: Normocephalic and atraumatic.   Eyes: PERRLA, sclerae clear.  Slight disconjugate gaze.  ENT: Moist oral mucosa.  She has her own teeth.  She has a bit of a runny nose today.  Hearing is unimpaired.  Cardiovascular: Irregular rhythm with a 2/6 systolic ejection murmur at the left sternal border.  Respiratory: Diminished lung sounds bilaterally but otherwise clear.  Abdomen: Soft and nontender.   Musculoskeletal/Extremities: Age-related degenerative joint disease.  Trace right pedal edema, with most of the left, improved.  Integument: No rashes, clinically significant lesions, or skin breakdown.   Cognitive/Psychiatric: Alert and oriented ×4.  Affect is euthymic.    DIAGNOSTICS:   Results for orders placed or performed during the hospital encounter of 02/18/21   Basic Metabolic Panel   Result Value Ref Range    Sodium 136 136 - 145 mmol/L    Potassium 3.7 3.5 - 5.0 mmol/L    Chloride 97 (L) 98 - 107 mmol/L    CO2 29 22 - 31 mmol/L    Anion Gap, Calculation 10 5 - 18 mmol/L    Glucose 95 70 - 125 mg/dL    Calcium 8.7 8.5 - 10.5 mg/dL    BUN 28 8 - 28 mg/dL    Creatinine 3.01 (H) 0.60 - 1.10 mg/dL    GFR MDRD Af Amer 18 (L) >60 mL/min/1.73m2    GFR MDRD Non Af Amer 15 (L) >60 mL/min/1.73m2     Lab Results   Component Value Date    WBC 3.9 (L) 02/20/2021    HGB 10.1 (L) 02/20/2021    HCT 31.8 (L) 02/20/2021     (H) 02/20/2021    PLT 91 (L) 02/20/2021     CrCl cannot be calculated (Patient's most recent lab result is older than the maximum 10 days allowed.).  Lab Results   Component Value Date      (H) 02/18/2021     ASSESSMENT/Plan:      ICD-10-CM    1. Chronic respiratory failure with hypoxia (H)  J96.11    2. ESRD on hemodialysis (H), MWF  N18.6     Z99.2    3. Chronic diastolic heart failure (H)  I50.32    4. Hypotension due to hypovolemia  I95.89     E86.1    5. Chronic atrial fibrillation (H)  I48.20    6. Anemia of chronic renal failure, stage 5 (H)  N18.5     D63.1    7. Pulmonary emphysema, unspecified emphysema type (H)  J43.9    8. Mild episode of recurrent major depressive disorder (H)  F33.0    9. Chronic acquired lymphedema  I89.0    10. Gastroesophageal reflux disease without esophagitis  K21.9      CHANGES:    Medication changes include 15 mg of midodrine 3 times daily as needed on nondialysis days.    DISCHARGE PLAN/FACE TO FACE:  I certify that this patient is under my care and that I had a face-to-face encounter that meets the physician face-to-face encounter requirements with this patient.     Date of Face-to-Face Encounter: 03/29/2021     I certify that, based on my findings, the following services are medically necessary home health services:  Home PT and home OT    My clinical findings support the need for the above skilled services because: (Please write a brief narrative summary that describes what the RN, PT, SLP, or other services will be doing in the home. A list of diagnoses in this section does not meet the CMS requirements):  Home PT for strengthening, balance, endurance, and safety with mobility/ambulation; home OT for strengthening, ADL needs, adaptive equipment, and safety.   to help manage the intricacies involved in her care, including legal matters.    This patient is homebound because: (Please write a brief narrative summmary describing the functional limitations as to why this patient is homebound and specifically what makes this patient homebound.):  Above services necessarily need to be performed in the home to be of benefit.  Additionally, due to  the patient's chronic respiratory failure, travel entails increased risk.    The patient is, or has been, under my care and I have initiated the establishment of the plan of care. This patient will be followed by a physician who will periodically review the plan of care.  Initial follow-up should be within 7-10 days.    Approximate time spent with this patient was greater than 30 minutes with greater than 50% spent in discussions regarding services required upon discharge.      The above has been created using voice recognition software. Please be aware that this may unintentionally  produce inaccuracies and/or nonsensical sentences.      Electronically signed by: Louie Liriano CNP

## 2021-06-16 NOTE — TELEPHONE ENCOUNTER
[facility] Nurse Triage Telephone Note      Provider: MARIO Serna  Facility: [facility]    Facility Type: TCU    Caller: Darshana  Call Back Number:  [phone]    Allergies: Ace inhibitors, Atrovent [ipratropium bromide], Fosinopril sodium, and Zolpidem    Reason for call: Nurse called to report that Interdry sheets that were ordered over the weekend for patient is not helping.  Patient still has redness to her abdominal folds and there is a slight odor with white film.       Verbal Order/Direction given by Provider: Start Nystatin powder to affected area two times a day till resolved.      Provider giving order: MARIO Serna    Verbal order given to: Darshana Chaney RN

## 2021-06-17 NOTE — PROGRESS NOTES
INR 2.2 Left  message with return phone number for questions and concerns. [phone]  Increase warfarin to 6 mg Wed and 3 mg all other days. Retest in one week. After talking with pt and discussing history of greens/salads and medication change. Pt will  continue  with current diet and dosing of Warfarin.  Continue with moderation of Vit K and green leafy vegetables. Cautioned to call with increase bruising or bleeding. Reminded to call with medication change especially antibiotic. Call with any questions or concerns or any up coming procedures. Cautioned about using Herbal medication.

## 2021-06-17 NOTE — TELEPHONE ENCOUNTER
Received MTM referral from Hospital Discharge Transitions of Care     Patient was not reachable after several attempts, will route to MTM Pharmacist/Provider as an FYI. Left MTM scheduling information on patients voicemail.    Thank you for the referral,    Trav Kwok, MTM coordinator

## 2021-06-17 NOTE — PROGRESS NOTES
Pt was seen in clinic for post op LAAO device Watchman placement with Dr. Duarte on Thursday 04/05/18.  Pt VSS, wt stable, LSC, denies SOB, heart rate regular, heart sounds S1 and S2 present, and palpable radial and pedal pulses.  No peripheral edema noted, no abdominal bloating or discomfort.   Pt denies any neurological changes at this time.  Pt denies generalized or localized pain at this time, is tolerating advancement in activity well, staying well hydrated, and has a good appetite and eating well balanced meals.  Pt is having bowel movements and is voiding without difficulty.      Pt right groin has 1 puncture site, is C/D/I, no drainage, large hematoma, bruising in abd, hip & groin, 1 suture intact, groin is soft, and pt denies pain at the site.  Left groin has 0 puncture site.  Pt has strong femoral and pedal pulses present.  Sutures were removed from both the left/right groin sites  without complication and sites were left open to air.      Pt continues anticoagulation, Coumadin daily with weekly INR's x4 (therapeutic goal range 2.0-3.0)    Reviewed post op LAAO healing process, f/u apts, physical restrictions, nutritional requirements, when to contact EP RN/EP MD, and contact information was given to the pt for further concerns or questions.  Pt verbalized understanding.

## 2021-06-17 NOTE — ANESTHESIA PROCEDURE NOTES
TERRANCE    Patient location during procedure: OR  Start time: 4/5/2018 8:09 AM  Staffing:  Performing  Anesthesiologist: JENNI SOTO  TERRANCE:  Type/Reason: Monitoring TERRANCE  Technique: blind insertion  Difficulty: easy  Anesthesia Monitoring: see additional note

## 2021-06-17 NOTE — PROGRESS NOTES
INR 3.6 spoke with pt and she was taking 2.5 mg alternating with 5 mg. Will switch to 3 mg daily and retest in one week. Increase greens. After talking with pt and discussing history of greens/salads and medication change. Pt will  continue  with current diet and dosing of Warfarin.  Continue with moderation of Vit K and green leafy vegetables. Cautioned to call with increase bruising or bleeding. Reminded to call with medication change especially antibiotic. Call with any questions or concerns or any up coming procedures. Cautioned about using Herbal medication.

## 2021-06-17 NOTE — PROGRESS NOTES
INR 2.1 pt was to not to take 3 mg last Friday. Will return to 6 mg Wed Sat and 3 mg all other days. Retest in one week post Watchman. After talking with pt and discussing history of greens/salads and medication change. Pt will  continue  with current diet and dosing of Warfarin.  Continue with moderation of Vit K and green leafy vegetables. Cautioned to call with increase bruising or bleeding. Reminded to call with medication change especially antibiotic. Call with any questions or concerns or any up coming procedures. Cautioned about using Herbal medication.  Spoke with pt.

## 2021-06-17 NOTE — PROGRESS NOTES
Second attempt to reach patient for hospital discharge follow up.  No answer.  CHW has made two unsuccessful attempts to reach patient.   Encounter closed.          Clinic Care Coordination Contact  UT/Voicemail       Clinical Data: Care Coordinator Outreach  Outreach attempted x 1.  Left message on patient's voicemail with call back information and requested return call.  Plan: Care Coordinator TCM call, patient has PCP appt 5/20   Care Coordinator will try to reach patient again in 3-5 business days.  5/17

## 2021-06-17 NOTE — ANESTHESIA CARE TRANSFER NOTE
Last vitals:   Vitals:    04/05/18 0940   BP: 134/83   Pulse: (!) 122   Resp: 16   Temp:    SpO2: 100%     Patient's level of consciousness is drowsy  Spontaneous respirations: yes  Maintains airway independently: yes  Dentition unchanged: yes  Oropharynx: oropharynx clear of all foreign objects    QCDR Measures:  ASA# 20 - Surgical Safety Checklist: WHO surgical safety checklist completed prior to induction  PQRS# 430 - Adult PONV Prevention: 4558F - Pt received => 2 anti-emetic agents (different classes) preop & intraop  ASA# 8 - Peds PONV Prevention: NA - Not pediatric patient, not GA or 2 or more risk factors NOT present  PQRS# 424 - Kaylan-op Temp Management: 4559F - At least one body temp DOCUMENTED => 35.5C or 95.9F within required timeframe  PQRS# 426 - PACU Transfer Protocol: - Transfer of care checklist used  ASA# 14 - Acute Post-op Pain: ASA14B - Patient did NOT experience pain >= 7 out of 10

## 2021-06-17 NOTE — TELEPHONE ENCOUNTER
Copy, I will forward to provider's box for covering provider to review and advise   Ignacio Head CMA

## 2021-06-17 NOTE — TELEPHONE ENCOUNTER
Left detailed message asking for clarification in orders for attached patient. Advised that message received will require more details as it is not clear what is needed. Asked that Ceferino return call to clarify when able and also asked for fax number for where orders can be sent once completed   Ignacio Head CMA

## 2021-06-17 NOTE — PROGRESS NOTES
INR 2.6 Continue current management dosing of Warfarin. Continue  diet of moderate Vitamin K intake. Discussed with pt the need to call with questions or concerns or any change in medication especially herbal medication or OTC. Call with increased bleeding or bruising or any upcoming procedures.  3 mg daily

## 2021-06-17 NOTE — TELEPHONE ENCOUNTER
Ceferino returning call based on VM below.  She is unsure what exactly is needed for clarification.  She states she's simply asking for authorization to delay start of orders until tomorrow 5/18/21.    Phone: [phone]  Fax: [phone]

## 2021-06-17 NOTE — PROGRESS NOTES
Type: routine remote pacemaker transmission.  Presenting rhythm: ventricular pacing, rate 70 bpm.  Battery/lead status: stable  Arrhythmias: since 1/26/21, no ventricular high rates detected.   Anticoagulant: has LAAO  Comments: normal magnet and pacemaker function. prd    Current remote as above RV lead auto-cap is ON and good/'stable. Programmed VVIR, LRL 70; Chronic AF, Considering hospice but not quite ready for this per Palliative care notes. Will resubmit request to change to remotes only to Dr. Duarte.     Odilia Lima RN

## 2021-06-17 NOTE — PROGRESS NOTES
[facility] Heart Care Note  Assessment:        Tachybradycardia syndrome    Pauses following termination of atrial fibrillation with element of sinus node dysfunction    Patient has been considered for pacemaker but risk of infection and access seem in  excess to benefit     Paroxysmal atrial fibrillation with elevated ventricular response.  Persistent AF with RVR; 3-2017     Bradycardia concern moot with chronic AF     Amiodarone therapy 08/2014;stopped with persistent  AF.    Preserved left ventricular function with suggestion of diastolic dysfunction.        Echocardiogram 3-2016; EF=60%  . End-stage renal failure, on chronic hemodialysis. 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 Now has RIJ Port A Cath   History of recurrent bacteremia -- E Coli   History of extensive aortic dissection from the ascending to the   descending aorta leading to compromise of renal artery and leading to renal failure  COPD: Diffusion capacity 68% -pre amiodarone  Extensive ecchymosis with warfarin-anticoagulation;  Chronic anticoagulation with warfarin; difficulty with INR management  Left Atrial appendage occlusion/watchman device April 5, 2018  Sleep study shows likely obstructive sleep apnea; patient uses nocturnal oxygen supplements; unable to tolerate CPAP       Plan:  You are doing well following left atrial appendage occlusion/watchman procedure April 5, 2018  There is a hematoma and ecchymosis of the right groin area.  Follow this swelling closely and if it gets more noticeable or tender he should come back and be seen by Danielle  Cold packs sometimes help   Follow directions for anticoagulation as outlined by Dr. Duarte  Atrial fibrillation is present and heart rate seems controlled  Continue current medications no adjustments were made  Follow-up in 3  months; at that time obtain a 24-hour Holter monitor to evaluate heart rate control of the atrial fibrillation      Subjective:    I had the opportunity to see.Belia Rodriguez , who is a 72 y.o. female with a known history of tachybradycardia syndrome  She now has chronic atrial fibrillation with more of an elevated ventricular response.    Bradycardia does not see much of a concern.  Previously the bradycardia was upon termination of atrial fibrillation and with a sinus node dysfunction.  But with persistent atrial fibrillation, bradycardia is not a problem  With acceptance of atrial fibrillation, we stopped amiodarone  She has had difficulty with warfarin, has considerable bruising and INR management is problematic.  With renal failure, she is not a good candidate for alternative anticoagulants  She underwent left atrial appendage occlusion/watchman procedure by Dr. Duarte April 5, 2018 without event.  Anticoagulation program will be outlined by Dr. Duarte  He is to have a repeat transesophageal echocardiogram in 45 days after procedure to assess results of the left atrial appendage occlusion    She was dismissed the following day  But she did develop an ecchymosis and a hematoma to the right groin that is mildly tender  She had a lot of respiratory insufficiency in March and was hospitalized and in transitional care for some time now seems to have recovered  Uses nighttime oxygen  Is tolerating hemodialysis with right Port-A-Cath type access  No recent bacteremia or sepsis  Completes dialysis runs with good results      Problem List:  Patient Active Problem List   Diagnosis     ESRD (end stage renal disease)     Chronic anemia     Tachycardia-bradycardia syndrome     Essential hypertension with goal blood pressure less than 140/90     Hyperlipidemia     Persistent atrial fibrillation     ZULEIKA (obstructive sleep apnea), severe     Anemia of chronic renal failure, stage 5     Right knee pain     Dissection of  thoracoabdominal aorta     CHF (congestive heart failure)     Pure hypercholesterolemia     Gout     GERD (gastroesophageal reflux disease)     Right foot drop     Gastrointestinal hemorrhage, unspecified gastrointestinal hemorrhage type     Acute midline low back pain with right-sided sciatica     History of compression fracture of spine     History of acute respiratory failure     Pulmonary emphysema     Left Atrial Appendage Occlusion (WATCHMAN)     Medical History:  Past Medical History:   Diagnosis Date     Arthritis      CHF (congestive heart failure)      Chronic anemia 6/1/2014     Chronic kidney disease      Chronic thoracic aortic dissection 10/7/2015    Descending thoracic aorta; treated medically per notes of Dragan Singh and Jennifer.     COPD (chronic obstructive pulmonary disease)      CVA (cerebral infarction)      Disease of thyroid gland      Dyslipidemia      ESRD (end stage renal disease) 06/03/2009    on dialysis with Dr. Mitchell     Essential hypertension 6/30/2014     GI (gastrointestinal bleed)      GI bleeding 6/5/2017     Gout      L3 vertebral fracture 11/16/2015     Left Atrial Appendage Occlusion (WATCHMAN) 4/5/2018    LAAO April 5, 2018 (30 mm WATCHMAN)     Obesity      ZULEIKA (obstructive sleep apnea), severe, intolerant of CPAP 10/22/2015     Pneumonia 9-7-2015     Right foot drop      Spinal stenosis 3/28/2016     Stroke 3/24/2016     Surgical History:  Past Surgical History:   Procedure Laterality Date     BACK SURGERY      [facility]     COLONOSCOPY N/A 3/23/2016    Procedure: COLONOSCOPY;  Surgeon: Ruddy Tejada MD;  Location: [facility] GI;  Service:      DILATION AND CURETTAGE OF UTERUS       EP NEGRA CLOSURE N/A 4/5/2018    Procedure: EP NEGRA Closure;  Surgeon: Derick Duarte MD;  Location: [facility] Cath Lab;  Service:      EYE SURGERY       HERNIA REPAIR       [place] COLSC FLEXIBLE W/CONTROL BLEEDING ANY METHOD N/A 6/7/2017    Procedure: COLONOSCOPY;  Surgeon: Luis Mckeon  MD;  Location: [facility] GI;  Service: Gastroenterology     TONSILLECTOMY       Social History:  Social History     Social History     Marital status:      Spouse name: Cayden     Number of children: 2     Years of education: N/A     Occupational History      Retired     Social History Main Topics     Smoking status: Former Smoker     Packs/day: 1.50     Years: 37.00     Types: Cigarettes     Quit date: 1/1/2009     Smokeless tobacco: Never Used     Alcohol use 7.0 oz/week     14 Standard drinks or equivalent per week      Comment: 14 mixed drinks per week     Drug use: No     Sexual activity: No     Other Topics Concern     Not on file     Social History Narrative    Lives with her . Daughter in [place] and daughter in [place].       Review of Systems:      General: WNL  Eyes: WNL  Ears/Nose/Throat: WNL  Lungs: WNL  Heart: WNL  Stomach: WNL  Bladder: WNL  Muscle/Joints: WNL  Skin: WNL  Nervous System: WNL  Mental Health: WNL     Blood: WNL        Family History:  Family History   Problem Relation Age of Onset     Dementia Mother      Diabetes Mother      Arthritis Mother      Cancer Mother      Depression Mother      Heart disease Mother      Vision loss Mother      Stroke Father      Heart disease Father      Breast cancer Neg Hx          Allergies:  Allergies   Allergen Reactions     Ace Inhibitors Cough     Atrovent [Ipratropium Bromide] Headache     Fosinopril Sodium Cough     Zolpidem      hallucinations       Medications:  Current Outpatient Prescriptions   Medication Sig Dispense Refill     acetaminophen (TYLENOL) 500 MG tablet Take 500 mg by mouth every 6 (six) hours as needed for pain.       allopurinol (ZYLOPRIM) 100 MG tablet Take 1 tablet (100 mg total) by mouth daily. 90 tablet 3     aspirin 81 MG EC tablet Take 81 mg by mouth daily with lunch.        atorvastatin (LIPITOR) 10 MG tablet Take 10 mg by mouth at bedtime.       CHLORPHENIRAMINE/DEXTROMETHORP (CORICIDIN HBP COUGH AND COLD  ORAL) Take 1 tablet by mouth daily as needed.        cholecalciferol, vitamin D3, 2,000 unit cap Take 2,000 Units by mouth daily with lunch.        cinacalcet (SENSIPAR) 30 MG tablet Take 30 mg by mouth at bedtime.        digoxin (LANOXIN) 125 mcg tablet TAKE 1 TABLET ORALLY 3 TIMES A WEEK. 30 tablet 0     diltiazem (CARDIZEM SR) 120 MG 12 hr capsule Take 1 capsule (120 mg total) by mouth 2 (two) times a day. 60 capsule 2     folic acid (FOLVITE) 1 MG tablet Take 1 mg by mouth daily with lunch.        gabapentin (NEURONTIN) 100 MG capsule Take 100 mg by mouth 3 (three) times a day.       HYDROmorphone (DILAUDID) 2 MG tablet Take 1 tablet (2 mg total) by mouth every 3 (three) hours as needed. (Patient taking differently: Take 2 mg by mouth every 3 (three) hours as needed for pain. ) 240 tablet 0     Lactobacillus rhamnosus GG (CULTURELLE) 10-15 Billion cell capsule Take 1 capsule by mouth daily with lunch.       midodrine (PROAMATINE) 5 MG tablet Take 3 tablets (15 mg total) by mouth 3 (three) times a week. Take every Monday, Wednesday, and Friday in the morning. 36 tablet 11     polyvinyl alcohol (LIQUIFILM TEARS) 1.4 % ophthalmic solution Apply 1 drop to eye as needed for dry eyes.       ranitidine (ZANTAC) 150 MG tablet Take 150 mg by mouth at bedtime.       senna-docusate (SENNOSIDES-DOCUSATE SODIUM) 8.6-50 mg tablet Take 1 tablet by mouth at bedtime.        sevelamer carbonate (RENVELA) 800 mg tablet Take 1,600 mg by mouth 3 (three) times a day with meals.       timolol maleate (TIMOPTIC) 0.5 % ophthalmic solution Administer 1 drop to both eyes 2 (two) times a day.        traZODone (DESYREL) 100 MG tablet Take 1 tablet (100 mg total) by mouth at bedtime. 30 tablet 2     vit B comp no.3-folic-C-biotin (NEPHRO-OLGA) 1- mg-mg-mcg Tab tablet Take 1 tablet by mouth daily with supper.        warfarin (COUMADIN) 5 MG tablet Take 0.5-1 tablets (2.5-5 mg total) by mouth See Admin Instructions. HOLD dose on  "4/6/18  On 4/7/18-Saturday only take 2.5 mg then resume normal schedule of Take 1 tablets (5 mg) by mouth on Monday and Saturday, 1/2 tablet (2.5 mg) all other days. Adjust dose based on INR results as directed.  0     No current facility-administered medications for this visit.      Objective:   Vital signs:  /80 (Patient Site: Left Arm, Patient Position: Sitting, Cuff Size: Adult Regular)  Pulse 77  Resp 14  Ht 5' 6\" (1.676 m)  Wt 185 lb (83.9 kg)  BMI 29.86 kg/m2    Heart  rate about 90 and irregular consistent with atrial fibrillation somewhat elevated heart rate  Physical Exam:  Alert and appropriate    GENERAL APPEARANCE: Alert, cooperative and in no acute distress.  HEENT: No scleral icterus. No Xanthelasma. Oral mucous membranes pink and moist.  NECK: JVP normal cm. No Hepatojugular reflux. Thyroid not  Palpable  Analysis catheter to right subclavicular region  CHEST: clear to auscultation and percussion  CARDIOVASCULAR: S1, S2 without murmur    Brachial, radial  pulses are intact and symmetric.   No carotid bruits noted.  ABDOMEN: Non tender. BS+. No bruits.  EXTREMITIES: No cyanosis, clubbing or edema.    Lab Results:  LIPIDS:  Lab Results   Component Value Date    CHOL 102 03/25/2016     Lab Results   Component Value Date    HDL 43 (L) 03/25/2016     Lab Results   Component Value Date    LDLCALC 47 03/25/2016     Lab Results   Component Value Date    TRIG 60 03/25/2016     No components found for: CHOLHDL    BMP:  Lab Results   Component Value Date    CREATININE 7.37 (HH) 04/06/2018    BUN 27 04/05/2018     04/05/2018    K 4.5 04/05/2018     04/05/2018    CO2 21 (L) 04/05/2018         This note has been dictated using voice recognition software. Any grammatical or context distortions are unintentional and inherent to the software.  Elie Rodriguez MD  [facility]  [phone]            "

## 2021-06-17 NOTE — PROGRESS NOTES
INR 2.1 getting ready for Wartchman on 4/5. Continue current management dosing of Warfarin. Continue  diet of moderate Vitamin K intake. Discussed with pt the need to call with questions or concerns or any change in medication especially herbal medication or OTC. Call with increased bleeding or bruising or any upcoming procedures.

## 2021-06-17 NOTE — PROGRESS NOTES
Clinic Care Coordination Contact  [facility]       Clinical Data: Care Coordinator Outreach  Outreach attempted x 2.  Left message on patient's voicemail with call back information and requested return call.  Care Coordinator will do no further outreaches at this time.      Clinic Care Coordination Contact  [facility]       Clinical Data: Care Coordinator Outreach  Outreach attempted x 1.  No answer at time of call today   Care Coordinator will try to reach patient again in 1-2 business days.  4/30

## 2021-06-17 NOTE — ANESTHESIA POSTPROCEDURE EVALUATION
Patient: Belia Rodriguez  EP NEGRA Closure - General anesthesia whole case, teach-Danielle, no device, ZUK non invasive, Watchman implant-arrival time 530am  Anesthesia type: general    Patient location: PACU  Last vitals:   Vitals:    04/06/18 1330   BP: 121/58   Pulse: 77   Resp: 18   Temp:    SpO2:      Post vital signs: stable  Level of consciousness: awake and responds to simple questions  Post-anesthesia pain: pain controlled  Post-anesthesia nausea and vomiting: no  Pulmonary: unassisted, return to baseline  Cardiovascular: stable and blood pressure at baseline  Hydration: adequate  Anesthetic events: no    QCDR Measures:  ASA# 11 - Kaylan-op Cardiac Arrest: ASA11B - Patient did NOT experience unanticipated cardiac arrest  ASA# 12 - Kaylan-op Mortality Rate: ASA12B - Patient did NOT die  ASA# 13 - PACU Re-Intubation Rate: ASA13B - Patient did NOT require a new airway mgmt  ASA# 10 - Composite Anes Safety: ASA10A - No serious adverse event    Additional Notes: Pt c/o sore throat. States it is difficult to swallow solids, but is able to eat oatmeal, other soft foods and drink liquids. States it has improved over the past 24 hours. No problems with breathing. Minor bruising in posterior pharynx. Encouraged ice chips, head elevation at night, and a prompt call if sx get worse. # of on-call anesthesiologist left with patient.

## 2021-06-17 NOTE — TELEPHONE ENCOUNTER
Ceferino with [facility] is calling for a delaying in start orders for home care for patient.  Patient has dialysis on M W and F    Ceferino can be reached at

## 2021-06-17 NOTE — TELEPHONE ENCOUNTER
Trupti with [facility]    PT  2 times a week for 3 weeks  And 1 time a week for 5 weeks.     OT Evaluation     Home Health Aide    1 time a week for 7 weeks    Trupti's call back number is secure and confidential.  Trupti specifically asked for Demetra Larose CNP instead of Dr. Rai's care team.

## 2021-06-17 NOTE — PROGRESS NOTES
INR 3.1 pt will increase greens and salads and see Robbin next week pre TERRANCE. No more INR's unless pt notices bruises. After talking with pt and discussing history of greens/salads and medication change. Pt will  continue  with current diet and dosing of Warfarin.  Continue with moderation of Vit K and green leafy vegetables. Cautioned to call with increase bruising or bleeding. Reminded to call with medication change especially antibiotic. Call with any questions or concerns or any up coming procedures. Cautioned about using Herbal medication.

## 2021-06-17 NOTE — ANESTHESIA PREPROCEDURE EVALUATION
Anesthesia Evaluation        Airway   Mallampati: II  Neck ROM: full   Pulmonary - normal exam   (+) pneumonia, COPD mild, sleep apnea,                          Cardiovascular   Exercise tolerance: > or = 4 METS  (+) hypertension, CHF, ,     Rhythm: regular  Rate: normal,      ROS comment: Echo:    Left ventricle ejection fraction is normal. The estimated left ventricular ejection fraction is 60%.    Left Atrium: Left atrial volume is moderately increased. No shunts as documented by negative color flow doppler and saline contrast injection. No thrombus present. No mass present. No spontaneous echo contrast. There appears to be lipomatous hypertrophy of the interatrial septum. No evidence of thrombus in the left atrial appendage     Neuro/Psych    (+) CVA ,     Endo/Other       GI/Hepatic/Renal    (+) GERD,   chronic renal disease,      Other findings: Patient appears very dry      Dental - normal exam                        Anesthesia Plan  Planned anesthetic: general endotracheal  Avoid excess fluids, awaiting labs given patient was dialyzed 3 days in a row  Minimal versed  ASA 4   Induction: intravenous   Anesthetic plan and risks discussed with: patient  Anesthesia plan special considerations: antiemetics,   Post-op plan: routine recovery

## 2021-06-18 NOTE — PROGRESS NOTES
MODIFIED KENIA SCALE   Follow up (post Watchman Implant)  Timepoint: 6 weeks    Previous score: 0    Score Description   0 No symptoms at all   1 No significant disability despite symptoms; able to carry out all usual duties and activities   2 Slight disability; unable to carry out all previous activities, but able to look after own affairs without assistance   3 Moderate disability; requiring some help, but able to walk without assistance   4 Moderately severe disability; unable to walk without assistance and unable to attend to own bodily needs without assistance   5 Severe disability; bedridden, incontinent and requiring constant nursing care and attention   6 Dead    Total score (0 - 6):  0  Change in score? none  If yes, notify implanting cardiologist.    Danielle Levine RN

## 2021-06-18 NOTE — LETTER
Letter by Giovanni Santamaria MD at      Author: Giovanni Santamaria MD Service: -- Author Type: --    Filed:  Encounter Date: 3/12/2019 Status: (Other)         Patient: Belia Rodriguez   MR Number: [id number]   YOB: 1945   Date of Visit: 3/12/2019     Code Status:  FULL CODE  Visit Type: No chief complaint on file.     Facility:  ANSWER ROOMING ACTIVITY QUESTION      Facility Type: [facility] ([facility] Term Care, LTC)    History of Present Illness:   Hospital Admission Date: 3/3/2019 Hospital Discharge Date: 3/8/2019  Facility Admission Date: 3/8/2019    Belia Rodriguez is a 73 y.o. female with a PMH significant for ESRD on dialysis M-W-F secondary to a dissected abdominal aortic aneurysm approximately 10 years ago, COPD, heart failure with preserved ejection fraction, atrial fibrillation S/P LAAO (4/2018), AV jonn ablation and pacemaker placement (3/2019), and ZULEIKA- CPAP and BiPAP intolerant on nocturnal oxygen.    Patient hospitalized for dyspnea and was found to be in atrial fibrillation with RVR on admission.  HR improved with IV diltiazem.  Cardiology was consulted while in the hospital, and after discussion she agreed to proceed with AV jonn ablation and pacemaker placement.  Successful AV jonn ablation and placement of a single-chamber RV pacemaker on 3/7/2019.  She denied any obvious symptoms when she would be in atrial fibrillation, but notes that she has been on a lot of medications over the last few years to try and control her HR.  Has been very pleased with having LAAO placed last year, and is only on ASA 81 mg daily.      She reports no issues with her dialysis port, and does not want to have a fistula placed.  She has had her right IJ port changed a couple times since she has been on dialysis.  She follows up with her nephrologist regularly.  Dialysis days are Monday, Wednesday and Fridays.      She is a former smoker.  Quit smoking after her dissection.  Has been told that she has COPD and ZULEIKA.   Multiple attempts with using a CPAP and BiPAP, but could not tolerate this.  She feels that she has been well managed with nocturnal oxygen with sleep via NC.  She feels rested when she wakes up in the morning and denies daytime headaches.      Chronic low back pain with history of surgery due to what she believes was a disc or nerve problem.  She has a chronic right foot drop secondary to permanent nerve damage.  She has an AFO, but does not care to use this much.  She feels that she has learned to be more aware of her steps to avoid tripping.  Has been on Gabapentin as prescribed for her back pain, and she feels that this may help with fatigue of her right foot.    Has been adjusting to being here without difficulties.  Is happy to be here to get stronger so she can be safe at home.  Lives in a 2 story home with her .  She has 2 grown daughters, one of whom lives in the [facility].  He  does most of the heavy housework including washing dishes, laundry, and cleaning.  She does most of the cooking, which she enjoys.      Past Medical History:   Diagnosis Date   ? Arthritis    ? CHF (congestive heart failure) (H)    ? Chronic anemia 6/1/2014   ? Chronic kidney disease    ? Chronic thoracic aortic dissection (H) 10/7/2015    Descending thoracic aorta; treated medically per notes of Dragan Singh and Jennifer.   ? COPD (chronic obstructive pulmonary disease) (H)    ? CVA (cerebral infarction)    ? Disease of thyroid gland    ? Dyslipidemia    ? ESRD (end stage renal disease) (H) 06/03/2009    on dialysis with Dr. Mitchell   ? Essential hypertension 6/30/2014   ? Gastrointestinal hemorrhage, unspecified gastrointestinal hemorrhage type 6/5/2017   ? GI (gastrointestinal bleed)    ? GI bleeding 6/5/2017   ? Gout    ? L3 vertebral fracture (H) 11/16/2015   ? Left Atrial Appendage Occlusion (WATCHMAN) 4/5/2018    LAAO April 5, 2018 (30 mm WATCHMAN)   ? Obesity    ? ZULEIKA (obstructive sleep apnea), severe,  intolerant of CPAP 10/22/2015   ? Pneumonia 9-7-2015   ? Right foot drop    ? Spinal stenosis 3/28/2016   ? Stroke (H) 3/24/2016     Past Surgical History:   Procedure Laterality Date   ? BACK SURGERY      [facility]   ? COLONOSCOPY N/A 3/23/2016    Procedure: COLONOSCOPY;  Surgeon: Ruddy Tejada MD;  Location: [facility];  Service:    ? DILATION AND CURETTAGE OF UTERUS     ? EP ABLATION AV NODE N/A 3/7/2019    Procedure: EP Ablation AV Node;  Surgeon: Derick Duarte MD;  Location: [facility];  Service: Cardiology   ? EP NEGRA CLOSURE N/A 4/5/2018    Procedure: EP NEGRA Closure;  Surgeon: Derick Duarte MD;  Location: [facility];  Service:    ? EP PACEMAKER INSERT N/A 3/7/2019    Procedure: EP Pacemaker Insertion;  Surgeon: Derick Duarte MD;  Location: [facility];  Service: Cardiology   ? EYE SURGERY     ? HERNIA REPAIR     ? IR TUNNELED CATHETER INSERT  11/20/2018   ? IR TUNNELED CATHETER REMOVAL  11/20/2018   ? NV COLSC FLEXIBLE W/CONTROL BLEEDING ANY METHOD N/A 6/7/2017    Procedure: COLONOSCOPY;  Surgeon: Luis Mckeon MD;  Location: [facility];  Service: Gastroenterology   ? TONSILLECTOMY       Family History   Problem Relation Age of Onset   ? Dementia Mother    ? Diabetes Mother    ? Arthritis Mother    ? Cancer Mother    ? Depression Mother    ? Heart disease Mother    ? Vision loss Mother    ? Stroke Father    ? Heart disease Father    ? Breast cancer Neg Hx      Social History     Socioeconomic History   ? Marital status:      Spouse name: Cayden   ? Number of children: 2   ? Years of education: Not on file   ? Highest education level: Not on file   Occupational History     Employer: RETIRED   Social Needs   ? Financial resource strain: Not on file   ? Food insecurity:     Worry: Not on file     Inability: Not on file   ? Transportation needs:     Medical: Not on file     Non-medical: Not on file   Tobacco Use   ? Smoking status: Former Smoker      Packs/day: 1.50     Years: 37.00     Pack years: 55.50     Types: Cigarettes     Last attempt to quit: 1/1/2009     Years since quitting: 10.1   ? Smokeless tobacco: Never Used   Substance and Sexual Activity   ? Alcohol use: No     Alcohol/week: 7.0 oz     Types: 14 Standard drinks or equivalent per week     Comment: 14 mixed drinks per week   ? Drug use: No   ? Sexual activity: No     Partners: Male   Lifestyle   ? Physical activity:     Days per week: Not on file     Minutes per session: Not on file   ? Stress: Not on file   Relationships   ? Social connections:     Talks on phone: Not on file     Gets together: Not on file     Attends Scientologist service: Not on file     Active member of club or organization: Not on file     Attends meetings of clubs or organizations: Not on file     Relationship status: Not on file   ? Intimate partner violence:     Fear of current or ex partner: Not on file     Emotionally abused: Not on file     Physically abused: Not on file     Forced sexual activity: Not on file   Other Topics Concern   ? Not on file   Social History Narrative    Lives with her . Daughter in [place] and daughter in [place].     Additional Geriatric Review:  Lives independently with her  in a single family home.  No difficulties with her hearing or vision.  She denies any problems with her memory.     Current Outpatient Medications   Medication Sig Dispense Refill   ? acetaminophen (TYLENOL) 500 MG tablet Take 500 mg by mouth every 6 (six) hours as needed for pain.     ? albuterol (PROAIR HFA;PROVENTIL HFA;VENTOLIN HFA) 90 mcg/actuation inhaler Inhale 2 puffs every 4 (four) hours as needed for wheezing. 8 g 5   ? albuterol (PROVENTIL) 2.5 mg /3 mL (0.083 %) nebulizer solution Take 3 mL (2.5 mg total) by nebulization every 4 (four) hours as needed for wheezing or shortness of breath. 30 vial 5   ? aspirin 81 MG EC tablet Take 81 mg by mouth daily with lunch.      ? atorvastatin (LIPITOR) 10 MG  tablet Take 10 mg by mouth at bedtime.     ? calcium, as carbonate, (TUMS) 200 mg calcium (500 mg) chewable tablet Chew 1 tablet (200 mg total) 4 (four) times a day as needed.  0   ? CHLORPHENIRAMINE/DEXTROMETHORP (CORICIDIN HBP COUGH AND COLD ORAL) Take 1 tablet by mouth daily as needed.      ? cholecalciferol, vitamin D3, 2,000 unit cap Take 2,000 Units by mouth daily with lunch.      ? cinacalcet (SENSIPAR) 30 MG tablet Take 30 mg by mouth 3 times weekly with dialysis           ? DIALYVITE 100-1 mg Tab TAKE ONE TABLET BY MOUTH DAILY IN THE EVENING 90 tablet 0   ? folic acid (FOLVITE) 1 MG tablet TAKE ONE TABLET BY MOUTH ONCE DAILY 90 tablet 3   ? gabapentin (NEURONTIN) 100 MG capsule TAKE 1 CAPSULE BY MOUTH THREE TIMES DAILY 270 capsule 3   ? Lactobacillus rhamnosus GG (CULTURELLE) 10-15 Billion cell capsule Take 1 capsule by mouth daily with lunch.     ? metoprolol succinate (TOPROL XL) 25 MG Take 1 tablet (25 mg total) by mouth daily. 90 tablet 3   ? midodrine (PROAMATINE) 5 MG tablet Take 3 tablets (15 mg total) by mouth 3 (three) times a week. Take every Monday, Wednesday, and Friday in the morning. 36 tablet 11   ? polyvinyl alcohol (LIQUIFILM TEARS) 1.4 % ophthalmic solution Apply 1 drop to eye as needed for dry eyes.     ? ranitidine (ZANTAC) 150 MG tablet Take 150 mg by mouth at bedtime.     ? senna-docusate (SENNOSIDES-DOCUSATE SODIUM) 8.6-50 mg tablet Take 1 tablet by mouth at bedtime.      ? sevelamer carbonate (RENVELA) 800 mg tablet Take 1,600 mg by mouth 3 (three) times a day with meals.     ? sevelamer carbonate (RENVELA) 800 mg tablet Take 1,600 mg by mouth 2 (two) times a day as needed (with snacks).     ? timolol maleate (TIMOPTIC) 0.5 % ophthalmic solution Administer 1 drop to both eyes 2 (two) times a day.      ? traZODone (DESYREL) 50 MG tablet Take 1 tablet (50 mg total) by mouth at bedtime as needed for sleep.  0     No current facility-administered medications for this visit.       Allergies   Allergen Reactions   ? Ace Inhibitors Cough   ? Atrovent [Ipratropium Bromide] Headache   ? Fosinopril Sodium Cough   ? Zolpidem      hallucinations     Immunization History   Administered Date(s) Administered   ? Influenza high dose, seasonal 10/06/2014, 10/03/2016   ? Influenza, Seasonal, Inj PF IIV3 10/06/2014   ? Influenza, inj, historic,unspecified 12/04/2003, 10/06/2009, 01/06/2010, 10/27/2010, 09/18/2015, 09/12/2018   ? Influenza,seasonal quad, PF, 36+MOS 09/13/2017   ? Pneumo Conj 13-V (2010&after) 10/30/2017   ? Pneumo Polysac 23-V 05/18/2014   ? Td, Adult, Absorbed 05/30/2000   ? Td, adult adsorbed, PF 10/30/2017   ? Td,adult,historic,unspecified 05/30/2000   ? ZOSTER, LIVE 07/28/2009     Review of Systems   Constitutional: Positive for fatigue (Deconditioning). Negative for appetite change and fever.   HENT: Negative for congestion, rhinorrhea and sore throat.    Eyes: Negative for visual disturbance.   Respiratory: Negative for cough and shortness of breath.    Cardiovascular: Positive for leg swelling (at baseline). Negative for chest pain and palpitations.   Gastrointestinal: Negative.    Endocrine: Negative.    Genitourinary: Negative.    Musculoskeletal: Positive for back pain (Chronic low back).   Skin: Negative.    Neurological: Positive for numbness (Chronic in right foot). Negative for dizziness, light-headedness and headaches.   Psychiatric/Behavioral: Negative.         Physical Exam   Constitutional: She is oriented to person, place, and time. She appears well-nourished. No distress.   HENT:   Head: Normocephalic and atraumatic.   Nose: Nose normal.   Mouth/Throat: Oropharynx is clear and moist.   Eyes: EOM are normal. Pupils are equal, round, and reactive to light.   Neck: Normal range of motion. Neck supple. No JVD present. No thyromegaly present.   Cardiovascular: Normal rate and regular rhythm. Exam reveals no gallop and no friction rub.   No murmur heard.  Pulmonary/Chest:  Effort normal and breath sounds normal. No respiratory distress. She has no wheezes.   Pacemaker in left upper chest covered in steri strips.  No active bleeding.  Mild bruising.  No tenderness to palpation.  Right IJ dialysis port bandaged   Abdominal: Soft. Bowel sounds are normal. There is tenderness. There is no rebound and no guarding.   Musculoskeletal: Normal range of motion. She exhibits edema (trace pitting B/L to knees).   Lymphadenopathy:     She has no cervical adenopathy.   Neurological: She is alert and oriented to person, place, and time. No cranial nerve deficit. Coordination normal.   Skin: Skin is warm and dry.   Scattered bruising secondary to recent hospital blood draws and IV sticks on arms and hands.   Psychiatric: She has a normal mood and affect. Her behavior is normal. Thought content normal.   Nursing note and vitals reviewed.    Labs:  All labs reviewed in the nursing home record.    Assessment:  1. Chronic atrial fibrillation (H)     2. Chronic diastolic congestive heart failure (H)     3. ESRD (end stage renal disease) on dialysis (H)       Plan: 73 year old female with a complex PMH who was admitted to [facility] on 3/8/2019.  Patient has no acute complaints and is enjoying having a structured day of therapies to help with her mobility and strength.      She reports she had been using Benadryl nightly to help with sleep, but while in the hospital most recently her PCP started her on Trazodone which she feels has been helpful.  After discussion of potential risks and side effects of Benadryl, she is in agreement to discontinue this medication, and continue Trazodone at bedtime for sleep.    Discussed Gabapentin dosing with the patient for which she uses for chronic low back pain.  Discussed with her that the current dose she is on is higher than what is recommended for her ESRD and creatinine clearance.  She expressed understanding of this and would like to talk with her PCP about changing her  dose when she is discharged from this facility.      Patient will continue with regularly scheduled M-W-F dialysis at her dialysis center.  No acute issues or concerns today.  As an FYI: The patients chronic dialysis catheter is quite adherent to the SVC and lateral RA. If the dialysis catheter needs to be removed for any reason the patient is at high risk for pacemaker lead dislodgement and she now has complete heart block. If the dialysis catheter needs to be removed I would recommend that it be done in the EP lab with a temporary pacing catheter in place.    Patient's BP is within goal.  She does have a descending aortic aneurysm measuring 4.3 cm (stable per last imaging report in 9/2018).  She appears to be at her baseline weight (dry weight per chart is 72-73 kg).  No signs or symptoms of fluid overload.  Diltiazem was discontinued prior to discharge from the hospital, and Metoprolol succinate 25 mg PO daily was continued.      Total 45 minutes of which 65% was spent in counseling and coordination of care of the above plan.    Electronically signed by: Maribel Kinney DO patient was discussed chart was reviewed and patient examined with third year family practice resident Dr. Chance Kinney and I agree with her assessment and plan. Giovanni Santamaria MD

## 2021-06-18 NOTE — PROGRESS NOTES
Objective findings: The patient return to the clinic today for continued foot care complaining of long thick painful nails both feet.    Objective findings:Nails bilateral feet are severely elongated and 2-3 times normal thickness.  The nails are also severely discolored.  DP and PT pulses +1/4 bilaterally Capillary refill less than 2 seconds bilateral feet.  Negative clonus, negative Babinski bilateral feet.    Range of motion within normal limits. Muscle power + 4 over 5 within all compartments.       Assessment: Onychomycosis, onychauxis         Plan: Debridement nails 1 through 5 both feet today.  I recommended the patient return to the clinic every 3 months for continued foot care

## 2021-06-19 NOTE — LETTER
Letter by Le Flynn CNP at      Author: Le Flynn CNP Service: -- Author Type: --    Filed:  Encounter Date: 7/30/2019 Status: (Other)         Patient: Belia Rodriguez   MR Number: [id number]   YOB: 1945   Date of Visit: 7/30/2019     [facility]    Facility:   [facility] [[id number]]   Code Status: FULL CODE      CHIEF COMPLAINT/REASON FOR VISIT:  Chief Complaint   Patient presents with   ? Review Of Multiple Medical Conditions       HISTORY:      HPI: Belia is a 73 y.o. female undergoing physical and  occupational therapy at [facility]. with history of ESRD on hemodialysis M/W/F, chronic thrombocytopenia, chronic atrial fibrillation, sick sinus syndrome, status post watchman device, status post pacemaker, diastolic CHF, hypertension, ZULEIKA, anemia of chronic kidney disease, dyslipidemia who was brought to the emergency department for evaluation after a fall.  The patient uses a walker for ambulation due to right foot drop and while at home tripped on the kitchen floor mat when she turned and fell.  She denies head trauma or loss of consciousness but complained of left hip pain.  In the ED, she was hemodynamically stable.  X-rays showed a displaced, comminuted intertrochanteric fracture of left femur. She underwent a left intramedullary nailing.     Today she is seen for review of multiple medical issues. . She denied CP. She does have shortness of breath with activity. She is on 2 L NC at night due to unable to tolerate C- Pap after many tries She occasionally will wear oxygen  during the day.   She was recently seen by GI for negative Occults x 3 but had a  moderate amount of visible blood in the toilet.  Her consult paperwork has not been received by the facility yet but the patient tells me she has large internal hemorrhoids and when she is off the coumadin it is recommended she have them banded,. Her weight is up 5   pounds this week and she has not been able to tolerate a full dialysis run due to low BP's. She takes meclizine to help but reports they have tried to give it to her at all different times and have not found the right time.   She tells me diuretics do work for her and today she will have an extra run. Hgb 7/23 was 8.9. She has not had any further rectal bleeding over the last week.     Past Medical History:   Diagnosis Date   ? Arthritis    ? CHF (congestive heart failure) (H)    ? Chronic anemia 6/1/2014   ? Chronic kidney disease    ? Chronic thoracic aortic dissection (H) 10/7/2015    Descending thoracic aorta; treated medically per notes of Dragan Singh and Jennifer.   ? COPD (chronic obstructive pulmonary disease) (H)    ? CVA (cerebral infarction)    ? Disease of thyroid gland    ? Dyslipidemia    ? ESRD (end stage renal disease) (H) 06/03/2009    on dialysis with Dr. Mitchell   ? Essential hypertension 6/30/2014   ? Gastrointestinal hemorrhage, unspecified gastrointestinal hemorrhage type 6/5/2017   ? GI (gastrointestinal bleed)    ? GI bleeding 6/5/2017   ? Gout    ? L3 vertebral fracture (H) 11/16/2015   ? Left Atrial Appendage Occlusion (WATCHMAN) 4/5/2018    LAAO April 5, 2018 (30 mm WATCHMAN)   ? Obesity    ? ZULEIKA (obstructive sleep apnea), severe, intolerant of CPAP 10/22/2015   ? Pneumonia 9-7-2015   ? Right foot drop    ? Spinal stenosis 3/28/2016   ? Stroke (H) 3/24/2016             Family History   Problem Relation Age of Onset   ? Dementia Mother    ? Diabetes Mother    ? Arthritis Mother    ? Cancer Mother    ? Depression Mother    ? Heart disease Mother    ? Vision loss Mother    ? Stroke Father    ? Heart disease Father    ? Breast cancer Neg Hx      Social History     Socioeconomic History   ? Marital status:      Spouse name: Cayden   ? Number of children: 2   ? Years of education: Not on file   ? Highest education level: Not on file   Occupational History     Employer: RETIRED   Social  "Needs   ? Financial resource strain: Not on file   ? Food insecurity:     Worry: Not on file     Inability: Not on file   ? Transportation needs:     Medical: Not on file     Non-medical: Not on file   Tobacco Use   ? Smoking status: Former Smoker     Packs/day: 1.50     Years: 37.00     Pack years: 55.50     Types: Cigarettes     Last attempt to quit: 1/1/2009     Years since quitting: 10.5   ? Smokeless tobacco: Never Used   Substance and Sexual Activity   ? Alcohol use: No     Alcohol/week: 7.0 oz     Types: 14 Standard drinks or equivalent per week     Comment: 14 mixed drinks per week   ? Drug use: No   ? Sexual activity: Never     Partners: Male   Lifestyle   ? Physical activity:     Days per week: Not on file     Minutes per session: Not on file   ? Stress: Not on file   Relationships   ? Social connections:     Talks on phone: Not on file     Gets together: Not on file     Attends Islam service: Not on file     Active member of club or organization: Not on file     Attends meetings of clubs or organizations: Not on file     Relationship status: Not on file   ? Intimate partner violence:     Fear of current or ex partner: Not on file     Emotionally abused: Not on file     Physically abused: Not on file     Forced sexual activity: Not on file   Other Topics Concern   ? Not on file   Social History Narrative    Lives with her . Daughter in [place] and daughter in [place].         Review of Systems   Constitutional: Negative for activity change, appetite change, fatigue and fever.   HENT: Negative for congestion.    Respiratory: Positive for shortness of breath. Negative for cough and wheezing.         Dialysis port R upper chest    Cardiovascular: Negative for chest pain and leg swelling.   Gastrointestinal: Negative for abdominal distention, abdominal pain, constipation, diarrhea and nausea.   Genitourinary: Negative for dysuria.        Voids \"a little per her report\"   Musculoskeletal: Negative " for arthralgias and back pain.   Skin: Positive for wound. Negative for color change.   Neurological: Negative for dizziness.   Psychiatric/Behavioral: Positive for sleep disturbance. Negative for agitation, behavioral problems and confusion.         On trazodone        .  Vitals:    07/30/19 0708   BP: 139/81   Pulse: 71   Resp: 18   Temp: 97.3  F (36.3  C)   SpO2: 92%   Weight: 176 lb 9.6 oz (80.1 kg)       Physical Exam   Constitutional: She is oriented to person, place, and time. She appears well-developed and well-nourished.   pleasant woman in no acute distress   HENT:   Head: Normocephalic and atraumatic.   Eyes: Pupils are equal, round, and reactive to light. Conjunctivae are normal.   Neck: Normal range of motion. Neck supple.   Cardiovascular: Normal rate, regular rhythm and normal heart sounds.   No murmur heard.  Pulmonary/Chest: Effort normal. She has no wheezes. She has rales.   Bilateral lower lobes    Abdominal: Soft. Bowel sounds are normal. She exhibits no distension. There is no tenderness.   Musculoskeletal: Normal range of motion. She exhibits edema.   Right foot drop  2+ edema left leg.    Neurological: She is alert and oriented to person, place, and time.   Neuropathy right foot and decreased sensation bottom of left foot.     Skin: Skin is warm and dry.   Left hip wound healed    Psychiatric: She has a normal mood and affect. Her behavior is normal.         LABS:   Recent Results (from the past 240 hour(s))   INR   Result Value Ref Range    INR 4.46 (H) 0.90 - 1.10   INR   Result Value Ref Range    INR 2.44 (H) 0.90 - 1.10   INR   Result Value Ref Range    INR 2.11 (H) 0.90 - 1.10     Current Outpatient Medications   Medication Sig Note   ? acetaminophen (TYLENOL) 500 MG tablet Take 2 tablets (1,000 mg total) by mouth 3 (three) times a day.    ? aspirin 325 MG tablet Take 1 tablet (325 mg total) by mouth 2 (two) times a day.    ? atorvastatin (LIPITOR) 10 MG tablet Take 10 mg by mouth at  bedtime.    ? B complex-vitamin C-folic acid (DIALYVITE) 100-1 mg Tab Take 1 tablet by mouth daily.    ? chlorpheniramine/dextromethorp (CORICIDIN HBP COUGH AND COLD ORAL) Take 1 tablet by mouth every 6 (six) hours as needed.    ? cholecalciferol, vitamin D3, 2,000 unit cap Take 2,000 Units by mouth daily with lunch.     ? cinacalcet (SENSIPAR) 30 MG tablet Take 30 mg by mouth 3 times weekly with dialysis          ? diphenhydrAMINE (BENADRYL) 25 mg tablet Take 50 mg by mouth at bedtime as needed for sleep.    ? folic acid (FOLVITE) 1 MG tablet Take 1 mg by mouth daily.    ? gabapentin (NEURONTIN) 100 MG capsule Take 100 mg by mouth 3 (three) times a day.    ? Lactobacillus rhamnosus GG (CULTURELLE) 10-15 Billion cell capsule Take 1 capsule by mouth daily with lunch.    ? metoprolol succinate (TOPROL-XL) 25 MG Take 25 mg by mouth daily.    ? midodrine HCl (MIDODRINE ORAL) Take 15 mg by mouth 3 (three) times a week. Three times weekly before dialysis.           ? oxyCODONE (ROXICODONE) 5 MG immediate release tablet Take 0.5-1 tablets (2.5-5 mg total) by mouth every 4 (four) hours as needed.    ? polyvinyl alcohol (LIQUIFILM TEARS) 1.4 % ophthalmic solution Apply 1 drop to eye as needed for dry eyes.    ? ranitidine (ZANTAC) 150 MG tablet Take 150 mg by mouth at bedtime.    ? senna-docusate (SENNOSIDES-DOCUSATE SODIUM) 8.6-50 mg tablet Take 1 tablet by mouth at bedtime.     ? sevelamer carbonate (RENVELA) 800 mg tablet Take 2,400 mg by mouth 3 (three) times a day with meals.           ? timolol maleate (TIMOPTIC) 0.5 % ophthalmic solution Administer 1 drop to both eyes 2 (two) times a day.     ? traZODone (DESYREL) 50 MG tablet Take 50 mg by mouth at bedtime. 6/23/2019: Patient states she uses this almost every night.      ASSESSMENT:      ICD-10-CM    1. ESRD (end stage renal disease) (H) N18.6    2. Closed displaced intertrochanteric fracture of left femur with routine healing, subsequent encounter S72.142D    3.  Pain management R52    4. Anticoagulation goal of INR 2 to 3 Z51.81     Z79.01        PLAN:    S/P internal fixation using intramedullary nail. PT/OT/pain  Control, monitor incision for S/S infection. Incision healed.    End-stage renal disease. Dialysis as scheduled.  M/W/F  Pt will have an extra run today 7/30/19  Hypertension on Metoprolol Succinate   Chronic atrial fibrillation- on coumadin , metoprolol  Pain control Tylenol and Oxycodone as scheduled     Anemia.  Chronic anemia. Last HGB 8.9.    Insomnia-trazadone  75 mg HS  Constipation resolved continue  senna s to 2 tabs two times a day, Biscodyl suppository 10 mg MI q 3 days PRN if no BM   GI bleeding - occults negative,  stat HGB 8.9 which is up. From 7.9- Pt seen by GI awaiting report.   Anticoagulation therapy - recently started on coumadin, adjust per INR's.       Electronically signed by: Le Flynn CNP

## 2021-06-19 NOTE — LETTER
Letter by Le Flynn CNP at      Author: Le Flynn CNP Service: -- Author Type: --    Filed:  Encounter Date: 7/8/2019 Status: (Other)         Patient: Belia Rodriguez   MR Number: [id number]   YOB: 1945   Date of Visit: 7/8/2019     [facility]    Facility:   [facility] [[id number]]   Code Status: FULL CODE      CHIEF COMPLAINT/REASON FOR VISIT:  Chief Complaint   Patient presents with   ? Review Of Multiple Medical Conditions       HISTORY:      HPI: Belia is a 73 y.o. female undergoing physical and  occupational therapy at [facility]. with history of ESRD on hemodialysis M/W/F, chronic thrombocytopenia, chronic atrial fibrillation, sick sinus syndrome, status post watchman device, status post pacemaker, diastolic CHF, hypertension, ZULEIKA, anemia of chronic kidney disease, dyslipidemia who was brought to the emergency department for evaluation after a fall.  The patient uses a walker for ambulation due to right foot drop and while at home tripped on the kitchen floor mat when she turned and fell.  She denies head trauma or loss of consciousness but complained of left hip pain.  In the ED, she was hemodynamically stable.  X-rays showed a displaced, comminuted intertrochanteric fracture of left femur. She underwent a left intramedullary nailing.     Today she is seen for a routine  visit to review multiple medical issues. She denied CP. She does have shortness of breath with activity. She is on 2 L NC at night due to unable to tolerate C- Pap after many tries She occasionally will wear 1L NC  during the day.  She  is with right foot drop and previously has told me  she has shoes ordered and in the process of getting a brace. Her HGB was noted to be 7.4 on 7/2, came up to  8.9 on 7/5 and on 7/8 dropped to 7.9.   She denied any active bleeding but is with multiple bruising bilateral upper extremities. I will check occult  "stool. Her left hip incision is with staples C/D/I. She did report increased pain due to bending over during the weekend and unsure if she heard a pop. She did not appear to be in any acute  distress and I will check an x-ray. She tells me she has been sleeping well and has been able to stand 5\" on the parallel bars and took 2 steps. Her weight is up 3 pounds and she will have a dialysis run today.     Past Medical History:   Diagnosis Date   ? Arthritis    ? CHF (congestive heart failure) (H)    ? Chronic anemia 6/1/2014   ? Chronic kidney disease    ? Chronic thoracic aortic dissection (H) 10/7/2015    Descending thoracic aorta; treated medically per notes of Dragan Singh and Jennifer.   ? COPD (chronic obstructive pulmonary disease) (H)    ? CVA (cerebral infarction)    ? Disease of thyroid gland    ? Dyslipidemia    ? ESRD (end stage renal disease) (H) 06/03/2009    on dialysis with Dr. Mitchell   ? Essential hypertension 6/30/2014   ? Gastrointestinal hemorrhage, unspecified gastrointestinal hemorrhage type 6/5/2017   ? GI (gastrointestinal bleed)    ? GI bleeding 6/5/2017   ? Gout    ? L3 vertebral fracture (H) 11/16/2015   ? Left Atrial Appendage Occlusion (WATCHMAN) 4/5/2018    LAAO April 5, 2018 (30 mm WATCHMAN)   ? Obesity    ? ZULEIKA (obstructive sleep apnea), severe, intolerant of CPAP 10/22/2015   ? Pneumonia 9-7-2015   ? Right foot drop    ? Spinal stenosis 3/28/2016   ? Stroke (H) 3/24/2016             Family History   Problem Relation Age of Onset   ? Dementia Mother    ? Diabetes Mother    ? Arthritis Mother    ? Cancer Mother    ? Depression Mother    ? Heart disease Mother    ? Vision loss Mother    ? Stroke Father    ? Heart disease Father    ? Breast cancer Neg Hx      Social History     Socioeconomic History   ? Marital status:      Spouse name: Cayden   ? Number of children: 2   ? Years of education: Not on file   ? Highest education level: Not on file   Occupational History     Employer: " RETIRED   Social Needs   ? Financial resource strain: Not on file   ? Food insecurity:     Worry: Not on file     Inability: Not on file   ? Transportation needs:     Medical: Not on file     Non-medical: Not on file   Tobacco Use   ? Smoking status: Former Smoker     Packs/day: 1.50     Years: 37.00     Pack years: 55.50     Types: Cigarettes     Last attempt to quit: 1/1/2009     Years since quitting: 10.5   ? Smokeless tobacco: Never Used   Substance and Sexual Activity   ? Alcohol use: No     Alcohol/week: 7.0 oz     Types: 14 Standard drinks or equivalent per week     Comment: 14 mixed drinks per week   ? Drug use: No   ? Sexual activity: Never     Partners: Male   Lifestyle   ? Physical activity:     Days per week: Not on file     Minutes per session: Not on file   ? Stress: Not on file   Relationships   ? Social connections:     Talks on phone: Not on file     Gets together: Not on file     Attends Oriental orthodox service: Not on file     Active member of club or organization: Not on file     Attends meetings of clubs or organizations: Not on file     Relationship status: Not on file   ? Intimate partner violence:     Fear of current or ex partner: Not on file     Emotionally abused: Not on file     Physically abused: Not on file     Forced sexual activity: Not on file   Other Topics Concern   ? Not on file   Social History Narrative    Lives with her . Daughter in [place] and daughter in [place].         Review of Systems   Constitutional: Negative for activity change, appetite change, fatigue and fever.   HENT: Negative for congestion.    Respiratory: Positive for shortness of breath. Negative for cough and wheezing.         Dialysis port R upper chest    Cardiovascular: Negative for chest pain and leg swelling.   Gastrointestinal: Negative for abdominal distention, abdominal pain, constipation, diarrhea and nausea.   Genitourinary: Negative for dysuria.   Musculoskeletal: Negative for arthralgias and  back pain.   Skin: Positive for wound. Negative for color change.   Neurological: Negative for dizziness.   Psychiatric/Behavioral: Positive for sleep disturbance. Negative for agitation, behavioral problems and confusion.         On trazodone        .  Vitals:    07/08/19 0840   BP: 122/68   Pulse: 85   Resp: 18   Temp: 97.9  F (36.6  C)   SpO2: 95%   Weight: 171 lb (77.6 kg)       Physical Exam   Constitutional: She is oriented to person, place, and time. She appears well-developed and well-nourished.   pleasant woman in no acute distress   HENT:   Head: Normocephalic and atraumatic.   Eyes: Pupils are equal, round, and reactive to light. Conjunctivae are normal.   Neck: Normal range of motion. Neck supple.   Cardiovascular: Normal rate, regular rhythm and normal heart sounds.   No murmur heard.  Pulmonary/Chest: Effort normal and breath sounds normal. She has no wheezes. She has no rales.   Abdominal: Soft. Bowel sounds are normal. She exhibits no distension. There is no tenderness.   Musculoskeletal: Normal range of motion. She exhibits no edema.   Right foot drop   Neurological: She is alert and oriented to person, place, and time.   Neuropathy right foot and decreased sensation bottom of left foot.     Skin: Skin is warm and dry.   Left hip wound with staples C/D/I   Psychiatric: She has a normal mood and affect. Her behavior is normal.         LABS:   Recent Results (from the past 240 hour(s))   HM2(CBC w/o Differential)   Result Value Ref Range    WBC 3.9 (L) 4.0 - 11.0 thou/uL    RBC 2.30 (L) 3.80 - 5.40 mill/uL    Hemoglobin 7.4 (L) 12.0 - 16.0 g/dL    Hematocrit 24.0 (L) 35.0 - 47.0 %     (H) 80 - 100 fL    MCH 32.2 27.0 - 34.0 pg    MCHC 30.8 (L) 32.0 - 36.0 g/dL    RDW 21.1 (H) 11.0 - 14.5 %    Platelets 139 (L) 140 - 440 thou/uL    MPV 11.5 8.5 - 12.5 fL   HM2(CBC w/o Differential)   Result Value Ref Range    WBC 5.5 4.0 - 11.0 thou/uL    RBC 2.74 (L) 3.80 - 5.40 mill/uL    Hemoglobin 8.9 (L)  12.0 - 16.0 g/dL    Hematocrit 28.3 (L) 35.0 - 47.0 %     (H) 80 - 100 fL    MCH 32.5 27.0 - 34.0 pg    MCHC 31.4 (L) 32.0 - 36.0 g/dL    RDW 21.8 (H) 11.0 - 14.5 %    Platelets 208 140 - 440 thou/uL    MPV 9.5 8.5 - 12.5 fL     Current Outpatient Medications   Medication Sig Note   ? acetaminophen (TYLENOL) 500 MG tablet Take 2 tablets (1,000 mg total) by mouth 3 (three) times a day.    ? aspirin 325 MG tablet Take 1 tablet (325 mg total) by mouth 2 (two) times a day.    ? atorvastatin (LIPITOR) 10 MG tablet Take 10 mg by mouth at bedtime.    ? B complex-vitamin C-folic acid (DIALYVITE) 100-1 mg Tab Take 1 tablet by mouth daily.    ? chlorpheniramine/dextromethorp (CORICIDIN HBP COUGH AND COLD ORAL) Take 1 tablet by mouth every 6 (six) hours as needed.    ? cholecalciferol, vitamin D3, 2,000 unit cap Take 2,000 Units by mouth daily with lunch.     ? cinacalcet (SENSIPAR) 30 MG tablet Take 30 mg by mouth 3 times weekly with dialysis          ? diphenhydrAMINE (BENADRYL) 25 mg tablet Take 50 mg by mouth at bedtime as needed for sleep.    ? folic acid (FOLVITE) 1 MG tablet Take 1 mg by mouth daily.    ? gabapentin (NEURONTIN) 100 MG capsule Take 100 mg by mouth 3 (three) times a day.    ? Lactobacillus rhamnosus GG (CULTURELLE) 10-15 Billion cell capsule Take 1 capsule by mouth daily with lunch.    ? metoprolol succinate (TOPROL-XL) 25 MG Take 25 mg by mouth daily.    ? midodrine HCl (MIDODRINE ORAL) Take 15 mg by mouth 3 (three) times a week. Three times weekly before dialysis.           ? oxyCODONE (ROXICODONE) 5 MG immediate release tablet Take 0.5-1 tablets (2.5-5 mg total) by mouth every 4 (four) hours as needed.    ? polyvinyl alcohol (LIQUIFILM TEARS) 1.4 % ophthalmic solution Apply 1 drop to eye as needed for dry eyes.    ? ranitidine (ZANTAC) 150 MG tablet Take 150 mg by mouth at bedtime.    ? senna-docusate (SENNOSIDES-DOCUSATE SODIUM) 8.6-50 mg tablet Take 1 tablet by mouth at bedtime.     ?  sevelamer carbonate (RENVELA) 800 mg tablet Take 2,400 mg by mouth 3 (three) times a day with meals.           ? timolol maleate (TIMOPTIC) 0.5 % ophthalmic solution Administer 1 drop to both eyes 2 (two) times a day.     ? traZODone (DESYREL) 50 MG tablet Take 50 mg by mouth at bedtime. 6/23/2019: Patient states she uses this almost every night.      ASSESSMENT:      ICD-10-CM    1. S/P ORIF (open reduction internal fixation) fracture Z96.7     Z87.81    2. Essential hypertension I10    3. Chronic atrial fibrillation (H) I48.2    4. ESRD (end stage renal disease) on dialysis (H) N18.6     Z99.2        PLAN:    S/P internal fixation using intramedullary nail. PT/OT/pain  Control, monitor incision for S/S infection   End-stage renal disease. Dialysis as scheduled.  M/W/F   Hypertension on Metoprolol Succinate   Chronic atrial fibrillation- on coumadin , metoprolol  Pain control Tylenol and Oxycodone as scheduled  check hip  x-ray due to increased pain.   Hyperkalemia.  Potassium was occasionally greater than 6 and was treated with dialysis 6/24 potassium 4.5  Anemia.  Chronic anemia was worsened with acute blood loss.  She received 1 unit of packed red blood cells HGB 7.9 on 6/27/19, dropped to 7.4 came up to 8.9 and 7.9 on 7/8. Monitor labs. Check occult stool  Insomnia-trazadone  75 mg HS  Constipation resolved continue  senna s to 2 tabs two times a day, Biscodyl suppository 10 mg SC q 3 days PRN if no BM            Electronically signed by: Le Flynn, CNP

## 2021-06-19 NOTE — LETTER
Letter by Johnson, Michael Duane, CNP at      Author: Johnson, Michael Duane, CNP Service: -- Author Type: --    Filed:  Encounter Date: 9/4/2019 Status: (Other)         Patient: Belia Rodriguez   MR Number: [id number]   YOB: 1945   Date of Visit: 9/4/2019     [facility]    Facility:   [facility] [[id number]]   Code Status: FULL CODE      CHIEF COMPLAINT/REASON FOR VISIT:  Chief Complaint   Patient presents with   ? Follow Up     rehab, esrd       HISTORY:      HPI: Belia is a 74 y.o. female who I had the pleasure to revisit with secondary to her hospitalization August 27, 2019 secondary to a fall sustaining left hip discomfort.  She is a post internal fixation of left intertrochanteric fracture from June 2019.  She also does have end-stage renal disease does attend dialysis 3 times weekly.  She has been normotensive and afebrile and also on room air.  For pain she can have oxycodone as needed usually takes 2 or 3 doses per day.  Also being followed and managed by the Coumadin clinic.  Had a chance to talk about her stay in the transitional care and she does feel that she is about 60% better.  No heartburn or reflux.  Appetite is good.  Sleeping well at night is on trazodone.  Is also on a number of other multivitamins and minerals.  No blood pressure issues.  No other particular complaints.    Past Medical History:   Diagnosis Date   ? Arthritis    ? CHF (congestive heart failure) (H)    ? Chronic anemia 6/1/2014   ? Chronic kidney disease    ? Chronic thoracic aortic dissection (H) 10/7/2015    Descending thoracic aorta; treated medically per notes of Dragan Singh and Jennifer.   ? COPD (chronic obstructive pulmonary disease) (H)    ? CVA (cerebral infarction)    ? Disease of thyroid gland    ? Dyslipidemia    ? ESRD (end stage renal disease) (H) 06/03/2009    on dialysis with Dr. Mitchell   ? Essential hypertension 6/30/2014   ? Gastrointestinal  hemorrhage, unspecified gastrointestinal hemorrhage type 6/5/2017   ? GI (gastrointestinal bleed)    ? GI bleeding 6/5/2017   ? Gout    ? L3 vertebral fracture (H) 11/16/2015   ? Left Atrial Appendage Occlusion (WATCHMAN) 4/5/2018    LAAO April 5, 2018 (30 mm WATCHMAN)   ? Obesity    ? ZULEIKA (obstructive sleep apnea), severe, intolerant of CPAP 10/22/2015   ? Pneumonia 9-7-2015   ? Right foot drop    ? Spinal stenosis 3/28/2016   ? Stroke (H) 3/24/2016             Family History   Problem Relation Age of Onset   ? Dementia Mother    ? Diabetes Mother    ? Arthritis Mother    ? Cancer Mother    ? Depression Mother    ? Heart disease Mother    ? Vision loss Mother    ? Stroke Father    ? Heart disease Father    ? Breast cancer Neg Hx      Social History     Socioeconomic History   ? Marital status:      Spouse name: Cayden   ? Number of children: 2   ? Years of education: Not on file   ? Highest education level: Not on file   Occupational History     Employer: RETIRED   Social Needs   ? Financial resource strain: Not on file   ? Food insecurity:     Worry: Not on file     Inability: Not on file   ? Transportation needs:     Medical: Not on file     Non-medical: Not on file   Tobacco Use   ? Smoking status: Former Smoker     Packs/day: 1.50     Years: 37.00     Pack years: 55.50     Types: Cigarettes     Last attempt to quit: 1/1/2009     Years since quitting: 10.6   ? Smokeless tobacco: Never Used   Substance and Sexual Activity   ? Alcohol use: No     Alcohol/week: 7.0 oz     Types: 14 Standard drinks or equivalent per week     Comment: 14 mixed drinks per week   ? Drug use: No   ? Sexual activity: Never     Partners: Male   Lifestyle   ? Physical activity:     Days per week: Not on file     Minutes per session: Not on file   ? Stress: Not on file   Relationships   ? Social connections:     Talks on phone: Not on file     Gets together: Not on file     Attends Sabianist service: Not on file     Active member of  club or organization: Not on file     Attends meetings of clubs or organizations: Not on file     Relationship status: Not on file   ? Intimate partner violence:     Fear of current or ex partner: Not on file     Emotionally abused: Not on file     Physically abused: Not on file     Forced sexual activity: Not on file   Other Topics Concern   ? Not on file   Social History Narrative    Lives with her . Daughter in [place] and daughter in [place].         Review of Systems  Currently she denies any chills and fever coughing wheezing chest pain dizziness or vertigo nausea vomiting diarrhea dysuria headache stiff neck swollen glands rashes or sores.  History of end-stage renal disease on dialysis along with GERD hypertension CHF pacemaker chronic low back pain sleep apnea intolerant to CPAP and a right foot drop.      Current Outpatient Medications:   ?  acetaminophen (TYLENOL) 500 MG tablet, Take 1,000 mg by mouth 3 (three) times a day as needed., Disp: , Rfl:   ?  atorvastatin (LIPITOR) 10 MG tablet, Take 10 mg by mouth at bedtime., Disp: , Rfl:   ?  B complex-vitamin C-folic acid (DIALYVITE) 100-1 mg Tab, Take 1 tablet by mouth daily with lunch.    , Disp: , Rfl:   ?  chlorpheniramine/dextromethorp (CORICIDIN HBP COUGH AND COLD ORAL), Take 1 tablet by mouth every 6 (six) hours as needed., Disp: , Rfl:   ?  cholecalciferol, vitamin D3, (VITAMIN D3) 2,000 unit Tab, Take 2,000 Units by mouth daily with lunch.    , Disp: , Rfl:   ?  cinacalcet (SENSIPAR) 30 MG tablet, Take 30 mg by mouth see administration instructions. Take three times weekly with dialysis (on Mondays, Wednesdays, and Fridays).   , Disp: , Rfl:   ?  folic acid (FOLVITE) 1 MG tablet, Take 1 mg by mouth daily with lunch.    , Disp: , Rfl:   ?  gabapentin (NEURONTIN) 100 MG capsule, Take 100 mg by mouth 3 (three) times a day., Disp: , Rfl:   ?  Lactobacillus rhamnosus GG (CULTURELLE) 10-15 Billion cell capsule, Take 1 capsule by mouth daily with  lunch., Disp: , Rfl:   ?  metoprolol succinate (TOPROL-XL) 25 MG, Take 25 mg by mouth daily with lunch.    , Disp: , Rfl:   ?  midodrine HCl (MIDODRINE ORAL), Take 10 mg by mouth see administration instructions. Take 10 mg at the beginning of dialysis and 10 mg correction through dialysis three days weekly on dialysis days (Mondays, Wednesdays, and Fridays).   , Disp: , Rfl:   ?  omeprazole (PRILOSEC) 20 MG capsule, Take 20 mg by mouth 2 (two) times daily before lunch and supper.    , Disp: , Rfl:   ?  oxyCODONE (ROXICODONE) 5 MG immediate release tablet, Take 0.5-1 tablets (2.5-5 mg total) by mouth every 4 (four) hours as needed., Disp: 40 tablet, Rfl: 0  ?  polyvinyl alcohol (LIQUIFILM TEARS) 1.4 % ophthalmic solution, Apply 1 drop to eye as needed for dry eyes., Disp: , Rfl:   ?  pot bicarb-sod bicarb-cit ac (EDI-SELTZER GOLD) 344-1,050-1,000 mg TbEF, Take 1 tablet by mouth every 4 (four) hours as needed., Disp: , Rfl:   ?  ranitidine (ZANTAC) 150 MG tablet, Take 150 mg by mouth at bedtime., Disp: , Rfl:   ?  senna-docusate (SENNOSIDES-DOCUSATE SODIUM) 8.6-50 mg tablet, Take 1 tablet by mouth every evening.    , Disp: , Rfl:   ?  sucroferric oxyhydroxide (VELPHORO) 500 mg Chew chewable tablet, Chew 500 mg 3 (three) times a day with meals., Disp: , Rfl:   ?  timolol maleate (TIMOPTIC) 0.5 % ophthalmic solution, Administer 1 drop to both eyes 2 (two) times a day. , Disp: , Rfl:   ?  traZODone (DESYREL) 150 MG tablet, Take 75 mg by mouth at bedtime., Disp: , Rfl:   ?  warfarin (COUMADIN/JANTOVEN) 3 MG tablet, Take 3-4.5 mg by mouth See Admin Instructions. Take 4.5 mg two days weekly (on Tuesdays and Fridays) and 3 mg five days weekly (on all other days of the week). Adjust dose based on INR results as directed., Disp: , Rfl:     .There were no vitals filed for this visit.  Blood pressure 145/84 pulse 79 temperature 97.8 saturation room air 96%  Physical Exam   Constitutional: No distress.   HENT:   Neck: Neck supple.  No thyromegaly present.   Cardiovascular: Normal rate, regular rhythm and normal heart sounds.   Pacemaker.  Dialysis port right upper thorax   Pulmonary/Chest: Breath sounds normal.   History of sleep apnea intolerant to CPAP.   Abdominal: Bowel sounds are normal. There is no tenderness. There is no guarding.   Musculoskeletal:   History of right hip intertrochanteric fracture status post nail June 2019.  History of falls.   Lymphadenopathy:     She has no cervical adenopathy.   Neurological: She is alert.   Skin: Skin is warm and dry. No rash noted.   Psychiatric: Her behavior is normal.    LABS:   Lab Results   Component Value Date    WBC 4.5 07/08/2019    HGB 8.9 (L) 07/15/2019    HCT 25.8 (L) 07/08/2019     (H) 07/08/2019     07/08/2019         ASSESSMENT:      ICD-10-CM    1. Closed displaced intertrochanteric fracture of left femur with routine healing, subsequent encounter S72.142D    2. ESRD on dialysis (H) N18.6     Z99.2    3. Essential hypertension with goal blood pressure less than 140/90 I10    4. Chronic diastolic congestive heart failure (H) I50.32        PLAN:    No other changes.  Continue with dialysis 3 times weekly.  Is about 60% better.  She did not have any other questions.      Electronically signed by: Michael Duane Johnson, CNP

## 2021-06-19 NOTE — LETTER
Letter by Le Flynn CNP at      Author: Le Flynn CNP Service: -- Author Type: --    Filed:  Encounter Date: 7/15/2019 Status: (Other)         Patient: Belia Rodriguez   MR Number: [id number]   YOB: 1945   Date of Visit: 7/15/2019     [facility]    Facility:   [facility] [[id number]]   Code Status: FULL CODE      CHIEF COMPLAINT/REASON FOR VISIT:  Chief Complaint   Patient presents with   ? Review Of Multiple Medical Conditions       HISTORY:      HPI: Belia is a 73 y.o. female undergoing physical and  occupational therapy at [facility]. with history of ESRD on hemodialysis M/W/F, chronic thrombocytopenia, chronic atrial fibrillation, sick sinus syndrome, status post watchman device, status post pacemaker, diastolic CHF, hypertension, ZULEIKA, anemia of chronic kidney disease, dyslipidemia who was brought to the emergency department for evaluation after a fall.  The patient uses a walker for ambulation due to right foot drop and while at home tripped on the kitchen floor mat when she turned and fell.  She denies head trauma or loss of consciousness but complained of left hip pain.  In the ED, she was hemodynamically stable.  X-rays showed a displaced, comminuted intertrochanteric fracture of left femur. She underwent a left intramedullary nailing.     Today she is seen for a routine  visit to review multiple medical issues. She denied CP. She does have shortness of breath with activity. She is on 2 L NC at night due to unable to tolerate C- Pap after many tries She occasionally will wear 1L NC  during the day.  She  is with right foot drop with shoes on order per her report. . Her HGB was noted to be 7.4 on 7/2, came up to  8.9 on 7/5 and on 7/8 dropped to 7.9.   She denied any active bleeding but is with multiple bruising bilateral upper extremities. However last 3 occult stools were negative but she had a large amount of  bleeding that was noted by staff in the toilet. Her weight is up 3 pounds and she will have a dialysis run today.     Past Medical History:   Diagnosis Date   ? Arthritis    ? CHF (congestive heart failure) (H)    ? Chronic anemia 6/1/2014   ? Chronic kidney disease    ? Chronic thoracic aortic dissection (H) 10/7/2015    Descending thoracic aorta; treated medically per notes of Dragan Singh and Jennifer.   ? COPD (chronic obstructive pulmonary disease) (H)    ? CVA (cerebral infarction)    ? Disease of thyroid gland    ? Dyslipidemia    ? ESRD (end stage renal disease) (H) 06/03/2009    on dialysis with Dr. Mitchell   ? Essential hypertension 6/30/2014   ? Gastrointestinal hemorrhage, unspecified gastrointestinal hemorrhage type 6/5/2017   ? GI (gastrointestinal bleed)    ? GI bleeding 6/5/2017   ? Gout    ? L3 vertebral fracture (H) 11/16/2015   ? Left Atrial Appendage Occlusion (WATCHMAN) 4/5/2018    LAAO April 5, 2018 (30 mm WATCHMAN)   ? Obesity    ? ZULEIKA (obstructive sleep apnea), severe, intolerant of CPAP 10/22/2015   ? Pneumonia 9-7-2015   ? Right foot drop    ? Spinal stenosis 3/28/2016   ? Stroke (H) 3/24/2016             Family History   Problem Relation Age of Onset   ? Dementia Mother    ? Diabetes Mother    ? Arthritis Mother    ? Cancer Mother    ? Depression Mother    ? Heart disease Mother    ? Vision loss Mother    ? Stroke Father    ? Heart disease Father    ? Breast cancer Neg Hx      Social History     Socioeconomic History   ? Marital status:      Spouse name: Cayden   ? Number of children: 2   ? Years of education: Not on file   ? Highest education level: Not on file   Occupational History     Employer: RETIRED   Social Needs   ? Financial resource strain: Not on file   ? Food insecurity:     Worry: Not on file     Inability: Not on file   ? Transportation needs:     Medical: Not on file     Non-medical: Not on file   Tobacco Use   ? Smoking status: Former Smoker     Packs/day: 1.50      "Years: 37.00     Pack years: 55.50     Types: Cigarettes     Last attempt to quit: 1/1/2009     Years since quitting: 10.5   ? Smokeless tobacco: Never Used   Substance and Sexual Activity   ? Alcohol use: No     Alcohol/week: 7.0 oz     Types: 14 Standard drinks or equivalent per week     Comment: 14 mixed drinks per week   ? Drug use: No   ? Sexual activity: Never     Partners: Male   Lifestyle   ? Physical activity:     Days per week: Not on file     Minutes per session: Not on file   ? Stress: Not on file   Relationships   ? Social connections:     Talks on phone: Not on file     Gets together: Not on file     Attends Latter-day service: Not on file     Active member of club or organization: Not on file     Attends meetings of clubs or organizations: Not on file     Relationship status: Not on file   ? Intimate partner violence:     Fear of current or ex partner: Not on file     Emotionally abused: Not on file     Physically abused: Not on file     Forced sexual activity: Not on file   Other Topics Concern   ? Not on file   Social History Narrative    Lives with her . Daughter in [place] and daughter in [place].         Review of Systems   Constitutional: Negative for activity change, appetite change, fatigue and fever.   HENT: Negative for congestion.    Respiratory: Positive for shortness of breath. Negative for cough and wheezing.         Dialysis port R upper chest    Cardiovascular: Negative for chest pain and leg swelling.   Gastrointestinal: Negative for abdominal distention, abdominal pain, constipation, diarrhea and nausea.   Genitourinary: Negative for dysuria.        She reported she voids, \"a little, enough to keep me from getting a UTI.\"   Musculoskeletal: Negative for arthralgias and back pain.   Skin: Positive for wound. Negative for color change.   Neurological: Negative for dizziness.   Psychiatric/Behavioral: Positive for sleep disturbance. Negative for agitation, behavioral problems " and confusion.         On trazodone        .  Vitals:    07/15/19 0822   BP: 109/64   Pulse: 82   Resp: 20   Temp: 97.7  F (36.5  C)   SpO2: 96%   Weight: 177 lb (80.3 kg)       Physical Exam   Constitutional: She is oriented to person, place, and time. She appears well-developed and well-nourished.   pleasant woman in no acute distress   HENT:   Head: Normocephalic and atraumatic.   Eyes: Pupils are equal, round, and reactive to light. Conjunctivae are normal.   Neck: Normal range of motion. Neck supple.   Cardiovascular: Normal rate, regular rhythm and normal heart sounds.   No murmur heard.  Pulmonary/Chest: Effort normal and breath sounds normal. She has no wheezes. She has no rales.   Abdominal: Soft. Bowel sounds are normal. She exhibits no distension. There is no tenderness.   Musculoskeletal: Normal range of motion. She exhibits edema.   Right foot drop  2+ edema left leg.    Neurological: She is alert and oriented to person, place, and time.   Neuropathy right foot and decreased sensation bottom of left foot.     Skin: Skin is warm and dry.   Left hip wound    Psychiatric: She has a normal mood and affect. Her behavior is normal.         LABS:   Recent Results (from the past 240 hour(s))   HM2(CBC w/o Differential)   Result Value Ref Range    WBC 5.5 4.0 - 11.0 thou/uL    RBC 2.74 (L) 3.80 - 5.40 mill/uL    Hemoglobin 8.9 (L) 12.0 - 16.0 g/dL    Hematocrit 28.3 (L) 35.0 - 47.0 %     (H) 80 - 100 fL    MCH 32.5 27.0 - 34.0 pg    MCHC 31.4 (L) 32.0 - 36.0 g/dL    RDW 21.8 (H) 11.0 - 14.5 %    Platelets 208 140 - 440 thou/uL    MPV 9.5 8.5 - 12.5 fL   HM2(CBC w/o Differential)   Result Value Ref Range    WBC 4.5 4.0 - 11.0 thou/uL    RBC 2.40 (L) 3.80 - 5.40 mill/uL    Hemoglobin 7.9 (L) 12.0 - 16.0 g/dL    Hematocrit 25.8 (L) 35.0 - 47.0 %     (H) 80 - 100 fL    MCH 32.9 27.0 - 34.0 pg    MCHC 30.6 (L) 32.0 - 36.0 g/dL    RDW 22.6 (H) 11.0 - 14.5 %    Platelets 188 140 - 440 thou/uL    MPV 9.9  8.5 - 12.5 fL   Occult Blood, Fecal   Result Value Ref Range    Occult Blood, Stool #1 Negative Negative   Occult Blood, Fecal   Result Value Ref Range    Occult Blood, Stool #1 Negative Negative   Occult Blood, Fecal   Result Value Ref Range    Occult Blood, Stool #1 Negative Negative     Current Outpatient Medications   Medication Sig Note   ? acetaminophen (TYLENOL) 500 MG tablet Take 2 tablets (1,000 mg total) by mouth 3 (three) times a day.    ? aspirin 325 MG tablet Take 1 tablet (325 mg total) by mouth 2 (two) times a day.    ? atorvastatin (LIPITOR) 10 MG tablet Take 10 mg by mouth at bedtime.    ? B complex-vitamin C-folic acid (DIALYVITE) 100-1 mg Tab Take 1 tablet by mouth daily.    ? chlorpheniramine/dextromethorp (CORICIDIN HBP COUGH AND COLD ORAL) Take 1 tablet by mouth every 6 (six) hours as needed.    ? cholecalciferol, vitamin D3, 2,000 unit cap Take 2,000 Units by mouth daily with lunch.     ? cinacalcet (SENSIPAR) 30 MG tablet Take 30 mg by mouth 3 times weekly with dialysis          ? diphenhydrAMINE (BENADRYL) 25 mg tablet Take 50 mg by mouth at bedtime as needed for sleep.    ? folic acid (FOLVITE) 1 MG tablet Take 1 mg by mouth daily.    ? gabapentin (NEURONTIN) 100 MG capsule Take 100 mg by mouth 3 (three) times a day.    ? Lactobacillus rhamnosus GG (CULTURELLE) 10-15 Billion cell capsule Take 1 capsule by mouth daily with lunch.    ? metoprolol succinate (TOPROL-XL) 25 MG Take 25 mg by mouth daily.    ? midodrine HCl (MIDODRINE ORAL) Take 15 mg by mouth 3 (three) times a week. Three times weekly before dialysis.           ? oxyCODONE (ROXICODONE) 5 MG immediate release tablet Take 0.5-1 tablets (2.5-5 mg total) by mouth every 4 (four) hours as needed.    ? polyvinyl alcohol (LIQUIFILM TEARS) 1.4 % ophthalmic solution Apply 1 drop to eye as needed for dry eyes.    ? ranitidine (ZANTAC) 150 MG tablet Take 150 mg by mouth at bedtime.    ? senna-docusate (SENNOSIDES-DOCUSATE SODIUM) 8.6-50 mg  tablet Take 1 tablet by mouth at bedtime.     ? sevelamer carbonate (RENVELA) 800 mg tablet Take 2,400 mg by mouth 3 (three) times a day with meals.           ? timolol maleate (TIMOPTIC) 0.5 % ophthalmic solution Administer 1 drop to both eyes 2 (two) times a day.     ? traZODone (DESYREL) 50 MG tablet Take 50 mg by mouth at bedtime. 6/23/2019: Patient states she uses this almost every night.      ASSESSMENT:      ICD-10-CM    1. Chronic atrial fibrillation (H) I48.2    2. Essential hypertension I10    3. Pain management R52    4. Closed displaced intertrochanteric fracture of left femur with routine healing, subsequent encounter S72.142D        PLAN:    S/P internal fixation using intramedullary nail. PT/OT/pain  Control, monitor incision for S/S infection   End-stage renal disease. Dialysis as scheduled.  M/W/F   Hypertension on Metoprolol Succinate   Chronic atrial fibrillation- on coumadin , metoprolol  Pain control Tylenol and Oxycodone as scheduled  check hip  x-ray due to increased pain.   Hyperkalemia.  Potassium was occasionally greater than 6 and was treated with dialysis 6/24 potassium 4.5  Anemia.  Chronic anemia was worsened with acute blood loss.  She received 1 unit of packed red blood cells HGB 7.9 on 6/27/19, dropped to 7.4 came up to 8.9 and 7.9 on 7/8. Monitor labs. Check occult stool  Insomnia-trazadone  75 mg HS  Constipation resolved continue  senna s to 2 tabs two times a day, Biscodyl suppository 10 mg [place] q 3 days PRN if no BM   GI bleeding - occults negative, will check a stat HGB when pt returns from appointment. Currently she is on  mg two times a day            Electronically signed by: Le Flynn CNP

## 2021-06-19 NOTE — LETTER
Letter by Johnson, Michael Duane, CNP at      Author: Johnson, Michael Duane, CNP Service: -- Author Type: --    Filed:  Encounter Date: 9/11/2019 Status: (Other)         Patient: Belia Rodriguez   MR Number: [id number]   YOB: 1945   Date of Visit: 9/11/2019     [facility]    Facility:   [facility] [[id number]]   Code Status: FULL CODE      CHIEF COMPLAINT/REASON FOR VISIT:  Chief Complaint   Patient presents with   ? Follow Up     rehab, esrd, htn, gait       HISTORY:      HPI: Belia is a 74 y.o. female who I had the opportunity to revisit with secondary to her hospitalization August 27 secondary to fall and did have a status post anterior trochanteric fixator from June 2019.  Does attend dialysis 3 times weekly secondary end-stage renal disease.  She does feel that her gait has improved she is increasing her distance as well as competence with her front wheel walker.  Regarding her pain she does take oxycodone as needed usually about 2 doses per day.  She does have some allergies would like to renew her Claritin she is taking that at home.  No heartburn or reflux is on omeprazole.  Also does wear CLARITA hose.  For sleep on trazodone 75 mg.  Also she is being followed and managed by the Coumadin clinic    Past Medical History:   Diagnosis Date   ? Arthritis    ? CHF (congestive heart failure) (H)    ? Chronic anemia 6/1/2014   ? Chronic kidney disease    ? Chronic thoracic aortic dissection (H) 10/7/2015    Descending thoracic aorta; treated medically per notes of Dragan Singh and Jennifer.   ? COPD (chronic obstructive pulmonary disease) (H)    ? CVA (cerebral infarction)    ? Disease of thyroid gland    ? Dyslipidemia    ? ESRD (end stage renal disease) (H) 06/03/2009    on dialysis with Dr. Mitchell   ? Essential hypertension 6/30/2014   ? Gastrointestinal hemorrhage, unspecified gastrointestinal hemorrhage type 6/5/2017   ? GI (gastrointestinal bleed)     ? GI bleeding 6/5/2017   ? Gout    ? L3 vertebral fracture (H) 11/16/2015   ? Left Atrial Appendage Occlusion (WATCHMAN) 4/5/2018    LAAO April 5, 2018 (30 mm WATCHMAN)   ? Obesity    ? ZULEIKA (obstructive sleep apnea), severe, intolerant of CPAP 10/22/2015   ? Pneumonia 9-7-2015   ? Right foot drop    ? Spinal stenosis 3/28/2016   ? Stroke (H) 3/24/2016             Family History   Problem Relation Age of Onset   ? Dementia Mother    ? Diabetes Mother    ? Arthritis Mother    ? Cancer Mother    ? Depression Mother    ? Heart disease Mother    ? Vision loss Mother    ? Stroke Father    ? Heart disease Father    ? Breast cancer Neg Hx      Social History     Socioeconomic History   ? Marital status:      Spouse name: Cayden   ? Number of children: 2   ? Years of education: Not on file   ? Highest education level: Not on file   Occupational History     Employer: RETIRED   Social Needs   ? Financial resource strain: Not on file   ? Food insecurity:     Worry: Not on file     Inability: Not on file   ? Transportation needs:     Medical: Not on file     Non-medical: Not on file   Tobacco Use   ? Smoking status: Former Smoker     Packs/day: 1.50     Years: 37.00     Pack years: 55.50     Types: Cigarettes     Last attempt to quit: 1/1/2009     Years since quitting: 10.6   ? Smokeless tobacco: Never Used   Substance and Sexual Activity   ? Alcohol use: No     Alcohol/week: 7.0 oz     Types: 14 Standard drinks or equivalent per week     Comment: 14 mixed drinks per week   ? Drug use: No   ? Sexual activity: Never     Partners: Male   Lifestyle   ? Physical activity:     Days per week: Not on file     Minutes per session: Not on file   ? Stress: Not on file   Relationships   ? Social connections:     Talks on phone: Not on file     Gets together: Not on file     Attends Advent service: Not on file     Active member of club or organization: Not on file     Attends meetings of clubs or organizations: Not on file      Relationship status: Not on file   ? Intimate partner violence:     Fear of current or ex partner: Not on file     Emotionally abused: Not on file     Physically abused: Not on file     Forced sexual activity: Not on file   Other Topics Concern   ? Not on file   Social History Narrative    Lives with her . Daughter in [place] and daughter in [place].         Review of Systems  Currently she denies any chills and fever coughing wheezing chest pain dizziness or vertigo nausea vomiting diarrhea dysuria headache stiff neck swollen glands rashes or sores.  History of end-stage renal disease on dialysis along with GERD hypertension CHF pacemaker chronic low back pain sleep apnea intolerant to CPAP and a right foot drop.     Current Outpatient Medications   Medication Sig Note   ? loratadine (CLARITIN) 10 mg tablet Take 10 mg by mouth daily.    ? acetaminophen (TYLENOL) 500 MG tablet Take 1,000 mg by mouth 3 (three) times a day as needed.    ? atorvastatin (LIPITOR) 10 MG tablet Take 10 mg by mouth at bedtime.    ? B complex-vitamin C-folic acid (DIALYVITE) 100-1 mg Tab Take 1 tablet by mouth daily with lunch.           ? chlorpheniramine/dextromethorp (CORICIDIN HBP COUGH AND COLD ORAL) Take 1 tablet by mouth every 6 (six) hours as needed.    ? cholecalciferol, vitamin D3, (VITAMIN D3) 2,000 unit Tab Take 2,000 Units by mouth daily with lunch.           ? cinacalcet (SENSIPAR) 30 MG tablet Take 30 mg by mouth see administration instructions. Take three times weekly with dialysis (on Mondays, Wednesdays, and Fridays).          ? folic acid (FOLVITE) 1 MG tablet Take 1 mg by mouth daily with lunch.           ? gabapentin (NEURONTIN) 100 MG capsule Take 100 mg by mouth 3 (three) times a day.    ? Lactobacillus rhamnosus GG (CULTURELLE) 10-15 Billion cell capsule Take 1 capsule by mouth daily with lunch.    ? metoprolol succinate (TOPROL-XL) 25 MG Take 25 mg by mouth daily with lunch.           ? midodrine HCl  (MIDODRINE ORAL) Take 10 mg by mouth see administration instructions. Take 10 mg at the beginning of dialysis and 10 mg prison through dialysis three days weekly on dialysis days (Mondays, Wednesdays, and Fridays).          ? omeprazole (PRILOSEC) 20 MG capsule Take 20 mg by mouth 2 (two) times daily before lunch and supper.           ? oxyCODONE (ROXICODONE) 5 MG immediate release tablet Take 0.5-1 tablets (2.5-5 mg total) by mouth every 4 (four) hours as needed.    ? polyvinyl alcohol (LIQUIFILM TEARS) 1.4 % ophthalmic solution Apply 1 drop to eye as needed for dry eyes.    ? pot bicarb-sod bicarb-cit ac (EDI-SELTZER GOLD) 344-1,050-1,000 mg TbEF Take 1 tablet by mouth every 4 (four) hours as needed.    ? ranitidine (ZANTAC) 150 MG tablet Take 150 mg by mouth at bedtime.    ? senna-docusate (SENNOSIDES-DOCUSATE SODIUM) 8.6-50 mg tablet Take 1 tablet by mouth every evening.           ? sucroferric oxyhydroxide (VELPHORO) 500 mg Chew chewable tablet Chew 500 mg 3 (three) times a day with meals.    ? timolol maleate (TIMOPTIC) 0.5 % ophthalmic solution Administer 1 drop to both eyes 2 (two) times a day.     ? traZODone (DESYREL) 150 MG tablet Take 75 mg by mouth at bedtime. 8/27/2019: 8/27/2019: Per the patient, she has been taking 150 mg at bedtime. Please address.   ? warfarin (COUMADIN/JANTOVEN) 3 MG tablet Take 3-4.5 mg by mouth See Admin Instructions. Take 4.5 mg two days weekly (on Tuesdays and Fridays) and 3 mg five days weekly (on all other days of the week). Adjust dose based on INR results as directed.        .There were no vitals filed for this visit.  Blood pressure 131/73 pulse 80 temperature 98.6 saturation room air 98%  Physical Exam  Constitutional: No distress.   HENT:   Cardiovascular: Normal rate, regular rhythm and normal heart sounds.   Pacemaker.  Dialysis port right upper thorax   Pulmonary/Chest: Breath sounds normal.   History of sleep apnea intolerant to CPAP.   Abdominal: Bowel sounds are  normal. There is no tenderness. There is no guarding.   Musculoskeletal:   History of right hip intertrochanteric fracture status post nail June 2019.  History of falls.   Skin: Skin is warm and dry. No rash noted.   Psychiatric: Her behavior is normal.       LABS:   Lab Results   Component Value Date    WBC 4.5 07/08/2019    HGB 8.9 (L) 07/15/2019    HCT 25.8 (L) 07/08/2019     (H) 07/08/2019     07/08/2019         ASSESSMENT:      ICD-10-CM    1. ZULEIKA (obstructive sleep apnea), severe G47.33    2. Essential hypertension with goal blood pressure less than 140/90 I10    3. ESRD on dialysis (H) N18.6     Z99.2    4. Leg weakness, bilateral R29.898        PLAN:    She is making pretty good progress and increasing her distance.  It looks like there is a potential discharge for sometime in the near future and at least by the weekend.  We will give her the Claritin per her request because she is myopic for secondary to allergies.  She did not have any other questions.      Electronically signed by: Michael Duane Johnson, AYAH

## 2021-06-19 NOTE — LETTER
Letter by John Escoto DO at      Author: John Escoto DO Service: -- Author Type: --    Filed:  Encounter Date: 11/20/2019 Status: Signed         Belia Rodriguez  [address] [place] [id number]             November 20, 2019         Dear Ms. Rodriguez,    Below are the results from your recent visit:    Resulted Orders   Lipid Cascade   Result Value Ref Range    Cholesterol 116 <=199 mg/dL    Triglycerides 122 <=149 mg/dL    HDL Cholesterol 57 >=50 mg/dL    LDL Calculated 35 <=129 mg/dL    Patient Fasting > 8hrs? No        No concerns with the cholesterol numbers.  It looks good    Please call with questions or contact us using [employer].    Sincerely,        Electronically signed by John Escoto DO

## 2021-06-19 NOTE — LETTER
Letter by Andria Woods CNP at      Author: Andria Woods CNP Service: -- Author Type: --    Filed:  Encounter Date: 7/5/2019 Status: (Other)         Patient: Belia Rodriguez   MR Number: [id number]   YOB: 1945   Date of Visit: 7/5/2019     Code Status:  FULL CODE  Visit Type: Problem Visit     Facility:  [facility] [[id number]]        Facility Type: SNF (Skilled Nursing Facility, TCU)    History of Present Illness: Belia Rodriguez is a 73 y.o. female seen for TCU follow-up visit today.  She has a past medical history for ESRD on HD Monday Wednesday Friday, chronic thrombocytopenia, atrial fib, sick sinus syndrome, status post watchman, status post pacer, diastolic CHF, hypertension, ZULEIKA, anemia, CKD, HLD, with chronic right foot drop.  She is here for rehab after hospitalization following a fall in which he sustained a displaced comminuted intertrochanteric fracture of her left femur.  She underwent left intramedullary nailing.  She did have trouble with hypoxia especially at night and she cannot tolerate CPAP and so she was discharged with nasal cannula oxygen 2 L at night.    Today, nursing requests that I see her due to increased knee pain and LE edema.  They also report that she has had 2 incidences of blood in stool.  They are unable to tell me if the blood was in the stool, toilet or just on the toilet paper. She does have a history of a low hgb with last hgb in the 7s.  She denies any dizziness, increased fatigue or shortness of breath. She reports here stools require her to strain and she does have some pain that resolves after the bowel movement.     The patient states that she is having more pain in her left leg.  Her lower extremities are wraps with ace wraps and she has 1+ pitting edema in feet but lower legs have no edema.  It appears that there are 2 aces on each leg and the swelling is below the cut off from the lower ace and the start of the upper  ace.  She is only using oxycodone 2x/day.  And she reports she was not aware of how often she was taking it.        Review of Systems   Patient denies fever, chills, headache, lightheadedness, dizziness, rhinorrhea, cough, congestion, shortness of breath, chest pain, palpitations, abdominal pain, n/v, diarrhea, constipation, change in appetite, dysuria, frequency, burning or pain with urination.  Other than stated in HPI all other review of systems is negative.         Physical Exam   Vital signs: VSS reviewed in the TCU record  GENERAL APPEARANCE: Well developed, well nourished, in no acute distress.  HEENT: normocephalic, atraumatic  PERRL, sclerae anicteric, conjunctivae clear and moist, EOM intact  LUNGS: Lung sounds CTA, no adventitious sounds, respiratory effort decreased  CARD: RRR, S1, S2, without murmurs, gallops, rubs, no JVD  ABD: Soft and nontender with normal bowel sounds.   MSK: Muscle strength and tone were normal.  EXTREMITIES:+1 pitting edema to feet bilaterally  NEURO: Alert and oriented x 3.  Face is symmetric.  SKIN: Inspection of the skin reveals no rashes, ulcerations or petechiae.  PSYCH: euthymic          Labs:    Recent Results (from the past 240 hour(s))   Hemoglobin   Result Value Ref Range    Hemoglobin 8.0 (L) 12.0 - 16.0 g/dL   Crossmatch   Result Value Ref Range    Crossmatch COMPATIBLE     Blood Expiration Date [id number]     Unit Type O Pos     Unit Number [id number]     Status Transfused     Component Red Blood Cells     PRODUCT CODE [id number]     Issue Date and Time [id number]     Blood Type 5100     CODING SYSTEM [id number]    Crossmatch   Result Value Ref Range    Crossmatch COMPATIBLE     Blood Expiration Date [id number]     Unit Type O Pos     Unit Number [id number]     Status Released     Component Red Blood Cells     PRODUCT CODE [id number]     Blood Type 5100     CODING SYSTEM [id number]    APTT  (if on Coumadin)   Result Value Ref Range    PTT 52 (H) 24 - 37  seconds   Hemoglobin   Result Value Ref Range    Hemoglobin 7.9 (L) 12.0 - 16.0 g/dL   HM2(CBC w/o Differential)   Result Value Ref Range    WBC 3.9 (L) 4.0 - 11.0 thou/uL    RBC 2.30 (L) 3.80 - 5.40 mill/uL    Hemoglobin 7.4 (L) 12.0 - 16.0 g/dL    Hematocrit 24.0 (L) 35.0 - 47.0 %     (H) 80 - 100 fL    MCH 32.2 27.0 - 34.0 pg    MCHC 30.8 (L) 32.0 - 36.0 g/dL    RDW 21.1 (H) 11.0 - 14.5 %    Platelets 139 (L) 140 - 440 thou/uL    MPV 11.5 8.5 - 12.5 fL   HM2(CBC w/o Differential)   Result Value Ref Range    WBC 5.5 4.0 - 11.0 thou/uL    RBC 2.74 (L) 3.80 - 5.40 mill/uL    Hemoglobin 8.9 (L) 12.0 - 16.0 g/dL    Hematocrit 28.3 (L) 35.0 - 47.0 %     (H) 80 - 100 fL    MCH 32.5 27.0 - 34.0 pg    MCHC 31.4 (L) 32.0 - 36.0 g/dL    RDW 21.8 (H) 11.0 - 14.5 %    Platelets 208 140 - 440 thou/uL    MPV 9.5 8.5 - 12.5 fL         Assessment:  1. BRBPR (bright red blood per rectum)     2. Anemia, unspecified type     3. S/P ORIF (open reduction internal fixation) fracture     4. Pain management         Plan:  Rectal bleeding: GI bleed vs hemorrhoids.  Will check CBC STAT if hgb greater than 7.0 then will start on hemorrhoidal suppositories and observe for other bleeding symptoms.  If less then 7.0 then will send into the ER for further evaluation of bleeding and the need for transfusion.      Update:  Hgb returned and was 8.9 up from 7.4 earlier this week.  Will start hemorrhoid suppositories and observe.  Nursing to be descriptive in assessing any ongoing bleeding.  If continues will consider abdominal CT.  If becomes symptomatic will send to ER. Coordinated plan with nursing regarding treatment and plan for the weekend.     Anemia: improving, continue to monitor    S/P ORIF: continue with therapy, will have therapy provide US and warm packs with stretching for pain. Counseled patient to use oxycodone more often to get ahead of her pain and when she is more stretched out then could back off on pain  medications.               Electronically signed by: Andria Woods, CNP

## 2021-06-19 NOTE — LETTER
Letter by Johnson, Michael Duane, CNP at      Author: Johnson, Michael Duane, CNP Service: -- Author Type: --    Filed:  Encounter Date: 9/13/2019 Status: (Other)         Patient: Belia Rodriguez   MR Number: [id number]   YOB: 1945   Date of Visit: 9/13/2019     [facility]    Facility:   [facility] [[id number]]   Code Status: FULL CODE  PCP: John Escoto DO   Phone: [phone]   Fax: [phone]      CHIEF COMPLAINT/REASON FOR VISIT:  Chief Complaint   Patient presents with   ? Discharge Summary       HISTORY COURSE:  Belia is a 74 y.o. female who was seen secondary to her hospitalization August 27, 2019 secondary to a fall sustaining left hip discomfort.  She is a status post internal fixation of a left intertrochanteric hip fracture with biliary nail in June 2019.  She eventually was discharged from the transitional care unit and tripped and fell.  Therefore after further evaluation she was sent back to transitional care unit to work on her strength and balance and conditioning.  She does have a history of end-stage renal disease and does attend dialysis 3 times weekly.  She has been normotensive and afebrile.  Has a pacemaker from March 2019.  Does wear a right leg AFO secondary to foot drop.  History of chronic back pain.  She has been on the transitional care unit and even despite her being in dialysis 3 times weekly she does feel that she is made some progress with her ambulation along with that has increased competence with her ambulation is able to use her 2 wheeled walker up and down the hallway.  She will also receive some home services upon discharge.  Her appetite is good sleeping well at night she does have chronic pain she does take oxycodone as needed usually 2 or 3 doses extra per day she can also have Tylenol as needed she takes 1 dose per day.  She is also on warfarin being followed by the Coumadin clinic.  Is on a number of  multivitamins and minerals.  She has been normotensive and afebrile and also on room air during the day.  No heartburn or reflux.  Bowels are regular sleeping well at night is on trazodone 75 mg.  We did a chance to talk about her discharge and she does feel comfortable upon going home managing her medications but also receiving various services.  Review of Systems  Currently she denies any chills and fever coughing wheezing chest pain dizziness or vertigo nausea vomiting diarrhea dysuria headache stiff neck swollen glands rashes or sores.  History of end-stage renal disease on dialysis along with GERD hypertension CHF pacemaker chronic low back pain sleep apnea intolerant to CPAP and a right foot drop.  There were no vitals filed for this visit.  Blood pressure 103/62 pulse 73 temperature 98.2 saturation room air 96%  Physical Exam  Constitutional: No distress.   HENT:   Cardiovascular: Normal rate, regular rhythm and normal heart sounds.   Pacemaker.  Dialysis port right upper thorax   Pulmonary/Chest: Breath sounds normal.   History of sleep apnea intolerant to CPAP.   Musculoskeletal:   History of right hip intertrochanteric fracture status post nail June 2019.  History of falls.   Psychiatric: Her behavior is normal.       Lab Results   Component Value Date    CHOL 102 03/25/2016     Lab Results   Component Value Date    HDL 43 (L) 03/25/2016     Lab Results   Component Value Date    LDLCALC 47 03/25/2016     Lab Results   Component Value Date    TRIG 60 03/25/2016     No components found for: CHOLHDL  Lab Results   Component Value Date    WBC 4.5 07/08/2019    HGB 8.9 (L) 07/15/2019    HCT 25.8 (L) 07/08/2019     (H) 07/08/2019     07/08/2019     Results for orders placed or performed during the hospital encounter of 05/18/19   Basic Metabolic Panel   Result Value Ref Range    Sodium 137 136 - 145 mmol/L    Potassium 5.0 3.5 - 5.0 mmol/L    Chloride 100 98 - 107 mmol/L    CO2 26 22 - 31 mmol/L     Anion Gap, Calculation 11 5 - 18 mmol/L    Glucose 81 70 - 125 mg/dL    Calcium 9.5 8.5 - 10.5 mg/dL    BUN 29 (H) 8 - 28 mg/dL    Creatinine 4.91 (H) 0.60 - 1.10 mg/dL    GFR MDRD Af Amer 11 (L) >60 mL/min/1.73m2    GFR MDRD Non Af Amer 9 (L) >60 mL/min/1.73m2       Lab Results   Component Value Date    ALT 15 06/23/2019    AST 16 06/23/2019    ALKPHOS 101 06/23/2019    BILITOT 0.4 06/23/2019       MEDICATION LIST:  Current Outpatient Medications   Medication Sig   ? acetaminophen (TYLENOL) 500 MG tablet Take 1,000 mg by mouth 3 (three) times a day as needed.   ? atorvastatin (LIPITOR) 10 MG tablet Take 10 mg by mouth at bedtime.   ? B complex-vitamin C-folic acid (DIALYVITE) 100-1 mg Tab Take 1 tablet by mouth daily with lunch.          ? chlorpheniramine/dextromethorp (CORICIDIN HBP COUGH AND COLD ORAL) Take 1 tablet by mouth every 6 (six) hours as needed.   ? cholecalciferol, vitamin D3, (VITAMIN D3) 2,000 unit Tab Take 2,000 Units by mouth daily with lunch.          ? cinacalcet (SENSIPAR) 30 MG tablet Take 30 mg by mouth see administration instructions. Take three times weekly with dialysis (on Mondays, Wednesdays, and Fridays).         ? folic acid (FOLVITE) 1 MG tablet Take 1 mg by mouth daily with lunch.          ? gabapentin (NEURONTIN) 100 MG capsule Take 100 mg by mouth 3 (three) times a day.   ? Lactobacillus rhamnosus GG (CULTURELLE) 10-15 Billion cell capsule Take 1 capsule by mouth daily with lunch.   ? loratadine (CLARITIN) 10 mg tablet Take 10 mg by mouth daily.   ? metoprolol succinate (TOPROL-XL) 25 MG Take 25 mg by mouth daily with lunch.          ? midodrine HCl (MIDODRINE ORAL) Take 10 mg by mouth see administration instructions. Take 10 mg at the beginning of dialysis and 10 mg MCC through dialysis three days weekly on dialysis days (Mondays, Wednesdays, and Fridays).         ? omeprazole (PRILOSEC) 20 MG capsule Take 20 mg by mouth 2 (two) times daily before lunch and supper.          ?  oxyCODONE (ROXICODONE) 5 MG immediate release tablet Take 0.5-1 tablets (2.5-5 mg total) by mouth every 4 (four) hours as needed.   ? polyvinyl alcohol (LIQUIFILM TEARS) 1.4 % ophthalmic solution Apply 1 drop to eye as needed for dry eyes.   ? pot bicarb-sod bicarb-cit ac (EDI-SELTZER GOLD) 344-1,050-1,000 mg TbEF Take 1 tablet by mouth every 4 (four) hours as needed.   ? ranitidine (ZANTAC) 150 MG tablet Take 150 mg by mouth at bedtime.   ? senna-docusate (SENNOSIDES-DOCUSATE SODIUM) 8.6-50 mg tablet Take 1 tablet by mouth every evening.          ? sucroferric oxyhydroxide (VELPHORO) 500 mg Chew chewable tablet Chew 500 mg 3 (three) times a day with meals.   ? timolol maleate (TIMOPTIC) 0.5 % ophthalmic solution Administer 1 drop to both eyes 2 (two) times a day.    ? traZODone (DESYREL) 150 MG tablet Take 75 mg by mouth at bedtime.   ? warfarin (COUMADIN/JANTOVEN) 3 MG tablet Take 3-4.5 mg by mouth See Admin Instructions. Take 4.5 mg two days weekly (on Tuesdays and Fridays) and 3 mg five days weekly (on all other days of the week). Adjust dose based on INR results as directed.       DISCHARGE DIAGNOSIS:    ICD-10-CM    1. Pulmonary emphysema, unspecified emphysema type (H) J43.9    2. Hyperparathyroidism (H) E21.3    3. Closed displaced intertrochanteric fracture of left femur with routine healing, subsequent encounter S72.142D    4. ESRD on dialysis (H) N18.6     Z99.2        MEDICAL EQUIPMENT NEEDS:  None    DISCHARGE PLAN/FACE TO FACE:  I certify that services are/were furnished while this patient was under the care of a physician and that a physician or an allowed non-physician practitioner (NPP), had a face-to-face encounter that meets the physician face-to-face encounter requirements. The encounter was in whole, or in part, related to the primary reason for home health. The patient is confined to his/her home and needs intermittent skilled nursing, physical therapy, speech-language pathology, or the  continued need for occupational therapy. A plan of care has been established by a physician and is periodically reviewed by a physician.  Date of Face-to-Face Encounter: September 13, 2019    I certify that, based on my findings, the following services are medically necessary home health services: She will be discharging to home with an anticipated date of September 13, 2019 with current medications and narcotics will also have physical and occupational therapy home health aide and nursing.  The home care agency has not yet been identified.    My clinical findings support the need for the above skilled services because: (Please write a brief narrative summary that describes what the RN, PT, SLP, or other services will be doing in the home. A list of diagnoses in this section does not meet the CMS requirements.)  She will require the skilled services secondary to her chronic medical conditions including her left hip fracture from June 2019 along with end-stage renal disease safety transfers self-care deficits and medication management.    This patient is homebound because: (Please write a brief narrative summary describing the functional limitations as to why this patient is homebound and specifically what makes this patient homebound.)  Secondary to multiple chronic medical conditions including home safety transfers various exercises self-care deficits and medication management.    The patient is, or has been, under my care and I have initiated the establishment of the plan of care. This patient will be followed by a physician who will periodically review the plan of care.    Schedule follow up visit with primary care provider within 7 days to reestablish care.  She will continue with dialysis 3 times weekly.  She will also follow-up with any specialist as previously determined.  She will also follow-up with her primary care doctor regarding medication management of her chronic medical conditions.  She feels  comfortable with the information as well as her stay on the transitional care unit.  Did not have any other further questions.    Discharge coordination care greater than 30 minutes  Electronically signed by: Michael Duane Johnson, CNP

## 2021-06-19 NOTE — LETTER
Letter by Dl Nolan DO at      Author: Dl Nolan DO Service: -- Author Type: --    Filed:  Encounter Date: 8/30/2019 Status: (Other)         Patient: Belia Rodriguez   MR Number: [id number]   YOB: 1945   Date of Visit: 8/30/2019     [facility]      Facility:    [facility] [[id number]]  Code Status: FULL CODE      Chief Complaint/Reason for Visit:  Chief Complaint   Patient presents with   ? H & P     Fall at home with left hip pain.  Previously before this fall and ER visit patient had a left intertrochanteric hip fracture using intramedullary nail in June.  10 days ago she was discharged from the TCU she is getting out of her bed using a walker tripped and fell.  There are no presyncopal symptoms there may have been some head trauma.  She was brought to the emergency room there is no other signs of trauma she was neurovascularly intact pain control was given in the ED and was decided she is a good        HPI:   Belia is a 74 y.o. female who was brought to the emergency room on 8/27/2019 secondary to fall she sustained.  Previously she had been recently released about 10 days before this time from the TCU secondary to intertrochanteric hip fracture with intramedullary nailing.  She says her fall was mechanical and was no prodrome of syncope near syncope chest pain shortness of breath dizziness.  She is brought to the hospital and work-up did not show any acute fracture.  She is on Coumadin for DVT she claims she did hit her head but she remained neurovascularly intact.  She is unable to ambulate without assistive to and it was recommended that she stay in observation and go to TCU and was transferred here to the [place] [facility] in stable condition.    Patient does have hemodialysis secondary to end-stage kidney disease.  She also has history of CHF.  She does get back from dialysis and she feels pretty good at this time.  She seems  euvolemic by exam she has no chest pain or shortness of breath.  She says her hip pain is getting much better and she does still make some urine and she is moving her bowels without difficulty.  She has no other issues at this time.    Past Medical History:  Past Medical History:   Diagnosis Date   ? Arthritis    ? CHF (congestive heart failure) (H)    ? Chronic anemia 6/1/2014   ? Chronic kidney disease    ? Chronic thoracic aortic dissection (H) 10/7/2015    Descending thoracic aorta; treated medically per notes of Dragan Singh and Jennifer.   ? COPD (chronic obstructive pulmonary disease) (H)    ? CVA (cerebral infarction)    ? Disease of thyroid gland    ? Dyslipidemia    ? ESRD (end stage renal disease) (H) 06/03/2009    on dialysis with Dr. Mitchell   ? Essential hypertension 6/30/2014   ? Gastrointestinal hemorrhage, unspecified gastrointestinal hemorrhage type 6/5/2017   ? GI (gastrointestinal bleed)    ? GI bleeding 6/5/2017   ? Gout    ? L3 vertebral fracture (H) 11/16/2015   ? Left Atrial Appendage Occlusion (WATCHMAN) 4/5/2018    LAAO April 5, 2018 (30 mm WATCHMAN)   ? Obesity    ? ZULEIKA (obstructive sleep apnea), severe, intolerant of CPAP 10/22/2015   ? Pneumonia 9-7-2015   ? Right foot drop    ? Spinal stenosis 3/28/2016   ? Stroke (H) 3/24/2016           Surgical History:  Past Surgical History:   Procedure Laterality Date   ? BACK SURGERY      [facility]   ? COLONOSCOPY N/A 3/23/2016    Procedure: COLONOSCOPY;  Surgeon: Ruddy Tejada MD;  Location[facility];  Service:    ? DILATION AND CURETTAGE OF UTERUS     ? EP ABLATION AV NODE N/A 3/7/2019    Procedure: EP Ablation AV Node;  Surgeon: Derick Duarte MD;  Location: [facility] Cath Lab;  Service: Cardiology   ? EP NEGRA CLOSURE N/A 4/5/2018    Procedure: EP NEGRA Closure;  Surgeon: Derick Duarte MD;  Location: [facility] Cath Lab;  Service:    ? EP PACEMAKER INSERT N/A 3/7/2019    Procedure: EP Pacemaker Insertion;  Surgeon: Gerald  Derick NOEL MD;  Location: [facility] Cath Lab;  Service: Cardiology   ? EYE SURGERY     ? HERNIA REPAIR     ? IR TUNNELED CATHETER INSERT  11/20/2018   ? IR TUNNELED CATHETER REMOVAL  11/20/2018   ? IN COLSC FLEXIBLE W/CONTROL BLEEDING ANY METHOD N/A 6/7/2017    Procedure: COLONOSCOPY;  Surgeon: Luis Mckeon MD;  Location: [facility] GI;  Service: Gastroenterology   ? IN OPEN FIX INTER/SUBTROCH FX,IMPLNT Left 6/23/2019    Procedure: INTERNAL FIXATION, FRACTURE, TROCHANTERIC, HIP, USING INTERMEDULLARY NAIL;  Surgeon: Bennie Marsh DO;  Location: [facility] Main OR;  Service: Orthopedics   ? TONSILLECTOMY         Family History:   Family History   Problem Relation Age of Onset   ? Dementia Mother    ? Diabetes Mother    ? Arthritis Mother    ? Cancer Mother    ? Depression Mother    ? Heart disease Mother    ? Vision loss Mother    ? Stroke Father    ? Heart disease Father    ? Breast cancer Neg Hx        Social History:    Social History     Socioeconomic History   ? Marital status:      Spouse name: Cayden   ? Number of children: 2   ? Years of education: None   ? Highest education level: None   Occupational History     Employer: RETIRED   Social Needs   ? Financial resource strain: None   ? Food insecurity:     Worry: None     Inability: None   ? Transportation needs:     Medical: None     Non-medical: None   Tobacco Use   ? Smoking status: Former Smoker     Packs/day: 1.50     Years: 37.00     Pack years: 55.50     Types: Cigarettes     Last attempt to quit: 1/1/2009     Years since quitting: 10.6   ? Smokeless tobacco: Never Used   Substance and Sexual Activity   ? Alcohol use: No     Alcohol/week: 7.0 oz     Types: 14 Standard drinks or equivalent per week     Comment: 14 mixed drinks per week   ? Drug use: No   ? Sexual activity: Never     Partners: Male   Lifestyle   ? Physical activity:     Days per week: None     Minutes per session: None   ? Stress: None   Relationships   ? Social  connections:     Talks on phone: None     Gets together: None     Attends Caoda[employer] service: None     Active member of club or organization: None     Attends meetings of clubs or organizations: None     Relationship status: None   ? Intimate partner violence:     Fear of current or ex partner: None     Emotionally abused: None     Physically abused: None     Forced sexual activity: None   Other Topics Concern   ? None   Social History Narrative    Lives with her . Daughter in [place] and daughter in [place].          Review of Systems   Constitutional:        Patient denies any pain fevers chills nausea vomiting diarrhea change in vision hearing taste smell weakness one-sided chest mentions of breath.  She denies any congeners stool polyphagia polydipsia polyuria depression or anxiety and the remainder the review of systems negative.  She still makes a little bit a urine but not very much.       Vitals:    08/30/19 1355   BP: 131/71   Pulse: 75   Resp: 21   Temp: 97.2  F (36.2  C)   SpO2: 95%       Physical Exam   Constitutional: No distress.   HENT:   Head: Normocephalic and atraumatic.   Nose: Nose normal.   Eyes: Conjunctivae are normal. Right eye exhibits no discharge. Left eye exhibits no discharge.   Neck: Neck supple. No thyromegaly present.   Cardiovascular: Normal rate and regular rhythm.   Pulmonary/Chest: Effort normal and breath sounds normal. No respiratory distress. She exhibits no tenderness.   Abdominal: Soft. Bowel sounds are normal. She exhibits distension. There is no tenderness. There is no rebound and no guarding.   Musculoskeletal: She exhibits edema.   Patient is only slightly tender over the left trochanteric area.   Lymphadenopathy:     She has no cervical adenopathy.   Skin: Skin is warm and dry. She is not diaphoretic.   Psychiatric: She has a normal mood and affect. Her behavior is normal.       Medication List:  Current Outpatient Medications   Medication Sig   ? acetaminophen  (TYLENOL) 500 MG tablet Take 1,000 mg by mouth 3 (three) times a day as needed.   ? atorvastatin (LIPITOR) 10 MG tablet Take 10 mg by mouth at bedtime.   ? B complex-vitamin C-folic acid (DIALYVITE) 100-1 mg Tab Take 1 tablet by mouth daily with lunch.          ? chlorpheniramine/dextromethorp (CORICIDIN HBP COUGH AND COLD ORAL) Take 1 tablet by mouth every 6 (six) hours as needed.   ? cholecalciferol, vitamin D3, (VITAMIN D3) 2,000 unit Tab Take 2,000 Units by mouth daily with lunch.          ? cinacalcet (SENSIPAR) 30 MG tablet Take 30 mg by mouth see administration instructions. Take three times weekly with dialysis (on Mondays, Wednesdays, and Fridays).         ? folic acid (FOLVITE) 1 MG tablet Take 1 mg by mouth daily with lunch.          ? gabapentin (NEURONTIN) 100 MG capsule Take 100 mg by mouth 3 (three) times a day.   ? Lactobacillus rhamnosus GG (CULTURELLE) 10-15 Billion cell capsule Take 1 capsule by mouth daily with lunch.   ? metoprolol succinate (TOPROL-XL) 25 MG Take 25 mg by mouth daily with lunch.          ? midodrine HCl (MIDODRINE ORAL) Take 10 mg by mouth see administration instructions. Take 10 mg at the beginning of dialysis and 10 mg prison through dialysis three days weekly on dialysis days (Mondays, Wednesdays, and Fridays).         ? omeprazole (PRILOSEC) 20 MG capsule Take 20 mg by mouth 2 (two) times daily before lunch and supper.          ? oxyCODONE (ROXICODONE) 5 MG immediate release tablet Take 0.5-1 tablets (2.5-5 mg total) by mouth every 4 (four) hours as needed.   ? polyvinyl alcohol (LIQUIFILM TEARS) 1.4 % ophthalmic solution Apply 1 drop to eye as needed for dry eyes.   ? pot bicarb-sod bicarb-cit ac (EDI-SELTZER GOLD) 344-1,050-1,000 mg TbEF Take 1 tablet by mouth every 4 (four) hours as needed.   ? ranitidine (ZANTAC) 150 MG tablet Take 150 mg by mouth at bedtime.   ? senna-docusate (SENNOSIDES-DOCUSATE SODIUM) 8.6-50 mg tablet Take 1 tablet by mouth every evening.           ? sucroferric oxyhydroxide (VELPHORO) 500 mg Chew chewable tablet Chew 500 mg 3 (three) times a day with meals.   ? timolol maleate (TIMOPTIC) 0.5 % ophthalmic solution Administer 1 drop to both eyes 2 (two) times a day.    ? traZODone (DESYREL) 150 MG tablet Take 75 mg by mouth at bedtime.   ? warfarin (COUMADIN/JANTOVEN) 3 MG tablet Take 3-4.5 mg by mouth See Admin Instructions. Take 4.5 mg two days weekly (on Tuesdays and Fridays) and 3 mg five days weekly (on all other days of the week). Adjust dose based on INR results as directed.       Labs:      Assessment:    ICD-10-CM    1. Fall, subsequent encounter W19.XXXD    2. Left hip pain M25.552    3. End stage kidney disease (H) N18.6    4. Chronic diastolic congestive heart failure (H) I50.32    5. Chronic anemia D64.9    6. Pain management R52    7. Essential hypertension I10        Plan: Plan at this time will check weights here and get labs from dialysis.  She says her hemoglobin is been good so no reason to check her hemoglobin at this time and we will no changes in pain medications.  Chart was reviewed and med reconciliation was done and no other changes to care plan we will continue to monitor above medical problems.        Electronically signed by: Dl oNlan DO

## 2021-06-19 NOTE — LETTER
Letter by Le Flynn CNP at      Author: Le Flynn CNP Service: -- Author Type: --    Filed:  Encounter Date: 7/16/2019 Status: (Other)         Patient: Belia Rodriguez   MR Number: [id number]   YOB: 1945   Date of Visit: 7/16/2019     [facility]    Facility:   [facility] [[id number]]   Code Status: FULL CODE      CHIEF COMPLAINT/REASON FOR VISIT:  Chief Complaint   Patient presents with   ? Problem Visit     rectal bleeding        HISTORY:      HPI: Belia is a 73 y.o. female undergoing physical and  occupational therapy at [facility]. with history of ESRD on hemodialysis M/W/F, chronic thrombocytopenia, chronic atrial fibrillation, sick sinus syndrome, status post watchman device, status post pacemaker, diastolic CHF, hypertension, ZULEIKA, anemia of chronic kidney disease, dyslipidemia who was brought to the emergency department for evaluation after a fall.  The patient uses a walker for ambulation due to right foot drop and while at home tripped on the kitchen floor mat when she turned and fell.  She denies head trauma or loss of consciousness but complained of left hip pain.  In the ED, she was hemodynamically stable.  X-rays showed a displaced, comminuted intertrochanteric fracture of left femur. She underwent a left intramedullary nailing.     Today she is seen for review of a stat HGB, rectal bleeding weight review.  Her HGB has increased to 8.9,  Occult stools have been negative.  However last 3 occult stools were negative but she had a large amount of bleeding  Noted in the toilet. She reports a HX of diverticula  In the past requiring blood transfusions.  Her weights were reviewed and she was up 11 pounds and then down 12 pounds within the last week. She reports she lasix does not work for her and when she gains weight she does an extra run in which she did an extra run last week. She denied CP. She does have  shortness of breath with activity. She is on 2 L NC at night due to unable to tolerate C- Pap after many tries She occasionally will wear 1L NC  during the day.  Care conference scheduled for today 7/16/19.    Past Medical History:   Diagnosis Date   ? Arthritis    ? CHF (congestive heart failure) (H)    ? Chronic anemia 6/1/2014   ? Chronic kidney disease    ? Chronic thoracic aortic dissection (H) 10/7/2015    Descending thoracic aorta; treated medically per notes of Dragan Singh and Jennifer.   ? COPD (chronic obstructive pulmonary disease) (H)    ? CVA (cerebral infarction)    ? Disease of thyroid gland    ? Dyslipidemia    ? ESRD (end stage renal disease) (H) 06/03/2009    on dialysis with Dr. Mitchell   ? Essential hypertension 6/30/2014   ? Gastrointestinal hemorrhage, unspecified gastrointestinal hemorrhage type 6/5/2017   ? GI (gastrointestinal bleed)    ? GI bleeding 6/5/2017   ? Gout    ? L3 vertebral fracture (H) 11/16/2015   ? Left Atrial Appendage Occlusion (WATCHMAN) 4/5/2018    LAAO April 5, 2018 (30 mm WATCHMAN)   ? Obesity    ? ZULEIKA (obstructive sleep apnea), severe, intolerant of CPAP 10/22/2015   ? Pneumonia 9-7-2015   ? Right foot drop    ? Spinal stenosis 3/28/2016   ? Stroke (H) 3/24/2016             Family History   Problem Relation Age of Onset   ? Dementia Mother    ? Diabetes Mother    ? Arthritis Mother    ? Cancer Mother    ? Depression Mother    ? Heart disease Mother    ? Vision loss Mother    ? Stroke Father    ? Heart disease Father    ? Breast cancer Neg Hx      Social History     Socioeconomic History   ? Marital status:      Spouse name: Cayden   ? Number of children: 2   ? Years of education: Not on file   ? Highest education level: Not on file   Occupational History     Employer: RETIRED   Social Needs   ? Financial resource strain: Not on file   ? Food insecurity:     Worry: Not on file     Inability: Not on file   ? Transportation needs:     Medical: Not on file      "Non-medical: Not on file   Tobacco Use   ? Smoking status: Former Smoker     Packs/day: 1.50     Years: 37.00     Pack years: 55.50     Types: Cigarettes     Last attempt to quit: 1/1/2009     Years since quitting: 10.5   ? Smokeless tobacco: Never Used   Substance and Sexual Activity   ? Alcohol use: No     Alcohol/week: 7.0 oz     Types: 14 Standard drinks or equivalent per week     Comment: 14 mixed drinks per week   ? Drug use: No   ? Sexual activity: Never     Partners: Male   Lifestyle   ? Physical activity:     Days per week: Not on file     Minutes per session: Not on file   ? Stress: Not on file   Relationships   ? Social connections:     Talks on phone: Not on file     Gets together: Not on file     Attends Tenriism service: Not on file     Active member of club or organization: Not on file     Attends meetings of clubs or organizations: Not on file     Relationship status: Not on file   ? Intimate partner violence:     Fear of current or ex partner: Not on file     Emotionally abused: Not on file     Physically abused: Not on file     Forced sexual activity: Not on file   Other Topics Concern   ? Not on file   Social History Narrative    Lives with her . Daughter in [place] and daughter in [place].         Review of Systems   Constitutional: Negative for activity change, appetite change, fatigue and fever.   HENT: Negative for congestion.    Respiratory: Positive for shortness of breath. Negative for cough and wheezing.         Dialysis port R upper chest    Cardiovascular: Negative for chest pain and leg swelling.   Gastrointestinal: Negative for abdominal distention, abdominal pain, constipation, diarrhea and nausea.   Genitourinary: Negative for dysuria.        Voids \"a little per her report\"   Musculoskeletal: Negative for arthralgias and back pain.   Skin: Positive for wound. Negative for color change.   Neurological: Negative for dizziness.   Psychiatric/Behavioral: Positive for sleep " disturbance. Negative for agitation, behavioral problems and confusion.         On trazodone        .  Vitals:    07/16/19 1326   BP: 119/58   Pulse: 70   Resp: 18   Temp: 98.3  F (36.8  C)   SpO2: 96%   Weight: 171 lb 3.2 oz (77.7 kg)       Physical Exam   Constitutional: She is oriented to person, place, and time. She appears well-developed and well-nourished.   pleasant woman in no acute distress   HENT:   Head: Normocephalic and atraumatic.   Eyes: Pupils are equal, round, and reactive to light. Conjunctivae are normal.   Neck: Normal range of motion. Neck supple.   Cardiovascular: Normal rate, regular rhythm and normal heart sounds.   No murmur heard.  Pulmonary/Chest: Effort normal and breath sounds normal. She has no wheezes. She has no rales.   Abdominal: Soft. Bowel sounds are normal. She exhibits no distension. There is no tenderness.   Musculoskeletal: Normal range of motion. She exhibits edema.   Right foot drop  2+ edema left leg.    Neurological: She is alert and oriented to person, place, and time.   Neuropathy right foot and decreased sensation bottom of left foot.     Skin: Skin is warm and dry.   Left hip wound    Psychiatric: She has a normal mood and affect. Her behavior is normal.         LABS:   Recent Results (from the past 240 hour(s))   HM2(CBC w/o Differential)   Result Value Ref Range    WBC 4.5 4.0 - 11.0 thou/uL    RBC 2.40 (L) 3.80 - 5.40 mill/uL    Hemoglobin 7.9 (L) 12.0 - 16.0 g/dL    Hematocrit 25.8 (L) 35.0 - 47.0 %     (H) 80 - 100 fL    MCH 32.9 27.0 - 34.0 pg    MCHC 30.6 (L) 32.0 - 36.0 g/dL    RDW 22.6 (H) 11.0 - 14.5 %    Platelets 188 140 - 440 thou/uL    MPV 9.9 8.5 - 12.5 fL   Occult Blood, Fecal   Result Value Ref Range    Occult Blood, Stool #1 Negative Negative   Occult Blood, Fecal   Result Value Ref Range    Occult Blood, Stool #1 Negative Negative   Occult Blood, Fecal   Result Value Ref Range    Occult Blood, Stool #1 Negative Negative   Hemoglobin   Result  Value Ref Range    Hemoglobin 8.9 (L) 12.0 - 16.0 g/dL     Current Outpatient Medications   Medication Sig Note   ? acetaminophen (TYLENOL) 500 MG tablet Take 2 tablets (1,000 mg total) by mouth 3 (three) times a day.    ? aspirin 325 MG tablet Take 1 tablet (325 mg total) by mouth 2 (two) times a day.    ? atorvastatin (LIPITOR) 10 MG tablet Take 10 mg by mouth at bedtime.    ? B complex-vitamin C-folic acid (DIALYVITE) 100-1 mg Tab Take 1 tablet by mouth daily.    ? chlorpheniramine/dextromethorp (CORICIDIN HBP COUGH AND COLD ORAL) Take 1 tablet by mouth every 6 (six) hours as needed.    ? cholecalciferol, vitamin D3, 2,000 unit cap Take 2,000 Units by mouth daily with lunch.     ? cinacalcet (SENSIPAR) 30 MG tablet Take 30 mg by mouth 3 times weekly with dialysis          ? diphenhydrAMINE (BENADRYL) 25 mg tablet Take 50 mg by mouth at bedtime as needed for sleep.    ? folic acid (FOLVITE) 1 MG tablet Take 1 mg by mouth daily.    ? gabapentin (NEURONTIN) 100 MG capsule Take 100 mg by mouth 3 (three) times a day.    ? Lactobacillus rhamnosus GG (CULTURELLE) 10-15 Billion cell capsule Take 1 capsule by mouth daily with lunch.    ? metoprolol succinate (TOPROL-XL) 25 MG Take 25 mg by mouth daily.    ? midodrine HCl (MIDODRINE ORAL) Take 15 mg by mouth 3 (three) times a week. Three times weekly before dialysis.           ? oxyCODONE (ROXICODONE) 5 MG immediate release tablet Take 0.5-1 tablets (2.5-5 mg total) by mouth every 4 (four) hours as needed.    ? polyvinyl alcohol (LIQUIFILM TEARS) 1.4 % ophthalmic solution Apply 1 drop to eye as needed for dry eyes.    ? ranitidine (ZANTAC) 150 MG tablet Take 150 mg by mouth at bedtime.    ? senna-docusate (SENNOSIDES-DOCUSATE SODIUM) 8.6-50 mg tablet Take 1 tablet by mouth at bedtime.     ? sevelamer carbonate (RENVELA) 800 mg tablet Take 2,400 mg by mouth 3 (three) times a day with meals.           ? timolol maleate (TIMOPTIC) 0.5 % ophthalmic solution Administer 1 drop  to both eyes 2 (two) times a day.     ? traZODone (DESYREL) 50 MG tablet Take 50 mg by mouth at bedtime. 6/23/2019: Patient states she uses this almost every night.      ASSESSMENT:      ICD-10-CM    1. Rectal bleeding K62.5        PLAN:    S/P internal fixation using intramedullary nail. PT/OT/pain  Control, monitor incision for S/S infection   End-stage renal disease. Dialysis as scheduled.  M/W/F   Hypertension on Metoprolol Succinate   Chronic atrial fibrillation- on coumadin , metoprolol  Pain control Tylenol and Oxycodone as scheduled  check hip  x-ray due to increased pain.   Hyperkalemia.  Potassium was occasionally greater than 6 and was treated with dialysis 6/24 potassium 4.5  Anemia.  Chronic anemia was worsened with acute blood loss.  She received 1 unit of packed red blood cells HGB 7.9 on 6/27/19, dropped to 7.4 came up to 8.9 and 7.9 on 7/8.  And lastly 8.9 on 7/15/19. Monitor labs.  occult stools x 3 negative despite rectal bleeding   Insomnia-trazadone  75 mg HS  Constipation resolved continue  senna s to 2 tabs two times a day, Biscodyl suppository 10 mg VT q 3 days PRN if no BM   GI bleeding - occults negative,  stat HGB 8.9 which is up. From 7.9- GI consult.       Electronically signed by: Le Flynn CNP

## 2021-06-19 NOTE — LETTER
Letter by Giovanni Santamaria MD at      Author: Giovanni Santamaria MD Service: -- Author Type: --    Filed:  Encounter Date: 3/22/2019 Status: (Other)         Patient: Belia Rodriguez   MR Number: [id number]   YOB: 1945   Date of Visit: 3/22/2019     Code Status:  FULL CODE  Visit Type: Discharge Summary     Facility:  [facility] [[id number]]          PCP:  John Escoto DO  [phone]       Admission Date to our Facility: March 8, 2019 Hospital on March 3, 2019.  Discharge Date from our Facility: March 23, 2019    Discharge Diagnosis:    1. Chronic atrial fibrillation (H)     2. Chronic diastolic congestive heart failure (H)     3. ESRD (end stage renal disease) on dialysis (H)     4. Pulmonary emphysema, unspecified emphysema type (H)     5. Acute on chronic diastolic congestive heart failure (H)     6. ESRD on dialysis (H)     7. Essential hypertension with goal blood pressure less than 140/90     8. Persistent atrial fibrillation (H)     9. Chronic obstructive pulmonary disease with acute exacerbation (H)     10. Anemia of chronic renal failure, stage 5 (H)     11. ZULEIKA (obstructive sleep apnea), severe     12. Acute respiratory failure with hypoxia (H)     13. Chronic pulmonary edema          History of Present Illness: Belia Rodriguez is a 73 y.o. female     Skilled Nursing Facility Course: Patient initially is a dialysis patient with underlying COPD and heart failure with preserved ejection fraction atrial fibrillation who had had a L AAA O in April 2018 and this year AV jonn ablation and pacemaker placement who has underlying both obstructive sleep apnea and has been intolerant of CPAP and BiPAP.  She was hospitalized for dyspnea is found to be in atrial fibrillation with rapid ventricular response.  This was treated with IV diltiazem.  On the seventh this was when she had her AV jonn ablation and single-chamber right ventricular pacemaker placed.  Otherwise she  came to us if her rehabilitation.    Our facility course she made a gradual improvement and at the time of discharge she is up to her ambulatory activity that that she had to achieve day prior to the hospitalization.  She is greater overall capacity can walk to therapy and back independently on her own.  She has been following up regularly with the heart clinic and had a specific instructions for that.  During her stay we focused on therapeutic interventions and there were no med changes during this time.    Discharge Medications: No changes to above list  Current Outpatient Medications   Medication Sig Dispense Refill   ? acetaminophen (TYLENOL) 500 MG tablet Take 500 mg by mouth every 6 (six) hours as needed for pain.     ? albuterol (PROAIR HFA;PROVENTIL HFA;VENTOLIN HFA) 90 mcg/actuation inhaler Inhale 2 puffs every 4 (four) hours as needed for wheezing. 8 g 5   ? albuterol (PROVENTIL) 2.5 mg /3 mL (0.083 %) nebulizer solution Take 3 mL (2.5 mg total) by nebulization every 4 (four) hours as needed for wheezing or shortness of breath. 30 vial 5   ? aspirin 81 MG EC tablet Take 81 mg by mouth daily with lunch.      ? atorvastatin (LIPITOR) 10 MG tablet Take 10 mg by mouth at bedtime.     ? calcium, as carbonate, (TUMS) 200 mg calcium (500 mg) chewable tablet Chew 1 tablet (200 mg total) 4 (four) times a day as needed.  0   ? CHLORPHENIRAMINE/DEXTROMETHORP (CORICIDIN HBP COUGH AND COLD ORAL) Take 1 tablet by mouth daily as needed.      ? cholecalciferol, vitamin D3, 2,000 unit cap Take 2,000 Units by mouth daily with lunch.      ? cinacalcet (SENSIPAR) 30 MG tablet Take 30 mg by mouth 3 times weekly with dialysis           ? DIALYVITE 100-1 mg Tab TAKE ONE TABLET BY MOUTH DAILY IN THE EVENING 90 tablet 0   ? folic acid (FOLVITE) 1 MG tablet TAKE ONE TABLET BY MOUTH ONCE DAILY 90 tablet 3   ? gabapentin (NEURONTIN) 100 MG capsule TAKE 1 CAPSULE BY MOUTH THREE TIMES DAILY 270 capsule 3   ? Lactobacillus rhamnosus GG  (CULTURELLE) 10-15 Billion cell capsule Take 1 capsule by mouth daily with lunch.     ? metoprolol succinate (TOPROL XL) 25 MG Take 1 tablet (25 mg total) by mouth daily. 90 tablet 3   ? midodrine (PROAMATINE) 5 MG tablet Take 3 tablets (15 mg total) by mouth 3 (three) times a week. Take every Monday, Wednesday, and Friday in the morning. 36 tablet 11   ? polyvinyl alcohol (LIQUIFILM TEARS) 1.4 % ophthalmic solution Apply 1 drop to eye as needed for dry eyes.     ? ranitidine (ZANTAC) 150 MG tablet Take 150 mg by mouth at bedtime.     ? senna-docusate (SENNOSIDES-DOCUSATE SODIUM) 8.6-50 mg tablet Take 1 tablet by mouth at bedtime.      ? sevelamer carbonate (RENVELA) 800 mg tablet Take 1,600 mg by mouth 3 (three) times a day with meals.     ? sevelamer carbonate (RENVELA) 800 mg tablet Take 1,600 mg by mouth 2 (two) times a day as needed (with snacks).     ? timolol maleate (TIMOPTIC) 0.5 % ophthalmic solution Administer 1 drop to both eyes 2 (two) times a day.      ? traZODone (DESYREL) 50 MG tablet Take 1 tablet (50 mg total) by mouth at bedtime as needed for sleep.  0     No current facility-administered medications for this visit.        For most current and accurate medication list, please contact the skilled nursing facility that this patient visit took place at.      Discharge Plan: Patient is to go home on March 23 independent living no him home care services  Review of Systems     Physical Exam   Constitutional:   Patient doing fabulous with good attitude O2 sats normal and no heart rate elevations with her ambulatory activities   Vitals reviewed.      Labs:  All labs reviewed in the nursing home record.    Assessment:  1. Chronic atrial fibrillation (H)     2. Chronic diastolic congestive heart failure (H)     3. ESRD (end stage renal disease) on dialysis (H)     4. Pulmonary emphysema, unspecified emphysema type (H)     5. Acute on chronic diastolic congestive heart failure (H)     6. ESRD on dialysis (H)      7. Essential hypertension with goal blood pressure less than 140/90     8. Persistent atrial fibrillation (H)     9. Chronic obstructive pulmonary disease with acute exacerbation (H)     10. Anemia of chronic renal failure, stage 5 (H)     11. ZULEIKA (obstructive sleep apnea), severe     12. Acute respiratory failure with hypoxia (H)     13. Chronic pulmonary edema         MEDICAL EQUIPMENT NEEDS:  None      DISCHARGE PLAN/FACE TO FACE:  I certify that services are/were furnished while this patient was under the care of a physician and that a physician or an allowed non-physician practitioner (NPP), had a face-to-face encounter that meets the physician face-to-face encounter requirements. The encounter was in whole, or in part, related to the primary reason for home health. The patient is confined to his/her home and needs intermittent skilled nursing, physical therapy, speech-language pathology, or the continued need for occupational therapy. A plan of care has been established by a physician and is periodically reviewed by a physician.    I certify that this patient is under my care and that I, or a nurse practitioner or physician's assistant working with me, had a face-to-face encounter that meets the physician face-to-face encounter requirements with this patient.   Date of Face-to-Face Encounter: March 22, 2019    I certify that, based on my findings, the following services are medically necessary home health services: No services needed as she is independent at this juncture    My clinical findings support the need for the above skilled services because: (Please write a brief narrative summary that describes what the RN, PT, SLP, or other services will be doing in the home. A list of diagnoses in this section does not meet the CMS requirements.)     This patient is homebound because: (Please write a brief narrative summary describing the functional limitations as to why this patient is homebound and  specifically what makes this patient homebound.)     The patient is, or has been, under my care and I have initiated the establishment of the plan of care. This patient will be followed by a physician who will periodically review the plan of care.    45 minutes total time of which 65% was in face to face communication with patient about above plan of care.    Electronically signed by: Giovanni Santamaria MD

## 2021-06-19 NOTE — LETTER
Letter by Le Flynn CNP at      Author: Le Flynn CNP Service: -- Author Type: --    Filed:  Encounter Date: 8/19/2019 Status: (Other)         Patient: Belia Rodriguez   MR Number: [id number]   YOB: 1945   Date of Visit: 8/19/2019     [facility]    Facility:   [facility] [[id number]]   Code Status: FULL CODE  PCP: John Escoto DO   Phone: [phone]   Fax: [phone]      CHIEF COMPLAINT/REASON FOR VISIT:  Chief Complaint   Patient presents with   ? Discharge Summary       HISTORY COURSE:  Belia is a 74 y.o. female undergoing physical and  occupational therapy at [facility]. with history of ESRD on hemodialysis M/W/F, chronic thrombocytopenia, chronic atrial fibrillation, sick sinus syndrome, status post watchman device, status post pacemaker, diastolic CHF, hypertension, ZULEIKA, anemia of chronic kidney disease, dyslipidemia who was brought to the emergency department for evaluation after a fall.  The patient uses a walker for ambulation due to right foot drop and while at home tripped on the kitchen floor mat when she turned and fell.  She denies head trauma or loss of consciousness but complained of left hip pain.  In the ED, she was hemodynamically stable.  X-rays showed a displaced, comminuted intertrochanteric fracture of left femur. She underwent a left intramedullary nailing.      Today she is seen for a face-to-face for discharge and also for a wheelchair request..  She will discharge to home on 8/20/2019 with current medications and treatments.  She will have S home care services.  Her INR is scheduled for 8/20/2019 her dosing will be done prior to discharge if able, if not it will be called in and home care RN will continue INRs further. She denied CP. She does have shortness of breath with activity. She is on 2 L NC at night due to unable to tolerate C- Pap after many tries She occasionally will  wear oxygen  during the day.  She was recently seen by GI for negative Occults x 3 but had a  moderate amount of visible blood in the toilet.  It appears she has  large internal hemorrhoids and when she is off the coumadin it is recommended she have them banded,. Her weight have been up and down 2-3pounds over the last week.  She has not had any further rectal bleeding in quite some time. She is moving her bowels.     Face-to-face for wheelchair    My patient Belia Larose date of birth 1945 will need a wheelchair for discharge due to the diagnosis of displaced intertrochanteric fracture of left femur, CHF, COPD, right foot drop, and end-stage renal disease by patient has the following limitations of mobility to participate in MR ADL less such as food prep, dressing, transferring, and ambulation.  The patient's mobility limitations cannot be sufficiently and safely resolved by use of a cane or walker.  Her current home is adequate space for maneuvering a manual wheelchair, the patient and caregiver able to safely use a manual wheelchair, the patient will use the wheelchair daily, use of the wheelchair will improve the participation in MR ADL less for the patient.  My patient will require benefit from an 18 inch x 18 inch standard manual wheelchair with bilateral elevated foot rest, bilateral full-length armrests and 18 use of 18 inch cushion.  A cushion is necessary since my patient sits in a wheelchair for greater than 8 hours/day placing her at high risk for skin breakdown.  This equipment is necessary for the duration of patient's lifetime.      Review of Systems  Constitutional: Negative for activity change, appetite change, fatigue and fever.   HENT: Negative for congestion.    Respiratory: Positive for shortness of breath. Negative for cough and wheezing.         Dialysis port R upper chest    Cardiovascular: Negative for chest pain and leg swelling.   Gastrointestinal: Negative for abdominal distention,  abdominal pain, constipation, diarrhea and nausea.   Genitourinary: Negative for dysuria.        Reports she is now voiding daily   Musculoskeletal: Negative for arthralgias and back pain.   Skin: Positive for wound. Negative for color change.   Neurological: Negative for dizziness.   Psychiatric/Behavioral: Positive for sleep disturbance. Negative for agitation, behavioral problems and confusion.         On trazodone    Vitals:    08/19/19 0752   BP: 139/79   Pulse: 77   Resp: 16   Temp: 97.7  F (36.5  C)   SpO2: 95%   Weight: 170 lb (77.1 kg)       Physical Exam  Constitutional: She is oriented to person, place, and time. She appears well-developed and well-nourished.   pleasant woman in no acute distress   HENT:   Head: Normocephalic and atraumatic.   Eyes: Pupils are equal, round, and reactive to light. Conjunctivae are normal.   Neck: Normal range of motion. Neck supple.   Cardiovascular: Normal rate, regular rhythm and normal heart sounds.   No murmur heard.  Pulmonary/Chest: Effort normal. She has no wheezes. She has rales.   Bilateral lower lobes    Abdominal: Soft. Bowel sounds are normal. She exhibits no distension. There is no tenderness.   Musculoskeletal: Normal range of motion. She exhibits edema.   Right foot drop  2+ edema left foot- no leg swelling .    Neurological: She is alert and oriented to person, place, and time.   Neuropathy right foot and decreased sensation bottom of left foot.     Skin: Skin is warm and dry.   Left hip wound healed    Psychiatric: She has a normal mood and affect. Her behavior is normal.      MEDICATION LIST:  Current Outpatient Medications   Medication Sig   ? acetaminophen (TYLENOL) 500 MG tablet Take 2 tablets (1,000 mg total) by mouth 3 (three) times a day.   ? aluminum-magnesium hydroxide-simethicone (MAALOX ADVANCED) 200-200-20 mg/5 mL Susp Take 30 mL by mouth 4 (four) times a day as needed.   ? atorvastatin (LIPITOR) 10 MG tablet Take 10 mg by mouth at bedtime.   ?  B complex-vitamin C-folic acid (DIALYVITE) 100-1 mg Tab Take 1 tablet by mouth daily.   ? chlorpheniramine/dextromethorp (CORICIDIN HBP COUGH AND COLD ORAL) Take 1 tablet by mouth every 6 (six) hours as needed.   ? cholecalciferol, vitamin D3, 2,000 unit cap Take 2,000 Units by mouth daily with lunch.    ? cinacalcet (SENSIPAR) 30 MG tablet Take 30 mg by mouth 3 times weekly with dialysis         ? folic acid (FOLVITE) 1 MG tablet Take 1 mg by mouth daily.   ? gabapentin (NEURONTIN) 100 MG capsule Take 100 mg by mouth 3 (three) times a day.   ? Lactobacillus rhamnosus GG (CULTURELLE) 10-15 Billion cell capsule Take 1 capsule by mouth daily with lunch.   ? metoprolol succinate (TOPROL-XL) 25 MG Take 25 mg by mouth daily.   ? midodrine HCl (MIDODRINE ORAL) Take 10 mg by mouth 3 (three) times a week. Three times weekly before dialysis. 10 mg two times a day 3 times weekly         ? omeprazole (PRILOSEC) 20 MG capsule Take 20 mg by mouth 2 (two) times a day before meals.   ? oxyCODONE (ROXICODONE) 5 MG immediate release tablet Take 0.5-1 tablets (2.5-5 mg total) by mouth every 4 (four) hours as needed.   ? polyvinyl alcohol (LIQUIFILM TEARS) 1.4 % ophthalmic solution Apply 1 drop to eye as needed for dry eyes.   ? pot bicarb-sod bicarb-cit ac (EDI-YUMIKO GOLD) 344-1,050-1,000 mg TbEF Take 1 tablet by mouth every 4 (four) hours as needed.   ? ranitidine (ZANTAC) 150 MG tablet Take 150 mg by mouth at bedtime.   ? senna-docusate (SENNOSIDES-DOCUSATE SODIUM) 8.6-50 mg tablet Take 2 tablets by mouth 2 (two) times a day.          ? sucroferric oxyhydroxide (VELPHORO) 500 mg Chew chewable tablet Chew 500 mg 3 (three) times a day with meals.   ? timolol maleate (TIMOPTIC) 0.5 % ophthalmic solution Administer 1 drop to both eyes 2 (two) times a day.    ? traZODone (DESYREL) 50 MG tablet Take 75 mg by mouth at bedtime.              DISCHARGE DIAGNOSIS:    ICD-10-CM    1. ESRD (end stage renal disease) on dialysis (H) N18.6      Z99.2    2. Chronic anemia D64.9    3. Pain management R52    4. Chronic diastolic congestive heart failure (H) I50.32        MEDICAL EQUIPMENT NEEDS:  Wheelchair ordered     DISCHARGE PLAN/FACE TO FACE:  I certify that services are/were furnished while this patient was under the care of a physician and that a physician or an allowed non-physician practitioner (NPP), had a face-to-face encounter that meets the physician face-to-face encounter requirements. The encounter was in whole, or in part, related to the primary reason for home health. The patient is confined to his/her home and needs intermittent skilled nursing, physical therapy, speech-language pathology, or the continued need for occupational therapy. A plan of care has been established by a physician and is periodically reviewed by a physician.  Date of Face-to-Face Encounter: 8/19/19    I certify that, based on my findings, the following services are medically necessary home health services: [facility] PT OT home health aide and RN    My clinical findings support the need for the above skilled services because: PT OT for continued strength and endurance, home health aide to assist with activities of daily living, RN for vital signs, medication management and INR checks    This patient is homebound because: She is deconditioned and easily fatigued.  She is end-stage renal disease with dialysis 3 times a week and as needed for fluid retention she also requires the use of adaptive equipment    The patient is, or has been, under my care and I have initiated the establishment of the plan of care. This patient will be followed by a physician who will periodically review the plan of care.    Schedule follow up visit with primary care provider within 7 days to reestablish care.    Electronically signed by: Le Flynn CNP

## 2021-06-19 NOTE — LETTER
Letter by Le Flynn CNP at      Author: Le Flynn CNP Service: -- Author Type: --    Filed:  Encounter Date: 7/22/2019 Status: (Other)         Patient: Belia Rodriguez   MR Number: [id number]   YOB: 1945   Date of Visit: 7/22/2019     Not seen out of the building at dialysis

## 2021-06-19 NOTE — LETTER
Letter by Le Flynn CNP at      Author: Le Flynn CNP Service: -- Author Type: --    Filed:  Encounter Date: 7/18/2019 Status: (Other)         Patient: Belia Rodriguez   MR Number: [id number]   YOB: 1945   Date of Visit: 7/18/2019     [facility]    Facility:   [facility] [[id number]]   Code Status: FULL CODE      CHIEF COMPLAINT/REASON FOR VISIT:  Chief Complaint   Patient presents with   ? Problem Visit     INR review       HISTORY:      HPI: Belia is a 73 y.o. female undergoing physical and  occupational therapy at [facility]. with history of ESRD on hemodialysis M/W/F, chronic thrombocytopenia, chronic atrial fibrillation, sick sinus syndrome, status post watchman device, status post pacemaker, diastolic CHF, hypertension, ZULEIKA, anemia of chronic kidney disease, dyslipidemia who was brought to the emergency department for evaluation after a fall.  The patient uses a walker for ambulation due to right foot drop and while at home tripped on the kitchen floor mat when she turned and fell.  She denies head trauma or loss of consciousness but complained of left hip pain.  In the ED, she was hemodynamically stable.  X-rays showed a displaced, comminuted intertrochanteric fracture of left femur. She underwent a left intramedullary nailing.     Today she is seen for review of first scheduled INR due to a new DVT left leg. She denied CP. She does have shortness of breath with activity. She is on 2 L NC at night due to unable to tolerate C- Pap after many tries She occasionally will wear 1L NC  during the day.   She had a care Care conference  7/16/19. INR subtherapeutic.coumadin increased and next INR 7/22/19    Past Medical History:   Diagnosis Date   ? Arthritis    ? CHF (congestive heart failure) (H)    ? Chronic anemia 6/1/2014   ? Chronic kidney disease    ? Chronic thoracic aortic dissection (H) 10/7/2015    Descending  thoracic aorta; treated medically per notes of Dragan Singh and Jennifer.   ? COPD (chronic obstructive pulmonary disease) (H)    ? CVA (cerebral infarction)    ? Disease of thyroid gland    ? Dyslipidemia    ? ESRD (end stage renal disease) (H) 06/03/2009    on dialysis with Dr. Mitchell   ? Essential hypertension 6/30/2014   ? Gastrointestinal hemorrhage, unspecified gastrointestinal hemorrhage type 6/5/2017   ? GI (gastrointestinal bleed)    ? GI bleeding 6/5/2017   ? Gout    ? L3 vertebral fracture (H) 11/16/2015   ? Left Atrial Appendage Occlusion (WATCHMAN) 4/5/2018    LAAO April 5, 2018 (30 mm WATCHMAN)   ? Obesity    ? ZULEIKA (obstructive sleep apnea), severe, intolerant of CPAP 10/22/2015   ? Pneumonia 9-7-2015   ? Right foot drop    ? Spinal stenosis 3/28/2016   ? Stroke (H) 3/24/2016             Family History   Problem Relation Age of Onset   ? Dementia Mother    ? Diabetes Mother    ? Arthritis Mother    ? Cancer Mother    ? Depression Mother    ? Heart disease Mother    ? Vision loss Mother    ? Stroke Father    ? Heart disease Father    ? Breast cancer Neg Hx      Social History     Socioeconomic History   ? Marital status:      Spouse name: Cayden   ? Number of children: 2   ? Years of education: Not on file   ? Highest education level: Not on file   Occupational History     Employer: RETIRED   Social Needs   ? Financial resource strain: Not on file   ? Food insecurity:     Worry: Not on file     Inability: Not on file   ? Transportation needs:     Medical: Not on file     Non-medical: Not on file   Tobacco Use   ? Smoking status: Former Smoker     Packs/day: 1.50     Years: 37.00     Pack years: 55.50     Types: Cigarettes     Last attempt to quit: 1/1/2009     Years since quitting: 10.5   ? Smokeless tobacco: Never Used   Substance and Sexual Activity   ? Alcohol use: No     Alcohol/week: 7.0 oz     Types: 14 Standard drinks or equivalent per week     Comment: 14 mixed drinks per week   ? Drug  "use: No   ? Sexual activity: Never     Partners: Male   Lifestyle   ? Physical activity:     Days per week: Not on file     Minutes per session: Not on file   ? Stress: Not on file   Relationships   ? Social connections:     Talks on phone: Not on file     Gets together: Not on file     Attends Jehovah's witness service: Not on file     Active member of club or organization: Not on file     Attends meetings of clubs or organizations: Not on file     Relationship status: Not on file   ? Intimate partner violence:     Fear of current or ex partner: Not on file     Emotionally abused: Not on file     Physically abused: Not on file     Forced sexual activity: Not on file   Other Topics Concern   ? Not on file   Social History Narrative    Lives with her . Daughter in [place] and daughter in [place].         Review of Systems   Constitutional: Negative for activity change, appetite change, fatigue and fever.   HENT: Negative for congestion.    Respiratory: Positive for shortness of breath. Negative for cough and wheezing.         Dialysis port R upper chest    Cardiovascular: Negative for chest pain and leg swelling.   Gastrointestinal: Negative for abdominal distention, abdominal pain, constipation, diarrhea and nausea.   Genitourinary: Negative for dysuria.        Voids \"a little per her report\"   Musculoskeletal: Negative for arthralgias and back pain.   Skin: Positive for wound. Negative for color change.   Neurological: Negative for dizziness.   Psychiatric/Behavioral: Positive for sleep disturbance. Negative for agitation, behavioral problems and confusion.         On trazodone        .  Vitals:    07/18/19 0851   BP: 133/58   Pulse: 72   Resp: 18   Temp: 98.1  F (36.7  C)   SpO2: 93%   Weight: 172 lb 1.6 oz (78.1 kg)       Physical Exam   Constitutional: She is oriented to person, place, and time. She appears well-developed and well-nourished.   pleasant woman in no acute distress   HENT:   Head: Normocephalic " and atraumatic.   Eyes: Pupils are equal, round, and reactive to light. Conjunctivae are normal.   Neck: Normal range of motion. Neck supple.   Cardiovascular: Normal rate, regular rhythm and normal heart sounds.   No murmur heard.  Pulmonary/Chest: Effort normal and breath sounds normal. She has no wheezes. She has no rales.   Abdominal: Soft. Bowel sounds are normal. She exhibits no distension. There is no tenderness.   Musculoskeletal: Normal range of motion. She exhibits edema.   Right foot drop  2+ edema left leg.    Neurological: She is alert and oriented to person, place, and time.   Neuropathy right foot and decreased sensation bottom of left foot.     Skin: Skin is warm and dry.   Left hip wound    Psychiatric: She has a normal mood and affect. Her behavior is normal.         LABS:   Recent Results (from the past 240 hour(s))   Occult Blood, Fecal   Result Value Ref Range    Occult Blood, Stool #1 Negative Negative   Occult Blood, Fecal   Result Value Ref Range    Occult Blood, Stool #1 Negative Negative   Occult Blood, Fecal   Result Value Ref Range    Occult Blood, Stool #1 Negative Negative   Hemoglobin   Result Value Ref Range    Hemoglobin 8.9 (L) 12.0 - 16.0 g/dL     Current Outpatient Medications   Medication Sig Note   ? acetaminophen (TYLENOL) 500 MG tablet Take 2 tablets (1,000 mg total) by mouth 3 (three) times a day.    ? aspirin 325 MG tablet Take 1 tablet (325 mg total) by mouth 2 (two) times a day.    ? atorvastatin (LIPITOR) 10 MG tablet Take 10 mg by mouth at bedtime.    ? B complex-vitamin C-folic acid (DIALYVITE) 100-1 mg Tab Take 1 tablet by mouth daily.    ? chlorpheniramine/dextromethorp (CORICIDIN HBP COUGH AND COLD ORAL) Take 1 tablet by mouth every 6 (six) hours as needed.    ? cholecalciferol, vitamin D3, 2,000 unit cap Take 2,000 Units by mouth daily with lunch.     ? cinacalcet (SENSIPAR) 30 MG tablet Take 30 mg by mouth 3 times weekly with dialysis          ? diphenhydrAMINE  (BENADRYL) 25 mg tablet Take 50 mg by mouth at bedtime as needed for sleep.    ? folic acid (FOLVITE) 1 MG tablet Take 1 mg by mouth daily.    ? gabapentin (NEURONTIN) 100 MG capsule Take 100 mg by mouth 3 (three) times a day.    ? Lactobacillus rhamnosus GG (CULTURELLE) 10-15 Billion cell capsule Take 1 capsule by mouth daily with lunch.    ? metoprolol succinate (TOPROL-XL) 25 MG Take 25 mg by mouth daily.    ? midodrine HCl (MIDODRINE ORAL) Take 15 mg by mouth 3 (three) times a week. Three times weekly before dialysis.           ? oxyCODONE (ROXICODONE) 5 MG immediate release tablet Take 0.5-1 tablets (2.5-5 mg total) by mouth every 4 (four) hours as needed.    ? polyvinyl alcohol (LIQUIFILM TEARS) 1.4 % ophthalmic solution Apply 1 drop to eye as needed for dry eyes.    ? ranitidine (ZANTAC) 150 MG tablet Take 150 mg by mouth at bedtime.    ? senna-docusate (SENNOSIDES-DOCUSATE SODIUM) 8.6-50 mg tablet Take 1 tablet by mouth at bedtime.     ? sevelamer carbonate (RENVELA) 800 mg tablet Take 2,400 mg by mouth 3 (three) times a day with meals.           ? timolol maleate (TIMOPTIC) 0.5 % ophthalmic solution Administer 1 drop to both eyes 2 (two) times a day.     ? traZODone (DESYREL) 50 MG tablet Take 50 mg by mouth at bedtime. 6/23/2019: Patient states she uses this almost every night.      ASSESSMENT:      ICD-10-CM    1. Closed displaced intertrochanteric fracture of left femur with routine healing, subsequent encounter S72.142D    2. ESRD (end stage renal disease) (H) N18.6    3. Anticoagulation goal of INR 2 to 3 Z51.81     Z79.01        PLAN:    S/P internal fixation using intramedullary nail. PT/OT/pain  Control, monitor incision for S/S infection   End-stage renal disease. Dialysis as scheduled.  M/W/F   Hypertension on Metoprolol Succinate   Chronic atrial fibrillation- on coumadin , metoprolol  Pain control Tylenol and Oxycodone as scheduled  check hip  x-ray due to increased pain.   Hyperkalemia.   Potassium was occasionally greater than 6 and was treated with dialysis 6/24 potassium 4.5  Anemia.  Chronic anemia was worsened with acute blood loss.  She received 1 unit of packed red blood cells HGB 7.9 on 6/27/19, dropped to 7.4 came up to 8.9 and 7.9 on 7/8.  And lastly 8.9 on 7/15/19. Monitor labs.  occult stools x 3 negative despite rectal bleeding GI consult.   Insomnia-trazadone  75 mg HS  Constipation resolved continue  senna s to 2 tabs two times a day, Biscodyl suppository 10 mg AR q 3 days PRN if no BM   GI bleeding - occults negative,  stat HGB 8.9 which is up. From 7.9- GI consult.   Anticoagulation therapy - recently started on coumadin, adjust per INR's.       Electronically signed by: Le Flynn CNP

## 2021-06-19 NOTE — LETTER
Letter by Giovanni Santamaria MD at      Author: Giovanni Santamaria MD Service: -- Author Type: --    Filed:  Encounter Date: 3/15/2019 Status: (Other)         Patient: Belia Rodriguez   MR Number: [id number]   YOB: 1945   Date of Visit: 3/15/2019     Code Status:  FULL CODE  Visit Type: Problem Visit (dialysis)     Facility:  [facility] [[id number]]        Facility Type: SNF (Skilled Nursing Facility, TCU)    History of Present Illness: Belia Rodriguez is a 73 y.o. female who is a very jovial self coming back from dialysis.  She states that since she has had the pacemaker her energy level has significantly improved.  She is not been short of breath with activity.  She admits to being on the hurt her words lazy side and is looking forward to the 5 days to 7 days approved for her rehabilitation to strengthen her physical deconditioning.    Review of Systems     Physical Exam   Constitutional:   Alert interactive no rapid heartbeats vital signs of all been normal lungs show diminishing sounds in the bases posteriorly bilaterally but no extra adventitial sounds appreciated.  Fair inspiratory and expiratory volume.  Heart nonrapid.   Vitals reviewed.      Labs:  All labs reviewed in the nursing home record.    Assessment:  1. Chronic atrial fibrillation (H)     2. Chronic diastolic congestive heart failure (H)     3. ESRD (end stage renal disease) on dialysis (H)         Plan: At this juncture I am very pleased with the interventions and will continue same medicines but really emphasized the conditioning and improvements in performance.  No other changes at this time.      25 minutes spent of which greater than 65% was face to face communication with the patient about above plan of care    Electronically signed by: Giovanni Santamaria MD

## 2021-06-19 NOTE — LETTER
Letter by Johnson, Michael Duane, CNP at      Author: Johnson, Michael Duane, CNP Service: -- Author Type: --    Filed:  Encounter Date: 9/2/2019 Status: (Other)         Patient: Belia Rodriguez   MR Number: [id number]   YOB: 1945   Date of Visit: 9/2/2019     [facility]    Facility:   [facility] [[id number]]   Code Status: FULL CODE      CHIEF COMPLAINT/REASON FOR VISIT:  Chief Complaint   Patient presents with   ? Follow Up     rehab, esrd, hip       HISTORY:      HPI: Belia is a 74 y.o. female who was seen secondary to her hospitalization August 27, 2019 secondary to a fall sustaining left hip discomfort.  She is a status post internal fixation of a left intertrochanteric hip fracture with biliary nail in June 2019.  She eventually was discharged from the transitional care unit and tripped and fell.  Therefore after further evaluation she was sent back to transitional care unit to work on her strength and balance and conditioning.  She does have a history of end-stage renal disease and does attend dialysis 3 times weekly.  She has been normotensive and afebrile.  Has a pacemaker from March 2019.  Does wear a right leg AFO secondary to foot drop.  History of chronic back pain.  I remember her from previous transitional care units.  Her pain she can have oxycodone as needed usually takes about 2 doses per day and she is being followed by the Coumadin clinic.  Also for neuropathic pain is on gabapentin 100 mg 3 times a daily omeprazole 20 mg twice a daily for reflux along with Zantac 150 mg at bedtime also with sleep she is on trazodone 75 mg.  She does not have a whole lot extra to say regarding her rehabilitation she does feel like she is making pretty good progress overall does like the parallel bars as well as the bicycle.    Past Medical History:   Diagnosis Date   ? Arthritis    ? CHF (congestive heart failure) (H)    ? Chronic anemia 6/1/2014   ?  Chronic kidney disease    ? Chronic thoracic aortic dissection (H) 10/7/2015    Descending thoracic aorta; treated medically per notes of Dragan Singh and Jennifer.   ? COPD (chronic obstructive pulmonary disease) (H)    ? CVA (cerebral infarction)    ? Disease of thyroid gland    ? Dyslipidemia    ? ESRD (end stage renal disease) (H) 06/03/2009    on dialysis with Dr. Mitchell   ? Essential hypertension 6/30/2014   ? Gastrointestinal hemorrhage, unspecified gastrointestinal hemorrhage type 6/5/2017   ? GI (gastrointestinal bleed)    ? GI bleeding 6/5/2017   ? Gout    ? L3 vertebral fracture (H) 11/16/2015   ? Left Atrial Appendage Occlusion (WATCHMAN) 4/5/2018    LAAO April 5, 2018 (30 mm WATCHMAN)   ? Obesity    ? ZULEIKA (obstructive sleep apnea), severe, intolerant of CPAP 10/22/2015   ? Pneumonia 9-7-2015   ? Right foot drop    ? Spinal stenosis 3/28/2016   ? Stroke (H) 3/24/2016             Family History   Problem Relation Age of Onset   ? Dementia Mother    ? Diabetes Mother    ? Arthritis Mother    ? Cancer Mother    ? Depression Mother    ? Heart disease Mother    ? Vision loss Mother    ? Stroke Father    ? Heart disease Father    ? Breast cancer Neg Hx      Social History     Socioeconomic History   ? Marital status:      Spouse name: Cayden   ? Number of children: 2   ? Years of education: Not on file   ? Highest education level: Not on file   Occupational History     Employer: RETIRED   Social Needs   ? Financial resource strain: Not on file   ? Food insecurity:     Worry: Not on file     Inability: Not on file   ? Transportation needs:     Medical: Not on file     Non-medical: Not on file   Tobacco Use   ? Smoking status: Former Smoker     Packs/day: 1.50     Years: 37.00     Pack years: 55.50     Types: Cigarettes     Last attempt to quit: 1/1/2009     Years since quitting: 10.6   ? Smokeless tobacco: Never Used   Substance and Sexual Activity   ? Alcohol use: No     Alcohol/week: 7.0 oz     Types:  14 Standard drinks or equivalent per week     Comment: 14 mixed drinks per week   ? Drug use: No   ? Sexual activity: Never     Partners: Male   Lifestyle   ? Physical activity:     Days per week: Not on file     Minutes per session: Not on file   ? Stress: Not on file   Relationships   ? Social connections:     Talks on phone: Not on file     Gets together: Not on file     Attends Congregation service: Not on file     Active member of club or organization: Not on file     Attends meetings of clubs or organizations: Not on file     Relationship status: Not on file   ? Intimate partner violence:     Fear of current or ex partner: Not on file     Emotionally abused: Not on file     Physically abused: Not on file     Forced sexual activity: Not on file   Other Topics Concern   ? Not on file   Social History Narrative    Lives with her . Daughter in [place] and daughter in [place].         Review of Systems  Currently she denies any chills and fever coughing wheezing chest pain dizziness or vertigo nausea vomiting diarrhea dysuria headache stiff neck swollen glands rashes or sores.  History of end-stage renal disease on dialysis along with GERD hypertension CHF pacemaker chronic low back pain sleep apnea intolerant to CPAP and a right foot drop.    Current Outpatient Medications   Medication Sig Note   ? acetaminophen (TYLENOL) 500 MG tablet Take 1,000 mg by mouth 3 (three) times a day as needed.    ? atorvastatin (LIPITOR) 10 MG tablet Take 10 mg by mouth at bedtime.    ? B complex-vitamin C-folic acid (DIALYVITE) 100-1 mg Tab Take 1 tablet by mouth daily with lunch.           ? chlorpheniramine/dextromethorp (CORICIDIN HBP COUGH AND COLD ORAL) Take 1 tablet by mouth every 6 (six) hours as needed.    ? cholecalciferol, vitamin D3, (VITAMIN D3) 2,000 unit Tab Take 2,000 Units by mouth daily with lunch.           ? cinacalcet (SENSIPAR) 30 MG tablet Take 30 mg by mouth see administration instructions. Take three  times weekly with dialysis (on Mondays, Wednesdays, and Fridays).          ? folic acid (FOLVITE) 1 MG tablet Take 1 mg by mouth daily with lunch.           ? gabapentin (NEURONTIN) 100 MG capsule Take 100 mg by mouth 3 (three) times a day.    ? Lactobacillus rhamnosus GG (CULTURELLE) 10-15 Billion cell capsule Take 1 capsule by mouth daily with lunch.    ? metoprolol succinate (TOPROL-XL) 25 MG Take 25 mg by mouth daily with lunch.           ? midodrine HCl (MIDODRINE ORAL) Take 10 mg by mouth see administration instructions. Take 10 mg at the beginning of dialysis and 10 mg detention through dialysis three days weekly on dialysis days (Mondays, Wednesdays, and Fridays).          ? omeprazole (PRILOSEC) 20 MG capsule Take 20 mg by mouth 2 (two) times daily before lunch and supper.           ? oxyCODONE (ROXICODONE) 5 MG immediate release tablet Take 0.5-1 tablets (2.5-5 mg total) by mouth every 4 (four) hours as needed.    ? polyvinyl alcohol (LIQUIFILM TEARS) 1.4 % ophthalmic solution Apply 1 drop to eye as needed for dry eyes.    ? pot bicarb-sod bicarb-cit ac (EDI-SELTZER GOLD) 344-1,050-1,000 mg TbEF Take 1 tablet by mouth every 4 (four) hours as needed.    ? ranitidine (ZANTAC) 150 MG tablet Take 150 mg by mouth at bedtime.    ? senna-docusate (SENNOSIDES-DOCUSATE SODIUM) 8.6-50 mg tablet Take 1 tablet by mouth every evening.           ? sucroferric oxyhydroxide (VELPHORO) 500 mg Chew chewable tablet Chew 500 mg 3 (three) times a day with meals.    ? timolol maleate (TIMOPTIC) 0.5 % ophthalmic solution Administer 1 drop to both eyes 2 (two) times a day.     ? traZODone (DESYREL) 150 MG tablet Take 75 mg by mouth at bedtime. 8/27/2019: 8/27/2019: Per the patient, she has been taking 150 mg at bedtime. Please address.   ? warfarin (COUMADIN/JANTOVEN) 3 MG tablet Take 3-4.5 mg by mouth See Admin Instructions. Take 4.5 mg two days weekly (on Tuesdays and Fridays) and 3 mg five days weekly (on all other days of the  week). Adjust dose based on INR results as directed.        .There were no vitals filed for this visit.  Blood pressure 104/56 pulse 57 respirations 18 temperature 97.2 saturation room air 95%  Physical Exam   Constitutional: No distress.   HENT:   Head: Normocephalic.   Eyes: Pupils are equal, round, and reactive to light.   Neck: Neck supple. No thyromegaly present.   Cardiovascular: Normal rate, regular rhythm and normal heart sounds.   Pacemaker.  Dialysis port right upper thorax   Pulmonary/Chest: Breath sounds normal.   History of sleep apnea intolerant to CPAP.   Abdominal: Bowel sounds are normal. There is no tenderness. There is no guarding.   Musculoskeletal:   History of right hip intertrochanteric fracture status post nail June 2019.  History of falls.   Lymphadenopathy:     She has no cervical adenopathy.   Neurological: She is alert.   Skin: Skin is warm and dry. No rash noted.   Psychiatric: Her behavior is normal.         LABS:   Lab Results   Component Value Date    WBC 4.5 07/08/2019    HGB 8.9 (L) 07/15/2019    HCT 25.8 (L) 07/08/2019     (H) 07/08/2019     07/08/2019     Results for orders placed or performed during the hospital encounter of 05/18/19   Basic Metabolic Panel   Result Value Ref Range    Sodium 137 136 - 145 mmol/L    Potassium 5.0 3.5 - 5.0 mmol/L    Chloride 100 98 - 107 mmol/L    CO2 26 22 - 31 mmol/L    Anion Gap, Calculation 11 5 - 18 mmol/L    Glucose 81 70 - 125 mg/dL    Calcium 9.5 8.5 - 10.5 mg/dL    BUN 29 (H) 8 - 28 mg/dL    Creatinine 4.91 (H) 0.60 - 1.10 mg/dL    GFR MDRD Af Amer 11 (L) >60 mL/min/1.73m2    GFR MDRD Non Af Amer 9 (L) >60 mL/min/1.73m2       Lab Results   Component Value Date    CHOL 102 03/25/2016     Lab Results   Component Value Date    HDL 43 (L) 03/25/2016     Lab Results   Component Value Date    LDLCALC 47 03/25/2016     Lab Results   Component Value Date    TRIG 60 03/25/2016     No components found for: CHOLHDL      ASSESSMENT:       ICD-10-CM    1. Closed fracture of left hip with routine healing, subsequent encounter S72.002D    2. ESRD on dialysis (H) N18.6     Z99.2    3. Essential hypertension with goal blood pressure less than 140/90 I10    4. Chronic diastolic congestive heart failure (H) I50.32        PLAN:    Continue to work with therapy department.  Her leg looks to be in pretty good shape and well-healed.  Continue with dialysis 3 times weekly.  No medication changes but this visit.  She did not have any other questions.    Electronically signed by: Michael Duane Johnson, AYAH

## 2021-06-19 NOTE — LETTER
Letter by Andria Woods CNP at      Author: Andria Woods CNP Service: -- Author Type: --    Filed:  Encounter Date: 7/1/2019 Status: (Other)         Patient: Belia Rodriguez   MR Number: [id number]   YOB: 1945   Date of Visit: 7/1/2019     Code Status:  FULL CODE  Visit Type: Follow Up     Facility:  [facility] [[id number]]        Facility Type: SNF (Skilled Nursing Facility, TCU)    History of Present Illness: Belia Rodriguez is a 73 y.o. female seen for TCU follow-up visit today.  She has a past medical history for ESRD on HD Monday Wednesday Friday, chronic thrombocytopenia, atrial fib, sick sinus syndrome, status post watchman, status post pacer, diastolic CHF, hypertension, ZULEIKA, anemia, CKD, HLD, with chronic right foot drop.  She is here for rehab after hospitalization following a fall in which he sustained a displaced comminuted intertrochanteric fracture of her left femur.  She underwent left intramedullary nailing.  She did have trouble with hypoxia especially at night and she cannot tolerate CPAP and so she was discharged with nasal cannula oxygen 2 L at night.    Today, nursing reports that oxycodone did not cover her pain as easily.  In discussion with the patient she is concerned that her oxycodone does get dialyzed off during dialysis.  I did explain that she is scheduled to take oxycodone every 4 hours and so taking it prior to dialysis and when she gets back from dialysis is okay.  She does have scheduled acetaminophen in which she feels helps to maintain her pain management.  She is using oxygen more during the day and feels a little more short of breath however her lung sounds are clear and her weight is stable at 171.  Her fluid is managed by her dialysis 3 times a week.  She does have soft nonpitting edema of her lower extremities and is wearing CLARITA hose.  Her blood pressures are well managed with most SBP's in the 110-120s.  She reports issues  with constipation however she is on senna S twice daily.  She reports she is sleeping well after the increase in trazodone last week.    Review of Systems   Patient denies fever, chills, headache, lightheadedness, dizziness, rhinorrhea, cough, congestion, shortness of breath, chest pain, palpitations, abdominal pain, n/v, diarrhea, constipation, change in appetite, dysuria, frequency, burning or pain with urination.  Other than stated in HPI all other review of systems is negative.         Physical Exam   Vital signs: /65, heart rate 95, respiratory 18, temp 97.7.  GENERAL APPEARANCE: Well developed, well nourished, in no acute distress.  HEENT: normocephalic, atraumatic  PERRL, sclerae anicteric, conjunctivae clear and moist, EOM intact  LUNGS: Lung sounds CTA, no adventitious sounds, respiratory effort decreased  CARD: RRR, S1, S2, without murmurs, gallops, rubs, no JVD  ABD: Soft and nontender with normal bowel sounds.   MSK: Muscle strength and tone were normal.  EXTREMITIES: Soft nonpitting lower extremity edema bilaterally  NEURO: Alert and oriented x 3.  Face is symmetric.  SKIN: Inspection of the skin reveals no rashes, ulcerations or petechiae.  PSYCH: euthymic          Labs:    Recent Results (from the past 240 hour(s))   ECG 12 lead nursing unit performed   Result Value Ref Range    SYSTOLIC BLOOD PRESSURE 161 mmHg    DIASTOLIC BLOOD PRESSURE 75 mmHg    VENTRICULAR RATE 84 BPM    ATRIAL RATE 85 BPM    P-R INTERVAL  ms    QRS DURATION 146 ms    Q-T INTERVAL 440 ms    QTC CALCULATION (BEZET) 519 ms    P Axis  degrees    R AXIS 97 degrees    T AXIS 58 degrees    MUSE DIAGNOSIS       Electronic ventricular pacemaker  Atrial fibrillation  Abnormal ECG  When compared with ECG of 18-MAY-2019 09:10,  No significant change was found  Confirmed by JENNY ARIAS MD LOC:[facility]J ([id number]) on 6/23/2019 4:05:09 PM     Comprehensive Metabolic Panel   Result Value Ref Range    Sodium 134 (L) 136 - 145 mmol/L    Potassium  4.5 3.5 - 5.0 mmol/L    Chloride 100 98 - 107 mmol/L    CO2 20 (L) 22 - 31 mmol/L    Anion Gap, Calculation 14 5 - 18 mmol/L    Glucose 105 70 - 125 mg/dL    BUN 34 (H) 8 - 28 mg/dL    Creatinine 5.61 (H) 0.60 - 1.10 mg/dL    GFR MDRD Af Amer 9 (L) >60 mL/min/1.73m2    GFR MDRD Non Af Amer 7 (L) >60 mL/min/1.73m2    Bilirubin, Total 0.4 0.0 - 1.0 mg/dL    Calcium 8.8 8.5 - 10.5 mg/dL    Protein, Total 6.2 6.0 - 8.0 g/dL    Albumin 3.5 3.5 - 5.0 g/dL    Alkaline Phosphatase 101 45 - 120 U/L    AST 16 0 - 40 U/L    ALT 15 0 - 45 U/L   INR   Result Value Ref Range    INR 1.02 0.90 - 1.10   APTT   Result Value Ref Range    PTT 30 24 - 37 seconds   HM1 (CBC with Diff)   Result Value Ref Range    WBC 5.9 4.0 - 11.0 thou/uL    RBC 2.78 (L) 3.80 - 5.40 mill/uL    Hemoglobin 9.4 (L) 12.0 - 16.0 g/dL    Hematocrit 29.0 (L) 35.0 - 47.0 %     (H) 80 - 100 fL    MCH 33.8 27.0 - 34.0 pg    MCHC 32.4 32.0 - 36.0 g/dL    RDW 14.6 (H) 11.0 - 14.5 %    Platelets 88 (L) 140 - 440 thou/uL    MPV 9.5 8.5 - 12.5 fL    Neutrophils % 80 (H) 50 - 70 %    Lymphocytes % 11 (L) 20 - 40 %    Monocytes % 7 2 - 10 %    Eosinophils % 1 0 - 6 %    Basophils % 0 0 - 2 %    Neutrophils Absolute 4.7 2.0 - 7.7 thou/uL    Lymphocytes Absolute 0.7 (L) 0.8 - 4.4 thou/uL    Monocytes Absolute 0.4 0.0 - 0.9 thou/uL    Eosinophils Absolute 0.1 0.0 - 0.4 thou/uL    Basophils Absolute 0.0 0.0 - 0.2 thou/uL   Antibody Identification   Result Value Ref Range    Unidentified Antibody       POS WITH 3/10 CELLS;NONSPECIFIC;NO SPECIFICITY FOUND;REFERRED FOR I.D.   Reference Lab Workup   Result Value Ref Range    Antibody ID See scanned report.    Type and Screen   Result Value Ref Range    ABORh O POS     Antibody Screen Weak Positive (!) Negative   Protime-INR (if on Coumadin)   Result Value Ref Range    INR 1.05 0.90 - 1.10   APTT  (if on Coumadin)   Result Value Ref Range    PTT 30 24 - 37 seconds   Platelet Function Test   Result Value Ref Range     PFA-COL/ (H) 1 - 180 sec   Platelet Function Confirmation   Result Value Ref Range    PFA-COL/ (H) 1 - 110 sec   Protime-INR (if on Coumadin)   Result Value Ref Range    INR 1.12 (H) 0.90 - 1.10   Platelet Product Information   Result Value Ref Range    Unit Type O Pos     Blood Expiration Date 66943522470626     Unit Number [id number]     Status Transfused     Component Platelets     PRODUCT CODE Z7155Y88     Issue Date and Time 20190623102200     Blood Type 5100     CODING SYSTEM [id number]    Potassium   Result Value Ref Range    Potassium 5.8 (H) 3.5 - 5.0 mmol/L   Protime-INR (if on Coumadin)   Result Value Ref Range    INR 1.15 (H) 0.90 - 1.10   APTT  (if on Coumadin)   Result Value Ref Range    PTT 32 24 - 37 seconds   Potassium   Result Value Ref Range    Potassium 6.8 (HH) 3.5 - 5.0 mmol/L   HGB   Result Value Ref Range    Hemoglobin 6.9 (LL) 12.0 - 16.0 g/dL   POCT Glucose   Result Value Ref Range    Glucose 147 (H) 70 - 139 mg/dL   Potassium   Result Value Ref Range    Potassium 6.5 (HH) 3.5 - 5.0 mmol/L   ECG 12 lead with MUSE, verify if completed in ER   Result Value Ref Range    SYSTOLIC BLOOD PRESSURE  mmHg    DIASTOLIC BLOOD PRESSURE  mmHg    VENTRICULAR RATE 70 BPM    ATRIAL RATE 34 BPM    P-R INTERVAL  ms    QRS DURATION 142 ms    Q-T INTERVAL 456 ms    QTC CALCULATION (BEZET) 492 ms    P Axis  degrees    R AXIS 43 degrees    T AXIS 58 degrees    MUSE DIAGNOSIS       Electronic ventricular pacemaker Atrial fibrillation  When compared with ECG of 23-JUN-2019 01:18,  No significant change was found  Confirmed by STEVEN ZAPATA MD LOC:[facility]ELYN ([id number]) on 6/24/2019 4:16:06 PM     POCT Glucose   Result Value Ref Range    Glucose 135 70 - 139 mg/dL   POCT Glucose   Result Value Ref Range    Glucose 177 (H) 70 - 139 mg/dL   POCT Glucose   Result Value Ref Range    Glucose 142 (H) 70 - 139 mg/dL   POCT Glucose   Result Value Ref Range    Glucose 124 70 - 139 mg/dL   POCT Glucose - for patients  with diabetes   Result Value Ref Range    Glucose 123 70 - 139 mg/dL   POCT Glucose - for patients with diabetes   Result Value Ref Range    Glucose 111 70 - 139 mg/dL   Potassium   Result Value Ref Range    Potassium 4.5 3.5 - 5.0 mmol/L   POCT Glucose   Result Value Ref Range    Glucose 122 70 - 139 mg/dL   POCT Glucose - for patients with diabetes   Result Value Ref Range    Glucose 118 70 - 139 mg/dL   Hemoglobin   Result Value Ref Range    Hemoglobin 7.5 (L) 12.0 - 16.0 g/dL   Crossmatch   Result Value Ref Range    Crossmatch COMPATIBLE     Blood Expiration Date [id number]     Unit Type O Neg     Unit Number [id number]     Status Transfused     Component Red Blood Cells     PRODUCT CODE P6561G24     Issue Date and Time 20190624100400     Blood Type 9500     CODING SYSTEM [id number]    APTT  (if on Coumadin)   Result Value Ref Range    PTT 33 24 - 37 seconds   HM2(CBC w/o Differential)   Result Value Ref Range    WBC 3.6 (L) 4.0 - 11.0 thou/uL    RBC 1.96 (L) 3.80 - 5.40 mill/uL    Hemoglobin 6.3 (LL) 12.0 - 16.0 g/dL    Hematocrit 19.4 (L) 35.0 - 47.0 %    MCV 99 80 - 100 fL    MCH 32.1 27.0 - 34.0 pg    MCHC 32.5 32.0 - 36.0 g/dL    RDW 20.5 (H) 11.0 - 14.5 %    Platelets 67 (L) 140 - 440 thou/uL    MPV 9.9 8.5 - 12.5 fL   Hepatitis B Surface Antigen (HBsAG)   Result Value Ref Range    Hepatitis B Surface Ag Negative Negative   Blood Gases, Arterial   Result Value Ref Range    pH, Arterial 7.39 7.37 - 7.44    pCO2, Arterial 50 (H) 35 - 45 mm Hg    pO2, Arterial 77 75 - 85 mm Hg    Bicarbonate, Arterial Calc 28.7 23.0 - 29.0 mmol/L    O2 Sat, Arterial 99.4 (H) 95.0 - 96.0 %    Oxyhemoglobin 95.4 95.0 - 96.0 %    Base Excess, Arterial Calc 4.8 mmol/L    Ventilation Mode Nasal cannula     Flow 4.0 LPM    Sample Stabilized Temperature 37.0 degrees C   Hemoglobin   Result Value Ref Range    Hemoglobin 7.4 (L) 12.0 - 16.0 g/dL   Crossmatch   Result Value Ref Range    Crossmatch COMPATIBLE     Blood Expiration  Date [id number]     Unit Type O Neg     Unit Number [id number]     Status Transfused     Component Red Blood Cells     PRODUCT CODE K6191C71     Issue Date and Time [id number]     Blood Type 9500     CODING SYSTEM [id number]    APTT  (if on Coumadin)   Result Value Ref Range    PTT 43 (H) 24 - 37 seconds   Hemoglobin   Result Value Ref Range    Hemoglobin 6.7 (LL) 12.0 - 16.0 g/dL   Hemoglobin   Result Value Ref Range    Hemoglobin 8.0 (L) 12.0 - 16.0 g/dL   Crossmatch   Result Value Ref Range    Crossmatch COMPATIBLE     Blood Expiration Date [id number]     Unit Type O Pos     Unit Number [id number]     Status Transfused     Component Red Blood Cells     PRODUCT CODE O7761O48     Issue Date and Time [id number]     Blood Type 5100     CODING SYSTEM [id number]    Crossmatch   Result Value Ref Range    Crossmatch COMPATIBLE     Blood Expiration Date [id number]     Unit Type O Pos     Unit Number [id number]     Status Released     Component Red Blood Cells     PRODUCT CODE A9098X56     Blood Type 5100     CODING SYSTEM [id number]    APTT  (if on Coumadin)   Result Value Ref Range    PTT 52 (H) 24 - 37 seconds   Hemoglobin   Result Value Ref Range    Hemoglobin 7.9 (L) 12.0 - 16.0 g/dL         Assessment:  1. S/P ORIF (open reduction internal fixation) fracture     2. Pain management     3. ESRD (end stage renal disease) on dialysis (H)     4. Chronic anemia     5. Chronic diastolic congestive heart failure (H)     6. Chronic bronchitis, unspecified chronic bronchitis type (H)         Plan:   post intramedullary nailing: Continue with therapies, ice often.    Judgment: Stable as long as she uses her oxycodone timely which has good results.  Continue with oxycodone 2.5 mg to 5 mg every 4 hours as needed for pain, scheduled acetaminophen thousand milligrams 3 times daily.    ESRD: Stable continue dialysis Monday Wednesday Friday    Chronic anemia: Dialysis draws labs every Monday Wednesday Friday we  will continue to follow along.    CHF: Continue to monitor daily weights, continue with dialysis.  Wean O2 as able keeping sats above 90%.  Okay for oxygen during the day.    COPD: No current medications for COPD continue with oxygen to keep O2 sats above 90%.          Electronically signed by: Andria Woods CNP

## 2021-06-19 NOTE — LETTER
Letter by Le Flynn CNP at      Author: Le Flynn CNP Service: -- Author Type: --    Filed:  Encounter Date: 7/23/2019 Status: (Other)         Patient: Belia Rodriguez   MR Number: [id number]   YOB: 1945   Date of Visit: 7/23/2019     [facility]    Facility:   [facility] [[id number]]   Code Status: FULL CODE      CHIEF COMPLAINT/REASON FOR VISIT:  Chief Complaint   Patient presents with   ? Review Of Multiple Medical Conditions       HISTORY:      HPI: Belia is a 73 y.o. female undergoing physical and  occupational therapy at [facility]. with history of ESRD on hemodialysis M/W/F, chronic thrombocytopenia, chronic atrial fibrillation, sick sinus syndrome, status post watchman device, status post pacemaker, diastolic CHF, hypertension, ZULEIKA, anemia of chronic kidney disease, dyslipidemia who was brought to the emergency department for evaluation after a fall.  The patient uses a walker for ambulation due to right foot drop and while at home tripped on the kitchen floor mat when she turned and fell.  She denies head trauma or loss of consciousness but complained of left hip pain.  In the ED, she was hemodynamically stable.  X-rays showed a displaced, comminuted intertrochanteric fracture of left femur. She underwent a left intramedullary nailing.     Today she is seen for review of multiple medical issues. . She denied CP. She does have shortness of breath with activity. She is on 2 L NC at night due to unable to tolerate C- Pap after many tries She occasionally will wear oxygen  during the day.   Her first  INR was  subtherapeutic.coumadin increased and repeat INR elevated at 4.46. She did have prior rectal bleeding when she was subtherapeutic. When seen with the increased INR she tells me she has not had any noticeable bleeding x 2 days.  A prior  GI consult was ordered. Her weights have been labile and she tells me  diuretics do not work for her and she will call for an extra  run when she feels she needs it. HGB on 7/15 was 8.9. HGB ordered for 7/23 and remained at 8.9    Past Medical History:   Diagnosis Date   ? Arthritis    ? CHF (congestive heart failure) (H)    ? Chronic anemia 6/1/2014   ? Chronic kidney disease    ? Chronic thoracic aortic dissection (H) 10/7/2015    Descending thoracic aorta; treated medically per notes of Dragan Singh and Jennifer.   ? COPD (chronic obstructive pulmonary disease) (H)    ? CVA (cerebral infarction)    ? Disease of thyroid gland    ? Dyslipidemia    ? ESRD (end stage renal disease) (H) 06/03/2009    on dialysis with Dr. Mitchell   ? Essential hypertension 6/30/2014   ? Gastrointestinal hemorrhage, unspecified gastrointestinal hemorrhage type 6/5/2017   ? GI (gastrointestinal bleed)    ? GI bleeding 6/5/2017   ? Gout    ? L3 vertebral fracture (H) 11/16/2015   ? Left Atrial Appendage Occlusion (WATCHMAN) 4/5/2018    LAAO April 5, 2018 (30 mm WATCHMAN)   ? Obesity    ? ZULEIKA (obstructive sleep apnea), severe, intolerant of CPAP 10/22/2015   ? Pneumonia 9-7-2015   ? Right foot drop    ? Spinal stenosis 3/28/2016   ? Stroke (H) 3/24/2016             Family History   Problem Relation Age of Onset   ? Dementia Mother    ? Diabetes Mother    ? Arthritis Mother    ? Cancer Mother    ? Depression Mother    ? Heart disease Mother    ? Vision loss Mother    ? Stroke Father    ? Heart disease Father    ? Breast cancer Neg Hx      Social History     Socioeconomic History   ? Marital status:      Spouse name: Cayden   ? Number of children: 2   ? Years of education: Not on file   ? Highest education level: Not on file   Occupational History     Employer: RETIRED   Social Needs   ? Financial resource strain: Not on file   ? Food insecurity:     Worry: Not on file     Inability: Not on file   ? Transportation needs:     Medical: Not on file     Non-medical: Not on file   Tobacco Use   ? Smoking  "status: Former Smoker     Packs/day: 1.50     Years: 37.00     Pack years: 55.50     Types: Cigarettes     Last attempt to quit: 1/1/2009     Years since quitting: 10.5   ? Smokeless tobacco: Never Used   Substance and Sexual Activity   ? Alcohol use: No     Alcohol/week: 7.0 oz     Types: 14 Standard drinks or equivalent per week     Comment: 14 mixed drinks per week   ? Drug use: No   ? Sexual activity: Never     Partners: Male   Lifestyle   ? Physical activity:     Days per week: Not on file     Minutes per session: Not on file   ? Stress: Not on file   Relationships   ? Social connections:     Talks on phone: Not on file     Gets together: Not on file     Attends [employer] service: Not on file     Active member of club or organization: Not on file     Attends meetings of clubs or organizations: Not on file     Relationship status: Not on file   ? Intimate partner violence:     Fear of current or ex partner: Not on file     Emotionally abused: Not on file     Physically abused: Not on file     Forced sexual activity: Not on file   Other Topics Concern   ? Not on file   Social History Narrative    Lives with her . Daughter in [place] and daughter in [place].         Review of Systems   Constitutional: Negative for activity change, appetite change, fatigue and fever.   HENT: Negative for congestion.    Respiratory: Positive for shortness of breath. Negative for cough and wheezing.         Dialysis port R upper chest    Cardiovascular: Negative for chest pain and leg swelling.   Gastrointestinal: Negative for abdominal distention, abdominal pain, constipation, diarrhea and nausea.   Genitourinary: Negative for dysuria.        Voids \"a little per her report\"   Musculoskeletal: Negative for arthralgias and back pain.   Skin: Positive for wound. Negative for color change.   Neurological: Negative for dizziness.   Psychiatric/Behavioral: Positive for sleep disturbance. Negative for agitation, behavioral " problems and confusion.         On trazodone        .  Vitals:    07/23/19 0748   BP: 120/60   Pulse: 70   Resp: 16   Temp: 97.5  F (36.4  C)   SpO2: 95%   Weight: 174 lb (78.9 kg)       Physical Exam   Constitutional: She is oriented to person, place, and time. She appears well-developed and well-nourished.   pleasant woman in no acute distress   HENT:   Head: Normocephalic and atraumatic.   Eyes: Pupils are equal, round, and reactive to light. Conjunctivae are normal.   Neck: Normal range of motion. Neck supple.   Cardiovascular: Normal rate, regular rhythm and normal heart sounds.   No murmur heard.  Pulmonary/Chest: Effort normal and breath sounds normal. She has no wheezes. She has no rales.   Abdominal: Soft. Bowel sounds are normal. She exhibits no distension. There is no tenderness.   Musculoskeletal: Normal range of motion. She exhibits edema.   Right foot drop  2+ edema left leg.    Neurological: She is alert and oriented to person, place, and time.   Neuropathy right foot and decreased sensation bottom of left foot.     Skin: Skin is warm and dry.   Left hip wound    Psychiatric: She has a normal mood and affect. Her behavior is normal.         LABS:   Recent Results (from the past 240 hour(s))   Hemoglobin   Result Value Ref Range    Hemoglobin 8.9 (L) 12.0 - 16.0 g/dL   INR   Result Value Ref Range    INR 1.57 (H) 0.90 - 1.10   INR   Result Value Ref Range    INR 4.46 (H) 0.90 - 1.10     Current Outpatient Medications   Medication Sig Note   ? acetaminophen (TYLENOL) 500 MG tablet Take 2 tablets (1,000 mg total) by mouth 3 (three) times a day.    ? aspirin 325 MG tablet Take 1 tablet (325 mg total) by mouth 2 (two) times a day.    ? atorvastatin (LIPITOR) 10 MG tablet Take 10 mg by mouth at bedtime.    ? B complex-vitamin C-folic acid (DIALYVITE) 100-1 mg Tab Take 1 tablet by mouth daily.    ? chlorpheniramine/dextromethorp (CORICIDIN HBP COUGH AND COLD ORAL) Take 1 tablet by mouth every 6 (six) hours  as needed.    ? cholecalciferol, vitamin D3, 2,000 unit cap Take 2,000 Units by mouth daily with lunch.     ? cinacalcet (SENSIPAR) 30 MG tablet Take 30 mg by mouth 3 times weekly with dialysis          ? diphenhydrAMINE (BENADRYL) 25 mg tablet Take 50 mg by mouth at bedtime as needed for sleep.    ? folic acid (FOLVITE) 1 MG tablet Take 1 mg by mouth daily.    ? gabapentin (NEURONTIN) 100 MG capsule Take 100 mg by mouth 3 (three) times a day.    ? Lactobacillus rhamnosus GG (CULTURELLE) 10-15 Billion cell capsule Take 1 capsule by mouth daily with lunch.    ? metoprolol succinate (TOPROL-XL) 25 MG Take 25 mg by mouth daily.    ? midodrine HCl (MIDODRINE ORAL) Take 15 mg by mouth 3 (three) times a week. Three times weekly before dialysis.           ? oxyCODONE (ROXICODONE) 5 MG immediate release tablet Take 0.5-1 tablets (2.5-5 mg total) by mouth every 4 (four) hours as needed.    ? polyvinyl alcohol (LIQUIFILM TEARS) 1.4 % ophthalmic solution Apply 1 drop to eye as needed for dry eyes.    ? ranitidine (ZANTAC) 150 MG tablet Take 150 mg by mouth at bedtime.    ? senna-docusate (SENNOSIDES-DOCUSATE SODIUM) 8.6-50 mg tablet Take 1 tablet by mouth at bedtime.     ? sevelamer carbonate (RENVELA) 800 mg tablet Take 2,400 mg by mouth 3 (three) times a day with meals.           ? timolol maleate (TIMOPTIC) 0.5 % ophthalmic solution Administer 1 drop to both eyes 2 (two) times a day.     ? traZODone (DESYREL) 50 MG tablet Take 50 mg by mouth at bedtime. 6/23/2019: Patient states she uses this almost every night.      ASSESSMENT:      ICD-10-CM    1. ESRD (end stage renal disease) (H) N18.6    2. Closed displaced intertrochanteric fracture of left femur with routine healing, subsequent encounter S72.142D    3. Essential hypertension I10    4. Pain management R52        PLAN:    S/P internal fixation using intramedullary nail. PT/OT/pain  Control, monitor incision for S/S infection   End-stage renal disease. Dialysis as  scheduled.  M/W/F   Hypertension on Metoprolol Succinate   Chronic atrial fibrillation- on coumadin , metoprolol  Pain control Tylenol and Oxycodone as scheduled  check hip  x-ray due to increased pain.   Anemia.  Chronic anemia was worsened with acute blood loss.  She received 1 unit of packed red blood cells HGB 7.9 on 6/27/19, dropped to 7.4 came up to 8.9 and 7.9 on 7/8.  And lastly 8.9 on 7/15/19. Monitor labs.  occult stools x 3 negative despite rectal bleeding GI consult.   Insomnia-trazadone  75 mg HS  Constipation resolved continue  senna s to 2 tabs two times a day, Biscodyl suppository 10 mg AZ q 3 days PRN if no BM   GI bleeding - occults negative,  stat HGB 8.9 which is up. From 7.9- GI consult.   Anticoagulation therapy - recently started on coumadin, adjust per INR's.       Electronically signed by: Le Flynn CNP

## 2021-06-19 NOTE — LETTER
Letter by John Escoto DO at      Author: John Escoto DO Service: -- Author Type: --    Filed:  Encounter Date: 4/23/2019 Status: (Other)         April 23, 2019     Patient: Belia Rodriguez   YOB: 1945   Date of Visit: 4/23/2019       To Whom It May Concern:    It is my medical opinion that Belia Rodriguez may be off aspirin for 1 week prior to eyelid surgery.      If you have any questions or concerns, please don't hesitate to call.    Sincerely,        John Escoto DO  Internal Medicine  [facility]

## 2021-06-19 NOTE — PROGRESS NOTES
"TCM DISCHARGE FOLLOW UP CALL    Discharge Date:  8/11/2018  Reason for hospital stay (discharge diagnosis)::  Dyspnea  Are you feeling better, the same or worse since your discharge?:  Patient is feeling better (States she's not 100%, but definitely better than she was.  She is still experiencing \"a little trouble swallowing & little breathing problems, but better.\")  Do you feel like you have a plan in the event of a health emergency?: Yes ( is there to help here.  RN informed pt of 24/7 nurse triage services)    As part of your discharge plan, were  home care services ordered for you?: No    Did you receive any new medications, or was there a change to your medications?: Yes    Are you taking those medications, or do you have any established regiment?:  Trazodone 25 mg at bedtime PRN - pt has picked up from pharmacy but hasn't started it yet as she's not yet picked up Melatonin, which she's going to take w/the trazodone.    Do you have any follow up visits scheduled with your PCP or Specialist?:  Yes, with PCP (Dr. Carpenter 8/15/18)  (RN) Is PCP appt scheduled soon enough (within 14 days of discharge date)?: Yes      Hallie Miguel RN Care Manager, Population Health    "

## 2021-06-19 NOTE — LETTER
"Letter by Le Flynn CNP at      Author: Le Flynn CNP Service: -- Author Type: --    Filed:  Encounter Date: 6/28/2019 Status: (Other)         Patient: Belia Rodriguez   MR Number: [id number]   YOB: 1945   Date of Visit: 6/28/2019     [facility]    Facility:   [facility] [[id number]]   Code Status: FULL CODE      CHIEF COMPLAINT/REASON FOR VISIT:  Chief Complaint   Patient presents with   ? Review Of Multiple Medical Conditions       HISTORY:      HPI: Belia is a 73 y.o. female undergoing physical and  occupational therapy at [facility]. with history of ESRD on hemodialysis M/W/F, chronic thrombocytopenia, chronic atrial fibrillation, sick sinus syndrome, status post watchman device, status post pacemaker, diastolic CHF, hypertension, ZULEIKA, anemia of chronic kidney disease, dyslipidemia who was brought to the emergency department for evaluation after a fall.  The patient uses a walker for ambulation due to right foot drop and while at home tripped on the kitchen floor mat when she turned and fell.  She denies head trauma or loss of consciousness but complained of left hip pain.  In the ED, she was hemodynamically stable.  X-rays showed a displaced, comminuted intertrochanteric fracture of left femur. She underwent a left intramedullary nailing.     Today she is seen for a routine first visit to review multiple medical issues. She denied CP. She does have shortness of breath with activity. She is on 2 L NC at night due to unable to tolerate C- Pap after many tries. She reports she will wear  it during the day  occassionally if she gets stressed and hyperventilates.  She  is with right foot drop and tells me she has shoes ordered and in the process of getting a brace.No BM x 6 days, abdomen is soft and she os passing \"a little gas\". No abdominal tenderness with palpation.  She was also seen for benadryl use and this was " stopped and trazodone increased to 75 mg.  She reports neuropathy in her right foot and cant feel the bottom of her left foot. She reports the surgeon is aware and it is slowly coming back. She was able to flex and extend the foot, wiggle her toes and had  a 3+ pedal pulse.     Past Medical History:   Diagnosis Date   ? Arthritis    ? CHF (congestive heart failure) (H)    ? Chronic anemia 6/1/2014   ? Chronic kidney disease    ? Chronic thoracic aortic dissection (H) 10/7/2015    Descending thoracic aorta; treated medically per notes of Dragan Singh and Jennifer.   ? COPD (chronic obstructive pulmonary disease) (H)    ? CVA (cerebral infarction)    ? Disease of thyroid gland    ? Dyslipidemia    ? ESRD (end stage renal disease) (H) 06/03/2009    on dialysis with Dr. Mitchell   ? Essential hypertension 6/30/2014   ? Gastrointestinal hemorrhage, unspecified gastrointestinal hemorrhage type 6/5/2017   ? GI (gastrointestinal bleed)    ? GI bleeding 6/5/2017   ? Gout    ? L3 vertebral fracture (H) 11/16/2015   ? Left Atrial Appendage Occlusion (WATCHMAN) 4/5/2018    LAAO April 5, 2018 (30 mm WATCHMAN)   ? Obesity    ? ZULEIKA (obstructive sleep apnea), severe, intolerant of CPAP 10/22/2015   ? Pneumonia 9-7-2015   ? Right foot drop    ? Spinal stenosis 3/28/2016   ? Stroke (H) 3/24/2016             Family History   Problem Relation Age of Onset   ? Dementia Mother    ? Diabetes Mother    ? Arthritis Mother    ? Cancer Mother    ? Depression Mother    ? Heart disease Mother    ? Vision loss Mother    ? Stroke Father    ? Heart disease Father    ? Breast cancer Neg Hx      Social History     Socioeconomic History   ? Marital status:      Spouse name: Cayden   ? Number of children: 2   ? Years of education: Not on file   ? Highest education level: Not on file   Occupational History     Employer: RETIRED   Social Needs   ? Financial resource strain: Not on file   ? Food insecurity:     Worry: Not on file     Inability: Not  on file   ? Transportation needs:     Medical: Not on file     Non-medical: Not on file   Tobacco Use   ? Smoking status: Former Smoker     Packs/day: 1.50     Years: 37.00     Pack years: 55.50     Types: Cigarettes     Last attempt to quit: 1/1/2009     Years since quitting: 10.4   ? Smokeless tobacco: Never Used   Substance and Sexual Activity   ? Alcohol use: No     Alcohol/week: 7.0 oz     Types: 14 Standard drinks or equivalent per week     Comment: 14 mixed drinks per week   ? Drug use: No   ? Sexual activity: Never     Partners: Male   Lifestyle   ? Physical activity:     Days per week: Not on file     Minutes per session: Not on file   ? Stress: Not on file   Relationships   ? Social connections:     Talks on phone: Not on file     Gets together: Not on file     Attends Temple service: Not on file     Active member of club or organization: Not on file     Attends meetings of clubs or organizations: Not on file     Relationship status: Not on file   ? Intimate partner violence:     Fear of current or ex partner: Not on file     Emotionally abused: Not on file     Physically abused: Not on file     Forced sexual activity: Not on file   Other Topics Concern   ? Not on file   Social History Narrative    Lives with her . Daughter in [place] and daughter in [place].         Review of Systems   Constitutional: Negative for activity change, appetite change, fatigue and fever.   HENT: Negative for congestion.    Respiratory: Positive for shortness of breath. Negative for cough and wheezing.         Dialysis port R upper chest    Cardiovascular: Negative for chest pain and leg swelling.   Gastrointestinal: Negative for abdominal distention, abdominal pain, constipation, diarrhea and nausea.   Genitourinary: Negative for dysuria.   Musculoskeletal: Negative for arthralgias and back pain.   Skin: Positive for wound. Negative for color change.   Neurological: Negative for dizziness.   Psychiatric/Behavioral:  Positive for sleep disturbance. Negative for agitation, behavioral problems and confusion.        Trazodone increased and benadryl stopped        .  Vitals:    06/28/19 0638   BP: 117/56   Pulse: 71   Resp: 16   Temp: 97.4  F (36.3  C)   SpO2: 91%       Physical Exam   Constitutional: She is oriented to person, place, and time. She appears well-developed and well-nourished.   pleasant woman in no acute distress   HENT:   Head: Normocephalic and atraumatic.   Eyes: Pupils are equal, round, and reactive to light. Conjunctivae are normal.   Neck: Normal range of motion. Neck supple.   Cardiovascular: Normal rate, regular rhythm and normal heart sounds.   No murmur heard.  Pulmonary/Chest: Effort normal and breath sounds normal. She has no wheezes. She has no rales.   Abdominal: Soft. Bowel sounds are normal. She exhibits no distension. There is no tenderness.   Musculoskeletal: Normal range of motion. She exhibits no edema.   Right foot drop   Neurological: She is alert and oriented to person, place, and time.   Neuropathy right foot and decreased sensation bottom of left foot.     Skin: Skin is warm and dry.   Left hip wound with 2 Aquacel dressing with drainage    Psychiatric: She has a normal mood and affect. Her behavior is normal.         LABS:   Recent Results (from the past 240 hour(s))   Remote Device Check   Result Value Ref Range    Device Manufacture [employer]     Device Model L310 ACCOLADE MRI     Device Serial Number [id number]     Device Type Pacemaker     Device Implanting Provider MARISA Duarte II     Comments/Summary       Type: Add-on remote pacemaker transmission to assess histograms per Pilar STOKES   Presenting Rhythm: Ventricular pacing, rate 90s.   Lead/Battery status: Stable.   Arrhythmias: Since 6/14/19, presumed permanent atrial fibrillation (RV lead only), overall ventricular rates >/=120 bpm 0%. No ventricular arrhythmias.   Anticoagulant: LAAO device.   Comments: Normal magnet  and pacemaker function. Routed to Nilam STOKES for review. PONCHO. ADD: Pt called to discuss some dizziness when she is at rest. Sensor adjustments made last week. Reviewed transmission and histograms. Good variability noted. Doubtful   that the device is causing the issue. Pt is on dialysis. Advised that she talk with a nurse at her runs for BP/fluid issues. Pt also notes that she has some allergies that could be contributing. Advised that she continue to monitor and will defer on   changing any device settings. Pt verbalized understanding and in agreement. BK     ECG 12 lead nursing unit performed   Result Value Ref Range    SYSTOLIC BLOOD PRESSURE 161 mmHg    DIASTOLIC BLOOD PRESSURE 75 mmHg    VENTRICULAR RATE 84 BPM    ATRIAL RATE 85 BPM    P-R INTERVAL  ms    QRS DURATION 146 ms    Q-T INTERVAL 440 ms    QTC CALCULATION (BEZET) 519 ms    P Axis  degrees    R AXIS 97 degrees    T AXIS 58 degrees    MUSE DIAGNOSIS       Electronic ventricular pacemaker  Atrial fibrillation  Abnormal ECG  When compared with ECG of 18-MAY-2019 09:10,  No significant change was found  Confirmed by JENNY ARIAS MD LOC: ([id number]) on 6/23/2019 4:05:09 PM     Comprehensive Metabolic Panel   Result Value Ref Range    Sodium 134 (L) 136 - 145 mmol/L    Potassium 4.5 3.5 - 5.0 mmol/L    Chloride 100 98 - 107 mmol/L    CO2 20 (L) 22 - 31 mmol/L    Anion Gap, Calculation 14 5 - 18 mmol/L    Glucose 105 70 - 125 mg/dL    BUN 34 (H) 8 - 28 mg/dL    Creatinine 5.61 (H) 0.60 - 1.10 mg/dL    GFR MDRD Af Amer 9 (L) >60 mL/min/1.73m2    GFR MDRD Non Af Amer 7 (L) >60 mL/min/1.73m2    Bilirubin, Total 0.4 0.0 - 1.0 mg/dL    Calcium 8.8 8.5 - 10.5 mg/dL    Protein, Total 6.2 6.0 - 8.0 g/dL    Albumin 3.5 3.5 - 5.0 g/dL    Alkaline Phosphatase 101 45 - 120 U/L    AST 16 0 - 40 U/L    ALT 15 0 - 45 U/L   INR   Result Value Ref Range    INR 1.02 0.90 - 1.10   APTT   Result Value Ref Range    PTT 30 24 - 37 seconds   HM1 (CBC with Diff)   Result Value Ref  Range    WBC 5.9 4.0 - 11.0 thou/uL    RBC 2.78 (L) 3.80 - 5.40 mill/uL    Hemoglobin 9.4 (L) 12.0 - 16.0 g/dL    Hematocrit 29.0 (L) 35.0 - 47.0 %     (H) 80 - 100 fL    MCH 33.8 27.0 - 34.0 pg    MCHC 32.4 32.0 - 36.0 g/dL    RDW 14.6 (H) 11.0 - 14.5 %    Platelets 88 (L) 140 - 440 thou/uL    MPV 9.5 8.5 - 12.5 fL    Neutrophils % 80 (H) 50 - 70 %    Lymphocytes % 11 (L) 20 - 40 %    Monocytes % 7 2 - 10 %    Eosinophils % 1 0 - 6 %    Basophils % 0 0 - 2 %    Neutrophils Absolute 4.7 2.0 - 7.7 thou/uL    Lymphocytes Absolute 0.7 (L) 0.8 - 4.4 thou/uL    Monocytes Absolute 0.4 0.0 - 0.9 thou/uL    Eosinophils Absolute 0.1 0.0 - 0.4 thou/uL    Basophils Absolute 0.0 0.0 - 0.2 thou/uL   Antibody Identification   Result Value Ref Range    Unidentified Antibody       POS WITH 3/10 CELLS;NONSPECIFIC;NO SPECIFICITY FOUND;REFERRED FOR I.D.   Reference Lab Workup   Result Value Ref Range    Antibody ID See scanned report.    Type and Screen   Result Value Ref Range    ABORh O POS     Antibody Screen Weak Positive (!) Negative   Protime-INR (if on Coumadin)   Result Value Ref Range    INR 1.05 0.90 - 1.10   APTT  (if on Coumadin)   Result Value Ref Range    PTT 30 24 - 37 seconds   Platelet Function Test   Result Value Ref Range    PFA-COL/ (H) 1 - 180 sec   Platelet Function Confirmation   Result Value Ref Range    PFA-COL/ (H) 1 - 110 sec   Protime-INR (if on Coumadin)   Result Value Ref Range    INR 1.12 (H) 0.90 - 1.10   Platelet Product Information   Result Value Ref Range    Unit Type O Pos     Blood Expiration Date 25227638557747     Unit Number [id number]     Status Transfused     Component Platelets     PRODUCT CODE I4472Q37     Issue Date and Time [id number]     Blood Type 5100     CODING SYSTEM [id number]    Potassium   Result Value Ref Range    Potassium 5.8 (H) 3.5 - 5.0 mmol/L   Protime-INR (if on Coumadin)   Result Value Ref Range    INR 1.15 (H) 0.90 - 1.10   APTT  (if on Coumadin)    Result Value Ref Range    PTT 32 24 - 37 seconds   Potassium   Result Value Ref Range    Potassium 6.8 (HH) 3.5 - 5.0 mmol/L   HGB   Result Value Ref Range    Hemoglobin 6.9 (LL) 12.0 - 16.0 g/dL   POCT Glucose   Result Value Ref Range    Glucose 147 (H) 70 - 139 mg/dL   Potassium   Result Value Ref Range    Potassium 6.5 (HH) 3.5 - 5.0 mmol/L   ECG 12 lead with MUSE, verify if completed in ER   Result Value Ref Range    SYSTOLIC BLOOD PRESSURE  mmHg    DIASTOLIC BLOOD PRESSURE  mmHg    VENTRICULAR RATE 70 BPM    ATRIAL RATE 34 BPM    P-R INTERVAL  ms    QRS DURATION 142 ms    Q-T INTERVAL 456 ms    QTC CALCULATION (BEZET) 492 ms    P Axis  degrees    R AXIS 43 degrees    T AXIS 58 degrees    MUSE DIAGNOSIS       Electronic ventricular pacemaker Atrial fibrillation  When compared with ECG of 23-JUN-2019 01:18,  No significant change was found  Confirmed by STEVEN ZAPATA MD LOC:SJ ([id number]) on 6/24/2019 4:16:06 PM     POCT Glucose   Result Value Ref Range    Glucose 135 70 - 139 mg/dL   POCT Glucose   Result Value Ref Range    Glucose 177 (H) 70 - 139 mg/dL   POCT Glucose   Result Value Ref Range    Glucose 142 (H) 70 - 139 mg/dL   POCT Glucose   Result Value Ref Range    Glucose 124 70 - 139 mg/dL   POCT Glucose - for patients with diabetes   Result Value Ref Range    Glucose 123 70 - 139 mg/dL   POCT Glucose - for patients with diabetes   Result Value Ref Range    Glucose 111 70 - 139 mg/dL   Potassium   Result Value Ref Range    Potassium 4.5 3.5 - 5.0 mmol/L   POCT Glucose   Result Value Ref Range    Glucose 122 70 - 139 mg/dL   POCT Glucose - for patients with diabetes   Result Value Ref Range    Glucose 118 70 - 139 mg/dL   Hemoglobin   Result Value Ref Range    Hemoglobin 7.5 (L) 12.0 - 16.0 g/dL   Crossmatch   Result Value Ref Range    Crossmatch COMPATIBLE     Blood Expiration Date [id number]     Unit Type O Neg     Unit Number [id number]     Status Transfused     Component Red Blood Cells      PRODUCT CODE [id number]     Issue Date and Time [id number]     Blood Type 9500     CODING SYSTEM [id number]    APTT  (if on Coumadin)   Result Value Ref Range    PTT 33 24 - 37 seconds   HM2(CBC w/o Differential)   Result Value Ref Range    WBC 3.6 (L) 4.0 - 11.0 thou/uL    RBC 1.96 (L) 3.80 - 5.40 mill/uL    Hemoglobin 6.3 (LL) 12.0 - 16.0 g/dL    Hematocrit 19.4 (L) 35.0 - 47.0 %    MCV 99 80 - 100 fL    MCH 32.1 27.0 - 34.0 pg    MCHC 32.5 32.0 - 36.0 g/dL    RDW 20.5 (H) 11.0 - 14.5 %    Platelets 67 (L) 140 - 440 thou/uL    MPV 9.9 8.5 - 12.5 fL   Hepatitis B Surface Antigen (HBsAG)   Result Value Ref Range    Hepatitis B Surface Ag Negative Negative   Blood Gases, Arterial   Result Value Ref Range    pH, Arterial 7.39 7.37 - 7.44    pCO2, Arterial 50 (H) 35 - 45 mm Hg    pO2, Arterial 77 75 - 85 mm Hg    Bicarbonate, Arterial Calc 28.7 23.0 - 29.0 mmol/L    O2 Sat, Arterial 99.4 (H) 95.0 - 96.0 %    Oxyhemoglobin 95.4 95.0 - 96.0 %    Base Excess, Arterial Calc 4.8 mmol/L    Ventilation Mode Nasal cannula     Flow 4.0 LPM    Sample Stabilized Temperature 37.0 degrees C   Hemoglobin   Result Value Ref Range    Hemoglobin 7.4 (L) 12.0 - 16.0 g/dL   Crossmatch   Result Value Ref Range    Crossmatch COMPATIBLE     Blood Expiration Date [id number]     Unit Type O Neg     Unit Number [id number]     Status Transfused     Component Red Blood Cells     PRODUCT CODE [id number]     Issue Date and Time [id number]     Blood Type 9500     CODING SYSTEM [id number]    APTT  (if on Coumadin)   Result Value Ref Range    PTT 43 (H) 24 - 37 seconds   Hemoglobin   Result Value Ref Range    Hemoglobin 6.7 (LL) 12.0 - 16.0 g/dL   Hemoglobin   Result Value Ref Range    Hemoglobin 8.0 (L) 12.0 - 16.0 g/dL   Crossmatch   Result Value Ref Range    Crossmatch COMPATIBLE     Blood Expiration Date [id number]     Unit Type O Pos     Unit Number [id number]     Status Transfused     Component Red Blood Cells     PRODUCT CODE  Y7981P99     Issue Date and Time [id number]     Blood Type 5100     CODING SYSTEM [id number]    Crossmatch   Result Value Ref Range    Crossmatch COMPATIBLE     Blood Expiration Date [id number]     Unit Type O Pos     Unit Number [id number]     Status Released     Component Red Blood Cells     PRODUCT CODE X9814D01     Blood Type 5100     CODING SYSTEM [id number]    APTT  (if on Coumadin)   Result Value Ref Range    PTT 52 (H) 24 - 37 seconds   Hemoglobin   Result Value Ref Range    Hemoglobin 7.9 (L) 12.0 - 16.0 g/dL     Current Outpatient Medications   Medication Sig Note   ? acetaminophen (TYLENOL) 500 MG tablet Take 2 tablets (1,000 mg total) by mouth 3 (three) times a day.    ? aspirin 325 MG tablet Take 1 tablet (325 mg total) by mouth 2 (two) times a day.    ? atorvastatin (LIPITOR) 10 MG tablet Take 10 mg by mouth at bedtime.    ? B complex-vitamin C-folic acid (DIALYVITE) 100-1 mg Tab Take 1 tablet by mouth daily.    ? chlorpheniramine/dextromethorp (CORICIDIN HBP COUGH AND COLD ORAL) Take 1 tablet by mouth every 6 (six) hours as needed.    ? cholecalciferol, vitamin D3, 2,000 unit cap Take 2,000 Units by mouth daily with lunch.     ? cinacalcet (SENSIPAR) 30 MG tablet Take 30 mg by mouth 3 times weekly with dialysis          ? diphenhydrAMINE (BENADRYL) 25 mg tablet Take 50 mg by mouth at bedtime as needed for sleep.    ? folic acid (FOLVITE) 1 MG tablet Take 1 mg by mouth daily.    ? gabapentin (NEURONTIN) 100 MG capsule Take 100 mg by mouth 3 (three) times a day.    ? Lactobacillus rhamnosus GG (CULTURELLE) 10-15 Billion cell capsule Take 1 capsule by mouth daily with lunch.    ? metoprolol succinate (TOPROL-XL) 25 MG Take 25 mg by mouth daily.    ? midodrine HCl (MIDODRINE ORAL) Take 15 mg by mouth 3 (three) times a week. Three times weekly before dialysis.           ? oxyCODONE (ROXICODONE) 5 MG immediate release tablet Take 0.5-1 tablets (2.5-5 mg total) by mouth every 4 (four) hours as needed.     ? polyvinyl alcohol (LIQUIFILM TEARS) 1.4 % ophthalmic solution Apply 1 drop to eye as needed for dry eyes.    ? ranitidine (ZANTAC) 150 MG tablet Take 150 mg by mouth at bedtime.    ? senna-docusate (SENNOSIDES-DOCUSATE SODIUM) 8.6-50 mg tablet Take 1 tablet by mouth at bedtime.     ? sevelamer carbonate (RENVELA) 800 mg tablet Take 2,400 mg by mouth 3 (three) times a day with meals.           ? timolol maleate (TIMOPTIC) 0.5 % ophthalmic solution Administer 1 drop to both eyes 2 (two) times a day.     ? traZODone (DESYREL) 50 MG tablet Take 50 mg by mouth at bedtime. 6/23/2019: Patient states she uses this almost every night.      ASSESSMENT:      ICD-10-CM    1. Chronic atrial fibrillation (H) I48.2    2. ESRD (end stage renal disease) on dialysis (H) N18.6     Z99.2    3. Chronic anemia D64.9    4. Closed fracture of left hip with routine healing, subsequent encounter S72.002D     left intramedullary nailing        PLAN:    S/P internal fixation using intramedullary nail. PT/OT/pain  Control, monitor incision for S/S infection   End-stage renal disease. Dialysis as scheduled.  M/W/F   Hypertension on Metoprolol Succinate   Chronic atrial fibrillation- on coumadin , metoprolol  Pain control Tylenol and Oxycodone as scheduled    Hyperkalemia.  Potassium was occasionally greater than 6 and was treated with dialysis 6/24 potassium 4.5  Anemia.  Chronic anemia was worsened with acute blood loss.  She received 1 unit of packed red blood cells HGB 7.9 on 6/27/19  Insomnia- stop benadryl and increase trazadone to 75 mg HS  Constipation increase senna s to 2 tabs two times a day, Biscodyl suppository 10 mg MI q 3 days PRN if no BM            Electronically signed by: Le Flynn CNP

## 2021-06-19 NOTE — LETTER
Letter by Giovanni Santamaria MD at      Author: Giovanni Santamaria MD Service: -- Author Type: --    Filed:  Encounter Date: 3/19/2019 Status: (Other)         Patient: Belia Rodriguez   MR Number: [id number]   YOB: 1945   Date of Visit: 3/19/2019     Code Status:  FULL CODE  Visit Type: Problem Visit (Follow-up)     Facility:  [facility] [[id number]]        Facility Type: SNF (Skilled Nursing Facility, TCU)    History of Present Illness: Belia Rodriguez is a 73 y.o. female who is in a great spirits and feels like her range of walking all way to therapy and back is significantly improved since the pacemaker.  She went for pacemaker check and they tried different settings and clearly she was fatigued when they lowered the heart rate.  She is been responding very well.  All of her energy seems to be coming back.  She is tolerating the dialysis runs as well.    Review of Systems     Physical Exam   Constitutional:   Vital signs are absolutely stable and her O2 sats are stable.  Her heart is actually regular rate the incision looks great.  Lungs show fairly good air movement for diagnosis of COPD.   Vitals reviewed.      Labs:  All labs reviewed in the nursing home record.    Assessment:  1. Chronic atrial fibrillation (H)     2. Chronic diastolic congestive heart failure (H)     3. ESRD (end stage renal disease) on dialysis (H)     4. Pulmonary emphysema, unspecified emphysema type (H)         Plan: Patient is remarkably stable and I would absolutely not to change anything at this juncture going to continue to challenge by increasing her fitness and conditioning at OT and PT.  No other changes at this time but visited quite a bit about range in future planning      35 minutes spent of which greater than 66% was face to face communication with the patient about above plan of care    Electronically signed by: Giovanni Santamaria MD

## 2021-06-19 NOTE — PROGRESS NOTES
ASSESSMENT AND PLAN:    1. Hyperparathyroidism (H)  Stable, controlled with medications.     2. ESRD on dialysis (H)  Ongoing outpatient dialysis    3. Other dysphagia  Seems resolved.  Esophogram during recent hospitalization was negative.  No further significant symptoms.     4. Essential hypertension with goal blood pressure less than 140/90  Controlled.      5. ZULEIKA (obstructive sleep apnea), severe  Using oxygen at home, does not tolerate CPAP    Medications are reviewed. No changes are indicated.    Follow up prn and as previously recommended.      CHIEF COMPLAINT:  Chief Complaint   Patient presents with     Follow-up     [facility]; 8/9/18-8/11/18;  Dyspnea     HISTORY OF PRESENT ILLNESS:  Belia Rodriguez is a 73 y.o. female with recent hospitalization for dysphagia episode and dyspnea.  She has had some dysphagia since Watchman placement.  Seems better now. Esophogram in the hospital was negative.  She is tolerating dialysis as outpatient.  Using oxygen at night for severe ZULEIKA, can't tolerate CPAP.  No chest pain, or unusual sputum production.  No nausea or emesis or symptoms of bleeding.      REVIEW OF SYSTEMS:   See HPI, all other pertinent systems on review are negative.    Active Ambulatory Problems     Diagnosis Date Noted     ESRD (end stage renal disease) (H) 06/01/2014     Chronic anemia 06/01/2014     Tachycardia-bradycardia syndrome (H) 06/12/2014     Essential hypertension with goal blood pressure less than 140/90 06/30/2014     Hyperlipidemia 06/30/2014     Persistent atrial fibrillation (H) 06/30/2014     ZULEIKA (obstructive sleep apnea), severe 10/22/2015     Anemia of chronic renal failure, stage 5 (H)      Dissection of thoracoabdominal aorta (H)      CHF (congestive heart failure) (H) 04/09/2017     Gout      GERD (gastroesophageal reflux disease) 04/14/2017     Right foot drop 05/16/2017     Gastrointestinal hemorrhage, unspecified gastrointestinal hemorrhage type 06/05/2017     Acute  midline low back pain with right-sided sciatica 06/16/2017     History of compression fracture of spine 06/16/2017     History of acute respiratory failure 02/19/2018     Pulmonary emphysema (H) 02/19/2018     Left Atrial Appendage Occlusion (WATCHMAN) 04/05/2018     Other dysphagia 08/10/2018     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     Hyperparathyroidism (H) 03/24/2009     Osteoporosis 03/24/2009     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     ESRD on dialysis (H) 08/15/2018     Resolved Ambulatory Problems     Diagnosis Date Noted     Hypotension 06/01/2014     Fever 06/07/2014     Aortic aneurysm rupture (H)      Bacteremia due to Escherichia coli 06/08/2014     Hyperkalemia 06/30/2014     Acute respiratory failure with hypoxia (H) 06/30/2014     Severe tobacco use disorder 06/30/2014     Bradycardia, sinus 06/30/2014     Volume overload 06/30/2014     Hypomagnesemia 06/30/2014     Cellulitis 08/25/2014     Hematochezia 11/02/2014     BRBPR (bright red blood per rectum) 11/02/2014     Hematuria 04/01/2015     Aortic dissection (H) 04/01/2015     Pneumonia 09/07/2015     Hypoxia 09/23/2015     SOB (shortness of breath) 09/23/2015     Acute on chronic diastolic heart failure (H) 09/27/2015     Bacteremia 09/27/2015     Dyspnea 10/07/2015     Fluid overload 10/07/2015     Chronic thoracic aortic dissection (H) 10/07/2015     L3 vertebral fracture (H) 11/16/2015     GI bleeding 03/20/2016     Chronic systolic congestive heart failure (H)      Acute lower GI bleeding 03/21/2016     Bilateral anterior& Lt Occipital embolic Infarction 03/24/2016     Spinal stenosis 03/28/2016     Back pain 03/28/2016     Tremor 03/28/2016     SOB (shortness of breath) 05/03/2016     CVA (cerebral infarction) 05/04/2016     Respiratory distress 05/09/2016     CHF (congestive heart failure) (H) 05/09/2016     Right knee pain      Dyspnea 10/02/2016     Volume overload 10/02/2016     Acute bronchitis  due to infection 10/02/2016     Acute bacterial conjunctivitis of both eyes 10/02/2016     Acute CHF (congestive heart failure) (H) 10/09/2016     Acute on chronic diastolic congestive heart failure (H)      Hypervolemia, unspecified hypervolemia type      Pure hypercholesterolemia      Gastroesophageal reflux disease without esophagitis      Leg weakness 04/20/2017     Anticoagulated on warfarin 05/16/2017     Anticoagulant long-term use 06/16/2017     Acute GI bleeding 12/09/2017     SOB (shortness of breath) 01/26/2018     Acute bronchitis with bronchospasm      Acute respiratory failure with hypoxia and hypercapnia (H)      Chronic respiratory failure with hypoxia and hypercapnia (H)      Chronic respiratory failure with hypoxia (H) 03/02/2018     Dyspnea 08/09/2018     Chest pain 08/10/2018     Past Medical History:   Diagnosis Date     Arthritis      CHF (congestive heart failure) (H)      Chronic anemia 6/1/2014     Chronic kidney disease      Chronic thoracic aortic dissection (H) 10/7/2015     COPD (chronic obstructive pulmonary disease) (H)      CVA (cerebral infarction)      Disease of thyroid gland      Dyslipidemia      ESRD (end stage renal disease) (H) 06/03/2009     Essential hypertension 6/30/2014     GI (gastrointestinal bleed)      GI bleeding 6/5/2017     Gout      L3 vertebral fracture (H) 11/16/2015     Left Atrial Appendage Occlusion (WATCHMAN) 4/5/2018     Obesity      ZULEIKA (obstructive sleep apnea), severe, intolerant of CPAP 10/22/2015     Pneumonia 9-7-2015     Right foot drop      Spinal stenosis 3/28/2016     Stroke (H) 3/24/2016     Past Surgical History:   Procedure Laterality Date     BACK SURGERY      [facility]     COLONOSCOPY N/A 3/23/2016    Procedure: COLONOSCOPY;  Surgeon: Ruddy Tejada MD;  Location: [facility];  Service:      DILATION AND CURETTAGE OF UTERUS       EP NEGRA CLOSURE N/A 4/5/2018    Procedure: EP NEGRA Closure;  Surgeon: Derick Duarte MD;  Location:   [facility] Cath Lab;  Service:      EYE SURGERY       HERNIA REPAIR       NV COLSC FLEXIBLE W/CONTROL BLEEDING ANY METHOD N/A 6/7/2017    Procedure: COLONOSCOPY;  Surgeon: Luis Mckeon MD;  Location: [facility] GI;  Service: Gastroenterology     TONSILLECTOMY     VITALS:  Vitals:    08/15/18 1425   BP: 118/70   Patient Site: Left Arm   Patient Position: Sitting   Cuff Size: Adult Regular   Pulse: 80   SpO2: 93%   Weight: 172 lb 8 oz (78.2 kg)   Height: 5' (1.524 m)     Wt Readings from Last 3 Encounters:   08/15/18 172 lb 8 oz (78.2 kg)   08/10/18 197 lb 8 oz (89.6 kg)   05/15/18 186 lb (84.4 kg)     PHYSICAL EXAM:  Constitutional:  In NAD  Neck:  Supple, no significant adenopathy.  Cardiac:  S1 S2 without murmur  Lungs: Clear to auscultation, decreased with prolonged expiration.   Abdomen:   Soft, flat and non-tender, without hepatosplenomegaly, guarding, rebound, or mass.    Extremities: mild edema, no ulcer or inflammation    DECISION TO OBTAIN OLD RECORDS AND/OR OBTAIN HISTORY FROM SOMEONE OTHER THAN PATIENT, AND/OR ACCESSING CARE EVERYWHERE):  1  0     REVIEW AND SUMMARIZATION OF OLD RECORDS, AND/OR OBTAINING HISTORY FROM SOMEONE OTHER THAN PATIENT, AND/OR DISCUSSION OF CASE WITH ANOTHER HEALTH CARE PROVIDER:  2 reviewed recent inpatient records.     REVIEW AND/OR ORDER OF OF CLINICAL LAB TESTS: 1    .  REVIEW AND/OR ORDER OF RADIOLOGY TESTS: 1   .  REVIEW AND/OR ORDER OF MEDICAL TESTS (EKG/ECHO/COLONOSCOPY/EGD): 1      INDEPENDENT  VISUALIZATION OF IMAGE, TRACING, OR SPECIMEN ITSELF (2 EACH):       TOTAL: 2    Current Outpatient Prescriptions   Medication Sig Dispense Refill     acetaminophen (TYLENOL) 500 MG tablet Take 500 mg by mouth every 6 (six) hours as needed for pain.       allopurinol (ZYLOPRIM) 100 MG tablet Take 1 tablet (100 mg total) by mouth daily. 90 tablet 3     aspirin 81 MG EC tablet Take 81 mg by mouth daily with lunch.        atorvastatin (LIPITOR) 10 MG tablet Take 10 mg by mouth at  bedtime.       CHLORPHENIRAMINE/DEXTROMETHORP (CORICIDIN HBP COUGH AND COLD ORAL) Take 1 tablet by mouth daily as needed.        cholecalciferol, vitamin D3, 2,000 unit cap Take 2,000 Units by mouth daily with lunch.        cinacalcet (SENSIPAR) 30 MG tablet Take 30 mg by mouth at bedtime.        clopidogrel (PLAVIX) 75 mg tablet Take 1 tablet (75 mg total) by mouth daily. 90 tablet 1     DIALYVITE 100-1 mg Tab TAKE ONE TABLET BY MOUTH DAILY IN THE EVENING 90 tablet 0     digoxin (LANOXIN) 125 mcg tablet TAKE 1 TABLET ORALLY 3 TIMES A WEEK. 30 tablet 0     diltiazem (CARDIZEM SR) 120 MG 12 hr capsule Take 1 capsule (120 mg total) by mouth 2 (two) times a day. 60 capsule 2     folic acid (FOLVITE) 1 MG tablet Take 1 mg by mouth daily with lunch.        gabapentin (NEURONTIN) 100 MG capsule Take 100 mg by mouth 3 (three) times a day.       Lactobacillus rhamnosus GG (CULTURELLE) 10-15 Billion cell capsule Take 1 capsule by mouth daily with lunch.       midodrine (PROAMATINE) 5 MG tablet Take 3 tablets (15 mg total) by mouth 3 (three) times a week. Take every Monday, Wednesday, and Friday in the morning. 36 tablet 11     polyvinyl alcohol (LIQUIFILM TEARS) 1.4 % ophthalmic solution Apply 1 drop to eye as needed for dry eyes.       ranitidine (ZANTAC) 150 MG tablet Take 150 mg by mouth at bedtime.       senna-docusate (SENNOSIDES-DOCUSATE SODIUM) 8.6-50 mg tablet Take 1 tablet by mouth at bedtime.        sevelamer carbonate (RENVELA) 800 mg tablet Take 1,600 mg by mouth 3 (three) times a day with meals.       timolol maleate (TIMOPTIC) 0.5 % ophthalmic solution Administer 1 drop to both eyes 2 (two) times a day.        traZODone (DESYREL) 50 MG tablet Take 0.5 tablets (25 mg total) by mouth at bedtime as needed for sleep (give with melatonin). 15 tablet 0     vit B comp no.3-folic-C-biotin (NEPHRO-OLGA) 1- mg-mg-mcg Tab tablet Take 1 tablet by mouth daily with supper.        No current facility-administered  medications for this visit.      Earnest Carpenter MD  Internal Medicine  [facility]

## 2021-06-19 NOTE — LETTER
Letter by Saige Ott MD at      Author: Saige Ott MD Service: -- Author Type: --    Filed:  Encounter Date: 8/8/2019 Status: (Other)         Patient: Belia Rodriguez   MR Number: [id number]   YOB: 1945   Date of Visit: 8/8/2019     [facility]    Facility:   [facility] [[id number]]   Code Status: FULL CODE       Chief Complaint   Patient presents with   ? Follow Up     TCU 8/8/19.       HPI:   Belia is a 73 y.o. female who was admitted to the hospital on 6/22/19 after a fall. Her hospital info is partially excerpted below.    Hospital HPI:  Belia Rodriguez is a 73 y.o. old female with history of ESRD on hemodialysis M/W/F, chronic thrombocytopenia, chronic atrial fibrillation, sick sinus syndrome, status post watchman device, status post pacemaker, diastolic CHF, hypertension, ZULEIKA, anemia of chronic kidney disease, dyslipidemia who was brought to the emergency department for evaluation after a fall.  The patient uses a walker for ambulation due to right foot drop and while at home tripped on the kitchen floor mat when she turned and fell.  She denies head trauma or loss of consciousness but complained of left hip pain.  In the ED, she was hemodynamically stable.  X-rays showed a displaced, comminuted intertrochanteric fracture of left femur.  She has been admitted for repair.     Hospital Summary:   Belia Rodriguez is a 73 y.o. female  admitted for Displaced intertrochanteric fracture of left femur, initial encounter for closed fracture (H) [S72.142A]     1.  Hip fracture, intertrochanteric (H)    she was admitted with a broken hip.  She was seen by Ortho and went to the operating room on June 23 where she underwent internal fixation using intramedullary nail.  Post procedure she slowly improved and will be discharged to a TCU     2.   End-stage renal disease.  She has chronic renal failure on dialysis.  She was seen by  nephrology and underwent dialysis on her normal schedule.  She will continue dialysis at the TCU     3.   Hypertension.  Blood pressure remains stable.  No changes were made to her medication     4.   Chronic atrial fibrillation.  She remains on chronic warfarin     5.   Disposition.  She will be discharged to a TCU where she will remain for approximately 2 weeks then return home     6.  Hyperkalemia.  Potassium was occasionally greater than 6 and was treated with dialysis     7.  Anemia.  Chronic anemia was worsened with acute blood loss.  She received 1 unit of packed red blood cells     Overall stabilized and discharged to TCU on 6/27/19 for PT, OT, nursing cares, medical management and monitoring.     Today:  She is now back on warfarin due to left LE post op DVT diagnosed in the TCU. Tolerating without bleeding problems. Has known hemorrhoids and hx of GI problems. Saw GI for consult, no need to do intervention, follow up if needed. Hgb has been stable. Still with left hip pain, working with therapy. Hoping to return home soon,  installed stair lift. Her weight is up a few pounds, she will go to dialysis tomorrow. Occ has needed extra runs during the week to manage fluid. No increased shortness of breath. No fever or cough. She uses oxygen at night as she has been intolerant of CPAP. Appetite pretty good.      Past Medical History:  Past Medical History:   Diagnosis Date   ? Arthritis    ? CHF (congestive heart failure) (H)    ? Chronic anemia 6/1/2014   ? Chronic kidney disease    ? Chronic thoracic aortic dissection (H) 10/7/2015    Descending thoracic aorta; treated medically per notes of Dragan Singh and Jennifer.   ? COPD (chronic obstructive pulmonary disease) (H)    ? CVA (cerebral infarction)    ? Disease of thyroid gland    ? Dyslipidemia    ? ESRD (end stage renal disease) (H) 06/03/2009    on dialysis with Dr. Mitchell   ? Essential hypertension 6/30/2014   ? Gastrointestinal hemorrhage,  unspecified gastrointestinal hemorrhage type 6/5/2017   ? GI (gastrointestinal bleed)    ? GI bleeding 6/5/2017   ? Gout    ? L3 vertebral fracture (H) 11/16/2015   ? Left Atrial Appendage Occlusion (WATCHMAN) 4/5/2018    LAAO April 5, 2018 (30 mm WATCHMAN)   ? Obesity    ? ZULEIKA (obstructive sleep apnea), severe, intolerant of CPAP 10/22/2015   ? Pneumonia 9-7-2015   ? Right foot drop    ? Spinal stenosis 3/28/2016   ? Stroke (H) 3/24/2016       Medications:  Current Outpatient Medications   Medication Sig Note   ? acetaminophen (TYLENOL) 500 MG tablet Take 2 tablets (1,000 mg total) by mouth 3 (three) times a day.    ? aspirin 325 MG tablet Take 1 tablet (325 mg total) by mouth 2 (two) times a day.    ? atorvastatin (LIPITOR) 10 MG tablet Take 10 mg by mouth at bedtime.    ? B complex-vitamin C-folic acid (DIALYVITE) 100-1 mg Tab Take 1 tablet by mouth daily.    ? chlorpheniramine/dextromethorp (CORICIDIN HBP COUGH AND COLD ORAL) Take 1 tablet by mouth every 6 (six) hours as needed.    ? cholecalciferol, vitamin D3, 2,000 unit cap Take 2,000 Units by mouth daily with lunch.     ? cinacalcet (SENSIPAR) 30 MG tablet Take 30 mg by mouth 3 times weekly with dialysis          ? diphenhydrAMINE (BENADRYL) 25 mg tablet Take 50 mg by mouth at bedtime as needed for sleep.    ? folic acid (FOLVITE) 1 MG tablet Take 1 mg by mouth daily.    ? gabapentin (NEURONTIN) 100 MG capsule Take 100 mg by mouth 3 (three) times a day.    ? Lactobacillus rhamnosus GG (CULTURELLE) 10-15 Billion cell capsule Take 1 capsule by mouth daily with lunch.    ? metoprolol succinate (TOPROL-XL) 25 MG Take 25 mg by mouth daily.    ? midodrine HCl (MIDODRINE ORAL) Take 15 mg by mouth 3 (three) times a week. Three times weekly before dialysis.           ? oxyCODONE (ROXICODONE) 5 MG immediate release tablet Take 0.5-1 tablets (2.5-5 mg total) by mouth every 4 (four) hours as needed.    ? polyvinyl alcohol (LIQUIFILM TEARS) 1.4 % ophthalmic solution  Apply 1 drop to eye as needed for dry eyes.    ? ranitidine (ZANTAC) 150 MG tablet Take 150 mg by mouth at bedtime.    ? senna-docusate (SENNOSIDES-DOCUSATE SODIUM) 8.6-50 mg tablet Take 1 tablet by mouth at bedtime.     ? sevelamer carbonate (RENVELA) 800 mg tablet Take 2,400 mg by mouth 3 (three) times a day with meals.           ? timolol maleate (TIMOPTIC) 0.5 % ophthalmic solution Administer 1 drop to both eyes 2 (two) times a day.     ? traZODone (DESYREL) 50 MG tablet Take 50 mg by mouth at bedtime. 6/23/2019: Patient states she uses this almost every night.        Physical Exam:   General: Patient is alert, pale female, no distress.   Vitals: /66, Temp 97.5, Pulse 74, RR 20, O2 sat 92% on O2.  HEENT: Head is NCAT. Eyes show no injection or icterus. Nares negative. Oropharynx well hydrated.  Neck: Supple. No tenderness or adenopathy. No JVD. Dialysis access right neck.  Lungs: Clear bilaterally. No wheezes.  Cardiovascular: Regular rate and rhythm, normal S1, S2.  Back: No spinal or CVA tenderness.  Abdomen: Soft, no tenderness on exam. Bowel sounds present. No guarding rebound or rigidity.  Extremities: LE edema, feet are puffy.  Musculoskeletal: Degen changes.  Psych: Mood appears good.      Labs:  Lab Results   Component Value Date    WBC 3.9 (L) 07/02/2019    HGB 7.4 (L) 07/02/2019    HCT 24.0 (L) 07/02/2019     (H) 07/02/2019     (L) 07/02/2019     Results for orders placed or performed during the hospital encounter of 05/18/19   Basic Metabolic Panel   Result Value Ref Range    Sodium 137 136 - 145 mmol/L    Potassium 5.0 3.5 - 5.0 mmol/L    Chloride 100 98 - 107 mmol/L    CO2 26 22 - 31 mmol/L    Anion Gap, Calculation 11 5 - 18 mmol/L    Glucose 81 70 - 125 mg/dL    Calcium 9.5 8.5 - 10.5 mg/dL    BUN 29 (H) 8 - 28 mg/dL    Creatinine 4.91 (H) 0.60 - 1.10 mg/dL    GFR MDRD Af Amer 11 (L) >60 mL/min/1.73m2    GFR MDRD Non Af Amer 9 (L) >60 mL/min/1.73m2       Assessment/Plan:  1.  Left femur fracture. Sustained in a fall. S/p ORIF with nail on 6/23/19. WBAT, working with therapy.   2. ESRD. On dialysis 3 x weekly.   3. Anemia. ABLA from surgery on baseline chronic anemia. Stable now.  4. HTN. Continue metoprolol though takes midodrine on dialysis days.  5. Afib. She is s/p watchman and pacer. Rate controlled. On warfarin due to DVT.   6. DVT LLE. On warfarin.   7. COPD. ZULEIKA. She reports prn oxygen use at home at HS.  8. Chronic HF.  Monitor weights, edema, clinical status.        Electronically signed by: Saige Ott MD

## 2021-06-19 NOTE — LETTER
Letter by Le Flynn CNP at      Author: Le Flynn CNP Service: -- Author Type: --    Filed:  Encounter Date: 8/13/2019 Status: (Other)         Patient: Belia Rodriguez   MR Number: [id number]   YOB: 1945   Date of Visit: 8/13/2019     [facility]    Facility:   [facility] [[id number]]   Code Status: FULL CODE      CHIEF COMPLAINT/REASON FOR VISIT:  Chief Complaint   Patient presents with   ? Review Of Multiple Medical Conditions       HISTORY:      HPI: Belia is a 74 y.o. female undergoing physical and  occupational therapy at [facility]. with history of ESRD on hemodialysis M/W/F, chronic thrombocytopenia, chronic atrial fibrillation, sick sinus syndrome, status post watchman device, status post pacemaker, diastolic CHF, hypertension, ZULEIKA, anemia of chronic kidney disease, dyslipidemia who was brought to the emergency department for evaluation after a fall.  The patient uses a walker for ambulation due to right foot drop and while at home tripped on the kitchen floor mat when she turned and fell.  She denies head trauma or loss of consciousness but complained of left hip pain.  In the ED, she was hemodynamically stable.  X-rays showed a displaced, comminuted intertrochanteric fracture of left femur. She underwent a left intramedullary nailing.     Today she is seen for review of multiple medical issues. . She denied CP. She does have shortness of breath with activity. She is on 2 L NC at night due to unable to tolerate C- Pap after many tries She occasionally will wear oxygen  during the day. She tel;ls me today she wears the oxygen at dialysis because it helps her through the run but not because she  has increased shortness of breath. She is now able to complete her runs and the dizziness is better. She tells me she is voiding daily now and before it was only every few days.    She was recently seen by GI for negative  Occults x 3 but had a  moderate amount of visible blood in the toilet.  It appears she has  large internal hemorrhoids and when she is off the coumadin it is recommended she have them banded,. Her weight is up 4.5  pounds this week.   Hgb 7/15 was 8.9. She has not had any further rectal bleeding in quite some time. She is moving her bowels. Next INR due 8/15    Past Medical History:   Diagnosis Date   ? Arthritis    ? CHF (congestive heart failure) (H)    ? Chronic anemia 6/1/2014   ? Chronic kidney disease    ? Chronic thoracic aortic dissection (H) 10/7/2015    Descending thoracic aorta; treated medically per notes of Dragan Singh and Jennifer.   ? COPD (chronic obstructive pulmonary disease) (H)    ? CVA (cerebral infarction)    ? Disease of thyroid gland    ? Dyslipidemia    ? ESRD (end stage renal disease) (H) 06/03/2009    on dialysis with Dr. Mitchell   ? Essential hypertension 6/30/2014   ? Gastrointestinal hemorrhage, unspecified gastrointestinal hemorrhage type 6/5/2017   ? GI (gastrointestinal bleed)    ? GI bleeding 6/5/2017   ? Gout    ? L3 vertebral fracture (H) 11/16/2015   ? Left Atrial Appendage Occlusion (WATCHMAN) 4/5/2018    LAAO April 5, 2018 (30 mm WATCHMAN)   ? Obesity    ? ZULEIKA (obstructive sleep apnea), severe, intolerant of CPAP 10/22/2015   ? Pneumonia 9-7-2015   ? Right foot drop    ? Spinal stenosis 3/28/2016   ? Stroke (H) 3/24/2016             Family History   Problem Relation Age of Onset   ? Dementia Mother    ? Diabetes Mother    ? Arthritis Mother    ? Cancer Mother    ? Depression Mother    ? Heart disease Mother    ? Vision loss Mother    ? Stroke Father    ? Heart disease Father    ? Breast cancer Neg Hx      Social History     Socioeconomic History   ? Marital status:      Spouse name: Cayden   ? Number of children: 2   ? Years of education: Not on file   ? Highest education level: Not on file   Occupational History     Employer: RETIRED   Social Needs   ? Financial  resource strain: Not on file   ? Food insecurity:     Worry: Not on file     Inability: Not on file   ? Transportation needs:     Medical: Not on file     Non-medical: Not on file   Tobacco Use   ? Smoking status: Former Smoker     Packs/day: 1.50     Years: 37.00     Pack years: 55.50     Types: Cigarettes     Last attempt to quit: 1/1/2009     Years since quitting: 10.6   ? Smokeless tobacco: Never Used   Substance and Sexual Activity   ? Alcohol use: No     Alcohol/week: 7.0 oz     Types: 14 Standard drinks or equivalent per week     Comment: 14 mixed drinks per week   ? Drug use: No   ? Sexual activity: Never     Partners: Male   Lifestyle   ? Physical activity:     Days per week: Not on file     Minutes per session: Not on file   ? Stress: Not on file   Relationships   ? Social connections:     Talks on phone: Not on file     Gets together: Not on file     Attends Mandaeism service: Not on file     Active member of club or organization: Not on file     Attends meetings of clubs or organizations: Not on file     Relationship status: Not on file   ? Intimate partner violence:     Fear of current or ex partner: Not on file     Emotionally abused: Not on file     Physically abused: Not on file     Forced sexual activity: Not on file   Other Topics Concern   ? Not on file   Social History Narrative    Lives with her . Daughter in [place] and daughter in [place].         Review of Systems   Constitutional: Negative for activity change, appetite change, fatigue and fever.   HENT: Negative for congestion.    Respiratory: Positive for shortness of breath. Negative for cough and wheezing.         Dialysis port R upper chest    Cardiovascular: Negative for chest pain and leg swelling.   Gastrointestinal: Negative for abdominal distention, abdominal pain, constipation, diarrhea and nausea.   Genitourinary: Negative for dysuria.        Reports she is now voiding daily   Musculoskeletal: Negative for arthralgias and  back pain.   Skin: Positive for wound. Negative for color change.   Neurological: Negative for dizziness.   Psychiatric/Behavioral: Positive for sleep disturbance. Negative for agitation, behavioral problems and confusion.         On trazodone        .  Vitals:    08/13/19 0649   BP: 100/55   Pulse: 71   Resp: 18   Temp: 97.2  F (36.2  C)   SpO2: 95%   Weight: 169 lb (76.7 kg)       Physical Exam   Constitutional: She is oriented to person, place, and time. She appears well-developed and well-nourished.   pleasant woman in no acute distress   HENT:   Head: Normocephalic and atraumatic.   Eyes: Pupils are equal, round, and reactive to light. Conjunctivae are normal.   Neck: Normal range of motion. Neck supple.   Cardiovascular: Normal rate, regular rhythm and normal heart sounds.   No murmur heard.  Pulmonary/Chest: Effort normal. She has no wheezes. She has rales.   Bilateral lower lobes    Abdominal: Soft. Bowel sounds are normal. She exhibits no distension. There is no tenderness.   Musculoskeletal: Normal range of motion. She exhibits edema.   Right foot drop  2+ edema left foot- no leg swelling .    Neurological: She is alert and oriented to person, place, and time.   Neuropathy right foot and decreased sensation bottom of left foot.     Skin: Skin is warm and dry.   Left hip wound healed    Psychiatric: She has a normal mood and affect. Her behavior is normal.         LABS:   Recent Results (from the past 240 hour(s))   INR   Result Value Ref Range    INR 2.06 (H) 0.90 - 1.10     Current Outpatient Medications   Medication Sig Note   ? acetaminophen (TYLENOL) 500 MG tablet Take 2 tablets (1,000 mg total) by mouth 3 (three) times a day.    ? aspirin 325 MG tablet Take 1 tablet (325 mg total) by mouth 2 (two) times a day.    ? atorvastatin (LIPITOR) 10 MG tablet Take 10 mg by mouth at bedtime.    ? B complex-vitamin C-folic acid (DIALYVITE) 100-1 mg Tab Take 1 tablet by mouth daily.    ?  chlorpheniramine/dextromethorp (CORICIDIN HBP COUGH AND COLD ORAL) Take 1 tablet by mouth every 6 (six) hours as needed.    ? cholecalciferol, vitamin D3, 2,000 unit cap Take 2,000 Units by mouth daily with lunch.     ? cinacalcet (SENSIPAR) 30 MG tablet Take 30 mg by mouth 3 times weekly with dialysis          ? diphenhydrAMINE (BENADRYL) 25 mg tablet Take 50 mg by mouth at bedtime as needed for sleep.    ? folic acid (FOLVITE) 1 MG tablet Take 1 mg by mouth daily.    ? gabapentin (NEURONTIN) 100 MG capsule Take 100 mg by mouth 3 (three) times a day.    ? Lactobacillus rhamnosus GG (CULTURELLE) 10-15 Billion cell capsule Take 1 capsule by mouth daily with lunch.    ? metoprolol succinate (TOPROL-XL) 25 MG Take 25 mg by mouth daily.    ? midodrine HCl (MIDODRINE ORAL) Take 15 mg by mouth 3 (three) times a week. Three times weekly before dialysis.           ? oxyCODONE (ROXICODONE) 5 MG immediate release tablet Take 0.5-1 tablets (2.5-5 mg total) by mouth every 4 (four) hours as needed.    ? polyvinyl alcohol (LIQUIFILM TEARS) 1.4 % ophthalmic solution Apply 1 drop to eye as needed for dry eyes.    ? ranitidine (ZANTAC) 150 MG tablet Take 150 mg by mouth at bedtime.    ? senna-docusate (SENNOSIDES-DOCUSATE SODIUM) 8.6-50 mg tablet Take 1 tablet by mouth at bedtime.     ? sevelamer carbonate (RENVELA) 800 mg tablet Take 2,400 mg by mouth 3 (three) times a day with meals.           ? timolol maleate (TIMOPTIC) 0.5 % ophthalmic solution Administer 1 drop to both eyes 2 (two) times a day.     ? traZODone (DESYREL) 50 MG tablet Take 50 mg by mouth at bedtime. 6/23/2019: Patient states she uses this almost every night.      ASSESSMENT:      ICD-10-CM    1. ESRD (end stage renal disease) on dialysis (H) N18.6     Z99.2    2. Chronic anemia D64.9    3. Pain management R52    4. Closed displaced intertrochanteric fracture of left femur with routine healing, subsequent encounter S72.237D        PLAN:    S/P internal fixation  using intramedullary nail. PT/OT/pain  Control, monitor incision for S/S infection. Incision healed.    End-stage renal disease. Dialysis as scheduled.  M/W/F Pt  does extra runs when she feels a weight gain  Hypertension on Metoprolol Succinate   Chronic atrial fibrillation- on coumadin , metoprolol  Pain control Tylenol and Oxycodone as scheduled     Anemia.  Chronic anemia. Last HGB 8.9.    Insomnia-trazadone  75 mg HS  Constipation resolved continue  senna s to 2 tabs two times a day, Biscodyl suppository 10 mg KY q 3 days PRN if no BM   GI bleeding - occults negative,  No recent bleeding , seen bu GI and recommended hemorrhoid banding when off the coumadin.  Anticoagulation therapy - recently started on coumadin, adjust per INR's.       Electronically signed by: Le Flynn CNP

## 2021-06-19 NOTE — LETTER
Letter by Saige Ott MD at      Author: Saige Ott MD Service: -- Author Type: --    Filed:  Encounter Date: 7/2/2019 Status: (Other)         Patient: Belia Rodriguez   MR Number: [id number]   YOB: 1945   Date of Visit: 7/2/2019     [facility]      Facility:    [facility] [[id number]]  Code Status: FULL CODE       Chief Complaint/Reason for Visit:  Chief Complaint   Patient presents with   ? H & P     Admit note to TCU after left femur fracture.        HPI:   Belia is a 73 y.o. female who was admitted to the hospital on 6/22/19 after a fall. Her hospital info is partially excerpted below.    Hospital HPI:  Belia Rodriguez is a 73 y.o. old female with history of ESRD on hemodialysis M/W/F, chronic thrombocytopenia, chronic atrial fibrillation, sick sinus syndrome, status post watchman device, status post pacemaker, diastolic CHF, hypertension, ZULEIKA, anemia of chronic kidney disease, dyslipidemia who was brought to the emergency department for evaluation after a fall.  The patient uses a walker for ambulation due to right foot drop and while at home tripped on the kitchen floor mat when she turned and fell.  She denies head trauma or loss of consciousness but complained of left hip pain.  In the ED, she was hemodynamically stable.  X-rays showed a displaced, comminuted intertrochanteric fracture of left femur.  She has been admitted for repair.     Hospital Summary:   Belia Rodriguez is a 73 y.o. female  admitted for Displaced intertrochanteric fracture of left femur, initial encounter for closed fracture (H) [S72.142A]     1.  Hip fracture, intertrochanteric (H)    she was admitted with a broken hip.  She was seen by Ortho and went to the operating room on June 23 where she underwent internal fixation using intramedullary nail.  Post procedure she slowly improved and will be discharged to a TCU     2.   End-stage renal disease.   She has chronic renal failure on dialysis.  She was seen by nephrology and underwent dialysis on her normal schedule.  She will continue dialysis at the TCU     3.   Hypertension.  Blood pressure remains stable.  No changes were made to her medication     4.   Chronic atrial fibrillation.  She remains on chronic warfarin     5.   Disposition.  She will be discharged to a TCU where she will remain for approximately 2 weeks then return home     6.  Hyperkalemia.  Potassium was occasionally greater than 6 and was treated with dialysis     7.  Anemia.  Chronic anemia was worsened with acute blood loss.  She received 1 unit of packed red blood cells     Overall stabilized and discharged to TCU on 6/27/19 for PT, OT, nursing cares, medical management and monitoring.     Today:  Discharge summary indicates she is on warfarin at home but she is not. In fact, states she had some sort of bleeding event when she was previously on it. Currently on aspirin for DVT prevention. Complaining of some stomach upset today, poor appetite. Omeprazole added, she is already anemic, will need to watch closely, labs and GI sx, may need another transfusion. Nephrology will be updated, question need for IV iron or epo as well. She is on oxygen, uses at home as needed. Denies shortness of breath at rest but overall very tired and fatigued. No chest pain. No cough or fever. Using IS. No diarrhea, Feels somewhat constipated, likely from narcotic pain meds. Left hip pain, allowed WBAT, needs follow up appt with ortho. No new vision or hearing problems.       Past Medical History:  Past Medical History:   Diagnosis Date   ? Arthritis    ? CHF (congestive heart failure) (H)    ? Chronic anemia 6/1/2014   ? Chronic kidney disease    ? Chronic thoracic aortic dissection (H) 10/7/2015    Descending thoracic aorta; treated medically per notes of Dragan Singh and Jennifer.   ? COPD (chronic obstructive pulmonary disease) (H)    ? CVA (cerebral infarction)     ? Disease of thyroid gland    ? Dyslipidemia    ? ESRD (end stage renal disease) (H) 06/03/2009    on dialysis with Dr. Mitchell   ? Essential hypertension 6/30/2014   ? Gastrointestinal hemorrhage, unspecified gastrointestinal hemorrhage type 6/5/2017   ? GI (gastrointestinal bleed)    ? GI bleeding 6/5/2017   ? Gout    ? L3 vertebral fracture (H) 11/16/2015   ? Left Atrial Appendage Occlusion (WATCHMAN) 4/5/2018    LAAO April 5, 2018 (30 mm WATCHMAN)   ? Obesity    ? ZULEIKA (obstructive sleep apnea), severe, intolerant of CPAP 10/22/2015   ? Pneumonia 9-7-2015   ? Right foot drop    ? Spinal stenosis 3/28/2016   ? Stroke (H) 3/24/2016           Surgical History:  Past Surgical History:   Procedure Laterality Date   ? BACK SURGERY      [facility]   ? COLONOSCOPY N/A 3/23/2016    Procedure: COLONOSCOPY;  Surgeon: Ruddy Tejada MD;  Location: [facility];  Service:    ? DILATION AND CURETTAGE OF UTERUS     ? EP ABLATION AV NODE N/A 3/7/2019    Procedure: EP Ablation AV Node;  Surgeon: Derick Duarte MD;  Location: [facility];  Service: Cardiology   ? EP NEGRA CLOSURE N/A 4/5/2018    Procedure: EP NEGRA Closure;  Surgeon: Derick Duarte MD;  Location: [facility];  Service:    ? EP PACEMAKER INSERT N/A 3/7/2019    Procedure: EP Pacemaker Insertion;  Surgeon: Derick Duarte MD;  Location: [facility];  Service: Cardiology   ? EYE SURGERY     ? HERNIA REPAIR     ? IR TUNNELED CATHETER INSERT  11/20/2018   ? IR TUNNELED CATHETER REMOVAL  11/20/2018   ? RI COLSC FLEXIBLE W/CONTROL BLEEDING ANY METHOD N/A 6/7/2017    Procedure: COLONOSCOPY;  Surgeon: Luis Mckeon MD;  Location: [facility];  Service: Gastroenterology   ? RI OPEN FIX INTER/SUBTROCH FX,IMPLNT Left 6/23/2019    Procedure: INTERNAL FIXATION, FRACTURE, TROCHANTERIC, HIP, USING INTERMEDULLARY NAIL;  Surgeon: Bennie Marsh DO;  Location: [facility];  Service: Orthopedics   ? TONSILLECTOMY          Family History:   Family History   Problem Relation Age of Onset   ? Dementia Mother    ? Diabetes Mother    ? Arthritis Mother    ? Cancer Mother    ? Depression Mother    ? Heart disease Mother    ? Vision loss Mother    ? Stroke Father    ? Heart disease Father    ? Breast cancer Neg Hx        Social History:    Social History     Socioeconomic History   ? Marital status:      Spouse name: Cayden   ? Number of children: 2   ? Years of education: Not on file   ? Highest education level: Not on file   Occupational History     Employer: RETIRED   Social Needs   ? Financial resource strain: Not on file   ? Food insecurity:     Worry: Not on file     Inability: Not on file   ? Transportation needs:     Medical: Not on file     Non-medical: Not on file   Tobacco Use   ? Smoking status: Former Smoker     Packs/day: 1.50     Years: 37.00     Pack years: 55.50     Types: Cigarettes     Last attempt to quit: 1/1/2009     Years since quitting: 10.5   ? Smokeless tobacco: Never Used   Substance and Sexual Activity   ? Alcohol use: No     Alcohol/week: 7.0 oz     Types: 14 Standard drinks or equivalent per week     Comment: 14 mixed drinks per week   ? Drug use: No   ? Sexual activity: Never     Partners: Male   Lifestyle   ? Physical activity:     Days per week: Not on file     Minutes per session: Not on file   ? Stress: Not on file   Relationships   ? Social connections:     Talks on phone: Not on file     Gets together: Not on file     Attends Caodaism service: Not on file     Active member of club or organization: Not on file     Attends meetings of clubs or organizations: Not on file     Relationship status: Not on file   ? Intimate partner violence:     Fear of current or ex partner: Not on file     Emotionally abused: Not on file     Physically abused: Not on file     Forced sexual activity: Not on file   Other Topics Concern   ? Not on file   Social History Narrative    Lives with her . Daughter  in [place] and daughter in [place].       Medications:  Current Outpatient Medications   Medication Sig Note   ? acetaminophen (TYLENOL) 500 MG tablet Take 2 tablets (1,000 mg total) by mouth 3 (three) times a day.    ? aspirin 325 MG tablet Take 1 tablet (325 mg total) by mouth 2 (two) times a day.    ? atorvastatin (LIPITOR) 10 MG tablet Take 10 mg by mouth at bedtime.    ? B complex-vitamin C-folic acid (DIALYVITE) 100-1 mg Tab Take 1 tablet by mouth daily.    ? chlorpheniramine/dextromethorp (CORICIDIN HBP COUGH AND COLD ORAL) Take 1 tablet by mouth every 6 (six) hours as needed.    ? cholecalciferol, vitamin D3, 2,000 unit cap Take 2,000 Units by mouth daily with lunch.     ? cinacalcet (SENSIPAR) 30 MG tablet Take 30 mg by mouth 3 times weekly with dialysis          ? diphenhydrAMINE (BENADRYL) 25 mg tablet Take 50 mg by mouth at bedtime as needed for sleep.    ? folic acid (FOLVITE) 1 MG tablet Take 1 mg by mouth daily.    ? gabapentin (NEURONTIN) 100 MG capsule Take 100 mg by mouth 3 (three) times a day.    ? Lactobacillus rhamnosus GG (CULTURELLE) 10-15 Billion cell capsule Take 1 capsule by mouth daily with lunch.    ? metoprolol succinate (TOPROL-XL) 25 MG Take 25 mg by mouth daily.    ? midodrine HCl (MIDODRINE ORAL) Take 15 mg by mouth 3 (three) times a week. Three times weekly before dialysis.           ? oxyCODONE (ROXICODONE) 5 MG immediate release tablet Take 0.5-1 tablets (2.5-5 mg total) by mouth every 4 (four) hours as needed.    ? polyvinyl alcohol (LIQUIFILM TEARS) 1.4 % ophthalmic solution Apply 1 drop to eye as needed for dry eyes.    ? ranitidine (ZANTAC) 150 MG tablet Take 150 mg by mouth at bedtime.    ? senna-docusate (SENNOSIDES-DOCUSATE SODIUM) 8.6-50 mg tablet Take 1 tablet by mouth at bedtime.     ? sevelamer carbonate (RENVELA) 800 mg tablet Take 2,400 mg by mouth 3 (three) times a day with meals.           ? timolol maleate (TIMOPTIC) 0.5 % ophthalmic solution Administer 1 drop to  both eyes 2 (two) times a day.     ? traZODone (DESYREL) 50 MG tablet Take 50 mg by mouth at bedtime. 6/23/2019: Patient states she uses this almost every night.        Allergies:  Allergies   Allergen Reactions   ? Ace Inhibitors Cough   ? Atrovent [Ipratropium Bromide] Headache   ? Fosinopril Sodium Cough   ? Zolpidem      hallucinations        Review of Systems:  Pertinent items are noted in HPI.      Physical Exam:   General: Patient is alert, pale female, no distress.   Vitals: /67, Temp 97.5, Pulse 73, RR 18, O2 sat 96% on O2.  HEENT: Head is NCAT. Eyes show no injection or icterus. Nares negative. Oropharynx well hydrated.  Neck: Supple. No tenderness or adenopathy. No JVD. Dialysis access right neck.  Lungs: Clear bilaterally. No wheezes.  Cardiovascular: Regular rate and rhythm, normal S1, S2.  Back: No spinal or CVA tenderness.  Abdomen: Soft, no tenderness on exam. Bowel sounds present. No guarding rebound or rigidity.  : Deferred.  Extremities: Mild LE edema is noted.  Musculoskeletal: Bandage left femur incisions.  Skin: Incisions covered.  Psych: Mood appears good.      Labs:  Lab Results   Component Value Date    WBC 3.9 (L) 07/02/2019    HGB 7.4 (L) 07/02/2019    HCT 24.0 (L) 07/02/2019     (H) 07/02/2019     (L) 07/02/2019     Results for orders placed or performed during the hospital encounter of 05/18/19   Basic Metabolic Panel   Result Value Ref Range    Sodium 137 136 - 145 mmol/L    Potassium 5.0 3.5 - 5.0 mmol/L    Chloride 100 98 - 107 mmol/L    CO2 26 22 - 31 mmol/L    Anion Gap, Calculation 11 5 - 18 mmol/L    Glucose 81 70 - 125 mg/dL    Calcium 9.5 8.5 - 10.5 mg/dL    BUN 29 (H) 8 - 28 mg/dL    Creatinine 4.91 (H) 0.60 - 1.10 mg/dL    GFR MDRD Af Amer 11 (L) >60 mL/min/1.73m2    GFR MDRD Non Af Amer 9 (L) >60 mL/min/1.73m2       Assessment/Plan:  1. Left femur fracture. Sustained in a fall. S/p ORIF with nail on 6/23/19. WBAT, working with therapy. Follow up with  ortho.  2. ESRD. On dialysis 3 x weekly. Follow up per protocol.  3. Anemia. ABLA from surgery on baseline chronic anemia. Hgb today at 7.4. Did receive transfusion post op in the hospital. Will update nephrology, question if she needs IV iron or transfusion soon, unsure if she gets epo with dialysis. Check and follow labs, transfuse if needed.   4. HTN. Continue metoprolol though takes midodrine on dialysis days.  5. Afib. DC summary says she is on warfarin but she is not chronically on warfarin. She is s/p watchman and pacer. Rate controlled.  6. DVT prevention. On aspirin two times a day per direction of ortho.  7. GI upset. She is on home ranitidine. On aspirin for DVT prevention so Omeprazole added for GI protection.   8. COPD. ZULEIKA. She reports prn oxygen use at home, currently using at TCU. Encourage IS.  9. Chronic HF. Appears stable. Monitor weights, edema, clinical status.  10. Code status is full code.      Total time greater than 60 minutes, greater than 50% counseling and coordination of care, time spent in interview and examination of patient, review of records, discussion with nursing staff. CC includes hospital consultant, lab and med review, discussion with patient for DVT prevention, GI protection, Orthopedic progress, maintenance of dialysis, update nephrology, follow up lab monitoring. She understands and agrees.           Electronically signed by: Saige Ott MD

## 2021-06-19 NOTE — PROGRESS NOTES
8/16/18 Patient unable to tolerate NIV at home, picked up equipment today. Does not want to follow up with any sleep provider at this time.    Sangita, RRT  [facility]

## 2021-06-20 NOTE — PROGRESS NOTES
[facility]      Facility:    Formerly [facility] [[id number]]  Code Status: FULL CODE      Chief Complaint/Reason for Visit:  Chief Complaint   Patient presents with     H & P       HPI:   Belia is a 73 y.o. female who was admitted to the hospital due to shortness of breath which was pulmonary edema with acute on chronic congestive heart failure as well as sepsis with infected dialysis catheter.  She was diagnosed with enterococcus bacteremia.  The tunneled dialysis catheter was exchanged the day prior to discharge from the hospital.  She does have underlying sleep apnea but is not able to tolerate the BiPAP.  She has chronic atrial fibrillation and episodic rapid ventricular rate but she has undergone the left atrial appendage occlusion procedure (watchman) so that she does not require anticoagulation anymore.  She does have end-stage renal disease requiring chronic hemodialysis.  She does have chronic respiratory failure with hypoxia.  She also has a thoracic aortic aneurysm measuring 4.3 cm which was rechecked during this hospitalization and it is stable.  Her BNP in the hospital was 1972.  She was tachycardic on arrival to the emergency room and this improved with IV diltiazem 10 mg.    Upon current review of systems she has been experiencing constipation.  She was not aware that she was already taking senna S every evening.  That is what she normally takes at home.  When I mentioned this to her she will increase her prunes and prune juice intake rather than increasing the dose of senna S.  It is felt that the increased problem with constipation secondary to her current Vicodin prescription.  She does not have fevers or chills.  She does not have sore throat or nasal congestion.  She is no longer feeling worsened shortness of breath beyond her baseline.  She is not coughing.  She is not having chest pains or palpitations.  She is not having abdominal pain or nausea.  She is not having  dysuria.    Past Medical History:  Past Medical History:   Diagnosis Date     Arthritis      CHF (congestive heart failure) (H)      Chronic anemia 6/1/2014     Chronic kidney disease      Chronic thoracic aortic dissection (H) 10/7/2015    Descending thoracic aorta; treated medically per notes of Dragan Singh and Jennifer.     COPD (chronic obstructive pulmonary disease) (H)      CVA (cerebral infarction)      Disease of thyroid gland      Dyslipidemia      ESRD (end stage renal disease) (H) 06/03/2009    on dialysis with Dr. Mitchell     Essential hypertension 6/30/2014     GI (gastrointestinal bleed)      GI bleeding 6/5/2017     Gout      L3 vertebral fracture (H) 11/16/2015     Left Atrial Appendage Occlusion (WATCHMAN) 4/5/2018    LAAO April 5, 2018 (30 mm WATCHMAN)     Obesity      ZULEIKA (obstructive sleep apnea), severe, intolerant of CPAP 10/22/2015     Pneumonia 9-7-2015     Right foot drop      Spinal stenosis 3/28/2016     Stroke (H) 3/24/2016           Surgical History:  Past Surgical History:   Procedure Laterality Date     BACK SURGERY      [facility]     COLONOSCOPY N/A 3/23/2016    Procedure: COLONOSCOPY;  Surgeon: Ruddy Tejada MD;  Location: [facility] GI;  Service:      DILATION AND CURETTAGE OF UTERUS       EP NEGRA CLOSURE N/A 4/5/2018    Procedure: EP NEGRA Closure;  Surgeon: Derick Duarte MD;  Location: Health system Cath Lab;  Service:      EYE SURGERY       HERNIA REPAIR       NM COLSC FLEXIBLE W/CONTROL BLEEDING ANY METHOD N/A 6/7/2017    Procedure: COLONOSCOPY;  Surgeon: Luis Mckeon MD;  Location: [facility] GI;  Service: Gastroenterology     TONSILLECTOMY         Family History:   Family History   Problem Relation Age of Onset     Dementia Mother      Diabetes Mother      Arthritis Mother      Cancer Mother      Depression Mother      Heart disease Mother      Vision loss Mother      Stroke Father      Heart disease Father      Breast cancer Neg Hx        Social History:    Social  History     Social History     Marital status:      Spouse name: Cayden     Number of children: 2     Years of education: N/A     Occupational History      Retired     Social History Main Topics     Smoking status: Former Smoker     Packs/day: 1.50     Years: 37.00     Types: Cigarettes     Quit date: 1/1/2009     Smokeless tobacco: Never Used     Alcohol use 7.0 oz/week     14 Standard drinks or equivalent per week      Comment: 14 mixed drinks per week     Drug use: No     Sexual activity: No     Other Topics Concern     Not on file     Social History Narrative    Lives with her . Daughter in [place] and daughter in [place].          Review of Systems   All other systems reviewed and are negative.      Vitals:    09/12/18 1421   BP: 136/65   Pulse: 77   Resp: 18   Temp: 97.9  F (36.6  C)   SpO2: 97%       Physical Exam   Constitutional: No distress.   HENT:   Mouth/Throat: Oropharynx is clear and moist.   Eyes: Right eye exhibits no discharge. Left eye exhibits no discharge.   Neck: No JVD present. No thyromegaly present.   Cardiovascular: Normal heart sounds.    Irregularly irregular   Pulmonary/Chest: Breath sounds normal. No respiratory distress.   Abdominal: Soft. Bowel sounds are normal. She exhibits no distension. There is no tenderness.   Musculoskeletal: She exhibits no edema.   Lymphadenopathy:     She has no cervical adenopathy.   Neurological: She is alert.   Skin: Skin is warm and dry.   Psychiatric: She has a normal mood and affect.   Nursing note and vitals reviewed.      Medication List:  Current Outpatient Prescriptions   Medication Sig     acetaminophen (TYLENOL) 500 MG tablet Take 500 mg by mouth every 6 (six) hours as needed for pain.     albuterol (PROAIR HFA;PROVENTIL HFA;VENTOLIN HFA) 90 mcg/actuation inhaler Inhale 2 puffs every 6 (six) hours as needed for wheezing.     allopurinol (ZYLOPRIM) 100 MG tablet Take 1 tablet (100 mg total) by mouth daily. (Patient taking  differently: Take 100 mg by mouth daily with lunch. )     aspirin 81 MG EC tablet Take 81 mg by mouth daily with lunch.      atorvastatin (LIPITOR) 10 MG tablet Take 10 mg by mouth at bedtime.     CHLORPHENIRAMINE/DEXTROMETHORP (CORICIDIN HBP COUGH AND COLD ORAL) Take 1 tablet by mouth daily as needed.      cholecalciferol, vitamin D3, 2,000 unit cap Take 2,000 Units by mouth daily with lunch.      cinacalcet (SENSIPAR) 30 MG tablet Take 30 mg by mouth daily with lunch.      clopidogrel (PLAVIX) 75 mg tablet Take 1 tablet (75 mg total) by mouth daily. (Patient taking differently: Take 75 mg by mouth daily with lunch. )     DIALYVITE 100-1 mg Tab TAKE ONE TABLET BY MOUTH DAILY IN THE EVENING     digoxin (LANOXIN) 125 mcg tablet TAKE 1 TABLET ORALLY 3 TIMES A WEEK. (Patient taking differently: TAKE 1 TABLET ORALLY 3 TIMES A WEEK. Monday, Wednesday, Friday)     diltiazem (CARDIZEM SR) 120 MG 12 hr capsule Take 1 capsule (120 mg total) by mouth 2 (two) times a day.     diphenhydrAMINE (BENADRYL) 25 mg tablet Take 50 mg by mouth at bedtime.     folic acid (FOLVITE) 1 MG tablet TAKE ONE TABLET BY MOUTH ONCE DAILY     gabapentin (NEURONTIN) 100 MG capsule TAKE 1 CAPSULE BY MOUTH THREE TIMES DAILY     HYDROcodone-acetaminophen 5-325 mg per tablet Take 1 tablet by mouth 4 (four) times a day as needed.     Lactobacillus rhamnosus GG (CULTURELLE) 10-15 Billion cell capsule Take 1 capsule by mouth daily with lunch.     metoprolol tartrate (LOPRESSOR) 50 MG tablet Take 0.5 tablets (25 mg total) by mouth 2 (two) times a day.     midodrine (PROAMATINE) 5 MG tablet Take 3 tablets (15 mg total) by mouth 3 (three) times a week. Take every Monday, Wednesday, and Friday in the morning.     polyvinyl alcohol (LIQUIFILM TEARS) 1.4 % ophthalmic solution Apply 1 drop to eye as needed for dry eyes.     ranitidine (ZANTAC) 150 MG tablet Take 150 mg by mouth at bedtime.     senna-docusate (SENNOSIDES-DOCUSATE SODIUM) 8.6-50 mg tablet Take 1  tablet by mouth at bedtime.      sevelamer carbonate (RENVELA) 800 mg tablet TAKE 2 TABLETS BY MOUTH 3 TIMES A DAY WITH MEALS AND 2 TABS WITH SNACKS 2 TIMES A DAY     timolol maleate (TIMOPTIC) 0.5 % ophthalmic solution Administer 1 drop to both eyes 2 (two) times a day.      traZODone (DESYREL) 50 MG tablet Take 0.5 tablets (25 mg total) by mouth at bedtime as needed for sleep (give with melatonin).     vancomycin 750 mg SolR 750 mg vancomycin IV with HD M/W/F start on 9/12, finishing with the last dose on 9/21 to complete 14 day course       Labs:  No new laboratory testing.    Assessment:    ICD-10-CM    1. Acute respiratory failure with hypoxia and hypercapnia (H) J96.01     J96.02    2. Acute on chronic diastolic congestive heart failure (H) I50.33    3. Atrial fibrillation with RVR (H) I48.91    4. Infection of hemodialysis catheter, subsequent encounter T82.7XXD    5. ESRD on dialysis (H) N18.6     Z99.2    6. Pulmonary emphysema, unspecified emphysema type (H) J43.9    7. Chronic pulmonary edema J81.1    8. Chronic anemia D64.9        Plan:  Continue with current goals of therapy to increase her strength and independence of activities of daily living.  Continue to monitor her medical conditions which are multiple and she will be continuing to be receiving hemodialysis 3 times weekly.      Electronically signed by: Farhan Kiran MD

## 2021-06-20 NOTE — LETTER
Letter by Derick San MD at      Author: Derick San MD Service: -- Author Type: --    Filed:  Encounter Date: 8/4/2020 Status: (Other)         Patient: Belia Rodriguez   MR Number: [id number]   YOB: 1945   Date of Visit: 8/4/2020      [facility]    Facility:  [facility] [[id number]]    Code Status:  DNR/DNI    Chief Complaint   Patient presents with   ? Review Of Multiple Medical Conditions   :                    Patient Active Problem List   Diagnosis   ? Tachycardia-bradycardia syndrome (H)   ? Hyperkalemia   ? Essential hypertension with goal blood pressure less than 140/90   ? Hyperlipidemia   ? ZULEIKA (obstructive sleep apnea), severe   ? Anemia of chronic renal failure, stage 5 (H)   ? Dissection of thoracoabdominal aorta (H)   ? Gout   ? GERD (gastroesophageal reflux disease)   ? Right foot drop   ? Acute midline low back pain with right-sided sciatica   ? History of compression fracture of spine   ? Pulmonary emphysema (H)   ? Presence of Watchman left atrial appendage closure device   ? Other dysphagia   ? Borderline glaucoma with ocular hypertension   ? Hyperparathyroidism (H)   ? Osteoporosis   ? Venous tributary (branch) occlusion of retina   ? ESRD on dialysis (H)   ? Acute pulmonary edema (H)   ? Chronic atrial fibrillation (H)   ? Acute on chronic diastolic congestive heart failure (H)   ? Thrombocytopenia (H)   ? Contraindication to anticoagulation therapy   ? Dyspnea   ? Cardiac pacemaker in situ   ? S/P AV (atrioventricular) jonn ablation   ? Hip fracture, intertrochanteric (H)   ? DVT (deep venous thrombosis) (H)   ? Carpal tunnel syndrome of left wrist   ? SOB (shortness of breath)   ? Hypotension, unspecified hypotension type       History:  Belia Rodriguez  is a 74 year old female with history of end-stage renal disease on CCR T, tachybradycardia syndrome/AV jonn ablation/permanent pacemaker, atrial  "fibrillation status post watchman device, abdominal aortic aneurysm dissection, hypertension, COPD, chronic hypoxic respiratory failure, ZULEIKA, gout, GERD, glaucoma on timolol, depression seen for admission to TCU on 8/4/2020    Hospital Course: Patient was hospitalized between July 13 and July 20 presenting with edema and shortness of breath despite not missing her scheduled outpatient dialysis days.  She was diagnosed with acute on chronic diastolic heart failure and was diuresed 11 L by nephrology/dialysis on successive days.    Her dialysis is complicated by severe hypotension despite midodrine therapy.  It hypotension resulted in discontinuation of her metoprolol.  Her in dialysis weight target is 70.5 kg.    Due to the severity of her heart failure/hypertension with complicated dialysis, comfort care/hospice approach has been discussed with patient and she is considering it though she remained full code up until to my visit here with her today, she is now DNR/DNI per her request.    Patient did have an echocardiogram this hospitalization which showed no LV dysfunction and no valvular dysfunction.  She had normal thyroid function testing.  She had \"equivocal cortisol\" but in the end adrenal insufficiency was not suspected as a cause of her hypotension.    Subjective/ROS:    -augmented by discussion with facility staff involved in direct care    -Patient is wondering what happened to the orders we discussed last week.  It turns out that all of the orders in the facility that I wrote last week were lost.  No explanation has been discovered by investigation to this point.  I apologized to her, and said if more is learned about what happened I would share with her.  As result she never got her guaifenesin up.  In the meanwhile Ms. Gamez appropriately gave her Liquibid but she does not like the pill.  She wants her syrup back even though it is a much lesser dose she likes it better and there may be a psychological " "benefit to her from the syrup form.    -Patient also requests a refill of her Coricidin which she has taken with her for years.  Chlorpheniramine 4 mg is the primary ingredient there is also some dextromethorphan.  She says she uses it for her runny nose which is \"running like a faucet\" and has been a chronic problem for her.  It was bad enough earlier this week that it plugged her nasal cannula.    -My DNR/DNI order was not transcribed with lost orders last week, fortunately Ms. Gamez followed up on that discussion and has enacted the wishes of the patient.  I reviewed those with her today and she remains settled with the current orders.    -She forgot to ask her nephrology team about the Florinef.  She does say that she used to be quite symptomatic with her orthostatic hypotension but the symptoms have basically gone away even though she will be hypotensive especially during dialysis.  She is taking the Midrin 15 3 times daily except on dialysis days which they gave her an extra 10 before dialysis and 10 correction through dialysis.      -Patient reports that she continues to feel better although she has not had steady progress in therapy.  She reports there was a day last week that she walked around the entire [place] without resting and this time she had to stop 4 times because of shortness of breath but she has not had fever sweats chills denies orthopnea PND (but does have postnasal drip), occasional cough unchanged.  Her appetite is good.  He is moving her bowels usually has no bladder issues.  She says she is sleeping \"like a rock\".  She thinks the trazodone is the key for that.  She does take 150 mg daily.  Peripheral neuropathy is unchanged.      - Patient says she is feeling better.  She is less short of breath think she is probably back to her chronic baseline.  She denies chest pain orthopnea PND.  She says her edema is back to baseline, she has chronic edema in the right foot area but very little " elsewhere.  She was requiring 3 L oxygen at discharge.    - Patient  sees Dr. Dl Kinney from nephrology, was a long-term patient of Dr. Mitchell prior to that.        Past Medical History:   Diagnosis Date   ? Arthritis    ? CHF (congestive heart failure) (H)    ? Chronic anemia 6/1/2014   ? Chronic kidney disease    ? Chronic thoracic aortic dissection (H) 10/7/2015    Descending thoracic aorta; treated medically per notes of Dragan Singh and Jennifer.   ? COPD (chronic obstructive pulmonary disease) (H)    ? CVA (cerebral infarction)    ? Disease of thyroid gland    ? Dyslipidemia    ? ESRD (end stage renal disease) (H) 06/03/2009    on dialysis with Dr. Mitchell   ? Essential hypertension 6/30/2014   ? Gastrointestinal hemorrhage, unspecified gastrointestinal hemorrhage type 6/5/2017   ? GI (gastrointestinal bleed)    ? GI bleeding 6/5/2017   ? Gout    ? L3 vertebral fracture (H) 11/16/2015   ? Left Atrial Appendage Occlusion (WATCHMAN) 4/5/2018    LAAO April 5, 2018 (30 mm WATCHMAN)   ? Obesity    ? ZULEIKA (obstructive sleep apnea), severe, intolerant of CPAP 10/22/2015   ? Oxygen dependent     3L nc   ? Pneumonia 9-7-2015   ? Right foot drop    ? Spinal stenosis 3/28/2016   ? Stroke (H) 3/24/2016     Past Surgical History:   Procedure Laterality Date   ? BACK SURGERY      [facility]   ? COLONOSCOPY N/A 3/23/2016    Procedure: COLONOSCOPY;  Surgeon: Ruddy Tejada MD;  Location: [facility];  Service:    ? DILATION AND CURETTAGE OF UTERUS     ? EP ABLATION AV NODE N/A 3/7/2019    Procedure: EP Ablation AV Node;  Surgeon: Derick Duarte MD;  Location: [facility] Cath Lab;  Service: Cardiology   ? EP NEGRA CLOSURE N/A 4/5/2018    Procedure: EP NEGRA Closure;  Surgeon: Derick Duarte MD;  Location: [facility] Cath Lab;  Service:    ? EP PACEMAKER INSERT N/A 3/7/2019    Procedure: EP Pacemaker Insertion;  Surgeon: Derick Duarte MD;  Location: [facility] Cath Lab;  Service: Cardiology   ? EYE SURGERY      ? HERNIA REPAIR     ? IR TUNNELED CATHETER INSERT  2018   ? IR TUNNELED CATHETER REMOVAL  2018   ? SC COLSC FLEXIBLE W/CONTROL BLEEDING ANY METHOD N/A 2017    Procedure: COLONOSCOPY;  Surgeon: Luis Mckeon MD;  Location: [facility] GI;  Service: Gastroenterology   ? SC OPEN FIX INTER/SUBTROCH FX,IMPLNT Left 2019    Procedure: INTERNAL FIXATION, FRACTURE, TROCHANTERIC, HIP, USING INTERMEDULLARY NAIL;  Surgeon: Bennie Marsh DO;  Location: [facility] Main OR;  Service: Orthopedics   ? TONSILLECTOMY            Family History   Problem Relation Age of Onset   ? Dementia Mother    ? Diabetes Mother    ? Arthritis Mother    ? Cancer Mother    ? Depression Mother    ? Heart disease Mother    ? Vision loss Mother    ? Stroke Father    ? Heart disease Father    ? Breast cancer Neg Hx    :   Her mom had glaucoma and dementia  Her dad had COPD and asbestosis as well she is an only child.    Social History     Socioeconomic History   ? Marital status:      Spouse name: Cayden   ? Number of children: 2   ? Years of education: Not on file   ? Highest education level: Not on file   Occupational History     Employer: RETIRED   Social Needs   ? Financial resource strain: Not on file   ? Food insecurity     Worry: Not on file     Inability: Not on file   ? Transportation needs     Medical: Not on file     Non-medical: Not on file   Tobacco Use   ? Smoking status: Former Smoker     Packs/day: 1.50     Years: 37.00     Pack years: 55.50     Types: Cigarettes     Last attempt to quit: 2009     Years since quittin.5   ? Smokeless tobacco: Never Used   Substance and Sexual Activity   ? Alcohol use: No     Alcohol/week: 11.7 standard drinks     Types: 14 Standard drinks or equivalent per week     Comment: 14 mixed drinks per week   ? Drug use: No   ? Sexual activity: Never     Partners: Male   Lifestyle   ? Physical activity     Days per week: Not on file     Minutes per session: Not on  file   ? Stress: Not on file   Relationships   ? Social connections     Talks on phone: Not on file     Gets together: Not on file     Attends Protestant service: Not on file     Active member of club or organization: Not on file     Attends meetings of clubs or organizations: Not on file     Relationship status: Not on file   ? Intimate partner violence     Fear of current or ex partner: Not on file     Emotionally abused: Not on file     Physically abused: Not on file     Forced sexual activity: Not on file   Other Topics Concern   ? Not on file   Social History Narrative    Lives with her . Daughter in [place] and daughter in [place].   :    Patient and her spouse live in [facility].  They have been in the house for about 30 years.    Current Outpatient Medications on File Prior to Visit   Medication Sig Dispense Refill   ? acetaminophen (TYLENOL) 500 MG tablet Take 1,000 mg by mouth 3 (three) times a day as needed.     ? artificial tears,hypromellose, (GENTEAL; SYSTANE) 0.3 % Gel Administer 1 drop to both eyes as needed.     ? aspirin 81 MG EC tablet Take 1 tablet (81 mg total) by mouth daily.  0   ? benzocaine-menthoL (CEPACOL) 15-3.6 mg Take 1 lozenge by mouth every hour as needed.     ? buPROPion (WELLBUTRIN XL) 150 MG 24 hr tablet Take 1 tablet (150 mg total) by mouth 2 (two) times a week. Tuesday and saturday  0   ? chlorpheniramine/dextromethorp (CORICIDIN HBP COUGH AND COLD ORAL) Take by mouth. Take 1 tablet 4 times daily as needed nasal drainage     ? cholecalciferol, vitamin D3, 1,000 unit (25 mcg) tablet Take 2,000 Units by mouth daily.     ? cinacalcet (SENSIPAR) 30 MG tablet Take 30 mg by mouth see administration instructions. Take three times weekly with dialysis (on Mondays, Wednesdays, and Fridays).           ? famotidine (FOR PEPCID) 10 MG tablet Take 10 mg by mouth daily as needed for heartburn.     ? folic acid (FOLVITE) 1 MG tablet TAKE ONE TABLET BY MOUTH ONCE DAILY 90 tablet  4   ? gabapentin (NEURONTIN) 100 MG capsule TAKE 1 CAPSULE BY MOUTH THREE TIMES DAILY 270 capsule 3   ? Lactobacillus rhamnosus GG (CULTURELLE) 10-15 Billion cell capsule Take 1 capsule by mouth daily with lunch.     ? lidocaine (LMX) 4 % cream Apply topically 3 (three) times a day.     ? loratadine (CLARITIN) 10 mg tablet Take 10 mg by mouth daily as needed.      ? midodrine (PROAMATINE) 10 MG tablet Take 1.5 tablets (15 mg total) by mouth 3 (three) times a day with meals. (Patient taking differently: Take 15 mg by mouth 3 (three) times a day with meals. Patient reports that she usually gets 10 mg before dialysis and another 10 mg MCFP through dialysis as well)     ? multivitamin (DAILY-OLGA) per tablet Take 1 tablet by mouth daily.     ? senna-docusate (SENNOSIDES-DOCUSATE SODIUM) 8.6-50 mg tablet Take 1 tablet by mouth daily as needed for constipation.      ? sevelamer carbonate (RENVELA) 800 mg tablet Take 1 tablet (800 mg total) by mouth 2 (two) times a day as needed (FOr snacks.).  0   ? sevelamer HCL (RENAGEL) 800 MG tablet Take 2,400 mg by mouth 3 (three) times a day with meals. And take 2 tablets with snacks     ? timolol maleate (TIMOPTIC) 0.5 % ophthalmic solution Administer 1 drop to both eyes 2 (two) times a day.      ? traZODone (DESYREL) 50 MG tablet TAKE 1&1/2 TABLETS (75MG) BY MOUTH AT BEDTIME. (Patient taking differently: Take 150 mg by mouth at bedtime. ) 135 tablet 3   ? UNABLE TO FIND Med Name: guaifenesin 100/5cc  Take 5cc q 4 prn     ? [DISCONTINUED] famotidine (PEPCID) 20 MG tablet Take 1 tablet (20 mg total) by mouth 2 (two) times a day as needed for heartburn. (Patient taking differently: Take 10 mg by mouth daily as needed for heartburn. ) 180 tablet 2   ? [DISCONTINUED] B complex-vitamin C-folic acid (DIALYVITE) 100-1 mg Tab Take 1 tablet by mouth daily with lunch.              No current facility-administered medications on file prior to visit.    :      ALLERGIES:  Ace inhibitors;  Atrovent [ipratropium bromide]; Fosinopril sodium; and Zolpidem    Vitals:      Current Vitals   BP: 96/60 mmHg  8/4/2020 08:47    Temp:97.1  F  8/4/2020 08:47  Pulse: 71 bpm  8/4/2020 10:34    Weight: 157.9 Lbs  8/4/2020 09:48  Resp: 18 Breaths/min  8/2/2020 10:59  BS:  O2: 94 %  8/4/2020 10:48  Pain: 0  8/3/2020 20:56  Physical exam:    General: Patient is again alert oriented x3 quite sharp and a good historian.  She is normally conversant.  Lungs reveal decreased breath sounds in the bases posteriorly but is moving air easily without rales wheezing or accessory muscle use.  Heart is regular S1-S2 without murmur gallop or rub abdomen is soft.  Extremities show that she continues to have some edema in her right foot which is chronic but otherwise look looking good.  rsum foot which is chronic she reports.      Due to the 2020 Covid 19 pandemic, except as noted above, the patient was visually observed at a 6 foot plus distance.  An observational exam was performed in an effort to keep patient safe from Covid 19 and other communicable diseases.   Labs:  Lab Results   Component Value Date    WBC 3.8 (L) 07/23/2020    HGB 10.5 (L) 07/23/2020    HCT 36.8 07/23/2020     (H) 07/23/2020     (L) 07/23/2020     Results for orders placed or performed in visit on 07/23/20   Basic Metabolic Panel   Result Value Ref Range    Sodium 135 (L) 136 - 145 mmol/L    Potassium 4.2 3.5 - 5.0 mmol/L    Chloride 105 98 - 107 mmol/L    CO2 21 (L) 22 - 31 mmol/L    Anion Gap, Calculation 9 5 - 18 mmol/L    Glucose 95 70 - 125 mg/dL    Calcium 9.6 8.5 - 10.5 mg/dL    BUN 17 8 - 28 mg/dL    Creatinine 3.89 (H) 0.60 - 1.10 mg/dL    GFR MDRD Af Amer 14 (L) >60 mL/min/1.73m2    GFR MDRD Non Af Amer 11 (L) >60 mL/min/1.73m2         Lab Results   Component Value Date    TSH 3.89 07/18/2020     Lab Results   Component Value Date    HGBA1C 5.7 06/01/2014     [unfilled]  Lab Results   Component Value Date    YEOPGUXI22 592 10/09/2015      Lab Results   Component Value Date     (H) 07/13/2020     [unfilled]  Most Recent EKG     Units 07/13/20  1501   VENTRATE BPM 70   ATRIALRATE BPM 68   QRSDURATION ms 140   QTINTERVAL ms 448   QTCCALC ms 483   RAXIS degrees 211   TAXIS degrees 88   MUSEDX  ** Suspect arm lead reversal, interpretation assumes no reversal  Wide QRS rhythm  Non-specific intra-ventricular conduction block  Anterolateral infarct , age undetermined  Abnormal ECG  When compared with ECG of 13-JUL-2020 14:40,  No significant change was found  Confirmed by SEE ED PROVIDER NOTE FOR, ECG INTERPRETATION (4000),  ADIA PEACOCK ([id number]) on 7/13/2020 3:18:25 PM           Assessment/Plan:      ICD-10-CM    1. Pulmonary emphysema, unspecified emphysema type (H)  J43.9    2. Gout, unspecified cause, unspecified chronicity, unspecified site  M10.9    3. Presence of Watchman left atrial appendage closure device  Z95.818    4. Acute pulmonary edema (H)  J81.0    5. Acute on chronic diastolic congestive heart failure (H)  I50.33        End-stage renal disease  CC RT  Severe hypotension   Patient's hypotension is a bit less severe this past week.  She is worse at dialysis she reports and is given extra midodrine there.  She forgot to ask about the Florinef--- she still has the notes that I gave her last week and she will ask them when she goes in for dialysis tomorrow.  Again I assume they have considered this, and I remind her that her nephrology team is the expert team on managing hypotension especially associated with dialysis.  she says she intends to discuss it with the dialysis staff tomorrow.  -See my previous note regarding her hospice consideration.  Does not hold for the moment.  She now plans to return home and continue on with dialysis and other current cares for the foreseeable future.  -Her metoprolol has been discontinued due to the severity of the hypotension    -I asked the TCU staff to call her ophthalmology clinic to  see if her ophthalmologist would be okay with going off her timolol eyedrops or substituting an alternative.  It may have little to no effect on her systemic blood pressure I realize, but we are looking for anything that might contribute positively here.  Raissa is continued for the meanwhile though.  -Sevelamer  -Cinacalcet per nephrology    Acute diastolic heart failure   Patient reports that she was dialyzed for time us last week to manage fluid.  Her weight is been stable here at 155.  -Ongoing fluid management with nephrology/dialysis  Estimated dry weight 70.5 kg  -Continue low-salt diet     Glaucoma   See timolol discussion above.    A. Fib  Watchman device  Permanent pacemaker (AV jonn ablation/tachybradycardia syndrome)   Watchman device in place.  Has permanent pacemaker.  Not on any rate controlling agent.  Pulses look okay.    Chronic hypoxic respiratory failure  COPD  Heart failure   Patient just had a PE run in the hospital:Study Result  EXAM: CTA CHEST PE RUN  LOCATION: [facility]  DATE/TIME: 7/18/2020 4:50 PM    INDICATION: Positive d-dimer, hypoxia.  COMPARISON: CT chest exam 07/13/2020 and CT chest exam 09/21/2018  TECHNIQUE: CT chest pulmonary angiogram during arterial phase injection of IV contrast. Multiplanar reformats and MIP reconstructions were performed. Dose reduction techniques were used.   CONTRAST: Iohexol (Omni) 75mL    FINDINGS:  ANGIOGRAM CHEST: Pulmonary arteries are normal caliber and negative for pulmonary emboli. Thoracic aorta is negative for dissection. No CT evidence of right heart strain.    LUNGS AND PLEURA: Small bilateral pleural effusions and bibasilar atelectasis or infiltrates have developed since the previous exam. Interstitial septal thickening persists suggesting edema.    MEDIASTINUM/AXILLAE: Mild cardiac enlargement. Low-attenuation lobulated mass in the lower left mediastinum adjacent to the spine measures 4.2 x 2.6 cm on image 102 of series 4,  unchanged. Scattered subcentimeter lymph nodes elsewhere in the mediastinum  Atrial appendage occlusion device. Atherosclerotic disease thoracic aorta. Pacemaker anterior left chest wall with lead tips right atrium and ventricle. Right IJ dialysis catheter extends into the right atrium.    UPPER ABDOMEN: 4.3 x 4.0 cm aneurysm of upper abdomen is unchanged. Extensive plaque within the superior mesenteric artery. Bilateral renal cortical atrophy and multiple mixed high and low attenuation cysts, unchanged. Diverticulosis throughout the   visualized colon.    MUSCULOSKELETAL: Degenerative changes thoracic spine.    IMPRESSION:   1. No evidence for pulmonary emboli.  2. Mild cardiac enlargement with new, small bilateral pleural effusions and subsegmental atelectasis both lower lobes. There is also septal thickening both lungs suggesting edema and heart failure.   3. Low-attenuation mass within the left posterior inferior mediastinum is unchanged since 09/21/2018 and presumed benign.  4. Advanced atherosclerotic disease with stable 4.3 x 4.0 cm upper abdominal aortic aneurysm.  5. Bilateral renal cortical atrophy and mixed attenuation cysts.  6. Colonic diverticulosis.      Patient reports that she is doing well here.  She did have the episode of shortness of breath needing to rest with therapy after doing better days ago.  However this was a one-time incident and I am not sure of the significance.  Her weight is stable.  She is not showing any symptoms of respiratory infection.    -Continue supplemental oxygen  -Bronchodilators as need be    Depression   Wellbutrin plus trazodone as current    Peripheral neuropathy   Continue gabapentin at renal dosing.  Discontinue capsaicin per Ms. Gamez and added 4% lidocaine topically which she likes so far.    CODE STATUS   See discussion above.  Patient just met with hospice consultants yesterday.  She has not signed onto the program, at least not yet.  She says she has discussed  "it with her spouse food tells her to \"do what I want\".  Patient changes to DNR/DNI.  She will continue discussions regarding if and or when hospice is appropriate but plans to continue dialysis at this time.    GERD   Pepcid renally dosed 10 mg daily as needed    Chronic cough  Rhinorrhea/postnasal drip, chronic   Patient is frustrated that she cannot be on her home medication program that has worked so well.  We talked this through and I think we will try to replicate it since she has tolerated it  -Discontinue Liquibid tablets, even though she is getting more medication and one would think this would be superior for her.  -She prefers guaifenesin 100 mg per 5 cc which she has used at home.  She will take 5 cc every 4 hours as needed  - Coricidin, and her usual over-the-counter preparation, combination of chlorpheniramine and dextromethorphan is ordered 1 4 times daily as needed  -Patient strongly wishes to avoid any inhaled nasal preparation/medications such as ipratropium or fluticasone/equivalents    Peripheral vascular disease   Status post rupture of AAA.  No longer on metoprolol due to hypertensive issues.  No longer on statins.    Other chronic problems: Include but not limited to ZULEIKA, gout, diverticulosis, hip/femur fractures, back surgery, constipation       Case discussed with:    Facility staff           Derick San MD           "

## 2021-06-20 NOTE — PROGRESS NOTES
[facility]    Facility:   [facility] [[id number]]   Code Status: FULL CODE      CHIEF COMPLAINT/REASON FOR VISIT:  Chief Complaint   Patient presents with     Review Of Multiple Medical Conditions       HISTORY:      HPI: Belia is a 73 y.o. female who was admitted to the hospital due to shortness of breath which was pulmonary edema with acute on chronic congestive heart failure as well as sepsis with infected dialysis catheter.  She was diagnosed with enterococcus bacteremia.  The tunneled dialysis catheter was exchanged the day prior to discharge from the hospital.  She does have underlying sleep apnea but is not able to tolerate the BiPAP.  She has chronic atrial fibrillation and episodic rapid ventricular rate but she has undergone the left atrial appendage occlusion procedure (watchman) so that she does not require anticoagulation anymore.  She does have end-stage renal disease requiring chronic hemodialysis.  She does have chronic respiratory failure with hypoxia.  She also has a thoracic aortic aneurysm measuring 4.3 cm which was rechecked during this hospitalization and it is stable.  Her BNP in the hospital was 1972.  She was tachycardic on arrival to the emergency room and this improved with IV diltiazem 10 mg.    Upon current review of systems, she has had increased problems with shortness of breath recently.  She is not having cough nor chest pains nor palpitations of the heart and she is not having fevers or chills.  She does not have abdominal pain or nausea.  She is not feeling lightheaded.    Past Medical History:   Diagnosis Date     Arthritis      CHF (congestive heart failure) (H)      Chronic anemia 6/1/2014     Chronic kidney disease      Chronic thoracic aortic dissection (H) 10/7/2015    Descending thoracic aorta; treated medically per notes of Dragan Singh and Jennifer.     COPD (chronic obstructive pulmonary disease) (H)      CVA (cerebral infarction)       Disease of thyroid gland      Dyslipidemia      ESRD (end stage renal disease) (H) 06/03/2009    on dialysis with Dr. Mitchell     Essential hypertension 6/30/2014     GI (gastrointestinal bleed)      GI bleeding 6/5/2017     Gout      L3 vertebral fracture (H) 11/16/2015     Left Atrial Appendage Occlusion (WATCHMAN) 4/5/2018    LAAO April 5, 2018 (30 mm WATCHMAN)     Obesity      ZULEIKA (obstructive sleep apnea), severe, intolerant of CPAP 10/22/2015     Pneumonia 9-7-2015     Right foot drop      Spinal stenosis 3/28/2016     Stroke (H) 3/24/2016             Family History   Problem Relation Age of Onset     Dementia Mother      Diabetes Mother      Arthritis Mother      Cancer Mother      Depression Mother      Heart disease Mother      Vision loss Mother      Stroke Father      Heart disease Father      Breast cancer Neg Hx      Social History     Social History     Marital status:      Spouse name: Cayden     Number of children: 2     Years of education: N/A     Occupational History      Retired     Social History Main Topics     Smoking status: Former Smoker     Packs/day: 1.50     Years: 37.00     Types: Cigarettes     Quit date: 1/1/2009     Smokeless tobacco: Never Used     Alcohol use No      Comment: 14 mixed drinks per week     Drug use: No     Sexual activity: No     Other Topics Concern     Not on file     Social History Narrative    Lives with her . Daughter in [place] and daughter in [place].         Review of Systems    .  Vitals:    09/19/18 2212   BP: 134/72   Pulse: 78   Resp: 18   Temp: 97.9  F (36.6  C)   SpO2: 92%       Physical Exam   Constitutional: No distress.   Eyes: Right eye exhibits no discharge. Left eye exhibits no discharge.   Neck: No JVD present.   Cardiovascular: Normal heart sounds.    Pulmonary/Chest: Breath sounds normal. No respiratory distress.   Abdominal: Bowel sounds are normal. She exhibits no distension. There is no tenderness.   Musculoskeletal: She  exhibits no edema.   Neurological: She is alert.   Psychiatric: She has a normal mood and affect.   Nursing note and vitals reviewed.        LABS:   Chest x-ray shows evidence of pulmonary edema and pleural effusion    ASSESSMENT:      ICD-10-CM    1. Chronic pulmonary edema J81.1    2. Acute on chronic diastolic congestive heart failure (H) I50.33    3. Atrial fibrillation with RVR (H) I48.91    4. ESRD on dialysis (H) N18.6     Z99.2        PLAN:    A copy of the chest x-ray report is to be sent along with her to dialysis tomorrow so that they can better determine how the dialysis run should be done.  Continue with current therapies and monitoring of medical conditions.      Electronically signed by: Farhan Kiran MD

## 2021-06-20 NOTE — LETTER
Letter by Angie Gamez CNP at      Author: Angie Gamez CNP Service: -- Author Type: --    Filed:  Encounter Date: 7/27/2020 Status: (Other)         Patient: Belia Rodriguez   MR Number: [id number]   YOB: 1945   Date of Visit: 7/27/2020     [facility]    Facility:   [facility] [[id number]]   Code Status: FULL CODE      CHIEF COMPLAINT/REASON FOR VISIT:  Chief Complaint   Patient presents with   ? Problem Visit     cough       HISTORY:      HPI: Belia is a 74 y.o. female who was admitted to [facility] from 7/13/20-7/20/20 for acute on chronic CHF that was managed with daily HD runs.  She is on HD for ESRD with MWF schedule.  It was recommended that she consider hospice services during her hospitalization with progression of CHF, ESRD, and COPD.  Itzel  has a past medical history of Arthritis, CHF (congestive heart failure) (H), Chronic anemia (6/1/2014), Chronic kidney disease, Chronic thoracic aortic dissection (H) (10/7/2015), COPD (chronic obstructive pulmonary disease) (H), CVA (cerebral infarction), Disease of thyroid gland, Dyslipidemia, ESRD (end stage renal disease) (H) (06/03/2009), Essential hypertension (6/30/2014), Gastrointestinal hemorrhage, unspecified gastrointestinal hemorrhage type (6/5/2017), GI (gastrointestinal bleed), GI bleeding (6/5/2017), Gout, L3 vertebral fracture (H) (11/16/2015), Left Atrial Appendage Occlusion (WATCHMAN) (4/5/2018), Obesity, ZULEIKA (obstructive sleep apnea), severe, intolerant of CPAP (10/22/2015), Oxygen dependent, Pneumonia (9-7-2015), Right foot drop, Spinal stenosis (3/28/2016), and Stroke (H) (3/24/2016).   The patient is currently at [facility] s/p hospitalization for acute rehabilitation.      Today Ms. Rodriguez is being evaluated for cough.  Nursing staff reported that the patient has had increased coughing over the past few days and requested cough medicine.  Itzel reported  that she has had a dry cough for years and she uses cough medicine PRN at home for management.  She denied increased dyspnea, wheezing, or peripheral edema at this time.  She continues to use her baseline 2 LPM oxygen via nasal cannula for chronic hypoxia.  She was agreeable to trying Mucinex for management of her cough at this time.  She said that the capsaicin ointment that was started at her last visit has been refused because she does not like the tingling feeling that the medication gives her and the pain has remained the same with no pain at this time.  She was agreeable to plan to trial lidocaine 4% ointment instead of capsaicin ointment for management of her nighttime neuropathic pain.  She asked if she could meet with hospice services at this visit and plan for consult today for ESRD on HD.  She also asked that she could meet with in-house psychology for depressed mood due to frustrations with her family and managing chronic illnesses.  The patient denied changes in sleep or appetite, lightheadedness, dizziness, breathing difficulty, chest pain, palpitations, constipation, urinary symptoms, numbness or tingling in extremities, and pain.     Past Medical History:   Diagnosis Date   ? Arthritis    ? CHF (congestive heart failure) (H)    ? Chronic anemia 6/1/2014   ? Chronic kidney disease    ? Chronic thoracic aortic dissection (H) 10/7/2015    Descending thoracic aorta; treated medically per notes of Dragan Singh and Jennifer.   ? COPD (chronic obstructive pulmonary disease) (H)    ? CVA (cerebral infarction)    ? Disease of thyroid gland    ? Dyslipidemia    ? ESRD (end stage renal disease) (H) 06/03/2009    on dialysis with Dr. Mitchell   ? Essential hypertension 6/30/2014   ? Gastrointestinal hemorrhage, unspecified gastrointestinal hemorrhage type 6/5/2017   ? GI (gastrointestinal bleed)    ? GI bleeding 6/5/2017   ? Gout    ? L3 vertebral fracture (H) 11/16/2015   ? Left Atrial Appendage Occlusion  (WATCHMAN) 2018    LAAO 2018 (30 mm WATCHMAN)   ? Obesity    ? ZULEIKA (obstructive sleep apnea), severe, intolerant of CPAP 10/22/2015   ? Oxygen dependent     3L nc   ? Pneumonia 2015   ? Right foot drop    ? Spinal stenosis 3/28/2016   ? Stroke (H) 3/24/2016             Family History   Problem Relation Age of Onset   ? Dementia Mother    ? Diabetes Mother    ? Arthritis Mother    ? Cancer Mother    ? Depression Mother    ? Heart disease Mother    ? Vision loss Mother    ? Stroke Father    ? Heart disease Father    ? Breast cancer Neg Hx      Social History     Socioeconomic History   ? Marital status:      Spouse name: Cayden   ? Number of children: 2   ? Years of education: Not on file   ? Highest education level: Not on file   Occupational History     Employer: RETIRED   Social Needs   ? Financial resource strain: Not on file   ? Food insecurity     Worry: Not on file     Inability: Not on file   ? Transportation needs     Medical: Not on file     Non-medical: Not on file   Tobacco Use   ? Smoking status: Former Smoker     Packs/day: 1.50     Years: 37.00     Pack years: 55.50     Types: Cigarettes     Last attempt to quit: 2009     Years since quittin.6   ? Smokeless tobacco: Never Used   Substance and Sexual Activity   ? Alcohol use: No     Alcohol/week: 11.7 standard drinks     Types: 14 Standard drinks or equivalent per week     Comment: 14 mixed drinks per week   ? Drug use: No   ? Sexual activity: Never     Partners: Male   Lifestyle   ? Physical activity     Days per week: Not on file     Minutes per session: Not on file   ? Stress: Not on file   Relationships   ? Social connections     Talks on phone: Not on file     Gets together: Not on file     Attends Jainism service: Not on file     Active member of club or organization: Not on file     Attends meetings of clubs or organizations: Not on file     Relationship status: Not on file   ? Intimate partner violence     Fear  of current or ex partner: Not on file     Emotionally abused: Not on file     Physically abused: Not on file     Forced sexual activity: Not on file   Other Topics Concern   ? Not on file   Social History Narrative    Lives with her . Daughter in [place] and daughter in [place].       REVIEW OF SYSTEM:   Pertinent items are noted in HPI.  A 12 point comprehensive review of systems was negative except as noted.    PHYSICAL EXAM:     General Appearance:    Alert, cooperative, no distress, appears stated age   Head:    Normocephalic, without obvious abnormality, atraumatic   Eyes:    PERRL, conjunctiva/corneas clear, both eyes; wears glasses   Ears:    Normal external ear canals, both ears   Nose:   Nares normal, septum midline, mucosa normal, no drainage    or sinus tenderness   Throat:   Lips, mucosa, and tongue normal; teeth and gums normal   Neck:   Supple, symmetrical, trachea midline, no adenopathy;     thyroid:  no enlargement/tenderness/nodules; no carotid    bruit or JVD   Back:     Symmetric, no curvature, ROM normal, no CVA tenderness   Lungs:     Clear to auscultation bilaterally, respirations unlabored; using 2 LPM oxygen via NC   Chest Wall:    No tenderness or deformity    Heart:    Regular rate and rhythm, S1 and S2 normal, no murmur, rub   or gallop   Abdomen:     Soft, non-tender, bowel sounds active all four quadrants,     no masses, no organomegaly   Extremities:   Extremities normal, atraumatic, no cyanosis or edema   Pulses:   2+ and symmetric all extremities   Skin:   Skin color, texture, turgor normal, no rashes or lesions   Neurologic:   Oriented to person, place, time, and situation; exhibits logical thought processes; generalized weakness; sitting in wheelchair during this visit         LABS:   Lab Results   Component Value Date    WBC 3.8 (L) 07/23/2020    HGB 10.5 (L) 07/23/2020    HCT 36.8 07/23/2020     (L) 07/23/2020    CHOL 116 11/19/2019    TRIG 122 11/19/2019    HDL 57  11/19/2019    ALT 14 07/13/2020    ALT 14 07/13/2020    AST 25 07/13/2020    AST 25 07/13/2020     (L) 07/23/2020    K 4.2 07/23/2020     07/23/2020    CREATININE 3.89 (H) 07/23/2020    BUN 17 07/23/2020    CO2 21 (L) 07/23/2020    TSH 3.89 07/18/2020    INR 1.10 07/17/2020    HGBA1C 5.7 06/01/2014        ASSESSMENT:      ICD-10-CM    1. Physical deconditioning  R53.81    2. Palliative care encounter  Z51.5    3. Neuropathic pain  M79.2    4. Cough  R05    5. Adjustment disorder with depressed mood  F43.21        PLAN:      Continue PT/OT as recommended for physical deconditioning.  Continue HD on MWF per nephrology recommendations for ESRD.  Continue to monitor weight daily for acute on chronic CHF.  Continue oxygen 0-4 LPM as needed titrate to SpO2> 90% with COPD.  START Mucinex 600 mg ER daily for chronic cough.  DISCONTINUE capsaicin 0.075% ointment three times a day for neuropathic pain.  START lidocaine 4% ointment two times a day for neuropathic pain.  Continue gabapentin with bedtime dosing 300 mg daily at HS for neuropathic pain.  Consult for HE hospice informational session for ESRD on HD.  Consult to in-house psychology services for adjustment disorder with depressed mood.  SW to work with patient on LTC options per patient request.  Otherwise continue current care plan for all other chronic medical conditions, as they are stable. Encouraged patient to engage in healthy lifestyle behaviors such as engaging in social activities, exercising (PT/OT), eating well, and following care plan. Follow up for routine check-up, or sooner if needed. Will continue to monitor patient and work with nursing staff collaboratively to work toward positive patient outcomes.     Electronically signed by: Angie Gamez CNP     Total floor/unit time was 60 minutes and 40 minutes was spent in counseling patient on pain management, hospice philosophy, mood disorder management, and acute rehabilitation and  coordination of care with nursing staff, SW, and therapy services.

## 2021-06-20 NOTE — LETTER
Letter by Angie Gamez CNP at      Author: Angie Gamez CNP Service: -- Author Type: --    Filed:  Encounter Date: 7/30/2020 Status: (Other)         Patient: Belia Rodriguez   MR Number: [id number]   YOB: 1945   Date of Visit: 7/30/2020     [facility]    Facility:   [facility] [[id number]]   Code Status: FULL CODE      CHIEF COMPLAINT/REASON FOR VISIT:  Chief Complaint   Patient presents with   ? Problem Visit     cough       HISTORY:      HPI: Belia is a 74 y.o. female who was admitted to [facility] from 7/13/20-7/20/20 for acute on chronic CHF that was managed with daily HD runs.  She is on HD for ESRD with MWF schedule.  It was recommended that she consider hospice services during her hospitalization with progression of CHF, ESRD, and COPD.  Itzel  has a past medical history of Arthritis, CHF (congestive heart failure) (H), Chronic anemia (6/1/2014), Chronic kidney disease, Chronic thoracic aortic dissection (H) (10/7/2015), COPD (chronic obstructive pulmonary disease) (H), CVA (cerebral infarction), Disease of thyroid gland, Dyslipidemia, ESRD (end stage renal disease) (H) (06/03/2009), Essential hypertension (6/30/2014), Gastrointestinal hemorrhage, unspecified gastrointestinal hemorrhage type (6/5/2017), GI (gastrointestinal bleed), GI bleeding (6/5/2017), Gout, L3 vertebral fracture (H) (11/16/2015), Left Atrial Appendage Occlusion (WATCHMAN) (4/5/2018), Obesity, ZULEIKA (obstructive sleep apnea), severe, intolerant of CPAP (10/22/2015), Oxygen dependent, Pneumonia (9-7-2015), Right foot drop, Spinal stenosis (3/28/2016), and Stroke (H) (3/24/2016).   The patient is currently at [facility] s/p hospitalization for acute rehabilitation.      Today Ms. Rodriguez is being evaluated for cough.  Itzel reported that she felt that her hospice meeting went well and she feels ready to discuss hospice services with her family.  She  said that she is between going to a hospice home versus staying at her current facility for hospice services.  She would like to continue therapy services at this time and continue to work on processing her end-of-life options.  She said that she continues to have cough and clear nasal drainage and would like to stop taking Mucinex as she does not feel that this has been helpful for her.  She was agreeable to trying loratadine as she has used this in the past for seasonal allergies.  Her breathing has been stable and she denied increased dyspnea, wheezing, or edema at this time.  She continues to use her oxygen at 2 LPM via nasal cannula at this visit.  She reported that her pain has been well-managed with gabapentin at  and lidocaine ointment with improved sleep since starting the lidocaine ointment at her last visit.  The patient denied changes in sleep or appetite, lightheadedness, dizziness, breathing difficulty, chest pain, palpitations, constipation, urinary symptoms, numbness or tingling in extremities, and pain.      Past Medical History:   Diagnosis Date   ? Arthritis    ? CHF (congestive heart failure) (H)    ? Chronic anemia 6/1/2014   ? Chronic kidney disease    ? Chronic thoracic aortic dissection (H) 10/7/2015    Descending thoracic aorta; treated medically per notes of Dragan Singh and Jennifer.   ? COPD (chronic obstructive pulmonary disease) (H)    ? CVA (cerebral infarction)    ? Disease of thyroid gland    ? Dyslipidemia    ? ESRD (end stage renal disease) (H) 06/03/2009    on dialysis with Dr. Mitchell   ? Essential hypertension 6/30/2014   ? Gastrointestinal hemorrhage, unspecified gastrointestinal hemorrhage type 6/5/2017   ? GI (gastrointestinal bleed)    ? GI bleeding 6/5/2017   ? Gout    ? L3 vertebral fracture (H) 11/16/2015   ? Left Atrial Appendage Occlusion (WATCHMAN) 4/5/2018    LAAO April 5, 2018 (30 mm WATCHMAN)   ? Obesity    ? ZULEIKA (obstructive sleep apnea), severe, intolerant of  CPAP 10/22/2015   ? Oxygen dependent     3L nc   ? Pneumonia 2015   ? Right foot drop    ? Spinal stenosis 3/28/2016   ? Stroke (H) 3/24/2016             Family History   Problem Relation Age of Onset   ? Dementia Mother    ? Diabetes Mother    ? Arthritis Mother    ? Cancer Mother    ? Depression Mother    ? Heart disease Mother    ? Vision loss Mother    ? Stroke Father    ? Heart disease Father    ? Breast cancer Neg Hx      Social History     Socioeconomic History   ? Marital status:      Spouse name: Cayden   ? Number of children: 2   ? Years of education: Not on file   ? Highest education level: Not on file   Occupational History     Employer: RETIRED   Social Needs   ? Financial resource strain: Not on file   ? Food insecurity     Worry: Not on file     Inability: Not on file   ? Transportation needs     Medical: Not on file     Non-medical: Not on file   Tobacco Use   ? Smoking status: Former Smoker     Packs/day: 1.50     Years: 37.00     Pack years: 55.50     Types: Cigarettes     Last attempt to quit: 2009     Years since quittin.6   ? Smokeless tobacco: Never Used   Substance and Sexual Activity   ? Alcohol use: No     Alcohol/week: 11.7 standard drinks     Types: 14 Standard drinks or equivalent per week     Comment: 14 mixed drinks per week   ? Drug use: No   ? Sexual activity: Never     Partners: Male   Lifestyle   ? Physical activity     Days per week: Not on file     Minutes per session: Not on file   ? Stress: Not on file   Relationships   ? Social connections     Talks on phone: Not on file     Gets together: Not on file     Attends [employer] service: Not on file     Active member of club or organization: Not on file     Attends meetings of clubs or organizations: Not on file     Relationship status: Not on file   ? Intimate partner violence     Fear of current or ex partner: Not on file     Emotionally abused: Not on file     Physically abused: Not on file     Forced sexual  activity: Not on file   Other Topics Concern   ? Not on file   Social History Narrative    Lives with her . Daughter in [place] and daughter in [place].       REVIEW OF SYSTEM:   Pertinent items are noted in HPI.  A 12 point comprehensive review of systems was negative except as noted.    PHYSICAL EXAM:     General Appearance:    Alert, cooperative, no distress, appears stated age   Head:    Normocephalic, without obvious abnormality, atraumatic   Eyes:    PERRL, conjunctiva/corneas clear, both eyes; wears glasses   Ears:    Normal external ear canals, both ears   Nose:   Nares normal, septum midline, mucosa normal, moderate clear nasal drainage or sinus tenderness   Throat:   Lips, mucosa, and tongue normal; teeth and gums normal   Neck:   Supple, symmetrical, trachea midline, no adenopathy;     thyroid:  no enlargement/tenderness/nodules; no carotid    bruit or JVD   Back:     Symmetric, no curvature, ROM normal, no CVA tenderness   Lungs:     Clear to auscultation bilaterally, respirations unlabored; using 2 LPM oxygen via NC   Chest Wall:    No tenderness or deformity    Heart:    Regular rate and rhythm, S1 and S2 normal, no murmur, rub   or gallop   Abdomen:     Soft, non-tender, bowel sounds active all four quadrants,     no masses, no organomegaly   Extremities:   Extremities normal, atraumatic, no cyanosis or edema   Pulses:   2+ and symmetric all extremities   Skin:   Skin color, texture, turgor normal, no rashes or lesions   Neurologic:   Oriented to person, place, time, and situation; exhibits logical thought processes; generalized weakness; sitting in wheelchair during this visit         LABS:   Lab Results   Component Value Date    WBC 3.8 (L) 07/23/2020    HGB 10.5 (L) 07/23/2020    HCT 36.8 07/23/2020     (L) 07/23/2020    CHOL 116 11/19/2019    TRIG 122 11/19/2019    HDL 57 11/19/2019    ALT 14 07/13/2020    ALT 14 07/13/2020    AST 25 07/13/2020    AST 25 07/13/2020     (L)  07/23/2020    K 4.2 07/23/2020     07/23/2020    CREATININE 3.89 (H) 07/23/2020    BUN 17 07/23/2020    CO2 21 (L) 07/23/2020    TSH 3.89 07/18/2020    INR 1.10 07/17/2020    HGBA1C 5.7 06/01/2014        ASSESSMENT:      ICD-10-CM    1. Physical deconditioning  R53.81    2. Cough  R05    3. Neuropathic pain  M79.2        PLAN:      Continue PT/OT as recommended for physical deconditioning.  Continue HD on MWF per nephrology recommendations for ESRD.  Continue to monitor weight daily for acute on chronic CHF.  Continue oxygen 0-4 LPM as needed titrate to SpO2> 90% with COPD.  DISCONTINUE Mucinex 600 mg ER daily for chronic cough.  START loratadine 10 mg daily for nasal drainage and cough.  Continue lidocaine 4% ointment two times a day for neuropathic pain.  Continue gabapentin with bedtime dosing 300 mg daily at HS for neuropathic pain.  Consult for HE hospice informational session for ESRD on HD.  Consult to in-house psychology services for adjustment disorder with depressed mood.  SW to work with patient on LTC options per patient request.  Otherwise continue current care plan for all other chronic medical conditions, as they are stable. Encouraged patient to engage in healthy lifestyle behaviors such as engaging in social activities, exercising (PT/OT), eating well, and following care plan. Follow up for routine check-up, or sooner if needed. Will continue to monitor patient and work with nursing staff collaboratively to work toward positive patient outcomes.     Electronically signed by: Angie Gamez CNP

## 2021-06-20 NOTE — LETTER
Letter by Angie Gamez CNP at      Author: Angie Gamez CNP Service: -- Author Type: --    Filed:  Encounter Date: 7/23/2020 Status: (Other)         Patient: Belia Rodriguez   MR Number: [id number]   YOB: 1945   Date of Visit: 7/23/2020     [facility]    Facility:   [facility] [[id number]]   Code Status: FULL CODE      CHIEF COMPLAINT/REASON FOR VISIT:  Chief Complaint   Patient presents with   ? Problem Visit     CHF exacerbation       HISTORY:      HPI: Belia is a 74 y.o. female who was admitted to [facility] from 7/13/20-7/20/20 for acute on chronic CHF that was managed with daily HD runs.  She is on HD for ESRD with MWF schedule.  It was recommended that she consider hospice services during her hospitalization with progression of CHF, ESRD, and COPD.  Itzel  has a past medical history of Arthritis, CHF (congestive heart failure) (H), Chronic anemia (6/1/2014), Chronic kidney disease, Chronic thoracic aortic dissection (H) (10/7/2015), COPD (chronic obstructive pulmonary disease) (H), CVA (cerebral infarction), Disease of thyroid gland, Dyslipidemia, ESRD (end stage renal disease) (H) (06/03/2009), Essential hypertension (6/30/2014), Gastrointestinal hemorrhage, unspecified gastrointestinal hemorrhage type (6/5/2017), GI (gastrointestinal bleed), GI bleeding (6/5/2017), Gout, L3 vertebral fracture (H) (11/16/2015), Left Atrial Appendage Occlusion (WATCHMAN) (4/5/2018), Obesity, ZULEIKA (obstructive sleep apnea), severe, intolerant of CPAP (10/22/2015), Oxygen dependent, Pneumonia (9-7-2015), Right foot drop, Spinal stenosis (3/28/2016), and Stroke (H) (3/24/2016).   The patient is currently at [facility] s/p hospitalization for acute rehabilitation.      Today Ms. Rodriguez is being evaluated for acute on chronic CHF.  Her blood pressure has been running low since admission to TCU with SBP 80-90s which is at the patient's  baseline.  She is on midodrine PRN for hypotension per nephrology recommendation.  She feels that her breathing has improved with decreased dyspnea at rest and audible wheezing resolved.  She is currently on 2 LPM oxygen via nasal cannula which is her baseline per patient report.  Her main concern at this visit is that she has been having increased neuropathic pain at bedtime and would like something to help take the edge off so that she can sleep better.  She was agreeable to use capsaicin ointment as pain adjunct with recommendation to order gabapentin for bedtime dosing per renal guidelines from facility pharmacist.  She denied pain at this visit.  We discussed hospice recommendations per patient request and she said that she feels ready to consult hospice at this time but she is very worried about how her  will handle her going on hospice services.  We discussed her prognosis as days to hours if she were to discontinue hemodialysis with her CHF, COPD, and ESRD.  She had questions about the hospice process and if she could remain at her current facility for treatment that were answered.  She feels that she has good support from her two children but they do not live nearby and she does not want to burden them.  Plan for consult to hospice services for informational meeting next week per patient request.  The patient denied sleep disturbances, changes in mood or appetite, lightheadedness, dizziness, breathing difficulty, chest pain, palpitations, constipation, and urinary symptoms.      Past Medical History:   Diagnosis Date   ? Arthritis    ? CHF (congestive heart failure) (H)    ? Chronic anemia 6/1/2014   ? Chronic kidney disease    ? Chronic thoracic aortic dissection (H) 10/7/2015    Descending thoracic aorta; treated medically per notes of Dragan Singh and Jennifer.   ? COPD (chronic obstructive pulmonary disease) (H)    ? CVA (cerebral infarction)    ? Disease of thyroid gland    ? Dyslipidemia    ? ESRD  (end stage renal disease) (H) 2009    on dialysis with Dr. Mitchell   ? Essential hypertension 2014   ? Gastrointestinal hemorrhage, unspecified gastrointestinal hemorrhage type 2017   ? GI (gastrointestinal bleed)    ? GI bleeding 2017   ? Gout    ? L3 vertebral fracture (H) 2015   ? Left Atrial Appendage Occlusion (WATCHMAN) 2018    LAAO 2018 (30 mm WATCHMAN)   ? Obesity    ? ZULEIKA (obstructive sleep apnea), severe, intolerant of CPAP 10/22/2015   ? Oxygen dependent     3L nc   ? Pneumonia 2015   ? Right foot drop    ? Spinal stenosis 3/28/2016   ? Stroke (H) 3/24/2016             Family History   Problem Relation Age of Onset   ? Dementia Mother    ? Diabetes Mother    ? Arthritis Mother    ? Cancer Mother    ? Depression Mother    ? Heart disease Mother    ? Vision loss Mother    ? Stroke Father    ? Heart disease Father    ? Breast cancer Neg Hx      Social History     Socioeconomic History   ? Marital status:      Spouse name: Cayden   ? Number of children: 2   ? Years of education: Not on file   ? Highest education level: Not on file   Occupational History     Employer: RETIRED   Social Needs   ? Financial resource strain: Not on file   ? Food insecurity     Worry: Not on file     Inability: Not on file   ? Transportation needs     Medical: Not on file     Non-medical: Not on file   Tobacco Use   ? Smoking status: Former Smoker     Packs/day: 1.50     Years: 37.00     Pack years: 55.50     Types: Cigarettes     Last attempt to quit: 2009     Years since quittin.6   ? Smokeless tobacco: Never Used   Substance and Sexual Activity   ? Alcohol use: No     Alcohol/week: 11.7 standard drinks     Types: 14 Standard drinks or equivalent per week     Comment: 14 mixed drinks per week   ? Drug use: No   ? Sexual activity: Never     Partners: Male   Lifestyle   ? Physical activity     Days per week: Not on file     Minutes per session: Not on file   ? Stress: Not  on file   Relationships   ? Social connections     Talks on phone: Not on file     Gets together: Not on file     Attends Evangelical service: Not on file     Active member of club or organization: Not on file     Attends meetings of clubs or organizations: Not on file     Relationship status: Not on file   ? Intimate partner violence     Fear of current or ex partner: Not on file     Emotionally abused: Not on file     Physically abused: Not on file     Forced sexual activity: Not on file   Other Topics Concern   ? Not on file   Social History Narrative    Lives with her . Daughter in [place] and daughter in [place].       REVIEW OF SYSTEM:   Pertinent items are noted in HPI.  A 12 point comprehensive review of systems was negative except as noted.    PHYSICAL EXAM:     General Appearance:    Alert, cooperative, no distress, appears stated age   Head:    Normocephalic, without obvious abnormality, atraumatic   Eyes:    PERRL, conjunctiva/corneas clear, both eyes; wears glasses   Ears:    Normal external ear canals, both ears   Nose:   Nares normal, septum midline, mucosa normal, no drainage    or sinus tenderness   Throat:   Lips, mucosa, and tongue normal; teeth and gums normal   Neck:   Supple, symmetrical, trachea midline, no adenopathy;     thyroid:  no enlargement/tenderness/nodules; no carotid    bruit or JVD   Back:     Symmetric, no curvature, ROM normal, no CVA tenderness   Lungs:     Clear to auscultation bilaterally, respirations unlabored; using 2 LPM oxygen via NC   Chest Wall:    No tenderness or deformity    Heart:    Regular rate and rhythm, S1 and S2 normal, no murmur, rub   or gallop   Abdomen:     Soft, non-tender, bowel sounds active all four quadrants,     no masses, no organomegaly   Extremities:   Extremities normal, atraumatic, no cyanosis or edema   Pulses:   2+ and symmetric all extremities   Skin:   Skin color, texture, turgor normal, no rashes or lesions   Neurologic:   Oriented  to person, place, time, and situation; exhibits logical thought processes; generalized weakness; sitting in wheelchair during this visit         LABS:   None ordered at this visit.    ASSESSMENT:      ICD-10-CM    1. Acute on chronic diastolic congestive heart failure (H)  I50.33    2. ESRD on dialysis (H)  N18.6     Z99.2    3. Physical deconditioning  R53.81    4. Neuropathic pain  M79.2        PLAN:      Continue PT/OT as recommended for physical deconditioning.  Continue HD on MWF per nephrology recommendations for ESRD.  Continue to monitor weight daily for acute on chronic CHF.  Continue oxygen 0-4 LPM as needed titrate to SpO2> 90% with COPD.  START capsaicin 0.075% ointment three times a day for neuropathic pain.  Continue gabapentin with bedtime dosing 300 mg daily at HS for neuropathic pain.  Plan for [facility] hospice informational session next week per patient request.  SW to work with patient on LTC options per patient request.  Otherwise continue current care plan for all other chronic medical conditions, as they are stable. Encouraged patient to engage in healthy lifestyle behaviors such as engaging in social activities, exercising (PT/OT), eating well, and following care plan. Follow up for routine check-up, or sooner if needed. Will continue to monitor patient and work with nursing staff collaboratively to work toward positive patient outcomes.     Electronically signed by: Angie Gamez CNP     Total floor/unit time was 60 minutes and 50 minutes was spent in counseling patient on CHF management, pain management, hospice philosophy, ESRD prognosis, and acute rehabilitation and coordination of care with nursing staff, SW, and therapy services.

## 2021-06-20 NOTE — LETTER
Letter by Chelsea Jiang at      Author: Chelsea Jiang Service: -- Author Type: --    Filed:  Encounter Date: 12/17/2019 Status: Signed         Belia K Michael  [address] [place] [id number]      December 17, 2019      Dear Ms. Rodriguez,    RE: Remote Results    We are writing to you regarding your recent Remote Pacemaker check from home. Your transmission was received successfully. Battery status is satisfactory at this time.     Your results are within normal limits.    Your next device appointment will be a remote check on March 18, 2020; this will occur automatically.    To schedule or reschedule, please call [phone] and press 1.    NOTE: If you would like to do an extra transmission, please call [phone] and press 3 to speak to a nurse BEFORE transmitting. This ensures that the Device Clinic staff is aware of the reason you are sending a transmission, and can follow-up with you after it has been reviewed.    We will be checking your implanted device from home (remotely) every three months unless otherwise instructed. We will need to see you in the clinic at least once a year. You may need to be seen in the clinic sooner depending on the results of your check.    Please be aware:    The follow-up schedule is like a Physician prescription.    Your remote monitor is paired to your specific implanted device.      Sincerely,    [facility]

## 2021-06-20 NOTE — LETTER
Letter by Derick San MD at      Author: Derick San MD Service: -- Author Type: --    Filed:  Encounter Date: 7/28/2020 Status: (Other)         Patient: Belia Rodriguez   MR Number: [id number]   YOB: 1945   Date of Visit: 7/28/2020      [facility]    Facility:  [facility] [[id number]]    Code Status:  DNR/DNI    Chief Complaint   Patient presents with   ? H & P   :                    Patient Active Problem List   Diagnosis   ? Tachycardia-bradycardia syndrome (H)   ? Hyperkalemia   ? Essential hypertension with goal blood pressure less than 140/90   ? Hyperlipidemia   ? ZULEIKA (obstructive sleep apnea), severe   ? Anemia of chronic renal failure, stage 5 (H)   ? Dissection of thoracoabdominal aorta (H)   ? Gout   ? GERD (gastroesophageal reflux disease)   ? Right foot drop   ? Acute midline low back pain with right-sided sciatica   ? History of compression fracture of spine   ? Pulmonary emphysema (H)   ? Presence of Watchman left atrial appendage closure device   ? Other dysphagia   ? Borderline glaucoma with ocular hypertension   ? Hyperparathyroidism (H)   ? Osteoporosis   ? Venous tributary (branch) occlusion of retina   ? ESRD on dialysis (H)   ? Acute pulmonary edema (H)   ? Chronic atrial fibrillation (H)   ? Acute on chronic diastolic congestive heart failure (H)   ? Thrombocytopenia (H)   ? Contraindication to anticoagulation therapy   ? Dyspnea   ? Cardiac pacemaker in situ   ? S/P AV (atrioventricular) jonn ablation   ? Hip fracture, intertrochanteric (H)   ? DVT (deep venous thrombosis) (H)   ? Carpal tunnel syndrome of left wrist   ? SOB (shortness of breath)   ? Hypotension, unspecified hypotension type       History:  Belia Rodriguez  is a 74 year old female with history of end-stage renal disease on CCR T, tachybradycardia syndrome/AV jonn ablation/permanent pacemaker, atrial fibrillation status post  "watchman device, abdominal aortic aneurysm dissection, hypertension, COPD, chronic hypoxic respiratory failure, ZULEIKA, gout, GERD, glaucoma on timolol, depression seen for admission to TCU on 7/28/2020    Hospital Course: Patient was hospitalized between July 13 and July 20 presenting with edema and shortness of breath despite not missing her scheduled outpatient dialysis days.  She was diagnosed with acute on chronic diastolic heart failure and was diuresed 11 L by nephrology/dialysis on successive days.    Her dialysis is complicated by severe hypotension despite midodrine therapy.  It hypotension resulted in discontinuation of her metoprolol.  Her in dialysis weight target is 70.5 kg.    Due to the severity of her heart failure/hypertension with complicated dialysis, comfort care/hospice approach has been discussed with patient and she is considering it though she remained full code up until to my visit here with her today, she is now DNR/DNI per her request.    Patient did have an echocardiogram this hospitalization which showed no LV dysfunction and no valvular dysfunction.  She had normal thyroid function testing.  She had \"equivocal cortisol\" but in the end adrenal insufficiency was not suspected as a cause of her hypotension.    Subjective/ROS:    -augmented by discussion with facility staff involved in direct care      - Patient says she is feeling better.  She is less short of breath think she is probably back to her chronic baseline.  She denies chest pain orthopnea PND.  She says her edema is back to baseline, she has chronic edema in the right foot area but very little elsewhere.  She was requiring 3 L oxygen at discharge.    - Patient says she sees Dr. Dl Kniney from nephrology.  She says she was a long-term patient of Dr. Mitchell prior to that.    - Patient's main complaint is her peripheral neuropathy.  She is on renal dosing of gabapentin 100 mg 3 times daily.  She has been tried on various " "topicals, capsaicin think to make things worse.  She was just started on lidocaine gel this morning for the first time she says that the initial result was promising.    -Patient otherwise reports that she is not having headaches change in vision speaking swallowing hearing falls injuries nausea vomiting diarrhea melena bright red blood per rectum.  She says she does not feel depressed, she wishes she \"was not so broken\" but understands that some of her issues cannot be cured at this point.    - We discussed her CODE STATUS wishes.  She has remained full code.  However if we discussed it today she says that she has seriously been considering hospice and if she were to become seriously ill now she would not want CPR, shocking, code medications, intubation or mechanical ventilation.  I asked her if she would like mechanical ventilation in a prearrest situation such as if she contracted COVID-19 and have progressive respiratory failure.  She says she would not want intubation or mechanical ventilation.  She requests DNR/DNI states a good understanding of it and is able to teach it back to me.  It is so ordered.    Past Medical History:   Diagnosis Date   ? Arthritis    ? CHF (congestive heart failure) (H)    ? Chronic anemia 6/1/2014   ? Chronic kidney disease    ? Chronic thoracic aortic dissection (H) 10/7/2015    Descending thoracic aorta; treated medically per notes of Dragan Singh and Jennifer.   ? COPD (chronic obstructive pulmonary disease) (H)    ? CVA (cerebral infarction)    ? Disease of thyroid gland    ? Dyslipidemia    ? ESRD (end stage renal disease) (H) 06/03/2009    on dialysis with Dr. Mitchell   ? Essential hypertension 6/30/2014   ? Gastrointestinal hemorrhage, unspecified gastrointestinal hemorrhage type 6/5/2017   ? GI (gastrointestinal bleed)    ? GI bleeding 6/5/2017   ? Gout    ? L3 vertebral fracture (H) 11/16/2015   ? Left Atrial Appendage Occlusion (WATCHMAN) 4/5/2018    LAAO April 5, 2018 (30 " mm WATCHMAN)   ? Obesity    ? ZULEIKA (obstructive sleep apnea), severe, intolerant of CPAP 10/22/2015   ? Oxygen dependent     3L nc   ? Pneumonia 9-7-2015   ? Right foot drop    ? Spinal stenosis 3/28/2016   ? Stroke (H) 3/24/2016     Past Surgical History:   Procedure Laterality Date   ? BACK SURGERY      [facility]   ? COLONOSCOPY N/A 3/23/2016    Procedure: COLONOSCOPY;  Surgeon: Ruddy Tejada MD;  Location: [facility];  Service:    ? DILATION AND CURETTAGE OF UTERUS     ? EP ABLATION AV NODE N/A 3/7/2019    Procedure: EP Ablation AV Node;  Surgeon: Derick Duarte MD;  Location: [facility] Cath Lab;  Service: Cardiology   ? EP NEGRA CLOSURE N/A 4/5/2018    Procedure: EP NEGRA Closure;  Surgeon: Derick Duarte MD;  Location: [facility] Cath Lab;  Service:    ? EP PACEMAKER INSERT N/A 3/7/2019    Procedure: EP Pacemaker Insertion;  Surgeon: Derick Duarte MD;  Location: [facility] Cath Lab;  Service: Cardiology   ? EYE SURGERY     ? HERNIA REPAIR     ? IR TUNNELED CATHETER INSERT  11/20/2018   ? IR TUNNELED CATHETER REMOVAL  11/20/2018   ? AL COLSC FLEXIBLE W/CONTROL BLEEDING ANY METHOD N/A 6/7/2017    Procedure: COLONOSCOPY;  Surgeon: Luis Mckeon MD;  Location: [facility];  Service: Gastroenterology   ? AL OPEN FIX INTER/SUBTROCH FX,IMPLNT Left 6/23/2019    Procedure: INTERNAL FIXATION, FRACTURE, TROCHANTERIC, HIP, USING INTERMEDULLARY NAIL;  Surgeon: Bennie Marsh DO;  Location: [facility] Main OR;  Service: Orthopedics   ? TONSILLECTOMY            Family History   Problem Relation Age of Onset   ? Dementia Mother    ? Diabetes Mother    ? Arthritis Mother    ? Cancer Mother    ? Depression Mother    ? Heart disease Mother    ? Vision loss Mother    ? Stroke Father    ? Heart disease Father    ? Breast cancer Neg Hx    :   Her mom had glaucoma and dementia  Her dad had COPD and asbestosis as well she is an only child.    Social History     Socioeconomic History   ? Marital status:       Spouse name: Cayden   ? Number of children: 2   ? Years of education: Not on file   ? Highest education level: Not on file   Occupational History     Employer: RETIRED   Social Needs   ? Financial resource strain: Not on file   ? Food insecurity     Worry: Not on file     Inability: Not on file   ? Transportation needs     Medical: Not on file     Non-medical: Not on file   Tobacco Use   ? Smoking status: Former Smoker     Packs/day: 1.50     Years: 37.00     Pack years: 55.50     Types: Cigarettes     Last attempt to quit: 2009     Years since quittin.5   ? Smokeless tobacco: Never Used   Substance and Sexual Activity   ? Alcohol use: No     Alcohol/week: 11.7 standard drinks     Types: 14 Standard drinks or equivalent per week     Comment: 14 mixed drinks per week   ? Drug use: No   ? Sexual activity: Never     Partners: Male   Lifestyle   ? Physical activity     Days per week: Not on file     Minutes per session: Not on file   ? Stress: Not on file   Relationships   ? Social connections     Talks on phone: Not on file     Gets together: Not on file     Attends Caodaism service: Not on file     Active member of club or organization: Not on file     Attends meetings of clubs or organizations: Not on file     Relationship status: Not on file   ? Intimate partner violence     Fear of current or ex partner: Not on file     Emotionally abused: Not on file     Physically abused: Not on file     Forced sexual activity: Not on file   Other Topics Concern   ? Not on file   Social History Narrative    Lives with her . Daughter in [place] and daughter in [place].   :    Patient and her spouse live in [facility] [facility].  They have been in the house for about 30 years.    Current Outpatient Medications on File Prior to Visit   Medication Sig Dispense Refill   ? acetaminophen (TYLENOL) 500 MG tablet Take 1,000 mg by mouth 3 (three) times a day as needed.     ? artificial tears,hypromellose,  (GENTEAL; SYSTANE) 0.3 % Gel Administer 1 drop to both eyes as needed.     ? aspirin 81 MG EC tablet Take 1 tablet (81 mg total) by mouth daily.  0   ? B complex-vitamin C-folic acid (DIALYVITE) 100-1 mg Tab Take 1 tablet by mouth daily with lunch.            ? buPROPion (WELLBUTRIN XL) 150 MG 24 hr tablet Take 1 tablet (150 mg total) by mouth 2 (two) times a week. Tuesday and saturday  0   ? cholecalciferol, vitamin D3, 1,000 unit (25 mcg) tablet Take 2,000 Units by mouth daily.     ? cinacalcet (SENSIPAR) 30 MG tablet Take 30 mg by mouth see administration instructions. Take three times weekly with dialysis (on Mondays, Wednesdays, and Fridays).           ? famotidine (PEPCID) 20 MG tablet Take 1 tablet (20 mg total) by mouth 2 (two) times a day as needed for heartburn. 180 tablet 2   ? folic acid (FOLVITE) 1 MG tablet TAKE ONE TABLET BY MOUTH ONCE DAILY 90 tablet 4   ? gabapentin (NEURONTIN) 100 MG capsule TAKE 1 CAPSULE BY MOUTH THREE TIMES DAILY 270 capsule 3   ? Lactobacillus rhamnosus GG (CULTURELLE) 10-15 Billion cell capsule Take 1 capsule by mouth daily with lunch.     ? lidocaine (LMX) 4 % cream Apply topically 3 (three) times a day.     ? loratadine (CLARITIN) 10 mg tablet Take 10 mg by mouth daily as needed.      ? midodrine (PROAMATINE) 10 MG tablet Take 1.5 tablets (15 mg total) by mouth 3 (three) times a day with meals. (Patient taking differently: Take 15 mg by mouth 3 (three) times a day with meals. Patient reports that she usually gets 10 mg before dialysis and another 10 mg senior care through dialysis as well)     ? multivitamin (DAILY-OLGA) per tablet Take 1 tablet by mouth daily.     ? senna-docusate (SENNOSIDES-DOCUSATE SODIUM) 8.6-50 mg tablet Take 1 tablet by mouth daily as needed for constipation.      ? sevelamer carbonate (RENVELA) 800 mg tablet Take 1 tablet (800 mg total) by mouth 2 (two) times a day as needed (FOr snacks.).  0   ? sevelamer HCL (RENAGEL) 800 MG tablet Take 2,400 mg by  mouth 3 (three) times a day with meals. And take 2 tablets with snacks     ? timolol maleate (TIMOPTIC) 0.5 % ophthalmic solution Administer 1 drop to both eyes 2 (two) times a day.      ? traZODone (DESYREL) 50 MG tablet TAKE 1&1/2 TABLETS (75MG) BY MOUTH AT BEDTIME. (Patient taking differently: Take 150 mg by mouth at bedtime. ) 135 tablet 3   ? [DISCONTINUED] polyvinyl alcohol (LIQUIFILM TEARS) 1.4 % ophthalmic solution Apply 1 drop to eye as needed for dry eyes.       No current facility-administered medications on file prior to visit.    :      ALLERGIES:  Ace inhibitors; Atrovent [ipratropium bromide]; Fosinopril sodium; and Zolpidem    Vitals:    Current Vitals   BP: 125/78 mmHg  7/28/2020 16:03    Temp:97.5  F  7/28/2020 16:03  Pulse: 73 bpm  7/28/2020 16:03  Weight: 158.9 Lbs  7/28/2020 09:03  Resp: 18 Breaths/min  7/28/2020 16:03  BS:  O2: 94 %  7/28/2020 11:41  Pain: 2  7/26/2020 23:00    Physical exam:    General:  Alert  oriented x3 though she says that she was recently confused in the hospital she feels like she is back to her best baseline.  Appears in no distress    Patient is pleasant and normally conversant.  She is working on coloring projects but glad when the activities staff appears with a possible for her.  She says she enjoys putting puzzles together.  She is also glad that the weekend is over so she is getting more tension now from the therapists.    She is breathing comfortably without accessory muscle use or tachypnea.  Right chest cath without infection.  Heart is regular 70 distant S1-S2 decreased breath sounds in the bases but moving air adequately with occasional rhonchi during expiration edema right dorsum foot which is chronic she reports.      Due to the 2020 Covid 19 pandemic, except as noted above, the patient was visually observed at a 6 foot plus distance.  An observational exam was performed in an effort to keep patient safe from Covid 19 and other communicable diseases.    Labs:  Lab Results   Component Value Date    WBC 3.8 (L) 07/23/2020    HGB 10.5 (L) 07/23/2020    HCT 36.8 07/23/2020     (H) 07/23/2020     (L) 07/23/2020     Results for orders placed or performed in visit on 07/23/20   Basic Metabolic Panel   Result Value Ref Range    Sodium 135 (L) 136 - 145 mmol/L    Potassium 4.2 3.5 - 5.0 mmol/L    Chloride 105 98 - 107 mmol/L    CO2 21 (L) 22 - 31 mmol/L    Anion Gap, Calculation 9 5 - 18 mmol/L    Glucose 95 70 - 125 mg/dL    Calcium 9.6 8.5 - 10.5 mg/dL    BUN 17 8 - 28 mg/dL    Creatinine 3.89 (H) 0.60 - 1.10 mg/dL    GFR MDRD Af Amer 14 (L) >60 mL/min/1.73m2    GFR MDRD Non Af Amer 11 (L) >60 mL/min/1.73m2         Lab Results   Component Value Date    TSH 3.89 07/18/2020     Lab Results   Component Value Date    HGBA1C 5.7 06/01/2014     [unfilled]  Lab Results   Component Value Date    TERDZCBC21 592 10/09/2015     Lab Results   Component Value Date     (H) 07/13/2020     [unfilled]  Most Recent EKG     Units 07/13/20  1501   VENTRATE BPM 70   ATRIALRATE BPM 68   QRSDURATION ms 140   QTINTERVAL ms 448   QTCCALC ms 483   RAXIS degrees 211   TAXIS degrees 88   MUSEDX  ** Suspect arm lead reversal, interpretation assumes no reversal  Wide QRS rhythm  Non-specific intra-ventricular conduction block  Anterolateral infarct , age undetermined  Abnormal ECG  When compared with ECG of 13-JUL-2020 14:40,  No significant change was found  Confirmed by SEE ED PROVIDER NOTE FOR, ECG INTERPRETATION (4000),  ADIA PEACOCK (162) on 7/13/2020 3:18:25 PM           Assessment/Plan:      ICD-10-CM    1. Cardiac pacemaker in situ  Z95.0    2. ESRD on dialysis (H)  N18.6     Z99.2    3. Dissection of thoracoabdominal aorta (H)  I71.03    4. Pulmonary emphysema, unspecified emphysema type (H)  J43.9    5. Acute on chronic diastolic congestive heart failure (H)  I50.33        End-stage renal disease  CC RT  Severe hypotension   Patient has been hypotensive  "in the facility down to a systolic of 80.  However her worst hypotension is in the setting of dialysis.  She has been started on midodrine but says it is not working out well.  Sounds like she is never been on Florinef and she asks me to write that down for her.  I tell her that her nephrologist is well familiar with that and if he thought that was an alternative he would discuss it with her.  However she says she intends to discuss it with the staff when she next has dialysis.  -Patient is considering comfort/hospice approach but she is not ready to commit to that yet.  She says she really like to be in a hospice facility.  She does not want home hospice.  She does not want to move to a long-term care center to have hospice.  She might be able to be on a waiting list while still getting dialysis and\" choose for time\" to discontinue it.  -Her metoprolol has been discontinued due to the severity of the hypotension  -Midodrine is continued, patient intends to ask her nephrology team about Florinef though it may have been tried before or not as effective in the situation?  -I asked the TCU staff to call her ophthalmology clinic to see if her ophthalmologist would be okay with going off her timolol eyedrops.  It may have no effect on her blood pressure I realize, but we are looking for anything that might contribute positively here.  [place] is continued for the meanwhile though.  -Sevelamer  -Cinacalcet per nephrology    Acute diastolic heart failure   Patient reports that 11 kg were dialyzed off of her this last day.  It looked to me like it was less than that, admitted at 76.7 kg with an estimated dry weight of 70.5 kg.  At any rate she is feeling much better back to her baseline.  -Low-salt diet  -Treatment complicated by hypotension as noted above.    Glaucoma   See atenolol discussion above    A. Fib  Watchman device  Permanent pacemaker (AV jonn ablation/tachybradycardia syndrome)   Watchman device in place.  " Has permanent pacemaker.  Not on any rate controlling agent.    Chronic hypoxic respiratory failure  COPD  Heart failure   Patient just had a PE run in the hospital:Study Result  EXAM: CTA CHEST PE RUN  LOCATION: [facility]  DATE/TIME: 7/18/2020 4:50 PM    INDICATION: Positive d-dimer, hypoxia.  COMPARISON: CT chest exam 07/13/2020 and CT chest exam 09/21/2018  TECHNIQUE: CT chest pulmonary angiogram during arterial phase injection of IV contrast. Multiplanar reformats and MIP reconstructions were performed. Dose reduction techniques were used.   CONTRAST: Iohexol (Omni) 75mL    FINDINGS:  ANGIOGRAM CHEST: Pulmonary arteries are normal caliber and negative for pulmonary emboli. Thoracic aorta is negative for dissection. No CT evidence of right heart strain.    LUNGS AND PLEURA: Small bilateral pleural effusions and bibasilar atelectasis or infiltrates have developed since the previous exam. Interstitial septal thickening persists suggesting edema.    MEDIASTINUM/AXILLAE: Mild cardiac enlargement. Low-attenuation lobulated mass in the lower left mediastinum adjacent to the spine measures 4.2 x 2.6 cm on image 102 of series 4, unchanged. Scattered subcentimeter lymph nodes elsewhere in the mediastinum  Atrial appendage occlusion device. Atherosclerotic disease thoracic aorta. Pacemaker anterior left chest wall with lead tips right atrium and ventricle. Right IJ dialysis catheter extends into the right atrium.    UPPER ABDOMEN: 4.3 x 4.0 cm aneurysm of upper abdomen is unchanged. Extensive plaque within the superior mesenteric artery. Bilateral renal cortical atrophy and multiple mixed high and low attenuation cysts, unchanged. Diverticulosis throughout the   visualized colon.    MUSCULOSKELETAL: Degenerative changes thoracic spine.    IMPRESSION:   1. No evidence for pulmonary emboli.  2. Mild cardiac enlargement with new, small bilateral pleural effusions and subsegmental atelectasis both lower lobes.  "There is also septal thickening both lungs suggesting edema and heart failure.   3. Low-attenuation mass within the left posterior inferior mediastinum is unchanged since 09/21/2018 and presumed benign.  4. Advanced atherosclerotic disease with stable 4.3 x 4.0 cm upper abdominal aortic aneurysm.  5. Bilateral renal cortical atrophy and mixed attenuation cysts.  6. Colonic diverticulosis.      -Continue supplemental oxygen  -Bronchodilators as need be    Depression   Wellbutrin plus trazodone as current    Peripheral neuropathy   Continue gabapentin at renal dosing.  Discontinue capsaicin per Ms. Gamez and added 4% lidocaine topically which she likes so far.    CODE STATUS   See discussion above.  Patient just met with hospice consultants yesterday.  She has not signed onto the program, at least not yet.  She says she has discussed it with her spouse food tells her to \"do what I want\".  Patient changes to DNR/DNI.  She will continue discussions regarding if and or when hospice is appropriate but plans to continue dialysis at this time.    GERD   Pepcid    Chronic cough   Patient requests guaifenesin instead of Cepacol drops so I added guaifenesin syrup for her today    Peripheral vascular disease   Status post rupture of AAA.  No longer on metoprolol due to hypertensive issues.  No longer on statins.    Multiple other medical issues reviewed without change in therapy today.  Please include but are not limited to ZULEIKA, gout, diverticulosis, hip/femur fractures, back surgery, constipation       Case discussed with:    Facility staff     Time:  57 minutes with > 50% in counseling and coordination of care as noted above         Derick San MD           "

## 2021-06-20 NOTE — LETTER
Letter by Maame Mccain RN at      Author: Maame Mccain RN Service: -- Author Type: --    Filed:  Encounter Date: 6/17/2020 Status: (Other)         Belia Rodriguez  [address] [place] [id number]      June 17, 2020      Dear Ms. Rodriguez,    RE: Remote Results    We are writing to you regarding your recent Remote Pacemaker check from home. Your transmission was received successfully. Battery status is satisfactory at this time.     Your results are within normal limits.    Your next device appointment will be a clinic visit.  Please call in August 2020 to schedule.      To schedule or reschedule, please call [phone] and press 1.    NOTE: If you would like to do an extra transmission, please call [phone] and press 3 to speak to a nurse BEFORE transmitting. This ensures that the Device Clinic staff is aware of the reason you are sending a transmission, and can follow-up with you after it has been reviewed.    We will be checking your implanted device from home (remotely) every three months unless otherwise instructed. We will need to see you in the clinic at least once a year. You may need to be seen in the clinic sooner depending on the results of your check.    Please be aware:    The follow-up schedule is like a Physician prescription.    Your remote monitor is paired to your specific implanted device.      Sincerely,    [facility]

## 2021-06-20 NOTE — LETTER
Letter by Chelsea Jiang at      Author: Chelsea Jiang Service: -- Author Type: --    Filed:  Encounter Date: 3/18/2020 Status: (Other)         Belia Rodriguez  [address] [facility] [address]      March 18, 2020      Dear Ms. Rodriguez,    RE: Remote Results    We are writing to you regarding your recent Remote Pacemaker check from home. Your transmission was received successfully. Battery status is satisfactory at this time.     Your results are within normal limits.    Your next device appointment will be a remote check on June 17, 2020; this will occur automatically.    To schedule or reschedule, please call [phone] and press 1.    NOTE: If you would like to do an extra transmission, please call [phone] and press 3 to speak to a nurse BEFORE transmitting. This ensures that the Device Clinic staff is aware of the reason you are sending a transmission, and can follow-up with you after it has been reviewed.    We will be checking your implanted device from home (remotely) every three months unless otherwise instructed. We will need to see you in the clinic at least once a year. You may need to be seen in the clinic sooner depending on the results of your check.    Please be aware:    The follow-up schedule is like a Physician prescription.    Your remote monitor is paired to your specific implanted device.      Sincerely,    [facility]

## 2021-06-20 NOTE — PROGRESS NOTES
[facility]    Facility:   [facility] [[id number]]   Code Status: FULL CODE      CHIEF COMPLAINT/REASON FOR VISIT:  Chief Complaint   Patient presents with     Review Of Multiple Medical Conditions       HISTORY:      HPI: Belia is a 73 y.o. female who was readmitted to the hospital for shortness of breath and was discovered to be in congestive heart failure again.  She had recently been admitted to the hospital due to shortness of breath which was pulmonary edema with acute on chronic congestive heart failure as well as sepsis with infected dialysis catheter.  She was diagnosed with enterococcus bacteremia.  The tunneled dialysis catheter was exchanged the day prior to discharge from the hospital.  She does have underlying sleep apnea but is not able to tolerate the BiPAP.  She has chronic atrial fibrillation and episodic rapid ventricular rate but she has undergone the left atrial appendage occlusion procedure (watchman) so that she does not require anticoagulation anymore.  She does have end-stage renal disease requiring chronic hemodialysis.  She does have chronic respiratory failure with hypoxia.  She also has a thoracic aortic aneurysm measuring 4.3 cm which was rechecked during this hospitalization and it is stable.  Her BNP in the hospital was 1972.  She was tachycardic on arrival to the emergency room and this improved with IV diltiazem 10 mg.    Upon current review of systems, she is feeling well in general but still weak.  Her weight has varied only within a couple of pounds since her readmission from 9/26.  Prior to that time she was 10 pounds higher.  The dietitian has talked with her about the importance of adequate nutrition without gaining water weight.  Staff was wondering about fluid restriction for her and she states that she is well aware about what she needs to do with her hemodialysis and does not want to have formal fluid restriction.  She does not have fevers  or chills.  She does not have sore throat.  She is not having cough or shortness of breath or chest pain or palpitations of the heart.  She does not have abdominal pain or nausea.    Past Medical History:   Diagnosis Date     Arthritis      CHF (congestive heart failure) (H)      Chronic anemia 6/1/2014     Chronic kidney disease      Chronic thoracic aortic dissection (H) 10/7/2015    Descending thoracic aorta; treated medically per notes of Dragan Singh and Jennifer.     COPD (chronic obstructive pulmonary disease) (H)      CVA (cerebral infarction)      Disease of thyroid gland      Dyslipidemia      ESRD (end stage renal disease) (H) 06/03/2009    on dialysis with Dr. Mitchell     Essential hypertension 6/30/2014     GI (gastrointestinal bleed)      GI bleeding 6/5/2017     Gout      L3 vertebral fracture (H) 11/16/2015     Left Atrial Appendage Occlusion (WATCHMAN) 4/5/2018    LAAO April 5, 2018 (30 mm WATCHMAN)     Obesity      ZULEIKA (obstructive sleep apnea), severe, intolerant of CPAP 10/22/2015     Pneumonia 9-7-2015     Right foot drop      Spinal stenosis 3/28/2016     Stroke (H) 3/24/2016             Family History   Problem Relation Age of Onset     Dementia Mother      Diabetes Mother      Arthritis Mother      Cancer Mother      Depression Mother      Heart disease Mother      Vision loss Mother      Stroke Father      Heart disease Father      Breast cancer Neg Hx      Social History     Social History     Marital status:      Spouse name: Cayden     Number of children: 2     Years of education: N/A     Occupational History      Retired     Social History Main Topics     Smoking status: Former Smoker     Packs/day: 1.50     Years: 37.00     Types: Cigarettes     Quit date: 1/1/2009     Smokeless tobacco: Never Used     Alcohol use No      Comment: 14 mixed drinks per week     Drug use: No     Sexual activity: No     Other Topics Concern     Not on file     Social History Narrative    Lives with  her . Daughter in [place] and daughter in [place].         Review of Systems    .  Vitals:    10/03/18 1937   BP: 124/68   Pulse: 68   Resp: 18   Temp: 97.2  F (36.2  C)   SpO2: 100%       Physical Exam   Constitutional: No distress.   Eyes: Right eye exhibits no discharge. Left eye exhibits no discharge.   Neck: No JVD present.   Cardiovascular: Normal heart sounds.    Pulmonary/Chest: Breath sounds normal. No respiratory distress.   Abdominal: Bowel sounds are normal. There is no tenderness.   Musculoskeletal: She exhibits no edema.   Neurological: She is alert.   Skin: Skin is warm and dry.   Psychiatric: She has a normal mood and affect.   Nursing note and vitals reviewed.        LABS:   No new laboratory testing    ASSESSMENT:      ICD-10-CM    1. Acute on chronic diastolic congestive heart failure (H) I50.33    2. ESRD on dialysis (H) N18.6     Z99.2    3. Pulmonary emphysema, unspecified emphysema type (H) J43.9    4. Chronic anemia D64.9    5. Essential hypertension with goal blood pressure less than 140/90 I10        PLAN:    Continue with current therapies.  Continue to monitor her multiple medical conditions.      Electronically signed by: Farhan Kiran MD

## 2021-06-20 NOTE — PROGRESS NOTES
[facility] Note  assessment:        Tachybradycardia syndrome    Pauses following termination of atrial fibrillation with element of sinus node dysfunction    Patient has been considered for pacemaker but risk of infection and access seem in  excess to benefit     Paroxysmal atrial fibrillation with elevated ventricular response.  Persistent AF with RVR; 3-2017        Amiodarone therapy 08/2014;stopped with persistent  AF.    Preserved left ventricular function with suggestion of diastolic dysfunction.        Echocardiogram 3-2016; EF=60%  End-stage renal failure, on chronic hemodialysis. 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 Now has RIJ Port A Cath   History of recurrent bacteremia -- E Coli   History of extensive aortic dissection from the ascending to the   descending aorta leading to compromise of renal artery and leading to renal failure  COPD: Diffusion capacity 68% -pre amiodarone  Chronic anticoagulation with warfarin; difficulty with INR management  Left Atrial appendage occlusion/watchman device April 5, 2018-now off warfarin   Sleep study shows likely obstructive sleep apnea; patient uses nocturnal oxygen supplements; unable to tolerate CPAP      Plan:    Holter monitor August 24 showed persistent atrial fibrillation with elevated ventricular response.  Average heart rate was 100 bpm  Because of elevated heart rate, start metoprolol 25 mg twice daily  Try Proventil inhaler use 2 puffs up to 4 times a day to see if this helps with your breathing  Your lungs seem quite tight; seem to be in some type of bronchospasm  Use oxygen as needed  Should see  a lung specialist about going on some type of bronchodilators        Subjective:    I had the opportunity to see.Belia Rodriguez , who is a 73 y.o. female with a known  history of fibrillation  She has long history of paroxysmal atrial fibrillation and sinus node dysfunction.    She now is in a permanent atrial fibrillation and during atrial fibrillation actually has some elevated heart rates  Holter monitor August 24 showed an average heart rate of 100 bpm with rates up to 160s.  No bradycardia or pauses noted  She is undergone extensive evaluation recently  She has dysphagia, can only eat and swallow small amounts of food.  She did have a GI evaluation and esophagram was normal-seem like this problem arose after her watchman left atrial appendage closure device was done  She was recently hospitalized with increasing shortness of breath; she seems to dialyze well although does gain between 2-3 kg between runs.  To control her atrial fibrillation she takes digoxin 0.125 mg 3 days a week and diltiazem 120 mg twice daily  She was on metoprolol in the past but this was stopped mainly because of sinus node dysfunction concerns.  She has a history of obstructive sleep apnea but cannot tolerate CPAP  She uses oxygen quite a bit but not all the time  Does not have an inhaler  Did see Dr. Yoseph Hernandez for long evaluation that she had severe sleep apnea and that it was mandatory that she use BiPAP type nocturnal sleep equipment    Has minimal urine output less than 8 ounces per day        Problem List:  Patient Active Problem List   Diagnosis     ESRD (end stage renal disease) (H)     Chronic anemia     Tachycardia-bradycardia syndrome (H)     Essential hypertension with goal blood pressure less than 140/90     Hyperlipidemia     Persistent atrial fibrillation (H)     ZULEIKA (obstructive sleep apnea), severe     Anemia of chronic renal failure, stage 5 (H)     Dissection of thoracoabdominal aorta (H)     CHF (congestive heart failure) (H)     Gout     GERD (gastroesophageal reflux disease)     Right foot drop     Gastrointestinal hemorrhage, unspecified gastrointestinal hemorrhage type      Acute midline low back pain with right-sided sciatica     History of compression fracture of spine     History of acute respiratory failure     Pulmonary emphysema (H)     Left Atrial Appendage Occlusion (WATCHMAN)     Other dysphagia     Borderline glaucoma with ocular hypertension     Hyperparathyroidism (H)     Osteoporosis     Venous tributary (branch) occlusion of retina     ESRD on dialysis (H)     Medical History:  Past Medical History:   Diagnosis Date     Arthritis      CHF (congestive heart failure) (H)      Chronic anemia 6/1/2014     Chronic kidney disease      Chronic thoracic aortic dissection (H) 10/7/2015    Descending thoracic aorta; treated medically per notes of Dragan Singh and Jennifer.     COPD (chronic obstructive pulmonary disease) (H)      CVA (cerebral infarction)      Disease of thyroid gland      Dyslipidemia      ESRD (end stage renal disease) (H) 06/03/2009    on dialysis with Dr. Mitchell     Essential hypertension 6/30/2014     GI (gastrointestinal bleed)      GI bleeding 6/5/2017     Gout      L3 vertebral fracture (H) 11/16/2015     Left Atrial Appendage Occlusion (WATCHMAN) 4/5/2018    LAAO April 5, 2018 (30 mm WATCHMAN)     Obesity      ZULEIKA (obstructive sleep apnea), severe, intolerant of CPAP 10/22/2015     Pneumonia 9-7-2015     Right foot drop      Spinal stenosis 3/28/2016     Stroke (H) 3/24/2016     Surgical History:  Past Surgical History:   Procedure Laterality Date     BACK SURGERY      [facility]     COLONOSCOPY N/A 3/23/2016    Procedure: COLONOSCOPY;  Surgeon: Ruddy Tejada MD;  Location: [facility] GI;  Service:      DILATION AND CURETTAGE OF UTERUS       EP NEGRA CLOSURE N/A 4/5/2018    Procedure: EP NEGRA Closure;  Surgeon: Derick Duarte MD;  Location: [facility] Cath Lab;  Service:      EYE SURGERY       HERNIA REPAIR       FL COLSC FLEXIBLE W/CONTROL BLEEDING ANY METHOD N/A 6/7/2017    Procedure: COLONOSCOPY;  Surgeon: Luis Mckeon MD;  Location: [facility];  Service: Gastroenterology     TONSILLECTOMY       Social History:  Social History     Social History     Marital status:      Spouse name: Caydne     Number of children: 2     Years of education: N/A     Occupational History      Retired     Social History Main Topics     Smoking status: Former Smoker     Packs/day: 1.50     Years: 37.00     Types: Cigarettes     Quit date: 1/1/2009     Smokeless tobacco: Never Used     Alcohol use 7.0 oz/week     14 Standard drinks or equivalent per week      Comment: 14 mixed drinks per week     Drug use: No     Sexual activity: No     Other Topics Concern     Not on file     Social History Narrative    Lives with her . Daughter in [place] and daughter in [place].       Review of Systems:      General: WNL  Eyes: WNL  Ears/Nose/Throat: WNL  Lungs: Cough, Shortness of Breath  Heart: Shortness of Breath with activity  Stomach: WNL  Bladder: WNL  Muscle/Joints: WNL  Skin: WNL  Nervous System: WNL  Mental Health: WNL     Blood: Easy Bruising        Family History:  Family History   Problem Relation Age of Onset     Dementia Mother      Diabetes Mother      Arthritis Mother      Cancer Mother      Depression Mother      Heart disease Mother      Vision loss Mother      Stroke Father      Heart disease Father      Breast cancer Neg Hx          Allergies:  Allergies   Allergen Reactions     Ace Inhibitors Cough     Atrovent [Ipratropium Bromide] Headache     Fosinopril Sodium Cough     Zolpidem      hallucinations       Medications:  Current Outpatient Prescriptions   Medication Sig Dispense Refill     acetaminophen (TYLENOL) 500 MG tablet Take 500 mg by mouth every 6 (six) hours as needed for pain.       allopurinol (ZYLOPRIM) 100 MG tablet Take 1 tablet (100 mg total) by mouth daily. 90 tablet 3     aspirin 81 MG EC tablet Take 81 mg by mouth daily with lunch.        atorvastatin (LIPITOR) 10 MG tablet Take 10 mg by mouth at bedtime.        CHLORPHENIRAMINE/DEXTROMETHORP (CORICIDIN HBP COUGH AND COLD ORAL) Take 1 tablet by mouth daily as needed.        cholecalciferol, vitamin D3, 2,000 unit cap Take 2,000 Units by mouth daily with lunch.        cinacalcet (SENSIPAR) 30 MG tablet Take 30 mg by mouth at bedtime.        clopidogrel (PLAVIX) 75 mg tablet Take 1 tablet (75 mg total) by mouth daily. 90 tablet 1     DIALYVITE 100-1 mg Tab TAKE ONE TABLET BY MOUTH DAILY IN THE EVENING 90 tablet 0     digoxin (LANOXIN) 125 mcg tablet TAKE 1 TABLET ORALLY 3 TIMES A WEEK. 30 tablet 0     diltiazem (CARDIZEM SR) 120 MG 12 hr capsule Take 1 capsule (120 mg total) by mouth 2 (two) times a day. 60 capsule 2     folic acid (FOLVITE) 1 MG tablet TAKE ONE TABLET BY MOUTH ONCE DAILY 90 tablet 0     gabapentin (NEURONTIN) 100 MG capsule Take 100 mg by mouth 3 (three) times a day.       Lactobacillus rhamnosus GG (CULTURELLE) 10-15 Billion cell capsule Take 1 capsule by mouth daily with lunch.       midodrine (PROAMATINE) 5 MG tablet Take 3 tablets (15 mg total) by mouth 3 (three) times a week. Take every Monday, Wednesday, and Friday in the morning. 36 tablet 11     polyvinyl alcohol (LIQUIFILM TEARS) 1.4 % ophthalmic solution Apply 1 drop to eye as needed for dry eyes.       ranitidine (ZANTAC) 150 MG tablet Take 150 mg by mouth at bedtime.       senna-docusate (SENNOSIDES-DOCUSATE SODIUM) 8.6-50 mg tablet Take 1 tablet by mouth at bedtime.        sevelamer carbonate (RENVELA) 800 mg tablet Take 1,600 mg by mouth 3 (three) times a day with meals.       timolol maleate (TIMOPTIC) 0.5 % ophthalmic solution Administer 1 drop to both eyes 2 (two) times a day.        vit B comp no.3-folic-C-biotin (NEPHRO-OLGA) 1- mg-mg-mcg Tab tablet Take 1 tablet by mouth daily with supper.        albuterol (PROAIR HFA;PROVENTIL HFA;VENTOLIN HFA) 90 mcg/actuation inhaler Inhale 2 puffs every 6 (six) hours as needed for wheezing. 1 Inhaler 5     metoprolol tartrate (LOPRESSOR) 50 MG  "tablet Take 0.5 tablets (25 mg total) by mouth 2 (two) times a day. 180 tablet 3     traZODone (DESYREL) 50 MG tablet Take 0.5 tablets (25 mg total) by mouth at bedtime as needed for sleep (give with melatonin). 15 tablet 0     No current facility-administered medications for this visit.      Objective:   Vital signs:  /76 (Patient Site: Right Arm, Patient Position: Sitting, Cuff Size: Adult Regular)  Pulse 60  Resp 28  Ht 5' 5.25\" (1.657 m)  Wt 173 lb (78.5 kg)  BMI 28.57 kg/m2    Heart rate about  100 and irregular consistent with atrial fibrillation  Patient in respiratory distress; wearing oxygen; nasal elevated RR  She is alert and appropriate   Physical Exam:    GENERAL APPEARANCE: Alert, cooperative and in no acute distress.  HEENT: No scleral icterus. No Xanthelasma. Oral mucous membranes pink and moist.  NECK: JVP distended with CV patterncm. Hepatojugular reflux. Thyroid not  Palpable  CHEST: Lungs are tight with prolonged expiratory phase and reduced breath sounds.  No rales heard no obvious wheezing  CARDIOVASCULAR: irregular pulse S1, S2 without murmur    Brachial, radial  pulses are intact and symmetric.   No carotid bruits noted.  ABDOMEN: Non tender. BS+. No bruits.  EXTREMITIES: No cyanosis, clubbing or edema.    Lab Results:  LIPIDS:  Lab Results   Component Value Date    CHOL 102 03/25/2016     Lab Results   Component Value Date    HDL 43 (L) 03/25/2016     Lab Results   Component Value Date    LDLCALC 47 03/25/2016     Lab Results   Component Value Date    TRIG 60 03/25/2016     No components found for: CHOLHDL    BMP:  Lab Results   Component Value Date    CREATININE 6.22 (HH) 08/09/2018    BUN 22 08/09/2018     (L) 08/09/2018    K 4.7 08/09/2018    CL 99 08/09/2018    CO2 22 08/09/2018         This note has been dictated using voice recognition software. Any grammatical or context distortions are unintentional and inherent to the software.  Elie Rodriguez MD  [facility] HEART " [place] [phone]

## 2021-06-20 NOTE — LETTER
"Letter by Derick San MD at      Author: Derick San MD Service: -- Author Type: --    Filed:  Encounter Date: 8/18/2020 Status: (Other)         Patient: Belia Rodriguez   MR Number: [id number]   YOB: 1945   Date of Visit: 8/18/2020     [facility]    Facility:   [facility] [[id number]]   Code Status: DNR/DNI  PCP: John Escoto DO   Phone: [phone]   Fax: [phone]      CHIEF COMPLAINT/REASON FOR VISIT:  Chief Complaint   Patient presents with   ? Discharge Summary       HISTORY COURSE:    Belia Rodriguez  is a 74 year old female with history of end-stage renal disease on CCR T, tachybradycardia syndrome/AV jonn ablation/permanent pacemaker, atrial fibrillation status post watchman device, abdominal aortic aneurysm dissection, hypertension, COPD, chronic hypoxic respiratory failure, ZULEIKA, gout, GERD, glaucoma on timolol, depression seen for admission to TCU on 8/4/2020     Hospital Course: Patient was hospitalized between July 13 and July 20 presenting with edema and shortness of breath despite not missing her scheduled outpatient dialysis days.  She was diagnosed with acute on chronic diastolic heart failure and was diuresed 11 L by nephrology/dialysis on successive days.     Her dialysis is complicated by severe hypotension despite midodrine therapy.  It hypotension resulted in discontinuation of her metoprolol.  Her in dialysis weight target is 70.5 kg.     Due to the severity of her heart failure/hypertension with complicated dialysis, comfort care/hospice approach has been discussed with patient and she is considering it though she remained full code up until to my visit here with her today, she is now DNR/DNI per her request.     Patient did have an echocardiogram this hospitalization which showed no LV dysfunction and no valvular dysfunction.  She had normal thyroid function testing.  She had \"equivocal " "cortisol\" but in the end adrenal insufficiency was not suspected as a cause of her hypotension.         End-stage renal disease  CC RT  Severe hypotension              Dialysis related hypotension is of limiting factor.  She had to stop her dialysis run a little bit early yesterday.  She is already on Midrin.  We discussed Florinef but we will leave that up to her nephrologist.  She plans to continue 3 times weekly dialysis.  -Patient has declined hospice involvement at this time  -Her metoprolol has been discontinued due to the severity of the hypotension     -I asked the TCU staff to call her ophthalmology clinic to see if her ophthalmologist would be okay with going off her timolol eyedrops or substituting an alternative.  However she remains on them at the time of discharge.  -Sevelamer  -Cinacalcet per nephrology     Acute diastolic heart failure              Patient is dialyzed with attention to fluid. Her weight is been stable here at 155.  -Ongoing fluid management with nephrology/dialysis  Estimated dry weight 70.5 kg  -Continue low-salt diet      Glaucoma              See timolol discussion above.     A. Fib  Watchman device  Permanent pacemaker (AV jonn ablation/tachybradycardia syndrome)              Watchman device in place.  Has permanent pacemaker.  Not on any rate controlling agent.  Pulses look okay.     Chronic hypoxic respiratory failure  COPD  Heart failure              Patient just had a PE run in the hospital:Study Result  EXAM: CTA CHEST PE RUN  LOCATION: [facility]  DATE/TIME: 7/18/2020 4:50 PM    INDICATION: Positive d-dimer, hypoxia.  COMPARISON: CT chest exam 07/13/2020 and CT chest exam 09/21/2018  TECHNIQUE: CT chest pulmonary angiogram during arterial phase injection of IV contrast. Multiplanar reformats and MIP reconstructions were performed. Dose reduction techniques were used.   CONTRAST: Iohexol (Omni) 75mL    FINDINGS:  ANGIOGRAM CHEST: Pulmonary arteries are normal " caliber and negative for pulmonary emboli. Thoracic aorta is negative for dissection. No CT evidence of right heart strain.    LUNGS AND PLEURA: Small bilateral pleural effusions and bibasilar atelectasis or infiltrates have developed since the previous exam. Interstitial septal thickening persists suggesting edema.    MEDIASTINUM/AXILLAE: Mild cardiac enlargement. Low-attenuation lobulated mass in the lower left mediastinum adjacent to the spine measures 4.2 x 2.6 cm on image 102 of series 4, unchanged. Scattered subcentimeter lymph nodes elsewhere in the mediastinum  Atrial appendage occlusion device. Atherosclerotic disease thoracic aorta. Pacemaker anterior left chest wall with lead tips right atrium and ventricle. Right IJ dialysis catheter extends into the right atrium.    UPPER ABDOMEN: 4.3 x 4.0 cm aneurysm of upper abdomen is unchanged. Extensive plaque within the superior mesenteric artery. Bilateral renal cortical atrophy and multiple mixed high and low attenuation cysts, unchanged. Diverticulosis throughout the   visualized colon.    MUSCULOSKELETAL: Degenerative changes thoracic spine.    IMPRESSION:   1. No evidence for pulmonary emboli.  2. Mild cardiac enlargement with new, small bilateral pleural effusions and subsegmental atelectasis both lower lobes. There is also septal thickening both lungs suggesting edema and heart failure.   3. Low-attenuation mass within the left posterior inferior mediastinum is unchanged since 09/21/2018 and presumed benign.  4. Advanced atherosclerotic disease with stable 4.3 x 4.0 cm upper abdominal aortic aneurysm.  5. Bilateral renal cortical atrophy and mixed attenuation cysts.  6. Colonic diverticulosis.        Patient had a one-time incidence of shortness of breath with physical therapy but that was early in the stay and did quite well thereafter.  No additional work-up was performed. However this was a one-time incident and I am not sure of the significance.  Her  "weight is stable.  She is not showing any symptoms of respiratory infection.    -Continue supplemental oxygen  -Bronchodilators as need be     Depression              Wellbutrin plus trazodone as current     Peripheral neuropathy              Continue gabapentin at renal dosing.    Lidocaine.  Acetaminophen.     CODE STATUS              See discussion above.  Patient just met with hospice consultants yesterday.  She has not signed onto the program, at least not yet.  She says she has discussed it with her spouse food tells her to \"do what I want\".  Patient changes to DNR/DNI.  She will continue discussions regarding if and or when hospice is appropriate but plans to continue dialysis at this time.     GERD              Pepcid renally dosed 10 mg daily as needed     Chronic cough  Rhinorrhea/postnasal drip, chronic              After several iterations of treatment, we went with patient's long-term home treatment which she was using prior to admission.  -She prefers guaifenesin 100 mg per 5 cc which she has used at home.  She will take 5 cc every 4 hours as needed  - Coricidin, and her usual over-the-counter preparation, combination of chlorpheniramine and dextromethorphan is ordered 1 4 times daily as needed  -Patient strongly wishes to avoid any inhaled nasal preparation/medications such as ipratropium or fluticasone/equivalents     Peripheral vascular disease              Status post rupture of AAA.  No longer on metoprolol due to hypertensive issues.  No longer on statins.     Other chronic problems: Include but not limited to ZULEIKA, gout, diverticulosis, hip/femur fractures, back surgery, constipation    Subjective/review of systems    Patient is anxious about going home.  She is also anxious about how long she has to live as she feels she has to teach her  so much about running the household.  I remind her to focus on her own health.  Patient is frustrated about her dialysis hypotension and continues to " work with her nephrologist on that issue with her midodrine.    She denies headaches change in vision speaking swallowing chest pain nausea vomiting diarrhea falls injuries fever sweats chills remainder negative      Current Vitals   BP: 103/72 mmHg  8/18/2020 16:07    Temp:97.5  F  8/18/2020 22:21  Pulse: 74 bpm  8/17/2020 16:45    Weight: 154.7 Lbs  8/18/2020 11:20  Resp: 18 Breaths/min  8/16/2020 09:38  BS:  O2: 100 %  8/18/2020 22:22  Pain: 1  8/18/2020 22:01  Physical Exam  Patient is alert oriented pleasant sitting up in her chair finishing lunch.  She is with fluent speech answers all questions appropriately she is breathing comfort without accessory muscle use or tachypnea.  She appears nonedematous skin appears clear in visualized areas.      MEDICATION LIST:  Current Outpatient Medications   Medication Sig   ? acetaminophen (TYLENOL) 500 MG tablet Take 1,000 mg by mouth 3 (three) times a day as needed.   ? artificial tears,hypromellose, (GENTEAL; SYSTANE) 0.3 % Gel Administer 1 drop to both eyes as needed.   ? aspirin 81 MG EC tablet Take 1 tablet (81 mg total) by mouth daily.   ? benzocaine-menthoL (CEPACOL) 15-3.6 mg Take 1 lozenge by mouth every hour as needed.   ? buPROPion (WELLBUTRIN XL) 150 MG 24 hr tablet Take 1 tablet (150 mg total) by mouth 2 (two) times a week. Tuesday and saturday   ? chlorpheniramine/dextromethorp (CORICIDIN HBP COUGH AND COLD ORAL) Take by mouth. Take 1 tablet 4 times daily as needed nasal drainage   ? cholecalciferol, vitamin D3, 1,000 unit (25 mcg) tablet Take 2,000 Units by mouth daily.   ? cinacalcet (SENSIPAR) 30 MG tablet Take 30 mg by mouth see administration instructions. Take three times weekly with dialysis (on Mondays, Wednesdays, and Fridays).         ? famotidine (FOR PEPCID) 10 MG tablet Take 10 mg by mouth daily as needed for heartburn.   ? folic acid (FOLVITE) 1 MG tablet TAKE ONE TABLET BY MOUTH ONCE DAILY   ? gabapentin (NEURONTIN) 100 MG capsule TAKE 1  CAPSULE BY MOUTH THREE TIMES DAILY   ? Lactobacillus rhamnosus GG (CULTURELLE) 10-15 Billion cell capsule Take 1 capsule by mouth daily with lunch.   ? lidocaine (LMX) 4 % cream Apply topically 3 (three) times a day.   ? loratadine (CLARITIN) 10 mg tablet Take 10 mg by mouth daily as needed.    ? midodrine (PROAMATINE) 10 MG tablet Take 1.5 tablets (15 mg total) by mouth 3 (three) times a day with meals. (Patient taking differently: Take 15 mg by mouth 3 (three) times a day with meals. Hold for SBP>105  Patient reports that she usually gets 10 mg before dialysis and another 10 mg [facility] through dialysis as well)   ? multivitamin (DAILY-OLGA) per tablet Take 1 tablet by mouth daily.   ? senna-docusate (SENNOSIDES-DOCUSATE SODIUM) 8.6-50 mg tablet Take 1 tablet by mouth daily as needed for constipation.    ? sevelamer carbonate (RENVELA) 800 mg tablet Take 1 tablet (800 mg total) by mouth 2 (two) times a day as needed (FOr snacks.).   ? sevelamer HCL (RENAGEL) 800 MG tablet Take 2,400 mg by mouth 3 (three) times a day with meals. And take 2 tablets with snacks   ? timolol maleate (TIMOPTIC) 0.5 % ophthalmic solution Administer 1 drop to both eyes 2 (two) times a day.    ? traZODone (DESYREL) 50 MG tablet TAKE 1&1/2 TABLETS (75MG) BY MOUTH AT BEDTIME. (Patient taking differently: Take 150 mg by mouth at bedtime. )   ? UNABLE TO FIND Med Name: guaifenesin 100/5cc  Take 5cc q 4 prn       DISCHARGE DIAGNOSIS:    ICD-10-CM    1. ESRD on dialysis (H)  N18.6     Z99.2        MEDICAL EQUIPMENT NEEDS:      DISCHARGE PLAN/FACE TO FACE:  I certify that services are/were furnished while this patient was under the care of a physician and that a physician or an allowed non-physician practitioner (NPP), had a face-to-face encounter that meets the physician face-to-face encounter requirements. The encounter was in whole, or in part, related to the primary reason for home health. The patient is confined to his/her home and needs  intermittent skilled nursing, physical therapy, speech-language pathology, or the continued need for occupational therapy. A plan of care has been established by a physician and is periodically reviewed by a physician.  Date of Face-to-Face Encounter: 8/18/2020      I certify that, based on my findings, the following services are medically necessary home health services: PT, OT, RN, home health aide    My clinical findings support the need for the above skilled services because: End-stage renal disease with severe hypotension and inability to leave home without assistance    This patient is homebound because: Inability leave home without assistance of another person and assistive mobility device.    The patient is, or has been, under my care and I have initiated the establishment of the plan of care. This patient will be followed by a physician who will periodically review the plan of care.    Schedule follow up visit with primary care provider within 7 days to reestablish care.    Electronically signed by: Derick San MD

## 2021-06-20 NOTE — LETTER
Letter by Angie Gamez CNP at      Author: Angie Gamez CNP Service: -- Author Type: --    Filed:  Encounter Date: 8/10/2020 Status: (Other)         Patient: Belia Rodriguez   MR Number: [id number]   YOB: 1945   Date of Visit: 8/10/2020     [facility]    Facility:   [facility] [[id number]]   Code Status: FULL CODE      CHIEF COMPLAINT/REASON FOR VISIT:  Chief Complaint   Patient presents with   ? Problem Visit     cough       HISTORY:      HPI: Belia is a 75 y.o. female who was admitted to [facility] from 7/13/20-7/20/20 for acute on chronic CHF that was managed with daily HD runs.  She is on HD for ESRD with MWF schedule.  It was recommended that she consider hospice services during her hospitalization with progression of CHF, ESRD, and COPD.  Itzel  has a past medical history of Arthritis, CHF (congestive heart failure) (H), Chronic anemia (6/1/2014), Chronic kidney disease, Chronic thoracic aortic dissection (H) (10/7/2015), COPD (chronic obstructive pulmonary disease) (H), CVA (cerebral infarction), Disease of thyroid gland, Dyslipidemia, ESRD (end stage renal disease) (H) (06/03/2009), Essential hypertension (6/30/2014), Gastrointestinal hemorrhage, unspecified gastrointestinal hemorrhage type (6/5/2017), GI (gastrointestinal bleed), GI bleeding (6/5/2017), Gout, L3 vertebral fracture (H) (11/16/2015), Left Atrial Appendage Occlusion (WATCHMAN) (4/5/2018), Obesity, ZULEIKA (obstructive sleep apnea), severe, intolerant of CPAP (10/22/2015), Oxygen dependent, Pneumonia (9-7-2015), Right foot drop, Spinal stenosis (3/28/2016), and Stroke (H) (3/24/2016).   The patient is currently at [facility] s/p hospitalization for acute rehabilitation.      Today Ms. Rodriguez is being evaluated for cough.  Itzel reported that her cough has resolved with Coricidin PRN at bedtime for congested cough.  Itzel said that her current plan is that  she would like to discharge home in a couple weeks with home therapy services.  She said that she no longer wants to pursue hospice services because she does not feel ready to stop HD at this time.  She said that her  has been having a hard time with accepting her decline and she does not feel that he would be open to having hospice come into their home for services.  She asked if someone would tell her when it is the time to sign up for hospice services.  We discussed that her frequent hospitalizations and continued decline with her ESRD, CHF, and COPD that she would be appropriate for hospice services at this time if that is something that she is interested in.  She said that she would like to spend more time with her family before she feels ready to start hospice services at this time.  She also asked for further information about payment for long-term care and was referred to  for further information.  She denied pain at this visit and feels that gabapentin and lidocaine ointment have been effective for management of her neuropathic pain that is worse at bedtime.  The patient denied changes in mood or appetite, sleep disturbances, fever, chills, diaphoresis, lightheadedness, dizziness, breathing difficulty, chest pain, palpitations, constipation, urinary symptoms, numbness or tingling in extremities, and pain.  Nursing staff denied any new concerns for the patient at this visit.    Past Medical History:   Diagnosis Date   ? Arthritis    ? CHF (congestive heart failure) (H)    ? Chronic anemia 6/1/2014   ? Chronic kidney disease    ? Chronic thoracic aortic dissection (H) 10/7/2015    Descending thoracic aorta; treated medically per notes of Dragan Singh and Jennifer.   ? COPD (chronic obstructive pulmonary disease) (H)    ? CVA (cerebral infarction)    ? Disease of thyroid gland    ? Dyslipidemia    ? ESRD (end stage renal disease) (H) 06/03/2009    on dialysis with Dr. Mitchell   ? Essential hypertension  2014   ? Gastrointestinal hemorrhage, unspecified gastrointestinal hemorrhage type 2017   ? GI (gastrointestinal bleed)    ? GI bleeding 2017   ? Gout    ? L3 vertebral fracture (H) 2015   ? Left Atrial Appendage Occlusion (WATCHMAN) 2018    LAAO 2018 (30 mm WATCHMAN)   ? Obesity    ? ZULEIKA (obstructive sleep apnea), severe, intolerant of CPAP 10/22/2015   ? Oxygen dependent     3L nc   ? Pneumonia 2015   ? Right foot drop    ? Spinal stenosis 3/28/2016   ? Stroke (H) 3/24/2016             Family History   Problem Relation Age of Onset   ? Dementia Mother    ? Diabetes Mother    ? Arthritis Mother    ? Cancer Mother    ? Depression Mother    ? Heart disease Mother    ? Vision loss Mother    ? Stroke Father    ? Heart disease Father    ? Breast cancer Neg Hx      Social History     Socioeconomic History   ? Marital status:      Spouse name: Cayden   ? Number of children: 2   ? Years of education: Not on file   ? Highest education level: Not on file   Occupational History     Employer: RETIRED   Social Needs   ? Financial resource strain: Not on file   ? Food insecurity     Worry: Not on file     Inability: Not on file   ? Transportation needs     Medical: Not on file     Non-medical: Not on file   Tobacco Use   ? Smoking status: Former Smoker     Packs/day: 1.50     Years: 37.00     Pack years: 55.50     Types: Cigarettes     Last attempt to quit: 2009     Years since quittin.6   ? Smokeless tobacco: Never Used   Substance and Sexual Activity   ? Alcohol use: No     Alcohol/week: 11.7 standard drinks     Types: 14 Standard drinks or equivalent per week     Comment: 14 mixed drinks per week   ? Drug use: No   ? Sexual activity: Never     Partners: Male   Lifestyle   ? Physical activity     Days per week: Not on file     Minutes per session: Not on file   ? Stress: Not on file   Relationships   ? Social connections     Talks on phone: Not on file     Gets together: Not  on file     Attends Jew service: Not on file     Active member of club or organization: Not on file     Attends meetings of clubs or organizations: Not on file     Relationship status: Not on file   ? Intimate partner violence     Fear of current or ex partner: Not on file     Emotionally abused: Not on file     Physically abused: Not on file     Forced sexual activity: Not on file   Other Topics Concern   ? Not on file   Social History Narrative    Lives with her . Daughter in [place] and daughter in [place].       REVIEW OF SYSTEM:   Pertinent items are noted in HPI.  A 12 point comprehensive review of systems was negative except as noted.    PHYSICAL EXAM:     General Appearance:    Alert, cooperative, no distress, appears stated age   Head:    Normocephalic, without obvious abnormality, atraumatic   Eyes:    PERRL, conjunctiva/corneas clear, both eyes; wears glasses   Ears:    Normal external ear canals, both ears   Nose:   Nares normal, septum midline, mucosa normal, moderate clear nasal drainage or sinus tenderness   Throat:   Lips, mucosa, and tongue normal; teeth and gums normal   Neck:   Supple, symmetrical, trachea midline, no adenopathy;     thyroid:  no enlargement/tenderness/nodules; no carotid    bruit or JVD   Back:     Symmetric, no curvature, ROM normal, no CVA tenderness   Lungs:     Clear to auscultation bilaterally, respirations unlabored; on room air   Chest Wall:    No tenderness or deformity    Heart:    Regular rate and rhythm, S1 and S2 normal, no murmur, rub   or gallop   Abdomen:     Soft, non-tender, bowel sounds active all four quadrants,     no masses, no organomegaly   Extremities:   Extremities normal, atraumatic, no cyanosis or edema   Pulses:   2+ and symmetric all extremities   Skin:   Skin color, texture, turgor normal, no rashes or lesions   Neurologic:   Oriented to person, place, time, and situation; exhibits logical thought processes; generalized weakness;  sitting in wheelchair during this visit         LABS:   Lab Results   Component Value Date    WBC 3.8 (L) 07/23/2020    HGB 10.5 (L) 07/23/2020    HCT 36.8 07/23/2020     (L) 07/23/2020    CHOL 116 11/19/2019    TRIG 122 11/19/2019    HDL 57 11/19/2019    ALT 14 07/13/2020    ALT 14 07/13/2020    AST 25 07/13/2020    AST 25 07/13/2020     (L) 07/23/2020    K 4.2 07/23/2020     07/23/2020    CREATININE 3.89 (H) 07/23/2020    BUN 17 07/23/2020    CO2 21 (L) 07/23/2020    TSH 3.89 07/18/2020    INR 1.10 07/17/2020    HGBA1C 5.7 06/01/2014        ASSESSMENT:      ICD-10-CM    1. Physical deconditioning  R53.81    2. Cough  R05    3. Neuropathic pain  M79.2        PLAN:      Continue PT/OT as recommended for physical deconditioning with plan for discharge home in 1-2 weeks with home health services.  Continue HD on MWF per nephrology recommendations for ESRD.  Continue to monitor weight daily for acute on chronic CHF.  Continue Coricidin PRN for chronic cough.  Continue lidocaine 4% ointment two times a day for neuropathic pain.  Continue gabapentin with bedtime dosing 300 mg daily at HS for neuropathic pain.  SW to work with patient on LTC options per patient request.  Otherwise continue current care plan for all other chronic medical conditions, as they are stable. Encouraged patient to engage in healthy lifestyle behaviors such as engaging in social activities, exercising (PT/OT), eating well, and following care plan. Follow up for routine check-up, or sooner if needed. Will continue to monitor patient and work with nursing staff collaboratively to work toward positive patient outcomes.     Electronically signed by: Angie Gamez CNP     Total floor/unit time was 60 minutes and 40 minutes was spent in counseling patient on end-of-life planning, pain management, cough management, and discharge planning and coordination of care with nursing staff and SW.

## 2021-06-20 NOTE — PROGRESS NOTES
[facility]    DATE: 10/2/2018    NAME:  Belia Rodriguez             :  1945  MRN: [id number]  CODE STATUS:  FULL CODE    FACILITY:  [facility] [[id number]]       ROOM:   [id number]    CHIEF COMPLAINT/REASON FOR VISIT:  Chief Complaint   Patient presents with     Problem Visit     Acute on Chronic CHF     HISTORY OF PRESENT ILLNESS: Belia Rodriguez is a 73 y.o. female with chronic A. fib, status post watchman device, ESRD on HD MWF who  admitted  with shortness of breath secondary to acute on chronic diastolic CHF in setting of ESRD and on HD MWF. Last echo in  2018 did not reveal any  major abnormality. ACS was ruled out and Nuclear stress test was done and did not show any significant abnormality.     H/o  COPD, chronic respiratory failure with hypoxia on nocturnal home oxygen 2 L/min.  Patient has CHF and has been recommended to follow up with CHF clinic and  4 week f/u with HE.  He has been enrolled in the CHF tele monitoring project to prevent readmissions. Patient was stabilized and discharged in a stable condition to  TCU  .       Past Medical History:   Diagnosis Date     Arthritis      CHF (congestive heart failure) (H)      Chronic anemia 2014     Chronic kidney disease      Chronic thoracic aortic dissection (H) 10/7/2015    Descending thoracic aorta; treated medically per notes of Dragan Singh and Jennifer.     COPD (chronic obstructive pulmonary disease) (H)      CVA (cerebral infarction)      Disease of thyroid gland      Dyslipidemia      ESRD (end stage renal disease) (H) 2009    on dialysis with Dr. Mitchell     Essential hypertension 2014     GI (gastrointestinal bleed)      GI bleeding 2017     Gout      L3 vertebral fracture (H) 2015     Left Atrial Appendage Occlusion (WATCHMAN) 2018    LAAO 2018 (30 mm WATCHMAN)     Obesity      ZULEIKA (obstructive sleep apnea), severe, intolerant of CPAP 10/22/2015     Pneumonia  9-7-2015     Right foot drop      Spinal stenosis 3/28/2016     Stroke (H) 3/24/2016     Past Surgical History:   Procedure Laterality Date     BACK SURGERY      [facility]     COLONOSCOPY N/A 3/23/2016    Procedure: COLONOSCOPY;  Surgeon: Ruddy Tejada MD;  Location: [facility];  Service:      DILATION AND CURETTAGE OF UTERUS       EP NEGRA CLOSURE N/A 4/5/2018    Procedure: EP NEGRA Closure;  Surgeon: Derick Duarte MD;  Location: [facility];  Service:      EYE SURGERY       HERNIA REPAIR       DE COLSC FLEXIBLE W/CONTROL BLEEDING ANY METHOD N/A 6/7/2017    Procedure: COLONOSCOPY;  Surgeon: Luis Mckeon MD;  Location: [facility];  Service: Gastroenterology     TONSILLECTOMY       Family History   Problem Relation Age of Onset     Dementia Mother      Diabetes Mother      Arthritis Mother      Cancer Mother      Depression Mother      Heart disease Mother      Vision loss Mother      Stroke Father      Heart disease Father      Breast cancer Neg Hx      Social History     Social History     Marital status:      Spouse name: Cayden     Number of children: 2     Years of education: N/A     Occupational History      Retired     Social History Main Topics     Smoking status: Former Smoker     Packs/day: 1.50     Years: 37.00     Types: Cigarettes     Quit date: 1/1/2009     Smokeless tobacco: Never Used     Alcohol use No      Comment: 14 mixed drinks per week     Drug use: No     Sexual activity: No     Other Topics Concern     Not on file     Social History Narrative    Lives with her . Daughter in [place] and daughter in [place].     Allergies   Allergen Reactions     Ace Inhibitors Cough     Atrovent [Ipratropium Bromide] Headache     Fosinopril Sodium Cough     Zolpidem      hallucinations     Current Outpatient Prescriptions   Medication Sig Dispense Refill     acetaminophen (TYLENOL) 500 MG tablet Take 500 mg by mouth every 6 (six) hours as needed for pain.        albuterol (ACCUNEB) 0.63 mg/3 mL nebulizer solution Take 1 ampule by nebulization every 4 (four) hours as needed for wheezing.       albuterol (PROAIR HFA;PROVENTIL HFA;VENTOLIN HFA) 90 mcg/actuation inhaler Inhale 2 puffs every 6 (six) hours as needed for wheezing. 1 Inhaler 5     allopurinol (ZYLOPRIM) 100 MG tablet Take 100 mg by mouth daily.       aspirin 81 MG EC tablet Take 81 mg by mouth daily with lunch.        atorvastatin (LIPITOR) 10 MG tablet Take 10 mg by mouth at bedtime.       CHLORPHENIRAMINE/DEXTROMETHORP (CORICIDIN HBP COUGH AND COLD ORAL) Take 1 tablet by mouth daily as needed.        cholecalciferol, vitamin D3, 2,000 unit cap Take 2,000 Units by mouth daily with lunch.        cinacalcet (SENSIPAR) 30 MG tablet Take 30 mg by mouth daily with lunch.        clopidogrel (PLAVIX) 75 mg tablet Take 1 tablet (75 mg total) by mouth daily. 90 tablet 1     DIALYVITE 100-1 mg Tab TAKE ONE TABLET BY MOUTH DAILY IN THE EVENING 90 tablet 0     digoxin (LANOXIN) 125 mcg tablet TAKE 1 TABLET ORALLY 3 TIMES A WEEK. (Patient taking differently: TAKE 1 TABLET ORALLY 3 TIMES A WEEK. Monday, Wednesday, Friday PM after Dialysis) 30 tablet 0     diltiazem (CARDIZEM SR) 120 MG 12 hr capsule Take 1 capsule (120 mg total) by mouth 2 (two) times a day. 60 capsule 2     diphenhydrAMINE (BENADRYL) 25 mg tablet Take 2 tablets (50 mg total) by mouth at bedtime. 15 tablet 0     folic acid (FOLVITE) 1 MG tablet TAKE ONE TABLET BY MOUTH ONCE DAILY 90 tablet 0     gabapentin (NEURONTIN) 100 MG capsule TAKE 1 CAPSULE BY MOUTH THREE TIMES DAILY 270 capsule 0     HYDROcodone-acetaminophen 5-325 mg per tablet Take 1 tablet by mouth 4 (four) times a day as needed. 15 tablet 0     Lactobacillus rhamnosus GG (CULTURELLE) 10-15 Billion cell capsule Take 1 capsule by mouth daily with lunch.       metoprolol tartrate (LOPRESSOR) 50 MG tablet Take 0.5 tablets (25 mg total) by mouth 2 (two) times a day. 180 tablet 3     midodrine  (PROAMATINE) 5 MG tablet Take 3 tablets (15 mg total) by mouth 3 (three) times a week. Take every Monday, Wednesday, and Friday in the morning. 36 tablet 11     ondansetron (ZOFRAN) 4 MG tablet Take 4 mg by mouth every 8 (eight) hours as needed for nausea.       polyvinyl alcohol (LIQUIFILM TEARS) 1.4 % ophthalmic solution Apply 1 drop to eye as needed for dry eyes.       ranitidine (ZANTAC) 150 MG tablet Take 150 mg by mouth at bedtime.       senna-docusate (SENNOSIDES-DOCUSATE SODIUM) 8.6-50 mg tablet Take 1 tablet by mouth at bedtime.        sevelamer carbonate (RENVELA) 800 mg tablet TAKE 2 TABLETS BY MOUTH 3 TIMES A DAY WITH MEALS AND 2 TABS WITH SNACKS 2 TIMES A  tablet 0     timolol maleate (TIMOPTIC) 0.5 % ophthalmic solution Administer 1 drop to both eyes 2 (two) times a day.        traZODone (DESYREL) 50 MG tablet Take 0.5 tablets (25 mg total) by mouth at bedtime as needed for sleep (give with melatonin). 15 tablet 0     No current facility-administered medications for this visit.        REVIEW OF SYSTEMS:    Currently, no fever, chills, or rigors. Does not have any visual or hearing problems. Denies any chest pain, headaches, palpitations, lightheadedness, dizziness, shortness of breath, or cough. Appetite is good. Denies any GERD symptoms. Denies any difficulty with swallowing, nausea, or vomiting.  Denies any abdominal pain, diarrhea or constipation. Denies any urinary symptoms. No insomnia. No active bleeding. No rash.       PHYSICAL EXAMINATION:  Vitals:    10/04/18 1855   BP: 119/65   Pulse: 72   Resp: 18   Temp: 97.5  F (36.4  C)   SpO2: 90%   Weight: 166 lb 11.2 oz (75.6 kg)       GENERAL: Awake, Alert, oriented x3, not in any form of acute distress, answers questions appropriately, follows simple commands, conversant  HEENT: Head is normocephalic with normal hair distribution. No evidence of trauma. Ears: No acute purulent discharge. Eyes: Conjunctivae pink with no scleral jaundice. Nose:  Normal mucosa and septum. NECK: Supple with no cervical or supraclavicular lymphadenopathy. Trachea is midline.   CHEST: No tenderness or deformity, no crepitus  LUNG: Clear to auscultation with good chest expansion. There are no crackles or wheezes, normal AP diameter.  BACK: No kyphosis of the thoracic spine. Symmetric, no curvature, ROM normal, no CVA tenderness, no spinal tenderness   CVS: There is good S1  S2, there are no murmurs, rubs, gallops, or heaves, rhythm is regular,  2+ pulses symmetric in all extremities.  ABDOMEN: Globular and soft, nontender to palpation, non distended, no masses, no organomegaly, good bowel sounds, no rebound or guarding, no peritoneal signs.   EXTREMITIES:  Full range of motion on both upper and lower extremities, there is no tenderness to palpation, no pedal edema, no cyanosis or clubbing, no calf tenderness.  Pulses equal in all extremities, normal cap refill, no joint swelling.  SKIN: Warm and dry, no erythema noted.  Skin color, texture, no rashes or lesions.  NEUROLOGICAL: The patient is oriented to person, place and time. Strength and sensation are grossly intact. Face is symmetric.    LABS:  All labs reviewed in the nursing home record.  Lab Results   Component Value Date    WBC 4.2 09/21/2018    HGB 9.8 (L) 09/21/2018    HCT 29.8 (L) 09/21/2018     (H) 09/21/2018     (L) 09/25/2018     Results for orders placed or performed during the hospital encounter of 09/21/18   Basic metabolic panel   Result Value Ref Range    Sodium 139 136 - 145 mmol/L    Potassium 4.3 3.5 - 5.0 mmol/L    Chloride 101 98 - 107 mmol/L    CO2 29 22 - 31 mmol/L    Anion Gap, Calculation 9 5 - 18 mmol/L    Glucose 85 70 - 125 mg/dL    Calcium 9.0 8.5 - 10.5 mg/dL    BUN 10 8 - 28 mg/dL    Creatinine 3.28 (H) 0.60 - 1.10 mg/dL    GFR MDRD Af Amer 17 (L) >60 mL/min/1.73m2    GFR MDRD Non Af Amer 14 (L) >60 mL/min/1.73m2         Lab Results   Component Value Date    HGBA1C 5.7 06/01/2014      Vitamin D, Total (25-Hydroxy)   Date Value Ref Range Status   11/16/2015 18.5 (L) 30.0 - 80.0 ng/mL Final     Lab Results   Component Value Date    [id number] 592 10/09/2015       ASSESSMENT/PLAN:    1. Acute on chronic diastolic congestive heart failure (H) -  Patient has CHF and has been recommended to follow up with CHF clinic and  4 week f/u with HE.  Continue Lopressor, Plavix,  Digoxin, and Cardizem   2. ESRD on dialysis (H) - [facility]   3. Atrial fibrillation with RVR (H) - Continue Cardizem and Digoxin for rate control.  Status post watchman device placement since patient was not able to tolerate anticoagulation.   4. COPD (chronic obstructive pulmonary disease) (H) - No evidence of exacerbation   5. Right foot drop - Stable, does wear AFO boot but uses a walker   6. ZULEIKA (obstructive sleep apnea), severe - Uses nocturnal O2    7. Acute respiratory failure with hypoxia (H) - Resolved   8. Anemia of chronic renal failure, stage 5 (H) - Stable               Electronically signed by:  Selene Trinidad CNP    35 minutes TT of which 50% was spent in counseling and coordination of care of the above plan.    Time spent in interview and examination of patient, review of available records, and discussion with nursing staff. Continue care plan, efforts at therapy, and monitor nutritional status.

## 2021-06-21 NOTE — LETTER
Letter by June Kingston CNP at      Author: June Kingston CNP Service: -- Author Type: --    Filed:  Encounter Date: 2/16/2021 Status: (Other)         Patient: Belia Rodriguez   MR Number: [id number]   YOB: 1945   Date of Visit: 2/16/2021     Code Status:  DNR  Visit Type: Discharge Summary     Facility:  [facility] [[id number]]              History of Present Illness: Belia Rodriguez is a 75 y.o. female  who I am seeing today for discharge from the TCU post recent hospitalization for congestive heart failure and COPD.  Past medical history includes end-stage renal disease on dialysis 3 times weekly, acute hypoxic respiratory failure secondary to CHF as well as COPD.  Patient recently hospitalized on 1/26/2021 secondary to increasing shortness of breath.  She was needing increased oxygen support.  She is chronically on O2 2 to 3 L.  She was treated for COPD exacerbation.  She was given Solu-Medrol IV as well as the nebulizers.  She does have underlying end-stage renal disease and continues on dialysis 3 times weekly.  She also has extra runs on Thursdays.  She was needing extra runs prior to her hospitalization for increased CHF exacerbation.  She did get dialysis daily during her hospitalization and her breathing improved.  She was switched from her Solu-Medrol over to prednisone.She also was treated with azithromycin.    Today patient sitting up in wheelchair.  Underlying end-stage renal disease.  Patient continues on dialysis 3 times weekly.  Recent CHF exacerbation.  Patient is up about 3 pounds.  She does have some increased lower extremity edema since admit to the lower extremities.  She continues in CLARITA hose.  Recent uric acid level obtained at the patient's request which was negative.  She had no edema upon admit.  Fluid being managed per dialysis.  She denies any increased shortness of breath.  She does have underlying COPD.  Continues on O2 at 1 L.  She  has completed her prednisone taper.  She continues on as needed nebulizer.  She has had some increased pain to the lower extremities.  Known history of neuropathy.  She continues on gabapentin and Tylenol.  She was treated with low-dose of oxycodone 2.5 mg.  I am going to send her with 10 tabs and she will need to wean herself off.  She is declining home services and opting for outpatient rehab here at the facility.      Active Ambulatory Problems     Diagnosis Date Noted   ? Tachycardia-bradycardia syndrome (H) 06/12/2014   ? Hyperkalemia 06/30/2014   ? Essential hypertension with goal blood pressure less than 140/90 06/30/2014   ? Hyperlipidemia 06/30/2014   ? Acute respiratory failure with hypoxia (H) 06/30/2014   ? Fluid overload 10/07/2015   ? ZULEIKA (obstructive sleep apnea), severe 10/22/2015   ? Anemia of chronic renal failure, stage 5 (H)    ? Dissection of thoracoabdominal aorta (H)    ? Gout    ? GERD (gastroesophageal reflux disease) 04/14/2017   ? Right foot drop 05/16/2017   ? Acute midline low back pain with right-sided sciatica 06/16/2017   ? History of compression fracture of spine 06/16/2017   ? Acute and chronic respiratory failure with hypoxia (H)    ? Pulmonary emphysema (H) 02/19/2018   ? Presence of Watchman left atrial appendage closure device 04/05/2018   ? Other dysphagia 08/10/2018   ? 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   ? Hyperparathyroidism (H) 03/24/2009   ? Osteoporosis 03/24/2009   ? 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 ESRD on hemodialysis (H), MWF 08/15/2018   ? Acute pulmonary edema (H) 09/07/2018   ? Chronic atrial fibrillation (H)    ? COPD exacerbation (H) 01/04/2019   ? Acute on chronic diastolic congestive heart failure (H) 01/23/2019   ? Thrombocytopenia (H)    ? Contraindication to anticoagulation therapy 03/26/2019   ? Dyspnea 05/18/2019   ? Cardiac pacemaker in situ 03/20/2019   ? S/P AV (atrioventricular) jonn ablation  03/20/2019   ? Hip fracture, intertrochanteric (H) 06/23/2019   ? DVT (deep venous thrombosis) (H) 08/22/2019   ? Carpal tunnel syndrome of left wrist 02/04/2020   ? Major depressive disorder, remission status unspecified, unspecified whether recurrent 09/15/2020   ? Encounter for palliative care    ? PVD (peripheral vascular disease) (H) 12/18/2020   ? SOB (shortness of breath) 01/26/2021     Resolved Ambulatory Problems     Diagnosis Date Noted   ? Hypotension 06/01/2014   ? Fever 06/07/2014   ? Aortic aneurysm rupture (H)    ? Bacteremia due to Escherichia coli 06/08/2014   ? Severe tobacco use disorder 06/30/2014   ? Persistent atrial fibrillation (H) 06/30/2014   ? Bradycardia, sinus 06/30/2014   ? Volume overload 06/30/2014   ? Hypomagnesemia 06/30/2014   ? Cellulitis 08/25/2014   ? Hematochezia 11/02/2014   ? BRBPR (bright red blood per rectum) 11/02/2014   ? Hematuria 04/01/2015   ? Aortic dissection (H) 04/01/2015   ? Pneumonia 09/07/2015   ? Hypoxia 09/23/2015   ? SOB (shortness of breath) 09/23/2015   ? Acute on chronic diastolic heart failure (H) 09/27/2015   ? Bacteremia 09/27/2015   ? Dyspnea 10/07/2015   ? Chronic thoracic aortic dissection (H) 10/07/2015   ? L3 vertebral fracture (H) 11/16/2015   ? GI bleeding 03/20/2016   ? Chronic systolic congestive heart failure (H)    ? Acute lower GI bleeding 03/21/2016   ? Bilateral anterior& Lt Occipital embolic Infarction 03/24/2016   ? Spinal stenosis 03/28/2016   ? Back pain 03/28/2016   ? Tremor 03/28/2016   ? SOB (shortness of breath) 05/03/2016   ? CVA (cerebral infarction) 05/04/2016   ? Respiratory distress 05/09/2016   ? CHF (congestive heart failure) (H) 05/09/2016   ? Right knee pain    ? Dyspnea 10/02/2016   ? Volume overload 10/02/2016   ? Acute bronchitis due to infection 10/02/2016   ? Acute bacterial conjunctivitis of both eyes 10/02/2016   ? Acute CHF (congestive heart failure) (H) 10/09/2016   ? Hypervolemia, unspecified hypervolemia type     ? Pure hypercholesterolemia    ? Gastroesophageal reflux disease without esophagitis    ? Leg weakness 04/20/2017   ? Anticoagulated on warfarin 05/16/2017   ? Gastrointestinal hemorrhage, unspecified gastrointestinal hemorrhage type 06/05/2017   ? Anticoagulant long-term use 06/16/2017   ? Acute GI bleeding 12/09/2017   ? SOB (shortness of breath) 01/26/2018   ? Acute bronchitis with bronchospasm    ? Acute respiratory failure with hypoxia and hypercapnia (H)    ? History of acute respiratory failure 02/19/2018   ? Chronic respiratory failure with hypoxia (H) 03/02/2018   ? Dyspnea 08/09/2018   ? Chest pain 08/10/2018   ? Hemodialysis catheter infection (H)    ? Hyperkalemia 10/18/2018   ? Atrial fibrillation with RVR (H) 03/03/2019   ? Intertrochanteric fracture of left hip, closed, initial encounter (H) 06/23/2019   ? Closed intertrochanteric fracture of hip, left, initial encounter (H) 06/22/2019   ? SOB (shortness of breath) 07/13/2020   ? Hypotension, unspecified hypotension type    ? Hypoxia 10/05/2020   ? Hypoxemia 12/08/2020     Past Medical History:   Diagnosis Date   ? Arthritis    ? Chronic anemia 6/1/2014   ? Chronic kidney disease    ? COPD (chronic obstructive pulmonary disease) (H)    ? CVA (cerebral infarction)    ? Disease of thyroid gland    ? Dyslipidemia    ? ESRD (end stage renal disease) (H) 06/03/2009   ? Essential hypertension 6/30/2014   ? GI (gastrointestinal bleed)    ? Left Atrial Appendage Occlusion (WATCHMAN) 4/5/2018   ? Obesity    ? Oxygen dependent        Current Outpatient Medications   Medication Sig   ? acetaminophen (TYLENOL) 500 MG tablet Take 2 tablets (1,000 mg total) by mouth every 6 (six) hours as needed.   ? albuterol (PROVENTIL) 2.5 mg /3 mL (0.083 %) nebulizer solution Take 3 mL (2.5 mg total) by nebulization every 4 (four) hours as needed for wheezing.   ? aspirin 81 MG EC tablet Take 1 tablet (81 mg total) by mouth daily. (Patient taking differently: Take 81 mg by  mouth daily. )   ? atorvastatin (LIPITOR) 10 MG tablet Take 10 mg by mouth at bedtime.   ? B complex 11-folic-C-biot-zinc (DIALYVITE) 1-279-714-50 mg-mg-mcg-mg Tab Take 1 tablet by mouth daily with supper. (Dialyvite)    ? buPROPion (WELLBUTRIN XL) 150 MG 24 hr tablet Take 1 tablet (150 mg total) by mouth 2 (two) times a week. Tuesday and saturday (Patient taking differently: Take 150 mg by mouth 2 (two) times a week. Tuesday and saturday)   ? cholecalciferol, vitamin D3, 1,000 unit (25 mcg) tablet Take 2,000 Units by mouth daily.    ? cinacalcet (SENSIPAR) 30 MG tablet Take 30 mg by mouth see administration instructions. Take three times weekly with dialysis (on Mondays, Wednesdays, and Fridays).   ? famotidine (PEPCID) 20 MG tablet Take 1 tablet (20 mg total) by mouth at bedtime.   ? folic acid (FOLVITE) 1 MG tablet TAKE ONE TABLET BY MOUTH ONCE DAILY (Patient taking differently: No sig reported)   ? gabapentin (NEURONTIN) 100 MG capsule TAKE 1 CAPSULE BY MOUTH THREE TIMES DAILY (Patient taking differently: No sig reported)   ? Lactobacillus rhamnosus GG (CULTURELLE) 10-15 Billion cell capsule Take 1 capsule by mouth daily with lunch.    ? metoprolol succinate (TOPROL-XL) 25 MG Take 1 tablet (25 mg total) by mouth daily with lunch. Hold for SBP<110 or hr<60   ? midodrine (PROAMATINE) 10 MG tablet Take 20 mg by mouth 3 (three) times a week. On dialysis days. Hold for SBP>105  Patient reports that she takes 20mg prior to dialysis   ? midodrine (PROAMATINE) 5 MG tablet Take 10 mg by mouth 4 (four) times a week. On non dialysis days if SBP < 106   ? oxyCODONE (ROXICODONE) 5 MG immediate release tablet Take 5 mg by mouth 2 (two) times a day as needed for pain. Give half tablet to equal 2.5 mg twice daily as needed   ? polyvinyl alcohol (LIQUIFILM TEARS) 1.4 % ophthalmic solution Administer 1 drop to both eyes as needed for dry eyes.   ? senna-docusate (SENNOSIDES-DOCUSATE SODIUM) 8.6-50 mg tablet Take 1 tablet by mouth  at bedtime as needed for constipation.    ? sevelamer carbonate (RENVELA) 800 mg tablet Take 1,600 mg by mouth 2 (two) times a day as needed (snacks).   ? sevelamer HCL (RENAGEL) 800 MG tablet Take 1,600 mg by mouth 3 (three) times a day with meals.    ? timolol maleate (TIMOPTIC) 0.5 % ophthalmic solution Administer 1 drop to both eyes 2 (two) times a day.    ? traZODone (DESYREL) 150 MG tablet Take 150 mg by mouth at bedtime.        Allergies   Allergen Reactions   ? Ace Inhibitors Cough   ? Atrovent [Ipratropium Bromide] Headache   ? Fosinopril Sodium Cough   ? Zolpidem      hallucinations         Review of Systems  No fevers or chills. No headache, lightheadedness or dizziness.  Chronic SOB, patient wears oxygen at 2 to 3 L, no chest pains or palpitations. Appetite is good. No nausea, vomiting, constipation or diarrhea. No dysuria, frequency, burning or pain with urination. + weakness. + chronic back pain. Continues on gabapentin, oxycodone and tylenol. Otherwise review of systems are negative.     Physical Exam  PHYSICAL EXAMINATION:  Vital signs: /63, pulse 83, respirations 16, temperature 97.5, 93% on 1 L.  Weight 163 pounds.    General: Awake, sitting up in wheelchair, alert, oriented x3, appropriately, follows simple commands, conversant  HEENT: Pink conjunctiva, anicteric sclerae, moist oral mucosa.  NECK: Supple, no lymphadenopathy no masses.  CVS: Dialysis catheter in the right chest wall.  Regular rate and rhythm on exam.  LUNG: No wheezes, rales or rhonchi. Somewhat diminished in the bases.  O2 on at 1 L nasal cannula.  ABDOMEN: Soft, non distended with active bowel sounds.   EXTREMITIES: Moves both upper and lower extremities with generalized weakness.  2+ edema in the right foot.  CLARITA hose on.  No redness to the right lower extremity.   PSYCHIATRIC: Cognition intact.       Labs:    Lab Results   Component Value Date    WBC 2.3 (L) 01/27/2021    HGB 9.6 (L) 01/27/2021    HCT 32.3 (L) 01/27/2021      (H) 01/27/2021    PLT 98 (L) 01/27/2021     Results for orders placed or performed during the hospital encounter of 01/26/21   Basic Metabolic Panel   Result Value Ref Range    Sodium 133 (L) 136 - 145 mmol/L    Potassium 5.8 (H) 3.5 - 5.0 mmol/L    Chloride 100 98 - 107 mmol/L    CO2 21 (L) 22 - 31 mmol/L    Anion Gap, Calculation 12 5 - 18 mmol/L    Glucose 234 (H) 70 - 125 mg/dL    Calcium 8.4 (L) 8.5 - 10.5 mg/dL    BUN 41 (H) 8 - 28 mg/dL    Creatinine 5.88 (H) 0.60 - 1.10 mg/dL    GFR MDRD Af Amer 9 (L) >60 mL/min/1.73m2    GFR MDRD Non Af Amer 7 (L) >60 mL/min/1.73m2           Assessment/Plan:    1. Acute on chronic diastolic congestive heart failure (H)   fluids managed per dialysis.   Weight gain of 5 pounds.   2. Pulmonary emphysema, unspecified emphysema type (H)   currently on O2 at 1 L during the day.  She does wear oxygen at night.  She has completed her prednisone taper.  Continues as needed nebs.   3. ESRD on dialysis (H)   continues 3 times weekly.     4. Severe low back pain   continues on  gabapentin, oxycodone and Tylenol.  I will only send with 10 tabs and she will need to wean herself off with no refill.   6. Neuropathic pain   continues on gabapentin.   7. Paroxysmal atrial fibrillation (H)   currently on metoprolol and aspirin.     Okay to DC home with current meds and treatments.  Patient has decided to do outpatient physical therapy here at the facility.  Follow-up with primary care provider in 1 week.  Follow-up with nephrology outpatient.  Follow-up with cardiology outpatient.    Total time spent for this visit was 35 minutes with greater than 50 during the time face-to-face with patient reviewing discharge medications, deciding on whether or not she would have home therapy versus outpatient therapy as well as collaborating with nursing staff and .    This note has been dictated using voice recognition software. Any grammatical or context distortions are  unintentional and inherent to the software      Electronically signed by: June Kingston, CNP

## 2021-06-21 NOTE — LETTER
Letter by June Kingston CNP at      Author: June Kingston CNP Service: -- Author Type: --    Filed:  Encounter Date: 2/11/2021 Status: (Other)         Patient: Belia Rodriguez   MR Number: [id number]   YOB: 1945   Date of Visit: 2/11/2021     Code Status:  DNR  Visit Type: Problem Visit (End-stage renal disease, CHF)     Facility:  [facility] [[id number]]              History of Present Illness: Belia Rodriguez is a 75 y.o. female  who I am seeing today for follow-up on the TCU post recent hospitalization for congestive heart failure and COPD.  Past medical history includes end-stage renal disease on dialysis 3 times weekly, acute hypoxic respiratory failure secondary to CHF as well as COPD.  Patient recently hospitalized on 1/26/2021 secondary to increasing shortness of breath.  She was needing increased oxygen support.  She is chronically on O2 2 to 3 L.  She was treated for COPD exacerbation.  She was given Solu-Medrol IV as well as the nebulizers.  She does have underlying end-stage renal disease and continues on dialysis 3 times weekly.  She also has extra runs on Thursdays.  She was needing extra runs prior to her hospitalization for increased CHF exacerbation.  She did get dialysis daily during her hospitalization and her breathing improved.  She was switched from her Solu-Medrol over to prednisone.  Continues on a prednisone taper.  She also was treated with azithromycin.      Today patient sitting up in wheelchair.  Underlying end-stage renal disease.  Patient continues on dialysis 3 times weekly.  Recent CHF exacerbation.  Early in the week it was noted she was up about 10 pounds.  She is now down about 7 pounds.  Hypovolemia managed by dialysis.  She is being more conscious of her dietary restrictions.  Edema down on her feet today 2+.  She was placed in CLARITA hose.  She does have underlying chronic neuropathy.  She continues on gabapentin, Tylenol and  oxycodone for pain.  Chronic O2 use at 1 L.  She was treated for recent COPD exacerbation.  She has completed her prednisone taper.  She does continue with as needed nebs.      Active Ambulatory Problems     Diagnosis Date Noted   ? Tachycardia-bradycardia syndrome (H) 06/12/2014   ? Hyperkalemia 06/30/2014   ? Essential hypertension with goal blood pressure less than 140/90 06/30/2014   ? Hyperlipidemia 06/30/2014   ? Acute respiratory failure with hypoxia (H) 06/30/2014   ? Fluid overload 10/07/2015   ? ZULEIKA (obstructive sleep apnea), severe 10/22/2015   ? Anemia of chronic renal failure, stage 5 (H)    ? Dissection of thoracoabdominal aorta (H)    ? Gout    ? GERD (gastroesophageal reflux disease) 04/14/2017   ? Right foot drop 05/16/2017   ? Acute midline low back pain with right-sided sciatica 06/16/2017   ? History of compression fracture of spine 06/16/2017   ? Acute and chronic respiratory failure with hypoxia (H)    ? Pulmonary emphysema (H) 02/19/2018   ? Presence of Watchman left atrial appendage closure device 04/05/2018   ? Other dysphagia 08/10/2018   ? 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   ? Hyperparathyroidism (H) 03/24/2009   ? Osteoporosis 03/24/2009   ? 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 ESRD on hemodialysis (H), MWF 08/15/2018   ? Acute pulmonary edema (H) 09/07/2018   ? Chronic atrial fibrillation (H)    ? COPD exacerbation (H) 01/04/2019   ? Acute on chronic diastolic congestive heart failure (H) 01/23/2019   ? Thrombocytopenia (H)    ? Contraindication to anticoagulation therapy 03/26/2019   ? Dyspnea 05/18/2019   ? Cardiac pacemaker in situ 03/20/2019   ? S/P AV (atrioventricular) jonn ablation 03/20/2019   ? Hip fracture, intertrochanteric (H) 06/23/2019   ? DVT (deep venous thrombosis) (H) 08/22/2019   ? Carpal tunnel syndrome of left wrist 02/04/2020   ? Major depressive disorder, remission status unspecified, unspecified whether recurrent  09/15/2020   ? Encounter for palliative care    ? PVD (peripheral vascular disease) (H) 12/18/2020   ? SOB (shortness of breath) 01/26/2021     Resolved Ambulatory Problems     Diagnosis Date Noted   ? Hypotension 06/01/2014   ? Fever 06/07/2014   ? Aortic aneurysm rupture (H)    ? Bacteremia due to Escherichia coli 06/08/2014   ? Severe tobacco use disorder 06/30/2014   ? Persistent atrial fibrillation (H) 06/30/2014   ? Bradycardia, sinus 06/30/2014   ? Volume overload 06/30/2014   ? Hypomagnesemia 06/30/2014   ? Cellulitis 08/25/2014   ? Hematochezia 11/02/2014   ? BRBPR (bright red blood per rectum) 11/02/2014   ? Hematuria 04/01/2015   ? Aortic dissection (H) 04/01/2015   ? Pneumonia 09/07/2015   ? Hypoxia 09/23/2015   ? SOB (shortness of breath) 09/23/2015   ? Acute on chronic diastolic heart failure (H) 09/27/2015   ? Bacteremia 09/27/2015   ? Dyspnea 10/07/2015   ? Chronic thoracic aortic dissection (H) 10/07/2015   ? L3 vertebral fracture (H) 11/16/2015   ? GI bleeding 03/20/2016   ? Chronic systolic congestive heart failure (H)    ? Acute lower GI bleeding 03/21/2016   ? Bilateral anterior& Lt Occipital embolic Infarction 03/24/2016   ? Spinal stenosis 03/28/2016   ? Back pain 03/28/2016   ? Tremor 03/28/2016   ? SOB (shortness of breath) 05/03/2016   ? CVA (cerebral infarction) 05/04/2016   ? Respiratory distress 05/09/2016   ? CHF (congestive heart failure) (H) 05/09/2016   ? Right knee pain    ? Dyspnea 10/02/2016   ? Volume overload 10/02/2016   ? Acute bronchitis due to infection 10/02/2016   ? Acute bacterial conjunctivitis of both eyes 10/02/2016   ? Acute CHF (congestive heart failure) (H) 10/09/2016   ? Hypervolemia, unspecified hypervolemia type    ? Pure hypercholesterolemia    ? Gastroesophageal reflux disease without esophagitis    ? Leg weakness 04/20/2017   ? Anticoagulated on warfarin 05/16/2017   ? Gastrointestinal hemorrhage, unspecified gastrointestinal hemorrhage type 06/05/2017   ?  Anticoagulant long-term use 06/16/2017   ? Acute GI bleeding 12/09/2017   ? SOB (shortness of breath) 01/26/2018   ? Acute bronchitis with bronchospasm    ? Acute respiratory failure with hypoxia and hypercapnia (H)    ? History of acute respiratory failure 02/19/2018   ? Chronic respiratory failure with hypoxia (H) 03/02/2018   ? Dyspnea 08/09/2018   ? Chest pain 08/10/2018   ? Hemodialysis catheter infection (H)    ? Hyperkalemia 10/18/2018   ? Atrial fibrillation with RVR (H) 03/03/2019   ? Intertrochanteric fracture of left hip, closed, initial encounter (H) 06/23/2019   ? Closed intertrochanteric fracture of hip, left, initial encounter (H) 06/22/2019   ? SOB (shortness of breath) 07/13/2020   ? Hypotension, unspecified hypotension type    ? Hypoxia 10/05/2020   ? Hypoxemia 12/08/2020     Past Medical History:   Diagnosis Date   ? Arthritis    ? Chronic anemia 6/1/2014   ? Chronic kidney disease    ? COPD (chronic obstructive pulmonary disease) (H)    ? CVA (cerebral infarction)    ? Disease of thyroid gland    ? Dyslipidemia    ? ESRD (end stage renal disease) (H) 06/03/2009   ? Essential hypertension 6/30/2014   ? GI (gastrointestinal bleed)    ? Left Atrial Appendage Occlusion (WATCHMAN) 4/5/2018   ? Obesity    ? Oxygen dependent        Current Outpatient Medications   Medication Sig   ? acetaminophen (TYLENOL) 500 MG tablet Take 2 tablets (1,000 mg total) by mouth every 6 (six) hours as needed.   ? albuterol (PROVENTIL) 2.5 mg /3 mL (0.083 %) nebulizer solution Take 3 mL (2.5 mg total) by nebulization every 4 (four) hours as needed for wheezing.   ? aspirin 81 MG EC tablet Take 1 tablet (81 mg total) by mouth daily. (Patient taking differently: Take 81 mg by mouth daily. )   ? atorvastatin (LIPITOR) 10 MG tablet Take 10 mg by mouth at bedtime.   ? B complex 11-folic-C-biot-zinc (DIALYVITE) [phone] mg-mg-mcg-mg Tab Take 1 tablet by mouth daily with supper. (Dialyvite)    ? buPROPion (WELLBUTRIN XL) 150  MG 24 hr tablet Take 1 tablet (150 mg total) by mouth 2 (two) times a week. Tuesday and saturday (Patient taking differently: Take 150 mg by mouth 2 (two) times a week. Tuesday and saturday)   ? cholecalciferol, vitamin D3, 1,000 unit (25 mcg) tablet Take 2,000 Units by mouth daily.    ? cinacalcet (SENSIPAR) 30 MG tablet Take 30 mg by mouth see administration instructions. Take three times weekly with dialysis (on Mondays, Wednesdays, and Fridays).   ? famotidine (PEPCID) 20 MG tablet Take 1 tablet (20 mg total) by mouth at bedtime.   ? folic acid (FOLVITE) 1 MG tablet TAKE ONE TABLET BY MOUTH ONCE DAILY (Patient taking differently: No sig reported)   ? gabapentin (NEURONTIN) 100 MG capsule TAKE 1 CAPSULE BY MOUTH THREE TIMES DAILY (Patient taking differently: No sig reported)   ? Lactobacillus rhamnosus GG (CULTURELLE) 10-15 Billion cell capsule Take 1 capsule by mouth daily with lunch.    ? metoprolol succinate (TOPROL-XL) 25 MG Take 1 tablet (25 mg total) by mouth daily with lunch. Hold for SBP<110 or hr<60   ? midodrine (PROAMATINE) 10 MG tablet Take 20 mg by mouth 3 (three) times a week. On dialysis days. Hold for SBP>105  Patient reports that she takes 20mg prior to dialysis   ? midodrine (PROAMATINE) 5 MG tablet Take 10 mg by mouth 4 (four) times a week. On non dialysis days if SBP < 106   ? oxyCODONE (ROXICODONE) 5 MG immediate release tablet Take 5 mg by mouth 2 (two) times a day as needed for pain. Give half tablet to equal 2.5 mg twice daily as needed   ? polyvinyl alcohol (LIQUIFILM TEARS) 1.4 % ophthalmic solution Administer 1 drop to both eyes as needed for dry eyes.   ? predniSONE (DELTASONE) 10 mg tablet Take 40 mg by mouth daily for 3 days, THEN 20 mg daily for 3 days, THEN 10 mg daily for 3 days, THEN 5 mg daily for 3 days.   ? senna-docusate (SENNOSIDES-DOCUSATE SODIUM) 8.6-50 mg tablet Take 1 tablet by mouth at bedtime as needed for constipation.    ? sevelamer carbonate (RENVELA) 800 mg tablet  Take 1,600 mg by mouth 2 (two) times a day as needed (snacks).   ? sevelamer HCL (RENAGEL) 800 MG tablet Take 1,600 mg by mouth 3 (three) times a day with meals.    ? timolol maleate (TIMOPTIC) 0.5 % ophthalmic solution Administer 1 drop to both eyes 2 (two) times a day.    ? traZODone (DESYREL) 150 MG tablet Take 150 mg by mouth at bedtime.        Allergies   Allergen Reactions   ? Ace Inhibitors Cough   ? Atrovent [Ipratropium Bromide] Headache   ? Fosinopril Sodium Cough   ? Zolpidem      hallucinations         Review of Systems  No fevers or chills. No headache, lightheadedness or dizziness.  Chronic SOB, patient wears oxygen at 2 to 3 L, no chest pains or palpitations. Appetite is good. No nausea, vomiting, constipation or diarrhea. No dysuria, frequency, burning or pain with urination. + weakness. + chronic back pain. Continues on gabapentin, oxycodone and tylenol. Otherwise review of systems are negative.     Physical Exam  PHYSICAL EXAMINATION:  Vital signs: /65, pulse 70, respirations 16, temperature 97.3, 93% on 1 L.  Weight 158.4 pounds.    General: Awake, sitting up in wheelchair, alert, oriented x3, appropriately, follows simple commands, conversant  HEENT: Pink conjunctiva, anicteric sclerae, moist oral mucosa.  NECK: Supple, no lymphadenopathy no masses.  CVS: Dialysis catheter in the right chest wall.  Regular rate and rhythm on exam.  LUNG: No wheezes, rales or rhonchi. Somewhat diminished in the bases.  O2 on at 1 L nasal cannula.  ABDOMEN: Soft, non distended with active bowel sounds.   EXTREMITIES: Moves both upper and lower extremities with generalized weakness.  2+ edema in the right foot.  CLARITA hose on.  No redness to the right lower extremity. PSYCHIATRIC: Cognition intact.       Labs:    Lab Results   Component Value Date    WBC 2.3 (L) 01/27/2021    HGB 9.6 (L) 01/27/2021    HCT 32.3 (L) 01/27/2021     (H) 01/27/2021    PLT 98 (L) 01/27/2021     Results for orders placed or  performed during the hospital encounter of 01/26/21   Basic Metabolic Panel   Result Value Ref Range    Sodium 133 (L) 136 - 145 mmol/L    Potassium 5.8 (H) 3.5 - 5.0 mmol/L    Chloride 100 98 - 107 mmol/L    CO2 21 (L) 22 - 31 mmol/L    Anion Gap, Calculation 12 5 - 18 mmol/L    Glucose 234 (H) 70 - 125 mg/dL    Calcium 8.4 (L) 8.5 - 10.5 mg/dL    BUN 41 (H) 8 - 28 mg/dL    Creatinine 5.88 (H) 0.60 - 1.10 mg/dL    GFR MDRD Af Amer 9 (L) >60 mL/min/1.73m2    GFR MDRD Non Af Amer 7 (L) >60 mL/min/1.73m2           Assessment/Plan:    1. Acute on chronic diastolic congestive heart failure (H)   fluids managed per dialysis.   Weight gain. Pt reeducated on fluid management and dietary guidelines.    2. Pulmonary emphysema, unspecified emphysema type (H)   currently on O2 at 2 L during the day.   She has completed her prednisone taper.  Continues as needed nebs.   3. ESRD on dialysis (H)   continues 3 times weekly.     4. Severe low back pain   continues on  gabapentin, oxycodone and Tylenol.     6. Neuropathic pain   continues on gabapentin.   7. Paroxysmal atrial fibrillation (H)   currently on metoprolol and aspirin.     This note has been dictated using voice recognition software. Any grammatical or context distortions are unintentional and inherent to the software      Electronically signed by: June Kingston, CNP

## 2021-06-21 NOTE — PROGRESS NOTES
TCM DISCHARGE FOLLOW UP CALL    Discharge Date:  10/19/2018  Reason for hospital stay (discharge diagnosis)::  Hyperkalemia, dialysis cath replaced.  Are you feeling better, the same or worse since your discharge?:  Patient is feeling better  Do you feel like you have a plan in the event of a health emergency?: Yes (Pt is aware of nurse triage)    As part of your discharge plan, were  home care services ordered for you?: No    Did you receive any new medications, or was there a change to your medications?: No    Do you have any follow up visits scheduled with your PCP or Specialist?:  No  (RN) Is PCP appt scheduled soon enough (within 14 days of discharge date)?: Yes    I'm glad to hear you're doing well and we want you to continue to do well. Your PCP would like to see you for a follow-up visit. Can we help set that up for your today?: No    (RN) Provided patient the PCP's phone number to call if they have any questions or concerns?: No (Pt has clinic phone number)

## 2021-06-21 NOTE — PROGRESS NOTES
MODIFIED KENIA SCALE   Follow up (post Watchman Implant)  Timepoint: 6 mths    Previous score: 0    Score Description   0 No symptoms at all   1 No significant disability despite symptoms; able to carry out all usual duties and activities   2 Slight disability; unable to carry out all previous activities, but able to look after own affairs without assistance   3 Moderate disability; requiring some help, but able to walk without assistance   4 Moderately severe disability; unable to walk without assistance and unable to attend to own bodily needs without assistance   5 Severe disability; bedridden, incontinent and requiring constant nursing care and attention   6 Dead    Total score (0 - 6):  0  Change in score? none  If yes, notify implanting cardiologist.    Danielle Levine RN

## 2021-06-21 NOTE — LETTER
Letter by Kendal Oh RDCS at      Author: Kendal Oh RDCS Service: -- Author Type: --    Filed:  Encounter Date: 4/29/2021 Status: (Other)         Belia MOJICA Michael  [address] [facility] [address]      April 29, 2021      Dear Ms. Rodriguez,    RE: Remote Results    We are writing to you regarding your recent Remote Pacemaker check from home. Your transmission was received successfully. Battery status is satisfactory at this time.     Your results are showing no significant changes.    Your next device appointment will be a clinic visit.  Please call in May to schedule.      To schedule or reschedule, please call [phone] and press 1.    NOTE: If you would like to do an extra transmission, please call [phone] and press 3 to speak to a nurse BEFORE transmitting. This ensures that the Device Clinic staff is aware of the reason you are sending a transmission, and can follow-up with you after it has been reviewed.    We will be checking your implanted device from home (remotely) every three months unless otherwise instructed. We will need to see you in the clinic at least once a year. You may need to be seen in the clinic sooner depending on the results of your check.    Please be aware:    The follow-up schedule is like a Physician prescription.    Your remote monitor is paired to your specific implanted device.      Sincerely,    [facility]        [FreeTextEntry1] : MARTITA BARRY  is a 71 year old female here for initial consultation for management of ER+, LA+, HER2 neg left breast cancer.  She is s/p lumpectomy and SLN biopsy.  pT1aN0,  AJCC 8 -stage IA.\par \par Given small tumor size of 0.4cm and overall excellent prognosis, I recommend endocrine therapy alone with anastrazole 1mg daily.  We discussed potential side effects which include but are not limited to hot flashes, arthralgias, nausea, vomiting, fatigue, fracture, mood disturbance, cardiac symptoms, vaginal dryness, hair loss. \par \par I will get reports for her recent bone density exam which was reportedly normal.  We will continue to monitor while on aromatase inhibitors which can cause accelerated bone loss.   \par \par She will continue yearly mammogram/US breasts and FU with breast surgeon Dr. Grijalva. She had a consultation with Dr. Peña from radiation oncology and will receive adjuvant RT.  Pt will begin taking anastrazole daily after radiation treatment is complete. She will see me 3 months after starting anastrazole.\par \par To reduce the risk of recurrence, we discussed lifestyle modifications and ETOH/smoking cessation which patient is interested in and has a program she is willing to be involved with.  I also offered her our social work services and breast cancer support group information.  \par \par Patient was given the time to ask questions which I answered to the best of my ability and to her apparent satisfaction.  I encouraged her to call me with any additional questions.

## 2021-06-21 NOTE — H&P
[facility] Radiology Pre-Procedure Note (see H&P per Dr. SPENSER Holly on 10/26/18)  Date/Time: 11/20/2018/1:28 PM    Requested Procedure:  Tunneled dialysis catheter placement/exchange  Requested Provider:  MICHAELA Martinez CNP    HPI: Belia Rodriguez is a 73 y.o. female with HD dependent ESRD, here for tunneled dialysis catheter placement or exchange due to a poorly functional left IJ HD catheter. Dialyzes M/W/F.     IMAGING:   [facility]  10/18/2018  PROCEDURE: COMPLETE REPLACEMENT OF TUNNELED DIALYSIS CATHETER THROUGH SAME VENOUS ACCESS WITH FLUOROSCOPIC GUIDANCE  IMPRESSION:   Successful complete replacement of tunneled dialysis catheter, tip resides at the mid right atrium.     NPO status:  Since MN   Anticoagulation/Antiplatelets/Bleeding tendencies:  ASA 81 mg   Antibiotics:  Ancef 2g IV x 1 dose for IR procedure    PAST MEDICAL HISTORY:      Past Medical History:   Diagnosis Date     Arthritis      CHF (congestive heart failure) (H)      Chronic anemia 6/1/2014     Chronic kidney disease      Chronic thoracic aortic dissection (H) 10/7/2015    Descending thoracic aorta; treated medically per notes of Dragan Singh and Jennifer.     COPD (chronic obstructive pulmonary disease) (H)      CVA (cerebral infarction)      Disease of thyroid gland      Dyslipidemia      ESRD (end stage renal disease) (H) 06/03/2009    on dialysis with Dr. Mitchell     Essential hypertension 6/30/2014     Gastrointestinal hemorrhage, unspecified gastrointestinal hemorrhage type 6/5/2017     GI (gastrointestinal bleed)      GI bleeding 6/5/2017     Gout      L3 vertebral fracture (H) 11/16/2015     Left Atrial Appendage Occlusion (WATCHMAN) 4/5/2018    LAAO April 5, 2018 (30 mm WATCHMAN)     Obesity      ZULEIKA (obstructive sleep apnea), severe, intolerant of CPAP 10/22/2015     Pneumonia 9-7-2015     Right foot drop      Spinal stenosis 3/28/2016     Stroke (H) 3/24/2016       PAST SURGICAL HISTORY:  Past Surgical History:   Procedure  Laterality Date     BACK SURGERY      [facility]     COLONOSCOPY N/A 3/23/2016    Procedure: COLONOSCOPY;  Surgeon: Ruddy Tejada MD;  Location: [facility] GI;  Service:      DILATION AND CURETTAGE OF UTERUS       EP NEGRA CLOSURE N/A 4/5/2018    Procedure: EP NEGRA Closure;  Surgeon: Derick Duarte MD;  Location: [facility] Cath Lab;  Service:      EYE SURGERY       HERNIA REPAIR       FL COLSC FLEXIBLE W/CONTROL BLEEDING ANY METHOD N/A 6/7/2017    Procedure: COLONOSCOPY;  Surgeon: Luis Mckeon MD;  Location: [facility] GI;  Service: Gastroenterology     TONSILLECTOMY         ALLERGIES:  Ace inhibitors; Atrovent [ipratropium bromide]; Fosinopril sodium; and Zolpidem    MEDICATIONS:  Current Outpatient Medications   Medication Sig Dispense Refill     acetaminophen (TYLENOL) 500 MG tablet Take 500 mg by mouth every 6 (six) hours as needed for pain.       allopurinol (ZYLOPRIM) 100 MG tablet Take 100 mg by mouth daily.       aspirin 81 MG EC tablet Take 81 mg by mouth daily with lunch.        atorvastatin (LIPITOR) 10 MG tablet Take 10 mg by mouth at bedtime.       CHLORPHENIRAMINE/DEXTROMETHORP (CORICIDIN HBP COUGH AND COLD ORAL) Take 1 tablet by mouth daily as needed.        cholecalciferol, vitamin D3, 2,000 unit cap Take 2,000 Units by mouth daily with lunch.        cinacalcet (SENSIPAR) 30 MG tablet Take 30 mg by mouth daily with lunch.        DIALYVITE 100-1 mg Tab TAKE ONE TABLET BY MOUTH DAILY IN THE EVENING 90 tablet 0     digoxin (LANOXIN) 125 mcg tablet TAKE 1 TABLET ORALLY 3 TIMES A WEEK. (Patient taking differently: TAKE 1 TABLET ORALLY 3 TIMES A WEEK. Monday, Wednesday, Friday PM after Dialysis) 30 tablet 0     diltiazem (CARDIZEM SR) 120 MG 12 hr capsule Take 1 capsule (120 mg total) by mouth 2 (two) times a day. 60 capsule 2     diphenhydrAMINE (BENADRYL) 25 mg tablet Take 2 tablets (50 mg total) by mouth at bedtime. 15 tablet 0     gabapentin (NEURONTIN) 100 MG capsule TAKE 1 CAPSULE BY  MOUTH THREE TIMES DAILY 270 capsule 0     Lactobacillus rhamnosus GG (CULTURELLE) 10-15 Billion cell capsule Take 1 capsule by mouth daily with lunch.       metoprolol tartrate (LOPRESSOR) 25 MG tablet Take 1 tablet (25 mg total) by mouth 2 (two) times a day. (Patient taking differently: Take 25 mg by mouth daily .      ) 60 tablet 11     midodrine (PROAMATINE) 5 MG tablet Take 3 tablets (15 mg total) by mouth 3 (three) times a week. Take every Monday, Wednesday, and Friday in the morning. 36 tablet 11     polyvinyl alcohol (LIQUIFILM TEARS) 1.4 % ophthalmic solution Apply 1 drop to eye as needed for dry eyes.       ranitidine (ZANTAC) 150 MG tablet Take 150 mg by mouth at bedtime.       senna-docusate (SENNOSIDES-DOCUSATE SODIUM) 8.6-50 mg tablet Take 1 tablet by mouth at bedtime.        sevelamer carbonate (RENVELA) 800 mg tablet TAKE 2 TABLETS BY MOUTH 3 TIMES A DAY WITH MEALS AND 2 TABS WITH SNACKS 2 TIMES A  tablet 0     timolol maleate (TIMOPTIC) 0.5 % ophthalmic solution Administer 1 drop to both eyes 2 (two) times a day.        albuterol (ACCUNEB) 0.63 mg/3 mL nebulizer solution Take 1 ampule by nebulization every 4 (four) hours as needed for wheezing.       albuterol (PROAIR HFA;PROVENTIL HFA;VENTOLIN HFA) 90 mcg/actuation inhaler Inhale 2 puffs every 6 (six) hours as needed for wheezing. 1 Inhaler 5     folic acid (FOLVITE) 1 MG tablet TAKE ONE TABLET BY MOUTH ONCE DAILY 90 tablet 0     Current Facility-Administered Medications   Medication Dose Route Frequency Provider Last Rate Last Dose     ceFAZolin 2 g in 100 mL in D5W (ANCEF)  2 g Intravenous 30 Min Pre-Op Ignacio Guadalupe CNP         sodium chloride bacteriostatic 0.9 % injection 0.1-0.3 mL  0.1-0.3 mL Subcutaneous PRN Ignacio Guadalupe CNP         sodium chloride flush 3 mL (NS)  3 mL Intravenous Line Care Ignacio Guadalupe CNP           LABS:    Lab Results   Component Value Date    INR 1.09 11/20/2018     Lab Results   Component  "Value Date    WBC 6.8 10/26/2018    HGB 12.2 11/20/2018    HCT 35.7 10/26/2018     (H) 10/26/2018     (L) 11/20/2018     Lab Results   Component Value Date    K 5.3 (H) 11/20/2018     EXAM:  /79 (Patient Position: Semi-alcantara)   Pulse (!) 101   Temp 97.7  F (36.5  C) (Oral)   Resp 20   Ht 5' 5\" (1.651 m)   Wt 168 lb 3 oz (76.3 kg)   SpO2 94% Comment: moves up and down between 88 to 94%  BMI 27.99 kg/m    General:  Stable.  In no acute distress.  Neuro:  A&O x 3.  Moves all extremities equally.  Resp:  Lungs clear to auscultation bilaterally.  Cardio:  S1S2 and r/r/r  Skin:  No excoriations, ecchymosis, erythema, lesions, or open sores noted on the upper chest.    Pre-Sedation Assessment:  Mallampati Airway Classification: Class 2: upper half of tonsil fossa visible  Previous reaction to anesthesia/sedation: no  Sedation plan based on assessment: Moderate  Sleep Apnea: no  Dentures: no  COPD: no  ASA Classification: ASA 3 - Patient with moderate systemic disease with functional limitations  Comments: None    ASSESSMENT:  73 yr old female with HD dependent renal failure and a poorly functioning left IJ tunneled HD catheter.    PLAN:  Attempted placement of new HD catheter on the right or exchange of existing HD catheter on the left. Patient is also requesting possible inpatient dialysis due to incomplete run yesterday. Will contact nephrology to ask if this is possible. Patient was told it may not be a guarantee and she may have to go to her normal outpatient appointment tomorrow.    The procedure, risks and moderate sedation were discussed with patient, all questions answered and patient agrees to proceed with the procedure. Written consent obtained.    Dr. SANTIAGO Corral  Interventional Radiology    Note scribed by:  Ignacio Guadalupe, APRN, CNP      "

## 2021-06-21 NOTE — LETTER
Letter by June Kingston CNP at      Author: June Kingston CNP Service: -- Author Type: --    Filed:  Encounter Date: 11/12/2020 Status: (Other)         Patient: Belia Rodriguez   MR Number: [id number]   YOB: 1945   Date of Visit: 11/12/2020     Code Status:  DNR  Visit Type: Problem Visit (CHF)     Facility:  [facility] [[id number]]             History of Present Illness: Belia Rodriguez is a 75 y.o. female  who I am seeing today for follow-up on the TCU.  Past medical history includes end-stage renal disease on dialysis 3 times weekly, acute hypoxic respiratory failure secondary to CHF as well as COPD.  Patient recently hospitalized on 11/2/2020 secondary to acute on chronic respiratory failure secondary to acute COPD and acute diastolic heart failure.  Patient with underlying COPD.  She is dependent on oxygen at 2 to 3 L.  She continues on home nebs.  She was treated with prednisone.  She also completed a 5-day course of ceftriaxone/azithromycin.  Fluid overload improved with daily dialysis.  She has resumed her 3 times weekly schedule.  Hyperkalemia improved with dialysis.  Leg swelling.  Ultrasound negative.  Obstructive sleep apnea on CPAP.  Patient with a history of neuropathy in the lower extremities.  History of atrial fib.  She does have a watchman device in place.  Chronic anemia secondary to end-stage renal disease.  Hypotension on dialysis days.  She continues on Midodrine.  Patient was seen by hospice during her previous hospitalization on 10/5/2020 however this was declined.    Today patient sitting up in wheelchair.  She is dependent on oxygen at 2 to 3 L.  No cough.  Shortness of breath improving.  She continues with some lower extremity edema.  She does have CLARITA hose on.  She reports she is eating well.  Her bowels are moving regularly.  Continues on dialysis 3 times weekly.  Blood pressures on the soft side.    Active Ambulatory Problems      Diagnosis Date Noted   ? Tachycardia-bradycardia syndrome (H) 06/12/2014   ? Hyperkalemia 06/30/2014   ? Essential hypertension with goal blood pressure less than 140/90 06/30/2014   ? Hyperlipidemia 06/30/2014   ? Acute respiratory failure with hypoxia (H) 06/30/2014   ? ZULEIKA (obstructive sleep apnea), severe 10/22/2015   ? Anemia of chronic renal failure, stage 5 (H)    ? Dissection of thoracoabdominal aorta (H)    ? Gout    ? GERD (gastroesophageal reflux disease) 04/14/2017   ? Right foot drop 05/16/2017   ? Acute midline low back pain with right-sided sciatica 06/16/2017   ? History of compression fracture of spine 06/16/2017   ? Pulmonary emphysema (H) 02/19/2018   ? Presence of Watchman left atrial appendage closure device 04/05/2018   ? Other dysphagia 08/10/2018   ? 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   ? Hyperparathyroidism (H) 03/24/2009   ? Osteoporosis 03/24/2009   ? 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 ESRD on hemodialysis (H) 08/15/2018   ? Acute pulmonary edema (H) 09/07/2018   ? Chronic atrial fibrillation (H)    ? COPD exacerbation (H) 01/04/2019   ? Acute on chronic diastolic congestive heart failure (H) 01/23/2019   ? Thrombocytopenia (H)    ? Contraindication to anticoagulation therapy 03/26/2019   ? Dyspnea 05/18/2019   ? Cardiac pacemaker in situ 03/20/2019   ? S/P AV (atrioventricular) jonn ablation 03/20/2019   ? Hip fracture, intertrochanteric (H) 06/23/2019   ? DVT (deep venous thrombosis) (H) 08/22/2019   ? Carpal tunnel syndrome of left wrist 02/04/2020   ? SOB (shortness of breath) 07/13/2020   ? Hypotension, unspecified hypotension type    ? Major depressive disorder, remission status unspecified, unspecified whether recurrent 09/15/2020   ? Hypoxia 10/05/2020   ? Encounter for palliative care      Resolved Ambulatory Problems     Diagnosis Date Noted   ? Hypotension 06/01/2014   ? Fever 06/07/2014   ? Aortic aneurysm rupture (H)    ?  Bacteremia due to Escherichia coli 06/08/2014   ? Severe tobacco use disorder 06/30/2014   ? Persistent atrial fibrillation (H) 06/30/2014   ? Bradycardia, sinus 06/30/2014   ? Volume overload 06/30/2014   ? Hypomagnesemia 06/30/2014   ? Cellulitis 08/25/2014   ? Hematochezia 11/02/2014   ? BRBPR (bright red blood per rectum) 11/02/2014   ? Hematuria 04/01/2015   ? Aortic dissection (H) 04/01/2015   ? Pneumonia 09/07/2015   ? Hypoxia 09/23/2015   ? SOB (shortness of breath) 09/23/2015   ? Acute on chronic diastolic heart failure (H) 09/27/2015   ? Bacteremia 09/27/2015   ? Dyspnea 10/07/2015   ? Fluid overload 10/07/2015   ? Chronic thoracic aortic dissection (H) 10/07/2015   ? L3 vertebral fracture (H) 11/16/2015   ? GI bleeding 03/20/2016   ? Chronic systolic congestive heart failure (H)    ? Acute lower GI bleeding 03/21/2016   ? Bilateral anterior& Lt Occipital embolic Infarction 03/24/2016   ? Spinal stenosis 03/28/2016   ? Back pain 03/28/2016   ? Tremor 03/28/2016   ? SOB (shortness of breath) 05/03/2016   ? CVA (cerebral infarction) 05/04/2016   ? Respiratory distress 05/09/2016   ? CHF (congestive heart failure) (H) 05/09/2016   ? Right knee pain    ? Dyspnea 10/02/2016   ? Volume overload 10/02/2016   ? Acute bronchitis due to infection 10/02/2016   ? Acute bacterial conjunctivitis of both eyes 10/02/2016   ? Acute CHF (congestive heart failure) (H) 10/09/2016   ? Hypervolemia, unspecified hypervolemia type    ? Pure hypercholesterolemia    ? Gastroesophageal reflux disease without esophagitis    ? Leg weakness 04/20/2017   ? Anticoagulated on warfarin 05/16/2017   ? Gastrointestinal hemorrhage, unspecified gastrointestinal hemorrhage type 06/05/2017   ? Anticoagulant long-term use 06/16/2017   ? Acute GI bleeding 12/09/2017   ? SOB (shortness of breath) 01/26/2018   ? Acute bronchitis with bronchospasm    ? Acute respiratory failure with hypoxia and hypercapnia (H)    ? Acute on chronic respiratory  failure with hypoxia (H)    ? History of acute respiratory failure 02/19/2018   ? Chronic respiratory failure with hypoxia (H) 03/02/2018   ? Dyspnea 08/09/2018   ? Chest pain 08/10/2018   ? Hemodialysis catheter infection (H)    ? Hyperkalemia 10/18/2018   ? Atrial fibrillation with RVR (H) 03/03/2019   ? Intertrochanteric fracture of left hip, closed, initial encounter (H) 06/23/2019   ? Closed intertrochanteric fracture of hip, left, initial encounter (H) 06/22/2019     Past Medical History:   Diagnosis Date   ? Arthritis    ? Chronic anemia 6/1/2014   ? Chronic kidney disease    ? COPD (chronic obstructive pulmonary disease) (H)    ? CVA (cerebral infarction)    ? Disease of thyroid gland    ? Dyslipidemia    ? ESRD (end stage renal disease) (H) 06/03/2009   ? Essential hypertension 6/30/2014   ? GI (gastrointestinal bleed)    ? Left Atrial Appendage Occlusion (WATCHMAN) 4/5/2018   ? Obesity    ? Oxygen dependent        Current Outpatient Medications   Medication Sig Note   ? acetaminophen (TYLENOL) 500 MG tablet Take 2 tablets (1,000 mg total) by mouth every 6 (six) hours as needed.    ? albuterol (PROAIR HFA;PROVENTIL HFA;VENTOLIN HFA) 90 mcg/actuation inhaler Inhale 2 puffs 4 (four) times a day as needed for wheezing or shortness of breath.    ? aspirin 81 MG EC tablet Take 1 tablet (81 mg total) by mouth daily. (Patient taking differently: Take 81 mg by mouth daily. )    ? B complex 11-folic-C-biot-zinc (DIALYVITE) 2-009-431-50 mg-mg-mcg-mg Tab Take 1 tablet by mouth daily with supper. (Dialyvite)     ? buPROPion (WELLBUTRIN XL) 150 MG 24 hr tablet Take 1 tablet (150 mg total) by mouth 2 (two) times a week. Tuesday and saturday (Patient taking differently: Take 150 mg by mouth 2 (two) times a week. Tuesday and saturday)    ? cholecalciferol, vitamin D3, 1,000 unit (25 mcg) tablet Take 2,000 Units by mouth daily.     ? cinacalcet (SENSIPAR) 30 MG tablet Take 30 mg by mouth see administration instructions.  Take three times weekly with dialysis (on Mondays, Wednesdays, and Fridays).    ? famotidine (PEPCID) 20 MG tablet Take 1 tablet (20 mg total) by mouth at bedtime.    ? folic acid (FOLVITE) 1 MG tablet TAKE ONE TABLET BY MOUTH ONCE DAILY (Patient taking differently: No sig reported)    ? gabapentin (NEURONTIN) 100 MG capsule TAKE 1 CAPSULE BY MOUTH THREE TIMES DAILY (Patient taking differently: No sig reported)    ? Lactobacillus rhamnosus GG (CULTURELLE) 10-15 Billion cell capsule Take 1 capsule by mouth daily with lunch.     ? metoprolol succinate (TOPROL-XL) 25 MG Take 1 tablet (25 mg total) by mouth daily with lunch. Hold for SBP<110 or hr<60    ? midodrine (PROAMATINE) 10 MG tablet Take 10-20 mg by mouth 3 (three) times a week. On dialysis days. Hold for SBP>105  Patient reports that she usually gets 10 mg before dialysis and another 10 mg residential through dialysis as well     ? oxyCODONE (ROXICODONE) 5 MG immediate release tablet Take 0.5 tablets (2.5 mg total) by mouth every 6 (six) hours as needed for pain.    ? polyvinyl alcohol (LIQUIFILM TEARS) 1.4 % ophthalmic solution Administer 1 drop to both eyes as needed for dry eyes.    ? predniSONE (DELTASONE) 20 MG tablet Take 40 mg by mouth daily with breakfast for 1 day, THEN 20 mg daily with breakfast for 3 days, THEN 10 mg daily with breakfast for 3 days.    ? senna-docusate (SENNOSIDES-DOCUSATE SODIUM) 8.6-50 mg tablet Take 1 tablet by mouth at bedtime.     ? sevelamer carbonate (RENVELA) 800 mg tablet Take 1 tablet (800 mg total) by mouth 2 (two) times a day as needed (FOr snacks.). (Patient taking differently: Take 800 mg by mouth 2 (two) times a day as needed (FOr snacks.). )    ? sevelamer HCL (RENAGEL) 800 MG tablet Take 1,600 mg by mouth 3 (three) times a day with meals.  11/2/2020: For the past week, she was instructed to take 2 tablets instead of the usual 3 tablets to see if that is enough   ? timolol maleate (TIMOPTIC) 0.5 % ophthalmic solution  Administer 1 drop to both eyes 2 (two) times a day.     ? traZODone (DESYREL) 150 MG tablet Take 150 mg by mouth at bedtime.         Allergies   Allergen Reactions   ? Ace Inhibitors Cough   ? Atrovent [Ipratropium Bromide] Headache   ? Fosinopril Sodium Cough   ? Zolpidem      hallucinations         Review of Systems  No fevers or chills. No headache, lightheadedness or dizziness.  Chronic SOB, patient wears oxygen at 2 to 3 L.  Was only wearing oxygen at 2 L at night previously, no chest pains or palpitations. Appetite is good. No nausea, vomiting, constipation or diarrhea. No dysuria, frequency, burning or pain with urination. + weakness. + chronic back pain. Continues on oxycodone. Otherwise review of systems are negative.     Physical Exam  PHYSICAL EXAMINATION:  Vital signs: /67, pulse 70, respirations 16, temperature 97.7, O2 sats 98% on room air.  Weight 140.4 pounds.  General: Awake, Alert, oriented x3, appropriately, follows simple commands, conversant  HEENT: Pink conjunctiva, anicteric sclerae, moist oral mucosa  NECK: Supple, without any lymphadenopathy, or masses  CVS:  S1  S2, without murmur or gallop.  Dialysis catheter in the right chest wall.  LUNG: Clear to auscultation, No wheezes, rales or rhonci.  No dyspnea at rest.  O2 on at 2 L nasal cannula.  BACK: No kyphosis of the thoracic spine  ABDOMEN: Soft, nontender to palpation, with positive bowel sounds  EXTREMITIES: Moves both upper and lower extremities with generalized weakness, 1+ pedal edema, no calf tenderness.  CLARITA hose on.  SKIN: Warm and dry, no rashes or erythema noted  NEUROLOGIC: Intact, pulses palpable  PSYCHIATRIC: Cognition intact. Pleasant affect.      Labs:    Lab Results   Component Value Date    WBC 3.5 (L) 11/03/2020    HGB 8.7 (L) 11/04/2020    HCT 27.7 (L) 11/03/2020     (H) 11/03/2020    PLT 86 (L) 11/03/2020     Results for orders placed or performed during the hospital encounter of 11/02/20   Basic Metabolic  Panel   Result Value Ref Range    Sodium 137 136 - 145 mmol/L    Potassium 4.3 3.5 - 5.0 mmol/L    Chloride 98 98 - 107 mmol/L    CO2 28 22 - 31 mmol/L    Anion Gap, Calculation 11 5 - 18 mmol/L    Glucose 88 70 - 125 mg/dL    Calcium 8.3 (L) 8.5 - 10.5 mg/dL    BUN 19 8 - 28 mg/dL    Creatinine 2.93 (H) 0.60 - 1.10 mg/dL    GFR MDRD Af Amer 19 (L) >60 mL/min/1.73m2    GFR MDRD Non Af Amer 16 (L) >60 mL/min/1.73m2           Assessment/Plan:  1. Acute on chronic diastolic congestive heart failure (H)   fluids managed per dialysis.   2. Pulmonary emphysema, unspecified emphysema type (H)   currently on O2 at 2 L.  Will attempt to wean off oxygen during the day.  She was only wearing this at night.  Continues on home nebs.  Continues on prednisone taper.   3. ESRD on dialysis (H)   continues 3 times weekly.   4. Severe low back pain   continues on oxycodone, gabapentin and Tylenol.   5. Neuropathic pain   continues on gabapentin.   6. Paroxysmal atrial fibrillation (H)   currently on metoprolol and aspirin.             1. ESRD on dialysis (H)   continues on dialysis 3 times weekly.  Follow-up with nephrology outpatient.   2. Pulmonary emphysema, unspecified emphysema type (H)   patient continues on O2 at 2 L at night.  She was unable to tolerate CPAP secondary to claustrophobia.  She has been weaned off of her oxygen during the day.  Patient continues on prednisone 20 mg daily.    Patient continues on home nebs.     3. Acute on chronic diastolic congestive heart failure (H)   fluid managed per hemodialysis rounds.  Follow-up with cardiology outpatient.       This note has been dictated using voice recognition software. Any grammatical or context distortions are unintentional and inherent to the software    Electronically signed by: June Kingston, AYAH

## 2021-06-21 NOTE — H&P
Interventional Radiology Post-Procedure Note   [facility]  ?   Brief Procedure Note:   Patient name: Belia Rodriguez  Pt MRN:[id number]   Date of procedure: 11/20/2018     Procedure(s): Tunneled hemodialysis catheter placement on the right and removal on the left  Sedation method: Moderate sedation was employed. The patient was monitored by an interventional radiology nurse at all times throughout the procedure under my direct guidance.  Pre Procedure Diagnosis: ESRD in need of hemodialysis   Post Procedure Diagnosis: Same  Indications: Need for moderate-term central venous access for hemodialysis. Poorly functioning left tunneled TDC  ?   Attending: Yoseph Corral M.D.  Specimen(s) removed: None   Additional studies ordered: None  Drains: None   Lines: 23 cm (Tip-to-cuff) 15 Fr Duraflow 2 Hemodialysis catheter  Estimated Blood Loss: Minimal  Complications: None  Vascular closure method: N/A    Findings/Notes/Comments: Uncomplicated placement of right internal jugular hemodialysis catheter. Catheter tip lies in the mid/low right atrium. Catheter is ready for immediate use. Successful removal of left tunneled hemodialysis catheter.   ?   Please see dictation in PACS or under the Imaging tab in [facility] for detailed procedure note.     Yoseph Corral M.D.   Vascular and Interventional Radiology   Pager: [phone]   After Hours / Scheduling: [phone]     11/20/2018  3:45 PM

## 2021-06-21 NOTE — LETTER
Letter by Louie Liriano CNP at      Author: Louie Liriano CNP Service: -- Author Type: --    Filed:  Encounter Date: 2021 Status: (Other)         [facility]  [address] [address]                                  2021    Patient: Belia Rodriguez   MR Number: [id number]   YOB: 1945   Date of Visit: 2021     Dear Dr. Villa:    Thank you for referring Belia Rodriguez to me for evaluation. Below are the relevant portions of my assessment and plan of care.    If you have questions, please do not hesitate to call me. I look forward to following Belia along with you.    Sincerely,        Louie Liriano CNP          CC  No Recipients  Louie Liriano CNP  2021  4:26 PM  Signed  [facility]    Name:   Belia Rodriguez (Itzel)  : 1945  Facility:   [facility] [[id number]]   Room: [place] [address]  Code Status: DNR/DNI and POLST AVAILABLE -   Fac type:   SNF (Skilled Nursing Facility, TCU) -     CHIEF COMPLAINT / REASON FOR VISIT:  Chief Complaint   Patient presents with   ? Follow-up     Acute on chronic respiratory failure due to a combination of fluid overload and COPD exacerbation     [facility] from 18 until 18  [facility] from 18 until 18  [facility] from 2021 until 2021 (acute on chronic respiratory failure due to a combination of fluid overload and COPD exacerbation)  [facility] from 2021 until 2021  [facility] from 2021 until 2021 (acute on chronic respiratory failure due to a combination of fluid overload and COPD exacerbation)       HPI: Belia is a 75 y.o. female with known end-stage renal disease (on dialysis), COPD (former smoker, 45-60 pack years),  chronic respiratory failure with hypoxia, obstructive  "sleep apnea (severe and previously intolerant of CPAP), chronic atrial fibrillation, GERD, essential hypertension, and anemia related to chronic renal failure.    She has had multiple hospitalizations for respiratory failure due to a combination of fluid overload and COPD exacerbation.  This occurred on 01/26/2021 and again on 02/18/2021.  Her last admission came within hours of her discharge from [facility].  When last in the TCU at [facility], she was a full code, but she indicated during her last hospitalization that she wants no further BiPAP and is DNR/DNI.  This did improve somewhat with steroids and doxycycline.    While she continues on hemodialysis Mondays, Wednesdays, and Fridays, she did have some questions regarding hospice but was not sure she would sign on given her need for dialysis.  She did have a palliative care consult, and she strongly agreed, especially given her readmission to the hospital.      CURRENT/RECENT TCU ISSUES    Disposition: She was having no particular issues until about 6 weeks ago, not needing oxygen but now expecting a chronic continuous need.  She tells me she plans to go on hospice after discharge.  She wants to go to [facility].  She notes that she will be stopping dialysis and would expect to survive no longer than 1 week.  She has been presented with this suggestion about 6 months ago.  She believes that she is now ready.  Her main concern is not about dying but is getting her paperwork done for her  of 44 years.     She tells me that she is doing much better than she was just a few days ago.  She complains of neuropathy in her feet, Raynaud's syndrome in the feet as well, and some constipation.    She is on a 1500 mL fluid restriction.    Medication changes: Considering her plans for hospice, we talked about her medications.  It was agreed that atorvastatin is not needed, and so we are discontinuing that.  She stated, \"anything you can " "take away from me won't break my heart at all.\"      ROS:   Positives: Her feet really hurt this morning due to her neuropathy.  She finds that lying in bed with her knees up in the air helps.  Breathing, she says, is about the same.  She is on chronic oxygen.      Negatives: Complaining of constipation before, things are going better now.  She denies any headaches or chest pains, coughing or congestion at the moment, dizziness, dysuria, diarrhea, difficulty chewing or swallowing, or problems with sleep (when on 150 mg of trazodone nightly).    Past Medical History:   Diagnosis Date   ? Arthritis    ? CHF (congestive heart failure) (H)    ? Chronic anemia 6/1/2014   ? Chronic kidney disease    ? Chronic thoracic aortic dissection (H) 10/7/2015    Descending thoracic aorta; treated medically per notes of Dragan Singh and Jennifer.   ? COPD (chronic obstructive pulmonary disease) (H)    ? CVA (cerebral infarction)    ? Disease of thyroid gland    ? Dyslipidemia    ? ESRD (end stage renal disease) (H) 06/03/2009    on dialysis with Dr. Mitchell   ? Essential hypertension 6/30/2014   ? Gastrointestinal hemorrhage, unspecified gastrointestinal hemorrhage type 6/5/2017   ? GI (gastrointestinal bleed)    ? GI bleeding 6/5/2017   ? Gout    ? L3 vertebral fracture (H) 11/16/2015   ? Left Atrial Appendage Occlusion (WATCHMAN) 4/5/2018    LAAO April 5, 2018 (30 mm WATCHMAN)   ? Obesity    ? ZULEIKA (obstructive sleep apnea), severe, intolerant of CPAP 10/22/2015   ? Oxygen dependent     3L nc   ? Pneumonia 9-7-2015   ? Right foot drop    ? Spinal stenosis 3/28/2016   ? Stroke (H) 3/24/2016              Family History   Problem Relation Age of Onset   ? Dementia Mother    ? Diabetes Mother    ? Arthritis Mother    ? Cancer Mother    ? Depression Mother    ? Heart disease Mother    ? Vision loss Mother    ? Stroke Father    ? Heart disease Father    ? Breast cancer Neg Hx      Social History     Socioeconomic History   ? Marital " status:      Spouse name: Cayden   ? Number of children: 2   ? Years of education: Not on file   ? Highest education level: Not on file   Occupational History     Employer: RETIRED   Social Needs   ? Financial resource strain: Not on file   ? Food insecurity     Worry: Not on file     Inability: Not on file   ? Transportation needs     Medical: Not on file     Non-medical: Not on file   Tobacco Use   ? Smoking status: Former Smoker     Packs/day: 1.50     Years: 37.00     Pack years: 55.50     Types: Cigarettes     Quit date: 2009     Years since quittin.1   ? Smokeless tobacco: Never Used   Substance and Sexual Activity   ? Alcohol use: No     Alcohol/week: 11.7 standard drinks     Types: 14 Standard drinks or equivalent per week     Comment: 14 mixed drinks per week   ? Drug use: No   ? Sexual activity: Never     Partners: Male   Lifestyle   ? Physical activity     Days per week: Not on file     Minutes per session: Not on file   ? Stress: Not on file   Relationships   ? Social connections     Talks on phone: Not on file     Gets together: Not on file     Attends [employer] service: Not on file     Active member of club or organization: Not on file     Attends meetings of clubs or organizations: Not on file     Relationship status: Not on file   ? Intimate partner violence     Fear of current or ex partner: Not on file     Emotionally abused: Not on file     Physically abused: Not on file     Forced sexual activity: Not on file   Other Topics Concern   ? Not on file   Social History Narrative    Lives with her . Daughter in [place] and daughter in [place].     MEDICATIONS: Reviewed from the MAR, physician orders, and earlier progress notes.  Post Discharge Medication Reconciliation Status: discharge medications reconciled and changed, per note/orders.  Updated by me today (2021) with a change in time of senna S reflected below.    Current Outpatient Medications   Medication Sig   ?  acetaminophen (TYLENOL) 500 MG tablet Take 2 tablets (1,000 mg total) by mouth every 6 (six) hours as needed.   ? albuterol (PROVENTIL) 2.5 mg /3 mL (0.083 %) nebulizer solution Take 3 mL (2.5 mg total) by nebulization every 4 (four) hours as needed for wheezing.   ? aspirin 81 MG EC tablet Take 1 tablet (81 mg total) by mouth daily.   ? atorvastatin (LIPITOR) 10 MG tablet Take 10 mg by mouth at bedtime.   ? B complex 11-folic-C-biot-zinc (DIALYVITE) 7-307-141-50 mg-mg-mcg-mg Tab Take 1 tablet by mouth daily with supper. (Dialyvite)    ? buPROPion (WELLBUTRIN XL) 150 MG 24 hr tablet Take 1 tablet (150 mg total) by mouth 2 (two) times a week. Tuesday and saturday (Patient taking differently: Take 150 mg by mouth 2 (two) times a week. Tuesday and saturday)   ? cholecalciferol, vitamin D3, 1,000 unit (25 mcg) tablet Take 2,000 Units by mouth daily.    ? cinacalcet (SENSIPAR) 30 MG tablet Take 30 mg by mouth see administration instructions. Take three times weekly with dialysis (on Mondays, Wednesdays, and Fridays).   ? famotidine (PEPCID) 20 MG tablet Take 1 tablet (20 mg total) by mouth at bedtime.   ? folic acid (FOLVITE) 1 MG tablet TAKE ONE TABLET BY MOUTH ONCE DAILY (Patient taking differently: Take 1 mg by mouth daily. )   ? gabapentin (NEURONTIN) 100 MG capsule Take 100 mg by mouth 2 (two) times a day. Leg pain   ? Lactobacillus rhamnosus GG (CULTURELLE) 10-15 Billion cell capsule Take 1 capsule by mouth daily with lunch.    ? metoprolol succinate (TOPROL-XL) 25 MG Take 1 tablet (25 mg total) by mouth daily with lunch. Hold for SBP<110 or hr<60   ? midodrine (PROAMATINE) 10 MG tablet Take 2 tablets (20 mg total) by mouth 3 (three) times a week. Patient takes prior to dialysis qMWF and PRN if additional dialysis treatments.  Patient reports that she takes 20mg prior to dialysis   ? midodrine (PROAMATINE) 5 MG tablet Take 10 mg by mouth 4 (four) times a week. On non dialysis days if SBP < 106   ? oxyCODONE  (ROXICODONE) 5 MG immediate release tablet TAKE 1/2 TO 1 TABLET (2.5-5MG) BY MOUTH TWICE DAILY AS NEEDED FOR PAIN   ? polyvinyl alcohol (LIQUIFILM TEARS) 1.4 % ophthalmic solution Administer 1 drop to both eyes as needed for dry eyes.   ? senna-docusate (SENNOSIDES-DOCUSATE SODIUM) 8.6-50 mg tablet Take 1 tablet by mouth at bedtime as needed for constipation.    ? sevelamer carbonate (RENVELA) 800 mg tablet Take 1,600 mg by mouth 2 (two) times a day as needed (snacks).   ? sevelamer HCL (RENAGEL) 800 MG tablet Take 1,600 mg by mouth 3 (three) times a day with meals.    ? timolol maleate (TIMOPTIC) 0.5 % ophthalmic solution Administer 1 drop to both eyes 2 (two) times a day.    ? traZODone (DESYREL) 150 MG tablet Take 150 mg by mouth at bedtime.      ALLERGIES:   Allergies   Allergen Reactions   ? Ace Inhibitors Cough   ? Atrovent [Ipratropium Bromide] Headache   ? Fosinopril Sodium Cough   ? Zolpidem      hallucinations     DIET: Dialysis diet, regular texture, thin liquids.  1500 mL /24-hour fluid restriction    Vitals:    02/24/21 1149   BP: 109/65   Pulse: 78   Resp: 18   Temp: 97.7  F (36.5  C)   SpO2: 95%   Weight: 177 lb 1.6 oz (80.3 kg)     Body mass index is 29.47 kg/m .    EXAMINATION:   General: Pleasant, alert, and conversant middle-aged female, sitting in her wheelchair, in no apparent distress.  Head: Normocephalic and atraumatic.   Eyes: PERRLA, sclerae clear.  Slight disconjugate gaze.  ENT: Moist oral mucosa.  She has her own teeth.  No rhinorrhea or nasal discharge.  Hearing is unimpaired.  Cardiovascular: Irregular rhythm with a 2/6 systolic ejection murmur at the left sternal border.  Respiratory: Diminished lung sounds bilaterally but otherwise clear.  Abdomen: Soft and nontender.   Musculoskeletal/Extremities: Age-related degenerative joint disease.  Right 3+ pitting pedal and pretibial edema, 2+ on the left.  Integument: No rashes, clinically significant lesions, or skin breakdown.    Cognitive/Psychiatric: Alert and oriented ×4.  Affect is euthymic.    DIAGNOSTICS:   Results for orders placed or performed during the hospital encounter of 02/18/21   Basic Metabolic Panel   Result Value Ref Range    Sodium 136 136 - 145 mmol/L    Potassium 3.7 3.5 - 5.0 mmol/L    Chloride 97 (L) 98 - 107 mmol/L    CO2 29 22 - 31 mmol/L    Anion Gap, Calculation 10 5 - 18 mmol/L    Glucose 95 70 - 125 mg/dL    Calcium 8.7 8.5 - 10.5 mg/dL    BUN 28 8 - 28 mg/dL    Creatinine 3.01 (H) 0.60 - 1.10 mg/dL    GFR MDRD Af Amer 18 (L) >60 mL/min/1.73m2    GFR MDRD Non Af Amer 15 (L) >60 mL/min/1.73m2     Lab Results   Component Value Date    WBC 3.9 (L) 02/20/2021    HGB 10.1 (L) 02/20/2021    HCT 31.8 (L) 02/20/2021     (H) 02/20/2021    PLT 91 (L) 02/20/2021     Estimated Creatinine Clearance: 17.8 mL/min (A) (by C-G formula based on SCr of 3.01 mg/dL (H)).  Lab Results   Component Value Date     (H) 02/18/2021     ASSESSMENT/Plan:      ICD-10-CM    1. Chronic respiratory failure with hypoxia (H)  J96.11    2. COPD exacerbation (H)  J44.1    3. Pulmonary emphysema, unspecified emphysema type (H)  J43.9    4. ESRD on hemodialysis (H), MWF  N18.6     Z99.2    5. Chronic atrial fibrillation (H)  I48.20    6. Anemia of chronic renal failure, stage 5 (H)  N18.5     D63.1    7. Essential hypertension  I10    8. Chronic acquired lymphedema  I89.0      CHANGES:    1.  Discontinue atorvastatin.    CARE PLAN:    The care plan has been reviewed and all orders signed. Changes to care plan, if any, as noted. Otherwise, continue care plan of care.  Total time spent with this patient was approximately 35 minutes, with greater than 50% spent in counseling and coordination of care that included a review of her medications and, considering her code status and plan of care, initial changes were made.    The above has been created using voice recognition software. Please be aware that this may unintentionally  produce  inaccuracies and/or nonsensical sentences.      Electronically signed by: Louie Liriano, CNP

## 2021-06-21 NOTE — LETTER
Letter by June Kingston CNP at      Author: June Kingston CNP Service: -- Author Type: --    Filed:  Encounter Date: 11/26/2020 Status: (Other)         Patient: Belia Rodriguez   MR Number: [id number]   YOB: 1945   Date of Visit: 11/26/2020     Code Status:  DNR  Visit Type: Discharge Summary     Facility:  [facility] [[id number]]              History of Present Illness: Belia Rodriguez is a 75 y.o. female  who I am seeing today for discharge from the TCU.  Past medical history includes end-stage renal disease on dialysis 3 times weekly, acute hypoxic respiratory failure secondary to CHF as well as COPD.  Patient recently hospitalized on 11/2/2020 secondary to acute on chronic respiratory failure secondary to acute COPD and acute diastolic heart failure.  Patient with underlying COPD.  She is dependent on oxygen at 2 to 3 L.  She continues on home nebs.  She was treated with prednisone.  She also completed a 5-day course of ceftriaxone/azithromycin.  Fluid overload improved with daily dialysis.  She has resumed her 3 times weekly schedule.  Hyperkalemia improved with dialysis.  Leg swelling.  Ultrasound negative.  Obstructive sleep apnea on CPAP.  Patient with a history of neuropathy in the lower extremities.  History of atrial fib.  She does have a watchman device in place.  Chronic anemia secondary to end-stage renal disease.  Hypotension on dialysis days.  She continues on Midodrine.  Patient was seen by hospice during her previous hospitalization on 10/5/2020 however this was declined.    Today patient sitting up in wheelchair.  Patient with end-stage renal disease on dialysis 3 times weekly.  She is having some increased lower extremity edema as greater on the right.  She tells me she is asked dialysis to go 4 times weekly instead of 3 however they do not have room at her current dialysis.  I do feel this is going to be a barrier to discharge.  Underlying  CAD.  She off-and-on has increased swelling pain and redness to the right lower extremity. Patient did have a venous Doppler on 11/3 during hospitalization which was negative for DVT.  She had a arterial Doppler on 10/15/2020 which showed some mild atherosclerotic irregularity but no significant stenotic or occlusive changes. Two-vessel runoff at the ankle noted.patient continues on Tylenol and oxycodone for pain.  We did talk about attempting to wean off when she goes home.  I will send her with a weeks worth.  She does have underlying COPD and end-stage heart disease dependent on oxygen.  Shortness of breath continues with exertion.  Blood pressure continues on the soft side.  She is receiving midodrine.  Patient with chronic back pain well controlled with oxycodone.  She continues with some hyperkalemia. Potassium was 5.3. Creatinine about 6.83.        Active Ambulatory Problems     Diagnosis Date Noted   ? Tachycardia-bradycardia syndrome (H) 06/12/2014   ? Hyperkalemia 06/30/2014   ? Essential hypertension with goal blood pressure less than 140/90 06/30/2014   ? Hyperlipidemia 06/30/2014   ? Acute respiratory failure with hypoxia (H) 06/30/2014   ? ZULEIKA (obstructive sleep apnea), severe 10/22/2015   ? Anemia of chronic renal failure, stage 5 (H)    ? Dissection of thoracoabdominal aorta (H)    ? Gout    ? GERD (gastroesophageal reflux disease) 04/14/2017   ? Right foot drop 05/16/2017   ? Acute midline low back pain with right-sided sciatica 06/16/2017   ? History of compression fracture of spine 06/16/2017   ? Pulmonary emphysema (H) 02/19/2018   ? Presence of Watchman left atrial appendage closure device 04/05/2018   ? Other dysphagia 08/10/2018   ? 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   ? Hyperparathyroidism (H) 03/24/2009   ? Osteoporosis 03/24/2009   ? 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 ESRD on hemodialysis (H) 08/15/2018   ? Acute pulmonary edema (H) 09/07/2018    ? Chronic atrial fibrillation (H)    ? COPD exacerbation (H) 01/04/2019   ? Acute on chronic diastolic congestive heart failure (H) 01/23/2019   ? Thrombocytopenia (H)    ? Contraindication to anticoagulation therapy 03/26/2019   ? Dyspnea 05/18/2019   ? Cardiac pacemaker in situ 03/20/2019   ? S/P AV (atrioventricular) jonn ablation 03/20/2019   ? Hip fracture, intertrochanteric (H) 06/23/2019   ? DVT (deep venous thrombosis) (H) 08/22/2019   ? Carpal tunnel syndrome of left wrist 02/04/2020   ? SOB (shortness of breath) 07/13/2020   ? Hypotension, unspecified hypotension type    ? Major depressive disorder, remission status unspecified, unspecified whether recurrent 09/15/2020   ? Hypoxia 10/05/2020   ? Encounter for palliative care      Resolved Ambulatory Problems     Diagnosis Date Noted   ? Hypotension 06/01/2014   ? Fever 06/07/2014   ? Aortic aneurysm rupture (H)    ? Bacteremia due to Escherichia coli 06/08/2014   ? Severe tobacco use disorder 06/30/2014   ? Persistent atrial fibrillation (H) 06/30/2014   ? Bradycardia, sinus 06/30/2014   ? Volume overload 06/30/2014   ? Hypomagnesemia 06/30/2014   ? Cellulitis 08/25/2014   ? Hematochezia 11/02/2014   ? BRBPR (bright red blood per rectum) 11/02/2014   ? Hematuria 04/01/2015   ? Aortic dissection (H) 04/01/2015   ? Pneumonia 09/07/2015   ? Hypoxia 09/23/2015   ? SOB (shortness of breath) 09/23/2015   ? Acute on chronic diastolic heart failure (H) 09/27/2015   ? Bacteremia 09/27/2015   ? Dyspnea 10/07/2015   ? Fluid overload 10/07/2015   ? Chronic thoracic aortic dissection (H) 10/07/2015   ? L3 vertebral fracture (H) 11/16/2015   ? GI bleeding 03/20/2016   ? Chronic systolic congestive heart failure (H)    ? Acute lower GI bleeding 03/21/2016   ? Bilateral anterior& Lt Occipital embolic Infarction 03/24/2016   ? Spinal stenosis 03/28/2016   ? Back pain 03/28/2016   ? Tremor 03/28/2016   ? SOB (shortness of breath) 05/03/2016   ? CVA (cerebral infarction)  05/04/2016   ? Respiratory distress 05/09/2016   ? CHF (congestive heart failure) (H) 05/09/2016   ? Right knee pain    ? Dyspnea 10/02/2016   ? Volume overload 10/02/2016   ? Acute bronchitis due to infection 10/02/2016   ? Acute bacterial conjunctivitis of both eyes 10/02/2016   ? Acute CHF (congestive heart failure) (H) 10/09/2016   ? Hypervolemia, unspecified hypervolemia type    ? Pure hypercholesterolemia    ? Gastroesophageal reflux disease without esophagitis    ? Leg weakness 04/20/2017   ? Anticoagulated on warfarin 05/16/2017   ? Gastrointestinal hemorrhage, unspecified gastrointestinal hemorrhage type 06/05/2017   ? Anticoagulant long-term use 06/16/2017   ? Acute GI bleeding 12/09/2017   ? SOB (shortness of breath) 01/26/2018   ? Acute bronchitis with bronchospasm    ? Acute respiratory failure with hypoxia and hypercapnia (H)    ? Acute on chronic respiratory failure with hypoxia (H)    ? History of acute respiratory failure 02/19/2018   ? Chronic respiratory failure with hypoxia (H) 03/02/2018   ? Dyspnea 08/09/2018   ? Chest pain 08/10/2018   ? Hemodialysis catheter infection (H)    ? Hyperkalemia 10/18/2018   ? Atrial fibrillation with RVR (H) 03/03/2019   ? Intertrochanteric fracture of left hip, closed, initial encounter (H) 06/23/2019   ? Closed intertrochanteric fracture of hip, left, initial encounter (H) 06/22/2019     Past Medical History:   Diagnosis Date   ? Arthritis    ? Chronic anemia 6/1/2014   ? Chronic kidney disease    ? COPD (chronic obstructive pulmonary disease) (H)    ? CVA (cerebral infarction)    ? Disease of thyroid gland    ? Dyslipidemia    ? ESRD (end stage renal disease) (H) 06/03/2009   ? Essential hypertension 6/30/2014   ? GI (gastrointestinal bleed)    ? Left Atrial Appendage Occlusion (WATCHMAN) 4/5/2018   ? Obesity    ? Oxygen dependent        Current Outpatient Medications   Medication Sig Note   ? acetaminophen (TYLENOL) 500 MG tablet Take 2 tablets (1,000 mg  total) by mouth every 6 (six) hours as needed.    ? albuterol (PROAIR HFA;PROVENTIL HFA;VENTOLIN HFA) 90 mcg/actuation inhaler Inhale 2 puffs 4 (four) times a day as needed for wheezing or shortness of breath.    ? aspirin 81 MG EC tablet Take 1 tablet (81 mg total) by mouth daily. (Patient taking differently: Take 81 mg by mouth daily. )    ? B complex 11-folic-C-biot-zinc (DIALYVITE) 2-746-726-50 mg-mg-mcg-mg Tab Take 1 tablet by mouth daily with supper. (Dialyvite)     ? buPROPion (WELLBUTRIN XL) 150 MG 24 hr tablet Take 1 tablet (150 mg total) by mouth 2 (two) times a week. Tuesday and saturday (Patient taking differently: Take 150 mg by mouth 2 (two) times a week. Tuesday and saturday)    ? cholecalciferol, vitamin D3, 1,000 unit (25 mcg) tablet Take 2,000 Units by mouth daily.     ? cinacalcet (SENSIPAR) 30 MG tablet Take 30 mg by mouth see administration instructions. Take three times weekly with dialysis (on Mondays, Wednesdays, and Fridays).    ? famotidine (PEPCID) 20 MG tablet Take 1 tablet (20 mg total) by mouth at bedtime.    ? folic acid (FOLVITE) 1 MG tablet TAKE ONE TABLET BY MOUTH ONCE DAILY (Patient taking differently: No sig reported)    ? gabapentin (NEURONTIN) 100 MG capsule TAKE 1 CAPSULE BY MOUTH THREE TIMES DAILY (Patient taking differently: No sig reported)    ? Lactobacillus rhamnosus GG (CULTURELLE) 10-15 Billion cell capsule Take 1 capsule by mouth daily with lunch.     ? metoprolol succinate (TOPROL-XL) 25 MG Take 1 tablet (25 mg total) by mouth daily with lunch. Hold for SBP<110 or hr<60    ? midodrine (PROAMATINE) 10 MG tablet Take 10-20 mg by mouth 3 (three) times a week. On dialysis days. Hold for SBP>105  Patient reports that she usually gets 10 mg before dialysis and another 10 mg M[facility] through dialysis as well     ? oxyCODONE (ROXICODONE) 5 MG immediate release tablet Take 0.5 tablets (2.5 mg total) by mouth every 6 (six) hours as needed for pain.    ? polyvinyl alcohol  (LIQUIFILM TEARS) 1.4 % ophthalmic solution Administer 1 drop to both eyes as needed for dry eyes.    ? senna-docusate (SENNOSIDES-DOCUSATE SODIUM) 8.6-50 mg tablet Take 1 tablet by mouth at bedtime.     ? sevelamer carbonate (RENVELA) 800 mg tablet Take 1 tablet (800 mg total) by mouth 2 (two) times a day as needed (FOr snacks.). (Patient taking differently: Take 800 mg by mouth 2 (two) times a day as needed (FOr snacks.). )    ? sevelamer HCL (RENAGEL) 800 MG tablet Take 1,600 mg by mouth 3 (three) times a day with meals.  11/2/2020: For the past week, she was instructed to take 2 tablets instead of the usual 3 tablets to see if that is enough   ? timolol maleate (TIMOPTIC) 0.5 % ophthalmic solution Administer 1 drop to both eyes 2 (two) times a day.     ? traZODone (DESYREL) 150 MG tablet Take 150 mg by mouth at bedtime.         Allergies   Allergen Reactions   ? Ace Inhibitors Cough   ? Atrovent [Ipratropium Bromide] Headache   ? Fosinopril Sodium Cough   ? Zolpidem      hallucinations         Review of Systems  No fevers or chills. No headache, lightheadedness or dizziness.  Chronic SOB, patient wears oxygen at 2 to 3 L.  Was only wearing oxygen at 2 L at night previously, no chest pains or palpitations. Appetite is good. No nausea, vomiting, constipation or diarrhea. No dysuria, frequency, burning or pain with urination. + weakness. + chronic back pain. Continues on oxycodone. Otherwise review of systems are negative.     Physical Exam  PHYSICAL EXAMINATION:  Vital signs: BP 90/60, pulse 70, respirations 16, temperature 97.5,, O2 sats 99% on 2L.  Weight 158 pounds.  General: Awake, sitting up in wheelchair, alert, oriented x3, appropriately, follows simple commands, conversant  HEENT: Pink conjunctiva, anicteric sclerae, moist oral mucosa.  NECK: Supple, no lymphadenopathy no masses.  CVS: Dialysis catheter in the right chest wall.  Regular rate and rhythm on exam.  LUNG: Slight crackles in the lower lobes.   No cough.  O2 on at 1 L nasal cannula.  EXTREMITIES: Moves both upper and lower extremities with generalized weakness.  3-4+ edema in the right foot.  No redness to the right lower extremity.  She does have bruising to the forefoot and ankle.  PSYCHIATRIC: Cognition intact.       Labs:    Lab Results   Component Value Date    WBC 4.4 11/23/2020    HGB 8.1 (L) 11/23/2020    HCT 26.5 (L) 11/23/2020     (H) 11/23/2020    PLT 75 (L) 11/23/2020     Results for orders placed or performed in visit on 11/23/20   Basic Metabolic Panel   Result Value Ref Range    Sodium 132 (L) 136 - 145 mmol/L    Potassium 6.7 (HH) 3.5 - 5.0 mmol/L    Chloride 105 98 - 107 mmol/L    CO2 17 (L) 22 - 31 mmol/L    Anion Gap, Calculation 10 5 - 18 mmol/L    Glucose 76 70 - 125 mg/dL    Calcium 8.7 8.5 - 10.5 mg/dL    BUN 52 (H) 8 - 28 mg/dL    Creatinine 6.83 (HH) 0.60 - 1.10 mg/dL    GFR MDRD Af Amer 7 (L) >60 mL/min/1.73m2    GFR MDRD Non Af Amer 6 (L) >60 mL/min/1.73m2           Assessment/Plan:    1.    Right lower extremity edema  Doppler obtained to rule out DVT.  This was negative.  Xray of ankle and tibia negative.   Encourage elevation and ice.  Patient continues on dialysis for fluid management.   2. Acute on chronic diastolic congestive heart failure (H)   fluids managed per dialysis.  Weights up about 6 pounds.   2. Pulmonary emphysema, unspecified emphysema type (H)   currently on O2 at 1 L during the day.  Continues on 3 L at night. Continues on home nebs.  Continues on prednisone taper.   3. ESRD on dialysis (H)   continues 3 times weekly.     4.  Hyperkalemia  potassium 5.3 due to end-stage renal disease.  Managed per nephrology.   5. Severe low back pain   continues on oxycodone, gabapentin and Tylenol.  Wean off oxycodone at home.    6. Neuropathic pain   continues on gabapentin.   7. Paroxysmal atrial fibrillation (H)   currently on metoprolol and aspirin.     Okay to DC home with current meds and treatments including 1  weeks worth of oxycodone.  She should wean off this at home.  Follow-up with primary care provider in 1 week.  Follow-up with nephrology outpatient.  This will be a barrier to discharge given her chronic kidney disease and multiple hospitalizations.  Okay for home PT, OT, home health aide and nurse for medication administration and monitoring of fluid.    DISCHARGE PLAN/FACE TO FACE:  I certify that this patient is under my care and that I, or a nurse practitioner or physician's assistant working with me, had a face-to-face encounter that meets the physician face-to-face encounter requirements with this patient.       I certify that, based on my findings, the following services are medically necessary home health services.    My clinical findings support the need for the above skilled services.    This patient is homebound because: End-stage renal disease on dialysis 3 times weekly with recent acute respiratory failure secondary to fluid overload.    The patient is, or has been, under my care and I have initiated the establishment of the plan of care. This patient will be followed by a physician who will periodically review the plan of care.      This note has been dictated using voice recognition software. Any grammatical or context distortions are unintentional and inherent to the software    Electronically signed by: June Kingston, CNP

## 2021-06-21 NOTE — PROGRESS NOTES
Patient seen in clinic for HF education s/p recent hospital discharge 09-25-18.  Reviewed HF Binder that includes the  HF Sx Awareness & Action plan  handout and  A Stronger Pump  booklet and Weight log booklet highlighting :  __X_patient s type of heart failure _X__Na management in diet  __X_importance of daily wts  _X__Fluid Guidelines, if applicable  __X_medication review and importance of compliance     Instructed patient in signs and sx of heart failure, reiterated when to call clinic - reviewed HF hotline # [phone] and after hours call # [phone].  Majority of time was spent reviewing: how to avoid sodium.  Patient verbalized understanding of HF discussion.  Plan for f/u with continued HF education reviewed.  No formal f/u scheduled with nurse clinician for continued education - will continue to reinforce HF management education.

## 2021-06-21 NOTE — LETTER
Letter by Louie Liriano CNP at      Author: Louie Liriano CNP Service: -- Author Type: --    Filed:  Encounter Date: 3/22/2021 Status: (Other)         [facility]  [address] [address]                                  2021    Patient: Belia Rodriguez   MR Number: [id number]   YOB: 1945   Date of Visit: 3/22/2021     Dear Dr. Villa:    Thank you for referring Belia Rodriguez to me for evaluation. Below are the relevant portions of my assessment and plan of care.    If you have questions, please do not hesitate to call me. I look forward to following Belia along with you.    Sincerely,        Louie Liriano CNP          CC  No Recipients  Louie Liriano CNP  3/22/2021  9:23 PM  Signed  [facility]    Name:   Belia Rodriguez (Itzel)  : 1945  Facility:   [facility] [[id number]]   Room: [place] [address]  Code Status: DNR/DNI and POLST AVAILABLE - Palliative Care  Fac type:   SNF (Skilled Nursing Facility, TCU) -     DOS: 2021    PCP: Neil Galan MD      CHIEF COMPLAINT / REASON FOR VISIT:  Chief Complaint   Patient presents with   ? Follow-up     TCU follow-up: Hospitalization for acute on chronic respiratory failure due to a combination of fluid overload and COPD exacerbation   ? Problem Visit     1. Hypotension due to hypovolemia. 2. Depression.     [facility] from 18 until 18  [facility] from 18 until 18  [facility] from 2021 until 2021 (acute on chronic respiratory failure due to a combination of fluid overload and COPD exacerbation)  [facility] from 2021 until 2021  [facility] from 2021 until 2021 (acute on chronic respiratory failure due to a combination of fluid overload and COPD exacerbation)      Patient was last seen by me on 03/18/2021.      HPI: Belia is a 75 y.o. female with known end-stage renal disease (on dialysis), COPD (former smoker, 45-60 pack years),  chronic respiratory failure with hypoxia, obstructive sleep apnea (severe and previously intolerant of CPAP), chronic atrial fibrillation, GERD, essential hypertension, and anemia related to chronic renal failure.    She has had multiple hospitalizations for respiratory failure due to a combination of fluid overload and COPD exacerbation.  This occurred on 01/26/2021 and again on 02/18/2021.  Her last admission came within hours of her discharge from [facility].  When last in the TCU at [facility], she was a full code, but she indicated during her last hospitalization that she wants no further BiPAP and is DNR/DNI.  This did improve somewhat with steroids and doxycycline.    While she continues on hemodialysis Mondays, Wednesdays, and Fridays, she did have some questions regarding hospice but was not sure she would sign on given her need for dialysis.  She did have a palliative care consult, and she strongly agreed, especially given her readmission to the hospital.      CURRENT/RECENT TCU ISSUES    Disposition: The patient is aware that her health will not improve and is ready for hospice, although she is concerned about having all her paperwork in order.  I had received a request by  that I give an okay for an in-house psych consult.  My initial thought was that the patient has been doing quite well and accepting her situation, and there would be nothing gained from such a consult.  At the time, we spent a good 20 minutes talking about her feelings with regard to hospice, dying, and other related issues, including depression.  She did tell me that she thought she might benefit from a consult and was recently seen via video conference.  She thought it was beneficial.    She was having no particular  issues until about 6 weeks ago, not needing oxygen but now expecting a chronic continuous need.  She initially told me she planned to go on hospice after discharge.  She expressed a desire to go to [facility].  She noted that she would be stopping dialysis and would expect to survive no longer than 1 week.  She had been presented with this suggestion about 6 months ago.  She believed that she was ready.  Her main concern has not been about dying but  getting her paperwork done for her  of 44 years.     She is doing fairly well.  She complains of neuropathy in her feet, Raynaud's phenomenon in the feet as well, and some constipation.    Hypotension: She is on a 1500 mL fluid restriction.  This may be producing hypovolemia.  She does have orders for 20 mg of midodrine to take on dialysis days (Mondays, Wednesdays, and Fridays) and has additional orders for taking this when additional dialysis is needed.  However, she has been running significantly low blood pressures.  Systolic blood pressure has been as low as the 70s. On 03/01/2021, another nurse practitioner ordered 10 mg of midodrine to be given on nondialysis days when the SBP is <106.  An adjustment was recently made at dialysis, and she is now receiving a 20 mg dose there as well if necessary.  They has been pacing her dialysis fluid removal with a dry weight of 67.5 kg.  There have been times when she has required additional fluids following dialysis due to severely low blood pressures.  Very recently, the nephrologist adjusted her dry weight to 70.5 kg, and this has proved to make a considerable difference.  She has been able to skip some postdialysis midodrine doses.  They have not placed the midodrine doses in an envelope and given them to her.  We are going to write orders for 10 mg 3 times daily as needed of midodrine to be given whenever SBPs are <106, regardless of when this occurs.    Medication changes: At an earlier visit, considering her  "plans for hospice, we talked about her medications.  It was agreed that atorvastatin was no longer needed, and so we discontinued it.  At the time, she stated, \"anything you can take away from me won't break my heart at all.\"    Discharge planning: She told her  and daughter that she wanted to stay here, but her  became angry.  He has been the one to take her to dialysis and suggest that she would need to arrange her own transportation if she decided to stay.  He also told her he would not be doing housework.  The end result was that the patient would be staying 2 more weeks.  Apparently, her  has now finally resigned himself to her being here.  She tells me that she is here now because she has no other place to go, and palliative care is cheaper than hospice.  I told her I had no problem with managing a palliative care plan. There was a request for physical therapy, and we gave orders for that. Currently, no discharge date has been set, although her family is cleaning up the space where she will be living upon her return home.      ROS:   Positives: Her feet continue to be painful due to neuropathy.  She has found that lying in bed with her knees up in the air helps. She is on chronic oxygen, and breathing is no better and no worse.      Negatives: Complaining of constipation before, things are going better now.  She denies any headaches or chest pains, coughing or congestion at the moment, dizziness, dysuria, diarrhea, difficulty chewing or swallowing, or problems with sleep (when on 150 mg of trazodone nightly).    Past Medical History:   Diagnosis Date   ? Arthritis    ? CHF (congestive heart failure) (H)    ? Chronic anemia 6/1/2014   ? Chronic kidney disease    ? Chronic thoracic aortic dissection (H) 10/7/2015    Descending thoracic aorta; treated medically per notes of Dragan Singh and Jennifer.   ? COPD (chronic obstructive pulmonary disease) (H)    ? CVA (cerebral infarction)    ? Disease " of thyroid gland    ? Dyslipidemia    ? ESRD (end stage renal disease) (H) 2009    on dialysis with Dr. Mitchell   ? Essential hypertension 2014   ? Gastrointestinal hemorrhage, unspecified gastrointestinal hemorrhage type 2017   ? GI (gastrointestinal bleed)    ? GI bleeding 2017   ? Gout    ? L3 vertebral fracture (H) 2015   ? Left Atrial Appendage Occlusion (WATCHMAN) 2018    LAAO 2018 (30 mm WATCHMAN)   ? Obesity    ? ZULEIKA (obstructive sleep apnea), severe, intolerant of CPAP 10/22/2015   ? Oxygen dependent     3L nc   ? Pneumonia 2015   ? Right foot drop    ? Spinal stenosis 3/28/2016   ? Stroke (H) 3/24/2016              Family History   Problem Relation Age of Onset   ? Dementia Mother    ? Diabetes Mother    ? Arthritis Mother    ? Cancer Mother    ? Depression Mother    ? Heart disease Mother    ? Vision loss Mother    ? Stroke Father    ? Heart disease Father    ? Breast cancer Neg Hx      Social History     Socioeconomic History   ? Marital status:      Spouse name: Cayden   ? Number of children: 2   ? Years of education: Not on file   ? Highest education level: Not on file   Occupational History     Employer: RETIRED   Social Needs   ? Financial resource strain: Not on file   ? Food insecurity     Worry: Not on file     Inability: Not on file   ? Transportation needs     Medical: Not on file     Non-medical: Not on file   Tobacco Use   ? Smoking status: Former Smoker     Packs/day: 1.50     Years: 37.00     Pack years: 55.50     Types: Cigarettes     Quit date: 2009     Years since quittin.2   ? Smokeless tobacco: Never Used   Substance and Sexual Activity   ? Alcohol use: No     Alcohol/week: 11.7 standard drinks     Types: 14 Standard drinks or equivalent per week     Comment: 14 mixed drinks per week   ? Drug use: No   ? Sexual activity: Never     Partners: Male   Lifestyle   ? Physical activity     Days per week: Not on file     Minutes per  session: Not on file   ? Stress: Not on file   Relationships   ? Social connections     Talks on phone: Not on file     Gets together: Not on file     Attends [employer] service: Not on file     Active member of club or organization: Not on file     Attends meetings of clubs or organizations: Not on file     Relationship status: Not on file   ? Intimate partner violence     Fear of current or ex partner: Not on file     Emotionally abused: Not on file     Physically abused: Not on file     Forced sexual activity: Not on file   Other Topics Concern   ? Not on file   Social History Narrative    Lives with her . Daughter in [place] and daughter in [place].     MEDICATIONS: Reviewed from the MAR, physician orders, and earlier progress notes.    Post Discharge Medication Reconciliation Status: medication reconciliation previously completed during another office visit.    Updated by me today (03/22/2021) with the addition of as needed midodrine administered 3 times daily reflected below.    Current Outpatient Medications   Medication Sig   ? acetaminophen (TYLENOL) 500 MG tablet Take 2 tablets (1,000 mg total) by mouth every 6 (six) hours as needed.   ? albuterol (PROVENTIL) 2.5 mg /3 mL (0.083 %) nebulizer solution Take 3 mL (2.5 mg total) by nebulization every 4 (four) hours as needed for wheezing.   ? aspirin 81 MG EC tablet Take 1 tablet (81 mg total) by mouth daily.   ? atorvastatin (LIPITOR) 10 MG tablet Take 10 mg by mouth at bedtime.   ? B complex 11-folic-C-biot-zinc (DIALYVITE) 1-059-031-50 mg-mg-mcg-mg Tab Take 1 tablet by mouth daily with supper. (Dialyvite)    ? buPROPion (WELLBUTRIN XL) 150 MG 24 hr tablet Take 1 tablet (150 mg total) by mouth 2 (two) times a week. Tuesday and saturday (Patient taking differently: Take 150 mg by mouth 2 (two) times a week. Tuesday and saturday)   ? cholecalciferol, vitamin D3, 1,000 unit (25 mcg) tablet Take 2,000 Units by mouth daily.    ? cinacalcet (SENSIPAR) 30  MG tablet Take 30 mg by mouth see administration instructions. Take three times weekly with dialysis (on Mondays, Wednesdays, and Fridays).   ? famotidine (PEPCID) 20 MG tablet Take 1 tablet (20 mg total) by mouth at bedtime.   ? folic acid (FOLVITE) 1 MG tablet TAKE ONE TABLET BY MOUTH ONCE DAILY (Patient taking differently: Take 1 mg by mouth daily. )   ? gabapentin (NEURONTIN) 100 MG capsule Take 100 mg by mouth 2 (two) times a day. Leg pain   ? Lactobacillus rhamnosus GG (CULTURELLE) 10-15 Billion cell capsule Take 1 capsule by mouth daily with lunch.    ? metoprolol succinate (TOPROL-XL) 25 MG Take 1 tablet (25 mg total) by mouth daily with lunch. Hold for SBP<110 or hr<60   ? midodrine (PROAMATINE) 10 MG tablet Take 2 tablets (20 mg total) by mouth 3 (three) times a week. Patient takes prior to dialysis qMWF and PRN if additional dialysis treatments.  Patient reports that she takes 20mg prior to dialysis (Patient taking differently: Take 20 mg by mouth 3 (three) times a week. Patient takes prior to dialysis qMWF and PRN if additional dialysis treatments.  Patient reports taking 20mg prior to dialysis and is given 20 mg after dialysis as well.    Also, on nondialysis days, give 10 mg daily for SBP <106.)   ? midodrine (PROAMATINE) 10 MG tablet Take 10 mg by mouth daily as needed. Whenever SBP is <106 (Check BP three times a day).   ? midodrine (PROAMATINE) 5 MG tablet Take 10 mg by mouth 4 (four) times a week. On non dialysis days if SBP < 106   ? oxyCODONE (ROXICODONE) 5 MG immediate release tablet TAKE 1/2 TO 1 TABLET (2.5-5MG) BY MOUTH TWICE DAILY AS NEEDED FOR PAIN   ? polyvinyl alcohol (LIQUIFILM TEARS) 1.4 % ophthalmic solution Administer 1 drop to both eyes as needed for dry eyes.   ? pot bicarb/sod bicarb/cit ac (EDI-SELTZER GOLD ORAL) Take by mouth daily.   ? senna-docusate (SENNOSIDES-DOCUSATE SODIUM) 8.6-50 mg tablet Take 1 tablet by mouth at bedtime as needed for constipation.    ? sevelamer  carbonate (RENVELA) 800 mg tablet Take 1,600 mg by mouth 2 (two) times a day as needed (snacks).   ? sevelamer HCL (RENAGEL) 800 MG tablet Take 1,600 mg by mouth 3 (three) times a day with meals.    ? timolol maleate (TIMOPTIC) 0.5 % ophthalmic solution Administer 1 drop to both eyes 2 (two) times a day.    ? traZODone (DESYREL) 150 MG tablet Take 150 mg by mouth at bedtime.      ALLERGIES:   Allergies   Allergen Reactions   ? Ace Inhibitors Cough   ? Atrovent [Ipratropium Bromide] Headache   ? Fosinopril Sodium Cough   ? Zolpidem      hallucinations     DIET: Dialysis diet, regular texture, thin liquids.  1500 mL /24-hour fluid restriction    Vitals:    03/22/21 1625   BP: 96/55   Pulse: 79   Resp: 18   Temp: 98.2  F (36.8  C)   SpO2: 95%   Weight: 157 lb (71.2 kg)     Body mass index is 25.34 kg/m .    EXAMINATION:   General: Pleasant, alert, and conversant middle-aged female, sitting on the bed, in no apparent distress.  Head: Normocephalic and atraumatic.   Eyes: PERRLA, sclerae clear.  Slight disconjugate gaze.  ENT: Moist oral mucosa.  She has her own teeth.  She has a bit of a runny nose today.  Hearing is unimpaired.  Cardiovascular: Irregular rhythm with a 2/6 systolic ejection murmur at the left sternal border.  Respiratory: Diminished lung sounds bilaterally but otherwise clear.  Abdomen: Soft and nontender.   Musculoskeletal/Extremities: Age-related degenerative joint disease.  Right 2+ pitting pedal and pretibial edema, 1+ on the left, slightly improved while Ace wrapped.  Integument: No rashes, clinically significant lesions, or skin breakdown.   Cognitive/Psychiatric: Alert and oriented ×4.  Affect is euthymic.    DIAGNOSTICS:   Results for orders placed or performed during the hospital encounter of 02/18/21   Basic Metabolic Panel   Result Value Ref Range    Sodium 136 136 - 145 mmol/L    Potassium 3.7 3.5 - 5.0 mmol/L    Chloride 97 (L) 98 - 107 mmol/L    CO2 29 22 - 31 mmol/L    Anion Gap,  Calculation 10 5 - 18 mmol/L    Glucose 95 70 - 125 mg/dL    Calcium 8.7 8.5 - 10.5 mg/dL    BUN 28 8 - 28 mg/dL    Creatinine 3.01 (H) 0.60 - 1.10 mg/dL    GFR MDRD Af Amer 18 (L) >60 mL/min/1.73m2    GFR MDRD Non Af Amer 15 (L) >60 mL/min/1.73m2     Lab Results   Component Value Date    WBC 3.9 (L) 02/20/2021    HGB 10.1 (L) 02/20/2021    HCT 31.8 (L) 02/20/2021     (H) 02/20/2021    PLT 91 (L) 02/20/2021     CrCl cannot be calculated (Patient's most recent lab result is older than the maximum 10 days allowed.).  Lab Results   Component Value Date     (H) 02/18/2021     ASSESSMENT/Plan:      ICD-10-CM    1. Chronic respiratory failure with hypoxia (H)  J96.11    2. ESRD on hemodialysis (H), MWF  N18.6     Z99.2    3. Chronic diastolic heart failure (H)  I50.32    4. Hypotension due to hypovolemia  I95.89     E86.1    5. Chronic atrial fibrillation (H)  I48.20    6. Anemia of chronic renal failure, stage 5 (H)  N18.5     D63.1    7. Pulmonary emphysema, unspecified emphysema type (H)  J43.9    8. Mild episode of recurrent major depressive disorder (H)  F33.0    9. Chronic acquired lymphedema  I89.0    10. Gastroesophageal reflux disease without esophagitis  K21.9      CHANGES:    Add as needed dose of midodrine 10 mg 3 times daily at any time for SBP <106.    CARE PLAN:    The care plan has been reviewed and all orders signed. Changes to care plan, if any, as noted. Otherwise, continue care plan of care.      The above has been created using voice recognition software. Please be aware that this may unintentionally  produce inaccuracies and/or nonsensical sentences.      Electronically signed by: Louie Liriano, AYAH

## 2021-06-21 NOTE — LETTER
Letter by Louie Liriano CNP at      Author: Louie Liriano CNP Service: -- Author Type: --    Filed:  Encounter Date: 3/11/2021 Status: (Other)         [facility]  [address] [address]                                  2021    Patient: Belia Rodriguez   MR Number: [id number]   YOB: 1945   Date of Visit: 3/11/2021     Dear Dr. Villa:    Thank you for referring Belia Rodriguez to me for evaluation. Below are the relevant portions of my assessment and plan of care.    If you have questions, please do not hesitate to call me. I look forward to following Belia along with you.    Sincerely,        Louie Liriano CNP          CC  No Recipients  Louie Liriano CNP  3/14/2021 12:41 PM  Signed  [facility]    Name:   Belia Rodriguez (Itzel)  : 1945  Facility:   [facility] [[id number]]   Room: [place] [address]  Code Status: DNR/DNI and POLST AVAILABLE -   Fac type:   SNF (Skilled Nursing Facility, TCU) -     CHIEF COMPLAINT / REASON FOR VISIT:  Chief Complaint   Patient presents with   ? Follow-up     TCU follow-up: Hospitalization for acute on chronic respiratory failure due to a combination of fluid overload and COPD exacerbation.   ? Problem Visit     1. Hypotension due to hypovolemia. 2. Depression.     [facility] from 18 until 18  [facility]  [facility] from 2021 until 2021 (acute on chronic respiratory failure due to a combination of fluid overload and COPD exacerbation)  [facility] from 2021 until 2021  [facility] from 2021 until 2021 (acute on chronic respiratory failure due to a combination of fluid overload and COPD exacerbation)     Patient was last seen by me on 2021.      HPI: Belia is a 75 y.o.  female with known end-stage renal disease (on dialysis), COPD (former smoker, 45-60 pack years),  chronic respiratory failure with hypoxia, obstructive sleep apnea (severe and previously intolerant of CPAP), chronic atrial fibrillation, GERD, essential hypertension, and anemia related to chronic renal failure.    She has had multiple hospitalizations for respiratory failure due to a combination of fluid overload and COPD exacerbation.  This occurred on 01/26/2021 and again on 02/18/2021.  Her last admission came within hours of her discharge from [facility].  When last in the TCU at [facility], she was a full code, but she indicated during her last hospitalization that she wants no further BiPAP and is DNR/DNI.  This did improve somewhat with steroids and doxycycline.    While she continues on hemodialysis Mondays, Wednesdays, and Fridays, she did have some questions regarding hospice but was not sure she would sign on given her need for dialysis.  She did have a palliative care consult, and she strongly agreed, especially given her readmission to the hospital.      CURRENT/RECENT TCU ISSUES    Disposition: The patient is aware that her health will not improve and is ready for hospice, although she is concerned about having all her paperwork in order.  I had received a request by  that I give an okay for an in-house psych consult.  My initial thought was that the patient has been doing quite well and accepting her situation, and there would be nothing gained from such a consult.  At the time, we spent a good 20 minutes talking about her feelings with regard to hospice, dying, and other related issues, including depression.  She did tell me that she thought she might benefit from a consult and was recently seen via video conference.  She thought it was beneficial.    She was having no particular issues until about 6 weeks ago, not needing oxygen but now expecting a chronic  "continuous need.  She tells me she plans to go on hospice after discharge.  She wants to go to [facility].  She notes that she will be stopping dialysis and would expect to survive no longer than 1 week.  She has been presented with this suggestion about 6 months ago.  She believes that she is now ready.  Her main concern is not about dying but is getting her paperwork done for her  of 44 years.     She is doing fairly well.  She complains of neuropathy in her feet, Raynaud's phenomenon in the feet as well, and some constipation.    Hypotension: She is on a 1500 mL fluid restriction.  This may be producing hypovolemia.  She does have orders for 20 mg of midodrine to take on dialysis days (Mondays, Wednesdays, and Fridays) and has additional orders for taking this when additional dialysis is needed.  However, she has been running significantly low blood pressures.  Systolic blood pressure has been as low as 79. On 03/01/2021, another nurse practitioner ordered 10 mg of midodrine to be given on nondialysis days when the SBP is <106.  An adjustment was recently made at dialysis, and she is now receiving a 20 mg dose there.    Medication changes: At an earlier visit, considering her plans for hospice, we talked about her medications.  It was agreed that atorvastatin was no longer needed, and so we discontinued it.  At the time, she stated, \"anything you can take away from me won't break my heart at all.\"    Discharge planning: She told her  and daughter that she wanted to stay here, but her  became angry.  He has been the one to take her to dialysis and suggest that she would need to arrange her own transportation if she decided to stay.  He also told her he would not be doing housework.  The end result is that the patient will be staying 2 more weeks.      ROS:   Positives: Her feet continue to be painful due to neuropathy.  She has found that lying in bed with her knees up in the air helps. She " is on chronic oxygen, and breathing is no better and no worse.      Negatives: Complaining of constipation before, things are going better now.  She denies any headaches or chest pains, coughing or congestion at the moment, dizziness, dysuria, diarrhea, difficulty chewing or swallowing, or problems with sleep (when on 150 mg of trazodone nightly).    Past Medical History:   Diagnosis Date   ? Arthritis    ? CHF (congestive heart failure) (H)    ? Chronic anemia 6/1/2014   ? Chronic kidney disease    ? Chronic thoracic aortic dissection (H) 10/7/2015    Descending thoracic aorta; treated medically per notes of Dragan Singh and Jennifer.   ? COPD (chronic obstructive pulmonary disease) (H)    ? CVA (cerebral infarction)    ? Disease of thyroid gland    ? Dyslipidemia    ? ESRD (end stage renal disease) (H) 06/03/2009    on dialysis with Dr. Mitchell   ? Essential hypertension 6/30/2014   ? Gastrointestinal hemorrhage, unspecified gastrointestinal hemorrhage type 6/5/2017   ? GI (gastrointestinal bleed)    ? GI bleeding 6/5/2017   ? Gout    ? L3 vertebral fracture (H) 11/16/2015   ? Left Atrial Appendage Occlusion (WATCHMAN) 4/5/2018    LAAO April 5, 2018 (30 mm WATCHMAN)   ? Obesity    ? ZULEIKA (obstructive sleep apnea), severe, intolerant of CPAP 10/22/2015   ? Oxygen dependent     3L nc   ? Pneumonia 9-7-2015   ? Right foot drop    ? Spinal stenosis 3/28/2016   ? Stroke (H) 3/24/2016              Family History   Problem Relation Age of Onset   ? Dementia Mother    ? Diabetes Mother    ? Arthritis Mother    ? Cancer Mother    ? Depression Mother    ? Heart disease Mother    ? Vision loss Mother    ? Stroke Father    ? Heart disease Father    ? Breast cancer Neg Hx      Social History     Socioeconomic History   ? Marital status:      Spouse name: Cayden   ? Number of children: 2   ? Years of education: Not on file   ? Highest education level: Not on file   Occupational History     Employer: RETIRED   Social Needs    ? Financial resource strain: Not on file   ? Food insecurity     Worry: Not on file     Inability: Not on file   ? Transportation needs     Medical: Not on file     Non-medical: Not on file   Tobacco Use   ? Smoking status: Former Smoker     Packs/day: 1.50     Years: 37.00     Pack years: 55.50     Types: Cigarettes     Quit date: 2009     Years since quittin.2   ? Smokeless tobacco: Never Used   Substance and Sexual Activity   ? Alcohol use: No     Alcohol/week: 11.7 standard drinks     Types: 14 Standard drinks or equivalent per week     Comment: 14 mixed drinks per week   ? Drug use: No   ? Sexual activity: Never     Partners: Male   Lifestyle   ? Physical activity     Days per week: Not on file     Minutes per session: Not on file   ? Stress: Not on file   Relationships   ? Social connections     Talks on phone: Not on file     Gets together: Not on file     Attends Buddhism service: Not on file     Active member of club or organization: Not on file     Attends meetings of clubs or organizations: Not on file     Relationship status: Not on file   ? Intimate partner violence     Fear of current or ex partner: Not on file     Emotionally abused: Not on file     Physically abused: Not on file     Forced sexual activity: Not on file   Other Topics Concern   ? Not on file   Social History Narrative    Lives with her . Daughter in [place] and daughter in [place].     MEDICATIONS: Reviewed from the MAR, physician orders, and earlier progress notes.  Post Discharge Medication Reconciliation Status: medication reconciliation previously completed during another office visit.  Updated by me today (2021) with recent increase in midodrine at dialysis is noted below.    Current Outpatient Medications   Medication Sig   ? acetaminophen (TYLENOL) 500 MG tablet Take 2 tablets (1,000 mg total) by mouth every 6 (six) hours as needed.   ? albuterol (PROVENTIL) 2.5 mg /3 mL (0.083 %) nebulizer solution  Take 3 mL (2.5 mg total) by nebulization every 4 (four) hours as needed for wheezing.   ? aspirin 81 MG EC tablet Take 1 tablet (81 mg total) by mouth daily.   ? atorvastatin (LIPITOR) 10 MG tablet Take 10 mg by mouth at bedtime.   ? B complex 11-folic-C-biot-zinc (DIALYVITE) [phone] mg-mg-mcg-mg Tab Take 1 tablet by mouth daily with supper. (Dialyvite)    ? buPROPion (WELLBUTRIN XL) 150 MG 24 hr tablet Take 1 tablet (150 mg total) by mouth 2 (two) times a week. Tuesday and saturday (Patient taking differently: Take 150 mg by mouth 2 (two) times a week. Tuesday and saturday)   ? cholecalciferol, vitamin D3, 1,000 unit (25 mcg) tablet Take 2,000 Units by mouth daily.    ? cinacalcet (SENSIPAR) 30 MG tablet Take 30 mg by mouth see administration instructions. Take three times weekly with dialysis (on Mondays, Wednesdays, and Fridays).   ? famotidine (PEPCID) 20 MG tablet Take 1 tablet (20 mg total) by mouth at bedtime.   ? folic acid (FOLVITE) 1 MG tablet TAKE ONE TABLET BY MOUTH ONCE DAILY (Patient taking differently: Take 1 mg by mouth daily. )   ? gabapentin (NEURONTIN) 100 MG capsule Take 100 mg by mouth 2 (two) times a day. Leg pain   ? Lactobacillus rhamnosus GG (CULTURELLE) 10-15 Billion cell capsule Take 1 capsule by mouth daily with lunch.    ? metoprolol succinate (TOPROL-XL) 25 MG Take 1 tablet (25 mg total) by mouth daily with lunch. Hold for SBP<110 or hr<60   ? midodrine (PROAMATINE) 10 MG tablet Take 2 tablets (20 mg total) by mouth 3 (three) times a week. Patient takes prior to dialysis qMWF and PRN if additional dialysis treatments.  Patient reports that she takes 20mg prior to dialysis (Patient taking differently: Take 20 mg by mouth 3 (three) times a week. Patient takes prior to dialysis qMWF and PRN if additional dialysis treatments.  Patient reports taking 20mg prior to dialysis    Also, on nondialysis days, give 10 mg daily for SBP <106.)   ? midodrine (PROAMATINE) 10 MG tablet Take 10 mg by  mouth daily as needed. Take on non dialysis days. Has an order for dialysis days.   ? midodrine (PROAMATINE) 5 MG tablet Take 10 mg by mouth 4 (four) times a week. On non dialysis days if SBP < 106   ? oxyCODONE (ROXICODONE) 5 MG immediate release tablet TAKE 1/2 TO 1 TABLET (2.5-5MG) BY MOUTH TWICE DAILY AS NEEDED FOR PAIN   ? polyvinyl alcohol (LIQUIFILM TEARS) 1.4 % ophthalmic solution Administer 1 drop to both eyes as needed for dry eyes.   ? pot bicarb/sod bicarb/cit ac (EDI-SELTZER GOLD ORAL) Take by mouth daily.   ? senna-docusate (SENNOSIDES-DOCUSATE SODIUM) 8.6-50 mg tablet Take 1 tablet by mouth at bedtime as needed for constipation.    ? sevelamer carbonate (RENVELA) 800 mg tablet Take 1,600 mg by mouth 2 (two) times a day as needed (snacks).   ? sevelamer HCL (RENAGEL) 800 MG tablet Take 1,600 mg by mouth 3 (three) times a day with meals.    ? timolol maleate (TIMOPTIC) 0.5 % ophthalmic solution Administer 1 drop to both eyes 2 (two) times a day.    ? traZODone (DESYREL) 150 MG tablet Take 150 mg by mouth at bedtime.      ALLERGIES:   Allergies   Allergen Reactions   ? Ace Inhibitors Cough   ? Atrovent [Ipratropium Bromide] Headache   ? Fosinopril Sodium Cough   ? Zolpidem      hallucinations     DIET: Dialysis diet, regular texture, thin liquids.  1500 mL /24-hour fluid restriction    Vitals:    03/11/21 1316   BP: 110/70   Pulse: 80   Resp: 18   Temp: 98  F (36.7  C)   SpO2: 98%   Weight: 158 lb 12.8 oz (72 kg)     Body mass index is 25.63 kg/m .    EXAMINATION:   General: Pleasant, alert, and conversant middle-aged female, sitting on the bed, in no apparent distress.  Head: Normocephalic and atraumatic.   Eyes: PERRLA, sclerae clear.  Slight disconjugate gaze.  ENT: Moist oral mucosa.  She has her own teeth.  She has a bit of a runny nose today.  Hearing is unimpaired.  Cardiovascular: Irregular rhythm with a 2/6 systolic ejection murmur at the left sternal border.  Respiratory: Diminished lung  sounds bilaterally but otherwise clear.  Abdomen: Soft and nontender.   Musculoskeletal/Extremities: Age-related degenerative joint disease.  Right 2+ pitting pedal and pretibial edema, 1+ on the left, slightly improved while Ace wrapped.  Integument: No rashes, clinically significant lesions, or skin breakdown.   Cognitive/Psychiatric: Alert and oriented ×4.  Affect is euthymic.    DIAGNOSTICS:   Results for orders placed or performed during the hospital encounter of 02/18/21   Basic Metabolic Panel   Result Value Ref Range    Sodium 136 136 - 145 mmol/L    Potassium 3.7 3.5 - 5.0 mmol/L    Chloride 97 (L) 98 - 107 mmol/L    CO2 29 22 - 31 mmol/L    Anion Gap, Calculation 10 5 - 18 mmol/L    Glucose 95 70 - 125 mg/dL    Calcium 8.7 8.5 - 10.5 mg/dL    BUN 28 8 - 28 mg/dL    Creatinine 3.01 (H) 0.60 - 1.10 mg/dL    GFR MDRD Af Amer 18 (L) >60 mL/min/1.73m2    GFR MDRD Non Af Amer 15 (L) >60 mL/min/1.73m2     Lab Results   Component Value Date    WBC 3.9 (L) 02/20/2021    HGB 10.1 (L) 02/20/2021    HCT 31.8 (L) 02/20/2021     (H) 02/20/2021    PLT 91 (L) 02/20/2021     CrCl cannot be calculated (Patient's most recent lab result is older than the maximum 10 days allowed.).  Lab Results   Component Value Date     (H) 02/18/2021     ASSESSMENT/Plan:      ICD-10-CM    1. Chronic respiratory failure with hypoxia (H)  J96.11    2. ESRD on hemodialysis (H), MWF  N18.6     Z99.2    3. Chronic diastolic heart failure (H)  I50.32    4. Hypotension due to hypovolemia  I95.89     E86.1    5. Chronic atrial fibrillation (H)  I48.20    6. Anemia of chronic renal failure, stage 5 (H)  N18.5     D63.1    7. Pulmonary emphysema, unspecified emphysema type (H)  J43.9    8. Mild episode of recurrent major depressive disorder (H)  F33.0    9. Chronic acquired lymphedema  I89.0    10. Gastroesophageal reflux disease without esophagitis  K21.9      CHANGES:    None.    CARE PLAN:    The care plan has been reviewed and all  orders signed. Changes to care plan, if any, as noted. Otherwise, continue care plan of care.      The above has been created using voice recognition software. Please be aware that this may unintentionally  produce inaccuracies and/or nonsensical sentences.      Electronically signed by: Louie Liriano CNP

## 2021-06-21 NOTE — LETTER
Letter by June Kingston CNP at      Author: June Kingston CNP Service: -- Author Type: --    Filed:  Encounter Date: 2/9/2021 Status: (Other)         Patient: Belia Rodriguez   MR Number: [id number]   YOB: 1945   Date of Visit: 2/9/2021     Code Status:  DNR  Visit Type: Problem Visit (ESRD, CHF )     Facility:  [facility] [[id number]]              History of Present Illness: Belia Rodriguez is a 75 y.o. female  who I am seeing today for follow-up on the TCU post recent hospitalization for congestive heart failure and COPD.  Past medical history includes end-stage renal disease on dialysis 3 times weekly, acute hypoxic respiratory failure secondary to CHF as well as COPD.  Patient recently hospitalized on 1/26/2021 secondary to increasing shortness of breath.  She was needing increased oxygen support.  She is chronically on O2 2 to 3 L.  She was treated for COPD exacerbation.  She was given Solu-Medrol IV as well as the nebulizers.  She does have underlying end-stage renal disease and continues on dialysis 3 times weekly.  She also has extra runs on Thursdays.  She was needing extra runs prior to her hospitalization for increased CHF exacerbation.  She did get dialysis daily during her hospitalization and her breathing improved.  She was switched from her Solu-Medrol over to prednisone.  Continues on a prednisone taper.  She also was treated with azithromycin.      Today patient sitting up in wheelchair. Pt with CHF. Pt with weight gain of ~ 10 lbs however down 5 lbs yesterday after dialysis. Pt seen by the RD today to review diet and weight loss. I did reiterate this today with pt. Pt reports usual weight around 155-158 lbs now 160 lbs. Pt now with increased edema to BLE, now 2-3+. Pt with ESRD on dialysis 3/weekly. Chronic neuropathy. Pt continues on gabapentin and tylenol. Occasional prn use of oxycodone.         Active Ambulatory Problems     Diagnosis Date  Noted   ? Tachycardia-bradycardia syndrome (H) 06/12/2014   ? Hyperkalemia 06/30/2014   ? Essential hypertension with goal blood pressure less than 140/90 06/30/2014   ? Hyperlipidemia 06/30/2014   ? Acute respiratory failure with hypoxia (H) 06/30/2014   ? Fluid overload 10/07/2015   ? ZULEIKA (obstructive sleep apnea), severe 10/22/2015   ? Anemia of chronic renal failure, stage 5 (H)    ? Dissection of thoracoabdominal aorta (H)    ? Gout    ? GERD (gastroesophageal reflux disease) 04/14/2017   ? Right foot drop 05/16/2017   ? Acute midline low back pain with right-sided sciatica 06/16/2017   ? History of compression fracture of spine 06/16/2017   ? Acute and chronic respiratory failure with hypoxia (H)    ? Pulmonary emphysema (H) 02/19/2018   ? Presence of Watchman left atrial appendage closure device 04/05/2018   ? Other dysphagia 08/10/2018   ? 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   ? Hyperparathyroidism (H) 03/24/2009   ? Osteoporosis 03/24/2009   ? 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 ESRD on hemodialysis (H), MWF 08/15/2018   ? Acute pulmonary edema (H) 09/07/2018   ? Chronic atrial fibrillation (H)    ? COPD exacerbation (H) 01/04/2019   ? Acute on chronic diastolic congestive heart failure (H) 01/23/2019   ? Thrombocytopenia (H)    ? Contraindication to anticoagulation therapy 03/26/2019   ? Dyspnea 05/18/2019   ? Cardiac pacemaker in situ 03/20/2019   ? S/P AV (atrioventricular) jonn ablation 03/20/2019   ? Hip fracture, intertrochanteric (H) 06/23/2019   ? DVT (deep venous thrombosis) (H) 08/22/2019   ? Carpal tunnel syndrome of left wrist 02/04/2020   ? Major depressive disorder, remission status unspecified, unspecified whether recurrent 09/15/2020   ? Encounter for palliative care    ? PVD (peripheral vascular disease) (H) 12/18/2020   ? SOB (shortness of breath) 01/26/2021     Resolved Ambulatory Problems     Diagnosis Date Noted   ? Hypotension 06/01/2014    ? Fever 06/07/2014   ? Aortic aneurysm rupture (H)    ? Bacteremia due to Escherichia coli 06/08/2014   ? Severe tobacco use disorder 06/30/2014   ? Persistent atrial fibrillation (H) 06/30/2014   ? Bradycardia, sinus 06/30/2014   ? Volume overload 06/30/2014   ? Hypomagnesemia 06/30/2014   ? Cellulitis 08/25/2014   ? Hematochezia 11/02/2014   ? BRBPR (bright red blood per rectum) 11/02/2014   ? Hematuria 04/01/2015   ? Aortic dissection (H) 04/01/2015   ? Pneumonia 09/07/2015   ? Hypoxia 09/23/2015   ? SOB (shortness of breath) 09/23/2015   ? Acute on chronic diastolic heart failure (H) 09/27/2015   ? Bacteremia 09/27/2015   ? Dyspnea 10/07/2015   ? Chronic thoracic aortic dissection (H) 10/07/2015   ? L3 vertebral fracture (H) 11/16/2015   ? GI bleeding 03/20/2016   ? Chronic systolic congestive heart failure (H)    ? Acute lower GI bleeding 03/21/2016   ? Bilateral anterior& Lt Occipital embolic Infarction 03/24/2016   ? Spinal stenosis 03/28/2016   ? Back pain 03/28/2016   ? Tremor 03/28/2016   ? SOB (shortness of breath) 05/03/2016   ? CVA (cerebral infarction) 05/04/2016   ? Respiratory distress 05/09/2016   ? CHF (congestive heart failure) (H) 05/09/2016   ? Right knee pain    ? Dyspnea 10/02/2016   ? Volume overload 10/02/2016   ? Acute bronchitis due to infection 10/02/2016   ? Acute bacterial conjunctivitis of both eyes 10/02/2016   ? Acute CHF (congestive heart failure) (H) 10/09/2016   ? Hypervolemia, unspecified hypervolemia type    ? Pure hypercholesterolemia    ? Gastroesophageal reflux disease without esophagitis    ? Leg weakness 04/20/2017   ? Anticoagulated on warfarin 05/16/2017   ? Gastrointestinal hemorrhage, unspecified gastrointestinal hemorrhage type 06/05/2017   ? Anticoagulant long-term use 06/16/2017   ? Acute GI bleeding 12/09/2017   ? SOB (shortness of breath) 01/26/2018   ? Acute bronchitis with bronchospasm    ? Acute respiratory failure with hypoxia and hypercapnia (H)    ? History  of acute respiratory failure 02/19/2018   ? Chronic respiratory failure with hypoxia (H) 03/02/2018   ? Dyspnea 08/09/2018   ? Chest pain 08/10/2018   ? Hemodialysis catheter infection (H)    ? Hyperkalemia 10/18/2018   ? Atrial fibrillation with RVR (H) 03/03/2019   ? Intertrochanteric fracture of left hip, closed, initial encounter (H) 06/23/2019   ? Closed intertrochanteric fracture of hip, left, initial encounter (H) 06/22/2019   ? SOB (shortness of breath) 07/13/2020   ? Hypotension, unspecified hypotension type    ? Hypoxia 10/05/2020   ? Hypoxemia 12/08/2020     Past Medical History:   Diagnosis Date   ? Arthritis    ? Chronic anemia 6/1/2014   ? Chronic kidney disease    ? COPD (chronic obstructive pulmonary disease) (H)    ? CVA (cerebral infarction)    ? Disease of thyroid gland    ? Dyslipidemia    ? ESRD (end stage renal disease) (H) 06/03/2009   ? Essential hypertension 6/30/2014   ? GI (gastrointestinal bleed)    ? Left Atrial Appendage Occlusion (WATCHMAN) 4/5/2018   ? Obesity    ? Oxygen dependent        Current Outpatient Medications   Medication Sig   ? acetaminophen (TYLENOL) 500 MG tablet Take 2 tablets (1,000 mg total) by mouth every 6 (six) hours as needed.   ? albuterol (PROVENTIL) 2.5 mg /3 mL (0.083 %) nebulizer solution Take 3 mL (2.5 mg total) by nebulization every 4 (four) hours as needed for wheezing.   ? aspirin 81 MG EC tablet Take 1 tablet (81 mg total) by mouth daily. (Patient taking differently: Take 81 mg by mouth daily. )   ? atorvastatin (LIPITOR) 10 MG tablet Take 10 mg by mouth at bedtime.   ? B complex 11-folic-C-biot-zinc (DIALYVITE) 0-421-668-50 mg-mg-mcg-mg Tab Take 1 tablet by mouth daily with supper. (Dialyvite)    ? buPROPion (WELLBUTRIN XL) 150 MG 24 hr tablet Take 1 tablet (150 mg total) by mouth 2 (two) times a week. Tuesday and saturday (Patient taking differently: Take 150 mg by mouth 2 (two) times a week. Tuesday and saturday)   ? cholecalciferol, vitamin D3, 1,000  unit (25 mcg) tablet Take 2,000 Units by mouth daily.    ? cinacalcet (SENSIPAR) 30 MG tablet Take 30 mg by mouth see administration instructions. Take three times weekly with dialysis (on Mondays, Wednesdays, and Fridays).   ? famotidine (PEPCID) 20 MG tablet Take 1 tablet (20 mg total) by mouth at bedtime.   ? folic acid (FOLVITE) 1 MG tablet TAKE ONE TABLET BY MOUTH ONCE DAILY (Patient taking differently: No sig reported)   ? gabapentin (NEURONTIN) 100 MG capsule TAKE 1 CAPSULE BY MOUTH THREE TIMES DAILY (Patient taking differently: No sig reported)   ? Lactobacillus rhamnosus GG (CULTURELLE) 10-15 Billion cell capsule Take 1 capsule by mouth daily with lunch.    ? metoprolol succinate (TOPROL-XL) 25 MG Take 1 tablet (25 mg total) by mouth daily with lunch. Hold for SBP<110 or hr<60   ? midodrine (PROAMATINE) 10 MG tablet Take 20 mg by mouth 3 (three) times a week. On dialysis days. Hold for SBP>105  Patient reports that she takes 20mg prior to dialysis   ? midodrine (PROAMATINE) 5 MG tablet Take 10 mg by mouth 4 (four) times a week. On non dialysis days if SBP < 106   ? oxyCODONE (ROXICODONE) 5 MG immediate release tablet Take 5 mg by mouth 2 (two) times a day as needed for pain. Give half tablet to equal 2.5 mg twice daily as needed   ? polyvinyl alcohol (LIQUIFILM TEARS) 1.4 % ophthalmic solution Administer 1 drop to both eyes as needed for dry eyes.   ? predniSONE (DELTASONE) 10 mg tablet Take 40 mg by mouth daily for 3 days, THEN 20 mg daily for 3 days, THEN 10 mg daily for 3 days, THEN 5 mg daily for 3 days.   ? senna-docusate (SENNOSIDES-DOCUSATE SODIUM) 8.6-50 mg tablet Take 1 tablet by mouth at bedtime as needed for constipation.    ? sevelamer carbonate (RENVELA) 800 mg tablet Take 1,600 mg by mouth 2 (two) times a day as needed (snacks).   ? sevelamer HCL (RENAGEL) 800 MG tablet Take 1,600 mg by mouth 3 (three) times a day with meals.    ? timolol maleate (TIMOPTIC) 0.5 % ophthalmic solution Administer  1 drop to both eyes 2 (two) times a day.    ? traZODone (DESYREL) 150 MG tablet Take 150 mg by mouth at bedtime.        Allergies   Allergen Reactions   ? Ace Inhibitors Cough   ? Atrovent [Ipratropium Bromide] Headache   ? Fosinopril Sodium Cough   ? Zolpidem      hallucinations         Review of Systems  No fevers or chills. No headache, lightheadedness or dizziness.  Chronic SOB, patient wears oxygen at 2 to 3 L, no chest pains or palpitations. Appetite is good. No nausea, vomiting, constipation or diarrhea. No dysuria, frequency, burning or pain with urination. + weakness. + chronic back pain. Continues on gabapentin and tylenol. Otherwise review of systems are negative.     Physical Exam  PHYSICAL EXAMINATION:  Vital signs: /62, pulse 71, respirations 16, temperature 97.3, 98% on 1 L.  Weight 160.8  pounds.    General: Awake, sitting up in wheelchair, alert, oriented x3, appropriately, follows simple commands, conversant  HEENT: Pink conjunctiva, anicteric sclerae, moist oral mucosa.  NECK: Supple, no lymphadenopathy no masses.  CVS: Dialysis catheter in the right chest wall.  Regular rate and rhythm on exam.  LUNG: No wheezes, rales or rhonchi. Somewhat diminished in the bases.  O2 on at 1 L nasal cannula.  ABDOMEN: Soft, non distended with active bowel sounds.   EXTREMITIES: Moves both upper and lower extremities with generalized weakness.  3+ edema in the right foot.  No redness to the right lower extremity. PSYCHIATRIC: Cognition intact.       Labs:    Lab Results   Component Value Date    WBC 2.3 (L) 01/27/2021    HGB 9.6 (L) 01/27/2021    HCT 32.3 (L) 01/27/2021     (H) 01/27/2021    PLT 98 (L) 01/27/2021     Results for orders placed or performed during the hospital encounter of 01/26/21   Basic Metabolic Panel   Result Value Ref Range    Sodium 133 (L) 136 - 145 mmol/L    Potassium 5.8 (H) 3.5 - 5.0 mmol/L    Chloride 100 98 - 107 mmol/L    CO2 21 (L) 22 - 31 mmol/L    Anion Gap,  Calculation 12 5 - 18 mmol/L    Glucose 234 (H) 70 - 125 mg/dL    Calcium 8.4 (L) 8.5 - 10.5 mg/dL    BUN 41 (H) 8 - 28 mg/dL    Creatinine 5.88 (H) 0.60 - 1.10 mg/dL    GFR MDRD Af Amer 9 (L) >60 mL/min/1.73m2    GFR MDRD Non Af Amer 7 (L) >60 mL/min/1.73m2           Assessment/Plan:    1. Acute on chronic diastolic congestive heart failure (H)   fluids managed per dialysis.   Weight gain. Pt reeducated on fluid management and dietary guidelines.    2. Pulmonary emphysema, unspecified emphysema type (H)   currently on O2 at 2 L during the day.  Continues on nebs and prednisone taper.   3. ESRD on dialysis (H)   continues 3 times weekly.     4. Severe low back pain   continues on  gabapentin and Tylenol.  She was receiving low-dose oxycodone on her previous day.   Continue oxycodone prn.    6. Neuropathic pain   continues on gabapentin.   7. Paroxysmal atrial fibrillation (H)   currently on metoprolol and aspirin.     This note has been dictated using voice recognition software. Any grammatical or context distortions are unintentional and inherent to the software      Electronically signed by: June Kingston, AYAH

## 2021-06-21 NOTE — LETTER
Letter by Dl Nolan DO at      Author: Dl Nolan DO Service: -- Author Type: --    Filed:  Encounter Date: 11/17/2020 Status: (Other)         Patient: Belia Rodriguez   MR Number: [id number]   YOB: 1945   Date of Visit: 11/17/2020     [facility]      Facility:    [facility] [[id number]]  Code Status: DNR      Chief Complaint/Reason for Visit:  Chief Complaint   Patient presents with   ? H & P     Acute hypoxic respiratory failure, chronic respiratory failure, acute COPD with exacerbation, acute and chronic diastolic heart failure, lower extremity edema, end-stage renal disease on hemodialysis, obstructive sleep apnea, low back pain, pain management.       HPI:   Belia is a 75 y.o. female who was recently admitted to the hospital 11/2/2020 11/7/2020.  She is currently has a history of end-stage renal disease on dialysis is currently on chronic O2 for COPD as well as congestive heart failure.  She was then admitted admitted to the hospital with acute hypoxic respiratory failure.  She was worked up thoroughly and likely COPD exacerbation with acute diastolic heart failure.  She did start prednisone in the hospital completed a course of antibiotics including ceftriaxone/azithromycin.  Covid was negative.  Ultrasound of legs were negative and she continue with dialysis.  Some fluid was taken off and she did have hyperkalemia which did improve and I do not have the lab results from dialysis.  I did review the nurse practitioner's last note of from the 12th and apparently this has improved.  Patient was treated appropriately and transferred here to the TCU at [facility] in stable condition.    Patient claims her pain is under adequate control but her feet still hurt her.  It is status quo at this time and has not changed or gotten worse.  Her back pain is doing pretty good at this time.  We did have a long discussion about  possible hospice and she is continue to discuss it with the family but she is a candidate at this time secondary to her end-stage COPD and also her renal failure when I did discuss with her she afraid to go she said not at all.  She is more concerned about her  at this time and she is likely ready at this time.  I will discuss with nurse manager today to have this discussion with witness to get a hospice consult.    Otherwise she is doing well at this time and her breathing is okay at this time.  It is about status quo.    Past Medical History:  Past Medical History:   Diagnosis Date   ? Arthritis    ? CHF (congestive heart failure) (H)    ? Chronic anemia 6/1/2014   ? Chronic kidney disease    ? Chronic thoracic aortic dissection (H) 10/7/2015    Descending thoracic aorta; treated medically per notes of Dragan Singh and Jennifer.   ? COPD (chronic obstructive pulmonary disease) (H)    ? CVA (cerebral infarction)    ? Disease of thyroid gland    ? Dyslipidemia    ? ESRD (end stage renal disease) (H) 06/03/2009    on dialysis with Dr. Mitchell   ? Essential hypertension 6/30/2014   ? Gastrointestinal hemorrhage, unspecified gastrointestinal hemorrhage type 6/5/2017   ? GI (gastrointestinal bleed)    ? GI bleeding 6/5/2017   ? Gout    ? L3 vertebral fracture (H) 11/16/2015   ? Left Atrial Appendage Occlusion (WATCHMAN) 4/5/2018    LAAO April 5, 2018 (30 mm WATCHMAN)   ? Obesity    ? ZULEIKA (obstructive sleep apnea), severe, intolerant of CPAP 10/22/2015   ? Oxygen dependent     3L nc   ? Pneumonia 9-7-2015   ? Right foot drop    ? Spinal stenosis 3/28/2016   ? Stroke (H) 3/24/2016           Surgical History:  Past Surgical History:   Procedure Laterality Date   ? BACK SURGERY      [facility]   ? COLONOSCOPY N/A 3/23/2016    Procedure: COLONOSCOPY;  Surgeon: Ruddy Tejada MD;  Location: [facility];  Service:    ? DILATION AND CURETTAGE OF UTERUS     ? EP ABLATION AV NODE N/A 3/7/2019    Procedure: EP  Ablation AV Node;  Surgeon: Derick Duarte MD;  Location: [facility] Cath Lab;  Service: Cardiology   ? EP NEGRA CLOSURE N/A 2018    Procedure: EP NEGRA Closure;  Surgeon: Derick Duarte MD;  Location: [facility] Cath Lab;  Service:    ? EP PACEMAKER INSERT N/A 3/7/2019    Procedure: EP Pacemaker Insertion;  Surgeon: Derick Duarte MD;  Location: [facility] Cath Lab;  Service: Cardiology   ? EYE SURGERY     ? HERNIA REPAIR     ? IR TUNNELED CATHETER COMPLETE REPLACEMENT  10/23/2020   ? IR TUNNELED CATHETER INSERT  2018   ? IR TUNNELED CATHETER REMOVAL  2018   ? ND COLSC FLEXIBLE W/CONTROL BLEEDING ANY METHOD N/A 2017    Procedure: COLONOSCOPY;  Surgeon: Luis Mckeon MD;  Location: [facility] GI;  Service: Gastroenterology   ? ND OPEN FIX INTER/SUBTROCH FX,IMPLNT Left 2019    Procedure: INTERNAL FIXATION, FRACTURE, TROCHANTERIC, HIP, USING INTERMEDULLARY NAIL;  Surgeon: Bennie Marsh DO;  Location: [facility] Main OR;  Service: Orthopedics   ? TONSILLECTOMY         Family History:   Family History   Problem Relation Age of Onset   ? Dementia Mother    ? Diabetes Mother    ? Arthritis Mother    ? Cancer Mother    ? Depression Mother    ? Heart disease Mother    ? Vision loss Mother    ? Stroke Father    ? Heart disease Father    ? Breast cancer Neg Hx        Social History:    Social History     Socioeconomic History   ? Marital status:      Spouse name: Cayden   ? Number of children: 2   ? Years of education: None   ? Highest education level: None   Occupational History     Employer: RETIRED   Social Needs   ? Financial resource strain: None   ? Food insecurity     Worry: None     Inability: None   ? Transportation needs     Medical: None     Non-medical: None   Tobacco Use   ? Smoking status: Former Smoker     Packs/day: 1.50     Years: 37.00     Pack years: 55.50     Types: Cigarettes     Quit date: 2009     Years since quittin.8   ? Smokeless tobacco: Never  Used   Substance and Sexual Activity   ? Alcohol use: No     Alcohol/week: 11.7 standard drinks     Types: 14 Standard drinks or equivalent per week     Comment: 14 mixed drinks per week   ? Drug use: No   ? Sexual activity: Never     Partners: Male   Lifestyle   ? Physical activity     Days per week: None     Minutes per session: None   ? Stress: None   Relationships   ? Social connections     Talks on phone: None     Gets together: None     Attends Caodaism service: None     Active member of club or organization: None     Attends meetings of clubs or organizations: None     Relationship status: None   ? Intimate partner violence     Fear of current or ex partner: None     Emotionally abused: None     Physically abused: None     Forced sexual activity: None   Other Topics Concern   ? None   Social History Narrative    Lives with her . Daughter in [place] and daughter in [place].          Review of Systems   Constitutional:        Patient claims her pain is under adequate control but denies any fevers chills nausea vomiting diarrhea change in vision hearing taste or smell weakness one-sided chest pain or shortness of breath is about baseline at this time and she is rather comfortable.  Pain is well controlled.  And the remainder of the review of systems is negative.       Vitals:    11/17/20 0856   BP: 120/70   Pulse: 82   Resp: 16   Temp: 97.7  F (36.5  C)   SpO2: 98%       Physical Exam  Constitutional:       General: She is not in acute distress.  HENT:      Head: Normocephalic and atraumatic.      Nose: Nose normal.      Mouth/Throat:      Mouth: Mucous membranes are moist.      Pharynx: Oropharynx is clear.   Cardiovascular:      Rate and Rhythm: Normal rate.      Comments: Patient rhythm auscultates is pretty regular at this time.  I did not  any atrial fibrillation by 30 seconds of auscultation.  Pulmonary:      Comments: Patient does have decreased inspiratory volume but no crackles rales or  wheezes were heard with auscultation.  Abdominal:      General: Bowel sounds are normal. There is no distension.      Tenderness: There is no abdominal tenderness.   Musculoskeletal:      Right lower leg: Edema present.      Left lower leg: Edema present.   Skin:     General: Skin is warm and dry.   Neurological:      Mental Status: She is alert. Mental status is at baseline.   Psychiatric:         Mood and Affect: Mood normal.         Behavior: Behavior normal.         Medication List:  Current Outpatient Medications   Medication Sig   ? acetaminophen (TYLENOL) 500 MG tablet Take 2 tablets (1,000 mg total) by mouth every 6 (six) hours as needed.   ? albuterol (PROAIR HFA;PROVENTIL HFA;VENTOLIN HFA) 90 mcg/actuation inhaler Inhale 2 puffs 4 (four) times a day as needed for wheezing or shortness of breath.   ? aspirin 81 MG EC tablet Take 1 tablet (81 mg total) by mouth daily. (Patient taking differently: Take 81 mg by mouth daily. )   ? B complex 11-folic-C-biot-zinc (DIALYVITE) 0-059-372-50 mg-mg-mcg-mg Tab Take 1 tablet by mouth daily with supper. (Dialyvite)    ? buPROPion (WELLBUTRIN XL) 150 MG 24 hr tablet Take 1 tablet (150 mg total) by mouth 2 (two) times a week. Tuesday and saturday (Patient taking differently: Take 150 mg by mouth 2 (two) times a week. Tuesday and saturday)   ? cholecalciferol, vitamin D3, 1,000 unit (25 mcg) tablet Take 2,000 Units by mouth daily.    ? cinacalcet (SENSIPAR) 30 MG tablet Take 30 mg by mouth see administration instructions. Take three times weekly with dialysis (on Mondays, Wednesdays, and Fridays).   ? famotidine (PEPCID) 20 MG tablet Take 1 tablet (20 mg total) by mouth at bedtime.   ? folic acid (FOLVITE) 1 MG tablet TAKE ONE TABLET BY MOUTH ONCE DAILY (Patient taking differently: No sig reported)   ? gabapentin (NEURONTIN) 100 MG capsule TAKE 1 CAPSULE BY MOUTH THREE TIMES DAILY (Patient taking differently: No sig reported)   ? Lactobacillus rhamnosus GG (CULTURELLE)  10-15 Billion cell capsule Take 1 capsule by mouth daily with lunch.    ? metoprolol succinate (TOPROL-XL) 25 MG Take 1 tablet (25 mg total) by mouth daily with lunch. Hold for SBP<110 or hr<60   ? midodrine (PROAMATINE) 10 MG tablet Take 10-20 mg by mouth 3 (three) times a week. On dialysis days. Hold for SBP>105  Patient reports that she usually gets 10 mg before dialysis and another 10 mg intermediate through dialysis as well    ? oxyCODONE (ROXICODONE) 5 MG immediate release tablet Take 0.5 tablets (2.5 mg total) by mouth every 6 (six) hours as needed for pain.   ? polyvinyl alcohol (LIQUIFILM TEARS) 1.4 % ophthalmic solution Administer 1 drop to both eyes as needed for dry eyes.   ? senna-docusate (SENNOSIDES-DOCUSATE SODIUM) 8.6-50 mg tablet Take 1 tablet by mouth at bedtime.    ? sevelamer carbonate (RENVELA) 800 mg tablet Take 1 tablet (800 mg total) by mouth 2 (two) times a day as needed (FOr snacks.). (Patient taking differently: Take 800 mg by mouth 2 (two) times a day as needed (FOr snacks.). )   ? sevelamer HCL (RENAGEL) 800 MG tablet Take 1,600 mg by mouth 3 (three) times a day with meals.    ? timolol maleate (TIMOPTIC) 0.5 % ophthalmic solution Administer 1 drop to both eyes 2 (two) times a day.    ? traZODone (DESYREL) 150 MG tablet Take 150 mg by mouth at bedtime.        Labs: Hospital labs are as follows; Covid in the hospital was negative, troponins were 0.04, EKG showed wide QRS rhythm, the remainder the labs recently have been reviewed.      Assessment:    ICD-10-CM    1. Acute on chronic diastolic congestive heart failure (H)  I50.33    2. Pulmonary emphysema, unspecified emphysema type (H)  J43.9    3. ESRD on dialysis (H)  N18.6     Z99.2    4. Severe low back pain  M54.5    5. Physical deconditioning  R53.81    6. Paroxysmal atrial fibrillation (H)  I48.0    7. Neuropathic pain  M79.2    8. Presence of Watchman left atrial appendage closure device  Z95.818        Plan: Plan at this time will get  labs sent over from dialysis and posted in matrix for my review today.    As far as a low back pain no changes in medications at this time we will continue with physical and Occupational Therapy.    Her paroxysmal atrial fibrillation there will be no medication changes at this time and we will continue to monitor above medical problems.  She does a watchman device in at this time.  Her neuropathic pain is about baseline at this time and no new changes.  Her acute on chronic respiratory failure seems to be stable at this time and no other changes at this time.        Electronically signed by: Dl Nolan DO

## 2021-06-21 NOTE — LETTER
Letter by Melanie Maldonado CHW at      Author: Melanie Maldonado CHW Service: -- Author Type: --    Filed:  Encounter Date: 2/1/2021 Status: (Other)       CARE COORDINATION  [facility]  [address] [place] [address]    February 2, 2021    Belia Rodriguez  [address] [address]      Dear Belia,    I am a clinic community health worker who works with Neil Galan MD at [facility]. I have been trying to reach you recently to introduce Clinic Care Coordination and to see if there was anything I could assist you with.  Below is a description of clinic care coordination and how I can further assist you.      The clinic care coordination team is made up of a registered nurse,  and community health worker who understand the health care system. The goal of clinic care coordination is to help you manage your health and improve access to the health care system in the most efficient manner. The team can assist you in meeting your health care goals by providing education, coordinating services, strengthening the communication among your providers and supporting you with any resource needs.    Please feel free to contact me at [phone] with any questions or concerns. We are focused on providing you with the highest-quality healthcare experience possible and that all starts with you.     Sincerely,     Brittanie  [facility] Worker  [phone]

## 2021-06-21 NOTE — LETTER
Letter by Louie Liriano CNP at      Author: Louie Liriano CNP Service: -- Author Type: --    Filed:  Encounter Date: 3/18/2021 Status: (Other)         [facility]  [address] [address]                                  2021    Patient: Belia Rodriguez   MR Number: [id number]   YOB: 1945   Date of Visit: 3/18/2021     Dear Dr. Villa:    Thank you for referring Belia Rodriguez to me for evaluation. Below are the relevant portions of my assessment and plan of care.    If you have questions, please do not hesitate to call me. I look forward to following Belia along with you.    Sincerely,        Louie Liriano CNP          CC  No Recipients  Louie Liriano CNP  3/20/2021  6:03 PM  Signed  [facility]    Name:   Belia Triplett)  : 1945  Facility:   [facility] [[id number]]   Room: [place] [address]  Code Status: DNR/DNI and POLST AVAILABLE - Palliative Care  Fac type:   SNF (Skilled Nursing Facility, TCU) -     DOS: 2021    PCP: Neil Galan MD      CHIEF COMPLAINT / REASON FOR VISIT:  Chief Complaint   Patient presents with   ? Follow-up     TCU follow-up: Hospitalization for acute on chronic respiratory failure due to a combination of fluid overload and COPD exacerbation.   ? Problem Visit     1. Hypotension due to hypovolemia. 2. Depression.     [facility] from 18 until 18  [facility] from 18 until 18  [facility] from 2021 until 2021 (acute on chronic respiratory failure due to a combination of fluid overload and COPD exacerbation)  [facility] from 2021 until 2021  [facility] from 2021 until 2021 (acute on chronic respiratory failure due to a combination of fluid overload and COPD exacerbation)      Patient was last seen by me on 03/15/2021.      HPI: Belia is a 75 y.o. female with known end-stage renal disease (on dialysis), COPD (former smoker, 45-60 pack years),  chronic respiratory failure with hypoxia, obstructive sleep apnea (severe and previously intolerant of CPAP), chronic atrial fibrillation, GERD, essential hypertension, and anemia related to chronic renal failure.    She has had multiple hospitalizations for respiratory failure due to a combination of fluid overload and COPD exacerbation.  This occurred on 01/26/2021 and again on 02/18/2021.  Her last admission came within hours of her discharge from [facility].  When last in the TCU at [facility], she was a full code, but she indicated during her last hospitalization that she wants no further BiPAP and is DNR/DNI.  This did improve somewhat with steroids and doxycycline.    While she continues on hemodialysis Mondays, Wednesdays, and Fridays, she did have some questions regarding hospice but was not sure she would sign on given her need for dialysis.  She did have a palliative care consult, and she strongly agreed, especially given her readmission to the hospital.      CURRENT/RECENT TCU ISSUES    Disposition: The patient is aware that her health will not improve and is ready for hospice, although she is concerned about having all her paperwork in order.  I had received a request by  that I give an okay for an in-house psych consult.  My initial thought was that the patient has been doing quite well and accepting her situation, and there would be nothing gained from such a consult.  At the time, we spent a good 20 minutes talking about her feelings with regard to hospice, dying, and other related issues, including depression.  She did tell me that she thought she might benefit from a consult and was recently seen via video conference.  She thought it was beneficial.    She was having no particular  "issues until about 6 weeks ago, not needing oxygen but now expecting a chronic continuous need.  She initially told me she planned to go on hospice after discharge.  She expressed a desire to go to [facility].  She noted that she would be stopping dialysis and would expect to survive no longer than 1 week.  She had been presented with this suggestion about 6 months ago.  She believed that she was ready.  Her main concern has not been about dying but  getting her paperwork done for her  of 44 years.     She is doing fairly well.  She complains of neuropathy in her feet, Raynaud's phenomenon in the feet as well, and some constipation.    Hypotension: She is on a 1500 mL fluid restriction.  This may be producing hypovolemia.  She does have orders for 20 mg of midodrine to take on dialysis days (Mondays, Wednesdays, and Fridays) and has additional orders for taking this when additional dialysis is needed.  However, she has been running significantly low blood pressures.  Systolic blood pressure has been as low as 79. On 03/01/2021, another nurse practitioner ordered 10 mg of midodrine to be given on nondialysis days when the SBP is <106.  An adjustment was recently made at dialysis, and she is now receiving a 20 mg dose there as well.  Now, she tells me her systolic blood pressure gets into the high 80s to 90s in the evening.    TODAY, blood pressures are still low but not terribly so. They do drop after dialysis. She has, occasionally, been given fluid after dialysis. She now has a Bluetooth heart monitor in her room.    Medication changes: At an earlier visit, considering her plans for hospice, we talked about her medications.  It was agreed that atorvastatin was no longer needed, and so we discontinued it.  At the time, she stated, \"anything you can take away from me won't break my heart at all.\"    Discharge planning: She told her  and daughter that she wanted to stay here, but her  became " angry.  He has been the one to take her to dialysis and suggest that she would need to arrange her own transportation if she decided to stay.  He also told her he would not be doing housework.  The end result was that the patient would be staying 2 more weeks.  Apparently, her  has now finally resigned himself to her being here.  She tells me that she is here now because she has no other place to go, and palliative care is cheaper than hospice.  I told her I had no problem with managing a palliative care plan. There was a request for physical therapy, and we gave orders for that. Currently, no discharge date has been set.      ROS:   Positives: Her feet continue to be painful due to neuropathy.  She has found that lying in bed with her knees up in the air helps. She is on chronic oxygen, and breathing is no better and no worse.      Negatives: Complaining of constipation before, things are going better now.  She denies any headaches or chest pains, coughing or congestion at the moment, dizziness, dysuria, diarrhea, difficulty chewing or swallowing, or problems with sleep (when on 150 mg of trazodone nightly).    Past Medical History:   Diagnosis Date   ? Arthritis    ? CHF (congestive heart failure) (H)    ? Chronic anemia 6/1/2014   ? Chronic kidney disease    ? Chronic thoracic aortic dissection (H) 10/7/2015    Descending thoracic aorta; treated medically per notes of Dragan Singh and Jennifer.   ? COPD (chronic obstructive pulmonary disease) (H)    ? CVA (cerebral infarction)    ? Disease of thyroid gland    ? Dyslipidemia    ? ESRD (end stage renal disease) (H) 06/03/2009    on dialysis with Dr. Mitchell   ? Essential hypertension 6/30/2014   ? Gastrointestinal hemorrhage, unspecified gastrointestinal hemorrhage type 6/5/2017   ? GI (gastrointestinal bleed)    ? GI bleeding 6/5/2017   ? Gout    ? L3 vertebral fracture (H) 11/16/2015   ? Left Atrial Appendage Occlusion (WATCHMAN) 4/5/2018    IVETTE April 5,   (30 mm WATCHMAN)   ? Obesity    ? ZULEIKA (obstructive sleep apnea), severe, intolerant of CPAP 10/22/2015   ? Oxygen dependent     3L nc   ? Pneumonia 2015   ? Right foot drop    ? Spinal stenosis 3/28/2016   ? Stroke (H) 3/24/2016              Family History   Problem Relation Age of Onset   ? Dementia Mother    ? Diabetes Mother    ? Arthritis Mother    ? Cancer Mother    ? Depression Mother    ? Heart disease Mother    ? Vision loss Mother    ? Stroke Father    ? Heart disease Father    ? Breast cancer Neg Hx      Social History     Socioeconomic History   ? Marital status:      Spouse name: Cayden   ? Number of children: 2   ? Years of education: Not on file   ? Highest education level: Not on file   Occupational History     Employer: RETIRED   Social Needs   ? Financial resource strain: Not on file   ? Food insecurity     Worry: Not on file     Inability: Not on file   ? Transportation needs     Medical: Not on file     Non-medical: Not on file   Tobacco Use   ? Smoking status: Former Smoker     Packs/day: 1.50     Years: 37.00     Pack years: 55.50     Types: Cigarettes     Quit date: 2009     Years since quittin.2   ? Smokeless tobacco: Never Used   Substance and Sexual Activity   ? Alcohol use: No     Alcohol/week: 11.7 standard drinks     Types: 14 Standard drinks or equivalent per week     Comment: 14 mixed drinks per week   ? Drug use: No   ? Sexual activity: Never     Partners: Male   Lifestyle   ? Physical activity     Days per week: Not on file     Minutes per session: Not on file   ? Stress: Not on file   Relationships   ? Social connections     Talks on phone: Not on file     Gets together: Not on file     Attends Taoism service: Not on file     Active member of club or organization: Not on file     Attends meetings of clubs or organizations: Not on file     Relationship status: Not on file   ? Intimate partner violence     Fear of current or ex partner: Not on file      Emotionally abused: Not on file     Physically abused: Not on file     Forced sexual activity: Not on file   Other Topics Concern   ? Not on file   Social History Narrative    Lives with her . Daughter in [place] and daughter in [place].     MEDICATIONS: Reviewed from the MAR, physician orders, and earlier progress notes.  Post Discharge Medication Reconciliation Status: medication reconciliation previously completed during another office visit.      Current Outpatient Medications   Medication Sig   ? acetaminophen (TYLENOL) 500 MG tablet Take 2 tablets (1,000 mg total) by mouth every 6 (six) hours as needed.   ? albuterol (PROVENTIL) 2.5 mg /3 mL (0.083 %) nebulizer solution Take 3 mL (2.5 mg total) by nebulization every 4 (four) hours as needed for wheezing.   ? aspirin 81 MG EC tablet Take 1 tablet (81 mg total) by mouth daily.   ? atorvastatin (LIPITOR) 10 MG tablet Take 10 mg by mouth at bedtime.   ? B complex 11-folic-C-biot-zinc (DIALYVITE) [phone] mg-mg-mcg-mg Tab Take 1 tablet by mouth daily with supper. (Dialyvite)    ? buPROPion (WELLBUTRIN XL) 150 MG 24 hr tablet Take 1 tablet (150 mg total) by mouth 2 (two) times a week. Tuesday and saturday (Patient taking differently: Take 150 mg by mouth 2 (two) times a week. Tuesday and saturday)   ? cholecalciferol, vitamin D3, 1,000 unit (25 mcg) tablet Take 2,000 Units by mouth daily.    ? cinacalcet (SENSIPAR) 30 MG tablet Take 30 mg by mouth see administration instructions. Take three times weekly with dialysis (on Mondays, Wednesdays, and Fridays).   ? famotidine (PEPCID) 20 MG tablet Take 1 tablet (20 mg total) by mouth at bedtime.   ? folic acid (FOLVITE) 1 MG tablet TAKE ONE TABLET BY MOUTH ONCE DAILY (Patient taking differently: Take 1 mg by mouth daily. )   ? gabapentin (NEURONTIN) 100 MG capsule Take 100 mg by mouth 2 (two) times a day. Leg pain   ? Lactobacillus rhamnosus GG (CULTURELLE) 10-15 Billion cell capsule Take 1 capsule by mouth  daily with lunch.    ? metoprolol succinate (TOPROL-XL) 25 MG Take 1 tablet (25 mg total) by mouth daily with lunch. Hold for SBP<110 or hr<60   ? midodrine (PROAMATINE) 10 MG tablet Take 2 tablets (20 mg total) by mouth 3 (three) times a week. Patient takes prior to dialysis qMWF and PRN if additional dialysis treatments.  Patient reports that she takes 20mg prior to dialysis (Patient taking differently: Take 20 mg by mouth 3 (three) times a week. Patient takes prior to dialysis qMWF and PRN if additional dialysis treatments.  Patient reports taking 20mg prior to dialysis and is given 20 mg after dialysis as well.    Also, on nondialysis days, give 10 mg daily for SBP <106.)   ? midodrine (PROAMATINE) 10 MG tablet Take 10 mg by mouth daily as needed. Take on non dialysis days. Has an order for dialysis days.   ? midodrine (PROAMATINE) 5 MG tablet Take 10 mg by mouth 4 (four) times a week. On non dialysis days if SBP < 106   ? oxyCODONE (ROXICODONE) 5 MG immediate release tablet TAKE 1/2 TO 1 TABLET (2.5-5MG) BY MOUTH TWICE DAILY AS NEEDED FOR PAIN   ? polyvinyl alcohol (LIQUIFILM TEARS) 1.4 % ophthalmic solution Administer 1 drop to both eyes as needed for dry eyes.   ? pot bicarb/sod bicarb/cit ac (EDI-SELTZER GOLD ORAL) Take by mouth daily.   ? senna-docusate (SENNOSIDES-DOCUSATE SODIUM) 8.6-50 mg tablet Take 1 tablet by mouth at bedtime as needed for constipation.    ? sevelamer carbonate (RENVELA) 800 mg tablet Take 1,600 mg by mouth 2 (two) times a day as needed (snacks).   ? sevelamer HCL (RENAGEL) 800 MG tablet Take 1,600 mg by mouth 3 (three) times a day with meals.    ? timolol maleate (TIMOPTIC) 0.5 % ophthalmic solution Administer 1 drop to both eyes 2 (two) times a day.    ? traZODone (DESYREL) 150 MG tablet Take 150 mg by mouth at bedtime.      ALLERGIES:   Allergies   Allergen Reactions   ? Ace Inhibitors Cough   ? Atrovent [Ipratropium Bromide] Headache   ? Fosinopril Sodium Cough   ? Zolpidem       hallucinations     DIET: Dialysis diet, regular texture, thin liquids.  1500 mL /24-hour fluid restriction    Vitals:    03/18/21 1204   BP: (!) 86/54   Pulse: 70   Resp: 16   Temp: 98.3  F (36.8  C)   SpO2: 99%   Weight: 147 lb (66.7 kg)     Body mass index is 23.73 kg/m .    EXAMINATION:   General: Pleasant, alert, and conversant middle-aged female, sitting on the bed, in no apparent distress.  Head: Normocephalic and atraumatic.   Eyes: PERRLA, sclerae clear.  Slight disconjugate gaze.  ENT: Moist oral mucosa.  She has her own teeth.  She has a bit of a runny nose today.  Hearing is unimpaired.  Cardiovascular: Irregular rhythm with a 2/6 systolic ejection murmur at the left sternal border.  Respiratory: Diminished lung sounds bilaterally but otherwise clear.  Abdomen: Soft and nontender.   Musculoskeletal/Extremities: Age-related degenerative joint disease.  Right 2+ pitting pedal and pretibial edema, 1+ on the left, slightly improved while Ace wrapped.  Integument: No rashes, clinically significant lesions, or skin breakdown.   Cognitive/Psychiatric: Alert and oriented ×4.  Affect is euthymic.    DIAGNOSTICS:   Results for orders placed or performed during the hospital encounter of 02/18/21   Basic Metabolic Panel   Result Value Ref Range    Sodium 136 136 - 145 mmol/L    Potassium 3.7 3.5 - 5.0 mmol/L    Chloride 97 (L) 98 - 107 mmol/L    CO2 29 22 - 31 mmol/L    Anion Gap, Calculation 10 5 - 18 mmol/L    Glucose 95 70 - 125 mg/dL    Calcium 8.7 8.5 - 10.5 mg/dL    BUN 28 8 - 28 mg/dL    Creatinine 3.01 (H) 0.60 - 1.10 mg/dL    GFR MDRD Af Amer 18 (L) >60 mL/min/1.73m2    GFR MDRD Non Af Amer 15 (L) >60 mL/min/1.73m2     Lab Results   Component Value Date    WBC 3.9 (L) 02/20/2021    HGB 10.1 (L) 02/20/2021    HCT 31.8 (L) 02/20/2021     (H) 02/20/2021    PLT 91 (L) 02/20/2021     CrCl cannot be calculated (Patient's most recent lab result is older than the maximum 10 days allowed.).  Lab Results    Component Value Date     (H) 02/18/2021     ASSESSMENT/Plan:      ICD-10-CM    1. Chronic respiratory failure with hypoxia (H)  J96.11    2. ESRD on hemodialysis (H), MWF  N18.6     Z99.2    3. Chronic diastolic heart failure (H)  I50.32    4. Hypotension due to hypovolemia  I95.89     E86.1    5. Chronic atrial fibrillation (H)  I48.20    6. Anemia of chronic renal failure, stage 5 (H)  N18.5     D63.1    7. Pulmonary emphysema, unspecified emphysema type (H)  J43.9    8. Mild episode of recurrent major depressive disorder (H)  F33.0    9. Chronic acquired lymphedema  I89.0    10. Gastroesophageal reflux disease without esophagitis  K21.9      CHANGES:    None.    CARE PLAN:    The care plan has been reviewed and all orders signed. Changes to care plan, if any, as noted. Otherwise, continue care plan of care.      The above has been created using voice recognition software. Please be aware that this may unintentionally  produce inaccuracies and/or nonsensical sentences.      Electronically signed by: Louie Liriano, CNP

## 2021-06-21 NOTE — LETTER
"Letter by June Kingston CNP at      Author: June Kingston CNP Service: -- Author Type: --    Filed:  Encounter Date: 11/24/2020 Status: (Other)         Patient: Belia Rodriguez   MR Number: [id number]   YOB: 1945   Date of Visit: 11/24/2020     Code Status:  DNR  Visit Type: Problem Visit (RLE edema )     Facility:  [facility] [[id number]]        \"This video visit will be conducted via a call between you and your physician/provider. We have found that certain health care needs can be provided without the need for an in-person physical exam.  This service lets us provide the care you need with a video conversation.  If a prescription is necessary we can send it to the facility team.  If lab work is needed we can place an order through the facility team to have that test done at a later time.     If during the course of the call the physician/provider feels a video visit is not appropriate, you will not be charged for this service.\"      Physician/provider has received verbal consent for a Video Visit from the patient? Yes         Video Start Time: 3:00 pm               History of Present Illness: Belia Rodriguez is a 75 y.o. female  who I am seeing today at the request of the nursing staff regarding increased swelling to RLE.  Past medical history includes end-stage renal disease on dialysis 3 times weekly, acute hypoxic respiratory failure secondary to CHF as well as COPD.  Patient recently hospitalized on 11/2/2020 secondary to acute on chronic respiratory failure secondary to acute COPD and acute diastolic heart failure.  Patient with underlying COPD.  She is dependent on oxygen at 2 to 3 L.  She continues on home nebs.  She was treated with prednisone.  She also completed a 5-day course of ceftriaxone/azithromycin.  Fluid overload improved with daily dialysis.  She has resumed her 3 times weekly schedule.  Hyperkalemia improved with dialysis.  Leg swelling.  " Ultrasound negative.  Obstructive sleep apnea on CPAP.  Patient with a history of neuropathy in the lower extremities.  History of atrial fib.  She does have a watchman device in place.  Chronic anemia secondary to end-stage renal disease.  Hypotension on dialysis days.  She continues on Midodrine.  Patient was seen by hospice during her previous hospitalization on 10/5/2020 however this was declined.    Today patient sitting up in wheelchair. Nurse present on exam. Earlier this am I was phoned in regards to increased swelling, pain and redness in the RLE. Pt with hx of ESRD on dialysis 3/weekly. VS doppler ordered earlier today prior to visit. I am awaiting results. Upon exam she has 3-4+ edema of the right foot. Nurse tells me she is unable to feel a pedal or post tibial pulse. She can feel a popliteal pulse.  Patient did have a venous Doppler on 11/3 during hospitalization which was negative for DVT.  She had a arterial Doppler on 10/15/2020 which showed some mild atherosclerotic irregularity but no significant stenotic or occlusive changes. Two-vessel runoff at the ankle noted.  She does complain of pain on today's visit.  I did have the nurses carri the area on today's visit however with looking at her leg it appears more bruised down around the ankle and along the tibia shaft.  She initially denied any injury.  She did report that she hit her leg on her wheelchair during a transfer, with further questioning.  She currently is afebrile.  She does have underlying COPD and end-stage heart disease dependent on oxygen.  Shortness of breath continues with exertion.  Blood pressure continues on the soft side.  She is receiving midodrine.  Patient with chronic back pain well controlled with oxycodone.  She continues with some hyperkalemia.  Yesterday received initially a potassium of 6.7.  I am not sure who ordered this because she receives labs at dialysis.  She did undergo stat redraw and potassium was 5.3.  Her  nephrologist was updated.  No new orders.  Creatinine about 6.83.        Active Ambulatory Problems     Diagnosis Date Noted   ? Tachycardia-bradycardia syndrome (H) 06/12/2014   ? Hyperkalemia 06/30/2014   ? Essential hypertension with goal blood pressure less than 140/90 06/30/2014   ? Hyperlipidemia 06/30/2014   ? Acute respiratory failure with hypoxia (H) 06/30/2014   ? ZULEIKA (obstructive sleep apnea), severe 10/22/2015   ? Anemia of chronic renal failure, stage 5 (H)    ? Dissection of thoracoabdominal aorta (H)    ? Gout    ? GERD (gastroesophageal reflux disease) 04/14/2017   ? Right foot drop 05/16/2017   ? Acute midline low back pain with right-sided sciatica 06/16/2017   ? History of compression fracture of spine 06/16/2017   ? Pulmonary emphysema (H) 02/19/2018   ? Presence of Watchman left atrial appendage closure device 04/05/2018   ? Other dysphagia 08/10/2018   ? 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   ? Hyperparathyroidism (H) 03/24/2009   ? Osteoporosis 03/24/2009   ? 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 ESRD on hemodialysis (H) 08/15/2018   ? Acute pulmonary edema (H) 09/07/2018   ? Chronic atrial fibrillation (H)    ? COPD exacerbation (H) 01/04/2019   ? Acute on chronic diastolic congestive heart failure (H) 01/23/2019   ? Thrombocytopenia (H)    ? Contraindication to anticoagulation therapy 03/26/2019   ? Dyspnea 05/18/2019   ? Cardiac pacemaker in situ 03/20/2019   ? S/P AV (atrioventricular) jonn ablation 03/20/2019   ? Hip fracture, intertrochanteric (H) 06/23/2019   ? DVT (deep venous thrombosis) (H) 08/22/2019   ? Carpal tunnel syndrome of left wrist 02/04/2020   ? SOB (shortness of breath) 07/13/2020   ? Hypotension, unspecified hypotension type    ? Major depressive disorder, remission status unspecified, unspecified whether recurrent 09/15/2020   ? Hypoxia 10/05/2020   ? Encounter for palliative care      Resolved Ambulatory Problems     Diagnosis  Date Noted   ? Hypotension 06/01/2014   ? Fever 06/07/2014   ? Aortic aneurysm rupture (H)    ? Bacteremia due to Escherichia coli 06/08/2014   ? Severe tobacco use disorder 06/30/2014   ? Persistent atrial fibrillation (H) 06/30/2014   ? Bradycardia, sinus 06/30/2014   ? Volume overload 06/30/2014   ? Hypomagnesemia 06/30/2014   ? Cellulitis 08/25/2014   ? Hematochezia 11/02/2014   ? BRBPR (bright red blood per rectum) 11/02/2014   ? Hematuria 04/01/2015   ? Aortic dissection (H) 04/01/2015   ? Pneumonia 09/07/2015   ? Hypoxia 09/23/2015   ? SOB (shortness of breath) 09/23/2015   ? Acute on chronic diastolic heart failure (H) 09/27/2015   ? Bacteremia 09/27/2015   ? Dyspnea 10/07/2015   ? Fluid overload 10/07/2015   ? Chronic thoracic aortic dissection (H) 10/07/2015   ? L3 vertebral fracture (H) 11/16/2015   ? GI bleeding 03/20/2016   ? Chronic systolic congestive heart failure (H)    ? Acute lower GI bleeding 03/21/2016   ? Bilateral anterior& Lt Occipital embolic Infarction 03/24/2016   ? Spinal stenosis 03/28/2016   ? Back pain 03/28/2016   ? Tremor 03/28/2016   ? SOB (shortness of breath) 05/03/2016   ? CVA (cerebral infarction) 05/04/2016   ? Respiratory distress 05/09/2016   ? CHF (congestive heart failure) (H) 05/09/2016   ? Right knee pain    ? Dyspnea 10/02/2016   ? Volume overload 10/02/2016   ? Acute bronchitis due to infection 10/02/2016   ? Acute bacterial conjunctivitis of both eyes 10/02/2016   ? Acute CHF (congestive heart failure) (H) 10/09/2016   ? Hypervolemia, unspecified hypervolemia type    ? Pure hypercholesterolemia    ? Gastroesophageal reflux disease without esophagitis    ? Leg weakness 04/20/2017   ? Anticoagulated on warfarin 05/16/2017   ? Gastrointestinal hemorrhage, unspecified gastrointestinal hemorrhage type 06/05/2017   ? Anticoagulant long-term use 06/16/2017   ? Acute GI bleeding 12/09/2017   ? SOB (shortness of breath) 01/26/2018   ? Acute bronchitis with bronchospasm    ?  Acute respiratory failure with hypoxia and hypercapnia (H)    ? Acute on chronic respiratory failure with hypoxia (H)    ? History of acute respiratory failure 02/19/2018   ? Chronic respiratory failure with hypoxia (H) 03/02/2018   ? Dyspnea 08/09/2018   ? Chest pain 08/10/2018   ? Hemodialysis catheter infection (H)    ? Hyperkalemia 10/18/2018   ? Atrial fibrillation with RVR (H) 03/03/2019   ? Intertrochanteric fracture of left hip, closed, initial encounter (H) 06/23/2019   ? Closed intertrochanteric fracture of hip, left, initial encounter (H) 06/22/2019     Past Medical History:   Diagnosis Date   ? Arthritis    ? Chronic anemia 6/1/2014   ? Chronic kidney disease    ? COPD (chronic obstructive pulmonary disease) (H)    ? CVA (cerebral infarction)    ? Disease of thyroid gland    ? Dyslipidemia    ? ESRD (end stage renal disease) (H) 06/03/2009   ? Essential hypertension 6/30/2014   ? GI (gastrointestinal bleed)    ? Left Atrial Appendage Occlusion (WATCHMAN) 4/5/2018   ? Obesity    ? Oxygen dependent        Current Outpatient Medications   Medication Sig Note   ? acetaminophen (TYLENOL) 500 MG tablet Take 2 tablets (1,000 mg total) by mouth every 6 (six) hours as needed.    ? albuterol (PROAIR HFA;PROVENTIL HFA;VENTOLIN HFA) 90 mcg/actuation inhaler Inhale 2 puffs 4 (four) times a day as needed for wheezing or shortness of breath.    ? aspirin 81 MG EC tablet Take 1 tablet (81 mg total) by mouth daily. (Patient taking differently: Take 81 mg by mouth daily. )    ? B complex 11-folic-C-biot-zinc (DIALYVITE) 6-070-090-50 mg-mg-mcg-mg Tab Take 1 tablet by mouth daily with supper. (Dialyvite)     ? buPROPion (WELLBUTRIN XL) 150 MG 24 hr tablet Take 1 tablet (150 mg total) by mouth 2 (two) times a week. Tuesday and saturday (Patient taking differently: Take 150 mg by mouth 2 (two) times a week. Tuesday and saturday)    ? cholecalciferol, vitamin D3, 1,000 unit (25 mcg) tablet Take 2,000 Units by mouth daily.      ? cinacalcet (SENSIPAR) 30 MG tablet Take 30 mg by mouth see administration instructions. Take three times weekly with dialysis (on Mondays, Wednesdays, and Fridays).    ? famotidine (PEPCID) 20 MG tablet Take 1 tablet (20 mg total) by mouth at bedtime.    ? folic acid (FOLVITE) 1 MG tablet TAKE ONE TABLET BY MOUTH ONCE DAILY (Patient taking differently: No sig reported)    ? gabapentin (NEURONTIN) 100 MG capsule TAKE 1 CAPSULE BY MOUTH THREE TIMES DAILY (Patient taking differently: No sig reported)    ? Lactobacillus rhamnosus GG (CULTURELLE) 10-15 Billion cell capsule Take 1 capsule by mouth daily with lunch.     ? metoprolol succinate (TOPROL-XL) 25 MG Take 1 tablet (25 mg total) by mouth daily with lunch. Hold for SBP<110 or hr<60    ? midodrine (PROAMATINE) 10 MG tablet Take 10-20 mg by mouth 3 (three) times a week. On dialysis days. Hold for SBP>105  Patient reports that she usually gets 10 mg before dialysis and another 10 mg group home through dialysis as well     ? oxyCODONE (ROXICODONE) 5 MG immediate release tablet Take 0.5 tablets (2.5 mg total) by mouth every 6 (six) hours as needed for pain.    ? polyvinyl alcohol (LIQUIFILM TEARS) 1.4 % ophthalmic solution Administer 1 drop to both eyes as needed for dry eyes.    ? senna-docusate (SENNOSIDES-DOCUSATE SODIUM) 8.6-50 mg tablet Take 1 tablet by mouth at bedtime.     ? sevelamer carbonate (RENVELA) 800 mg tablet Take 1 tablet (800 mg total) by mouth 2 (two) times a day as needed (FOr snacks.). (Patient taking differently: Take 800 mg by mouth 2 (two) times a day as needed (FOr snacks.). )    ? sevelamer HCL (RENAGEL) 800 MG tablet Take 1,600 mg by mouth 3 (three) times a day with meals.  11/2/2020: For the past week, she was instructed to take 2 tablets instead of the usual 3 tablets to see if that is enough   ? timolol maleate (TIMOPTIC) 0.5 % ophthalmic solution Administer 1 drop to both eyes 2 (two) times a day.     ? traZODone (DESYREL) 150 MG tablet  Take 150 mg by mouth at bedtime.         Allergies   Allergen Reactions   ? Ace Inhibitors Cough   ? Atrovent [Ipratropium Bromide] Headache   ? Fosinopril Sodium Cough   ? Zolpidem      hallucinations         Review of Systems  No fevers or chills. No headache, lightheadedness or dizziness.  Chronic SOB, patient wears oxygen at 2 to 3 L.  Was only wearing oxygen at 2 L at night previously, no chest pains or palpitations. Appetite is good. No nausea, vomiting, constipation or diarrhea. No dysuria, frequency, burning or pain with urination. + weakness. + chronic back pain. Continues on oxycodone. Otherwise review of systems are negative.     Physical Exam  PHYSICAL EXAMINATION:  Vital signs: /65, pulse 71, respirations 17, temperature 97.3, O2 sats 96% on 2L.  Weight 158 pounds.  General: Awake, sitting up in wheelchair, alert, oriented x3, appropriately, follows simple commands, conversant  HEENT: Pink conjunctiva, anicteric sclerae  NECK: Supple  CVS: Dialysis catheter in the right chest wall.  LUNG: O2 on at 1 L nasal cannula.  EXTREMITIES: Moves both upper and lower extremities with generalized weakness.  3-4+ edema in the right foot.  Forefoot is red however she has bruising down around the ankle and up along the tibial shaft.  PSYCHIATRIC: Cognition intact.       Labs:    Lab Results   Component Value Date    WBC 4.4 11/23/2020    HGB 8.1 (L) 11/23/2020    HCT 26.5 (L) 11/23/2020     (H) 11/23/2020    PLT 75 (L) 11/23/2020     Results for orders placed or performed in visit on 11/23/20   Basic Metabolic Panel   Result Value Ref Range    Sodium 132 (L) 136 - 145 mmol/L    Potassium 6.7 (HH) 3.5 - 5.0 mmol/L    Chloride 105 98 - 107 mmol/L    CO2 17 (L) 22 - 31 mmol/L    Anion Gap, Calculation 10 5 - 18 mmol/L    Glucose 76 70 - 125 mg/dL    Calcium 8.7 8.5 - 10.5 mg/dL    BUN 52 (H) 8 - 28 mg/dL    Creatinine 6.83 (HH) 0.60 - 1.10 mg/dL    GFR MDRD Af Amer 7 (L) >60 mL/min/1.73m2    GFR MDRD Non Af  Amer 6 (L) >60 mL/min/1.73m2           Assessment/Plan:    1.    Right lower extremity edema  Doppler obtained to rule out DVT.  This was negative.  Obtain x-ray of ankle and tibia.  More bruising than redness present.  Encourage elevation and ice.  Patient continues on dialysis for fluid management.   2. Acute on chronic diastolic congestive heart failure (H)   fluids managed per dialysis.  Weights up about 6 pounds.   2. Pulmonary emphysema, unspecified emphysema type (H)   currently on O2 at 1 L during the day.  Continues on 3 L at night.  Will attempt to wean off oxygen during the day.  She was only wearing this at night.  Continues on home nebs.  Continues on prednisone taper.   3. ESRD on dialysis (H)   continues 3 times weekly.     4.  Hyperkalemia  potassium 5.3 due to end-stage renal disease.  Managed per nephrology.   5. Severe low back pain   continues on oxycodone, gabapentin and Tylenol.   6. Neuropathic pain   continues on gabapentin.   7. Paroxysmal atrial fibrillation (H)   currently on metoprolol and aspirin.       Video-Visit Details     Type of service:  Video Visit     Video End Time: 3:15 pm     Originating Location (pt. Location):[facility] [[id number]]     Distant Location (provider location):  [facility]      Mode of Communication:  Face time via I phone      This note has been dictated using voice recognition software. Any grammatical or context distortions are unintentional and inherent to the software    Electronically signed by: June Kingston CNP

## 2021-06-21 NOTE — LETTER
Letter by Dl Nolan DO at      Author: Dl Nolan DO Service: -- Author Type: --    Filed:  Encounter Date: 10/20/2020 Status: (Other)         Patient: Belia Rodriguez   MR Number: [id number]   YOB: 1945   Date of Visit: 10/20/2020     [facility]    Facility:   [facility] [[id number]]   Code Status: UNKNOWN      CHIEF COMPLAINT/REASON FOR VISIT:  Chief Complaint   Patient presents with   ? Review Of Multiple Medical Conditions     Stage kidney disease currently on hemodialysis, low back pain intractable at this time.  Still has some shortness of breath with exertion, history of hypoxia which is resolved.       HISTORY:      HPI: Belia is a 75 y.o. female resides here at the [facility] care undergoing physical and occupational therapy following hospitalization for hypoxemia and she is already end-stage renal disease currently on hemodialysis.  Acute exacerbation of CHF which is improving and she does have underlying COPD.  It says that she is on 3 L of O2 at baseline but she has not needed any oxygen since she has been in the  U.  She was treated for pneumonia in the hospital and the suspicion for pneumonia was very low.  Low suspicion for COPD exacerbation at this time and she is DNR/DNI and palliative care was consulted and hospice evaluated patient and she wants to continue restorative cares at this time.  COVID-19 was negative.  She has been attending hemodialysis and there are no issues with the exception of low back pain.    Today the only complaint she has a low back pain her lower extremity edema and fluid overload seems to have been improving at this time and she has no other concerns.  Her back pain is intractable at this time and is causing her some problems we did a long discussion about treatment and we decided on very low-dose oxycodone at this time to give her some comfort.    Past Medical History:    Diagnosis Date   ? Arthritis    ? CHF (congestive heart failure) (H)    ? Chronic anemia 2014   ? Chronic kidney disease    ? Chronic thoracic aortic dissection (H) 10/7/2015    Descending thoracic aorta; treated medically per notes of Dragan Singh and Jennifer.   ? COPD (chronic obstructive pulmonary disease) (H)    ? CVA (cerebral infarction)    ? Disease of thyroid gland    ? Dyslipidemia    ? ESRD (end stage renal disease) (H) 2009    on dialysis with Dr. Mitchell   ? Essential hypertension 2014   ? Gastrointestinal hemorrhage, unspecified gastrointestinal hemorrhage type 2017   ? GI (gastrointestinal bleed)    ? GI bleeding 2017   ? Gout    ? L3 vertebral fracture (H) 2015   ? Left Atrial Appendage Occlusion (WATCHMAN) 2018    LAAO 2018 (30 mm WATCHMAN)   ? Obesity    ? ZULEIKA (obstructive sleep apnea), severe, intolerant of CPAP 10/22/2015   ? Oxygen dependent     3L nc   ? Pneumonia 2015   ? Right foot drop    ? Spinal stenosis 3/28/2016   ? Stroke (H) 3/24/2016             Family History   Problem Relation Age of Onset   ? Dementia Mother    ? Diabetes Mother    ? Arthritis Mother    ? Cancer Mother    ? Depression Mother    ? Heart disease Mother    ? Vision loss Mother    ? Stroke Father    ? Heart disease Father    ? Breast cancer Neg Hx      Social History     Socioeconomic History   ? Marital status:      Spouse name: Cayden   ? Number of children: 2   ? Years of education: None   ? Highest education level: None   Occupational History     Employer: RETIRED   Social Needs   ? Financial resource strain: None   ? Food insecurity     Worry: None     Inability: None   ? Transportation needs     Medical: None     Non-medical: None   Tobacco Use   ? Smoking status: Former Smoker     Packs/day: 1.50     Years: 37.00     Pack years: 55.50     Types: Cigarettes     Quit date: 2009     Years since quittin.8   ? Smokeless tobacco: Never Used   Substance and  Sexual Activity   ? Alcohol use: No     Alcohol/week: 11.7 standard drinks     Types: 14 Standard drinks or equivalent per week     Comment: 14 mixed drinks per week   ? Drug use: No   ? Sexual activity: Never     Partners: Male   Lifestyle   ? Physical activity     Days per week: None     Minutes per session: None   ? Stress: None   Relationships   ? Social connections     Talks on phone: None     Gets together: None     Attends Hoahaoism service: None     Active member of club or organization: None     Attends meetings of clubs or organizations: None     Relationship status: None   ? Intimate partner violence     Fear of current or ex partner: None     Emotionally abused: None     Physically abused: None     Forced sexual activity: None   Other Topics Concern   ? None   Social History Narrative    Lives with her . Daughter in [place] and daughter in [place].         Review of Systems   Constitutional:        Review of system positive for low back pain but denies any fevers chills nausea vomit diarrhea change in vision hearing taste or smell weakness one-sided of the chest pain.  She still has shortness of breath with exertion but that is about baseline for her and it seems to be improving and mostly likely to deconditioning.  Her lungs have been clear.  The remainder the view of systems is negative.       Vitals:    10/20/20 2005   BP: 122/71   Pulse: 72   Resp: 20   Temp: 97.1  F (36.2  C)   SpO2: 97%       Physical Exam  Constitutional:       General: She is not in acute distress.  HENT:      Head: Normocephalic and atraumatic.      Nose: Nose normal. No congestion.   Eyes:      General:         Right eye: No discharge.         Left eye: No discharge.   Cardiovascular:      Rate and Rhythm: Normal rate and regular rhythm.   Pulmonary:      Effort: Pulmonary effort is normal. No respiratory distress.      Breath sounds: No wheezing or rales.   Musculoskeletal:      Comments: Very minimal lower extremity  edema on this exam.   Skin:     General: Skin is warm and dry.   Neurological:      Mental Status: She is alert. Mental status is at baseline.   Psychiatric:         Mood and Affect: Mood normal.         Behavior: Behavior normal.           LABS: Reviewed.      ASSESSMENT:      ICD-10-CM    1. ESRD on dialysis (H)  N18.6     Z99.2    2. Pulmonary emphysema, unspecified emphysema type (H)  J43.9    3. Neuropathic pain  M79.2    4. Physical deconditioning  R53.81    5. Acute pulmonary edema (H)  J81.0    6. Severe low back pain  M54.5        PLAN: Plan at this time after talking with her in detail about her low back pain and the pulled muscles as him back in the excruciating nature of it.  I do believe that she is having pain but I did talk to her about oxycodone she says she is taking this in the past without any problems.  We did agree to a low dose and extended every 8 hours.  It will be 2.5 mg every 8 hours as needed and pain assessments around the clock.  We will continue with hemodialysis at this time and continue with physical and occupational therapy    As far as a fluid overload she seems euvolemic by exam at this time.  She is not on any oxygen.      Total  minutes of which % was spent counseling and coordination of care of the above plan.    Electronically signed by: Dl Nolan DO

## 2021-06-21 NOTE — LETTER
Letter by Louie Liriano CNP at      Author: Louie Liriano CNP Service: -- Author Type: --    Filed:  Encounter Date: 3/29/2021 Status: (Other)         [facility]  [address] [address]                                  2021    Patient: Belia Rodriguez   MR Number: [id number]   YOB: 1945   Date of Visit: 3/29/2021     Dear Dr. Villa:    Thank you for referring Belia Rodriguez to me for evaluation. Below are the relevant portions of my assessment and plan of care.    If you have questions, please do not hesitate to call me. I look forward to following Belia along with you.    Sincerely,        Louie Liriano CNP          CC  No Recipients  Louie Liriano CNP  3/29/2021  5:26 PM  Signed  [facility]    Name:   Belia Rodriguez (Itzel)  : 1945  Facility:   [facility] [[id number]]   Room: [place] [address]  Code Status: DNR/DNI and POLST AVAILABLE - Palliative Care  Fac type:   SNF (Skilled Nursing Facility, TCU) -   DOS: 2021  Last visit: 2021    PCP: Neil Galan MD      CHIEF COMPLAINT / REASON FOR VISIT:  Chief Complaint   Patient presents with   ? Discharge Summary     TCU Discharge: Hospitalization for acute on chronic respiratory failure due to a combination of fluid overload and COPD exacerbation     [facility] from 18 until 18  [facility] TCU from 18 until 18  [facility] from 2021 until 2021 (acute on chronic respiratory failure due to a combination of fluid overload and COPD exacerbation)  [facility] from 2021 until 2021  [facility] from 2021 until 2021 (acute on chronic respiratory failure due to a combination of fluid overload and COPD exacerbation)         HPI: Belia is a 75 y.o. female with  known end-stage renal disease (on dialysis), COPD (former smoker, 45-60 pack years),  chronic respiratory failure with hypoxia, obstructive sleep apnea (severe and previously intolerant of CPAP), chronic atrial fibrillation, GERD, essential hypertension, and anemia related to chronic renal failure.    She has had multiple hospitalizations for respiratory failure due to a combination of fluid overload and COPD exacerbation.  This occurred on 01/26/2021 and again on 02/18/2021.  Her last admission came within hours of her discharge from [facility].  When last in the TCU at [facility], she was a full code, but she indicated during her last hospitalization that she wants no further BiPAP and is DNR/DNI.  This did improve somewhat with steroids and doxycycline.    While she continues on hemodialysis Mondays, Wednesdays, and Fridays, she did have some questions regarding hospice but was not sure she would sign on given her need for dialysis.  She did have a palliative care consult, and she strongly agreed, especially given her readmission to the hospital.      CURRENT/RECENT TCU ISSUES    Disposition: The patient is aware that her health will not improve and is ready for hospice, although she is concerned about having all her paperwork in order.  I had received a request by  that I give an okay for an in-house psych consult.  My initial thought was that the patient has been doing quite well and accepting her situation, and there would be nothing gained from such a consult.  At the time, we spent a good 20 minutes talking about her feelings with regard to hospice, dying, and other related issues, including depression.  She did tell me that she thought she might benefit from a consult and was recently seen via video conference.  She thought it was beneficial.    She was having no particular issues until about 6 weeks ago, not needing oxygen but now expecting a chronic continuous  need.  She initially told me she planned to go on hospice after discharge.  She expressed a desire to go to [facility].  She noted that she would be stopping dialysis and would expect to survive no longer than 1 week.  She had been presented with this suggestion about 6 months ago.  She believed that she was ready.  Her main concern has not been about dying but  getting her paperwork done for her  of 44 years.     She is doing fairly well.  She complains of neuropathy in her feet, Raynaud's phenomenon in the feet as well, and some constipation and low blood pressures as described below.    Her attitude appears to have changed somewhat, and while she remains quite ill, she may hold off going on hospice and continuing dialysis.    Hypotension: She is on a 1500 mL fluid restriction.  This may be producing hypovolemia.  She does have orders for 20 mg of midodrine to take on dialysis days (Mondays, Wednesdays, and Fridays) and has additional orders for taking this when additional dialysis is needed.  However, she has been running significantly low blood pressures.  Systolic blood pressure has been as low as the 70s. On 03/01/2021, another nurse practitioner ordered 10 mg of midodrine to be given on nondialysis days when the SBP is <106.  An adjustment was recently made at dialysis, and she is now receiving a 20 mg dose there as well if necessary.  They has been pacing her dialysis fluid removal with a dry weight of 67.5 kg.  There have been times when she has required additional fluids following dialysis due to severely low blood pressures.  Very recently, the nephrologist adjusted her dry weight to 70.5 kg, and this has proved to make a considerable difference.  She has been able to skip some postdialysis midodrine doses.  They place the midodrine doses in an envelope and give them to her.  We did write orders for 10 mg 3 times daily as needed of midodrine to be given whenever SBPs are <106, regardless of when  "this occurs.  We are increasing this to 15 mg 3 times daily as needed, preferably with meals.    Miscellaneous medication changes: At an earlier visit, considering her plans for hospice, we talked about her medications.  It was agreed that atorvastatin was no longer needed, and so we discontinued it.  At the time, she stated, \"anything you can take away from me won't break my heart at all.\"    Discharge planning: She told her  and daughter that she wanted to stay here, but her  became angry.  He has been the one to take her to dialysis and suggest that she would need to arrange her own transportation if she decided to stay.  He also told her he would not be doing housework.  The end result was that the patient would be staying 2 more weeks.  Apparently, her  has now finally resigned himself to her being here.  She tells me that she is here now because she has no other place to go, and palliative care is cheaper than hospice.  I told her I had no problem with managing a palliative care plan. There was a request for physical therapy, and we gave orders for that.     Her discharge date has been set for 03/29/2021.  She will require a  home visit to assist with navigating issues relating to her care, as well as home PT and OT to continue therapies in the home setting.      ROS:   Positives: Her feet continue to be painful due to neuropathy.  She has found that lying in bed with her knees up in the air helps. She is on chronic oxygen, and breathing is no better and no worse.      Negatives: Complaining of constipation before, things are going better now.  She denies any headaches or chest pains, coughing or congestion at the moment, dizziness, dysuria, diarrhea, difficulty chewing or swallowing, or problems with sleep (when on 150 mg of trazodone nightly).    Past Medical History:   Diagnosis Date   ? Arthritis    ? CHF (congestive heart failure) (H)    ? Chronic anemia 6/1/2014   ? " Chronic kidney disease    ? Chronic thoracic aortic dissection (H) 10/7/2015    Descending thoracic aorta; treated medically per notes of Dragan Singh and Jennifer.   ? COPD (chronic obstructive pulmonary disease) (H)    ? CVA (cerebral infarction)    ? Disease of thyroid gland    ? Dyslipidemia    ? ESRD (end stage renal disease) (H) 2009    on dialysis with Dr. Mitchell   ? Essential hypertension 2014   ? Gastrointestinal hemorrhage, unspecified gastrointestinal hemorrhage type 2017   ? GI (gastrointestinal bleed)    ? GI bleeding 2017   ? Gout    ? L3 vertebral fracture (H) 2015   ? Left Atrial Appendage Occlusion (WATCHMAN) 2018    LAAO 2018 (30 mm WATCHMAN)   ? Obesity    ? ZULEIKA (obstructive sleep apnea), severe, intolerant of CPAP 10/22/2015   ? Oxygen dependent     3L nc   ? Pneumonia 2015   ? Right foot drop    ? Spinal stenosis 3/28/2016   ? Stroke (H) 3/24/2016              Family History   Problem Relation Age of Onset   ? Dementia Mother    ? Diabetes Mother    ? Arthritis Mother    ? Cancer Mother    ? Depression Mother    ? Heart disease Mother    ? Vision loss Mother    ? Stroke Father    ? Heart disease Father    ? Breast cancer Neg Hx      Social History     Socioeconomic History   ? Marital status:      Spouse name: Cayden   ? Number of children: 2   ? Years of education: Not on file   ? Highest education level: Not on file   Occupational History     Employer: RETIRED   Social Needs   ? Financial resource strain: Not on file   ? Food insecurity     Worry: Not on file     Inability: Not on file   ? Transportation needs     Medical: Not on file     Non-medical: Not on file   Tobacco Use   ? Smoking status: Former Smoker     Packs/day: 1.50     Years: 37.00     Pack years: 55.50     Types: Cigarettes     Quit date: 2009     Years since quittin.2   ? Smokeless tobacco: Never Used   Substance and Sexual Activity   ? Alcohol use: No     Alcohol/week:  11.7 standard drinks     Types: 14 Standard drinks or equivalent per week     Comment: 14 mixed drinks per week   ? Drug use: No   ? Sexual activity: Never     Partners: Male   Lifestyle   ? Physical activity     Days per week: Not on file     Minutes per session: Not on file   ? Stress: Not on file   Relationships   ? Social connections     Talks on phone: Not on file     Gets together: Not on file     Attends Yazidism service: Not on file     Active member of club or organization: Not on file     Attends meetings of clubs or organizations: Not on file     Relationship status: Not on file   ? Intimate partner violence     Fear of current or ex partner: Not on file     Emotionally abused: Not on file     Physically abused: Not on file     Forced sexual activity: Not on file   Other Topics Concern   ? Not on file   Social History Narrative    Lives with her . Daughter in [place] and daughter in [place].     MEDICATIONS: Reviewed from the MAR, physician orders, and earlier progress notes.    Post Discharge Medication Reconciliation Status: medication reconciliation previously completed during another office visit.    Updated by me today (03/29/2021) with adjustments to midodrine reflected below.  Current Outpatient Medications   Medication Sig   ? acetaminophen (TYLENOL) 500 MG tablet Take 2 tablets (1,000 mg total) by mouth every 6 (six) hours as needed.   ? albuterol (PROVENTIL) 2.5 mg /3 mL (0.083 %) nebulizer solution Take 3 mL (2.5 mg total) by nebulization every 4 (four) hours as needed for wheezing.   ? aspirin 81 MG EC tablet Take 1 tablet (81 mg total) by mouth daily.   ? atorvastatin (LIPITOR) 10 MG tablet Take 10 mg by mouth at bedtime.   ? B complex 11-folic-C-biot-zinc (DIALYVITE) 4-671-203-50 mg-mg-mcg-mg Tab Take 1 tablet by mouth daily with supper. (Dialyvite)    ? buPROPion (WELLBUTRIN XL) 150 MG 24 hr tablet Take 1 tablet (150 mg total) by mouth 2 (two) times a week. Tuesday and saturday  (Patient taking differently: Take 150 mg by mouth 2 (two) times a week. Tuesday and saturday)   ? cholecalciferol, vitamin D3, 1,000 unit (25 mcg) tablet Take 2,000 Units by mouth daily.    ? cinacalcet (SENSIPAR) 30 MG tablet Take 30 mg by mouth see administration instructions. Take three times weekly with dialysis (on Mondays, Wednesdays, and Fridays).   ? famotidine (PEPCID) 20 MG tablet Take 1 tablet (20 mg total) by mouth at bedtime.   ? folic acid (FOLVITE) 1 MG tablet TAKE ONE TABLET BY MOUTH ONCE DAILY (Patient taking differently: Take 1 mg by mouth daily. )   ? gabapentin (NEURONTIN) 100 MG capsule Take 100 mg by mouth 2 (two) times a day. Leg pain   ? Lactobacillus rhamnosus GG (CULTURELLE) 10-15 Billion cell capsule Take 1 capsule by mouth daily with lunch.    ? metoprolol succinate (TOPROL-XL) 25 MG Take 1 tablet (25 mg total) by mouth daily with lunch. Hold for SBP<110 or hr<60   ? midodrine (PROAMATINE) 10 MG tablet Take 2 tablets (20 mg total) by mouth 3 (three) times a week. Patient takes prior to dialysis qMWF and PRN if additional dialysis treatments.  Patient reports that she takes 20mg prior to dialysis (Patient taking differently: Take 20 mg by mouth 3 (three) times a week. Patient takes prior to dialysis qMWF and PRN if additional dialysis treatments.  Patient reports taking 20mg prior to dialysis and is given 10 mg after dialysis as well if needed.    Also, on nondialysis days, give 10 mg 3 times daily for SBP <106.)   ? midodrine (PROAMATINE) 10 MG tablet Take 15 mg by mouth 3 (three) times a day as needed. 15 mg (1.5 tabs) whenever SBP is <106 (Check BP three times a day).   ? midodrine (PROAMATINE) 5 MG tablet Take 10 mg by mouth 4 (four) times a week. On non dialysis days if SBP < 106   ? oxyCODONE (ROXICODONE) 5 MG immediate release tablet TAKE 1/2 - 1 TABLET (2.5-5MG) BY MOUTH TWICE DAILY AS NEEDED FOR PAIN   ? polyvinyl alcohol (LIQUIFILM TEARS) 1.4 % ophthalmic solution Administer 1  drop to both eyes as needed for dry eyes.   ? pot bicarb/sod bicarb/cit ac (EDI-SELTZER GOLD ORAL) Take by mouth daily.   ? senna-docusate (SENNOSIDES-DOCUSATE SODIUM) 8.6-50 mg tablet Take 1 tablet by mouth at bedtime as needed for constipation.    ? sevelamer carbonate (RENVELA) 800 mg tablet Take 1,600 mg by mouth 2 (two) times a day as needed (snacks).   ? sevelamer HCL (RENAGEL) 800 MG tablet Take 1,600 mg by mouth 3 (three) times a day with meals.    ? timolol maleate (TIMOPTIC) 0.5 % ophthalmic solution Administer 1 drop to both eyes 2 (two) times a day.    ? traZODone (DESYREL) 150 MG tablet Take 150 mg by mouth at bedtime.      ALLERGIES:   Allergies   Allergen Reactions   ? Ace Inhibitors Cough   ? Atrovent [Ipratropium Bromide] Headache   ? Fosinopril Sodium Cough   ? Zolpidem      hallucinations     DIET: Dialysis diet, regular texture, thin liquids.  1500 mL /24-hour fluid restriction    Vitals:    03/29/21 1433   BP: 99/56   Pulse: 74   Resp: 18   Temp: 98.3  F (36.8  C)   SpO2: 94%   Weight: 152 lb (68.9 kg)     Body mass index is 24.53 kg/m .    EXAMINATION:   General: Pleasant, alert, and conversant middle-aged female, sitting in a wheelchair, in no apparent distress.  Head: Normocephalic and atraumatic.   Eyes: PERRLA, sclerae clear.  Slight disconjugate gaze.  ENT: Moist oral mucosa.  She has her own teeth.  She has a bit of a runny nose today.  Hearing is unimpaired.  Cardiovascular: Irregular rhythm with a 2/6 systolic ejection murmur at the left sternal border.  Respiratory: Diminished lung sounds bilaterally but otherwise clear.  Abdomen: Soft and nontender.   Musculoskeletal/Extremities: Age-related degenerative joint disease.  Trace right pedal edema, with most of the left, improved.  Integument: No rashes, clinically significant lesions, or skin breakdown.   Cognitive/Psychiatric: Alert and oriented ×4.  Affect is euthymic.    DIAGNOSTICS:   Results for orders placed or performed during  the hospital encounter of 02/18/21   Basic Metabolic Panel   Result Value Ref Range    Sodium 136 136 - 145 mmol/L    Potassium 3.7 3.5 - 5.0 mmol/L    Chloride 97 (L) 98 - 107 mmol/L    CO2 29 22 - 31 mmol/L    Anion Gap, Calculation 10 5 - 18 mmol/L    Glucose 95 70 - 125 mg/dL    Calcium 8.7 8.5 - 10.5 mg/dL    BUN 28 8 - 28 mg/dL    Creatinine 3.01 (H) 0.60 - 1.10 mg/dL    GFR MDRD Af Amer 18 (L) >60 mL/min/1.73m2    GFR MDRD Non Af Amer 15 (L) >60 mL/min/1.73m2     Lab Results   Component Value Date    WBC 3.9 (L) 02/20/2021    HGB 10.1 (L) 02/20/2021    HCT 31.8 (L) 02/20/2021     (H) 02/20/2021    PLT 91 (L) 02/20/2021     CrCl cannot be calculated (Patient's most recent lab result is older than the maximum 10 days allowed.).  Lab Results   Component Value Date     (H) 02/18/2021     ASSESSMENT/Plan:      ICD-10-CM    1. Chronic respiratory failure with hypoxia (H)  J96.11    2. ESRD on hemodialysis (H), MWF  N18.6     Z99.2    3. Chronic diastolic heart failure (H)  I50.32    4. Hypotension due to hypovolemia  I95.89     E86.1    5. Chronic atrial fibrillation (H)  I48.20    6. Anemia of chronic renal failure, stage 5 (H)  N18.5     D63.1    7. Pulmonary emphysema, unspecified emphysema type (H)  J43.9    8. Mild episode of recurrent major depressive disorder (H)  F33.0    9. Chronic acquired lymphedema  I89.0    10. Gastroesophageal reflux disease without esophagitis  K21.9      CHANGES:    Medication changes include 15 mg of midodrine 3 times daily as needed on nondialysis days.    DISCHARGE PLAN/FACE TO FACE:  I certify that this patient is under my care and that I had a face-to-face encounter that meets the physician face-to-face encounter requirements with this patient.     Date of Face-to-Face Encounter: 03/29/2021     I certify that, based on my findings, the following services are medically necessary home health services:  Home PT and home OT    My clinical findings support the need for  the above skilled services because: (Please write a brief narrative summary that describes what the RN, PT, SLP, or other services will be doing in the home. A list of diagnoses in this section does not meet the CMS requirements):  Home PT for strengthening, balance, endurance, and safety with mobility/ambulation; home OT for strengthening, ADL needs, adaptive equipment, and safety.   to help manage the intricacies involved in her care, including legal matters.    This patient is homebound because: (Please write a brief narrative summmary describing the functional limitations as to why this patient is homebound and specifically what makes this patient homebound.):  Above services necessarily need to be performed in the home to be of benefit.  Additionally, due to the patient's chronic respiratory failure, travel entails increased risk.    The patient is, or has been, under my care and I have initiated the establishment of the plan of care. This patient will be followed by a physician who will periodically review the plan of care.  Initial follow-up should be within 7-10 days.    Approximate time spent with this patient was greater than 30 minutes with greater than 50% spent in discussions regarding services required upon discharge.      The above has been created using voice recognition software. Please be aware that this may unintentionally  produce inaccuracies and/or nonsensical sentences.      Electronically signed by: Louie Liriano CNP

## 2021-06-21 NOTE — LETTER
Letter by Louie Liriano CNP at      Author: Louie Liriano CNP Service: -- Author Type: --    Filed:  Encounter Date: 2021 Status: (Other)         Patient: Belia Rodriguez   MR Number: [id number]   YOB: 1945   Date of Visit: 2021     [facility]    Name:   Belia Rodriguez (Itzel)  : 1945  Facility:   [facility] [[id number]]   Room:   Code Status: DNR/DNI and POLST AVAILABLE -   Fac type:   SNF (Skilled Nursing Facility, TCU) -     CHIEF COMPLAINT / REASON FOR VISIT:  Chief Complaint   Patient presents with   ? Follow-up     Acute on chronic respiratory failure due to a combination of fluid overload and COPD exacerbation   ? Problem Visit     Depression.     [facility] from 18 until 18  [facility]CU from 18 until 18  [facility] from 2021 until 2021 (acute on chronic respiratory failure due to a combination of fluid overload and COPD exacerbation)  [facility] from 2021 until 2021  [facility] from 2021 until 2021 (acute on chronic respiratory failure due to a combination of fluid overload and COPD exacerbation)       HPI: Belia is a 75 y.o. female with known end-stage renal disease (on dialysis), COPD (former smoker, 45-60 pack years),  chronic respiratory failure with hypoxia, obstructive sleep apnea (severe and previously intolerant of CPAP), chronic atrial fibrillation, GERD, essential hypertension, and anemia related to chronic renal failure.    She has had multiple hospitalizations for respiratory failure due to a combination of fluid overload and COPD exacerbation.  This occurred on 2021 and again on 2021.  Her last admission came within hours of her discharge from [facility].  When last in the TCU at [facility],  she was a full code, but she indicated during her last hospitalization that she wants no further BiPAP and is DNR/DNI.  This did improve somewhat with steroids and doxycycline.    While she continues on hemodialysis Mondays, Wednesdays, and Fridays, she did have some questions regarding hospice but was not sure she would sign on given her need for dialysis.  She did have a palliative care consult, and she strongly agreed, especially given her readmission to the hospital.      CURRENT/RECENT TCU ISSUES    Disposition: Today, the patient is interested in talking about her depression and realization that her health will not improve.  I had received a request by social services that I give an okay for an in-house psych consult.  My initial thought was that the patient has been doing quite well and accepting her situation, and there would be nothing gained from such a consult.  We spent a good 20 minutes talking about her feelings with regard to hospice, dying, and other related issues, including depression.  She did tell me that she thought she might benefit from a consult, and we have given the okay.    She was having no particular issues until about 6 weeks ago, not needing oxygen but now expecting a chronic continuous need.  She tells me she plans to go on hospice after discharge.  She wants to go to [facility].  She notes that she will be stopping dialysis and would expect to survive no longer than 1 week.  She has been presented with this suggestion about 6 months ago.  She believes that she is now ready.  Her main concern is not about dying but is getting her paperwork done for her  of 44 years.     She tells me that she is doing much better than she was just a few days ago.  She complains of neuropathy in her feet, Raynaud's syndrome in the feet as well, and some constipation.    She is on a 1500 mL fluid restriction.    Medication changes: At my last visit, considering her plans for hospice, we talked  "about her medications.  It was agreed that atorvastatin was no longer needed, and so we discontinued it.  At the time, she stated, \"anything you can take away from me won't break my heart at all.\"      ROS:   Positives: Her feet continue to be painful due to neuropathy.  She has found that lying in bed with her knees up in the air helps. She is on chronic oxygen, and breathing is no better and no worse.      Negatives: Complaining of constipation before, things are going better now.  She denies any headaches or chest pains, coughing or congestion at the moment, dizziness, dysuria, diarrhea, difficulty chewing or swallowing, or problems with sleep (when on 150 mg of trazodone nightly).    Past Medical History:   Diagnosis Date   ? Arthritis    ? CHF (congestive heart failure) (H)    ? Chronic anemia 6/1/2014   ? Chronic kidney disease    ? Chronic thoracic aortic dissection (H) 10/7/2015    Descending thoracic aorta; treated medically per notes of Dragan Singh and Jennifer.   ? COPD (chronic obstructive pulmonary disease) (H)    ? CVA (cerebral infarction)    ? Disease of thyroid gland    ? Dyslipidemia    ? ESRD (end stage renal disease) (H) 06/03/2009    on dialysis with Dr. Mitchell   ? Essential hypertension 6/30/2014   ? Gastrointestinal hemorrhage, unspecified gastrointestinal hemorrhage type 6/5/2017   ? GI (gastrointestinal bleed)    ? GI bleeding 6/5/2017   ? Gout    ? L3 vertebral fracture (H) 11/16/2015   ? Left Atrial Appendage Occlusion (WATCHMAN) 4/5/2018    LAAO April 5, 2018 (30 mm WATCHMAN)   ? Obesity    ? ZULEIKA (obstructive sleep apnea), severe, intolerant of CPAP 10/22/2015   ? Oxygen dependent     3L nc   ? Pneumonia 9-7-2015   ? Right foot drop    ? Spinal stenosis 3/28/2016   ? Stroke (H) 3/24/2016              Family History   Problem Relation Age of Onset   ? Dementia Mother    ? Diabetes Mother    ? Arthritis Mother    ? Cancer Mother    ? Depression Mother    ? Heart disease Mother    ? Vision " loss Mother    ? Stroke Father    ? Heart disease Father    ? Breast cancer Neg Hx      Social History     Socioeconomic History   ? Marital status:      Spouse name: Cayden   ? Number of children: 2   ? Years of education: Not on file   ? Highest education level: Not on file   Occupational History     Employer: RETIRED   Social Needs   ? Financial resource strain: Not on file   ? Food insecurity     Worry: Not on file     Inability: Not on file   ? Transportation needs     Medical: Not on file     Non-medical: Not on file   Tobacco Use   ? Smoking status: Former Smoker     Packs/day: 1.50     Years: 37.00     Pack years: 55.50     Types: Cigarettes     Quit date: 2009     Years since quittin.1   ? Smokeless tobacco: Never Used   Substance and Sexual Activity   ? Alcohol use: No     Alcohol/week: 11.7 standard drinks     Types: 14 Standard drinks or equivalent per week     Comment: 14 mixed drinks per week   ? Drug use: No   ? Sexual activity: Never     Partners: Male   Lifestyle   ? Physical activity     Days per week: Not on file     Minutes per session: Not on file   ? Stress: Not on file   Relationships   ? Social connections     Talks on phone: Not on file     Gets together: Not on file     Attends L[employer]-day service: Not on file     Active member of club or organization: Not on file     Attends meetings of clubs or organizations: Not on file     Relationship status: Not on file   ? Intimate partner violence     Fear of current or ex partner: Not on file     Emotionally abused: Not on file     Physically abused: Not on file     Forced sexual activity: Not on file   Other Topics Concern   ? Not on file   Social History Narrative    Lives with her . Daughter in [place] and daughter in [place].     MEDICATIONS: Reviewed from the MAR, physician orders, and earlier progress notes.  Post Discharge Medication Reconciliation Status: discharge medications reconciled and changed, per note/orders.   Updated by me today (02/22/2021) with a change in time of senna S reflected below.    Current Outpatient Medications   Medication Sig   ? acetaminophen (TYLENOL) 500 MG tablet Take 2 tablets (1,000 mg total) by mouth every 6 (six) hours as needed.   ? albuterol (PROVENTIL) 2.5 mg /3 mL (0.083 %) nebulizer solution Take 3 mL (2.5 mg total) by nebulization every 4 (four) hours as needed for wheezing.   ? aspirin 81 MG EC tablet Take 1 tablet (81 mg total) by mouth daily.   ? atorvastatin (LIPITOR) 10 MG tablet Take 10 mg by mouth at bedtime.   ? B complex 11-folic-C-biot-zinc (DIALYVITE) 3-373-426-50 mg-mg-mcg-mg Tab Take 1 tablet by mouth daily with supper. (Dialyvite)    ? buPROPion (WELLBUTRIN XL) 150 MG 24 hr tablet Take 1 tablet (150 mg total) by mouth 2 (two) times a week. Tuesday and saturday (Patient taking differently: Take 150 mg by mouth 2 (two) times a week. Tuesday and saturday)   ? cholecalciferol, vitamin D3, 1,000 unit (25 mcg) tablet Take 2,000 Units by mouth daily.    ? cinacalcet (SENSIPAR) 30 MG tablet Take 30 mg by mouth see administration instructions. Take three times weekly with dialysis (on Mondays, Wednesdays, and Fridays).   ? famotidine (PEPCID) 20 MG tablet Take 1 tablet (20 mg total) by mouth at bedtime.   ? folic acid (FOLVITE) 1 MG tablet TAKE ONE TABLET BY MOUTH ONCE DAILY (Patient taking differently: Take 1 mg by mouth daily. )   ? gabapentin (NEURONTIN) 100 MG capsule Take 100 mg by mouth 2 (two) times a day. Leg pain   ? Lactobacillus rhamnosus GG (CULTURELLE) 10-15 Billion cell capsule Take 1 capsule by mouth daily with lunch.    ? metoprolol succinate (TOPROL-XL) 25 MG Take 1 tablet (25 mg total) by mouth daily with lunch. Hold for SBP<110 or hr<60   ? midodrine (PROAMATINE) 10 MG tablet Take 2 tablets (20 mg total) by mouth 3 (three) times a week. Patient takes prior to dialysis qMWF and PRN if additional dialysis treatments.  Patient reports that she takes 20mg prior to dialysis  "  ? midodrine (PROAMATINE) 5 MG tablet Take 10 mg by mouth 4 (four) times a week. On non dialysis days if SBP < 106   ? oxyCODONE (ROXICODONE) 5 MG immediate release tablet TAKE 1/2 TO 1 TABLET (2.5-5MG) BY MOUTH TWICE DAILY AS NEEDED FOR PAIN   ? polyvinyl alcohol (LIQUIFILM TEARS) 1.4 % ophthalmic solution Administer 1 drop to both eyes as needed for dry eyes.   ? senna-docusate (SENNOSIDES-DOCUSATE SODIUM) 8.6-50 mg tablet Take 1 tablet by mouth at bedtime as needed for constipation.    ? sevelamer carbonate (RENVELA) 800 mg tablet Take 1,600 mg by mouth 2 (two) times a day as needed (snacks).   ? sevelamer HCL (RENAGEL) 800 MG tablet Take 1,600 mg by mouth 3 (three) times a day with meals.    ? timolol maleate (TIMOPTIC) 0.5 % ophthalmic solution Administer 1 drop to both eyes 2 (two) times a day.    ? traZODone (DESYREL) 150 MG tablet Take 150 mg by mouth at bedtime.      ALLERGIES:   Allergies   Allergen Reactions   ? Ace Inhibitors Cough   ? Atrovent [Ipratropium Bromide] Headache   ? Fosinopril Sodium Cough   ? Zolpidem      hallucinations     DIET: Dialysis diet, regular texture, thin liquids.  1500 mL /24-hour fluid restriction    Vitals:    02/25/21 2050   BP: 98/61   Pulse: 69   Resp: 16   Temp: 96.9  F (36.1  C)   SpO2: 97%   Weight: 177 lb 1.6 oz (80.3 kg)   Height: 5' 6\" (1.676 m)     Body mass index is 28.58 kg/m .    EXAMINATION:   General: Pleasant, alert, and conversant middle-aged female, sitting in her wheelchair, in no apparent distress.  Head: Normocephalic and atraumatic.   Eyes: PERRLA, sclerae clear.  Slight disconjugate gaze.  ENT: Moist oral mucosa.  She has her own teeth.  No rhinorrhea or nasal discharge.  Hearing is unimpaired.  Cardiovascular: Irregular rhythm with a 2/6 systolic ejection murmur at the left sternal border.  Respiratory: Diminished lung sounds bilaterally but otherwise clear.  Abdomen: Soft and nontender.   Musculoskeletal/Extremities: Age-related degenerative joint " disease.  Right 3+ pitting pedal and pretibial edema, 2+ on the left.  Integument: No rashes, clinically significant lesions, or skin breakdown.   Cognitive/Psychiatric: Alert and oriented ×4.  Affect is euthymic.    DIAGNOSTICS:   Results for orders placed or performed during the hospital encounter of 02/18/21   Basic Metabolic Panel   Result Value Ref Range    Sodium 136 136 - 145 mmol/L    Potassium 3.7 3.5 - 5.0 mmol/L    Chloride 97 (L) 98 - 107 mmol/L    CO2 29 22 - 31 mmol/L    Anion Gap, Calculation 10 5 - 18 mmol/L    Glucose 95 70 - 125 mg/dL    Calcium 8.7 8.5 - 10.5 mg/dL    BUN 28 8 - 28 mg/dL    Creatinine 3.01 (H) 0.60 - 1.10 mg/dL    GFR MDRD Af Amer 18 (L) >60 mL/min/1.73m2    GFR MDRD Non Af Amer 15 (L) >60 mL/min/1.73m2     Lab Results   Component Value Date    WBC 3.9 (L) 02/20/2021    HGB 10.1 (L) 02/20/2021    HCT 31.8 (L) 02/20/2021     (H) 02/20/2021    PLT 91 (L) 02/20/2021     Estimated Creatinine Clearance: 17.8 mL/min (A) (by C-G formula based on SCr of 3.01 mg/dL (H)).  Lab Results   Component Value Date     (H) 02/18/2021     ASSESSMENT/Plan:      ICD-10-CM    1. Chronic respiratory failure with hypoxia (H)  J96.11    2. Pulmonary emphysema, unspecified emphysema type (H)  J43.9    3. ESRD on hemodialysis (H), MWF  N18.6     Z99.2    4. Chronic atrial fibrillation (H)  I48.20    5. Anemia of chronic renal failure, stage 5 (H)  N18.5     D63.1    6. Essential hypertension  I10    7. Chronic acquired lymphedema  I89.0      CHANGES:    None.    CARE PLAN:    The care plan has been reviewed and all orders signed. Changes to care plan, if any, as noted. Otherwise, continue care plan of care.  Total time spent with this patient was approximately 35 minutes, with greater than 50% spent in counseling and coordination of care that included a lengthy conversation regarding her depression, plans for hospice, and thoughts about death.    The above has been created using voice  recognition software. Please be aware that this may unintentionally  produce inaccuracies and/or nonsensical sentences.      Electronically signed by: Louie Liriano CNP

## 2021-06-21 NOTE — LETTER
Letter by Louie Liriano CNP at      Author: Louie Liriano CNP Service: -- Author Type: --    Filed:  Encounter Date: 3/25/2021 Status: (Other)         [facility]  [address] [address]                                  2021    Patient: Belia Rodriguez   MR Number: [id number]   YOB: 1945   Date of Visit: 3/25/2021     Dear Dr. Villa:    Thank you for referring Belia Rodriguez to me for evaluation. Below are the relevant portions of my assessment and plan of care.    If you have questions, please do not hesitate to call me. I look forward to following Belia along with you.    Sincerely,        Louie Liriano CNP          CC  No Recipients  Louie Liriano CNP  3/28/2021  3:14 PM  Signed  [facility]    Name:   Belia Triplett)  : 1945  Facility:   [facility] [[id number]]   Room: [place] [address]  Code Status: DNR/DNI and POLST AVAILABLE - Palliative Care  Fac type:   SNF (Skilled Nursing Facility, TCU) -   DOS: 2021  Last visit: 2021    PCP: Neil Galan MD      CHIEF COMPLAINT / REASON FOR VISIT:  Chief Complaint   Patient presents with   ? Follow-up     TCU follow-up: Hospitalization for acute on chronic respiratory failure due to a combination of fluid overload and COPD exacerbation   ? Problem Visit     1. Hypotension due to hypovolemia. 2. Depression.     [facility] from 18 until 18  [facility] from 18 until 18  [facility] from 2021 until 2021 (acute on chronic respiratory failure due to a combination of fluid overload and COPD exacerbation)  [facility] from 2021 until 2021  [facility] from 2021 until 2021 (acute on chronic respiratory failure due to a combination of fluid overload and  COPD exacerbation)         HPI: Belia is a 75 y.o. female with known end-stage renal disease (on dialysis), COPD (former smoker, 45-60 pack years),  chronic respiratory failure with hypoxia, obstructive sleep apnea (severe and previously intolerant of CPAP), chronic atrial fibrillation, GERD, essential hypertension, and anemia related to chronic renal failure.    She has had multiple hospitalizations for respiratory failure due to a combination of fluid overload and COPD exacerbation.  This occurred on 01/26/2021 and again on 02/18/2021.  Her last admission came within hours of her discharge from [facility].  When last in the TCU at [facility], she was a full code, but she indicated during her last hospitalization that she wants no further BiPAP and is DNR/DNI.  This did improve somewhat with steroids and doxycycline.    While she continues on hemodialysis Mondays, Wednesdays, and Fridays, she did have some questions regarding hospice but was not sure she would sign on given her need for dialysis.  She did have a palliative care consult, and she strongly agreed, especially given her readmission to the hospital.      CURRENT/RECENT TCU ISSUES    Disposition: The patient is aware that her health will not improve and is ready for hospice, although she is concerned about having all her paperwork in order.  I had received a request by  that I give an okay for an in-house psych consult.  My initial thought was that the patient has been doing quite well and accepting her situation, and there would be nothing gained from such a consult.  At the time, we spent a good 20 minutes talking about her feelings with regard to hospice, dying, and other related issues, including depression.  She did tell me that she thought she might benefit from a consult and was recently seen via video conference.  She thought it was beneficial.    She was having no particular issues until about 6  weeks ago, not needing oxygen but now expecting a chronic continuous need.  She initially told me she planned to go on hospice after discharge.  She expressed a [facility].  She noted that she would be stopping dialysis and would expect to survive no longer than 1 week.  She had been presented with this suggestion about 6 months ago.  She believed that she was ready.  Her main concern has not been about dying but  getting her paperwork done for her  of 44 years.     She is doing fairly well.  She complains of neuropathy in her feet, Raynaud's phenomenon in the feet as well, and some constipation and low blood pressures as described below.    Her attitude appears to have changed somewhat, and while she remains quite ill, she may hold off going on hospice and continuing dialysis.    Hypotension: She is on a 1500 mL fluid restriction.  This may be producing hypovolemia.  She does have orders for 20 mg of midodrine to take on dialysis days (Mondays, Wednesdays, and Fridays) and has additional orders for taking this when additional dialysis is needed.  However, she has been running significantly low blood pressures.  Systolic blood pressure has been as low as the 70s. On 03/01/2021, another nurse practitioner ordered 10 mg of midodrine to be given on nondialysis days when the SBP is <106.  An adjustment was recently made at dialysis, and she is now receiving a 20 mg dose there as well if necessary.  They has been pacing her dialysis fluid removal with a dry weight of 67.5 kg.  There have been times when she has required additional fluids following dialysis due to severely low blood pressures.  Very recently, the nephrologist adjusted her dry weight to 70.5 kg, and this has proved to make a considerable difference.  She has been able to skip some postdialysis midodrine doses.  They have placed the midodrine doses in an envelope and given them to her.  We did write orders for 10 mg 3 times daily as  "needed of midodrine to be given whenever SBPs are <106, regardless of when this occurs.    Medication changes: At an earlier visit, considering her plans for hospice, we talked about her medications.  It was agreed that atorvastatin was no longer needed, and so we discontinued it.  At the time, she stated, \"anything you can take away from me won't break my heart at all.\"    Discharge planning: She told her  and daughter that she wanted to stay here, but her  became angry.  He has been the one to take her to dialysis and suggest that she would need to arrange her own transportation if she decided to stay.  He also told her he would not be doing housework.  The end result was that the patient would be staying 2 more weeks.  Apparently, her  has now finally resigned himself to her being here.  She tells me that she is here now because she has no other place to go, and palliative care is cheaper than hospice.  I told her I had no problem with managing a palliative care plan. There was a request for physical therapy, and we gave orders for that. Currently, no specific discharge date has been set, although her family is preparing the space where she will be living upon her return home.      ROS:   Positives: Her feet continue to be painful due to neuropathy.  She has found that lying in bed with her knees up in the air helps. She is on chronic oxygen, and breathing is no better and no worse.      Negatives: Complaining of constipation before, things are going better now.  She denies any headaches or chest pains, coughing or congestion at the moment, dizziness, dysuria, diarrhea, difficulty chewing or swallowing, or problems with sleep (when on 150 mg of trazodone nightly).    Past Medical History:   Diagnosis Date   ? Arthritis    ? CHF (congestive heart failure) (H)    ? Chronic anemia 6/1/2014   ? Chronic kidney disease    ? Chronic thoracic aortic dissection (H) 10/7/2015    Descending thoracic " aorta; treated medically per notes of Dragan Singh and Jennifer.   ? COPD (chronic obstructive pulmonary disease) (H)    ? CVA (cerebral infarction)    ? Disease of thyroid gland    ? Dyslipidemia    ? ESRD (end stage renal disease) (H) 2009    on dialysis with Dr. Mitchell   ? Essential hypertension 2014   ? Gastrointestinal hemorrhage, unspecified gastrointestinal hemorrhage type 2017   ? GI (gastrointestinal bleed)    ? GI bleeding 2017   ? Gout    ? L3 vertebral fracture (H) 2015   ? Left Atrial Appendage Occlusion (WATCHMAN) 2018    LAAO 2018 (30 mm WATCHMAN)   ? Obesity    ? ZULEIKA (obstructive sleep apnea), severe, intolerant of CPAP 10/22/2015   ? Oxygen dependent     3L nc   ? Pneumonia 2015   ? Right foot drop    ? Spinal stenosis 3/28/2016   ? Stroke (H) 3/24/2016              Family History   Problem Relation Age of Onset   ? Dementia Mother    ? Diabetes Mother    ? Arthritis Mother    ? Cancer Mother    ? Depression Mother    ? Heart disease Mother    ? Vision loss Mother    ? Stroke Father    ? Heart disease Father    ? Breast cancer Neg Hx      Social History     Socioeconomic History   ? Marital status:      Spouse name: Cayden   ? Number of children: 2   ? Years of education: Not on file   ? Highest education level: Not on file   Occupational History     Employer: RETIRED   Social Needs   ? Financial resource strain: Not on file   ? Food insecurity     Worry: Not on file     Inability: Not on file   ? Transportation needs     Medical: Not on file     Non-medical: Not on file   Tobacco Use   ? Smoking status: Former Smoker     Packs/day: 1.50     Years: 37.00     Pack years: 55.50     Types: Cigarettes     Quit date: 2009     Years since quittin.2   ? Smokeless tobacco: Never Used   Substance and Sexual Activity   ? Alcohol use: No     Alcohol/week: 11.7 standard drinks     Types: 14 Standard drinks or equivalent per week     Comment: 14 mixed  drinks per week   ? Drug use: No   ? Sexual activity: Never     Partners: Male   Lifestyle   ? Physical activity     Days per week: Not on file     Minutes per session: Not on file   ? Stress: Not on file   Relationships   ? Social connections     Talks on phone: Not on file     Gets together: Not on file     Attends Jain service: Not on file     Active member of club or organization: Not on file     Attends meetings of clubs or organizations: Not on file     Relationship status: Not on file   ? Intimate partner violence     Fear of current or ex partner: Not on file     Emotionally abused: Not on file     Physically abused: Not on file     Forced sexual activity: Not on file   Other Topics Concern   ? Not on file   Social History Narrative    Lives with her . Daughter in [place] and daughter in [place].     MEDICATIONS: Reviewed from the MAR, physician orders, and earlier progress notes.    Post Discharge Medication Reconciliation Status: medication reconciliation previously completed during another office visit.      Current Outpatient Medications   Medication Sig   ? acetaminophen (TYLENOL) 500 MG tablet Take 2 tablets (1,000 mg total) by mouth every 6 (six) hours as needed.   ? albuterol (PROVENTIL) 2.5 mg /3 mL (0.083 %) nebulizer solution Take 3 mL (2.5 mg total) by nebulization every 4 (four) hours as needed for wheezing.   ? aspirin 81 MG EC tablet Take 1 tablet (81 mg total) by mouth daily.   ? atorvastatin (LIPITOR) 10 MG tablet Take 10 mg by mouth at bedtime.   ? B complex 11-folic-C-biot-zinc (DIALYVITE) 2-999-798-50 mg-mg-mcg-mg Tab Take 1 tablet by mouth daily with supper. (Dialyvite)    ? buPROPion (WELLBUTRIN XL) 150 MG 24 hr tablet Take 1 tablet (150 mg total) by mouth 2 (two) times a week. Tuesday and saturday (Patient taking differently: Take 150 mg by mouth 2 (two) times a week. Tuesday and saturday)   ? cholecalciferol, vitamin D3, 1,000 unit (25 mcg) tablet Take 2,000 Units by  mouth daily.    ? cinacalcet (SENSIPAR) 30 MG tablet Take 30 mg by mouth see administration instructions. Take three times weekly with dialysis (on Mondays, Wednesdays, and Fridays).   ? famotidine (PEPCID) 20 MG tablet Take 1 tablet (20 mg total) by mouth at bedtime.   ? folic acid (FOLVITE) 1 MG tablet TAKE ONE TABLET BY MOUTH ONCE DAILY (Patient taking differently: Take 1 mg by mouth daily. )   ? gabapentin (NEURONTIN) 100 MG capsule Take 100 mg by mouth 2 (two) times a day. Leg pain   ? Lactobacillus rhamnosus GG (CULTURELLE) 10-15 Billion cell capsule Take 1 capsule by mouth daily with lunch.    ? metoprolol succinate (TOPROL-XL) 25 MG Take 1 tablet (25 mg total) by mouth daily with lunch. Hold for SBP<110 or hr<60   ? midodrine (PROAMATINE) 10 MG tablet Take 2 tablets (20 mg total) by mouth 3 (three) times a week. Patient takes prior to dialysis qMWF and PRN if additional dialysis treatments.  Patient reports that she takes 20mg prior to dialysis (Patient taking differently: Take 20 mg by mouth 3 (three) times a week. Patient takes prior to dialysis qMWF and PRN if additional dialysis treatments.  Patient reports taking 20mg prior to dialysis and is given 20 mg after dialysis as well.    Also, on nondialysis days, give 10 mg daily for SBP <106.)   ? midodrine (PROAMATINE) 10 MG tablet Take 10 mg by mouth daily as needed. Whenever SBP is <106 (Check BP three times a day).   ? midodrine (PROAMATINE) 5 MG tablet Take 10 mg by mouth 4 (four) times a week. On non dialysis days if SBP < 106   ? oxyCODONE (ROXICODONE) 5 MG immediate release tablet TAKE 1/2 - 1 TABLET (2.5-5MG) BY MOUTH TWICE DAILY AS NEEDED FOR PAIN   ? polyvinyl alcohol (LIQUIFILM TEARS) 1.4 % ophthalmic solution Administer 1 drop to both eyes as needed for dry eyes.   ? pot bicarb/sod bicarb/cit ac (EDI-SELTZER GOLD ORAL) Take by mouth daily.   ? senna-docusate (SENNOSIDES-DOCUSATE SODIUM) 8.6-50 mg tablet Take 1 tablet by mouth at bedtime as  needed for constipation.    ? sevelamer carbonate (RENVELA) 800 mg tablet Take 1,600 mg by mouth 2 (two) times a day as needed (snacks).   ? sevelamer HCL (RENAGEL) 800 MG tablet Take 1,600 mg by mouth 3 (three) times a day with meals.    ? timolol maleate (TIMOPTIC) 0.5 % ophthalmic solution Administer 1 drop to both eyes 2 (two) times a day.    ? traZODone (DESYREL) 150 MG tablet Take 150 mg by mouth at bedtime.      ALLERGIES:   Allergies   Allergen Reactions   ? Ace Inhibitors Cough   ? Atrovent [Ipratropium Bromide] Headache   ? Fosinopril Sodium Cough   ? Zolpidem      hallucinations     DIET: Dialysis diet, regular texture, thin liquids.  1500 mL /24-hour fluid restriction    Vitals:    03/25/21 1130   BP: (!) 88/56   Pulse: 75   Resp: 16   Temp: 98.3  F (36.8  C)   SpO2: 96%   Weight: 156 lb 12.8 oz (71.1 kg)     Body mass index is 25.31 kg/m .    EXAMINATION:   General: Pleasant, alert, and conversant middle-aged female, sitting in a wheelchair, in no apparent distress.  Head: Normocephalic and atraumatic.   Eyes: PERRLA, sclerae clear.  Slight disconjugate gaze.  ENT: Moist oral mucosa.  She has her own teeth.  She has a bit of a runny nose today.  Hearing is unimpaired.  Cardiovascular: Irregular rhythm with a 2/6 systolic ejection murmur at the left sternal border.  Respiratory: Diminished lung sounds bilaterally but otherwise clear.  Abdomen: Soft and nontender.   Musculoskeletal/Extremities: Age-related degenerative joint disease.  Right 2+ pitting pedal and pretibial edema, 1+ on the left.  Integument: No rashes, clinically significant lesions, or skin breakdown.   Cognitive/Psychiatric: Alert and oriented ×4.  Affect is euthymic.    DIAGNOSTICS:   Results for orders placed or performed during the hospital encounter of 02/18/21   Basic Metabolic Panel   Result Value Ref Range    Sodium 136 136 - 145 mmol/L    Potassium 3.7 3.5 - 5.0 mmol/L    Chloride 97 (L) 98 - 107 mmol/L    CO2 29 22 - 31 mmol/L     Anion Gap, Calculation 10 5 - 18 mmol/L    Glucose 95 70 - 125 mg/dL    Calcium 8.7 8.5 - 10.5 mg/dL    BUN 28 8 - 28 mg/dL    Creatinine 3.01 (H) 0.60 - 1.10 mg/dL    GFR MDRD Af Amer 18 (L) >60 mL/min/1.73m2    GFR MDRD Non Af Amer 15 (L) >60 mL/min/1.73m2     Lab Results   Component Value Date    WBC 3.9 (L) 02/20/2021    HGB 10.1 (L) 02/20/2021    HCT 31.8 (L) 02/20/2021     (H) 02/20/2021    PLT 91 (L) 02/20/2021     CrCl cannot be calculated (Patient's most recent lab result is older than the maximum 10 days allowed.).  Lab Results   Component Value Date     (H) 02/18/2021     ASSESSMENT/Plan:      ICD-10-CM    1. Chronic respiratory failure with hypoxia (H)  J96.11    2. ESRD on hemodialysis (H), MWF  N18.6     Z99.2    3. Chronic diastolic heart failure (H)  I50.32    4. Hypotension due to hypovolemia  I95.89     E86.1    5. Chronic atrial fibrillation (H)  I48.20    6. Anemia of chronic renal failure, stage 5 (H)  N18.5     D63.1    7. Pulmonary emphysema, unspecified emphysema type (H)  J43.9    8. Mild episode of recurrent major depressive disorder (H)  F33.0    9. Chronic acquired lymphedema  I89.0    10. Gastroesophageal reflux disease without esophagitis  K21.9      CHANGES:    None.    CARE PLAN:    The care plan has been reviewed and all orders signed. Changes to care plan, if any, as noted. Otherwise, continue care plan of care.      The above has been created using voice recognition software. Please be aware that this may unintentionally  produce inaccuracies and/or nonsensical sentences.      Electronically signed by: Louie Liriano CNP

## 2021-06-21 NOTE — LETTER
Letter by June Kingston CNP at      Author: June Kingston CNP Service: -- Author Type: --    Filed:  Encounter Date: 10/21/2020 Status: (Other)         Patient: Belia Rodriguez   MR Number: [id number]   YOB: 1945   Date of Visit: 10/21/2020     Code Status:  DNR  Visit Type: Discharge Summary     Facility:  [facility] [[id number]]             History of Present Illness: Belia Rodriguez is a 75 y.o. female  Who I am seeing today for discharge from the TCU.  Patient initially hospitalized on 10/5/2020 secondary to increasing weakness and hypoxia.  Past medical history includes end-stage renal disease on dialysis 3 times weekly, acute hypoxic respiratory failure secondary to CHF as well as COPD.  She does wear O2 at 3 L at baseline at home at night.  Patient was treated for both her CHF exacerbation as well as pneumonia.  Chest x-ray showed bilateral mild pulmonary edema.  She does make urine.  Troponins were negative.  MI was ruled out.  BNP higher than baseline.  Patient did undergo some extra runs of dialysis.  Symptoms did improve.  She did initially require BiPAP however did not tolerate this well.  She was treated with ceftriaxone and doxycycline for possible pneumonia.  Her procalcitonin was negative however she did continue the 5-day course of doxycycline.  She continued on prednisone 20 mg for 3 days.  CTA of the chest negative for PE.  Did show emphysema with pulmonary hypertension.  She is wheelchair-bound secondary to history of neuropathy in the lower extremities.  A palliative care consult was ordered.  COVID-19 was negative.  Respiratory panel unremarkable.  She does continue with Midrin on her days of dialysis secondary to hypotension.  Continues on cinacalet and Sensipar.  Chronic anemia secondary to end-stage renal disease.  History of atrial fib rate controlled.  History of AV node ablation with pacemaker placement.  She does have a watchman device  in place.  Patient also seen by hospice however stated she was not ready for hospice at this time.  During her TCU patient was evaluated the ER for some coloration to her feet would diminished pulses and coolness.  This is chronic.  She was found to have no sensation and no dorsi flexion on the right foot however stated that was chronic.  Sensation is also decreased in the left.  Pulses were identified per Doppler.  Arterial ultrasound was obtained and showed no occlusion with two-vessel runoff in the foot.  She was found to have some atherosclerosis but no obvious occlusion.  Work-up for acute ischemia was negative and suggested for follow-up outpatient was recommended.  Patient has had some ongoing thoracic and back pain during her TCU stay.  Oxycodone was ordered per Dr. Rocha.  She is taking this about once daily.      Active Ambulatory Problems     Diagnosis Date Noted   ? Tachycardia-bradycardia syndrome (H) 06/12/2014   ? Hyperkalemia 06/30/2014   ? Essential hypertension with goal blood pressure less than 140/90 06/30/2014   ? Hyperlipidemia 06/30/2014   ? ZULEIKA (obstructive sleep apnea), severe 10/22/2015   ? Anemia of chronic renal failure, stage 5 (H)    ? Dissection of thoracoabdominal aorta (H)    ? Gout    ? GERD (gastroesophageal reflux disease) 04/14/2017   ? Right foot drop 05/16/2017   ? Acute midline low back pain with right-sided sciatica 06/16/2017   ? History of compression fracture of spine 06/16/2017   ? Pulmonary emphysema (H) 02/19/2018   ? Presence of Watchman left atrial appendage closure device 04/05/2018   ? Other dysphagia 08/10/2018   ? 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   ? Hyperparathyroidism (H) 03/24/2009   ? Osteoporosis 03/24/2009   ? 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 ESRD (end stage renal disease) on dialysis (H) 08/15/2018   ? Acute pulmonary edema (H) 09/07/2018   ? Chronic atrial fibrillation (H)    ? Acute on chronic  diastolic congestive heart failure (H) 01/23/2019   ? Thrombocytopenia (H)    ? Contraindication to anticoagulation therapy 03/26/2019   ? Dyspnea 05/18/2019   ? Cardiac pacemaker in situ 03/20/2019   ? S/P AV (atrioventricular) jonn ablation 03/20/2019   ? Hip fracture, intertrochanteric (H) 06/23/2019   ? DVT (deep venous thrombosis) (H) 08/22/2019   ? Carpal tunnel syndrome of left wrist 02/04/2020   ? SOB (shortness of breath) 07/13/2020   ? Hypotension, unspecified hypotension type    ? Major depressive disorder, remission status unspecified, unspecified whether recurrent 09/15/2020   ? Hypoxia 10/05/2020   ? Encounter for palliative care      Resolved Ambulatory Problems     Diagnosis Date Noted   ? Hypotension 06/01/2014   ? Fever 06/07/2014   ? Aortic aneurysm rupture (H)    ? Bacteremia due to Escherichia coli 06/08/2014   ? Acute respiratory failure with hypoxia (H) 06/30/2014   ? Severe tobacco use disorder 06/30/2014   ? Persistent atrial fibrillation (H) 06/30/2014   ? Bradycardia, sinus 06/30/2014   ? Volume overload 06/30/2014   ? Hypomagnesemia 06/30/2014   ? Cellulitis 08/25/2014   ? Hematochezia 11/02/2014   ? BRBPR (bright red blood per rectum) 11/02/2014   ? Hematuria 04/01/2015   ? Aortic dissection (H) 04/01/2015   ? Pneumonia 09/07/2015   ? Hypoxia 09/23/2015   ? SOB (shortness of breath) 09/23/2015   ? Acute on chronic diastolic heart failure (H) 09/27/2015   ? Bacteremia 09/27/2015   ? Dyspnea 10/07/2015   ? Fluid overload 10/07/2015   ? Chronic thoracic aortic dissection (H) 10/07/2015   ? L3 vertebral fracture (H) 11/16/2015   ? GI bleeding 03/20/2016   ? Chronic systolic congestive heart failure (H)    ? Acute lower GI bleeding 03/21/2016   ? Bilateral anterior& Lt Occipital embolic Infarction 03/24/2016   ? Spinal stenosis 03/28/2016   ? Back pain 03/28/2016   ? Tremor 03/28/2016   ? SOB (shortness of breath) 05/03/2016   ? CVA (cerebral infarction) 05/04/2016   ? Respiratory distress  05/09/2016   ? CHF (congestive heart failure) (H) 05/09/2016   ? Right knee pain    ? Dyspnea 10/02/2016   ? Volume overload 10/02/2016   ? Acute bronchitis due to infection 10/02/2016   ? Acute bacterial conjunctivitis of both eyes 10/02/2016   ? Acute CHF (congestive heart failure) (H) 10/09/2016   ? Hypervolemia, unspecified hypervolemia type    ? Pure hypercholesterolemia    ? Gastroesophageal reflux disease without esophagitis    ? Leg weakness 04/20/2017   ? Anticoagulated on warfarin 05/16/2017   ? Gastrointestinal hemorrhage, unspecified gastrointestinal hemorrhage type 06/05/2017   ? Anticoagulant long-term use 06/16/2017   ? Acute GI bleeding 12/09/2017   ? SOB (shortness of breath) 01/26/2018   ? Acute bronchitis with bronchospasm    ? Acute respiratory failure with hypoxia and hypercapnia (H)    ? Acute on chronic respiratory failure with hypoxia (H)    ? History of acute respiratory failure 02/19/2018   ? Chronic respiratory failure with hypoxia (H) 03/02/2018   ? Dyspnea 08/09/2018   ? Chest pain 08/10/2018   ? Hemodialysis catheter infection (H)    ? Hyperkalemia 10/18/2018   ? COPD exacerbation (H) 01/04/2019   ? Atrial fibrillation with RVR (H) 03/03/2019   ? Intertrochanteric fracture of left hip, closed, initial encounter (H) 06/23/2019   ? Closed intertrochanteric fracture of hip, left, initial encounter (H) 06/22/2019     Past Medical History:   Diagnosis Date   ? Arthritis    ? Chronic anemia 6/1/2014   ? Chronic kidney disease    ? COPD (chronic obstructive pulmonary disease) (H)    ? CVA (cerebral infarction)    ? Disease of thyroid gland    ? Dyslipidemia    ? ESRD (end stage renal disease) (H) 06/03/2009   ? Essential hypertension 6/30/2014   ? GI (gastrointestinal bleed)    ? Left Atrial Appendage Occlusion (WATCHMAN) 4/5/2018   ? Obesity    ? Oxygen dependent        Current Outpatient Medications   Medication Sig   ? oxyCODONE (ROXICODONE) 5 MG immediate release tablet Take 5 mg by mouth  every 4 (four) hours as needed for pain.   ? acetaminophen (TYLENOL) 500 MG tablet Take 1,000 mg by mouth 3 (three) times a day as needed.   ? artificial tears,hypromellose, (GENTEAL; SYSTANE) 0.3 % Gel Administer 1 drop to both eyes as needed.   ? aspirin 81 MG EC tablet Take 1 tablet (81 mg total) by mouth daily.   ? buPROPion (WELLBUTRIN XL) 150 MG 24 hr tablet Take 1 tablet (150 mg total) by mouth 2 (two) times a week. Tuesday and saturday   ? cholecalciferol, vitamin D3, 1,000 unit (25 mcg) tablet Take 2,000 Units by mouth daily.   ? cinacalcet (SENSIPAR) 30 MG tablet Take 30 mg by mouth see administration instructions. Take three times weekly with dialysis (on Mondays, Wednesdays, and Fridays).         ? famotidine (PEPCID) 20 MG tablet Take 20 mg by mouth 2 (two) times a day.   ? folic acid (FOLVITE) 1 MG tablet TAKE ONE TABLET BY MOUTH ONCE DAILY   ? gabapentin (NEURONTIN) 100 MG capsule TAKE 1 CAPSULE BY MOUTH THREE TIMES DAILY   ? ipratropium-albuteroL (COMBIVENT RESPIMAT)  mcg/actuation Mist inhaler Inhale 1 puff 4 (four) times a day.   ? Lactobacillus rhamnosus GG (CULTURELLE) 10-15 Billion cell capsule Take 1 capsule by mouth daily with lunch.   ? loratadine (CLARITIN) 10 mg tablet Take 10 mg by mouth daily as needed.    ? midodrine (PROAMATINE) 10 MG tablet Take 1.5 tablets (15 mg total) by mouth 3 (three) times a day with meals. (Patient taking differently: Take 15 mg by mouth 3 (three) times a day with meals. Hold for SBP>105  Patient reports that she usually gets 10 mg before dialysis and another 10 mg USP through dialysis as well)   ? multivitamin (DAILY-OLGA) per tablet Take 1 tablet by mouth daily.   ? predniSONE (DELTASONE) 20 MG tablet Take 20 mg by mouth daily with breakfast.   ? senna-docusate (SENNOSIDES-DOCUSATE SODIUM) 8.6-50 mg tablet Take 1 tablet by mouth daily as needed for constipation.    ? sevelamer carbonate (RENVELA) 800 mg tablet Take 1 tablet (800 mg total) by mouth 2  (two) times a day as needed (FOr snacks.).   ? sevelamer HCL (RENAGEL) 800 MG tablet Take 2,400 mg by mouth 3 (three) times a day with meals. And take 2 tablets with snacks   ? timolol maleate (TIMOPTIC) 0.5 % ophthalmic solution Administer 1 drop to both eyes 2 (two) times a day.    ? traZODone (DESYREL) 50 MG tablet TAKE 1&1/2 TABLETS (75MG) BY MOUTH AT BEDTIME. (Patient taking differently: Take 150 mg by mouth at bedtime. )       Allergies   Allergen Reactions   ? Ace Inhibitors Cough   ? Atrovent [Ipratropium Bromide] Headache   ? Fosinopril Sodium Cough   ? Zolpidem      hallucinations         Review of Systems  No fevers or chills. No headache, lightheadedness or dizziness.  Chronic SOB, patient wears oxygen at 2 L at night, no chest pains or palpitations. Appetite is good. No nausea, vomiting, constipation or diarrhea. No dysuria, frequency, burning or pain with urination. + weakness. + chronic back pain. Continues on oxycodone. Otherwise review of systems are negative.     Physical Exam  PHYSICAL EXAMINATION:  Vital signs: /88, pulse 70, respirations 16, temperature 96.9, O2 sats 99% on room air.  Weight 154 pounds.  General: Awake, Alert, oriented x3, appropriately, follows simple commands, conversant  HEENT: Pink conjunctiva, anicteric sclerae, moist oral mucosa  NECK: Supple, without any lymphadenopathy, or masses  CVS:  S1  S2, without murmur or gallop.  Dialysis catheter in the right chest wall.  LUNG: Clear to auscultation, No wheezes, rales or rhonci.  No dyspnea at rest.  BACK: No kyphosis of the thoracic spine  ABDOMEN: Soft, nontender to palpation, with positive bowel sounds  EXTREMITIES: Moves both upper and lower extremities with generalized weakness, trace pedal edema, no calf tenderness  SKIN: Warm and dry, no rashes or erythema noted  NEUROLOGIC: Intact, pulses palpable  PSYCHIATRIC: Cognition intact.  Very chatty.  Pleasant affect.      Labs:    Lab Results   Component Value Date    WBC  2.8 (L) 10/07/2020    HGB 9.6 (L) 10/07/2020    HGB 9.7 (L) 10/07/2020    HCT 31.7 (L) 10/07/2020     (H) 10/07/2020    PLT 93 (L) 10/07/2020    PLT 94 (L) 10/07/2020     Results for orders placed or performed during the hospital encounter of 10/05/20   Basic Metabolic Panel   Result Value Ref Range    Sodium 133 (L) 136 - 145 mmol/L    Potassium 3.8 3.5 - 5.0 mmol/L    Chloride 93 (L) 98 - 107 mmol/L    CO2 29 22 - 31 mmol/L    Anion Gap, Calculation 11 5 - 18 mmol/L    Glucose 106 70 - 125 mg/dL    Calcium 8.0 (L) 8.5 - 10.5 mg/dL    BUN 16 8 - 28 mg/dL    Creatinine 3.01 (H) 0.60 - 1.10 mg/dL    GFR MDRD Af Amer 18 (L) >60 mL/min/1.73m2    GFR MDRD Non Af Amer 15 (L) >60 mL/min/1.73m2           Assessment/Plan:  1. ESRD on dialysis (H)   continues on dialysis 3 times weekly.  Follow-up with nephrology outpatient.   2. Pulmonary emphysema, unspecified emphysema type (H)   patient continues on O2 at 2 L at night.  She was unable to tolerate CPAP secondary to claustrophobia.  She has been weaned off of her oxygen during the day.  Patient continues on prednisone 20 mg daily.    Patient continues on home nebs.     3. Acute on chronic diastolic congestive heart failure (H)   fluid managed per hemodialysis rounds.  Follow-up with cardiology outpatient.   4. Neuropathic pain   patient continues on gabapentin 3 times daily.  Lower extremity weakness.   5. Severe low back pain   patient started on oxycodone 2.5 mg every 8 hours as needed during her TCU stay.  I will send her with 15 tabs.  She will need to follow-up with primary for further pain management.   6. Physical deconditioning   continues with extreme weakness.  Hospice consult ordered during hospitalization and patient declined.   7. Presence of Watchman left atrial appendage closure device     8. Cardiac pacemaker in situ     9. Peripheral arterial disease (H)   no recent occlusion noted.  However diminished pulses and sensation with atherosclerotic  disease noted.  Recommended follow-up outpatient.     Okay to DC home with current meds and treatments as well as 15 tabs of oxycodone.  Follow-up with primary care provider in 1 week.  Follow-up with nephrology outpatient.  Follow-up with cardiology outpatient.  Home PT, OT, home health aide and RN for pain management.      DISCHARGE PLAN/FACE TO FACE:  I certify that this patient is under my care and that I, or a nurse practitioner or physician's assistant working with me, had a face-to-face encounter that meets the physician face-to-face encounter requirements with this patient.       I certify that, based on my findings, the following services are medically necessary home health services.    My clinical findings support the need for the above skilled services.    This patient is homebound because: Recent COPD and CHF exacerbation.    The patient is, or has been, under my care and I have initiated the establishment of the plan of care. This patient will be followed by a physician who will periodically review the plan of care.      45 minutes spent of which greater than 50% was face to face communication with the patient regarding discharge medications, follow-ups and home care services.  We did again review possible hospice.  Patient is unsure if she is like to proceed with hospice at this time however will consider outpatient work-up.    This note has been dictated using voice recognition software. Any grammatical or context distortions are unintentional and inherent to the software    Electronically signed by: June Kingston CNP

## 2021-06-21 NOTE — LETTER
"Letter by Juen Kingston CNP at      Author: June Kingston CNP Service: -- Author Type: --    Filed:  Encounter Date: 11/19/2020 Status: (Other)         Patient: Belia Rodriguez   MR Number: [id number]   YOB: 1945   Date of Visit: 11/19/2020     Code Status:  DNR  Visit Type: Problem Visit (acute on chronic respiratory failure )     Facility:  [facility] [[id number]]        \"This video visit will be conducted via a call between you and your physician/provider. We have found that certain health care needs can be provided without the need for an in-person physical exam.  This service lets us provide the care you need with a video conversation.  If a prescription is necessary we can send it to the facility team.  If lab work is needed we can place an order through the facility team to have that test done at a later time.     If during the course of the call the physician/provider feels a video visit is not appropriate, you will not be charged for this service.\"      Physician/provider has received verbal consent for a Video Visit from the patient? Yes         Video Start Time: 12:46 pm               History of Present Illness: Belia Rodriguez is a 75 y.o. female  who I am seeing today for follow-up on the TCU post acute respiratory failure.  Past medical history includes end-stage renal disease on dialysis 3 times weekly, acute hypoxic respiratory failure secondary to CHF as well as COPD.  Patient recently hospitalized on 11/2/2020 secondary to acute on chronic respiratory failure secondary to acute COPD and acute diastolic heart failure.  Patient with underlying COPD.  She is dependent on oxygen at 2 to 3 L.  She continues on home nebs.  She was treated with prednisone.  She also completed a 5-day course of ceftriaxone/azithromycin.  Fluid overload improved with daily dialysis.  She has resumed her 3 times weekly schedule.  Hyperkalemia improved with dialysis.  Leg " swelling.  Ultrasound negative.  Obstructive sleep apnea on CPAP.  Patient with a history of neuropathy in the lower extremities.  History of atrial fib.  She does have a watchman device in place.  Chronic anemia secondary to end-stage renal disease.  Hypotension on dialysis days.  She continues on Midodrine.  Patient was seen by hospice during her previous hospitalization on 10/5/2020 however this was declined.    Today patient sitting up in wheelchair. Nurse present on exam.  Patient continues to be dependent on oxygen at 1 L.  She does have shortness of breath with exertion.  She was previously using oxygen at 3 L at night.  She denies any cough.  She does continue with some trace lower extremity edema.  She has CLARITA hose on.  She reports that she was not eating well previously however her appetite has improved.  End-stage renal disease on dialysis 3 times weekly.  Blood pressures continue to be on the soft side the days of dialysis.  She does have midodrine in place for dialysis days as well as nondialysis days.  Patient with chronic back pain.  Pain well controlled with oxycodone.      Active Ambulatory Problems     Diagnosis Date Noted   ? Tachycardia-bradycardia syndrome (H) 06/12/2014   ? Hyperkalemia 06/30/2014   ? Essential hypertension with goal blood pressure less than 140/90 06/30/2014   ? Hyperlipidemia 06/30/2014   ? Acute respiratory failure with hypoxia (H) 06/30/2014   ? ZULEIKA (obstructive sleep apnea), severe 10/22/2015   ? Anemia of chronic renal failure, stage 5 (H)    ? Dissection of thoracoabdominal aorta (H)    ? Gout    ? GERD (gastroesophageal reflux disease) 04/14/2017   ? Right foot drop 05/16/2017   ? Acute midline low back pain with right-sided sciatica 06/16/2017   ? History of compression fracture of spine 06/16/2017   ? Pulmonary emphysema (H) 02/19/2018   ? Presence of Watchman left atrial appendage closure device 04/05/2018   ? Other dysphagia 08/10/2018   ? Borderline glaucoma with  ocular hypertension 08/24/2001   ? Hyperparathyroidism (H) 03/24/2009   ? Osteoporosis 03/24/2009   ? 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 ESRD on hemodialysis (H) 08/15/2018   ? Acute pulmonary edema (H) 09/07/2018   ? Chronic atrial fibrillation (H)    ? COPD exacerbation (H) 01/04/2019   ? Acute on chronic diastolic congestive heart failure (H) 01/23/2019   ? Thrombocytopenia (H)    ? Contraindication to anticoagulation therapy 03/26/2019   ? Dyspnea 05/18/2019   ? Cardiac pacemaker in situ 03/20/2019   ? S/P AV (atrioventricular) jonn ablation 03/20/2019   ? Hip fracture, intertrochanteric (H) 06/23/2019   ? DVT (deep venous thrombosis) (H) 08/22/2019   ? Carpal tunnel syndrome of left wrist 02/04/2020   ? SOB (shortness of breath) 07/13/2020   ? Hypotension, unspecified hypotension type    ? Major depressive disorder, remission status unspecified, unspecified whether recurrent 09/15/2020   ? Hypoxia 10/05/2020   ? Encounter for palliative care      Resolved Ambulatory Problems     Diagnosis Date Noted   ? Hypotension 06/01/2014   ? Fever 06/07/2014   ? Aortic aneurysm rupture (H)    ? Bacteremia due to Escherichia coli 06/08/2014   ? Severe tobacco use disorder 06/30/2014   ? Persistent atrial fibrillation (H) 06/30/2014   ? Bradycardia, sinus 06/30/2014   ? Volume overload 06/30/2014   ? Hypomagnesemia 06/30/2014   ? Cellulitis 08/25/2014   ? Hematochezia 11/02/2014   ? BRBPR (bright red blood per rectum) 11/02/2014   ? Hematuria 04/01/2015   ? Aortic dissection (H) 04/01/2015   ? Pneumonia 09/07/2015   ? Hypoxia 09/23/2015   ? SOB (shortness of breath) 09/23/2015   ? Acute on chronic diastolic heart failure (H) 09/27/2015   ? Bacteremia 09/27/2015   ? Dyspnea 10/07/2015   ? Fluid overload 10/07/2015   ? Chronic thoracic aortic dissection (H) 10/07/2015   ? L3 vertebral fracture (H) 11/16/2015   ? GI bleeding 03/20/2016   ? Chronic systolic congestive heart failure (H)    ? Acute lower  GI bleeding 03/21/2016   ? Bilateral anterior& Lt Occipital embolic Infarction 03/24/2016   ? Spinal stenosis 03/28/2016   ? Back pain 03/28/2016   ? Tremor 03/28/2016   ? SOB (shortness of breath) 05/03/2016   ? CVA (cerebral infarction) 05/04/2016   ? Respiratory distress 05/09/2016   ? CHF (congestive heart failure) (H) 05/09/2016   ? Right knee pain    ? Dyspnea 10/02/2016   ? Volume overload 10/02/2016   ? Acute bronchitis due to infection 10/02/2016   ? Acute bacterial conjunctivitis of both eyes 10/02/2016   ? Acute CHF (congestive heart failure) (H) 10/09/2016   ? Hypervolemia, unspecified hypervolemia type    ? Pure hypercholesterolemia    ? Gastroesophageal reflux disease without esophagitis    ? Leg weakness 04/20/2017   ? Anticoagulated on warfarin 05/16/2017   ? Gastrointestinal hemorrhage, unspecified gastrointestinal hemorrhage type 06/05/2017   ? Anticoagulant long-term use 06/16/2017   ? Acute GI bleeding 12/09/2017   ? SOB (shortness of breath) 01/26/2018   ? Acute bronchitis with bronchospasm    ? Acute respiratory failure with hypoxia and hypercapnia (H)    ? Acute on chronic respiratory failure with hypoxia (H)    ? History of acute respiratory failure 02/19/2018   ? Chronic respiratory failure with hypoxia (H) 03/02/2018   ? Dyspnea 08/09/2018   ? Chest pain 08/10/2018   ? Hemodialysis catheter infection (H)    ? Hyperkalemia 10/18/2018   ? Atrial fibrillation with RVR (H) 03/03/2019   ? Intertrochanteric fracture of left hip, closed, initial encounter (H) 06/23/2019   ? Closed intertrochanteric fracture of hip, left, initial encounter (H) 06/22/2019     Past Medical History:   Diagnosis Date   ? Arthritis    ? Chronic anemia 6/1/2014   ? Chronic kidney disease    ? COPD (chronic obstructive pulmonary disease) (H)    ? CVA (cerebral infarction)    ? Disease of thyroid gland    ? Dyslipidemia    ? ESRD (end stage renal disease) (H) 06/03/2009   ? Essential hypertension 6/30/2014   ? GI  (gastrointestinal bleed)    ? Left Atrial Appendage Occlusion (WATCHMAN) 4/5/2018   ? Obesity    ? Oxygen dependent        Current Outpatient Medications   Medication Sig Note   ? acetaminophen (TYLENOL) 500 MG tablet Take 2 tablets (1,000 mg total) by mouth every 6 (six) hours as needed.    ? albuterol (PROAIR HFA;PROVENTIL HFA;VENTOLIN HFA) 90 mcg/actuation inhaler Inhale 2 puffs 4 (four) times a day as needed for wheezing or shortness of breath.    ? aspirin 81 MG EC tablet Take 1 tablet (81 mg total) by mouth daily. (Patient taking differently: Take 81 mg by mouth daily. )    ? B complex 11-folic-C-biot-zinc (DIALYVITE) [phone] mg-mg-mcg-mg Tab Take 1 tablet by mouth daily with supper. (Dialyvite)     ? buPROPion (WELLBUTRIN XL) 150 MG 24 hr tablet Take 1 tablet (150 mg total) by mouth 2 (two) times a week. Tuesday and saturday (Patient taking differently: Take 150 mg by mouth 2 (two) times a week. Tuesday and saturday)    ? cholecalciferol, vitamin D3, 1,000 unit (25 mcg) tablet Take 2,000 Units by mouth daily.     ? cinacalcet (SENSIPAR) 30 MG tablet Take 30 mg by mouth see administration instructions. Take three times weekly with dialysis (on Mondays, Wednesdays, and Fridays).    ? famotidine (PEPCID) 20 MG tablet Take 1 tablet (20 mg total) by mouth at bedtime.    ? folic acid (FOLVITE) 1 MG tablet TAKE ONE TABLET BY MOUTH ONCE DAILY (Patient taking differently: No sig reported)    ? gabapentin (NEURONTIN) 100 MG capsule TAKE 1 CAPSULE BY MOUTH THREE TIMES DAILY (Patient taking differently: No sig reported)    ? Lactobacillus rhamnosus GG (CULTURELLE) 10-15 Billion cell capsule Take 1 capsule by mouth daily with lunch.     ? metoprolol succinate (TOPROL-XL) 25 MG Take 1 tablet (25 mg total) by mouth daily with lunch. Hold for SBP<110 or hr<60    ? midodrine (PROAMATINE) 10 MG tablet Take 10-20 mg by mouth 3 (three) times a week. On dialysis days. Hold for SBP>105  Patient reports that she usually gets  10 mg before dialysis and another 10 mg assisted through dialysis as well     ? oxyCODONE (ROXICODONE) 5 MG immediate release tablet Take 0.5 tablets (2.5 mg total) by mouth every 6 (six) hours as needed for pain.    ? polyvinyl alcohol (LIQUIFILM TEARS) 1.4 % ophthalmic solution Administer 1 drop to both eyes as needed for dry eyes.    ? senna-docusate (SENNOSIDES-DOCUSATE SODIUM) 8.6-50 mg tablet Take 1 tablet by mouth at bedtime.     ? sevelamer carbonate (RENVELA) 800 mg tablet Take 1 tablet (800 mg total) by mouth 2 (two) times a day as needed (FOr snacks.). (Patient taking differently: Take 800 mg by mouth 2 (two) times a day as needed (FOr snacks.). )    ? sevelamer HCL (RENAGEL) 800 MG tablet Take 1,600 mg by mouth 3 (three) times a day with meals.  11/2/2020: For the past week, she was instructed to take 2 tablets instead of the usual 3 tablets to see if that is enough   ? timolol maleate (TIMOPTIC) 0.5 % ophthalmic solution Administer 1 drop to both eyes 2 (two) times a day.     ? traZODone (DESYREL) 150 MG tablet Take 150 mg by mouth at bedtime.         Allergies   Allergen Reactions   ? Ace Inhibitors Cough   ? Atrovent [Ipratropium Bromide] Headache   ? Fosinopril Sodium Cough   ? Zolpidem      hallucinations         Review of Systems  No fevers or chills. No headache, lightheadedness or dizziness.  Chronic SOB, patient wears oxygen at 2 to 3 L.  Was only wearing oxygen at 2 L at night previously, no chest pains or palpitations. Appetite is good. No nausea, vomiting, constipation or diarrhea. No dysuria, frequency, burning or pain with urination. + weakness. + chronic back pain. Continues on oxycodone. Otherwise review of systems are negative.     Physical Exam  PHYSICAL EXAMINATION:  Vital signs: /70, pulse 82, respirations 16, temperature 97.5, O2 sats 100% on 2L.  Weight 155.2 pounds.  General: Awake, sitting up in wheelchair, alert, oriented x3, appropriately, follows simple commands,  conversant  HEENT: Pink conjunctiva, anicteric sclerae  NECK: Supple  CVS: Dialysis catheter in the right chest wall.  LUNG: O2 on at 1 L nasal cannula.  EXTREMITIES: Moves both upper and lower extremities with generalized weakness.  CLARITA hose on.  PSYCHIATRIC: Cognition intact. Pleasant affect.      Labs:    Lab Results   Component Value Date    WBC 3.5 (L) 11/03/2020    HGB 8.7 (L) 11/04/2020    HCT 27.7 (L) 11/03/2020     (H) 11/03/2020    PLT 86 (L) 11/03/2020     Results for orders placed or performed during the hospital encounter of 11/02/20   Basic Metabolic Panel   Result Value Ref Range    Sodium 137 136 - 145 mmol/L    Potassium 4.3 3.5 - 5.0 mmol/L    Chloride 98 98 - 107 mmol/L    CO2 28 22 - 31 mmol/L    Anion Gap, Calculation 11 5 - 18 mmol/L    Glucose 88 70 - 125 mg/dL    Calcium 8.3 (L) 8.5 - 10.5 mg/dL    BUN 19 8 - 28 mg/dL    Creatinine 2.93 (H) 0.60 - 1.10 mg/dL    GFR MDRD Af Amer 19 (L) >60 mL/min/1.73m2    GFR MDRD Non Af Amer 16 (L) >60 mL/min/1.73m2           Assessment/Plan:  1. Acute on chronic diastolic congestive heart failure (H)   fluids managed per dialysis.    2. Pulmonary emphysema, unspecified emphysema type (H)   currently on O2 at 1 L during the day.  Continues on 3 L at night.  Will attempt to wean off oxygen during the day.  She was only wearing this at night.  Continues on home nebs.  Continues on prednisone taper.   3. ESRD on dialysis (H)   continues 3 times weekly. Send copy of daily weights to dialysis for comparison.    4. Severe low back pain   continues on oxycodone, gabapentin and Tylenol.   5. Neuropathic pain   continues on gabapentin.   6. Paroxysmal atrial fibrillation (H)   currently on metoprolol and aspirin.       Video-Visit Details     Type of service:  Video Visit     Video End Time: 1:00 pm     Originating Location (pt. Location):[facility] [[id number]]     Distant Location (provider location):  [facility]      Mode of Communication:  Face time via I phone      This note has been dictated using voice recognition software. Any grammatical or context distortions are unintentional and inherent to the software    Electronically signed by: June Kingston CNP

## 2021-06-21 NOTE — LETTER
Letter by Louie Liriano CNP at      Author: Louie Liriano CNP Service: -- Author Type: --    Filed:  Encounter Date: 3/15/2021 Status: (Other)         [facility]  [address] [address]                                  2021    Patient: Belia Rodriguez   MR Number: [id number]   YOB: 1945   Date of Visit: 3/15/2021     Dear Dr. Villa:    Thank you for referring Belia Rodriguez to me for evaluation. Below are the relevant portions of my assessment and plan of care.    If you have questions, please do not hesitate to call me. I look forward to following Belia along with you.    Sincerely,        Louie Liriano CNP          CC  No Recipients  Louie Liriano CNP  3/17/2021  5:27 PM  Signed  [facility]    Name:   Belia Triplett)  : 1945  Facility:   [facility] [[id number]]   Room: [place] [address]  Code Status: DNR/DNI and POLST AVAILABLE - Palliative Care  Fac type:   SNF (Skilled Nursing Facility, TCU) -     CHIEF COMPLAINT / REASON FOR VISIT:  Chief Complaint   Patient presents with   ? Follow-up     TCU follow-up: Hospitalization for acute on chronic respiratory failure due to a combination of fluid overload and COPD exacerbation.   ? Problem Visit     1. Hypotension due to hypovolemia. 2. Depression.     [facility] from 18 until 18  [facility] from 18 until 18  [facility] from 2021 until 2021 (acute on chronic respiratory failure due to a combination of fluid overload and COPD exacerbation)  [facility] from 2021 until 2021  [facility] from 2021 until 2021 (acute on chronic respiratory failure due to a combination of fluid overload and COPD exacerbation)     Patient was last seen by me on 2021.      HPI:  Belia is a 75 y.o. female with known end-stage renal disease (on dialysis), COPD (former smoker, 45-60 pack years),  chronic respiratory failure with hypoxia, obstructive sleep apnea (severe and previously intolerant of CPAP), chronic atrial fibrillation, GERD, essential hypertension, and anemia related to chronic renal failure.    She has had multiple hospitalizations for respiratory failure due to a combination of fluid overload and COPD exacerbation.  This occurred on 01/26/2021 and again on 02/18/2021.  Her last admission came within hours of her discharge from [facility].  When last in the TCU at [facility], she was a full code, but she indicated during her last hospitalization that she wants no further BiPAP and is DNR/DNI.  This did improve somewhat with steroids and doxycycline.    While she continues on hemodialysis Mondays, Wednesdays, and Fridays, she did have some questions regarding hospice but was not sure she would sign on given her need for dialysis.  She did have a palliative care consult, and she strongly agreed, especially given her readmission to the hospital.      CURRENT/RECENT TCU ISSUES    Disposition: The patient is aware that her health will not improve and is ready for hospice, although she is concerned about having all her paperwork in order.  I had received a request by  that I give an okay for an in-house psych consult.  My initial thought was that the patient has been doing quite well and accepting her situation, and there would be nothing gained from such a consult.  At the time, we spent a good 20 minutes talking about her feelings with regard to hospice, dying, and other related issues, including depression.  She did tell me that she thought she might benefit from a consult and was recently seen via video conference.  She thought it was beneficial.    She was having no particular issues until about 6 weeks ago, not needing oxygen but  "now expecting a chronic continuous need.  She tells me she plans to go on hospice after discharge.  She wants to go to [facility].  She notes that she will be stopping dialysis and would expect to survive no longer than 1 week.  She has been presented with this suggestion about 6 months ago.  She believes that she is now ready.  Her main concern is not about dying but is getting her paperwork done for her  of 44 years.     She is doing fairly well.  She complains of neuropathy in her feet, Raynaud's phenomenon in the feet as well, and some constipation.    Hypotension: She is on a 1500 mL fluid restriction.  This may be producing hypovolemia.  She does have orders for 20 mg of midodrine to take on dialysis days (Mondays, Wednesdays, and Fridays) and has additional orders for taking this when additional dialysis is needed.  However, she has been running significantly low blood pressures.  Systolic blood pressure has been as low as 79. On 03/01/2021, another nurse practitioner ordered 10 mg of midodrine to be given on nondialysis days when the SBP is <106.  An adjustment was recently made at dialysis, and she is now receiving a 20 mg dose there as well.  Now, she tells me her systolic blood pressure gets into the high 80s to 90s in the evening.    Medication changes: At an earlier visit, considering her plans for hospice, we talked about her medications.  It was agreed that atorvastatin was no longer needed, and so we discontinued it.  At the time, she stated, \"anything you can take away from me won't break my heart at all.\"    Discharge planning: She told her  and daughter that she wanted to stay here, but her  became angry.  He has been the one to take her to dialysis and suggest that she would need to arrange her own transportation if she decided to stay.  He also told her he would not be doing housework.  The end result was that the patient would be staying 2 more weeks.  Apparently, her "  has now finally resigned himself to her being here.  She tells me that she is here now because she has no other place to go, and palliative care is cheaper than hospice.  I told her I had no problem with managing a palliative care plan.  The patient there is a request for physical therapy, and we will give orders for that.      ROS:   Positives: Her feet continue to be painful due to neuropathy.  She has found that lying in bed with her knees up in the air helps. She is on chronic oxygen, and breathing is no better and no worse.      Negatives: Complaining of constipation before, things are going better now.  She denies any headaches or chest pains, coughing or congestion at the moment, dizziness, dysuria, diarrhea, difficulty chewing or swallowing, or problems with sleep (when on 150 mg of trazodone nightly).    Past Medical History:   Diagnosis Date   ? Arthritis    ? CHF (congestive heart failure) (H)    ? Chronic anemia 6/1/2014   ? Chronic kidney disease    ? Chronic thoracic aortic dissection (H) 10/7/2015    Descending thoracic aorta; treated medically per notes of Dragan Singh and Jennifer.   ? COPD (chronic obstructive pulmonary disease) (H)    ? CVA (cerebral infarction)    ? Disease of thyroid gland    ? Dyslipidemia    ? ESRD (end stage renal disease) (H) 06/03/2009    on dialysis with Dr. Mitchell   ? Essential hypertension 6/30/2014   ? Gastrointestinal hemorrhage, unspecified gastrointestinal hemorrhage type 6/5/2017   ? GI (gastrointestinal bleed)    ? GI bleeding 6/5/2017   ? Gout    ? L3 vertebral fracture (H) 11/16/2015   ? Left Atrial Appendage Occlusion (WATCHMAN) 4/5/2018    LAAO April 5, 2018 (30 mm WATCHMAN)   ? Obesity    ? ZULEIKA (obstructive sleep apnea), severe, intolerant of CPAP 10/22/2015   ? Oxygen dependent     3L nc   ? Pneumonia 9-7-2015   ? Right foot drop    ? Spinal stenosis 3/28/2016   ? Stroke (H) 3/24/2016              Family History   Problem Relation Age of Onset   ?  Dementia Mother    ? Diabetes Mother    ? Arthritis Mother    ? Cancer Mother    ? Depression Mother    ? Heart disease Mother    ? Vision loss Mother    ? Stroke Father    ? Heart disease Father    ? Breast cancer Neg Hx      Social History     Socioeconomic History   ? Marital status:      Spouse name: Cayden   ? Number of children: 2   ? Years of education: Not on file   ? Highest education level: Not on file   Occupational History     Employer: RETIRED   Social Needs   ? Financial resource strain: Not on file   ? Food insecurity     Worry: Not on file     Inability: Not on file   ? Transportation needs     Medical: Not on file     Non-medical: Not on file   Tobacco Use   ? Smoking status: Former Smoker     Packs/day: 1.50     Years: 37.00     Pack years: 55.50     Types: Cigarettes     Quit date: 2009     Years since quittin.2   ? Smokeless tobacco: Never Used   Substance and Sexual Activity   ? Alcohol use: No     Alcohol/week: 11.7 standard drinks     Types: 14 Standard drinks or equivalent per week     Comment: 14 mixed drinks per week   ? Drug use: No   ? Sexual activity: Never     Partners: Male   Lifestyle   ? Physical activity     Days per week: Not on file     Minutes per session: Not on file   ? Stress: Not on file   Relationships   ? Social connections     Talks on phone: Not on file     Gets together: Not on file     Attends Confucianist service: Not on file     Active member of club or organization: Not on file     Attends meetings of clubs or organizations: Not on file     Relationship status: Not on file   ? Intimate partner violence     Fear of current or ex partner: Not on file     Emotionally abused: Not on file     Physically abused: Not on file     Forced sexual activity: Not on file   Other Topics Concern   ? Not on file   Social History Narrative    Lives with her . Daughter in [place] and daughter in [place].     MEDICATIONS: Reviewed from the MAR, physician orders,  and earlier progress notes.  Post Discharge Medication Reconciliation Status: medication reconciliation previously completed during another office visit.  Further clarification has been made to dosing of midodrine.    Current Outpatient Medications   Medication Sig   ? acetaminophen (TYLENOL) 500 MG tablet Take 2 tablets (1,000 mg total) by mouth every 6 (six) hours as needed.   ? albuterol (PROVENTIL) 2.5 mg /3 mL (0.083 %) nebulizer solution Take 3 mL (2.5 mg total) by nebulization every 4 (four) hours as needed for wheezing.   ? aspirin 81 MG EC tablet Take 1 tablet (81 mg total) by mouth daily.   ? atorvastatin (LIPITOR) 10 MG tablet Take 10 mg by mouth at bedtime.   ? B complex 11-folic-C-biot-zinc (DIALYVITE) 3-634-670-50 mg-mg-mcg-mg Tab Take 1 tablet by mouth daily with supper. (Dialyvite)    ? buPROPion (WELLBUTRIN XL) 150 MG 24 hr tablet Take 1 tablet (150 mg total) by mouth 2 (two) times a week. Tuesday and saturday (Patient taking differently: Take 150 mg by mouth 2 (two) times a week. Tuesday and saturday)   ? cholecalciferol, vitamin D3, 1,000 unit (25 mcg) tablet Take 2,000 Units by mouth daily.    ? cinacalcet (SENSIPAR) 30 MG tablet Take 30 mg by mouth see administration instructions. Take three times weekly with dialysis (on Mondays, Wednesdays, and Fridays).   ? famotidine (PEPCID) 20 MG tablet Take 1 tablet (20 mg total) by mouth at bedtime.   ? folic acid (FOLVITE) 1 MG tablet TAKE ONE TABLET BY MOUTH ONCE DAILY (Patient taking differently: Take 1 mg by mouth daily. )   ? gabapentin (NEURONTIN) 100 MG capsule Take 100 mg by mouth 2 (two) times a day. Leg pain   ? Lactobacillus rhamnosus GG (CULTURELLE) 10-15 Billion cell capsule Take 1 capsule by mouth daily with lunch.    ? metoprolol succinate (TOPROL-XL) 25 MG Take 1 tablet (25 mg total) by mouth daily with lunch. Hold for SBP<110 or hr<60   ? midodrine (PROAMATINE) 10 MG tablet Take 2 tablets (20 mg total) by mouth 3 (three) times a week.  Patient takes prior to dialysis qMWF and PRN if additional dialysis treatments.  Patient reports that she takes 20mg prior to dialysis (Patient taking differently: Take 20 mg by mouth 3 (three) times a week. Patient takes prior to dialysis qMWF and PRN if additional dialysis treatments.  Patient reports taking 20mg prior to dialysis and is given 20 mg after dialysis as well.    Also, on nondialysis days, give 10 mg daily for SBP <106.)   ? midodrine (PROAMATINE) 10 MG tablet Take 10 mg by mouth daily as needed. Take on non dialysis days. Has an order for dialysis days.   ? midodrine (PROAMATINE) 5 MG tablet Take 10 mg by mouth 4 (four) times a week. On non dialysis days if SBP < 106   ? oxyCODONE (ROXICODONE) 5 MG immediate release tablet TAKE 1/2 TO 1 TABLET (2.5-5MG) BY MOUTH TWICE DAILY AS NEEDED FOR PAIN   ? polyvinyl alcohol (LIQUIFILM TEARS) 1.4 % ophthalmic solution Administer 1 drop to both eyes as needed for dry eyes.   ? pot bicarb/sod bicarb/cit ac (EDI-SELTZER GOLD ORAL) Take by mouth daily.   ? senna-docusate (SENNOSIDES-DOCUSATE SODIUM) 8.6-50 mg tablet Take 1 tablet by mouth at bedtime as needed for constipation.    ? sevelamer carbonate (RENVELA) 800 mg tablet Take 1,600 mg by mouth 2 (two) times a day as needed (snacks).   ? sevelamer HCL (RENAGEL) 800 MG tablet Take 1,600 mg by mouth 3 (three) times a day with meals.    ? timolol maleate (TIMOPTIC) 0.5 % ophthalmic solution Administer 1 drop to both eyes 2 (two) times a day.    ? traZODone (DESYREL) 150 MG tablet Take 150 mg by mouth at bedtime.      ALLERGIES:   Allergies   Allergen Reactions   ? Ace Inhibitors Cough   ? Atrovent [Ipratropium Bromide] Headache   ? Fosinopril Sodium Cough   ? Zolpidem      hallucinations     DIET: Dialysis diet, regular texture, thin liquids.  1500 mL /24-hour fluid restriction    Vitals:    03/15/21 1511   BP: 92/54   Pulse: 70   Resp: 18   Temp: 98.2  F (36.8  C)   SpO2: 98%   Weight: 153 lb 9.6 oz (69.7 kg)      Body mass index is 24.79 kg/m .    EXAMINATION:   General: Pleasant, alert, and conversant middle-aged female, sitting on the bed, in no apparent distress.  Head: Normocephalic and atraumatic.   Eyes: PERRLA, sclerae clear.  Slight disconjugate gaze.  ENT: Moist oral mucosa.  She has her own teeth.  She has a bit of a runny nose today.  Hearing is unimpaired.  Cardiovascular: Irregular rhythm with a 2/6 systolic ejection murmur at the left sternal border.  Respiratory: Diminished lung sounds bilaterally but otherwise clear.  Abdomen: Soft and nontender.   Musculoskeletal/Extremities: Age-related degenerative joint disease.  Right 2+ pitting pedal and pretibial edema, 1+ on the left, slightly improved while Ace wrapped.  Integument: No rashes, clinically significant lesions, or skin breakdown.   Cognitive/Psychiatric: Alert and oriented ×4.  Affect is euthymic.    DIAGNOSTICS:   Results for orders placed or performed during the hospital encounter of 02/18/21   Basic Metabolic Panel   Result Value Ref Range    Sodium 136 136 - 145 mmol/L    Potassium 3.7 3.5 - 5.0 mmol/L    Chloride 97 (L) 98 - 107 mmol/L    CO2 29 22 - 31 mmol/L    Anion Gap, Calculation 10 5 - 18 mmol/L    Glucose 95 70 - 125 mg/dL    Calcium 8.7 8.5 - 10.5 mg/dL    BUN 28 8 - 28 mg/dL    Creatinine 3.01 (H) 0.60 - 1.10 mg/dL    GFR MDRD Af Amer 18 (L) >60 mL/min/1.73m2    GFR MDRD Non Af Amer 15 (L) >60 mL/min/1.73m2     Lab Results   Component Value Date    WBC 3.9 (L) 02/20/2021    HGB 10.1 (L) 02/20/2021    HCT 31.8 (L) 02/20/2021     (H) 02/20/2021    PLT 91 (L) 02/20/2021     CrCl cannot be calculated (Patient's most recent lab result is older than the maximum 10 days allowed.).  Lab Results   Component Value Date     (H) 02/18/2021     ASSESSMENT/Plan:      ICD-10-CM    1. Chronic respiratory failure with hypoxia (H)  J96.11    2. ESRD on hemodialysis (H), MWF  N18.6     Z99.2    3. Chronic diastolic heart failure (H)   I50.32    4. Hypotension due to hypovolemia  I95.89     E86.1    5. Chronic atrial fibrillation (H)  I48.20    6. Anemia of chronic renal failure, stage 5 (H)  N18.5     D63.1    7. Pulmonary emphysema, unspecified emphysema type (H)  J43.9    8. Mild episode of recurrent major depressive disorder (H)  F33.0    9. Chronic acquired lymphedema  I89.0    10. Gastroesophageal reflux disease without esophagitis  K21.9      CHANGES:    None.    CARE PLAN:    The care plan has been reviewed and all orders signed. Changes to care plan, if any, as noted. Otherwise, continue care plan of care.      The above has been created using voice recognition software. Please be aware that this may unintentionally  produce inaccuracies and/or nonsensical sentences.      Electronically signed by: Louie Liriano CNP

## 2021-06-21 NOTE — LETTER
Letter by Louie Liriano CNP at      Author: Louie Liriano CNP Service: -- Author Type: --    Filed:  Encounter Date: 2021 Status: (Other)         [facility]  [address] [address]                                  2021    Patient: Belia Rodriguez   MR Number: [id number]   YOB: 1945   Date of Visit: 2021     Dear Dr. Villa:    Thank you for referring Belia Rodriguez to me for evaluation. Below are the relevant portions of my assessment and plan of care.    If you have questions, please do not hesitate to call me. I look forward to following Belia along with you.    Sincerely,        Louie Liriaon CNP          CC  No Recipients  Loiue Liriano CNP  2021  3:04 PM  Signed  [facility]    Name:   Belia Rodriguez (Itzel)  : 1945  Facility:   [facility] [[id number]]   Room: [place] [address]  Code Status: DNR/DNI and POLST AVAILABLE -   Fac type:   SNF (Skilled Nursing Facility, TCU) -     CHIEF COMPLAINT / REASON FOR VISIT:  Chief Complaint   Patient presents with   ? H & P     Acute on chronic respiratory failure due to a combination of fluid overload and COPD exacerbation     [facility] from 18 until 18  [facility] from 18 until 18  [facility] from 2021 until 2021 (acute on chronic respiratory failure due to a combination of fluid overload and COPD exacerbation)  [facility] from 2021 until 2021  [facility] from 2021 until 2021 (acute on chronic respiratory failure due to a combination of fluid overload and COPD exacerbation)       HPI: Belia is a 75 y.o. female with known end-stage renal disease (on dialysis), COPD (former smoker, 45-60 pack years),  chronic respiratory failure with hypoxia, obstructive  sleep apnea (severe and previously intolerant of CPAP), chronic atrial fibrillation, GERD, essential hypertension, and anemia related to chronic renal failure.    She has had multiple hospitalizations for respiratory failure due to a combination of fluid overload and COPD exacerbation.  This occurred on 01/26/2021 and again on 02/18/2021.  Her last admission came within hours of her discharge from [facility].  When last in the TCU at [facility], she was a full code, but she indicated during her last hospitalization that she wants no further BiPAP and is DNR/DNI.  This did improve somewhat with steroids and doxycycline.    While she continues on hemodialysis Mondays, Wednesdays, and Fridays, she did have some questions regarding hospice but was not sure she would sign on given her need for dialysis.  She did have a palliative care consult, and she strongly agreed, especially given her readmission to the hospital.      CURRENT/RECENT TCU ISSUES    Disposition: She tells me she plans to go on hospice after discharge.  She wants to go to [facility].  She notes that she will be stopping dialysis and would expect to survive no longer than 1 week.  She has been presented with this suggestion about 6 months ago.  She believes that she is now ready.  Her main concern is not about dying but is getting her paperwork done for her  of 44 years.     She tells me that she is doing much better than she was just a few days ago.  She complains of neuropathy in her feet, Raynaud's syndrome in the feet as well, and some constipation.  She would prefer to have her senna S at bedtime, and we will change this.    She is on a 1500 mL fluid restriction.      ROS: She denies any headaches or chest pains, coughing or congestion at the moment, dizziness, dysuria, diarrhea, difficulty chewing or swallowing.    Past Medical History:   Diagnosis Date   ? Arthritis    ? CHF (congestive heart failure) (H)    ?  Chronic anemia 2014   ? Chronic kidney disease    ? Chronic thoracic aortic dissection (H) 10/7/2015    Descending thoracic aorta; treated medically per notes of Dragan Singh and Jennifer.   ? COPD (chronic obstructive pulmonary disease) (H)    ? CVA (cerebral infarction)    ? Disease of thyroid gland    ? Dyslipidemia    ? ESRD (end stage renal disease) (H) 2009    on dialysis with Dr. Mitchell   ? Essential hypertension 2014   ? Gastrointestinal hemorrhage, unspecified gastrointestinal hemorrhage type 2017   ? GI (gastrointestinal bleed)    ? GI bleeding 2017   ? Gout    ? L3 vertebral fracture (H) 2015   ? Left Atrial Appendage Occlusion (WATCHMAN) 2018    LAAO 2018 (30 mm WATCHMAN)   ? Obesity    ? ZULEIKA (obstructive sleep apnea), severe, intolerant of CPAP 10/22/2015   ? Oxygen dependent     3L nc   ? Pneumonia 2015   ? Right foot drop    ? Spinal stenosis 3/28/2016   ? Stroke (H) 3/24/2016              Family History   Problem Relation Age of Onset   ? Dementia Mother    ? Diabetes Mother    ? Arthritis Mother    ? Cancer Mother    ? Depression Mother    ? Heart disease Mother    ? Vision loss Mother    ? Stroke Father    ? Heart disease Father    ? Breast cancer Neg Hx      Social History     Socioeconomic History   ? Marital status:      Spouse name: Cayden   ? Number of children: 2   ? Years of education: Not on file   ? Highest education level: Not on file   Occupational History     Employer: RETIRED   Social Needs   ? Financial resource strain: Not on file   ? Food insecurity     Worry: Not on file     Inability: Not on file   ? Transportation needs     Medical: Not on file     Non-medical: Not on file   Tobacco Use   ? Smoking status: Former Smoker     Packs/day: 1.50     Years: 37.00     Pack years: 55.50     Types: Cigarettes     Quit date: 2009     Years since quittin.1   ? Smokeless tobacco: Never Used   Substance and Sexual Activity   ?  Alcohol use: No     Alcohol/week: 11.7 standard drinks     Types: 14 Standard drinks or equivalent per week     Comment: 14 mixed drinks per week   ? Drug use: No   ? Sexual activity: Never     Partners: Male   Lifestyle   ? Physical activity     Days per week: Not on file     Minutes per session: Not on file   ? Stress: Not on file   Relationships   ? Social connections     Talks on phone: Not on file     Gets together: Not on file     Attends Cheondoism service: Not on file     Active member of club or organization: Not on file     Attends meetings of clubs or organizations: Not on file     Relationship status: Not on file   ? Intimate partner violence     Fear of current or ex partner: Not on file     Emotionally abused: Not on file     Physically abused: Not on file     Forced sexual activity: Not on file   Other Topics Concern   ? Not on file   Social History Narrative    Lives with her . Daughter in [place] and daughter in [place].     MEDICATIONS: Reviewed from the MAR, physician orders, and earlier progress notes.  Post Discharge Medication Reconciliation Status: discharge medications reconciled and changed, per note/orders.  Updated by me today (02/22/2021) with a change in time of senna S reflected below.    Current Outpatient Medications   Medication Sig   ? acetaminophen (TYLENOL) 500 MG tablet Take 2 tablets (1,000 mg total) by mouth every 6 (six) hours as needed.   ? albuterol (PROVENTIL) 2.5 mg /3 mL (0.083 %) nebulizer solution Take 3 mL (2.5 mg total) by nebulization every 4 (four) hours as needed for wheezing.   ? aspirin 81 MG EC tablet Take 1 tablet (81 mg total) by mouth daily.   ? atorvastatin (LIPITOR) 10 MG tablet Take 10 mg by mouth at bedtime.   ? B complex 11-folic-C-biot-zinc (DIALYVITE) 9-840-215-50 mg-mg-mcg-mg Tab Take 1 tablet by mouth daily with supper. (Dialyvite)    ? buPROPion (WELLBUTRIN XL) 150 MG 24 hr tablet Take 1 tablet (150 mg total) by mouth 2 (two) times a week.  Tuesday and saturday (Patient taking differently: Take 150 mg by mouth 2 (two) times a week. Tuesday and saturday)   ? cholecalciferol, vitamin D3, 1,000 unit (25 mcg) tablet Take 2,000 Units by mouth daily.    ? cinacalcet (SENSIPAR) 30 MG tablet Take 30 mg by mouth see administration instructions. Take three times weekly with dialysis (on Mondays, Wednesdays, and Fridays).   ? famotidine (PEPCID) 20 MG tablet Take 1 tablet (20 mg total) by mouth at bedtime.   ? folic acid (FOLVITE) 1 MG tablet TAKE ONE TABLET BY MOUTH ONCE DAILY (Patient taking differently: Take 1 mg by mouth daily. )   ? gabapentin (NEURONTIN) 100 MG capsule Take 100 mg by mouth 2 (two) times a day. Leg pain   ? Lactobacillus rhamnosus GG (CULTURELLE) 10-15 Billion cell capsule Take 1 capsule by mouth daily with lunch.    ? metoprolol succinate (TOPROL-XL) 25 MG Take 1 tablet (25 mg total) by mouth daily with lunch. Hold for SBP<110 or hr<60   ? midodrine (PROAMATINE) 10 MG tablet Take 2 tablets (20 mg total) by mouth 3 (three) times a week. Patient takes prior to dialysis qMWF and PRN if additional dialysis treatments.  Patient reports that she takes 20mg prior to dialysis   ? midodrine (PROAMATINE) 5 MG tablet Take 10 mg by mouth 4 (four) times a week. On non dialysis days if SBP < 106   ? oxyCODONE (ROXICODONE) 5 MG immediate release tablet TAKE 1/2 TO 1 TABLET (2.5-5MG) BY MOUTH TWICE DAILY AS NEEDED FOR PAIN   ? polyvinyl alcohol (LIQUIFILM TEARS) 1.4 % ophthalmic solution Administer 1 drop to both eyes as needed for dry eyes.   ? senna-docusate (SENNOSIDES-DOCUSATE SODIUM) 8.6-50 mg tablet Take 1 tablet by mouth at bedtime as needed for constipation.    ? sevelamer carbonate (RENVELA) 800 mg tablet Take 1,600 mg by mouth 2 (two) times a day as needed (snacks).   ? sevelamer HCL (RENAGEL) 800 MG tablet Take 1,600 mg by mouth 3 (three) times a day with meals.    ? timolol maleate (TIMOPTIC) 0.5 % ophthalmic solution Administer 1 drop to both  eyes 2 (two) times a day.    ? traZODone (DESYREL) 150 MG tablet Take 150 mg by mouth at bedtime.      ALLERGIES:   Allergies   Allergen Reactions   ? Ace Inhibitors Cough   ? Atrovent [Ipratropium Bromide] Headache   ? Fosinopril Sodium Cough   ? Zolpidem      hallucinations     DIET: Dialysis diet, regular texture, thin liquids.  1500 mL /24-hour fluid restriction    Vitals:    02/21/21 1150   BP: 106/64   Pulse: 71   Resp: 16   Temp: 97.3  F (36.3  C)   SpO2: 97%   Weight: 177 lb 1.6 oz (80.3 kg)     Body mass index is 29.47 kg/m .    EXAMINATION:   General: Pleasant, alert, and conversant middle-aged female, sitting in her wheelchair, in no apparent distress.  Head: Normocephalic and atraumatic.   Eyes: PERRLA, sclerae clear.  Slight disconjugate gaze.  ENT: Moist oral mucosa.  She has her own teeth.  No rhinorrhea or nasal discharge.  Hearing is unimpaired.  Cardiovascular: Irregular rhythm with a 2/6 systolic ejection murmur at the left sternal border.  Respiratory: Diminished lung sounds bilaterally but otherwise clear.  Abdomen: Soft and nontender.   Musculoskeletal/Extremities: Age-related degenerative joint disease.  Right 3+ pitting pedal and pretibial edema, 2+ on the left.  Integument: No rashes, clinically significant lesions, or skin breakdown.   Cognitive/Psychiatric: Alert and oriented ×4.  Affect is euthymic.    DIAGNOSTICS:   Results for orders placed or performed during the hospital encounter of 02/18/21   Basic Metabolic Panel   Result Value Ref Range    Sodium 136 136 - 145 mmol/L    Potassium 3.7 3.5 - 5.0 mmol/L    Chloride 97 (L) 98 - 107 mmol/L    CO2 29 22 - 31 mmol/L    Anion Gap, Calculation 10 5 - 18 mmol/L    Glucose 95 70 - 125 mg/dL    Calcium 8.7 8.5 - 10.5 mg/dL    BUN 28 8 - 28 mg/dL    Creatinine 3.01 (H) 0.60 - 1.10 mg/dL    GFR MDRD Af Amer 18 (L) >60 mL/min/1.73m2    GFR MDRD Non Af Amer 15 (L) >60 mL/min/1.73m2     Lab Results   Component Value Date    WBC 3.9 (L) 02/20/2021     HGB 10.1 (L) 02/20/2021    HCT 31.8 (L) 02/20/2021     (H) 02/20/2021    PLT 91 (L) 02/20/2021     Estimated Creatinine Clearance: 17.8 mL/min (A) (by C-G formula based on SCr of 3.01 mg/dL (H)).  Lab Results   Component Value Date     (H) 02/18/2021     ASSESSMENT/Plan:      ICD-10-CM    1. Chronic respiratory failure with hypoxia (H)  J96.11    2. COPD exacerbation (H)  J44.1    3. Pulmonary emphysema, unspecified emphysema type (H)  J43.9    4. ESRD on hemodialysis (H), MWF  N18.6     Z99.2    5. Chronic atrial fibrillation (H)  I48.20    6. Anemia of chronic renal failure, stage 5 (H)  N18.5     D63.1    7. Essential hypertension  I10      CHANGES:    1.  Change senna S to bedtime.    CARE PLAN:    The care plan has been reviewed and all orders signed. Changes to care plan, if any, as noted. Otherwise, continue care plan of care.  Total time spent with this patient was approximately 35 minutes, with greater than 50% spent in counseling and coordination of care that included obtaining data from the patient regarding her multiple recent hospitalizations, not specifically addressed in the most recent discharge summary.    The above has been created using voice recognition software. Please be aware that this may unintentionally  produce inaccuracies and/or nonsensical sentences.      Electronically signed by: Louie Liriano CNP

## 2021-06-21 NOTE — LETTER
Letter by Louie Liriano CNP at      Author: Louie Liriano CNP Service: -- Author Type: --    Filed:  Encounter Date: 3/8/2021 Status: (Other)         [facility]  [address] [facility] [address]                                  2021    Patient: Belia Rodriguez   MR Number: [id number]   YOB: 1945   Date of Visit: 3/8/2021     Dear Dr. Villa:    Thank you for referring Belia Rodriguez to me for evaluation. Below are the relevant portions of my assessment and plan of care.    If you have questions, please do not hesitate to call me. I look forward to following Belia along with you.    Sincerely,        Louie Liriano CNP          CC  No Recipients  Louie Liriano CNP  3/8/2021  8:27 PM  Signed  [facility]    Name:   Belia Rodriguez (Itzel)  : 1945  Facility:   [facility] [[id number]]   Room: [place] [address]  Code Status: DNR/DNI and POLST AVAILABLE -   Fac type:   SNF (Skilled Nursing Facility, TCU) -     CHIEF COMPLAINT / REASON FOR VISIT:  Chief Complaint   Patient presents with   ? Follow-up     TCU follow-up: Hospitalization for acute on chronic respiratory failure due to a combination of fluid overload and COPD exacerbation.   ? Problem Visit     Hypotension due to hypovolemia.     [facility] from 18 until 18  [facility] from 18 until 18  [facility] from 2021 until 2021 (acute on chronic respiratory failure due to a combination of fluid overload and COPD exacerbation)  [facility] from 2021 until 2021  [facility] from 2021 until 2021 (acute on chronic respiratory failure due to a combination of fluid overload and COPD exacerbation)       HPI: Belia is a 75 y.o. female with known end-stage renal disease (on dialysis), COPD  (former smoker, 45-60 pack years),  chronic respiratory failure with hypoxia, obstructive sleep apnea (severe and previously intolerant of CPAP), chronic atrial fibrillation, GERD, essential hypertension, and anemia related to chronic renal failure.    She has had multiple hospitalizations for respiratory failure due to a combination of fluid overload and COPD exacerbation.  This occurred on 01/26/2021 and again on 02/18/2021.  Her last admission came within hours of her discharge from [facility].  When last in the TCU at [facility], she was a full code, but she indicated during her last hospitalization that she wants no further BiPAP and is DNR/DNI.  This did improve somewhat with steroids and doxycycline.    While she continues on hemodialysis Mondays, Wednesdays, and Fridays, she did have some questions regarding hospice but was not sure she would sign on given her need for dialysis.  She did have a palliative care consult, and she strongly agreed, especially given her readmission to the hospital.      CURRENT/RECENT TCU ISSUES    Disposition: Today, the patient is interested in talking about her depression and realization that her health will not improve.  I had received a request by  that I give an okay for an in-house psych consult.  My initial thought was that the patient has been doing quite well and accepting her situation, and there would be nothing gained from such a consult.  We spent a good 20 minutes talking about her feelings with regard to hospice, dying, and other related issues, including depression.  She did tell me that she thought she might benefit from a consult, and we have given the okay.    She was having no particular issues until about 6 weeks ago, not needing oxygen but now expecting a chronic continuous need.  She tells me she plans to go on hospice after discharge.  She wants to go to [facility].  She notes that she will be stopping dialysis  "and would expect to survive no longer than 1 week.  She has been presented with this suggestion about 6 months ago.  She believes that she is now ready.  Her main concern is not about dying but is getting her paperwork done for her  of 44 years.     She tells me that she is doing much better than she was just a few days ago.  She complains of neuropathy in her feet, Raynaud's syndrome in the feet as well, and some constipation.    Hypotension: She is on a 1500 mL fluid restriction.  This may be producing hypovolemia.  She does have orders for 20 mg of midodrine to take on dialysis days (Mondays, Wednesdays, and Fridays) and has additional orders for taking this when additional dialysis is needed.  However, she has been running significantly low blood pressures.  In fact, this morning, it was 86/53.  She has not yet gone to dialysis.  103/01/21, and other nurse practitioner ordered 10 mg of midodrine to be given on nondialysis days when the SBP is <106.  I will review this with the pharmacist to see if it is possible to go beyond 20 mg/day of midodrine.    Medication changes: At my last visit, considering her plans for hospice, we talked about her medications.  It was agreed that atorvastatin was no longer needed, and so we discontinued it.  At the time, she stated, \"anything you can take away from me won't break my heart at all.\"    Discharge planning: She told her  and daughter that she wanted to stay here, but her  became angry.  He has been the one to take her to dialysis and suggest that she would need to arrange her own transportation if she decided to stay.  He also told her he would not be doing housework.  The end result is that the patient will be staying 2 more weeks.      ROS:   Positives: Her feet continue to be painful due to neuropathy.  She has found that lying in bed with her knees up in the air helps. She is on chronic oxygen, and breathing is no better and no worse.  "     Negatives: Complaining of constipation before, things are going better now.  She denies any headaches or chest pains, coughing or congestion at the moment, dizziness, dysuria, diarrhea, difficulty chewing or swallowing, or problems with sleep (when on 150 mg of trazodone nightly).    Past Medical History:   Diagnosis Date   ? Arthritis    ? CHF (congestive heart failure) (H)    ? Chronic anemia 6/1/2014   ? Chronic kidney disease    ? Chronic thoracic aortic dissection (H) 10/7/2015    Descending thoracic aorta; treated medically per notes of Dragan Singh and Jennifer.   ? COPD (chronic obstructive pulmonary disease) (H)    ? CVA (cerebral infarction)    ? Disease of thyroid gland    ? Dyslipidemia    ? ESRD (end stage renal disease) (H) 06/03/2009    on dialysis with Dr. iMtchell   ? Essential hypertension 6/30/2014   ? Gastrointestinal hemorrhage, unspecified gastrointestinal hemorrhage type 6/5/2017   ? GI (gastrointestinal bleed)    ? GI bleeding 6/5/2017   ? Gout    ? L3 vertebral fracture (H) 11/16/2015   ? Left Atrial Appendage Occlusion (WATCHMAN) 4/5/2018    LAAO April 5, 2018 (30 mm WATCHMAN)   ? Obesity    ? ZULEIKA (obstructive sleep apnea), severe, intolerant of CPAP 10/22/2015   ? Oxygen dependent     3L nc   ? Pneumonia 9-7-2015   ? Right foot drop    ? Spinal stenosis 3/28/2016   ? Stroke (H) 3/24/2016              Family History   Problem Relation Age of Onset   ? Dementia Mother    ? Diabetes Mother    ? Arthritis Mother    ? Cancer Mother    ? Depression Mother    ? Heart disease Mother    ? Vision loss Mother    ? Stroke Father    ? Heart disease Father    ? Breast cancer Neg Hx      Social History     Socioeconomic History   ? Marital status:      Spouse name: Cayden   ? Number of children: 2   ? Years of education: Not on file   ? Highest education level: Not on file   Occupational History     Employer: RETIRED   Social Needs   ? Financial resource strain: Not on file   ? Food insecurity      Worry: Not on file     Inability: Not on file   ? Transportation needs     Medical: Not on file     Non-medical: Not on file   Tobacco Use   ? Smoking status: Former Smoker     Packs/day: 1.50     Years: 37.00     Pack years: 55.50     Types: Cigarettes     Quit date: 2009     Years since quittin.1   ? Smokeless tobacco: Never Used   Substance and Sexual Activity   ? Alcohol use: No     Alcohol/week: 11.7 standard drinks     Types: 14 Standard drinks or equivalent per week     Comment: 14 mixed drinks per week   ? Drug use: No   ? Sexual activity: Never     Partners: Male   Lifestyle   ? Physical activity     Days per week: Not on file     Minutes per session: Not on file   ? Stress: Not on file   Relationships   ? Social connections     Talks on phone: Not on file     Gets together: Not on file     Attends Jew service: Not on file     Active member of club or organization: Not on file     Attends meetings of clubs or organizations: Not on file     Relationship status: Not on file   ? Intimate partner violence     Fear of current or ex partner: Not on file     Emotionally abused: Not on file     Physically abused: Not on file     Forced sexual activity: Not on file   Other Topics Concern   ? Not on file   Social History Narrative    Lives with her . Daughter in [place] and daughter in [place].     MEDICATIONS: Reviewed from the MAR, physician orders, and earlier progress notes.  Post Discharge Medication Reconciliation Status: medication reconciliation previously completed during another office visit.  Updated by me today (2021) with the change in midodrine orders plus the addition of low sodium Edi-Wallagrass Gold (per patient request) reflected below.    Current Outpatient Medications   Medication Sig   ? pot bicarb/sod bicarb/cit ac (EDI-SELTZER GOLD ORAL) Take by mouth daily.   ? acetaminophen (TYLENOL) 500 MG tablet Take 2 tablets (1,000 mg total) by mouth every 6 (six) hours as  needed.   ? albuterol (PROVENTIL) 2.5 mg /3 mL (0.083 %) nebulizer solution Take 3 mL (2.5 mg total) by nebulization every 4 (four) hours as needed for wheezing.   ? aspirin 81 MG EC tablet Take 1 tablet (81 mg total) by mouth daily.   ? atorvastatin (LIPITOR) 10 MG tablet Take 10 mg by mouth at bedtime.   ? B complex 11-folic-C-biot-zinc (DIALYVITE) [phone] mg-mg-mcg-mg Tab Take 1 tablet by mouth daily with supper. (Dialyvite)    ? buPROPion (WELLBUTRIN XL) 150 MG 24 hr tablet Take 1 tablet (150 mg total) by mouth 2 (two) times a week. Tuesday and saturday (Patient taking differently: Take 150 mg by mouth 2 (two) times a week. Tuesday and saturday)   ? cholecalciferol, vitamin D3, 1,000 unit (25 mcg) tablet Take 2,000 Units by mouth daily.    ? cinacalcet (SENSIPAR) 30 MG tablet Take 30 mg by mouth see administration instructions. Take three times weekly with dialysis (on Mondays, Wednesdays, and Fridays).   ? famotidine (PEPCID) 20 MG tablet Take 1 tablet (20 mg total) by mouth at bedtime.   ? folic acid (FOLVITE) 1 MG tablet TAKE ONE TABLET BY MOUTH ONCE DAILY (Patient taking differently: Take 1 mg by mouth daily. )   ? gabapentin (NEURONTIN) 100 MG capsule Take 100 mg by mouth 2 (two) times a day. Leg pain   ? Lactobacillus rhamnosus GG (CULTURELLE) 10-15 Billion cell capsule Take 1 capsule by mouth daily with lunch.    ? metoprolol succinate (TOPROL-XL) 25 MG Take 1 tablet (25 mg total) by mouth daily with lunch. Hold for SBP<110 or hr<60   ? midodrine (PROAMATINE) 10 MG tablet Take 2 tablets (20 mg total) by mouth 3 (three) times a week. Patient takes prior to dialysis qMWF and PRN if additional dialysis treatments.  Patient reports that she takes 20mg prior to dialysis (Patient taking differently: Take 20 mg by mouth 3 (three) times a week. Patient takes prior to dialysis qMWF and PRN if additional dialysis treatments.  Patient reports taking 20mg prior to dialysis    Also, on nondialysis days, give 10  mg daily for SBP <106.)   ? midodrine (PROAMATINE) 10 MG tablet Take 10 mg by mouth daily as needed. Take on non dialysis days. Has an order for dialysis days.   ? midodrine (PROAMATINE) 5 MG tablet Take 10 mg by mouth 4 (four) times a week. On non dialysis days if SBP < 106   ? oxyCODONE (ROXICODONE) 5 MG immediate release tablet TAKE 1/2 TO 1 TABLET (2.5-5MG) BY MOUTH TWICE DAILY AS NEEDED FOR PAIN   ? polyvinyl alcohol (LIQUIFILM TEARS) 1.4 % ophthalmic solution Administer 1 drop to both eyes as needed for dry eyes.   ? senna-docusate (SENNOSIDES-DOCUSATE SODIUM) 8.6-50 mg tablet Take 1 tablet by mouth at bedtime as needed for constipation.    ? sevelamer carbonate (RENVELA) 800 mg tablet Take 1,600 mg by mouth 2 (two) times a day as needed (snacks).   ? sevelamer HCL (RENAGEL) 800 MG tablet Take 1,600 mg by mouth 3 (three) times a day with meals.    ? timolol maleate (TIMOPTIC) 0.5 % ophthalmic solution Administer 1 drop to both eyes 2 (two) times a day.    ? traZODone (DESYREL) 150 MG tablet Take 150 mg by mouth at bedtime.      ALLERGIES:   Allergies   Allergen Reactions   ? Ace Inhibitors Cough   ? Atrovent [Ipratropium Bromide] Headache   ? Fosinopril Sodium Cough   ? Zolpidem      hallucinations     DIET: Dialysis diet, regular texture, thin liquids.  1500 mL /24-hour fluid restriction    Vitals:    03/08/21 1343   BP: 117/73   Pulse: 76   Resp: 16   Temp: 98.2  F (36.8  C)   SpO2: 97%   Weight: 157 lb (71.2 kg)     Body mass index is 25.34 kg/m .    EXAMINATION:   General: Pleasant, alert, and conversant middle-aged female, sitting in her wheelchair, in no apparent distress.  Head: Normocephalic and atraumatic.   Eyes: PERRLA, sclerae clear.  Slight disconjugate gaze.  ENT: Moist oral mucosa.  She has her own teeth.  No rhinorrhea or nasal discharge.  Hearing is unimpaired.  Cardiovascular: Irregular rhythm with a 2/6 systolic ejection murmur at the left sternal border.  Respiratory: Diminished lung sounds  bilaterally but otherwise clear.  Abdomen: Soft and nontender.   Musculoskeletal/Extremities: Age-related degenerative joint disease.  Right 3+ pitting pedal and pretibial edema, 2+ on the left.  Integument: No rashes, clinically significant lesions, or skin breakdown.   Cognitive/Psychiatric: Alert and oriented ×4.  Affect is euthymic.    DIAGNOSTICS:   Results for orders placed or performed during the hospital encounter of 02/18/21   Basic Metabolic Panel   Result Value Ref Range    Sodium 136 136 - 145 mmol/L    Potassium 3.7 3.5 - 5.0 mmol/L    Chloride 97 (L) 98 - 107 mmol/L    CO2 29 22 - 31 mmol/L    Anion Gap, Calculation 10 5 - 18 mmol/L    Glucose 95 70 - 125 mg/dL    Calcium 8.7 8.5 - 10.5 mg/dL    BUN 28 8 - 28 mg/dL    Creatinine 3.01 (H) 0.60 - 1.10 mg/dL    GFR MDRD Af Amer 18 (L) >60 mL/min/1.73m2    GFR MDRD Non Af Amer 15 (L) >60 mL/min/1.73m2     Lab Results   Component Value Date    WBC 3.9 (L) 02/20/2021    HGB 10.1 (L) 02/20/2021    HCT 31.8 (L) 02/20/2021     (H) 02/20/2021    PLT 91 (L) 02/20/2021     CrCl cannot be calculated (Patient's most recent lab result is older than the maximum 10 days allowed.).  Lab Results   Component Value Date     (H) 02/18/2021     ASSESSMENT/Plan:      ICD-10-CM    1. Chronic respiratory failure with hypoxia (H)  J96.11    2. ESRD on hemodialysis (H), MWF  N18.6     Z99.2    3. Chronic diastolic heart failure (H)  I50.32    4. Hypotension due to hypovolemia  I95.89     E86.1    5. Chronic atrial fibrillation (H)  I48.20    6. Anemia of chronic renal failure, stage 5 (H)  N18.5     D63.1    7. Pulmonary emphysema, unspecified emphysema type (H)  J43.9    8. Chronic acquired lymphedema  I89.0    9. Gastroesophageal reflux disease without esophagitis  K21.9      CHANGES:    Consultation with pharmacy will be required for the possibility of increasing her midodrine dose.    CARE PLAN:    The care plan has been reviewed and all orders signed. Changes  to care plan, if any, as noted. Otherwise, continue care plan of care.      The above has been created using voice recognition software. Please be aware that this may unintentionally  produce inaccuracies and/or nonsensical sentences.      Electronically signed by: Louie Liriano CNP

## 2021-06-21 NOTE — PROGRESS NOTES
[facility]    Facility:   [facility] [[id number]]   Code Status: FULL CODE      CHIEF COMPLAINT/REASON FOR VISIT:  Chief Complaint   Patient presents with     Review Of Multiple Medical Conditions       HISTORY:      HPI: Belia is a 73 y.o. female who was readmitted to the hospital for shortness of breath and was discovered to be in congestive heart failure again.  She had recently been admitted to the hospital due to shortness of breath which was pulmonary edema with acute on chronic congestive heart failure as well as sepsis with infected dialysis catheter.  She was diagnosed with enterococcus bacteremia.  The tunneled dialysis catheter was exchanged the day prior to discharge from the hospital.  She does have underlying sleep apnea but is not able to tolerate the BiPAP.  She has chronic atrial fibrillation and episodic rapid ventricular rate but she has undergone the left atrial appendage occlusion procedure (watchman) so that she does not require anticoagulation anymore.  She does have end-stage renal disease requiring chronic hemodialysis.  She does have chronic respiratory failure with hypoxia.  She also has a thoracic aortic aneurysm measuring 4.3 cm which was rechecked during this hospitalization and it is stable.  Her BNP in the hospital was 1972.  She was tachycardic on arrival to the emergency room and this improved with IV diltiazem 10 mg.    Upon current review of systems there are no new developments of health.  She is not having fevers or chills.  She does not have sore throat.  She does not have cough or worsening shortness of breath.  She is not having chest pain or palpitations of the heart.  She is not having persistent nausea nor does she have abdominal pain.    Past Medical History:   Diagnosis Date     Arthritis      CHF (congestive heart failure) (H)      Chronic anemia 6/1/2014     Chronic kidney disease      Chronic thoracic aortic dissection (H) 10/7/2015     Descending thoracic aorta; treated medically per notes of Dragan Singh and Jennifer.     COPD (chronic obstructive pulmonary disease) (H)      CVA (cerebral infarction)      Disease of thyroid gland      Dyslipidemia      ESRD (end stage renal disease) (H) 06/03/2009    on dialysis with Dr. Mitchell     Essential hypertension 6/30/2014     GI (gastrointestinal bleed)      GI bleeding 6/5/2017     Gout      L3 vertebral fracture (H) 11/16/2015     Left Atrial Appendage Occlusion (WATCHMAN) 4/5/2018    LAAO April 5, 2018 (30 mm WATCHMAN)     Obesity      ZULEIKA (obstructive sleep apnea), severe, intolerant of CPAP 10/22/2015     Pneumonia 9-7-2015     Right foot drop      Spinal stenosis 3/28/2016     Stroke (H) 3/24/2016             Family History   Problem Relation Age of Onset     Dementia Mother      Diabetes Mother      Arthritis Mother      Cancer Mother      Depression Mother      Heart disease Mother      Vision loss Mother      Stroke Father      Heart disease Father      Breast cancer Neg Hx      Social History     Social History     Marital status:      Spouse name: Cayden     Number of children: 2     Years of education: N/A     Occupational History      Retired     Social History Main Topics     Smoking status: Former Smoker     Packs/day: 1.50     Years: 37.00     Types: Cigarettes     Quit date: 1/1/2009     Smokeless tobacco: Never Used     Alcohol use No      Comment: 14 mixed drinks per week     Drug use: No     Sexual activity: No     Other Topics Concern     Not on file     Social History Narrative    Lives with her . Daughter in [place] and daughter in [place].         Review of Systems    .  Vitals:    10/11/18 2020   BP: 123/73   Pulse: 67   Resp: 19   Temp: 97.7  F (36.5  C)   SpO2: 90%       Physical Exam   Constitutional: No distress.   Eyes: Right eye exhibits no discharge. Left eye exhibits no discharge.   Neck: No JVD present.   Cardiovascular: Normal heart sounds.     Pulmonary/Chest: Breath sounds normal. No respiratory distress.   Abdominal: Soft. Bowel sounds are normal. She exhibits no distension. There is no tenderness.   Musculoskeletal: She exhibits no edema.   Neurological: She is alert.   Skin: Skin is warm and dry.   Psychiatric: She has a normal mood and affect.   Nursing note and vitals reviewed.        LABS:   No new laboratory testing    ASSESSMENT:      ICD-10-CM    1. Acute on chronic diastolic congestive heart failure (H) I50.33    2. ESRD on dialysis (H) N18.6     Z99.2    3. Pulmonary emphysema, unspecified emphysema type (H) J43.9    4. Anemia of chronic renal failure, stage 5 (H) N18.5     D63.1    5. Weakness R53.1        PLAN:    Continue with current therapies and monitoring of medical conditions.      Electronically signed by: Farhan Kiran MD

## 2021-06-21 NOTE — LETTER
Letter by Dl Nolan DO at      Author: Dl Nolan DO Service: -- Author Type: --    Filed:  Encounter Date: 10/15/2020 Status: (Other)         Patient: Belia Rodriguez   MR Number: [id number]   YOB: 1945   Date of Visit: 10/15/2020     [facility]      Facility:    [facility] [[id number]]  Code Status: UNKNOWN      Chief Complaint/Reason for Visit:  Chief Complaint   Patient presents with   ? H & P     Hypoxia, end-stage renal disease currently on hemodialysis, palliative care, underlying CHF, COPD on 2 L of oxygen at baseline,.       HPI:   Belia is a 75 y.o. female who was recently admitted to the hospital on 10/5/2020.  She does have COPD is at baseline oxygen 3 L at home.  And does have chronic anemia.  She came in with a chief complaint of worsening shortness of breath by 2 days.  It was likely was related to acute exacerbation of CHF and mild pulmonary edema.  She is currently end-stage renal disease does not make urine and troponins x3 were negative.  And no signs of acute coronary syndrome.  Brain natruretic peptide was higher than baseline at that time and renal was consulted and she did have extra runs of hemodialysis.  She essentially needed BiPAP initially now not on baseline O2 3 L.  She was placed on ceftriaxone doxycycline for possible pneumonia and procalcitonin was negative.  They continue the doxycycline for 5-day course and likely this could been exacerbated by COPD exacerbation.  She is wheelchair-bound at this time and there was no signs of any PE.  She is DNR/DNI and hospice evaluated patient and she wants to continue restorative cares and she is not ready for hospice at this time.  COVID-19 was negative.  She was treated appropriately and transferred here to the TCU at [facility] in stable condition.    I did discuss with patient about her O2 she was not on O2 in the notes it did said she was on 3 L  chronically at home.  She did tell me that she is only on it at night but not on it during the day she is not on it at this time and she is satting okay without any signs of respiratory distress.  She does have a purple purplish color right leg with some swelling however this is not new for her.  She says it was worse yesterday better today but it was worse than she seen it yesterday.  But his always been like that.  The feet are warm to touch and do not appear to be infected.  But there is a definitely a purplish reddish color to them.  She does have sensation some sensation in her feet and congenic each can feel me touch and they are warm to touch.  Not hot warm they are not cold at all.  She denies any fevers chills nausea vomiting diarrhea cough shortness of breath.  She does have low back pain right-sided back pain palpated lower lumbar right.  There is some pain in her upper thoracic area which I may get an x-ray for at this time.    Past Medical History:  Past Medical History:   Diagnosis Date   ? Arthritis    ? CHF (congestive heart failure) (H)    ? Chronic anemia 6/1/2014   ? Chronic kidney disease    ? Chronic thoracic aortic dissection (H) 10/7/2015    Descending thoracic aorta; treated medically per notes of Dragan Singh and Jennifer.   ? COPD (chronic obstructive pulmonary disease) (H)    ? CVA (cerebral infarction)    ? Disease of thyroid gland    ? Dyslipidemia    ? ESRD (end stage renal disease) (H) 06/03/2009    on dialysis with Dr. Mitchell   ? Essential hypertension 6/30/2014   ? Gastrointestinal hemorrhage, unspecified gastrointestinal hemorrhage type 6/5/2017   ? GI (gastrointestinal bleed)    ? GI bleeding 6/5/2017   ? Gout    ? L3 vertebral fracture (H) 11/16/2015   ? Left Atrial Appendage Occlusion (WATCHMAN) 4/5/2018    LAAO April 5, 2018 (30 mm WATCHMAN)   ? Obesity    ? ZULEIKA (obstructive sleep apnea), severe, intolerant of CPAP 10/22/2015   ? Oxygen dependent     3L nc   ? Pneumonia 9-7-2015    ? Right foot drop    ? Spinal stenosis 3/28/2016   ? Stroke (H) 3/24/2016           Surgical History:  Past Surgical History:   Procedure Laterality Date   ? BACK SURGERY      [facility]   ? COLONOSCOPY N/A 3/23/2016    Procedure: COLONOSCOPY;  Surgeon: Ruddy Tejada MD;  Location: [facility];  Service:    ? DILATION AND CURETTAGE OF UTERUS     ? EP ABLATION AV NODE N/A 3/7/2019    Procedure: EP Ablation AV Node;  Surgeon: Derick Duarte MD;  Location: [facility];  Service: Cardiology   ? EP NEGRA CLOSURE N/A 4/5/2018    Procedure: EP NEGRA Closure;  Surgeon: Derick Duarte MD;  Location: [facility];  Service:    ? EP PACEMAKER INSERT N/A 3/7/2019    Procedure: EP Pacemaker Insertion;  Surgeon: Derick Duarte MD;  Location: [facility];  Service: Cardiology   ? EYE SURGERY     ? HERNIA REPAIR     ? IR TUNNELED CATHETER INSERT  11/20/2018   ? IR TUNNELED CATHETER REMOVAL  11/20/2018   ? NV COLSC FLEXIBLE W/CONTROL BLEEDING ANY METHOD N/A 6/7/2017    Procedure: COLONOSCOPY;  Surgeon: Luis Mckeon MD;  Location: [facility];  Service: Gastroenterology   ? NV OPEN FIX INTER/SUBTROCH FX,IMPLNT Left 6/23/2019    Procedure: INTERNAL FIXATION, FRACTURE, TROCHANTERIC, HIP, USING INTERMEDULLARY NAIL;  Surgeon: Bennie Marsh DO;  Location: [facility] OR;  Service: Orthopedics   ? TONSILLECTOMY         Family History:   Family History   Problem Relation Age of Onset   ? Dementia Mother    ? Diabetes Mother    ? Arthritis Mother    ? Cancer Mother    ? Depression Mother    ? Heart disease Mother    ? Vision loss Mother    ? Stroke Father    ? Heart disease Father    ? Breast cancer Neg Hx        Social History:    Social History     Socioeconomic History   ? Marital status:      Spouse name: Cayden   ? Number of children: 2   ? Years of education: None   ? Highest education level: None   Occupational History     Employer: RETIRED   Social Needs   ? Financial  resource strain: None   ? Food insecurity     Worry: None     Inability: None   ? Transportation needs     Medical: None     Non-medical: None   Tobacco Use   ? Smoking status: Former Smoker     Packs/day: 1.50     Years: 37.00     Pack years: 55.50     Types: Cigarettes     Quit date: 2009     Years since quittin.7   ? Smokeless tobacco: Never Used   Substance and Sexual Activity   ? Alcohol use: No     Alcohol/week: 11.7 standard drinks     Types: 14 Standard drinks or equivalent per week     Comment: 14 mixed drinks per week   ? Drug use: No   ? Sexual activity: Never     Partners: Male   Lifestyle   ? Physical activity     Days per week: None     Minutes per session: None   ? Stress: None   Relationships   ? Social connections     Talks on phone: None     Gets together: None     Attends Yarsani service: None     Active member of club or organization: None     Attends meetings of clubs or organizations: None     Relationship status: None   ? Intimate partner violence     Fear of current or ex partner: None     Emotionally abused: None     Physically abused: None     Forced sexual activity: None   Other Topics Concern   ? None   Social History Narrative    Lives with her . Daughter in [place] and daughter in [place].          Review of Systems   Constitutional:        Mid to low right back pain.  Red swollen and purplish right foot.  No fevers chills nausea vomiting diarrhea change in vision hearing taste or smell weakness 1 side the other chest pain shortness of breath.  She does not make urine at this time she is on hemodialysis.  She is moving her bowels without difficulty and outside her back her pain is being managed okay.  The remainder of the review of systems is negative.       Vitals:    10/15/20 0916   BP: 128/73   Pulse: 73   Resp: 16   Temp: 97  F (36.1  C)   SpO2: 95%       Physical Exam  Constitutional:       General: She is not in acute distress.  HENT:      Head: Normocephalic  and atraumatic.      Nose: Nose normal.      Mouth/Throat:      Mouth: Mucous membranes are moist.      Pharynx: Oropharynx is clear.   Eyes:      General:         Right eye: No discharge.         Left eye: No discharge.   Cardiovascular:      Rate and Rhythm: Normal rate and regular rhythm.   Pulmonary:      Effort: Pulmonary effort is normal. No respiratory distress.   Musculoskeletal:      Comments: Right leg with swelling mostly on the top of the foot without any palpatory tenderness.  The purplish pinkish color did gaby at this time and they were not cold.  The appropriate temperature was palpated and there were not overly warm either.  She did have a movement.  Pulses difficult to palpate secondary to the swelling.  Patient did claim that this is not new for her.   Skin:     General: Skin is warm and dry.   Neurological:      Mental Status: She is alert. Mental status is at baseline.   Psychiatric:         Mood and Affect: Mood normal.         Behavior: Behavior normal.         Medication List:  Current Outpatient Medications   Medication Sig   ? acetaminophen (TYLENOL) 500 MG tablet Take 1,000 mg by mouth 3 (three) times a day as needed.   ? artificial tears,hypromellose, (GENTEAL; SYSTANE) 0.3 % Gel Administer 1 drop to both eyes as needed.   ? aspirin 81 MG EC tablet Take 1 tablet (81 mg total) by mouth daily.   ? buPROPion (WELLBUTRIN XL) 150 MG 24 hr tablet Take 1 tablet (150 mg total) by mouth 2 (two) times a week. Tuesday and saturday   ? cholecalciferol, vitamin D3, 1,000 unit (25 mcg) tablet Take 2,000 Units by mouth daily.   ? cinacalcet (SENSIPAR) 30 MG tablet Take 30 mg by mouth see administration instructions. Take three times weekly with dialysis (on Mondays, Wednesdays, and Fridays).         ? famotidine (PEPCID) 20 MG tablet Take 20 mg by mouth 2 (two) times a day.   ? folic acid (FOLVITE) 1 MG tablet TAKE ONE TABLET BY MOUTH ONCE DAILY   ? gabapentin (NEURONTIN) 100 MG capsule TAKE 1 CAPSULE  BY MOUTH THREE TIMES DAILY   ? ipratropium-albuteroL (COMBIVENT RESPIMAT)  mcg/actuation Mist inhaler Inhale 1 puff 4 (four) times a day.   ? Lactobacillus rhamnosus GG (CULTURELLE) 10-15 Billion cell capsule Take 1 capsule by mouth daily with lunch.   ? loratadine (CLARITIN) 10 mg tablet Take 10 mg by mouth daily as needed.    ? midodrine (PROAMATINE) 10 MG tablet Take 1.5 tablets (15 mg total) by mouth 3 (three) times a day with meals. (Patient taking differently: Take 15 mg by mouth 3 (three) times a day with meals. Hold for SBP>105  Patient reports that she usually gets 10 mg before dialysis and another 10 mg long term through dialysis as well)   ? multivitamin (DAILY-OLGA) per tablet Take 1 tablet by mouth daily.   ? predniSONE (DELTASONE) 20 MG tablet Take 20 mg by mouth daily with breakfast.   ? senna-docusate (SENNOSIDES-DOCUSATE SODIUM) 8.6-50 mg tablet Take 1 tablet by mouth daily as needed for constipation.    ? sevelamer carbonate (RENVELA) 800 mg tablet Take 1 tablet (800 mg total) by mouth 2 (two) times a day as needed (FOr snacks.).   ? sevelamer HCL (RENAGEL) 800 MG tablet Take 2,400 mg by mouth 3 (three) times a day with meals. And take 2 tablets with snacks   ? timolol maleate (TIMOPTIC) 0.5 % ophthalmic solution Administer 1 drop to both eyes 2 (two) times a day.    ? traZODone (DESYREL) 50 MG tablet TAKE 1&1/2 TABLETS (75MG) BY MOUTH AT BEDTIME. (Patient taking differently: Take 150 mg by mouth at bedtime. )       Labs: Hospital labs are as follows; on 10/7/2020 white count was 2.8, hemoglobin was 9.7, hematocrit was 31.7, platelets were 94,000.  Procalcitonin 0.19, troponin I 0.03, sodium is 132, potassium 4.4, CO2 is 30, BUN was 21, creatinine 3.79, bilirubin 0.3, calcium 8.4, alk phosphatase of 78, AST was 10, ALT was 10.  White count could down his low was 1.7, but hemoglobin stayed in the 9 range.      Assessment:    ICD-10-CM    1. ESRD on dialysis (H)  N18.6     Z99.2    2. Pulmonary  emphysema, unspecified emphysema type (H)  J43.9    3. Acute on chronic diastolic congestive heart failure (H)  I50.33    4. Physical deconditioning  R53.81    5. Presence of Watchman left atrial appendage closure device  Z95.818    6. Acute pulmonary edema (H)  J81.0    7. Neuropathic pain  M79.2        Plan: Plan at this time arterial Doppler ultrasound to right lower extremity secondary to ischemia.  Will cool pack to low back continuously and change every 2 hours.  Physical therapy will assess for back pain and we will continue to monitor above medical problems.    I did assess the leg it did not seem like it was ischemic at this time but rule out is necessary.  It does not appear to be infectious because is his normal temperature and it is more purpleish.  This is not new for her at this time and we will continue to monitor very closely and keep legs elevated at rest.  I will continue to monitor above medical problems and no other changes to care plan at this time.            Electronically signed by: Dl Nolan,

## 2021-06-21 NOTE — PROGRESS NOTES
[facility]    Facility:   [facility] [[id number]]   Code Status: FULL CODE      CHIEF COMPLAINT/REASON FOR VISIT:  Chief Complaint   Patient presents with     Problem Visit     CHF, ESRD, COPD       HISTORY:      HPI: Belia is a 73 y.o. female with chronic A. fib, status post watchman device, ESRD on HD MWF.     Hospital summary:  H/o  COPD, chronic respiratory failure with hypoxia on nocturnal home oxygen 2 L/min. She has been admitted  with shortness of breath. patient  had sob secondary to acute on chronic diastolic CHF in setting of ESRD and on HD MWF. Last echo in  05/2018 did not reveal any  major abnormality. ACS was ruled out and Nuclear stress test was done and did not show any significant abnormality. Patient has shown significant improvement in his condition clinically at this time. Patient has been recommended to follow up with CHF clinic and  4 week f/u with HE. She has been enrolled in the CHF tele monitoring project to prevent readmissions. Patient is being discharged in a stable condition to TCU.    Today, patient was seen in her room. She had dialysis yesterday and said that it did not go so well. She said that her dialysis port was replaced about a month ago and it does not work as well as the last one that was on the right side of her chest, which causes her time of dialysis to be less efficient, resulting in other complications.. She is scheduled on Thursday with IR for the port to be replaced. Other than her frustration with this situation, she feels okay. She denies headache at this time but she said minor headaches are normal for her.  Her neck is a little sore from the bed in the facility, she is able to prop herself up in a way that feels better when at home. She denies dizziness, light headedness, fevers, chills, chest pain, shortness of breath, palpitations. She does have some leg swelling. She denies abdominal pain, constipation, or diarrhea. She  reports having diverticuli and is very cautious with her bowel regimen so she does not get constipated.  She does not wear her brace for her right foot drop, however she feels that she manages without it. She denies problems with urination. She produce very little urine but tries to stay hydrated to prevent UTIs. She said her appetite is adequate and is sleeping well.     Past Medical History:   Diagnosis Date     Arthritis      CHF (congestive heart failure) (H)      Chronic anemia 6/1/2014     Chronic kidney disease      Chronic thoracic aortic dissection (H) 10/7/2015    Descending thoracic aorta; treated medically per notes of Dragan Singh and Jennifer.     COPD (chronic obstructive pulmonary disease) (H)      CVA (cerebral infarction)      Disease of thyroid gland      Dyslipidemia      ESRD (end stage renal disease) (H) 06/03/2009    on dialysis with Dr. Mitchell     Essential hypertension 6/30/2014     GI (gastrointestinal bleed)      GI bleeding 6/5/2017     Gout      L3 vertebral fracture (H) 11/16/2015     Left Atrial Appendage Occlusion (WATCHMAN) 4/5/2018    LAAO April 5, 2018 (30 mm WATCHMAN)     Obesity      ZULEIKA (obstructive sleep apnea), severe, intolerant of CPAP 10/22/2015     Pneumonia 9-7-2015     Right foot drop      Spinal stenosis 3/28/2016     Stroke (H) 3/24/2016             Past Surgical History:   Procedure Laterality Date     BACK SURGERY      [facility]     COLONOSCOPY N/A 3/23/2016    Procedure: COLONOSCOPY;  Surgeon: Ruddy Tejada MD;  Location: [facility];  Service:      DILATION AND CURETTAGE OF UTERUS       EP NEGRA CLOSURE N/A 4/5/2018    Procedure: EP NEGRA Closure;  Surgeon: Derick Duarte MD;  Location: [facility];  Service:      EYE SURGERY       HERNIA REPAIR       MA COLSC FLEXIBLE W/CONTROL BLEEDING ANY METHOD N/A 6/7/2017    Procedure: COLONOSCOPY;  Surgeon: Luis Mckeon MD;  Location: [facility];  Service: Gastroenterology     TONSILLECTOMY          Family History   Problem Relation Age of Onset     Dementia Mother      Diabetes Mother      Arthritis Mother      Cancer Mother      Depression Mother      Heart disease Mother      Vision loss Mother      Stroke Father      Heart disease Father      Breast cancer Neg Hx      Social History     Social History     Marital status:      Spouse name: Cayden     Number of children: 2     Years of education: N/A     Occupational History      Retired     Social History Main Topics     Smoking status: Former Smoker     Packs/day: 1.50     Years: 37.00     Types: Cigarettes     Quit date: 1/1/2009     Smokeless tobacco: Never Used     Alcohol use No      Comment: 14 mixed drinks per week     Drug use: No     Sexual activity: No     Other Topics Concern     Not on file     Social History Narrative    Lives with her . Daughter in [place] and daughter in [place].     Current Outpatient Prescriptions on File Prior to Visit   Medication Sig Dispense Refill     acetaminophen (TYLENOL) 500 MG tablet Take 500 mg by mouth every 6 (six) hours as needed for pain.       albuterol (ACCUNEB) 0.63 mg/3 mL nebulizer solution Take 1 ampule by nebulization every 4 (four) hours as needed for wheezing.       albuterol (PROAIR HFA;PROVENTIL HFA;VENTOLIN HFA) 90 mcg/actuation inhaler Inhale 2 puffs every 6 (six) hours as needed for wheezing. 1 Inhaler 5     allopurinol (ZYLOPRIM) 100 MG tablet Take 100 mg by mouth daily.       aspirin 81 MG EC tablet Take 81 mg by mouth daily with lunch.        atorvastatin (LIPITOR) 10 MG tablet Take 10 mg by mouth at bedtime.       CHLORPHENIRAMINE/DEXTROMETHORP (CORICIDIN HBP COUGH AND COLD ORAL) Take 1 tablet by mouth daily as needed.        cholecalciferol, vitamin D3, 2,000 unit cap Take 2,000 Units by mouth daily with lunch.        cinacalcet (SENSIPAR) 30 MG tablet Take 30 mg by mouth daily with lunch.        clopidogrel (PLAVIX) 75 mg tablet Take 1 tablet (75 mg total) by mouth  daily. 90 tablet 1     DIALYVITE 100-1 mg Tab TAKE ONE TABLET BY MOUTH DAILY IN THE EVENING 90 tablet 0     digoxin (LANOXIN) 125 mcg tablet TAKE 1 TABLET ORALLY 3 TIMES A WEEK. (Patient taking differently: TAKE 1 TABLET ORALLY 3 TIMES A WEEK. Monday, Wednesday, Friday PM after Dialysis) 30 tablet 0     diltiazem (CARDIZEM SR) 120 MG 12 hr capsule Take 1 capsule (120 mg total) by mouth 2 (two) times a day. 60 capsule 2     diphenhydrAMINE (BENADRYL) 25 mg tablet Take 2 tablets (50 mg total) by mouth at bedtime. 15 tablet 0     folic acid (FOLVITE) 1 MG tablet TAKE ONE TABLET BY MOUTH ONCE DAILY 90 tablet 0     gabapentin (NEURONTIN) 100 MG capsule TAKE 1 CAPSULE BY MOUTH THREE TIMES DAILY 270 capsule 0     HYDROcodone-acetaminophen 5-325 mg per tablet Take 1 tablet by mouth 4 (four) times a day as needed. 15 tablet 0     Lactobacillus rhamnosus GG (CULTURELLE) 10-15 Billion cell capsule Take 1 capsule by mouth daily with lunch.       metoprolol tartrate (LOPRESSOR) 50 MG tablet Take 0.5 tablets (25 mg total) by mouth 2 (two) times a day. 180 tablet 3     midodrine (PROAMATINE) 5 MG tablet Take 3 tablets (15 mg total) by mouth 3 (three) times a week. Take every Monday, Wednesday, and Friday in the morning. 36 tablet 11     ondansetron (ZOFRAN) 4 MG tablet Take 4 mg by mouth every 8 (eight) hours as needed for nausea.       polyvinyl alcohol (LIQUIFILM TEARS) 1.4 % ophthalmic solution Apply 1 drop to eye as needed for dry eyes.       ranitidine (ZANTAC) 150 MG tablet Take 150 mg by mouth at bedtime.       senna-docusate (SENNOSIDES-DOCUSATE SODIUM) 8.6-50 mg tablet Take 1 tablet by mouth at bedtime.        sevelamer carbonate (RENVELA) 800 mg tablet TAKE 2 TABLETS BY MOUTH 3 TIMES A DAY WITH MEALS AND 2 TABS WITH SNACKS 2 TIMES A  tablet 0     timolol maleate (TIMOPTIC) 0.5 % ophthalmic solution Administer 1 drop to both eyes 2 (two) times a day.        traZODone (DESYREL) 50 MG tablet Take 0.5 tablets (25 mg  total) by mouth at bedtime as needed for sleep (give with melatonin). 15 tablet 0     No current facility-administered medications on file prior to visit.      Allergies   Allergen Reactions     Ace Inhibitors Cough     Atrovent [Ipratropium Bromide] Headache     Fosinopril Sodium Cough     Zolpidem      hallucinations         Review of Systems   Constitutional: Negative for appetite change, chills, diaphoresis, fatigue and fever.   HENT: Negative for congestion, ear pain, sore throat and trouble swallowing.    Eyes: Negative for visual disturbance.   Respiratory: Negative for cough, chest tightness and shortness of breath.    Cardiovascular: Positive for leg swelling. Negative for chest pain and palpitations.   Gastrointestinal: Negative for abdominal pain, blood in stool, constipation, diarrhea, nausea and vomiting.   Genitourinary: Positive for decreased urine volume. Negative for difficulty urinating, dysuria, frequency and urgency.        Urine is decreased because of ESRD, but the amount is normal for her.   Musculoskeletal: Positive for arthralgias, neck pain and neck stiffness.        She has some pain in her both knees from being more active with PT than she typically is at home. See HPI regarding neck pain.   Skin: Negative for rash and wound.   Neurological: Positive for headaches. Negative for dizziness, weakness, light-headedness and numbness.        She has minor tension headaches that have been normal for her without any change to types of headache.   Psychiatric/Behavioral: Negative for sleep disturbance.       .  Vitals:    10/14/18 0842 10/15/18 1427   BP:  140/68   Pulse:  79   Resp:  16   Temp:  96.8  F (36  C)   SpO2:  92%   Weight: 168 lb 8 oz (76.4 kg) 171 lb 14.4 oz (78 kg)       Physical Exam   Constitutional: She is oriented to person, place, and time. She appears well-developed and well-nourished. No distress.   HENT:   Head: Normocephalic.   Right Ear: Hearing and external ear normal.    Left Ear: Hearing and external ear normal.   Eyes: Lids are everted and swept, no foreign bodies found.   Neck: Normal range of motion. Neck supple. No JVD present.   Cardiovascular: Normal rate and intact distal pulses.  An irregularly irregular rhythm present.   Murmur heard.  Pulmonary/Chest: Effort normal and breath sounds normal. No respiratory distress. She has no wheezes. She has no rales.   Abdominal: Soft. Bowel sounds are normal. She exhibits no distension. There is no tenderness. There is no guarding.   Musculoskeletal: She exhibits edema.   Bilateral pedal edema with 2+ pitting edema.    Neurological: She is alert and oriented to person, place, and time.   Skin: Skin is warm and dry. No rash noted. She is not diaphoretic. No erythema.   Port site dressing C/D/I   Psychiatric: She has a normal mood and affect. Her behavior is normal. Thought content normal.         LABS:   Labs reviewed in nursing home chart.    ASSESSMENT/PLAN:      ICD-10-CM    1. Acute on chronic diastolic congestive heart failure (H) I50.33 Recommended to follow up with [facility] in 4 weeks following hospitalization. Also enrolled in the CHF tele-monitoring program to prevent readmissions.   2. ESRD on dialysis (H) N18.6 Stable - dialysis MWF. Will be getting port replaced on Thursday.    Z99.2    3. Essential hypertension with goal blood pressure less than 140/90 I10 BPs are within target range. Will continue diltiazem, metoprolol, midodrine.   4. Persistent atrial fibrillation (H) I48.1 Rate controlled. Has watchman device and plavix for anticoagulation. diltiazem and digoxin for rate control. Continue current regimen.    5. COPD (chronic obstructive pulmonary disease) (H) J44.9 Stable. No exacerbation at this time   6. Right foot drop M21.371 Has brace to wear but currently does not wear it.       I, Klaudia Mar, am scribing in the presence of, .Selene Trinidad CNP  ISelene CNP , personally performed  the services described in this documentation, as scribed by Klaudia Mar in my presence, and it is both accurate and complete.        Electronically signed by: Klaudia Mar

## 2021-06-21 NOTE — LETTER
Letter by Melanie Maldonado CHW at      Author: Melanie Maldonado CHW Service: -- Author Type: --    Filed:  Encounter Date: 2/23/2021 Status: (Other)       CARE COORDINATION    [facility]  [address] [place] [address]    February 25, 2021    Belia Rodriguez  [address] [facility] [id number]      Dear Belia,    I am a clinic community health worker who works with Neil Galan MD at [facility]. I have been trying to reach you recently to introduce Clinic Care Coordination and to see if there was anything I could assist you with.  Below is a description of clinic care coordination and how I can further assist you.      The clinic care coordination team is made up of a registered nurse,  and community health worker who understand the health care system. The goal of clinic care coordination is to help you manage your health and improve access to the health care system in the most efficient manner. The team can assist you in meeting your health care goals by providing education, coordinating services, strengthening the communication among your providers and supporting you with any resource needs.    Please feel free to contact me at [phone] with any questions or concerns. We are focused on providing you with the highest-quality healthcare experience possible and that all starts with you.     Sincerely,     Brittanie

## 2021-06-21 NOTE — LETTER
Letter by Dl Nolan DO at      Author: Dl Nolan DO Service: -- Author Type: --    Filed:  Encounter Date: 2/2/2021 Status: (Other)         Patient: Belia Rodriguez   MR Number: [id number]   YOB: 1945   Date of Visit: 2/2/2021     [facility]      Facility:    [facility] [[id number]]  Code Status: UNKNOWN      Chief Complaint/Reason for Visit:  Chief Complaint   Patient presents with   ? H & P     Hospitalization for shortness of breath with fluid overload, acute on chronic respiratory failure with hypoxia, end-stage renal disease on hemodialysis 3 times weekly, COPD exacerbation.       HPI:    Belia is a 75 y.o. female who was admitted to the hospital 1/26/2021.  She currently has end-stage renal disease on hemodialysis and COPD and is on home oxygen.  Her chief complaint was shortness of breath.  She was brought in the hospital found to be in fluid overload at this time and she needed increased oxygen.  She was treated for COPD exacerbation she received some Solu-Medrol IV.  And she did receive an extra 1 and hemodialysis on Thursday.  Her shortness of breath improved and her oxygen needs returned to baseline.  Her Solu-Medrol was changed to slow tapering dose of prednisone and she was treated appropriately and transferred here to the TCU in stable condition.    Patient is satting okay with an adequate respiratory rate and no oxygen no working to breathe.  She feels pretty comfortable now and has no issues.  She does get nauseous with food at times but she is eating okay and has no vomiting.  We did have a long discussion about hospice cares and she would like hospice consult this week to talk to them.  She feels like she has had enough and we did discuss CODE STATUS in detail and she wants to be DNR/DNI.        Past Medical History:  Past Medical History:   Diagnosis Date   ? Arthritis    ? CHF (congestive heart failure) (H)    ?  Chronic anemia 6/1/2014   ? Chronic kidney disease    ? Chronic thoracic aortic dissection (H) 10/7/2015    Descending thoracic aorta; treated medically per notes of Dragan Singh and Jennifer.   ? COPD (chronic obstructive pulmonary disease) (H)    ? CVA (cerebral infarction)    ? Disease of thyroid gland    ? Dyslipidemia    ? ESRD (end stage renal disease) (H) 06/03/2009    on dialysis with Dr. Mitchell   ? Essential hypertension 6/30/2014   ? Gastrointestinal hemorrhage, unspecified gastrointestinal hemorrhage type 6/5/2017   ? GI (gastrointestinal bleed)    ? GI bleeding 6/5/2017   ? Gout    ? L3 vertebral fracture (H) 11/16/2015   ? Left Atrial Appendage Occlusion (WATCHMAN) 4/5/2018    LAAO April 5, 2018 (30 mm WATCHMAN)   ? Obesity    ? ZULEIKA (obstructive sleep apnea), severe, intolerant of CPAP 10/22/2015   ? Oxygen dependent     3L nc   ? Pneumonia 9-7-2015   ? Right foot drop    ? Spinal stenosis 3/28/2016   ? Stroke (H) 3/24/2016           Surgical History:  Past Surgical History:   Procedure Laterality Date   ? BACK SURGERY      [facility]   ? COLONOSCOPY N/A 3/23/2016    Procedure: COLONOSCOPY;  Surgeon: Ruddy Tejada MD;  Location: [facility];  Service:    ? DILATION AND CURETTAGE OF UTERUS     ? EP ABLATION AV NODE N/A 3/7/2019    Procedure: EP Ablation AV Node;  Surgeon: Derick Duarte MD;  Location: [facility] Cath Lab;  Service: Cardiology   ? EP NEGRA CLOSURE N/A 4/5/2018    Procedure: EP NEGRA Closure;  Surgeon: Derick Duarte MD;  Location: [facility] Cath Lab;  Service:    ? EP PACEMAKER INSERT N/A 3/7/2019    Procedure: EP Pacemaker Insertion;  Surgeon: Derick Duarte MD;  Location: [facility] Cath Lab;  Service: Cardiology   ? EYE SURGERY     ? HERNIA REPAIR     ? IR TUNNELED CATHETER COMPLETE REPLACEMENT  10/23/2020   ? IR TUNNELED CATHETER INSERT  11/20/2018   ? IR TUNNELED CATHETER REMOVAL  11/20/2018   ? VT COLSC FLEXIBLE W/CONTROL BLEEDING ANY METHOD N/A 6/7/2017     Procedure: COLONOSCOPY;  Surgeon: Luis Mckeon MD;  Location: [facility] GI;  Service: Gastroenterology   ? AZ OPEN FIX INTER/SUBTROCH FX,IMPLNT Left 2019    Procedure: INTERNAL FIXATION, FRACTURE, TROCHANTERIC, HIP, USING INTERMEDULLARY NAIL;  Surgeon: Bennie Marsh DO;  Location: [facility] Main OR;  Service: Orthopedics   ? TONSILLECTOMY         Family History:   Family History   Problem Relation Age of Onset   ? Dementia Mother    ? Diabetes Mother    ? Arthritis Mother    ? Cancer Mother    ? Depression Mother    ? Heart disease Mother    ? Vision loss Mother    ? Stroke Father    ? Heart disease Father    ? Breast cancer Neg Hx        Social History:    Social History     Socioeconomic History   ? Marital status:      Spouse name: Cayden   ? Number of children: 2   ? Years of education: None   ? Highest education level: None   Occupational History     Employer: RETIRED   Social Needs   ? Financial resource strain: None   ? Food insecurity     Worry: None     Inability: None   ? Transportation needs     Medical: None     Non-medical: None   Tobacco Use   ? Smoking status: Former Smoker     Packs/day: 1.50     Years: 37.00     Pack years: 55.50     Types: Cigarettes     Quit date: 2009     Years since quittin.0   ? Smokeless tobacco: Never Used   Substance and Sexual Activity   ? Alcohol use: No     Alcohol/week: 11.7 standard drinks     Types: 14 Standard drinks or equivalent per week     Comment: 14 mixed drinks per week   ? Drug use: No   ? Sexual activity: Never     Partners: Male   Lifestyle   ? Physical activity     Days per week: None     Minutes per session: None   ? Stress: None   Relationships   ? Social connections     Talks on phone: None     Gets together: None     Attends Restoration service: None     Active member of club or organization: None     Attends meetings of clubs or organizations: None     Relationship status: None   ? Intimate partner violence     Fear of  current or ex partner: None     Emotionally abused: None     Physically abused: None     Forced sexual activity: None   Other Topics Concern   ? None   Social History Narrative    Lives with her . Daughter in [place] and daughter in [place].          Review of Systems   Constitutional:        Patient denies any pain fevers chills vomiting diarrhea change in vision hearing taste or smell weakness one-sided of the chest pain.  She is short of breath with exertion but is nothing new and she is not on oxygen at this time and at this time she is about her baseline.  She denies any depression or anxiety and the remainder review of systems is negative.       Vitals:    02/02/21 0724   BP: 99/65   Pulse: 67   Resp: 16   Temp: 97.2  F (36.2  C)   SpO2: 97%       Physical Exam  Constitutional:       General: She is not in acute distress.  HENT:      Head: Normocephalic and atraumatic.      Nose: Nose normal.      Mouth/Throat:      Mouth: Mucous membranes are moist.   Cardiovascular:      Rate and Rhythm: Normal rate and regular rhythm.   Pulmonary:      Comments: Patient has decreased inspiratory volume but no crackles rales or wheezes were heard.  Abdominal:      General: There is no distension.      Palpations: Abdomen is soft.      Tenderness: There is no abdominal tenderness.   Musculoskeletal:      Right lower leg: Edema present.      Left lower leg: Edema present.   Skin:     General: Skin is warm and dry.   Neurological:      Mental Status: She is alert. Mental status is at baseline.   Psychiatric:         Mood and Affect: Mood normal.         Behavior: Behavior normal.         Medication List:  Current Outpatient Medications   Medication Sig   ? acetaminophen (TYLENOL) 500 MG tablet Take 2 tablets (1,000 mg total) by mouth every 6 (six) hours as needed.   ? albuterol (PROVENTIL) 2.5 mg /3 mL (0.083 %) nebulizer solution Take 3 mL (2.5 mg total) by nebulization every 4 (four) hours as needed for wheezing.   ?  aspirin 81 MG EC tablet Take 1 tablet (81 mg total) by mouth daily. (Patient taking differently: Take 81 mg by mouth daily. )   ? atorvastatin (LIPITOR) 10 MG tablet Take 10 mg by mouth at bedtime.   ? B complex 11-folic-C-biot-zinc (DIALYVITE) 3-382-050-50 mg-mg-mcg-mg Tab Take 1 tablet by mouth daily with supper. (Dialyvite)    ? buPROPion (WELLBUTRIN XL) 150 MG 24 hr tablet Take 1 tablet (150 mg total) by mouth 2 (two) times a week. Tuesday and saturday (Patient taking differently: Take 150 mg by mouth 2 (two) times a week. Tuesday and saturday)   ? cholecalciferol, vitamin D3, 1,000 unit (25 mcg) tablet Take 2,000 Units by mouth daily.    ? cinacalcet (SENSIPAR) 30 MG tablet Take 30 mg by mouth see administration instructions. Take three times weekly with dialysis (on Mondays, Wednesdays, and Fridays).   ? famotidine (PEPCID) 20 MG tablet Take 1 tablet (20 mg total) by mouth at bedtime.   ? folic acid (FOLVITE) 1 MG tablet TAKE ONE TABLET BY MOUTH ONCE DAILY (Patient taking differently: No sig reported)   ? gabapentin (NEURONTIN) 100 MG capsule TAKE 1 CAPSULE BY MOUTH THREE TIMES DAILY (Patient taking differently: No sig reported)   ? Lactobacillus rhamnosus GG (CULTURELLE) 10-15 Billion cell capsule Take 1 capsule by mouth daily with lunch.    ? metoprolol succinate (TOPROL-XL) 25 MG Take 1 tablet (25 mg total) by mouth daily with lunch. Hold for SBP<110 or hr<60   ? midodrine (PROAMATINE) 10 MG tablet Take 20 mg by mouth 3 (three) times a week. On dialysis days. Hold for SBP>105  Patient reports that she takes 20mg prior to dialysis   ? midodrine (PROAMATINE) 5 MG tablet Take 10 mg by mouth 4 (four) times a week. On non dialysis days if SBP < 106   ? polyvinyl alcohol (LIQUIFILM TEARS) 1.4 % ophthalmic solution Administer 1 drop to both eyes as needed for dry eyes.   ? predniSONE (DELTASONE) 10 mg tablet Take 40 mg by mouth daily for 3 days, THEN 20 mg daily for 3 days, THEN 10 mg daily for 3 days, THEN 5 mg  "daily for 3 days.   ? senna-docusate (SENNOSIDES-DOCUSATE SODIUM) 8.6-50 mg tablet Take 1 tablet by mouth at bedtime as needed for constipation.    ? sevelamer carbonate (RENVELA) 800 mg tablet Take 1,600 mg by mouth 2 (two) times a day as needed (snacks).   ? sevelamer HCL (RENAGEL) 800 MG tablet Take 1,600 mg by mouth 3 (three) times a day with meals.    ? timolol maleate (TIMOPTIC) 0.5 % ophthalmic solution Administer 1 drop to both eyes 2 (two) times a day.    ? traZODone (DESYREL) 150 MG tablet Take 150 mg by mouth at bedtime.        Labs: Hospital labs are as follows; hemoglobin A1c was 4.2, sodium is 133, potassium 5.8, chloride was 100, CO2 is 21, anion gap was 12, glucose 234, calcium 8.4, creatinine 5.88 with a GFR of 7 and a BUN of 41.  White count was 2.3, hemoglobin 9.6, platelets were 98,000.  Covid was negative.  Troponin was 0.05, TSH was 1.43, brain natruretic peptide was 753.      Assessment:    ICD-10-CM    1. ESRD on dialysis (H)  N18.6     Z99.2    2. Hypervolemia, unspecified hypervolemia type  E87.70    3. Physical deconditioning  R53.81    4. Edema of right lower extremity  R60.0    5. Acute on chronic diastolic congestive heart failure (H)  I50.33    6. Chronic bronchitis, unspecified chronic bronchitis type (H)  J42    7. Neuropathic pain  M79.2        Plan: Plan at this time we will not do any labs with have her labs sent over from dialysis was \"a basic metabolic profile 3 times a week in a weekly hemoglobin.  Will they will put this into matrix for our review.  We will incorporate physical and occupational therapy and weights to be done a daily basis.    For her pain in her feet and neuropathic pain I will increase the a.m. gabapentin to 200 at this time and we did have a long discussion about CODE STATUS and her goals of care.  She is interested in starting hospice care at this time so we will have a consult for her to talk to them this week.  I feel she does realize that she will stop " dialysis and she will not last more than 3 to 4 days.  She does realize this and we did discuss this in detail.        Electronically signed by: Dl Nolan, DO

## 2021-06-21 NOTE — LETTER
Letter by June Kingston CNP at      Author: June Kingston CNP Service: -- Author Type: --    Filed:  Encounter Date: 2/4/2021 Status: (Other)         Patient: Belia Rodriguez   MR Number: [id number]   YOB: 1945   Date of Visit: 2/4/2021     Code Status:  DNR  Visit Type: Problem Visit (CHF)     Facility:  [facility] [[id number]]              History of Present Illness: Belia Rodriguez is a 75 y.o. female  who I am seeing today for follow-up on the TCU post recent hospitalization for congestive heart failure and COPD.  Past medical history includes end-stage renal disease on dialysis 3 times weekly, acute hypoxic respiratory failure secondary to CHF as well as COPD.  Patient recently hospitalized on 1/26/2021 secondary to increasing shortness of breath.  She was needing increased oxygen support.  She is chronically on O2 2 to 3 L.  She was treated for COPD exacerbation.  She was given Solu-Medrol IV as well as the nebulizers.  She does have underlying end-stage renal disease and continues on dialysis 3 times weekly.  She also has extra runs on Thursdays.  She was needing extra runs prior to her hospitalization for increased CHF exacerbation.  She did get dialysis daily during her hospitalization and her breathing improved.  She was switched from her Solu-Medrol over to prednisone.  Continues on a prednisone taper.  She also was treated with azithromycin.      Today patient sitting up in wheelchair.  Patient with end-stage renal disease on dialysis 3 times weekly.  Patient continues on prednisone taper as well as azithromycin.  She is on O2 at 2 L.  She reports her breathing is improving.  She does have some chronic right lower extremity edema however much improved from when she was at the TCU previously.  She said she was using an Ace wrap at dialysis.  Will continue to do so.  History of atrial fib.  Rate controlled.  Patient continues on midodrine for blood  pressure support.  Chronic neuropathy.  Continues on gabapentin and Tylenol.  She did take low-dose oxycodone from her previous TCU stay.  She is requesting this today.        Active Ambulatory Problems     Diagnosis Date Noted   ? Tachycardia-bradycardia syndrome (H) 06/12/2014   ? Hyperkalemia 06/30/2014   ? Essential hypertension with goal blood pressure less than 140/90 06/30/2014   ? Hyperlipidemia 06/30/2014   ? Acute respiratory failure with hypoxia (H) 06/30/2014   ? Fluid overload 10/07/2015   ? ZULEIKA (obstructive sleep apnea), severe 10/22/2015   ? Anemia of chronic renal failure, stage 5 (H)    ? Dissection of thoracoabdominal aorta (H)    ? Gout    ? GERD (gastroesophageal reflux disease) 04/14/2017   ? Right foot drop 05/16/2017   ? Acute midline low back pain with right-sided sciatica 06/16/2017   ? History of compression fracture of spine 06/16/2017   ? Acute and chronic respiratory failure with hypoxia (H)    ? Pulmonary emphysema (H) 02/19/2018   ? Presence of Watchman left atrial appendage closure device 04/05/2018   ? Other dysphagia 08/10/2018   ? 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   ? Hyperparathyroidism (H) 03/24/2009   ? Osteoporosis 03/24/2009   ? 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 ESRD on hemodialysis (H), MWF 08/15/2018   ? Acute pulmonary edema (H) 09/07/2018   ? Chronic atrial fibrillation (H)    ? COPD exacerbation (H) 01/04/2019   ? Acute on chronic diastolic congestive heart failure (H) 01/23/2019   ? Thrombocytopenia (H)    ? Contraindication to anticoagulation therapy 03/26/2019   ? Dyspnea 05/18/2019   ? Cardiac pacemaker in situ 03/20/2019   ? S/P AV (atrioventricular) jonn ablation 03/20/2019   ? Hip fracture, intertrochanteric (H) 06/23/2019   ? DVT (deep venous thrombosis) (H) 08/22/2019   ? Carpal tunnel syndrome of left wrist 02/04/2020   ? Major depressive disorder, remission status unspecified, unspecified whether recurrent  09/15/2020   ? Encounter for palliative care    ? PVD (peripheral vascular disease) (H) 12/18/2020   ? SOB (shortness of breath) 01/26/2021     Resolved Ambulatory Problems     Diagnosis Date Noted   ? Hypotension 06/01/2014   ? Fever 06/07/2014   ? Aortic aneurysm rupture (H)    ? Bacteremia due to Escherichia coli 06/08/2014   ? Severe tobacco use disorder 06/30/2014   ? Persistent atrial fibrillation (H) 06/30/2014   ? Bradycardia, sinus 06/30/2014   ? Volume overload 06/30/2014   ? Hypomagnesemia 06/30/2014   ? Cellulitis 08/25/2014   ? Hematochezia 11/02/2014   ? BRBPR (bright red blood per rectum) 11/02/2014   ? Hematuria 04/01/2015   ? Aortic dissection (H) 04/01/2015   ? Pneumonia 09/07/2015   ? Hypoxia 09/23/2015   ? SOB (shortness of breath) 09/23/2015   ? Acute on chronic diastolic heart failure (H) 09/27/2015   ? Bacteremia 09/27/2015   ? Dyspnea 10/07/2015   ? Chronic thoracic aortic dissection (H) 10/07/2015   ? L3 vertebral fracture (H) 11/16/2015   ? GI bleeding 03/20/2016   ? Chronic systolic congestive heart failure (H)    ? Acute lower GI bleeding 03/21/2016   ? Bilateral anterior& Lt Occipital embolic Infarction 03/24/2016   ? Spinal stenosis 03/28/2016   ? Back pain 03/28/2016   ? Tremor 03/28/2016   ? SOB (shortness of breath) 05/03/2016   ? CVA (cerebral infarction) 05/04/2016   ? Respiratory distress 05/09/2016   ? CHF (congestive heart failure) (H) 05/09/2016   ? Right knee pain    ? Dyspnea 10/02/2016   ? Volume overload 10/02/2016   ? Acute bronchitis due to infection 10/02/2016   ? Acute bacterial conjunctivitis of both eyes 10/02/2016   ? Acute CHF (congestive heart failure) (H) 10/09/2016   ? Hypervolemia, unspecified hypervolemia type    ? Pure hypercholesterolemia    ? Gastroesophageal reflux disease without esophagitis    ? Leg weakness 04/20/2017   ? Anticoagulated on warfarin 05/16/2017   ? Gastrointestinal hemorrhage, unspecified gastrointestinal hemorrhage type 06/05/2017   ?  Anticoagulant long-term use 06/16/2017   ? Acute GI bleeding 12/09/2017   ? SOB (shortness of breath) 01/26/2018   ? Acute bronchitis with bronchospasm    ? Acute respiratory failure with hypoxia and hypercapnia (H)    ? History of acute respiratory failure 02/19/2018   ? Chronic respiratory failure with hypoxia (H) 03/02/2018   ? Dyspnea 08/09/2018   ? Chest pain 08/10/2018   ? Hemodialysis catheter infection (H)    ? Hyperkalemia 10/18/2018   ? Atrial fibrillation with RVR (H) 03/03/2019   ? Intertrochanteric fracture of left hip, closed, initial encounter (H) 06/23/2019   ? Closed intertrochanteric fracture of hip, left, initial encounter (H) 06/22/2019   ? SOB (shortness of breath) 07/13/2020   ? Hypotension, unspecified hypotension type    ? Hypoxia 10/05/2020   ? Hypoxemia 12/08/2020     Past Medical History:   Diagnosis Date   ? Arthritis    ? Chronic anemia 6/1/2014   ? Chronic kidney disease    ? COPD (chronic obstructive pulmonary disease) (H)    ? CVA (cerebral infarction)    ? Disease of thyroid gland    ? Dyslipidemia    ? ESRD (end stage renal disease) (H) 06/03/2009   ? Essential hypertension 6/30/2014   ? GI (gastrointestinal bleed)    ? Left Atrial Appendage Occlusion (WATCHMAN) 4/5/2018   ? Obesity    ? Oxygen dependent        Current Outpatient Medications   Medication Sig   ? oxyCODONE (ROXICODONE) 5 MG immediate release tablet Take 5 mg by mouth 2 (two) times a day as needed for pain. Give half tablet to equal 2.5 mg twice daily as needed   ? acetaminophen (TYLENOL) 500 MG tablet Take 2 tablets (1,000 mg total) by mouth every 6 (six) hours as needed.   ? albuterol (PROVENTIL) 2.5 mg /3 mL (0.083 %) nebulizer solution Take 3 mL (2.5 mg total) by nebulization every 4 (four) hours as needed for wheezing.   ? aspirin 81 MG EC tablet Take 1 tablet (81 mg total) by mouth daily. (Patient taking differently: Take 81 mg by mouth daily. )   ? atorvastatin (LIPITOR) 10 MG tablet Take 10 mg by mouth at  bedtime.   ? B complex 11-folic-C-biot-zinc (DIALYVITE) [phone] mg-mg-mcg-mg Tab Take 1 tablet by mouth daily with supper. (Dialyvite)    ? buPROPion (WELLBUTRIN XL) 150 MG 24 hr tablet Take 1 tablet (150 mg total) by mouth 2 (two) times a week. Tuesday and saturday (Patient taking differently: Take 150 mg by mouth 2 (two) times a week. Tuesday and saturday)   ? cholecalciferol, vitamin D3, 1,000 unit (25 mcg) tablet Take 2,000 Units by mouth daily.    ? cinacalcet (SENSIPAR) 30 MG tablet Take 30 mg by mouth see administration instructions. Take three times weekly with dialysis (on Mondays, Wednesdays, and Fridays).   ? famotidine (PEPCID) 20 MG tablet Take 1 tablet (20 mg total) by mouth at bedtime.   ? folic acid (FOLVITE) 1 MG tablet TAKE ONE TABLET BY MOUTH ONCE DAILY (Patient taking differently: No sig reported)   ? gabapentin (NEURONTIN) 100 MG capsule TAKE 1 CAPSULE BY MOUTH THREE TIMES DAILY (Patient taking differently: No sig reported)   ? Lactobacillus rhamnosus GG (CULTURELLE) 10-15 Billion cell capsule Take 1 capsule by mouth daily with lunch.    ? metoprolol succinate (TOPROL-XL) 25 MG Take 1 tablet (25 mg total) by mouth daily with lunch. Hold for SBP<110 or hr<60   ? midodrine (PROAMATINE) 10 MG tablet Take 20 mg by mouth 3 (three) times a week. On dialysis days. Hold for SBP>105  Patient reports that she takes 20mg prior to dialysis   ? midodrine (PROAMATINE) 5 MG tablet Take 10 mg by mouth 4 (four) times a week. On non dialysis days if SBP < 106   ? polyvinyl alcohol (LIQUIFILM TEARS) 1.4 % ophthalmic solution Administer 1 drop to both eyes as needed for dry eyes.   ? predniSONE (DELTASONE) 10 mg tablet Take 40 mg by mouth daily for 3 days, THEN 20 mg daily for 3 days, THEN 10 mg daily for 3 days, THEN 5 mg daily for 3 days.   ? senna-docusate (SENNOSIDES-DOCUSATE SODIUM) 8.6-50 mg tablet Take 1 tablet by mouth at bedtime as needed for constipation.    ? sevelamer carbonate (RENVELA) 800 mg  tablet Take 1,600 mg by mouth 2 (two) times a day as needed (snacks).   ? sevelamer HCL (RENAGEL) 800 MG tablet Take 1,600 mg by mouth 3 (three) times a day with meals.    ? timolol maleate (TIMOPTIC) 0.5 % ophthalmic solution Administer 1 drop to both eyes 2 (two) times a day.    ? traZODone (DESYREL) 150 MG tablet Take 150 mg by mouth at bedtime.        Allergies   Allergen Reactions   ? Ace Inhibitors Cough   ? Atrovent [Ipratropium Bromide] Headache   ? Fosinopril Sodium Cough   ? Zolpidem      hallucinations         Review of Systems  No fevers or chills. No headache, lightheadedness or dizziness.  Chronic SOB, patient wears oxygen at 2 to 3 L, no chest pains or palpitations. Appetite is good. No nausea, vomiting, constipation or diarrhea. No dysuria, frequency, burning or pain with urination. + weakness. + chronic back pain. Continues on gabapentin and tylenol. Otherwise review of systems are negative.     Physical Exam  PHYSICAL EXAMINATION:  Vital signs: /67, pulse 72, respirations 16, temperature 96.9, O2 sats 99% on 2L.  Weight 158 pounds.    General: Awake, sitting up in wheelchair, alert, oriented x3, appropriately, follows simple commands, conversant  HEENT: Pink conjunctiva, anicteric sclerae, moist oral mucosa.  NECK: Supple, no lymphadenopathy no masses.  CVS: Dialysis catheter in the right chest wall.  Regular rate and rhythm on exam.  LUNG: No wheezes, rales or rhonchi. Somewhat diminished in the bases.  O2 on at 1 L nasal cannula.  EXTREMITIES: Moves both upper and lower extremities with generalized weakness.  2+ edema in the right foot.  No redness to the right lower extremity. PSYCHIATRIC: Cognition intact.       Labs:    Lab Results   Component Value Date    WBC 2.3 (L) 01/27/2021    HGB 9.6 (L) 01/27/2021    HCT 32.3 (L) 01/27/2021     (H) 01/27/2021    PLT 98 (L) 01/27/2021     Results for orders placed or performed during the hospital encounter of 01/26/21   Basic Metabolic  Panel   Result Value Ref Range    Sodium 133 (L) 136 - 145 mmol/L    Potassium 5.8 (H) 3.5 - 5.0 mmol/L    Chloride 100 98 - 107 mmol/L    CO2 21 (L) 22 - 31 mmol/L    Anion Gap, Calculation 12 5 - 18 mmol/L    Glucose 234 (H) 70 - 125 mg/dL    Calcium 8.4 (L) 8.5 - 10.5 mg/dL    BUN 41 (H) 8 - 28 mg/dL    Creatinine 5.88 (H) 0.60 - 1.10 mg/dL    GFR MDRD Af Amer 9 (L) >60 mL/min/1.73m2    GFR MDRD Non Af Amer 7 (L) >60 mL/min/1.73m2           Assessment/Plan:    1. Acute on chronic diastolic congestive heart failure (H)   fluids managed per dialysis.     2. Pulmonary emphysema, unspecified emphysema type (H)   currently on O2 at 2 L during the day.  Continues on nebs and prednisone taper.   3. ESRD on dialysis (H)   continues 3 times weekly.     4. Severe low back pain   continues on  gabapentin and Tylenol.  She was receiving low-dose oxycodone on her previous day.  We will reinstate oxycodone 2.5 mg p.o. twice daily as needed.   6. Neuropathic pain   continues on gabapentin.   7. Paroxysmal atrial fibrillation (H)   currently on metoprolol and aspirin.     This note has been dictated using voice recognition software. Any grammatical or context distortions are unintentional and inherent to the software      Electronically signed by: June Kingston, AYAH

## 2021-06-22 NOTE — TELEPHONE ENCOUNTER
RN cannot approve Refill Request    RN can NOT refill this medication overdue for labs.    Bennie Palomino, [facility] Triage/Med Refill 1/3/2019    Requested Prescriptions   Pending Prescriptions Disp Refills     digoxin (LANOXIN) 125 mcg tablet [Pharmacy Med Name: DIGOXIN 125 MCG TABLET] 30 tablet 0     Sig: TAKE 1 TABLET ORALLY 3 TIMES A WEEK.    Refill Protocol for Digoxin Failed - 1/2/2019  1:50 PM       Failed - Magnesium in last 12 months    Magnesium   Date Value Ref Range Status   03/25/2016 3.0 (H) 1.8 - 2.6 mg/dL Final             Passed - LFT or AST or ALT on file in last 12 months    Albumin   Date Value Ref Range Status   10/19/2018 3.1 (L) 3.5 - 5.0 g/dL Final     Bilirubin, Total   Date Value Ref Range Status   08/09/2018 0.5 0.0 - 1.0 mg/dL Final     Bilirubin, Direct   Date Value Ref Range Status   09/25/2015 0.2 <=0.5 mg/dL Final     Alkaline Phosphatase   Date Value Ref Range Status   08/09/2018 83 45 - 120 U/L Final     AST   Date Value Ref Range Status   08/09/2018 33 0 - 40 U/L Final     ALT   Date Value Ref Range Status   08/09/2018 24 0 - 45 U/L Final     Protein, Total   Date Value Ref Range Status   08/09/2018 6.9 6.0 - 8.0 g/dL Final               Passed - PCP or prescribing provider visit in past 6 months or next 3 months    Last office visit with prescriber/PCP: Visit date not found OR same dept: 8/15/2018 Earnest Carpenter MD OR same specialty: 8/15/2018 Earnest Carpenter MD Last physical: Visit date not found  Last MTM visit: Visit date not found     Next appt within 3 mo: Visit date not found  Next physical within 3 mo: Visit date not found  Prescriber OR PCP: John Escoto DO  Last diagnosis associated with med order: 1. Tachycardia-bradycardia syndrome (H)  - digoxin (LANOXIN) 125 mcg tablet [Pharmacy Med Name: DIGOXIN 125 MCG TABLET]; TAKE 1 TABLET ORALLY 3 TIMES A WEEK.  Dispense: 30 tablet; Refill: 0    2. Persistent atrial fibrillation (H)  - digoxin (LANOXIN) 125  mcg tablet [Pharmacy Med Name: DIGOXIN 125 MCG TABLET]; TAKE 1 TABLET ORALLY 3 TIMES A WEEK.  Dispense: 30 tablet; Refill: 0     If protocol passes may refill for 6 months if within 3 months of last provider visit (or a total of 9 months).         Passed - BMP on file in last 12 months    Sodium   Date Value Ref Range Status   10/26/2018 138 136 - 145 mmol/L Final     Potassium   Date Value Ref Range Status   11/20/2018 5.3 (H) 3.5 - 5.0 mmol/L Final     Chloride   Date Value Ref Range Status   10/26/2018 100 98 - 107 mmol/L Final     CO2   Date Value Ref Range Status   10/26/2018 27 22 - 31 mmol/L Final     BUN   Date Value Ref Range Status   10/26/2018 29 (H) 8 - 28 mg/dL Final     Creatinine   Date Value Ref Range Status   11/20/2018 8.16 (HH) 0.60 - 1.10 mg/dL Final            Passed - CBC w/plts (hm2) on file in last 12 months    WBC   Date Value Ref Range Status   10/26/2018 6.8 4.0 - 11.0 thou/uL Final     Hemoglobin   Date Value Ref Range Status   11/20/2018 12.2 12.0 - 16.0 g/dL Final     Hematocrit   Date Value Ref Range Status   10/26/2018 35.7 35.0 - 47.0 % Final     Platelets   Date Value Ref Range Status   11/20/2018 117 (L) 140 - 440 thou/uL Final            Passed - ECG in last 12 months    ECG rhythm strip: No results found for this or any previous visit. ECG 12 lead MUSE:   Results for orders placed or performed during the hospital encounter of 10/18/18   ECG 12 lead with MUSE, verify if completed in ER   Result Value Ref Range    SYSTOLIC BLOOD PRESSURE  mmHg    DIASTOLIC BLOOD PRESSURE  mmHg    VENTRICULAR RATE 77 BPM    ATRIAL RATE 258 BPM    P-R INTERVAL  ms    QRS DURATION 84 ms    Q-T INTERVAL 354 ms    QTC CALCULATION (BEZET) 400 ms    P Axis  degrees    R AXIS 34 degrees    T AXIS 71 degrees    MUSE DIAGNOSIS       Atrial fibrillation  Nonspecific ST and T wave abnormality , probably digitalis effect  Abnormal ECG  When compared with ECG of 21-SEP-2018 14:00,  T wave inversion no longer  evident in Inferior leads  Confirmed by GIN OLGUIN MD LOC:[facility] ([id number]) on 10/18/2018 3:52:33 PM      ECG 12 lead nursing unit:   Results for orders placed or performed during the hospital encounter of 09/21/18   ECG 12 lead nursing unit performed   Result Value Ref Range    SYSTOLIC BLOOD PRESSURE 149 mmHg    DIASTOLIC BLOOD PRESSURE 70 mmHg    VENTRICULAR RATE 84 BPM    ATRIAL RATE 76 BPM    P-R INTERVAL  ms    QRS DURATION 78 ms    Q-T INTERVAL 354 ms    QTC CALCULATION (BEZET) 418 ms    P Axis  degrees    R AXIS 52 degrees    T AXIS 189 degrees    MUSE DIAGNOSIS       Atrial fibrillation  ST & T wave abnormality, consider inferolateral ischemia  Abnormal ECG  When compared with ECG of 21-SEP-2018 13:59,  T wave inversion more evident in Lateral leads  Confirmed by RIDGE PETERSON, LES LOC:[facility] ([id number]) on 9/21/2018 4:26:30 PM              Passed - Normal digoxin level in last 12 months    No results found for: PHENOBARB         Passed - Serum creatinine in last 12 months    Creatinine   Date Value Ref Range Status   11/20/2018 8.16 (HH) 0.60 - 1.10 mg/dL Final

## 2021-06-22 NOTE — TELEPHONE ENCOUNTER
RN cannot approve Refill Request    RN can NOT refill this medication med is not covered by policy/route to provider.     Last office visit: Visit date not found Last Physical: Visit date not found Last MTM visit: Visit date not found Last visit same specialty: 8/15/2018 Earnest Carpenter MD.  Next visit within 3 mo: Visit date not found  Next physical within 3 mo: Visit date not found      Jose Whitney, [facility] Connection Triage/Med Refill 1/3/2019    Requested Prescriptions   Pending Prescriptions Disp Refills     folic acid (FOLVITE) 1 MG tablet [Pharmacy Med Name: FOLIC ACID 1 MG TABLET] 90 tablet 0     Sig: TAKE ONE TABLET BY MOUTH ONCE DAILY    There is no refill protocol information for this order

## 2021-06-22 NOTE — PROGRESS NOTES
TCM DISCHARGE FOLLOW UP CALL    Discharge Date:  1/7/2019  Reason for hospital stay (discharge diagnosis)::  COPD exacerbation. Volume overload ESRD  Are you feeling better, the same or worse since your discharge?:  Patient is feeling better (tired. Feels like she neds to sleep more.)  Do you feel like you have a plan in the event of a health emergency?: Yes (she would talk to her . Informed pt of nurse triage.)    As part of your discharge plan, were  home care services ordered for you?: No    Did you receive any new medications, or was there a change to your medications?: Yes    Are you taking those medications, or do you have any established regiment?:  Pt is waiting to for nebulizer from [facility]. Waiting for insurance approval. Pt finished azithromycin  And prednisone  Do you have any follow up visits scheduled with your PCP or Specialist?:  Yes, with PCP (1/14. MTM phone appt 1/10)  (RN) Is PCP appt scheduled soon enough (within 14 days of discharge date)?: Yes      
stated

## 2021-06-23 NOTE — PROGRESS NOTES
"TCM DISCHARGE FOLLOW UP CALL    Discharge Date:  2/3/2019  Reason for hospital stay (discharge diagnosis)::  CHF exacerbation  Are you feeling better, the same or worse since your discharge?:  Patient is feeling the same (feels really tired \"like I was hit by truck\". breathing easier. )  Do you feel like you have a plan in the event of a health emergency?: No (States she won't call the clinic because she doesn't want to go to any more appts.)    (RN) Patient provided with information to call us in the event of a health emergency: Yes (Encouraged pt to call clinic if she doesn't feel well. Pt is aware of nurse triage.)    As part of your discharge plan, were  home care services ordered for you?: No    Did you receive any new medications, or was there a change to your medications?: Yes    Are you taking those medications, or do you have any established regiment?:  Pt is taking doxazosin and diltiazem as prescribed. Stopped digoxin. RN notes discrepancy  In doxazosin and diltiazem directions and hospital notes. Note sent to PCP for clarification.  Do you have any follow up visits scheduled with your PCP or Specialist?:  Yes, with PCP (2/11)  (RN) Is PCP appt scheduled soon enough (within 14 days of discharge date)?: Yes    RN NOTES::  Pt denies depression or SI yet she sounds resigned and sad. Unable to do the things she wants to do. She states  is a tired caregiver. He won't allow help in the house. Instructed pt to notify clinic if she or her  need community resources.    "

## 2021-06-23 NOTE — TELEPHONE ENCOUNTER
Patient Returning Call  Reason for call:  Patient is returning call  Information relayed to patient:  Patient was advised that the [facility] was going to follow up with the patient but patient stated I tried calling them 2 times and no return call. I also read the reviews on them and wish to work with another company.   The company is [facility] fax number [phone].  Direct dial number [phone]  Patient has additional questions:  Yes  If YES, what are your questions/concerns:  I called them and they stated We just need the order for the nebulizer sent via fax.  Okay to leave a detailed message?: Yes

## 2021-06-23 NOTE — TELEPHONE ENCOUNTER
Nuris Blum      Pharmacy staff calling re pt meds     Wondering about digoxin and diltiazem    Both noted on pt's profile       Call to Dr Ye    > He related that the Digoxin has been d/c'ed following most recent hospitalization     I called back to pharmacy and related that to them      Rey Conteh, RN   Triage and Medication Refills           Reason for Disposition    Caller has URGENT medication question about med that PCP prescribed and triager unable to answer question    Protocols used: MEDICATION QUESTION CALL-A-

## 2021-06-23 NOTE — PROGRESS NOTES
"[facility] Note    Belia Rodriguez   73 y.o. female    Date of Visit: 2/11/2019  Chief Complaint   Patient presents with     Hospital Visit Follow Up     [facility]'s       ASSESSMENT/PLAN  1. Chronic atrial fibrillation (H)     2. Chronic diastolic congestive heart failure (H)     3. Anemia of chronic renal failure, stage 5 (H)       ---------------------------------------------    Belia Rodriguez \"Itzel\" is here for hospital follow-up.  She continues to do well after her antihypertensives and rate controlling agents, namely the decrease in diltiazem, discontinuation of digoxin, and addition of doxazosin.  She has a normal heart rate now, there were times in the hospital when it was in the low 50s.  We suspect that this recent CHF exacerbation may have been due to episodes of severe bradycardia.  She has cardiology clinic follow-up coming up, as well as follow-up with her new primary cardiologist Dr. Gee Benítez.    She is getting her lab work assessed at dialysis.  Her weight is stable, symptoms of orthopnea have not returned.  I think she is doing well at present time.  She knows well to avoid sodium and follows this as well as the fluid restriction well.    She does experience mild blurred vision on occasion, this might be from doxazosin.  She wanted to continue the medication and just watch it for now.    Return in about 1 month (around 3/11/2019) for Recheck.      SUBJECTIVE  Belia Rodriguez is a 73 year old woman with history of COPD, ESRD on HD M/w/F, here for hospital follow-up.      She was admitted January 29 - February 3 for CHF exacerbation.  Interestingly, her weight was not significantly above the dry weight, but she had episodes of severe bradycardia.  Cardiology was consulted and they stopped her digoxin, also discontinue the evening dose of diltiazem 12-hour release 120 mg, which was previously taken twice a day and held on dialysis days.    For antihypertensive, she was " started on doxazosin by recommendation of cardiology, which she tolerated well.  She occasionally has some blurred vision, but is minor, and we reviewed that this is a common side effect of doxazosin.  She wants to wait it out.    Her weight has been about 162 pounds or 72-73 kg at dialysis.  She has lower extremity edema, but this is not uncommon after dialysis.  She sits semi-upright, she will explore supine positioning when she goes to dialysis next time.  She importantly does not have any orthopnea.  She has a Holter monitor set up for tomorrow to assess whether or not she has ongoing episodes of bradycardia.  Long ago she saw Dr. Rodriguez who thought that perhaps she needed a pacemaker.  In fact, she was in the procedure room, then he ended up deciding against a pacemaker.  It seems that she has not had issues with her heart rate until recently.    When getting weighed, she fell against a wall and there is a small skin tear to her elbow.  This was wrapped up.  She did not have concerns about this.    She is feeling tired today, she had dialysis earlier.  She has been sleeping more than usual, but figured she just needs extra sleep.    ROS A comprehensive review of systems was performed and was otherwise negative      All medications before and after hospitalization including the changes were reconciled and reviewed with the patient.       Medications, allergies, and problem list were reviewed and updated    Patient Active Problem List   Diagnosis     Essential hypertension with goal blood pressure less than 140/90     Hyperlipidemia     ZULEIKA (obstructive sleep apnea), severe     Anemia of chronic renal failure, stage 5 (H)     Dissection of thoracoabdominal aorta (H)     Gout     GERD (gastroesophageal reflux disease)     Right foot drop     Acute midline low back pain with right-sided sciatica     History of compression fracture of spine     Pulmonary emphysema (H)     Left Atrial Appendage Occlusion (WATCHMAN)      Other dysphagia     Borderline glaucoma with ocular hypertension     Hyperparathyroidism (H)     Osteoporosis     Venous tributary (branch) occlusion of retina     ESRD on dialysis (H)     Chronic atrial fibrillation (H)     Chronic diastolic congestive heart failure (H)     Thrombocytopenia (H)     Past Medical History:   Diagnosis Date     Arthritis      CHF (congestive heart failure) (H)      Chronic anemia 6/1/2014     Chronic kidney disease      Chronic thoracic aortic dissection (H) 10/7/2015    Descending thoracic aorta; treated medically per notes of Dragan Singh and Jennifer.     COPD (chronic obstructive pulmonary disease) (H)      CVA (cerebral infarction)      Disease of thyroid gland      Dyslipidemia      ESRD (end stage renal disease) (H) 06/03/2009    on dialysis with Dr. Mitchell     Essential hypertension 6/30/2014     Gastrointestinal hemorrhage, unspecified gastrointestinal hemorrhage type 6/5/2017     GI (gastrointestinal bleed)      GI bleeding 6/5/2017     Gout      L3 vertebral fracture (H) 11/16/2015     Left Atrial Appendage Occlusion (WATCHMAN) 4/5/2018    LAAO April 5, 2018 (30 mm WATCHMAN)     Obesity      ZULEIKA (obstructive sleep apnea), severe, intolerant of CPAP 10/22/2015     Pneumonia 9-7-2015     Right foot drop      Spinal stenosis 3/28/2016     Stroke (H) 3/24/2016     Past Surgical History:   Procedure Laterality Date     BACK SURGERY      [facility]     COLONOSCOPY N/A 3/23/2016    Procedure: COLONOSCOPY;  Surgeon: Ruddy Tejada MD;  Location: [facility] GI;  Service:      DILATION AND CURETTAGE OF UTERUS       EP NEGRA CLOSURE N/A 4/5/2018    Procedure: EP NEGRA Closure;  Surgeon: Derick Duarte MD;  Location: [facility] Cath Lab;  Service:      EYE SURGERY       HERNIA REPAIR       IR TUNNELED CATHETER INSERT  11/20/2018     IR TUNNELED CATHETER REMOVAL  11/20/2018     [place] COLSC FLEXIBLE W/CONTROL BLEEDING ANY METHOD N/A 6/7/2017    Procedure: COLONOSCOPY;  Surgeon:  Luis Mckeon MD;  Location: [facility];  Service: Gastroenterology     TONSILLECTOMY       Social History     Socioeconomic History     Marital status:      Spouse name: Cayden     Number of children: 2     Years of education: Not on file     Highest education level: Not on file   Social Needs     Financial resource strain: Not on file     Food insecurity - worry: Not on file     Food insecurity - inability: Not on file     Transportation needs - medical: Not on file     Transportation needs - non-medical: Not on file   Occupational History     Employer: RETIRED   Tobacco Use     Smoking status: Former Smoker     Packs/day: 1.50     Years: 37.00     Pack years: 55.50     Types: Cigarettes     Last attempt to quit: 1/1/2009     Years since quitting: 10.1     Smokeless tobacco: Never Used   Substance and Sexual Activity     Alcohol use: No     Alcohol/week: 7.0 oz     Types: 14 Standard drinks or equivalent per week     Comment: 14 mixed drinks per week     Drug use: No     Sexual activity: No     Partners: Male   Other Topics Concern     Not on file   Social History Narrative    Lives with her . Daughter in [place] and daughter in [place].     Family History   Problem Relation Age of Onset     Dementia Mother      Diabetes Mother      Arthritis Mother      Cancer Mother      Depression Mother      Heart disease Mother      Vision loss Mother      Stroke Father      Heart disease Father      Breast cancer Neg Hx        Current Outpatient Medications   Medication Sig Dispense Refill     acetaminophen (TYLENOL) 500 MG tablet Take 500 mg by mouth every 6 (six) hours as needed for pain.       albuterol (PROAIR HFA;PROVENTIL HFA;VENTOLIN HFA) 90 mcg/actuation inhaler Inhale 2 puffs every 4 (four) hours as needed for wheezing. 8 g 5     albuterol (PROVENTIL) 2.5 mg /3 mL (0.083 %) nebulizer solution Take 3 mL (2.5 mg total) by nebulization every 4 (four) hours as needed for wheezing or shortness of  breath. 30 vial 5     aspirin 81 MG EC tablet Take 81 mg by mouth daily with lunch.        atorvastatin (LIPITOR) 10 MG tablet Take 10 mg by mouth at bedtime.       CHLORPHENIRAMINE/DEXTROMETHORP (CORICIDIN HBP COUGH AND COLD ORAL) Take 1 tablet by mouth daily as needed.        cholecalciferol, vitamin D3, 2,000 unit cap Take 2,000 Units by mouth daily with lunch.        cinacalcet (SENSIPAR) 30 MG tablet Take 30 mg by mouth 3 times weekly with dialysis             DIALYVITE 100-1 mg Tab TAKE ONE TABLET BY MOUTH DAILY IN THE EVENING 90 tablet 0     diltiazem (CARDIZEM CD) 120 MG 24 hr capsule Take 1 capsule (120 mg total) by mouth daily. 90 capsule 3     diphenhydrAMINE (BENADRYL) 25 mg tablet Take 2 tablets (50 mg total) by mouth at bedtime. 15 tablet 0     doxazosin (CARDURA) 1 MG tablet Take 1 tablet (1 mg total) by mouth daily. Hold on dialysis days 90 tablet 3     folic acid (FOLVITE) 1 MG tablet TAKE ONE TABLET BY MOUTH ONCE DAILY 90 tablet 3     gabapentin (NEURONTIN) 100 MG capsule TAKE 1 CAPSULE BY MOUTH THREE TIMES DAILY 270 capsule 0     Lactobacillus rhamnosus GG (CULTURELLE) 10-15 Billion cell capsule Take 1 capsule by mouth daily with lunch.       metoprolol tartrate (LOPRESSOR) 25 MG tablet Take 1 tablet (25 mg total) by mouth 2 (two) times a day. 60 tablet 11     midodrine (PROAMATINE) 5 MG tablet Take 3 tablets (15 mg total) by mouth 3 (three) times a week. Take every Monday, Wednesday, and Friday in the morning. 36 tablet 11     polyvinyl alcohol (LIQUIFILM TEARS) 1.4 % ophthalmic solution Apply 1 drop to eye as needed for dry eyes.       ranitidine (ZANTAC) 150 MG tablet Take 150 mg by mouth at bedtime.       senna-docusate (SENNOSIDES-DOCUSATE SODIUM) 8.6-50 mg tablet Take 1 tablet by mouth at bedtime.        sevelamer carbonate (RENVELA) 800 mg tablet Take 1,600 mg by mouth 3 (three) times a day with meals.       sevelamer carbonate (RENVELA) 800 mg tablet Take 1,600 mg by mouth 2 (two) times a  "day as needed (with snacks).       timolol maleate (TIMOPTIC) 0.5 % ophthalmic solution Administer 1 drop to both eyes 2 (two) times a day.        No current facility-administered medications for this visit.        Allergies   Allergen Reactions     Ace Inhibitors Cough     Atrovent [Ipratropium Bromide] Headache     Fosinopril Sodium Cough     Zolpidem      hallucinations       EXAM  Vitals:    02/11/19 1534   BP: 118/66   Patient Site: Left Arm   Patient Position: Sitting   Cuff Size: Adult Regular   Pulse: 88   SpO2: 92%   Weight: 162 lb 9.6 oz (73.8 kg)   Height: 5' 6\" (1.676 m)         General: alert, no distress  HEENT: sclerae anicteric, moist oral mucosa  Heart: Regular rate and rhythm, no murmurs.  1-2+ pitting pedal edema.  Warm extremities  Lungs: Breath sounds, trace crackle heard in the left lower lung, this seems to be baseline for her  Gastrointestinal: abdomen is non-distended.    Skin: warm/dry, no rashes  Neuro: no gross abnormalities  Psych: pleasant affect      RESULTS REVIEWED:     ANALYSIS AND SUMMARY OF OLD RECORDS, NOTES AND CONSULTS (2): Viewed hospital discharge summary, this is summarized above    RECORDS REQUESTED (1): None.     OTHER HISTORY SUMMARIZED (from nursing staff, family, friends) (2):     RADIOLOGY TESTS SUMMARIZED or REQUESTED (XRAY/CT/MRI/DXA) (1):   Xr Chest 2 Views    Result Date: 1/29/2019  XR CHEST 2 VIEWS 1/29/2019 4:21 PM INDICATION: Shortness of breath COMPARISON: Chest x-ray 01/04/2019 and CT chest 09/21/2018 FINDINGS: Stable right IJ dialysis catheter. A stable watchman device is also noted. Persistent mild interstitial prominence which may reflect mild edema. No focal pneumonic consolidation or pleural effusion. Stable mild cardiomegaly. No overt vascular congestion.      MEDICINE TESTS SUMMARIZED or REQUESTED (EKG/ECHO/COLONOSCOPY/EGD) (1): None    INDEPENDENT REVIEW OF EKG OR X-RAY (2): None.  Lab Results   Component Value Date    WBC 3.8 (L) 01/29/2019    HGB 9.2 " (L) 01/31/2019    HCT 29.4 (L) 01/29/2019     (H) 01/29/2019    PLT 94 (L) 02/02/2019        Data points  4     John Escoto DO  Internal Medicine  [facility]

## 2021-06-23 NOTE — TELEPHONE ENCOUNTER
Per Dr. Josue voicemail received after patient's hospital d/c. Pt needs to see Heart Failure NP 7-10 days after discharge from hospital and Holter monitor for atrial fib.     Patient was contacted and is agreeable to f/u with HF NP and monitor. Order placed for Holter, and patient was transferred to  to make appointment. -gerson

## 2021-06-23 NOTE — PROGRESS NOTES
The Clinic Care Guide called the patient  today at the request of the PCP to discuss possible clinic care coordination enrollment. Clinic care coordination was described to the patient and immediate needs were discussed. The patient declined enrollment in clinic care coordination at this time. The patient was provided with contact information for the clinic care guide if their needs changed.

## 2021-06-23 NOTE — PROGRESS NOTES
[facility] Note    Belia Rodriguez   73 y.o. female    Date of Visit: 1/14/2019  Chief Complaint   Patient presents with     Hospital Visit Follow Up     [facility]       ASSESSMENT/PLAN  1. ESRD on dialysis (H)     2. COPD exacerbation (H)     3. Pulmonary emphysema, unspecified emphysema type (H)       ---------------------------------------------    1.  Continue on hemodialysis as scheduled, seems euvolemic    2.  Recovered well from COPD exacerbation.  Repeat testing of oxygen demonstrates that she does not need any supplemental oxygen at rest or with activity.  She continues 2 L/min at night when she sleeps.    3.  See above.  She is working on getting nebulizer.    Return in about 2 months (around 3/14/2019) for Recheck.      SUBJECTIVE  Belia Rodriguez is here for hospital follow-up.  She is here with her .    She was admitted to [facility] from 1/4-1/7 for shortness of breath.      She feels better, she was treated with prednisone and azithromycin.    There was some question about whether or not fluid was a significant component of the shortness of breath.  She does not note any significant change in her weight or change in the fluid removed.  She does note that sometimes if her oxygen level decreases, they cannot pull as much fluid as they ordinarily would.  We talked about how inefficiencies and dialysis could contribute to extra fluid.  We also talked about the role of the heart and COPD.    Initially, oxygen saturation was about 86 or 87%, but this was retested.  See above.    ROS A comprehensive review of systems was performed and was otherwise negative    All medications before and after hospitalization including the changes were reconciled and reviewed with the patient.       Medications, allergies, and problem list were reviewed and updated    Patient Active Problem List   Diagnosis     Essential hypertension with goal blood pressure less than 140/90      Hyperlipidemia     ZULEIKA (obstructive sleep apnea), severe     Anemia of chronic renal failure, stage 5 (H)     Acute diastolic CHF (congestive heart failure) (H)     Dissection of thoracoabdominal aorta (H)     Gout     GERD (gastroesophageal reflux disease)     Right foot drop     Acute midline low back pain with right-sided sciatica     History of compression fracture of spine     Pulmonary emphysema (H)     Left Atrial Appendage Occlusion (WATCHMAN)     Other dysphagia     Borderline glaucoma with ocular hypertension     Hyperparathyroidism (H)     Osteoporosis     Venous tributary (branch) occlusion of retina     ESRD on dialysis (H)     Chronic atrial fibrillation (H)     Hyperkalemia     COPD exacerbation (H)     Past Medical History:   Diagnosis Date     Arthritis      CHF (congestive heart failure) (H)      Chronic anemia 6/1/2014     Chronic kidney disease      Chronic thoracic aortic dissection (H) 10/7/2015    Descending thoracic aorta; treated medically per notes of Dragan Singh and Jennifer.     COPD (chronic obstructive pulmonary disease) (H)      CVA (cerebral infarction)      Disease of thyroid gland      Dyslipidemia      ESRD (end stage renal disease) (H) 06/03/2009    on dialysis with Dr. Mitchell     Essential hypertension 6/30/2014     Gastrointestinal hemorrhage, unspecified gastrointestinal hemorrhage type 6/5/2017     GI (gastrointestinal bleed)      GI bleeding 6/5/2017     Gout      L3 vertebral fracture (H) 11/16/2015     Left Atrial Appendage Occlusion (WATCHMAN) 4/5/2018    LAAO April 5, 2018 (30 mm WATCHMAN)     Obesity      ZULEIKA (obstructive sleep apnea), severe, intolerant of CPAP 10/22/2015     Pneumonia 9-7-2015     Right foot drop      Spinal stenosis 3/28/2016     Stroke (H) 3/24/2016     Past Surgical History:   Procedure Laterality Date     BACK SURGERY      [facility]     COLONOSCOPY N/A 3/23/2016    Procedure: COLONOSCOPY;  Surgeon: Ruddy Tejada MD;  Location: [facility]  GI;  Service:      DILATION AND CURETTAGE OF UTERUS       EP NEGRA CLOSURE N/A 4/5/2018    Procedure: EP NEGRA Closure;  Surgeon: Derick Duarte MD;  Location: [facility] Cath Lab;  Service:      EYE SURGERY       HERNIA REPAIR       IR TUNNELED CATHETER INSERT  11/20/2018     IR TUNNELED CATHETER REMOVAL  11/20/2018     [place] COLSC FLEXIBLE W/CONTROL BLEEDING ANY METHOD N/A 6/7/2017    Procedure: COLONOSCOPY;  Surgeon: Luis Mckeon MD;  Location: [facility] GI;  Service: Gastroenterology     TONSILLECTOMY       Social History     Socioeconomic History     Marital status:      Spouse name: Cayden     Number of children: 2     Years of education: Not on file     Highest education level: Not on file   Social Needs     Financial resource strain: Not on file     Food insecurity - worry: Not on file     Food insecurity - inability: Not on file     Transportation needs - medical: Not on file     Transportation needs - non-medical: Not on file   Occupational History     Employer: RETIRED   Tobacco Use     Smoking status: Former Smoker     Packs/day: 1.50     Years: 37.00     Pack years: 55.50     Types: Cigarettes     Last attempt to quit: 1/1/2009     Years since quitting: 10.0     Smokeless tobacco: Never Used   Substance and Sexual Activity     Alcohol use: No     Alcohol/week: 7.0 oz     Types: 14 Standard drinks or equivalent per week     Comment: 14 mixed drinks per week     Drug use: No     Sexual activity: No     Partners: Male   Other Topics Concern     Not on file   Social History Narrative    Lives with her . Daughter in [place] and daughter in [place].     Family History   Problem Relation Age of Onset     Dementia Mother      Diabetes Mother      Arthritis Mother      Cancer Mother      Depression Mother      Heart disease Mother      Vision loss Mother      Stroke Father      Heart disease Father      Breast cancer Neg Hx        Current Outpatient Medications   Medication Sig Dispense Refill      acetaminophen (TYLENOL) 500 MG tablet Take 500 mg by mouth every 6 (six) hours as needed for pain.       aspirin 81 MG EC tablet Take 81 mg by mouth daily with lunch.        atorvastatin (LIPITOR) 10 MG tablet Take 10 mg by mouth at bedtime.       CHLORPHENIRAMINE/DEXTROMETHORP (CORICIDIN HBP COUGH AND COLD ORAL) Take 1 tablet by mouth daily as needed.        cholecalciferol, vitamin D3, 2,000 unit cap Take 2,000 Units by mouth daily with lunch.        cinacalcet (SENSIPAR) 30 MG tablet Take 30 mg by mouth 3 times weekly with dialysis             clopidogrel (PLAVIX) 75 mg tablet Take 75 mg by mouth daily.       DIALYVITE 100-1 mg Tab TAKE ONE TABLET BY MOUTH DAILY IN THE EVENING 90 tablet 0     digoxin (LANOXIN) 125 mcg tablet TAKE 1 TABLET ORALLY 3 TIMES A WEEK. 30 tablet 0     diphenhydrAMINE (BENADRYL) 25 mg tablet Take 2 tablets (50 mg total) by mouth at bedtime. 15 tablet 0     folic acid (FOLVITE) 1 MG tablet TAKE ONE TABLET BY MOUTH ONCE DAILY 90 tablet 3     gabapentin (NEURONTIN) 100 MG capsule TAKE 1 CAPSULE BY MOUTH THREE TIMES DAILY 270 capsule 0     Lactobacillus rhamnosus GG (CULTURELLE) 10-15 Billion cell capsule Take 1 capsule by mouth daily with lunch.       metoprolol tartrate (LOPRESSOR) 25 MG tablet Take 1 tablet (25 mg total) by mouth 2 (two) times a day. (Patient taking differently: Take 25 mg by mouth daily .      ) 60 tablet 11     midodrine (PROAMATINE) 5 MG tablet Take 3 tablets (15 mg total) by mouth 3 (three) times a week. Take every Monday, Wednesday, and Friday in the morning. 36 tablet 11     polyvinyl alcohol (LIQUIFILM TEARS) 1.4 % ophthalmic solution Apply 1 drop to eye as needed for dry eyes.       ranitidine (ZANTAC) 150 MG tablet Take 150 mg by mouth at bedtime.       senna-docusate (SENNOSIDES-DOCUSATE SODIUM) 8.6-50 mg tablet Take 1 tablet by mouth at bedtime.        sevelamer carbonate (RENVELA) 800 mg tablet TAKE 2 TABLETS BY MOUTH 3 TIMES A DAY WITH MEALS AND 2 TABS WITH  "SNACKS 2 TIMES A DAY (Patient taking differently: TAKE 3 TABLETS BY MOUTH 3 TIMES A DAY WITH MEALS) 300 tablet 0     timolol maleate (TIMOPTIC) 0.5 % ophthalmic solution Administer 1 drop to both eyes 2 (two) times a day.        albuterol (PROAIR HFA;PROVENTIL HFA;VENTOLIN HFA) 90 mcg/actuation inhaler Inhale 2 puffs every 4 (four) hours as needed for wheezing. 8 g 5     albuterol (PROVENTIL) 2.5 mg /3 mL (0.083 %) nebulizer solution Take 3 mL (2.5 mg total) by nebulization every 4 (four) hours as needed for wheezing or shortness of breath. 30 vial 5     No current facility-administered medications for this visit.        Allergies   Allergen Reactions     Ace Inhibitors Cough     Atrovent [Ipratropium Bromide] Headache     Fosinopril Sodium Cough     Zolpidem      hallucinations       EXAM  Vitals:    01/14/19 1458 01/14/19 1538 01/14/19 1542   BP: 122/60     Patient Site: Left Arm     Patient Position: Sitting     Cuff Size: Adult Regular     Pulse: 68 65    SpO2: (!) 86% 96% 95%   Weight: 164 lb 4.8 oz (74.5 kg)     Height: 5' 5.6\" (1.666 m)           General: alert, no distress  HEENT: sclerae anicteric, moist oral mucosa  Heart: Regular rate and rhythm, no murmurs.  Mild pedal edema.  Warm extremities  Lungs: Clear to auscultation bilat, no crackles  Gastrointestinal: abdomen is non-distended.    Skin: warm/dry, no rashes  Neuro: no gross abnormalities        RESULTS REVIEWED:     ANALYSIS AND SUMMARY OF OLD RECORDS, NOTES AND CONSULTS (2): Reviewed hospital discharge summary, summarized above    RECORDS REQUESTED (1): None.     OTHER HISTORY SUMMARIZED (from nursing staff, family, friends) (2):     RADIOLOGY TESTS SUMMARIZED or REQUESTED (XRAY/CT/MRI/DXA) (1):   Xr Chest 2 Views    Result Date: 1/4/2019  XR CHEST 2 VIEWS 1/4/2019 1:52 PM INDICATION: Sob cough COMPARISON: 9/22/2018 FINDINGS: Improved aeration with resolution of tiny pleural effusions. Mild cardiomegaly and diffuse interstitial prominence " remains. No new pneumonia. Tunneled right IJ dialysis catheter in place. Watchman device also present.      MEDICINE TESTS SUMMARIZED or REQUESTED (EKG/ECHO/COLONOSCOPY/EGD) (1): None    INDEPENDENT REVIEW OF EKG OR X-RAY (2): None.    Lab Results   Component Value Date    WBC 6.6 01/04/2019    HGB 9.2 (L) 01/04/2019    HCT 28.1 (L) 01/04/2019     (H) 01/04/2019     01/04/2019            Data points  4     oJhn Escoto DO  Internal Medicine  [facility]

## 2021-06-23 NOTE — TELEPHONE ENCOUNTER
MTM Transition of Care Encounter  Assessment & Plan                                                     1. COPD exacerbation (H)  Feeling better since hospitalization, finished her last dose of prednisone and azithromycin as directed.  She continues to wait for her nebulizer machine from [facility], and would like assistance to follow-up on this if possible.  Provided education on how to use her albuterol inhaler at home in the meantime until she receives her nebulizer.  She demonstrates understanding about this.  She will follow-up with PCP on January 14.  -Educated on proper use of albuterol HFA    2. ESRD on dialysis (H)  Stable, following closely with nephrology.    -Educated to stay off of allopurinol as directed.    Follow Up  Return in about 4 days (around 1/14/2019) for with PCP.      Subjective & Objective                                                       Belia Rodriguez is a 73 y.o. female called for a transitions of care visit. she was discharged from [facility] on January 7, 2018 for COPD exacerbation.    Chief Complaint: Hospital follow-up    Medication Adherence/Access: Stable, she is very knowledgeable about her medications.    COPD exacerbation: She completed her azithromycin and prednisone dose after discharge, I have now discontinued these off of her medication list.  She does not know how to use her albuterol inhaler, we did discuss this over the phone.  She has not yet received her nebulizer from [facility].  She was told that she was supposed to get it yesterday.  She has used a nebulizer in the past but this was a was administered by somebody else.  She is on oxygen but only when she is sleeping, as she has not tolerated a CPAP machine.  She found that using the nebulizer during the hospital significantly improved her breathing therefore she is looking forward to getting this device at home.    End-stage renal disease: Continues with dialysis, she is actually below her dry weight now  at discharge.  She takes her sevelamer as directed, she will stay off of allopurinol which she did not remember she was told to do at discharge.    PMH: reviewed in EPIC   Allergies/ADRs: reviewed in EPIC   Alcohol: reviewed in EPIC   Tobacco:   Social History     Tobacco Use   Smoking Status Former Smoker     Packs/day: 1.50     Years: 37.00     Pack years: 55.50     Types: Cigarettes     Last attempt to quit: 1/1/2009     Years since quitting: 10.0   Smokeless Tobacco Never Used     Recent Vitals:   BP Readings from Last 3 Encounters:   01/07/19 159/81   11/20/18 (!) 139/94   11/14/18 122/70      Wt Readings from Last 3 Encounters:   01/06/19 158 lb 3.2 oz (71.8 kg)   11/20/18 168 lb 3 oz (76.3 kg)   11/14/18 168 lb (76.2 kg)     ----------------    Much or all of the text in this note was generated through the use of Dragon Dictate voice-to-text software. Errors in spelling or words which seem out of context are unintentional. Sound alike errors, in particular, may have escaped editing.    The patient declined an after visit summary    I spent 15 minutes with this patient today;  . All changes were made via collaborative practice agreement with John Escoto DO. A copy of the visit note was provided to the patient's provider.     Ignacio Love, PharmD, BCACP  Medication Management (MTM) Pharmacist  [facility]      Current Outpatient Medications   Medication Sig Dispense Refill     acetaminophen (TYLENOL) 500 MG tablet Take 500 mg by mouth every 6 (six) hours as needed for pain.       albuterol (PROAIR HFA;PROVENTIL HFA;VENTOLIN HFA) 90 mcg/actuation inhaler Inhale 2 puffs every 4 (four) hours as needed for wheezing. 8 g 5     albuterol (PROVENTIL) 2.5 mg /3 mL (0.083 %) nebulizer solution Take 3 mL (2.5 mg total) by nebulization every 4 (four) hours as needed for wheezing or shortness of breath. 30 vial 5     aspirin 81 MG EC tablet Take 81 mg by mouth daily with lunch.         CHLORPHENIRAMINE/DEXTROMETHORP (CORICIDIN HBP COUGH AND COLD ORAL) Take 1 tablet by mouth daily as needed.        cholecalciferol, vitamin D3, 2,000 unit cap Take 2,000 Units by mouth daily with lunch.        cinacalcet (SENSIPAR) 30 MG tablet Take 30 mg by mouth 3 times weekly with dialysis             DIALYVITE 100-1 mg Tab TAKE ONE TABLET BY MOUTH DAILY IN THE EVENING 90 tablet 0     digoxin (LANOXIN) 125 mcg tablet TAKE 1 TABLET ORALLY 3 TIMES A WEEK. 30 tablet 0     diphenhydrAMINE (BENADRYL) 25 mg tablet Take 2 tablets (50 mg total) by mouth at bedtime. 15 tablet 0     folic acid (FOLVITE) 1 MG tablet TAKE ONE TABLET BY MOUTH ONCE DAILY 90 tablet 3     gabapentin (NEURONTIN) 100 MG capsule TAKE 1 CAPSULE BY MOUTH THREE TIMES DAILY 270 capsule 0     Lactobacillus rhamnosus GG (CULTURELLE) 10-15 Billion cell capsule Take 1 capsule by mouth daily with lunch.       metoprolol tartrate (LOPRESSOR) 25 MG tablet Take 1 tablet (25 mg total) by mouth 2 (two) times a day. (Patient taking differently: Take 25 mg by mouth daily .      ) 60 tablet 11     midodrine (PROAMATINE) 5 MG tablet Take 3 tablets (15 mg total) by mouth 3 (three) times a week. Take every Monday, Wednesday, and Friday in the morning. 36 tablet 11     polyvinyl alcohol (LIQUIFILM TEARS) 1.4 % ophthalmic solution Apply 1 drop to eye as needed for dry eyes.       ranitidine (ZANTAC) 150 MG tablet Take 150 mg by mouth at bedtime.       senna-docusate (SENNOSIDES-DOCUSATE SODIUM) 8.6-50 mg tablet Take 1 tablet by mouth at bedtime.        sevelamer carbonate (RENVELA) 800 mg tablet TAKE 2 TABLETS BY MOUTH 3 TIMES A DAY WITH MEALS AND 2 TABS WITH SNACKS 2 TIMES A DAY (Patient taking differently: TAKE 3 TABLETS BY MOUTH 3 TIMES A DAY WITH MEALS) 300 tablet 0     timolol maleate (TIMOPTIC) 0.5 % ophthalmic solution Administer 1 drop to both eyes 2 (two) times a day.        No current facility-administered medications for this visit.

## 2021-06-23 NOTE — TELEPHONE ENCOUNTER
Reviewed meds with pt during hospital discharge follow up call. Doxazosin directions read to hold on dialysis days. In the hospital discharge note under CHF exacerbation it says pt to hold diltiazem on dialysis days. Which med is she supposed to hold or is she supposed to hold both? (Please route MD response back to support pool to call pt.)

## 2021-06-23 NOTE — TELEPHONE ENCOUNTER
Informed pt to hold doxazosin and diltiazem on dialysis days. Pt verbalized understanding and agrees with plan.

## 2021-06-23 NOTE — TELEPHONE ENCOUNTER
Patient was discharged from the hospital on January 7, and has not yet received her nebulizer from [facility].  She was told by [facility] that this would come yesterday.  Can you please assist in following up with this to ensure that she gets this?

## 2021-06-23 NOTE — TELEPHONE ENCOUNTER
Sergei states they received everything and are going to call the pt get it scheduled and will update the pt

## 2021-06-24 NOTE — PROGRESS NOTES
Code Status:  FULL CODE  Visit Type: No chief complaint on file.     Facility:  ANSWER ROOMING ACTIVITY QUESTION      Facility Type: [facility] (Long Term Care, LTC)    History of Present Illness:   Hospital Admission Date: 3/3/2019 Hospital Discharge Date: 3/8/2019  Facility Admission Date: 3/8/2019    Belia Rodriguez is a 73 y.o. female with a PMH significant for ESRD on dialysis M-W-F secondary to a dissected abdominal aortic aneurysm approximately 10 years ago, COPD, heart failure with preserved ejection fraction, atrial fibrillation S/P LAAO (4/2018), AV jonn ablation and pacemaker placement (3/2019), and ZULEIKA- CPAP and BiPAP intolerant on nocturnal oxygen.    Patient hospitalized for dyspnea and was found to be in atrial fibrillation with RVR on admission.  HR improved with IV diltiazem.  Cardiology was consulted while in the hospital, and after discussion she agreed to proceed with AV jonn ablation and pacemaker placement.  Successful AV jonn ablation and placement of a single-chamber RV pacemaker on 3/7/2019.  She denied any obvious symptoms when she would be in atrial fibrillation, but notes that she has been on a lot of medications over the last few years to try and control her HR.  Has been very pleased with having LAAO placed last year, and is only on ASA 81 mg daily.      She reports no issues with her dialysis port, and does not want to have a fistula placed.  She has had her right IJ port changed a couple times since she has been on dialysis.  She follows up with her nephrologist regularly.  Dialysis days are Monday, Wednesday and Fridays.      She is a former smoker.  Quit smoking after her dissection.  Has been told that she has COPD and ZULEIKA.  Multiple attempts with using a CPAP and BiPAP, but could not tolerate this.  She feels that she has been well managed with nocturnal oxygen with sleep via NC.  She feels rested when she wakes up in the morning and denies daytime headaches.      Chronic low back  pain with history of surgery due to what she believes was a disc or nerve problem.  She has a chronic right foot drop secondary to permanent nerve damage.  She has an AFO, but does not care to use this much.  She feels that she has learned to be more aware of her steps to avoid tripping.  Has been on Gabapentin as prescribed for her back pain, and she feels that this may help with fatigue of her right foot.    Has been adjusting to being here without difficulties.  Is happy to be here to get stronger so she can be safe at home.  Lives in a 2 story home with her .  She has 2 grown daughters, one of whom lives in the [facility].  He  does most of the heavy housework including washing dishes, laundry, and cleaning.  She does most of the cooking, which she enjoys.      Past Medical History:   Diagnosis Date     Arthritis      CHF (congestive heart failure) (H)      Chronic anemia 6/1/2014     Chronic kidney disease      Chronic thoracic aortic dissection (H) 10/7/2015    Descending thoracic aorta; treated medically per notes of Dragan Singh and Jennifer.     COPD (chronic obstructive pulmonary disease) (H)      CVA (cerebral infarction)      Disease of thyroid gland      Dyslipidemia      ESRD (end stage renal disease) (H) 06/03/2009    on dialysis with Dr. Mitchell     Essential hypertension 6/30/2014     Gastrointestinal hemorrhage, unspecified gastrointestinal hemorrhage type 6/5/2017     GI (gastrointestinal bleed)      GI bleeding 6/5/2017     Gout      L3 vertebral fracture (H) 11/16/2015     Left Atrial Appendage Occlusion (WATCHMAN) 4/5/2018    LAAO April 5, 2018 (30 mm WATCHMAN)     Obesity      ZULEIKA (obstructive sleep apnea), severe, intolerant of CPAP 10/22/2015     Pneumonia 9-7-2015     Right foot drop      Spinal stenosis 3/28/2016     Stroke (H) 3/24/2016     Past Surgical History:   Procedure Laterality Date     BACK SURGERY      [facility]     COLONOSCOPY N/A 3/23/2016    Procedure:  COLONOSCOPY;  Surgeon: Ruddy Tejada MD;  Location: [facility];  Service:      DILATION AND CURETTAGE OF UTERUS       EP ABLATION AV NODE N/A 3/7/2019    Procedure: EP Ablation AV Node;  Surgeon: Derick Duarte MD;  Location: [facility];  Service: Cardiology     EP NEGRA CLOSURE N/A 4/5/2018    Procedure: EP NEGRA Closure;  Surgeon: Derick Duarte MD;  Location: [facility];  Service:      EP PACEMAKER INSERT N/A 3/7/2019    Procedure: EP Pacemaker Insertion;  Surgeon: Derick Duarte MD;  Location: [facility];  Service: Cardiology     EYE SURGERY       HERNIA REPAIR       IR TUNNELED CATHETER INSERT  11/20/2018     IR TUNNELED CATHETER REMOVAL  11/20/2018     MT COLSC FLEXIBLE W/CONTROL BLEEDING ANY METHOD N/A 6/7/2017    Procedure: COLONOSCOPY;  Surgeon: Luis Mckeon MD;  Location: [facility];  Service: Gastroenterology     TONSILLECTOMY       Family History   Problem Relation Age of Onset     Dementia Mother      Diabetes Mother      Arthritis Mother      Cancer Mother      Depression Mother      Heart disease Mother      Vision loss Mother      Stroke Father      Heart disease Father      Breast cancer Neg Hx      Social History     Socioeconomic History     Marital status:      Spouse name: Cayden     Number of children: 2     Years of education: Not on file     Highest education level: Not on file   Occupational History     Employer: RETIRED   Social Needs     Financial resource strain: Not on file     Food insecurity:     Worry: Not on file     Inability: Not on file     Transportation needs:     Medical: Not on file     Non-medical: Not on file   Tobacco Use     Smoking status: Former Smoker     Packs/day: 1.50     Years: 37.00     Pack years: 55.50     Types: Cigarettes     Last attempt to quit: 1/1/2009     Years since quitting: 10.1     Smokeless tobacco: Never Used   Substance and Sexual Activity     Alcohol use: No     Alcohol/week: 7.0 oz     Types: 14  Standard drinks or equivalent per week     Comment: 14 mixed drinks per week     Drug use: No     Sexual activity: No     Partners: Male   Lifestyle     Physical activity:     Days per week: Not on file     Minutes per session: Not on file     Stress: Not on file   Relationships     Social connections:     Talks on phone: Not on file     Gets together: Not on file     Attends Caodaism service: Not on file     Active member of club or organization: Not on file     Attends meetings of clubs or organizations: Not on file     Relationship status: Not on file     Intimate partner violence:     Fear of current or ex partner: Not on file     Emotionally abused: Not on file     Physically abused: Not on file     Forced sexual activity: Not on file   Other Topics Concern     Not on file   Social History Narrative    Lives with her . Daughter in [place] and daughter in [place].     Additional Geriatric Review:  Lives independently with her  in a single family home.  No difficulties with her hearing or vision.  She denies any problems with her memory.     Current Outpatient Medications   Medication Sig Dispense Refill     acetaminophen (TYLENOL) 500 MG tablet Take 500 mg by mouth every 6 (six) hours as needed for pain.       albuterol (PROAIR HFA;PROVENTIL HFA;VENTOLIN HFA) 90 mcg/actuation inhaler Inhale 2 puffs every 4 (four) hours as needed for wheezing. 8 g 5     albuterol (PROVENTIL) 2.5 mg /3 mL (0.083 %) nebulizer solution Take 3 mL (2.5 mg total) by nebulization every 4 (four) hours as needed for wheezing or shortness of breath. 30 vial 5     aspirin 81 MG EC tablet Take 81 mg by mouth daily with lunch.        atorvastatin (LIPITOR) 10 MG tablet Take 10 mg by mouth at bedtime.       calcium, as carbonate, (TUMS) 200 mg calcium (500 mg) chewable tablet Chew 1 tablet (200 mg total) 4 (four) times a day as needed.  0     CHLORPHENIRAMINE/DEXTROMETHORP (CORICIDIN HBP COUGH AND COLD ORAL) Take 1 tablet by  mouth daily as needed.        cholecalciferol, vitamin D3, 2,000 unit cap Take 2,000 Units by mouth daily with lunch.        cinacalcet (SENSIPAR) 30 MG tablet Take 30 mg by mouth 3 times weekly with dialysis             DIALYVITE 100-1 mg Tab TAKE ONE TABLET BY MOUTH DAILY IN THE EVENING 90 tablet 0     folic acid (FOLVITE) 1 MG tablet TAKE ONE TABLET BY MOUTH ONCE DAILY 90 tablet 3     gabapentin (NEURONTIN) 100 MG capsule TAKE 1 CAPSULE BY MOUTH THREE TIMES DAILY 270 capsule 3     Lactobacillus rhamnosus GG (CULTURELLE) 10-15 Billion cell capsule Take 1 capsule by mouth daily with lunch.       metoprolol succinate (TOPROL XL) 25 MG Take 1 tablet (25 mg total) by mouth daily. 90 tablet 3     midodrine (PROAMATINE) 5 MG tablet Take 3 tablets (15 mg total) by mouth 3 (three) times a week. Take every Monday, Wednesday, and Friday in the morning. 36 tablet 11     polyvinyl alcohol (LIQUIFILM TEARS) 1.4 % ophthalmic solution Apply 1 drop to eye as needed for dry eyes.       ranitidine (ZANTAC) 150 MG tablet Take 150 mg by mouth at bedtime.       senna-docusate (SENNOSIDES-DOCUSATE SODIUM) 8.6-50 mg tablet Take 1 tablet by mouth at bedtime.        sevelamer carbonate (RENVELA) 800 mg tablet Take 1,600 mg by mouth 3 (three) times a day with meals.       sevelamer carbonate (RENVELA) 800 mg tablet Take 1,600 mg by mouth 2 (two) times a day as needed (with snacks).       timolol maleate (TIMOPTIC) 0.5 % ophthalmic solution Administer 1 drop to both eyes 2 (two) times a day.        traZODone (DESYREL) 50 MG tablet Take 1 tablet (50 mg total) by mouth at bedtime as needed for sleep.  0     No current facility-administered medications for this visit.      Allergies   Allergen Reactions     Ace Inhibitors Cough     Atrovent [Ipratropium Bromide] Headache     Fosinopril Sodium Cough     Zolpidem      hallucinations     Immunization History   Administered Date(s) Administered     Influenza high dose, seasonal 10/06/2014,  10/03/2016     Influenza, Seasonal, Inj PF IIV3 10/06/2014     Influenza, inj, historic,unspecified 12/04/2003, 10/06/2009, 01/06/2010, 10/27/2010, 09/18/2015, 09/12/2018     Influenza,seasonal quad, PF, 36+MOS 09/13/2017     Pneumo Conj 13-V (2010&after) 10/30/2017     Pneumo Polysac 23-V 05/18/2014     Td, Adult, Absorbed 05/30/2000     Td, adult adsorbed, PF 10/30/2017     Td,adult,historic,unspecified 05/30/2000     ZOSTER, LIVE 07/28/2009     Review of Systems   Constitutional: Positive for fatigue (Deconditioning). Negative for appetite change and fever.   HENT: Negative for congestion, rhinorrhea and sore throat.    Eyes: Negative for visual disturbance.   Respiratory: Negative for cough and shortness of breath.    Cardiovascular: Positive for leg swelling (at baseline). Negative for chest pain and palpitations.   Gastrointestinal: Negative.    Endocrine: Negative.    Genitourinary: Negative.    Musculoskeletal: Positive for back pain (Chronic low back).   Skin: Negative.    Neurological: Positive for numbness (Chronic in right foot). Negative for dizziness, light-headedness and headaches.   Psychiatric/Behavioral: Negative.         Physical Exam   Constitutional: She is oriented to person, place, and time. She appears well-nourished. No distress.   HENT:   Head: Normocephalic and atraumatic.   Nose: Nose normal.   Mouth/Throat: Oropharynx is clear and moist.   Eyes: EOM are normal. Pupils are equal, round, and reactive to light.   Neck: Normal range of motion. Neck supple. No JVD present. No thyromegaly present.   Cardiovascular: Normal rate and regular rhythm. Exam reveals no gallop and no friction rub.   No murmur heard.  Pulmonary/Chest: Effort normal and breath sounds normal. No respiratory distress. She has no wheezes.   Pacemaker in left upper chest covered in steri strips.  No active bleeding.  Mild bruising.  No tenderness to palpation.  Right IJ dialysis port bandaged   Abdominal: Soft. Bowel sounds  are normal. There is tenderness. There is no rebound and no guarding.   Musculoskeletal: Normal range of motion. She exhibits edema (trace pitting B/L to knees).   Lymphadenopathy:     She has no cervical adenopathy.   Neurological: She is alert and oriented to person, place, and time. No cranial nerve deficit. Coordination normal.   Skin: Skin is warm and dry.   Scattered bruising secondary to recent hospital blood draws and IV sticks on arms and hands.   Psychiatric: She has a normal mood and affect. Her behavior is normal. Thought content normal.   Nursing note and vitals reviewed.    Labs:  All labs reviewed in the nursing home record.    Assessment:  1. Chronic atrial fibrillation (H)     2. Chronic diastolic congestive heart failure (H)     3. ESRD (end stage renal disease) on dialysis (H)       Plan: 73 year old female with a complex PMH who was admitted to [facility] on 3/8/2019.  Patient has no acute complaints and is enjoying having a structured day of therapies to help with her mobility and strength.      She reports she had been using Benadryl nightly to help with sleep, but while in the hospital most recently her PCP started her on Trazodone which she feels has been helpful.  After discussion of potential risks and side effects of Benadryl, she is in agreement to discontinue this medication, and continue Trazodone at bedtime for sleep.    Discussed Gabapentin dosing with the patient for which she uses for chronic low back pain.  Discussed with her that the current dose she is on is higher than what is recommended for her ESRD and creatinine clearance.  She expressed understanding of this and would like to talk with her PCP about changing her dose when she is discharged from this facility.      Patient will continue with regularly scheduled M-W-F dialysis at her dialysis center.  No acute issues or concerns today.  As an FYI: The patients chronic dialysis catheter is quite adherent to the SVC and lateral RA. If  the dialysis catheter needs to be removed for any reason the patient is at high risk for pacemaker lead dislodgement and she now has complete heart block. If the dialysis catheter needs to be removed I would recommend that it be done in the EP lab with a temporary pacing catheter in place.    Patient's BP is within goal.  She does have a descending aortic aneurysm measuring 4.3 cm (stable per last imaging report in 9/2018).  She appears to be at her baseline weight (dry weight per chart is 72-73 kg).  No signs or symptoms of fluid overload.  Diltiazem was discontinued prior to discharge from the hospital, and Metoprolol succinate 25 mg PO daily was continued.      Total 45 minutes of which 65% was spent in counseling and coordination of care of the above plan.    Electronically signed by: Maribel Kinney DO patient was discussed chart was reviewed and patient examined with third year family practice resident Dr. Chance Kinney and I agree with her assessment and plan. Giovanni Santamaria MD

## 2021-06-24 NOTE — PATIENT INSTRUCTIONS - HE
Belia Rodriguez,    It was a pleasure to see you today at the [facility].     My recommendations after this visit include:    - I changed your Diltiazem to 240 mg 1 tablet every Tues,Thurs, Sat and Sun (non dialysis days) in AM and take 120 mg 1 tablet every Mon, Wed, & Friday (after dialysis)     - I stopped your Doxazosin to prevent low blood pressure    - Please monitor your BP and HR at home and call us with readings in a week    - Follow up with Dr. Benítez in March 2019 as scheduled    - Please call Jeannie Olivares RN on [phone], if you have any questions or concerns    Braden Fishman CNP

## 2021-06-24 NOTE — TELEPHONE ENCOUNTER
Refill Approved    Rx renewed per Medication Renewal Policy. Medication was last renewed on 9/4/18.    Leandra Hensley, [facility] Triage/Med Refill 2/13/2019     Requested Prescriptions   Pending Prescriptions Disp Refills     gabapentin (NEURONTIN) 100 MG capsule [Pharmacy Med Name: GABAPENTIN 100 MG CAPSULE] 270 capsule 0     Sig: TAKE 1 CAPSULE BY MOUTH THREE TIMES DAILY    Gabapentin/Levetiracetam/Tiagabine Refill Protocol  Passed - 2/11/2019  6:32 PM       Passed - PCP or prescribing provider visit in past 12 months or next 3 months    Last office visit with prescriber/PCP: 2/11/2019 John Escoto DO OR same dept: 2/11/2019 John Escoto DO OR same specialty: 2/11/2019 John Escoto DO  Last physical: Visit date not found Last MTM visit: Visit date not found   Next visit within 3 mo: Visit date not found  Next physical within 3 mo: Visit date not found  Prescriber OR PCP: John Escoto DO  Last diagnosis associated with med order: There are no diagnoses linked to this encounter.  If protocol passes may refill for 12 months if within 3 months of last provider visit (or a total of 15 months).

## 2021-06-25 NOTE — PROGRESS NOTES
DEVICE CLINIC RN POST-OP NOTE    Reason for visit: 1 week PO pacemaker check and wound assessment     Device: Green Plug L310 ACCOLADE MRI  Procedure date: 3/07/2019  Implant Diagnosis: SSS, s/p AVNA, Atrial Fibrillation, Complete Heart Block  Implanting Physician: Dr. Derick Duarte      Assessment  Subjective: Patient reports feeling well and denies any incisional discomfort  Vitals:   Vitals:    03/19/19 1456   BP: 126/80   Pulse: 80   Resp: 18     Heart Sounds: normal  Lung Sounds: clear to auscultation bilaterally  Incision/device pocket: Steri-strips removed. Area around incision was cleaned with adhesive remover. Incision is clean, dry, and intact with edges well approximated. There is no redness or drainage noted. Incision was wiped with alcohol wipe x1. Minimal swelling present.      Device Findings  Interrogation of device reveals normal sensing and capture thresholds  See the Paceart Report for a full summary of the device information.        Patient Education  Belia Rodriguez was accompanied today by her son     [facility]'s Partnership Agreement for Device Patients and Post-operative Checklist were presented and reviewed with patient at today's appointment.    Signs and symptoms of infection, poor wound healing, and device function were reviewed. Range of motion and weight restrictions for the left side are 4 weeks. She was issued a [employer] remote monitor and instructed on its set-up and use for remote monitoring by the [employer] representative prior to hospital discharge. These instructions were reviewed with the patient at today s visit.       Plan  - 1 month remote scheduled 4/11/2019

## 2021-06-25 NOTE — PROGRESS NOTES
Code Status:  FULL CODE  Visit Type: Problem Visit (dialysis)     Facility:  [facility] [[id number]]        Facility Type: SNF (Skilled Nursing Facility, TCU)    History of Present Illness: Belia Rodriguez is a 73 y.o. female who is a very jovial self coming back from dialysis.  She states that since she has had the pacemaker her energy level has significantly improved.  She is not been short of breath with activity.  She admits to being on the hurt her words lazy side and is looking forward to the 5 days to 7 days approved for her rehabilitation to strengthen her physical deconditioning.    Review of Systems     Physical Exam   Constitutional:   Alert interactive no rapid heartbeats vital signs of all been normal lungs show diminishing sounds in the bases posteriorly bilaterally but no extra adventitial sounds appreciated.  Fair inspiratory and expiratory volume.  Heart nonrapid.   Vitals reviewed.      Labs:  All labs reviewed in the nursing home record.    Assessment:  1. Chronic atrial fibrillation (H)     2. Chronic diastolic congestive heart failure (H)     3. ESRD (end stage renal disease) on dialysis (H)         Plan: At this juncture I am very pleased with the interventions and will continue same medicines but really emphasized the conditioning and improvements in performance.  No other changes at this time.      25 minutes spent of which greater than 65% was face to face communication with the patient about above plan of care    Electronically signed by: Giovanni Santamaria MD

## 2021-06-25 NOTE — PROGRESS NOTES
Code Status:  FULL CODE  Visit Type: Problem Visit (Follow-up)     Facility:  [facility] [[id number]]        Facility Type: SNF (Skilled Nursing Facility, TCU)    History of Present Illness: Belia Rodriguez is a 73 y.o. female who is in a great spirits and feels like her range of walking all way to therapy and back is significantly improved since the pacemaker.  She went for pacemaker check and they tried different settings and clearly she was fatigued when they lowered the heart rate.  She is been responding very well.  All of her energy seems to be coming back.  She is tolerating the dialysis runs as well.    Review of Systems     Physical Exam   Constitutional:   Vital signs are absolutely stable and her O2 sats are stable.  Her heart is actually regular rate the incision looks great.  Lungs show fairly good air movement for diagnosis of COPD.   Vitals reviewed.      Labs:  All labs reviewed in the nursing home record.    Assessment:  1. Chronic atrial fibrillation (H)     2. Chronic diastolic congestive heart failure (H)     3. ESRD (end stage renal disease) on dialysis (H)     4. Pulmonary emphysema, unspecified emphysema type (H)         Plan: Patient is remarkably stable and I would absolutely not to change anything at this juncture going to continue to challenge by increasing her fitness and conditioning at OT and PT.  No other changes at this time but visited quite a bit about range in future planning      35 minutes spent of which greater than 66% was face to face communication with the patient about above plan of care    Electronically signed by: Giovanni Santamaria MD

## 2021-06-25 NOTE — PATIENT INSTRUCTIONS - HE
ED PROVIDER NOTE   Date of Service: 8/6/2019   Time Seen: 10:49 PM   Provider: Antwon Ba NP  Supervising Physician: Dr Hernandez    CHIEF COMPLAINT   Chief Complaint   Patient presents with   • Leg Swelling   • Shortness of Breath        HISTORY OF PRESENT ILLNESS     Old records reviewed : Past medical history significant for chronic A. fib, pacemaker due to complete heart block, dyslipidemia and diabetes.    History obtained from patient and primary care provider Dr. Mckeon.     History limited by: None     This patient is a 88 year old male presenting to the emergency department with a chief complaint of new concerns for right lower leg swelling and weeping fluid. Patient states that since yesterday he has noted new concerns for isolated right lower extremity edema significantly worse on the right compared to the left. He denies fevers, chills or pain to the leg. He does have a known A. fib history however does have a pacemaker. Takes no blood thinners daily. He notes chronic dyspnea denying it worse today compared to his normal day-to-day activity. He denies chest pain, dizziness or lightheadedness. He denies productive cough. He denies hemoptysis or previous concerns for blood clots in the past. He did present to his PMDs office today for evaluation and was subsequently sent to the ED for DVT rule out.     PAST MEDICAL HISTORY     Past Medical History:   Diagnosis Date   • Bundle branch block    • Carotid stenosis     bilateral   • CHB (complete heart block) (CMS/HCC)    • Cholelithiasis     asymptomatic   • Colon polyps    • Coronary artery disease    • Diabetes     type 2   • Diverticulosis     of the colon   • Dizziness    • Epistaxis     cauterization & packing 4/2011   • GERD (gastroesophageal reflux disease)     taking Omeprazole   • Hyperlipidemia    • Hypertension    • LAE (left atrial enlargement) 2012    moderate   • LVH (left ventricular hypertrophy) 2012    moderate   • Permanent atrial  Pacemaker Post-operative Checklist      The Device Registered Nurse (RN) reviewed the pacemaker function.      The Device RN did a wound assessment and wound care teaching.    Please call the Device Nurses with any signs of infection or questions regarding wound healing. Device Nurse Line: [phone], Option #3      The Device RN demonstrated and displayed the specific remote monitoring system for your pacemaker.      The Device RN reviewed the Partnership Agreement Form.    Patient Instructions    Do not lift your left arm above the shoulder height, perform any vigorous arm movements such as swimming, golfing, washing windows, shoveling show, vacuuming or lifting greater than 10-15lbs with the affected arm for 4 weeks from the date of implant.      To reduce the risk of infection, try to avoid any dental procedures for the first 6 weeks after your pacemaker implant. If you have an emergent or urgent dental need during this time, contact the device clinic for a prescription for an antibiotic.      You will receive a device identification (ID) card in the mail from the device  within 6 weeks to replace the temporary ID card you were given in the hospital.      You may travel by any mode of transportation; just show your pacemaker ID card. You may be subject to a hand search or use of a handheld wand, but official should not keep the wand over the implant site for greater than 5-10 seconds.      For any surgery, let your doctor know you have a pacemaker.       Your pacemaker is MRI safe       Most household appliances, including a microwave, will not interfere with your pacemaker function. If you suspect interference, simply move away from the source. Cell phones do not cause a problem. Please refer to the device booklet from the  or their website under the section on electromagnetic interference (CHRISTINA) for further guidelines on things that may interfere with your pacemaker.       Device  Clinic Contact Information  Device Nurses: [phone], Option #3   Device Remote Specialists: [phone], Option #2. For questions about your Remote Transmission or Transmission Schedule  Device Schedulers: [phone], Option #1     fibrillation (CMS/HCC)     s/p pacemaker   • Personal history of traumatic fracture     collarbone   • Psoriasis    • Pulmonary hypertension (CMS/HCC) 2012    moderate   • Right atrial dilatation 2012    severe   • Syncope     secondary to intermittent complete AV heart block      PAST FAMILY HISTORY   Family History   Problem Relation Age of Onset   • Cancer Father         Lung   • Heart Mother    • Stroke Mother    • Hypertension Brother    • Heart disease Brother         valve replaced   • Depression Other         grandson with type 1 diabetis      PAST SURGICAL HISTORY   Past Surgical History:   Procedure Laterality Date   • Anesth,pacemaker insertion  2004   • Cdl pacemaker generator replacement  8/9/2016    Downgrade from Dual to Single   • Cdl pacemaker implant  05/2004    Generator replaced 2009   • Colonoscopy  6/07/2013   • Echo heart resting  01/13/2014   • Echocardiogram  12/12/2012    EF 85%   • Foot surgery      right   • Hernia repair  1960s    Left inguinal x 2   • Holter monitor     • Past surgical history  06/2010    Polypectomy, EGD, Colonoscopy   • Tonsillectomy and adenoidectomy        SOCIAL HISTORY   Social History     Socioeconomic History   • Marital status: /Civil Union     Spouse name: Brielle   • Number of children: 3   • Years of education: Not on file   • Highest education level: Not on file   Occupational History   • Occupation: Retired Supervisor   Social Needs   • Financial resource strain: Not on file   • Food insecurity:     Worry: Not on file     Inability: Not on file   • Transportation needs:     Medical: Not on file     Non-medical: Not on file   Tobacco Use   • Smoking status: Current Every Day Smoker     Packs/day: 0.20     Years: 60.00     Pack years: 12.00     Types: Cigarettes   • Smokeless tobacco: Never Used   • Tobacco comment: Declined info   Substance and Sexual Activity   • Alcohol use: No     Alcohol/week: 0.0 oz     Comment: Hasn't drank anything since the  beginning of 2010   • Drug use: No   • Sexual activity: Not on file   Lifestyle   • Physical activity:     Days per week: Not on file     Minutes per session: Not on file   • Stress: Not on file   Relationships   • Social connections:     Talks on phone: Not on file     Gets together: Not on file     Attends [employer] service: Not on file     Active member of club or organization: Not on file     Attends meetings of clubs or organizations: Not on file     Relationship status: Not on file   • Intimate partner violence:     Fear of current or ex partner: Not on file     Emotionally abused: Not on file     Physically abused: Not on file     Forced sexual activity: Not on file   Other Topics Concern   • Not on file   Social History Narrative   • Not on file           MEDICATIONS     Discharge Medication List as of 8/6/2019  8:04 PM      CONTINUE these medications which have NOT CHANGED    Details   metoPROLOL succinate (TOPROL-XL) 50 MG 24 hr tablet Take 1 tablet by mouth daily.Eprescribe, Disp-90 tablet, R-3      pantoprazole (PROTONIX) 40 MG tablet Take 1 tablet by mouth daily.Eprescribe, Disp-30 tablet, R-3      triamcinolone (ARISTOCORT) 0.1 % ointment APPLY  OINTMENT TOPICALLY TWICE DAILY FOR  2  WEEKS  AS  NEEDEDDisp-15 g, R-0, EprescribePlease consider 90 day supplies to promote better adherence      fenofibrate (TRICOR) 145 MG tablet Take 1 tablet by mouth daily.Eprescribe, Disp-90 tablet, R-1      hydrochlorothiazide (HYDRODIURIL) 25 MG tablet TAKE 1 TABLET BY MOUTH ONCE DAILYEprescribe, Disp-90 tablet, R-3      glipiZIDE (GLUCOTROL) 10 MG tablet TAKE 1 TABLET BY MOUTH TWICE DAILY BEFORE MEAL(S)Eprescribe, Disp-180 tablet, R-3      atorvastatin (LIPITOR) 20 MG tablet TAKE 1 TABLET BY MOUTH ONCE DAILYEprescribe, Disp-90 tablet, R-1      lisinopril (PRINIVIL,ZESTRIL) 40 MG tablet TAKE 1 TABLET BY MOUTH ONCE DAILYEprescribe, Disp-90 tablet, R-1      tamsulosin (FLOMAX) 0.4 MG Cap Take 1 capsule by mouth 2 times  daily.Eprescribe, Disp-60 capsule, R-11      !! blood glucose (ONE TOUCH ULTRA TEST) test strip Test blood sugar once daily and as needed as directed. DX E11.9Disp-100 strip, R-3, Eprescribe      !! ONE TOUCH ULTRA TEST test strip USE ONE STRIP TO CHECK GLUCOSE ONCE DAILY AND  AS  DIRECTEDDisp-50 strip, R-7, EprescribePlease consider 90 day supplies to promote better adherence      Lancets Thin Misc DISPENSE LANCETS FOR ONCE DAILY TESTING. DXE11.9Disp-100 each, R-3, Eprescribe      aspirin 81 MG tablet Take 81 mg by mouth daily.Historical Med       !! - Potential duplicate medications found. Please discuss with provider.           ALLERGIES   ALLERGIES:   Allergen Reactions   • Latex   (Environmental) RASH   • Amlodipine Other (See Comments)     Edema lower legs on the 10mg dose        REVIEW OF SYSTEMS   Constitutional: Negative for activity change, appetite change, chills, fatigue and fever.   HENT: Negative for congestion, rhinorrhea, sinus pressure and sore throat.    Eyes: Negative for photophobia and visual disturbance.   Respiratory: Positive for shortness of breath (Chronic).  Negative for chest tightness.    Cardiovascular: Negative for chest pain and palpitations.   Gastrointestinal: Negative for abdominal pain, diarrhea, nausea and vomiting.   Genitourinary: Negative for decreased urine volume, difficulty urinating, dysuria, flank pain and urgency.   Musculoskeletal: Positive for leg edema Negative for arthralgias, back pain, myalgias, neck pain and neck stiffness.   Skin: Positive for wound/weeping fluid. Negative for color change, pallor, rash.   Neurological: Negative for dizziness, syncope, weakness, light-headedness, numbness and headaches.   Hematological: Negative for adenopathy. Does not bruise/bleed easily.   Psychiatric/Behavioral: Negative for confusion. The patient is not nervous/anxious.      PHYSICAL EXAM   Triage Vitals:   ED Triage Vitals [08/06/19 1814]   ED Triage Vitals Group      [facility]  97.8 °F (36.6 °C)      Pulse 64      Resp 22      /72      SpO2 95 %      EtCO2 mmHg       Height 6' (1.829 m)      Weight 198 lb (89.8 kg)      Weight Scale Used ED Stated          Constitutional: This patient is a well developed, and well nourished, very pleasant 88 year old male who is sitting in bed in no acute distress. The patient does not appear to be in pain or discomfort at this time.   Eyes: Pupils are equal, round, and reactive to light. Extraocular movements are intact. Conjunctiva pink   ENT: Oropharynx is clear. There are moist mucous membranes.   Neck: Supple. No lymphadenopathy.Trachea midline   Respiratory: The exam is clear to auscultation bilaterally with normal effort. There are no wheezes, rales, or rhonchi.   Cardiovascular: The patient has a regular rate and rhythm. There are no obvious murmurs, rubs, or gallops.2+ dorsal pedal pulses bilaterally.   Gastrointestinal: Soft, nontender, nondistended. There is no obvious hepatosplenomegaly. There is no rebound or guarding. The patient has normoactive bowel sounds.   Musculoskeletal: Significant +3 edema to right lower extremity, trace edema to left lower extremity. Edema is noted on the right from the knee through the toes. +1 DP/PT pulses noted on the right. Both feet cool to the touch. Sensation intact. The patient has a full range of motion in all extremities without pain.   Skin: Warm and dry without rashes.   Neurologic: Alert and oriented x3. The patient has a GCS of 15.Cranial nerves II through XII intact. Sensation to touch grossly intact.     DIAGNOSTIC INVESTIGATIONS   Radiology Studies:   Radiology reports reviewed.   US Lower Extremity Venous Duplex Right    (Results Pending)        Laboratory Studies:   Labs were ordered and reviewed.   Labs Reviewed   CBC & AUTO DIFFERENTIAL - Abnormal; Notable for the following components:       Result Value    MCHC 31.6 (*)     Absolute Mono 1.1 (*)     All other components within normal  limits   COMPREHENSIVE METABOLIC PANEL - Abnormal; Notable for the following components:    Carbon Dioxide 33 (*)     Anion Gap 9 (*)     Glucose 136 (*)     BUN 21 (*)     Creatinine 1.49 (*)     AST/SGOT 105 (*)     Albumin 3.1 (*)     GLOBULIN 4.1 (*)     A/G Ratio, Serum 0.8 (*)     All other components within normal limits   D DIMER QUANTITATIVE - Abnormal; Notable for the following components:    D Dimer Quantitative 2.03 (*)     All other components within normal limits   NT PROBNP - Abnormal; Notable for the following components:    NT proBNP 13,481 (*)     All other components within normal limits        Monitor and Pulse Ox:   Pulse oximetry is 94 % on room air , which is interpreted as normal by me.     Patient was placed on cardiac monitor with normal sinus rhythm at a rate of 64 bpm, no ectopy, interpreted by me.       ASSESSMENT AND PLAN     Vitals:    08/06/19 2000   BP: 164/72   Pulse: 64   Resp: 18   Temp:     [reviewed]     ED Course:    2000 - patient and wife notified that d-dimer was found to be elevated however ultrasound of right lower extremity revealed no acute concerns for DVT. He is again asked about chest pain and dyspnea concerns. He denies acute chest pain today and notes chronic dyspnea no worse today than most other days. He is not hypoxic nor tachycardic at this time. Patient does take hydrochlorothiazide as a diuretic. Will initiate a new course of Lasix for presumed fluid overload concerns in the ED today. Will start patient on a 5 day course of furosemide at home and have him stay in close communication with his PMDs office. He is otherwise well-appearing, nontoxic, awake, alert and oriented. He is safe for discharge to home. He is aware that if symptoms do worsen but return to the ED would be warranted.      Interventions:   Medications - No data to display     Repeat Vitals:   Vitals:    08/06/19 1814 08/06/19 1831 08/06/19 1900 08/06/19 2000   BP: 168/72 139/67 139/63 164/72    Pulse: 64 64 64 64   Resp: 22   18   Temp: 97.8 °F (36.6 °C)      SpO2: 95% 96% 94% 94%   Weight: 89.8 kg      Height: 6' (1.829 m)           Procedures      CLINICAL IMPRESSION   1. Acute on chronic congestive heart failure, unspecified heart failure type (CMS/HCC)    2. Leg edema, right         DISPOSITION   DC to home     Antwon Ba, APNP  08/06/19 [id number]

## 2021-06-26 NOTE — PROGRESS NOTES
Progress Notes by Sangita Keller LRT at 3/27/2018 11:16 AM     Author: Sangita Keller LRT Service: -- Author Type: Respiratory Therapist    Filed: 3/27/2018 11:34 AM Encounter Date: 3/27/2018 Status: Signed    : Sangita Keller LRT (Respiratory Therapist)       3/27/18  Patient has been set up with NIV with [facility] ([employer] [facility]) since 3/2/18- at discharge from [facility], per order from John Ye MD. Her settings are as follows: PS-with safety VT. (AVAPS)  PS Min 10, PS max 25, epap 5, safety Vt 500, rr 12. . She states that she isn't sleeping well due to the noise of the machine. Her latest download as of today:

## 2021-06-26 NOTE — PROGRESS NOTES
Progress Notes by Braden Fishman NP at 11/14/2018  2:50 PM     Author: Braden Fishman NP Service: -- Author Type: Nurse Practitioner    Filed: 11/14/2018  4:26 PM Encounter Date: 11/14/2018 Status: Signed    : Braden Fishman NP (Nurse Practitioner)           Click to link to [facility] HEART CARE NOTE      Assessment/Recommendations   Assessment:    1.  Chronic diastolic heart failure, NYHA class III: Recent hospitalization x2 in September 2018 for heart failure exacerbation.  She had a negative stress test.  Her EF was normal.    She is well compensated except chronic fatigue, mild lower extremity edema, and mild shortness of breath on exertion.  She reports her weight stable at dialysis.    2.  Hypertension: Blood pressure today is 122/70 and heart rate 72.  Excellent control    3.  ESRD on dialysis: On dialysis every Monday Wednesday and Friday.    4.  Severe obstructive sleep apnea: Not tolerated CPAP in the past uses oxygen at home which helps.    Plan:  1.   We discussed about the heart failure, signs and symptoms, medication management, and lifestyle management including low-sodium diet and exercise.  Patient met with the nurse clinician for heart failure education.    2.  Recommended to stay on a low-sodium diet less than 2 g/day, daily weight, and stay active as tolerated.  Recommended compression stocking.    3.  Continue current hypertension regimen.    4.  Continue dialysis.  Recommended to stay compliant with using O2 at night    Follow-up with Dr. Benítez in January 2019 as scheduled.  Follow-up in heart failure clinic as needed.     History of Present Illness    Ms. Belia Rodriguez is a 73 y.o. female with a significant past medical history of persistent atrial fibrillation, status post left atrial appendage occlusion, obstructive sleep apnea, ESRD on dialysis, and chronic diastolic heart failure who is seen at [facility] heart failure clinic today for  recent posthospitalization follow-up.  Patient was admitted twice in September 2018 with heart failure exacerbation.  She underwent stress test that was negative for any inducible ischemia and also her EF is 64%.  She also had another ED visit with hyperkalemia.  Patient is on dialysis every Monday Wednesday and Fridays.  She was recommended to follow-up in heart failure clinic.  Her echocardiogram from April 2018 showed no significant abnormalities compared to her previous echocardiogram.    Today, patient comes in accompanied by her . She denies lightheadedness, shortness of breath, orthopnea, PND, palpitations, chest pain and abdominal fullness/bloating.  She reports chronic fatigue, lower extremity edema, and mild shortness of breath and exertion.  However, she overall feels significant improvement since recent hospitalization.  Per patient, at the dialysis they are trying best to adjust her fluid to her dry weight.  She does report having some dietary indiscretion when she was in TCU.  She reports her weight stable at the dialysis around 75 kg.    NM pharmacologic stress test in September 2018  Conclusion- reviewed    There is no prior study available.    The left ventricular ejection fraction is 64%.    The pharmacologic nuclear stress test is negative for inducible myocardial ischemia or infarction.        ECHO April 2018: Reviewed  Summary     Left ventricle ejection fraction is normal.    Normal left ventricular size.    Normal right ventricular size and systolic function.        Physical Examination Review of Systems   Vitals:    11/14/18 1457   BP: 122/70   Pulse: 72   Resp: 16     Body mass index is 27.53 kg/m .  Wt Readings from Last 3 Encounters:   11/14/18 168 lb (76.2 kg)   10/30/18 169 lb (76.7 kg)   10/26/18 166 lb 7.2 oz (75.5 kg)       General Appearance:     Alert, cooperative and in no acute distress.   ENT/Mouth: membranes moist, no oral lesions or bleeding gums.      EYES:  no scleral  icterus, normal conjunctivae   Neck: no carotid bruits or thyromegaly   Chest/Lungs:   lungs are clear to auscultation, no rales or wheezing, respirations unlabored   Cardiovascular:    Irregularly irregular. Normal first and second heart sounds with no murmurs, rubs, or gallops; the carotid, radial and posterior tibial pulses are intact, Jugular venous pressure mild with no HJR, 2+ edema bilateral lower extremities    Abdomen:  Soft, nontender, nondistended, bowel sounds present   Extremities: no cyanosis or clubbing   Skin: warm, dry.    Neurologic: mood and affect are appropriate, alert and oriented x3      General: WNL  Eyes: WNL  Ears/Nose/Throat: WNL  Lungs: Cough  Heart: Shortness of Breath with activity, Irregular Heartbeat, Leg Swelling  Stomach: Constipation, Diarrhea, Heartburn, Nausea  Bladder: WNL  Muscle/Joints: Muscle Weakness  Skin: WNL  Nervous System: Daytime Sleepiness  Mental Health: WNL     Blood: WNL     Medical History  Surgical History Family History Social History   Past Medical History:   Diagnosis Date   ? Arthritis    ? CHF (congestive heart failure) (H)    ? Chronic anemia 6/1/2014   ? Chronic kidney disease    ? Chronic thoracic aortic dissection (H) 10/7/2015    Descending thoracic aorta; treated medically per notes of Dragan Singh and Jennifer.   ? COPD (chronic obstructive pulmonary disease) (H)    ? CVA (cerebral infarction)    ? Disease of thyroid gland    ? Dyslipidemia    ? ESRD (end stage renal disease) (H) 06/03/2009    on dialysis with Dr. Mitchell   ? Essential hypertension 6/30/2014   ? Gastrointestinal hemorrhage, unspecified gastrointestinal hemorrhage type 6/5/2017   ? GI (gastrointestinal bleed)    ? GI bleeding 6/5/2017   ? Gout    ? L3 vertebral fracture (H) 11/16/2015   ? Left Atrial Appendage Occlusion (WATCHMAN) 4/5/2018    LAAO April 5, 2018 (30 mm WATCHMAN)   ? Obesity    ? ZULEIKA (obstructive sleep apnea), severe, intolerant of CPAP 10/22/2015   ? Pneumonia 9-7-2015    ? Right foot drop    ? Spinal stenosis 3/28/2016   ? Stroke (H) 3/24/2016    Past Surgical History:   Procedure Laterality Date   ? BACK SURGERY      [facility]   ? COLONOSCOPY N/A 3/23/2016    Procedure: COLONOSCOPY;  Surgeon: Ruddy Tejada MD;  Location: [facility];  Service:    ? DILATION AND CURETTAGE OF UTERUS     ? EP NEGRA CLOSURE N/A 2018    Procedure: EP NEGRA Closure;  Surgeon: Derick Duarte MD;  Location: [facility] Cath Lab;  Service:    ? EYE SURGERY     ? HERNIA REPAIR     ? NH COLSC FLEXIBLE W/CONTROL BLEEDING ANY METHOD N/A 2017    Procedure: COLONOSCOPY;  Surgeon: Luis Mckeon MD;  Location: [facility];  Service: Gastroenterology   ? TONSILLECTOMY      Family History   Problem Relation Age of Onset   ? Dementia Mother    ? Diabetes Mother    ? Arthritis Mother    ? Cancer Mother    ? Depression Mother    ? Heart disease Mother    ? Vision loss Mother    ? Stroke Father    ? Heart disease Father    ? Breast cancer Neg Hx     Social History     Socioeconomic History   ? Marital status:      Spouse name: Cayden   ? Number of children: 2   ? Years of education: Not on file   ? Highest education level: Not on file   Social Needs   ? Financial resource strain: Not on file   ? Food insecurity - worry: Not on file   ? Food insecurity - inability: Not on file   ? Transportation needs - medical: Not on file   ? Transportation needs - non-medical: Not on file   Occupational History     Employer: RETIRED   Tobacco Use   ? Smoking status: Former Smoker     Packs/day: 1.50     Years: 37.00     Pack years: 55.50     Types: Cigarettes     Last attempt to quit: 2009     Years since quittin.8   ? Smokeless tobacco: Never Used   Substance and Sexual Activity   ? Alcohol use: No     Alcohol/week: 7.0 oz     Types: 14 Standard drinks or equivalent per week     Comment: 14 mixed drinks per week   ? Drug use: No   ? Sexual activity: No     Partners: Male   Other Topics Concern   ?  Not on file   Social History Narrative    Lives with her . Daughter in [place] and daughter in [place].          Medications  Allergies   Current Outpatient Medications   Medication Sig Dispense Refill   ? acetaminophen (TYLENOL) 500 MG tablet Take 500 mg by mouth every 6 (six) hours as needed for pain.     ? allopurinol (ZYLOPRIM) 100 MG tablet Take 100 mg by mouth daily.     ? aspirin 81 MG EC tablet Take 81 mg by mouth daily with lunch.      ? atorvastatin (LIPITOR) 10 MG tablet Take 10 mg by mouth at bedtime.     ? cholecalciferol, vitamin D3, 2,000 unit cap Take 2,000 Units by mouth daily with lunch.      ? cinacalcet (SENSIPAR) 30 MG tablet Take 30 mg by mouth daily with lunch.      ? DIALYVITE 100-1 mg Tab TAKE ONE TABLET BY MOUTH DAILY IN THE EVENING 90 tablet 0   ? digoxin (LANOXIN) 125 mcg tablet TAKE 1 TABLET ORALLY 3 TIMES A WEEK. (Patient taking differently: TAKE 1 TABLET ORALLY 3 TIMES A WEEK. Monday, Wednesday, Friday PM after Dialysis) 30 tablet 0   ? diltiazem (CARDIZEM SR) 120 MG 12 hr capsule Take 1 capsule (120 mg total) by mouth 2 (two) times a day. 60 capsule 2   ? diphenhydrAMINE (BENADRYL) 25 mg tablet Take 2 tablets (50 mg total) by mouth at bedtime. 15 tablet 0   ? folic acid (FOLVITE) 1 MG tablet TAKE ONE TABLET BY MOUTH ONCE DAILY 90 tablet 0   ? gabapentin (NEURONTIN) 100 MG capsule TAKE 1 CAPSULE BY MOUTH THREE TIMES DAILY 270 capsule 0   ? Lactobacillus rhamnosus GG (CULTURELLE) 10-15 Billion cell capsule Take 1 capsule by mouth daily with lunch.     ? metoprolol tartrate (LOPRESSOR) 25 MG tablet Take 1 tablet (25 mg total) by mouth 2 (two) times a day. (Patient taking differently: Take 25 mg by mouth daily .      ) 60 tablet 11   ? midodrine (PROAMATINE) 5 MG tablet Take 3 tablets (15 mg total) by mouth 3 (three) times a week. Take every Monday, Wednesday, and Friday in the morning. 36 tablet 11   ? polyvinyl alcohol (LIQUIFILM TEARS) 1.4 % ophthalmic solution Apply 1 drop to  eye as needed for dry eyes.     ? ranitidine (ZANTAC) 150 MG tablet Take 150 mg by mouth at bedtime.     ? senna-docusate (SENNOSIDES-DOCUSATE SODIUM) 8.6-50 mg tablet Take 1 tablet by mouth at bedtime.      ? sevelamer carbonate (RENVELA) 800 mg tablet TAKE 2 TABLETS BY MOUTH 3 TIMES A DAY WITH MEALS AND 2 TABS WITH SNACKS 2 TIMES A  tablet 0   ? timolol maleate (TIMOPTIC) 0.5 % ophthalmic solution Administer 1 drop to both eyes 2 (two) times a day.      ? albuterol (ACCUNEB) 0.63 mg/3 mL nebulizer solution Take 1 ampule by nebulization every 4 (four) hours as needed for wheezing.     ? albuterol (PROAIR HFA;PROVENTIL HFA;VENTOLIN HFA) 90 mcg/actuation inhaler Inhale 2 puffs every 6 (six) hours as needed for wheezing. 1 Inhaler 5   ? CHLORPHENIRAMINE/DEXTROMETHORP (CORICIDIN HBP COUGH AND COLD ORAL) Take 1 tablet by mouth daily as needed.      ? HYDROcodone-acetaminophen 5-325 mg per tablet Take 1 tablet by mouth 4 (four) times a day as needed. 15 tablet 0   ? ondansetron (ZOFRAN) 4 MG tablet Take 4 mg by mouth every 8 (eight) hours as needed for nausea.     ? traZODone (DESYREL) 50 MG tablet Take 0.5 tablets (25 mg total) by mouth at bedtime as needed for sleep (give with melatonin). 15 tablet 0     No current facility-administered medications for this visit.       Allergies   Allergen Reactions   ? Ace Inhibitors Cough   ? Atrovent [Ipratropium Bromide] Headache   ? Fosinopril Sodium Cough   ? Zolpidem      hallucinations         Lab Results    Chemistry CBC BNP   Lab Results   Component Value Date    CREATININE 5.92 (H) 10/26/2018    BUN 29 (H) 10/26/2018     10/26/2018    K 4.4 10/26/2018     10/26/2018    CO2 27 10/26/2018     Creatinine (mg/dL)   Date Value   10/26/2018 5.92 (H)   10/19/2018 4.73 (H)   09/23/2018 4.45 (H)   09/22/2018 4.70 (H)    Lab Results   Component Value Date    WBC 6.8 10/26/2018    HGB 10.9 (L) 10/26/2018    HCT 35.7 10/26/2018     (H) 10/26/2018      10/26/2018    Lab Results   Component Value Date    BNP 1,366 (H) 09/21/2018     BNP (pg/mL)   Date Value   09/21/2018 1,366 (H)   09/07/2018 1,972 (H)   08/09/2018 849 (H)        Braden Fishman [facility]   Heart Failure Clinic           This note has been dictated using voice recognition software. Any grammatical, typographical, or context distortions are unintentional and inherent to the software

## 2021-06-26 NOTE — TELEPHONE ENCOUNTER
Brittanie with [facility] calling for verbal orders.    Home Care PT   1 time a week for 4 weeks  For balance, strenghthening, gait training, pain management and fall prevention.

## 2021-06-26 NOTE — PROGRESS NOTES
Progress Notes by Alyson Hernandez PA-C at 10/30/2018 12:50 PM     Author: Alyson Hernandez PA-C Service: -- Author Type: Physician Assistant    Filed: 10/30/2018  2:23 PM Encounter Date: 10/30/2018 Status: Signed    : Alyson Hernandez PA-C (Physician Assistant)           Click to link to [facility] Heart Care     [facility] HEART CARE NOTE      Assessment/Recommendations   Problem List Items Addressed This Visit     Persistent atrial fibrillation (H)    Relevant Medications    metoprolol tartrate (LOPRESSOR) 25 MG tablet    Left Atrial Appendage Occlusion (WATCHMAN) - Primary    ESRD on dialysis (H)          1.  Atrial fibrillation: Persistent, but asymptomatic.  She is status post watchman implant 6 months ago.  She is currently rate controlled on digoxin 125 mcg 3 times weekly, diltiazem 120 mg twice daily and metoprolol tartrate 25 mg twice daily.  She is currently taking aspirin and Plavix.  She can now stop her Plavix and continue aspirin 81 mg daily indefinitely.  She will see us again in April for her one-year follow-up visit.    2. ESRD: On hemodialysis.  Recent infection of right sided catheter.  This was removed and now has left-sided catheter in place.  She is dialyzed Monday Wednesdays Fridays.    3.  Chronic diastolic heart failure, NYHA class III: With 1-2+ edema bilaterally.  Not on diuretics, but complicated by end-stage renal disease.  Was admitted end of September for acute heart failure and will be seeing Chance, nurse practitioner on November 14 for heart failure follow-up.  Recommend that she establish care with Dr. Benítez in January as per Dr. Riley Singh's note on September hospital admission     History of Present Illness    Belia Rodriguez is a 73 y.o. female who comes in today for her six-month follow-up visit after watchman.  Her  is with her.    Belia Rodriguez has a past history of permanent atrial fibrillation, diastolic heart failure,  hypertension, recurrent GI bleeding, renal disease on chronic dialysis.  Because of her multiple comorbidities, she was implanted with the watchman device in April 2018.  She is very happy to be off of her blood thinner.  Still bruising easily with the aspirin and Plavix.  She is still getting hemodialysis and has had several admissions in the last month for acute heart failure, headache and hyperkalemia.  She thinks that in the last 2 days, lower extremity edema has gotten a bit worse.  She admits to not wearing her support stockings like she should.  Her breathing has remained stable, but she does occasionally need to use her home oxygen during the day.  She wears it faithfully at night.    Belia Rodriguez denies chest discomfort, palpitations, lightheadedness, dizziness, pre-syncope, or syncope.  Belia Rodriguez also denies any weight loss, changes in appetite, nausea or vomiting.     Medical, surgical, family, social history, and medications were reviewed and updated as necessary.       Physical Examination Review of Systems   Vitals:    10/30/18 1307   BP: 122/78   Pulse: 68   Resp: 18     Body mass index is 27.28 kg/(m^2).  Wt Readings from Last 3 Encounters:   10/30/18 169 lb (76.7 kg)   10/26/18 166 lb 7.2 oz (75.5 kg)   10/19/18 166 lb 7.2 oz (75.5 kg)       General Appearance:   Alert, cooperative and in no acute distress   ENT/Mouth: membranes moist, no oral lesions or bleeding gums.      EYES:  no scleral icterus, normal conjunctivae   Neck: Thyroid not visualized   Chest/Lungs:   lungs are clear to auscultation, no rales or wheezing   Cardiovascular:   Irregular . Normal first and second heart sounds with no murmurs, rubs or gallops; the carotid, radial and posterior tibial pulses are intact, 1-2+ edema bilaterally    Abdomen:  Soft and nontender. Bowel sounds are present in all quadrants   Extremities: no cyanosis or clubbing   Skin: no xanthelasma, warm.    Neurologic: normal gait, normal   bilateral, no tremors   Psychiatric: Normal mood and affect    General: WNL  Eyes: WNL  Ears/Nose/Throat: WNL  Lungs: WNL  Heart: WNL  Stomach: WNL  Bladder: WNL  Muscle/Joints: WNL  Skin: WNL  Nervous System: WNL  Mental Health: WNL     Blood: Easy Bruising     Medical History  Surgical History Family History Social History   Past Medical History:   Diagnosis Date   ? Arthritis    ? CHF (congestive heart failure) (H)    ? Chronic anemia 6/1/2014   ? Chronic kidney disease    ? Chronic thoracic aortic dissection (H) 10/7/2015    Descending thoracic aorta; treated medically per notes of Dragan Singh and Jennifer.   ? COPD (chronic obstructive pulmonary disease) (H)    ? CVA (cerebral infarction)    ? Disease of thyroid gland    ? Dyslipidemia    ? ESRD (end stage renal disease) (H) 06/03/2009    on dialysis with Dr. Mitchell   ? Essential hypertension 6/30/2014   ? Gastrointestinal hemorrhage, unspecified gastrointestinal hemorrhage type 6/5/2017   ? GI (gastrointestinal bleed)    ? GI bleeding 6/5/2017   ? Gout    ? L3 vertebral fracture (H) 11/16/2015   ? Left Atrial Appendage Occlusion (WATCHMAN) 4/5/2018    LAAO April 5, 2018 (30 mm WATCHMAN)   ? Obesity    ? ZULEIKA (obstructive sleep apnea), severe, intolerant of CPAP 10/22/2015   ? Pneumonia 9-7-2015   ? Right foot drop    ? Spinal stenosis 3/28/2016   ? Stroke (H) 3/24/2016    Past Surgical History:   Procedure Laterality Date   ? BACK SURGERY      [facility]   ? COLONOSCOPY N/A 3/23/2016    Procedure: COLONOSCOPY;  Surgeon: Ruddy Tejada MD;  Location: [facility];  Service:    ? DILATION AND CURETTAGE OF UTERUS     ? EP NEGRA CLOSURE N/A 4/5/2018    Procedure: EP NEGRA Closure;  Surgeon: Derick Duarte MD;  Location: [facility] Cath Lab;  Service:    ? EYE SURGERY     ? HERNIA REPAIR     ? ME COLSC FLEXIBLE W/CONTROL BLEEDING ANY METHOD N/A 6/7/2017    Procedure: COLONOSCOPY;  Surgeon: Luis Mckeon MD;  Location: [facility];  Service:  Gastroenterology   ? TONSILLECTOMY      Family History   Problem Relation Age of Onset   ? Dementia Mother    ? Diabetes Mother    ? Arthritis Mother    ? Cancer Mother    ? Depression Mother    ? Heart disease Mother    ? Vision loss Mother    ? Stroke Father    ? Heart disease Father    ? Breast cancer Neg Hx     Social History     Social History   ? Marital status:      Spouse name: Cayden   ? Number of children: 2   ? Years of education: N/A     Occupational History   ?  Retired     Social History Main Topics   ? Smoking status: Former Smoker     Packs/day: 1.50     Years: 37.00     Types: Cigarettes     Quit date: 1/1/2009   ? Smokeless tobacco: Never Used   ? Alcohol use No      Comment: 14 mixed drinks per week   ? Drug use: No   ? Sexual activity: No     Other Topics Concern   ? Not on file     Social History Narrative    Lives with her . Daughter in [place] and daughter in [place].          Medications  Allergies   Current Outpatient Prescriptions   Medication Sig Dispense Refill   ? acetaminophen (TYLENOL) 500 MG tablet Take 500 mg by mouth every 6 (six) hours as needed for pain.     ? albuterol (ACCUNEB) 0.63 mg/3 mL nebulizer solution Take 1 ampule by nebulization every 4 (four) hours as needed for wheezing.     ? albuterol (PROAIR HFA;PROVENTIL HFA;VENTOLIN HFA) 90 mcg/actuation inhaler Inhale 2 puffs every 6 (six) hours as needed for wheezing. 1 Inhaler 5   ? allopurinol (ZYLOPRIM) 100 MG tablet Take 100 mg by mouth daily.     ? aspirin 81 MG EC tablet Take 81 mg by mouth daily with lunch.      ? atorvastatin (LIPITOR) 10 MG tablet Take 10 mg by mouth at bedtime.     ? CHLORPHENIRAMINE/DEXTROMETHORP (CORICIDIN HBP COUGH AND COLD ORAL) Take 1 tablet by mouth daily as needed.      ? cholecalciferol, vitamin D3, 2,000 unit cap Take 2,000 Units by mouth daily with lunch.      ? cinacalcet (SENSIPAR) 30 MG tablet Take 30 mg by mouth daily with lunch.      ? DIALYVITE 100-1 mg Tab TAKE ONE  TABLET BY MOUTH DAILY IN THE EVENING 90 tablet 0   ? digoxin (LANOXIN) 125 mcg tablet TAKE 1 TABLET ORALLY 3 TIMES A WEEK. (Patient taking differently: TAKE 1 TABLET ORALLY 3 TIMES A WEEK. Monday, Wednesday, Friday PM after Dialysis) 30 tablet 0   ? diltiazem (CARDIZEM SR) 120 MG 12 hr capsule Take 1 capsule (120 mg total) by mouth 2 (two) times a day. 60 capsule 2   ? diphenhydrAMINE (BENADRYL) 25 mg tablet Take 2 tablets (50 mg total) by mouth at bedtime. 15 tablet 0   ? folic acid (FOLVITE) 1 MG tablet TAKE ONE TABLET BY MOUTH ONCE DAILY 90 tablet 0   ? gabapentin (NEURONTIN) 100 MG capsule TAKE 1 CAPSULE BY MOUTH THREE TIMES DAILY 270 capsule 0   ? HYDROcodone-acetaminophen 5-325 mg per tablet Take 1 tablet by mouth 4 (four) times a day as needed. 15 tablet 0   ? Lactobacillus rhamnosus GG (CULTURELLE) 10-15 Billion cell capsule Take 1 capsule by mouth daily with lunch.     ? metoprolol tartrate (LOPRESSOR) 25 MG tablet Take 1 tablet (25 mg total) by mouth 2 (two) times a day. 60 tablet 11   ? midodrine (PROAMATINE) 5 MG tablet Take 3 tablets (15 mg total) by mouth 3 (three) times a week. Take every Monday, Wednesday, and Friday in the morning. 36 tablet 11   ? ondansetron (ZOFRAN) 4 MG tablet Take 4 mg by mouth every 8 (eight) hours as needed for nausea.     ? polyvinyl alcohol (LIQUIFILM TEARS) 1.4 % ophthalmic solution Apply 1 drop to eye as needed for dry eyes.     ? ranitidine (ZANTAC) 150 MG tablet Take 150 mg by mouth at bedtime.     ? senna-docusate (SENNOSIDES-DOCUSATE SODIUM) 8.6-50 mg tablet Take 1 tablet by mouth at bedtime.      ? sevelamer carbonate (RENVELA) 800 mg tablet TAKE 2 TABLETS BY MOUTH 3 TIMES A DAY WITH MEALS AND 2 TABS WITH SNACKS 2 TIMES A  tablet 0   ? timolol maleate (TIMOPTIC) 0.5 % ophthalmic solution Administer 1 drop to both eyes 2 (two) times a day.      ? traZODone (DESYREL) 50 MG tablet Take 0.5 tablets (25 mg total) by mouth at bedtime as needed for sleep (give with  melatonin). 15 tablet 0     No current facility-administered medications for this visit.       Allergies   Allergen Reactions   ? Ace Inhibitors Cough   ? Atrovent [Ipratropium Bromide] Headache   ? Fosinopril Sodium Cough   ? Zolpidem      hallucinations         Lab Results    Chemistry/lipid CBC Cardiac Enzymes/BNP/TSH/INR   Lab Results   Component Value Date    CHOL 102 03/25/2016    HDL 43 (L) 03/25/2016    LDLCALC 47 03/25/2016    TRIG 60 03/25/2016    CREATININE 5.92 (H) 10/26/2018    BUN 29 (H) 10/26/2018    K 4.4 10/26/2018     10/26/2018     10/26/2018    CO2 27 10/26/2018    Lab Results   Component Value Date    WBC 6.8 10/26/2018    HGB 10.9 (L) 10/26/2018    HCT 35.7 10/26/2018     (H) 10/26/2018     10/26/2018    Lab Results   Component Value Date    CKTOTAL 30 12/09/2017    CKMB 1 06/30/2014    TROPONINI 0.03 09/22/2018    BNP 1366 (H) 09/21/2018    TSH 2.71 01/26/2018    INR 1.17 (H) 09/10/2018          25 minutes were spent with the patient with greater than 50% spent on education and counseling.    This note has been dictated using voice recognition software. Any grammatical or context distortions are unintentional and inherent to the software.    Alyson Hernandez PA-C, MPAS  Structural [facility] Program  [facility]

## 2021-06-26 NOTE — TELEPHONE ENCOUNTER
Dr. Cecelia Rojas calling from [facility].  Patient would like to discharge nursing services.  Patient will continue PT and OT.  Crystal would like a call back at [phone] to let her know if this is ok.

## 2021-06-26 NOTE — PROGRESS NOTES
Progress Notes by Alyson Hernandez PA-C at 5/15/2018  9:50 AM     Author: Alyson Hernandez PA-C Service: -- Author Type: Physician Assistant    Filed: 5/15/2018 11:04 AM Encounter Date: 5/15/2018 Status: Signed    : Alyson Hernandez PA-C (Physician Assistant)           Click to link to [facility]     [facility] HEART CARE NOTE      Assessment/Recommendations   Problem List Items Addressed This Visit     ESRD (end stage renal disease) (Chronic)    Essential hypertension with goal blood pressure less than 140/90 (Chronic)    Persistent atrial fibrillation    Left Atrial Appendage Occlusion (WATCHMAN) - Primary          1.  Atrial fibrillation:  Persistent and asymptomatic.  She is status post watchman implant on April 5.  She is doing well postprocedure and has had no neurological events.  Rates are currently controlled on digoxin 0.125 mg 3 times weekly and diltiazem 120 mg twice daily.  At this time she is on baby aspirin and Coumadin.  She is scheduled to have her postprocedure TERRANCE on May 24.  If the TERRANCE shows that there is no leakage around the watchman device, she will be called and instructed to stop her warfarin. She will then continue her aspirin 81 mg daily and start Plavix 75 mg daily.  She will stay on the Plavix until September, at which time she will see me for her six-month follow-up visit.    She understands all the instructions and his questions have been answered.  She is ready to proceed with TERRANCE.    2. ESRD: on chronic hemodialysis.  She is scheduled for IR tunneled catheter replacement on May 31 and needs to be off of her Coumadin 3 days prior to this procedure.    3.  Essential hypertension: Blood pressure is controlled at today's visit.  Currently on diltiazem 120 mg twice daily.     History of Present Illness    Belia Rodriguez is a 72 y.o. female who comes in today for history and physical prior to post Watchman TERRANCE.  She is with her  .    Belia Rodriguez has a past history of permanent atrial fibrillation, diastolic heart failure, end-stage renal disease on chronic dialysis, hypertension and recurrent GI bleeding.  Her GI bleeds required hospitalizations and transfusions.  She ended up having watchman implant on April 5, 2018.  She has been doing well since then but is anxious to get off of her Coumadin once again.  The only difficulty she has been having since being back on Coumadin is swelling in her legs which she had the last time she was on it.  She has noticed some minor trouble swallowing since the procedure and has occasional palpitations.      She otherwise denies chest discomfort, shortness of breath, paroxysmal nocturnal dyspnea, orthopnea, lightheadedness, dizziness, pre-syncope, or syncope.  Belia Rodriguez also denies any weight loss, changes in appetite, nausea or vomiting.     Medical, surgical, family, social history, and medications were reviewed and updated as necessary.       Physical Examination Review of Systems   Vitals:    05/15/18 0948   BP: 130/70   Pulse: 76   Resp: 16     Body mass index is 30.02 kg/(m^2).  Wt Readings from Last 3 Encounters:   05/15/18 186 lb (84.4 kg)   04/10/18 185 lb (83.9 kg)   04/10/18 184 lb (83.5 kg)       General Appearance:   Alert, cooperative and in no acute distress   ENT/Mouth: membranes moist, no oral lesions or bleeding gums.      EYES:  no scleral icterus, normal conjunctivae   Neck: Thyroid not visualized   Chest/Lungs:   lungs are clear to auscultation, no rales or wheezing   Cardiovascular:   Irregular . Normal first and second heart sounds with no murmurs, rubs or gallops; the carotid, radial and posterior tibial pulses are intact, 1-2+ edema bilaterally    Abdomen:  Soft and nontender. Bowel sounds are present in all quadrants   Extremities: no cyanosis or clubbing   Skin: no xanthelasma, warm.    Neurologic: normal gait, normal  bilateral, no tremors    Psychiatric: Normal mood and affect    General: WNL  Eyes: WNL  Ears/Nose/Throat: WNL  Lungs: WNL  Heart: WNL  Stomach: WNL  Bladder: WNL  Muscle/Joints: WNL  Skin: WNL  Nervous System: WNL  Mental Health: WNL     Blood: WNL     Medical History  Surgical History Family History Social History   Past Medical History:   Diagnosis Date   ? Arthritis    ? CHF (congestive heart failure)    ? Chronic anemia 6/1/2014   ? Chronic kidney disease    ? Chronic thoracic aortic dissection 10/7/2015    Descending thoracic aorta; treated medically per notes of Dragan Singh and Jennifer.   ? COPD (chronic obstructive pulmonary disease)    ? CVA (cerebral infarction)    ? Disease of thyroid gland    ? Dyslipidemia    ? ESRD (end stage renal disease) 06/03/2009    on dialysis with Dr. Mitchell   ? Essential hypertension 6/30/2014   ? GI (gastrointestinal bleed)    ? GI bleeding 6/5/2017   ? Gout    ? L3 vertebral fracture 11/16/2015   ? Left Atrial Appendage Occlusion (WATCHMAN) 4/5/2018    LAAO April 5, 2018 (30 mm WATCHMAN)   ? Obesity    ? ZULEIKA (obstructive sleep apnea), severe, intolerant of CPAP 10/22/2015   ? Pneumonia 9-7-2015   ? Right foot drop    ? Spinal stenosis 3/28/2016   ? Stroke 3/24/2016    Past Surgical History:   Procedure Laterality Date   ? BACK SURGERY      [facility]   ? COLONOSCOPY N/A 3/23/2016    Procedure: COLONOSCOPY;  Surgeon: Ruddy Tejada MD;  Location: [facility];  Service:    ? DILATION AND CURETTAGE OF UTERUS     ? EP NEGRA CLOSURE N/A 4/5/2018    Procedure: EP NEGRA Closure;  Surgeon: Derick Duarte MD;  Location: [facility];  Service:    ? EYE SURGERY     ? HERNIA REPAIR     ? MI COLSC FLEXIBLE W/CONTROL BLEEDING ANY METHOD N/A 6/7/2017    Procedure: COLONOSCOPY;  Surgeon: Luis Mckeon MD;  Location: [facility];  Service: Gastroenterology   ? TONSILLECTOMY      Family History   Problem Relation Age of Onset   ? Dementia Mother    ? Diabetes Mother    ? Arthritis Mother    ?  Cancer Mother    ? Depression Mother    ? Heart disease Mother    ? Vision loss Mother    ? Stroke Father    ? Heart disease Father    ? Breast cancer Neg Hx     Social History     Social History   ? Marital status:      Spouse name: Cayden   ? Number of children: 2   ? Years of education: N/A     Occupational History   ?  Retired     Social History Main Topics   ? Smoking status: Former Smoker     Packs/day: 1.50     Years: 37.00     Types: Cigarettes     Quit date: 1/1/2009   ? Smokeless tobacco: Never Used   ? Alcohol use 7.0 oz/week     14 Standard drinks or equivalent per week      Comment: 14 mixed drinks per week   ? Drug use: No   ? Sexual activity: No     Other Topics Concern   ? Not on file     Social History Narrative    Lives with her . Daughter in [place] and daughter in [place].          Medications  Allergies   Current Outpatient Prescriptions   Medication Sig Dispense Refill   ? acetaminophen (TYLENOL) 500 MG tablet Take 500 mg by mouth every 6 (six) hours as needed for pain.     ? allopurinol (ZYLOPRIM) 100 MG tablet Take 1 tablet (100 mg total) by mouth daily. 90 tablet 3   ? aspirin 81 MG EC tablet Take 81 mg by mouth daily with lunch.      ? atorvastatin (LIPITOR) 10 MG tablet Take 10 mg by mouth at bedtime.     ? CHLORPHENIRAMINE/DEXTROMETHORP (CORICIDIN HBP COUGH AND COLD ORAL) Take 1 tablet by mouth daily as needed.      ? cholecalciferol, vitamin D3, 2,000 unit cap Take 2,000 Units by mouth daily with lunch.      ? cinacalcet (SENSIPAR) 30 MG tablet Take 30 mg by mouth at bedtime.      ? DIALYVITE 100-1 mg Tab TAKE ONE TABLET BY MOUTH DAILY IN THE EVENING 90 tablet 0   ? digoxin (LANOXIN) 125 mcg tablet TAKE 1 TABLET ORALLY 3 TIMES A WEEK. 30 tablet 0   ? diltiazem (CARDIZEM SR) 120 MG 12 hr capsule Take 1 capsule (120 mg total) by mouth 2 (two) times a day. 60 capsule 2   ? folic acid (FOLVITE) 1 MG tablet Take 1 mg by mouth daily with lunch.      ? gabapentin (NEURONTIN) 100  MG capsule Take 100 mg by mouth 3 (three) times a day.     ? HYDROmorphone (DILAUDID) 2 MG tablet Take 1 tablet (2 mg total) by mouth every 3 (three) hours as needed. (Patient taking differently: Take 2 mg by mouth every 3 (three) hours as needed for pain. ) 240 tablet 0   ? Lactobacillus rhamnosus GG (CULTURELLE) 10-15 Billion cell capsule Take 1 capsule by mouth daily with lunch.     ? midodrine (PROAMATINE) 5 MG tablet Take 3 tablets (15 mg total) by mouth 3 (three) times a week. Take every Monday, Wednesday, and Friday in the morning. 36 tablet 11   ? polyvinyl alcohol (LIQUIFILM TEARS) 1.4 % ophthalmic solution Apply 1 drop to eye as needed for dry eyes.     ? ranitidine (ZANTAC) 150 MG tablet Take 150 mg by mouth at bedtime.     ? senna-docusate (SENNOSIDES-DOCUSATE SODIUM) 8.6-50 mg tablet Take 1 tablet by mouth at bedtime.      ? sevelamer carbonate (RENVELA) 800 mg tablet Take 1,600 mg by mouth 3 (three) times a day with meals.     ? timolol maleate (TIMOPTIC) 0.5 % ophthalmic solution Administer 1 drop to both eyes 2 (two) times a day.      ? traZODone (DESYREL) 100 MG tablet Take 1 tablet (100 mg total) by mouth at bedtime. 30 tablet 2   ? vit B comp no.3-folic-C-biotin (NEPHRO-OLGA) 1- mg-mg-mcg Tab tablet Take 1 tablet by mouth daily with supper.      ? warfarin (COUMADIN) 3 MG tablet Take 6 mg Wed and 3 mg all other day. 30 tablet 0     No current facility-administered medications for this visit.       Allergies   Allergen Reactions   ? Ace Inhibitors Cough   ? Atrovent [Ipratropium Bromide] Headache   ? Fosinopril Sodium Cough   ? Zolpidem      hallucinations         Lab Results    Chemistry/lipid CBC Cardiac Enzymes/BNP/TSH/INR   Lab Results   Component Value Date    CHOL 102 03/25/2016    HDL 43 (L) 03/25/2016    LDLCALC 47 03/25/2016    TRIG 60 03/25/2016    CREATININE 7.37 (HH) 04/06/2018    BUN 27 04/05/2018    K 4.5 04/05/2018     04/05/2018     04/05/2018    CO2 21 (L)  04/05/2018    Lab Results   Component Value Date    WBC 6.5 04/05/2018    HGB 9.5 (L) 04/06/2018    HCT 37.5 04/05/2018     (H) 04/05/2018     (L) 04/05/2018    Lab Results   Component Value Date    CKTOTAL 30 12/09/2017    CKMB 1 06/30/2014    TROPONINI 0.06 01/26/2018     (H) 01/26/2018    TSH 2.71 01/26/2018    INR 3.10 (!) 05/08/2018          40 minutes were spent with the patient with greater than 50% spent on education and counseling.    This note has been dictated using voice recognition software. Any grammatical or context distortions are unintentional and inherent to the software.    Alyson Zhang PA-C, MPAS  Structural Heart Program  [facility]

## 2021-06-26 NOTE — PROGRESS NOTES
"TCM DISCHARGE FOLLOW UP CALL     Tell me how you are doing now that you are home?\" Fine, but the hospital left her IV in at discharge so she had to go back to the ED to have it taken out.      Discharge Instructions     Do you understand your discharge instructions?Yes      \"Has an appointment with your primary care provider been scheduled?\" Yes  \"If yes, when is that appointment?  6/24/21 had to reschedule previous appointment because the hospital scheduled for when she is at dialysis    Medications    \"Did you have any medication changes?   No   Do you have any concerns with obtaining the medications or questions about your medications that you would like a RN to review with you?  No  \"When you see the provider, I would recommend that you bring your medications with you.\"    Call Summary    \"What questions or concerns do you have about your recent visit and your follow-up care?\"None          \"If you have questions or things don't continue to improve, we encourage you contact us through the main clinic number [phone].  Even if the clinic is not open, triage nurses are available 24/7 to help you.                    "

## 2021-06-26 NOTE — PROGRESS NOTES
Progress Notes by Alyson Hernandez PA-C at 1/4/2018  9:50 AM     Author: Alyson Hernandez PA-C Service: -- Author Type: Physician Assistant    Filed: 1/4/2018 12:01 PM Encounter Date: 1/4/2018 Status: Signed    : Alyson Hernandez PA-C (Physician Assistant)           Click to link to [facility]     [facility] NOTE      Assessment/Recommendations   Problem List Items Addressed This Visit     ESRD (end stage renal disease) - Primary (Chronic)    Essential hypertension with goal blood pressure less than 140/90 (Chronic)    Persistent atrial fibrillation    CHF (congestive heart failure)          1.  Permanent atrial fibrillation: Asymptomatic and rate controlled with digoxin 125 mcg 3 times weekly and metoprolol tartrate 25 mg twice daily.  She has a [id number] score of 4 for diastolic dysfunction, hypertension, age 65-74 and female gender.  She has a HAS-BLED score of 3 for age greater than 65, bleeding history and chronic dialysis.   She is not a candidate for long-term anticoagulation due to recurrent GI bleed as well as end-stage renal disease with chronic dialysis.  She is scheduled for transesophageal echo on January 11 and if her anatomy is appropriate will be scheduled subsequently for the Watchman left atrial appendage occlusion device.     She understands the need for pre-and post procedure TERRANCE.  She also understands that she will need to be started on anticoagulation (we are recommending Coumadin) prior to implant and for approximately 45 days post implant.  She has had difficulty with Coumadin in the past, specifically with it causing lower extremity edema and spontaneous bruising, however she states to me today that she is willing to put up with this for short-term in order to get the watchman device.  We discussed that after 45 days, she will have a post implant TERRANCE and if the TERRANCE is negative for leaks, she would then stop her warfarin and go to aspirin  81 mg plus Plavix 75 mg by mouth daily for an additional 4 months, after which she would continue just the low-dose aspirin.  She understands that the risks of the procedure are <2% and include, but are not limited to device embolization, air embolism, myocardial perforation, device thrombosis, ASD, stroke, or death.  We discussed expected recovery and follow-up.       Her questions were answered to her satisfaction and she is ready to proceed.    2.  Essential hypertension: Blood pressure is controlled at today's visit.  Currently on metoprolol tartrate 25 mg twice daily and Midrin 10 mg 3 times weekly with dialysis runs.    3.  End-stage renal disease: HD on Monday/Wednesday/Friday.  Run was ended early yesterday because of low blood pressure.  She takes Midodrine to help with this.  She is also on Sensipar and Renvela per nephrology.     4.  Recurrent GI bleed:  With known multiple diverticuli.  Most recently admitted in early December.  No colonoscopy on this admission and no blood transfusion required.  Eliquis was stopped in the hospital and she has had no further GI bleeding since the Eliquis was stopped.  On prior hospitalizations, she did have colonoscopy which showed multiple diverticuli.  She also required blood transfusions on those previous admissions.     History of Present Illness    Belia Rodriguez is a 72 y.o. female who comes in today for history and physical prior to screening TERRANCE.    Belia Rodriguez has a past history of diastolic heart failure, end-stage renal disease on HD, hypertension, permanent atrial fibrillation and recurrent GI bleeding with anticoagulation.  She was hospitalized in December and at that time Dr. Swift saw her in consultation and recommended the watchman device.  She has had previous hospitalizations for her GI bleed and got transfused on those prior visits.  She was not transfused during this most recent visit.  Previous colonoscopies have shown multiple  diverticuli.  She states that she was at one time on Coumadin, but it caused lower extremity edema and spontaneous bruising of the lower extremities and thus she was switched to Eliquis.  She states that her GI bleeding is anywhere from blood on the toilet paper to a full bowl full of blood and clots.  The patient states that since she was taken off the Eliquis, she has had no further bleeding.    She otherwise has been doing well since her admission.  She has occasional palpitations and states that in the last 4-5 months she has been feeling her atrial fibrillation more.  However she is not symptomatic with it.  She does have chronic shortness of breath and uses as needed home oxygen.  She states that most of the time she uses her oxygen with extreme temps, especially the heat.  She does have difficulty with hypotension during her dialysis runs and takes Midodrine.  She otherwise denies chest discomfort, paroxysmal nocturnal dyspnea or orthopnea.  Belia Rodriguez also denies any weight loss, changes in appetite, nausea or vomiting.     Medical, surgical, family, social history, and medications were reviewed and updated as necessary.       Physical Examination Review of Systems   Vitals:    01/04/18 0936   BP: 128/70   Pulse: 76   Resp: 16     Body mass index is 30.39 kg/(m^2).  Wt Readings from Last 3 Encounters:   01/04/18 188 lb 4.8 oz (85.4 kg)   12/20/17 183 lb 4.8 oz (83.1 kg)   12/13/17 186 lb 8 oz (84.6 kg)       General Appearance:   Alert, cooperative and in no acute distress   ENT/Mouth: membranes moist, no oral lesions or bleeding gums.      EYES:  no scleral icterus, normal conjunctivae   Neck: Thyroid not visualized   Chest/Lungs:   lungs are clear to auscultation, no rales or wheezing   Cardiovascular:   Irregular . Normal first and second heart sounds with no murmurs, rubs or gallops; the carotid, radial and posterior tibial pulses are intact, no edema bilaterally (she is wearing compression  stockings)   Abdomen:  Soft and nontender. Bowel sounds are present in all quadrants   Extremities: no cyanosis or clubbing   Skin: no xanthelasma, warm.    Neurologic: normal gait, normal  bilateral, no tremors   Psychiatric: Normal mood and affect    General: WNL  Eyes: WNL  Ears/Nose/Throat: WNL  Lungs: Shortness of Breath  Heart:  (palpitations)  Stomach: WNL  Bladder: WNL  Muscle/Joints: WNL  Skin: WNL  Nervous System: WNL  Mental Health: Depression     Blood: WNL     Medical History  Surgical History Family History Social History   Past Medical History:   Diagnosis Date   ? Arthritis    ? CHF (congestive heart failure)    ? Chronic anemia 6/1/2014   ? Chronic kidney disease    ? Chronic thoracic aortic dissection 10/7/2015    Descending thoracic aorta; treated medically per notes of Dragan Singh and Jennifer.   ? COPD (chronic obstructive pulmonary disease)    ? CVA (cerebral infarction)    ? Disease of thyroid gland    ? Dyslipidemia    ? ESRD (end stage renal disease) 06/03/2009    on dialysis with Dr. Mitchell   ? Essential hypertension 6/30/2014   ? GI (gastrointestinal bleed)    ? GI bleeding 6/5/2017   ? Gout    ? L3 vertebral fracture 11/16/2015   ? Obesity    ? ZULEIKA (obstructive sleep apnea), severe, intolerant of CPAP 10/22/2015   ? Pneumonia 9-7-2015   ? Right foot drop    ? Spinal stenosis 3/28/2016   ? Stroke 3/24/2016    Past Surgical History:   Procedure Laterality Date   ? BACK SURGERY      [facility]   ? COLONOSCOPY N/A 3/23/2016    Procedure: COLONOSCOPY;  Surgeon: Ruddy Tejada MD;  Location: [facility];  Service:    ? DILATION AND CURETTAGE OF UTERUS     ? EYE SURGERY     ? HERNIA REPAIR     ? IL COLSC FLEXIBLE W/CONTROL BLEEDING ANY METHOD N/A 6/7/2017    Procedure: COLONOSCOPY;  Surgeon: Luis Mckeon MD;  Location: [facility];  Service: Gastroenterology   ? TONSILLECTOMY      Family History   Problem Relation Age of Onset   ? Dementia Mother    ? Diabetes Mother    ?  Arthritis Mother    ? Cancer Mother    ? Depression Mother    ? Heart disease Mother    ? Vision loss Mother    ? Stroke Father    ? Heart disease Father    ? Breast cancer Neg Hx     Social History     Social History   ? Marital status:      Spouse name: Cayden   ? Number of children: 2   ? Years of education: N/A     Occupational History   ?  Retired     Social History Main Topics   ? Smoking status: Former Smoker     Packs/day: 1.50     Years: 37.00     Types: Cigarettes     Quit date: 1/1/2009   ? Smokeless tobacco: Never Used   ? Alcohol use 7.0 oz/week     14 Standard drinks or equivalent per week      Comment: 14 mixed drinks per week   ? Drug use: No   ? Sexual activity: No     Other Topics Concern   ? Not on file     Social History Narrative    Lives with her . Daughter in [place] and daughter in [place].          Medications  Allergies   Current Outpatient Prescriptions   Medication Sig Dispense Refill   ? acetaminophen (TYLENOL) 500 MG tablet Take 500 mg by mouth every 6 (six) hours as needed for pain.     ? allopurinol (ZYLOPRIM) 100 MG tablet Take 100 mg by mouth daily.     ? aspirin 81 MG EC tablet Take 81 mg by mouth daily.     ? atorvastatin (LIPITOR) 10 MG tablet Take 10 mg by mouth at bedtime.     ? CHLORPHENIRAMINE/DEXTROMETHORP (CORICIDIN HBP COUGH AND COLD ORAL) Take 1 tablet by mouth daily as needed.      ? cholecalciferol, vitamin D3, 2,000 unit cap Take 2,000 Units by mouth daily with lunch.      ? digoxin (LANOXIN) 125 mcg tablet Take 125 mcg by mouth 3 (three) times a week. Take every Monday, Wednesday, and Saturday.     ? diphenhydrAMINE (BENADRYL) 50 MG tablet Take 50 mg by mouth at bedtime.      ? folic acid (FOLVITE) 1 MG tablet Take 1 mg by mouth daily.     ? gabapentin (NEURONTIN) 100 MG capsule Take 100 mg by mouth 3 (three) times a day.     ? ipratropium (ATROVENT) 42 mcg (0.06 %) nasal spray 1 spray into each nostril 3 (three) times a day. 15 mL 2   ? Lactobacillus  rhamnosus GG (CULTURELLE) 10-15 Billion cell capsule Take 1 capsule by mouth daily with lunch.     ? metoprolol tartrate (LOPRESSOR) 25 MG tablet Take 25 mg by mouth 2 (two) times a day.     ? midodrine (PROAMATINE) 5 MG tablet Take 10 mg by mouth 3 (three) times a week. Take every Monday, Wednesday, and Friday.     ? OXYGEN-AIR DELIVERY SYSTEMS MISC 2 L/min into each nostril daily as needed (SHORTNESS OF BREATH). Indications: shortness of breath     ? ranitidine (ZANTAC) 150 MG tablet Take 1 tablet (150 mg total) by mouth daily. 14 tablet 0   ? senna-docusate (SENNOSIDES-DOCUSATE SODIUM) 8.6-50 mg tablet Take 1 tablet by mouth at bedtime as needed for constipation.      ? sevelamer carbonate (RENVELA) 800 mg tablet Take 1,600 mg by mouth 3 (three) times a day with meals.     ? timolol maleate (TIMOPTIC) 0.5 % ophthalmic solution Administer 1 drop to both eyes 2 (two) times a day.      ? vit B comp no.3-folic-C-biotin (NEPHRO-OLGA) 1- mg-mg-mcg Tab tablet Take 1 tablet by mouth daily.     ? cinacalcet (SENSIPAR) 30 MG tablet Take 30 mg by mouth daily.       No current facility-administered medications for this visit.       Allergies   Allergen Reactions   ? Ace Inhibitors Cough   ? Fosinopril Sodium Cough         Lab Results    Chemistry/lipid CBC Cardiac Enzymes/BNP/TSH/INR   Lab Results   Component Value Date    CHOL 102 03/25/2016    HDL 43 (L) 03/25/2016    LDLCALC 47 03/25/2016    TRIG 60 03/25/2016    CREATININE 6.81 (HH) 12/10/2017    BUN 36 (H) 12/10/2017    K 4.8 12/10/2017     12/10/2017     12/10/2017    CO2 24 12/10/2017    Lab Results   Component Value Date    WBC 5.4 12/09/2017    HGB 10.6 (L) 12/12/2017    HCT 35.9 12/09/2017     (H) 12/09/2017     (L) 12/09/2017    Lab Results   Component Value Date    CKTOTAL 30 12/09/2017    CKMB 1 06/30/2014    TROPONINI 0.06 06/05/2017    BNP 1241 (H) 04/09/2017    TSH 5.50 (H) 04/09/2017    INR 1.17 (H) 12/09/2017          40  minutes were spent with the patient with greater than 50% spent on education and counseling.    This note has been dictated using voice recognition software. Any grammatical or context distortions are unintentional and inherent to the software.    Alyson Zhang PA-C, MPAS  Structural Heart Program  [facility]

## 2021-06-27 NOTE — PROGRESS NOTES
"Progress Notes by Gee Benítez MD at 6/14/2019  3:30 PM     Author: Gee Benítez MD Service: -- Author Type: Physician    Filed: 6/15/2019 10:28 AM Encounter Date: 6/14/2019 Status: Signed    : Gee Benítez MD (Physician)           Click to link to [facility] HEART CARE NOTE    Thank you, Dr. Escoto, for asking us to see Belia Rodriguez at the [facility].      Assessment/Recommendations   Patient with known chronic hemodialysis, atrial fibrillation, now with a controlled ventricular response because of AV node ablation and pacemaker implantation.  She also has a watchman device and is on low-dose aspirin.  She wants to have a eyelid repair and they want her to come off of aspirin for 5 days.  She does have significant risk for thromboembolic events but with the watchman device, I was unsure of what the risk of coming off aspirin would be but we will asked the watchman coordinators about this.  We will get back to her with this information.    I told her to let us know if adjusting her pacemaker causes reduced functional capacity.  It would not be standard but I am not at all opposed to going back to 80 bpm as a baseline heart rate if she feels better at that rate.    Thank you for allowing us to participate in her care.         History of Present Illness    Ms. Belia Rodriguez is a 73 y.o. female known persistent atrial fibrillation and had AV node ablation and a pacemaker placed.  She is also on hemodialysis.  She was last admitted to the hospital for fluid overload but her dialysis \"dry weight\" was dropped significantly and she is feeling much better.  She has not had any chest discomfort, syncope or near syncopal episodes.  Her pacemaker baseline heart rate was set at 70 and she just did not feel as well and it was set back up to 80.  Today her device was checked it was set back down to 70 but the rate responsive mode was accelerated " significantly, hoping that she will feel better at that rate and save battery.             Physical Examination Review of Systems   Vitals:    06/14/19 1459   BP: 110/60   Pulse: 88   Resp: 16     Body mass index is 27.12 kg/m .  Wt Readings from Last 3 Encounters:   06/14/19 163 lb (73.9 kg)   05/20/19 161 lb 9.6 oz (73.3 kg)   04/23/19 169 lb 6.4 oz (76.8 kg)     General Appearance:   Alert, cooperative and in no acute distress.   ENT/Mouth: Oral mucuos membranes pink and moist .      EYES:  No scleral icterus. No Xanthelasma.    Neck: JVP normal. No Hepatojugular reflux. Thyroid not visualized   Chest/Lungs:   Lungs are clear to auscultation except mild scattered rhonchi, equal chest wall expansion    Cardiovascular:   S1, S2 with 1/6 systolic murmur , no clicks or rubs. Brachial, radial pulses are intact and symetric. No carotid bruits noted   Abdomen:  Nontender. BS+.       Extremities: No cyanosis, clubbing mild ankle edema   Skin: no xanthelasma, warm.    Psych: Appropriate affect.   Neurologic: normal  bilateral, no tremors        General: WNL  Eyes: WNL  Ears/Nose/Throat: WNL  Lungs: WNL  Heart: WNL  Stomach: WNL  Bladder: WNL  Muscle/Joints: WNL  Skin: WNL  Nervous System: WNL  Mental Health: WNL     Blood: WNL     Medical History  Surgical History Family History Social History   Past Medical History:   Diagnosis Date   ? Arthritis    ? CHF (congestive heart failure) (H)    ? Chronic anemia 6/1/2014   ? Chronic kidney disease    ? Chronic thoracic aortic dissection (H) 10/7/2015    Descending thoracic aorta; treated medically per notes of Dragan Singh and Jennifer.   ? COPD (chronic obstructive pulmonary disease) (H)    ? CVA (cerebral infarction)    ? Disease of thyroid gland    ? Dyslipidemia    ? ESRD (end stage renal disease) (H) 06/03/2009    on dialysis with Dr. Mitchell   ? Essential hypertension 6/30/2014   ? Gastrointestinal hemorrhage, unspecified gastrointestinal hemorrhage type 6/5/2017   ?  GI (gastrointestinal bleed)    ? GI bleeding 6/5/2017   ? Gout    ? L3 vertebral fracture (H) 11/16/2015   ? Left Atrial Appendage Occlusion (WATCHMAN) 4/5/2018    LAAO April 5, 2018 (30 mm WATCHMAN)   ? Obesity    ? ZULEIKA (obstructive sleep apnea), severe, intolerant of CPAP 10/22/2015   ? Pneumonia 9-7-2015   ? Right foot drop    ? Spinal stenosis 3/28/2016   ? Stroke (H) 3/24/2016    Past Surgical History:   Procedure Laterality Date   ? BACK SURGERY      [facility]   ? COLONOSCOPY N/A 3/23/2016    Procedure: COLONOSCOPY;  Surgeon: Ruddy Tejada MD;  Location: [facility];  Service:    ? DILATION AND CURETTAGE OF UTERUS     ? EP ABLATION AV NODE N/A 3/7/2019    Procedure: EP Ablation AV Node;  Surgeon: Derick Duarte MD;  Location: [facility] Cath Lab;  Service: Cardiology   ? EP NEGRA CLOSURE N/A 4/5/2018    Procedure: EP NEGRA Closure;  Surgeon: Derick Duarte MD;  Location: [facility] Cath Lab;  Service:    ? EP PACEMAKER INSERT N/A 3/7/2019    Procedure: EP Pacemaker Insertion;  Surgeon: Derick Duarte MD;  Location: [facility] Cath Lab;  Service: Cardiology   ? EYE SURGERY     ? HERNIA REPAIR     ? IR TUNNELED CATHETER INSERT  11/20/2018   ? IR TUNNELED CATHETER REMOVAL  11/20/2018   ? ND COLSC FLEXIBLE W/CONTROL BLEEDING ANY METHOD N/A 6/7/2017    Procedure: COLONOSCOPY;  Surgeon: Luis Mckeon MD;  Location: [facility];  Service: Gastroenterology   ? TONSILLECTOMY      Family History   Problem Relation Age of Onset   ? Dementia Mother    ? Diabetes Mother    ? Arthritis Mother    ? Cancer Mother    ? Depression Mother    ? Heart disease Mother    ? Vision loss Mother    ? Stroke Father    ? Heart disease Father    ? Breast cancer Neg Hx     Social History     Socioeconomic History   ? Marital status:      Spouse name: Cayden   ? Number of children: 2   ? Years of education: Not on file   ? Highest education level: Not on file   Occupational History     Employer: RETIRED   Social  Needs   ? Financial resource strain: Not on file   ? Food insecurity:     Worry: Not on file     Inability: Not on file   ? Transportation needs:     Medical: Not on file     Non-medical: Not on file   Tobacco Use   ? Smoking status: Former Smoker     Packs/day: 1.50     Years: 37.00     Pack years: 55.50     Types: Cigarettes     Last attempt to quit: 1/1/2009     Years since quitting: 10.4   ? Smokeless tobacco: Never Used   Substance and Sexual Activity   ? Alcohol use: No     Alcohol/week: 7.0 oz     Types: 14 Standard drinks or equivalent per week     Comment: 14 mixed drinks per week   ? Drug use: No   ? Sexual activity: Never     Partners: Male   Lifestyle   ? Physical activity:     Days per week: Not on file     Minutes per session: Not on file   ? Stress: Not on file   Relationships   ? Social connections:     Talks on phone: Not on file     Gets together: Not on file     Attends [employer] service: Not on file     Active member of club or organization: Not on file     Attends meetings of clubs or organizations: Not on file     Relationship status: Not on file   ? Intimate partner violence:     Fear of current or ex partner: Not on file     Emotionally abused: Not on file     Physically abused: Not on file     Forced sexual activity: Not on file   Other Topics Concern   ? Not on file   Social History Narrative    Lives with her . Daughter in [place] and daughter in [place].          Medications  Allergies   Current Outpatient Medications   Medication Sig Dispense Refill   ? acetaminophen (TYLENOL) 500 MG tablet Take 500 mg by mouth every 6 (six) hours as needed for pain.     ? aspirin 81 MG EC tablet Take 81 mg by mouth daily with lunch.      ? atorvastatin (LIPITOR) 10 MG tablet Take 10 mg by mouth at bedtime.     ? B complex-vitamin C-folic acid (DIALYVITE) 100-1 mg Tab Take 1 tablet by mouth daily.     ? chlorpheniramine/dextromethorp (CORICIDIN HBP COUGH AND COLD ORAL) Take 1 tablet by mouth  every 6 (six) hours as needed.     ? cholecalciferol, vitamin D3, 2,000 unit cap Take 2,000 Units by mouth daily with lunch.      ? cinacalcet (SENSIPAR) 30 MG tablet Take 30 mg by mouth 3 times weekly with dialysis           ? diphenhydrAMINE (BENADRYL) 25 mg tablet Take 50 mg by mouth at bedtime as needed for sleep.     ? folic acid (FOLVITE) 1 MG tablet Take 1 mg by mouth daily.     ? gabapentin (NEURONTIN) 100 MG capsule Take 100 mg by mouth 3 (three) times a day.     ? Lactobacillus rhamnosus GG (CULTURELLE) 10-15 Billion cell capsule Take 1 capsule by mouth daily with lunch.     ? metoprolol succinate (TOPROL-XL) 25 MG Take 25 mg by mouth daily.     ? midodrine HCl (MIDODRINE ORAL) Take 15 mg by mouth 3 (three) times a week. Three times weekly before dialysis.            ? polyvinyl alcohol (LIQUIFILM TEARS) 1.4 % ophthalmic solution Apply 1 drop to eye as needed for dry eyes.     ? ranitidine (ZANTAC) 150 MG tablet Take 150 mg by mouth at bedtime.     ? senna-docusate (SENNOSIDES-DOCUSATE SODIUM) 8.6-50 mg tablet Take 1 tablet by mouth at bedtime.      ? sevelamer carbonate (RENVELA) 800 mg tablet Take 2,400 mg by mouth 3 (three) times a day with meals.            ? timolol maleate (TIMOPTIC) 0.5 % ophthalmic solution Administer 1 drop to both eyes 2 (two) times a day.      ? traZODone (DESYREL) 50 MG tablet Take 50 mg by mouth at bedtime.       No current facility-administered medications for this visit.       Allergies   Allergen Reactions   ? Ace Inhibitors Cough   ? Atrovent [Ipratropium Bromide] Headache   ? Fosinopril Sodium Cough   ? Zolpidem      hallucinations         Lab Results    Chemistry/lipid CBC Cardiac Enzymes/BNP/TSH/INR   Lab Results   Component Value Date    CHOL 102 03/25/2016    HDL 43 (L) 03/25/2016    LDLCALC 47 03/25/2016    TRIG 60 03/25/2016    CREATININE 4.91 (H) 05/18/2019    BUN 29 (H) 05/18/2019    K 5.0 05/18/2019     05/18/2019     05/18/2019    CO2 26 05/18/2019     Lab Results   Component Value Date    WBC 3.7 (L) 05/18/2019    HGB 11.5 (L) 05/18/2019    HCT 36.3 05/18/2019     (H) 05/18/2019    PLT 82 (L) 05/18/2019    Lab Results   Component Value Date    CKTOTAL 30 12/09/2017    CKMB 1 06/30/2014    TROPONINI 0.03 05/18/2019    BNP 1,894 (H) 05/18/2019    TSH 2.71 01/26/2018    INR 1.05 05/18/2019

## 2021-06-27 NOTE — PROGRESS NOTES
Progress Notes by Gee Benítez MD at 1/23/2019  3:10 PM     Author: Gee Benítez MD Service: -- Author Type: Physician    Filed: 1/23/2019  3:58 PM Encounter Date: 1/23/2019 Status: Signed    : Gee Benítez MD (Physician)           Click to link to [facility] HEART CARE NOTE    Thank you, Dr. Escoto, for asking us to see Belia Rodriguez at the [facility].      Assessment/Recommendations   Patient with known diastolic heart failure, obstructive pulmonary disease, end-stage renal disease, permanent atrial fibrillation with a watchman device protective of cardioembolic phenomena.  I believe her current weight is quite reasonable and I do not think she has evidence for pulmonary vascular congestion at this time.  Her central venous pressure is also normal.    I have not changing of her medications today.  I would recommend continuing her at this dry weight and I would like to see her back in 3 months but of course would be happy to see her sooner if questions or problems arise.    Thank you for allowing us to participate in her care.         History of Present Illness    Ms. Belia Rodriguez is a 73 y.o. female with known end-stage renal disease on hemodialysis, permanent atrial fibrillation, obstructive pulmonary disease, and diastolic heart failure in the past.  She has had a nuclear pharmacologic test within the last year which did not show evidence of myocardial ischemia.  She was recently hospitalized with shortness of breath which was felt to be an exacerbation of obstructive pulmonary disease with an element of diastolic heart failure.  She was dialyzed to a drier weight at that time and since her discharge is been feeling reasonably well although a bit tired.  She denies orthopnea or paroxysmal nocturnal dyspnea.  She admits that she has significant sleep apnea but she cannot tolerate a CPAP mask.  Fortunately she has a watchman device and as  she was not tolerant of anticoagulants.  She has not had any chest discomfort or recent syncope.  She denies palpitations.  Peripheral edema is been the best in quite some time.  She feels like her dry weight is about as low as it has been and of course this is monitored at dialysis on 3 times each week.       Physical Examination Review of Systems   Vitals:    01/23/19 1509   BP: 126/80   Pulse: 68   Resp: 20     Body mass index is 26.92 kg/m .  Wt Readings from Last 3 Encounters:   01/23/19 163 lb (73.9 kg)   01/14/19 164 lb 4.8 oz (74.5 kg)   01/06/19 158 lb 3.2 oz (71.8 kg)     General Appearance:   Alert, cooperative and in no acute distress.   ENT/Mouth: Oral mucuos membranes pink and moist .      EYES:  No scleral icterus. No Xanthelasma.    Neck: JVP normal. No Hepatojugular reflux. Thyroid not visualized   Chest/Lungs:   Lungs have diminished breath sounds at the bases bilaterally, equal chest wall expansion    Cardiovascular:   S1, S2 without murmur ,clicks or rubs. Brachial, radial  pulses are intact and symetric. No carotid bruits noted   Abdomen:  Nontender. BS+.       Extremities: No cyanosis, clubbing and mild bilateral pretibial edema   Skin: no xanthelasma, warm.    Psych: Appropriate affect.   Neurologic:  Slow gait with assistance to the exam table, normal  bilateral, no tremors        General: WNL  Eyes: WNL  Ears/Nose/Throat: WNL  Lungs: WNL  Heart: WNL  Stomach: WNL  Bladder: WNL  Muscle/Joints: WNL  Skin: WNL  Nervous System: WNL  Mental Health: WNL     Blood: WNL     Medical History  Surgical History Family History Social History   Past Medical History:   Diagnosis Date   ? Arthritis    ? CHF (congestive heart failure) (H)    ? Chronic anemia 6/1/2014   ? Chronic kidney disease    ? Chronic thoracic aortic dissection (H) 10/7/2015    Descending thoracic aorta; treated medically per notes of Dragan Singh and Jennifer.   ? COPD (chronic obstructive pulmonary disease) (H)    ? CVA (cerebral  infarction)    ? Disease of thyroid gland    ? Dyslipidemia    ? ESRD (end stage renal disease) (H) 06/03/2009    on dialysis with Dr. Mitchell   ? Essential hypertension 6/30/2014   ? Gastrointestinal hemorrhage, unspecified gastrointestinal hemorrhage type 6/5/2017   ? GI (gastrointestinal bleed)    ? GI bleeding 6/5/2017   ? Gout    ? L3 vertebral fracture (H) 11/16/2015   ? Left Atrial Appendage Occlusion (WATCHMAN) 4/5/2018    LAAO April 5, 2018 (30 mm WATCHMAN)   ? Obesity    ? ZULEIKA (obstructive sleep apnea), severe, intolerant of CPAP 10/22/2015   ? Pneumonia 9-7-2015   ? Right foot drop    ? Spinal stenosis 3/28/2016   ? Stroke (H) 3/24/2016    Past Surgical History:   Procedure Laterality Date   ? BACK SURGERY      [facility]   ? COLONOSCOPY N/A 3/23/2016    Procedure: COLONOSCOPY;  Surgeon: Ruddy Tejada MD;  Location: [facility] GI;  Service:    ? DILATION AND CURETTAGE OF UTERUS     ? EP NEGRA CLOSURE N/A 4/5/2018    Procedure: EP NEGRA Closure;  Surgeon: Derick Duarte MD;  Location: [facility] Cath Lab;  Service:    ? EYE SURGERY     ? HERNIA REPAIR     ? IR TUNNELED CATHETER INSERT  11/20/2018   ? IR TUNNELED CATHETER REMOVAL  11/20/2018   ? UT COLSC FLEXIBLE W/CONTROL BLEEDING ANY METHOD N/A 6/7/2017    Procedure: COLONOSCOPY;  Surgeon: Luis Mckeon MD;  Location: [facility] GI;  Service: Gastroenterology   ? TONSILLECTOMY      Family History   Problem Relation Age of Onset   ? Dementia Mother    ? Diabetes Mother    ? Arthritis Mother    ? Cancer Mother    ? Depression Mother    ? Heart disease Mother    ? Vision loss Mother    ? Stroke Father    ? Heart disease Father    ? Breast cancer Neg Hx     Social History     Socioeconomic History   ? Marital status:      Spouse name: Cayden   ? Number of children: 2   ? Years of education: Not on file   ? Highest education level: Not on file   Social Needs   ? Financial resource strain: Not on file   ? Food insecurity - worry: Not on file    ? Food insecurity - inability: Not on file   ? Transportation needs - medical: Not on file   ? Transportation needs - non-medical: Not on file   Occupational History     Employer: RETIRED   Tobacco Use   ? Smoking status: Former Smoker     Packs/day: 1.50     Years: 37.00     Pack years: 55.50     Types: Cigarettes     Last attempt to quit: 1/1/2009     Years since quitting: 10.0   ? Smokeless tobacco: Never Used   Substance and Sexual Activity   ? Alcohol use: No     Alcohol/week: 7.0 oz     Types: 14 Standard drinks or equivalent per week     Comment: 14 mixed drinks per week   ? Drug use: No   ? Sexual activity: No     Partners: Male   Other Topics Concern   ? Not on file   Social History Narrative    Lives with her . Daughter in [place] and daughter in [place].          Medications  Allergies   Current Outpatient Medications   Medication Sig Dispense Refill   ? acetaminophen (TYLENOL) 500 MG tablet Take 500 mg by mouth every 6 (six) hours as needed for pain.     ? albuterol (PROAIR HFA;PROVENTIL HFA;VENTOLIN HFA) 90 mcg/actuation inhaler Inhale 2 puffs every 4 (four) hours as needed for wheezing. 8 g 5   ? albuterol (PROVENTIL) 2.5 mg /3 mL (0.083 %) nebulizer solution Take 3 mL (2.5 mg total) by nebulization every 4 (four) hours as needed for wheezing or shortness of breath. 30 vial 5   ? aspirin 81 MG EC tablet Take 81 mg by mouth daily with lunch.      ? atorvastatin (LIPITOR) 10 MG tablet Take 10 mg by mouth at bedtime.     ? CHLORPHENIRAMINE/DEXTROMETHORP (CORICIDIN HBP COUGH AND COLD ORAL) Take 1 tablet by mouth daily as needed.      ? cholecalciferol, vitamin D3, 2,000 unit cap Take 2,000 Units by mouth daily with lunch.      ? cinacalcet (SENSIPAR) 30 MG tablet Take 30 mg by mouth 3 times weekly with dialysis           ? clopidogrel (PLAVIX) 75 mg tablet Take 75 mg by mouth daily.     ? DIALYVITE 100-1 mg Tab TAKE ONE TABLET BY MOUTH DAILY IN THE EVENING 90 tablet 0   ? digoxin (LANOXIN) 125  mcg tablet TAKE 1 TABLET ORALLY 3 TIMES A WEEK. 30 tablet 0   ? diphenhydrAMINE (BENADRYL) 25 mg tablet Take 2 tablets (50 mg total) by mouth at bedtime. 15 tablet 0   ? folic acid (FOLVITE) 1 MG tablet TAKE ONE TABLET BY MOUTH ONCE DAILY 90 tablet 3   ? gabapentin (NEURONTIN) 100 MG capsule TAKE 1 CAPSULE BY MOUTH THREE TIMES DAILY 270 capsule 0   ? Lactobacillus rhamnosus GG (CULTURELLE) 10-15 Billion cell capsule Take 1 capsule by mouth daily with lunch.     ? metoprolol tartrate (LOPRESSOR) 25 MG tablet Take 1 tablet (25 mg total) by mouth 2 (two) times a day. (Patient taking differently: Take 25 mg by mouth daily .      ) 60 tablet 11   ? midodrine (PROAMATINE) 5 MG tablet Take 3 tablets (15 mg total) by mouth 3 (three) times a week. Take every Monday, Wednesday, and Friday in the morning. 36 tablet 11   ? polyvinyl alcohol (LIQUIFILM TEARS) 1.4 % ophthalmic solution Apply 1 drop to eye as needed for dry eyes.     ? ranitidine (ZANTAC) 150 MG tablet Take 150 mg by mouth at bedtime.     ? senna-docusate (SENNOSIDES-DOCUSATE SODIUM) 8.6-50 mg tablet Take 1 tablet by mouth at bedtime.      ? sevelamer carbonate (RENVELA) 800 mg tablet TAKE 2 TABLETS BY MOUTH 3 TIMES A DAY WITH MEALS AND 2 TABS WITH SNACKS 2 TIMES A DAY (Patient taking differently: TAKE 3 TABLETS BY MOUTH 3 TIMES A DAY WITH MEALS) 300 tablet 0   ? timolol maleate (TIMOPTIC) 0.5 % ophthalmic solution Administer 1 drop to both eyes 2 (two) times a day.        No current facility-administered medications for this visit.       Allergies   Allergen Reactions   ? Ace Inhibitors Cough   ? Atrovent [Ipratropium Bromide] Headache   ? Fosinopril Sodium Cough   ? Zolpidem      hallucinations         Lab Results    Chemistry/lipid CBC Cardiac Enzymes/BNP/TSH/INR   Lab Results   Component Value Date    CHOL 102 03/25/2016    HDL 43 (L) 03/25/2016    LDLCALC 47 03/25/2016    TRIG 60 03/25/2016    CREATININE 3.09 (H) 01/04/2019    BUN 13 01/04/2019    K 4.5  01/07/2019     01/04/2019     01/04/2019    CO2 27 01/04/2019    Lab Results   Component Value Date    WBC 6.6 01/04/2019    HGB 9.2 (L) 01/04/2019    HCT 28.1 (L) 01/04/2019     (H) 01/04/2019     01/04/2019    Lab Results   Component Value Date    CKTOTAL 30 12/09/2017    CKMB 1 06/30/2014    TROPONINI 0.03 01/04/2019    BNP 1,946 (H) 01/04/2019    TSH 2.71 01/26/2018    INR 1.13 (H) 01/04/2019

## 2021-06-27 NOTE — PROGRESS NOTES
Progress Notes by Alyson Hernandez PA-C at 4/12/2019  3:30 PM     Author: Alyson Hernandez PA-C Service: -- Author Type: Physician Assistant    Filed: 4/12/2019  4:31 PM Encounter Date: 4/12/2019 Status: Signed    : Alyson Hernandez PA-C (Physician Assistant)           Click to link to [facility] Heart Care     [facility] HEART CARE NOTE      Assessment/Recommendations   Problem List Items Addressed This Visit     left atrial appendage closure (WATCHMAN) - Primary    ESRD on dialysis (H)    Chronic atrial fibrillation (H)          1.  Atrial fibrillation: With tachy-amanda syndrome and s/p AV node ablation with single-chamber pacemaker placement on March 7 with Dr. Duarte.  On first clinic device check March 19 there was normal device function and the LRL was decreased to 70 bpm.  Patient noticed increased fatigue once that change was made and on clinic device check yesterday at the LRL was increased back to 80 bpm.     She is now 1 year status post watchman implant.  She is very happy with her decision to have the watchman in.  She has significantly less bleeding issues with her dialysis runs being only on baby aspirin.  She will continue this indefinitely.  I will see her again in 1 year for her 2-year follow-up for watchman.    2.  End-stage renal disease: On hemodialysis.  She takes Midodrin on her dialysis days to decrease hypotensive episodes with runs.  She did have to have an extra dialysis run last week for fluid overload.    3.  Chronic diastolic heart failure, NYHA class III: Complicated by end-stage renal disease.  Lower extremity edema is much improved from when I saw her last time.  She did have an extra dialysis run last week for fluid overload.  Minimal shortness of breath.  She should see Dr. Benítez in May or June for her regular scheduled follow-up visit.  She was supposed to see him in March, but this got missed because of her anticipated AV node ablation procedure.      History of Present Illness    Belia Rodriguez is a 73 y.o. female who comes in today for her one-year follow-up visit after watchman implant.  Her  is with her.    Belia Rodriguez has a past history of chronic atrial fibrillation, end-stage renal disease, chronic diastolic heart failure, recurrent GI bleed, hypertension and tachybradycardia syndrome requiring AV node ablation, then with complete heart block secondary to the ablation requiring single-chamber pacemaker placement.    Belia had watchman implant because of her recurrent GI bleeding and other comorbidities problems with dialysis runs.  Implant was April 5.  She has had no neurological events or symptoms since that time.  She states that she is short of breath with activity occasionally and she also notices short of breath when she is fluid overloaded.  She had to have an extra dialysis run last week.  Overall she is doing quite well and feels much better since pacemaker settings worse increased to 80 bpm.  She felt very fatigued and tired with the rates down to 70 bpm.    Belia Rodriguez denies chest discomfort, palpitations, paroxysmal nocturnal dyspnea, orthopnea, lightheadedness, dizziness, pre-syncope, or syncope.  Belia Rodriguez also denies any weight loss, changes in appetite, nausea or vomiting.     Medical, surgical, family, social history, and medications were reviewed and updated as necessary.       Physical Examination Review of Systems   Vitals:    04/12/19 1535   BP: 130/68   Pulse: 84   Resp: 20     Body mass index is 27.62 kg/m .  Wt Readings from Last 3 Encounters:   04/12/19 166 lb (75.3 kg)   04/03/19 166 lb (75.3 kg)   03/26/19 164 lb 12.8 oz (74.8 kg)       General Appearance:   Alert, cooperative and in no acute distress   ENT/Mouth: membranes moist, no oral lesions or bleeding gums.      EYES:  no scleral icterus, normal conjunctivae   Neck: Thyroid not visualized   Chest/Lungs:   lungs are clear to  auscultation, no rales or wheezing   Cardiovascular:   Regular . Normal first and second heart sounds with no murmurs, rubs or gallops; the carotid, radial and posterior tibial pulses are intact, minimal edema bilaterally    Abdomen:  Soft and nontender. Bowel sounds are present in all quadrants   Extremities: no cyanosis or clubbing   Skin: no xanthelasma, warm.    Neurologic: normal gait, normal  bilateral, no tremors   Psychiatric: Normal mood and affect    General: WNL  Eyes: WNL  Ears/Nose/Throat: WNL  Lungs: WNL  Heart: WNL  Stomach: WNL  Bladder: WNL  Muscle/Joints: WNL  Skin: WNL  Nervous System: WNL  Mental Health: WNL     Blood: WNL     Medical History  Surgical History Family History Social History   Past Medical History:   Diagnosis Date   ? Arthritis    ? CHF (congestive heart failure) (H)    ? Chronic anemia 6/1/2014   ? Chronic kidney disease    ? Chronic thoracic aortic dissection (H) 10/7/2015    Descending thoracic aorta; treated medically per notes of Dragan Singh and Jennifer.   ? COPD (chronic obstructive pulmonary disease) (H)    ? CVA (cerebral infarction)    ? Disease of thyroid gland    ? Dyslipidemia    ? ESRD (end stage renal disease) (H) 06/03/2009    on dialysis with Dr. Mitchell   ? Essential hypertension 6/30/2014   ? Gastrointestinal hemorrhage, unspecified gastrointestinal hemorrhage type 6/5/2017   ? GI (gastrointestinal bleed)    ? GI bleeding 6/5/2017   ? Gout    ? L3 vertebral fracture (H) 11/16/2015   ? Left Atrial Appendage Occlusion (WATCHMAN) 4/5/2018    LAAO April 5, 2018 (30 mm WATCHMAN)   ? Obesity    ? ZULEIKA (obstructive sleep apnea), severe, intolerant of CPAP 10/22/2015   ? Pneumonia 9-7-2015   ? Right foot drop    ? Spinal stenosis 3/28/2016   ? Stroke (H) 3/24/2016    Past Surgical History:   Procedure Laterality Date   ? BACK SURGERY      [facility]   ? COLONOSCOPY N/A 3/23/2016    Procedure: COLONOSCOPY;  Surgeon: Ruddy Tejada MD;  Location: [facility];   Service:    ? DILATION AND CURETTAGE OF UTERUS     ? EP ABLATION AV NODE N/A 3/7/2019    Procedure: EP Ablation AV Node;  Surgeon: Derick Duarte MD;  Location: [facility] Cath Lab;  Service: Cardiology   ? EP NEGRA CLOSURE N/A 4/5/2018    Procedure: EP NEGRA Closure;  Surgeon: Derick Duarte MD;  Location: [facility] Cath Lab;  Service:    ? EP PACEMAKER INSERT N/A 3/7/2019    Procedure: EP Pacemaker Insertion;  Surgeon: Derick Duarte MD;  Location: [facility] Cath Lab;  Service: Cardiology   ? EYE SURGERY     ? HERNIA REPAIR     ? IR TUNNELED CATHETER INSERT  11/20/2018   ? IR TUNNELED CATHETER REMOVAL  11/20/2018   ? NM COLSC FLEXIBLE W/CONTROL BLEEDING ANY METHOD N/A 6/7/2017    Procedure: COLONOSCOPY;  Surgeon: Luis Mckeon MD;  Location[facility];  Service: Gastroenterology   ? TONSILLECTOMY      Family History   Problem Relation Age of Onset   ? Dementia Mother    ? Diabetes Mother    ? Arthritis Mother    ? Cancer Mother    ? Depression Mother    ? Heart disease Mother    ? Vision loss Mother    ? Stroke Father    ? Heart disease Father    ? Breast cancer Neg Hx     Social History     Socioeconomic History   ? Marital status:      Spouse name: Cayden   ? Number of children: 2   ? Years of education: Not on file   ? Highest education level: Not on file   Occupational History     Employer: RETIRED   Social Needs   ? Financial resource strain: Not on file   ? Food insecurity:     Worry: Not on file     Inability: Not on file   ? Transportation needs:     Medical: Not on file     Non-medical: Not on file   Tobacco Use   ? Smoking status: Former Smoker     Packs/day: 1.50     Years: 37.00     Pack years: 55.50     Types: Cigarettes     Last attempt to quit: 1/1/2009     Years since quitting: 10.2   ? Smokeless tobacco: Never Used   Substance and Sexual Activity   ? Alcohol use: No     Alcohol/week: 7.0 oz     Types: 14 Standard drinks or equivalent per week     Comment: 14 mixed drinks per  week   ? Drug use: No   ? Sexual activity: Never     Partners: Male   Lifestyle   ? Physical activity:     Days per week: Not on file     Minutes per session: Not on file   ? Stress: Not on file   Relationships   ? Social connections:     Talks on phone: Not on file     Gets together: Not on file     Attends Christian service: Not on file     Active member of club or organization: Not on file     Attends meetings of clubs or organizations: Not on file     Relationship status: Not on file   ? Intimate partner violence:     Fear of current or ex partner: Not on file     Emotionally abused: Not on file     Physically abused: Not on file     Forced sexual activity: Not on file   Other Topics Concern   ? Not on file   Social History Narrative    Lives with her . Daughter in [place] and daughter in [place].          Medications  Allergies   Current Outpatient Medications   Medication Sig Dispense Refill   ? acetaminophen (TYLENOL) 500 MG tablet Take 500 mg by mouth every 6 (six) hours as needed for pain.     ? aspirin 81 MG EC tablet Take 81 mg by mouth daily with lunch.      ? atorvastatin (LIPITOR) 10 MG tablet Take 10 mg by mouth at bedtime.     ? cholecalciferol, vitamin D3, 2,000 unit cap Take 2,000 Units by mouth daily with lunch.      ? cinacalcet (SENSIPAR) 30 MG tablet Take 30 mg by mouth 3 times weekly with dialysis           ? DIALYVITE 100-1 mg Tab TAKE ONE TABLET BY MOUTH DAILY IN THE EVENING 90 tablet 0   ? folic acid (FOLVITE) 1 MG tablet TAKE ONE TABLET BY MOUTH ONCE DAILY 90 tablet 3   ? gabapentin (NEURONTIN) 100 MG capsule TAKE 1 CAPSULE BY MOUTH THREE TIMES DAILY 270 capsule 3   ? Lactobacillus rhamnosus GG (CULTURELLE) 10-15 Billion cell capsule Take 1 capsule by mouth daily with lunch.     ? metoprolol succinate (TOPROL XL) 25 MG Take 1 tablet (25 mg total) by mouth daily. 90 tablet 3   ? midodrine (PROAMATINE) 5 MG tablet TAKE 3 TABLETS BY MOUTH EVERY MON, WED, AND FRI MORNING 36 tablet 0    ? polyvinyl alcohol (LIQUIFILM TEARS) 1.4 % ophthalmic solution Apply 1 drop to eye as needed for dry eyes.     ? ranitidine (ZANTAC) 150 MG tablet Take 150 mg by mouth at bedtime.     ? senna-docusate (SENNOSIDES-DOCUSATE SODIUM) 8.6-50 mg tablet Take 1 tablet by mouth at bedtime.      ? sevelamer carbonate (RENVELA) 800 mg tablet Take 1,600 mg by mouth 3 (three) times a day with meals.     ? sevelamer carbonate (RENVELA) 800 mg tablet Take 1,600 mg by mouth 2 (two) times a day as needed (with snacks).     ? timolol maleate (TIMOPTIC) 0.5 % ophthalmic solution Administer 1 drop to both eyes 2 (two) times a day.      ? traZODone (DESYREL) 50 MG tablet Take 1 tablet (50 mg total) by mouth at bedtime as needed for sleep. 30 tablet 11     No current facility-administered medications for this visit.       Allergies   Allergen Reactions   ? Ace Inhibitors Cough   ? Atrovent [Ipratropium Bromide] Headache   ? Fosinopril Sodium Cough   ? Zolpidem      hallucinations         Lab Results    Chemistry/lipid CBC Cardiac Enzymes/BNP/TSH/INR   Lab Results   Component Value Date    CHOL 102 03/25/2016    HDL 43 (L) 03/25/2016    LDLCALC 47 03/25/2016    TRIG 60 03/25/2016    CREATININE 4.01 (H) 03/06/2019    BUN 17 03/06/2019    K 4.8 03/08/2019     03/06/2019     03/06/2019    CO2 26 03/06/2019    Lab Results   Component Value Date    WBC 4.3 03/03/2019    HGB 10.1 (L) 03/08/2019    HCT 30.1 (L) 03/03/2019     (H) 03/03/2019     (L) 03/03/2019    Lab Results   Component Value Date    CKTOTAL 30 12/09/2017    CKMB 1 06/30/2014    TROPONINI 0.02 03/03/2019    BNP 1,477 (H) 03/03/2019    TSH 2.71 01/26/2018    INR 1.11 (H) 03/03/2019          25 minutes were spent with the patient with greater than 50% spent on education and counseling.    This note has been dictated using voice recognition software. Any grammatical or context distortions are unintentional and inherent to the software.    Alyson BLAKELY  RYLIE Hernandez, MPAS  Structural Heart Program  [facility]

## 2021-06-27 NOTE — PROGRESS NOTES
Progress Notes by Braden Fishman NP at 2/19/2019  2:50 PM     Author: Braden Fishman NP Service: -- Author Type: Nurse Practitioner    Filed: 2/22/2019  8:46 AM Encounter Date: 2/19/2019 Status: Addendum    : Braden Fishman NP (Nurse Practitioner)    Related Notes: Original Note by Braden Fishman NP (Nurse Practitioner) filed at 2/19/2019  4:48 PM           Click to link to [facility] Heart Care     [facility] HEART CARE NOTE      Assessment/Recommendations   Assessment:    1. Chronic diastolic heart failure, NYHA class III: Patient was recently hospitalized from 1/29/2019 -2/3/2019 with heart failure exacerbation with volume overload.  Orthopnea and lower extremity edema improved after dialysis.     She is well compensated except lower extremity edema and  shortness of breath with exertion.  She denies orthopnea or PND.  She gets fatigue and excessive sleepiness especially after dialysis.    2.  Chronic atrial fibrillation with RVR: Noted heart rate in 80s-110's today.  Due to bradycardia, digoxin was discontinued and diltiazem dose was reduced in the hospital.     Recent Holter study report from 2/14/2019 showed average heart rate of 80-90s with RVR with activities with -150 bpm with shortness of breath.    3.  Hypertension: Blood pressure today is 110/68.  She is asymptomatic. Was started on doxazosin since the diltiazem dose was reduced in the hospital. She is also on midodrine 15 mg on dialysis days in the morning.    4.  End-stage renal disease: on dialysis every Monday, Wednesday, and Friday     Plan:  1.  Patient declined heart failure video.  Reinforced low-sodium diet < 2 g/day, daily weight monitoring, and stay active as tolerated    2.  Discussed with Cy Powers CNP regarding Afib management.  Recommended diltiazem 120 mg every Monday, Wednesday, and Friday after dialysis and 240 mg on Tuesday, Thursday Saturday, and Sunday (non dialysis days) in AM.  Patient was recommended to  monitor blood pressure and heart rate and call us with readings in a week.  She was encouraged to call  if experiences lightheadedness or dizziness with low blood pressure.    3.  Stopped doxazosin since increasing diltiazem dose today    4. Addendum: 2/22/19- note faxed to her dialysis clinic with med changes    Because of multiple doctor appointments, patient declined to follow-up in the heart failure clinic.  She agreed to follow-up with Dr. Benítez in March as scheduled     History of Present Illness    Ms. Belia Rodriguez is a 73 y.o. female with a significant past medical history of persistent atrial fibrillation, status post left atrial appendage occlusion, obstructive sleep apnea, ESRD on dialysis, and chronic diastolic heart failure who is seen at [facility] heart failure clinic today for recent posthospitalization follow-up.  Patient was recently admitted from January 29 - February 3, 2019 with heart failure exacerbation and volume overload.  Her orthopnea and lower extremity edema improved after dialysis.  She was also noted to be in bradycardia with heart rate in low 40s and 50s.  Her digoxin was discontinued and diltiazem dose was reduced.    Recent Holter monitor showed average heart rate of 80-90s with rapid ventricular response with activities between 100-150 bpm more in the late morning and early afternoon when patient is more active.  She was also having more shortness of breath with rapid ventricular response.    Today, patient presented to the heart failure clinic accompanied by her .  She reports feeling much improvement since hospitalization with her heart failure symptoms.  She reports eating hot dogs before she got hospitalized with heart failure exacerbation.  She denies fatigue, lightheadedness, shortness of breath, orthopnea, PND, palpitations, chest pain and abdominal fullness/bloating.  She normally hold her doxazosin and diltiazem on the dialysis days.    She reports  "her weight around 164 pounds at dialysis.  Posthospitalization her weight was 162 pounds.  She reports following low-sodium diet.    Holter Study report  TEST DATE:  02/12/2019     INTERPRETATION:  Predominant rhythm is atrial fibrillation, which is present  throughout the monitoring period.  Ventricular response ranged from 52 beats per  minute to 176 beats per minute with average ventricular response of 99 beats per  minute.  Average resting ventricular response appeared to be approximately 80-90  beats per minute.  She tended to have a rapid ventricular response with activity  with rates commonly between 100 and approximately 150 beats per minute.  Ventricular  response was the fastest in the late morning and early afternoon, presumably when  she was more active.  Rare single ventricular premature beats were present, 45 over  24 hours.  There were 4 ventricular couplets.  Symptoms of shortness of breath,  usually with walking, were associated with atrial fibrillation and ventricular  response ranging from 124 to 160 beats per minute.     CONCLUSION:  Persistent atrial fibrillation with some tendency towards rapid  ventricular response with activity.  Associated symptoms of dyspnea on exertion were  noted.     ECHO-Reviewed:   Results for orders placed during the hospital encounter of 01/04/19   Echo Complete [ECH10] 01/05/2019    Narrative   When compared to the previous study dated 5/24/2018, no significant   change.    Normal left ventricular size, wall motion and function. Significantly   elevated left ventricular filling pressure E/e\" >15. Left ventricle   ejection fraction is normal. The calculated left ventricular ejection   fraction is 62%.    Mildly enlarged right ventricle with mildly reduced right ventricular   systolic performance.    Moderately enlarged left atrium.    No obvious valvular disease.        Physical Examination Review of Systems   Vitals:    02/19/19 1457   BP: 110/68   Pulse: 80   Resp: " 20     Body mass index is 27.28 kg/m .  Wt Readings from Last 3 Encounters:   02/19/19 169 lb (76.7 kg)   02/11/19 162 lb 9.6 oz (73.8 kg)   02/03/19 158 lb 8 oz (71.9 kg)       General Appearance:     Alert, cooperative and in no acute distress.   ENT/Mouth: membranes moist, no oral lesions or bleeding gums.      EYES:  no scleral icterus, normal conjunctivae   Neck: no carotid bruits or thyromegaly   Chest/Lungs:   lungs are clear to auscultation, no rales or wheezing, respirations unlabored   Cardiovascular:    Irregularly irregular. Normal first and second heart sounds with no murmurs, rubs, or gallops; the carotid, radial and posterior tibial pulses are intact, Jugular venous pressure mild, 2+ (rt>lt) pitting edema bilateral lower extremities    Abdomen:  Soft, nontender, nondistended, bowel sounds present   Extremities: no cyanosis or clubbing   Skin: warm, dry.    Neurologic: mood and affect are appropriate, alert and oriented x3    General: WNL  Eyes: WNL  Ears/Nose/Throat: WNL  Lungs: WNL  Heart: WNL  Stomach: WNL  Bladder: WNL  Muscle/Joints: WNL  Skin: WNL  Nervous System: WNL  Mental Health: WNL     Blood: WNL     Medical History  Surgical History Family History Social History   Past Medical History:   Diagnosis Date   ? Arthritis    ? CHF (congestive heart failure) (H)    ? Chronic anemia 6/1/2014   ? Chronic kidney disease    ? Chronic thoracic aortic dissection (H) 10/7/2015    Descending thoracic aorta; treated medically per notes of Dragan Singh and Jnenifer.   ? COPD (chronic obstructive pulmonary disease) (H)    ? CVA (cerebral infarction)    ? Disease of thyroid gland    ? Dyslipidemia    ? ESRD (end stage renal disease) (H) 06/03/2009    on dialysis with Dr. Mitchell   ? Essential hypertension 6/30/2014   ? Gastrointestinal hemorrhage, unspecified gastrointestinal hemorrhage type 6/5/2017   ? GI (gastrointestinal bleed)    ? GI bleeding 6/5/2017   ? Gout    ? L3 vertebral fracture (H) 11/16/2015    ? Left Atrial Appendage Occlusion (WATCHMAN) 4/5/2018    LAAO April 5, 2018 (30 mm WATCHMAN)   ? Obesity    ? ZULEIKA (obstructive sleep apnea), severe, intolerant of CPAP 10/22/2015   ? Pneumonia 9-7-2015   ? Right foot drop    ? Spinal stenosis 3/28/2016   ? Stroke (H) 3/24/2016    Past Surgical History:   Procedure Laterality Date   ? BACK SURGERY      [facility]   ? COLONOSCOPY N/A 3/23/2016    Procedure: COLONOSCOPY;  Surgeon: Ruddy Tejada MD;  Location: [facility];  Service:    ? DILATION AND CURETTAGE OF UTERUS     ? EP NEGRA CLOSURE N/A 4/5/2018    Procedure: EP NEGRA Closure;  Surgeon: Derick Duarte MD;  Location: [facility] Cath Lab;  Service:    ? EYE SURGERY     ? HERNIA REPAIR     ? IR TUNNELED CATHETER INSERT  11/20/2018   ? IR TUNNELED CATHETER REMOVAL  11/20/2018   ? NE COLSC FLEXIBLE W/CONTROL BLEEDING ANY METHOD N/A 6/7/2017    Procedure: COLONOSCOPY;  Surgeon: Luis Mckeon MD;  Location: [facility];  Service: Gastroenterology   ? TONSILLECTOMY      Family History   Problem Relation Age of Onset   ? Dementia Mother    ? Diabetes Mother    ? Arthritis Mother    ? Cancer Mother    ? Depression Mother    ? Heart disease Mother    ? Vision loss Mother    ? Stroke Father    ? Heart disease Father    ? Breast cancer Neg Hx     Social History     Socioeconomic History   ? Marital status:      Spouse name: Cayden   ? Number of children: 2   ? Years of education: Not on file   ? Highest education level: Not on file   Social Needs   ? Financial resource strain: Not on file   ? Food insecurity - worry: Not on file   ? Food insecurity - inability: Not on file   ? Transportation needs - medical: Not on file   ? Transportation needs - non-medical: Not on file   Occupational History     Employer: RETIRED   Tobacco Use   ? Smoking status: Former Smoker     Packs/day: 1.50     Years: 37.00     Pack years: 55.50     Types: Cigarettes     Last attempt to quit: 1/1/2009     Years since quitting:  10.1   ? Smokeless tobacco: Never Used   Substance and Sexual Activity   ? Alcohol use: No     Alcohol/week: 7.0 oz     Types: 14 Standard drinks or equivalent per week     Comment: 14 mixed drinks per week   ? Drug use: No   ? Sexual activity: No     Partners: Male   Other Topics Concern   ? Not on file   Social History Narrative    Lives with her . Daughter in [place] and daughter in [place].          Medications  Allergies   Current Outpatient Medications   Medication Sig Dispense Refill   ? acetaminophen (TYLENOL) 500 MG tablet Take 500 mg by mouth every 6 (six) hours as needed for pain.     ? albuterol (PROAIR HFA;PROVENTIL HFA;VENTOLIN HFA) 90 mcg/actuation inhaler Inhale 2 puffs every 4 (four) hours as needed for wheezing. 8 g 5   ? albuterol (PROVENTIL) 2.5 mg /3 mL (0.083 %) nebulizer solution Take 3 mL (2.5 mg total) by nebulization every 4 (four) hours as needed for wheezing or shortness of breath. 30 vial 5   ? aspirin 81 MG EC tablet Take 81 mg by mouth daily with lunch.      ? atorvastatin (LIPITOR) 10 MG tablet Take 10 mg by mouth at bedtime.     ? CHLORPHENIRAMINE/DEXTROMETHORP (CORICIDIN HBP COUGH AND COLD ORAL) Take 1 tablet by mouth daily as needed.      ? cholecalciferol, vitamin D3, 2,000 unit cap Take 2,000 Units by mouth daily with lunch.      ? cinacalcet (SENSIPAR) 30 MG tablet Take 30 mg by mouth 3 times weekly with dialysis           ? DIALYVITE 100-1 mg Tab TAKE ONE TABLET BY MOUTH DAILY IN THE EVENING 90 tablet 0   ? diltiazem (CARDIZEM CD) 120 MG 24 hr capsule Take 1 capsule (120 mg total) by mouth daily. 90 capsule 3   ? diphenhydrAMINE (BENADRYL) 25 mg tablet Take 2 tablets (50 mg total) by mouth at bedtime. 15 tablet 0   ? doxazosin (CARDURA) 1 MG tablet Take 1 tablet (1 mg total) by mouth daily. Hold on dialysis days 90 tablet 3   ? folic acid (FOLVITE) 1 MG tablet TAKE ONE TABLET BY MOUTH ONCE DAILY 90 tablet 3   ? gabapentin (NEURONTIN) 100 MG capsule TAKE 1 CAPSULE BY  MOUTH THREE TIMES DAILY 270 capsule 3   ? Lactobacillus rhamnosus GG (CULTURELLE) 10-15 Billion cell capsule Take 1 capsule by mouth daily with lunch.     ? metoprolol tartrate (LOPRESSOR) 25 MG tablet Take 1 tablet (25 mg total) by mouth 2 (two) times a day. 60 tablet 11   ? midodrine (PROAMATINE) 5 MG tablet Take 3 tablets (15 mg total) by mouth 3 (three) times a week. Take every Monday, Wednesday, and Friday in the morning. 36 tablet 11   ? polyvinyl alcohol (LIQUIFILM TEARS) 1.4 % ophthalmic solution Apply 1 drop to eye as needed for dry eyes.     ? ranitidine (ZANTAC) 150 MG tablet Take 150 mg by mouth at bedtime.     ? senna-docusate (SENNOSIDES-DOCUSATE SODIUM) 8.6-50 mg tablet Take 1 tablet by mouth at bedtime.      ? sevelamer carbonate (RENVELA) 800 mg tablet Take 1,600 mg by mouth 3 (three) times a day with meals.     ? timolol maleate (TIMOPTIC) 0.5 % ophthalmic solution Administer 1 drop to both eyes 2 (two) times a day.      ? sevelamer carbonate (RENVELA) 800 mg tablet Take 1,600 mg by mouth 2 (two) times a day as needed (with snacks).       No current facility-administered medications for this visit.       Allergies   Allergen Reactions   ? Ace Inhibitors Cough   ? Atrovent [Ipratropium Bromide] Headache   ? Fosinopril Sodium Cough   ? Zolpidem      hallucinations         Lab Results    Chemistry CBC BNP   Lab Results   Component Value Date    CREATININE 4.41 (H) 01/31/2019    BUN 20 01/31/2019     01/31/2019    K 4.3 01/31/2019     01/31/2019    CO2 27 01/31/2019     Creatinine (mg/dL)   Date Value   01/31/2019 4.41 (H)   01/29/2019 5.45 (H)   01/04/2019 3.09 (H)   11/20/2018 8.16 (HH)    Lab Results   Component Value Date    WBC 3.8 (L) 01/29/2019    HGB 9.2 (L) 01/31/2019    HCT 29.4 (L) 01/29/2019     (H) 01/29/2019    PLT 94 (L) 02/02/2019    Lab Results   Component Value Date    BNP 2,025 (H) 01/29/2019     BNP (pg/mL)   Date Value   01/29/2019 2,025 (H)   01/04/2019 1,946  (H)   09/21/2018 1,366 (H)        Braden Fishman, [facility]   Heart Failure Clinic         This note has been dictated using voice recognition software. Any grammatical, typographical, or context distortions are unintentional and inherent to the software

## 2021-06-28 NOTE — PROGRESS NOTES
Progress Notes by Fidelia Ly CNP at 9/19/2019 10:30 AM     Author: Fidelia Ly CNP Service: -- Author Type: Nurse Practitioner    Filed: 9/19/2019  2:21 PM Encounter Date: 9/19/2019 Status: Signed    : Fidelia Ly CNP (Nurse Practitioner)           Click to link to [facility]     [facility] HEART CARE ELECTROPHYSIOLOGY NOTE      Assessment/Recommendations   Assessment/Plan:    1.  Permanent atrial fibrillation: Ventricular rates controlled since AV node ablation in March 2019.  She underwent left atrial appendage closure with the watchman device as an alternative to long-term oral anticoagulation therapy on 4/5/2018.  She remains on low-dose daily aspirin, which is to continue indefinitely.  She is temporarily back on warfarin due to a DVT, though this was not unprovoked and occurred in the setting of immobility after femoral fracture and surgery.  We discussed that this will likely be discontinued fairly soon.    2.  Complete heart block status post AV node ablation and pacemaker implant:  The pacemaker was interrogated and is functioning appropriately.  The battery shows estimated longevity of 9.5 years.  Right ventricular lead impedance and pacing thresholds are stable and within normal limits (unable to test sensing today as she was paced at VVI 30).  She is quite symptomatic when lower rate set to 60 bpm; feels much better with a lower rate at 70 bpm.    3.  Hypertension: Blood pressure bit elevated today, but is been consistently at target after dialysis.  She continues to have issues with hypotension during dialysis for which she gets midodrine.  Continue metoprolol succinate 25 mg daily.    Follow up with Dr. Benítez in 6 months.  Device and arrhythmia follow-up in 1 year.     History of Present Illness    Ms. Belia Rodriguez is a very pleasant 74 y.o. female who comes in today accompanied by her  for EP device and arrhythmia follow-up.  She has a long history of  persistent atrial fibrillation and tachybradycardia syndrome for which she underwent AV node ablation and pacemaker implant on 3/7/2019.  Dr. Duarte was unable to place an LV lead at that time due to interference of dialysis catheter.  She has end-stage renal disease on hemodialysis and has a right IJ dialysis catheter.  Dr. Duarte noted during pacemaker implant that her chronic dialysis catheter is adherent to the SVC and lateral RA.  He recommends that if for any reason this catheter needs to be removed, it needs to be done in the EP lab with temporary pacing catheter in place due to high risk for pacemaker lead dislodgment with (she is pacemaker dependent).  Due to recurrent GI bleeding, she underwent left atrial appendage closure with the watchman device on 4/5/2018, as opposed to remaining on long-term oral anticoagulation therapy.  She had a fall resulting in a shattered femur requiring surgery.  Following surgery she had a DVT and was restarted on warfarin.      Itzel states that she feels much better with a heart rate of 70 than she did at 60.  She is beginning to feel better, but is significantly deconditioned, so is using a wheelchair.  She denies chest discomfort, palpitations, abdominal fullness/bloating or peripheral edema, shortness of breath at rest or with light activity, paroxysmal nocturnal dyspnea, orthopnea, lightheadedness, dizziness, pre-syncope, or syncope.    Cardiographics (personally reviewed):  EKG, Done 6/24/2019 shows atrial fibrillation with ventricular pacing at 70 bpm    Pacemaker Interrogation (BSCI):  Pacing mode: VVIR   Presenting rhythm: AF- at 70 bpm  Underlying rhythm: AF, No R at VVI 30  V-paced 100%.  Battery: estimated longevity 9.5 years.  Ventricular lead (RV only): Good impedance and pacing thresholds, previously had good sensing, but paced today  Arrhythmias: Permanent AF.  No ventricular arrhythmias  Histograms: Good rate distribution for activity  level.    Transesophageal echo done 5/24/2018:    Left Ventricle: Normal left ventricular size.The estimated left ventricular ejection fraction is 60%.    Right Ventricle: Normal right ventricular systolic function.    Mild mitral regurgitation.    Well seated Watchman device is noted in the left atrial appendage. There is no regurgitation noted across the NEGRA device. No evidence of thrombus on Watchman device.  Post Watchman  Device Placement TERRANCE Review:  Impression: A well seated Watchman  Device is noted in the left atrial appendage.  There is no color doppler evidence of casi-device flow.  There is no evidence of thrombus present in the left atrium, left atrial appendage or on the surface of the Watchman  device.   Anticoagulation Recommendations: Stop anticoagulation and start antiplatelet therapy per protocol    NM stress test done 9/24/2018 is negative for inducible myocardial ischemia or infarction.  LVEF 64%.       Physical Examination Review of Systems   Vitals:    09/19/19 1014   BP: 150/76   Pulse: 60   Resp: 14     Body mass index is 26.63 kg/m .  Wt Readings from Last 3 Encounters:   09/19/19 160 lb (72.6 kg)   09/18/19 151 lb 5 oz (68.6 kg)   08/27/19 167 lb (75.8 kg)     General Appearance:   Alert, well-appearing and in no acute distress.   HEENT: Atraumatic, normocephalic.  No scleral icterus, normal conjunctivae, EOM intact; mucous membranes pink and moist.     Chest: Chest symmetric, spine straight.  Left subclavian pacemaker site with no sign of infection or tissue thinning.   Lungs:   Respirations unlabored: Lungs are clear to auscultation.   Cardiovascular:   Normal first and second heart sounds with no murmurs, rubs, or gallops.  Regular rate and rhythm.  Radial and posterior tibial pulses are intact, no JVD, 1+ left lower extremity edema.  Wearing bilateral compression stockings.   Abdomen:  Soft, nontender, nondistended, bowel sounds present   Extremities: No cyanosis or clubbing    Musculoskeletal: Moves all extremities.  Gait instability.  Using wheelchair.   Skin: Warm, dry, intact.    Neurologic: Mood and affect are appropriate, alert and oriented to person, place, time, and situation    General: WNL  Eyes: WNL  Ears/Nose/Throat: WNL  Lungs: WNL  Heart: WNL  Stomach: WNL  Bladder: WNL  Muscle/Joints: WNL  Skin: WNL  Nervous System: WNL  Mental Health: WNL     Blood: WNL     Medical History  Surgical History Family History Social History   Past Medical History:   Diagnosis Date   ? Arthritis    ? CHF (congestive heart failure) (H)    ? Chronic anemia 6/1/2014   ? Chronic kidney disease    ? Chronic thoracic aortic dissection (H) 10/7/2015    Descending thoracic aorta; treated medically per notes of Dragan Singh and Jennifer.   ? COPD (chronic obstructive pulmonary disease) (H)    ? CVA (cerebral infarction)    ? Disease of thyroid gland    ? Dyslipidemia    ? ESRD (end stage renal disease) (H) 06/03/2009    on dialysis with Dr. Mitchell   ? Essential hypertension 6/30/2014   ? Gastrointestinal hemorrhage, unspecified gastrointestinal hemorrhage type 6/5/2017   ? GI (gastrointestinal bleed)    ? GI bleeding 6/5/2017   ? Gout    ? L3 vertebral fracture (H) 11/16/2015   ? Left Atrial Appendage Occlusion (WATCHMAN) 4/5/2018    LAAO April 5, 2018 (30 mm WATCHMAN)   ? Obesity    ? ZULEIKA (obstructive sleep apnea), severe, intolerant of CPAP 10/22/2015   ? Pneumonia 9-7-2015   ? Right foot drop    ? Spinal stenosis 3/28/2016   ? Stroke (H) 3/24/2016    Past Surgical History:   Procedure Laterality Date   ? BACK SURGERY      [facility]   ? COLONOSCOPY N/A 3/23/2016    Procedure: COLONOSCOPY;  Surgeon: Ruddy Tejada MD;  Location: [facility] GI;  Service:    ? DILATION AND CURETTAGE OF UTERUS     ? EP ABLATION AV NODE N/A 3/7/2019    Procedure: EP Ablation AV Node;  Surgeon: Derick Duarte MD;  Location: [facility] Cath Lab;  Service: Cardiology   ? EP ENGRA CLOSURE N/A 4/5/2018    Procedure:  EP NEGRA Closure;  Surgeon: Derick Duarte MD;  Location: [facility] Cath Lab;  Service:    ? EP PACEMAKER INSERT N/A 3/7/2019    Procedure: EP Pacemaker Insertion;  Surgeon: Derick Duarte MD;  Location: [facility] Cath Lab;  Service: Cardiology   ? EYE SURGERY     ? HERNIA REPAIR     ? IR TUNNELED CATHETER INSERT  11/20/2018   ? IR TUNNELED CATHETER REMOVAL  11/20/2018   ? WV COLSC FLEXIBLE W/CONTROL BLEEDING ANY METHOD N/A 6/7/2017    Procedure: COLONOSCOPY;  Surgeon: Luis Mckeon MD;  Location: [facility] GI;  Service: Gastroenterology   ? W[place] OPEN FIX INTER/SUBTROCH FX,IMPLNT Left 6/23/2019    Procedure: INTERNAL FIXATION, FRACTURE, TROCHANTERIC, HIP, USING INTERMEDULLARY NAIL;  Surgeon: Bennie Marsh DO;  Location: [facility] Main OR;  Service: Orthopedics   ? TONSILLECTOMY      Family History   Problem Relation Age of Onset   ? Dementia Mother    ? Diabetes Mother    ? Arthritis Mother    ? Cancer Mother    ? Depression Mother    ? Heart disease Mother    ? Vision loss Mother    ? Stroke Father    ? Heart disease Father    ? Breast cancer Neg Hx     Social History     Tobacco Use   ? Smoking status: Former Smoker     Packs/day: 1.50     Years: 37.00     Pack years: 55.50     Types: Cigarettes     Last attempt to quit: 1/1/2009     Years since quitting: 10.7   ? Smokeless tobacco: Never Used   Substance Use Topics   ? Alcohol use: No     Alcohol/week: 7.0 oz     Types: 14 Standard drinks or equivalent per week     Comment: 14 mixed drinks per week   ? Drug use: No           Medications  Allergies   Current Outpatient Medications   Medication Sig Dispense Refill   ? acetaminophen (TYLENOL) 500 MG tablet Take 1,000 mg by mouth 3 (three) times a day as needed.     ? atorvastatin (LIPITOR) 10 MG tablet Take 10 mg by mouth at bedtime.     ? B complex-vitamin C-folic acid (DIALYVITE) 100-1 mg Tab Take 1 tablet by mouth daily with lunch.            ? chlorpheniramine/dextromethorp (CORICIDIN HBP COUGH  AND COLD ORAL) Take 1 tablet by mouth every 6 (six) hours as needed.     ? cholecalciferol, vitamin D3, (VITAMIN D3) 2,000 unit Tab Take 2,000 Units by mouth daily with lunch.            ? cinacalcet (SENSIPAR) 30 MG tablet Take 30 mg by mouth see administration instructions. Take three times weekly with dialysis (on Mondays, Wednesdays, and Fridays).           ? folic acid (FOLVITE) 1 MG tablet Take 1 mg by mouth daily with lunch.            ? gabapentin (NEURONTIN) 100 MG capsule Take 100 mg by mouth 3 (three) times a day.     ? Lactobacillus rhamnosus GG (CULTURELLE) 10-15 Billion cell capsule Take 1 capsule by mouth daily with lunch.     ? loratadine (CLARITIN) 10 mg tablet Take 10 mg by mouth daily.     ? metoprolol succinate (TOPROL-XL) 25 MG Take 25 mg by mouth daily with lunch.            ? midodrine HCl (MIDODRINE ORAL) Take 10 mg by mouth see administration instructions. Take 10 mg at the beginning of dialysis and 10 mg USP through dialysis three days weekly on dialysis days (Mondays, Wednesdays, and Fridays).           ? multivitamin (DAILY-OLGA) per tablet Take 1 tablet by mouth daily.     ? omeprazole (PRILOSEC) 20 MG capsule Take 20 mg by mouth 2 (two) times daily before lunch and supper.            ? oxyCODONE (ROXICODONE) 5 MG immediate release tablet Take 0.5-1 tablets (2.5-5 mg total) by mouth every 4 (four) hours as needed. 40 tablet 0   ? polyvinyl alcohol (LIQUIFILM TEARS) 1.4 % ophthalmic solution Apply 1 drop to eye as needed for dry eyes.     ? pot bicarb-sod bicarb-cit ac (EDI-SELTZER GOLD) 344-1,050-1,000 mg TbEF Take 1 tablet by mouth every 4 (four) hours as needed.     ? ranitidine (ZANTAC) 150 MG tablet Take 150 mg by mouth at bedtime.     ? senna-docusate (SENNOSIDES-DOCUSATE SODIUM) 8.6-50 mg tablet Take 1 tablet by mouth every evening.            ? sucroferric oxyhydroxide (VELPHORO) 500 mg Chew chewable tablet Chew 500 mg 3 (three) times a day with meals.     ? timolol maleate  (TIMOPTIC) 0.5 % ophthalmic solution Administer 1 drop to both eyes 2 (two) times a day.      ? traZODone (DESYREL) 150 MG tablet Take 75 mg by mouth at bedtime.     ? warfarin (COUMADIN/JANTOVEN) 3 MG tablet Take 1 to 1.5 tablets (3 to 4.5 mg) by mouth daily. Adjust dose per INR results as instructed. 100 tablet 1     No current facility-administered medications for this visit.       Allergies   Allergen Reactions   ? Ace Inhibitors Cough   ? Atrovent [Ipratropium Bromide] Headache   ? Fosinopril Sodium Cough   ? Zolpidem      hallucinations      Medical, surgical, family, social history, and medications were all reviewed and updated as necessary.   Lab Results    Chemistry CBC Other   Lab Results   Component Value Date    CREATININE 5.61 (H) 06/23/2019    BUN 34 (H) 06/23/2019     (L) 06/23/2019    K 4.5 06/24/2019     06/23/2019    CO2 20 (L) 06/23/2019     Creatinine (mg/dL)   Date Value   06/23/2019 5.61 (H)   05/18/2019 4.91 (H)   03/06/2019 4.01 (H)   03/03/2019 5.81 (H)    Lab Results   Component Value Date    WBC 4.5 07/08/2019    HGB 8.9 (L) 07/15/2019    HCT 25.8 (L) 07/08/2019     (H) 07/08/2019     07/08/2019    Lab Results   Component Value Date    INR 1.80 (!) 09/18/2019    INR 2.00 (!) 09/11/2019    INR 3.10 (!) 09/04/2019     Lab Results   Component Value Date    TSH 2.71 01/26/2018              This note has been dictated using voice recognition software. Any grammatical, typographical, or context distortions are unintentional and inherent to the software.    Fidelia Ly, [facility]   Electrophysiology

## 2021-06-29 NOTE — PROGRESS NOTES
"Progress Notes by Chayito Willard NP at 10/23/2020  3:15 PM     Author: Chayito Willard NP Service: Interventional Radiology Author Type: Nurse Practitioner    Filed: 10/23/2020  3:35 PM Date of Service: 10/23/2020  3:15 PM Status: Signed    : Chayito Willard NP (Nurse Practitioner)         Interventional Radiology - Pre-Procedure Note:  10/23/2020    Procedure Requested: Tunneled dialysis catheter exchange   Requested by: Dr. Kinney       Brief HPI: Belia Rodriguez is a 75 y.o. old female with HD dependent ESRD via a tunneled dialysis catheter that presents for exchange as her current catheter is broken - dialysis staff broke part of the connection while disconnecting her from dialysis        NPO: 0700    ANTIBIOTIC:  Ancef dose ordered pre procedure       ALLERGIES  Ace inhibitors, Atrovent [ipratropium bromide], Fosinopril sodium, and Zolpidem      EXAM:  /68   Pulse 71   Temp 98.5  F (36.9  C) (Oral)   Resp 16   Ht 5' 4\" (1.626 m)   Wt 154 lb (69.9 kg)   SpO2 100%   BMI 26.43 kg/m    General: Lying in bed, pleasantly talkative, in no acute distress.  Neuro: A&O x 3. Moves all extremities equally.  Resp: Rate regular, breathing non labored, anterior ungs clear to auscultation bilaterally.  Oxygen sats 100% on 2-3 liters per nasal cannula.    Cardio: S1S2 and reg, without murmur, clicks or rubs  Vascular: Right chest tunneled dialysis catheter in place.  Clamps in place to catheter tubing.    MSK: No gross motor weakness. Sensation intact.     Pre-Sedation Assessment:  Mallampati Airway Classification: Class 2: upper half of tonsil fossa visible  Previous reaction to anesthesia/sedation: no  Sedation plan based on assessment: Moderate  Sleep Apnea: yes  Dentures: no  COPD: yes  ASA Classification: ASA 3 - Patient with moderate systemic disease with functional limitations  Comments: patient is oxygen dependent at home 2-3 liters        ASSESSMENT:    75 y.o. old female with HD dependent ESRD " via a tunneled dialysis catheter that presents for exchange as her current catheter is broken.        PLAN:   Tunneled dialysis catheter exchange with sedation as needed.     Procedure, risk/benefits and sedation reviewed with pt/family. All questions answered. OK to proceed with above radiology procedure.     Chayito Willard CNP  Interventional Radiology

## 2021-07-03 NOTE — ADDENDUM NOTE
Addendum Note by Bettina Torres RN at 6/12/2017  4:11 PM     Author: Bettina Torres RN Service: -- Author Type: Registered Nurse    Filed: 6/12/2017  4:11 PM Encounter Date: 6/12/2017 Status: Signed    : Bettina Torres RN (Registered Nurse)    Addended by: BETTINA TORRES on: 6/12/2017 04:11 PM        Modules accepted: Orders

## 2021-07-04 NOTE — PROGRESS NOTES
Progress Notes by Odilia Lima RN at 3/16/2021 10:26 AM     Author: Odilia Lima RN Service: -- Author Type: Registered Nurse    Filed: 7/2/2021 10:05 AM Encounter Date: 3/16/2021 Status: Signed    : Odilia Lima RN (Registered Nurse)       Derick Duarte MD Gebel, Mandy L, RN             Ok for remote only follow-up.      Above noted. Patient follow up will be changed to Remotes Only.  Odilia Lima, RN

## 2021-07-10 PROBLEM — R06.02 SHORTNESS OF BREATH: Status: ACTIVE | Noted: 2021-01-01

## 2021-07-10 NOTE — CONSULTS
Consults by Melanie Cordoba FNP at 7/10/2021  8:41 AM     Author: Melanie Cordoba FNP Service: Nephrology Author Type: Nurse Practitioner    Filed: 7/10/2021 10:20 AM Date of Service: 7/10/2021  8:41 AM Status: Signed    : Melanie Cordoba FNP (Nurse Practitioner)     Consult Orders    1. Inpatient consult to Nephrology Reason for Consult? ESRD on HD; Which Nephrology Group will follow this patient? [facility]; Consult priority: Today (routine); Communication for MD: No phone communication necessary for now [[id number]] ordered by Kayleigh Monterroso MD at 07/10/21 0057             NEPHROLOGY CONSULTATION        REASON FOR CONSULTATION:  ESRD, hyperK and HTN management    Requesting provider:  MD Kriss  (thank you for this referral)      ASSESSMENT/PLAN:    ESRD: on HD MWF at [facility] (primary neph Dr Kinney, of [facility]).  Running off schedule today d/t volume overload    Has CVC, has refused AV access since initiation 14+ years ago, d/t frequent crafting and didn't want access to interfere.      Hyperkalemia:  Dialysis today, renal diet.     H/o HoTN:  BP currently controlled, Has prn MIdodrine outpt, low dose     CKD/MBD:  On Lorri D and Renvela binders, Sensipar                COPD: admitted with acute shortness of breath/WAGNER, recurrent recent admission for similar presentation, followed by pulm outpt, infiltration on CT ? Bronchitis, no outward s/s infectious etio    ZULEIKA:  Severe, with pulm HTN, bipap PRN , stable NC now , has inhalers    H/o CHF: UF for volume excess , BNP 700s     Anemia of ESKD: hemoglobin above goal for ESKD at 11.8, no need for AILYN or iron currently, trend     H/o A-fib: rate controlled , PPM      Goals of Care: per prior note from Dr Kinney in June, discussed with Itzel(ongoing discussion re hospice) not ready, but has been considering for some time now, we did not addresss today       HISTORY OF PRESENT ILLNESS: Belia Rodriguez is a 75 y.o. female  "with ESRD and managed by DR Meza of [facility].  ICHD at the [facility] on MWF.  she says that she did run on scheduled yesterday, but feeling in shortness of breath and presented to the ED for the reason. She has been admitted mutliple times for similar presentation  She was transferred from [facility] d/t bed limitation.    She has reportedly dhad multiple discussion with Dr Meza re POC and hospice consideration, but not wanting to dicontinue care yet.  We did not discuss this today.  She is reasonably stable and new for me, so I would defer ongoing discussion to her primary neph unless she were to decline this admission.   Currently feels \"ok\" not sig shortness of breath.  She makes no urine. Has appetite, but has not eaten yet today simply because she wasn't sure of dialysis timing and doesn't want to get interrupted as she cannot eat on the run.  She is WC bound, doesn't move around much.        REVIEW OF SYSTEMS:  See hPI    Past Medical History:   Diagnosis Date   ? Acute on chronic diastolic congestive heart failure (H) 01/23/2019   ? Acute on chronic respiratory failure with hypoxia and hypercapnia (H)    ? Acute respiratory failure with hypoxia (H) 06/30/2014   ? Arthritis    ? Chronic anemia 06/01/2014   ? Chronic kidney disease    ? Chronic respiratory failure with hypoxia (H)     3L nc   ? Chronic thoracic aortic dissection (H) 10/07/2015    descending thoracic aorta; treated medically per notes of Dragan Singh and Jennifer.   ? COPD (chronic obstructive pulmonary disease) (H)    ? COPD exacerbation (H) 01/04/2019   ? Disease of thyroid gland    ? Dissection of thoracoabdominal aorta (H)    ? DVT (deep venous thrombosis) (H) 08/22/2019   ? Dyslipidemia    ? ESRD (end stage renal disease) (H) 06/03/2009    on dialysis with Dr. Mitchell   ? Essential hypertension 06/30/2014   ? Gastrointestinal hemorrhage, unspecified gastrointestinal hemorrhage type 06/05/2017   ? GI bleeding 06/05/2017   ? Gout    ? " Heart failure (H)    ? Hip fracture, intertrochanteric (H) 2019   ? History of compression fracture of spine 2017   ? L3 vertebral fracture (H) 2015   ? Left Atrial Appendage Occlusion (WATCHMAN) 2018    LAAO 2018 (30 mm WATCHMAN)   ? ZULEIKA (obstructive sleep apnea), severe, intolerant of CPAP 10/22/2015   ? Pneumonia 2015   ? Right foot drop    ? Spinal stenosis 2016   ? Stroke (H) 2016       Social History     Socioeconomic History   ? Marital status:      Spouse name: Cayden   ? Number of children: 2   ? Years of education: Not on file   ? Highest education level: Not on file   Occupational History     Employer: RETIRED   Social Needs   ? Financial resource strain: Not on file   ? Food insecurity     Worry: Not on file     Inability: Not on file   ? Transportation needs     Medical: Not on file     Non-medical: Not on file   Tobacco Use   ? Smoking status: Former Smoker     Packs/day: 1.50     Years: 37.00     Pack years: 55.50     Types: Cigarettes     Quit date: 2009     Years since quittin.5   ? Smokeless tobacco: Never Used   Substance and Sexual Activity   ? Alcohol use: Yes     Alcohol/week: 11.7 standard drinks     Types: 14 Standard drinks or equivalent per week     Comment: 14 mixed drinks per week   ? Drug use: No   ? Sexual activity: Never     Partners: Male   Lifestyle   ? Physical activity     Days per week: Not on file     Minutes per session: Not on file   ? Stress: Not on file   Relationships   ? Social connections     Talks on phone: Not on file     Gets together: Not on file     Attends Denominational service: Not on file     Active member of club or organization: Not on file     Attends meetings of clubs or organizations: Not on file     Relationship status: Not on file   ? Intimate partner violence     Fear of current or ex partner: Not on file     Emotionally abused: Not on file     Physically abused: Not on file     Forced sexual  activity: Not on file   Other Topics Concern   ? Not on file   Social History Narrative    Lives with her . Daughter in [place] and daughter in [place].       Family History   Problem Relation Age of Onset   ? Dementia Mother    ? Diabetes Mother    ? Arthritis Mother    ? Cancer Mother    ? Depression Mother    ? Heart disease Mother    ? Vision loss Mother    ? Stroke Father    ? Heart disease Father    ? Breast cancer Neg Hx        Allergies   Allergen Reactions   ? Ace Inhibitors Cough   ? Atrovent [Ipratropium Bromide] Headache   ? Fosinopril Sodium Cough   ? Zolpidem      hallucinations       MEDICATIONS:  ? heparin (PF) ANTICOAGULANT  5,000 Units Subcutaneous Q12H -   ? sodium chloride  2.5 mL Intravenous Line Care         PHYSICAL EXAM    Vitals:    07/10/21 0517   BP: 138/73   Pulse: 77   Resp: 20   Temp: 98.2  F (36.8  C)   SpO2: 96%         EXAM:  General:  Alert/oriented x 3; NAD, pleasant and interactive, good memor y and health recal   HEENT: PERRLA, head/face symmetrical  Card:  AP RRR with murmur, no rub, BP controled, PPM L chest wall   Lun% on NC  Abd: soft, non tender; not distended; normal bs, no bruits  Ext: trace edema and well perfused, no LE ulcers or lesions.R arm skin tear is dressed   Skin: no rash, normal turgor  Neuro; grossly intact  Psych:  Grossly intact  :  Makes no urine       Intake/Output Summary (Last 24 hours) at 7/10/2021 0841  Last data filed at 7/10/2021 0517  Gross per 24 hour   Intake 420 ml   Output 0 ml   Net 420 ml       LABORATORIES    Results from last 7 days   Lab Units 07/10/21  0610 21   LN-WHITE BLOOD CELL COUNT thou/uL 2.1* 4.2   LN-HEMOGLOBIN g/dL 11.8* 12.6   LN-HEMATOCRIT % 39.4 41.9   LN-PLATELET COUNT thou/uL 74* 90*     Results from last 7 days   Lab Units 07/10/21  0610 21   LN-SODIUM mmol/L 135* 141   LN-POTASSIUM mmol/L 5.7* 4.4   LN-CHLORIDE mmol/L 103 105   LN-CO2 mmol/L 20* 26   LN-BLOOD UREA NITROGEN mg/dL  19 13   LN-CREATININE mg/dL 3.87* 3.25*   LN-CALCIUM mg/dL 8.3* 9.4   LN-PROTEIN TOTAL g/dL 5.7*  --    LN-BILIRUBIN TOTAL mg/dL 0.3  --    LN-ALKALINE PHOSPHATASE U/L 107  --    LN-ALT (SGPT) U/L 11  --    LN-AST (SGOT) U/L 15  --                    I reviewed all labs    Thank you for this referral.  We will continue to follow this patient along with you.       MARAL Steen-BC  [facility]  [address]  [place] [address]  Office:  [phone]  Fax:  [phone]

## 2021-07-10 NOTE — PLAN OF CARE
Plan of Care by Sarah Moreno RN at 7/10/2021  3:49 AM     Author: Sarah Moreno RN Service: -- Author Type: Registered Nurse    Filed: 7/10/2021  4:01 AM Date of Service: 7/10/2021  3:49 AM Status: Signed    : Sarah Moreno RN (Registered Nurse)         Problem: Pain  Goal: Patient's pain/discomfort is manageable  Outcome: Progressing       Pt has chronic pain in her feet.  Pts pain goal is a 6/10.  Received tylenol and oxycodone at [facility] prior to arrival.  Did c/o headache here tonight and given another dose of tylenol with some relief.  Continue to monitor pain and encourage pt to monitor her own pain.        Problem: Safety  Goal: Patient will be injury free during hospitalization  Outcome: Progressing    Problem: Potential for Falls  Goal: Patient will remain free of falls  Outcome: Progressing       Pt is mostly W/C bound at home.  She can stand and pivot and occasionally take a step or two.  She has Drop Foot on the right and doesn't always wear her AFO brace if she has foot swelling.  Commode at bedside and universal falls precautions in place.  Pt encouraged to call for assistance if she needs to use commode.          Problem: Ineffective Airway Clearance  Goal: Maintain airway patency  Outcome: Progressing     Problem: Abnormal Respirations  Goal: Patient will maintain/improve baseline respiratory rate/effort  Outcome: Progressing     Problem: Impaired Gas Exchange  Goal: Demonstrate improved ventilation and adequate oxygenation of tissues as evidenced by absence of respiratory distress  Outcome: Progressing     Problem: Excessive Fluid Volume  Goal: Fluid and electrolyte balance are achieved/maintained  Outcome: Progressing  Goal: Patient will achieve/maintain normal respiratory rate/effort  Outcome: Progressing      Pt c/o shortness of breath and displays dyspnea, tachypnea, and dry cough with any exertion.  She is chronically on 3L O2 at home.  Currently, her O2 sats are in 95% or better on  "the 3L O2.  Will have continuous pulse oximeter on.  She is also a dialysis patient who states she \"had dialysis on Friday\".  Lungs are slightly diminished with the occasional wheeze heard, otherwise no crackles at this time.  Continue to monitor and pt to have Nephrology consult later today.                 "

## 2021-07-10 NOTE — PROGRESS NOTES
Progress Notes by Henry Arndt MD at 7/10/2021 10:38 AM     Author: Henry Arndt MD Service: Hospitalist Author Type: Physician    Filed: 7/10/2021 11:12 AM Date of Service: 7/10/2021 10:38 AM Status: Signed    : Henry Arndt MD (Physician)       [facility] Medicine PROGRESS NOTE      Identification/Summary: Belia Rodriguez is a 75 y.o. female with a past medical history of COPD, paroxysmal atrial fibrillation with LAAO device, status post AV node ablation and pacemaker, with adverse reaction to anticoagulant, chronic respiratory failure, chronic lymphedema, peripheral vascular disease, mood disorder with depression, end-stage renal disease on hemodialysis, anemia of chronic kidney disease, congestive heart failure, obstructive sleep apnea, (see below).  Was just recently discharged from [facility] on 6/28/2021 for acute pulmonary edema, improved with dialysis.    Brief history: For details, see H&P.   Chief complaint: Shortness of breath  Fei from [facility].  Patient presented with active cough x2 days, shortness of breath and wheezing.  Previously admitted in June 2021 for pulmonary edema and fluid overload and COPD.  Previously evaluated by pulmonary however and COPD was questionable given nonobstructive spirometry in 2014 and no emphysema on CAT scan.  Patient is wheelchair-bound.  She was dialyzed chest yesterday.  Breathing did not improve.  In the ER: BNP was elevated in the 700s.  Normal WBC.  Negative troponin.  CTA was negative for PE but there was evidence of edema.  The admitting impression or working diagnosis was pulmonary edema on 7/10/2021    -------------------------------------------------------------------------    Assessment and Plan:   Acute respiratory failure, likely related to fluid overload/congestive heart failure  Reactive airway disease  -She had recent hemodialysis, consult nephrology guarding dialysis to remove excess fluid  -No  evidence of pneumonia or bronchitis  -Elevated BNP could be falsely elevated.  -She does have COPD as well that may be contributing to it although this is questionable per pulmonary function test.  Lungs were not wheezy on admission.  -Oxygen supplementation, albuterol, Symbicort  -DNR/DNI.    End-stage renal disease with hemodialysis  Chronic lymphedema  -Renal consult  -Dialysis to resume,  and Friday.  -Fluid and electrolyte management per nephrology.    Atrial fibrillation, status post watchman device, status post AV node ablation, sick sinus syndrome, status post permanent pacemaker  -Rate controlled.  -Had adverse reaction to anticoagulation in the past.  -On aspirin 81 mg daily    Essential hypertension  Dyslipidemia  -Optimize blood pressure medications  -Continue statin if on it.-Atorvastatin 10 mg daily    Mood disorder, depression  -Resume bupropion    Thrombocytopenia, noncritical  -Continue to monitor and trend.  -Avoid heparin    History of chronic pain, right foot  History of right foot drop  -Chronic oxycodone.    History of hyperparathyroidism  History of osteoporosis  History of gout  ___________________________________________    Significant interval data reviewed:   Pertinent results/findings include the following:  Labs:   Sodium 135  Potassium 5.7  CO2 20  Creatinine 3.87    Glucose 170  Procalcitonin normal  WBC 2.1, hemoglobin 11.8, platelets 74  Urinalysis: --  Microbiology:  COVID-19 PCR: Negative  Other cultures:  Imagin2021 CTA negative for PE, mild interstitial infiltrates suggestive of mild pulmonary edema  Chest x-ray 2021 subsegmental atelectasis in the left lower costophrenic sulcus.  No new airspace opacities or interstitial thickening.  No pleural fluid or pneumothorax.  EKG / cardiac monitor: Paced ventricular rhythm.    ------------------------------------------------------------------------    Code Status: No CPR- Do NOT Intubate  Diet: Diet  Renal  Fluids: --  Activity: --  DVT Prophylaxis:  Avoid heparin for now.  Therapies (if indicated) and recommendation: Recommend PT and OT.  Patient lives with/in: --  Anticipated discharge and barriers to discharge: Ongoing medical issues as above.    ------------------------------------------------------------------------  7/10/2021 (today):  Subjective:  Patient is relatively stable.  She has been short of breath.  She has had multiple admissions for the same problem in the past.  She denies any chest pressure.  Patient is not able to urinate anymore as she has been on dialysis.  She had dialysis yesterday.  Denies abdominal pain nausea or vomiting.  No significant peripheral edema.  She thinks he may have had mild fever.  She is coughing up yellowish phlegm.  Rest of the 12-point ROS is negative today.    -----------------------------------------------------------------------    Physical Examination:  Vitals I/O   Temp:  [98.2  F (36.8  C)-99.3  F (37.4  C)] 98.2  F (36.8  C)  Heart Rate:  [69-83] 83  Resp:  [16-25] 18  BP: (124-147)/(60-78) 138/73  SpO2:  [90 %-98 %] 98 %  I/O last 3 completed shifts:  In: 420 [P.O.:420]  Out: 0    153 lb 4.8 oz (69.5 kg)  Body mass index is 26.31 kg/m .  Trend vital signs: Blood pressure 138/73.  No fever.  96% with 3 L by nasal cannula.  Respiratory rate 18-20  Alert and coherent.  Not in distress but she has oxygen on.  Mildly short of breath.  Lungs are notable for crackles in the bases no significant wheezing.  Regular heart rhythm which is based on telemetry.  Abdomen is soft with no guarding.  No peripheral edema or tenderness of the calves.  No cyanosis.  No focal neurologic deficits.  No signs of acute stroke.  ----------------------------------------------------------------------  Medications:   Personally Reviewed.  Current Facility-Administered Medications   Medication Dose Route Frequency Provider Last Rate Last Admin   ? acetaminophen suppository 650 mg (TYLENOL)   650 mg Rectal Q4H PRN Kayleigh Monterroso MD       ? acetaminophen tablet 500-1,000 mg (TYLENOL)  500-1,000 mg Oral Q4H PRN Kayleigh Monterroso MD   1,000 mg at 07/10/21 0311   ? albumin human 25 % bottle 12.5-37.5 g  12.5-37.5 g Intravenous PRN Rakesh Rader MD       ? albuterol nebulizer solution 2.5 mg (PROVENTIL)  2.5 mg Nebulization Q4H PRN Kayleigh Monterroso MD   2.5 mg at 07/10/21 0848   ? albuterol nebulizer solution 2.5 mg (PROVENTIL)  2.5 mg Nebulization QID - RT Henry Arndt MD       ? anticoagulant citrate dextrose flush solution (ACD-A) 3 mL  1-6 mL Intracatheter PRN for dialysis Rakesh Rader MD       ? bisacodyL suppository 10 mg (DULCOLAX)  10 mg Rectal Daily PRN Kayleigh Monterroso MD       ? gelatin sponge,absorb-porcine sponge 1 each (GELFOAM)  1 each Topical (Top) PRN for dialysis Rakesh Rader MD       ? heparin (PF) ANTICOAGULANT subcutaneous injection 5,000 Units  5,000 Units Subcutaneous Q12H 09-21 Kayleigh Monterroso MD       ? heparin ANTICOAGULANT injection 1,000-6,000 Units  1,000-6,000 Units Intracatheter PRN for dialysis Rakesh Rader MD       ? heparin ANTICOAGULANT injection  500 Units/hr Dialysis PRN for dialysis Rakesh Rader MD       ? lidocaine 10 mg/mL (1 %) injection 1 mL  1 mL Subcutaneous PRN for dialysis Rakesh Rader MD       ? magnesium hydroxide suspension 30 mL (MILK OF MAG)  30 mL Oral Daily PRN Kayleigh Monterroso MD       ? melatonin tablet 3 mg  3 mg Oral Bedtime PRN Kayleigh Monterroso MD   3 mg at 07/10/21 0220   ? naloxone injection 0.2 mg (NARCAN)  0.2 mg Intravenous Q2 Min PRN Kayleigh Monterroso MD        Or   ? naloxone injection 0.2 mg (NARCAN)  0.2 mg Intramuscular Q2 Min PRN Kayleigh Monterroso MD        Or   ? naloxone injection 0.4 mg (NARCAN)  0.4 mg Intravenous Q2 Min PRN Kayleigh Monterroso MD        Or   ? naloxone injection 0.4 mg (NARCAN)  0.4 mg Intramuscular Q2 Min PRN Kayleigh Monterroso MD       ? sodium chloride 0.9% 100-500 mL  100-500 mL  Intravenous PRN Rakesh Rader MD       ? sodium chloride flush 2.5 mL (NS)  2.5 mL Intravenous Line Care Kayleigh Monterroso MD   2.5 mL at 07/10/21 0130   ? traZODone tablet 50 mg (DESYREL)  50 mg Oral Bedtime PRN Kayleigh Monterroso MD   50 mg at 07/10/21 0220         ----------------------------------------------------------------------  Henry Arndt  [facility]

## 2021-07-10 NOTE — PLAN OF CARE
Plan of Care by Belia Reynolds RN at 7/10/2021  3:34 PM     Author: Belia Reynolds RN Service: -- Author Type: Registered Nurse    Filed: 7/10/2021  3:35 PM Date of Service: 7/10/2021  3:34 PM Status: Signed    : Belia Reynolds RN (Registered Nurse)         Problem: Pain  Goal: Patient's pain/discomfort is manageable  Outcome: Progressing   Patient c/o pain in feet.  Oxycodone given with relief.      Problem: Potential for Compromised Skin Integrity  Goal: Skin integrity is maintained or improved  Outcome: Progressing   Patient had skin tear on right arm.  Orders received to apply bactracin and dress skin tear.  Wound care consult obtain due to multiple skin issues.

## 2021-07-10 NOTE — PROGRESS NOTES
Progress Notes by Elise Dodd RN at 7/10/2021  9:07 AM     Author: Elise Dodd RN Service: -- Author Type: RN, Care Manager    Filed: 7/10/2021 10:27 AM Date of Service: 7/10/2021  9:07 AM Status: Addendum    : Elise Dodd RN (RN, Care Manager)    Related Notes: Original Note by Elise Dodd RN (RN, Care Manager) filed at 7/10/2021 10:20 AM       Chart assessment completed per CM protocol. Patient recently discharged to home with resumption of home PT on 6/28 after being admitted with suspected pulmonary edema. Patient receives dialysis Mon Wed and Fri @ [facility].     Patient again admitted with hypoxia 7/10. Per Nephrology consult note, plan for dialysis today before resuming OPHD Mon Wed and Fri.     Met with patient to review role of care management, progression of care and possible need for services at discharge, including OP services, home care, or skilled nursing care. Patient alert, oriented and engaged in the conversation. Completed Medicare Outpatient Observation Notice (MOON) education with patient. Patient interested in going to TCU if COVID Medicare waiver honored (currently COVID 19 Peacetime Emergency in effect until 7/14/2021). Considering patient is a readmission in less than 30 days, may qualify for TCU. Therapy orders requested from MD. Tentative [facility]: [facility] and [facility].     Care Management staff discussed current COVID 19 pandemic and Medicare waiver of the traditional 3 day stay requirement. Referred patient to medicare.gov, insurance provider, and admissions department at accepting facility for further questions or concerns on coverage for SNF stay.  1. This patient has been evaluated from a nursing home in the emergency area? no   2. This patient is being discharged from a hospital (in the emergency area) in order to provide care to more seriously ill patients? yes   3. This patient needs SNF care as a result of  the emergency, regardless of whether he/she was in a hospital/nursing home prior to this stay? possibly     10:26 AM Called and spoke with answering service for [facility] home care to notify of hospitalization under observation and verify services. Patient is currently open for PT. Since patient is in observation status, will not need resumption orders. Fax number for DC orders :# [phone].          CM will continue to monitor progression of care, team recommendations and provide discharge planning assist as needed.

## 2021-07-10 NOTE — CONSULTS
Consults by Nely Melgar MD at 7/10/2021 12:45 PM     Author: Nely Melgar MD Service: Pulmonology Author Type: Physician    Filed: 7/10/2021  2:05 PM Date of Service: 7/10/2021 12:45 PM Status: Signed    : Nely Melgar MD (Physician)     Consult Orders    1. Inpatient consult to Pulmonology Reason for Consult? acute on chronic resp acidosis; Consult priority: Today (routine); Communication for MD: No phone communication necessary for now [[id number]] ordered by Henry Arndt MD at 07/10/21 1135             PULMONARY CONSULTATION NOTE      Consultation - Pulmonary Medicine  Belia Rodriguez,  1945, MRN [id number]  Date / Time of Admission:  7/10/2021 12:49 AM    Admitting Dx: shortness of breath    PCP: Neil Galan MD, [phone]  Consulting physician:  No att. providers found   Code status:  No CPR- Do NOT Intubate       Extended Emergency Contact Information  Primary Emergency Contact: SukhjinderCayden zuñiga  Address: [address] [place] [address] of [facility]  Home Phone: [phone]  Mobile Phone: [phone]  Relation: Spouse       ID:  Belia Rodriguez is a 75 y.o. female with chronic respiratory failure with hypoxia/hypercapnia on 3 L/min NC, COPD (FEV1 57% on PFTs 2014), untreated severe ZULEIKA (nontolerant of NIPPV), possible pulmonary hypertension, atrial fibrillation status post LAAO device, AV node ablation status post PPM, dyslipidemia, history of stroke, ESRD on hemodialysis, history of GI bleed, chronic anemia, history of lumbar fracture, gout who presented to [facility] ED on 7/10/2021 with shortness of breath and subsequently admitted for COPD exacerbation.    Reason for consultation:  CO2 retention, respiratory acidosis     ASSESSMENT   1. Chronic respiratory failure with hypoxia and hypercapnia.  Patient chronically on 3 L/min nasal cannula, currently still on same dose.  No evidence of acute  "respiratory failure.    2. Combined respiratory and metabolic acidosis.  She does have chronic CO2 retention and her acid-base status is compromised by her metabolic acidosis with serum bicarb 20.  She is receiving dialysis today, hopefully will have improvement.  3. Acute bronchitis, organism unknown.  4. COPD without exacerbation.  Initiated on nebulizer therapy and Symbicort in the last 1 month; prior to this was not on any inhaler therapy.  CTA chest PE study on 7/9/2021 with motion artifact but showing patchy areas of mild pulmonary emphysema as well as air trapping in the bilateral upper lobes MI: This is similar to CTA chest study 06/2021.  PFTs 08/2014 with combined obstructive and restrictive disease; FEV1 57%, FEV1 60%, FEV1/FVC 80%, %, DLCO 68% and mildly reduced when corrected for hemoglobin.  5. Untreated severe obstructive sleep apnea, nontolerant of NIPPV.  Patient had split-night sleep study on 10/23/2015 with an overall AHI 52.5.  Recommendations included \"nightly use of Bi-level PAP in spontaneous mode with IPAP=15 cwp, EPAP=9 cwp to be used for the entire duration of sleep.\"  Patient has difficulty tolerating NIPPV device.  6. Possible pulmonary hypertension.  Based on TTE reports.  No right heart catheterization for confirmation.  7. History of tobacco use disorder.  Began smoking at the age of 32, quit in 2009.  Smoking 2 packs/day for approximately 32 years but states she did not \"inhale\" the entire cigarette.  8. QTc prolongation.  Noted on EKG 7/9/21.  9. Leukopenia.  ANC 1785 today.  Patient has intermittent issues with leukopenia at baseline. No evidence of active infection, procalcitonin normal at 0.27.  10. Chronic thrombocytopenia.   11. Hyperkalemia.       RECOMMENDATIONS/PLAN     Patient clinically alert and interactive - acidosis is mixed picture of both respiratory and metabolic    No indication for non-invasive ventilation at this time.  I have discontinued this order. "     Patient being set-up for hemodialysis now.  We can get a VBG for follow-up after dialysis is complete later today    ETCO2 monitoring for now as well, monitor trends    Continue Symbicort 80.45 mcg inhl, 2 puff 2x/day per home regimen; using with spacer.    Continue albuterol nebs 4x/day.  Holding home albuterol regimen of 3x/day MWF and 4x/day TThSatSun    Albuterol inhaler 2 puff x 1 now (with spacer) then PRN - will see if she can tolerate this, which will also help with symptom control while she is at her dialysis session.    Empiric abx for acute bronchitis.     Ceftriaxone 1 gm IV daily while hospitalized    Doxycycline 100 mg 2x/day x 5 days; discharge home with supply    Avoid QT prolonging drugs.  Discontinue ondansetron.    Procalcitonin normal     Patient and/or family were educated on the above recommendations.    Thank you for allowing our service to participate in the care of this patient.  Please call with any questions or concerns.  Anticipate discharge tomorrow if remains clinically stable.    Total patient care time:  90 minutes, with over 50% spent in counseling/coordination of care.     Nely Melgar MD, MPH  Pulmonary & Critical Care Medicine  7/10/2021  1:01 PM     Chief Complaint No chief complaint on file.         GRIS Rodriguez is a 75 y.o. female with chronic respiratory failure with hypoxia/hypercapnia on 3 L/min NC, COPD (FEV1 57% on PFTs 01/2014), untreated severe ZULEIKA (nontolerant of NIPPV), possible pulmonary hypertension, atrial fibrillation status post LAAO device, AV node ablation status post PPM, dyslipidemia, history of stroke, ESRD on hemodialysis, history of GI bleed, chronic anemia, history of lumbar fracture, gout who presented to [facility] ED on 7/10/2021 with shortness of breath and subsequently admitted for COPD exacerbation. History is provided by the patient, review of medical records..    Patient was in her normal state of health until 2 to 3  days ago, when developed productive cough with yellow sputum.  Also with low-grade fevers.  Denied any chest pain/pressure, wheezing, hemoptysis.  States that she has shortness of breath chronically.  Has been in the hospital every 2 weeks with breathing issues, intermittently being treated for COPD/pneumonia.  She was started on albuterol nebulizers about 1 month ago; previously did not use.  And was started on Symbicort inhaler about 2 weeks ago per her report.  She does notice improvement with breathing after nebulizer use.  On her dialysis days, she uses the nebulizer only 3 times per day and feels very winded by the time she returns home from dialysis.  Otherwise, on nondialysis days using it 4 times per day.  Has not used the nebulizer more frequently than what has been ordered.  In regards to the Symbicort, she is utilizing it with a spacer.  Has a history of recurrent pneumonias and felt she was developing pneumonia for occluding her ED visit.  Received hemodialysis session on 7/9 but persistent shortness of breath so presented to the ED for evaluation.  Upon arrival, initial vitals: Temp 98.6, /78, HR 72, RR 16, O2 sat 97% on 3 L/min nasal cannula.  On exam, patient was in no acute distress.  Had poor air movement bilaterally and appeared short of breath.  Labs with troponin 0 0.04, , potassium 4.4, CO2 26, creatinine 3.25, WBC 4.2, hemoglobin 12.6, platelets 90K.  EKG with  ms.  Chest x-ray showing substance mental atelectasis without any airspace opacities.  In the ED, patient received ceftriaxone 1 g, Solu-Medrol 125 mg IV, albuterol neb 2.5 mg x 1, oxycodone 5 mg, Tylenol 1000 mg.  She was admitted to the hospital with COPD exacerbation.    Today, potassium 5.7.  VBG performed, pH 7.20, PCO2 64.  Primary team was concerned for respiratory acidosis, BiPAP ordered, pulmonary consultation.     Review of Systems   Pertinent items are noted in HPI.  A 12 point comprehensive review of  systems was negative except as noted.     Active Problem List   Patient Active Problem List    Diagnosis Date Noted   ? SOB (shortness of breath) 07/10/2021   ? Shortness of breath 07/09/2021   ? Acute pulmonary edema (H) 06/26/2021   ? COPD exacerbation (H) 05/10/2021   ? PAF (paroxysmal atrial fibrillation) (H)    ? Pain of left lower leg 04/26/2021   ? Neuropathy 04/23/2021   ? Acute on chronic respiratory failure with hypoxia (H) 04/11/2021   ? Chronic acquired lymphedema 02/28/2021   ? PVD (peripheral vascular disease) (H) 12/18/2020   ? Goals of care, counseling/discussion    ? Mild episode of recurrent major depressive disorder (H) 09/15/2020   ? Hypotension, unspecified hypotension type    ? Carpal tunnel syndrome of left wrist 02/04/2020   ? Contraindication to anticoagulation therapy 03/26/2019   ? Cardiac pacemaker in situ 03/20/2019   ? S/P AV (atrioventricular) jonn ablation 03/20/2019   ? Thrombocytopenia (H)    ? Chronic atrial fibrillation (H)    ? ESRD on hemodialysis (H), MWF 08/15/2018   ? Other dysphagia 08/10/2018   ? Presence of Watchman left atrial appendage closure device 04/05/2018   ? Chronic respiratory failure with hypoxia (H) 03/02/2018   ? Pulmonary emphysema (H) 02/19/2018   ? Right foot drop 05/16/2017   ? GERD (gastroesophageal reflux disease) 04/14/2017   ? Gout    ? Anemia of chronic renal failure, stage 5 (H)    ? ZULEIKA (obstructive sleep apnea), severe 10/22/2015   ? Hyperkalemia 06/30/2014   ? Essential hypertension 06/30/2014   ? Hyperlipidemia 06/30/2014   ? 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 Hyperparathyroidism (H) 03/24/2009   ? Osteoporosis 03/24/2009   ? 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         Medical/Surgical History   Past Medical History:   Diagnosis Date   ? Acute on chronic diastolic congestive heart failure (H) 01/23/2019   ? Acute on chronic respiratory failure with hypoxia and hypercapnia (H)    ? Acute respiratory  failure with hypoxia (H) 06/30/2014   ? Arthritis    ? Chronic anemia 06/01/2014   ? Chronic kidney disease    ? Chronic respiratory failure with hypoxia (H)     3L nc   ? Chronic thoracic aortic dissection (H) 10/07/2015    descending thoracic aorta; treated medically per notes of Dragan Singh and Jennifer.   ? COPD (chronic obstructive pulmonary disease) (H)    ? COPD exacerbation (H) 01/04/2019   ? Disease of thyroid gland    ? Dissection of thoracoabdominal aorta (H)    ? DVT (deep venous thrombosis) (H) 08/22/2019   ? Dyslipidemia    ? ESRD (end stage renal disease) (H) 06/03/2009    on dialysis with Dr. Mitchell   ? Essential hypertension 06/30/2014   ? Gastrointestinal hemorrhage, unspecified gastrointestinal hemorrhage type 06/05/2017   ? GI bleeding 06/05/2017   ? Gout    ? Heart failure (H)    ? Hip fracture, intertrochanteric (H) 06/23/2019   ? History of compression fracture of spine 06/16/2017   ? L3 vertebral fracture (H) 11/16/2015   ? Left Atrial Appendage Occlusion (WATCHMAN) 04/05/2018    LAAO April 5, 2018 (30 mm WATCHMAN)   ? ZULEIKA (obstructive sleep apnea), severe, intolerant of CPAP 10/22/2015   ? Pneumonia 09/07/2015   ? Right foot drop    ? Spinal stenosis 03/28/2016   ? Stroke (H) 03/24/2016     Past Surgical History:   Procedure Laterality Date   ? BACK SURGERY      [facility]   ? COLONOSCOPY N/A 3/23/2016    Procedure: COLONOSCOPY;  Surgeon: Ruddy Tejada MD;  Location: [facility];  Service:    ? DILATION AND CURETTAGE OF UTERUS     ? EP ABLATION AV NODE N/A 3/7/2019    Procedure: EP Ablation AV Node;  Surgeon: Derick Duarte MD;  Location: [facility] Cath Lab;  Service: Cardiology   ? EP NEGRA CLOSURE N/A 4/5/2018    Procedure: EP NEGRA Closure;  Surgeon: Derick Duarte MD;  Location: [facility] Cath Lab;  Service:    ? EP PACEMAKER INSERT N/A 3/7/2019    Procedure: EP Pacemaker Insertion;  Surgeon: Derick Duarte MD;  Location: [facility] Cath Lab;  Service: Cardiology   ?  EYE SURGERY     ? HERNIA REPAIR     ? IR TUNNELED CATHETER COMPLETE REPLACEMENT  10/23/2020   ? IR TUNNELED CATHETER INSERT  11/20/2018   ? IR TUNNELED CATHETER REMOVAL  11/20/2018   ? ND COLSC FLEXIBLE W/CONTROL BLEEDING ANY METHOD N/A 6/7/2017    Procedure: COLONOSCOPY;  Surgeon: Luis Mckeon MD;  Location: [facility] GI;  Service: Gastroenterology   ? ND OPEN FIX INTER/SUBTROCH FX,IMPLNT Left 6/23/2019    Procedure: INTERNAL FIXATION, FRACTURE, TROCHANTERIC, HIP, USING INTERMEDULLARY NAIL;  Surgeon: Bennie Marsh DO;  Location: [facility] Main OR;  Service: Orthopedics   ? TONSILLECTOMY           Allergies   Allergies   Allergen Reactions   ? Ace Inhibitors Cough   ? Atrovent [Ipratropium Bromide] Headache   ? Fosinopril Sodium Cough   ? Zolpidem      hallucinations         Medications:  OUTpatient medications   Prior to Admission medications    Medication Sig Start Date End Date Taking? Authorizing Provider   acetaminophen (TYLENOL) 500 MG tablet Take 2 tablets (1,000 mg total) by mouth every 6 (six) hours as needed. 11/7/20  Yes Earnest Isaacs,    albuterol (PROVENTIL) 2.5 mg /3 mL (0.083 %) nebulizer solution Take 2.5 mg by nebulization 4 (four) times a day. On non-dialysis days. May also use 1 nebulizer as needed for shortness of breath.   Yes PROVIDER, HISTORICAL   albuterol (PROVENTIL) 2.5 mg /3 mL (0.083 %) nebulizer solution Take 2.5 mg by nebulization 3 (three) times a day. On dialysis days (M, W, F)   Yes PROVIDER, HISTORICAL   aspirin 81 MG EC tablet Take 1 tablet (81 mg total) by mouth daily with lunch. 4/14/21  Yes Andria Brooks MD   atorvastatin (LIPITOR) 10 MG tablet Take 10 mg by mouth at bedtime.   Yes PROVIDER, HISTORICAL   B complex 11-folic-C-biot-zinc (DIALYVITE) 5-326-332-50 mg-mg-mcg-mg Tab Take 1 tablet by mouth daily with lunch. (Dialyvite)    Yes PROVIDER, HISTORICAL   budesonide-formoteroL (SYMBICORT) 80-4.5 mcg/actuation inhaler Inhale 2 puffs 2 (two) times a day.  6/16/21  Yes Unruly Diamond MD   buPROPion (WELLBUTRIN XL) 150 MG 24 hr tablet Take 1 tablet (150 mg total) by mouth 2 (two) times a week. Tuesday and Saturday 6/24/21  Yes Bennie Myers MD   cholecalciferol, vitamin D3, 1,000 unit (25 mcg) tablet Take 2,000 Units by mouth daily with lunch.    Yes PROVIDER, HISTORICAL   cinacalcet (SENSIPAR) 30 MG tablet Take 30 mg by mouth see administration instructions. Take three times weekly with dialysis (on Mondays, Wednesdays, and Fridays).   Yes PROVIDER, HISTORICAL   famotidine (PEPCID) 20 MG tablet Take 1 tablet (20 mg total) by mouth at bedtime. 11/7/20  Yes Earnest Isaacs, DO   gabapentin (NEURONTIN) 100 MG capsule Take 100 mg by mouth 3 (three) times a day. Leg pain 6/24/21  Yes Bennie Myers MD   Lactobacillus rhamnosus GG (CULTURELLE) 10-15 Billion cell capsule Take 1 capsule by mouth daily with lunch.    Yes PROVIDER, HISTORICAL   loratadine (CLARITIN) 10 mg tablet Take 10 mg by mouth daily as needed for allergies.   Yes PROVIDER, HISTORICAL   melatonin 3 mg Tab tablet Take 3 mg by mouth at bedtime.   Yes PROVIDER, HISTORICAL   midodrine (PROAMATINE) 10 MG tablet Take 1.5 tablets (15 mg total) by mouth 3 (three) times a week. qMWF prior to dialysis 4/19/21  Yes Fe Valentino MD   midodrine (PROAMATINE) 5 MG tablet Take 2 tablets (10 mg total) by mouth every 6 (six) hours as needed (for SBP <110). 6/28/21  Yes Greg Hicks,    ondansetron (ZOFRAN) 4 MG tablet Take 1 tablet (4 mg total) by mouth every 8 (eight) hours as needed. 5/13/21  Yes Grace Dee MD   oxyCODONE (ROXICODONE) 5 MG immediate release tablet Take 1 tablet (5 mg total) by mouth every 6 (six) hours as needed. 4/20/21  Yes Demetra Larose, MARAL   polyvinyl alcohol (LIQUIFILM TEARS) 1.4 % ophthalmic solution Administer 1 drop to both eyes as needed for dry eyes.   Yes PROVIDER, HISTORICAL   pot bicarb/sod bicarb/cit ac (EDI-SELTZER GOLD ORAL) Take by mouth  daily as needed.  3/1/21  Yes PROVIDER, HISTORICAL   senna-docusate (SENNOSIDES-DOCUSATE SODIUM) 8.6-50 mg tablet Take 1 tablet by mouth every other day.   Yes PROVIDER, HISTORICAL   sevelamer carbonate (RENVELA) 800 mg tablet Take 1,600 mg by mouth 3 (three) times a day with meals.   Yes PROVIDER, HISTORICAL   sevelamer carbonate (RENVELA) 800 mg tablet Take 800 mg by mouth with snacks.   Yes PROVIDER, HISTORICAL   timolol maleate (TIMOPTIC) 0.5 % ophthalmic solution Administer 1 drop to both eyes 2 (two) times a day.    Yes PROVIDER, HISTORICAL   traZODone (DESYREL) 150 MG tablet Take 150 mg by mouth at bedtime.    Yes PROVIDER, HISTORICAL   albuterol (PROVENTIL) 2.5 mg /3 mL (0.083 %) nebulizer solution Take 3 mL (2.5 mg total) by nebulization 3 (three) times a day. And as needed for shortness of breath 6/24/21 7/10/21  Bennie Myers MD   folic acid (FOLVITE) 1 MG tablet TAKE ONE TABLET BY MOUTH ONCE DAILY 4/28/20 7/10/21  Earnest Carpenter MD   folic acid-vit B6-vit B12 (FOLGARD) 1.1-12.5-0.5 mg Tab Take 1 mg by mouth. 7/21/20 7/10/21  PROVIDER, HISTORICAL   metoprolol succinate (TOPROL-XL) 25 MG Take 1 tablet by mouth. 2/21/21 7/10/21  PROVIDER, HISTORICAL   nystatin (MYCOSTATIN) powder Apply 1 application topically 2 (two) times a day as needed.  7/10/21  PROVIDER, HISTORICAL   senna (SENOKOT) 8.6 mg tablet Take 1 tablet by mouth. 2/24/21 7/10/21  PROVIDER, HISTORICAL   sevelamer HCL (RENAGEL) 800 MG tablet Take 1,600 mg by mouth. 2/20/21 7/10/21  PROVIDER, HISTORICAL   traZODone (DESYREL) 100 MG tablet Take 150 mg by mouth. 7/21/20 7/10/21  PROVIDER, HISTORICAL           Medications:  INpatient medications   ? albuterol  2.5 mg Nebulization QID - RT   ? [START ON 7/12/2021] albuterol  2.5 mg Nebulization 3 times per day on Mon Wed Fri   ? albuterol  2.5 mg Nebulization 4 times per day on Sun Tue Thu Sat   ? aspirin  81 mg Oral Daily with lunch   ? atorvastatin  10 mg Oral QHS   ? budesonide-formoteroL   2 puff Inhalation BID   ? buPROPion  150 mg Oral Once per day on Tue Sat   ? cholecalciferol (vitamin D3)  2,000 Units Oral Daily with lunch   ? cinacalcet  30 mg Oral Once per day on    ? famotidine  20 mg Oral QHS   ? gabapentin  100 mg Oral TID   ? heparin (PF) ANTICOAGULANT  5,000 Units Subcutaneous Q12H -   ? melatonin  3 mg Oral QHS   ? [START ON 2021] midodrine  15 mg Oral Once per day on    ? senna-docusate  1 tablet Oral Every Other Day   ? sevelamer carbonate  1,600 mg Oral TID with meals   ? sodium chloride  2.5 mL Intravenous Line Care   ? timoloL maleate  1 drop Both Eyes BID   ? traZODone  150 mg Oral QHS           Family History  Social History   Reviewed, and:  Family History   Problem Relation Age of Onset   ? Dementia Mother    ? Diabetes Mother    ? Arthritis Mother    ? Cancer Mother    ? Depression Mother    ? Heart disease Mother    ? Vision loss Mother    ? Stroke Father    ? Heart disease Father    ? Breast cancer Neg Hx      Reviewed, and:    Tobacco: Prior use, quit .  Began smoking at the age of 32, smoked 2 packs/day for about 30 years.  States that she would not smoke the full cigarette.  Social History     Socioeconomic History   ? Marital status:      Spouse name: Cayden   ? Number of children: 2   ? Years of education: Not on file   ? Highest education level: Not on file   Occupational History     Employer: RETIRED   Social Needs   ? Financial resource strain: Not on file   ? Food insecurity     Worry: Not on file     Inability: Not on file   ? Transportation needs     Medical: Not on file     Non-medical: Not on file   Tobacco Use   ? Smoking status: Former Smoker     Packs/day: 1.50     Years: 37.00     Pack years: 55.50     Types: Cigarettes     Quit date: 2009     Years since quittin.5   ? Smokeless tobacco: Never Used   Substance and Sexual Activity   ? Alcohol use: Yes     Alcohol/week: 11.7 standard drinks     Types: 14  "Standard drinks or equivalent per week     Comment: 14 mixed drinks per week   ? Drug use: No   ? Sexual activity: Never     Partners: Male   Lifestyle   ? Physical activity     Days per week: Not on file     Minutes per session: Not on file   ? Stress: Not on file   Relationships   ? Social connections     Talks on phone: Not on file     Gets together: Not on file     Attends Congregational service: Not on file     Active member of club or organization: Not on file     Attends meetings of clubs or organizations: Not on file     Relationship status: Not on file   ? Intimate partner violence     Fear of current or ex partner: Not on file     Emotionally abused: Not on file     Physically abused: Not on file     Forced sexual activity: Not on file   Other Topics Concern   ? Not on file   Social History Narrative    Lives with her . Daughter in [place] and daughter in [place].      Psychosocial Needs  Social History     Social History Narrative    Lives with her . Daughter in [place] and daughter in [place].     Additional psychosocial needs reviewed per nursing assessment.      Physical Exam   VITALS:  /58 (Patient Position: Sitting)   Pulse 80   Temp 98.7  F (37.1  C) (Oral)   Resp 20   Ht 5' 4\" (1.626 m)   Wt 153 lb 4.8 oz (69.5 kg)   SpO2 99%   BMI 26.31 kg/m    Temp:  [98.2  F (36.8  C)-99.3  F (37.4  C)] 98.7  F (37.1  C)  Heart Rate:  [69-83] 80  Resp:  [16-25] 20  BP: (124-147)/(58-78) 124/58  SpO2:  [90 %-99 %] 99 %  ETCO2 (mmHg):  [30 mmHg] 30 mmHg    PHYSICAL EXAM:   GEN: Pleasant female, laying in the bed in no acute distress, interactive.  HEENT: Normocephalic, atraumatic.  Extraoccular eye movements intact, anicteric sclera. No sinus tenderness to palpation.  Moist mucous membranes.   NECK: Supple.    PULM: Non-labored breathing.  No use of accessory muscles.  Diminished breath sounds throughout, no wheezing.  CVS: Regular rate and rhythm.  Normal S1, S2.  No rubs, murmurs, or " gallops.    ABDOMEN: Normoactive bowel sounds.  Non-tender to palpation.  Non-distended.    EXTREMITIES/SKIN:  No clubbing, cyanosis, or edema.  Skin tears to right arm with bleeding.  Right IJ dialysis catheter.  NEURO:  Awake.  Oriented to person, place, time and situation.  Cranial nerves 2-12 grossly intact.  Moving all extremities.      I&O:      Intake/Output Summary (Last 24 hours) at 7/10/2021 1301  Last data filed at 7/10/2021 1200  Gross per 24 hour   Intake 1020 ml   Output 1 ml   Net 1019 ml        Pertinent Labs   Lab Results: personally reviewed.     Serum Glucose range:No results for input(s): POCGLU in the last 72 hours.  ABG:  No results for input(s): PHART, [id number], PO2ART, [id number], BEARTCALC, O2SAT, FIO2 in the last 72 hours.  CBC:  Results from last 7 days   Lab Units 07/10/21  0610 07/09/21 2002   LN-WHITE BLOOD CELL COUNT thou/uL 2.1* 4.2   LN-HEMOGLOBIN g/dL 11.8* 12.6   LN-HEMATOCRIT % 39.4 41.9   LN-PLATELET COUNT thou/uL 74* 90*   LN-NEUTROPHILS RELATIVE PERCENT %  --  68   LN-MONOCYTES RELATIVE PERCENT %  --  13*   LN-MONOCYTES - REL (DIFF) % 1*  --      Chemistry:  Results from last 7 days   Lab Units 07/10/21  0610 07/09/21  1922   LN-SODIUM mmol/L 135* 141   LN-POTASSIUM mmol/L 5.7* 4.4   LN-CHLORIDE mmol/L 103 105   LN-CO2 mmol/L 20* 26   LN-BLOOD UREA NITROGEN mg/dL 19 13   LN-CREATININE mg/dL 3.87* 3.25*   LN-CALCIUM mg/dL 8.3* 9.4   LN-MAGNESIUM mg/dL  --  2.1   LN-ALBUMIN g/dL 3.3*  --    LN-ALT (SGPT) U/L 11  --    LN-AST (SGOT) U/L 15  --    LN-ALKALINE PHOSPHATASE U/L 107  --    LN-BILIRUBIN TOTAL mg/dL 0.3  --      Coags:  Lab Results   Component Value Date    INR 0.99 06/11/2021    INR 1.05 03/31/2021    INR 1.00 01/26/2021     Cardiac Markers:  Results from last 7 days   Lab Units 07/10/21  1126 07/10/21  0610 07/09/21  1922   LN-TROPONIN I ng/mL 0.05 0.05 0.04       Microbiology:    COVID-19 PCR, 7/9: negative     Cardiac/Radiology Studies   Cardiac:    EKG:    personally reviewed.      TTE, 5/10/2021:  Summary:  1.Left ventricle ejection fraction is normal. The calculated left ventricular ejection fraction is 64%. 2.TAPSE is abnormal, which is consistent with abnormal right ventricular systolic function. 3.Mild regurgitant lesions of all 4 cardiac valves. 4.Moderate pulmonary hypertension suggested. 5.When compared to the previous study dated 7/18/2020, no significant change other than mild increase in pulmonic pressures.    Radiology:  Personally reviewed image/s and Personally reviewed impression/s    Chest X-Ray, 7/9/21:  Left subclavian approach pacemaker lead terminates in the ventricular apex. Tunneled right jugular approach multilumen catheter terminates in the mid right atrium. There is a left atrial appendage occlusion device.  Cardiac silhouette is not enlarged. Tortuous thoracic aorta. There are atheromatous calcifications in the arch and descending aorta. A curvilinear calcification projecting to the right of the lower thoracic spine relates to the tortuous descending aorta.  There is a smooth oblong interface in the medial basal left chest, separate from the descending aortic interface, which relates to lobulated paraspinous tissue on the prior CT and is unchanged. Subsegmental atelectasis in the left lateral costophrenic sulcus. No new airspace opacities or interstitial thickening. No pleural fluid or pneumothorax.      CTA Chest PE study, 7/9/21:      FINDINGS: ANGIOGRAM CHEST: Pulmonary arteries are normal caliber and negative for pulmonary emboli. Redemonstrated aortic atherosclerotic change with aneurysmal dilatation of the distal descending thoracic aorta which measures up to 4.5 cm on image 143 of the axial series, stable compared to prior study when measured in the same plane. No CT evidence of right heart strain. LUNGS AND PLEURA: Mild apical and basilar interstitial infiltrates. No pleural effusion. Patchy foci of atelectasis in the mid and lower  lung zones. MEDIASTINUM/AXILLAE: Heart size at upper limits normal. No pericardial effusion. Left atrial appendage occlusion device. Cardiac pacer leads. CORONARY ARTERY CALCIFICATION: Moderate. UPPER ABDOMEN: Right renal atrophy and cysts of varying density. Atherosclerotic changes. MUSCULOSKELETAL: No acute osseous findings. The indeterminate lobulated soft tissue density present in the left paraspinal region is not significantly changed over multiple comparison exams. No cortical erosion of the adjacent vertebral bodies.    IMPRESSION: 1.  Negative for pulmonary embolism. 2.  Mild interstitial infiltrates suggestive of mild pulmonary edema.    Outside reports reviewed: Historical medical records.

## 2021-07-10 NOTE — PROGRESS NOTES
Progress Notes by Gee Lopez RN at 7/10/2021  4:29 PM     Author: Gee Lopez RN Service: Nephrology Author Type: Registered Nurse    Filed: 7/10/2021  4:31 PM Date of Service: 7/10/2021  4:29 PM Status: Signed    : Gee Lopez RN (Registered Nurse)       Hemodialysis Treatment Note      Fluid Removed: 2L    Vascular Access Status:Right chest tunneled Internal jugular cvc aspirates and flushes easily, no issues with BFR. Dressing C/D/I with bio patch in place. Post treatment catheter instilled with 1:1000 units heparin per limb volume. Catheter labeled, wrapped in gauze and secured with tape.     Dialyzer Rinse: Good    Total Blood Volume Processed: 65.2L    Total Dialysis Treatment Time: 3hrs    Run Summary  Pt. was hemodynamically stable during dialysis.    Interventions  Critline used for fluid monitoring/management.    Plan:  per renal team    Neymar Lopez RN  [facility]

## 2021-07-10 NOTE — H&P
H&P by Kayleigh Monterroso MD at 2021 10:55 PM     Author: Kayleigh Monterroso MD Service: Hospitalist Author Type: Physician    Filed: 7/10/2021  2:08 AM Date of Service: 2021 10:55 PM Status: Signed    : Kayleigh Monterroso MD (Physician)       Admission History and Physical   Belia Rodriguez,  1945, MRN [id number]    PCP: Neil Galan MD, [phone]   Code status:  Prior       Extended Emergency Contact Information  Primary Emergency Contact: Cayden Rodriguez  Address: [address] [place] [address] of [facility]  Home Phone: [phone]  Mobile Phone: [phone]  Relation: Spouse       Assessment and Plan   1. Acute shortness of breath - She appeared comfortable at this point. Talks in full sentences. No abdominal breathing  - She ESRD on HD with chronic anemia. Could be fluid overload. She was just dialyzed Friday and her breathing did not improve.  - She has no infiltrate on chest imaging. Low suspicion for PNA but could be bronchitis.  - She also  has untreated severe UZLEIKA with reported AHI >50, with pulmonary HTN   - She has elevated BNP and could be CHF. She has history of atrial fibrillation s/p LAAO device, AV node ablation s/p PPM  - She has a COPD diagnosis, but was felt a questionable diagnosis given non-obstructive spirometry in  and has no emphysema on CT per Dr Hernandez. Her lungs not wheezy     2. She has HTN and HL    Plans  1. Renal referral, may need HD today to pull more fluid  2. No evidence of respiratory infection, will check labs in AM  3. PRN nebs  4. BiPAP PRN       VTE prophylaxis: SCDs and ambulation, heparin   IV fluids: Per order set   Diet:  Renal   GI prophylaxis: Not indicated at this point, re-assess if needed.   Pain, sleep, and vomiting medications: Per order set  Code Status: DNR/DNI  COVID test result:  negative   COVID vaccination: unknown   Barriers to discharge: admitting clinical condition  Discharge  Disposition:  Too early to tell at this point. Consider referral to care manager/        Care plan was created based on available information provided, including patient's condition at the time of encounter.   This plan was discussed with patient and/or family members using layman's terms and have agreed to proceed.   At the end of night shift (9PM - 730A), this case will be presented to the AM Hospitalist.    It is recommended to revise care plan if there is change in condition and/or new clinical information that are not available during my encounter.     All or some of home medication/s were not resumed on admission due to safety reasons or contraindications. Dosing and frequency may also have been modified. Please resume/review them during your visit.     70 minutes of total visit duration and greater than 50% was spent in direct evaluation of patient and coordination of care including discussion of diagnostic test results and recommended treatment. .      Idris Monterroso MD, MPH, FACP, [facility]  Internal Medicine - Hospitalist     Chief Complaint: shortness of breath      HPI:    Belia Rodriguez is a 75 y.o. old female     - Transferred from [facility] - bed availability issues  - She presented initially with productive cough, shortness of breath, and wheezing.   - She was admitted last of June 2021 for respiratory issues - pulmonary edema, fluid overload, COPD. She was seen by pulmonary service and felt the diagnosis of COPD was questionable given non-obstructive spirometry in 2014 and has no emphysema on CT per Dr Hernandez.   - Pertinent labs before transfer include elevated BNP to 700s, normal WBC, and has negative troponin. PE study was negative but has evidence of edema  - When I met her, she was awake and interactive. She presented with 2 days of cough. She has shortness of breath and chest tightness. She thought she was having PNA. She is wheelchair bound, doesn't move much. No diarrheal No  abdominal pain  - She has ESRD on HD with chronic anemia. She was just dialyzed Friday and her breathing did not improve  - She has untreated severe ZLUEIKA with reported AHI >50, with pulmonary HTN   - She has history of atrial fibrillation s/p LAAO device, AV node ablation s/p PPM  - ROS --- No headache. No dizziness. No weakness. No palpitations. No abdominal pain. No nausea or vomiting. No bleeding symptoms. No weight loss. Rest of 12 point ROS was reviewed and negative.      Medical History  Active Ambulatory (Non-Hospital) Problems    Diagnosis   ? Shortness of breath   ? Acute pulmonary edema (H)   ? COPD exacerbation (H)   ? PAF (paroxysmal atrial fibrillation) (H)   ? Pain of left lower leg   ? Neuropathy   ? Acute on chronic respiratory failure with hypoxia (H)   ? Chronic acquired lymphedema   ? PVD (peripheral vascular disease) (H)   ? Goals of care, counseling/discussion   ? Mild episode of recurrent major depressive disorder (H)   ? Hypotension, unspecified hypotension type   ? Carpal tunnel syndrome of left wrist   ? Contraindication to anticoagulation therapy   ? Cardiac pacemaker in situ   ? S/P AV (atrioventricular) jonn ablation   ? Thrombocytopenia (H)   ? Chronic atrial fibrillation (H)   ? ESRD on hemodialysis (H), MWF   ? Other dysphagia   ? Presence of Watchman left atrial appendage closure device   ? Chronic respiratory failure with hypoxia (H)   ? Pulmonary emphysema (H)   ? Right foot drop   ? GERD (gastroesophageal reflux disease)   ? Gout   ? Anemia of chronic renal failure, stage 5 (H)   ? ZULEIKA (obstructive sleep apnea), severe   ? Hyperkalemia   ? Essential hypertension   ? Hyperlipidemia   ? Venous tributary (branch) occlusion of retina   ? Hyperparathyroidism (H)   ? Osteoporosis   ? Borderline glaucoma with ocular hypertension     Past Medical History:   Diagnosis Date   ? Acute on chronic diastolic congestive heart failure (H) 01/23/2019   ? Acute on chronic respiratory failure with  hypoxia and hypercapnia (H)    ? Acute respiratory failure with hypoxia (H) 06/30/2014   ? Arthritis    ? Chronic anemia 06/01/2014   ? Chronic kidney disease    ? Chronic respiratory failure with hypoxia (H)    ? Chronic thoracic aortic dissection (H) 10/07/2015   ? COPD (chronic obstructive pulmonary disease) (H)    ? COPD exacerbation (H) 01/04/2019   ? Disease of thyroid gland    ? Dissection of thoracoabdominal aorta (H)    ? DVT (deep venous thrombosis) (H) 08/22/2019   ? Dyslipidemia    ? ESRD (end stage renal disease) (H) 06/03/2009   ? Essential hypertension 06/30/2014   ? Gastrointestinal hemorrhage, unspecified gastrointestinal hemorrhage type 06/05/2017   ? GI bleeding 06/05/2017   ? Gout    ? Heart failure (H)    ? Hip fracture, intertrochanteric (H) 06/23/2019   ? History of compression fracture of spine 06/16/2017   ? L3 vertebral fracture (H) 11/16/2015   ? Left Atrial Appendage Occlusion (WATCHMAN) 04/05/2018   ? ZULEIKA (obstructive sleep apnea), severe, intolerant of CPAP 10/22/2015   ? Pneumonia 09/07/2015   ? Right foot drop    ? Spinal stenosis 03/28/2016   ? Stroke (H) 03/24/2016        Surgical History  She  has a past surgical history that includes Tonsillectomy; Back surgery; Eye surgery; Hernia repair; Colonoscopy (N/A, 3/23/2016); Dilation and curettage of uterus; pr colsc flexible w/control bleeding any method (N/A, 6/7/2017); EP NEGRA Closure (N/A, 4/5/2018); IR Tunneled Catheter Insert (11/20/2018); IR Tunneled Catheter Removal (11/20/2018); EP Pacemaker Insertion (N/A, 3/7/2019); EP Ablation AV Node (N/A, 3/7/2019); pr open fix inter/subtroch fx,implnt (Left, 6/23/2019); and IR Tunneled Catheter Complete Replacement (10/23/2020).       Social History  Reviewed, and she  reports that she quit smoking about 12 years ago. Her smoking use included cigarettes. She has a 55.50 pack-year smoking history. She has never used smokeless tobacco. She reports current alcohol use of about 11.7 standard  drinks of alcohol per week. She reports that she does not use drugs.       Allergies  Allergies   Allergen Reactions   ? Ace Inhibitors Cough   ? Atrovent [Ipratropium Bromide] Headache   ? Fosinopril Sodium Cough   ? Zolpidem      hallucinations    Family History  Reviewed, and family history includes Arthritis in her mother; Cancer in her mother; Dementia in her mother; Depression in her mother; Diabetes in her mother; Heart disease in her father and mother; Stroke in her father; Vision loss in her mother.          Prior to Admission Medications   No outpatient medications have been marked as taking for the 7/9/21 encounter (Hospital Encounter).          Review of Systems:  A 12 point comprehensive review of systems was negative except as noted. Physical Exam:  Temp:  [98.6  F (37  C)] 98.6  F (37  C)  Heart Rate:  [69-80] 80  Resp:  [16-19] 19  BP: (125-147)/(60-78) 141/68    General Appearance:  Alert, cooperative, no distress  Head:    Normocephalic, without obvious abnormality, atraumatic  EENT:  PERRL, conjunctiva/corneas clear, EOM's intact.   Neck:   Supple, symmetrical, trachea midline, no adenopathy; no NVE  Back:  Symmetric, no curvature, no CVA tenderness  Chest/Lungs: decreased air entry, mild rales, respirations unlabored, No tenderness or deformity. No abdominal breathing or use of accessory muscles.   Heart:    Regular rate and rhythm, S1 and S2 normal, no murmur, rub   or gallop  Abdomen: Soft, non-tender, bowel sounds active all four quadrants, not peritoneal on palpation. Not distended  Extremities:  Extremities normal, atraumatic, no swelling   Skin:  Skin color, texture, turgor normal, no rashes or lesion  Neurologic:  Awake and alert,  No lateralizing or localizing signs        Pertinent Labs

## 2021-07-10 NOTE — PROGRESS NOTES
Progress Notes by Rakesh Rader MD at 7/10/2021  7:42 AM     Author: Rakesh Rader MD Service: Nephrology Author Type: Physician    Filed: 7/10/2021  7:49 AM Date of Service: 7/10/2021  7:42 AM Status: Signed    : Rakesh Rader MD (Physician)       Consult noted  ESKD patient of DR Kinney dialyzes at [facility]  EDW 67Kg , but has been hypertensive and not at goal often with high BP  Current Bp seems acceptable  K high  Will need extra treatment today and then continue MWF    Orders placed for HD  Has a hx of COPD as well  CT PE study -ve , mild pulm edema    Will see later today    Rakesh Rader MD  [facility]  [phone]

## 2021-07-10 NOTE — PROGRESS NOTES
Progress Notes by Andria Potts LRT at 7/10/2021 10:16 AM     Author: Andria Potts LRT Service: -- Author Type: Respiratory Therapist    Filed: 7/10/2021 10:22 AM Date of Service: 7/10/2021 10:16 AM Status: Signed    : Andria Potts LRT (Respiratory Therapist)       Pt admitted with shortness of breath.   Pt hx includes ESRD on dialysis, COPD on 3 LPM Home O2, CHF, HTN, pulm HTN, untreated ZULEIKA.  Pt is on 3 LPM NC, o2 sats 98%, RR-18, BS diminished.  Pt states home routine is Alb neb four times a day.  Will schedule Alb neb four times a day and PRN @ this time.  Will RE-EVAL if needed.       07/10/21 0853   Reason for Assessment (RCAT)   RCAT Assesment Initial   Assessment Reason  COPD   $ RCAT Eval Time 30 min.  Yes   Vitals (RCAT)   Heart Rate 83   Resp 18   SpO2 98 %   Chart Assessment (RCAT)   Pulmonary Status  4   Surgical Status  0   Chest Xray  3   Patient Assessment (RCAT)    Respiratory Pattern  0   Mental Status  0   Breath Sounds  2   Cough Effectiveness  0   Level of Activity  1   O2 Required SpO2 >= 92%  1   Chart + Pt. Assessment Total Points  11   RCAT Acuity Score 11 (Acuity 3)   Clinical Indications (RCAT)   Aerosol Hygiene Home regimen   Broncho-Pulmonary Therapy History of mucous producing disease   Volume Expansion Therapy Prevent atelectasis   RCAT Order Placed  No  (Home Routine)

## 2021-07-10 NOTE — PROGRESS NOTES
Progress Notes by Henry Arndt MD at 7/10/2021 11:37 AM     Author: Henry Arndt MD Service: Hospitalist Author Type: Physician    Filed: 7/10/2021 11:38 AM Date of Service: 7/10/2021 11:37 AM Status: Signed    : Henry Arndt MD (Physician)       VBG shows respi acidosi  Ordered for bipap  Pulmo consult  Albuterol nebs

## 2021-07-10 NOTE — PROGRESS NOTES
Progress Notes by Rebecca Ruiz at 7/10/2021  8:48 AM     Author: Rebecca Ruiz Service: Pharmacy Author Type: --    Filed: 7/10/2021  8:51 AM Date of Service: 7/10/2021  8:48 AM Status: Attested    : Rebecca Ruiz Cosigner: Atiya Cameron PharmD at 7/10/2021  9:05 AM    Attestation signed by Atiya Cameron PharmD at 7/10/2021  9:05 AM    Although I was not present for the interview nor did I personally see the patient, to my knowledge the intern has compiled the PTA medication list to the best of their ability given the information available at this time.    Atiya Cameron PharmD                  Pharmacy Note - Admission Medication History    Pertinent Provider Information: Patient is a good historian. She states that her  likely will not be able to drive her inhalers or eye drops to [facility], but she would like to use them while admitted.     Patient on Wellbutrin XL twice weekly and is due for a dose today.    ______________________________________________________________________    Prior To Admission (PTA) med list completed and updated in EMR.       PTA Med List   Medication Sig Note Last Dose   ? acetaminophen (TYLENOL) 500 MG tablet Take 2 tablets (1,000 mg total) by mouth every 6 (six) hours as needed.  7/9/2021 at Unknown time   ? albuterol (PROVENTIL) 2.5 mg /3 mL (0.083 %) nebulizer solution Take 2.5 mg by nebulization 4 (four) times a day. On non-dialysis days. May also use 1 nebulizer as needed for shortness of breath.  Past Week at Unknown time   ? albuterol (PROVENTIL) 2.5 mg /3 mL (0.083 %) nebulizer solution Take 2.5 mg by nebulization 3 (three) times a day. On dialysis days (M, W, F)  7/9/2021 at Unknown time   ? aspirin 81 MG EC tablet Take 1 tablet (81 mg total) by mouth daily with lunch.  7/9/2021 at Unknown time   ? atorvastatin (LIPITOR) 10 MG tablet Take 10 mg by mouth at bedtime.  7/8/2021   ? B complex 11-folic-C-biot-zinc (DIALYVITE) [phone] mg-mg-mcg-mg Tab Take 1  tablet by mouth daily with lunch. (Dialyvite)   7/9/2021 at Unknown time   ? budesonide-formoteroL (SYMBICORT) 80-4.5 mcg/actuation inhaler Inhale 2 puffs 2 (two) times a day.  7/9/2021 at AM only, pt unable to get from home   ? buPROPion (WELLBUTRIN XL) 150 MG 24 hr tablet Take 1 tablet (150 mg total) by mouth 2 (two) times a week. Tuesday and Saturday 7/10/2021: Takes on Tuesday and Saturday. 7/6/2021 at due for dose TODAY   ? cholecalciferol, vitamin D3, 1,000 unit (25 mcg) tablet Take 2,000 Units by mouth daily with lunch.   7/9/2021 at Unknown time   ? cinacalcet (SENSIPAR) 30 MG tablet Take 30 mg by mouth see administration instructions. Take three times weekly with dialysis (on Mondays, Wednesdays, and Fridays).  Past Week at Pt gets dose after each dialysis run at clinic   ? famotidine (PEPCID) 20 MG tablet Take 1 tablet (20 mg total) by mouth at bedtime.  7/8/2021 at HS   ? gabapentin (NEURONTIN) 100 MG capsule Take 100 mg by mouth 3 (three) times a day. Leg pain  7/9/2021 at Unknown time   ? Lactobacillus rhamnosus GG (CULTURELLE) 10-15 Billion cell capsule Take 1 capsule by mouth daily with lunch.   7/9/2021 at Unknown time   ? loratadine (CLARITIN) 10 mg tablet Take 10 mg by mouth daily as needed for allergies.  7/9/2021 at Unknown time   ? melatonin 3 mg Tab tablet Take 3 mg by mouth at bedtime.  7/8/2021 at HS   ? midodrine (PROAMATINE) 10 MG tablet Take 1.5 tablets (15 mg total) by mouth 3 (three) times a week. qMWF prior to dialysis  Past Week at takes 15 mg prior to dialysis run   ? midodrine (PROAMATINE) 5 MG tablet Take 2 tablets (10 mg total) by mouth every 6 (six) hours as needed (for SBP <110).  More than a month at not used recently per pt   ? ondansetron (ZOFRAN) 4 MG tablet Take 1 tablet (4 mg total) by mouth every 8 (eight) hours as needed.  7/9/2021 at Unknown time   ? oxyCODONE (ROXICODONE) 5 MG immediate release tablet Take 1 tablet (5 mg total) by mouth every 6 (six) hours as needed.   Past Month at not used recent per pt, for foot pain   ? polyvinyl alcohol (LIQUIFILM TEARS) 1.4 % ophthalmic solution Administer 1 drop to both eyes as needed for dry eyes.  7/9/2021 at pt can't get from home, but wants to use while admitted   ? pot bicarb/sod bicarb/cit ac (EDI-SELTZER GOLD ORAL) Take by mouth daily as needed.   7/9/2021 at Unknown time   ? senna-docusate (SENNOSIDES-DOCUSATE SODIUM) 8.6-50 mg tablet Take 1 tablet by mouth every other day.  7/7/2021   ? sevelamer carbonate (RENVELA) 800 mg tablet Take 1,600 mg by mouth 3 (three) times a day with meals.  7/9/2021 at 2 doses   ? sevelamer carbonate (RENVELA) 800 mg tablet Take 800 mg by mouth with snacks.  Past Month at not recent per pt   ? timolol maleate (TIMOPTIC) 0.5 % ophthalmic solution Administer 1 drop to both eyes 2 (two) times a day.   7/9/2021 at AM dose only, can't get from home   ? traZODone (DESYREL) 150 MG tablet Take 150 mg by mouth at bedtime.   7/8/2021       Information source(s): Patient and CareEverywhere/SureScripts  Method of interview communication: phone    Summary of Changes to PTA Med List  New: none  Discontinued: none  Changed: none    Patient was asked about OTC/herbal products specifically.  PTA med list reflects this.    In the past week, patient estimated taking medication this percent of the time:  greater than 90%.    Allergies were reviewed, assessed, and updated with the patient.      Patient did not bring any medications to the hospital and can't retrieve from home. No multi-dose medications are available for use during hospital stay.     The information provided in this note is only as accurate as the sources available at the time of the update(s).    Thank you for the opportunity to participate in the care of this patient.    Rebecca Ruiz  7/10/2021 8:50 AM

## 2021-07-11 PROBLEM — J96.20 ACUTE ON CHRONIC RESPIRATORY FAILURE (H): Status: ACTIVE | Noted: 2021-01-01

## 2021-07-11 NOTE — PROGRESS NOTES
Progress Notes by Aye Reyes LRT at 7/10/2021 10:15 PM     Author: Aye Reyes LRT Service: -- Author Type: Respiratory Therapist    Filed: 7/10/2021 10:17 PM Date of Service: 7/10/2021 10:15 PM Status: Signed    : Aye Reyes LRT (Respiratory Therapist)       Pt on 3L NC, SpO2 98%. Pt received neb treatment and tolerated well. BS; clear/diminished.        07/10/21 2045   Respiratory Assessment   Assessment Type Post-treatment   Respiratory Pattern Regular   Chest Assessment Chest expansion symmetrical   Bilateral Breath Sounds Clear;Diminished   Respiratory Treatments    Post-Treatment Pulse 73   Post-Treatment Respirations 18   Post-Treatment Sp02 98   Position High Ware's   Treatment Tolerance Tolerated well     Will continue to monitor and assess pt.    LIGIA Mcintosh

## 2021-07-11 NOTE — PROGRESS NOTES
Progress Notes by Helen Novak RN at 7/10/2021 10:43 PM     Author: Helen Novak RN Service: -- Author Type: Registered Nurse    Filed: 7/10/2021 10:44 PM Date of Service: 7/10/2021 10:43 PM Status: Signed    : Helen Novak RN (Registered Nurse)       Patient alert and oriented. BP stable(see flowsheet). Sating above 97% on 3L home oxygen. Afebrile.Tachypneic with RR of 25-36, MD aware. Flagged sepsis protocol. Lactic acid 3.5; MD made aware; received orders to continue monitoring at this time. Patient anuric.Had dialysis this afternoon. Pain to bilateral feet adequately controlled with prn oxycodone. Up to chair with meals.

## 2021-07-11 NOTE — CONSULTS
This consult was completed on 7/10/2021 by myself.    Nely Melgar MD, MPH  Pulmonary & Critical Care Medicine  7/11/2021  2:35 PM

## 2021-07-11 NOTE — PLAN OF CARE
Plan of Care by Aye Reyes LRT at 7/10/2021 10:17 PM     Author: Aye Reyes LRT Service: -- Author Type: Respiratory Therapist    Filed: 7/10/2021 10:17 PM Date of Service: 7/10/2021 10:17 PM Status: Signed    : Aye Reyes LRT (Respiratory Therapist)         Problem: Ineffective Airway Clearance  Goal: Maintain airway patency  Outcome: Progressing     Problem: Abnormal Respirations  Goal: Patient will maintain/improve baseline respiratory rate/effort  Outcome: Progressing     Problem: Impaired Gas Exchange  Goal: Demonstrate improved ventilation and adequate oxygenation of tissues as evidenced by absence of respiratory distress  Outcome: Progressing     Problem: Inadequate Gas Exchange  Goal: Patient is adequately oxygenated and ventilation is improved  Outcome: Progressing       LIGIA Mcintosh

## 2021-07-11 NOTE — PROGRESS NOTES
[facility]: PULMONARY PROGRESS NOTE     Date / Time of Admission:  7/10/2021 12:49 AM    ID: Belia Rodriguez is a 75 year old with chronic respiratory failure with hypoxia/hypercapnia on 3 L/min NC, COPD (FEV1 57% on PFTs 01/2014), untreated severe ZULEIKA (nontolerant of NIPPV), possible pulmonary hypertension, atrial fibrillation status post LAAO device, AV node ablation status post PPM, dyslipidemia, history of stroke, ESRD on hemodialysis, history of GI bleed, chronic anemia, history of lumbar fracture, gout who presented to [facility][1] ED on 7/10/2021 with shortness of breath and subsequently admitted for COPD exacerbation.     Reason for consultation:  CO2 retention, respiratory acidosis         Assessment: 1.   Chronic respiratory failure with hypoxia and hypercapnia.  Patient chronically on 3 L/min nasal cannula, currently still on same dose.  No evidence of acute respiratory failure.      2. Combined respiratory and metabolic acidosis.  Chronic respiratory acidosis (pCO2 ~ 50 mmHg at baseline) based on VBG from 7/10/21 evening, which was obtained after hemodialysis completed.  Also chronic She does have chronic CO2 retention and her acid-base status is compromised by her metabolic acidosis with serum bicarb 20.  She is receiving dialysis today, hopefully will have improvement.  1. Acute bronchitis, organism unknown.  2. COPD without exacerbation.  Initiated on nebulizer therapy and Symbicort in the last 1 month; prior to this was not on any inhaler therapy.  CTA chest PE study on 7/9/2021 with motion artifact but showing patchy areas of mild pulmonary emphysema as well as air trapping in the bilateral upper lobes MI: This is similar to CTA chest study 06/2021.  PFTs 08/2014 with combined obstructive and restrictive disease; FEV1 57%, FEV1 60%, FEV1/FVC 80%, %, DLCO 68% and mildly reduced when corrected for hemoglobin.  3. Untreated severe obstructive sleep apnea, nontolerant  "of NIPPV.  Patient had split-night sleep study on 10/23/2015 with an overall AHI 52.5.  Recommendations included \"nightly use of Bi-level PAP in spontaneous mode with IPAP=15 cwp, EPAP=9 cwp to be used for the entire duration of sleep.\"  Patient has difficulty tolerating NIPPV device.  4. Possible pulmonary hypertension.  Based on TTE reports.  No right heart catheterization for confirmation.  5. History of tobacco use disorder.  Began smoking at the age of 32, quit in 2009.  Smoking 2 packs/day for approximately 32 years but states she did not \"inhale\" the entire cigarette.         Plan:     VBG improved from pH 7.20, pCO2 64 => pH 7.37, pCO2 52 after dialysis on 7/10.  We never used NIPPV given stable mental state.    ETCO2 monitoring discontinued    Continue Symbicort 80.45 mcg inhl, 2 puff 2x/day per home regimen; using with spacer.    Continue albuterol nebs 4x/day while in-house.      Resume home nebulizer regiment at time of discharge:  albuterol neb 3x/day MWF and 4x/day TThSatSun    Please discharge home with an albuterol HFA prescription, 2 puff Q6H PRN wheezing/dyspnea.  Patient will use this on days she is at dialysis and also PRN.    Transition to oral therapy for bronchitis management:  Doxycyline, to complete a 5-day course    Avoid QT prolonging drugs.      Patient and/or family were educated on the above recommendations.   Thank you for allowing our service to participate in the care of this patient.  Pulmonary service will sign off.  Discussed with Dr. Arndt    Total patient care time: 35 minutes, with over 50% spent in counseling/coordination of care.      Nely Melgar MD, MPH  Pulmonary/Critical Care Medicine  7/11/2021 2:12 PM           Subjective/Interval History:   Patient doing well today.  Feels like the albuterol HFA with spacer worked well.  Would like to keep spacer with HFA albuterol.      Denies fevers, chills, N/V, chest pain/pressure.      Review of Systems:  Pertinent items are " "noted in HPI.  A comprehensive review of systems was performed and found to be negative except as described in this note        Allergies/Medications:   Allergies:     Allergies   Allergen Reactions     Ace Inhibitors Cough     Other reaction(s): *Unknown     Fosinopril Cough     Ipratropium Headache     Zolpidem Other (See Comments)     Hallucinations  Other reaction(s): Hallucinations       Continuous Infusions:    Scheduled Medications:    albuterol  2.5 mg Nebulization 4x Daily     [Held by provider] albuterol  2.5 mg Nebulization 3 times per day on Mon Wed Fri     [Held by provider] albuterol  2.5 mg Nebulization 4 times per day on Sun Tue Thu Sat     aspirin  81 mg Oral Daily with lunch     atorvastatin  10 mg Oral At Bedtime     budesonide-formoterol  2 puff Inhalation BID     [START ON 7/13/2021] buPROPion  150 mg Oral Once per day on Tue Sat     [START ON 7/12/2021] cefTRIAXone  1 g Intravenous Q24H     [START ON 7/12/2021] cinacalcet  30 mg Oral Q Mon Wed Fri AM     doxycycline (VIBRAMYCIN) IV  100 mg Intravenous Q12H     famotidine  20 mg Oral At Bedtime     gabapentin  100 mg Oral TID     heparin ANTICOAGULANT  5,000 Units Subcutaneous Q12H     melatonin  3 mg Oral At Bedtime     [START ON 7/12/2021] midodrine  15 mg Oral Once per day on Mon Wed Fri     [START ON 7/12/2021] senna-docusate  1 tablet Oral Every Other Day     sevelamer carbonate  1,600 mg Oral TID w/meals     timolol maleate  1 drop Both Eyes BID     traZODone  150 mg Oral At Bedtime     Vitamin D (Cholecalciferol)  2,000 Units Oral Daily with lunch          Objective:   Vitals:  /56 (BP Location: Left arm)   Pulse 70   Temp 98  F (36.7  C) (Oral)   Resp 18   Ht 1.626 m (5' 4\")   Wt 69.4 kg (153 lb)   SpO2 97%   BMI 26.26 kg/m     VS: /56 (BP Location: Left arm)   Pulse 70   Temp 98  F (36.7  C) (Oral)   Resp 18   Ht 1.626 m (5' 4\")   Wt 69.4 kg (153 lb)   SpO2 97%   BMI 26.26 kg/m     GEN: Pleasant female, sitting " in a chair in no acute distress, interactive.  HEENT: Normocephalic, atraumatic.  Extraoccular eye movements intact, anicteric sclera. No sinus tenderness to palpation.  Moist mucous membranes.   NECK: Supple.    PULM: Non-labored breathing.  No use of accessory muscles.  Diminished breath sounds throughout, no wheezing.  CVS: Regular rate and rhythm.  Normal S1, S2.  No rubs, murmurs, or gallops.    ABDOMEN: Normoactive bowel sounds.  Non-tender to palpation.  Non-distended.    EXTREMITIES/SKIN:  No clubbing, cyanosis, or edema.  Right IJ dialysis catheter.  NEURO:  Awake.  Oriented to person, place, time and situation.  Cranial nerves 2-12 grossly intact.  Moving all extremities.     Intake/Output:     Intake/Output Summary (Last 24 hours) at 7/11/2021 1412  Last data filed at 7/11/2021 1100  Gross per 24 hour   Intake 720 ml   Output 0 ml   Net 720 ml             Pertinent Studies:   All laboratory data reviewed  Lab Results   Component Value Date    PH 7.24 (LL) 02/18/2021    PH 7.21 (LL) 02/18/2021    PH 7.17 (LL) 10/05/2020    PO2 81 02/18/2021    PO2 137 (H) 02/18/2021    PO2 70 (L) 10/05/2020    PCO2 56 (H) 02/18/2021    PCO2 64 (H) 02/18/2021    PCO2 53 (H) 10/05/2020    HCO3 20.8 04/05/2018    HCO3 21 06/29/2014    GORDON -3.9 02/18/2021    GORDON -2.6 02/18/2021    GORDON -9.4 10/05/2020     Lab Results   Component Value Date    WBC 3.6 (L) 07/11/2021    WBC 3.8 (L) 05/13/2021    WBC 3.8 (L) 05/11/2021    HGB 12.3 07/11/2021    HGB 8.7 (L) 05/13/2021    HGB 8.1 (L) 05/12/2021    HCT 40.8 07/11/2021    HCT 29.5 (L) 05/13/2021    HCT 25.1 (L) 05/11/2021     (H) 07/11/2021     (H) 05/13/2021     (H) 05/11/2021    PLT 90 (L) 07/11/2021    PLT 99 (L) 05/13/2021    PLT 98 (L) 05/12/2021     Microbiology:    COVID-19 PCR, 7/9: negative        Cardiology/Radiology:   Cardiac:    EKG:   personally reviewed.      TTE, 5/10/2021:  Summary:  1.Left ventricle ejection fraction is normal. The calculated  left ventricular ejection fraction is 64%. 2.TAPSE is abnormal, which is consistent with abnormal right ventricular systolic function. 3.Mild regurgitant lesions of all 4 cardiac valves. 4.Moderate pulmonary hypertension suggested. 5.When compared to the previous study dated 7/18/2020, no significant change other than mild increase in pulmonic pressures.     Radiology:  Personally reviewed image/s and Personally reviewed impression/s    Chest X-Ray, 7/9/21:  Left subclavian approach pacemaker lead terminates in the ventricular apex. Tunneled right jugular approach multilumen catheter terminates in the mid right atrium. There is a left atrial appendage occlusion device.  Cardiac silhouette is not enlarged. Tortuous thoracic aorta. There are atheromatous calcifications in the arch and descending aorta. A curvilinear calcification projecting to the right of the lower thoracic spine relates to the tortuous descending aorta.  There is a smooth oblong interface in the medial basal left chest, separate from the descending aortic interface, which relates to lobulated paraspinous tissue on the prior CT and is unchanged. Subsegmental atelectasis in the left lateral costophrenic sulcus. No new airspace opacities or interstitial thickening. No pleural fluid or pneumothorax.      CTA Chest PE study, 7/9/21:    ? FINDINGS: ANGIOGRAM CHEST: Pulmonary arteries are normal caliber and negative for pulmonary emboli. Redemonstrated aortic atherosclerotic change with aneurysmal dilatation of the distal descending thoracic aorta which measures up to 4.5 cm on image 143 of the axial series, stable compared to prior study when measured in the same plane. No CT evidence of right heart strain. LUNGS AND PLEURA: Mild apical and basilar interstitial infiltrates. No pleural effusion. Patchy foci of atelectasis in the mid and lower lung zones. MEDIASTINUM/AXILLAE: Heart size at upper limits normal. No pericardial effusion. Left atrial appendage  occlusion device. Cardiac pacer leads. CORONARY ARTERY CALCIFICATION: Moderate. UPPER ABDOMEN: Right renal atrophy and cysts of varying density. Atherosclerotic changes. MUSCULOSKELETAL: No acute osseous findings. The indeterminate lobulated soft tissue density present in the left paraspinal region is not significantly changed over multiple comparison exams. No cortical erosion of the adjacent vertebral bodies.  ? IMPRESSION: 1.  Negative for pulmonary embolism. 2.  Mild interstitial infiltrates suggestive of mild pulmonary edema.

## 2021-07-11 NOTE — CONSULTS
"        REASON FOR CONSULTATION:  ESRD, hyperK and HTN management      ASSESSMENT/PLAN:  ESRD: on HD MWF at [facility] (primary neph Dr Kinney, of [facility]). Ran off schedule Sat mostly for UF, no plans for run today and will resume Monday prior to anticipated discharge to TCU     Has CVC, has refused AV access since initiation 14+ years ago, d/t frequent crafting and didn't want access to interfere.      Hyperkalemia:  corrected with Dialysis Sat renal diet.     H/o HoTN:  BP currently controlled, Has prn MIdodrine outpt, low dose     CKD/MBD:  On Lorri D and Renvela binders, Sensipar                COPD: admitted with acute on chronic shortness of breath/WAGNER, recurrent recent admission for similar presentation, followed by pulm outpt, infiltration on CT ? Bronchitis, no outward s/s infectious etio, better with some UF 7/10, on 3L at baseline    pulm has adjusted inhalers    Started empiric antibx for ? resp infection    ZULEIKA:  Severe, with pulm HTN, bipap PRN , stable NC now , has inhalers    H/o CHF: UF for volume excess , BNP 700s     Anemia of ESKD: hemoglobin above goal for ESKD at 11.8, no need for AILYN or iron currently, trend     H/o A-fib: rate controlled , PPM      Goals of Care: per prior note from Dr Kinney in June, discussed with Itzel(ongoing discussion re hospice) not ready, but has been considering for some time now, we did not address this admit.  She would like to go  To TCU tomorrow, to build her strength and \"give her  a break, he has been doing all the work.\"           REVIEW OF SYSTEMS:  Breathing is better today after dialysis and change in inhalers, gen weakness persists, chronic.  No n/v/d.  No CP.  Hoping to go to TCU tomorrow nad wants dialysis here first.     Past Medical History:   Diagnosis Date     Acute on chronic diastolic congestive heart failure (H) 01/23/2019     Acute on chronic respiratory failure with hypoxia and hypercapnia (H)      Acute respiratory failure with " hypoxia (H) 2014     Arthritis      Chronic anemia 2014     Chronic kidney disease      Chronic respiratory failure with hypoxia (H)     3L nc     Chronic thoracic aortic dissection (H) 10/07/2015    descending thoracic aorta; treated medically per notes of Dragan Singh and Jennifer.     COPD (chronic obstructive pulmonary disease) (H)      COPD exacerbation (H) 2019     Disease of thyroid gland      Dissection of thoracoabdominal aorta (H)      DVT (deep venous thrombosis) (H) 2019     Dyslipidemia      ESRD (end stage renal disease) (H) 2009    on dialysis with Dr. Mitchell     Essential hypertension 2014     Gastrointestinal hemorrhage, unspecified gastrointestinal hemorrhage type 2017     GI bleeding 2017     Gout      Heart failure (H)      Hip fracture, intertrochanteric (H) 2019     History of compression fracture of spine 2017     L3 vertebral fracture (H) 2015     Left Atrial Appendage Occlusion (WATCHMAN) 2018    LAAO 2018 (30 mm WATCHMAN)     ZULEIKA (obstructive sleep apnea), severe, intolerant of CPAP 10/22/2015     Pneumonia 2015     Right foot drop      Spinal stenosis 2016     Stroke (H) 2016       Social History     Socioeconomic History     Marital status:      Spouse name: Cayden     Number of children: 2     Years of education: Not on file     Highest education level: Not on file   Occupational History     Employer: RETIRED   Social Needs     Financial resource strain: Not on file     Food insecurity     Worry: Not on file     Inability: Not on file     Transportation needs     Medical: Not on file     Non-medical: Not on file   Tobacco Use     Smoking status: Former Smoker     Packs/day: 1.50     Years: 37.00     Pack years: 55.50     Types: Cigarettes     Quit date: 2009     Years since quittin.5     Smokeless tobacco: Never Used   Substance and Sexual Activity     Alcohol use: Yes      "Alcohol/week: 11.7 standard drinks     Types: 14 Standard drinks or equivalent per week     Comment: 14 mixed drinks per week     Drug use: No     Sexual activity: Never     Partners: Male   Lifestyle     Physical activity     Days per week: Not on file     Minutes per session: Not on file     Stress: Not on file   Relationships     Social connections     Talks on phone: Not on file     Gets together: Not on file     Attends [employer] service: Not on file     Active member of club or organization: Not on file     Attends meetings of clubs or organizations: Not on file     Relationship status: Not on file     Intimate partner violence     Fear of current or ex partner: Not on file     Emotionally abused: Not on file     Physically abused: Not on file     Forced sexual activity: Not on file   Other Topics Concern     Not on file   Social History Narrative    Lives with her . Daughter in [place] and daughter in [place].       Family History   Problem Relation Age of Onset     Dementia Mother      Diabetes Mother      Arthritis Mother      Cancer Mother      Depression Mother      Heart disease Mother      Vision loss Mother      Stroke Father      Heart disease Father      Breast cancer Neg Hx        Allergies   Allergen Reactions     Ace Inhibitors Cough     Atrovent [Ipratropium Bromide] Headache     Fosinopril Sodium Cough     Zolpidem      hallucinations       MEDICATIONS:    heparin (PF) ANTICOAGULANT  5,000 Units Subcutaneous Q12H 09-21     sodium chloride  2.5 mL Intravenous Line Care         PHYSICAL EXAM    Vitals:    07/10/21 0517   BP: 138/73   Pulse: 77   Resp: 20   Temp: 98.2  F (36.8  C)   SpO2: 96%   /57 (BP Location: Left arm)   Pulse 70   Resp 18   Ht 1.626 m (5' 4\")   Wt 69.4 kg (153 lb)   SpO2 100%   BMI 26.26 kg/m      Intake/Output Summary (Last 24 hours) at 7/11/2021 1031  Last data filed at 7/11/2021 0823  Gross per 24 hour   Intake 360 ml   Output 0 ml   Net 360 ml "           EXAM:  General:  Alert/oriented x 3; NAD, pleasant and interactive, good memory and health recal   HEENT: PERRLA, head/face symmetrical  Card:  AP RRR with murmur, no rub, BP controled, PPM L chest wall , CVC intact and dressed   Lun% on 3L NC, drops to 80's when talking   Abd: soft, non tender; not distended; normal bs, no bruits  Ext: trace edema and well perfused, no LE ulcers or lesions.R arm skin tear is dressed   Skin: no rash, normal turgor  Neuro; grossly intact  Psych:  Grossly intact  :  Makes no urine       Intake/Output Summary (Last 24 hours) at 7/10/2021 0841  Last data filed at 7/10/2021 0517  Gross per 24 hour   Intake 420 ml   Output 0 ml   Net 420 ml       LABORATORIES    Results from last 7 days   Lab Units 07/10/21  0610 21   LN-WHITE BLOOD CELL COUNT thou/uL 2.1* 4.2   LN-HEMOGLOBIN g/dL 11.8* 12.6   LN-HEMATOCRIT % 39.4 41.9   LN-PLATELET COUNT thou/uL 74* 90*     Results from last 7 days   Lab Units 07/10/21  0610 21  1922   LN-SODIUM mmol/L 135* 141   LN-POTASSIUM mmol/L 5.7* 4.4   LN-CHLORIDE mmol/L 103 105   LN-CO2 mmol/L 20* 26   LN-BLOOD UREA NITROGEN mg/dL 19 13   LN-CREATININE mg/dL 3.87* 3.25*   LN-CALCIUM mg/dL 8.3* 9.4   LN-PROTEIN TOTAL g/dL 5.7*  --    LN-BILIRUBIN TOTAL mg/dL 0.3  --    LN-ALKALINE PHOSPHATASE U/L 107  --    LN-ALT (SGPT) U/L 11  --    LN-AST (SGOT) U/L 15  --                    I reviewed all labs    Thank you for this referral.  We will continue to follow this patient along with you.       MARAL Steen-BC  [facility]   [address] [address]  Office:  [phone]  Fax:  [phone]

## 2021-07-12 NOTE — PROGRESS NOTES
"RESPIRATORY CARE NOTE     Patient Name: Belia Rodriguez  Today's Date: 7/12/2021       Pt continues to receive Albuterol nebs QID. BS are diminished. Pt is on 3L of oxygen via NC.     /68 (BP Location: Right arm)   Pulse 71   Temp 98  F (36.7  C) (Oral)   Resp 20   Ht 1.626 m (5' 4\")   Wt 69.4 kg (153 lb)   SpO2 99%   BMI 26.26 kg/m        Melania Alejandro, RT on 7/12/2021 at 4:34 AM       "

## 2021-07-12 NOTE — PROGRESS NOTES
HEMODIALYSIS NOTE:      ASSESSMENT:      UF TOTAL:2.4    WEIGHT PRE:72    WEIGHT POST:69.6      ACCESS PRE:Both ports aspirated and flushed easily.       HEPATITIS STATUS:  Chronic Unit: [facility]    Hbsag Neg   6/26/21      RUN SUMMARY: Hemodynamically stable during Tx        BVP:58.6L      ACCESS POST:Heparin instilled to fill volumes for dwell. Clamped, capped and secured. Art port    ml, Venous port     ml        INTERVENTIONS:Critline used for fluid monitoring/management.      PLAN:per renal team

## 2021-07-12 NOTE — PLAN OF CARE
Problem: Adult Inpatient Plan of Care  Goal: Absence of Hospital-Acquired Illness or Injury  Intervention: Prevent Skin Injury  Recent Flowsheet Documentation  Taken 7/12/2021 0020 by Kendal Franco RN  Body Position: position changed independently  Intervention: Prevent and Manage VTE (Venous Thromboembolism) Risk  Recent Flowsheet Documentation  Taken 7/12/2021 0400 by Kendal Franco RN  VTE Prevention/Management: AROM (active range of motion) performed  Taken 7/12/2021 0000 by Kendal Franco RN  VTE Prevention/Management: AROM (active range of motion) performed

## 2021-07-12 NOTE — PLAN OF CARE
Pt is feeling better. No noticeable shortness of breath. LS diminished. On 3L NC which is baseline. Denies pain. Sleepy after dialysis. VSS. Plan to discharge when bed becomes available.

## 2021-07-12 NOTE — PROGRESS NOTES
"Fay in admissions with [facility] requested full referral be faxed as they cannot access in Epic.  Nereida ([phone]) in admissions with [facility] requested full referral be faxed as they cannot access in Epic. They do anticipate bed availability and have had patient in the best but cannot accept without seeing PT/OT notes.   CM discussed with MD, still need orders and to be seen by PT/OT.   CM updated bedside RN.     3:33 PM  PT and OT still pending (attempted to come when patient was receiving dialysis). Will need full referrals sent to TCU's above if TCU is recommended once patient is seen.       CM met with patient. She is on home 02 at baseline and has portable tank for transport.   Wears Rx glasses.   Patient was active with [place]  services for PT prior to hospital admission.   Goes to OP HD [facility] on M/W/F.    will provide the transportation at hospital discharge.     Patient verbalized she is getting weaker and \"going downhill fast\". Feels it would be beneficial to go to TCU prior to returning home. Has received both COVID vaccines.   "

## 2021-07-12 NOTE — CONSULTS
Wound Ostomy  WOC Assessment       Allergies:  Ace Inhibitors  Fosinopril  Ipratropium  Zolpidem    Diagnosis:   Patient Active Problem List    Diagnosis Date Noted     Acute on chronic respiratory failure (H) 07/11/2021     Priority: Medium     Shortness of breath 07/10/2021     Priority: Medium     COPD exacerbation (H) 03/31/2021     Priority: Medium       Labs:  Recent Labs   Lab Test 07/12/21  0513 07/11/21  0631 07/23/20  0918 07/17/20  0718   CRP  --   --   --  0.7   HGB 11.5* 12.3   < > 9.5*   ALBUMIN  --  3.3*   < >  --     < > = values in this interval not displayed.       Danny:  Danny Score: 18    Specialty Bed:   [facility]    Wound culture obtained: No    Edema:  Yes:  Generalized    Anatomic Site/Laterality: BLE    Reason for ongoing care:   Skin integrity and plan of care    Encounter Type:  Initial Wound Type:   Skin integrity    BLE with xerosis unless moisturizer used per patient report.  R foot cool with purple discoloration. PT pulse palpable. Patient follows with DPM. Two areas of callus/dried drainage noted to B great toes.    Aquaphor to BLE from feet (not between toes) to knees after cleansing with tap water and patting dry.       Nursing care provided was coordinated care with RN.    Discussed plan of care with patient.    Outcomes and treatment recommendations are to promote skin integrity.    Actions taken by WOC RN: 10 minutes of education and WOC Discharge recommendations entered.    Planned Follow Up: None - Signed off.

## 2021-07-12 NOTE — CONSULTS
"        REASON FOR CONSULTATION:  ESRD, hyperK and HTN management      ASSESSMENT/PLAN:  ESRD: on HD MWF at [facility] (primary neph Dr Kinney, of [facility]). Ran off schedule Sat mostly for UF, running again Monday prior to anticipated discharge to TCU     Has CVC, has refused AV access since initiation 14+ years ago, d/t frequent crafting and didn't want access to interfere.      Hyperkalemia:  corrected with Dialysis Sat renal diet.     H/o HoTN:  BP currently controlled, Has prn MIdodrine outpt, low dose     CKD/MBD:  On Lorri D and Renvela binders, Sensipar                COPD: admitted with acute on chronic shortness of breath/WAGNER, recurrent recent admission for similar presentation, followed by pulm outpt, infiltration on CT ? Bronchitis, no outward s/s infectious etio, better with some UF 7/10, on 3L at baseline    pulm has adjusted inhalers, now doing better     Started empiric antibx for ? resp infection    ZULEIKA:  Severe, with pulm HTN, bipap PRN , stable NC now , has inhalers    H/o CHF: UF for volume excess , BNP 700s     Anemia of ESKD: hemoglobin above goal for ESKD at 11.8, no need for AILYN or iron currently, trend     H/o A-fib: rate controlled , PPM      Goals of Care: per prior note from Dr Kinney in June, discussed with Itzel(ongoing discussion re hospice) not ready, but has been considering for some time now, we did not address this admit.  Tentative plan for discharge to TCU today, to build her strength and \"give her  a break, he has been doing all the work.\"       REVIEW OF SYSTEMS:  Patient seen on dialysis with HD RN. No dialysis related complaints or issues per patient and RN. Breathing is a lot better with new inhaler and chronic O2 but does get a little SOB while we are talking. Swelling is well controlled, \"best my feet have looked in 8 years.\"       Past Medical History:   Diagnosis Date     Acute on chronic diastolic congestive heart failure (H) 01/23/2019     Acute on chronic " respiratory failure with hypoxia and hypercapnia (H)      Acute respiratory failure with hypoxia (H) 2014     Arthritis      Chronic anemia 2014     Chronic kidney disease      Chronic respiratory failure with hypoxia (H)     3L nc     Chronic thoracic aortic dissection (H) 10/07/2015    descending thoracic aorta; treated medically per notes of Dragan Singh and Jennifer.     COPD (chronic obstructive pulmonary disease) (H)      COPD exacerbation (H) 2019     Disease of thyroid gland      Dissection of thoracoabdominal aorta (H)      DVT (deep venous thrombosis) (H) 2019     Dyslipidemia      ESRD (end stage renal disease) (H) 2009    on dialysis with Dr. Mitchell     Essential hypertension 2014     Gastrointestinal hemorrhage, unspecified gastrointestinal hemorrhage type 2017     GI bleeding 2017     Gout      Heart failure (H)      Hip fracture, intertrochanteric (H) 2019     History of compression fracture of spine 2017     L3 vertebral fracture (H) 2015     Left Atrial Appendage Occlusion (WATCHMAN) 2018    LAAO 2018 (30 mm WATCHMAN)     ZULEIKA (obstructive sleep apnea), severe, intolerant of CPAP 10/22/2015     Pneumonia 2015     Right foot drop      Spinal stenosis 2016     Stroke (H) 2016       Social History     Socioeconomic History     Marital status:      Spouse name: Cayden     Number of children: 2     Years of education: Not on file     Highest education level: Not on file   Occupational History     Employer: RETIRED   Social Needs     Financial resource strain: Not on file     Food insecurity     Worry: Not on file     Inability: Not on file     Transportation needs     Medical: Not on file     Non-medical: Not on file   Tobacco Use     Smoking status: Former Smoker     Packs/day: 1.50     Years: 37.00     Pack years: 55.50     Types: Cigarettes     Quit date: 2009     Years since quittin.5      "Smokeless tobacco: Never Used   Substance and Sexual Activity     Alcohol use: Yes     Alcohol/week: 11.7 standard drinks     Types: 14 Standard drinks or equivalent per week     Comment: 14 mixed drinks per week     Drug use: No     Sexual activity: Never     Partners: Male   Lifestyle     Physical activity     Days per week: Not on file     Minutes per session: Not on file     Stress: Not on file   Relationships     Social connections     Talks on phone: Not on file     Gets together: Not on file     Attends Temple service: Not on file     Active member of club or organization: Not on file     Attends meetings of clubs or organizations: Not on file     Relationship status: Not on file     Intimate partner violence     Fear of current or ex partner: Not on file     Emotionally abused: Not on file     Physically abused: Not on file     Forced sexual activity: Not on file   Other Topics Concern     Not on file   Social History Narrative    Lives with her . Daughter in [place] and daughter in [place].       Family History   Problem Relation Age of Onset     Dementia Mother      Diabetes Mother      Arthritis Mother      Cancer Mother      Depression Mother      Heart disease Mother      Vision loss Mother      Stroke Father      Heart disease Father      Breast cancer Neg Hx        Allergies   Allergen Reactions     Ace Inhibitors Cough     Atrovent [Ipratropium Bromide] Headache     Fosinopril Sodium Cough     Zolpidem      hallucinations       MEDICATIONS:    heparin (PF) ANTICOAGULANT  5,000 Units Subcutaneous Q12H 09-21     sodium chloride  2.5 mL Intravenous Line Care         PHYSICAL EXAM    Vitals:    07/10/21 0517   BP: 138/73   Pulse: 77   Resp: 20   Temp: 98.2  F (36.8  C)   SpO2: 96%   /59 (BP Location: Right arm)   Pulse 75   Temp 97.8  F (36.6  C) (Oral)   Resp 20   Ht 1.626 m (5' 4\")   Wt 72 kg (158 lb 12.8 oz)   SpO2 99%   BMI 27.26 kg/m      Intake/Output Summary (Last 24 " hours) at 2021 1031  Last data filed at 2021 0823  Gross per 24 hour   Intake 360 ml   Output 0 ml   Net 360 ml           EXAM:  General:  Alert/oriented x 3; NAD, pleasant and interactive, good memory and health recal   HEENT: PERRLA, head/face symmetrical  Card:  AP RRR with murmur, no rub, BP controled, PPM L chest wall , CVC intact and dressed   Lun% on 3L NC, drops to 80's when talking   Abd: soft, non tender; not distended; normal bs, no bruits  Ext: trace edema and well perfused, no LE ulcers or lesions.R arm skin tear is dressed   Skin: no rash, normal turgor  Neuro; grossly intact  Psych:  Grossly intact  :  Makes no urine       Intake/Output Summary (Last 24 hours) at 7/10/2021 0841  Last data filed at 7/10/2021 0517  Gross per 24 hour   Intake 420 ml   Output 0 ml   Net 420 ml       LABORATORIES    Results from last 7 days   Lab Units 07/10/21  0610 21   LN-WHITE BLOOD CELL COUNT thou/uL 2.1* 4.2   LN-HEMOGLOBIN g/dL 11.8* 12.6   LN-HEMATOCRIT % 39.4 41.9   LN-PLATELET COUNT thou/uL 74* 90*     Results from last 7 days   Lab Units 07/10/21  0610 21  1922   LN-SODIUM mmol/L 135* 141   LN-POTASSIUM mmol/L 5.7* 4.4   LN-CHLORIDE mmol/L 103 105   LN-CO2 mmol/L 20* 26   LN-BLOOD UREA NITROGEN mg/dL 19 13   LN-CREATININE mg/dL 3.87* 3.25*   LN-CALCIUM mg/dL 8.3* 9.4   LN-PROTEIN TOTAL g/dL 5.7*  --    LN-BILIRUBIN TOTAL mg/dL 0.3  --    LN-ALKALINE PHOSPHATASE U/L 107  --    LN-ALT (SGPT) U/L 11  --    LN-AST (SGOT) U/L 15  --                    I reviewed all labs    Thank you for this referral.  We will continue to follow this patient along with you.       Demetra Villagomez PA-C   [facility]  [address] [place] [address] [address]  [place] [address]  Office:  [phone]  Fax:  [phone]

## 2021-07-12 NOTE — DISCHARGE INSTRUCTIONS
Apply Aquaphor to BLE from feet (not between toes) to knees after cleansing with tap water and patting dry.

## 2021-07-12 NOTE — PROGRESS NOTES
[facility] MEDICINE PROGRESS NOTE      Identification/Summary: Belia Rodriguez is a 75 y.o. female with a past medical history of COPD, paroxysmal atrial fibrillation with LAAO device, status post AV node ablation and pacemaker, with adverse reaction to anticoagulant, chronic respiratory failure, chronic lymphedema, peripheral vascular disease, mood disorder with depression, end-stage renal disease on hemodialysis, anemia of chronic kidney disease, congestive heart failure, obstructive sleep apnea, (see below).  Was just recently discharged from [facility] on 6/28/2021 for acute pulmonary edema, improved with dialysis. She was admitted with SOB, active cough x 2 days, and wheezing.       BNP was elevated in the 700s.  Normal WBC.  Negative troponin.  CTA was negative for PE but there was evidence of edema. She started on HD, received another session today    PT/OT consulted for discharge planning       Assessment and Plan:     Acute on chronic respiratory failure, likely related to fluid overload/congestive heart failure  Reactive airway disease, with chronic hypercarbia and chronic hypoxia  Severe obstructive sleep apnea intolerant of NIPPV  Pulmonary hypertension  History of tobacco use disorder  On HD, she is getting another session today    She does have COPD as well that may be contributing to it although this is questionable per pulmonary function test.  Lungs were not wheezy on admission.  Pulmonary consult notes reviewed and appreciated.  Per pulmonary, there was no indication for BiPAP.  Metabolic acidosis is also contributing to the pH being acidotic.    Although CAT scan showed patchy areas of mild pulmonary emphysema as well as air trapping in the bilateral upper lobes, this is similar to CTA of the chest in June 2021.    On doxycycline for acute bronchitis     Continue albuterol and Symbicort.   -DNR/DNI.     End-stage renal disease with  hemodialysis  Hyperkalemia  Chronic lymphedema  Renal consult appreciated.  Did ultrafiltration on 7/10 and today   Hyperkalemia being managed with dialysis.  Dialysis to resume, Monday Wednesday and Friday  Fluid and electrolyte management per nephrology  On vitamin D and Renvela and Sensipar     Atrial fibrillation, status post watchman device, status post AV node ablation, sick sinus syndrome, status post permanent pacemaker  -Rate controlled.  -Had adverse reaction to anticoagulation in the past.  -On aspirin 81 mg daily     Essential hypertension  Dyslipidemia  medication list does not include any blood pressure medications from home.  Continue Lipitor Atorvastatin 10 mg daily  Takes midodrine for dialysis.     Mood disorder, depression  Resume bupropion     Thrombocytopenia, noncritical  Continue to monitor and trend. Stable   Avoid heparin     History of chronic pain, right foot  History of right foot drop  -Chronic oxycodone.     History of hyperparathyroidism  History of osteoporosis  History of gout    Diet: Renal Diet (non-dialysis)  DVT Prophylaxis: Heparin subcu  Code Status: No CPR- Do NOT Intubate    Anticipated possible discharge: In a few days.  Likely TCU. PT/OT ordered today    Interval History/Subjective:  Feeling better, on 3 liter/min supplemental oxygen like home dose     Physical Exam/Objective:  Temp:  [97.8  F (36.6  C)-98.4  F (36.9  C)] 98.3  F (36.8  C)  Pulse:  [70-84] 70  Resp:  [18-20] 18  BP: (109-126)/(56-76) 109/64  SpO2:  [83 %-100 %] 100 %    Body mass index is 27.25 kg/m .  No fever.  Hypoxic with 3 L/min nasal cannula.  Blood pressure overall stable.  Heart rate in the 70s.     Physical Exam:    General Appearance:  Alert, cooperative, no distress on 3 liter/min oxygen   Head:  Normocephalic, atraumatic   Eyes:  PERRL    Neck: No JVD, no LAP   Lungs:   Clear to auscultation bilaterally, respirations unlabored   Heart:  Regular rate and rhythm, S1, S2 normal,no murmur   Abdomen:    Soft, non tender, non distended, bowel sounds present, no guarding or rigidity   Extremities: No edema, no joint swelling   Skin: Skin color, texture, turgor normal, no rashes or lesions   Neurologic: Alert and oriented X 3, Moves all 4 extremities       Medications:   Personally Reviewed.    sodium chloride 0.9%  250 mL Intravenous Once in dialysis/CRRT     sodium chloride 0.9%  300 mL Hemodialysis Machine Once     albuterol  2.5 mg Nebulization 4x Daily     [Held by provider] albuterol  2.5 mg Nebulization 3 times per day on Mon Wed Fri     [Held by provider] albuterol  2.5 mg Nebulization 4 times per day on Sun Tue Thu Sat     aspirin  81 mg Oral Daily with lunch     atorvastatin  10 mg Oral At Bedtime     budesonide-formoterol  2 puff Inhalation BID     [START ON 7/13/2021] buPROPion  150 mg Oral Once per day on Tue Sat     cinacalcet  30 mg Oral Q Mon Wed Fri AM     doxycycline monohydrate  100 mg Oral Q12H ELLIE     famotidine  20 mg Oral At Bedtime     gabapentin  100 mg Oral TID     heparin ANTICOAGULANT  5,000 Units Subcutaneous Q12H     melatonin  3 mg Oral At Bedtime     midodrine  15 mg Oral Once per day on Mon Wed Fri     - MEDICATION INSTRUCTIONS -   Does not apply Once     senna-docusate  1 tablet Oral Every Other Day     sevelamer carbonate  1,600 mg Oral TID w/meals     sodium chloride (PF)  9 mL Intracatheter During Dialysis/CRRT (from stock)     sodium chloride (PF)  9 mL Intracatheter During Dialysis/CRRT (from stock)     timolol maleate  1 drop Both Eyes BID     traZODone  150 mg Oral At Bedtime     Vitamin D3  2,000 Units Oral Daily with lunch     Current Facility-Administered Medications   Medication Dose Route Frequency     sodium chloride 0.9%  100-150 mL Intravenous Q15 Min PRN     sodium chloride 0.9%  100-500 mL Intravenous PRN     acetaminophen  650 mg Rectal Q4H PRN     acetaminophen  500-1,000 mg Oral Q4H PRN     albuterol  2 puff Inhalation Q4H PRN     albuterol  2.5 mg Nebulization  Q4H PRN     alteplase  2 mg Intracatheter Q1H PRN     alteplase  2 mg Intracatheter Q1H PRN     bisacodyl  10 mg Rectal Daily PRN     loratadine  10 mg Oral Daily PRN     magnesium hydroxide  30 mL Oral Daily PRN     melatonin  3 mg Oral At Bedtime PRN     midodrine  10 mg Oral Q6H PRN     naloxone  0.2 mg Intravenous Q2 Min PRN    Or     naloxone  0.4 mg Intravenous Q2 Min PRN    Or     naloxone  0.2 mg Intramuscular Q2 Min PRN    Or     naloxone  0.4 mg Intramuscular Q2 Min PRN     neomycin-bacitracin-polymyxin   Topical Q6H PRN     oxyCODONE  5 mg Oral Q6H PRN     polyvinyl alcohol  1 drop Both Eyes Q1H PRN     sevelamer carbonate  800 mg Oral With Snacks or Supplements     sodium chloride (PF)  10 mL Intracatheter Q1H PRN     sodium chloride (PF)  10 mL Intracatheter Q1H PRN     sodium chloride (PF)  3 mL Intravenous PRN     traZODone  50 mg Oral At Bedtime PRN       Data reviewed today: I personally reviewed all new medications, labs, imaging/diagnostics reports over the past 24 hours.     Labs:   Results for VIRIDIANA LUBIN (MRN [id number]) as of 7/12/2021 16:41   7/12/2021 05:13   Sodium 136   Potassium 4.6   Chloride 101   Carbon Dioxide 26   Urea Nitrogen 42 (H)   Creatinine 4.45 (H)   GFR Estimate 9 (L)   Calcium 8.0 (L)   Anion Gap 9     Results for VIRIDIANA LUBIN (MRN [id number]) as of 7/12/2021 16:41   7/12/2021 05:13   WBC 3.8 (L)   Hemoglobin 11.5 (L)   Hematocrit 38.4   Platelet Count 96 (L)   RBC Count 3.48 (L)    (H)   MCH 33.0   MCHC 29.9 (L)   RDW 15.4 (H)     Imaging:   XR Chest Port 1 View    Result Date: 7/9/2021  Left subclavian approach pacemaker lead terminates in the ventricular apex. Tunneled right jugular approach multilumen catheter terminates in the mid right atrium. There is a left atrial appendage occlusion device. Cardiac silhouette is not enlarged. Tortuous thoracic aorta. There are atheromatous calcifications in the arch and descending aorta. A curvilinear calcification  projecting to the right of the lower thoracic spine relates to the tortuous descending aorta.  There is a smooth oblong interface in the medial basal left chest, separate from the descending aortic interface, which relates to lobulated paraspinous tissue on the prior CT and is unchanged. Subsegmental atelectasis in the left lateral costophrenic sulcus. No new airspace opacities or interstitial thickening. No pleural fluid or pneumothorax.        CT Chest Pulmonary Embolism w Contrast  Result Date: 7/9/2021  1.  Negative for pulmonary embolism. 2.  Mild interstitial infiltrates suggestive of mild pulmonary edema.        Palmira Pascal MD   [facility]  [facility]  Phone: #[phone]

## 2021-07-12 NOTE — PROGRESS NOTES
Respiratory Therapy Assessment    Assessment:          Pulmonary History:         Smoking cessation information given: No     Surgical:       CXR:        Respiratory Pattern:  18 normal       Breath Sounds:  crackles       Effectiveness of Cough:   non productive       Mental Status:  good        Level of Activity:          Current O2 Requirement: 3 by nasal cannula       Patient uses home oxygen:   No    Does the patient have a diagnosis of COPD?

## 2021-07-13 NOTE — PROGRESS NOTES
"CLINICAL NUTRITION SERVICES - ASSESSMENT NOTE     Nutrition Prescription    RECOMMENDATIONS FOR MDs/PROVIDERS TO ORDER:  None at this time     Malnutrition Status:    Not noted        REASON FOR ASSESSMENT  Belia Rodriguez is a/an 75 year old female assessed by the dietitian for Admission Nutrition Risk Screen for SOB, COPD, Resp Failure, ESRD on HD    NUTRITION HISTORY  She had questions on why she gets different foods at [facility] vs [facility] while on the same renal diet. She also usually has a 1/2 cup cottage cheese as her dairy option and was told no. Will discuss some of her concerns with nutrition.     CURRENT NUTRITION ORDERS  Diet:  Renal   Intake/Tolerance: 100%    LABS  Labs reviewed    MEDICATIONS  Medications reviewed    ANTHROPOMETRICS  Height: 162.6 cm (5' 4\")  Most Recent Weight: 72 kg (158 lb 11.7 oz)    IBW: 55 kg  BMI: Overweight BMI 25-29.9  Weight History:   Wt Readings from Last 10 Encounters:   07/12/21 72 kg (158 lb 11.7 oz)   07/01/21 68.5 kg (151 lb)   06/24/21 68.9 kg (152 lb)   04/02/21 72.8 kg (160 lb 7.9 oz)   03/29/21 68.9 kg (152 lb)   03/25/21 71.1 kg (156 lb 12.8 oz)   03/22/21 71.2 kg (157 lb)   03/18/21 66.7 kg (147 lb)   03/15/21 69.7 kg (153 lb 9.6 oz)   03/11/21 72 kg (158 lb 12.8 oz)       Dosing Weight: 55kg     ASSESSED NUTRITION NEEDS  Estimated Energy Needs: 8755-1494 kcals/day (25 - 30 kcals/kg)  Justification: Maintenance  Estimated Protein Needs: 66-83 grams protein/day (1.2 - 1.5 grams of pro/kg)  Justification: Wound healing  Estimated Fluid Needs: 9921-8073 mL/day (25 - 30 mL/kg)   Justification: Maintenance    PHYSICAL FINDINGS  BLE with xerosis unless moisturizer used per patient report.  R foot cool with purple discoloration per wound care note     MALNUTRITION  % Intake: No decreased intake noted  % Weight Loss: None noted  Subcutaneous Fat Loss: None observed  Muscle Loss: None observed  Fluid Accumulation/Edema: Mild  Malnutrition Diagnosis: Patient does not " meet two of the established criteria necessary for diagnosing malnutrition    NUTRITION DIAGNOSIS  No diagnosis at this time. Pt had food concerns     INTERVENTIONS  Implementation  Discussed patients concerns with her regarding diet discrepancies. She is very knowledgeable on her renal diet. She could probably even be on a renal diet and make her own choices as she watches it closely     Goals  No risk at this time      Monitoring/Evaluation  Progress toward goals will be monitored and evaluated per protocol.

## 2021-07-13 NOTE — CONSULTS
"        REASON FOR CONSULTATION:  ESRD, hyperK and HTN management      ASSESSMENT/PLAN:  ESRD: on HD MWF at [facility] (primary neph Dr Kinney, of [facility]). Ran off schedule Sat mostly for UF, now resumed MWF schedule     Has CVC, has refused AV access since initiation 14+ years ago, d/t frequent crafting and didn't want access to interfere.      Hyperkalemia:  corrected with Dialysis Sat renal diet.     H/o HoTN:  BP currently controlled, Has prn MIdodrine outpt, low dose     CKD/MBD:  On Lorri D and Renvela binders, Sensipar                COPD: admitted with acute on chronic shortness of breath/WAGNER, recurrent recent admission for similar presentation, followed by pulm outpt, infiltration on CT ? Bronchitis, no outward s/s infectious etio, better with some UF 7/10, on 3L at baseline    pulm has adjusted inhalers, now doing better     Started empiric antibx for ? resp infection    ZULEIKA:  Severe, with pulm HTN, bipap PRN , stable NC now , has inhalers    H/o CHF: UF for volume excess , BNP 700s     Anemia of ESKD: hemoglobin above goal for ESKD at 11.8, no need for AILYN or iron currently, trend     H/o A-fib: rate controlled , PPM      Goals of Care: per prior note from Dr Kinney in June, discussed with Itzel(ongoing discussion re hospice) not ready, but has been considering for some time now, we did not address this admit.  Tentative plan for discharge to TCU, to build her strength and \"give her  a break, he has been doing all the work.\" Awaiting PT/OT eval.       REVIEW OF SYSTEMS:  Patient seen at bedside. No changes to breathing, dialysis went well yesterday. Awaiting PT/OT eval for acceptance to TCU.     Past Medical History:   Diagnosis Date     Acute on chronic diastolic congestive heart failure (H) 01/23/2019     Acute on chronic respiratory failure with hypoxia and hypercapnia (H)      Acute respiratory failure with hypoxia (H) 06/30/2014     Arthritis      Chronic anemia 06/01/2014     Chronic " kidney disease      Chronic respiratory failure with hypoxia (H)     3L nc     Chronic thoracic aortic dissection (H) 10/07/2015    descending thoracic aorta; treated medically per notes of Dragan Singh and Jennifer.     COPD (chronic obstructive pulmonary disease) (H)      COPD exacerbation (H) 2019     Disease of thyroid gland      Dissection of thoracoabdominal aorta (H)      DVT (deep venous thrombosis) (H) 2019     Dyslipidemia      ESRD (end stage renal disease) (H) 2009    on dialysis with Dr. Mitchell     Essential hypertension 2014     Gastrointestinal hemorrhage, unspecified gastrointestinal hemorrhage type 2017     GI bleeding 2017     Gout      Heart failure (H)      Hip fracture, intertrochanteric (H) 2019     History of compression fracture of spine 2017     L3 vertebral fracture (H) 2015     Left Atrial Appendage Occlusion (WATCHMAN) 2018    LAAO 2018 (30 mm WATCHMAN)     ZULEIKA (obstructive sleep apnea), severe, intolerant of CPAP 10/22/2015     Pneumonia 2015     Right foot drop      Spinal stenosis 2016     Stroke (H) 2016       Social History     Socioeconomic History     Marital status:      Spouse name: Cayden     Number of children: 2     Years of education: Not on file     Highest education level: Not on file   Occupational History     Employer: RETIRED   Social Needs     Financial resource strain: Not on file     Food insecurity     Worry: Not on file     Inability: Not on file     Transportation needs     Medical: Not on file     Non-medical: Not on file   Tobacco Use     Smoking status: Former Smoker     Packs/day: 1.50     Years: 37.00     Pack years: 55.50     Types: Cigarettes     Quit date: 2009     Years since quittin.5     Smokeless tobacco: Never Used   Substance and Sexual Activity     Alcohol use: Yes     Alcohol/week: 11.7 standard drinks     Types: 14 Standard drinks or equivalent  "per week     Comment: 14 mixed drinks per week     Drug use: No     Sexual activity: Never     Partners: Male   Lifestyle     Physical activity     Days per week: Not on file     Minutes per session: Not on file     Stress: Not on file   Relationships     Social connections     Talks on phone: Not on file     Gets together: Not on file     Attends Latter day service: Not on file     Active member of club or organization: Not on file     Attends meetings of clubs or organizations: Not on file     Relationship status: Not on file     Intimate partner violence     Fear of current or ex partner: Not on file     Emotionally abused: Not on file     Physically abused: Not on file     Forced sexual activity: Not on file   Other Topics Concern     Not on file   Social History Narrative    Lives with her . Daughter in [place] and daughter in [place].       Family History   Problem Relation Age of Onset     Dementia Mother      Diabetes Mother      Arthritis Mother      Cancer Mother      Depression Mother      Heart disease Mother      Vision loss Mother      Stroke Father      Heart disease Father      Breast cancer Neg Hx        Allergies   Allergen Reactions     Ace Inhibitors Cough     Atrovent [Ipratropium Bromide] Headache     Fosinopril Sodium Cough     Zolpidem      hallucinations       MEDICATIONS:    heparin (PF) ANTICOAGULANT  5,000 Units Subcutaneous Q12H 09-21     sodium chloride  2.5 mL Intravenous Line Care         PHYSICAL EXAM    Vitals:    07/10/21 0517   BP: 138/73   Pulse: 77   Resp: 20   Temp: 98.2  F (36.8  C)   SpO2: 96%   /69 (BP Location: Right arm)   Pulse 75   Temp 98.6  F (37  C) (Oral)   Resp 20   Ht 1.626 m (5' 4\")   Wt 72 kg (158 lb 11.7 oz)   SpO2 97%   BMI 27.25 kg/m      Intake/Output Summary (Last 24 hours) at 7/11/2021 1031  Last data filed at 7/11/2021 0823  Gross per 24 hour   Intake 360 ml   Output 0 ml   Net 360 ml           EXAM:  General:  Alert/oriented x 3; NAD, " pleasant and interactive, good memory and health recal   HEENT: PERRLA, head/face symmetrical  Card:  AP RRR with murmur, no rub, BP controled, PPM L chest wall , CVC intact and dressed   Lun% on 3L NC   Abd: soft, non tender; not distended; normal bs, no bruits  Ext: trace edema and well perfused, no LE ulcers or lesions.R arm skin tear is dressed   Skin: no rash, normal turgor  Neuro; grossly intact  Psych:  Grossly intact  :  Makes no urine       Intake/Output Summary (Last 24 hours) at 7/10/2021 0841  Last data filed at 7/10/2021 0517  Gross per 24 hour   Intake 420 ml   Output 0 ml   Net 420 ml       LABORATORIES    Results from last 7 days   Lab Units 07/10/21  0610 21   LN-WHITE BLOOD CELL COUNT thou/uL 2.1* 4.2   LN-HEMOGLOBIN g/dL 11.8* 12.6   LN-HEMATOCRIT % 39.4 41.9   LN-PLATELET COUNT thou/uL 74* 90*     Results from last 7 days   Lab Units 07/10/21  0610 21  192   LN-SODIUM mmol/L 135* 141   LN-POTASSIUM mmol/L 5.7* 4.4   LN-CHLORIDE mmol/L 103 105   LN-CO2 mmol/L 20* 26   LN-BLOOD UREA NITROGEN mg/dL 19 13   LN-CREATININE mg/dL 3.87* 3.25*   LN-CALCIUM mg/dL 8.3* 9.4   LN-PROTEIN TOTAL g/dL 5.7*  --    LN-BILIRUBIN TOTAL mg/dL 0.3  --    LN-ALKALINE PHOSPHATASE U/L 107  --    LN-ALT (SGPT) U/L 11  --    LN-AST (SGOT) U/L 15  --                    I reviewed all labs    Thank you for this referral.  We will continue to follow this patient along with you.       Demetra Villagomez PA-C   [facility]   [address] [address]  Office:  [phone]  Fax:  [phone]

## 2021-07-13 NOTE — PROGRESS NOTES
Care Management Follow Up    Length of Stay (days): 2    Expected Discharge Date: 07/14/2021  Expected Time of Departure: 11:00 AM (To discharge after dialysis.)  Concerns to be Addressed:   Nephrology consult, PT/OT, medical progression    Patient plan of care discussed at interdisciplinary rounds: yes    Anticipated Discharge Disposition: Transitional Care     Anticipated Discharge Services:  TCU  Anticipated Discharge DME:  TBD    Patient/family educated on Medicare website which has current facility and service quality ratings: yes  Education Provided on the Discharge Plan:  yes  Patient/Family in Agreement with the Plan: yes    Referrals Placed by CM/SW:  yes  Private pay costs discussed: Yes    Additional Information:  Chart reviewed and updates with care team done.  RNCM met with patient to introduce self and discuss discharge planning.  Patient states she needs to go to TCU .  Therapy recommending TCU.  Patient requests [facility] as her first choice as long as there is no private room fee.  RNCM called to speak with Denise in admissions at [facility].  Denise reports that private room fee is currently being waived.  Denise has accepted patient for admission on 7/14 between the hours of 11:00 AM and 5:00 PM.  Patient will need dialysis run before discharge.  Itzel reports that her spouse will bring her portable oxygen tank and provide transport to TCU when discharged.  PAS is needed.    CM to confirm discharge time with TCU in the morning.      Berenice Carmona RN

## 2021-07-13 NOTE — PROGRESS NOTES
"   07/13/21 1130   Quick Adds   Type of Visit Initial Occupational Therapy Evaluation   Living Environment   People in home spouse   Current Living Arrangements house   Stair Railings, Within Home, Primary   (stairlift to second level per PT)   Living Environment Comments 3L O2 at baseline   Self-Care   Usual Activity Tolerance moderate   Current Activity Tolerance fair   Equipment Currently Used at Home raised toilet seat;grab bar, toilet   Instrumental Activities of Daily Living (IADL)   Previous Responsibilities   (able to complete most ADL indep'ly, except below)   IADL Comments needs assist with \"washing back, getting LEs in bed\"   Disability/Function   Wear Glasses or Blind yes  (glasses  Simultaneous filing. User may not have seen previous data.)   General Information   Onset of Illness/Injury or Date of Surgery 07/10/21   Referring Physician Jen Galvan   Patient/Family Therapy Goal Statement (OT) dress now, TCU after d/c   Additional Occupational Profile Info/Pertinent History of Current Problem moderate    Existing Precautions/Restrictions other (see comments);oxygen therapy device and L/min  (3L O2)   Limitations/Impairments   (dialysis 3x/week)   Cognitive Status Examination   Orientation Status orientation to person, place and time   Affect/Mental Status (Cognitive) WFL   Visual Perception   Visual Impairment/Limitations corrective lenses full-time   Sensory   Sensory Quick Adds   (\"numbness/tingling in feet-typical for neurapthy\" )   Sensory Comments when cold, numbness in hands as well   Integumentary/Edema   Integumentary/Edema Comments B LE edema prior to dialysis per pt   Range of Motion Comprehensive   General Range of Motion no range of motion deficits identified   Strength Comprehensive (MMT)   General Manual Muscle Testing (MMT) Assessment   (generalized weakness)   Coordination   Upper Extremity Coordination No deficits were identified   Transfers   Transfers sit-stand transfer   Transfer " Comments SOB after donning shorts. Educ in PLB/EC tech.   Sit-Stand Transfer   Sit-Stand [place] (Transfers) modified independence;verbal cues   Assistive Device (Sit-Stand Transfers) walker, front-wheeled   Balance   Balance Assessment no deficits were identified   Balance Comments SOB/gen weakness   Activities of Daily Living   BADL Assessment grooming;toileting   Grooming Assessment   [place] Level (Grooming) set up;modified independence   Position (Grooming) supported sitting   Comment (Grooming) needed sitting for g/h tasks, with SOB-89% and PLB tech.   Clinical Impression   Criteria for Skilled Therapeutic Interventions Met (OT) yes   OT Diagnosis decr ADL second to SOB/decr activity tolerance.   OT Problem List-Impairments impacting ADL activity tolerance impaired   ADL comments/analysis decr ADL second to SOB/decr activity tolerance.   Assessment of Occupational Performance 1-3 Performance Deficits   Identified Performance Deficits limited dressing, g/h, home management   Planned Therapy Interventions (OT) ADL retraining;bed mobility training;transfer training;strengthening;progressive activity/exercise   Clinical Decision Making Complexity (OT) moderate complexity   Therapy Frequency (OT) Daily   Predicted Duration of Therapy 1 week   Risk & Benefits of therapy have been explained care plan/treatment goals reviewed   OT Discharge Planning    OT Discharge Recommendation (DC Rec) Transitional Care Facility   OT Rationale for DC Rec significant taxing with minimal ADL second to SOB/decr activity tolerance.   Total Evaluation Time (Minutes)   Total Evaluation Time (Minutes) 5

## 2021-07-13 NOTE — PROGRESS NOTES
RESPIRATORY CARE NOTE     Patient Name: Belia Rodriguez  Today's Date: 7/13/2021       Pt continues to receive albuterol. BS are clear diminished. Pt is on 3 lpm of oxygen via NC, SpO2 is 96%. Post treatment there is increased aeration. Pt also perceives improvement.  RT encouraged deep breathing and coughing techniques .  RT will continue to monitor and assess.     Jesi Oro, RT

## 2021-07-13 NOTE — PROGRESS NOTES
"RESPIRATORY CARE NOTE     Patient Name: Belia Rodriguez  Today's Date: 7/13/2021       Pt continues to receive Albuterol nebs QID. BS are diminished. Pt is on 3L of oxygen via NC.     /59 (BP Location: Right arm)   Pulse 71   Temp 98.1  F (36.7  C) (Oral)   Resp 18   Ht 1.626 m (5' 4\")   Wt 72 kg (158 lb 11.7 oz)   SpO2 96%   BMI 27.25 kg/m        Melania Alejandro, RT on 7/13/2021 at 4:59 AM     "

## 2021-07-13 NOTE — PLAN OF CARE
Patient is on 3 liters of oxygen per her baseline.LS are diminished, short of breath with exertion. Patient is anuric. Up in chair most of the evening, sleeping well at this time. Chronic neuropathy in her feet causes pain, it is helped by Oxycodone prn. Tele shows a-fib with a BBB. Skin tear right a/c dressing changed x 2. Edema lower extremities. Right foot drop (baseline) Plan will be to discharge to TCU when a bed is available. Patient is able to make her needs known.    Noy Roberson RN

## 2021-07-13 NOTE — PROGRESS NOTES
07/13/21 1100   Quick Adds   Type of Visit Initial PT Evaluation       Present no   Living Environment   People in home spouse   Current Living Arrangements house   Home Accessibility wheelchair accessible;stairs within home   Stair Railings, Within Home, Primary   (Stairlift to second level)   Living Environment Comments Lives on main level, stairlift to second level for showers, 3L 02 at baseline   Self-Care   Usual Activity Tolerance moderate   Current Activity Tolerance fair   Equipment Currently Used at Home hospital bed;walker, rolling;wheelchair, manual   Disability/Function   Walking or Climbing Stairs Difficulty yes   Walking or Climbing Stairs ambulation difficulty, requires equipment;ambulation difficulty, assistance 1 person   Mobility Management uses wheelchair most of the time, ambulates short distances at home with Home PT   Change in Functional Status Since Onset of Current Illness/Injury yes   General Information   Onset of Illness/Injury or Date of Surgery 07/10/21   Referring Physician Jesús Ortiz   Patient/Family Therapy Goals Statement (PT) Be able to ambulate short distances modified independent   Existing Precautions/Restrictions oxygen therapy device and L/min;pacemaker  (Dialysis)   Weight-Bearing Status - LLE full weight-bearing   Weight-Bearing Status - RLE full weight-bearing  (Drop foot at baseline, 20 years, has AFO at home)   Cognition   Orientation Status (Cognition) oriented x 4   Affect/Mental Status (Cognition) WNL   Follows Commands (Cognition) WFL   Strength   Strength Comments Gross weakness sarah LEs   Bed Mobility   Comment (Bed Mobility) Already up in chair   Transfers   Transfers sit-stand transfer   Impairments Contributing to Impaired Transfers decreased strength;decreased sensation   Sit-Stand Transfer   Sit-Stand [place] (Transfers) contact guard   Assistive Device (Sit-Stand Transfers) walker, front-wheeled   Sit/Stand Transfer Comments  verbal cueing for safety   Gait/Stairs (Locomotion)   [place] Level (Gait) contact guard;verbal cues   Assistive Device (Gait) walker, front-wheeled   Distance in Feet (Required for LE Total Joints) 25, 10  (seated break)   Pattern (Gait) step-through   Deviations/Abnormal Patterns (Gait) gait speed decreased;stride length decreased  (decreased foot clearance on right )   Clinical Impression   Criteria for Skilled Therapeutic Intervention yes, treatment indicated   PT Diagnosis (PT) impaired functional mobility   Influenced by the following impairments weakness, impaired balance, pain   Functional limitations due to impairments bed mobility, transfers, gait   Clinical Presentation Stable/Uncomplicated   Clinical Presentation Rationale Pt presents as medically diagnosed   Clinical Decision Making (Complexity) moderate complexity   Therapy Frequency (PT) Daily   Predicted Duration of Therapy Intervention (days/wks) 3 days   Planned Therapy Interventions (PT) balance training;gait training;bed mobility training;home exercise program;strengthening;neuromuscular re-education;patient/family education   Anticipated Equipment Needs at Discharge (PT) gait belt;walker, rolling;wheelchair   Risk & Benefits of therapy have been explained evaluation/treatment results reviewed;care plan/treatment goals reviewed;patient   PT Discharge Planning    PT Discharge Recommendation (DC Rec) Transitional Care Facility   PT Rationale for DC Rec pt present with decreased strength and endurance, goal to ambulate short distances at home for safe access to shower and toilet   Total Evaluation Time   Total Evaluation Time (Minutes) 15

## 2021-07-13 NOTE — PROGRESS NOTES
[facility] MEDICINE PROGRESS NOTE      Identification/Summary: HPI: Belia Rodriguez is a 75 y.o. female with a past medical history significant for end-stage renal disease on hemodialysis, s/p AV node ablation and pacemaker placement, chronic respiratory failure, COPD, PVD and chronic lymphedema, CHF, anemia of chronic kidney disease, ZULEIKA, and mood disorder with depression.  She was discharged from [facility] on 6/28/2021 following admission for acute pulmonary edema which improved with dialysis treatment. Developed worsening shortness of breath, productive cough for 2-3 days, low-grade fever, and wheezing resulting in admission to [facility] on 7/10/21.      Found to have mixed respiratory and metabolic acidosis, chronic respiratory failure, and acute bronchitis. Started on inpatient hemodialysis, IV ceftriaxone/Doxycycline, and continued albuterol nebs and inhaled Symbicort.      Acute on chronic respiratory failure.  Reactive airway disease.  Severe obstructive sleep apnea.  Acute Bronchitis.   - Lungs are clear and shortness of breath/coughing has improved.   - O2 requirement back to baseline: 3L O2 nasal cannula  - Finish 5 day course of BID oral doxycycline  (started 7/11/21).  - Continue Albuterol and Symbicort.   - Pulmonary consult previously determined no indication for BiPAP.      End-stage renal disease with hemodialysis.   - Nephrology consulted and followed hospital course.   - Continue hemodialysis 3x weekly, last session was yesterday (7/12/21).  - Itzel brought up the possibility for hospice care in the future. Does not feel ready at this time, however has been thinking about this for quite some time.   - DNR/DNI.     Anemia of chronic disease.   - Hgb 11.5 up from 8.7 (5/13/21)  - Asymptomatic; denies lightheadedness/fatigue.      Hyperkalemia.   - Likely related to end-stage renal disease.  - Potassium stable at discharge; 4.6.      Atrial fibrillation,  s/p watchman device, s/p AV node ablation, s/p permanent pacemaker.   - Heart rate stable, rate controlled.   - 81 mg ASA daily, history of adverse reaction to anticoagulation.      Essential hypertension.   - Blood pressure under good control, no home blood pressure medications.   - Midodrine for dialysis.      Dyslipidemia.   - Continue Atorvastatin 10 mg daily.     Mood disorder, depression.   - Continue Bupropion.     History of chronic back pain, right foot drop.   - Chronic Oxycodone.        Diet: Renal Diet (non-dialysis)  DVT Prophylaxis: Heparin subcu  Code Status: No CPR- Do NOT Intubate     Anticipated possible discharge: Likely tomorrow after dialysis run.     Interval History/Subjective:  Patient feels she is doing much better now getting close to baseline    Physical Exam/Objective:  Temp:  [98  F (36.7  C)-98.6  F (37  C)] 98.4  F (36.9  C)  Pulse:  [70-75] 70  Resp:  [16-20] 20  BP: ()/(53-69) 121/67  SpO2:  [91 %-97 %] 96 %    Body mass index is 27.25 kg/m .    GENERAL:  Alert, appears comfortable, in no acute distress, appears stated age   HEAD:  Normocephalic, without obvious abnormality, atraumatic   EYES:  PERRL, conjunctiva/corneas clear, no scleral icterus, EOM's intact   NOSE: Nares normal, septum midline, mucosa normal, no drainage   THROAT: Lips, mucosa, and tongue normal; teeth and gums normal, mouth moist   NECK: Supple, symmetrical, trachea midline   LUNGS:   Clear to auscultation bilaterally, no rales, rhonchi, or wheezing, symmetric chest rise on inhalation, respirations unlabored   CHEST WALL:  No tenderness or deformity; CVC and dressing intact   HEART:  Pacemaker present; appropriate rate and rhythm    ABDOMEN:   Soft, non-tender, bowel sounds active all four quadrants, no masses, no organomegaly, no rebound or guarding   EXTREMITIES: Extremities normal, atraumatic, no cyanosis or edema    SKIN: Dry to touch, no exanthems in the visualized areas; frail skin; right forearm with  skin tear from adhesive tape   NEURO: Alert, oriented x 4, moves all four extremities freely, non-focal; chronic right foot drop   PSYCH: Cooperative, behavior is appropriate        Medications:   Personally Reviewed.  Medications       sodium chloride 0.9%  250 mL Intravenous Once in dialysis/CRRT     sodium chloride 0.9%  300 mL Hemodialysis Machine Once     albuterol  2.5 mg Nebulization 4x Daily     [Held by provider] albuterol  2.5 mg Nebulization 3 times per day on Mon Wed Fri     [Held by provider] albuterol  2.5 mg Nebulization 4 times per day on Sun Tue Thu Sat     aspirin  81 mg Oral Daily with lunch     atorvastatin  10 mg Oral At Bedtime     budesonide-formoterol  2 puff Inhalation BID     buPROPion  150 mg Oral Once per day on Tue Sat     cinacalcet  30 mg Oral Q Mon Wed Fri AM     doxycycline monohydrate  100 mg Oral Q12H ELLIE     famotidine  20 mg Oral At Bedtime     gabapentin  100 mg Oral TID     heparin ANTICOAGULANT  5,000 Units Subcutaneous Q12H     melatonin  3 mg Oral At Bedtime     midodrine  15 mg Oral Once per day on Mon Wed Fri     - MEDICATION INSTRUCTIONS -   Does not apply Once     senna-docusate  1 tablet Oral Every Other Day     sevelamer carbonate  1,600 mg Oral TID w/meals     sodium chloride (PF)  9 mL Intracatheter During Dialysis/CRRT (from stock)     sodium chloride (PF)  9 mL Intracatheter During Dialysis/CRRT (from stock)     timolol maleate  1 drop Both Eyes BID     traZODone  150 mg Oral At Bedtime     Vitamin D3  2,000 Units Oral Daily with lunch       Data reviewed today: I personally reviewed all new medications, labs, imaging/diagnostics reports over the past 24 hours. Pertinent findings include:    Labs:  Most Recent 3 CBC's:Recent Labs   Lab Test 07/12/21  0513 07/11/21  0631 05/13/21  0533   WBC 3.8* 3.6* 3.8*   HGB 11.5* 12.3 8.7*   * 111* 111*   PLT 96* 90* 99*     Most Recent 3 BMP's:Recent Labs   Lab Test 07/12/21  0513 07/11/21  0631 05/11/21  0515     137 136   POTASSIUM 4.6 3.9 4.1   CHLORIDE 101 98 98   CO2 26 27 26   BUN 42* 17 20   CR 4.45* 2.85* 3.35*   ANIONGAP 9 12 12   RONALDO 8.0* 8.3* 7.6*   GLC 96 79 123       BRENTON Shaffer  7/13/21  Patient seen and examined  Patient discussed with physician assistant student  Agree with assessment and plan as outlined above    Jesús Ortiz MD  [facility]  Phone: #[phone]

## 2021-07-14 PROBLEM — N18.6 ESRD (END STAGE RENAL DISEASE) ON DIALYSIS (H): Status: RESOLVED | Noted: 2021-02-28 | Resolved: 2021-01-01

## 2021-07-14 PROBLEM — J44.1 COPD EXACERBATION (H): Status: RESOLVED | Noted: 2019-01-04 | Resolved: 2021-01-01

## 2021-07-14 PROBLEM — I50.33 ACUTE ON CHRONIC DIASTOLIC CONGESTIVE HEART FAILURE (H): Status: RESOLVED | Noted: 2019-01-23 | Resolved: 2021-01-01

## 2021-07-14 PROBLEM — K92.2 ACUTE GI BLEEDING: Status: RESOLVED | Noted: 2017-12-09 | Resolved: 2017-12-09

## 2021-07-14 PROBLEM — K92.2 GASTROINTESTINAL HEMORRHAGE, UNSPECIFIED GASTROINTESTINAL HEMORRHAGE TYPE: Status: RESOLVED | Noted: 2017-06-05 | Resolved: 2018-10-18

## 2021-07-14 PROBLEM — J96.20 RESPIRATORY FAILURE, ACUTE-ON-CHRONIC (H): Status: RESOLVED | Noted: 2021-01-01 | Resolved: 2021-01-01

## 2021-07-14 PROBLEM — Z99.2 ESRD (END STAGE RENAL DISEASE) ON DIALYSIS (H): Status: RESOLVED | Noted: 2021-02-28 | Resolved: 2021-01-01

## 2021-07-14 NOTE — PROGRESS NOTES
Pt rating bilateral foot pain 5-8/10 this evening after arrival to unit. States pain is chronic and d/t neuropathy. Prn 5 mg PO oxy given, pt states effective. Pt remains on 3L O2 via NC. Is up in chair. Tolerating diet and denies n/v. Had one BM this evening.

## 2021-07-14 NOTE — PROGRESS NOTES
Pt on 3L, SpO2 96%. Pt received neb treatment and tolerated well. BS; Clear/diminished pre/post neb treatment. Will continue to monitor and assess pt.    Aye Reyes, RT on 7/14/2021 at 2:51 AM

## 2021-07-14 NOTE — PLAN OF CARE
Occupational Therapy Discharge Summary    Reason for therapy discharge:    Discharged to transitional care facility.    Progress towards therapy goal(s). See goals on Care Plan in [facility] electronic health record for goal details.  Goals not met.  Barriers to achieving goals:   discharge from facility.    Therapy recommendation(s):    Continued therapy is recommended.  Rationale/Recommendations:  Increase weakness and SOB.

## 2021-07-14 NOTE — PROGRESS NOTES
"    RENAL PROGRESS NOTE    CC:  SOB, ESRD     ASSESSMENT & PLAN:   ESRD: on HD MWF at [facility] (primary neph Dr Kinney, of [facility]). Ran off schedule Sat mostly for UF, now resumed MWF schedule     Has CVC, has refused AV access since initiation 14+ years ago, d/t frequent crafting and didn't want access to interfere.      Ok to discharge from renal standpoint      Hyperkalemia:  corrected with Dialysis Sat renal diet.      H/o HoTN:  BP currently controlled, Has prn MIdodrine outpt, low dose      CKD/MBD:  On Lorri D and Renvela binders, Sensipar                COPD: admitted with acute on chronic shortness of breath/WAGNER, recurrent recent admission for similar presentation, followed by pulm outpt, infiltration on CT ? Bronchitis, no outward s/s infectious etio, better with some UF 7/10, on 3L at baseline    pulm has adjusted inhalers, now doing better     Started empiric antibx for ? resp infection     ZULEIKA:  Severe, with pulm HTN, bipap PRN , stable NC now , has inhalers     H/o CHF: UF for volume excess , BNP 700s     Anemia of ESKD: hemoglobin above goal for ESKD at 11.8, no need for AILYN or iron currently, trend      H/o A-fib: rate controlled , PPM      Goals of Care: per prior note from Dr Kinney in June, discussed with Itzel(ongoing discussion re hospice) not ready, but has been considering for some time now, we did not address this admit.  Tentative plan for discharge to TCU, to build her strength and \"give her  a break, he has been doing all the work.\" Seen by PT/OT but TCU not accepting unless patient self pays. Will be going home with home care.      SUBJECTIVE:  Patient seen on HD. Worsening SOB today, not improved with dialysis. Did receive nebulizer treatment and inhaler today and feels this helped a little. Has to sit up mostly straight to breathe comfortably. Appears sad today and not looking forward to discharge as she is unable to self pay for TCU and will have to go home with home cares. "     OBJECTIVE:  Physical Exam   Temp: 98  F (36.7  C) Temp src: Oral BP: 103/55 Pulse: 70   Resp: 16 SpO2: 98 % O2 Device: Nasal cannula with humidification Oxygen Delivery: 3 LPM  Vitals:    07/12/21 1035 07/14/21 0823 07/14/21 0900   Weight: 72 kg (158 lb 11.7 oz) 71.3 kg (157 lb 3 oz) 71.6 kg (157 lb 14.4 oz)     Vital Signs with Ranges  Temp:  [97.4  F (36.3  C)-98.4  F (36.9  C)] 98  F (36.7  C)  Pulse:  [69-76] 70  Resp:  [16-20] 16  BP: ()/(55-67) 103/55  SpO2:  [91 %-98 %] 98 %  I/O last 3 completed shifts:  In: 950 [P.O.:950]  Out: -     @TMAXR(24)@    Patient Vitals for the past 72 hrs:   Weight   07/14/21 0900 71.6 kg (157 lb 14.4 oz)   07/14/21 0823 71.3 kg (157 lb 3 oz)   07/12/21 1035 72 kg (158 lb 11.7 oz)   07/12/21 0410 72 kg (158 lb 12.8 oz)   [unfilled]    PHYSICAL EXAM:  General - Alert and oriented x3, appears comfortable, NAD  Cardiovascular - Regular rate and rhythm, no rub  Respiratory - Clear to auscultation bilaterally, no crackles or wheezes, O2 per NC   Abd: BS present, no guarding or pain with palpation, no ascites  Extremities - No lower extremity edema bilaterally  Skin: dry, intact, no rash, good turgor  Neuro:  Grossly intact, no focal deficits  MSK:  Grossly intact  Psych:  Appears sad, near tears     LABORATORY STUDIES:     Recent Labs   Lab 07/14/21  0850 07/12/21  0513 07/11/21  0631   WBC 4.2 3.8* 3.6*   RBC 3.39* 3.48* 3.67*   HGB 11.4* 11.5* 12.3   HCT 37.2 38.4 40.8   PLT 96* 96* 90*       Basic Metabolic Panel:  Recent Labs   Lab 07/14/21  0850 07/12/21  0513 07/11/21  0631   * 136 137   POTASSIUM 5.0 4.6 3.9   CHLORIDE 98 101 98   CO2 25 26 27   BUN 42* 42* 17   CR 5.02* 4.45* 2.85*    96 79   RONALDO 8.3* 8.0* 8.3*       INRNo lab results found in last 7 days.     Recent Labs   Lab Test 07/14/21  0850 07/12/21  0513 04/01/21  0654 03/31/21  0306 01/27/21  0539 01/26/21  1825   INR  --   --   --  1.05  --  1.00   WBC 4.2 3.8*   < > 6.7   < > 4.1   HGB 11.4*  11.5*   < > 10.7*   < > 11.0*   PLT 96* 96*   < > 106*   < > 125*    < > = values in this interval not displayed.       Personally reviewed current labs    Demetra Villagomez PA-C   [facility]  [phone]

## 2021-07-14 NOTE — PROGRESS NOTES
[facility] MEDICINE PROGRESS NOTE      Identification/Summary: Belia Rodriguez is a 75 y.o. female with a past medical history significant for end-stage renal disease on hemodialysis, s/p AV node ablation and pacemaker placement, chronic respiratory failure, COPD, PVD and chronic lymphedema, CHF, anemia of chronic kidney disease, ZULEIKA, and mood disorder with depression.  She was discharged from [facility] on 6/28/2021 following admission for acute pulmonary edema which improved with dialysis treatment. Developed worsening shortness of breath, productive cough for 2-3 days, low-grade fever, and wheezing resulting in admission to [facility] on 7/10/21.      Found to have mixed respiratory and metabolic acidosis, chronic respiratory failure, and acute bronchitis. Started on inpatient hemodialysis 3x weeky, IV ceftriaxone x 2 days, PO Doxycycline x 5 days, and continued albuterol nebs and inhaled Symbicort. Has been followed by respiratory therapy, pulmonary/critical care, and nephrology.     Assessment and Plan:     Acute on chronic respiratory failure.  Reactive airway disease.  Severe obstructive sleep apnea.  Acute Bronchitis.   - Lungs are clear and shortness of breath/coughing has improved.   - O2 requirement at baseline: 3L O2 nasal cannula  - Finish 5 day course of BID oral doxycycline  (started 7/11/21).  - Continue Albuterol and Symbicort.   - Pulmonary consult previously determined no indication for BiPAP.      End-stage renal disease with hemodialysis.   - Nephrology consulted and followed hospital course.   - Continue hemodialysis 3x weekly, has a session this morning.   - Itzel brought up the possibility for hospice care in the future. Does not feel ready at this time, however has been thinking about this for quite some time.   - DNR/DNI.     Anemia of chronic disease.   - Hgb 11.4 up from 8.7 (5/13/21)  - Asymptomatic; denies lightheadedness/fatigue.       Hyperkalemia.   - Likely related to end-stage renal disease. Continuing hemodialysis 3x weekly.   - Potassium stable; 5.02.     Atrial fibrillation, s/p watchman device, s/p AV node ablation, s/p permanent pacemaker.   - Heart rate stable, rate controlled.   - 81 mg ASA daily, history of adverse reaction to anticoagulation.    - On subcutaneous heparin during hospitalization.      Essential hypertension.   - Blood pressure under good control, no home blood pressure medications.   - Midodrine for dialysis.      Dyslipidemia.   - Continue Atorvastatin 10 mg daily.     Mood disorder, depression.   - Continue Bupropion.     History of chronic back pain, right foot drop.   - Chronic Oxycodone.    Diet: Renal Diet (non-dialysis)  Diet  DVT Prophylaxis:  Heparin SQ  Code Status: No CPR- Do NOT Intubate    Anticipated possible discharge in 1 days to home with [facility]. Initially planning to discharge today to TCU, however insurance denied request and ivette does not feel comfortable discharging to home today given her lack of energy. Is in agreement with the plan to discharge to home with home care tomorrow morning.     Interval History/Subjective:  Ivette is much more fatigued after hemodialysis today. O2 requirement is back to baseline with 3L nasal cannula. Increased chest tightness and O2 saturation 88-92% on 2L nasal cannula.  Chronically increased SOB with walking and laying flat in bed.     Physical Exam/Objective:  Temp:  [97.4  F (36.3  C)-98.4  F (36.9  C)] 98.3  F (36.8  C)  Pulse:  [69-82] 71  Resp:  [16-20] 16  BP: ()/(50-67) 90/50  SpO2:  [95 %-98 %] 97 %    Body mass index is 27.1 kg/m .    GENERAL:  Alert, appears comfortable, in no acute distress, appears stated age   HEAD:  Normocephalic, without obvious abnormality, atraumatic   EYES:  PERRL, conjunctiva/corneas clear, no scleral icterus, EOM's intact   NOSE: Nares normal, septum midline, mucosa normal, no drainage   THROAT: Lips,  mucosa, and tongue normal; teeth and gums normal, mouth moist   NECK: Supple, symmetrical, trachea midline   LUNGS:   Clear to auscultation bilaterally, no rales, rhonchi, or wheezing, symmetric chest rise on inhalation, respirations unlabored   CHEST WALL:  No tenderness or deformity   HEART:  Pacemaker present; appropriate rate and rhythm   ABDOMEN:   Soft, non-tender, bowel sounds active all four quadrants, no masses, no organomegaly, no rebound or guarding   EXTREMITIES: Extremities normal, atraumatic, no cyanosis or edema; chronic right foot drop   SKIN: Dry to touch, no exanthems in the visualized areas; skin tear right forearm- dressing intact.   NEURO: Alert, oriented x 4, moves all four extremities freely, non-focal   PSYCH: Cooperative, behavior is appropriate          Medications:   Personally Reviewed.  Medications       sodium chloride 0.9%  250 mL Intravenous Once in dialysis/CRRT     sodium chloride 0.9%  300 mL Hemodialysis Machine Once     albuterol  2.5 mg Nebulization 4x Daily     [Held by provider] albuterol  2.5 mg Nebulization 3 times per day on Mon Wed Fri     [Held by provider] albuterol  2.5 mg Nebulization 4 times per day on Sun Tue Thu Sat     aspirin  81 mg Oral Daily with lunch     atorvastatin  10 mg Oral At Bedtime     budesonide-formoterol  2 puff Inhalation BID     buPROPion  150 mg Oral Once per day on Tue Sat     cinacalcet  30 mg Oral Q Mon Wed Fri AM     doxycycline monohydrate  100 mg Oral Q12H ELLIE     famotidine  20 mg Oral At Bedtime     gabapentin  100 mg Oral TID     sodium chloride (PF) 0.9%  1.3-2.6 mL Intracatheter Once in dialysis/CRRT    Followed by     heparin (porcine)  3 mL Intracatheter Once in dialysis/CRRT     sodium chloride (PF) 0.9%  1.3-2.6 mL Intracatheter Once in dialysis/CRRT    Followed by     heparin (porcine)  3 mL Intracatheter Once in dialysis/CRRT     heparin ANTICOAGULANT  5,000 Units Subcutaneous Q12H     melatonin  3 mg Oral At Bedtime      midodrine  15 mg Oral Once per day on Mon Wed Fri     - MEDICATION INSTRUCTIONS -   Does not apply Once     senna-docusate  1 tablet Oral Every Other Day     sevelamer carbonate  1,600 mg Oral TID w/meals     sodium chloride (PF)  9 mL Intracatheter During Dialysis/CRRT (from stock)     sodium chloride (PF)  9 mL Intracatheter During Dialysis/CRRT (from stock)     timolol maleate  1 drop Both Eyes BID     traZODone  150 mg Oral At Bedtime     Vitamin D3  2,000 Units Oral Daily with lunch       Data reviewed today: I personally reviewed all new medications, labs, imaging/diagnostics reports over the past 24 hours. Pertinent findings include:    Imaging:   No results found for this or any previous visit (from the past 24 hour(s)).    Labs:  Most Recent 3 BMP's:Recent Labs   Lab Test 07/14/21  0850 07/12/21  0513 07/11/21  0631   * 136 137   POTASSIUM 5.0 4.6 3.9   CHLORIDE 98 101 98   CO2 25 26 27   BUN 42* 42* 17   CR 5.02* 4.45* 2.85*   ANIONGAP 10 9 12   RONALDO 8.3* 8.0* 8.3*    96 79     Most Recent 3 Creatinines:Recent Labs   Lab Test 07/14/21  0850 07/12/21  0513 07/11/21  0631   CR 5.02* 4.45* 2.85*     Most Recent 3 Hemoglobins:Recent Labs   Lab Test 07/14/21  0850 07/12/21  0513 07/11/21  0631   HGB 11.4* 11.5* 12.3       BRENTON Shaffer  7/14/2021    Patient seen and examined  Patient discussed with physician assistant student  Agree with her assessment and plan as noted above    Jesús Ortiz MD  [facility]  Phone: #[phone]

## 2021-07-14 NOTE — PROGRESS NOTES
Care Management Follow Up    Length of Stay (days): 3    Expected Discharge Date: 07/14/2021  Expected Time of Departure:  (1:30 PM)  Concerns to be Addressed:       Patient plan of care discussed at interdisciplinary rounds: Yes    Anticipated Discharge Disposition: Transitional Care     Anticipated Discharge Services:    Anticipated Discharge DME:      Patient/family educated on Medicare website which has current facility and service quality ratings: yes  Education Provided on the Discharge Plan:    Patient/Family in Agreement with the Plan: yes    Referrals Placed by CM/SW:    Private pay costs discussed:     Additional Information:  CM met with patient & confirmed discharge plan of going to [facility] transitional care unit today. Patient voiced her spouse will transport. Per dialysis nurse dialysis should be completed around 11:35 AM. Patient requested a 1:30 PM discharge time. CM left voicemail for patient's spouse with this information & confirmed discharge time with admissions at [facility]. [id number]      DIMA Valera     Denise, admissions at [facility] updated that after running patient's insurance Itzel has exhausted all of her transitional care unit days. Denise explained patient has not had a 60 day break from a hospital and/or transitional care unit since July of 2020. RAMSEY updated patient & her spouse, Cayden via multiple discussions. Ramsey explained patient would be private pay at transitional care. Patient & spouse in agreement she will discharge to home with resumption of [facility] for home physical therapy & a home health aide. Patient declined needing home occupational therapy. Patient's spouse will transport to home at 1:30 PM.     RAMSEY spoke with intake at [facility] that voiced they are able to accept patient back for home physical therapy & a home health aide.   DIMA Valera  [facility]  7/14/2021  10:27 AM    Faxed discharge orders & history & physical to [facility] at [phone].   Tracy Gallego [facility]  7/14/2021  11:57 AM

## 2021-07-14 NOTE — PLAN OF CARE
Problem: Adult Inpatient Plan of Care  Goal: Optimal Comfort and Wellbeing  Outcome: Improving     Problem: Risk for Delirium  Goal: Improved Sleep  Outcome: Improving     Pt A/O. Pain to feet. PRN oxy administered/effective.  Dialysis to be completed today.   [facility]

## 2021-07-14 NOTE — PROGRESS NOTES
Hemodialysis Treatment Note      Fluid Removed: 2L    Vascular Access Status: Right Internal jugular cvc aspirates and flushes easily, no issues with BFR. Dressing C/D/I with bio patch in place. Posttreatment catheter instilled with 1:1000 units heparin per limb volume. Catheter labeled, wrapped in gauze and secured with tape.     Dialyzer Rinse: Good    Total Blood Volume Processed: 63.8L    Total Dialysis Treatment Time: 3hrs    Run Summary  Stable run, midodrine given just before start of treatment and mid treatment for blood pressure support.     Interventions  Crit line used for fluid monitoring, goal adjusted accordingly    Plan:  per renal team    Neymar Lopez RN  [facility]

## 2021-07-15 NOTE — PLAN OF CARE
Problem: Adult Inpatient Plan of Care  Goal: Absence of Hospital-Acquired Illness or Injury  Intervention: Identify and Manage Fall Risk  Recent Flowsheet Documentation  Taken 7/15/2021 0120 by Ria Molina, RN  Safety Promotion/Fall Prevention:    bed alarm on    clutter free environment maintained    room door open    room near nurse's station    safety round/check completed  Intervention: Prevent Skin Injury  Recent Flowsheet Documentation  Taken 7/15/2021 0120 by Ria Molina, RN  Body Position: position changed independently       Pt A/O. On 3L 02.   Saline locked.   Discharging today with home care.

## 2021-07-15 NOTE — PROGRESS NOTES
"    RENAL PROGRESS NOTE    CC:  SOB, ESRD     ASSESSMENT & PLAN:   ESRD: on HD MWF at [facility] (primary neph Dr Kinney, of [facility]). Ran off schedule Sat mostly for UF, now resumed MWF schedule     Has CVC, has refused AV access since initiation 14+ years ago, d/t frequent crafting and didn't want access to interfere.      Ok to discharge from renal standpoint      Hyperkalemia:  corrected with Dialysis Sat renal diet.      H/o HoTN:  BP currently controlled, Has prn MIdodrine outpt, low dose      CKD/MBD:  On Lorri D and Renvela binders, Sensipar                COPD: admitted with acute on chronic shortness of breath/WAGNER, recurrent recent admission for similar presentation, followed by pulm outpt, infiltration on CT ? Bronchitis, no outward s/s infectious etio, better with some UF 7/10, on 3L at baseline    pulm has adjusted inhalers, now doing better     Started empiric antibx for ? resp infection     ZULEIKA:  Severe, with pulm HTN, bipap PRN , stable NC now , has inhalers     H/o CHF: UF for volume excess , BNP 700s     Anemia of ESKD: hemoglobin above goal for ESKD at 11.8, no need for AILYN or iron currently, trend      H/o A-fib: rate controlled , PPM      Goals of Care: per prior note from Dr Kinney in June, discussed with Itzel(ongoing discussion re hospice) not ready, but has been considering for some time now, we did not address this admit.  Tentative plan for discharge to TCU, to build her strength and \"give her  a break, he has been doing all the work.\" Seen by PT/OT but TCU not accepting unless patient self pays. Will be going home with home care.      SUBJECTIVE:  Patient seen at bedside prior to discharge. Had felt very fatigued after HD yesterday and with worsening SOB did not feel comfortable leaving hospital. Now feeling much better this am and breathing improved, plans now in place for discharge.     OBJECTIVE:  Physical Exam   Temp: 97.6  F (36.4  C) Temp src: Oral BP: 96/53 Pulse: 78   " Resp: 18 SpO2: 94 % O2 Device: Nasal cannula Oxygen Delivery: 3 LPM  Vitals:    07/14/21 0823 07/14/21 0900 07/15/21 0540   Weight: 71.3 kg (157 lb 3 oz) 71.6 kg (157 lb 14.4 oz) 67.5 kg (148 lb 14.4 oz)     Vital Signs with Ranges  Temp:  [97.5  F (36.4  C)-98.3  F (36.8  C)] 97.6  F (36.4  C)  Pulse:  [70-78] 78  Resp:  [14-20] 18  BP: ()/(50-59) 96/53  FiO2 (%):  [93 %] 93 %  SpO2:  [93 %-97 %] 94 %  I/O last 3 completed shifts:  In: 570 [P.O.:570]  Out: 2449 [Other:2449]      Patient Vitals for the past 72 hrs:   Weight   07/15/21 0540 67.5 kg (148 lb 14.4 oz)   07/14/21 0900 71.6 kg (157 lb 14.4 oz)   07/14/21 0823 71.3 kg (157 lb 3 oz)   07/12/21 1035 72 kg (158 lb 11.7 oz)       PHYSICAL EXAM:  General - Alert and oriented x3, appears comfortable, NAD  Cardiovascular - Regular rate and rhythm, no rub  Respiratory - Scattered crackles throughout posterior lungs, O2 per NC   Abd: BS present, no guarding or pain with palpation, no ascites  Extremities - No lower extremity edema bilaterally  Skin: dry, intact, no rash, good turgor  Neuro:  Grossly intact, no focal deficits  MSK:  Grossly intact  Psych:  Affect brighter today     LABORATORY STUDIES:     Recent Labs   Lab 07/14/21  0850 07/12/21  0513 07/11/21  0631 07/10/21  0610 07/09/21 2002   WBC 4.2 3.8* 3.6* 2.1* 4.2   RBC 3.39* 3.48* 3.67* 3.49* 3.78*   HGB 11.4* 11.5* 12.3 11.8* 12.6   HCT 37.2 38.4 40.8 39.4 41.9   PLT 96* 96* 90* 74* 90*       Basic Metabolic Panel:  Recent Labs   Lab 07/14/21  0850 07/12/21  0513 07/11/21  0631 07/10/21  0610 07/09/21  1922   * 136 137 135* 141   POTASSIUM 5.0 4.6 3.9 5.7* 4.4   CHLORIDE 98 101 98 103 105   CO2 25 26 27 20* 26   BUN 42* 42* 17 19 13   CR 5.02* 4.45* 2.85* 3.87* 3.25*    96 79 170* 105   RONALDO 8.3* 8.0* 8.3* 8.3* 9.4       INRNo lab results found in last 7 days.     Recent Labs   Lab Test 07/14/21  0850 07/12/21  0513 06/11/21  1419 06/11/21  1331 04/01/21  0654 03/31/21  0306   INR  --    --   --  0.99  --  1.05   WBC 4.2 3.8*   < > 4.1   < > 6.7   HGB 11.4* 11.5*  --  12.6   < > 10.7*   PLT 96* 96*  --  179   < > 106*    < > = values in this interval not displayed.       Personally reviewed current labs    Demetra Villagomez PA-C   [facility]  [phone]

## 2021-07-15 NOTE — DISCHARGE SUMMARY
[facility] MEDICINE  DISCHARGE SUMMARY     Primary Care Physician: Neil Galan  Admission Date: 7/10/2021   Discharge Provider: Jesús Ortiz MD Discharge Date: 7/15/2021   Diet:   Active Diet and Nourishment Order   Procedures     Renal Diet (non-dialysis)     Diet       Code Status: No CPR- Do NOT Intubate   Activity: DCACTIVITY: Activity as tolerated        Condition at Discharge: Stable     REASON FOR PRESENTATION(See Admission Note for Details)   Worsening shortness of breath, productive cough, low-grade fever, and wheezing following recent hospitalization (discharged 6/28/2021) at [facility] for acute pulmonary edema.    PRINCIPAL & ACTIVE DISCHARGE DIAGNOSES     Active Problems:    Shortness of breath    Acute on chronic respiratory failure (H)      PENDING LABS     Unresulted Labs Ordered in the Past 30 Days of this Admission     No orders found from 6/10/2021 to 7/11/2021.          PROCEDURES ( this hospitalization only)      None    RECOMMENDATIONS TO OUTPATIENT PROVIDER FOR F/U VISIT     Follow-up Appointments     Follow-up and recommended labs and tests       Follow up with primary care provider, Neil Glaan, within 7 days to   evaluate medication change and for hospital follow- up.  The following   labs/tests are recommended: Basic metabolic panel and hemogram.           DISPOSITION     Home with home care    SUMMARY OF HOSPITAL COURSE:    HPI: Belia Rodriguez is a 75 y.o. female with a past medical history significant for end-stage renal disease on hemodialysis, s/p AV node ablation and pacemaker placement, chronic respiratory failure, COPD, PVD and chronic lymphedema, CHF, anemia of chronic kidney disease, ZULEIKA, and mood disorder with depression.  She was discharged from [facility] on 6/28/2021 following admission for acute pulmonary edema which improved with dialysis treatment. Developed worsening shortness of breath, productive cough for  2-3 days, low-grade fever, and wheezing resulting in admission to [facility] on 7/10/21.      Found to have mixed respiratory and metabolic acidosis, chronic respiratory failure, and acute bronchitis. Started on inpatient hemodialysis 3x weeky, IV ceftriaxone x 2 days, PO Doxycycline x 5 days, and continued albuterol nebs and inhaled Symbicort. Has been followed by respiratory therapy, pulmonary/critical care, and nephrology.     Acute on chronic respiratory failure.  Reactive airway disease.  Severe obstructive sleep apnea.  Acute Bronchitis.   - Shortness of breath/coughing has improved, mild basilar crackles bilaterally today.    - O2 requirement at baseline: 3L O2 nasal cannula  - Finish 5 day course of BID oral doxycycline  (started 7/11/21).  - Continue Albuterol and Symbicort as written.     End-stage renal disease with hemodialysis.   - Nephrology consulted and followed hospital course.   - Continue hemodialysis 3x weekly, last session was yesterday morning.   - Discussed hospice care again today. She feels that she is not ready for this and is optimistic that she will be able to better manage her lung disease at home preventing re-hospitalization in the near future.   - DNR/DNI.     Anemia of chronic disease.   - Hgb 11.4 yesterday, up from 8.7 on 5/13/21.  - Asymptomatic; denies lightheadedness/fatigue.      Hyperkalemia.   - Likely related to end-stage renal disease. Continuing hemodialysis 3x weekly.   - Potassium stable; 5.02.     Atrial fibrillation, s/p watchman device, s/p AV node ablation, s/p permanent pacemaker.   - Heart rate stable, rate controlled.   - 81 mg ASA daily at discharge, history of adverse reaction to anticoagulation.    - On subcutaneous heparin during hospitalization.      Essential hypertension.   - Blood pressure under good control, no home blood pressure medications.   - Midodrine for dialysis.      Dyslipidemia.   - Continue Atorvastatin 10 mg daily.     Mood disorder,  depression.   - Continue Bupropion.     History of chronic back pain, right foot drop.   - Chronic Oxycodone.          Discharge Medications with Med changes:     Current Discharge Medication List      START taking these medications    Details   albuterol (PROAIR HFA/PROVENTIL HFA/VENTOLIN HFA) 108 (90 Base) MCG/ACT inhaler Inhale 2 puffs into the lungs every 6 hours as needed for shortness of breath / dyspnea or wheezing  Qty: 6.7 g, Refills: 0    Comments: Pharmacy may dispense brand covered by insurance (Proair, or proventil or ventolin or generic albuterol inhaler)  Associated Diagnoses: Acute on chronic respiratory failure with hypoxia (H)      doxycycline monohydrate (MONODOX) 50 MG capsule Take 2 capsules (100 mg) by mouth 2 times daily for 2 days  Qty: 8 capsule, Refills: 0    Associated Diagnoses: COPD exacerbation (H)         CONTINUE these medications which have CHANGED    Details   !! albuterol (PROVENTIL) (2.5 MG/3ML) 0.083% neb solution Take 1 vial (2.5 mg) by nebulization 4 times daily On non-dialysis days.  Qty: 300 mL, Refills: 1    Associated Diagnoses: Acute on chronic respiratory failure with hypoxia (H)       !! - Potential duplicate medications found. Please discuss with provider.      CONTINUE these medications which have NOT CHANGED    Details   acetaminophen (TYLENOL) 500 MG tablet Take 1,000 mg by mouth every 6 hours as needed for mild pain      !! albuterol (PROVENTIL) (2.5 MG/3ML) 0.083% neb solution Take 2.5 mg by nebulization 3 times daily On dialysis days Monday - Weds - Fri      aspirin 81 MG EC tablet Take 81 mg by mouth daily (with lunch)       atorvastatin (LIPITOR) 10 MG tablet Take 10 mg by mouth At Bedtime      B Complex-C-Folic Acid (DIALYVITE PO) Take 1 tablet by mouth daily (with lunch)       budesonide-formoterol (SYMBICORT) 80-4.5 MCG/ACT Inhaler Inhale 2 puffs into the lungs 2 times daily      buPROPion (WELLBUTRIN XL) 150 MG 24 hr tablet Take 150 mg by mouth twice a week  Tue & Sat      cinacalcet (SENSIPAR) 30 MG tablet Take 30 mg by mouth Every Mon, Wed, Fri Morning . At dialysis.      famotidine (PEPCID) 20 MG tablet Take 20 mg by mouth At Bedtime      gabapentin (NEURONTIN) 100 MG capsule Take 100 mg by mouth 3 times daily      lactobacillus rhamnosus, GG, (CULTURELL) capsule Take 1 capsule by mouth daily (with lunch)      loratadine (CLARITIN) 10 MG tablet Take 10 mg by mouth daily as needed for allergies      melatonin 3 MG tablet Take 3 mg by mouth At Bedtime      !! midodrine (PROAMATINE) 10 MG tablet Take 15 mg by mouth three times a week Take once daily prior to dialysis on dialysis days Mon-Wed-Fri      ondansetron (ZOFRAN) 4 MG tablet Take 4 mg by mouth every 8 hours as needed for nausea      oxyCODONE (ROXICODONE) 5 MG tablet Take 5 mg by mouth every 6 hours as needed for pain       polyvinyl alcohol (LIQUIFILM TEARS) 1.4 % ophthalmic solution Place 1 drop into both eyes as needed for dry eyes      senna-docusate (SENOKOT-S/PERICOLACE) 8.6-50 MG tablet Take 1 tablet by mouth every other day       !! sevelamer carbonate (RENVELA) 800 MG tablet Take 1,600 mg by mouth 3 times daily (with meals)      !! sevelamer carbonate (RENVELA) 800 MG tablet Take 800 mg by mouth Take with snacks or supplements       sodium-potassium bicarbonate (EDI-SELTZER GOLD) TBEF solu-tab Take 1 tablet by mouth daily as needed for heartburn      timolol maleate (TIMOPTIC) 0.5 % ophthalmic solution Place 1 drop into both eyes 2 times daily      traZODone (DESYREL) 150 MG tablet Take 150 mg by mouth At Bedtime      Vitamin D, Cholecalciferol, 25 MCG (1000 UT) TABS Take 2,000 Units by mouth daily (with lunch)      !! midodrine (PROAMATINE) 10 MG tablet Take 10 mg by mouth every 6 hours as needed (for SBP < 110)        !! - Potential duplicate medications found. Please discuss with provider.                Rationale for medication changes:      N/A        Consults     SOCIAL WORK IP CONSULT  PULMONARY  IP CONSULT  WOUND OSTOMY CONTINENCE NURSE  IP CONSULT  PHYSICAL THERAPY ADULT IP CONSULT  OCCUPATIONAL THERAPY ADULT IP CONSULT  NUTRITION SERVICES ADULT IP CONSULT    Immunizations given this encounter     Most Recent Immunizations   Administered Date(s) Administered     COVID-19,PF,Moderna 03/01/2021     Influenza, Quad, High Dose, Pf, 65yr + 09/14/2020      Anticoagulation Information      Recent INR results: No results for input(s): INR in the last 168 hours.  Warfarin doses (if applicable) or name of other anticoagulant: 81 mg ASA BID outpatient; subcutaneous heparin during hospitalization    SIGNIFICANT IMAGING FINDINGS     No results found for this visit on 07/10/21.    SIGNIFICANT LABORATORY FINDINGS       Most Recent 3 Creatinines:Recent Labs   Lab Test 07/14/21  0850 07/12/21  0513 07/11/21  0631   CR 5.02* 4.45* 2.85*     Most Recent 3 Hemoglobins:Recent Labs   Lab Test 07/14/21  0850 07/12/21  0513 07/11/21  0631   HGB 11.4* 11.5* 12.3       Discharge Orders        Reason for your hospital stay    Patient admitted for COPD exacerbation     Follow-up and recommended labs and tests     Follow up with primary care provider, Neil Galan, within 7 days to evaluate medication change and for hospital follow- up.  The following labs/tests are recommended: Basic metabolic panel and hemogram.     Activity    Your activity upon discharge: activity as tolerated     MD face to face encounter    Documentation of Face to Face and Certification for Home Health Services    I certify that patient: Belia Rodriguez is under my care and that I, or a nurse practitioner or physician's assistant working with me, had a face-to-face encounter that meets the physician face-to-face encounter requirements with this patient on: 7/14/2021.    This encounter with the patient was in whole, or in part, for the following medical condition, which is the primary reason for home health care: COPD exacerbation.    I certify that, based  on my findings, the following services are medically necessary home health services: Physical Therapy and home health aide.  Physical therapy to resume.    My clinical findings support the need for the above services because: Physical Therapy Services are needed to assess and treat the following functional impairments: Weakness with recent hospital stay and overall decline with end-stage COPD.    Further, I certify that my clinical findings support that this patient is homebound (i.e. absences from home require considerable and taxing effort and are for medical reasons or [employer] services or infrequently or of short duration when for other reasons) because: Leaving home is medically contraindicated for the following reason(s): Dyspnea on exertion that makes it so they cannot leave their home for needed services without clinical deterioration...    Based on the above findings. I certify that this patient is confined to the home and needs intermittent skilled nursing care, physical therapy and/or speech therapy.  The patient is under my care, and I have initiated the establishment of the plan of care.  This patient will be followed by a physician who will periodically review the plan of care.  Physician/Provider to provide follow up care: Neil Galan    Attending hospital physician (the Medicare certified [place] provider): Jesús Ortiz MD  Physician Signature: See electronic signature associated with these discharge orders.  Date: 7/14/2021     Diet    Follow this diet upon discharge: Renal       Examination   Physical Exam   Temp:  [97.5  F (36.4  C)-98.3  F (36.8  C)] 97.8  F (36.6  C)  Pulse:  [70-82] 70  Resp:  [14-20] 18  BP: ()/(50-63) 92/57  FiO2 (%):  [93 %] 93 %  SpO2:  [93 %-97 %] 93 %  Wt Readings from Last 1 Encounters:   07/15/21 67.5 kg (148 lb 14.4 oz)     GENERAL:  Alert, appears comfortable, pleasant female in no acute distress, appears stated age; appears much more rested today.    HEAD:  Normocephalic, without obvious abnormality, atraumatic   EYES:  PERRL, conjunctiva/corneas clear, no scleral icterus, EOM's intact   NOSE: Nares normal, septum midline, mucosa normal, no drainage   THROAT: Lips, mucosa, and tongue normal; teeth and gums normal, mouth moist   NECK: Supple, symmetrical, trachea midline   LUNGS:   Mild rales bilateral basilar lung fields; no rhonchi jennings wheezing, symmetric chest rise on inhalation, respirations unlabored   CHEST WALL:  No tenderness or deformity   HEART:  Pacemaker present; Regular rate and rhythm, S1 and S2 normal, no murmur, rub, or gallop    ABDOMEN:   Soft, non-tender, bowel sounds active all four quadrants, no masses, no organomegaly, no rebound or guarding   EXTREMITIES: Extremities normal, atraumatic, no cyanosis or edema    SKIN: Dry to touch, no exanthems in the visualized areas; skin tear right forearm, ecchymosis improving - clean dressing intact.   NEURO: Alert, oriented x 4, moves all four extremities freely, non-focal   PSYCH: Cooperative, behavior is appropriate          Please see EMR for more detailed significant labs, imaging, consultant notes etc.    I, Jesús Ortiz MD, personally saw the patient today and spent greater than 30 minutes discharging this patient.    BRENTON Shaffer  7/15/2021    Patient seen and examined  Patient discussed with physician assistant student  Agree with her assessment and plan as outlined above    Jesús Ortiz MD  [place] [facility]  [facility]    CC:Neil Galan

## 2021-07-15 NOTE — PROVIDER NOTIFICATION
Patient receiving duonebs qid as ordered.  Diminished bilaterally pre and post neb.  No change in aeration post.  Pt on 3 LPM via nasal cannula with spo2 93%  RT to continue to monitor and treat as ordered,     07/14/21 2022   Tech Time   $Tech Time (10 minute increments) 2   Vital Signs   Resp 20   Pulse 75   Oxygen Therapy   O2 Device Nasal cannula   Oxygen Delivery 3 LPM   Nebulizer Pre Assessment   $RT Use ONLY Delivery Method Nebulizer - Additional   Nebulizer Device Mask   Pretreatment Heart Rate (beats/min) 75   Pretreatment Resp Rate (breaths/min) 20   Pretreatment O2 sats - (TCU only) 93   Pretreat Breath Sounds - Bilat - All Lobes diminished   Breath Sounds Post-Respiratory Treatment   Posttreatment Heart Rate (beats/min) 80   Posttreatment Resp Rate (breaths/min) 20   Post treatment O2 Sats - (TCU only) 93   Breath Sounds Posttreatment All Fields All Fields   Breath Sounds Posttreatment All Fields no change;diminished

## 2021-07-16 NOTE — PROGRESS NOTES
Clinic Care Coordination Contact  [facility]/Voicemail       Clinical Data: Care Coordinator Outreach  Outreach attempted x 1.  Left message on patient's voicemail with call back information and requested return call.  Care Coordinator will try to reach patient again in 1-2 business days.

## 2021-07-19 NOTE — PROGRESS NOTES
Clinic Care Coordination Contact  Community Health Worker Initial Outreach            Patient accepts CC: No, Talked to patient and she stated she was still having shortness of breath and wanted help with a f/u appointment in [place] with Demetra Larose so Appointment was made for 07/27/21 for 11:30am.  Patient also stated she didn't need any other resources or help with anything and if she does she has all the literature. CTA will close Referral at this time.

## 2021-07-27 PROBLEM — N18.5 CKD (CHRONIC KIDNEY DISEASE) STAGE 5, GFR LESS THAN 15 ML/MIN (H): Status: ACTIVE | Noted: 2021-01-01

## 2021-07-27 PROBLEM — N18.30 CHRONIC KIDNEY DISEASE, STAGE 3 (H): Status: ACTIVE | Noted: 2021-01-01

## 2021-07-27 NOTE — PROGRESS NOTES
Internal Medicine Office Visit  [facility]   Patient Name: Belia Rodriguez  Patient Age: 75 year old  YOB: 1945  MRN: [id number]    Date of Visit: 7/27/2021  Patient presents with:  RECHECK: ER Visit 7/9 and then Colin.            Assessment / Plan / Medical Decision Making:    Problem List Items Addressed This Visit        Nervous and Auditory    Neuropathy     Gabapentin dose limited by renal function. Continue oxycodone to take sparingly at night for neuropathy pain in feet           Relevant Medications    traZODone (DESYREL) 150 MG tablet    oxyCODONE (ROXICODONE) 5 MG tablet       Respiratory    Chronic respiratory failure with hypoxia (H)    Relevant Medications    albuterol (PROAIR HFA/PROVENTIL HFA/VENTOLIN HFA) 108 (90 Base) MCG/ACT inhaler      Other Visit Diagnoses     Hospital discharge follow-up    -  Primary    Tinea cruris        Nystatin powder as needed     Relevant Medications    loratadine (CLARITIN) 10 MG capsule    diphenhydrAMINE (BENADRYL) 50 MG tablet    latanoprost (XALATAN) 0.005 % ophthalmic solution    nystatin (MYCOSTATIN) 089305 UNIT/GM external powder    albuterol (PROAIR HFA/PROVENTIL HFA/VENTOLIN HFA) 108 (90 Base) MCG/ACT inhaler    oxyCODONE (ROXICODONE) 5 MG tablet    Primary insomnia        Relevant Medications    traZODone (DESYREL) 150 MG tablet    ESRD on dialysis (H)        Relevant Medications    B Complex-C-Folic Acid (DIALYVITE) TABS         - Inpatient records reviewed as pertinent to this visit  - Continue with albuterol nebulizer treatments every 6 hours and oxygen therapy  - Follow up with dialysis/nephrology as scheduled  - She remains high risk for readmission but is optimistic with the change in her dialysis regimen and respiratory regimen   - She has toured [facility] for hospice care. She again reiterates desire not to die at home or in the hospital. Not interested in palliative/hospice care at this time. Hopes to  make it to her 76th birthday in August     I have changed Itzel Rodriguez's traZODone. I am also having her start on nystatin and Dialyvite. Additionally, I am having her maintain her sodium-potassium bicarbonate, aspirin, acetaminophen, atorvastatin, buPROPion, Vitamin D (Cholecalciferol), cinacalcet, famotidine, gabapentin, midodrine, midodrine, polyvinyl alcohol, senna-docusate, sevelamer carbonate, timolol maleate, melatonin, ondansetron, albuterol, budesonide-formoterol, loratadine, Multiple Vitamins-Iron (DAILY-OLGA/IRON PO), FOLIC ACID PO, sevelamer carbonate, Lactobacillus (CULTURELLE DIGESTIVE WOMENS PO), diphenhydrAMINE, latanoprost, metoprolol succinate ER, albuterol, and oxyCODONE.          No orders of the defined types were placed in this encounter.  Followup: Return in about 3 months (around 10/27/2021) for Follow up. earlier if needed.        Demetra Larose NP, CNP        HPI:  Belia Rodriguez is a 75 year old year old who presents to the office today with her spouse for follow up of a hospitalization from 7/10/21- 7/15/21 for acute on chronic respiratory failure and a fever. She was treated with inpatient hemodialysis and antibiotics.     She is now doing dialysis 4 times per week now, this is her second week of this. She is tolerating it well.     Acute on chronic respiratory failure, reactive airway disease is treated with regular albuterol neb treatments. She completed the antibiotic prescribed after the hospital stay.     She continues oxycodone at bedtime as needed for pain in her feet.     Health Maintenance Review  Health Maintenance   Topic Date Due     HF ACTION PLAN  Never done     ANNUAL REVIEW OF HM ORDERS  Never done     COPD ACTION PLAN  Never done     DEPRESSION ACTION PLAN  Never done     COLORECTAL CANCER SCREENING  Never done     HEPATITIS C SCREENING  Never done     HEPATITIS B IMMUNIZATION (1 of 3 - Risk Recombivax 3-dose series) Never done     ZOSTER IMMUNIZATION (1 of 2)  09/21/2009     MEDICARE ANNUAL WELLNESS VISIT  Never done     LIPID  11/19/2020     INFLUENZA VACCINE (1) 09/01/2021     MAMMO SCREENING  12/20/2021     PHQ-9  12/24/2021     BMP  01/14/2022     FALL RISK ASSESSMENT  06/24/2022     ALT  07/11/2022     CBC  07/14/2022     DEXA  07/08/2023     ADVANCE CARE PLANNING  07/06/2026     DTAP/TDAP/TD IMMUNIZATION (4 - Td or Tdap) 10/30/2027     TSH W/FREE T4 REFLEX  Completed     SPIROMETRY  Completed     Pneumococcal Vaccine: 65+ Years  Completed     COVID-19 Vaccine  Completed     IPV IMMUNIZATION  Aged Out     MENINGITIS IMMUNIZATION  Aged Out       Current Scheduled Meds:  Outpatient Encounter Medications as of 7/27/2021   Medication Sig Dispense Refill     acetaminophen (TYLENOL) 500 MG tablet Take 1,000 mg by mouth every 6 hours as needed for mild pain       albuterol (PROAIR HFA/PROVENTIL HFA/VENTOLIN HFA) 108 (90 Base) MCG/ACT inhaler Inhale 2 puffs into the lungs every 6 hours as needed for shortness of breath / dyspnea or wheezing 6.7 g 0     albuterol (PROVENTIL) (2.5 MG/3ML) 0.083% neb solution Take 1 vial (2.5 mg) by nebulization 4 times daily On non-dialysis days. 300 mL 1     aspirin 81 MG EC tablet Take 81 mg by mouth daily (with lunch)        atorvastatin (LIPITOR) 10 MG tablet Take 10 mg by mouth At Bedtime       B Complex-C-Folic Acid (DIALYVITE) TABS Take 1 tablet by mouth daily 90 tablet 3     budesonide-formoterol (SYMBICORT) 80-4.5 MCG/ACT Inhaler Inhale 2 puffs into the lungs 2 times daily 10.2 g 1     buPROPion (WELLBUTRIN XL) 150 MG 24 hr tablet Take 150 mg by mouth twice a week Tue & Sat       cinacalcet (SENSIPAR) 30 MG tablet Take 30 mg by mouth Every Mon, Wed, Fri Morning . At dialysis.       famotidine (PEPCID) 20 MG tablet Take 20 mg by mouth At Bedtime       FOLIC ACID PO Take 1 mg by mouth daily       gabapentin (NEURONTIN) 100 MG capsule Take 100 mg by mouth 3 times daily       Lactobacillus (CULTURELLE DIGESTIVE WOMENS PO) Take 1 tablet by  mouth daily       latanoprost (XALATAN) 0.005 % ophthalmic solution Apply 1 drop to eye At Bedtime       loratadine (CLARITIN) 10 MG capsule Take 10 mg by mouth as needed       melatonin 3 MG tablet Take 3 mg by mouth At Bedtime       metoprolol succinate ER (TOPROL-XL) 25 MG 24 hr tablet Take 1 tablet by mouth daily       midodrine (PROAMATINE) 10 MG tablet Take 10 mg by mouth every 6 hours as needed (for SBP < 110)        midodrine (PROAMATINE) 10 MG tablet Take 15 mg by mouth three times a week Take once daily prior to dialysis on dialysis days Mon-Wed-Fri       Multiple Vitamins-Iron (DAILY-OLGA/IRON PO) Take 1 tablet by mouth daily       nystatin (MYCOSTATIN) 561967 UNIT/GM external powder Apply topically 2 times daily 60 g 0     ondansetron (ZOFRAN) 4 MG tablet Take 4 mg by mouth every 8 hours as needed for nausea       oxyCODONE (ROXICODONE) 5 MG tablet Take 1 tablet (5 mg) by mouth every 6 hours as needed for pain 20 tablet 0     polyvinyl alcohol (LIQUIFILM TEARS) 1.4 % ophthalmic solution Place 1 drop into both eyes as needed for dry eyes       senna-docusate (SENOKOT-S/PERICOLACE) 8.6-50 MG tablet Take 1 tablet by mouth every other day        sevelamer carbonate (RENVELA) 800 MG tablet Take 2,400 mg by mouth 3 times daily       sevelamer carbonate (RENVELA) 800 MG tablet Take 800 mg by mouth Take with snacks or supplements        sodium-potassium bicarbonate (EDI-SELTZER GOLD) TBEF solu-tab Take 1 tablet by mouth daily as needed for heartburn       timolol maleate (TIMOPTIC) 0.5 % ophthalmic solution Place 1 drop into both eyes 2 times daily       traZODone (DESYREL) 150 MG tablet Take 1 tablet (150 mg) by mouth At Bedtime 90 tablet 3     Vitamin D, Cholecalciferol, 25 MCG (1000 UT) TABS Take 2,000 Units by mouth daily (with lunch)       diphenhydrAMINE (BENADRYL) 50 MG tablet Take 50 mg by mouth as needed       [DISCONTINUED] albuterol (PROAIR HFA/PROVENTIL HFA/VENTOLIN HFA) 108 (90 Base) MCG/ACT  "inhaler Inhale 2 puffs into the lungs every 6 hours as needed for shortness of breath / dyspnea or wheezing 6.7 g 0     [DISCONTINUED] albuterol (PROVENTIL) (2.5 MG/3ML) 0.083% neb solution Take 2.5 mg by nebulization 3 times daily On dialysis days Monday - Weds - Fri       [DISCONTINUED] B Complex-C-Folic Acid (DIALYVITE PO) Take 1 tablet by mouth daily (with lunch)        [DISCONTINUED] cinacalcet (SENSIPAR) 30 MG tablet Take 30 mg by mouth as needed       [DISCONTINUED] doxycycline monohydrate (MONODOX) 100 MG capsule Take 1 capsule by mouth 2 times daily       [DISCONTINUED] famotidine (PEPCID) 10 MG tablet Take 10 mg by mouth 2 times daily       [DISCONTINUED] gabapentin (NEURONTIN) 100 MG capsule Take 300 mg by mouth 3 times daily       [DISCONTINUED] lactobacillus rhamnosus, GG, (CULTURELL) capsule Take 1 capsule by mouth daily (with lunch)       [DISCONTINUED] loratadine (CLARITIN) 10 MG tablet Take 10 mg by mouth daily as needed for allergies       [DISCONTINUED] oxyCODONE (ROXICODONE) 5 MG tablet Take 1 tablet (5 mg) by mouth every 6 hours as needed for pain 20 tablet 0     [DISCONTINUED] sevelamer carbonate (RENVELA) 800 MG tablet Take 1,600 mg by mouth 3 times daily (with meals)       [DISCONTINUED] traZODone (DESYREL) 150 MG tablet Take 150 mg by mouth At Bedtime       No facility-administered encounter medications on file as of 7/27/2021.     Post Discharge Medication Reconciliation Status: discharge medications reconciled, continue medications without change.       Objective / Physical Examination:  Vitals:    07/27/21 1157   BP: 96/56   BP Location: Right arm   Patient Position: Sitting   Cuff Size: Adult Regular   Pulse: 78   Temp: 97.8  F (36.6  C)   Weight: 69 kg (152 lb 1.9 oz)   Height: 1.626 m (5' 4\")     Wt Readings from Last 3 Encounters:   07/27/21 69 kg (152 lb 1.9 oz)   07/15/21 67.5 kg (148 lb 14.4 oz)   07/01/21 68.5 kg (151 lb)     Body mass index is 26.11 kg/m .     Constitutional: In " no apparent distress  Eyes: Non-icteric.   Respiratory: Clear to auscultation bilaterally  Cardiovascular: Regular rate and rhythm. No murmurs, rubs, or gallops. No pretibial edema, 2+ edema over the dorsum of the feet   Psych: Alert and oriented x3.

## 2021-07-30 PROBLEM — N18.5 CKD (CHRONIC KIDNEY DISEASE) STAGE 5, GFR LESS THAN 15 ML/MIN (H): Status: RESOLVED | Noted: 2021-01-01 | Resolved: 2021-01-01

## 2021-07-30 PROBLEM — I89.0 CHRONIC ACQUIRED LYMPHEDEMA: Status: ACTIVE | Noted: 2021-02-28

## 2021-07-30 PROBLEM — N18.30 CHRONIC KIDNEY DISEASE, STAGE 3 (H): Status: RESOLVED | Noted: 2021-01-01 | Resolved: 2021-01-01

## 2021-07-30 PROBLEM — R26.89 OTHER ABNORMALITIES OF GAIT AND MOBILITY: Status: ACTIVE | Noted: 2018-02-09

## 2021-07-30 PROBLEM — Z98.890 S/P AV (ATRIOVENTRICULAR) NODAL ABLATION: Status: ACTIVE | Noted: 2019-03-20

## 2021-07-30 PROBLEM — K59.00 CONSTIPATION, UNSPECIFIED: Status: ACTIVE | Noted: 2018-02-09

## 2021-07-30 PROBLEM — Z99.81 DEPENDENCE ON SUPPLEMENTAL OXYGEN: Status: ACTIVE | Noted: 2020-07-20

## 2021-07-30 PROBLEM — K59.00 CONSTIPATION, UNSPECIFIED: Status: RESOLVED | Noted: 2018-02-09 | Resolved: 2021-01-01

## 2021-07-30 PROBLEM — J96.11 CHRONIC RESPIRATORY FAILURE WITH HYPOXIA (H): Status: ACTIVE | Noted: 2021-01-01

## 2021-07-30 PROBLEM — N18.6 ESRD ON HEMODIALYSIS (H): Status: ACTIVE | Noted: 2018-08-15

## 2021-07-30 PROBLEM — R06.02 SHORTNESS OF BREATH: Status: RESOLVED | Noted: 2021-01-01 | Resolved: 2021-01-01

## 2021-07-30 PROBLEM — E55.9 VITAMIN D DEFICIENCY, UNSPECIFIED: Status: ACTIVE | Noted: 2018-02-12

## 2021-07-30 PROBLEM — Z95.0 CARDIAC PACEMAKER IN SITU: Status: ACTIVE | Noted: 2019-03-08

## 2021-07-30 PROBLEM — F33.0 MILD EPISODE OF RECURRENT MAJOR DEPRESSIVE DISORDER (H): Status: ACTIVE | Noted: 2020-09-15

## 2021-07-30 PROBLEM — Z99.2 ESRD ON HEMODIALYSIS (H): Status: ACTIVE | Noted: 2018-08-15

## 2021-07-30 PROBLEM — Z95.818 PRESENCE OF WATCHMAN LEFT ATRIAL APPENDAGE CLOSURE DEVICE: Status: ACTIVE | Noted: 2018-04-05

## 2021-07-30 PROBLEM — G62.9 NEUROPATHY: Status: ACTIVE | Noted: 2021-01-01

## 2021-07-30 PROBLEM — M10.9 GOUT: Status: ACTIVE | Noted: 2018-02-09

## 2021-07-30 NOTE — ASSESSMENT & PLAN NOTE
Gabapentin dose limited by renal function. Continue oxycodone to take sparingly at night for neuropathy pain in feet

## 2021-07-31 NOTE — ED NOTES
Pt presents NAD. SKIN: NWD.   HEENT: normocephalic, PERRL, clear x 3.   CHEST: symmetrical rise, CEBBS, no CP or SOB.  ABD: NTND, no N&V or diarrhea.  EXT: MARTIN x 4, left hand pain radiating up to left shoulder, also left hand cramping.  Rest of exam unremarkable.

## 2021-07-31 NOTE — ED PROVIDER NOTES
EMERGENCY DEPARTMENT ENCOUNTER      NAME: Belia Rodriguez  AGE: 75 year old female  YOB: 1945  MRN: [id number]  EVALUATION DATE & TIME: 2021  2:55 PM    PCP: Neil Galan    ED PROVIDER: Ruddy Serna M.D.      Chief Complaint   Patient presents with     Arm Pain       FINAL IMPRESSION:  1. Pain of left forearm    2. Carpopedal spasm        ED COURSE & MEDICAL DECISION MAKIN year old female presents to the Emergency Department for evaluation of left forearm pain.  She has a history of chronic hypoxic respiratory failure, was hospitalized for pulmonary edema and CHF a couple of weeks ago.  Presents with isolated pain and spasm in her left hand and forearm.  She is not having any chest discomfort or new respiratory symptoms.  Her symptoms seem to be improving largely spontaneously by the time she arrives here by EMS.  I reviewed her EKG which shows paced rhythm without any ischemic changes.  Troponin is within normal limits.  Without any chest discomfort and has isolated spasm of pain in her left hand I really do not think this is likely at all to represent a primary cardiac event.  She is quite stable from a cardiopulmonary standpoint at this time.  Other basic labs including calcium, magnesium, and the remainder of her metabolic panel are stable and reassuring with nothing to account for carpopedal spasm.  She does have chronic renal failure and chronic anemia which are at her baseline.  She does not have any discrete areas of bony tenderness to raise concern for fracture and there is no trauma history.  I do not think imaging would be helpful.  She does not have anything for erythema warmth or cellulitic change.  There is no asymmetric swelling to raise concern for upper extremity DVT.  Patient received some Tylenol and had continued further improvement in her pain.  She was relieved by her negative lab evaluation here and I think is safe for discharge home at this time.   Recommended to avoid strenuous activity and follow-up with primary care by next week to review any ongoing symptoms.    3:06 PM I met with the patient, obtained history, performed an initial exam, and discussed options and plan for diagnostics and treatment here in the ED.  4:29 PM We discussed the plan for discharge and the patient is agreeable. Reviewed supportive cares, symptomatic treatment, outpatient follow up, and reasons to return to the Emergency Department. Patient to be discharged by ED RN.     At the conclusion of the encounter I discussed the results of all of the tests and the disposition. The questions were answered. The patient or family acknowledged understanding and was agreeable with the care plan.       MEDICATIONS GIVEN IN THE EMERGENCY:  Medications   acetaminophen (TYLENOL) tablet 650 mg (650 mg Oral Given 7/31/21 1609)       NEW PRESCRIPTIONS STARTED AT TODAY'S ER VISIT  Discharge Medication List as of 7/31/2021  4:34 PM             =================================================================    HPI    Patient information was obtained from: Patient     Use of : N/A       Belia Rodriguez is a 75 year old female with a pertinent history of chronic acquired lymphedema, CKD, COPD, GERD, ESRD, hyperlipidemia, who presents to this ED via EMS for evaluation of arm pain.     Patient reports left hand cramping that started around 1:45 PM, about an hour and a half ago, when she was watching TV. Patient reports that cramping worsened and she started experienced shooting pain that traveled up to her left elbow, shoulder, then left side of neck. Patient currently describes pain as a 6/10 and reports that worst pain is in her left index and third finger. Patient has not taken pain medication for relief. Patient denies injury or falls.     Patient denies fever, chills, and vomiting. Patient does not take blood thinners. Patient reports she is on 3L of oxygen every day.    No other  complaints at this time.       REVIEW OF SYSTEMS   All systems reviewed and negative except as noted in HPI.    PAST MEDICAL HISTORY:  Past Medical History:   Diagnosis Date     Acute on chronic diastolic congestive heart failure (H) 01/23/2019     Acute on chronic respiratory failure with hypoxia and hypercapnia (H)      Acute respiratory failure with hypoxia (H) 06/30/2014     Arthritis      Cardiac device in situ 04/05/2018    LAAO April 5, 2018 (30 mm WATCHMAN)     Chronic anemia 06/01/2014     Chronic kidney disease      Chronic respiratory failure with hypoxia (H)     3L nc     Chronic thoracic aortic dissection (H) 10/07/2015    descending thoracic aorta; treated medically per notes of Dragan Singh and Jennifer.     COPD (chronic obstructive pulmonary disease) (H)      COPD exacerbation (H) 01/04/2019     Disease of thyroid gland      Dissection of thoracoabdominal aorta (H)      DVT (deep venous thrombosis) (H) 08/22/2019     Dyslipidemia      ESRD (end stage renal disease) (H) 06/03/2009    on dialysis with Dr. Mitchell     Essential hypertension 06/30/2014     Gastrointestinal hemorrhage, unspecified gastrointestinal hemorrhage type 06/05/2017     GI bleeding 06/05/2017     Gout      Heart failure (H)      Hip fracture, intertrochanteric (H) 06/23/2019     History of compression fracture of spine 06/16/2017     L3 vertebral fracture (H) 11/16/2015     ZULEIKA (obstructive sleep apnea) 10/22/2015     Pneumonia 09/07/2015     Right foot drop      Spinal stenosis 03/28/2016     Stroke (H) 03/24/2016       PAST SURGICAL HISTORY:  Past Surgical History:   Procedure Laterality Date     BACK SURGERY      [facility]     C OPEN FIX INTER/SUBTROCH FX,IMPLNT Left 6/23/2019    Procedure: INTERNAL FIXATION, FRACTURE, TROCHANTERIC, HIP, USING INTERMEDULLARY NAIL;  Surgeon: Bennie Marsh DO;  Location: [facility] OR;  Service: Orthopedics     COLONOSCOPY N/A 3/23/2016    Procedure: COLONOSCOPY;  Surgeon:  Ruddy Tejada MD;  Location: [facility];  Service:      DILATION AND CURETTAGE       EP ABLATION AV NODE N/A 3/7/2019    Procedure: EP Ablation AV Node;  Surgeon: Derick Duarte MD;  Location: [facility];  Service: Cardiology     EP NEGRA CLOSURE N/A 4/5/2018    Procedure: EP NEGRA Closure;  Surgeon: Derick Duarte MD;  Location: [facility];  Service:      EP PACEMAKER INSERT N/A 3/7/2019    Procedure: EP Pacemaker Insertion;  Surgeon: Derick Duarte MD;  Location: [facility];  Service: Cardiology     EYE SURGERY       HERNIA REPAIR       IR ABDOMINAL AORTOGRAM  3/20/2016     IR CVC TUNNEL PLACEMENT > 5 YRS OF AGE  6/12/2014     IR CVC TUNNEL PLACEMENT > 5 YRS OF AGE  9/10/2018     IR CVC TUNNEL PLACEMENT > 5 YRS OF AGE  11/20/2018     IR CVC TUNNEL REVISION RIGHT  10/23/2020     IR MISCELLANEOUS PROCEDURE  3/25/2009     IR MISCELLANEOUS PROCEDURE  3/30/2009     IR MISCELLANEOUS PROCEDURE  10/28/2011     IR TUNNELED CATHETER COMPLETE REPLACEMENT  7/28/2016     IR TUNNELED CATHETER COMPLETE REPLACEMENT  8/18/2016     IR TUNNELED CATHETER COMPLETE REPLACEMENT  2/16/2017     IR TUNNELED CATHETER COMPLETE REPLACEMENT  10/18/2018     IR TUNNELED CATHETER COMPLETE REPLACEMENT  10/23/2020     IR TUNNELED CATHETER INSERT  11/20/2018     IR TUNNELED CATHETER REMOVAL  6/9/2014     IR TUNNELED CATHETER REMOVAL  9/7/2018     IR TUNNELED CATHETER REMOVAL  11/20/2018     IR TUNNELED CATHETER REMOVAL  11/20/2018     AZ COLSC FLEXIBLE W/CONTROL BLEEDING ANY METHOD N/A 6/7/2017    Procedure: COLONOSCOPY;  Surgeon: Luis Mckeon MD;  Location: [facility];  Service: Gastroenterology     TONSILLECTOMY             CURRENT MEDICATIONS:    No current facility-administered medications for this encounter.     Current Outpatient Medications   Medication     acetaminophen (TYLENOL) 500 MG tablet     albuterol (PROAIR HFA/PROVENTIL HFA/VENTOLIN HFA) 108 (90 Base) MCG/ACT inhaler     albuterol  (PROVENTIL) (2.5 MG/3ML) 0.083% neb solution     aspirin 81 MG EC tablet     atorvastatin (LIPITOR) 10 MG tablet     B Complex-C-Folic Acid (DIALYVITE) TABS     budesonide-formoterol (SYMBICORT) 80-4.5 MCG/ACT Inhaler     buPROPion (WELLBUTRIN XL) 150 MG 24 hr tablet     cinacalcet (SENSIPAR) 30 MG tablet     diphenhydrAMINE (BENADRYL) 50 MG tablet     famotidine (PEPCID) 20 MG tablet     FOLIC ACID PO     gabapentin (NEURONTIN) 100 MG capsule     Lactobacillus (CULTURELLE DIGESTIVE WOMENS PO)     latanoprost (XALATAN) 0.005 % ophthalmic solution     loratadine (CLARITIN) 10 MG capsule     melatonin 3 MG tablet     metoprolol succinate ER (TOPROL-XL) 25 MG 24 hr tablet     midodrine (PROAMATINE) 10 MG tablet     midodrine (PROAMATINE) 10 MG tablet     Multiple Vitamins-Iron (DAILY-OLGA/IRON PO)     nystatin (MYCOSTATIN) 483915 UNIT/GM external powder     ondansetron (ZOFRAN) 4 MG tablet     oxyCODONE (ROXICODONE) 5 MG tablet     polyvinyl alcohol (LIQUIFILM TEARS) 1.4 % ophthalmic solution     senna-docusate (SENOKOT-S/PERICOLACE) 8.6-50 MG tablet     sevelamer carbonate (RENVELA) 800 MG tablet     sevelamer carbonate (RENVELA) 800 MG tablet     sodium-potassium bicarbonate (EDI-SELTZER GOLD) TBEF solu-tab     timolol maleate (TIMOPTIC) 0.5 % ophthalmic solution     traZODone (DESYREL) 150 MG tablet     Vitamin D, Cholecalciferol, 25 MCG (1000 UT) TABS         ALLERGIES:  Allergies   Allergen Reactions     Ace Inhibitors Cough     Other reaction(s): *Unknown     Fosinopril Cough     Ipratropium Headache     Zolpidem Other (See Comments)     Hallucinations  Other reaction(s): Hallucinations       FAMILY HISTORY:  Family History   Problem Relation Age of Onset     Dementia Mother      Diabetes Mother      Arthritis Mother      Cancer Mother      Depression Mother      Heart Disease Mother      Vision Loss Mother      Cerebrovascular Disease Father      Heart Disease Father      Breast Cancer No family hx of         SOCIAL HISTORY:   Social History     Socioeconomic History     Marital status:      Spouse name: Not on file     Number of children: 2     Years of education: Not on file     Highest education level: Not on file   Occupational History     Not on file   Tobacco Use     Smoking status: Former Smoker     Packs/day: 1.50     Years: 37.00     Pack years: 55.50     Types: Cigarettes     Quit date: 2009     Years since quittin.5     Smokeless tobacco: Never Used   Substance and Sexual Activity     Alcohol use: Yes     Alcohol/week: 11.7 standard drinks     Comment: Alcoholic Drinks/day: 14 mixed drinks per week     Drug use: No     Sexual activity: Never     Partners: Male   Other Topics Concern     Not on file   Social History Narrative    Lives with her . Daughter in [place] and daughter in [place].     Social Determinants of Health     Financial Resource Strain:      Difficulty of Paying Living Expenses:    Food Insecurity:      Worried About Running Out of Food in the Last Year:      Ran Out of Food in the Last Year:    Transportation Needs:      Lack of Transportation (Medical):      Lack of Transportation (Non-Medical):    Physical Activity:      Days of Exercise per Week:      Minutes of Exercise per Session:    Stress:      Feeling of Stress :    Social Connections:      Frequency of Communication with Friends and Family:      Frequency of Social Gatherings with Friends and Family:      Attends [employer] Services:      Active Member of Clubs or Organizations:      Attends Club or Organization Meetings:      Marital Status:    Intimate Partner Violence:      Fear of Current or Ex-Partner:      Emotionally Abused:      Physically Abused:      Sexually Abused:        VITALS:  BP 98/56   Pulse 71   Temp 98.5  F (36.9  C) (Oral)   Resp 18   SpO2 94%     PHYSICAL EXAM    Constitutional: Well developed, Well nourished, NAD.  HENT: Normocephalic, Atraumatic. Neck Supple.  Eyes: EOMI,  Conjunctiva normal.  Respiratory: Breathing comfortably on room air. Speaks full sentences easily. Lungs clear to ascultation.  Cardiovascular: Normal heart rate, Regular rhythm. No peripheral edema.  Bilateral upper extremity radial pulses are palpable and brisk.  Abdomen: Soft, nontender  Musculoskeletal: Intact range of motion at the left wrist and elbow.  No areas of discrete bony tenderness throughout the entire forearm, wrist, hand, and digits.   strength is normal and intact.  Integument: Warm, Dry.  Neurologic: Alert & awake, Normal motor function, Normal sensory function, No focal deficits noted.   Psychiatric: Cooperative. Affect appropriate.     LAB:  All pertinent labs reviewed and interpreted.  Labs Ordered and Resulted from Time of ED Arrival Up to the Time of Departure from the ED   BASIC METABOLIC PANEL - Abnormal; Notable for the following components:       Result Value    Creatinine 4.40 (*)     GFR Estimate 9 (*)     All other components within normal limits   CBC WITH PLATELETS AND DIFFERENTIAL - Abnormal; Notable for the following components:    RBC Count 2.96 (*)     Hemoglobin 9.8 (*)     Hematocrit 32.1 (*)      (*)     MCH 33.1 (*)     MCHC 30.5 (*)     RDW 15.1 (*)     Platelet Count 115 (*)     Absolute Lymphocytes 0.6 (*)     Absolute Immature Granulocytes 0.1 (*)     All other components within normal limits   TROPONIN I - Normal   MAGNESIUM - Normal   CBC WITH PLATELETS & DIFFERENTIAL    Narrative:     The following orders were created for panel order CBC with platelets + differential.  Procedure                               Abnormality         Status                     ---------                               -----------         ------                     CBC with platelets and d...[[id number]]  Abnormal            Final result                 Please view results for these tests on the individual orders.       EKG:    Performed at: [facility]    31-July-2021, 15:52:13    Impression: Paced rhythm.     Rate: 70 BPM  Rhythm: Paced rhythm  Axis: N/A - 105 - 91  MT Interval: N/A  QRS Interval: 130 ms  QTc Interval: 428/462 ms  ST Changes: None  Comparison: Compared to ECG from 10:July-21, 01:41:18. No changes.     I have independently reviewed and interpreted the EKG(s) documented above.          I, Bettina Mccrary, am serving as a scribe to document services personally performed by Dr. Ruddy Serna, based on my observation and the provider's statements to me. I, Ruddy Serna MD attest that Bettina Mccrary is acting in a scribe capacity, has observed my performance of the services and has documented them in accordance with my direction.    Ruddy Serna M.D.  Emergency Medicine  [facility] EMERGENCY DEPARTMENT  [address] [place] [id number]  [phone]  Dept: [phone]     Ruddy Serna MD  07/31/21 5650

## 2021-07-31 NOTE — DISCHARGE INSTRUCTIONS
You were seen today in the [facility] emergency department for left hand and lower arm pain.  Based on your EKG and blood testing today we do not get the sense that this is related to a heart attack.  We also did not see any signs of major electrolyte or metabolic problems.  We are glad that your pain seems to be improving.  We would recommend that you avoid strenuous activity while you are having any pain in that area at all.  You should continue to take Tylenol 500 or 650 mg every 6 hours for pain.  If you have lingering concerns after this ER visit please try to follow-up with your primary doctor by the middle of next week.

## 2021-07-31 NOTE — ED TRIAGE NOTES
Pt reports fingers and hands felt cold put gloves on which normally helps but hand cont to cramp and pain shot up right arm to elbow and then shoulder. Denies and Chest pain,sob,n/v or injury

## 2021-08-03 PROBLEM — J81.0 ACUTE PULMONARY EDEMA (H): Status: RESOLVED | Noted: 2018-09-07 | Resolved: 2021-01-01

## 2021-08-03 PROBLEM — S72.142A CLOSED INTERTROCHANTERIC FRACTURE OF HIP, LEFT, INITIAL ENCOUNTER (H): Status: RESOLVED | Noted: 2019-06-22 | Resolved: 2019-09-02

## 2021-08-03 PROBLEM — D61.818 OTHER PANCYTOPENIA (H): Status: RESOLVED | Noted: 2021-01-01 | Resolved: 2021-01-01

## 2021-08-03 PROBLEM — S72.142A INTERTROCHANTERIC FRACTURE OF LEFT HIP, CLOSED, INITIAL ENCOUNTER (H): Status: RESOLVED | Noted: 2019-06-23 | Resolved: 2019-09-02

## 2021-08-03 PROBLEM — I82.409 DVT (DEEP VENOUS THROMBOSIS) (H): Status: RESOLVED | Noted: 2019-08-22 | Resolved: 2021-01-01

## 2021-08-03 PROBLEM — I48.91 ATRIAL FIBRILLATION WITH RVR (H): Status: RESOLVED | Noted: 2019-03-03 | Resolved: 2019-03-26

## 2021-08-03 PROBLEM — S72.143A HIP FRACTURE, INTERTROCHANTERIC (H): Status: RESOLVED | Noted: 2019-06-23 | Resolved: 2021-01-01

## 2021-08-16 PROBLEM — J96.02 ACUTE RESPIRATORY FAILURE WITH HYPOXIA AND HYPERCARBIA (H): Status: ACTIVE | Noted: 2021-01-01

## 2021-08-16 PROBLEM — J96.22 ACUTE ON CHRONIC RESPIRATORY FAILURE WITH HYPERCAPNIA (H): Status: ACTIVE | Noted: 2021-01-01

## 2021-08-16 PROBLEM — N18.6 ESRD (END STAGE RENAL DISEASE) ON DIALYSIS (H): Status: ACTIVE | Noted: 2021-01-01

## 2021-08-16 PROBLEM — Z99.2 ESRD (END STAGE RENAL DISEASE) ON DIALYSIS (H): Status: ACTIVE | Noted: 2021-01-01

## 2021-08-16 PROBLEM — J96.01 ACUTE RESPIRATORY FAILURE WITH HYPOXIA AND HYPERCARBIA (H): Status: ACTIVE | Noted: 2021-01-01

## 2021-08-16 NOTE — PROGRESS NOTES
Duoneb give via mouth-piece. BS coarse diminished throughout before and after.No change with treatment. Patient tolerated treatment well with no adverse reaction.

## 2021-08-16 NOTE — ED NOTES
Bed: [facility]NEDH-02  Expected date: 8/16/21  Expected time:   Means of arrival: Ambulance  Comments:  Ena ORELLANA

## 2021-08-16 NOTE — PHARMACY-ADMISSION MEDICATION HISTORY
Pharmacy Note - Admission Medication History    Pertinent Provider Information:      ______________________________________________________________________    Prior To Admission (PTA) med list completed and updated in EMR.       PTA Med List   Medication Sig Last Dose     acetaminophen (TYLENOL) 500 MG tablet Take 1,000 mg by mouth every 6 hours as needed for mild pain Past Week at Unknown time     albuterol (PROAIR HFA/PROVENTIL HFA/VENTOLIN HFA) 108 (90 Base) MCG/ACT inhaler Inhale 2 puffs into the lungs every 6 hours as needed for shortness of breath / dyspnea or wheezing Past Week at Unknown time     albuterol (PROVENTIL) (2.5 MG/3ML) 0.083% neb solution Take 1 vial (2.5 mg) by nebulization 4 times daily On non-dialysis days. 8/15/2021 at Unknown time     aspirin 81 MG EC tablet Take 81 mg by mouth daily (with lunch)  8/15/2021 at Unknown time     atorvastatin (LIPITOR) 10 MG tablet Take 10 mg by mouth At Bedtime 8/15/2021 at Unknown time     B Complex-C-Folic Acid (DIALYVITE) TABS Take 1 tablet by mouth daily 8/15/2021 at Unknown time     budesonide-formoterol (SYMBICORT) 80-4.5 MCG/ACT Inhaler Inhale 2 puffs into the lungs 2 times daily 8/15/2021 at hs     buPROPion (WELLBUTRIN XL) 150 MG 24 hr tablet Take 150 mg by mouth twice a week Tue & Sat Past Week at Unknown time     cinacalcet (SENSIPAR) 30 MG tablet Take 30 mg by mouth Every Mon, Wed, Fri Morning . At dialysis. Past Week at Unknown time     diphenhydrAMINE (BENADRYL) 50 MG tablet Take 50 mg by mouth as needed Past Month at Unknown time     famotidine (PEPCID) 20 MG tablet Take 20 mg by mouth At Bedtime 8/15/2021 at Unknown time     FOLIC ACID PO Take 1 mg by mouth daily 8/15/2021 at Unknown time     gabapentin (NEURONTIN) 100 MG capsule Take 100 mg by mouth 3 times daily 8/15/2021 at Unknown time     Lactobacillus (CULTURELLE DIGESTIVE WOMENS PO) Take 1 tablet by mouth daily 8/15/2021 at Unknown time     loratadine (CLARITIN) 10 MG capsule Take 10 mg  by mouth as needed Past Month at Unknown time     melatonin 3 MG tablet Take 3 mg by mouth At Bedtime 8/15/2021 at Unknown time     midodrine (PROAMATINE) 10 MG tablet Take 10 mg by mouth every 6 hours as needed (for SBP < 110)  Past Month at Unknown time     midodrine (PROAMATINE) 10 MG tablet Take 15 mg by mouth three times a week Take once daily prior to dialysis on dialysis days Mon-Wed-Fri 8/16/2021 at Unknown time     nystatin (MYCOSTATIN) 465780 UNIT/GM external powder Apply topically 2 times daily Past Week at Unknown time     ondansetron (ZOFRAN) 4 MG tablet Take 4 mg by mouth every 8 hours as needed for nausea Past Month at Unknown time     oxyCODONE (ROXICODONE) 5 MG tablet Take 1 tablet (5 mg) by mouth every 6 hours as needed for pain Past Week at Unknown time     polyvinyl alcohol (LIQUIFILM TEARS) 1.4 % ophthalmic solution Place 1 drop into both eyes as needed for dry eyes 8/15/2021 at Unknown time     senna-docusate (SENOKOT-S/PERICOLACE) 8.6-50 MG tablet Take 1 tablet by mouth every other day  8/15/2021 at Unknown time     sevelamer carbonate (RENVELA) 800 MG tablet Take 2,400 mg by mouth 3 times daily 8/15/2021 at Unknown time     sevelamer carbonate (RENVELA) 800 MG tablet Take 800 mg by mouth Take with snacks or supplements  8/15/2021 at Unknown time     sodium-potassium bicarbonate (EDI-SELTZER GOLD) TBEF solu-tab Take 1 tablet by mouth daily as needed for heartburn Past Week at Unknown time     timolol maleate (TIMOPTIC) 0.5 % ophthalmic solution Place 1 drop into both eyes 2 times daily 8/15/2021 at Unknown time     traZODone (DESYREL) 150 MG tablet Take 1 tablet (150 mg) by mouth At Bedtime 8/15/2021 at Unknown time     Vitamin D, Cholecalciferol, 25 MCG (1000 UT) TABS Take 2,000 Units by mouth daily (with lunch) 8/15/2021 at Unknown time       Information source(s): Patient, Surescripts and a lsit that the patient provided  Method of interview communication: in-person    Summary of Changes  to PTA Med List  New: Symbicort  Discontinued: metoprolol  Changed:     Patient was asked about OTC/herbal products specifically.  PTA med list reflects this.    In the past week, patient estimated taking medication this percent of the time:  greater than 90%.    Allergies were reviewed, assessed, and updated with the patient.      Patient did not bring any medications to the hospital and can't retrieve from home. No multi-dose medications are available for use during hospital stay.     The information provided in this note is only as accurate as the sources available at the time of the update(s).    Thank you for the opportunity to participate in the care of this patient.    Nadine Rodriges RPH  8/16/2021 5:01 PM

## 2021-08-16 NOTE — ED PROVIDER NOTES
ED SIGNOUT  Date/Time:8/16/2021 3:52 PM    ED Course/MDM:    3:30 PM Signout accepted from Dr. Alex.  Prior records were reviewed.  Labs, x-ray, CT, EKG from this visit are reviewed.  Patient presents with shortness of breath for the last 3 days, cough productive of white sputum.  She did not do her full dialysis run today.  X-ray and BNP pending.  4:10 PM BNP demonstrates mild elevation, chest x-ray with mild pulmonary edema which likely represents fluid overload related to incomplete dialysis run today.  She is hypercarbic on her initial venous blood gas.  4:27 PM patient rechecked, she has just received an additional nebulizer treatment.  On my exam, mild tachypnea and increased work of breathing with crackles on lung exam.  Concern for continued fluid overload given findings on x-ray and incomplete dialysis run.  Will discuss with nephrology for possible partial run today or tomorrow.  5:22 PM patient noted to be hypoxic in the 80s with good waveform.  Considered starting BiPAP but nurse informed me that patient was hooked up to portable oxygen tank that was empty and she is normally on 3 L of oxygen.  Repeat venous gases ordered, patient started on oxygen again.  Waiting for callback from nephrology.  5:26 PM Care discussed with Dr. Sandra, [facility].  Recommends dialysis tomorrow given hypotension during dialysis today as long as patient remains stable.  Will plan to admit.  6:31 PM hypercarbia improved.  No beds available in the [facility] and will plan to admit.  7:24 PM I discussed the case with hospitalist, Dr. Webb, who accepts the patient for admission.  7:53 PM I talked with Dr. Sandra of nephrology regarding the patient.      At the conclusion of the encounter I discussed  the results of all of the tests and the disposition with patient.   All questions were answered.  The patient acknowledged understanding and was involved in the decision making regarding the overall care plan.     I discussed  with patient the utility, limitations and findings of the exam/interventions/studies done during this visit as well as the list of differential diagnosis and symptoms to monitor/return to ER for.  Additional verbal discharge instructions were provided.     DIAGNOSIS  1. COPD exacerbation (H)    2. ESRD (end stage renal disease) on dialysis (H)    3. Acute respiratory failure with hypoxia and hypercarbia (H)        New Prescriptions    No medications on file       HPI    Briefly, this is a 76 year old female with a pertinent medical history of s/p pacemaker, hyperlipidemia, DVT, end stage renal disease (on dialysis), chronic 3L O2 who presents for evaluation of shortness of breath. Patient reports gradual onset of shortness of breath since 8/13 (~3 days ago). She states it has increasingly gotten worse to do daily tasks. She has developed a productive cough with white sputum. She has associated nausea without emesis. Patient has had to increase her O2 to 4L at night, so she could sleep. She also endorses an episode of lightheadedness when she attempted to go to the bathroom.     Patient unable to complete dialysis today because her blood pressure dropped too low.     Physical Exam:  /55   Pulse 69   Temp 98.3  F (36.8  C) (Oral)   Resp 26   Wt 68.9 kg (152 lb)   SpO2 95%   BMI 26.09 kg/m    Physical Exam  Vitals and nursing note reviewed.   Constitutional:       Appearance: Normal appearance.   HENT:      Head: Normocephalic and atraumatic.      Right Ear: External ear normal.      Left Ear: External ear normal.      Nose: Nose normal.   Eyes:      Extraocular Movements: Extraocular movements intact.      Conjunctiva/sclera: Conjunctivae normal.   Pulmonary:      Effort: Pulmonary effort is normal.   Musculoskeletal:         General: Normal range of motion.      Cervical back: Normal range of motion.      Right lower leg: No edema.      Left lower leg: No edema.   Skin:     General: Skin is warm and dry.    Neurological:      General: No focal deficit present.      Mental Status: She is alert and oriented to person, place, and time. Mental status is at baseline.   Psychiatric:         Mood and Affect: Mood normal.         Behavior: Behavior normal.         Thought Content: Thought content normal.          Results for orders placed or performed during the hospital encounter of 08/16/21   XR Chest Port 1 View    Impression    IMPRESSION: Pacemaker lead over the right ventricle. Central venous catheter tip in the SVC. Left atrial appendage occlusive device unchanged.    Mild interstitial prominence in both lungs is stable could represent some mild edema.    Similar-appearing paraspinal thickening on the left side corresponds to the chronic appearing low attenuating lobular region on CT guidance not been changing. Possibly lymphangioma.   Basic metabolic panel   Result Value Ref Range    Sodium 141 136 - 145 mmol/L    Potassium 3.6 3.5 - 5.0 mmol/L    Chloride 102 98 - 107 mmol/L    Carbon Dioxide (CO2) 29 22 - 31 mmol/L    Anion Gap 10 5 - 18 mmol/L    Urea Nitrogen 8 8 - 28 mg/dL    Creatinine 2.12 (H) 0.60 - 1.10 mg/dL    Calcium 8.6 8.5 - 10.5 mg/dL    Glucose 89 70 - 125 mg/dL    GFR Estimate 22 (L) >60 mL/min/1.73m2   Result Value Ref Range    Troponin I 0.03 0.00 - 0.29 ng/mL   B-Type Natriuretic Peptide ([facility] Only)   Result Value Ref Range     (H) 0 - 133 pg/mL   Blood gas venous   Result Value Ref Range    pH Venous 7.25 (L) 7.35 - 7.45    pCO2 Venous 75 (H) 35 - 50 mm Hg    pO2 Venous 26 25 - 47 mm Hg    Bicarbonate Venous 27 24 - 30 mmol/L    Base Excess/Deficit (+/-) 5.6   mmol/L    Oxyhemoglobin Venous 43.1 (L) 70.0 - 75.0 %    O2 Sat, Venous 44.1 (L) 70.0 - 75.0 %   CBC with platelets and differential   Result Value Ref Range    WBC Count 5.4 4.0 - 11.0 10e3/uL    RBC Count 3.25 (L) 3.80 - 5.20 10e6/uL    Hemoglobin 10.6 (L) 11.7 - 15.7 g/dL    Hematocrit 35.6 35.0 - 47.0 %     (H) 78 - 100  fL    MCH 32.6 26.5 - 33.0 pg    MCHC 29.8 (L) 31.5 - 36.5 g/dL    RDW 16.5 (H) 10.0 - 15.0 %    Platelet Count 167 150 - 450 10e3/uL    % Neutrophils 74 %    % Lymphocytes 9 %    % Monocytes 12 %    % Eosinophils 3 %    % Basophils 1 %    % Immature Granulocytes 1 %    NRBCs per 100 WBC 0 <1 /100    Absolute Neutrophils 4.0 1.6 - 8.3 10e3/uL    Absolute Lymphocytes 0.5 (L) 0.8 - 5.3 10e3/uL    Absolute Monocytes 0.7 0.0 - 1.3 10e3/uL    Absolute Eosinophils 0.2 0.0 - 0.7 10e3/uL    Absolute Basophils 0.0 0.0 - 0.2 10e3/uL    Absolute Immature Granulocytes 0.1 (H) <=0.0 10e3/uL    Absolute NRBCs 0.0 10e3/uL   Blood gas venous   Result Value Ref Range    pH Venous 7.29 (L) 7.35 - 7.45    pCO2 Venous 61 (H) 35 - 50 mm Hg    pO2 Venous 36 25 - 47 mm Hg    Bicarbonate Venous 26 24 - 30 mmol/L    Base Excess/Deficit (+/-) 2.9   mmol/L    Oxyhemoglobin Venous 68.5 (L) 70.0 - 75.0 %    O2 Sat, Venous 70.0 70.0 - 75.0 %       XR Chest Port 1 View    Result Date: 8/16/2021  EXAM: XR CHEST PORT 1 VIEW LOCATION: [facility] DATE/TIME: 8/16/2021 3:15 PM INDICATION: sob COMPARISON: 07/09/2021     IMPRESSION: Pacemaker lead over the right ventricle. Central venous catheter tip in the SVC. Left atrial appendage occlusive device unchanged. Mild interstitial prominence in both lungs is stable could represent some mild edema. Similar-appearing paraspinal thickening on the left side corresponds to the chronic appearing low attenuating lobular region on CT guidance not been changing. Possibly lymphangioma.    Cardiac Device Check - Remote    Result Date: 8/11/2021  Type: routine remote pacemaker transmission. Presenting rhythm: ventricular pacing 75 bpm. Battery/lead status: stable. Arrhythmias: since 4/29/21; no ventricular arrhythmias detected. Anticoagulant: LAAO. Comments: Normal magnet and pacemaker function. Chelsea Jiang I have reviewed and interpreted the device interrogation, settings, programming, and  encounter summary. The device is functioning within normal device parameters. I agree with the current findings, assessment and plan.      John Montenegro M.D.  [facility]

## 2021-08-16 NOTE — ED PROVIDER NOTES
EMERGENCY DEPARTMENT ENCOUNTER      NAME: Belia Rodriguez  AGE: 76 year old female  YOB: 1945  MRN: [id number]  EVALUATION DATE & TIME: 8/16/2021  1:34 PM    PCP: Neil Galan    ED PROVIDER: Felipe Alex M.D.      Chief Complaint   Patient presents with     Shortness of Breath     Cough         FINAL IMPRESSION:  1. COPD exacerbation (H)          ED COURSE & MEDICAL DECISION MAKING:    Pertinent Labs & Imaging studies reviewed. (See chart for details)    76 year old female presents to the Emergency Department for evaluation of shortness of breath. Patient appears non toxic with stable vitals signs, patient is afebrile with no tachycardia, she is on supplemental oxygen with increased respiratory rate but no distress.  Distant breath sounds without significant crackles or wheezing.  Patient denies any chest pain, pleurisy, no ripping or tearing chest discomfort of the back or shoulders.  She does endorse productive cough.  Concern for CHF, COPD exacerbation, her story is atypical for ACS, no pleurisy or hemoptysis, no chest pain to suggest PE, dissection or esophageal rupture.  We will obtain screening labs including blood gas, troponin, ECG and a chest x-ray.    Reassessment: Troponin negative, ECG nonischemic, did note elevated serum creatinine which is appear to be around the patient's baseline.  Blood gas showed pH of 7.25.  Patient was treated with steroids and DuoNeb.  At time of this dictation plain films pending but I anticipate admission.  Final disposition will be dependent upon the pending studies and the patient's clinical trajectory.  Patient signed out to the oncoming afternoon physician.    1:40 PM I met with the patient, obtained history, performed an initial exam, and discussed options and plan for diagnostics and treatment here in the ED. PPE worn including N95 mask, surgical gloves, eye protection.      At the conclusion of the encounter I discussed the results of all of  "the tests and the disposition. The questions were answered and return precautions provided. The patient or family acknowledged understanding and was agreeable with the care plan.         MEDICATIONS GIVEN IN THE EMERGENCY:  Medications   albuterol (PROVENTIL) neb solution 2.5 mg (has no administration in time range)   dexamethasone PF (DECADRON) injection 10 mg (has no administration in time range)   acetaminophen (TYLENOL) tablet 650 mg (has no administration in time range)       NEW PRESCRIPTIONS STARTED AT TODAY'S ER VISIT  New Prescriptions    No medications on file            =================================================================    HPI    Patient information was obtained from: patient    Use of Intrepreter: N/A        Belia Rodriguez is a 76 year old female with a pertinent medical history of pacemaker, hyperlipidemia, DVT, end stage renal disease, dialysis, chronic 3L O2 use who presents to this ED by EMS for the evaluation of shortness of breath. Patient reports feeling a gradual onset of shortness of breath since 08/13 (3 days ago). She states it got increasingly difficult to do everyday tasks like getting up from her wheel chair and even eating because they would provoke her shortness of breath. She also developed a productive cough with white sputum which she describes as \"gunky\". She has also felt nausea without vomiting. Patient states she needed to increase her 3L O2 intake to 4L at night so that she could sleep. Patient states she had an episode of lightheadedness when she was attempting to get to the bathroom.    She states she had dialysis today, but it could not be fully completed because her blood pressure dropped too low \"into the 80s\".    Patient denies chest pain, fever, vomiting, hematochezia, rash.       REVIEW OF SYSTEMS   Constitutional:  Denies fever, chills  Respiratory:  Positive for shortness of breath, productive cough with white suptum.  Cardiovascular:  Denies chest " pain, palpitations  GI: Positive for nausea. Denies abdominal pain, vomiting, hematochezia, or change in bowel or bladder habits   Musculoskeletal:  Denies any new muscle/joint swelling  Skin:  Denies rash   Neurologic: Positive for lightheadedness. Denies focal weakness  All systems negative except as marked.     PAST MEDICAL HISTORY:  Past Medical History:   Diagnosis Date     Acute on chronic diastolic congestive heart failure (H) 01/23/2019     Acute on chronic respiratory failure with hypoxia and hypercapnia (H)      Acute respiratory failure with hypoxia (H) 06/30/2014     Arthritis      Cardiac device in situ 04/05/2018    LAAO April 5, 2018 (30 mm WATCHMAN)     Chronic anemia 06/01/2014     Chronic kidney disease      Chronic respiratory failure with hypoxia (H)     3L nc     Chronic thoracic aortic dissection (H) 10/07/2015    descending thoracic aorta; treated medically per notes of Dragan Singh and Jennifer.     COPD (chronic obstructive pulmonary disease) (H)      COPD exacerbation (H) 01/04/2019     Disease of thyroid gland      Dissection of thoracoabdominal aorta (H)      DVT (deep venous thrombosis) (H) 08/22/2019     Dyslipidemia      ESRD (end stage renal disease) (H) 06/03/2009    on dialysis with Dr. Mitchell     Essential hypertension 06/30/2014     Gastrointestinal hemorrhage, unspecified gastrointestinal hemorrhage type 06/05/2017     GI bleeding 06/05/2017     Gout      Heart failure (H)      Hip fracture, intertrochanteric (H) 06/23/2019     History of compression fracture of spine 06/16/2017     L3 vertebral fracture (H) 11/16/2015     ZULEIKA (obstructive sleep apnea) 10/22/2015     Pneumonia 09/07/2015     Right foot drop      Spinal stenosis 03/28/2016     Stroke (H) 03/24/2016       PAST SURGICAL HISTORY:  Past Surgical History:   Procedure Laterality Date     BACK SURGERY      [facility]     C OPEN FIX INTER/SUBTROCH FX,IMPLNT Left 6/23/2019    Procedure: INTERNAL FIXATION, FRACTURE,  TROCHANTERIC, HIP, USING INTERMEDULLARY NAIL;  Surgeon: Bennie Marsh DO;  Location: [facility] Main OR;  Service: Orthopedics     COLONOSCOPY N/A 3/23/2016    Procedure: COLONOSCOPY;  Surgeon: Ruddy Tejada MD;  Location: [facility] GI;  Service:      DILATION AND CURETTAGE       EP ABLATION AV NODE N/A 3/7/2019    Procedure: EP Ablation AV Node;  Surgeon: Derick Duarte MD;  Location: [facility] Cath Lab;  Service: Cardiology     EP NEGRA CLOSURE N/A 4/5/2018    Procedure: EP NEGRA Closure;  Surgeon: Derick Duarte MD;  Location: [facility] Cath Lab;  Service:      EP PACEMAKER INSERT N/A 3/7/2019    Procedure: EP Pacemaker Insertion;  Surgeon: Derick Duarte MD;  Location: [facility] Cath Lab;  Service: Cardiology     EYE SURGERY       HERNIA REPAIR       IR ABDOMINAL AORTOGRAM  3/20/2016     IR CVC TUNNEL PLACEMENT > 5 YRS OF AGE  6/12/2014     IR CVC TUNNEL PLACEMENT > 5 YRS OF AGE  9/10/2018     IR CVC TUNNEL PLACEMENT > 5 YRS OF AGE  11/20/2018     IR CVC TUNNEL REVISION RIGHT  10/23/2020     IR MISCELLANEOUS PROCEDURE  3/25/2009     IR MISCELLANEOUS PROCEDURE  3/30/2009     IR MISCELLANEOUS PROCEDURE  10/28/2011     IR TUNNELED CATHETER COMPLETE REPLACEMENT  7/28/2016     IR TUNNELED CATHETER COMPLETE REPLACEMENT  8/18/2016     IR TUNNELED CATHETER COMPLETE REPLACEMENT  2/16/2017     IR TUNNELED CATHETER COMPLETE REPLACEMENT  10/18/2018     IR TUNNELED CATHETER COMPLETE REPLACEMENT  10/23/2020     IR TUNNELED CATHETER INSERT  11/20/2018     IR TUNNELED CATHETER REMOVAL  6/9/2014     IR TUNNELED CATHETER REMOVAL  9/7/2018     IR TUNNELED CATHETER REMOVAL  11/20/2018     IR TUNNELED CATHETER REMOVAL  11/20/2018     OR COLSC FLEXIBLE W/CONTROL BLEEDING ANY METHOD N/A 6/7/2017    Procedure: COLONOSCOPY;  Surgeon: Luis Mckeon MD;  Location: [facility];  Service: Gastroenterology     TONSILLECTOMY           CURRENT MEDICATIONS:    Prior to Admission medications    Medication Sig Start Date  End Date Taking? Authorizing Provider   acetaminophen (TYLENOL) 500 MG tablet Take 1,000 mg by mouth every 6 hours as needed for mild pain    Unknown, Entered By History   albuterol (PROAIR HFA/PROVENTIL HFA/VENTOLIN HFA) 108 (90 Base) MCG/ACT inhaler Inhale 2 puffs into the lungs every 6 hours as needed for shortness of breath / dyspnea or wheezing 7/27/21   Demetra Larose NP   albuterol (PROVENTIL) (2.5 MG/3ML) 0.083% neb solution Take 1 vial (2.5 mg) by nebulization 4 times daily On non-dialysis days. 7/14/21   Jesús Ortiz MD   aspirin 81 MG EC tablet Take 81 mg by mouth daily (with lunch)     Unknown, Entered By History   atorvastatin (LIPITOR) 10 MG tablet Take 10 mg by mouth At Bedtime    Unknown, Entered By History   B Complex-C-Folic Acid (DIALYVITE) TABS Take 1 tablet by mouth daily 7/27/21   Demetra Larose NP   budesonide-formoterol (SYMBICORT) 80-4.5 MCG/ACT Inhaler Inhale 2 puffs into the lungs 2 times daily 7/15/21   Jesús Ortiz MD   buPROPion (WELLBUTRIN XL) 150 MG 24 hr tablet Take 150 mg by mouth twice a week Tue & Sat    Unknown, Entered By History   cinacalcet (SENSIPAR) 30 MG tablet Take 30 mg by mouth Every Mon, Wed, Fri Morning . At dialysis.    Unknown, Entered By History   diphenhydrAMINE (BENADRYL) 50 MG tablet Take 50 mg by mouth as needed    Reported, Patient   famotidine (PEPCID) 20 MG tablet Take 20 mg by mouth At Bedtime    Unknown, Entered By History   FOLIC ACID PO Take 1 mg by mouth daily 7/21/20   Reported, Patient   gabapentin (NEURONTIN) 100 MG capsule Take 100 mg by mouth 3 times daily    Unknown, Entered By History   Lactobacillus (CULTURELLE DIGESTIVE WOMENS PO) Take 1 tablet by mouth daily 7/21/20   Reported, Patient   latanoprost (XALATAN) 0.005 % ophthalmic solution Apply 1 drop to eye At Bedtime 8/10/20   Reported, Patient   loratadine (CLARITIN) 10 MG capsule Take 10 mg by mouth as needed 7/20/20   Reported, Patient   melatonin 3 MG tablet Take 3 mg by mouth  At Bedtime    Unknown, Entered By History   metoprolol succinate ER (TOPROL-XL) 25 MG 24 hr tablet Take 1 tablet by mouth daily 2/21/21   Reported, Patient   midodrine (PROAMATINE) 10 MG tablet Take 10 mg by mouth every 6 hours as needed (for SBP < 110)     Unknown, Entered By History   midodrine (PROAMATINE) 10 MG tablet Take 15 mg by mouth three times a week Take once daily prior to dialysis on dialysis days Mon-Wed-Fri    Unknown, Entered By History   Multiple Vitamins-Iron (DAILY-OLGA/IRON PO) Take 1 tablet by mouth daily 7/21/20   Reported, Patient   nystatin (MYCOSTATIN) 759253 UNIT/GM external powder Apply topically 2 times daily 7/27/21   Demetra Larose, NP   ondansetron (ZOFRAN) 4 MG tablet Take 4 mg by mouth every 8 hours as needed for nausea    Unknown, Entered By History   oxyCODONE (ROXICODONE) 5 MG tablet Take 1 tablet (5 mg) by mouth every 6 hours as needed for pain 7/27/21   Demetra Larose, NP   polyvinyl alcohol (LIQUIFILM TEARS) 1.4 % ophthalmic solution Place 1 drop into both eyes as needed for dry eyes    Unknown, Entered By History   senna-docusate (SENOKOT-S/PERICOLACE) 8.6-50 MG tablet Take 1 tablet by mouth every other day     Unknown, Entered By History   sevelamer carbonate (RENVELA) 800 MG tablet Take 2,400 mg by mouth 3 times daily 7/22/20   Reported, Patient   sevelamer carbonate (RENVELA) 800 MG tablet Take 800 mg by mouth Take with snacks or supplements     Unknown, Entered By History   sodium-potassium bicarbonate (EDI-SELTZER GOLD) TBEF solu-tab Take 1 tablet by mouth daily as needed for heartburn    Unknown, Entered By History   timolol maleate (TIMOPTIC) 0.5 % ophthalmic solution Place 1 drop into both eyes 2 times daily    Unknown, Entered By History   traZODone (DESYREL) 150 MG tablet Take 1 tablet (150 mg) by mouth At Bedtime 7/27/21   Demetra Larose, NP   Vitamin D, Cholecalciferol, 25 MCG (1000 UT) TABS Take 2,000 Units by mouth daily (with lunch)    Unknown, Entered By History         ALLERGIES:  Allergies   Allergen Reactions     Ace Inhibitors Cough     Other reaction(s): *Unknown     Fosinopril Cough     Ipratropium Headache     Zolpidem Other (See Comments)     Hallucinations  Other reaction(s): Hallucinations       FAMILY HISTORY:  Family History   Problem Relation Age of Onset     Dementia Mother      Diabetes Mother      Arthritis Mother      Cancer Mother      Depression Mother      Heart Disease Mother      Vision Loss Mother      Cerebrovascular Disease Father      Heart Disease Father      Breast Cancer No family hx of        SOCIAL HISTORY:   Social History     Socioeconomic History     Marital status:      Spouse name: None     Number of children: 2     Years of education: None     Highest education level: None   Occupational History     None   Tobacco Use     Smoking status: Former Smoker     Packs/day: 1.50     Years: 37.00     Pack years: 55.50     Types: Cigarettes     Quit date: 2009     Years since quittin.6     Smokeless tobacco: Never Used   Substance and Sexual Activity     Alcohol use: Yes     Alcohol/week: 11.7 standard drinks     Comment: Alcoholic Drinks/day: 14 mixed drinks per week     Drug use: No     Sexual activity: Never     Partners: Male   Other Topics Concern     None   Social History Narrative    Lives with her . Daughter in [place] and daughter in [place].     Social Determinants of Health     Financial Resource Strain:      Difficulty of Paying Living Expenses:    Food Insecurity:      Worried About Running Out of Food in the Last Year:      Ran Out of Food in the Last Year:    Transportation Needs:      Lack of Transportation (Medical):      Lack of Transportation (Non-Medical):    Physical Activity:      Days of Exercise per Week:      Minutes of Exercise per Session:    Stress:      Feeling of Stress :    Social Connections:      Frequency of Communication with Friends and Family:      Frequency of Social Gatherings with  Friends and Family:      Attends Mu-ism Services:      Active Member of Clubs or Organizations:      Attends Club or Organization Meetings:      Marital Status:    Intimate Partner Violence:      Fear of Current or Ex-Partner:      Emotionally Abused:      Physically Abused:      Sexually Abused:        VITALS:  Patient Vitals for the past 24 hrs:   BP Temp Temp src Pulse Resp SpO2   08/16/21 1500 111/58 -- -- 69 17 100 %   08/16/21 1445 91/55 -- -- 76 -- --   08/16/21 1430 112/57 -- -- 74 -- --   08/16/21 1343 98/53 98.3  F (36.8  C) Oral 71 24 100 %        PHYSICAL EXAM    Constitutional:  Awake, alert, in no apparent distress. On chronic supplemental oxygen with nasal cannula in place.  HENT:  Normocephalic, Atraumatic. Bilateral external ears normal. Oropharynx moist. Nose normal. Neck- Normal range of motion with no guarding, No midline cervical tenderness, Supple, No stridor.   Eyes:  PERRL, EOMI with no signs of entrapment, Conjunctiva normal, No discharge.   Respiratory:  Diminished breath sounds bilaterally without wheezing or crackles. No respiratory distress.    Cardiovascular:  Normal heart rate, Normal rhythm, No appreciable rubs or gallops.   GI:  Soft, No tenderness, No distension, No palpable masses  Musculoskeletal:  Intact distal pulses, No edema. Good range of motion in all major joints. No tenderness to palpation or major deformities noted.  Integument:  Dialysis catheter noted in right upper chest wall. Warm, Dry, No erythema, No rash.   Neurologic:  Alert & oriented, Normal motor function, Normal sensory function, No focal deficits noted.   Psychiatric:  Affect normal, Judgment normal, Mood normal.     LAB:  All pertinent labs reviewed and interpreted.  Results for orders placed or performed during the hospital encounter of 08/16/21   Basic metabolic panel   Result Value Ref Range    Sodium 141 136 - 145 mmol/L    Potassium 3.6 3.5 - 5.0 mmol/L    Chloride 102 98 - 107 mmol/L    Carbon  Dioxide (CO2) 29 22 - 31 mmol/L    Anion Gap 10 5 - 18 mmol/L    Urea Nitrogen 8 8 - 28 mg/dL    Creatinine 2.12 (H) 0.60 - 1.10 mg/dL    Calcium 8.6 8.5 - 10.5 mg/dL    Glucose 89 70 - 125 mg/dL    GFR Estimate 22 (L) >60 mL/min/1.73m2   Result Value Ref Range    Troponin I 0.03 0.00 - 0.29 ng/mL   Blood gas venous   Result Value Ref Range    pH Venous 7.25 (L) 7.35 - 7.45    pCO2 Venous 75 (H) 35 - 50 mm Hg    pO2 Venous 26 25 - 47 mm Hg    Bicarbonate Venous 27 24 - 30 mmol/L    Base Excess/Deficit (+/-) 5.6   mmol/L    Oxyhemoglobin Venous 43.1 (L) 70.0 - 75.0 %    O2 Sat, Venous 44.1 (L) 70.0 - 75.0 %   CBC with platelets and differential   Result Value Ref Range    WBC Count 5.4 4.0 - 11.0 10e3/uL    RBC Count 3.25 (L) 3.80 - 5.20 10e6/uL    Hemoglobin 10.6 (L) 11.7 - 15.7 g/dL    Hematocrit 35.6 35.0 - 47.0 %     (H) 78 - 100 fL    MCH 32.6 26.5 - 33.0 pg    MCHC 29.8 (L) 31.5 - 36.5 g/dL    RDW 16.5 (H) 10.0 - 15.0 %    Platelet Count 167 150 - 450 10e3/uL    % Neutrophils 74 %    % Lymphocytes 9 %    % Monocytes 12 %    % Eosinophils 3 %    % Basophils 1 %    % Immature Granulocytes 1 %    NRBCs per 100 WBC 0 <1 /100    Absolute Neutrophils 4.0 1.6 - 8.3 10e3/uL    Absolute Lymphocytes 0.5 (L) 0.8 - 5.3 10e3/uL    Absolute Monocytes 0.7 0.0 - 1.3 10e3/uL    Absolute Eosinophils 0.2 0.0 - 0.7 10e3/uL    Absolute Basophils 0.0 0.0 - 0.2 10e3/uL    Absolute Immature Granulocytes 0.1 (H) <=0.0 10e3/uL    Absolute NRBCs 0.0 10e3/uL       RADIOLOGY:  XR Chest Port 1 View    (Results Pending)          EKG:    Wide QRS, no specific ST acute ischemic changes, no concerning dysrhythmias or interval prolongation, when compared to ECG of July 31, 2021, no specific ST acute ischemic change appreciated  I have independently reviewed and interpreted the EKG(s) documented above.    PROCEDURES:          IKristofer, am serving as a scribe to document services personally performed by Felipe Alex,  MD, based on my observation and the provider's statements to me. I, Felipe Alex MD attest that Kristofer Thakkar is acting in a scribe capacity, has observed my performance of the services and has documented them in accordance with my direction.    Felipe Alex M.D.  Emergency Medicine  [facility] EMERGENCY DEPARTMENT  [address] [place] [id number] [phone]  Dept: [phone]     Felipe Alex MD  08/16/21 1060

## 2021-08-16 NOTE — ED TRIAGE NOTES
Patient arrived from dialysis with complaint of shortness of breath. Patient states that developed worsening shortness of breath and productive cough since Friday. States that she is MWF dialysis patient, newly adding Saturday to dialysis. Patient is wheelchair bound and normally uses 3L oxygen via nasal canula at home.

## 2021-08-17 NOTE — ED NOTES
[facility] ED Handoff Report    ED Chief Complaint:  chief complaint    ED Diagnosis:  (J44.1) COPD exacerbation (H)  Comment: O2 sats decreased  Plan: Use O2, nebs and inhalers.    (N18.6,  Z99.2) ESRD (end stage renal disease) on dialysis (H)  Comment:  Plan:    (J96.01,  J96.02) Acute respiratory failure with hypoxia and hypercarbia (H)  Comment:  Plan:        PMH:    Past Medical History:   Diagnosis Date     Acute on chronic diastolic congestive heart failure (H) 01/23/2019     Acute on chronic respiratory failure with hypoxia and hypercapnia (H)      Acute respiratory failure with hypoxia (H) 06/30/2014     Arthritis      Cardiac device in situ 04/05/2018    LAAO April 5, 2018 (30 mm WATCHMAN)     Chronic anemia 06/01/2014     Chronic kidney disease      Chronic respiratory failure with hypoxia (H)     3L nc     Chronic thoracic aortic dissection (H) 10/07/2015    descending thoracic aorta; treated medically per notes of Dragan Singh and Jennifer.     COPD (chronic obstructive pulmonary disease) (H)      COPD exacerbation (H) 01/04/2019     Disease of thyroid gland      Dissection of thoracoabdominal aorta (H)      DVT (deep venous thrombosis) (H) 08/22/2019     Dyslipidemia      ESRD (end stage renal disease) (H) 06/03/2009    on dialysis with Dr. Mitchell     Essential hypertension 06/30/2014     Gastrointestinal hemorrhage, unspecified gastrointestinal hemorrhage type 06/05/2017     GI bleeding 06/05/2017     Gout      Heart failure (H)      Hip fracture, intertrochanteric (H) 06/23/2019     History of compression fracture of spine 06/16/2017     L3 vertebral fracture (H) 11/16/2015     ZULEIKA (obstructive sleep apnea) 10/22/2015     Pneumonia 09/07/2015     Right foot drop      Spinal stenosis 03/28/2016     Stroke (H) 03/24/2016        Code Status:  Prior     Falls Risk: No    Current Living Situation/Residence: Lives at home    Elimination Status: continent    Activity Level:  Up with assist    Patients Preferred Language:  English     Needed: No    Infection:  [unfilled]     Vital Signs:  /55   Pulse 69   Temp 98.3  F (36.8  C) (Oral)   Resp 26   Wt 68.9 kg (152 lb)   SpO2 95%   BMI 26.09 kg/m       Cardiac Rhythm: NSR W/PVCs    Pain Score:  0/10    Is the Patient Confused:  No    Last Food or Drink: Today, supper    Focused Assessment: Pt is a dialysis pt that is a M,W,F schedule. Recently added Saturday. Was at dialysis today and was feeling short of breath and had low BP, dialysis sent to ED. Pt states she just needs the fluid taken off with dialysis. Increased sob with activity. Nebs and inhalers used. Given a spacer for her MDI.    Tests Performed: Labs,Xray  Abnormal Results:    Abnormal Labs Reviewed   BASIC METABOLIC PANEL - Abnormal; Notable for the following components:       Result Value    Creatinine 2.12 (*)     GFR Estimate 22 (*)     All other components within normal limits   B-TYPE NATRIURETIC PEPTIDE ([facility] ONLY) - Abnormal; Notable for the following components:     (*)     All other components within normal limits   BLOOD GAS VENOUS - Abnormal; Notable for the following components:    pH Venous 7.25 (*)     pCO2 Venous 75 (*)     Oxyhemoglobin Venous 43.1 (*)     O2 Sat, Venous 44.1 (*)     All other components within normal limits   CBC WITH PLATELETS AND DIFFERENTIAL - Abnormal; Notable for the following components:    RBC Count 3.25 (*)     Hemoglobin 10.6 (*)      (*)     MCHC 29.8 (*)     RDW 16.5 (*)     Absolute Lymphocytes 0.5 (*)     Absolute Immature Granulocytes 0.1 (*)     All other components within normal limits   BLOOD GAS VENOUS - Abnormal; Notable for the following components:    pH Venous 7.29 (*)     pCO2 Venous 61 (*)     Oxyhemoglobin Venous 68.5 (*)     All other components within normal limits       XR Chest Port 1 View   Final Result   IMPRESSION: Pacemaker lead over the right ventricle. Central venous catheter tip in the  SVC. Left atrial appendage occlusive device unchanged.      Mild interstitial prominence in both lungs is stable could represent some mild edema.      Similar-appearing paraspinal thickening on the left side corresponds to the chronic appearing low attenuating lobular region on CT guidance not been changing. Possibly lymphangioma.           Treatments Provided: Nebs/MDIs  Family Dynamics/Concerns: No    Family Updated On Visitor Policy: No    Plan of Care Communicated to Family: No    Who Was Updated about Plan of Care: pt stated she would update family as she chose.        Asymptomatic swab done    ED Medications:    Medications   albuterol (PROVENTIL) neb solution 2.5 mg (2.5 mg Nebulization Given 8/16/21 1620)   albuterol (PROAIR HFA/PROVENTIL HFA/VENTOLIN HFA) 108 (90 Base) MCG/ACT inhaler 2 puff (2 puffs Inhalation Given 8/16/21 1932)   albuterol (PROAIR HFA/PROVENTIL HFA/VENTOLIN HFA) 108 (90 Base) MCG/ACT inhaler 2 puff (has no administration in time range)   dexamethasone PF (DECADRON) injection 10 mg (10 mg Intravenous Given 8/16/21 1555)   acetaminophen (TYLENOL) tablet 650 mg (650 mg Oral Given 8/16/21 1556)            @ME2@ 8/16/2021 7:49 PM

## 2021-08-17 NOTE — PROGRESS NOTES
"[facility] [place]      ADMIT DATE: 8/16/2021     FACILITY: [facility]    PCP: Neil Galan, [phone]    ASSESSMENT AND PLAN:   Belia Rodriguez is a 76 year old female  with a medical history significant for COPD, chronic respiratory failure on home oxygen, ESRD on hemodialysis, chronic hypotension presents to ER from hemodialysis center for shortness of breath.        Active Problems:    ESRD (end stage renal disease) on dialysis (H)    Acute on chronic respiratory failure with hypercapnia (H)    Acute respiratory failure with hypoxia and hypercarbia (H)      Acute on chronic hypoxemic and hypercapnic respiratory failure: Likely combination of COPD exacerbation and fluid overload from incomplete run of hemodialysis on the day of admission  -BNP on admission elevated but decreased from 7/9/2021 value  -nephro consulted and plan for HD session today.   -Continue nasal cannula oxygen and taper to home level as tolerated.  She normally uses home oxygen at 2 L/min.     Acute COPD exacerbation: Chest x-ray shows no focal infiltrate.  - Received Solu-Medrol in ER. Hypoxia and shortness of breath improved.  Continue prednisone 40 mg daily for 5 days  - Continue on home nebulizers: Symbicort and albuterol     ESRD on hemodialysis: Needed to stop hemodialysis earlier on the day of admission due to shortness of breath and hypotension.  - Hemodialysis per nephrology  - Continue home cinacalcet and sevelamer     Chronic pain syndrome: Patient reports having chronic bilateral lower leg pain.  Continue PTA Wellbutrin, gabapentin and oxycodone prn.     Chronic hypotension: Continue PTA midodrine          Gas/constipation:  -gas-x, pepcid, and pericolace     Goals of care  -per SW note 8/17/2021: \"Pt knows that when she decides to quit dialysis, she will have 3-5 days to live and wants to move into [facility] at that time. Against dying at home or in hospital. Pt is talkative and is " "open to having palliative support to discuss coping at home. \"  -palliative care consult placed      FEN/GI: renal diet  DVT proph: heparin  Code status: No CPR- Do NOT Intubate      Discharge barriers:  -hypoxia, HD session today  -ADOD: 1-2 days       SUBJECTIVE:    Patient complains of gas and constipation. She states she feels otherwise \"fine\" and waiting for her dialysis session today.     ROS:  12 Points review of systems reviewed and is negative except for what has already been mentioned above    OBJECTIVE:  Patient Vitals for the past 24 hrs:   BP Temp Temp src Pulse Resp SpO2 Height Weight   08/17/21 0800 98/60 98  F (36.7  C) Oral 90 18 99 % -- --   08/17/21 0435 90/55 97.7  F (36.5  C) Oral 79 20 98 % -- --   08/16/21 2327 102/54 97.7  F (36.5  C) Oral 74 21 97 % -- --   08/16/21 2120 96/52 98.5  F (36.9  C) Oral 70 20 98 % 1.626 m (5' 4\") 64.6 kg (142 lb 8 oz)   08/16/21 1958 103/57 97.9  F (36.6  C) Oral 69 24 96 % -- --   08/16/21 1945 103/57 -- -- 69 23 96 % -- --   08/16/21 1930 106/56 -- -- 70 -- -- -- --   08/16/21 1915 99/55 -- -- 69 19 99 % -- --   08/16/21 1900 106/55 -- -- 69 26 95 % -- --   08/16/21 1845 120/57 -- -- 77 -- -- -- --   08/16/21 1830 116/59 -- -- 69 -- -- -- --   08/16/21 1815 101/54 -- -- 70 25 100 % -- --   08/16/21 1807 -- -- -- -- -- -- -- 68.9 kg (152 lb)   08/16/21 1800 102/55 -- -- 69 20 100 % -- --   08/16/21 1745 111/57 -- -- 69 17 100 % -- --   08/16/21 1730 95/51 -- -- 69 19 100 % -- --   08/16/21 1715 103/56 -- -- 69 19 -- -- --   08/16/21 1700 100/55 -- -- 72 22 100 % -- --   08/16/21 1645 98/52 -- -- 69 23 100 % -- --   08/16/21 1630 106/73 -- -- 69 -- -- -- --   08/16/21 1615 119/58 -- -- 69 28 98 % -- --   08/16/21 1600 107/58 -- -- 69 -- -- -- --   08/16/21 1545 106/58 -- -- 69 18 99 % -- --   08/16/21 1530 111/58 -- -- 74 25 99 % -- --   08/16/21 1515 101/55 -- -- 69 17 99 % -- --   08/16/21 1500 111/58 -- -- 69 17 100 % -- --   08/16/21 1445 91/55 -- -- 76 -- " -- -- --   08/16/21 1430 112/57 -- -- 74 -- -- -- --   08/16/21 1343 98/53 98.3  F (36.8  C) Oral 71 24 100 % -- --      No intake or output data in the 24 hours ending 08/17/21 0805  GENRL: Alert and oriented X 3. Not in acute distress. Satting at 99% on 4 L O2 via NC.   HEENT: NC/AT      Neck- supple      Sclera- anicteric      Mucous membrane- moist and pink  CHEST: crackles at b/l bases  HEART: S1S2 regular. No murmurs, rubs or gallops  ABDMN: Soft. Non-tender, non-distended.No guarding or rigidity. Bowel sounds- active  EXTRM:  No pedal edema.   NEURO:  No focal neurological deficit. No involuntary movements  INTGM: please see nursing assessment for full skin assessment  PULSES: 2+ and symmetric all extremities  PSYCH: normal affect, normal speech     DIAGNOSTIC DATA:          Recent Results (from the past 24 hour(s))   ECG 12-LEAD WITH MUSE (E)    Collection Time: 08/16/21  2:10 PM   Result Value Ref Range    Systolic Blood Pressure 92 mmHg    Diastolic Blood Pressure 47 mmHg    Ventricular Rate 70 BPM    Atrial Rate 59 BPM    ID Interval  ms    QRS Duration 138 ms     ms    QTc 490 ms    P Axis  degrees    R AXIS 117 degrees    T Axis 86 degrees    Interpretation ECG       Wide QRS rhythm  Right axis deviation  Non-specific intra-ventricular conduction block  Cannot rule out Anteroseptal infarct , age undetermined  Abnormal ECG  When compared with ECG of 31-JUL-2021 15:52,  No significant change was found  Confirmed by SEE ED PROVIDER NOTE FOR, ECG INTERPRETATION ([id number]),  LUCILA GÓMEZ ([id number]) on 8/17/2021 7:35:01 AM     Basic metabolic panel    Collection Time: 08/16/21  2:44 PM   Result Value Ref Range    Sodium 141 136 - 145 mmol/L    Potassium 3.6 3.5 - 5.0 mmol/L    Chloride 102 98 - 107 mmol/L    Carbon Dioxide (CO2) 29 22 - 31 mmol/L    Anion Gap 10 5 - 18 mmol/L    Urea Nitrogen 8 8 - 28 mg/dL    Creatinine 2.12 (H) 0.60 - 1.10 mg/dL    Calcium 8.6 8.5 - 10.5 mg/dL    Glucose 89 70 -  125 mg/dL    GFR Estimate 22 (L) >60 mL/min/1.73m2   Troponin I    Collection Time: 08/16/21  2:44 PM   Result Value Ref Range    Troponin I 0.03 0.00 - 0.29 ng/mL   B-Type Natriuretic Peptide ([facility] Only)    Collection Time: 08/16/21  2:44 PM   Result Value Ref Range     (H) 0 - 133 pg/mL   Blood gas venous    Collection Time: 08/16/21  2:44 PM   Result Value Ref Range    pH Venous 7.25 (L) 7.35 - 7.45    pCO2 Venous 75 (H) 35 - 50 mm Hg    pO2 Venous 26 25 - 47 mm Hg    Bicarbonate Venous 27 24 - 30 mmol/L    Base Excess/Deficit (+/-) 5.6   mmol/L    Oxyhemoglobin Venous 43.1 (L) 70.0 - 75.0 %    O2 Sat, Venous 44.1 (L) 70.0 - 75.0 %   CBC with platelets and differential    Collection Time: 08/16/21  2:44 PM   Result Value Ref Range    WBC Count 5.4 4.0 - 11.0 10e3/uL    RBC Count 3.25 (L) 3.80 - 5.20 10e6/uL    Hemoglobin 10.6 (L) 11.7 - 15.7 g/dL    Hematocrit 35.6 35.0 - 47.0 %     (H) 78 - 100 fL    MCH 32.6 26.5 - 33.0 pg    MCHC 29.8 (L) 31.5 - 36.5 g/dL    RDW 16.5 (H) 10.0 - 15.0 %    Platelet Count 167 150 - 450 10e3/uL    % Neutrophils 74 %    % Lymphocytes 9 %    % Monocytes 12 %    % Eosinophils 3 %    % Basophils 1 %    % Immature Granulocytes 1 %    NRBCs per 100 WBC 0 <1 /100    Absolute Neutrophils 4.0 1.6 - 8.3 10e3/uL    Absolute Lymphocytes 0.5 (L) 0.8 - 5.3 10e3/uL    Absolute Monocytes 0.7 0.0 - 1.3 10e3/uL    Absolute Eosinophils 0.2 0.0 - 0.7 10e3/uL    Absolute Basophils 0.0 0.0 - 0.2 10e3/uL    Absolute Immature Granulocytes 0.1 (H) <=0.0 10e3/uL    Absolute NRBCs 0.0 10e3/uL   Blood gas venous    Collection Time: 08/16/21  5:31 PM   Result Value Ref Range    pH Venous 7.29 (L) 7.35 - 7.45    pCO2 Venous 61 (H) 35 - 50 mm Hg    pO2 Venous 36 25 - 47 mm Hg    Bicarbonate Venous 26 24 - 30 mmol/L    Base Excess/Deficit (+/-) 2.9   mmol/L    Oxyhemoglobin Venous 68.5 (L) 70.0 - 75.0 %    O2 Sat, Venous 70.0 70.0 - 75.0 %   Asymptomatic COVID-19 Virus (Coronavirus) by PCR  Nasopharyngeal    Collection Time: 08/16/21  6:55 PM    Specimen: Nasopharyngeal; Swab   Result Value Ref Range    SARS CoV2 PCR Negative Negative        Results for orders placed or performed during the hospital encounter of 08/16/21   XR Chest Port 1 View    Impression    IMPRESSION: Pacemaker lead over the right ventricle. Central venous catheter tip in the SVC. Left atrial appendage occlusive device unchanged.    Mild interstitial prominence in both lungs is stable could represent some mild edema.    Similar-appearing paraspinal thickening on the left side corresponds to the chronic appearing low attenuating lobular region on CT guidance not been changing. Possibly lymphangioma.          All recent labs reviewed personally  Radiology report reviewed.   Radiology Results: imaging impression reviewed      The total time spent in preparing this progress note is about 35 minutes, >50% time spent in care co-ordination that includes reviewing labs, images, discussing the plan of care with patient/family, consultants, and .      Fe Valentino MD.   [facility]   [phone]   Pager [phone]

## 2021-08-17 NOTE — CONSULTS
RENAL CONSULTATION:     Date of Consultation:  8/17/2021    Requesting Physician: Dr Valentino  Reason for Consult:  ESRD    Assessment/ Recommendations:  1. ESKD: on HD MWF at [facility]. SOB, will push UF. REcently added Saturday runs for 4x a weeks HD.               -Plan for HD today and HD MWF and prn during this hospitalization                 2. Dyspnea/hypoxia: multifactorial. COPD, possible some edema but not clearly all volume.     3. Anemia of ESKD: hemoglobin above goal for ESKD.               -no need for AILYN or iron. Trend     4. Goals of Care: Last admission we discussed hospice and she was not ready.     Dialyzer: 180NRe Optiflux   Na: 138 mEq/L   Bicarb: 25 mEq/L   Dialysate: 2.0 K, 2.25 Ca, 1.0 Mg, 100 Dextrose ()   Dialysate/Machine Temp (prescribed): 37 C   Dialysate/Machine Temp (actual): 35.8 C   BFR (prescribed): 450   BFR (actual): 450   Prescribed time: 03:15   EDW: 67.5 kg   Access Type: Active (In Use):CVCatheter-Tunneled/Chest      This document is created with the help of Dragon dictation system. All grammatical errors are unintentional.     Yoseph Jiménez,   [facility]  [phone] (off)       History of present illness:  Ms Rodriguez is a 77 y/o female with PMH of ESRD on HD, COPD, arthritis, HTN. Patient presetns with SOB from dialysis. Patient has recurrent admission for SOB and requiring extra dialysis. Recently started on 4x a week HD. Patient currently reports ongoing sob. Notes some cough increase with sputum. NO fevers. No chills. No diarrhea. Eating alright today. Recent started 4x week HD just 2 weeks ago. Discussed UF as able today and tomorrow. No missed HD.     Past Medical History:   Diagnosis Date     Acute on chronic diastolic congestive heart failure (H) 01/23/2019     Acute on chronic respiratory failure with hypoxia and hypercapnia (H)      Acute respiratory failure with hypoxia (H) 06/30/2014     Arthritis      Cardiac  device in situ 2018    LAAO 2018 (30 mm WATCHMAN)     Chronic anemia 2014     Chronic kidney disease      Chronic respiratory failure with hypoxia (H)     3L nc     Chronic thoracic aortic dissection (H) 10/07/2015    descending thoracic aorta; treated medically per notes of Dragan Singh and Jennifer.     COPD (chronic obstructive pulmonary disease) (H)      COPD exacerbation (H) 2019     Disease of thyroid gland      Dissection of thoracoabdominal aorta (H)      DVT (deep venous thrombosis) (H) 2019     Dyslipidemia      ESRD (end stage renal disease) (H) 2009    on dialysis with Dr. Mitchell     Essential hypertension 2014     Gastrointestinal hemorrhage, unspecified gastrointestinal hemorrhage type 2017     GI bleeding 2017     Gout      Heart failure (H)      Hip fracture, intertrochanteric (H) 2019     History of compression fracture of spine 2017     L3 vertebral fracture (H) 2015     ZULEIKA (obstructive sleep apnea) 10/22/2015     Pneumonia 2015     Right foot drop      Spinal stenosis 2016     Stroke (H) 2016       Drug and lactation database from the [place] National Library of Medicine:  http://toxnet.nlm.nih.gov/cgi-bin/sis/htmlgen?LACT      Allergies   Allergen Reactions     Ace Inhibitors Cough     Other reaction(s): *Unknown     Fosinopril Cough     Ipratropium Headache     Zolpidem Other (See Comments)     Hallucinations  Other reaction(s): Hallucinations       Social History     Socioeconomic History     Marital status:      Spouse name: None     Number of children: 2     Years of education: None     Highest education level: None   Occupational History     None   Tobacco Use     Smoking status: Former Smoker     Packs/day: 1.50     Years: 37.00     Pack years: 55.50     Types: Cigarettes     Quit date: 2009     Years since quittin.6     Smokeless tobacco: Never Used   Substance and Sexual  "Activity     Alcohol use: Yes     Alcohol/week: 11.7 standard drinks     Comment: Alcoholic Drinks/day: 14 mixed drinks per week     Drug use: No     Sexual activity: Never     Partners: Male   Other Topics Concern     None   Social History Narrative    Lives with her . Daughter in [place] and daughter in [place].     Social Determinants of Health     Financial Resource Strain:      Difficulty of Paying Living Expenses:    Food Insecurity:      Worried About Running Out of Food in the Last Year:      Ran Out of Food in the Last Year:    Transportation Needs:      Lack of Transportation (Medical):      Lack of Transportation (Non-Medical):    Physical Activity:      Days of Exercise per Week:      Minutes of Exercise per Session:    Stress:      Feeling of Stress :    Social Connections:      Frequency of Communication with Friends and Family:      Frequency of Social Gatherings with Friends and Family:      Attends Restorationist Services:      Active Member of Clubs or Organizations:      Attends Club or Organization Meetings:      Marital Status:    Intimate Partner Violence:      Fear of Current or Ex-Partner:      Emotionally Abused:      Physically Abused:      Sexually Abused:        Family History   Problem Relation Age of Onset     Dementia Mother      Diabetes Mother      Arthritis Mother      Cancer Mother      Depression Mother      Heart Disease Mother      Vision Loss Mother      Cerebrovascular Disease Father      Heart Disease Father      Breast Cancer No family hx of        Review of Systems: The remainder of 10 point review of systems is negative except as noted in HPI above.     BP 98/60 (BP Location: Right arm)   Pulse 90   Temp 98  F (36.7  C) (Oral)   Resp 18   Ht 1.626 m (5' 4\")   Wt 64.6 kg (142 lb 8 oz)   SpO2 99%   BMI 24.46 kg/m    No intake or output data in the 24 hours ending 08/17/21 0830  Physical Exam:   GENERAL: mild distress.   EYES: No scleral icterus, conjunctiva " clear  ENT: Hearing normal, Oral mucosa moist  RESP: Mild increase effort.  CV: + leg edema.    GI: ND  Musculoskeletal: Decreased muscle bulk/ tone; No gross joint abnormalities  SKIN: No rash  PSYCH:  Appropriate mood and affect    LABS:  Most Recent 3 CBC's:Recent Labs   Lab Test 08/16/21  1444 07/31/21  1548 07/14/21  0850   WBC 5.4 4.6 4.2   HGB 10.6* 9.8* 11.4*   * 108* 110*    115* 96*     Most Recent 3 BMP's:Recent Labs   Lab Test 08/16/21  1444 07/31/21  1548 07/14/21  0850    136 133*   POTASSIUM 3.6 4.1 5.0   CHLORIDE 102 102 98   CO2 29 25 25   BUN 8 17 42*   CR 2.12* 4.40* 5.02*   ANIONGAP 10 9 10   RONALDO 8.6 8.6 8.3*   GLC 89 125 107     Most Recent 2 LFT's:Recent Labs   Lab Test 07/11/21  0631 07/10/21  0610   AST 12 15   ALT 9 11   ALKPHOS 88 107   BILITOTAL 0.4 0.3     Most Recent 3 INR's:Recent Labs   Lab Test 06/11/21  1331 03/31/21  0306 01/26/21  1825   INR 0.99 1.05 1.00     Most Recent INR's and Anticoagulation Dosing History:  Anticoagulation Dose History     Recent Dosing and Labs Latest Ref Rng & Units 7/15/2020 7/16/2020 7/17/2020 10/5/2020 1/26/2021 3/31/2021 6/11/2021    INR 0.90 - 1.10 1.16(H) 1.13(H) 1.10 0.96 1.00 1.05 0.99        Most Recent 3 Creatinines:Recent Labs   Lab Test 08/16/21  1444 07/31/21  1548 07/14/21  0850   CR 2.12* 4.40* 5.02*     Most Recent 3 Hemoglobins:Recent Labs   Lab Test 08/16/21  1444 07/31/21  1548 07/14/21  0850   HGB 10.6* 9.8* 11.4*         All lab data was reviewed at 8:30 AM

## 2021-08-17 NOTE — PROGRESS NOTES
Patient asked about CPAP, she has had a machine in the past but can't tolerate, does not want here, ordered discontinued.  Shantel Torres, RT

## 2021-08-17 NOTE — UTILIZATION REVIEW
Admission Status; Secondary Review Determination   Under the authority of the Utilization Management Committee, the utilization review process indicated a secondary review on Belia Rodriguez. The review outcome is based on review of the medical records, discussions with staff, and applying clinical experience noted on the date of the review.   (x) Inpatient Status Appropriate - This patient's medical care is consistent with medical management for inpatient care and reasonable inpatient medical practice.     RATIONALE FOR DETERMINATION   76 year old female  with a medical history significant for COPD, chronic respiratory failure on home oxygen, ESRD on hemodialysis, chronic hypotension presents to ER from hemodialysis center for shortness of breath. she was admitted with acute on chronic hypoxemic and hypercapnic respiratory failure secondary to fluid overload and COPD exacerbation, still hypoxic and requires oxygen more than her home baseline, nephrology consult plan for extra dialysis today.  Crossing second mid night     At the time of admission with the information available to the attending physician more than 2 nights Hospital complex care was anticipated, based on patient risk of adverse outcome if treated as outpatient and complex care required. Inpatient admission is appropriate based on the Medicare guidelines.   The information on this document is developed by the utilization review team in order for the business office to ensure compliance. This only denotes the appropriateness of proper admission status and does not reflect the quality of care rendered.   The definitions of Inpatient Status and Observation Status used in making the determination above are those provided in the CMS Coverage Manual, Chapter 1 and Chapter 6, section 70.4.   Sincerely,   Inocencio Hawkins MD  Utilization Review  Physician Advisor  [facility]

## 2021-08-17 NOTE — PLAN OF CARE
Problem: Adult Inpatient Plan of Care  Goal: Plan of Care Review  Outcome: Improving   Pt A&Ox4.  Pt calls with call light appropriately.  Pt has a pacemaker.  Tele-1st degree AVB with BBB and prolonged QT.  Pt has skin tear on left elbow.  Lung sounds diminished with crackles in the left base.  On 4 L/nc to maintain saturation levels above 90%.  Pt requested to have dialysis run later in the afternoon, in order to sleep due to being awake all night.  Administered PRN Oxy x1 for pain of 8/10.  Will continue to monitor.

## 2021-08-17 NOTE — H&P
[facility]    History and Physical - Hospitalist Service       Date of Admission:  8/16/2021    Assessment & Plan      Belia Rodriguez is a 76 year old female  with a medical history significant for COPD, chronic respiratory failure on home oxygen, ESRD on hemodialysis, chronic hypotension presents to ER from hemodialysis center for shortness of breath.     Acute on chronic hypoxemic and hypercapnic respiratory failure: Likely combination of COPD exacerbation and fluid overload from incomplete run of hemodialysis today.  -Continue nasal cannula oxygen and taper to home level as tolerated.  She normally uses home oxygen at 2 L/min.    Acute COPD exacerbation: Chest x-ray shows no focal infiltrate.  - Received Solu-Medrol in ER. Hypoxia and shortness of breath improved.  Continue prednisone 40 mg daily for 5 days  - Continue on home nebulizers: Symbicort and albuterol    ESRD on hemodialysis: Needed to stop hemodialysis earlier  today due to shortness of breath and hypotension.  - Hemodialysis per nephrology  - Continue home cinacalcet and sevelamer    Chronic pain syndrome: Patient reports having chronic bilateral lower leg pain.  Continue PTA Wellbutrin, gabapentin and oxycodone prn.    Chronic hypotension: Continue PTA midodrine         Diet: Dialysis Diet    DVT Prophylaxis: Heparin SQ  Adorno Catheter: Not present  Central Lines: PRESENT     Code Status:  DNR/DNI per POLST on 5/20/21      Disposition Plan   Expected discharge: 2-3 days     The patient's care was discussed with the Bedside Nurse and Patient.    Donna Webb MD  [facility]  Securely message with the Vocera Web Console (learn more here)  Text page via [employer] Paging/Directory      ______________________________________________________________________    Chief Complaint   Shortness of breath    History is obtained from the patient    History of Present Illness   Belia Rodriguez is a 76 year old  female I did reduce putting patient reports that for the past 3 days, she has been having shortness of breath.  She also has productive cough with whitish phlegm.  Today, she went to hemodialysis as usual.  Her blood pressure became low during the run.  She also developed worsening hypoxia. The hemodialysis run had to stop earlier.  Patient was then sent to ER.  In ER, patient was found to have oxygen saturation at the low 90s when patient used her home oxygen at 2 L/min.  ABG revealed elevated PCO2.  She had elevated BNP to 600.  Chest x-ray indicates pulmonary edema.  Patient was given Decadron. Her SOB and hypoxia improved. Patient reports no fever, chest pain, nausea, vomiting, diarrhea and abdominal pain.    Review of Systems    The 10 point Review of Systems is negative other than noted in the HPI or here.     Past Medical History    I have reviewed this patient's medical history and updated it with pertinent information if needed.   Past Medical History:   Diagnosis Date     Acute on chronic diastolic congestive heart failure (H) 01/23/2019     Acute on chronic respiratory failure with hypoxia and hypercapnia (H)      Acute respiratory failure with hypoxia (H) 06/30/2014     Arthritis      Cardiac device in situ 04/05/2018    LAAO April 5, 2018 (30 mm WATCHMAN)     Chronic anemia 06/01/2014     Chronic kidney disease      Chronic respiratory failure with hypoxia (H)     3L nc     Chronic thoracic aortic dissection (H) 10/07/2015    descending thoracic aorta; treated medically per notes of Dragan Singh and Jennifer.     COPD (chronic obstructive pulmonary disease) (H)      COPD exacerbation (H) 01/04/2019     Disease of thyroid gland      Dissection of thoracoabdominal aorta (H)      DVT (deep venous thrombosis) (H) 08/22/2019     Dyslipidemia      ESRD (end stage renal disease) (H) 06/03/2009    on dialysis with Dr. Mitchell     Essential hypertension 06/30/2014     Gastrointestinal hemorrhage, unspecified  gastrointestinal hemorrhage type 06/05/2017     GI bleeding 06/05/2017     Gout      Heart failure (H)      Hip fracture, intertrochanteric (H) 06/23/2019     History of compression fracture of spine 06/16/2017     L3 vertebral fracture (H) 11/16/2015     ZULEIKA (obstructive sleep apnea) 10/22/2015     Pneumonia 09/07/2015     Right foot drop      Spinal stenosis 03/28/2016     Stroke (H) 03/24/2016       Past Surgical History   I have reviewed this patient's surgical history and updated it with pertinent information if needed.  Past Surgical History:   Procedure Laterality Date     BACK SURGERY           C OPEN FIX INTER/SUBTROCH FX,IMPLNT Left 6/23/2019    Procedure: INTERNAL FIXATION, FRACTURE, TROCHANTERIC, HIP, USING INTERMEDULLARY NAIL;  Surgeon: Bennie Marsh DO;  Location: [facility] Main OR;  Service: Orthopedics     COLONOSCOPY N/A 3/23/2016    Procedure: COLONOSCOPY;  Surgeon: Ruddy Tejada MD;  Location: [facility] GI;  Service:      DILATION AND CURETTAGE       EP ABLATION AV NODE N/A 3/7/2019    Procedure: EP Ablation AV Node;  Surgeon: Derick Duarte MD;  Location: [facility] Cath Lab;  Service: Cardiology     EP NEGRA CLOSURE N/A 4/5/2018    Procedure: EP NEGRA Closure;  Surgeon: Derick Duarte MD;  Location: [facility] Cath Lab;  Service:      EP PACEMAKER INSERT N/A 3/7/2019    Procedure: EP Pacemaker Insertion;  Surgeon: Derick Duarte MD;  Location: [facility] Cath Lab;  Service: Cardiology     EYE SURGERY       HERNIA REPAIR       IR ABDOMINAL AORTOGRAM  3/20/2016     IR CVC TUNNEL PLACEMENT > 5 YRS OF AGE  6/12/2014     IR CVC TUNNEL PLACEMENT > 5 YRS OF AGE  9/10/2018     IR CVC TUNNEL PLACEMENT > 5 YRS OF AGE  11/20/2018     IR CVC TUNNEL REVISION RIGHT  10/23/2020     IR MISCELLANEOUS PROCEDURE  3/25/2009     IR MISCELLANEOUS PROCEDURE  3/30/2009     IR MISCELLANEOUS PROCEDURE  10/28/2011     IR TUNNELED CATHETER COMPLETE REPLACEMENT  7/28/2016     IR TUNNELED  CATHETER COMPLETE REPLACEMENT  2016     IR TUNNELED CATHETER COMPLETE REPLACEMENT  2017     IR TUNNELED CATHETER COMPLETE REPLACEMENT  10/18/2018     IR TUNNELED CATHETER COMPLETE REPLACEMENT  10/23/2020     IR TUNNELED CATHETER INSERT  2018     IR TUNNELED CATHETER REMOVAL  2014     IR TUNNELED CATHETER REMOVAL  2018     IR TUNNELED CATHETER REMOVAL  2018     IR TUNNELED CATHETER REMOVAL  2018     OK COLSC FLEXIBLE W/CONTROL BLEEDING ANY METHOD N/A 2017    Procedure: COLONOSCOPY;  Surgeon: Luis Mckeon MD;  Location: [facility];  Service: Gastroenterology     TONSILLECTOMY         Social History   I have reviewed this patient's social history and updated it with pertinent information if needed.  Social History     Tobacco Use     Smoking status: Former Smoker     Packs/day: 1.50     Years: 37.00     Pack years: 55.50     Types: Cigarettes     Quit date: 2009     Years since quittin.6     Smokeless tobacco: Never Used   Substance Use Topics     Alcohol use: Yes     Alcohol/week: 11.7 standard drinks     Comment: Alcoholic Drinks/day: 14 mixed drinks per week     Drug use: No       Family History   I have reviewed this patient's family history and updated it with pertinent information if needed.  Family History   Problem Relation Age of Onset     Dementia Mother      Diabetes Mother      Arthritis Mother      Cancer Mother      Depression Mother      Heart Disease Mother      Vision Loss Mother      Cerebrovascular Disease Father      Heart Disease Father      Breast Cancer No family hx of        Prior to Admission Medications   Prior to Admission Medications   Prescriptions Last Dose Informant Patient Reported? Taking?   B Complex-C-Folic Acid (DIALYVITE) TABS 8/15/2021 at Unknown time  No Yes   Sig: Take 1 tablet by mouth daily   FOLIC ACID PO 8/15/2021 at Unknown time  Yes Yes   Sig: Take 1 mg by mouth daily   Lactobacillus (CULTURELLE DIGESTIVE WOMENS PO)  8/15/2021 at Unknown time  Yes Yes   Sig: Take 1 tablet by mouth daily   Vitamin D, Cholecalciferol, 25 MCG (1000 UT) TABS 8/15/2021 at Unknown time  Yes Yes   Sig: Take 2,000 Units by mouth daily (with lunch)   acetaminophen (TYLENOL) 500 MG tablet Past Week at Unknown time  Yes Yes   Sig: Take 1,000 mg by mouth every 6 hours as needed for mild pain   albuterol (PROAIR HFA/PROVENTIL HFA/VENTOLIN HFA) 108 (90 Base) MCG/ACT inhaler Past Week at Unknown time  No Yes   Sig: Inhale 2 puffs into the lungs every 6 hours as needed for shortness of breath / dyspnea or wheezing   albuterol (PROVENTIL) (2.5 MG/3ML) 0.083% neb solution 8/15/2021 at Unknown time  No Yes   Sig: Take 1 vial (2.5 mg) by nebulization 4 times daily On non-dialysis days.   aspirin 81 MG EC tablet 8/15/2021 at Unknown time  Yes Yes   Sig: Take 81 mg by mouth daily (with lunch)    atorvastatin (LIPITOR) 10 MG tablet 8/15/2021 at Unknown time  Yes Yes   Sig: Take 10 mg by mouth At Bedtime   buPROPion (WELLBUTRIN XL) 150 MG 24 hr tablet Past Week at Unknown time  Yes Yes   Sig: Take 150 mg by mouth twice a week Tue & Sat   budesonide-formoterol (SYMBICORT) 80-4.5 MCG/ACT Inhaler 8/15/2021 at hs  No Yes   Sig: Inhale 2 puffs into the lungs 2 times daily   cinacalcet (SENSIPAR) 30 MG tablet Past Week at Unknown time  Yes Yes   Sig: Take 30 mg by mouth Every Mon, Wed, Fri Morning . At dialysis.   diphenhydrAMINE (BENADRYL) 50 MG tablet Past Month at Unknown time  Yes Yes   Sig: Take 50 mg by mouth as needed   famotidine (PEPCID) 20 MG tablet 8/15/2021 at Unknown time  Yes Yes   Sig: Take 20 mg by mouth At Bedtime   gabapentin (NEURONTIN) 100 MG capsule 8/15/2021 at Unknown time  Yes Yes   Sig: Take 100 mg by mouth 3 times daily   loratadine (CLARITIN) 10 MG capsule Past Month at Unknown time  Yes Yes   Sig: Take 10 mg by mouth as needed   melatonin 3 MG tablet 8/15/2021 at Unknown time  Yes Yes   Sig: Take 3 mg by mouth At Bedtime   midodrine (PROAMATINE)  10 MG tablet Past Month at Unknown time  Yes Yes   Sig: Take 10 mg by mouth every 6 hours as needed (for SBP < 110)    midodrine (PROAMATINE) 10 MG tablet 8/16/2021 at Unknown time  Yes Yes   Sig: Take 15 mg by mouth three times a week Take once daily prior to dialysis on dialysis days Mon-Wed-Fri   nystatin (MYCOSTATIN) 860175 UNIT/GM external powder Past Week at Unknown time  No Yes   Sig: Apply topically 2 times daily   ondansetron (ZOFRAN) 4 MG tablet Past Month at Unknown time  Yes Yes   Sig: Take 4 mg by mouth every 8 hours as needed for nausea   oxyCODONE (ROXICODONE) 5 MG tablet Past Week at Unknown time  No Yes   Sig: Take 1 tablet (5 mg) by mouth every 6 hours as needed for pain   polyvinyl alcohol (LIQUIFILM TEARS) 1.4 % ophthalmic solution 8/15/2021 at Unknown time  Yes Yes   Sig: Place 1 drop into both eyes as needed for dry eyes   senna-docusate (SENOKOT-S/PERICOLACE) 8.6-50 MG tablet 8/15/2021 at Unknown time  Yes Yes   Sig: Take 1 tablet by mouth every other day    sevelamer carbonate (RENVELA) 800 MG tablet 8/15/2021 at Unknown time  Yes Yes   Sig: Take 800 mg by mouth Take with snacks or supplements    sevelamer carbonate (RENVELA) 800 MG tablet 8/15/2021 at Unknown time  Yes Yes   Sig: Take 2,400 mg by mouth 3 times daily   sodium-potassium bicarbonate (EDI-SELTZER GOLD) TBEF solu-tab Past Week at Unknown time  Yes Yes   Sig: Take 1 tablet by mouth daily as needed for heartburn   timolol maleate (TIMOPTIC) 0.5 % ophthalmic solution 8/15/2021 at Unknown time  Yes Yes   Sig: Place 1 drop into both eyes 2 times daily   traZODone (DESYREL) 150 MG tablet 8/15/2021 at Unknown time  No Yes   Sig: Take 1 tablet (150 mg) by mouth At Bedtime      Facility-Administered Medications: None     Allergies   Allergies   Allergen Reactions     Ace Inhibitors Cough     Other reaction(s): *Unknown     Fosinopril Cough     Ipratropium Headache     Zolpidem Other (See Comments)     Hallucinations  Other reaction(s):  Hallucinations       Physical Exam   Vital Signs: Temp: 98.3  F (36.8  C) Temp src: Oral BP: 106/55 Pulse: 69   Resp: 26 SpO2: 95 % O2 Device: Nasal cannula Oxygen Delivery: 3 LPM  Weight: 152 lbs 0 oz    General appearance: not in acute distress  HEENT: PERRL, EOMI  Lungs: Clear breath sounds in bilateral lung fields  Cardiovascular: Regular rate and rhythm, normal S1-S2  Abdomen: Soft, non tender, no distension, normal bowel sound  Musculoskeletal: No joint swelling  Skin: No rash and no edema  Neurology: AAO ×3.  Cranial nerves II - XII normal.  Normal muscle strength in all four extremities.    Data   Data reviewed today: I reviewed all medications, new labs and imaging results over the last 24 hours.     Recent Labs   Lab 08/16/21  1444   WBC 5.4   HGB 10.6*   *         POTASSIUM 3.6   CHLORIDE 102   CO2 29   BUN 8   CR 2.12*   ANIONGAP 10   RONALDO 8.6   GLC 89

## 2021-08-17 NOTE — TREATMENT PLAN
[facility] Treatment Plan    Patient Score: 7  Patient Acuity: 4    Clinical Indication for Therapy: home regimen    Therapy Ordered: Albuterol QID    Assessment Summary: Patient is on 3L NC which she wears at home, she does Albuterol QID at home, had neb and inhaler ordered, talked with patient about having just one order and she is okay with the inhaler being QID, has spacer to use, patient is able to use and knows how to use spacer.  Will re-evaluate patient if her respiratory status changes from baseline.    Shantel Torres, RT  8/17/2021

## 2021-08-17 NOTE — CONSULTS
Care Management Initial Consult    General Information  Assessment completed with: VM-chart review, met with pt in room        Primary Care Provider verified and updated as needed:     Readmission within the last 30 days:           Advance Care Planning:     Pt not interested in paperwork       Communication Assessment  Patient's communication style: spoken language (English or Bilingual)    Hearing Difficulty or Deaf: no   Wear Glasses or Blind: yes    Cognitive  Cognitive/Neuro/Behavioral: WDL                      Living Environment:   People in home: spouse     Current living Arrangements: house      Able to return to prior arrangements: yes       Family/Social Support:  Care provided by:    Provides care for:    Marital Status:             Description of Support System: Supportive         Current Resources:   Patient receiving home care services:       Community Resources: Dialysis Services  Equipment currently used at home: wheelchair, manual  Supplies currently used at home:      Employment/Financial:  Employment Status:     Retired     Financial Concerns:             Lifestyle & Psychosocial Needs:  Social Determinants of Health     Tobacco Use: Medium Risk     Smoking Tobacco Use: Former Smoker     Smokeless Tobacco Use: Never Used   Alcohol Use:      Frequency of Alcohol Consumption:      Average Number of Drinks:      Frequency of Binge Drinking:    Financial Resource Strain:      Difficulty of Paying Living Expenses:    Food Insecurity:      Worried About Running Out of Food in the Last Year:      Ran Out of Food in the Last Year:    Transportation Needs:      Lack of Transportation (Medical):      Lack of Transportation (Non-Medical):    Physical Activity:      Days of Exercise per Week:      Minutes of Exercise per Session:    Stress:      Feeling of Stress :    Social Connections:      Frequency of Communication with Friends and Family:      Frequency of Social Gatherings with Friends and  Family:      Attends Synagogue Services:      Active Member of Clubs or Organizations:      Attends Club or Organization Meetings:      Marital Status:    Intimate Partner Violence:      Fear of Current or Ex-Partner:      Emotionally Abused:      Physically Abused:      Sexually Abused:    Depression: Not at risk     PHQ-2 Score: 1   Housing Stability:      Unable to Pay for Housing in the Last Year:      Number of Places Lived in the Last Year:      Unstable Housing in the Last Year:        Functional Status:  Prior to admission patient needed assistance:   Dependent ADLs:: Wheelchair-independent            Additional Information:  SWCM met with pt in room to discuss CM role and discharge planning process. Pt has a wheelchair in her room with her; uses a different one at home. Pt is from home with spouse; she is on 3L O2 at baseline; has dialysis 3-4 times a week; spouse assists with transportation.  Pt shared stories about her life, being an only child / independent; her spouse, and the differences in her two daughters (one in [place], one in [place]).     Pt has been in a care facility and prefers being at home at this time; doesn't often have home care because spouse is concerned about theft. States that she doesn't have many close friends and it's hard to coordinate group outings with her former coworkers. Pt knows that when she decides to quit dialysis, she will have 3-5 days to live and wants to move into [facility] [place] [place] at that time. Against dying at home or in hospital. Pt is talkative and is open to having palliative support to discuss coping at home. MD notified. Plan to return home with family transport; no CM needs identified.     DAVID Limon

## 2021-08-18 NOTE — PROGRESS NOTES
"            RENAL ([facility]) progress note  CC: F/U   S: Since last visit, Patient seen on HD this am. BP low. Did not get midodrine. Patient reports breathing improved some after HD yesterday. Palliative consult today.     A/P:   Active Problems:    ESRD (end stage renal disease) on dialysis (H)    Acute on chronic respiratory failure with hypercapnia (H)    Acute respiratory failure with hypoxia and hypercarbia (H)    1. ESKD: on HD MWF at [facility]. SOB, will push UF. REcently added Saturday runs for 4x a weeks HD.               -Plan for HD MWF and prn during this hospitalization                 2. Dyspnea/hypoxia: multifactorial. COPD, possible some edema but not clearly all volume.      3. Anemia of ESKD: hemoglobin above goal for ESKD.               -no need for AILYN or iron. Trend     4. Goals of Care: Last admission we discussed hospice and she was not ready.      Dialyzer: 180NRe Optiflux   Na: 138 mEq/L   Bicarb: 25 mEq/L   Dialysate: 2.0 K, 2.25 Ca, 1.0 Mg, 100 Dextrose ()   Dialysate/Machine Temp (prescribed): 37 C   Dialysate/Machine Temp (actual): 35.8 C   BFR (prescribed): 450   BFR (actual): 450   Prescribed time: 03:15   EDW: 67.5 kg   Access Type: Active (In Use):CVCatheter-Tunneled/Chest    Yoseph Jiménez,   [facility]  [phone] (off)  [phone] (Pager)      No interval changes to past medical history, social history or family history to report.    /64   Pulse 71   Temp 98.1  F (36.7  C) (Oral)   Resp 18   Ht 1.626 m (5' 4\")   Wt 67.4 kg (148 lb 9.4 oz)   SpO2 98%   BMI 25.51 kg/m      I/O last 3 completed shifts:  In: 1460 [P.O.:1460]  Out: 2.4 [Other:2.4]    Physical Exam:   GENERAL: mild distress.   EYES: No scleral icterus, conjunctiva clear  ENT: Hearing normal, Oral mucosa moist  RESP: Mild increase effort. Decrease bases.   CV: + leg edema.    GI: ND  Musculoskeletal: Decreased muscle bulk/ tone; No gross joint abnormalities  SKIN: No " rash  PSYCH:  Appropriate mood and affect    Most Recent 3 CBC's:Recent Labs   Lab Test 08/18/21  0643 08/16/21  1444 07/31/21  1548   WBC 4.0 5.4 4.6   HGB 9.7* 10.6* 9.8*   * 110* 108*   * 167 115*     Most Recent 3 BMP's:Recent Labs   Lab Test 08/18/21  0643 08/16/21  1444 07/31/21  1548   * 141 136   POTASSIUM 3.8 3.6 4.1   CHLORIDE 102 102 102   CO2 25 29 25   BUN 13 8 17   CR 2.16* 2.12* 4.40*   ANIONGAP 8 10 9   RONALDO 8.6 8.6 8.6   GLC 93 89 125     Most Recent 2 LFT's:Recent Labs   Lab Test 07/11/21  0631 07/10/21  0610   AST 12 15   ALT 9 11   ALKPHOS 88 107   BILITOTAL 0.4 0.3     Most Recent 3 INR's:Recent Labs   Lab Test 06/11/21  1331 03/31/21  0306 01/26/21  1825   INR 0.99 1.05 1.00     Most Recent INR's and Anticoagulation Dosing History:  Anticoagulation Dose History     Recent Dosing and Labs Latest Ref Rng & Units 7/15/2020 7/16/2020 7/17/2020 10/5/2020 1/26/2021 3/31/2021 6/11/2021    INR 0.90 - 1.10 1.16(H) 1.13(H) 1.10 0.96 1.00 1.05 0.99        Most Recent 3 Creatinines:Recent Labs   Lab Test 08/18/21  0643 08/16/21  1444 07/31/21  1548   CR 2.16* 2.12* 4.40*     Most Recent 3 Hemoglobins:Recent Labs   Lab Test 08/18/21  0643 08/16/21  1444 07/31/21  1548   HGB 9.7* 10.6* 9.8*         Drug and lactation database from the [place] National Library of Medicine:  http://toxnet.nlm.nih.gov/cgi-bin/sis/htmlgen?LACT        Labs personally reviewed today during this evaluation at 11:00 AM

## 2021-08-18 NOTE — PROCEDURES
Hemodialysis Treatment Note    Pre weight:     67.4 Kg, pt states her dry wt is 67.5 kg                          Post weight: Est. 67.4 KG  Run length:3.5 hours  Total fluid removed (ml): 1600, net 1000 removed.  Blood Volume Processed: 77.7  Vascular Access: RIJ, tunneled. Lines were reversed, maintained a 400 BFR.   Treatment Summary: Pt. Started with hypotension, Midodrine was given for hypotension.   Interventions: Vitals checked q 5 minutes and documented q 15 minutes.  Plan: Per renal team.    Shavonne Bond  [facility]

## 2021-08-18 NOTE — PLAN OF CARE
Patient had dialysis today. Pulled off 1L of fluid, had hypotension episodes. Midodrine PO given. Tylenol given prior for headache. VSS stable post dialysis. Patient c/o cramping, stomach pain and all over pain. Oxy given. Writer offering comfort and reassurance. Palliative care at bedside discussing concerns.     Problem: Adult Inpatient Plan of Care  Goal: Plan of Care Review  Outcome: Declining  Goal: Absence of Hospital-Acquired Illness or Injury  Outcome: Declining  Intervention: Identify and Manage Fall Risk  Recent Flowsheet Documentation  Taken 8/18/2021 0800 by Atiya Moctezuma RN  Safety Promotion/Fall Prevention:   activity supervised   bed alarm on   clutter free environment maintained   room door open   safety round/check completed  Intervention: Prevent Skin Injury  Recent Flowsheet Documentation  Taken 8/18/2021 0800 by Atiya Moctezuma, RN  Body Position:   position changed independently   position maintained

## 2021-08-18 NOTE — PLAN OF CARE
Problem: Risk for Delirium  Goal: Optimal Coping  Outcome: Improving  Patient up to chair for meals.  Patient is pleasant and talkative.  Patient alert to person, place, plan of care, situation, and date.  Patient advocates for herself.

## 2021-08-18 NOTE — PLAN OF CARE
[id number]  Problem: Adult Inpatient Plan of Care  Goal: Plan of Care Review  Outcome: No Change     Problem: Adult Inpatient Plan of Care  Goal: Optimal Comfort and Wellbeing  Outcome: No Change    Oxycodone given for right foot pain and was effective.    B/P low at 88/50. Repeat b/p 104/55.  Patient remains on 3L O2. Sats in upper 90s.    Scheduled to have Dialysis tomorrow.

## 2021-08-18 NOTE — PROGRESS NOTES
HEMODIALYSIS NOTE:    WEIGHT PRE:69.1kg    WEIGHT POST:66.4kg    ACCESS PRE:Both ports aspirated and flushed easily.  New dressing and biopatch applied    Chronic Unit: [facility]    RUN SUMMARY:Pt. was hemodynamically stable during dialysis.    UF TOTAL:[id number] removed-400mL prime=2351mL Net removed    BVP:66.4L    ACCESS POST:Heparin instilled to fill volumes for dwell. Clamped, capped and secured.    INTERVENTIONS:Critline used for fluid monitoring/management.Monitor VS Q 15 minutes. Goal adjustment per critline and hemodynamics.    PLAN: per Renal Team

## 2021-08-18 NOTE — PROGRESS NOTES
"[facility] PROGRESS NOTE      ADMIT DATE: 8/16/2021     FACILITY: [facility]    PCP: Neil Galan, [phone]    ASSESSMENT AND PLAN:   Belia Rodriguez is a 76 year old female  with a medical history significant for COPD, chronic respiratory failure on home oxygen, ESRD on hemodialysis, chronic hypotension presents to ER from hemodialysis center for shortness of breath.        Active Problems:    ESRD (end stage renal disease) on dialysis (H)    Acute on chronic respiratory failure with hypercapnia (H)    Acute respiratory failure with hypoxia and hypercarbia (H)      Acute on chronic hypoxemic and hypercapnic respiratory failure: Likely combination of COPD exacerbation and fluid overload from incomplete run of hemodialysis on the day of admission  -BNP on admission elevated but decreased from 7/9/2021 value  -nephro consulted -->had HD session on 8/17 and plan for HD session on 8/18  -Continue nasal cannula oxygen and taper to home level as tolerated.  She normally uses home oxygen at 2-3 L/min.      Acute COPD exacerbation: Chest x-ray shows no focal infiltrate.  - Received Solu-Medrol in ER. Hypoxia and shortness of breath improved.  Continue prednisone 40 mg daily for 5 days  - Continue on home nebulizers: Symbicort and albuterol     ESRD on hemodialysis: Needed to stop hemodialysis earlier on the day of admission due to shortness of breath and hypotension.  - Hemodialysis per nephrology today 8/18  - Continue home cinacalcet and sevelamer     Chronic pain syndrome: Patient reports having chronic bilateral lower leg pain.  Continue PTA Wellbutrin, gabapentin and oxycodone prn.     Chronic hypotension: Continue PTA midodrine          Gas/constipation:  -gas-x, pepcid, and pericolace     Goals of care  -per SW note 8/17/2021: \"Pt knows that when she decides to quit dialysis, she will have 3-5 days to live and wants to move into [facility] at that time. Against dying at home or " "in hospital. Pt is talkative and is open to having palliative support to discuss coping at home. \"  -palliative care consulted and appreciate recommendations       FEN/GI: renal diet  DVT proph: heparin  Code status: No CPR- Do NOT Intubate      Discharge barriers:  -hypoxia, HD session today  -ADOD: 1-2 days       SUBJECTIVE:    See patient during HD today:    Patient complains of a headache. She is waiting for tylenol. She is also complaining of nausea but she gets that during HD. Patient denies any other complaints at this time.     ROS:  12 Points review of systems reviewed and is negative except for what has already been mentioned above    OBJECTIVE:  Patient Vitals for the past 24 hrs:   BP Temp Temp src Pulse Resp SpO2 Weight   08/18/21 0740 121/58 98.1  F (36.7  C) Oral 68 20 98 % 64.8 kg (142 lb 14.4 oz)   08/18/21 0422 115/59 97.3  F (36.3  C) Axillary 70 22 98 % --   08/17/21 2317 108/59 98.1  F (36.7  C) Oral 70 20 97 % --   08/17/21 2215 104/55 -- -- 72 -- -- --   08/17/21 2117 (!) 88/53 97.8  F (36.6  C) Oral 76 20 97 % --   08/17/21 1901 109/63 -- -- 71 20 -- --   08/17/21 1845 101/61 -- -- 73 20 -- --   08/17/21 1830 99/58 -- -- 70 20 -- --   08/17/21 1815 (!) 82/53 -- -- 74 20 -- --   08/17/21 1800 93/56 -- -- 75 20 -- --   08/17/21 1745 104/59 -- -- 73 20 -- --   08/17/21 1730 92/47 -- -- 70 20 -- --   08/17/21 1715 104/61 -- -- 77 20 -- --   08/17/21 1700 106/62 -- -- 78 20 -- --   08/17/21 1645 122/66 -- -- 70 20 -- --   08/17/21 1630 117/61 -- -- 71 20 -- --   08/17/21 1615 124/67 -- -- 72 20 -- --   08/17/21 1600 125/75 -- -- 70 20 -- --   08/17/21 1545 130/81 -- -- 71 20 -- --   08/17/21 1530 123/63 -- -- 73 20 -- --   08/17/21 1500 118/72 98.5  F (36.9  C) Oral 72 20 -- 69.1 kg (152 lb 5.4 oz)   08/17/21 1142 98/63 98  F (36.7  C) Oral 70 18 95 % --      No intake or output data in the 24 hours ending 08/17/21 0805  GENRL: Alert and oriented X 3. Not in acute distress. Satting at 98% on 3 L " O2 via NC.   HEENT: NC/AT      Neck- supple      Sclera- anicteric      Mucous membrane- moist and pink  CHEST: mild crackles at b/l bases  HEART: S1S2 regular. No murmurs, rubs or gallops  ABDMN: Soft. Non-tender.No guarding or rigidity. Bowel sounds- active.   NEURO:  No involuntary movements  INTGM: please see nursing assessment for full skin assessment  PULSES: 2+ and symmetric all extremities  PSYCH: normal affect, normal speech     DIAGNOSTIC DATA:          Recent Results (from the past 24 hour(s))   Basic metabolic panel    Collection Time: 08/18/21  6:43 AM   Result Value Ref Range    Sodium 135 (L) 136 - 145 mmol/L    Potassium 3.8 3.5 - 5.0 mmol/L    Chloride 102 98 - 107 mmol/L    Carbon Dioxide (CO2) 25 22 - 31 mmol/L    Anion Gap 8 5 - 18 mmol/L    Urea Nitrogen 13 8 - 28 mg/dL    Creatinine 2.16 (H) 0.60 - 1.10 mg/dL    Calcium 8.6 8.5 - 10.5 mg/dL    Glucose 93 70 - 125 mg/dL    GFR Estimate 22 (L) >60 mL/min/1.73m2   CBC with platelets    Collection Time: 08/18/21  6:43 AM   Result Value Ref Range    WBC Count 4.0 4.0 - 11.0 10e3/uL    RBC Count 2.92 (L) 3.80 - 5.20 10e6/uL    Hemoglobin 9.7 (L) 11.7 - 15.7 g/dL    Hematocrit 33.2 (L) 35.0 - 47.0 %     (H) 78 - 100 fL    MCH 33.2 (H) 26.5 - 33.0 pg    MCHC 29.2 (L) 31.5 - 36.5 g/dL    RDW 16.4 (H) 10.0 - 15.0 %    Platelet Count 145 (L) 150 - 450 10e3/uL        Results for orders placed or performed during the hospital encounter of 08/16/21   XR Chest Port 1 View    Impression    IMPRESSION: Pacemaker lead over the right ventricle. Central venous catheter tip in the SVC. Left atrial appendage occlusive device unchanged.    Mild interstitial prominence in both lungs is stable could represent some mild edema.    Similar-appearing paraspinal thickening on the left side corresponds to the chronic appearing low attenuating lobular region on CT guidance not been changing. Possibly lymphangioma.          All recent labs reviewed personally  Radiology  report reviewed.   Radiology Results: imaging impression reviewed      The total time spent in preparing this progress note is about 35 minutes, >50% time spent in care co-ordination that includes reviewing labs, images, discussing the plan of care with patient/family, consultants, and .      Fe Valentino MD.   [facility] Medicine Service   [phone]   Pager [phone]

## 2021-08-18 NOTE — PLAN OF CARE
Problem: Adult Inpatient Plan of Care  Goal: Plan of Care Review  Outcome: Improving     Problem: Adult Inpatient Plan of Care  Goal: Optimal Comfort and Wellbeing  Outcome: Improving     Pt AOx4, able to make needs known.  Reports ongoing fatigue, denies feeling short of breath but feels she cannot deeply inhale.  O2 97% on 3L.  Minimal chronic leg pain, no prns requested.
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
## 2021-08-19 PROBLEM — R51.9 INTRACTABLE HEADACHE: Status: ACTIVE | Noted: 2021-01-01

## 2021-08-19 NOTE — PLAN OF CARE
NURSING NOTE  1500 - 2300    Problem: Adult Inpatient Plan of Care  Goal: Plan of Care Review  Outcome: Improving    D/A: Afebrile, VSS with soft BP later in the evening. Continues with supplemental       O2 use at 3L per nasal cannula. Dyspneic with activity. Anuric, voided x1; stool x1.  R: No acute issues. Will continue with plan of care.     Problem: Adult Inpatient Plan of Care  Goal: Patient-Specific Goal (Individualized)  Outcome: Improving    D: Reports of pain sites 1)head, aching <8/10 and 2)feet, neuropathy      <9/10. No vision changes. Denies of nausea.   A: PRN Tylenol given x1 (see MAR). Essential oil. Lights turned down.  R: 6/10 post Tylenol. Oxycodone recently given, will reassess.

## 2021-08-19 NOTE — PLAN OF CARE
Problem: Adult Inpatient Plan of Care  Goal: Optimal Comfort and Wellbeing  Outcome: Improving   Patient has reported headache all day. Was given Oxycodone, Lavender, and cool wash cloth. Plan was to discharge home, but patient did not feel ready to go. Dr. Valentino was notified. Orthostatic BP ordered.

## 2021-08-19 NOTE — PROGRESS NOTES
"[facility]      ADMIT DATE: 8/16/2021     FACILITY: [facility]    PCP: Neil Galan, [phone]    ASSESSMENT AND PLAN:   Belia Rodriguez is a 76 year old female  with a medical history significant for COPD, chronic respiratory failure on home oxygen, ESRD on hemodialysis, chronic hypotension presents to ER from hemodialysis center for shortness of breath.        Active Problems:    ESRD (end stage renal disease) on dialysis (H)    Acute on chronic respiratory failure with hypercapnia (H)    Acute respiratory failure with hypoxia and hypercarbia (H)      Headache with new onset vertigo  -patient complains of persistent headaches which is frontal and at back of neck. Her home oxycodone and tylenol is not controlling the pain. Along with the headache, patient is noticing \"room spinning\" sensation when she is sitting up in the bed which is new. The patient has hx of migraines but she has not had a migraine \"for years\" and she has never had vertigo before. She feels nauseous as well and states her headache improves laying in a dark room.   -neuro exam was unremarkable and at baseline  -orthostatics were unremarkable  -ordered CT head  -ordered imitrex since have suspicion for migraine  -if patient continues to have headache with vertigo tomorrow, will consult neuro      Acute on chronic hypoxemic and hypercapnic respiratory failure: Likely combination of COPD exacerbation and fluid overload from incomplete run of hemodialysis on the day of admission  -BNP on admission elevated but decreased from 7/9/2021 value  -nephro consulted -->had HD session on 8/17 and plan for HD session on 8/18  -Continue nasal cannula oxygen and taper to home level as tolerated.  She normally uses home oxygen at 2-3 L/min.        Acute COPD exacerbation: Chest x-ray shows no focal infiltrate.  - Received Solu-Medrol in ER. Hypoxia and shortness of breath improved.  Continue prednisone 40 mg daily for 5 " "days  - Continue on home nebulizers: Symbicort and albuterol       ESRD on hemodialysis: Needed to stop hemodialysis earlier on the day of admission due to shortness of breath and hypotension.  - Hemodialysis per nephrology today 8/18  - Continue home cinacalcet and sevelamer     Chronic pain syndrome: Patient reports having chronic bilateral lower leg pain.  Continue PTA Wellbutrin, gabapentin and oxycodone prn.     Chronic hypotension: Continue PTA midodrine          Gas/constipation:  -gas-x, pepcid, and pericolace     Goals of care  -per SW note 8/17/2021: \"Pt knows that when she decides to quit dialysis, she will have 3-5 days to live and wants to move into [facility] at that time. Against dying at home or in hospital. Pt is talkative and is open to having palliative support to discuss coping at home. \"  -palliative care consulted and appreciate recommendations - POLST updated and placed in chart 8/18. HCD completed 8/19 and SW to have notary witness signature  -Follow up with Palliative as outpatient for ongoing GOC discussion       FEN/GI: renal diet  DVT proph: heparin  Code status: No CPR- Do NOT Intubate      Discharge barriers:  -headache with vertigo  -ADOD: 1-2 days       SUBJECTIVE:    Patient complains of persistent headaches which is frontal and at back of neck. Her home oxycodone and tylenol is not controlling the pain. Along with the headache, patient is noticing \"room spinning\" sensation when she is sitting up in the bed which is new. The patient has hx of migraines but she has not had a migraine \"for years\" and she has never had vertigo before. She feels nauseous as well and states her headache improves laying in a dark room. Patient denies any other complaints at this time.     ROS:  12 Points review of systems reviewed and is negative except for what has already been mentioned above    OBJECTIVE:  Patient Vitals for the past 24 hrs:   BP Temp Temp src Pulse Resp SpO2 Weight "   08/19/21 0716 108/61 97.2  F (36.2  C) Oral 73 16 97 % --   08/19/21 0328 95/58 98  F (36.7  C) Oral 70 16 91 % --   08/19/21 0104 98/65 97.7  F (36.5  C) Oral 70 16 92 % --   08/18/21 1944 95/50 98.1  F (36.7  C) Oral 73 16 96 % 66.7 kg (147 lb)   08/18/21 1615 119/58 97.9  F (36.6  C) Oral 78 16 95 % --   08/18/21 1245 106/53 -- -- 84 18 -- --   08/18/21 1236 105/64 -- -- 71 18 -- --   08/18/21 1225 107/62 -- -- 70 16 -- --   08/18/21 1215 108/59 -- -- 70 16 -- --   08/18/21 1200 100/60 -- -- 71 16 -- --   08/18/21 1145 100/64 -- -- 70 13 -- --   08/18/21 1130 110/63 -- -- 70 18 -- --   08/18/21 1115 112/62 -- -- 72 18 -- --   08/18/21 1100 120/68 -- -- 71 18 -- --   08/18/21 1045 112/64 -- -- 71 18 -- --   08/18/21 1030 101/59 -- -- 72 18 -- --   08/18/21 1015 98/55 -- -- 63 18 -- --   08/18/21 1000 (!) 81/56 -- -- 63 18 -- --   08/18/21 0945 (!) 89/49 -- -- 76 18 -- --   08/18/21 0930 (!) 84/47 -- -- 70 18 -- --   08/18/21 0915 (!) 89/47 -- -- 72 20 -- --   08/18/21 0906 (!) 89/47 -- -- 72 20 -- --   08/18/21 0809 103/63 98.1  F (36.7  C) Oral 75 22 98 % 67.4 kg (148 lb 9.4 oz)   08/18/21 0740 121/58 98.1  F (36.7  C) Oral 68 20 98 % 64.8 kg (142 lb 14.4 oz)      No intake or output data in the 24 hours ending 08/17/21 0805  GENRL: Alert and oriented X 3. Not in acute distress. Satting at 96% on 3 L O2 via NC.   HEENT: NC/AT      Neck- supple      Sclera- anicteric      Mucous membrane- moist and pink  CHEST: mild crackles at b/l bases  HEART: S1S2 regular. No murmurs, rubs or gallops  ABDMN: Soft. Non-tender.No guarding or rigidity. Bowel sounds- active.   NEURO:  No involuntary movements. Cranial nerves II-XII grossly intact. Strength and sensation at baseline.   INTGM: please see nursing assessment for full skin assessment  PSYCH: normal affect, normal speech     DIAGNOSTIC DATA:          No results found for this or any previous visit (from the past 24 hour(s)).     Results for orders placed or performed  during the hospital encounter of 08/16/21   XR Chest Port 1 View    Impression    IMPRESSION: Pacemaker lead over the right ventricle. Central venous catheter tip in the SVC. Left atrial appendage occlusive device unchanged.    Mild interstitial prominence in both lungs is stable could represent some mild edema.    Similar-appearing paraspinal thickening on the left side corresponds to the chronic appearing low attenuating lobular region on CT guidance not been changing. Possibly lymphangioma.          All recent labs reviewed personally  Radiology report reviewed.   Radiology Results: imaging impression reviewed      The total time spent in preparing this progress note is about 35 minutes, >50% time spent in care co-ordination that includes reviewing labs, images, discussing the plan of care with patient/family, consultants, and .      Fe Valentino MD.   [facility]   [phone]   Pager [phone]

## 2021-08-19 NOTE — PLAN OF CARE
Problem: Adult Inpatient Plan of Care  Goal: Patient-Specific Goal (Individualized)  Outcome: Improving     Problem: Adult Inpatient Plan of Care  Goal: Optimal Comfort and Wellbeing  Outcome: Improving     Patient slept well overnight. Reports 7/10 pain in BL feet. Improves with PRN APAP and oxycodone. CVC catheter is clean and intact. VSS. Alert and oriented x4.    Luis Fernando Greer RN

## 2021-08-19 NOTE — PROGRESS NOTES
Palliative Care Progress Note       Recommendations & Counseling     Goals of Care:  - Established.  Restorative with limits.  Not ready for hospice yet  - No BiPAP - if ever required, this would be a point to initiate comfort care  - If/when started on comfort care in future, she would like transfer to [facility] in [facility] with short trial feeding tube(2-3 days max).  No long-term feeding tubes  - DNR/I  - Follow up with Palliative as outpatient for ongoing GOC discussion - I will place referral    Symptom Management:  1. Chronic pain syndrome(stable) on PTA Wellbutrin, gabapentin, and oxycodone PRN.  Acute headache pain past couple days using acetaminophen with relief.   - No changes from our service    2. Dyspnea d/t AECOPD and fluid overload d/t incomplete HD run.  Stable today.   - HD per Nephrology  - Steroids, home inhalers, PRN nebs  - O2 PRN     3. Weakness, chronic in setting of ESRD, anemia.  Wheelchair bound.     Advanced Care Planning:   - POLST updated and placed in chart 8/18  - HCD completed today and SW to have notary witness signature  - Health care agents:  Cayden and daughter Yvonne     Psychosocial/Spiritual Support:  - [facility]  support  - Lives at home with   - Two daughters.  Yvonne lives here, Melony lives in [place]        Assessments          76 yoF with PMH significant for COPD, smoking history, on 3L home O2, ESRD on HD, chronic hypotension, chronic pain syndrome, chronic anemia, DVT, GI bleed, gout, CVA, ZULEIKA intolerant to CPAP, paroxysmal A.fib s/p AV node ablation, sick sinus syndrome s/p PPM and watchmen procedure, CHF, frequent hospitalizations, admitted 8/16 with dyspnea during hemodialysis suspected due to AECOPD and fluid overload from incomplete HD run.  Received IV steroid in ED and now on 5-day prednisone burst.  HD run completed 8/18.      Today, the patient was seen for:  Advanced care planning    Prognosis, Goals, or Advance Care  Planning was addressed today with: Yes.  Mood, coping, and/or meaning in the context of serious illness were addressed today: Yes.    Key Palliative Symptoms:  # Pain severity the last 12 hours: low  # Dyspnea severity the last 12 hours: none  # Nausea severity the last 12 hours: none  # Anxiety severity the last 12 hours: none    Patient is on opioids: assessed and bowels ok/no needed changes to plan of care today.            Interval History:     Chart review/discussion with unit or clinical team members:   Discussed completing HCD with DAVID Hernandez.  No events overnight.     Per patient or family/caregivers today:  Still has a headache but tolerable, no other unmet symptom burden.  Eating breakfast.  Completed HCD.  We talked about following up with our service as an outpatient and she agrees.             Review of Systems:     A full 14 point review of systems was otherwise completed and is negative aside from that mentioned above          Medications:     I have reviewed this patient's medication profile and medications during this hospitalization.      albuterol  2 puff Inhalation 4x Daily     aspirin  81 mg Oral Daily with lunch     atorvastatin  10 mg Oral At Bedtime     budesonide-formoterol  2 puff Inhalation BID     buPROPion  150 mg Oral Once per day on Tue Sat     cinacalcet  30 mg Oral Q Mon Wed Fri AM     famotidine  20 mg Oral At Bedtime     folic acid  1 mg Oral Daily     gabapentin  100 mg Oral TID     heparin ANTICOAGULANT  5,000 Units Subcutaneous Q12H     melatonin  1 mg Oral At Bedtime     midodrine  15 mg Oral Once per day on Tue Thu Sat     - MEDICATION INSTRUCTIONS -   Does not apply Once     oxyCODONE  2.5 mg Oral Once     predniSONE  40 mg Oral Daily     senna-docusate  1 tablet Oral Every Other Day     sevelamer carbonate  1,600 mg Oral TID     timolol maleate  1 drop Both Eyes BID     traZODone  150 mg Oral At Bedtime     Vitamin D3  2,000 Units Oral Daily with lunch     sodium chloride  "0.9%, acetaminophen, albuterol, albuterol, naloxone **OR** naloxone **OR** naloxone **OR** naloxone, oxyCODONE, sevelamer carbonate, simethicone           Physical Exam:   Blood pressure 108/61, pulse 73, temperature 97.2  F (36.2  C), temperature source Oral, resp. rate 16, height 1.626 m (5' 4\"), weight 66.7 kg (147 lb), SpO2 97 %.  GENERAL: lying in bed in NAD.  Washcloth over face    SKIN: Warm and dry   HEENT: Normocephalic, anicteric sclera, moist mucous membranes  LUNGS: Clear to auscultation anterolaterally; non-labored   CARDIAC: RRR, No MARIANN  ABDOMINAL: BS+, soft, non distended, non tender  EXTREMITIES: No edema or cyanosis, pulses 2+ and symmetrical  NEUROLOGIC: Alert, follows commands  PSYCH: calm, pleasant              Data Reviewed:     All labs/imaging reviewed in Epic     ====================================================  TT: I have personally spent a total of 35 minutes on the unit in review of medical record, consultation with the medical providers and assessment of patient today, with more than 50% of this time spent in counseling, coordination of care, and discussion with patient re: symptom management, risks and benefits of management options, advanced care planning, and development of plan of care as noted above.  ====================================================    AYAH Arellano [facility]  Palliative Medicine  Office: [phone]       "

## 2021-08-19 NOTE — PROGRESS NOTES
Care Management Follow Up    Length of Stay (days): 3    Expected Discharge Date: 08/19/2021     Concerns to be Addressed:     Medically resolved; working to complete HCD in hospital with virtual notary  Patient plan of care discussed at interdisciplinary rounds: Yes    Anticipated Discharge Disposition: Home     Anticipated Discharge Services:  N/A  Anticipated Discharge DME:  N/A      Additional Information:  11:15 AM - Met with pt in room w/[employer] notary representative via IPAD. Star confirmed pt identity/MPR and observed her signing HCD (completed previously with support from Palliative Care Team). HCD placed in chart and scan emailed to [employer]. Star will complete document and add to chart, along with snail mail the pt the original notarized copy and two additional copies for daughters.    SHERMAN spoke with palliative, who is assisting pt with completing a HCD. SHERMAN coordinating with [employer] to do a virtual notary at 11 AM.  Plan to return home with spouse to transport.    DAVID Limon

## 2021-08-19 NOTE — PROGRESS NOTES
"            RENAL ([facility]) progress note  CC: F/U   S: Since last visit, Patient with HD yesterday. Patient seen by pallative yesterday. Patient currently reports breathing near baseline. Current issue is with headache.     A/P:   Active Problems:    ESRD (end stage renal disease) on dialysis (H)    Acute on chronic respiratory failure with hypercapnia (H)    Acute respiratory failure with hypoxia and hypercarbia (H)    1. ESKD: on HD MWF at [facility]. SOB, will push UF. REcently added Saturday runs for 4x a weeks HD.               -Plan for HD MWF and prn during this hospitalization                 2. Dyspnea/hypoxia: multifactorial. COPD, possible some edema but not clearly all volume. IMproved.      3. Anemia of ESKD: hemoglobin above goal for ESKD.               -no need for AILYN or iron. Trend     4. Goals of Care: Last admission we discussed hospice and she was not ready.      Dialyzer: 180NRe Optiflux   Na: 138 mEq/L   Bicarb: 25 mEq/L   Dialysate: 2.0 K, 2.25 Ca, 1.0 Mg, 100 Dextrose ()   Dialysate/Machine Temp (prescribed): 37 C   Dialysate/Machine Temp (actual): 35.8 C   BFR (prescribed): 450   BFR (actual): 450   Prescribed time: 03:15   EDW: 67.5 kg   Access Type: Active (In Use):CVCatheter-Tunneled/Chest    Yoseph Jiménez DO  [facility]  [phone] (off)  [phone] (Pager)      No interval changes to past medical history, social history or family history to report.    /61 (BP Location: Right arm)   Pulse 73   Temp 97.2  F (36.2  C) (Oral)   Resp 16   Ht 1.626 m (5' 4\")   Wt 66.7 kg (147 lb)   SpO2 97%   BMI 25.23 kg/m      I/O last 3 completed shifts:  In: 600 [P.O.:600]  Out: 1000 [Other:1000]    Physical Exam:   GENERAL: mild distress.   EYES: No scleral icterus, conjunctiva clear  ENT: Hearing normal, Oral mucosa moist  RESP: Anterior clear. Decrease bases.   CV: Trace leg edema.    GI: ND  Musculoskeletal: Decreased muscle bulk/ tone; No gross " joint abnormalities  SKIN: No rash  PSYCH:  Appropriate mood and affect    Most Recent 3 CBC's:  Recent Labs   Lab Test 08/19/21  0734 08/18/21  0643 08/16/21  1444   WBC 3.9* 4.0 5.4   HGB 10.8* 9.7* 10.6*   * 114* 110*   * 145* 167     Most Recent 3 BMP's:  Recent Labs   Lab Test 08/19/21  0734 08/18/21  0643 08/16/21  1444    135* 141   POTASSIUM 4.7 3.8 3.6   CHLORIDE 99 102 102   CO2 29 25 29   BUN 12 13 8   CR 2.49* 2.16* 2.12*   ANIONGAP 9 8 10   RONALDO 9.1 8.6 8.6   GLC 84 93 89     Most Recent 2 LFT's:  Recent Labs   Lab Test 07/11/21  0631 07/10/21  0610   AST 12 15   ALT 9 11   ALKPHOS 88 107   BILITOTAL 0.4 0.3     Most Recent 3 INR's:  Recent Labs   Lab Test 06/11/21  1331 03/31/21  0306 01/26/21  1825   INR 0.99 1.05 1.00     Most Recent INR's and Anticoagulation Dosing History:  Anticoagulation Dose History     Recent Dosing and Labs Latest Ref Rng & Units 7/15/2020 7/16/2020 7/17/2020 10/5/2020 1/26/2021 3/31/2021 6/11/2021    INR 0.90 - 1.10 1.16(H) 1.13(H) 1.10 0.96 1.00 1.05 0.99        Most Recent 3 Creatinines:  Recent Labs   Lab Test 08/19/21  0734 08/18/21  0643 08/16/21  1444   CR 2.49* 2.16* 2.12*     Most Recent 3 Hemoglobins:  Recent Labs   Lab Test 08/19/21  0734 08/18/21  0643 08/16/21  1444   HGB 10.8* 9.7* 10.6*         Drug and lactation database from the [place] National Library of Medicine:  http://toxnet.nlm.nih.gov/cgi-bin/sis/htmlgen?LACT        Labs personally reviewed today during this evaluation at 11:00 AM

## 2021-08-20 NOTE — PROGRESS NOTES
Hemodialysis Treatment Note      Fluid Removed: 1.5L    Vascular Access Status: Right Internal jugular cvc aspirates and flushes easily, no issues with BFR. Dressing C/D/I with bio patch in place. Posttreatment catheter instilled with 1:1000 units heparin per limb volume. Catheter labeled, wrapped in gauze and secured with tape.     Dialyzer Rinse: Good    Total Blood Volume Processed: 77.9L    Total Dialysis Treatment Time: 3.5hrs    Run Summary  Pt. was hemodynamically stable during dialysis. No complications.    Interventions  Critline used for fluid monitoring/management.    Plan:  per renal team    Neymar Lopez, RN  [facility]

## 2021-08-20 NOTE — PROGRESS NOTES
[facility] Palliative Care Social Work Note    Assessment   Visited Itzel with Chaplain Citlali. Acknowledged meeting Itzel at her last admission and she recalled our meeting. She was asking for some strategies to deal with her anxiety about the unknown. Itzel expressed that she knows her time is limited and she is fearful of dying. Allowed opportunity to talk about her fears and feelings. Encouraged sharing of stories, and she commented how helpful it is to have people to talk to. She doesn't feel she has a lot of emotional support at home.      Citlali facilitated a guided meditation and initially Itzel talked at times during the meditation. Once she became more comfortable she was able to settle in and relax. Talked about other ways to be in the present and have a sense of calm in the face of uncertainty.     She expressed appreciation for visit. Updated Giovanny Victor NP re visit.     Clinical Social Work Interventions  Active/empathetic listening  Validation of feelings    Assess anxiety and coping  Processing of emotions  Emotional support    Plan of care  PC SW remains available to offer psychosocial/emotional support.     Marielena Bloom, [facility]  Palliative Care   Office # [phone]

## 2021-08-20 NOTE — PLAN OF CARE
Problem: Adult Inpatient Plan of Care  Goal: Optimal Comfort and Wellbeing  Outcome: No Change     Patient had restful night without pain or discomfort. Has scheduled dialysis today. Dressing to right internal jugular CVC clean, dry and intact. Telemetry monitoring showed atrial fibrillation with no paces (has pacemaker).

## 2021-08-20 NOTE — PROGRESS NOTES
"Pt was brought up [employer]ianism with her years in a [employer] school. This experience has resulted in a lifelong sense of guilt and unworthiness. Pt requested to speak with a .    Pt is preparing herself for death which she believes will come in the next few months. She is fearful of God's judgement. Pt wants to know how to receive God's forgiveness for past \"sins.\"    Visited pt alongside Marielena FABIAN. See Marielena's note. Writer promised pt that she would bring her some pamphlets that may be useful. Writer later returned to pt and offered a few resources. There was a CareNote talking about how family can be helpful and caring as they accompany a loved one dying. Specifically addressing God's love and forgiveness, a scripture passage was printed and read aloud to pt.  provided assurance of God's forgiveness.    Pt was visibly relieved to hear and receive this scripture.  underscored what this text meant to [place], a [employer] monk also struggling with guilt and worry about damnation. The ensuing conversation was meaningful for pt.    Pt discharging this evening.    HILDA Varma Div.    "

## 2021-08-20 NOTE — PLAN OF CARE
"  Problem: Adult Inpatient Plan of Care  Goal: Optimal Comfort and Wellbeing  Outcome: Improving   Patient receiving dialysis in her room today. Ate 100% BF. Denies headache, stating \"that Imitrex medication really works\".   "

## 2021-08-20 NOTE — DISCHARGE SUMMARY
"[facility] MEDICINE  DISCHARGE SUMMARY     Primary Care Physician: Neil Galan  Admission Date: 8/16/2021   Discharge Provider: Fe Valentino MD Discharge Date: 8/20/2021   Diet:   Active Diet and Nourishment Order   Procedures     Dialysis Diet     Diet       Code Status: No CPR- Do NOT Intubate   Activity: DCACTIVITY: Activity as tolerated        Condition at Discharge: Stable     REASON FOR PRESENTATION(See Admission Note for Details)     Please refer to H&P    PRINCIPAL & ACTIVE DISCHARGE DIAGNOSES     Principal Problem:    Intractable headache  Active Problems:    ESRD (end stage renal disease) on dialysis (H)    Acute on chronic respiratory failure with hypercapnia (H)    Acute respiratory failure with hypoxia and hypercarbia (H)      PENDING LABS     Unresulted Labs Ordered in the Past 30 Days of this Admission     No orders found from 7/17/2021 to 8/17/2021.            PROCEDURES ( this hospitalization only)          RECOMMENDATIONS TO OUTPATIENT PROVIDER FOR F/U VISIT     Follow-up Appointments     Follow-up and recommended labs and tests       Follow up with primary care provider, Neil Galan, within 2-3 days   for hospital follow-up. Monitor creatinine, sodium level, WBC, hemoglobin,   and platelet count in clinic. Monitor migraines and consider further   work-up in clinic.                  Please refer to discharge orders below and bolded portions below    DISPOSITION     Home    SUMMARY OF HOSPITAL COURSE:      Headache with new onset vertigo  -patient complains of persistent headaches which is frontal and at back of neck. Her home oxycodone and tylenol is not controlling the pain. Along with the headache, patient is noticing \"room spinning\" sensation when she is sitting up in the bed which is new. The patient has hx of migraines but she has not had a migraine \"for years\" and she has never had vertigo before. She feels nauseous as well and states her " "headache improves laying in a dark room.   -neuro exam was unremarkable and at baseline  -orthostatics were unremarkable  -ordered CT head which came back unremarkable  -ordered imitrex and patient's headache and vertigo resolved.   -patient will start taking imitrex on discharge.   -PCP to monitor migraines and consider further work-up in clinic.             Acute on chronic hypoxemic and hypercapnic respiratory failure: Likely combination of COPD exacerbation and fluid overload from incomplete run of hemodialysis on the day of admission  -She normally uses home oxygen at 2-3 L/min.   -Chest x-ray shows no focal infiltrate.  -BNP on admission elevated but decreased from 7/9/2021 value  -Started patient on 5 day course of prednisone  -nephro consulted -->had HD session on 8/17, 8/18, and 8/20  -Patient's breathing improved and she went back to her home O2 requirement of 3 L O2 via NC prior to discharge.  -patient will be discharged with 3 more days of prednisone to finish total 5 day course  -nephro plans to start patient on HD on Saturday as well as c/w her HD on MWF.   -PCP should monitor patient's breathing in OP setting.           ESRD on hemodialysis: Needed to stop hemodialysis earlier on the day of admission due to shortness of breath and hypotension.  -nephro consulted -->had HD session on 8/17, 8/18, and 8/20  -nephro plans to start patient on HD on Saturday as well as c/w her HD on MWF.        Chronic pain syndrome: Patient reports having chronic bilateral lower leg pain at baseline.  Continue PTA Wellbutrin, gabapentin and oxycodone prn on discharge. Patient was out of oxycodone at home-->ordered a few tabs for her on discharge. Patient denies signing a pain contract with any provider.   -PCP should monitor chronic pain in OP setting.           Goals of care  -per SW note 8/17/2021: \"Pt knows that when she decides to quit dialysis, she will have 3-5 days to live and wants to move into [facility] " "House at that time. Against dying at home or in hospital. Pt is talkative and is open to having palliative support to discuss coping at home. \" Patient is not ready for hospice yet.   -palliative care consulted and appreciate recommendations - POLST updated and placed in chart 8/18. HCD completed 8/19 and SW to have notary witness signature  -Follow up with Palliative as outpatient for ongoing GOC discussion. Per latest goals of care, \"No BiPAP - if ever required, this would be a point to initiate comfort care. If/when started on comfort care in future, she would like transfer to [facility] in [place]. Ok with short trial feeding tube(2-3 days max).  No long-term feeding tubes.\"       Pancytopenia  -chronic  -most likely 2/2 to ESRD   -PCP to monitor WBC, hemoglobin,   and platelet count closely in clinic and should consider further work-up if needed.       Hyponatremia  -PCP should monitor Na level in OP setting.     Patient was found to be medically stable for discharge on 8/20/2021 to home. Patient will continue with all her home meds except for the medication changes listed above.       Discharge Medications with Med changes:     Current Discharge Medication List      START taking these medications    Details   predniSONE (DELTASONE) 20 MG tablet Take 2 tablets (40 mg) by mouth daily for 3 days  Qty: 6 tablet, Refills: 0    Associated Diagnoses: Acute on chronic respiratory failure with hypercapnia (H)      simethicone (MYLICON) 80 MG chewable tablet Take 1 tablet (80 mg) by mouth every 6 hours as needed for cramping or flatulence  Qty: 30 tablet, Refills: 0    Associated Diagnoses: Gastroesophageal reflux disease, unspecified whether esophagitis present      SUMAtriptan (IMITREX) 50 MG tablet Take 1 tablet (50 mg) by mouth daily as needed for migraine  Qty: 30 tablet, Refills: 0    Associated Diagnoses: Other migraine without status migrainosus, not intractable         CONTINUE these medications which " have CHANGED    Details   oxyCODONE (ROXICODONE) 5 MG tablet Take 1 tablet (5 mg) by mouth every 6 hours as needed for pain  Qty: 8 tablet, Refills: 0    Associated Diagnoses: Neuropathy         CONTINUE these medications which have NOT CHANGED    Details   acetaminophen (TYLENOL) 500 MG tablet Take 1,000 mg by mouth every 6 hours as needed for mild pain      albuterol (PROAIR HFA/PROVENTIL HFA/VENTOLIN HFA) 108 (90 Base) MCG/ACT inhaler Inhale 2 puffs into the lungs every 6 hours as needed for shortness of breath / dyspnea or wheezing  Qty: 6.7 g, Refills: 0    Comments: Pharmacy may dispense brand covered by insurance (Proair, or proventil or ventolin or generic albuterol inhaler)  Associated Diagnoses: Chronic respiratory failure with hypoxia (H)      albuterol (PROVENTIL) (2.5 MG/3ML) 0.083% neb solution Take 1 vial (2.5 mg) by nebulization 4 times daily On non-dialysis days.  Qty: 300 mL, Refills: 1    Associated Diagnoses: Acute on chronic respiratory failure with hypoxia (H)      aspirin 81 MG EC tablet Take 81 mg by mouth daily (with lunch)       atorvastatin (LIPITOR) 10 MG tablet Take 10 mg by mouth At Bedtime      B Complex-C-Folic Acid (DIALYVITE) TABS Take 1 tablet by mouth daily  Qty: 90 tablet, Refills: 3    Associated Diagnoses: ESRD on dialysis (H)      budesonide-formoterol (SYMBICORT) 80-4.5 MCG/ACT Inhaler Inhale 2 puffs into the lungs 2 times daily  Qty: 10.2 g, Refills: 1    Associated Diagnoses: Acute on chronic respiratory failure with hypoxia (H); COPD exacerbation (H)      buPROPion (WELLBUTRIN XL) 150 MG 24 hr tablet Take 150 mg by mouth twice a week Tue & Sat      cinacalcet (SENSIPAR) 30 MG tablet Take 30 mg by mouth Every Mon, Wed, Fri Morning . At dialysis.      diphenhydrAMINE (BENADRYL) 50 MG tablet Take 50 mg by mouth as needed      famotidine (PEPCID) 20 MG tablet Take 20 mg by mouth At Bedtime      FOLIC ACID PO Take 1 mg by mouth daily      gabapentin (NEURONTIN) 100 MG capsule  Take 100 mg by mouth 3 times daily      Lactobacillus (CULTURELLE DIGESTIVE WOMENS PO) Take 1 tablet by mouth daily      loratadine (CLARITIN) 10 MG capsule Take 10 mg by mouth as needed      melatonin 3 MG tablet Take 3 mg by mouth At Bedtime      !! midodrine (PROAMATINE) 10 MG tablet Take 10 mg by mouth every 6 hours as needed (for SBP < 110)       !! midodrine (PROAMATINE) 10 MG tablet Take 15 mg by mouth three times a week Take once daily prior to dialysis on dialysis days Mon-Wed-Fri      nystatin (MYCOSTATIN) 814933 UNIT/GM external powder Apply topically 2 times daily  Qty: 60 g, Refills: 0    Associated Diagnoses: Tinea cruris      ondansetron (ZOFRAN) 4 MG tablet Take 4 mg by mouth every 8 hours as needed for nausea      polyvinyl alcohol (LIQUIFILM TEARS) 1.4 % ophthalmic solution Place 1 drop into both eyes as needed for dry eyes      senna-docusate (SENOKOT-S/PERICOLACE) 8.6-50 MG tablet Take 1 tablet by mouth every other day       !! sevelamer carbonate (RENVELA) 800 MG tablet Take 1,600 mg by mouth 3 times daily       !! sevelamer carbonate (RENVELA) 800 MG tablet Take 800 mg by mouth Take with snacks or supplements       sodium-potassium bicarbonate (EDI-SELTZER GOLD) TBEF solu-tab Take 1 tablet by mouth daily as needed for heartburn      timolol maleate (TIMOPTIC) 0.5 % ophthalmic solution Place 1 drop into both eyes 2 times daily      traZODone (DESYREL) 150 MG tablet Take 1 tablet (150 mg) by mouth At Bedtime  Qty: 90 tablet, Refills: 3    Associated Diagnoses: Primary insomnia      Vitamin D, Cholecalciferol, 25 MCG (1000 UT) TABS Take 2,000 Units by mouth daily (with lunch)       !! - Potential duplicate medications found. Please discuss with provider.                Rationale for medication changes:      Please refer to Summary of Hospital Course Section          Consults       NEPHROLOGY IP CONSULT  SOCIAL WORK IP CONSULT  PALLIATIVE CARE ADULT IP CONSULT    Immunizations given this encounter      Most Recent Immunizations   Administered Date(s) Administered     COVID-19,PF,Moderna 03/01/2021     COVID-19,PF,Pfizer 02/28/2021     Influenza, Quad, High Dose, Pf, 65yr + 09/14/2020     Pneumo Conj 13-V (2010&after) 10/29/2017     Pneumococcal 23 valent 10/29/2017     Pneumococcal, Unspecified 10/30/2017     TDAP Vaccine (Adacel) 10/29/2017     Zoster Vaccine, Unspecified (historical) 07/28/2009     Zoster vaccine recombinant adjuvanted (SHINGRIX) 07/27/2009     Zoster vaccine, live 07/27/2009           Anticoagulation Information      Recent INR results: No results for input(s): INR in the last 168 hours.  Warfarin doses (if applicable) or name of other anticoagulant: NA      SIGNIFICANT IMAGING FINDINGS     Results for orders placed or performed during the hospital encounter of 08/16/21   XR Chest Port 1 View    Impression    IMPRESSION: Pacemaker lead over the right ventricle. Central venous catheter tip in the SVC. Left atrial appendage occlusive device unchanged.    Mild interstitial prominence in both lungs is stable could represent some mild edema.    Similar-appearing paraspinal thickening on the left side corresponds to the chronic appearing low attenuating lobular region on CT guidance not been changing. Possibly lymphangioma.   CT Head w/o Contrast    Impression    IMPRESSION:  1.  No finding for intracranial hemorrhage, mass, or acute infarct. Mild presumed sequela of chronic microvascular ischemic change, similar to prior.       SIGNIFICANT LABORATORY FINDINGS     Most Recent 3 CBC's:Recent Labs   Lab Test 08/20/21  0702 08/19/21  0734 08/18/21  0643   WBC 3.7* 3.9* 4.0   HGB 10.0* 10.8* 9.7*   * 111* 114*   * 142* 145*     Most Recent 3 BMP's:Recent Labs   Lab Test 08/20/21  0702 08/19/21  0734 08/18/21  0643   * 137 135*   POTASSIUM 4.8 4.7 3.8   CHLORIDE 99 99 102   CO2 25 29 25   BUN 30* 12 13   CR 4.15* 2.49* 2.16*   ANIONGAP 10 9 8   RONALDO 8.9 9.1 8.6   GLC 97 84 93            Discharge Orders        Medication Therapy Management Referral      Reason for your hospital stay    Belia Rodriguez is a 76 year old female  with a medical history significant for COPD, chronic respiratory failure on home oxygen, ESRD on hemodialysis, chronic hypotension presents to ER from hemodialysis center for shortness of breath.     Activity    Your activity upon discharge: activity as tolerated     Follow-up and recommended labs and tests     Follow up with primary care provider, Neil Galan, within 2-3 days for hospital follow-up. Monitor creatinine, sodium level, WBC, hemoglobin, and platelet count in clinic. Monitor migraines and consider further work-up in clinic.     Diet    Follow this diet upon discharge: Orders Placed This Encounter      Dialysis Diet       Examination   Physical Exam   Temp:  [97.5  F (36.4  C)-98.5  F (36.9  C)] 98.3  F (36.8  C)  Pulse:  [61-86] 76  Resp:  [16-20] 16  BP: ()/(41-71) 97/62  SpO2:  [96 %-98 %] 97 %  Wt Readings from Last 1 Encounters:   08/18/21 66.7 kg (147 lb)     GENRL: Alert and oriented X 3. Not in acute distress. Satting at 97% on 3 L O2 via NC.   HEENT: NC/AT                 Neck- supple                 Sclera- anicteric                 Mucous membrane- moist and pink  CHEST: mild crackles at b/l bases  HEART: S1S2 regular. No murmurs, rubs or gallops  ABDMN: Soft. Non-tender.No guarding or rigidity. Bowel sounds- active.   NEURO:  No involuntary movements.   INTGM: please see nursing assessment for full skin assessment  PSYCH: normal affect, normal speech        Please see EMR for more detailed significant labs, imaging, consultant notes etc.    IFe MD, personally saw the patient today and spent greater than 30 minutes discharging this patient.    Fe Valentino MD  [facility]    CC:Neil Galan

## 2021-08-20 NOTE — PLAN OF CARE
NURSING NOTE  1500 - 2300    Problem: Adult Inpatient Plan of Care  Goal: Plan of Care Review  Outcome: Improving    D: Reports of headache <8/10 and chronic BLE neuropathy 9/10 tonight.      Mild photophobia with the headaches. Denies of nausea. Head CT done.  A: Due scheduled Imitrex given (see MAR). PRN Oxycodone x1 given with      her HS home meds. Essential oil.  R: Good response with Imitrex - pain 6/10, photophobia resolved.       - Continues with supplemental O2 use of 3L. Dyspneic with activity.   - Orthostatics WNL. VSS, soft BP this evening. On tele monitoring.  - Sustained a skin tear on LFA this afternoon when removing PIV dressing.    Vaseline dressing, gauze & coban applied.

## 2021-08-20 NOTE — PROGRESS NOTES
"            [facility] ([facility]) progress note  CC: F/U   S: Since last visit, seen on HD today. Breathing near baseline. No pain. Headache improved. Patient ok with discharge today.     A/P:   Principal Problem:    Intractable headache  Active Problems:    ESRD (end stage renal disease) on dialysis (H)    Acute on chronic respiratory failure with hypercapnia (H)    Acute respiratory failure with hypoxia and hypercarbia (H)    1. ESKD: on HD MWF at [facility]. SOB, will push UF. REcently added Saturday runs for 4x a weeks HD.               -Plan for HD MWF and prn during this hospitalization. Ok with discharge today.                 2. Dyspnea/hypoxia: multifactorial. COPD, possible some edema but not clearly all volume. IMproved.      3. Anemia of ESKD: hemoglobin above goal for ESKD.               -no need for AILYN or iron. Trend     4. Goals of Care: Last admission we discussed hospice and she was not ready.      Dialyzer: 180NRe Optiflux   Na: 138 mEq/L   Bicarb: 25 mEq/L   Dialysate: 2.0 K, 2.25 Ca, 1.0 Mg, 100 Dextrose ()   Dialysate/Machine Temp (prescribed): 37 C   Dialysate/Machine Temp (actual): 35.8 C   BFR (prescribed): 450   BFR (actual): 450   Prescribed time: 03:15   EDW: 67.5 kg   Access Type: Active (In Use):CVCatheter-Tunneled/Chest    Yoseph Jiménez, DO  [facility]  [phone] (off)  [phone] (Pager)      No interval changes to past medical history, social history or family history to report.    BP 94/53   Pulse 80   Temp 98.5  F (36.9  C) (Oral)   Resp 16   Ht 1.626 m (5' 4\")   Wt 66.7 kg (147 lb)   SpO2 97%   BMI 25.23 kg/m      I/O last 3 completed shifts:  In: 240 [P.O.:240]  Out: -     Physical Exam:   GENERAL: mild distress.   EYES: No scleral icterus, conjunctiva clear  ENT: Hearing normal, Oral mucosa moist  RESP: Anterior clear. Decrease bases.   CV: Trace leg edema.    GI: ND  Musculoskeletal: Decreased muscle bulk/ tone; No gross joint " abnormalities  SKIN: No rash  PSYCH:  Appropriate mood and affect    Most Recent 3 CBC's:  Recent Labs   Lab Test 08/20/21  0702 08/19/21  0734 08/18/21  0643   WBC 3.7* 3.9* 4.0   HGB 10.0* 10.8* 9.7*   * 111* 114*   * 142* 145*     Most Recent 3 BMP's:  Recent Labs   Lab Test 08/20/21  0702 08/19/21  0734 08/18/21  0643   * 137 135*   POTASSIUM 4.8 4.7 3.8   CHLORIDE 99 99 102   CO2 25 29 25   BUN 30* 12 13   CR 4.15* 2.49* 2.16*   ANIONGAP 10 9 8   RONALDO 8.9 9.1 8.6   GLC 97 84 93     Most Recent 2 LFT's:  Recent Labs   Lab Test 07/11/21  0631 07/10/21  0610   AST 12 15   ALT 9 11   ALKPHOS 88 107   BILITOTAL 0.4 0.3     Most Recent 3 INR's:  Recent Labs   Lab Test 06/11/21  1331 03/31/21  0306 01/26/21  1825   INR 0.99 1.05 1.00     Most Recent INR's and Anticoagulation Dosing History:  Anticoagulation Dose History     Recent Dosing and Labs Latest Ref Rng & Units 7/15/2020 7/16/2020 7/17/2020 10/5/2020 1/26/2021 3/31/2021 6/11/2021    INR 0.90 - 1.10 1.16(H) 1.13(H) 1.10 0.96 1.00 1.05 0.99        Most Recent 3 Creatinines:  Recent Labs   Lab Test 08/20/21  0702 08/19/21  0734 08/18/21  0643   CR 4.15* 2.49* 2.16*     Most Recent 3 Hemoglobins:  Recent Labs   Lab Test 08/20/21  0702 08/19/21  0734 08/18/21  0643   HGB 10.0* 10.8* 9.7*         Drug and lactation database from the [place] National Library of Medicine:  http://toxnet.nlm.nih.gov/cgi-bin/sis/htmlgen?LACT        Labs personally reviewed today during this evaluation at 11:00 AM

## 2021-08-20 NOTE — PROGRESS NOTES
Discharge order given at 1500. Discharge teaching completed by AM nurse per AM nurse and pt report. AM nurse and pt reported that med prescriptions given with discharge education. Discharge process completed prior to PM shift by AM nurse, per her report. Pt discharged and waiting for ride (to be picked up by .)

## 2021-08-21 NOTE — PROGRESS NOTES
Pt discharged at 5:50 with her .   Prior to discharge: telemetry discontinued; tylenol given for headache 1x, which effectively relieved pain.   Pt denied nausea. LS cta, and pt denied respiratory distress. Vss upon discharge. Pt sent home with all personal belongings, discharge paperwork, and medication prescriptions.

## 2021-08-22 NOTE — PROGRESS NOTES
Clinic Care Coordination Contact  Community Health Worker Initial Outreach        Patient accepts CC: No, Pt declined needs for any extra support( financial, medical, socail), Pt reported that she has a f/u appt with specialist.      Clinic Care Coordination Contact  [facility]: Post-Discharge Note  SITUATION                                                      Admission:    Admission Date: 08/16/21   Reason for Admission: ESRD (end stage renal disease) on dialysis (H)  Acute on chronic respiratory failure with hypercapnia (H)  Acute respiratory failure with hypoxia and hypercarbia (H)  Discharge:   Discharge Date: 08/20/21  Discharge Diagnosis: ESRD (end stage renal disease) on dialysis (H)  Acute on chronic respiratory failure with hypercapnia (H)  Acute respiratory failure with hypoxia and hypercarbia (H)    BACKGROUND                                                      Belia Rodriguez is a 76 year old female  with a medical history significant for COPD, chronic respiratory failure on home oxygen, ESRD on hemodialysis, chronic hypotension presents to ER from hemodialysis center for shortness of breath.          ASSESSMENT      Enrollment  Primary Care Care Coordination Status: Declined    Discharge Assessment  How are you doing now that you are home?: not feeling well  How are your symptoms? (Red Flag symptoms escalate to triage hotline per guidelines): Unchanged  Do you feel your condition is stable enough to be safe at home until your provider visit?: Yes  Does the patient have their discharge instructions? : Yes  Does the patient have questions regarding their discharge instructions? : No  Were you started on any new medications or were there changes to any of your previous medications? : No  Does the patient have all of their medications?: Yes  Do you have questions regarding any of your medications? : No    Post-op (CHW CTA Only)  If the patient had a surgery or procedure, do they have any  questions for a nurse?: No    Post-op (Clinicians Only)  Did the patient have surgery or a procedure: No  Fever: No  Chills: No  Eating & Drinking: eating and drinking without complaints/concerns  PO Intake: regular diet  Bowel Function: normal  Urinary Status: indwelling urinary catheter      PLAN                                                      Outpatient Plan:     Follow-up Appointments     Follow-up and recommended labs and tests       Follow up with primary care provider, Neil Galan, within 2-3 days   for hospital follow-up. Monitor creatinine, sodium level, WBC, hemoglobin,   and platelet count in clinic. Monitor migraines and consider further   work-up in clinic.                 Future Appointments   Date Time Provider Department Center   11/11/2021 12:00 AM DEMETRIO RODRIGUEZ REMOTE DEVICE CHECK FROM HOME [facility]         For any urgent concerns, please contact our 24 hour nurse triage line: [phone] ([phone])         Mu Hampton MA  Clinic Care Coordinator

## 2021-08-24 NOTE — TELEPHONE ENCOUNTER
MTM referral from: Transitions of Care (recent hospital discharge or ED visit)    MTM referral outreach attempt #2 on August 24, 2021 at 10:42 AM      Outcome: Patient not reachable after several attempts, will route to MTM Pharmacist/Provider as an FYI. Thank you for the referral.    Trav Kwok, MTM coordinator

## 2021-09-07 PROBLEM — R07.9 CHEST PAIN, UNSPECIFIED TYPE: Status: ACTIVE | Noted: 2021-01-01

## 2021-09-07 PROBLEM — E87.70 HYPERVOLEMIA, UNSPECIFIED HYPERVOLEMIA TYPE: Status: ACTIVE | Noted: 2021-01-01

## 2021-09-07 NOTE — CONSULTS
Met with pt in room. Pt has met with hospice 3 times in recent past so very familiar with program and services. Pt wanting to know about transportation if she goes from hospital to hospice home. Deferred to CM to follow up on this. Asked pt about desire for hospice and states she is not ready for hospice at this time. She has to get stuff in order, transfer land etc with her . She states she has called her daughter and hopefully will get some of this done soon. States when she is ready for hospice her plan is to go to [facility] and stop dialysis at that time. Responded to all questions, gave hospice phone number. Updated [facility]AGE and Antonella MUSTAFA.     Thank you for this referral and opportunity to work with this family and team.     Elena Martin RN BSN PHN PN  Hospice Referral Specialist  [facility]    [phone]  [email or web address]

## 2021-09-07 NOTE — PROGRESS NOTES
"Spiritual Assessment:   Coming to terms with her mortality  Concerned about 's mental wellbeing  Identifies support from 2 daughters  Wants to die in a hospice facility  Raised [place]; finds prayer comforting    Care Provided:   Empathic listening and presence  Helped patient in processing of emotions  Normalized/validated her feelings of doubt, concern, and hopefulness  Read selected passages from Scripture  Prayer shared    Full Spiritual Care Note: Received page from ED staff, notifying me that Itzel would like to speak with a . Itzel stated that she would like some written prayers to help her manage her anxiety. She states that she has a pacemaker before her heart is \"too weak to beat on it's own\" and notes that her lungs are not strong either. She notes that she is coming in to the hospital more frequently. Itzel says \"I have decided to go with hospice because I don't want to die at home, and I don't want to die in the hospital. But know one can tell me when it is time. I have to decide that myself, and I'm just not ready.\" She states that she has some legal issues with property that she wants to address, but notes that her  is reluctant to draw up a will or hire an . She expresses concerns about his wellbeing, noting that he is becoming more forgetful and increasingly suspicious of her. Itzel identifies support from her older daughter Dinah (who lives in [place]) and her younger daughter who lives in [place].     Itzel engaged in a bit of life review, sharing that she was raised [place], but had some difficult experiences that led to questions than answers. She expressed feelings of guilt about previous transgressions. We talked about forgiveness and eternity. Scripture read. Prayer shared. Brought Itzel some written prayers.     Plan of Care: Will plan to follow-up with Itzel if she is admitted to [facility].     Bettina Mcdonough M.Div.  Staff   [phone]    "

## 2021-09-07 NOTE — H&P
[facility]    History and Physical - Hospitalist Service       Date of Admission:  9/7/2021    Assessment & Plan      Belia Rodriguez is a 76 year old female with a medical history of COPD on 3 L nasal cannula home oxygen, chronic kidney disease on dialysis, DVT, heart failure, HTN, and pacemaker who presents with SOB for two days. Admitted for COPD exacerbation and fluid overload.    COPD exacerbation: chest XR shows no focal infiltrate. Admitted for COPD exacerbation about two weeks ago. Symptoms improved after prednisone for 5 days.  - Prednisone 40 mg po x 5 days  - Albuterol nebs  - Continue home symbicort    Chronic hypoxemic and hypercapnic respiratory failure: Continue home oxygen at 3 LPM. Titrate as indicated.    ESRD on hemodialysis: on MWF schedule. Two weeks ago, added Saturday too.  - Consult nephrology for additional fluid removal.  - Midodrine scheduled and prn for hypotension  - Continue sevelamer    Chronic pain syndrome: Patient reports having chronic bilateral lower leg pain at baseline.  - Continue PTA tylenol, Wellbutrin, gabapentin and oxycodone prn     Goal of care: patient has been having multiple hospitalization for fluid overload and COPD exacerbation. Per previous documentation, patient is aware that she has poor prognosis. She has POLST dated on 8/18/2021. During last admission, she was not ready to start hospice care.   - Patient requested to talk to hospice care again on admission. Hospice team consulted.       Diet:  renal dialysis diet  DVT Prophylaxis: Heparin SQ  Adorno Catheter: Not present  Central Lines: PRESENT     Code Status:  DNR/DNI. Per POLST on 8/18/2021.      Disposition Plan   Expected discharge: 2-3 days     The patient's care was discussed with the Bedside Nurse, Care Coordinator/ and Patient.    Donna Webb MD  [facility]  Securely message with the Vocera Web Console (learn more here)  Text page via  [facility] Paging/Directory      ______________________________________________________________________    Chief Complaint   SOB    History is obtained from the patient    History of Present Illness   Belia Rodriguez is a 76 year old female with a medical history of COPD on 3 L nasal cannula home oxygen, chronic kidney disease on dialysis, DVT, heart failure, HTN, and pacemaker who presents to the Emergency Department for evaluation of shortness of breath.  Patient reports that 4 days ago, she developed cough with whitish phlem. She reports that she coughs up a lot of phlem in the morning. Two days ago, she further developed SOB. She feels chest tightness and needs to take frequent deep breath. At baseline, she uses home oxygen at 3 LPM. For the past two days, she needed to increase the oxygen to 4 LPM due to her SOB. She feels sitting up make her SOB better. She normally uses wheelchair at home. She is able to do pivot transfer herself. But for the past two days, every step of the transfer makes her have severe SOB and needs to rest for a while before doing the next step. She has been using her nebulizers and inhalers. They only give her relief for a few hours. She reports no fever, no chest pain. Patient is on hemodialysis on MWF schedule. About two weeks ago, patient was admitted for COPD exacerbation and fluid overload. Additional hemodialysis has been added to Saturday. Patient reports that she did not miss any her hemodialysis after discharge. The last run was yesterday. After hemodialysis, she felt that her SOB only had slight improvement. She then decided to come to ER for evaluation. In ER, chest XR shows no focal infiltrate. COVID19 was tested negative. Troponin is within normal range. Patient was given nebulizer treatment empirically.  Patient has some improvement for her symptoms. She was then admitted for further evaluation.     Review of Systems    The 10 point Review of Systems is negative other than  noted in the HPI or here.     Past Medical History    I have reviewed this patient's medical history and updated it with pertinent information if needed.   Past Medical History:   Diagnosis Date     Acute on chronic diastolic congestive heart failure (H) 01/23/2019     Acute on chronic respiratory failure with hypoxia and hypercapnia (H)      Acute respiratory failure with hypoxia (H) 06/30/2014     Arthritis      Cardiac device in situ 04/05/2018    LAAO April 5, 2018 (30 mm WATCHMAN)     Chronic anemia 06/01/2014     Chronic kidney disease      Chronic respiratory failure with hypoxia (H)     3L nc     Chronic thoracic aortic dissection (H) 10/07/2015    descending thoracic aorta; treated medically per notes of Dragan Singh and Jennifer.     COPD (chronic obstructive pulmonary disease) (H)      COPD exacerbation (H) 01/04/2019     Disease of thyroid gland      Dissection of thoracoabdominal aorta (H)      DVT (deep venous thrombosis) (H) 08/22/2019     Dyslipidemia      ESRD (end stage renal disease) (H) 06/03/2009    on dialysis with Dr. Mitchell     Essential hypertension 06/30/2014     Gastrointestinal hemorrhage, unspecified gastrointestinal hemorrhage type 06/05/2017     GI bleeding 06/05/2017     Gout      Heart failure (H)      Hip fracture, intertrochanteric (H) 06/23/2019     History of compression fracture of spine 06/16/2017     L3 vertebral fracture (H) 11/16/2015     ZULEIKA (obstructive sleep apnea) 10/22/2015     Pneumonia 09/07/2015     Right foot drop      Spinal stenosis 03/28/2016     Stroke (H) 03/24/2016       Past Surgical History   I have reviewed this patient's surgical history and updated it with pertinent information if needed.  Past Surgical History:   Procedure Laterality Date     BACK SURGERY      [facility]     C OPEN FIX INTER/SUBTROCH FX,IMPLNT Left 6/23/2019    Procedure: INTERNAL FIXATION, FRACTURE, TROCHANTERIC, HIP, USING INTERMEDULLARY NAIL;  Surgeon: Bennie Marsh DO;   Location: [facility] Main OR;  Service: Orthopedics     COLONOSCOPY N/A 3/23/2016    Procedure: COLONOSCOPY;  Surgeon: Ruddy Tejada MD;  Location: [facility] GI;  Service:      DILATION AND CURETTAGE       EP ABLATION AV NODE N/A 3/7/2019    Procedure: EP Ablation AV Node;  Surgeon: Derick Duarte MD;  Location: [facility] Cath Lab;  Service: Cardiology     EP NEGRA CLOSURE N/A 4/5/2018    Procedure: EP NEGRA Closure;  Surgeon: Derick Duarte MD;  Location: [facility] Cath Lab;  Service:      EP PACEMAKER INSERT N/A 3/7/2019    Procedure: EP Pacemaker Insertion;  Surgeon: Dreick Duarte MD;  Location: [facility] Cath Lab;  Service: Cardiology     EYE SURGERY       HERNIA REPAIR       IR ABDOMINAL AORTOGRAM  3/20/2016     IR CVC TUNNEL PLACEMENT > 5 YRS OF AGE  6/12/2014     IR CVC TUNNEL PLACEMENT > 5 YRS OF AGE  9/10/2018     IR CVC TUNNEL PLACEMENT > 5 YRS OF AGE  11/20/2018     IR CVC TUNNEL REVISION RIGHT  10/23/2020     IR MISCELLANEOUS PROCEDURE  3/25/2009     IR MISCELLANEOUS PROCEDURE  3/30/2009     IR MISCELLANEOUS PROCEDURE  10/28/2011     IR TUNNELED CATHETER COMPLETE REPLACEMENT  7/28/2016     IR TUNNELED CATHETER COMPLETE REPLACEMENT  8/18/2016     IR TUNNELED CATHETER COMPLETE REPLACEMENT  2/16/2017     IR TUNNELED CATHETER COMPLETE REPLACEMENT  10/18/2018     IR TUNNELED CATHETER COMPLETE REPLACEMENT  10/23/2020     IR TUNNELED CATHETER INSERT  11/20/2018     IR TUNNELED CATHETER REMOVAL  6/9/2014     IR TUNNELED CATHETER REMOVAL  9/7/2018     IR TUNNELED CATHETER REMOVAL  11/20/2018     IR TUNNELED CATHETER REMOVAL  11/20/2018     VT COLSC FLEXIBLE W/CONTROL BLEEDING ANY METHOD N/A 6/7/2017    Procedure: COLONOSCOPY;  Surgeon: Luis Mckeon MD;  Location: [facility];  Service: Gastroenterology     TONSILLECTOMY         Social History   I have reviewed this patient's social history and updated it with pertinent information if needed.  Social History     Tobacco Use     Smoking status:  Former Smoker     Packs/day: 1.50     Years: 37.00     Pack years: 55.50     Types: Cigarettes     Quit date: 2009     Years since quittin.6     Smokeless tobacco: Never Used   Substance Use Topics     Alcohol use: Yes     Alcohol/week: 11.7 standard drinks     Comment: Alcoholic Drinks/day: 14 mixed drinks per week     Drug use: No       Family History   I have reviewed this patient's family history and updated it with pertinent information if needed.  Family History   Problem Relation Age of Onset     Dementia Mother      Diabetes Mother      Arthritis Mother      Cancer Mother      Depression Mother      Heart Disease Mother      Vision Loss Mother      Cerebrovascular Disease Father      Heart Disease Father      Breast Cancer No family hx of        Prior to Admission Medications   Prior to Admission Medications   Prescriptions Last Dose Informant Patient Reported? Taking?   B Complex-C-Folic Acid (DIALYVITE) TABS   No No   Sig: Take 1 tablet by mouth daily   FOLIC ACID PO   Yes No   Sig: Take 1 mg by mouth daily   Lactobacillus (CULTURELLE DIGESTIVE WOMENS PO)   Yes No   Sig: Take 1 tablet by mouth daily   SUMAtriptan (IMITREX) 50 MG tablet   No No   Sig: Take 1 tablet (50 mg) by mouth daily as needed for migraine   Vitamin D, Cholecalciferol, 25 MCG (1000 UT) TABS   Yes No   Sig: Take 2,000 Units by mouth daily (with lunch)   acetaminophen (TYLENOL) 500 MG tablet   Yes No   Sig: Take 1,000 mg by mouth every 6 hours as needed for mild pain   albuterol (PROAIR HFA/PROVENTIL HFA/VENTOLIN HFA) 108 (90 Base) MCG/ACT inhaler   No No   Sig: Inhale 2 puffs into the lungs every 6 hours as needed for shortness of breath / dyspnea or wheezing   albuterol (PROVENTIL) (2.5 MG/3ML) 0.083% neb solution   No No   Sig: Take 1 vial (2.5 mg) by nebulization 4 times daily On non-dialysis days.   aspirin 81 MG EC tablet   Yes No   Sig: Take 81 mg by mouth daily (with lunch)    atorvastatin (LIPITOR) 10 MG tablet   Yes No    Sig: Take 10 mg by mouth At Bedtime   buPROPion (WELLBUTRIN XL) 150 MG 24 hr tablet   Yes No   Sig: Take 150 mg by mouth twice a week Tue & Sat   budesonide-formoterol (SYMBICORT) 80-4.5 MCG/ACT Inhaler   No No   Sig: Inhale 2 puffs into the lungs 2 times daily   cinacalcet (SENSIPAR) 30 MG tablet   Yes No   Sig: Take 30 mg by mouth Every Mon, Wed, Fri Morning . At dialysis.   diphenhydrAMINE (BENADRYL) 50 MG tablet   Yes No   Sig: Take 50 mg by mouth as needed   famotidine (PEPCID) 20 MG tablet   Yes No   Sig: Take 20 mg by mouth At Bedtime   gabapentin (NEURONTIN) 100 MG capsule   Yes No   Sig: Take 100 mg by mouth 3 times daily   loratadine (CLARITIN) 10 MG capsule   Yes No   Sig: Take 10 mg by mouth as needed   melatonin 3 MG tablet   Yes No   Sig: Take 3 mg by mouth At Bedtime   midodrine (PROAMATINE) 10 MG tablet   Yes No   Sig: Take 10 mg by mouth every 6 hours as needed (for SBP < 110)    midodrine (PROAMATINE) 10 MG tablet   Yes No   Sig: Take 15 mg by mouth three times a week Take once daily prior to dialysis on dialysis days Mon-Wed-Fri   nystatin (MYCOSTATIN) 149421 UNIT/GM external powder   No No   Sig: Apply topically 2 times daily   ondansetron (ZOFRAN) 4 MG tablet   Yes No   Sig: Take 4 mg by mouth every 8 hours as needed for nausea   oxyCODONE (ROXICODONE) 5 MG tablet   No No   Sig: Take 1 tablet (5 mg) by mouth every 6 hours as needed for pain   polyvinyl alcohol (LIQUIFILM TEARS) 1.4 % ophthalmic solution   Yes No   Sig: Place 1 drop into both eyes as needed for dry eyes   senna-docusate (SENOKOT-S/PERICOLACE) 8.6-50 MG tablet   Yes No   Sig: Take 1 tablet by mouth every other day    sevelamer carbonate (RENVELA) 800 MG tablet   Yes No   Sig: Take 800 mg by mouth Take with snacks or supplements    sevelamer carbonate (RENVELA) 800 MG tablet   Yes No   Sig: Take 1,600 mg by mouth 3 times daily    simethicone (MYLICON) 80 MG chewable tablet   No No   Sig: Take 1 tablet (80 mg) by mouth every 6  hours as needed for cramping or flatulence   sodium-potassium bicarbonate (EDI-SELTZER GOLD) TBEF solu-tab   Yes No   Sig: Take 1 tablet by mouth daily as needed for heartburn   timolol maleate (TIMOPTIC) 0.5 % ophthalmic solution   Yes No   Sig: Place 1 drop into both eyes 2 times daily   traZODone (DESYREL) 150 MG tablet   No No   Sig: Take 1 tablet (150 mg) by mouth At Bedtime      Facility-Administered Medications: None     Allergies   Allergies   Allergen Reactions     Ace Inhibitors Cough     Other reaction(s): *Unknown     Fosinopril Cough     Ipratropium Headache     Zolpidem Other (See Comments)     Hallucinations  Other reaction(s): Hallucinations       Physical Exam   Vital Signs: Temp: 98.3  F (36.8  C) Temp src: Oral BP: 107/53 Pulse: 70   Resp: (!) 34 SpO2: 99 %      Weight: 148 lbs 12.97 oz    General appearance: not in acute distress  HEENT: PERRL, EOMI  Lungs: Coarse breath sounds in bilateral lung fields  Cardiovascular: Regular rate and rhythm, normal S1-S2  Abdomen: Soft, non tender, no distension, normal bowel sound  Musculoskeletal: No joint swelling  Skin: No rash and no edema  Neurology: AAO ×3.  Cranial nerves II - XII normal.  Normal muscle strength in all four extremities.    Data   Data reviewed today: I reviewed all medications, new labs and imaging results over the last 24 hours.    Recent Labs   Lab 09/07/21  0041   INR 1.02      POTASSIUM 4.0   CHLORIDE 101   CO2 26   BUN 16   CR 3.17*   ANIONGAP 12   RONALDO 8.9          Chest XR:  FINDINGS: Left subclavian cardiac device in place. Right IJ infusion catheter. Closure device in the heart. No pneumothorax. The heart size is normal. The thoracic aorta is calcified and tortuous. Probable chronic fibrotic disease in the lung bases and   periphery of the lungs. Atelectasis or scar at both lung bases. Lungs otherwise clear. Old right rib fractures.                                                                      IMPRESSION: No  acute abnormality.

## 2021-09-07 NOTE — PHARMACY-ADMISSION MEDICATION HISTORY
Pharmacy Note - Admission Medication History    Pertinent Provider Information: none     ______________________________________________________________________    Prior To Admission (PTA) med list completed and updated in EMR.       PTA Med List   Medication Sig Last Dose     acetaminophen (TYLENOL) 500 MG tablet Take 1,000 mg by mouth every 6 hours as needed for mild pain 9/7/2021 at Unknown time     albuterol (PROAIR HFA/PROVENTIL HFA/VENTOLIN HFA) 108 (90 Base) MCG/ACT inhaler Inhale 2 puffs into the lungs every 6 hours as needed for shortness of breath / dyspnea or wheezing 9/6/2021 at Unknown time     albuterol (PROVENTIL) (2.5 MG/3ML) 0.083% neb solution Take 1 vial (2.5 mg) by nebulization 4 times daily On non-dialysis days. 9/6/2021 at Unknown time     aspirin 81 MG EC tablet Take 81 mg by mouth daily (with lunch)  9/6/2021 at Unknown time     atorvastatin (LIPITOR) 10 MG tablet Take 10 mg by mouth At Bedtime 9/6/2021 at Unknown time     B Complex-C-Folic Acid (DIALYVITE) TABS Take 1 tablet by mouth daily (Patient taking differently: Take 1 tablet by mouth daily (with dinner) ) 9/6/2021 at Unknown time     budesonide-formoterol (SYMBICORT) 80-4.5 MCG/ACT Inhaler Inhale 2 puffs into the lungs 2 times daily 9/6/2021 at Unknown time     buPROPion (WELLBUTRIN XL) 150 MG 24 hr tablet Take 150 mg by mouth twice a week Tue & Sat Past Week at Unknown time     Chlorpheniramine-DM (CORICIDIN HBP COUGH/COLD) 4-30 MG TABS Take 1 tablet by mouth every 6 hours as needed Coricidin product Unknown at Unknown time     cinacalcet (SENSIPAR) 30 MG tablet Take 30 mg by mouth Every Mon, Wed, Fri Morning . At dialysis. Past Week at Unknown time     diphenhydrAMINE (BENADRYL) 50 MG tablet Take 50 mg by mouth as needed Unknown at Unknown time     famotidine (PEPCID) 20 MG tablet Take 20 mg by mouth At Bedtime 9/6/2021 at Unknown time     FOLIC ACID PO Take 1 mg by mouth daily (with lunch)  9/6/2021 at Unknown time     gabapentin  (NEURONTIN) 100 MG capsule Take 100 mg by mouth 3 times daily 9/6/2021 at Unknown time     Lactobacillus (CULTURELLE DIGESTIVE WOMENS PO) Take 1 tablet by mouth daily (with lunch)  9/6/2021 at Unknown time     loratadine (CLARITIN) 10 MG capsule Take 10 mg by mouth as needed Unknown at Unknown time     melatonin 3 MG tablet Take 3 mg by mouth At Bedtime 9/6/2021 at Unknown time     midodrine (PROAMATINE) 10 MG tablet Take 10 mg by mouth every 6 hours as needed (for SBP < 106) For non - dialysis days Past Week at Unknown time     midodrine (PROAMATINE) 10 MG tablet Take 15 mg by mouth three times a week Take once daily prior to dialysis on dialysis days Mon-Wed-Fri Past Week at Unknown time     nystatin (MYCOSTATIN) 559376 UNIT/GM external powder Apply topically 2 times daily (Patient taking differently: Apply topically 2 times daily as needed Yeast rash) Past Month at Unknown time     ondansetron (ZOFRAN) 4 MG tablet Take 4 mg by mouth every 8 hours as needed for nausea Unknown at Unknown time     oxyCODONE (ROXICODONE) 5 MG tablet Take 1 tablet (5 mg) by mouth every 6 hours as needed for pain Unknown at Unknown time     polyvinyl alcohol (LIQUIFILM TEARS) 1.4 % ophthalmic solution Place 1 drop into both eyes 4 times daily as needed for dry eyes  Unknown at Unknown time     senna-docusate (SENOKOT-S/PERICOLACE) 8.6-50 MG tablet Take 1 tablet by mouth nightly as needed  Past Month at Unknown time     sevelamer carbonate (RENVELA) 800 MG tablet Take 1,600 mg by mouth 3 times daily  9/6/2021 at Unknown time     sevelamer carbonate (RENVELA) 800 MG tablet Take 800 mg by mouth Take with snacks or supplements  Past Week at Unknown time     simethicone (MYLICON) 80 MG chewable tablet Take 1 tablet (80 mg) by mouth every 6 hours as needed for cramping or flatulence Unknown at Unknown time     sodium-potassium bicarbonate (EDI-SELTZER GOLD) TBEF solu-tab Take 1 tablet by mouth daily as needed for heartburn Unknown at  Unknown time     SUMAtriptan (IMITREX) 50 MG tablet Take 1 tablet (50 mg) by mouth daily as needed for migraine Unknown at Unknown time     timolol maleate (TIMOPTIC) 0.5 % ophthalmic solution Place 1 drop into both eyes 2 times daily 9/6/2021 at Unknown time     traZODone (DESYREL) 150 MG tablet Take 1 tablet (150 mg) by mouth At Bedtime 9/6/2021 at Unknown time     Vitamin D, Cholecalciferol, 25 MCG (1000 UT) TABS Take 2,000 Units by mouth daily (with lunch) 9/6/2021 at Unknown time       Information [facility](s): [facility] dated pt created list  Method of interview communication: in-person    Summary of Changes to PTA Med List  New: Coricidin  Discontinued: none  Changed: timing    Patient was asked about OTC/herbal products specifically.  PTA med list reflects this.    In the past week, patient estimated taking medication this percent of the time:  greater than 90%.    Allergies were reviewed, assessed, and updated with the patient.      Pt will call  to see if he can bring eye drop and inhalers.  Asked that we follow-up with her on if he is going to be able to bring or use central supply.    The information provided in this note is only as accurate as the sources available at the time of the update(s).    Thank you for the opportunity to participate in the care of this patient.    Aracelis Wesley RP  9/7/2021 8:35 AM

## 2021-09-07 NOTE — CONSULTS
Care Management Initial Consult    General Information  Assessment completed with: Patient,  (patient)  Type of CM/SW Visit: Initial Assessment    Primary Care Provider verified and updated as needed: Yes   Readmission within the last 30 days:        Reason for Consult: discharge planning  Advance Care Planning: Advance Care Planning Reviewed: present on chart          Communication Assessment  Patient's communication style: spoken language (English or Bilingual)    Hearing Difficulty or Deaf: no   Wear Glasses or Blind: yes    Cognitive  Cognitive/Neuro/Behavioral: WDL                      Living Environment:   People in home: spouse     Current living Arrangements: house      Able to return to prior arrangements: yes       Family/Social Support:  Care provided by:    Provides care for:    Marital Status:              Description of Support System:           Current Resources:   Patient receiving home care services: No     Community Resources: None  Equipment currently used at home: wheelchair, manual  Supplies currently used at home: Oxygen Tubing/Supplies, Nebulizer tubing    Employment/Financial:  Employment Status:          Financial Concerns:             Lifestyle & Psychosocial Needs:  Social Determinants of Health     Tobacco Use: Medium Risk     Smoking Tobacco Use: Former Smoker     Smokeless Tobacco Use: Never Used   Alcohol Use:      Frequency of Alcohol Consumption:      Average Number of Drinks:      Frequency of Binge Drinking:    Financial Resource Strain:      Difficulty of Paying Living Expenses:    Food Insecurity:      Worried About Running Out of Food in the Last Year:      Ran Out of Food in the Last Year:    Transportation Needs:      Lack of Transportation (Medical):      Lack of Transportation (Non-Medical):    Physical Activity:      Days of Exercise per Week:      Minutes of Exercise per Session:    Stress:      Feeling of Stress :    Social Connections:      Frequency of  Communication with Friends and Family:      Frequency of Social Gatherings with Friends and Family:      Attends Episcopal Services:      Active Member of Clubs or Organizations:      Attends Club or Organization Meetings:      Marital Status:    Intimate Partner Violence:      Fear of Current or Ex-Partner:      Emotionally Abused:      Physically Abused:      Sexually Abused:    Depression: Not at risk     PHQ-2 Score: 1   Housing Stability:      Unable to Pay for Housing in the Last Year:      Number of Places Lived in the Last Year:      Unstable Housing in the Last Year:        Functional Status:  Prior to admission patient needed assistance:   Dependent ADLs:: Wheelchair-with assist  Dependent IADLs:: Laundry, Shopping, Transportation       Mental Health Status:          Chemical Dependency Status:                Values/Beliefs:  Spiritual, Cultural Beliefs, Episcopal Practices, Values that affect care:                 Additional Information:  AIDET completed. Writer met with Pt. Pt lives with spouse Cayden in a private residence. She has ESRD so goes to dialysis; [facility]. Pt uses oxygen at home ;continuous 3 L per cannula and also uses a nebulizer. She is sitting in the chair in her room and is SOB. She has a wheel chair at home and home is set up with grab bars, wheel chair lift on stairs. She can't get into bathroom with wheel chair or walker though so give herself sponge baths. She has been to TCU but had to pay a lot of money she said, also hiring help in her home would incur a cost. She said she is managing the best she can. Pt discussed hospice with someone several months to a year ago. She was not interested at the time but said she would be now. Writer spoke with ED staff RN who will call hospitalist to put in hospice referral. Patient also requested  and referral placed by PUNEET Walker in the ED. Patient had a daughter in [place] but says she is busy with family. Discussed  home care at discharge; HHA, PT. Also hospice consult and the services that would provide.     MOON/Observation status reviewed, paper work given     Family to transport at discharge. Informed CM to follow.     Antonella Yan RN, CMM, EFE    12 noon: patient had hospice consult in the ED as requested by patient. She was not ready per Mary Carmen hospice RN. Apparently patient has had 4 other hospice consults. Recommend palliative HC at discharge to further address goals of care planning.      Antonella Yan RN

## 2021-09-07 NOTE — ED TRIAGE NOTES
MWF dialysis patient, right chest port. Complaint is increased SOB  And chest pain with deep inspiration. Coughing I up white sputum. DNR/DNI.

## 2021-09-07 NOTE — ED NOTES
Bed: JNED-01  Expected date: 9/7/21  Expected time: 12:18 AM  Means of arrival: Ambulance  Comments:  76F SOB/ cough; hx COPD 96%

## 2021-09-07 NOTE — CONSULTS
RENAL CONSULTATION:    Date of Consultation:  9/7/2021    Requesting Physician: Dr. MARINO    Reason for Consult:  ESKD    Assessment/ Recommendations:  1. ESKD: on HD MWF at [facility]. Her last HD was on 9/6/21.    -Plan for HD today for hypervolemia   -Plan for ongoing HD MWF    2. Hypotension on HD: midodrine. Trend.    3. Anemia: due to ESKD. Hemoglobin is at goal(9-11)    4. Dyspnea: multifactorial. Will address hypervolemia with HD today and ongoing MWF.     Uziel Kinney MD  [facility]  Office: [phone]        History of present illness: Itzel Rodriguez is a 76 year old woman who I am asked to see for management of ESKD.  Patient is on 3 L baseline nasal cannula and O2 saturations 100%.  chest x-ray noted unremarkable.     She denies fever, chest pain. She reports some leg edema.    Past Medical History:   Diagnosis Date     Acute on chronic diastolic congestive heart failure (H) 01/23/2019     Acute on chronic respiratory failure with hypoxia and hypercapnia (H)      Acute respiratory failure with hypoxia (H) 06/30/2014     Arthritis      Cardiac device in situ 04/05/2018    LAAO April 5, 2018 (30 mm WATCHMAN)     Chronic anemia 06/01/2014     Chronic kidney disease      Chronic respiratory failure with hypoxia (H)     3L nc     Chronic thoracic aortic dissection (H) 10/07/2015    descending thoracic aorta; treated medically per notes of Dragan Singh and Jennifer.     COPD (chronic obstructive pulmonary disease) (H)      COPD exacerbation (H) 01/04/2019     Disease of thyroid gland      Dissection of thoracoabdominal aorta (H)      DVT (deep venous thrombosis) (H) 08/22/2019     Dyslipidemia      ESRD (end stage renal disease) (H) 06/03/2009    on dialysis with Dr. Mitchell     Essential hypertension 06/30/2014     Gastrointestinal hemorrhage, unspecified gastrointestinal hemorrhage type 06/05/2017     GI bleeding 06/05/2017     Gout      Heart failure (H)      Hip  fracture, intertrochanteric (H) 2019     History of compression fracture of spine 2017     L3 vertebral fracture (H) 2015     ZULEIKA (obstructive sleep apnea) 10/22/2015     Pneumonia 2015     Right foot drop      Spinal stenosis 2016     Stroke (H) 2016       Medications: Drug and lactation database from the [place] National Library of Medicine:  http://toxnet.nlm.nih.gov/cgi-bin/sis/htmlgen?LACT      Allergies   Allergen Reactions     Ace Inhibitors Cough     Other reaction(s): *Unknown     Fosinopril Cough     Ipratropium Headache     Zolpidem Other (See Comments)     Hallucinations  Other reaction(s): Hallucinations       Social History     Socioeconomic History     Marital status:      Spouse name: Not on file     Number of children: 2     Years of education: Not on file     Highest education level: Not on file   Occupational History     Not on file   Tobacco Use     Smoking status: Former Smoker     Packs/day: 1.50     Years: 37.00     Pack years: 55.50     Types: Cigarettes     Quit date: 2009     Years since quittin.6     Smokeless tobacco: Never Used   Substance and Sexual Activity     Alcohol use: Yes     Alcohol/week: 11.7 standard drinks     Comment: Alcoholic Drinks/day: 14 mixed drinks per week     Drug use: No     Sexual activity: Never     Partners: Male   Other Topics Concern     Not on file   Social History Narrative    Lives with her . Daughter in [place] and daughter in [place].     Social Determinants of Health     Financial Resource Strain:      Difficulty of Paying Living Expenses:    Food Insecurity:      Worried About Running Out of Food in the Last Year:      Ran Out of Food in the Last Year:    Transportation Needs:      Lack of Transportation (Medical):      Lack of Transportation (Non-Medical):    Physical Activity:      Days of Exercise per Week:      Minutes of Exercise per Session:    Stress:      Feeling of Stress :   "  Social Connections:      Frequency of Communication with Friends and Family:      Frequency of Social Gatherings with Friends and Family:      Attends Bahai Services:      Active Member of Clubs or Organizations:      Attends Club or Organization Meetings:      Marital Status:    Intimate Partner Violence:      Fear of Current or Ex-Partner:      Emotionally Abused:      Physically Abused:      Sexually Abused:        Family History:    Family History   Problem Relation Age of Onset     Dementia Mother      Diabetes Mother      Arthritis Mother      Cancer Mother      Depression Mother      Heart Disease Mother      Vision Loss Mother      Cerebrovascular Disease Father      Heart Disease Father      Breast Cancer No family hx of          Review of Systems:Other than above, a comprehensive ROS was negative.        /72   Pulse 70   Temp 97.1  F (36.2  C) (Oral)   Resp (!) 34   Ht 1.626 m (5' 4\")   Wt 67.5 kg (148 lb 13 oz)   SpO2 99%   BMI 25.54 kg/m    No intake or output data in the 24 hours ending 09/07/21 0839  Physical Exam:   GENERAL: calm and comfortable, alert  HEENT: NC/AT, OP clear, MMM, pupils equal, sclerae not icteric.  RESP:  no respiratory distress, normal effort.  CV:Trace leg edema.    GI:  Soft, NT/ND  Musculoskeletal: No gross joint abnormalities  SKIN: No rash, warm/ dry  PSYCH: Alert, normal affect  Lymph: No cervical adenopathy    LABS:  Reviewed.               "

## 2021-09-08 NOTE — PROGRESS NOTES
RENAL CONSULTATION:    Date of Consultation:  9/8/2021    CC: ESKD    Assessment/ Recommendations:  1. ESKD: on HD MWF at [facility] \[facility].\"". Her last HD was on 9/6/21 PTA.               -Plan for ongoing HD MWF     2. Hypotension on HD: midodrine. Trend.     3. Anemia: due to ESKD. Hemoglobin is at goal(9-11). Trend.     4. Dyspnea: multifactorial. Will address hypervolemia with HD      Uziel Kinney MD  [facility]  Pager: [phone]   Office: [phone]      S:  Had HD overnight for hypervolemia/dyspnea. She reports stable breathing(not much better). Also had nebs. No fever, cp, edema.     Current Facility-Administered Medications   Medication     0.9% sodium chloride BOLUS     0.9% sodium chloride BOLUS     0.9% sodium chloride BOLUS     0.9% sodium chloride BOLUS     0.9% sodium chloride BOLUS     acetaminophen (TYLENOL) tablet 650 mg    Or     acetaminophen (TYLENOL) Suppository 650 mg     acetaminophen (TYLENOL) tablet 1,000 mg     albuterol (PROVENTIL) neb solution 2.5 mg     albuterol (PROVENTIL) neb solution 2.5 mg     aspirin EC tablet 81 mg     atorvastatin (LIPITOR) tablet 10 mg     budesonide-formoterol (SYMBICORT) 80-4.5 MCG/ACT Inhaler 2 puff     buPROPion (WELLBUTRIN XL) 24 hr tablet 150 mg     cinacalcet (SENSIPAR) tablet 30 mg     famotidine (PEPCID) tablet 20 mg     folic acid (FOLVITE) tablet 1 mg     gabapentin (NEURONTIN) capsule 100 mg     heparin ANTICOAGULANT injection 5,000 Units     lactobacillus rhamnosus (GG) (CULTURELL) capsule 1 capsule     lidocaine (LMX4) cream     lidocaine 1 % 0.1-1 mL     melatonin tablet 1 mg     midodrine (PROAMATINE) tablet 10 mg     midodrine (PROAMATINE) tablet 15 mg     multivitamin RENAL (RENAVITE RX/NEPHROVITE) tablet 1 tablet     No heparin via hemodialysis machine     No heparin via hemodialysis machine     ondansetron (ZOFRAN-ODT) ODT tab 4 mg    Or     ondansetron (ZOFRAN) injection 4 mg     oxyCODONE (ROXICODONE)  "tablet 5 mg     predniSONE (DELTASONE) tablet 40 mg     senna-docusate (SENOKOT-S/PERICOLACE) 8.6-50 MG per tablet 1 tablet     sevelamer carbonate (RENVELA) tablet 1,600 mg     sevelamer carbonate (RENVELA) tablet 800 mg     sodium chloride (PF) 0.9% PF flush 3 mL     sodium chloride (PF) 0.9% PF flush 3 mL     timolol maleate (TIMOPTIC) 0.5 % ophthalmic solution 1 drop     traZODone (DESYREL) tablet 150 mg     Vitamin D3 (CHOLECALCIFEROL) tablet 2,000 Units           Allergies   Allergen Reactions     Ace Inhibitors Cough     Other reaction(s): *Unknown     Fosinopril Cough     Ipratropium Headache     Zolpidem Other (See Comments)     Hallucinations  Other reaction(s): Hallucinations         /67 (BP Location: Left arm)   Pulse 66   Temp 98.7  F (37.1  C) (Oral)   Resp 18   Ht 1.626 m (5' 4\")   Wt 68.3 kg (150 lb 8 oz)   SpO2 95%   BMI 25.83 kg/m      Intake/Output Summary (Last 24 hours) at 9/8/2021 0944  Last data filed at 9/8/2021 0223  Gross per 24 hour   Intake 3 ml   Output 2518 ml   Net -2515 ml     Physical Exam:   GENERAL: calm and comfortable, alert  HEENT: NC/AT, OP clear, MMM, pupils equal, sclerae not icteric.  RESP: no respiratory distress, normal effort.  CV:  no leg edema.    GI:  Soft, NT  Musculoskeletal:dimished muscle mass  SKIN: No rash, warm/ dry  PSYCH: Alert, normal affect  ACCESS: tunneled RIJ HD Cath in place.    LABS:  reviewed              "

## 2021-09-08 NOTE — PROGRESS NOTES
"Hemodialysis orders for today obtained from Dr. Kinney.  Pre treatment report obtained from staff nurse, Lolis Zambrano, RN with request for patient to order early lunch d/t dialysis treatment to follow.  This writer arrived to patient's room, patient covering both eyes with wash cloth, c/o HA and nausea and stating, \"It is too soon for dialysis, I am not going to do a treatment today-tomorrow.\"  Attempts to talk with patient about maintaining her MWF schedule, patient again refused.  Dr. Kinney and staff nurse Lolis Zambrano, RN updated.   "

## 2021-09-08 NOTE — PROGRESS NOTES
[facility]    Medicine Progress Note - Hospitalist Service       Date of Admission:  9/7/2021    Assessment & Plan           Belia Rodriguez is a 76 year old female with a medical history of COPD on 3 L nasal cannula home oxygen, chronic kidney disease on dialysis, DVT, heart failure, HTN, and pacemaker who presents with SOB for two days. Admitted for COPD exacerbation and fluid overload.     COPD exacerbation: chest XR shows no focal infiltrate. Admitted for COPD exacerbation about two weeks ago. Symptoms improved after prednisone for 5 days.  - Prednisone 40 mg po x 5 days  - Albuterol nebs  - Continue home symbicort     Chronic hypoxemic and hypercapnic respiratory failure: Continue home oxygen at 3 LPM. Titrate as indicated.     ESRD on hemodialysis: on MWF schedule. Two weeks ago, added Saturday too.  - Continue hemodialysis per nephrology for additional fluid removal.  - Midodrine scheduled and prn for hypotension  - Continue sevelamer     Chronic pain syndrome: Patient reports having chronic bilateral lower leg pain at baseline.  - Continue PTA tylenol, Wellbutrin, gabapentin and oxycodone prn      Goal of care: patient has been having multiple hospitalization for fluid overload and COPD exacerbation. Per previous documentation, patient is aware that she has poor prognosis. She has POLST dated on 8/18/2021. During last admission, she was not ready to start hospice care.   - Patient requested to talk to hospice care again on admission. Hospice team consulted and input appreciated.       Diet: Combination Diet Dialysis Diet    DVT Prophylaxis: Low Risk/Ambulatory with no VTE prophylaxis indicated  Adorno Catheter: Not present  Central Lines: PRESENT  CVC Double Lumen Right Internal jugular Tunneled-Site Assessment: WDL  Code Status: No CPR- Do NOT Intubate      Disposition Plan   Expected discharge: Plan to discharge home tomorrow     The patient's care was discussed with the Bedside  Nurse, Care Coordinator/ and Patient.    Donna Webb MD  Hospitalist Service  [facility]  Securely message with the [employer] Web Console (learn more here)  Text page via [employer] Paging/Directory        ______________________________________________________________________    Interval History   Patient did not finish her extra run of hemodialysis until 3 am today. She reports having headache afterwards. She refused her regular run today. She would like to have a run tomorrow before going home tomorrow.     Data reviewed today: I reviewed all medications, new labs and imaging results over the last 24 hours.    Physical Exam   Vital Signs: Temp: 98.1  F (36.7  C) Temp src: Oral BP: 109/60 Pulse: 73   Resp: 18 SpO2: 100 % O2 Device: Nasal cannula with humidification Oxygen Delivery: 3 LPM  Weight: 150 lbs 8 oz    General appearance: not in acute distress  HEENT: PERRL, EOMI  Lungs: Clear breath sounds in bilateral lung fields  Cardiovascular: Regular rate and rhythm, normal S1-S2  Abdomen: Soft, non tender, no distension, normal bowel sound  Musculoskeletal: No joint swelling  Skin: No rash and no edema  Neurology: AAO ×3.  Cranial nerves II - XII normal.  Normal muscle strength in all four extremities.    Data   Recent Labs   Lab 09/07/21  0041   INR 1.02      POTASSIUM 4.0   CHLORIDE 101   CO2 26   BUN 16   CR 3.17*   ANIONGAP 12   RONALDO 8.9

## 2021-09-08 NOTE — PROGRESS NOTES
HEMODIALYSIS NOTE    ACCESS PRE:   Tunneled catheter with clean, dry and intact dressing. Both ports aspirated and flushed easily.     HEPATITIS STATUS:  Chronic Unit: [facility]  Hbsag .  Labs drawn. HD machine bleached after Tx.    RUN SUMMARY:Pt. was hemodynamically stable during dialysis.    BVP: 59.6L  UF TOTAL:2918mL removed-400mL prime=2518mL Net removed    ACCESS POST:Heparin instilled to fill volumes for dwell. Clamped, capped and secured. Art port    ml, Venous port     ml    INTERVENTIONS:Critline used for fluid monitoring/management.Monitor VS Q 15 minutes. Goal adjustment per critline and hemodynamics.      PLAN: per Renal Team

## 2021-09-08 NOTE — PROGRESS NOTES
Pt bedside RN contacted SW regarding pt not wanting to leave hospital and go home. SW met with pt in pt room and introduced self.  Pt discussed going home and that she was tired from dialysis at 3 am and wanted to sleep. She stated she just wanted to stay in hospital. Pt has no concerns about returning home or safety at home. Pt did stated her  Cayden is able to help her some. She is aware she is here on observation only.  Family to transport.    YARELY Duval

## 2021-09-09 PROBLEM — J96.20 RESPIRATORY FAILURE, ACUTE-ON-CHRONIC (H): Status: ACTIVE | Noted: 2021-01-01

## 2021-09-09 NOTE — PROGRESS NOTES
RESPIRATORY CARE NOTE     Duoneb given X1     09/08/21 2150   Vital Signs   Resp 20   Pulse 76   Oxygen Therapy   SpO2 98 %   O2 Device Nasal cannula with humidification   Oxygen Delivery 3 LPM   Breath Sounds   Breath Sounds All Fields   All Lung Fields Breath Sounds Anterior:;clear   LLL Breath Sounds Posterior:;crackles, fine;wheezes, expiratory   RLL Breath Sounds Posterior:;crackles, fine;wheezes, expiratory   Nebulizer Pre Assessment   $RT Use ONLY Delivery Method Nebulizer - Additional   Nebulizer Device Mouthpiece   Pretreatment Heart Rate (beats/min) 79   Pretreatment Resp Rate (breaths/min) 20   Pretreatment O2 sats - (TCU only) 98   Pretreat Breath Sounds - Bilat - All Lobes crackles, fine;wheezes, expiratory   Pretreat Breath Sounds - GEO clear   Pretreat Breath Sounds - LLL crackles, fine;wheezes, expiratory   Pretreat Breath Sounds - RUL clear   Pretreat Breath Sounds - RLL crackles, fine;wheezes, expiratory   Breath Sounds Post-Respiratory Treatment   Posttreatment Heart Rate (beats/min) 74   Posttreatment Resp Rate (breaths/min) 20   Post treatment O2 Sats - (TCU only) 99   Breath Sounds Posttreatment All Fields All Fields;GEO;LLL;RUL;RML;RLL   Breath Sounds Posttreatment All Fields Anterior:;no change   Breath Sounds Posttreatment LLL crackles, fine;aeration increased   Breath Sounds Posttreatment RML crackles, fine;aeration increased   Breath Sounds Posttreatment RLL crackles, fine;aeration increased      Veto Bower, RT

## 2021-09-09 NOTE — PROGRESS NOTES
Care Management Follow Up    Length of Stay (days): 0    Expected Discharge Date: 09/10/2021     Concerns to be Addressed: Clinical progression     , possible palliative care consult?   Patient plan of care discussed at interdisciplinary rounds: Yes- discussed with Hospitalist, RN and Nephrologist    Anticipated Discharge Disposition:  Home with      Anticipated Discharge Services:  To be determined  Anticipated Discharge DME:  Walker, wheelchair  Education Provided on the Discharge Plan:  yes  Patient/Family in Agreement with the Plan:  yes    Referrals Placed by CM/SW:  To be determined  Private pay costs discussed: Not applicable    Additional Information:  SHERMAN completed chart review, discussed updates with MD. Patient is from home,  is primary caregiver. SW met with patient in her room to discuss and review discharge planning. Patient states she has been open to [facility]. SW spoke to Raysa with [facility]-Raysa reports pt discharged from services in July 2021. Patient has had multiple hospitalizations and reports her  often declines services. Patient has stated multiple times she would like more in house services such as housekeeping and  declines to let services in the home. Patient states she feels overwhelmed about planning for the future and has wanted to work with an  on long term planning however  has not been agreeable. Patient states she has a daughter who is somewhat supportive but not able to provide daily assist. Patient reports she often fears 's reaction to talking about more support in the home for her self and for him as a caregiver. Patient denies safety concerns in returning home and denies physical abuse.  Vulnerable adult report filed [id number]. Patient agreeable to services in home, however  has declined to allow help in the home.   MD updated.       Sangita Sevilla, SW

## 2021-09-09 NOTE — PROGRESS NOTES
"Spiritual Health Note    Spiritual Assessment:    Follow up visit with patient to provide ongoing support. Patient known to writer from previous hospital stays. She shared about her hospital course and her mortality. She shared about her struggles with feeling \"down\" and \"afraid\" at times. She stated that today is a good day and that it helps to be able to talk about how she is feeling.     She shared about her  and their relationship. He is source of support for her during these times.     Patient comes from [employer] background and derives meaning, purpose, and comfort from esmer. She is appreciative of devotional/written materials and prayers.     Care Provided:    Empathic listening and presence     Helped patient in processing of emotions     Guided patient in exploration of beliefs     Plan of Care: A  will continue to visit as able or per request by patient/family/staff.        Chaplain Fransico Blake MDiv, BCC   "

## 2021-09-09 NOTE — PROGRESS NOTES
[facility]    Medicine Progress Note - Hospitalist Service       Date of Admission:  9/7/2021    Assessment & Plan           Belia Rodriguez is a 76 year old female with a medical history of COPD on 3 L nasal cannula home oxygen, chronic kidney disease on dialysis, DVT, heart failure, HTN, and pacemaker who presents with SOB for two days. Admitted for COPD exacerbation and fluid overload.     COPD exacerbation: chest XR shows no focal infiltrate. Admitted for COPD exacerbation about two weeks ago. Symptoms improved after prednisone for 5 days.  - Prednisone 40 mg po x 5 days  - Albuterol nebs prn  - Continue home symbicort     Chronic hypoxemic and hypercapnic respiratory failure: Continue home oxygen at 3 LPM. Titrate as indicated.     ESRD on hemodialysis: on MWF schedule. Two weeks ago, added Saturday too.  - Continue hemodialysis per nephrology for additional fluid removal.  - Midodrine scheduled and prn for hypotension  - Continue sevelamer     Chronic pain syndrome: Patient reports having chronic bilateral lower leg pain at baseline.  - Continue PTA tylenol, Wellbutrin, gabapentin and oxycodone prn      Goal of care: patient has been having multiple hospitalization for fluid overload and COPD exacerbation. Per previous documentation, patient is aware that she has poor prognosis. She has POLST dated on 8/18/2021. During last admission, she was not ready to start hospice care.   - Patient requested to talk to hospice care again on admission. Hospice team consulted and input appreciated.  - Per social work, there is concern that her  is not able to provide the care she needs. Vulnerable adult was filed.       Diet: Combination Diet Dialysis Diet    DVT Prophylaxis: Low Risk/Ambulatory with no VTE prophylaxis indicated  Adorno Catheter: Not present  Central Lines: PRESENT  CVC Double Lumen Right Internal jugular Tunneled-Site Assessment: WDL  Code Status: No CPR- Do NOT Intubate       Disposition Plan   Expected discharge: Plan to discharge home tomorrow     The patient's care was discussed with the Bedside Nurse, Care Coordinator/ and Patient.    Dnona Webb MD  Hospitalist Service  [facility]  Securely message with the Vocera Web Console (learn more here)  Text page via [facility] Paging/Directory        ______________________________________________________________________    Interval History   Patient had hemodialysis today, but she did not tolerate it well. She had low blood pressure, nausea and headache. The fluid did not get pulled enough as plan.    Data reviewed today: I reviewed all medications, new labs and imaging results over the last 24 hours.    Physical Exam   Vital Signs: Temp: 98.1  F (36.7  C) Temp src: Oral BP: 100/57 Pulse: 71   Resp: 20 SpO2: 94 % O2 Device: Nasal cannula Oxygen Delivery: 3 LPM  Weight: 159 lbs 0 oz    General appearance: not in acute distress  HEENT: PERRL, EOMI  Lungs: Clear breath sounds in bilateral lung fields  Cardiovascular: Regular rate and rhythm, normal S1-S2  Abdomen: Soft, non tender, no distension  Musculoskeletal: No joint swelling  Skin: No rash and no edema  Neurology: AAO ×3.  Cranial nerves II - XII normal.  Normal muscle strength in all four extremities.    Data   Recent Labs   Lab 09/07/21  0041   INR 1.02      POTASSIUM 4.0   CHLORIDE 101   CO2 26   BUN 16   CR 3.17*   ANIONGAP 12   RONALDO 8.9

## 2021-09-09 NOTE — PROGRESS NOTES
"  '    RENAL ([facility]) progress note (OUTPT OBSERVATION)  CC: F/U ESRD  S: Since last visit, patient feels breathing not much better after last UF treatment but generally improves with dialysis. Using nebs every 2-3 hours with some improvement. No cramping. HoTN on dialysis requiring pre-HD midodrine.     A/P:   1. ESRD: at [facility] with Dr. Kinney. Intolerant of aggressive UF due to HoTN. Outpt HD orders:  Dialyzer: 180NRe Optiflux    Na: 138 mEq/L  Bicarb: 25 mEq/L  Dialysate: 2.0 K, 2.25 Ca, 1.0 Mg, 100 Dextrose ()  BFR (prescribed): 450   Prescribed time: 03:15    EDW: 67.5 kg  Access Type: Active (In Use):CVCatheter-Tunneled/Chest   - Added UF treatment today. Uncertain how much of her dyspnea is fluid related vs COPD HD qMWF    2. 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 Not candidate for surgery given frailty likely.    Given observation status, recommend outpt dialysis Friday at [facility] or change to inpt status if prolonged admit anticipated..     Calvin Olivier MD  [facility]  [phone] (off)       No interval changes to past medical history, social history or family history to report.    /63 (BP Location: Left arm)   Pulse 73   Temp 97.7  F (36.5  C) (Oral)   Resp 18   Ht 1.626 m (5' 4\")   Wt 72.1 kg (159 lb)   SpO2 91%   BMI 27.29 kg/m      I/O last 3 completed shifts:  In: 246 [P.O.:240; I.V.:6]  Out: -     Physical Exam:   GENERAL:chronically ill appearing, alert  EYES: pupils equal, sclerae not icteric.  ENT: Hearing normal, oral mucosa moist.  RESP: Clear to auscultation bilaterally with no respiratory distress, normal effort.  CV: RRR, no murmurs. no leg edema.    GI: Active BS, Soft, NT/ND, no masses or HSM  SKIN: No rash, warm/ dry  NEURO: Moves all extremities, no tremor. Sensation grossly intact to LT  PSYCH: Appropriate mood and affect  PAD site clean    Sodium (mmol/L)   Date Value   09/07/2021 139   08/20/2021 134 (L)   08/19/2021 137 "   08/18/2021 135 (L)   04/02/2021 138   04/01/2021 139   06/29/2014 137     Potassium (mmol/L)   Date Value   09/07/2021 4.0   08/20/2021 4.8   08/19/2021 4.7   08/18/2021 3.8   04/02/2021 4.7   04/01/2021 4.5   06/29/2014 5.4 (H)     Chloride (mmol/L)   Date Value   09/07/2021 101   08/20/2021 99   08/19/2021 99   08/18/2021 102   04/02/2021 104   04/01/2021 105   06/29/2014 100     Carbon Dioxide (mmol/L)   Date Value   04/02/2021 28   04/01/2021 29   06/29/2014 21     Carbon Dioxide (CO2) (mmol/L)   Date Value   09/07/2021 26   08/20/2021 25   08/19/2021 29   08/18/2021 25     Urea Nitrogen (mg/dL)   Date Value   09/07/2021 16   08/20/2021 30 (H)   08/19/2021 12   08/18/2021 13   04/02/2021 40 (H)   04/01/2021 20   06/29/2014 37 (H)     Creatinine (mg/dL)   Date Value   09/07/2021 3.17 (H)   08/20/2021 4.15 (H)   08/19/2021 2.49 (H)   08/18/2021 2.16 (H)   04/02/2021 5.08 (H)   04/01/2021 3.69 (H)   06/29/2014 4.60 (H)     Phosphorus (mg/dL)   Date Value   05/11/2021 2.5   04/26/2021 3.5   04/12/2021 3.9   01/26/2021 3.3     Hemoglobin A1C (%)   Date Value   01/29/2021 4.2   06/01/2014 5.7     Hemoglobin (g/dL)   Date Value   08/20/2021 10.0 (L)   08/19/2021 10.8 (L)   08/18/2021 9.7 (L)   08/16/2021 10.6 (L)   04/02/2021 9.6 (L)   04/01/2021 9.8 (L)   06/29/2014 9.7 (L)           Current Facility-Administered Medications:      0.9% sodium chloride BOLUS, 250 mL, Intravenous, Once in dialysis/CRRT, Uziel Kinney MD     0.9% sodium chloride BOLUS, 300 mL, Hemodialysis Machine, Once, Uziel Kinney MD     0.9% sodium chloride BOLUS, 100-150 mL, Intravenous, Q15 Min PRN, Uziel Kinney MD     acetaminophen (TYLENOL) tablet 650 mg, 650 mg, Oral, Q6H PRN **OR** acetaminophen (TYLENOL) Suppository 650 mg, 650 mg, Rectal, Q6H PRN, Brenda Stevens MD, MD     albuterol (PROVENTIL) neb solution 2.5 mg, 2.5 mg, Nebulization, Q6H PRN, Brenda Stevens MD, MD     albuterol (PROVENTIL) neb solution 2.5 mg,  2.5 mg, Nebulization, 4x Daily, Donna Webb MD, 2.5 mg at 09/09/21 0709     aspirin EC tablet 81 mg, 81 mg, Oral, Daily with lunch, Donna Webb MD, 81 mg at 09/08/21 1113     atorvastatin (LIPITOR) tablet 10 mg, 10 mg, Oral, At Bedtime, Donna Webb MD, 10 mg at 09/08/21 2217     budesonide-formoterol (SYMBICORT) 80-4.5 MCG/ACT Inhaler 2 puff, 2 puff, Inhalation, BID, Donna Webb MD, 2 puff at 09/09/21 0811     buPROPion (WELLBUTRIN XL) 24 hr tablet 150 mg, 150 mg, Oral, Once per day on Tue Sat, Donna Webb MD, 150 mg at 09/07/21 2138     cinacalcet (SENSIPAR) tablet 30 mg, 30 mg, Oral, Q Mon Wed Fri AM, Donna Webb MD, 30 mg at 09/08/21 0842     famotidine (PEPCID) tablet 20 mg, 20 mg, Oral, At Bedtime, Donna Webb MD, 20 mg at 09/08/21 2217     folic acid (FOLVITE) tablet 1 mg, 1 mg, Oral, Daily with lunch, Donna Webb MD, 1 mg at 09/08/21 1112     gabapentin (NEURONTIN) capsule 100 mg, 100 mg, Oral, TID, Donna eWbb MD, 100 mg at 09/09/21 0810     sodium chloride 0.9% DIALYSIS Cath LOCK - RED Lumen, 1.3-2.6 mL, Intracatheter, Once in dialysis/CRRT **FOLLOWED BY** heparin 1000 unit/mL DIALYSIS Cath LOCK - RED Lumen, 3 mL, Intracatheter, Once in dialysis/CRRT, Uziel Kinney MD     sodium chloride 0.9% DIALYSIS Cath LOCK - BLUE Lumen, 1.3-2.6 mL, Intracatheter, Once in dialysis/CRRT **FOLLOWED BY** heparin 1000 unit/mL DIALYSIS Cath LOCK -BLUE Lumen, 3 mL, Intracatheter, Once in dialysis/CRRT, Uziel Kinney MD     heparin ANTICOAGULANT injection 5,000 Units, 5,000 Units, Subcutaneous, Q12H, Donna Webb MD     lactobacillus rhamnosus (GG) (CULTURELL) capsule 1 capsule, 1 capsule, Oral, Daily with lunch, Donna Webb MD, 1 capsule at 09/08/21 1112     lidocaine (LMX4) cream, , Topical, Q1H PRN, Donna Webb MD     lidocaine 1 % 0.1-1 mL, 0.1-1 mL, Other, Q1H PRN, Donna Webb MD     melatonin tablet 1 mg, 1 mg, Oral, At Bedtime PRN, Donna Webb MD     midodrine  (PROAMATINE) tablet 10 mg, 10 mg, Oral, Q6H PRN, Donna Webb MD, 10 mg at 09/08/21 0012     midodrine (PROAMATINE) tablet 15 mg, 15 mg, Oral, Once per day on Mon Wed Fri, Donna Webb MD, 15 mg at 09/08/21 0842     midodrine (PROAMATINE) tablet 5 mg, 5 mg, Oral, Daily PRN, Uziel Kinney MD     multivitamin RENAL (RENAVITE RX/NEPHROVITE) tablet 1 tablet, 1 tablet, Oral, Daily with supper, Donna Webb MD, 1 tablet at 09/08/21 1824     No heparin via hemodialysis machine, , Does not apply, Once, Uziel Kinney MD     ondansetron (ZOFRAN-ODT) ODT tab 4 mg, 4 mg, Oral, Q6H PRN, 4 mg at 09/08/21 2218 **OR** ondansetron (ZOFRAN) injection 4 mg, 4 mg, Intravenous, Q6H PRN, Brenda Stevens MD, MD     oxyCODONE (ROXICODONE) tablet 5 mg, 5 mg, Oral, Q6H PRN, Donna Webb MD, 5 mg at 09/09/21 0456     predniSONE (DELTASONE) tablet 40 mg, 40 mg, Oral, Daily, Donna Webb MD, 40 mg at 09/09/21 0809     senna-docusate (SENOKOT-S/PERICOLACE) 8.6-50 MG per tablet 1 tablet, 1 tablet, Oral, At Bedtime PRN, Donna Webb MD     sevelamer carbonate (RENVELA) tablet 1,600 mg, 1,600 mg, Oral, TID w/meals, Donna Webb MD, 1,600 mg at 09/09/21 0955     sevelamer carbonate (RENVELA) tablet 800 mg, 800 mg, Oral, With Snacks or Supplements, Donna Webb MD     sodium chloride (PF) 0.9% PF flush 3 mL, 3 mL, Intracatheter, Q8H, Donna Webb MD, 3 mL at 09/09/21 0456     sodium chloride (PF) 0.9% PF flush 3 mL, 3 mL, Intracatheter, q1 min prn, Donna Webb MD     timolol maleate (TIMOPTIC) 0.5 % ophthalmic solution 1 drop, 1 drop, Both Eyes, BID, Donna Webb MD, 1 drop at 09/09/21 0809     traZODone (DESYREL) tablet 150 mg, 150 mg, Oral, At Bedtime, Donna Webb MD, 150 mg at 09/08/21 2217     Vitamin D3 (CHOLECALCIFEROL) tablet 2,000 Units, 2,000 Units, Oral, Daily with lunch, Donna Webb MD, 2,000 Units at 09/08/21 1420      Labs personally reviewed today during this evaluation at 11:17 AM

## 2021-09-09 NOTE — PROGRESS NOTES
3.5 hours treatment on K 3 bath. Pt received pre and mid run midodrine. BP stable throughout. Pt became nauseated during the last 30 minutes of treatment, 250 NS bolus and pt did not feel any better and requested to come off dialysis. Lowest BP 98/57. 927 mL total  UF. BVP 65.2, change in BV -4.5, pt in profile C when she was rinsed back. A/B profile for entire treatment up until that. Pt received 3 hours of HD. Report given to Primary RN Belia.

## 2021-09-09 NOTE — PROVIDER NOTIFICATION
RESPIRATORY CARE NOTE     Patient Name: Belia Rodriguez  Today's Date: 9/9/2021     Pt is currently on 3LNC and denies any shortness of breath or difficult breathing. BS: slightly coarse/fine crackles and decreased @ bases. Neb txs given as scheduled and tolerated well. Supplemental oxygen will be titrated as able. Will continue to follow.        09/09/21 0709   Tech Time   $Tech Time (10 minute increments) 3   Vital Signs   Resp 18   Pulse 71   Oxygen Therapy   SpO2 96 %   O2 Device Nasal cannula   Oxygen Delivery 3 LPM   Assessment   Respiratory WDL breath sounds;cough;effort/expansion   Rhythm/Pattern, Respiratory dyspnea on exertion   Expansion/Accessory Muscles/Retractions expansion symmetric   Cough Frequency infrequent   Cough Type congested   Breath Sounds   Breath Sounds All Fields   All Lung Fields Breath Sounds clear;diminished   LLL Breath Sounds crackles, fine   Nebulizer Pre Assessment   $RT Use ONLY Delivery Method Nebulizer - Additional   Nebulizer Device Mouthpiece   Pretreatment Heart Rate (beats/min) 71   Pretreatment Resp Rate (breaths/min) 18   Pretreatment O2 sats - (TCU only) 96   Pretreat Breath Sounds - Bilat - All Lobes crackles, fine;diminished       Mohamed Marcus RRT

## 2021-09-10 NOTE — PROGRESS NOTES
Hemodialysis Treatment Note:    Total UF removed:    1000 ml    Access:   Tunneled catheter R : Dressing clean, dry and intact. No s/s of infections. Both ports aspired and flushed without resistance. Prescribed  achieved.     Run Summary:   K3 bath 3hr treatment with goal 1 kg. Pt was hemodynamically stable during dialysis. Pt completed dialysis treatment without issues. Report given to primary nurse, MERARI Box.      Access (post dialysis) :   Catheter continue to be patent.  maintained during dialysis. Ports flushed with NS, and locked with Heparin 1:1000 for specific amount for cathter. Ports wrapped and secured with tape.     Interventions:  VS check q15min   Critline used for blood volume monitoring.    Plans:  Per renal team.    Jose Juan Esparza RN  [facility] Acute Dialysis ....................9/10/2021 2:25 PM

## 2021-09-10 NOTE — UTILIZATION REVIEW
"Admission Status; Secondary Review Determination         Under the authority of the Utilization Management Committee, the utilization review process indicated a secondary review on the above patient.  The review outcome is based on review of the medical records, discussions with staff, and applying clinical experience noted on the date of the review.          (x) Observation Status Appropriate - This patient does not meet hospital inpatient criteria and is placed in observation status. If this patient's primary payer is Medicare and was admitted as an inpatient, Condition Code 44 should be used and patient status changed to \"observation\".     RATIONALE FOR DETERMINATION:  76 year old female with a medical history of COPD on 3 L nasal cannula home oxygen, chronic kidney disease on dialysis, DVT, heart failure, HTN, and pacemaker who presents with SOB for two days.   Felt to have a COPD exacerbation.  Currently on oral steroids and on baseline chronic O2 (3 Liters).  Also on chronic dialysis which has continued.  She is clinically stable today and likely discharging soon/possibly today per notes.    The severity of illness, intensity of service provided, expected LOS and risk for adverse outcome make the care appropriate for further observation; however, doesn't meet criteria for hospital inpatient admission. Dr. Webb notified of this determination.    The information on this document is developed by the utilization review team in order for the business office to ensure compliance.  This only denotes the appropriateness of proper admission status and does not reflect the quality of care rendered.         The definitions of Inpatient Status and Observation Status used in making the determination above are those provided in the CMS Coverage Manual, Chapter 1 and Chapter 6, section 70.4.      Sincerely,    Gus Em DO, [facility]  Utilization Review  Physician Advisor  "

## 2021-09-10 NOTE — DISCHARGE SUMMARY
[facility]  Hospitalist Discharge Summary      Date of Admission:  9/7/2021  Date of Discharge:  9/10/2021    Discharging Provider: Donna Webb MD      Discharge Diagnoses    Principal Problem:    Shortness of breath  Active Problems:    COPD exacerbation (H)    Chronic respiratory failure with hypoxia (H)    Cardiac pacemaker in situ    ESRD on hemodialysis (H)    Hypervolemia, unspecified hypervolemia type      Follow-ups Needed After Discharge   Follow-up Appointments     Follow-up and recommended labs and tests       Follow up with primary care provider, Physician No Ref-Primary, within 7   days to evaluate medication change, to evaluate treatment change, and for   hospital follow- up.  No follow up labs or test are needed.           Discharge Disposition   Discharged to home  Condition at discharge: Good      Hospital Course     Belia Rodriguez is a 76 year old female with a medical history of COPD on 3 L nasal cannula home oxygen, chronic kidney disease on dialysis, DVT, heart failure, HTN, and pacemaker. She presented to ER on 9/7/2021 for SOB for two days. Chest XR shows no acute abnormality. Patient was empirically treated with prednisone for 5 days for COPD exacerbation. She also had additional hemodialysis for fluid overload. Her SOB improved. Patient has been having multiple hospitalization for fluid overload and COPD exacerbation for the past few months. Hospice was consulted. Patient is aware that she has poor prognosis. But she is not ready for hospice care.     Consultations This Hospital Stay   NEPHROLOGY IP CONSULT  HOSPICE IP CONSULT  SOCIAL WORK IP CONSULT    Code Status   No CPR- Do NOT Intubate    Time Spent on this Encounter   I, Donna Webb MD, personally saw the patient today and spent greater than 30 minutes discharging this patient.       Donna Webb MD  [facility]  [address] [place] [id number]  Phone:  [phone]  Fax: [phone]  ______________________________________________________________________    Physical Exam   Vital Signs: Temp: 97.8  F (36.6  C) Temp src: Oral BP: 110/61 Pulse: 74   Resp: 18 SpO2: 99 % O2 Device: Nasal cannula Oxygen Delivery: 3 LPM  Weight: 153 lbs 12.8 oz    General appearance: not in acute distress  HEENT: PERRL, EOMI  Lungs: Clear breath sounds in bilateral lung fields  Cardiovascular: Regular rate and rhythm, normal S1-S2  Abdomen: Soft, non tender, no distension  Musculoskeletal: No joint swelling  Skin: No rash and no edema  Neurology: AAO ×3.  Cranial nerves II - XII normal.  Normal muscle strength in all four extremities.       Primary Care Physician   Physician No Ref-Primary    Discharge Orders      Reason for your hospital stay    Presented with shortness of breath. Admitted for COPD exacerbation and fluid overload. Was treated with prednisone and extra runs of hemodialysis.     Follow-up and recommended labs and tests     Follow up with primary care provider, Physician No Ref-Primary, within 7 days to evaluate medication change, to evaluate treatment change, and for hospital follow- up.  No follow up labs or test are needed.     Activity    Your activity upon discharge: activity as tolerated     Diet    Follow this diet upon discharge: Orders Placed This Encounter      Combination Diet Dialysis Diet       Significant Results and Procedures   Most Recent 3 CBC's:Recent Labs   Lab Test 08/20/21  0702 08/19/21  0734 08/18/21  0643   WBC 3.7* 3.9* 4.0   HGB 10.0* 10.8* 9.7*   * 111* 114*   * 142* 145*     Most Recent 3 BMP's:Recent Labs   Lab Test 09/07/21  0041 08/20/21  0702 08/19/21  0734    134* 137   POTASSIUM 4.0 4.8 4.7   CHLORIDE 101 99 99   CO2 26 25 29   BUN 16 30* 12   CR 3.17* 4.15* 2.49*   ANIONGAP 12 10 9   RONALDO 8.9 8.9 9.1    97 84       Chest XR: 9/7/2021  FINDINGS: Left subclavian cardiac device in place. Right IJ infusion catheter.  Closure device in the heart. No pneumothorax. The heart size is normal. The thoracic aorta is calcified and tortuous. Probable chronic fibrotic disease in the lung bases and periphery of the lungs. Atelectasis or scar at both lung bases. Lungs otherwise clear. Old right rib fractures.                                                                    IMPRESSION: No acute abnormality.      Discharge Medications   Current Discharge Medication List      START taking these medications    Details   predniSONE (DELTASONE) 10 MG tablet Take 4 tablets (40 mg) by mouth daily for 1 day, THEN 3 tablets (30 mg) daily for 3 days, THEN 2 tablets (20 mg) daily for 3 days, THEN 1 tablet (10 mg) daily for 3 days.  Qty: 22 tablet, Refills: 0    Associated Diagnoses: COPD exacerbation (H)         CONTINUE these medications which have NOT CHANGED    Details   acetaminophen (TYLENOL) 500 MG tablet Take 1,000 mg by mouth every 6 hours as needed for mild pain      albuterol (PROAIR HFA/PROVENTIL HFA/VENTOLIN HFA) 108 (90 Base) MCG/ACT inhaler Inhale 2 puffs into the lungs every 6 hours as needed for shortness of breath / dyspnea or wheezing  Qty: 6.7 g, Refills: 0    Comments: Pharmacy may dispense brand covered by insurance (Proair, or proventil or ventolin or generic albuterol inhaler)  Associated Diagnoses: Chronic respiratory failure with hypoxia (H)      albuterol (PROVENTIL) (2.5 MG/3ML) 0.083% neb solution Take 1 vial (2.5 mg) by nebulization 4 times daily On non-dialysis days.  Qty: 300 mL, Refills: 1    Associated Diagnoses: Acute on chronic respiratory failure with hypoxia (H)      aspirin 81 MG EC tablet Take 81 mg by mouth daily (with lunch)       atorvastatin (LIPITOR) 10 MG tablet Take 10 mg by mouth At Bedtime      B Complex-C-Folic Acid (DIALYVITE) TABS Take 1 tablet by mouth daily  Qty: 90 tablet, Refills: 3    Associated Diagnoses: ESRD on dialysis (H)      budesonide-formoterol (SYMBICORT) 80-4.5 MCG/ACT Inhaler Inhale  2 puffs into the lungs 2 times daily  Qty: 10.2 g, Refills: 1    Associated Diagnoses: Acute on chronic respiratory failure with hypoxia (H); COPD exacerbation (H)      buPROPion (WELLBUTRIN XL) 150 MG 24 hr tablet Take 150 mg by mouth twice a week Tue & Sat      Chlorpheniramine-DM (CORICIDIN HBP COUGH/COLD) 4-30 MG TABS Take 1 tablet by mouth every 6 hours as needed Coricidin product      cinacalcet (SENSIPAR) 30 MG tablet Take 30 mg by mouth Every Mon, Wed, Fri Morning . At dialysis.      diphenhydrAMINE (BENADRYL) 50 MG tablet Take 50 mg by mouth as needed      famotidine (PEPCID) 20 MG tablet Take 20 mg by mouth At Bedtime      FOLIC ACID PO Take 1 mg by mouth daily (with lunch)       gabapentin (NEURONTIN) 100 MG capsule Take 100 mg by mouth 3 times daily      Lactobacillus (CULTURELLE DIGESTIVE WOMENS PO) Take 1 tablet by mouth daily (with lunch)       loratadine (CLARITIN) 10 MG capsule Take 10 mg by mouth as needed      melatonin 3 MG tablet Take 3 mg by mouth At Bedtime      !! midodrine (PROAMATINE) 10 MG tablet Take 10 mg by mouth every 6 hours as needed (for SBP < 106) For non - dialysis days      !! midodrine (PROAMATINE) 10 MG tablet Take 15 mg by mouth three times a week Take once daily prior to dialysis on dialysis days Mon-Wed-Fri      nystatin (MYCOSTATIN) 961327 UNIT/GM external powder Apply topically 2 times daily  Qty: 60 g, Refills: 0    Associated Diagnoses: Tinea cruris      ondansetron (ZOFRAN) 4 MG tablet Take 4 mg by mouth every 8 hours as needed for nausea      oxyCODONE (ROXICODONE) 5 MG tablet Take 1 tablet (5 mg) by mouth every 6 hours as needed for pain  Qty: 8 tablet, Refills: 0    Associated Diagnoses: Neuropathy      polyvinyl alcohol (LIQUIFILM TEARS) 1.4 % ophthalmic solution Place 1 drop into both eyes 4 times daily as needed for dry eyes       senna-docusate (SENOKOT-S/PERICOLACE) 8.6-50 MG tablet Take 1 tablet by mouth nightly as needed       !! sevelamer carbonate (RENVELA)  800 MG tablet Take 1,600 mg by mouth 3 times daily (with meals)       !! sevelamer carbonate (RENVELA) 800 MG tablet Take 800 mg by mouth Take with snacks or supplements       simethicone (MYLICON) 80 MG chewable tablet Take 1 tablet (80 mg) by mouth every 6 hours as needed for cramping or flatulence  Qty: 30 tablet, Refills: 0    Associated Diagnoses: Gastroesophageal reflux disease, unspecified whether esophagitis present      sodium-potassium bicarbonate (EDI-SELTZER GOLD) TBEF solu-tab Take 1 tablet by mouth daily as needed for heartburn      SUMAtriptan (IMITREX) 50 MG tablet Take 1 tablet (50 mg) by mouth daily as needed for migraine  Qty: 30 tablet, Refills: 0    Associated Diagnoses: Other migraine without status migrainosus, not intractable      timolol maleate (TIMOPTIC) 0.5 % ophthalmic solution Place 1 drop into both eyes 2 times daily      traZODone (DESYREL) 150 MG tablet Take 1 tablet (150 mg) by mouth At Bedtime  Qty: 90 tablet, Refills: 3    Associated Diagnoses: Primary insomnia      Vitamin D, Cholecalciferol, 25 MCG (1000 UT) TABS Take 2,000 Units by mouth daily (with lunch)       !! - Potential duplicate medications found. Please discuss with provider.        Allergies   Allergies   Allergen Reactions     Ace Inhibitors Cough     Other reaction(s): *Unknown     Fosinopril Cough     Ipratropium Headache     Zolpidem Other (See Comments)     Hallucinations  Other reaction(s): Hallucinations

## 2021-09-10 NOTE — PROGRESS NOTES
"  '    RENAL ([facility]) progress note  (Admitted from Obs status since last seen)  CC: F/U ESRD  S: Since last visit, patient feels breathing is better today. Only 1 Kg UF on added UF treatment yesterday before getting HA and stopping early. No nausea/dizziness. Care d/w Dr. Webb.    A/P:   1. ESRD: at [facility] with Dr. Kinney. Intolerant of aggressive UF due to HoTN. Outpt HD orders:  Dialyzer: 180NRe Optiflux    Na: 138 mEq/L  Bicarb: 25 mEq/L  Dialysate: 2.0 K, 2.25 Ca, 1.0 Mg, 100 Dextrose ()  BFR (prescribed): 450   Prescribed time: 03:15    EDW: 67.5 kg  Access Type: Active (In Use):CVCatheter-Tunneled/Chest   - Added UF treatment today. Uncertain how much of her dyspnea is fluid related vs COPD HD qMWF   - EDW increased to 69 Kg on discharge - d/w dialysis clinic    2. 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 Not candidate for surgery given frailty likely.     OK to discharge after dialysis today -     Calvin Olivier MD  [facility]  [phone] (off)       No interval changes to past medical history, social history or family history to report.    /59 (BP Location: Left arm)   Pulse 70   Temp 97.6  F (36.4  C) (Oral)   Resp 20   Ht 1.626 m (5' 4\")   Wt 69.8 kg (153 lb 12.8 oz)   SpO2 96%   BMI 26.40 kg/m      I/O last 3 completed shifts:  In: 720 [P.O.:720]  Out: 927 [Other:927]    Physical Exam:   GENERAL:more alert, NAD  EYES: pupils equal, sclerae not icteric.  ENT: Hearing normal, oral mucosa moist.  RESP: rales right base but no respiratory distress, normal effort.  CV: RRR, no murmurs. no leg edema.    GI: Active BS, Soft, NT/ND, no masses or HSM  SKIN: No rash, warm/ dry  NEURO: Moves all extremities, no tremor. Sensation grossly intact to LT  PSYCH: Appropriate mood and affect  PAD site clean    Recent Labs   Lab 09/07/21  0041      POTASSIUM 4.0   CHLORIDE 101   CO2 26   BUN 16   CR 3.17*   GFRESTIMATED " 14*   RONALDO 8.9              Current Facility-Administered Medications:      0.9% sodium chloride BOLUS, 100-150 mL, Intravenous, Q15 Min PRN, Uziel Kinney MD     acetaminophen (TYLENOL) tablet 650 mg, 650 mg, Oral, Q6H PRN, 650 mg at 09/10/21 0917 **OR** acetaminophen (TYLENOL) Suppository 650 mg, 650 mg, Rectal, Q6H PRN, Brenda Stevens MD, MD     albuterol (PROVENTIL) neb solution 2.5 mg, 2.5 mg, Nebulization, Q6H PRN, Donna Webb MD     aspirin EC tablet 81 mg, 81 mg, Oral, Daily with lunch, Donna Webb MD, 81 mg at 09/09/21 1827     atorvastatin (LIPITOR) tablet 10 mg, 10 mg, Oral, At Bedtime, Donna Webb MD, 10 mg at 09/09/21 2212     budesonide-formoterol (SYMBICORT) 80-4.5 MCG/ACT Inhaler 2 puff, 2 puff, Inhalation, BID, Donna Webb MD, 2 puff at 09/10/21 0919     buPROPion (WELLBUTRIN XL) 24 hr tablet 150 mg, 150 mg, Oral, Once per day on Tue Sat, Donna Webb MD, 150 mg at 09/07/21 2138     cinacalcet (SENSIPAR) tablet 30 mg, 30 mg, Oral, Q Mon Wed Fri AM, Donna Webb MD, 30 mg at 09/08/21 0842     famotidine (PEPCID) tablet 20 mg, 20 mg, Oral, At Bedtime, Donna Webb MD, 20 mg at 09/09/21 2212     folic acid (FOLVITE) tablet 1 mg, 1 mg, Oral, Daily with lunch, Donna Webb MD, 1 mg at 09/09/21 1828     gabapentin (NEURONTIN) capsule 100 mg, 100 mg, Oral, TID, Donna Webb MD, 100 mg at 09/10/21 0917     heparin ANTICOAGULANT injection 5,000 Units, 5,000 Units, Subcutaneous, Q12H, Donna Webb MD     lactobacillus rhamnosus (GG) (CULTURELL) capsule 1 capsule, 1 capsule, Oral, Daily with lunch, Donna Webb MD, 1 capsule at 09/09/21 1814     lidocaine (LMX4) cream, , Topical, Q1H PRN, Donna Webb MD     lidocaine 1 % 0.1-1 mL, 0.1-1 mL, Other, Q1H PRN, Donna Webb MD     melatonin tablet 1 mg, 1 mg, Oral, At Bedtime PRN, Donna Webb MD, 1 mg at 09/09/21 2225     midodrine (PROAMATINE) tablet 10 mg, 10 mg, Oral, Q6H PRN, Donna Webb MD, 10 mg at 09/08/21 0012      midodrine (PROAMATINE) tablet 15 mg, 15 mg, Oral, Once per day on Mon Wed Fri, Donna Webb MD, 15 mg at 09/08/21 0842     midodrine (PROAMATINE) tablet 5 mg, 5 mg, Oral, Daily PRN, Uziel Kinney MD, 5 mg at 09/09/21 1436     multivitamin RENAL (RENAVITE RX/NEPHROVITE) tablet 1 tablet, 1 tablet, Oral, Daily with supper, Donna Webb MD, 1 tablet at 09/09/21 1816     No heparin via hemodialysis machine, , Does not apply, Once, Uziel Kinney MD     ondansetron (ZOFRAN-ODT) ODT tab 4 mg, 4 mg, Oral, Q6H PRN, 4 mg at 09/08/21 2218 **OR** ondansetron (ZOFRAN) injection 4 mg, 4 mg, Intravenous, Q6H PRN, Brenda Stevens MD, MD, 4 mg at 09/09/21 1629     oxyCODONE (ROXICODONE) tablet 5 mg, 5 mg, Oral, Q6H PRN, Donna Webb MD, 5 mg at 09/10/21 0042     predniSONE (DELTASONE) tablet 40 mg, 40 mg, Oral, Daily, Donna Webb MD, 40 mg at 09/10/21 0917     senna-docusate (SENOKOT-S/PERICOLACE) 8.6-50 MG per tablet 1 tablet, 1 tablet, Oral, At Bedtime PRN, Donna Webb MD     sevelamer carbonate (RENVELA) tablet 1,600 mg, 1,600 mg, Oral, TID w/meals, Donna Webb MD, 1,600 mg at 09/10/21 0917     sevelamer carbonate (RENVELA) tablet 800 mg, 800 mg, Oral, With Snacks or Supplements, Donna Webb MD     sodium chloride (PF) 0.9% PF flush 3 mL, 3 mL, Intracatheter, Q8H, Donna Webb MD, 3 mL at 09/10/21 0509     sodium chloride (PF) 0.9% PF flush 3 mL, 3 mL, Intracatheter, q1 min prn, Donna Webb MD     timolol maleate (TIMOPTIC) 0.5 % ophthalmic solution 1 drop, 1 drop, Both Eyes, BID, Donna Webb MD, 1 drop at 09/10/21 0918     traZODone (DESYREL) tablet 150 mg, 150 mg, Oral, At Bedtime, Donna Webb MD, 150 mg at 09/09/21 2212     Vitamin D3 (CHOLECALCIFEROL) tablet 2,000 Units, 2,000 Units, Oral, Daily with lunch, Donna Webb MD, 2,000 Units at 09/09/21 1816      Labs personally reviewed today during this evaluation at 11:17 AM

## 2021-09-10 NOTE — PROGRESS NOTES
"76yr F, admitted on 9/7/21.  She is a dialysis patient admitted for shortness of breath, Hx of COPD, patient takes Albuterol treatment at home Q6hr PRN.  BD decreased clear, HR 75, RR 20, SaO2 96% on 3 L NC.  Albuterol neb given x 1 BS clear decreased, non-productive cough      /61   Pulse 74   Temp 97.8  F (36.6  C) (Oral)   Resp 18   Ht 1.626 m (5' 4\")   Wt 69.8 kg (153 lb 12.8 oz)   SpO2 99%   BMI 26.40 kg/m      Continue current treatment and RCAT PRN.   "

## 2021-09-12 NOTE — PROGRESS NOTES
Clinic Care Coordination Contact  [facility]/Voicemail       Clinical Data: Care Coordinator Outreach  Outreach attempted x 2.  Left message on patient's voicemail with call back information and requested return call.  Plan: Care Coordinator will try to reach patient again in 1-2 business days.    HENRY Ley  [phone]  [facility]

## 2021-09-12 NOTE — PROGRESS NOTES
Clinic Care Coordination Contact  [facility]/Voicemail       Clinical Data: Care Coordinator Outreach  Outreach attempted x 1. The CHW left a message with the patient's significant other. The CHW will try to reach out later in the day.  Plan: Care Coordinator will try to reach patient again in 1-2 business days.    HENRY Ley  [phone]  [facility]

## 2021-09-13 NOTE — PROGRESS NOTES
Clinic Care Coordination Contact  [facility]/Voicemail       Clinical Data: Care Coordinator Outreach  Outreach attempted x 3. The CHW talked to the patient's , and left a message stating that if there are any questions or concerns to give a call back to the CHW.  Plan: Care Coordinator will do no further outreaches at this time.    TAYLA LeyW  [phone]  [facility]

## 2021-09-13 NOTE — PROGRESS NOTES
09/13/21  2:18 PM    GREGRAMSEY received a voice mail from Shant Flowers with [employer] ([phone]).  SHERMAN called back and left a voice mail with call back number.      2:38 PM  SHERMAN received a call back from Shant.  SHERMAN answered Shant's questions and gave him permission to call back if he has further questions.     DIMA Mcnally

## 2021-09-14 NOTE — TELEPHONE ENCOUNTER
MTM referral from: Transitions of Care (recent hospital discharge or ED visit)    MTM referral outreach attempt #1 on September 14, 2021 at 11:31 AM      Outcome: Patient states that this is a waste of her time, will route to MTM Pharmacist/Provider as an FYI. Thank you for the referral.     Trav Kwok, MTM coordinator

## 2021-11-02 NOTE — PROGRESS NOTES
Internal Medicine Office Visit  [facility]   Patient Name: Belia Rodriguez  Patient Age: 76 year old  YOB: 1945  MRN: [id number]    Date of Visit: 11/2/2021  Patient presents with:  Hospital F/U: kidney failure 9/27/21 TCU discharge 10/27/21           Assessment / Plan / Medical Decision Making:    Problem List Items Addressed This Visit        Nervous and Auditory    Neuropathy     Patient cites increasing pain.  She did not tolerate buprenorphine 10 mg patches as this caused increased headaches.  Reduce buprenorphine to 5 mg weekly.  Continue gabapentin.  We are unable to continue to titrate gabapentin due to renal function         Relevant Medications    oxyCODONE (ROXICODONE) 5 MG tablet    buprenorphine (BUTRANS) 5 MCG/HR WK patch       Respiratory    Chronic obstructive pulmonary disease, unspecified COPD type (H)     Continue current inhalers including albuterol every 4 hours as well as Symbicort            Urinary    End-stage renal disease on hemodialysis (H)     Continue dialysis  Check blood pressure and use midodrine as needed            Other    Chronic acquired lymphedema     Apply ACE wraps during the day, off at night.            Other Visit Diagnoses     Hospital discharge follow-up    -  Primary           I am having Itzel Rodriguez start on buprenorphine. I am also having her maintain her sodium-potassium bicarbonate, aspirin, acetaminophen, atorvastatin, buPROPion, Vitamin D (Cholecalciferol), cinacalcet, famotidine, gabapentin, midodrine, midodrine, polyvinyl alcohol, senna-docusate, sevelamer carbonate, timolol maleate, melatonin, ondansetron, albuterol, budesonide-formoterol, loratadine, FOLIC ACID PO, sevelamer carbonate, Lactobacillus (CULTURELLE DIGESTIVE WOMENS PO), diphenhydrAMINE, nystatin, traZODone, Dialyvite, simethicone, SUMAtriptan, Coricidin HBP Cough/Cold, albuterol, and oxyCODONE.          No orders of the defined types were placed in this  encounter.  Followup: Return in about 6 weeks (around 12/14/2021) for Follow up. earlier if needed.    Patient was seen with NP student, Melania Marin. I agree with assessment and plan.  Demetra Larose, DNP, APRN, CNP           HPI:  Belia Rodriguez is a 76 year old year old who presents to the office today for ED/Hospital visit follow up. At [facility] 9/7/21-9/10/21 for acute on chronic respiratory failure. She has had multiple recent hospitalizations for fluid overload with history of CHF and COPD exacerbations. Palliative care and hospice were discussed with patient during last hospital stay, she was not interested at that time. During hospital course, she required 5-6L O2 (baseline 3L).  She became frequently hypotensive and received Midodrine. She was discharged to TCU.     She is currently at her baseline O2 of 3L. She reports that her SOB and work of breathing has worsened since discharge from TCU.     She reports use of Midodrine approximately once per week in addition to dialysis days for hypotensive since hospital discharge.     She has a pacemaker and sees cardiology on 11/11/21.     She has ESRD and undergoes dialysis 3x/week     She reports BLE pain and increased swelling. She was prescribed Buprenorphine patches and a short course of Oxycodone 5mg while in the TCU. She tolerated the Buprenorphine 5mg patches but experienced headaches with the increased 10g and stopped using them. She reports her pain 9/10.     Discussed hospice with patient and . They are interested but are concerned about the room and board fee and termination of dialysis treatment. They do not wish to pursue hospice care at this time.         Health Maintenance Review  Health Maintenance   Topic Date Due     HF ACTION PLAN  Never done     ANNUAL REVIEW OF HM ORDERS  Never done     COPD ACTION PLAN  Never done     DEPRESSION ACTION PLAN  Never done     COLORECTAL CANCER SCREENING  Never done     HEPATITIS C SCREENING   Never done     HEPATITIS B IMMUNIZATION (1 of 3 - Risk Recombivax 3-dose series) Never done     ZOSTER IMMUNIZATION (1 of 2) 09/21/2009     MEDICARE ANNUAL WELLNESS VISIT  Never done     LIPID  11/19/2020     INFLUENZA VACCINE (1) 09/01/2021     COVID-19 Vaccine (3 - Booster for Pfizer series) 09/01/2021     MAMMO SCREENING  12/20/2021     PHQ-9  12/24/2021     BMP  03/07/2022     FALL RISK ASSESSMENT  06/24/2022     ALT  07/11/2022     CBC  08/20/2022     DEXA  07/08/2023     ADVANCE CARE PLANNING  08/24/2026     DTAP/TDAP/TD IMMUNIZATION (4 - Td or Tdap) 10/30/2027     TSH W/FREE T4 REFLEX  Completed     SPIROMETRY  Completed     Pneumococcal Vaccine: 65+ Years  Completed     IPV IMMUNIZATION  Aged Out     MENINGITIS IMMUNIZATION  Aged Out       Current Scheduled Meds:  Outpatient Encounter Medications as of 11/2/2021   Medication Sig Dispense Refill     acetaminophen (TYLENOL) 500 MG tablet Take 1,000 mg by mouth every 6 hours as needed for mild pain       albuterol (PROAIR HFA/PROVENTIL HFA/VENTOLIN HFA) 108 (90 Base) MCG/ACT inhaler INHALE 2 PUFFS INTO THE LUNGS EVERY 6 HOURS AS NEEDED FOR SHORTNESS OF BREATH / DYSPNEA OR WHEEZING 8.5 g 11     albuterol (PROVENTIL) (2.5 MG/3ML) 0.083% neb solution Take 1 vial (2.5 mg) by nebulization 4 times daily On non-dialysis days. 300 mL 1     aspirin 81 MG EC tablet Take 81 mg by mouth daily (with lunch)        atorvastatin (LIPITOR) 10 MG tablet Take 10 mg by mouth At Bedtime       B Complex-C-Folic Acid (DIALYVITE) TABS Take 1 tablet by mouth daily 90 tablet 3     budesonide-formoterol (SYMBICORT) 80-4.5 MCG/ACT Inhaler Inhale 2 puffs into the lungs 2 times daily 10.2 g 1     buprenorphine (BUTRANS) 5 MCG/HR WK patch Place 1 patch onto the skin every 7 days 4 patch 1     buPROPion (WELLBUTRIN XL) 150 MG 24 hr tablet Take 150 mg by mouth twice a week Tue & Sat       Chlorpheniramine-DM (CORICIDIN HBP COUGH/COLD) 4-30 MG TABS Take 1 tablet by mouth every 6 hours as needed  Coricidin product       cinacalcet (SENSIPAR) 30 MG tablet Take 30 mg by mouth Every Mon, Wed, Fri Morning . At dialysis.       diphenhydrAMINE (BENADRYL) 50 MG tablet Take 50 mg by mouth as needed       famotidine (PEPCID) 20 MG tablet Take 20 mg by mouth At Bedtime       FOLIC ACID PO Take 1 mg by mouth daily (with lunch)        gabapentin (NEURONTIN) 100 MG capsule Take 100 mg by mouth 3 times daily       Lactobacillus (CULTURELLE DIGESTIVE WOMENS PO) Take 1 tablet by mouth daily (with lunch)        loratadine (CLARITIN) 10 MG capsule Take 10 mg by mouth as needed       melatonin 3 MG tablet Take 3 mg by mouth At Bedtime       midodrine (PROAMATINE) 10 MG tablet Take 10 mg by mouth every 6 hours as needed (for SBP < 106) For non - dialysis days       midodrine (PROAMATINE) 10 MG tablet Take 15 mg by mouth three times a week Take once daily prior to dialysis on dialysis days Mon-Wed-Fri       nystatin (MYCOSTATIN) 937316 UNIT/GM external powder Apply topically 2 times daily (Patient taking differently: Apply topically 2 times daily as needed Yeast rash) 60 g 0     ondansetron (ZOFRAN) 4 MG tablet Take 4 mg by mouth every 8 hours as needed for nausea       oxyCODONE (ROXICODONE) 5 MG tablet Take 1 tablet (5 mg) by mouth every 6 hours as needed for pain 8 tablet 0     polyvinyl alcohol (LIQUIFILM TEARS) 1.4 % ophthalmic solution Place 1 drop into both eyes 4 times daily as needed for dry eyes        senna-docusate (SENOKOT-S/PERICOLACE) 8.6-50 MG tablet Take 1 tablet by mouth nightly as needed        sevelamer carbonate (RENVELA) 800 MG tablet Take 1,600 mg by mouth 3 times daily (with meals)        sevelamer carbonate (RENVELA) 800 MG tablet Take 800 mg by mouth Take with snacks or supplements        simethicone (MYLICON) 80 MG chewable tablet Take 1 tablet (80 mg) by mouth every 6 hours as needed for cramping or flatulence 30 tablet 0     sodium-potassium bicarbonate (EDI-SELTZER GOLD) TBEF solu-tab Take 1 tablet  by mouth daily as needed for heartburn       SUMAtriptan (IMITREX) 50 MG tablet Take 1 tablet (50 mg) by mouth daily as needed for migraine 30 tablet 0     timolol maleate (TIMOPTIC) 0.5 % ophthalmic solution Place 1 drop into both eyes 2 times daily       traZODone (DESYREL) 150 MG tablet Take 1 tablet (150 mg) by mouth At Bedtime 90 tablet 3     Vitamin D, Cholecalciferol, 25 MCG (1000 UT) TABS Take 2,000 Units by mouth daily (with lunch)       [DISCONTINUED] oxyCODONE (ROXICODONE) 5 MG tablet Take 1 tablet (5 mg) by mouth every 6 hours as needed for pain 8 tablet 0     No facility-administered encounter medications on file as of 11/2/2021.     Post Discharge Medication Reconciliation Status: discharge medications reconciled and changed, per note/orders.       Objective / Physical Examination:  Vitals:    11/02/21 1458   BP: 94/56   Pulse: 72   SpO2: 95%     Wt Readings from Last 3 Encounters:   09/10/21 69.8 kg (153 lb 12.8 oz)   08/18/21 66.7 kg (147 lb)   07/27/21 69 kg (152 lb 1.9 oz)     There is no height or weight on file to calculate BMI.     Constitutional: In no apparent distress  Eyes: Conjunctivae clear. Non-icteric.   Respiratory: Breath sounds diminished bilaterally. No crackles, rhonchi, or wheezes. Increased inspiratory and expiratory effort. On 3L oxygen nasal cannula.   Cardiovascular: Regular rate and rhythm. No murmurs, rubs, or gallops. 3+ pitting edema in bilateral ankles and feet with rubor.  Skin: Coolness of bilateral feet. Dry flaking skin. Skin intact.   Psych: Alert and oriented x3.

## 2021-11-03 PROBLEM — J44.1 COPD EXACERBATION (H): Status: ACTIVE | Noted: 2019-01-04

## 2021-11-03 PROBLEM — J44.9 CHRONIC OBSTRUCTIVE PULMONARY DISEASE, UNSPECIFIED COPD TYPE (H): Status: ACTIVE | Noted: 2021-01-01

## 2021-11-03 PROBLEM — J43.8 OTHER EMPHYSEMA (H): Status: ACTIVE | Noted: 2019-01-04

## 2021-11-03 NOTE — ASSESSMENT & PLAN NOTE
Patient cites increasing pain.  She did not tolerate buprenorphine 10 mg patches as this caused increased headaches.  Reduce buprenorphine to 5 mg weekly.  Continue gabapentin.  We are unable to continue to titrate gabapentin due to renal function

## 2021-11-05 NOTE — TELEPHONE ENCOUNTER
Central Prior Authorization Team   Phone: [phone]    PA Initiation    Medication: Buprenorphine 5MCG/HR weekly patches  Insurance Company: [employer] - Phone [phone] Fax [phone]  Pharmacy Filling the Rx: [facility] - [place], [place] - [address]  Filling Pharmacy Phone: [phone]  Filling Pharmacy Fax:    Start Date: 11/5/2021  Manually faxed request

## 2021-11-09 NOTE — TELEPHONE ENCOUNTER
Prior Authorization Approval    Authorization Effective Date: 8/7/2021  Authorization Expiration Date: 11/5/2022  Medication: Buprenorphine 5MCG/HR weekly patches  Approved Dose/Quantity:    Reference #:     Insurance Company: [employer] - Phone [phone] Fax [phone]  Expected CoPay:       CoPay Card Available:      Foundation Assistance Needed:    Which Pharmacy is filling the prescription (Not needed for infusion/clinic administered): [facility] - [place], [place] - [address] [place] [address]  Pharmacy Notified: Yes  Patient Notified: Yes

## 2021-11-29 NOTE — PROGRESS NOTES
Pt scheduled discharge discontinued this shift d/t pt c/o shortness of breath and weakness after AM dialysis. Pt remains on 3L O2, sats 95-98%. Pt rating ongoing chronic bilateral foot pain 7-8/10 this shift. Prn oxy and tylenol given for comfort. Plan is for pt to discharge 7/15 AM home with HC, spouse to transport.    (4) walks frequently

## 2022-01-01 ENCOUNTER — TELEPHONE (OUTPATIENT)
Dept: GERIATRICS | Facility: CLINIC | Age: 77
End: 2022-01-01
Payer: MEDICARE

## 2022-01-01 ENCOUNTER — TRANSITIONAL CARE UNIT VISIT (OUTPATIENT)
Dept: GERIATRICS | Facility: CLINIC | Age: 77
End: 2022-01-01
Payer: MEDICARE

## 2022-01-01 ENCOUNTER — OFFICE VISIT (OUTPATIENT)
Dept: INTERNAL MEDICINE | Facility: CLINIC | Age: 77
End: 2022-01-01
Payer: MEDICARE

## 2022-01-01 ENCOUNTER — MEDICAL CORRESPONDENCE (OUTPATIENT)
Dept: HEALTH INFORMATION MANAGEMENT | Facility: CLINIC | Age: 77
End: 2022-01-01
Payer: MEDICARE

## 2022-01-01 VITALS
HEART RATE: 71 BPM | BODY MASS INDEX: 25.35 KG/M2 | OXYGEN SATURATION: 94 % | DIASTOLIC BLOOD PRESSURE: 56 MMHG | WEIGHT: 147.7 LBS | SYSTOLIC BLOOD PRESSURE: 96 MMHG

## 2022-01-01 VITALS
RESPIRATION RATE: 18 BRPM | BODY MASS INDEX: 24.92 KG/M2 | WEIGHT: 146 LBS | HEART RATE: 70 BPM | OXYGEN SATURATION: 94 % | TEMPERATURE: 98.7 F | HEIGHT: 64 IN | SYSTOLIC BLOOD PRESSURE: 106 MMHG | DIASTOLIC BLOOD PRESSURE: 60 MMHG

## 2022-01-01 VITALS
RESPIRATION RATE: 18 BRPM | OXYGEN SATURATION: 90 % | HEART RATE: 73 BPM | TEMPERATURE: 98.6 F | SYSTOLIC BLOOD PRESSURE: 159 MMHG | WEIGHT: 156 LBS | DIASTOLIC BLOOD PRESSURE: 108 MMHG | HEIGHT: 64 IN | BODY MASS INDEX: 26.63 KG/M2

## 2022-01-01 VITALS
RESPIRATION RATE: 18 BRPM | TEMPERATURE: 97 F | OXYGEN SATURATION: 97 % | BODY MASS INDEX: 25.51 KG/M2 | DIASTOLIC BLOOD PRESSURE: 54 MMHG | HEART RATE: 68 BPM | WEIGHT: 149.4 LBS | HEIGHT: 64 IN | SYSTOLIC BLOOD PRESSURE: 82 MMHG

## 2022-01-01 VITALS
TEMPERATURE: 98.1 F | HEART RATE: 71 BPM | DIASTOLIC BLOOD PRESSURE: 60 MMHG | WEIGHT: 147.8 LBS | BODY MASS INDEX: 25.23 KG/M2 | RESPIRATION RATE: 20 BRPM | OXYGEN SATURATION: 97 % | HEIGHT: 64 IN | SYSTOLIC BLOOD PRESSURE: 108 MMHG

## 2022-01-01 VITALS
DIASTOLIC BLOOD PRESSURE: 78 MMHG | RESPIRATION RATE: 18 BRPM | SYSTOLIC BLOOD PRESSURE: 118 MMHG | TEMPERATURE: 98.3 F | WEIGHT: 148.6 LBS | HEIGHT: 64 IN | OXYGEN SATURATION: 93 % | BODY MASS INDEX: 25.37 KG/M2 | HEART RATE: 92 BPM

## 2022-01-01 DIAGNOSIS — N18.4 ANEMIA OF CHRONIC RENAL FAILURE, STAGE 4 (SEVERE) (H): ICD-10-CM

## 2022-01-01 DIAGNOSIS — K21.9 GASTROESOPHAGEAL REFLUX DISEASE WITHOUT ESOPHAGITIS: ICD-10-CM

## 2022-01-01 DIAGNOSIS — G62.9 NEUROPATHY: Primary | ICD-10-CM

## 2022-01-01 DIAGNOSIS — I50.32 CHRONIC DIASTOLIC HEART FAILURE (H): ICD-10-CM

## 2022-01-01 DIAGNOSIS — E21.3 HYPERPARATHYROIDISM (H): ICD-10-CM

## 2022-01-01 DIAGNOSIS — Z95.818 PRESENCE OF WATCHMAN LEFT ATRIAL APPENDAGE CLOSURE DEVICE: ICD-10-CM

## 2022-01-01 DIAGNOSIS — J96.22 ACUTE ON CHRONIC RESPIRATORY FAILURE WITH HYPERCAPNIA (H): ICD-10-CM

## 2022-01-01 DIAGNOSIS — G62.9 NEUROPATHY: ICD-10-CM

## 2022-01-01 DIAGNOSIS — Z99.2 END-STAGE RENAL DISEASE ON HEMODIALYSIS (H): ICD-10-CM

## 2022-01-01 DIAGNOSIS — N18.5 ANEMIA OF CHRONIC RENAL FAILURE, STAGE 5 (H): ICD-10-CM

## 2022-01-01 DIAGNOSIS — J96.11 CHRONIC RESPIRATORY FAILURE WITH HYPOXIA (H): ICD-10-CM

## 2022-01-01 DIAGNOSIS — I48.0 PAROXYSMAL ATRIAL FIBRILLATION (H): ICD-10-CM

## 2022-01-01 DIAGNOSIS — I50.32 CHRONIC DIASTOLIC CONGESTIVE HEART FAILURE (H): ICD-10-CM

## 2022-01-01 DIAGNOSIS — E87.70 HYPERVOLEMIA, UNSPECIFIED HYPERVOLEMIA TYPE: ICD-10-CM

## 2022-01-01 DIAGNOSIS — N18.6 END-STAGE RENAL DISEASE ON HEMODIALYSIS (H): ICD-10-CM

## 2022-01-01 DIAGNOSIS — F33.0 MILD EPISODE OF RECURRENT MAJOR DEPRESSIVE DISORDER (H): ICD-10-CM

## 2022-01-01 DIAGNOSIS — D63.1 ANEMIA OF CHRONIC RENAL FAILURE, STAGE 4 (SEVERE) (H): ICD-10-CM

## 2022-01-01 DIAGNOSIS — J96.11 CHRONIC RESPIRATORY FAILURE WITH HYPOXIA (H): Primary | ICD-10-CM

## 2022-01-01 DIAGNOSIS — E86.1 HYPOTENSION DUE TO HYPOVOLEMIA: ICD-10-CM

## 2022-01-01 DIAGNOSIS — F51.01 PRIMARY INSOMNIA: ICD-10-CM

## 2022-01-01 DIAGNOSIS — J44.9 CHRONIC OBSTRUCTIVE PULMONARY DISEASE, UNSPECIFIED COPD TYPE (H): ICD-10-CM

## 2022-01-01 DIAGNOSIS — D63.1 ANEMIA OF CHRONIC RENAL FAILURE, STAGE 5 (H): ICD-10-CM

## 2022-01-01 DIAGNOSIS — E86.1 HYPOTENSION DUE TO HYPOVOLEMIA: Primary | ICD-10-CM

## 2022-01-01 DIAGNOSIS — J96.21 ACUTE ON CHRONIC RESPIRATORY FAILURE WITH HYPOXIA (H): ICD-10-CM

## 2022-01-01 DIAGNOSIS — J44.1 COPD EXACERBATION (H): ICD-10-CM

## 2022-01-01 DIAGNOSIS — J96.01 ACUTE RESPIRATORY FAILURE WITH HYPOXIA AND HYPERCARBIA (H): ICD-10-CM

## 2022-01-01 DIAGNOSIS — J96.02 ACUTE RESPIRATORY FAILURE WITH HYPOXIA AND HYPERCARBIA (H): ICD-10-CM

## 2022-01-01 DIAGNOSIS — D69.6 THROMBOCYTOPENIA (H): ICD-10-CM

## 2022-01-01 PROCEDURE — 99309 SBSQ NF CARE MODERATE MDM 30: CPT | Performed by: NURSE PRACTITIONER

## 2022-01-01 PROCEDURE — 99214 OFFICE O/P EST MOD 30 MIN: CPT | Performed by: NURSE PRACTITIONER

## 2022-01-01 PROCEDURE — 99306 1ST NF CARE HIGH MDM 50: CPT | Performed by: NURSE PRACTITIONER

## 2022-01-01 PROCEDURE — 99316 NF DSCHRG MGMT 30 MIN+: CPT | Performed by: NURSE PRACTITIONER

## 2022-01-01 RX ORDER — BUPROPION HYDROCHLORIDE 150 MG/1
150 TABLET ORAL
Qty: 36 TABLET | Refills: 1 | Status: SHIPPED | OUTPATIENT
Start: 2022-01-01

## 2022-01-01 RX ORDER — PREGABALIN 100 MG/1
100 CAPSULE ORAL 2 TIMES DAILY
Qty: 180 CAPSULE | Refills: 1 | Status: SHIPPED | OUTPATIENT
Start: 2022-01-01

## 2022-01-01 RX ORDER — ALBUTEROL SULFATE 0.83 MG/ML
2.5 SOLUTION RESPIRATORY (INHALATION) 3 TIMES DAILY PRN
Qty: 270 ML | Status: CANCELLED | OUTPATIENT
Start: 2022-01-01

## 2022-01-01 RX ORDER — PREGABALIN 25 MG/1
100 CAPSULE ORAL 2 TIMES DAILY
COMMUNITY
Start: 2022-01-01

## 2022-01-01 RX ORDER — LIDOCAINE/PRILOCAINE 2.5 %-2.5%
CREAM (GRAM) TOPICAL 3 TIMES DAILY PRN
COMMUNITY
End: 2022-01-01

## 2022-01-01 RX ORDER — ALBUTEROL SULFATE 0.83 MG/ML
SOLUTION RESPIRATORY (INHALATION)
Qty: 90 ML | Refills: 0 | Status: SHIPPED | OUTPATIENT
Start: 2022-01-01

## 2022-01-01 RX ORDER — BUPROPION HYDROCHLORIDE 150 MG/1
TABLET, EXTENDED RELEASE ORAL
COMMUNITY
Start: 2021-01-01 | End: 2022-01-01

## 2022-01-01 RX ORDER — LIDOCAINE/PRILOCAINE 2.5 %-2.5%
CREAM (GRAM) TOPICAL 3 TIMES DAILY PRN
Qty: 60 G | Refills: 3 | Status: SHIPPED | OUTPATIENT
Start: 2022-01-01

## 2022-01-01 RX ORDER — OXYCODONE HYDROCHLORIDE 5 MG/1
5 TABLET ORAL EVERY 6 HOURS PRN
Qty: 10 TABLET | Refills: 0 | Status: SHIPPED | OUTPATIENT
Start: 2022-01-01 | End: 2022-01-01

## 2022-01-01 RX ORDER — PREGABALIN 25 MG/1
100 CAPSULE ORAL 2 TIMES DAILY
Status: CANCELLED | OUTPATIENT
Start: 2022-01-01

## 2022-01-01 RX ORDER — ALBUTEROL SULFATE 0.83 MG/ML
2.5 SOLUTION RESPIRATORY (INHALATION) 3 TIMES DAILY PRN
COMMUNITY

## 2022-01-01 RX ORDER — BUDESONIDE AND FORMOTEROL FUMARATE DIHYDRATE 80; 4.5 UG/1; UG/1
2 AEROSOL RESPIRATORY (INHALATION) 2 TIMES DAILY
Qty: 10.2 G | Refills: 3 | Status: SHIPPED | OUTPATIENT
Start: 2022-01-01

## 2022-01-01 RX ORDER — OXYCODONE HYDROCHLORIDE 5 MG/1
5 TABLET ORAL EVERY 6 HOURS PRN
Qty: 30 TABLET | Refills: 0 | Status: SHIPPED | OUTPATIENT
Start: 2022-01-01

## 2022-01-01 RX ORDER — TRAZODONE HYDROCHLORIDE 150 MG/1
TABLET ORAL
Qty: 90 TABLET | Refills: 1 | Status: SHIPPED | OUTPATIENT
Start: 2022-01-01

## 2022-01-01 ASSESSMENT — MIFFLIN-ST. JEOR
SCORE: 1147.25
SCORE: 1155.42
SCORE: 1192.61
SCORE: 1159.05
SCORE: 1162.67

## 2022-01-01 ASSESSMENT — PATIENT HEALTH QUESTIONNAIRE - PHQ9
10. IF YOU CHECKED OFF ANY PROBLEMS, HOW DIFFICULT HAVE THESE PROBLEMS MADE IT FOR YOU TO DO YOUR WORK, TAKE CARE OF THINGS AT HOME, OR GET ALONG WITH OTHER PEOPLE: SOMEWHAT DIFFICULT
SUM OF ALL RESPONSES TO PHQ QUESTIONS 1-9: 4

## 2022-01-06 NOTE — TELEPHONE ENCOUNTER
"Routing refill request to provider for review/approval because:  Patient requesting refill too soon. Please review.     Last Written Prescription Date:  7/27/2021  Last Fill Quantity: 90,  # refills: 3   Last office visit provider:  11/2/2021     Requested Prescriptions   Pending Prescriptions Disp Refills     traZODone (DESYREL) 150 MG tablet [Pharmacy Med Name: TRAZODONE  MG TABS 150 Tablet] 7 tablet 0     Sig: TAKE ONE TABLET BY MOUTH EVERY NIGHT       Serotonin Modulators Passed - 1/3/2022  2:05 PM        Passed - Recent (12 mo) or future (30 days) visit within the authorizing provider's specialty     Patient has had an office visit with the authorizing provider or a provider within the authorizing providers department within the previous 12 mos or has a future within next 30 days. See \"Patient Info\" tab in inbasket, or \"Choose Columns\" in Meds & Orders section of the refill encounter.              Passed - Medication is active on med list        Passed - Patient is age 18 or older        Passed - No active pregnancy on record        Passed - No positive pregnancy test in past 12 months             Saige Soto RN 01/06/22 1:03 PM  "

## 2022-01-06 NOTE — TELEPHONE ENCOUNTER
[facility] calling.  Pt requesting refill of albuterol nebulizer solution.      Requesting new RX so that patient can receive 90 day supply - 3 boxes at a time.

## 2022-01-07 NOTE — PROGRESS NOTES
[address] [facility] [address]  Progress Note Chelita Quintero 1950, 73 y o  male MRN: [id number]  Unit/Bed#: 420-01 Encounter: [id number]  Primary Care Provider: No primary care provider on file  Date and time admitted to hospital: 1/5/2022  4:52 PM    * Katie Gillis 24  Patient presented with weakness, nausea, fatigue, body aches -> was found to be COVID positive on 12/26/2021  Wife COVID +ve on 12/25/2021  EMS recorded oxygen saturation of low 80s    CXR on 01/05: shows pathcy bilateral ground glass opacities   CRP elevate at 33; Procalcitonin elevated    Ramdesivir day#2  Dexamethasone day#3  Lovenox 30 for VTE prophylaxis  Consulted Pulmonology: recommendations highly appreciated  Patient condition worsened this morning with increased O2 requirement, 3L O2 via NC  Patient is paraplegic with peripheral edema - LE venus doppler shows no DVT   Incentive spirometry  Follow CRP  I&Os            Acute respiratory failure with hypoxia (HCC)  Assessment & Plan  No O2 requirement at baseline  Home pulse Ox shows drop in SpO2 to 80s  Currently requiring 3L O2 via NC  See mgx of COVID    Elevated troponin  Assessment & Plan  Non MI trop elevation, Most likely due to COVID and UTI  EKG showing sinus tachycardia  Patient has no symptoms of chest pain        PAD (peripheral artery disease) ([address] )  Assessment & Plan  This is a chronic condition  continue ASA 81        Skin ulcer of toe of right foot with fat layer exposed ([address] )  Assessment & Plan  See management of skin ulcer of left foot    Skin ulcer of toe of left foot with fat layer exposed ([address] )  Assessment & Plan  Goes to wound care regularly  Vascular surgery referral was placed recently  Consult wound care    Paraplegia [facility])  Assessment & Plan  This is a chronic stable condition    Had telephone pole fall on him and since then has had paraplegia  Does well and self caths at home  Silver in place      VTE Pharmacologic Prophylaxis: [facility]    Facility:   [facility], [place], operated by [facility] [[id number]]   Code Status: FULL CODE      CHIEF COMPLAINT/REASON FOR VISIT:  Chief Complaint   Patient presents with     Follow Up     rehab, esrd, hip       HISTORY:      HPI: Belia is a 74 y.o. female who was seen secondary to her hospitalization August 27, 2019 secondary to a fall sustaining left hip discomfort.  She is a status post internal fixation of a left intertrochanteric hip fracture with biliary nail in June 2019.  She eventually was discharged from the transitional care unit and tripped and fell.  Therefore after further evaluation she was sent back to transitional care unit to work on her strength and balance and conditioning.  She does have a history of end-stage renal disease and does attend dialysis 3 times weekly.  She has been normotensive and afebrile.  Has a pacemaker from March 2019.  Does wear a right leg AFO secondary to foot drop.  History of chronic back pain.  I remember her from previous transitional care units.  Her pain she can have oxycodone as needed usually takes about 2 doses per day and she is being followed by the Coumadin clinic.  Also for neuropathic pain is on gabapentin 100 mg 3 times a daily omeprazole 20 mg twice a daily for reflux along with Zantac 150 mg at bedtime also with sleep she is on trazodone 75 mg.  She does not have a whole lot extra to say regarding her rehabilitation she does feel like she is making pretty good progress overall does like the parallel bars as well as the bicycle.    Past Medical History:   Diagnosis Date     Arthritis      CHF (congestive heart failure) (H)      Chronic anemia 6/1/2014     Chronic kidney disease      Chronic thoracic aortic dissection (H) 10/7/2015    Descending thoracic aorta; treated medically per notes of Dragan Singh and Jennifer.     COPD (chronic obstructive pulmonary disease) (H)      CVA (cerebral infarction)      Disease of thyroid gland       Dyslipidemia      ESRD (end stage renal disease) (H) 06/03/2009    on dialysis with Dr. Mitchell     Essential hypertension 6/30/2014     Gastrointestinal hemorrhage, unspecified gastrointestinal hemorrhage type 6/5/2017     GI (gastrointestinal bleed)      GI bleeding 6/5/2017     Gout      L3 vertebral fracture (H) 11/16/2015     Left Atrial Appendage Occlusion (WATCHMAN) 4/5/2018    LAAO April 5, 2018 (30 mm WATCHMAN)     Obesity      ZULEIKA (obstructive sleep apnea), severe, intolerant of CPAP 10/22/2015     Pneumonia 9-7-2015     Right foot drop      Spinal stenosis 3/28/2016     Stroke (H) 3/24/2016             Family History   Problem Relation Age of Onset     Dementia Mother      Diabetes Mother      Arthritis Mother      Cancer Mother      Depression Mother      Heart disease Mother      Vision loss Mother      Stroke Father      Heart disease Father      Breast cancer Neg Hx      Social History     Socioeconomic History     Marital status:      Spouse name: Cayden     Number of children: 2     Years of education: Not on file     Highest education level: Not on file   Occupational History     Employer: RETIRED   Social Needs     Financial resource strain: Not on file     Food insecurity:     Worry: Not on file     Inability: Not on file     Transportation needs:     Medical: Not on file     Non-medical: Not on file   Tobacco Use     Smoking status: Former Smoker     Packs/day: 1.50     Years: 37.00     Pack years: 55.50     Types: Cigarettes     Last attempt to quit: 1/1/2009     Years since quitting: 10.6     Smokeless tobacco: Never Used   Substance and Sexual Activity     Alcohol use: No     Alcohol/week: 7.0 oz     Types: 14 Standard drinks or equivalent per week     Comment: 14 mixed drinks per week     Drug use: No     Sexual activity: Never     Partners: Male   Lifestyle     Physical activity:     Days per week: Not on file     Minutes per session: Not on file     Stress: Not on file  Pharmacologic: Enoxaparin (Lovenox)  Mechanical VTE Prophylaxis in Place: Yes    Patient Centered Rounds: I have performed bedside rounds with nursing staff today  Discussions with Specialists or Other Care Team Provider: yes    Education and Discussions with Family / Patient: yes    Time Spent for Care: 30 minutes  More than 50% of total time spent on counseling and coordination of care as described above  Current Length of Stay: 1 day(s)    Current Patient Status: Inpatient   Certification Statement: The patient will continue to require additional inpatient hospital stay due to on going treatment    Discharge Plan: home    Code Status: Level 1 - Full Code    Subjective:   Seen and examined the patient at bedside today, no overnight events  Denied chest pain, palpitations, dizziness, lightheadedness  Objective:     Vitals:   Temp (24hrs), Av 4 °F (36 9 °C), Min:98 °F (36 7 °C), Max:98 9 °F (37 2 °C)    Temp:  [98 °F (36 7 °C)-98 9 °F (37 2 °C)] 98 2 °F (36 8 °C)  HR:  [68-88] 88  Resp:  [18-22] 19  BP: (105-145)/(54-68) 145/68  SpO2:  [87 %-97 %] 93 %  Body mass index is 31 07 kg/m²  Input and Output Summary (last 24 hours): Intake/Output Summary (Last 24 hours) at 2022 1646  Last data filed at 2022 1502  Gross per 24 hour   Intake 530 ml   Output 2550 ml   Net -2020 ml       Physical Exam:     Physical Exam  Vitals and nursing note reviewed  Constitutional:       General: He is not in acute distress  Appearance: Normal appearance  He is well-developed  HENT:      Head: Normocephalic and atraumatic  Right Ear: External ear normal       Left Ear: External ear normal       Nose: Nose normal  No rhinorrhea  Mouth/Throat:      Mouth: Mucous membranes are moist       Pharynx: Oropharynx is clear  Eyes:      General: No scleral icterus  Extraocular Movements: Extraocular movements intact        Conjunctiva/sclera: Conjunctivae normal       Pupils: Pupils are   Relationships     Social connections:     Talks on phone: Not on file     Gets together: Not on file     Attends Spiritism service: Not on file     Active member of club or organization: Not on file     Attends meetings of clubs or organizations: Not on file     Relationship status: Not on file     Intimate partner violence:     Fear of current or ex partner: Not on file     Emotionally abused: Not on file     Physically abused: Not on file     Forced sexual activity: Not on file   Other Topics Concern     Not on file   Social History Narrative    Lives with her . Daughter in [place] and daughter in [place].         Review of Systems  Currently she denies any chills and fever coughing wheezing chest pain dizziness or vertigo nausea vomiting diarrhea dysuria headache stiff neck swollen glands rashes or sores.  History of end-stage renal disease on dialysis along with GERD hypertension CHF pacemaker chronic low back pain sleep apnea intolerant to CPAP and a right foot drop.    Current Outpatient Medications   Medication Sig Note     acetaminophen (TYLENOL) 500 MG tablet Take 1,000 mg by mouth 3 (three) times a day as needed.      atorvastatin (LIPITOR) 10 MG tablet Take 10 mg by mouth at bedtime.      B complex-vitamin C-folic acid (DIALYVITE) 100-1 mg Tab Take 1 tablet by mouth daily with lunch.             chlorpheniramine/dextromethorp (CORICIDIN HBP COUGH AND COLD ORAL) Take 1 tablet by mouth every 6 (six) hours as needed.      cholecalciferol, vitamin D3, (VITAMIN D3) 2,000 unit Tab Take 2,000 Units by mouth daily with lunch.             cinacalcet (SENSIPAR) 30 MG tablet Take 30 mg by mouth see administration instructions. Take three times weekly with dialysis (on Mondays, Wednesdays, and Fridays).            folic acid (FOLVITE) 1 MG tablet Take 1 mg by mouth daily with lunch.             gabapentin (NEURONTIN) 100 MG capsule Take 100 mg by mouth 3 (three) times a day.      Lactobacillus rhamnosus GG  equal, round, and reactive to light  Cardiovascular:      Rate and Rhythm: Normal rate and regular rhythm  Pulses: Normal pulses  Heart sounds: Normal heart sounds  No murmur heard  Pulmonary:      Effort: Respiratory distress ( currently on 3L of O2 via NC) present  Breath sounds: No wheezing  Abdominal:      General: Bowel sounds are normal       Palpations: Abdomen is soft  Tenderness: There is no abdominal tenderness  Musculoskeletal:      Cervical back: Neck supple  Right lower leg: Edema present  Left lower leg: Edema present  Skin:     General: Skin is warm and dry  Capillary Refill: Capillary refill takes less than 2 seconds  Findings: No rash  Neurological:      General: No focal deficit present  Mental Status: He is alert and oriented to person, place, and time  Cranial Nerves: No cranial nerve deficit  Psychiatric:         Mood and Affect: Mood normal          Behavior: Behavior normal        Additional Data:     Labs:    Results from last 7 days   Lab Units 01/07/22  0546 01/06/22  1057 01/06/22  0432 01/05/22 2031 01/05/22  2031   WBC Thousand/uL 3 69*  --  1 79*   < > 3 03*   HEMOGLOBIN g/dL 11 9*  --  12 3   < > 11 9*   HEMATOCRIT % 36 4*  --  38 0   < > 35 8*   PLATELETS Thousands/uL 215   < > 189   < > 190   NEUTROS PCT %  --   --   --   --  91*   LYMPHS PCT %  --   --   --   --  6*   LYMPHO PCT %  --   --  5*  --   --    MONOS PCT %  --   --   --   --  3*   MONO PCT %  --   --  9  --   --    EOS PCT %  --   --  0  --  0    < > = values in this interval not displayed       Results from last 7 days   Lab Units 01/07/22  0546   SODIUM mmol/L 142   POTASSIUM mmol/L 3 7   CHLORIDE mmol/L 106   CO2 mmol/L 25   BUN mg/dL 20   CREATININE mg/dL 0 60   ANION GAP mmol/L 11   CALCIUM mg/dL 8 1*   ALBUMIN g/dL 2 9*   TOTAL BILIRUBIN mg/dL 0 34   ALK PHOS U/L 47   ALT U/L 74   AST U/L 83*   GLUCOSE RANDOM mg/dL 146*     Results from last 7 days (CULTURELLE) 10-15 Billion cell capsule Take 1 capsule by mouth daily with lunch.      metoprolol succinate (TOPROL-XL) 25 MG Take 25 mg by mouth daily with lunch.             midodrine HCl (MIDODRINE ORAL) Take 10 mg by mouth see administration instructions. Take 10 mg at the beginning of dialysis and 10 mg long-term through dialysis three days weekly on dialysis days (Mondays, Wednesdays, and Fridays).            omeprazole (PRILOSEC) 20 MG capsule Take 20 mg by mouth 2 (two) times daily before lunch and supper.             oxyCODONE (ROXICODONE) 5 MG immediate release tablet Take 0.5-1 tablets (2.5-5 mg total) by mouth every 4 (four) hours as needed.      polyvinyl alcohol (LIQUIFILM TEARS) 1.4 % ophthalmic solution Apply 1 drop to eye as needed for dry eyes.      pot bicarb-sod bicarb-cit ac (EDI-SELTZER GOLD) 344-1,050-1,000 mg TbEF Take 1 tablet by mouth every 4 (four) hours as needed.      ranitidine (ZANTAC) 150 MG tablet Take 150 mg by mouth at bedtime.      senna-docusate (SENNOSIDES-DOCUSATE SODIUM) 8.6-50 mg tablet Take 1 tablet by mouth every evening.             sucroferric oxyhydroxide (VELPHORO) 500 mg Chew chewable tablet Chew 500 mg 3 (three) times a day with meals.      timolol maleate (TIMOPTIC) 0.5 % ophthalmic solution Administer 1 drop to both eyes 2 (two) times a day.       traZODone (DESYREL) 150 MG tablet Take 75 mg by mouth at bedtime. 8/27/2019: 8/27/2019: Per the patient, she has been taking 150 mg at bedtime. Please address.     warfarin (COUMADIN/JANTOVEN) 3 MG tablet Take 3-4.5 mg by mouth See Admin Instructions. Take 4.5 mg two days weekly (on Tuesdays and Fridays) and 3 mg five days weekly (on all other days of the week). Adjust dose based on INR results as directed.        .There were no vitals filed for this visit.  Blood pressure 104/56 pulse 57 respirations 18 temperature 97.2 saturation room air 95%  Physical Exam   Constitutional: No distress.   HENT:   Head: Normocephalic.  Lab Units 01/07/22  0546   INR  1 11         Results from last 7 days   Lab Units 01/06/22  0432   HEMOGLOBIN A1C % 6 5*     Results from last 7 days   Lab Units 01/07/22  0546 01/06/22  0445 01/06/22  0432 01/05/22  1747   LACTIC ACID mmol/L  --   --   --  1 2   PROCALCITONIN ng/ml <0 05 <0 05 <0 05  --        * I Have Reviewed All Lab Data Listed Above  * Additional Pertinent Lab Tests Reviewed: [address] [facility] Admission Reviewed    Imaging:    Imaging Reports Reviewed Today Include: VAS lower extremity US - no DVT noted    Recent Cultures (last 7 days):         Last 24 Hours Medication List:   Current Facility-Administered Medications   Medication Dose Route Frequency Provider Last Rate    acetaminophen  650 mg Oral Q6H PRN Sofia Wall MD      aluminum-magnesium hydroxide-simethicone  30 mL Oral Q6H PRN Sofia Wall MD      aspirin  81 mg Oral Daily Vandana Bower MD      dexamethasone  6 mg Intravenous Q24H Vandana Bower MD      docusate sodium  100 mg Oral BID Sofia Wall MD      enoxaparin  30 mg Subcutaneous Q12H Nelly Sandhu MD      ondansetron  4 mg Intravenous Q6H PRN Sofia Wall MD      polyethylene glycol  17 g Oral Daily PRN Vandana Bower MD      remdesivir  100 mg Intravenous Q24H Vandana Bower MD      sodium chloride  100 mL/hr Intravenous Continuous Haritha Desai MD 75 mL/hr (01/07/22 0820)        Today, Patient Was Seen By: Sofia Wall MD    ** Please Note: Dictation voice to text software may have been used in the creation of this document   **   Eyes: Pupils are equal, round, and reactive to light.   Neck: Neck supple. No thyromegaly present.   Cardiovascular: Normal rate, regular rhythm and normal heart sounds.   Pacemaker.  Dialysis port right upper thorax   Pulmonary/Chest: Breath sounds normal.   History of sleep apnea intolerant to CPAP.   Abdominal: Bowel sounds are normal. There is no tenderness. There is no guarding.   Musculoskeletal:   History of right hip intertrochanteric fracture status post nail June 2019.  History of falls.   Lymphadenopathy:     She has no cervical adenopathy.   Neurological: She is alert.   Skin: Skin is warm and dry. No rash noted.   Psychiatric: Her behavior is normal.         LABS:   Lab Results   Component Value Date    WBC 4.5 07/08/2019    HGB 8.9 (L) 07/15/2019    HCT 25.8 (L) 07/08/2019     (H) 07/08/2019     07/08/2019     Results for orders placed or performed during the hospital encounter of 05/18/19   Basic Metabolic Panel   Result Value Ref Range    Sodium 137 136 - 145 mmol/L    Potassium 5.0 3.5 - 5.0 mmol/L    Chloride 100 98 - 107 mmol/L    CO2 26 22 - 31 mmol/L    Anion Gap, Calculation 11 5 - 18 mmol/L    Glucose 81 70 - 125 mg/dL    Calcium 9.5 8.5 - 10.5 mg/dL    BUN 29 (H) 8 - 28 mg/dL    Creatinine 4.91 (H) 0.60 - 1.10 mg/dL    GFR MDRD Af Amer 11 (L) >60 mL/min/1.73m2    GFR MDRD Non Af Amer 9 (L) >60 mL/min/1.73m2       Lab Results   Component Value Date    CHOL 102 03/25/2016     Lab Results   Component Value Date    HDL 43 (L) 03/25/2016     Lab Results   Component Value Date    LDLCALC 47 03/25/2016     Lab Results   Component Value Date    TRIG 60 03/25/2016     No components found for: CHOLHDL      ASSESSMENT:      ICD-10-CM    1. Closed fracture of left hip with routine healing, subsequent encounter S72.002D    2. ESRD on dialysis (H) N18.6     Z99.2    3. Essential hypertension with goal blood pressure less than 140/90 I10    4. Chronic diastolic congestive heart failure (H)  I50.32        PLAN:    Continue to work with therapy department.  Her leg looks to be in pretty good shape and well-healed.  Continue with dialysis 3 times weekly.  No medication changes but this visit.  She did not have any other questions.    Electronically signed by: Michael Duane Johnson, CNP

## 2022-01-09 NOTE — TELEPHONE ENCOUNTER
"Routing refill request to provider for review/approval because:  ACT score not on file    Last Written Prescription Date:  12/17/21  Last Fill Quantity: 90 ml,  # refills: 0   Last office visit provider:  11/2/21     Requested Prescriptions   Pending Prescriptions Disp Refills     albuterol (PROVENTIL) (2.5 MG/3ML) 0.083% neb solution [Pharmacy Med Name: ALBUTEROL SUL 2.5 MG/3 ML S (2.5 MG/3ML Nebulization Solution] 90 mL 0     Sig: NEBULIZE 1 VIAL THREE TIMES DAILY       Asthma Maintenance Inhalers - Anticholinergics Passed - 1/6/2022  3:27 PM        Passed - Patient is age 12 years or older        Passed - Recent (12 mo) or future (30 days) visit within the authorizing provider's specialty     Patient has had an office visit with the authorizing provider or a provider within the authorizing providers department within the previous 12 mos or has a future within next 30 days. See \"Patient Info\" tab in inbasket, or \"Choose Columns\" in Meds & Orders section of the refill encounter.              Passed - Medication is active on med list       Short-Acting Beta Agonist Inhalers Protocol  Passed - 1/6/2022  3:27 PM        Passed - Patient is age 12 or older        Passed - Recent (12 mo) or future (30 days) visit within the authorizing provider's specialty     Patient has had an office visit with the authorizing provider or a provider within the authorizing providers department within the previous 12 mos or has a future within next 30 days. See \"Patient Info\" tab in inbasket, or \"Choose Columns\" in Meds & Orders section of the refill encounter.              Passed - Medication is active on med list             Dl Patton RN 01/09/22 3:21 PM  "

## 2022-01-10 NOTE — TELEPHONE ENCOUNTER
Received fax from pharmacy requesting Symbicort Rx.    Last refill: 07/15/2021  Patient last seen: 11/02/2021    Okay for refill?  Juani Bai MA on 1/10/2022 at 7:32 AM

## 2022-01-12 NOTE — TELEPHONE ENCOUNTER
"Last Written Prescription Date:  7/15/21  Last Fill Quantity: 10.2g,  # refills: 1   Last office visit provider:  11/2/21    Routing refill request to provider for review/approval because:  A break in medication           Requested Prescriptions   Pending Prescriptions Disp Refills     budesonide-formoterol (SYMBICORT) 80-4.5 MCG/ACT Inhaler 10.2 g 1     Sig: Inhale 2 puffs into the lungs 2 times daily       Inhaled Steroids Protocol Passed - 1/10/2022  7:32 AM        Passed - Patient is age 12 or older        Passed - Recent (12 mo) or future (30 days) visit within the authorizing provider's specialty     Patient has had an office visit with the authorizing provider or a provider within the authorizing providers department within the previous 12 mos or has a future within next 30 days. See \"Patient Info\" tab in inbasket, or \"Choose Columns\" in Meds & Orders section of the refill encounter.              Passed - Medication is active on med list       Long-Acting Beta Agonist Inhalers Protocol  Passed - 1/10/2022  7:32 AM        Passed - Patient is age 12 or older        Passed - Recent (12 mo) or future (30 days) visit within the authorizing provider's specialty     Patient has had an office visit with the authorizing provider or a provider within the authorizing providers department within the previous 12 mos or has a future within next 30 days. See \"Patient Info\" tab in inbasket, or \"Choose Columns\" in Meds & Orders section of the refill encounter.              Passed - Medication is active on med list             Kendrick Curran RN 01/12/22 2:35 PM  "

## 2022-01-17 NOTE — LETTER
2022        RE: Belia Rodriguez  [address] [facility] [address]        [facility]    Name:   Belia Rodriguez (Itzel)  : 1945  MRN:  [id number]    Facility:   [facility] ([facility]) [[id number]]  Room: [facility] 204  Code Status: DNR/DNI and POLST AVAILABLE - Palliative Care    DOS: 2022  Previous visit: 2021 during her last TCU stay    PCP: Neil Galan MD      CHIEF COMPLAINT / REASON FOR VISIT:  Chief Complaint   Patient presents with     Hospital F/U     Acute on chronic respiratory failure secondary to fluid overload in the setting of end-stage renal disease and inability to give a full dialysis run due to hypotension; this is in the setting of chronic diastolic heart failure         [facility] from 18 until 18  [facility] TCU from 18 until 18  [facility] from 2021 until 2021 (acute on chronic respiratory failure due to a combination of fluid overload and COPD exacerbation)  [facility] from 2021 until 2021  [facility] from 2021 until 2021 (acute on chronic respiratory failure due to a combination of fluid overload and COPD exacerbation)   [facility] TCU from 2021 until 2021  [facility] from 2022 until 01/15/2022 (acute on chronic respiratory failure with hypoxia, hypervolemia)      HPI: Belia is a 76 year old female with known ESRD (on hemodialysis x12 years), COPD (former smoker, 45-60 pack years),  chronic respiratory failure with hypoxia, obstructive sleep apnea (severe and previously intolerant of CPAP), chronic atrial fibrillation, GERD, essential hypertension, and anemia related to chronic renal failure.    She has had multiple hospitalizations for respiratory failure due to a combination of fluid overload and COPD exacerbation.  This occurred on  01/26/2021 and again on 02/18/2021, the last admission coming within hours of her discharge from [facility].      While she continues on hemodialysis Mondays, Wednesdays, and Fridays, she did have some questions regarding hospice but was not sure she would sign on given her need for dialysis.  She did have a palliative care consult, and she strongly agreed, especially given her readmission to the hospital.    RECENT HOSPITALIZATION    She was most recently hospitalized on 01/12/2022, presenting to [facility] with dyspnea following a dialysis session where only 1 kg of fluid was removed due to hypotension.  This was felt to represent one fourth of the desired fluid removal.  He has been having difficulty with dialysis due to ongoing hypotension despite use of midodrine.  In the ED, she was hypoxemic and required up to 6 L of oxygen when her baseline is 3 L.  She was having increased wheeze/cough as well as pain in her lower legs.  Chronic pain in the lower extremities due to neuropathy which has been poorly controlled and poorly responsive to attempted treatments.  Some of her treatments, leg buprenorphine, she did not tolerate.    In the ED, she was hypotensive at 87/50.  Labs pertinent for WBC 3.3, hemoglobin 10.9 (with high MCV) and low platelet count at 77.  She historically seems pancytopenic, but there is no specific diagnosis for the cause.  Her troponin was 0.034 but no chest pain.  EKG significant for ventricular paced rhythm.  CXR consistent with volume overload.  She was admitted, and nephrology was consulted.  She was also found to have macrocytic anemia.  Hemoglobin is otherwise at goal.  UF is limited by hypotension, and she has a very limited tolerance of mild hypervolemia due to underlying COPD and heart failure.  Options are limited due to tenuous respiratory status.  The plan was to use albumin in the hospital to allow UF without hypotension; however, it would not be an option in  "the outpatient setting.    Palliative care has been consulted to readdress her goals of care.  They had seen the patient during previous hospitalizations and discussed the appropriateness of hospice; however, the patient was not ready -- and still isn't.      CURRENT/RECENT TCU ISSUES    Disposition: A very complicated patient that took a considerable amount of time, much of which is addressed below.  The patient is aware that her health will not improve and has considered hospice, although she is not yet ready to commit to that.  In-house psych was ordered during her last stay.  Apparently, she is not entirely compliant    Today, she tells me she is \"pretty much feeling like crap.\"  She feels \"fuzzy in the head,\" along with other symptoms.  She endorses orthopnea and sleeps in a hospital bed at home.    She complains of some of her meds are being given at the wrong times.  We went over each of them.  Albuterol nebulizer treatments, currently daily, should be given 3 times daily.  Sensipar should only be given at dialysis, not in the facility.  Renvela clarification: 800 mg, 2 tabs (1600 mg) should be administered before meals (not after) +1 tablet with each snack.  Midodrine was also clarified.    Midodrine tends to wear off in about 3 hours, so she likes to take it immediately before dialysis.      Neuropathy: She has a history of neuropathy in her feet, Raynaud's phenomenon in the feet as well, and some constipation and low blood pressures as described below.  With uncontrolled pain in the lower extremities on a regimen of acetaminophen, gabapentin 100 mg 3 times daily, and occasional oxycodone providing insufficient relief, she was initiated on pregabalin.  Nephrology recommended maximum 25 mg/day.  Gabapentin was discontinued.    Hypotension: She is on a 1500 mL fluid restriction.  This may be producing hypovolemia.  She does have orders for 10 or 15 mg of midodrine to take on dialysis days (Mondays, " Wednesdays, and Fridays) and has additional orders for taking this when additional dialysis is needed.  On nondialysis days, if her SBP is <106, she receives this every 6 hours as needed.  We're going with 10 mg of midodrine to clarify and can go up from there if needed.  She typically has a dry weight of 70.5 kg.    Discharge planning: During her last stay, she told her  and daughter that she wanted to stay here, but her  became angry.  He has been the one to take her to dialysis and suggest that she would need to arrange her own transportation if she decided to stay.  He also told her he would not be doing housework.  The end result was that she would be staying 2 more weeks.  Apparently, her  has now finally resigned himself to her being here.  At the time, she told me that she was here now because she has no other place to go, and palliative care is cheaper than hospice.  I told her I had no problem with managing a palliative care plan.     ROS:   Positives: Her feet continue to be painful due to neuropathy.  Breathing is iffy.  She sleeps in a hospital bed due to orthopnea.    Negatives: Complaining of constipation before, things are going better now.  She denies any headaches or chest pains, coughing or congestion at the moment, dizziness, dysuria, diarrhea, difficulty chewing or swallowing, or problems with sleep (when on 150 mg of trazodone nightly).      Past Medical History:   Diagnosis Date     Arthritis      CHF (congestive heart failure) (H)      Chronic anemia 6/1/2014     Chronic kidney disease      Chronic thoracic aortic dissection (H) 10/7/2015    Descending thoracic aorta; treated medically per notes of Dragan Singh and Jennifer.     COPD (chronic obstructive pulmonary disease) (H)      CVA (cerebral infarction)      Disease of thyroid gland      Dyslipidemia      ESRD (end stage renal disease) (H) 06/03/2009    on dialysis with Dr. Mitchell     Essential hypertension 6/30/2014      Gastrointestinal hemorrhage, unspecified gastrointestinal hemorrhage type 2017     GI (gastrointestinal bleed)      GI bleeding 2017     Gout      L3 vertebral fracture (H) 2015     Left Atrial Appendage Occlusion (WATCHMAN) 2018    LAAO 2018 (30 mm WATCHMAN)     Obesity      ZULEIKA (obstructive sleep apnea), severe, intolerant of CPAP 10/22/2015     Oxygen dependent     3L nc     Pneumonia 2015     Right foot drop      Spinal stenosis 3/28/2016     Stroke (H) 3/24/2016              Family History   Problem Relation Age of Onset     Dementia Mother      Diabetes Mother      Arthritis Mother      Cancer Mother      Depression Mother      Heart disease Mother      Vision loss Mother      Stroke Father      Heart disease Father      Breast cancer Neg Hx      Social History     Socioeconomic History     Marital status:      Spouse name: Cayden     Number of children: 2     Years of education: Not on file     Highest education level: Not on file   Occupational History     Employer: RETIRED   Social Needs     Financial resource strain: Not on file     Food insecurity     Worry: Not on file     Inability: Not on file     Transportation needs     Medical: Not on file     Non-medical: Not on file   Tobacco Use     Smoking status: Former Smoker     Packs/day: 1.50     Years: 37.00     Pack years: 55.50     Types: Cigarettes     Quit date: 2009     Years since quittin.2     Smokeless tobacco: Never Used   Substance and Sexual Activity     Alcohol use: No     Alcohol/week: 11.7 standard drinks     Types: 14 Standard drinks or equivalent per week     Comment: 14 mixed drinks per week     Drug use: No     Sexual activity: Never     Partners: Male   Lifestyle     Physical activity     Days per week: Not on file     Minutes per session: Not on file     Stress: Not on file   Relationships     Social connections     Talks on phone: Not on file     Gets together: Not on file     Attends  Voodoo service: Not on file     Active member of club or organization: Not on file     Attends meetings of clubs or organizations: Not on file     Relationship status: Not on file     Intimate partner violence     Fear of current or ex partner: Not on file     Emotionally abused: Not on file     Physically abused: Not on file     Forced sexual activity: Not on file   Other Topics Concern     Not on file   Social History Narrative    Lives with her . Daughter in [place] and daughter in [place].     MEDICATIONS: Reviewed from the MAR, physician orders, and earlier progress notes.    Post Discharge Medication Reconciliation Status: discharge medications reconciled, continue medications without change.  List below does reflect medication reconciliation.  Current Outpatient Medications   Medication Sig     acetaminophen (TYLENOL) 500 MG tablet Take 1,000 mg by mouth 3 times daily as needed for mild pain      albuterol (PROVENTIL) (2.5 MG/3ML) 0.083% neb solution Take 2.5 mg by nebulization 3 times daily as needed for shortness of breath / dyspnea or wheezing     aspirin 81 MG EC tablet Take 81 mg by mouth daily (with lunch)      B Complex-C-Folic Acid (DIALYVITE) TABS Take 1 tablet by mouth daily     budesonide-formoterol (SYMBICORT) 80-4.5 MCG/ACT Inhaler Inhale 2 puffs into the lungs 2 times daily     buPROPion (WELLBUTRIN XL) 150 MG 24 hr tablet Take 150 mg by mouth twice a week Tue & Sat     cinacalcet (SENSIPAR) 30 MG tablet Take 30 mg by mouth Every Mon, Wed, Fri Morning . At dialysis.     FOLIC ACID PO Take 1 mg by mouth daily (with lunch)      gabapentin (NEURONTIN) 100 MG capsule Take 100 mg by mouth daily      Lactobacillus (CULTURELLE DIGESTIVE WOMENS PO) Take 1 tablet by mouth daily (with lunch)      lidocaine-prilocaine (EMLA) 2.5-2.5 % external cream Apply topically 3 times daily as needed for moderate pain     melatonin 3 MG tablet Take 3 mg by mouth At Bedtime     midodrine (PROAMATINE) 10 MG  tablet Take 10 mg by mouth once a week every Tue, Thu, Sat, Sun for Dizziness; For non - dialysis days     midodrine (PROAMATINE) 10 MG tablet Take 15 mg by mouth three times a week Take once daily prior to dialysis on dialysis days Mon-Wed-Fri     oxyCODONE (ROXICODONE) 5 MG tablet Take 1 tablet (5 mg) by mouth every 6 hours as needed for pain     polyvinyl alcohol (LIQUIFILM TEARS) 1.4 % ophthalmic solution Place 1 drop into both eyes 3 times daily      pregabalin (LYRICA) 25 MG capsule Take 25 mg by mouth 2 times daily     senna-docusate (SENOKOT-S/PERICOLACE) 8.6-50 MG tablet Take 1 tablet by mouth 2 times daily And bid prn      sevelamer carbonate (RENVELA) 800 MG tablet Take 1,600 mg by mouth 3 times daily (with meals)      sevelamer carbonate (RENVELA) 800 MG tablet Take 800 mg by mouth Take with snacks or supplements      sodium-potassium bicarbonate-citric acid (EID-SELTZER GOLD) [id number] MG TBEF solu-tab Take 1 tablet by mouth daily as needed for heartburn     timolol maleate (TIMOPTIC) 0.5 % ophthalmic solution Place 1 drop into both eyes 2 times daily     traZODone (DESYREL) 150 MG tablet TAKE ONE TABLET BY MOUTH EVERY NIGHT     Vitamin D, Cholecalciferol, 25 MCG (1000 UT) TABS Take 2,000 Units by mouth daily (with lunch)     albuterol (PROVENTIL) (2.5 MG/3ML) 0.083% neb solution NEBULIZE 1 VIAL THREE TIMES DAILY     atorvastatin (LIPITOR) 10 MG tablet Take 10 mg by mouth At Bedtime     phenylephrine-mineral oil-petrolatum (PREPARATION H) 0.25-14-74.9 % rectal ointment Place 1 Application rectally 3 times daily as needed     No current facility-administered medications for this visit.     ALLERGIES:   Allergies   Allergen Reactions     Ace Inhibitors Cough     Atrovent [Ipratropium Bromide] Headache     Fosinopril Sodium Cough     Zolpidem      hallucinations     DIET: Dialysis diet, regular texture, thin liquids.  1500 mL /24-hour fluid restriction    Vitals:    01/17/22 1450   BP: (!) 82/54  "  Pulse: 68   Resp: 18   Temp: 97  F (36.1  C)   SpO2: 97%   Weight: 67.8 kg (149 lb 6.4 oz)   Height: 1.626 m (5' 4\")     Body mass index is 25.64 kg/m .    EXAMINATION:   General: Pleasant, alert, and conversant middle-aged female, sitting in a wheelchair, looking mildly dyspneic but otherwise in no apparent distress.  Head: Normocephalic and atraumatic.   Eyes: PERRLA, sclerae clear.  Slight disconjugate gaze.  ENT: Moist oral mucosa.  She has her own teeth.  Hearing is unimpaired.  Cardiovascular: Regular rate and rhythm (previously irregular) with a 2/6 systolic ejection murmur at the left sternal border.  Respiratory: Very diminished lung sounds bilaterally but otherwise clear.  Abdomen: Soft and nontender.   Musculoskeletal/Extremities: Age-related degenerative joint disease.   1+ right, trace left lower extremity edema.  Integument: No rashes, clinically significant lesions, or skin breakdown.   Cognitive/Psychiatric: Alert and oriented ×4.  Affect is euthymic.    DIAGNOSTICS:   Last Comprehensive Metabolic Panel:  Sodium   Date Value Ref Range Status   09/07/2021 139 136 - 145 mmol/L Final   04/02/2021 138 133 - 144 mmol/L Final     Potassium   Date Value Ref Range Status   09/07/2021 4.0 3.5 - 5.0 mmol/L Final   04/02/2021 4.7 3.4 - 5.3 mmol/L Final     Chloride   Date Value Ref Range Status   09/07/2021 101 98 - 107 mmol/L Final   04/02/2021 104 94 - 109 mmol/L Final     Carbon Dioxide   Date Value Ref Range Status   04/02/2021 28 20 - 32 mmol/L Final     Carbon Dioxide (CO2)   Date Value Ref Range Status   09/07/2021 26 22 - 31 mmol/L Final     Anion Gap   Date Value Ref Range Status   09/07/2021 12 5 - 18 mmol/L Final   04/02/2021 6 3 - 14 mmol/L Final     Glucose   Date Value Ref Range Status   09/07/2021 115 70 - 125 mg/dL Final   04/02/2021 92 70 - 99 mg/dL Final     Urea Nitrogen   Date Value Ref Range Status   09/07/2021 16 8 - 28 mg/dL Final   04/02/2021 40 (H) 7 - 30 mg/dL Final     Creatinine "   Date Value Ref Range Status   09/07/2021 3.17 (H) 0.60 - 1.10 mg/dL Final   04/02/2021 5.08 (H) 0.52 - 1.04 mg/dL Final     GFR Estimate   Date Value Ref Range Status   09/07/2021 14 (L) >60 mL/min/1.73m2 Final     Comment:     As of July 11, 2021, eGFR is calculated by the CKD-EPI creatinine equation, without race adjustment. eGFR can be influenced by muscle mass, exercise, and diet. The reported eGFR is an estimation only and is only applicable if the renal function is stable.   07/10/2021 11 (L) >60 mL/min/1.73m2 Final   04/02/2021 8 (L) >60 mL/min/[1.73_m2] Final     Comment:     Non  GFR Calc  Starting 12/18/2018, serum creatinine based estimated GFR (eGFR) will be   calculated using the Chronic Kidney Disease Epidemiology Collaboration   (CKD-EPI) equation.       Calcium   Date Value Ref Range Status   09/07/2021 8.9 8.5 - 10.5 mg/dL Final   04/02/2021 8.8 8.5 - 10.1 mg/dL Final     Bilirubin Total   Date Value Ref Range Status   07/11/2021 0.4 0.0 - 1.0 mg/dL Final   06/29/2014 0.5 0.2 - 1.3 mg/dL Final     Alkaline Phosphatase   Date Value Ref Range Status   07/11/2021 88 45 - 120 U/L Final   06/29/2014 110 40 - 150 U/L Final     ALT   Date Value Ref Range Status   07/11/2021 9 0 - 45 U/L Final   06/29/2014 23 0 - 50 U/L Final     AST   Date Value Ref Range Status   07/11/2021 12 0 - 40 U/L Final   06/29/2014 20 0 - 45 U/L Final     Lab Results   Component Value Date    WBC 3.7 08/20/2021    WBC 4.9 04/02/2021     Lab Results   Component Value Date    RBC 3.04 08/20/2021    RBC 2.92 04/02/2021     Lab Results   Component Value Date    HGB 10.0 08/20/2021    HGB 9.6 04/02/2021     Lab Results   Component Value Date    HCT 33.5 08/20/2021    HCT 32.7 04/02/2021     Lab Results   Component Value Date     08/20/2021     04/02/2021     Lab Results   Component Value Date    MCH 32.9 08/20/2021    MCH 32.9 04/02/2021     Lab Results   Component Value Date    MCHC 29.9 08/20/2021     MCHC 29.4 04/02/2021     Lab Results   Component Value Date    RDW 16.9 08/20/2021    RDW 14.6 04/02/2021     Lab Results   Component Value Date     08/20/2021     04/02/2021       ASSESSMENT/Plan:      ICD-10-CM    1. Hypervolemia, unspecified hypervolemia type  E87.70    2. Chronic respiratory failure with hypoxia (H)  J96.11    3. End-stage renal disease on hemodialysis (H)  N18.6     Z99.2    4. Hypotension due to hypovolemia  I95.89     E86.1    5. Chronic diastolic congestive heart failure (H)  I50.32    6. Anemia of chronic renal failure, stage 4 (severe) (H)  N18.4     D63.1    7. Neuropathy  G62.9    8. Chronic obstructive pulmonary disease, unspecified COPD type (H)  J44.9    9. Presence of Watchman left atrial appendage closure device  Z95.818    10. Chronic diastolic heart failure (H)  I50.32    11. Paroxysmal atrial fibrillation (H)  I48.0    12. Mild episode of recurrent major depressive disorder (H)  F33.0    13. Gastroesophageal reflux disease without esophagitis  K21.9        CHANGES:    1.  Give 10 mg of midodrine 3 times daily as needed on nondialysis days if SBP is <106 and may give every 6 hours as needed.   2.  Albuterol nebulizer treatments to be changed from a daily to 3 times daily.  3.  Sensipar is only given at dialysis.  4.  Renvela 800 mg 2 tabs before each meal (not after) and one tab with each snack.    CARE PLAN:  The care plan, medications, vital signs, orders, and nursing notes have been reviewed and all orders signed.  Changes to care plan, if any, as noted.  Otherwise, continue current plan of care.  Total time spent in this encounter was 42 minutes, with greater than 50% spent in counseling and coordination of care that included addressing medication concerns of the patient and addressing them.    The above has been created using voice recognition software. Please be aware that this may unintentionally  produce inaccuracies and/or nonsensical  sentences.      Electronically signed by: Louie Liriano, AYAH        Sincerely,        MARK Sherman CNP

## 2022-01-18 NOTE — TELEPHONE ENCOUNTER
[facility] Triage Nurse Telephone Encounter    Provider: MARIO Serna  Facility: [facility]  Facility Type:  TCU    Caller: Juliana   Call Back Number: [phone]    Allergies:    Allergies   Allergen Reactions     Ace Inhibitors Cough     Other reaction(s): *Unknown     Fosinopril Cough     Ipratropium Headache     Zolpidem Other (See Comments)     Hallucinations  Other reaction(s): Hallucinations        Reason for call: Nursing calling to report that the pt is requesting preparation H for hemorrhoids, per nursing the pt is having some constipation. The pt is on narcotics at this time with only Senna S 1 tab bid prn.       Verbal Order/Direction given by Provider: Senna S 1 tab bid and continue prn , Prep H rectally tid prn.     Provider giving Order:  MARIO Serna    Verbal Order given to: Juliana Preston RN

## 2022-01-19 PROBLEM — I50.32 CHRONIC DIASTOLIC HEART FAILURE (H): Status: ACTIVE | Noted: 2022-01-01

## 2022-01-19 PROBLEM — N18.4 ANEMIA OF CHRONIC RENAL FAILURE, STAGE 4 (SEVERE) (H): Status: ACTIVE | Noted: 2022-01-01

## 2022-01-19 PROBLEM — E86.1 HYPOTENSION DUE TO HYPOVOLEMIA: Status: ACTIVE | Noted: 2022-01-01

## 2022-01-19 PROBLEM — I48.0 PAROXYSMAL ATRIAL FIBRILLATION (H): Status: ACTIVE | Noted: 2022-01-01

## 2022-01-19 PROBLEM — D63.1 ANEMIA OF CHRONIC RENAL FAILURE, STAGE 4 (SEVERE) (H): Status: ACTIVE | Noted: 2022-01-01

## 2022-01-19 NOTE — PROGRESS NOTES
[facility]    Name:   Belia Rodriguez (Itzel)  : 1945  MRN:  [id number]    Facility:   [facility] ([facility]) [[id number]]  Room:   Code Status: DNR/DNI and POLST AVAILABLE - Palliative Care    DOS: 2022  Previous visit: 2021 during her last TCU stay    PCP: Neil Galan MD      CHIEF COMPLAINT / REASON FOR VISIT:  Chief Complaint   Patient presents with     Hospital F/U     Acute on chronic respiratory failure secondary to fluid overload in the setting of end-stage renal disease and inability to give a full dialysis run due to hypotension; this is in the setting of chronic diastolic heart failure         [facility] from 18 until 18  [facility] from 18 until 18  [facility] from 2021 until 2021 (acute on chronic respiratory failure due to a combination of fluid overload and COPD exacerbation)  [facility] from 2021 until 2021  [facility] from 2021 until 2021 (acute on chronic respiratory failure due to a combination of fluid overload and COPD exacerbation)   [facility] from 2021 until 2021  [facility] from 2022 until 01/15/2022 (acute on chronic respiratory failure with hypoxia, hypervolemia)      HPI: Belia is a 76 year old female with known ESRD (on hemodialysis x12 years), COPD (former smoker, 45-60 pack years),  chronic respiratory failure with hypoxia, obstructive sleep apnea (severe and previously intolerant of CPAP), chronic atrial fibrillation, GERD, essential hypertension, and anemia related to chronic renal failure.    She has had multiple hospitalizations for respiratory failure due to a combination of fluid overload and COPD exacerbation.  This occurred on 2021 and again on 2021, the last admission coming within hours of her discharge  from [facility].      While she continues on hemodialysis Mondays, Wednesdays, and Fridays, she did have some questions regarding hospice but was not sure she would sign on given her need for dialysis.  She did have a palliative care consult, and she strongly agreed, especially given her readmission to the hospital.    RECENT HOSPITALIZATION    She was most recently hospitalized on 01/12/2022, presenting to [facility] with dyspnea following a dialysis session where only 1 kg of fluid was removed due to hypotension.  This was felt to represent one fourth of the desired fluid removal.  He has been having difficulty with dialysis due to ongoing hypotension despite use of midodrine.  In the ED, she was hypoxemic and required up to 6 L of oxygen when her baseline is 3 L.  She was having increased wheeze/cough as well as pain in her lower legs.  Chronic pain in the lower extremities due to neuropathy which has been poorly controlled and poorly responsive to attempted treatments.  Some of her treatments, leg buprenorphine, she did not tolerate.    In the ED, she was hypotensive at 87/50.  Labs pertinent for WBC 3.3, hemoglobin 10.9 (with high MCV) and low platelet count at 77.  She historically seems pancytopenic, but there is no specific diagnosis for the cause.  Her troponin was 0.034 but no chest pain.  EKG significant for ventricular paced rhythm.  CXR consistent with volume overload.  She was admitted, and nephrology was consulted.  She was also found to have macrocytic anemia.  Hemoglobin is otherwise at goal.  UF is limited by hypotension, and she has a very limited tolerance of mild hypervolemia due to underlying COPD and heart failure.  Options are limited due to tenuous respiratory status.  The plan was to use albumin in the hospital to allow UF without hypotension; however, it would not be an option in the outpatient setting.    Palliative care has been consulted to readdress her goals of care.   "They had seen the patient during previous hospitalizations and discussed the appropriateness of hospice; however, the patient was not ready -- and still isn't.      CURRENT/RECENT TCU ISSUES    Disposition: A very complicated patient that took a considerable amount of time, much of which is addressed below.  The patient is aware that her health will not improve and has considered hospice, although she is not yet ready to commit to that.  In-house psych was ordered during her last stay.  Apparently, she is not entirely compliant    Today, she tells me she is \"pretty much feeling like crap.\"  She feels \"fuzzy in the head,\" along with other symptoms.  She endorses orthopnea and sleeps in a hospital bed at home.    She complains of some of her meds are being given at the wrong times.  We went over each of them.  Albuterol nebulizer treatments, currently daily, should be given 3 times daily.  Sensipar should only be given at dialysis, not in the facility.  Renvela clarification: 800 mg, 2 tabs (1600 mg) should be administered before meals (not after) +1 tablet with each snack.  Midodrine was also clarified.    Midodrine tends to wear off in about 3 hours, so she likes to take it immediately before dialysis.      Neuropathy: She has a history of neuropathy in her feet, Raynaud's phenomenon in the feet as well, and some constipation and low blood pressures as described below.  With uncontrolled pain in the lower extremities on a regimen of acetaminophen, gabapentin 100 mg 3 times daily, and occasional oxycodone providing insufficient relief, she was initiated on pregabalin.  Nephrology recommended maximum 25 mg/day.  Gabapentin was discontinued.    Hypotension: She is on a 1500 mL fluid restriction.  This may be producing hypovolemia.  She does have orders for 10 or 15 mg of midodrine to take on dialysis days (Mondays, Wednesdays, and Fridays) and has additional orders for taking this when additional dialysis is needed.  " On nondialysis days, if her SBP is <106, she receives this every 6 hours as needed.  We're going with 10 mg of midodrine to clarify and can go up from there if needed.  She typically has a dry weight of 70.5 kg.    Discharge planning: During her last stay, she told her  and daughter that she wanted to stay here, but her  became angry.  He has been the one to take her to dialysis and suggest that she would need to arrange her own transportation if she decided to stay.  He also told her he would not be doing housework.  The end result was that she would be staying 2 more weeks.  Apparently, her  has now finally resigned himself to her being here.  At the time, she told me that she was here now because she has no other place to go, and palliative care is cheaper than hospice.  I told her I had no problem with managing a palliative care plan.     ROS:   Positives: Her feet continue to be painful due to neuropathy.  Breathing is iffy.  She sleeps in a hospital bed due to orthopnea.    Negatives: Complaining of constipation before, things are going better now.  She denies any headaches or chest pains, coughing or congestion at the moment, dizziness, dysuria, diarrhea, difficulty chewing or swallowing, or problems with sleep (when on 150 mg of trazodone nightly).      Past Medical History:   Diagnosis Date     Arthritis      CHF (congestive heart failure) (H)      Chronic anemia 6/1/2014     Chronic kidney disease      Chronic thoracic aortic dissection (H) 10/7/2015    Descending thoracic aorta; treated medically per notes of Dragan Singh and Jennifer.     COPD (chronic obstructive pulmonary disease) (H)      CVA (cerebral infarction)      Disease of thyroid gland      Dyslipidemia      ESRD (end stage renal disease) (H) 06/03/2009    on dialysis with Dr. Mitchell     Essential hypertension 6/30/2014     Gastrointestinal hemorrhage, unspecified gastrointestinal hemorrhage type 6/5/2017     GI  (gastrointestinal bleed)      GI bleeding 2017     Gout      L3 vertebral fracture (H) 2015     Left Atrial Appendage Occlusion (WATCHMAN) 2018    LAAO 2018 (30 mm WATCHMAN)     Obesity      ZULEIKA (obstructive sleep apnea), severe, intolerant of CPAP 10/22/2015     Oxygen dependent     3L nc     Pneumonia 2015     Right foot drop      Spinal stenosis 3/28/2016     Stroke (H) 3/24/2016              Family History   Problem Relation Age of Onset     Dementia Mother      Diabetes Mother      Arthritis Mother      Cancer Mother      Depression Mother      Heart disease Mother      Vision loss Mother      Stroke Father      Heart disease Father      Breast cancer Neg Hx      Social History     Socioeconomic History     Marital status:      Spouse name: Cayden     Number of children: 2     Years of education: Not on file     Highest education level: Not on file   Occupational History     Employer: RETIRED   Social Needs     Financial resource strain: Not on file     Food insecurity     Worry: Not on file     Inability: Not on file     Transportation needs     Medical: Not on file     Non-medical: Not on file   Tobacco Use     Smoking status: Former Smoker     Packs/day: 1.50     Years: 37.00     Pack years: 55.50     Types: Cigarettes     Quit date: 2009     Years since quittin.2     Smokeless tobacco: Never Used   Substance and Sexual Activity     Alcohol use: No     Alcohol/week: 11.7 standard drinks     Types: 14 Standard drinks or equivalent per week     Comment: 14 mixed drinks per week     Drug use: No     Sexual activity: Never     Partners: Male   Lifestyle     Physical activity     Days per week: Not on file     Minutes per session: Not on file     Stress: Not on file   Relationships     Social connections     Talks on phone: Not on file     Gets together: Not on file     Attends Alevism service: Not on file     Active member of club or organization: Not on file      Attends meetings of clubs or organizations: Not on file     Relationship status: Not on file     Intimate partner violence     Fear of current or ex partner: Not on file     Emotionally abused: Not on file     Physically abused: Not on file     Forced sexual activity: Not on file   Other Topics Concern     Not on file   Social History Narrative    Lives with her . Daughter in [place] and daughter in [place].     MEDICATIONS: Reviewed from the MAR, physician orders, and earlier progress notes.    Post Discharge Medication Reconciliation Status: discharge medications reconciled, continue medications without change.  List below does reflect medication reconciliation.  Current Outpatient Medications   Medication Sig     acetaminophen (TYLENOL) 500 MG tablet Take 1,000 mg by mouth 3 times daily as needed for mild pain      albuterol (PROVENTIL) (2.5 MG/3ML) 0.083% neb solution Take 2.5 mg by nebulization 3 times daily as needed for shortness of breath / dyspnea or wheezing     aspirin 81 MG EC tablet Take 81 mg by mouth daily (with lunch)      B Complex-C-Folic Acid (DIALYVITE) TABS Take 1 tablet by mouth daily     budesonide-formoterol (SYMBICORT) 80-4.5 MCG/ACT Inhaler Inhale 2 puffs into the lungs 2 times daily     buPROPion (WELLBUTRIN XL) 150 MG 24 hr tablet Take 150 mg by mouth twice a week Tue & Sat     cinacalcet (SENSIPAR) 30 MG tablet Take 30 mg by mouth Every Mon, Wed, Fri Morning . At dialysis.     FOLIC ACID PO Take 1 mg by mouth daily (with lunch)      gabapentin (NEURONTIN) 100 MG capsule Take 100 mg by mouth daily      Lactobacillus (CULTURELLE DIGESTIVE WOMENS PO) Take 1 tablet by mouth daily (with lunch)      lidocaine-prilocaine (EMLA) 2.5-2.5 % external cream Apply topically 3 times daily as needed for moderate pain     melatonin 3 MG tablet Take 3 mg by mouth At Bedtime     midodrine (PROAMATINE) 10 MG tablet Take 10 mg by mouth once a week every Tue, Thu, Sat, Sun for Dizziness; For non -  dialysis days     midodrine (PROAMATINE) 10 MG tablet Take 15 mg by mouth three times a week Take once daily prior to dialysis on dialysis days Mon-Wed-Fri     oxyCODONE (ROXICODONE) 5 MG tablet Take 1 tablet (5 mg) by mouth every 6 hours as needed for pain     polyvinyl alcohol (LIQUIFILM TEARS) 1.4 % ophthalmic solution Place 1 drop into both eyes 3 times daily      pregabalin (LYRICA) 25 MG capsule Take 25 mg by mouth 2 times daily     senna-docusate (SENOKOT-S/PERICOLACE) 8.6-50 MG tablet Take 1 tablet by mouth 2 times daily And bid prn      sevelamer carbonate (RENVELA) 800 MG tablet Take 1,600 mg by mouth 3 times daily (with meals)      sevelamer carbonate (RENVELA) 800 MG tablet Take 800 mg by mouth Take with snacks or supplements      sodium-potassium bicarbonate-citric acid (EDI-SELTZER GOLD) 8956-968-4481 MG TBEF solu-tab Take 1 tablet by mouth daily as needed for heartburn     timolol maleate (TIMOPTIC) 0.5 % ophthalmic solution Place 1 drop into both eyes 2 times daily     traZODone (DESYREL) 150 MG tablet TAKE ONE TABLET BY MOUTH EVERY NIGHT     Vitamin D, Cholecalciferol, 25 MCG (1000 UT) TABS Take 2,000 Units by mouth daily (with lunch)     albuterol (PROVENTIL) (2.5 MG/3ML) 0.083% neb solution NEBULIZE 1 VIAL THREE TIMES DAILY     atorvastatin (LIPITOR) 10 MG tablet Take 10 mg by mouth At Bedtime     phenylephrine-mineral oil-petrolatum (PREPARATION H) 0.25-14-74.9 % rectal ointment Place 1 Application rectally 3 times daily as needed     No current facility-administered medications for this visit.     ALLERGIES:   Allergies   Allergen Reactions     Ace Inhibitors Cough     Atrovent [Ipratropium Bromide] Headache     Fosinopril Sodium Cough     Zolpidem      hallucinations     DIET: Dialysis diet, regular texture, thin liquids.  1500 mL /24-hour fluid restriction    Vitals:    01/17/22 1450   BP: (!) 82/54   Pulse: 68   Resp: 18   Temp: 97  F (36.1  C)   SpO2: 97%   Weight: 67.8 kg (149 lb 6.4 oz)  "  Height: 1.626 m (5' 4\")     Body mass index is 25.64 kg/m .    EXAMINATION:   General: Pleasant, alert, and conversant middle-aged female, sitting in a wheelchair, looking mildly dyspneic but otherwise in no apparent distress.  Head: Normocephalic and atraumatic.   Eyes: PERRLA, sclerae clear.  Slight disconjugate gaze.  ENT: Moist oral mucosa.  She has her own teeth.  Hearing is unimpaired.  Cardiovascular: Regular rate and rhythm (previously irregular) with a 2/6 systolic ejection murmur at the left sternal border.  Respiratory: Very diminished lung sounds bilaterally but otherwise clear.  Abdomen: Soft and nontender.   Musculoskeletal/Extremities: Age-related degenerative joint disease.   1+ right, trace left lower extremity edema.  Integument: No rashes, clinically significant lesions, or skin breakdown.   Cognitive/Psychiatric: Alert and oriented ×4.  Affect is euthymic.    DIAGNOSTICS:   Last Comprehensive Metabolic Panel:  Sodium   Date Value Ref Range Status   09/07/2021 139 136 - 145 mmol/L Final   04/02/2021 138 133 - 144 mmol/L Final     Potassium   Date Value Ref Range Status   09/07/2021 4.0 3.5 - 5.0 mmol/L Final   04/02/2021 4.7 3.4 - 5.3 mmol/L Final     Chloride   Date Value Ref Range Status   09/07/2021 101 98 - 107 mmol/L Final   04/02/2021 104 94 - 109 mmol/L Final     Carbon Dioxide   Date Value Ref Range Status   04/02/2021 28 20 - 32 mmol/L Final     Carbon Dioxide (CO2)   Date Value Ref Range Status   09/07/2021 26 22 - 31 mmol/L Final     Anion Gap   Date Value Ref Range Status   09/07/2021 12 5 - 18 mmol/L Final   04/02/2021 6 3 - 14 mmol/L Final     Glucose   Date Value Ref Range Status   09/07/2021 115 70 - 125 mg/dL Final   04/02/2021 92 70 - 99 mg/dL Final     Urea Nitrogen   Date Value Ref Range Status   09/07/2021 16 8 - 28 mg/dL Final   04/02/2021 40 (H) 7 - 30 mg/dL Final     Creatinine   Date Value Ref Range Status   09/07/2021 3.17 (H) 0.60 - 1.10 mg/dL Final   04/02/2021 5.08 " (H) 0.52 - 1.04 mg/dL Final     GFR Estimate   Date Value Ref Range Status   09/07/2021 14 (L) >60 mL/min/1.73m2 Final     Comment:     As of July 11, 2021, eGFR is calculated by the CKD-EPI creatinine equation, without race adjustment. eGFR can be influenced by muscle mass, exercise, and diet. The reported eGFR is an estimation only and is only applicable if the renal function is stable.   07/10/2021 11 (L) >60 mL/min/1.73m2 Final   04/02/2021 8 (L) >60 mL/min/[1.73_m2] Final     Comment:     Non  GFR Calc  Starting 12/18/2018, serum creatinine based estimated GFR (eGFR) will be   calculated using the Chronic Kidney Disease Epidemiology Collaboration   (CKD-EPI) equation.       Calcium   Date Value Ref Range Status   09/07/2021 8.9 8.5 - 10.5 mg/dL Final   04/02/2021 8.8 8.5 - 10.1 mg/dL Final     Bilirubin Total   Date Value Ref Range Status   07/11/2021 0.4 0.0 - 1.0 mg/dL Final   06/29/2014 0.5 0.2 - 1.3 mg/dL Final     Alkaline Phosphatase   Date Value Ref Range Status   07/11/2021 88 45 - 120 U/L Final   06/29/2014 110 40 - 150 U/L Final     ALT   Date Value Ref Range Status   07/11/2021 9 0 - 45 U/L Final   06/29/2014 23 0 - 50 U/L Final     AST   Date Value Ref Range Status   07/11/2021 12 0 - 40 U/L Final   06/29/2014 20 0 - 45 U/L Final     Lab Results   Component Value Date    WBC 3.7 08/20/2021    WBC 4.9 04/02/2021     Lab Results   Component Value Date    RBC 3.04 08/20/2021    RBC 2.92 04/02/2021     Lab Results   Component Value Date    HGB 10.0 08/20/2021    HGB 9.6 04/02/2021     Lab Results   Component Value Date    HCT 33.5 08/20/2021    HCT 32.7 04/02/2021     Lab Results   Component Value Date     08/20/2021     04/02/2021     Lab Results   Component Value Date    MCH 32.9 08/20/2021    MCH 32.9 04/02/2021     Lab Results   Component Value Date    MCHC 29.9 08/20/2021    MCHC 29.4 04/02/2021     Lab Results   Component Value Date    RDW 16.9 08/20/2021    RDW 14.6  04/02/2021     Lab Results   Component Value Date     08/20/2021     04/02/2021       ASSESSMENT/Plan:      ICD-10-CM    1. Hypervolemia, unspecified hypervolemia type  E87.70    2. Chronic respiratory failure with hypoxia (H)  J96.11    3. End-stage renal disease on hemodialysis (H)  N18.6     Z99.2    4. Hypotension due to hypovolemia  I95.89     E86.1    5. Chronic diastolic congestive heart failure (H)  I50.32    6. Anemia of chronic renal failure, stage 4 (severe) (H)  N18.4     D63.1    7. Neuropathy  G62.9    8. Chronic obstructive pulmonary disease, unspecified COPD type (H)  J44.9    9. Presence of Watchman left atrial appendage closure device  Z95.818    10. Chronic diastolic heart failure (H)  I50.32    11. Paroxysmal atrial fibrillation (H)  I48.0    12. Mild episode of recurrent major depressive disorder (H)  F33.0    13. Gastroesophageal reflux disease without esophagitis  K21.9        CHANGES:    1.  Give 10 mg of midodrine 3 times daily as needed on nondialysis days if SBP is <106 and may give every 6 hours as needed.   2.  Albuterol nebulizer treatments to be changed from a daily to 3 times daily.  3.  Sensipar is only given at dialysis.  4.  Renvela 800 mg 2 tabs before each meal (not after) and one tab with each snack.    CARE PLAN:  The care plan, medications, vital signs, orders, and nursing notes have been reviewed and all orders signed.  Changes to care plan, if any, as noted.  Otherwise, continue current plan of care.  Total time spent in this encounter was 42 minutes, with greater than 50% spent in counseling and coordination of care that included addressing medication concerns of the patient and addressing them.    The above has been created using voice recognition software. Please be aware that this may unintentionally  produce inaccuracies and/or nonsensical sentences.      Electronically signed by: Louie Liriano CNP

## 2022-01-20 NOTE — LETTER
2022        RE: Belia Rodriguez  [address] [facility] [address]        [facility]    Name:   Belia Rodriguez (Itzel)  : 1945  MRN:  [id number]    Facility:   [facility] ([facility]) [[id number]]  Room:  (moved from 204 due to creation of COVID Unit)  Code Status: DNR/DNI and POLST AVAILABLE - Palliative Care    DOS: 2022  Previous visit: 2022    PCP: Neil Galan MD      CHIEF COMPLAINT / REASON FOR VISIT:  Chief Complaint   Patient presents with     Clinic Care Coordination - Follow-up     Acute on chronic respiratory failure secondary to fluid overload in the setting of end-stage renal disease and inability to give a full dialysis run due to hypotension; this in the setting of chronic diastolic heart failure         [facility] from 18 until 18  [facility] from 18 until 18  [facility] from 2021 until 2021 (acute on chronic respiratory failure due to a combination of fluid overload and COPD exacerbation)  [facility] from 2021 until 2021  [facility] from 2021 until 2021 (acute on chronic respiratory failure due to a combination of fluid overload and COPD exacerbation)   [facility] from 2021 until 2021  [facility] from 2022 until 01/15/2022 (acute on chronic respiratory failure with hypoxia, hypervolemia)   [facility] from 01/15/2022 until       HPI: Belia is a 76 year old female with known ESRD (on hemodialysis x12 years), COPD (former smoker, 45-60 pack years),  chronic respiratory failure with hypoxia, obstructive sleep apnea (severe and previously intolerant of CPAP), chronic atrial fibrillation, GERD, essential hypertension, and anemia related to chronic renal failure.    She has had multiple hospitalizations for  respiratory failure due to a combination of fluid overload and COPD exacerbation.  This occurred on 01/26/2021 and again on 02/18/2021, the last admission coming within hours of her discharge from [facility].      While she continues on hemodialysis Mondays, Wednesdays, and Fridays, she did have some questions regarding hospice but was not sure she would sign on given her need for dialysis.  She did have a palliative care consult, and she strongly agreed, especially given her readmission to the hospital.    RECENT HOSPITALIZATION    She was most recently hospitalized on 01/12/2022, presenting to [facility] with dyspnea following a dialysis session where only 1 kg of fluid was removed due to hypotension.  This was felt to represent one fourth of the desired fluid removal.  He has been having difficulty with dialysis due to ongoing hypotension despite use of midodrine.  In the ED, she was hypoxemic and required up to 6 L of oxygen when her baseline is 3 L.  She was having increased wheeze/cough as well as pain in her lower legs.  Chronic pain in the lower extremities due to neuropathy which has been poorly controlled and poorly responsive to attempted treatments.  Some of her treatments, leg buprenorphine, she did not tolerate.    In the ED, she was hypotensive at 87/50.  Labs pertinent for WBC 3.3, hemoglobin 10.9 (with high MCV) and low platelet count at 77.  She historically seems pancytopenic, but there is no specific diagnosis for the cause.  Her troponin was 0.034 but no chest pain.  EKG significant for ventricular paced rhythm.  CXR consistent with volume overload.  She was admitted, and nephrology was consulted.  She was also found to have macrocytic anemia.  Hemoglobin is otherwise at goal.  UF is limited by hypotension, and she has a very limited tolerance of mild hypervolemia due to underlying COPD and heart failure.  Options are limited due to tenuous respiratory status.  The plan was to  use albumin in the hospital to allow UF without hypotension; however, it would not be an option in the outpatient setting.    Palliative care has been consulted to readdress her goals of care.  They had seen the patient during previous hospitalizations and discussed the appropriateness of hospice; however, the patient was not ready -- and still isn't.      CURRENT/RECENT TCU ISSUES    Disposition: A very complicated patient.  She is aware that her health will not improve and has considered hospice, although she is not yet ready to commit to that.  In-house psych was ordered during her last stay.  Apparently, she is not entirely compliant    When last seen, she complained of some of her meds are being given at the wrong times.  We went over each of them.  Albuterol nebulizer treatments, currently daily, should be given 3 times daily.  Sensipar should only be given at dialysis, not in the facility.  Renvela clarification: 800 mg, 2 tabs (1600 mg) should be administered before meals (not after) +1 tablet with each snack.  Midodrine was also clarified.  Midodrine tends to wear off in about 3 hours, so she likes to take it immediately before dialysis.     Nephropathy: According to the patient, this was caused by her history of aortic dissection.  She now undergoes hemodialysis 3 times weekly (MWF).    Neuropathy: She has a history of neuropathy in her feet (started in heels, now in the calves), and she uses several pillows at night under her legs.  Raynaud's phenomenon in the feet as well, and some constipation and low blood pressures as described below.  With uncontrolled pain in the lower extremities on a regimen of acetaminophen, gabapentin 100 mg 3 times daily, and occasional oxycodone providing insufficient relief, she was initiated on pregabalin (gabapentin discontinued).  Nephrology recommended maximum 25 mg/day; however, she tells me it is still very painful.  At this juncture, we will increase the Lyrica dose  from 25 mg twice daily to 50 mg twice daily and observe for any improvement.    Hemorrhoids/constipation: On 01/18, patient was requesting preparation H for hemorrhoids, and, per nursing, the patient was having some constipation.  Management had included only Senna S 1 tab bid prn.  New orders: Senna S1 tab twice daily and continue as needed dosing as is.  Preparation H rectally 3 times daily as needed.    Hypotension: She is on a 1500 mL fluid restriction.  This may be producing hypovolemia.  She does have orders for 10 or 15 mg of midodrine to take on dialysis days (Mondays, Wednesdays, and Fridays) and has additional orders for taking this when additional dialysis is needed.  On nondialysis days, if her SBP is <106, she receives this every 6 hours as needed.  We're going with 10 mg of midodrine to clarify and can go up from there if needed.  She typically has a dry weight of 70.5 kg.    Discharge planning: During her last stay, she told her  and daughter that she wanted to stay here, but her  became angry.  He has been the one to take her to dialysis and suggest that she would need to arrange her own transportation if she decided to stay.  He also told her he would not be doing housework.  The end result was that she would be staying 2 more weeks.  Apparently, her  has now finally resigned himself to her being here.  At the time, she told me that she was here now because she has no other place to go, and palliative care is cheaper than hospice.  I told her I had no problem with managing a palliative care plan if that is what she wanted.     ROS:   Positives: Her feet continue to be painful due to neuropathy.  Breathing is iffy.  She sleeps in a hospital bed due to orthopnea.    Negatives: Complaining of constipation before, things are going better now.  She denies any headaches or chest pains, coughing or congestion at the moment, dizziness, dysuria, diarrhea, difficulty chewing or  swallowing, or problems with sleep (when on 150 mg of trazodone nightly).      Past Medical History:   Diagnosis Date     Arthritis      CHF (congestive heart failure) (H)      Chronic anemia 6/1/2014     Chronic kidney disease      Chronic thoracic aortic dissection (H) 10/7/2015    Descending thoracic aorta; treated medically per notes of Dragan Singh and Jennifer.     COPD (chronic obstructive pulmonary disease) (H)      CVA (cerebral infarction)      Disease of thyroid gland      Dyslipidemia      ESRD (end stage renal disease) (H) 06/03/2009    on dialysis with Dr. Mitchell     Essential hypertension 6/30/2014     Gastrointestinal hemorrhage, unspecified gastrointestinal hemorrhage type 6/5/2017     GI (gastrointestinal bleed)      GI bleeding 6/5/2017     Gout      L3 vertebral fracture (H) 11/16/2015     Left Atrial Appendage Occlusion (WATCHMAN) 4/5/2018    LAAO April 5, 2018 (30 mm WATCHMAN)     Obesity      ZULEIKA (obstructive sleep apnea), severe, intolerant of CPAP 10/22/2015     Oxygen dependent     3L nc     Pneumonia 9-7-2015     Right foot drop      Spinal stenosis 3/28/2016     Stroke (H) 3/24/2016              Family History   Problem Relation Age of Onset     Dementia Mother      Diabetes Mother      Arthritis Mother      Cancer Mother      Depression Mother      Heart disease Mother      Vision loss Mother      Stroke Father      Heart disease Father      Breast cancer Neg Hx      Social History     Socioeconomic History     Marital status:      Spouse name: Cayden     Number of children: 2     Years of education: Not on file     Highest education level: Not on file   Occupational History     Employer: RETIRED   Social Needs     Financial resource strain: Not on file     Food insecurity     Worry: Not on file     Inability: Not on file     Transportation needs     Medical: Not on file     Non-medical: Not on file   Tobacco Use     Smoking status: Former Smoker     Packs/day: 1.50     Years:  37.00     Pack years: 55.50     Types: Cigarettes     Quit date: 2009     Years since quittin.2     Smokeless tobacco: Never Used   Substance and Sexual Activity     Alcohol use: No     Alcohol/week: 11.7 standard drinks     Types: 14 Standard drinks or equivalent per week     Comment: 14 mixed drinks per week     Drug use: No     Sexual activity: Never     Partners: Male   Lifestyle     Physical activity     Days per week: Not on file     Minutes per session: Not on file     Stress: Not on file   Relationships     Social connections     Talks on phone: Not on file     Gets together: Not on file     Attends Confucianism service: Not on file     Active member of club or organization: Not on file     Attends meetings of clubs or organizations: Not on file     Relationship status: Not on file     Intimate partner violence     Fear of current or ex partner: Not on file     Emotionally abused: Not on file     Physically abused: Not on file     Forced sexual activity: Not on file   Other Topics Concern     Not on file   Social History Narrative    Lives with her . Daughter in [place] and daughter in [place].     MEDICATIONS: Reviewed from the MAR, physician orders, and earlier progress notes.    Post Discharge Medication Reconciliation Status: medication reconcilation previously completed during another office visit.  Updated by me today (2022) with an increase in Lyrica reflected below.  Current Outpatient Medications   Medication Sig     acetaminophen (TYLENOL) 500 MG tablet Take 1,000 mg by mouth 3 times daily as needed for mild pain      albuterol (PROVENTIL) (2.5 MG/3ML) 0.083% neb solution Take 2.5 mg by nebulization 3 times daily as needed for shortness of breath / dyspnea or wheezing     albuterol (PROVENTIL) (2.5 MG/3ML) 0.083% neb solution NEBULIZE 1 VIAL THREE TIMES DAILY     aspirin 81 MG EC tablet Take 81 mg by mouth daily (with lunch)      atorvastatin (LIPITOR) 10 MG tablet Take 10 mg  by mouth At Bedtime     B Complex-C-Folic Acid (DIALYVITE) TABS Take 1 tablet by mouth daily     budesonide-formoterol (SYMBICORT) 80-4.5 MCG/ACT Inhaler Inhale 2 puffs into the lungs 2 times daily     buPROPion (WELLBUTRIN XL) 150 MG 24 hr tablet Take 150 mg by mouth twice a week Tue & Sat     cinacalcet (SENSIPAR) 30 MG tablet Take 30 mg by mouth Every Mon, Wed, Fri Morning . At dialysis.     FOLIC ACID PO Take 1 mg by mouth daily (with lunch)      gabapentin (NEURONTIN) 100 MG capsule Take 100 mg by mouth daily      Lactobacillus (CULTURELLE DIGESTIVE WOMENS PO) Take 1 tablet by mouth daily (with lunch)      lidocaine-prilocaine (EMLA) 2.5-2.5 % external cream Apply topically 3 times daily as needed for moderate pain     melatonin 3 MG tablet Take 3 mg by mouth At Bedtime     midodrine (PROAMATINE) 10 MG tablet Take 10 mg by mouth once a week every Tue, Thu, Sat, Sun for Dizziness; For non - dialysis days     midodrine (PROAMATINE) 10 MG tablet Take 15 mg by mouth three times a week Take once daily prior to dialysis on dialysis days Mon-Wed-Fri     oxyCODONE (ROXICODONE) 5 MG tablet Take 1 tablet (5 mg) by mouth every 6 hours as needed for pain     phenylephrine-mineral oil-petrolatum (PREPARATION H) 0.25-14-74.9 % rectal ointment Place 1 Application rectally 3 times daily as needed     polyvinyl alcohol (LIQUIFILM TEARS) 1.4 % ophthalmic solution Place 1 drop into both eyes 3 times daily      pregabalin (LYRICA) 25 MG capsule Take 50 mg by mouth 2 times daily      senna-docusate (SENOKOT-S/PERICOLACE) 8.6-50 MG tablet Take 1 tablet by mouth 2 times daily And bid prn      sevelamer carbonate (RENVELA) 800 MG tablet Take 1,600 mg by mouth 3 times daily (with meals)      sevelamer carbonate (RENVELA) 800 MG tablet Take 800 mg by mouth Take with snacks or supplements      sodium-potassium bicarbonate-citric acid (EDI-SELTZER GOLD) 2618-247-4790 MG TBEF solu-tab Take 1 tablet by mouth daily as needed for heartburn  "    timolol maleate (TIMOPTIC) 0.5 % ophthalmic solution Place 1 drop into both eyes 2 times daily     traZODone (DESYREL) 150 MG tablet TAKE ONE TABLET BY MOUTH EVERY NIGHT     Vitamin D, Cholecalciferol, 25 MCG (1000 UT) TABS Take 2,000 Units by mouth daily (with lunch)     No current facility-administered medications for this visit.     ALLERGIES:   Allergies   Allergen Reactions     Ace Inhibitors Cough     Atrovent [Ipratropium Bromide] Headache     Fosinopril Sodium Cough     Zolpidem      hallucinations     DIET: Dialysis diet, regular texture, thin liquids.  1500 mL /24-hour fluid restriction    Vitals:    01/20/22 0906   BP: 118/78   Pulse: 92   Resp: 18   Temp: 98.3  F (36.8  C)   SpO2: 93%   Weight: 67.4 kg (148 lb 9.6 oz)   Height: 1.626 m (5' 4\")     Body mass index is 25.51 kg/m .    EXAMINATION:   General: Pleasant, alert, and conversant middle-aged female, sitting in a wheelchair, looking much better than she did at my last visit.  Head: Normocephalic and atraumatic.   Eyes: PERRLA, sclerae clear.  Slight disconjugate gaze.  ENT: Moist oral mucosa.  She has her own teeth.  Hearing is unimpaired.  Cardiovascular: Regular rate and rhythm (previously irregular) with a 2/6 systolic ejection murmur at the left sternal border.  Respiratory: Very diminished lung sounds bilaterally but otherwise clear.  Abdomen: Soft and nontender.   Musculoskeletal/Extremities: Age-related degenerative joint disease.   1+ right, trace left lower extremity edema.  Integument: No rashes, clinically significant lesions, or skin breakdown.   Cognitive/Psychiatric: Alert and oriented ×4.  Affect is euthymic.    DIAGNOSTICS:   Last Comprehensive Metabolic Panel:  Sodium   Date Value Ref Range Status   09/07/2021 139 136 - 145 mmol/L Final   04/02/2021 138 133 - 144 mmol/L Final     Potassium   Date Value Ref Range Status   09/07/2021 4.0 3.5 - 5.0 mmol/L Final   04/02/2021 4.7 3.4 - 5.3 mmol/L Final     Chloride   Date Value Ref " Range Status   09/07/2021 101 98 - 107 mmol/L Final   04/02/2021 104 94 - 109 mmol/L Final     Carbon Dioxide   Date Value Ref Range Status   04/02/2021 28 20 - 32 mmol/L Final     Carbon Dioxide (CO2)   Date Value Ref Range Status   09/07/2021 26 22 - 31 mmol/L Final     Anion Gap   Date Value Ref Range Status   09/07/2021 12 5 - 18 mmol/L Final   04/02/2021 6 3 - 14 mmol/L Final     Glucose   Date Value Ref Range Status   09/07/2021 115 70 - 125 mg/dL Final   04/02/2021 92 70 - 99 mg/dL Final     Urea Nitrogen   Date Value Ref Range Status   09/07/2021 16 8 - 28 mg/dL Final   04/02/2021 40 (H) 7 - 30 mg/dL Final     Creatinine   Date Value Ref Range Status   09/07/2021 3.17 (H) 0.60 - 1.10 mg/dL Final   04/02/2021 5.08 (H) 0.52 - 1.04 mg/dL Final     GFR Estimate   Date Value Ref Range Status   09/07/2021 14 (L) >60 mL/min/1.73m2 Final     Comment:     As of July 11, 2021, eGFR is calculated by the CKD-EPI creatinine equation, without race adjustment. eGFR can be influenced by muscle mass, exercise, and diet. The reported eGFR is an estimation only and is only applicable if the renal function is stable.   07/10/2021 11 (L) >60 mL/min/1.73m2 Final   04/02/2021 8 (L) >60 mL/min/[1.73_m2] Final     Comment:     Non  GFR Calc  Starting 12/18/2018, serum creatinine based estimated GFR (eGFR) will be   calculated using the Chronic Kidney Disease Epidemiology Collaboration   (CKD-EPI) equation.       Calcium   Date Value Ref Range Status   09/07/2021 8.9 8.5 - 10.5 mg/dL Final   04/02/2021 8.8 8.5 - 10.1 mg/dL Final     Bilirubin Total   Date Value Ref Range Status   07/11/2021 0.4 0.0 - 1.0 mg/dL Final   06/29/2014 0.5 0.2 - 1.3 mg/dL Final     Alkaline Phosphatase   Date Value Ref Range Status   07/11/2021 88 45 - 120 U/L Final   06/29/2014 110 40 - 150 U/L Final     ALT   Date Value Ref Range Status   07/11/2021 9 0 - 45 U/L Final   06/29/2014 23 0 - 50 U/L Final     AST   Date Value Ref Range Status    07/11/2021 12 0 - 40 U/L Final   06/29/2014 20 0 - 45 U/L Final     Lab Results   Component Value Date    WBC 3.7 08/20/2021    WBC 4.9 04/02/2021     Lab Results   Component Value Date    RBC 3.04 08/20/2021    RBC 2.92 04/02/2021     Lab Results   Component Value Date    HGB 10.0 08/20/2021    HGB 9.6 04/02/2021     Lab Results   Component Value Date    HCT 33.5 08/20/2021    HCT 32.7 04/02/2021     Lab Results   Component Value Date     08/20/2021     04/02/2021     Lab Results   Component Value Date    MCH 32.9 08/20/2021    MCH 32.9 04/02/2021     Lab Results   Component Value Date    MCHC 29.9 08/20/2021    MCHC 29.4 04/02/2021     Lab Results   Component Value Date    RDW 16.9 08/20/2021    RDW 14.6 04/02/2021     Lab Results   Component Value Date     08/20/2021     04/02/2021       ASSESSMENT/Plan:      ICD-10-CM    1. Hypervolemia, unspecified hypervolemia type  E87.70    2. Chronic respiratory failure with hypoxia (H)  J96.11    3. End-stage renal disease on hemodialysis (H)  N18.6     Z99.2    4. Hypotension due to hypovolemia  I95.89     E86.1    5. Chronic diastolic congestive heart failure (H)  I50.32    6. Anemia of chronic renal failure, stage 4 (severe) (H)  N18.4     D63.1    7. Neuropathy  G62.9    8. Chronic obstructive pulmonary disease, unspecified COPD type (H)  J44.9    9. Presence of Watchman left atrial appendage closure device  Z95.818    10. Chronic diastolic heart failure (H)  I50.32    11. Paroxysmal atrial fibrillation (H)  I48.0    12. Mild episode of recurrent major depressive disorder (H)  F33.0    13. Gastroesophageal reflux disease without esophagitis  K21.9        CHANGES:    Increase Lyrica from 25 mg twice daily to 50 mg twice daily.    CARE PLAN:  The care plan, medications, vital signs, orders, and nursing notes have been reviewed and all orders signed.  Changes to care plan, if any, as noted.  Otherwise, continue current plan of care.      The  above has been created using voice recognition software. Please be aware that this may unintentionally  produce inaccuracies and/or nonsensical sentences.      Electronically signed by: Louie Liriano CNP        Sincerely,        MARK Sherman CNP

## 2022-01-22 NOTE — PROGRESS NOTES
[facility]    Name:   Belia Rodriguez (Itzel)  : 1945  MRN:  [id number]    Facility:   [facility] ([facility]) [[id number]]  Room:  (moved from 204 due to creation of COVID Unit)  Code Status: DNR/DNI and POLST AVAILABLE - Palliative Care    DOS: 2022  Previous visit: 2022    PCP: Neil Galan MD      CHIEF COMPLAINT / REASON FOR VISIT:  Chief Complaint   Patient presents with     Clinic Care Coordination - Follow-up     Acute on chronic respiratory failure secondary to fluid overload in the setting of end-stage renal disease and inability to give a full dialysis run due to hypotension; this in the setting of chronic diastolic heart failure         [facility] from 18 until 18  [facility] from 18 until 18  [facility] from 2021 until 2021 (acute on chronic respiratory failure due to a combination of fluid overload and COPD exacerbation)  [facility] from 2021 until 2021  [facility] from 2021 until 2021 (acute on chronic respiratory failure due to a combination of fluid overload and COPD exacerbation)   [facility] from 2021 until 2021  [facility] from 2022 until 01/15/2022 (acute on chronic respiratory failure with hypoxia, hypervolemia)   [facility] from 01/15/2022 until       HPI: Belia is a 76 year old female with known ESRD (on hemodialysis x12 years), COPD (former smoker, 45-60 pack years),  chronic respiratory failure with hypoxia, obstructive sleep apnea (severe and previously intolerant of CPAP), chronic atrial fibrillation, GERD, essential hypertension, and anemia related to chronic renal failure.    She has had multiple hospitalizations for respiratory failure due to a combination of fluid overload and COPD exacerbation.  This occurred on  01/26/2021 and again on 02/18/2021, the last admission coming within hours of her discharge from [facility].      While she continues on hemodialysis Mondays, Wednesdays, and Fridays, she did have some questions regarding hospice but was not sure she would sign on given her need for dialysis.  She did have a palliative care consult, and she strongly agreed, especially given her readmission to the hospital.    RECENT HOSPITALIZATION    She was most recently hospitalized on 01/12/2022, presenting to [facility] with dyspnea following a dialysis session where only 1 kg of fluid was removed due to hypotension.  This was felt to represent one fourth of the desired fluid removal.  He has been having difficulty with dialysis due to ongoing hypotension despite use of midodrine.  In the ED, she was hypoxemic and required up to 6 L of oxygen when her baseline is 3 L.  She was having increased wheeze/cough as well as pain in her lower legs.  Chronic pain in the lower extremities due to neuropathy which has been poorly controlled and poorly responsive to attempted treatments.  Some of her treatments, leg buprenorphine, she did not tolerate.    In the ED, she was hypotensive at 87/50.  Labs pertinent for WBC 3.3, hemoglobin 10.9 (with high MCV) and low platelet count at 77.  She historically seems pancytopenic, but there is no specific diagnosis for the cause.  Her troponin was 0.034 but no chest pain.  EKG significant for ventricular paced rhythm.  CXR consistent with volume overload.  She was admitted, and nephrology was consulted.  She was also found to have macrocytic anemia.  Hemoglobin is otherwise at goal.  UF is limited by hypotension, and she has a very limited tolerance of mild hypervolemia due to underlying COPD and heart failure.  Options are limited due to tenuous respiratory status.  The plan was to use albumin in the hospital to allow UF without hypotension; however, it would not be an option in  the outpatient setting.    Palliative care has been consulted to readdress her goals of care.  They had seen the patient during previous hospitalizations and discussed the appropriateness of hospice; however, the patient was not ready -- and still isn't.      CURRENT/RECENT TCU ISSUES    Disposition: A very complicated patient.  She is aware that her health will not improve and has considered hospice, although she is not yet ready to commit to that.  In-house psych was ordered during her last stay.  Apparently, she is not entirely compliant    When last seen, she complained of some of her meds are being given at the wrong times.  We went over each of them.  Albuterol nebulizer treatments, currently daily, should be given 3 times daily.  Sensipar should only be given at dialysis, not in the facility.  Renvela clarification: 800 mg, 2 tabs (1600 mg) should be administered before meals (not after) +1 tablet with each snack.  Midodrine was also clarified.  Midodrine tends to wear off in about 3 hours, so she likes to take it immediately before dialysis.     Nephropathy: According to the patient, this was caused by her history of aortic dissection.  She now undergoes hemodialysis 3 times weekly (MWF).    Neuropathy: She has a history of neuropathy in her feet (started in heels, now in the calves), and she uses several pillows at night under her legs.  Raynaud's phenomenon in the feet as well, and some constipation and low blood pressures as described below.  With uncontrolled pain in the lower extremities on a regimen of acetaminophen, gabapentin 100 mg 3 times daily, and occasional oxycodone providing insufficient relief, she was initiated on pregabalin (gabapentin discontinued).  Nephrology recommended maximum 25 mg/day; however, she tells me it is still very painful.  At this juncture, we will increase the Lyrica dose from 25 mg twice daily to 50 mg twice daily and observe for any  improvement.    Hemorrhoids/constipation: On 01/18, patient was requesting preparation H for hemorrhoids, and, per nursing, the patient was having some constipation.  Management had included only Senna S 1 tab bid prn.  New orders: Senna S1 tab twice daily and continue as needed dosing as is.  Preparation H rectally 3 times daily as needed.    Hypotension: She is on a 1500 mL fluid restriction.  This may be producing hypovolemia.  She does have orders for 10 or 15 mg of midodrine to take on dialysis days (Mondays, Wednesdays, and Fridays) and has additional orders for taking this when additional dialysis is needed.  On nondialysis days, if her SBP is <106, she receives this every 6 hours as needed.  We're going with 10 mg of midodrine to clarify and can go up from there if needed.  She typically has a dry weight of 70.5 kg.    Discharge planning: During her last stay, she told her  and daughter that she wanted to stay here, but her  became angry.  He has been the one to take her to dialysis and suggest that she would need to arrange her own transportation if she decided to stay.  He also told her he would not be doing housework.  The end result was that she would be staying 2 more weeks.  Apparently, her  has now finally resigned himself to her being here.  At the time, she told me that she was here now because she has no other place to go, and palliative care is cheaper than hospice.  I told her I had no problem with managing a palliative care plan if that is what she wanted.     ROS:   Positives: Her feet continue to be painful due to neuropathy.  Breathing is iffy.  She sleeps in a hospital bed due to orthopnea.    Negatives: Complaining of constipation before, things are going better now.  She denies any headaches or chest pains, coughing or congestion at the moment, dizziness, dysuria, diarrhea, difficulty chewing or swallowing, or problems with sleep (when on 150 mg of trazodone  nightly).      Past Medical History:   Diagnosis Date     Arthritis      CHF (congestive heart failure) (H)      Chronic anemia 6/1/2014     Chronic kidney disease      Chronic thoracic aortic dissection (H) 10/7/2015    Descending thoracic aorta; treated medically per notes of Dragan Singh and Jennifer.     COPD (chronic obstructive pulmonary disease) (H)      CVA (cerebral infarction)      Disease of thyroid gland      Dyslipidemia      ESRD (end stage renal disease) (H) 06/03/2009    on dialysis with Dr. Mitchell     Essential hypertension 6/30/2014     Gastrointestinal hemorrhage, unspecified gastrointestinal hemorrhage type 6/5/2017     GI (gastrointestinal bleed)      GI bleeding 6/5/2017     Gout      L3 vertebral fracture (H) 11/16/2015     Left Atrial Appendage Occlusion (WATCHMAN) 4/5/2018    LAAO April 5, 2018 (30 mm WATCHMAN)     Obesity      ZULEIKA (obstructive sleep apnea), severe, intolerant of CPAP 10/22/2015     Oxygen dependent     3L nc     Pneumonia 9-7-2015     Right foot drop      Spinal stenosis 3/28/2016     Stroke (H) 3/24/2016              Family History   Problem Relation Age of Onset     Dementia Mother      Diabetes Mother      Arthritis Mother      Cancer Mother      Depression Mother      Heart disease Mother      Vision loss Mother      Stroke Father      Heart disease Father      Breast cancer Neg Hx      Social History     Socioeconomic History     Marital status:      Spouse name: Cayden     Number of children: 2     Years of education: Not on file     Highest education level: Not on file   Occupational History     Employer: RETIRED   Social Needs     Financial resource strain: Not on file     Food insecurity     Worry: Not on file     Inability: Not on file     Transportation needs     Medical: Not on file     Non-medical: Not on file   Tobacco Use     Smoking status: Former Smoker     Packs/day: 1.50     Years: 37.00     Pack years: 55.50     Types: Cigarettes     Quit date:  2009     Years since quittin.2     Smokeless tobacco: Never Used   Substance and Sexual Activity     Alcohol use: No     Alcohol/week: 11.7 standard drinks     Types: 14 Standard drinks or equivalent per week     Comment: 14 mixed drinks per week     Drug use: No     Sexual activity: Never     Partners: Male   Lifestyle     Physical activity     Days per week: Not on file     Minutes per session: Not on file     Stress: Not on file   Relationships     Social connections     Talks on phone: Not on file     Gets together: Not on file     Attends Tenriism service: Not on file     Active member of club or organization: Not on file     Attends meetings of clubs or organizations: Not on file     Relationship status: Not on file     Intimate partner violence     Fear of current or ex partner: Not on file     Emotionally abused: Not on file     Physically abused: Not on file     Forced sexual activity: Not on file   Other Topics Concern     Not on file   Social History Narrative    Lives with her . Daughter in [place] and daughter in [place].     MEDICATIONS: Reviewed from the MAR, physician orders, and earlier progress notes.    Post Discharge Medication Reconciliation Status: medication reconcilation previously completed during another office visit.  Updated by me today (2022) with an increase in Lyrica reflected below.  Current Outpatient Medications   Medication Sig     acetaminophen (TYLENOL) 500 MG tablet Take 1,000 mg by mouth 3 times daily as needed for mild pain      albuterol (PROVENTIL) (2.5 MG/3ML) 0.083% neb solution Take 2.5 mg by nebulization 3 times daily as needed for shortness of breath / dyspnea or wheezing     albuterol (PROVENTIL) (2.5 MG/3ML) 0.083% neb solution NEBULIZE 1 VIAL THREE TIMES DAILY     aspirin 81 MG EC tablet Take 81 mg by mouth daily (with lunch)      atorvastatin (LIPITOR) 10 MG tablet Take 10 mg by mouth At Bedtime     B Complex-C-Folic Acid (DIALYVITE) TABS  Take 1 tablet by mouth daily     budesonide-formoterol (SYMBICORT) 80-4.5 MCG/ACT Inhaler Inhale 2 puffs into the lungs 2 times daily     buPROPion (WELLBUTRIN XL) 150 MG 24 hr tablet Take 150 mg by mouth twice a week Tue & Sat     cinacalcet (SENSIPAR) 30 MG tablet Take 30 mg by mouth Every Mon, Wed, Fri Morning . At dialysis.     FOLIC ACID PO Take 1 mg by mouth daily (with lunch)      gabapentin (NEURONTIN) 100 MG capsule Take 100 mg by mouth daily      Lactobacillus (CULTURELLE DIGESTIVE WOMENS PO) Take 1 tablet by mouth daily (with lunch)      lidocaine-prilocaine (EMLA) 2.5-2.5 % external cream Apply topically 3 times daily as needed for moderate pain     melatonin 3 MG tablet Take 3 mg by mouth At Bedtime     midodrine (PROAMATINE) 10 MG tablet Take 10 mg by mouth once a week every Tue, Thu, Sat, Sun for Dizziness; For non - dialysis days     midodrine (PROAMATINE) 10 MG tablet Take 15 mg by mouth three times a week Take once daily prior to dialysis on dialysis days Mon-Wed-Fri     oxyCODONE (ROXICODONE) 5 MG tablet Take 1 tablet (5 mg) by mouth every 6 hours as needed for pain     phenylephrine-mineral oil-petrolatum (PREPARATION H) 0.25-14-74.9 % rectal ointment Place 1 Application rectally 3 times daily as needed     polyvinyl alcohol (LIQUIFILM TEARS) 1.4 % ophthalmic solution Place 1 drop into both eyes 3 times daily      pregabalin (LYRICA) 25 MG capsule Take 50 mg by mouth 2 times daily      senna-docusate (SENOKOT-S/PERICOLACE) 8.6-50 MG tablet Take 1 tablet by mouth 2 times daily And bid prn      sevelamer carbonate (RENVELA) 800 MG tablet Take 1,600 mg by mouth 3 times daily (with meals)      sevelamer carbonate (RENVELA) 800 MG tablet Take 800 mg by mouth Take with snacks or supplements      sodium-potassium bicarbonate-citric acid (EDI-SELTZER GOLD) [id number] MG TBEF solu-tab Take 1 tablet by mouth daily as needed for heartburn     timolol maleate (TIMOPTIC) 0.5 % ophthalmic solution Place 1  "drop into both eyes 2 times daily     traZODone (DESYREL) 150 MG tablet TAKE ONE TABLET BY MOUTH EVERY NIGHT     Vitamin D, Cholecalciferol, 25 MCG (1000 UT) TABS Take 2,000 Units by mouth daily (with lunch)     No current facility-administered medications for this visit.     ALLERGIES:   Allergies   Allergen Reactions     Ace Inhibitors Cough     Atrovent [Ipratropium Bromide] Headache     Fosinopril Sodium Cough     Zolpidem      hallucinations     DIET: Dialysis diet, regular texture, thin liquids.  1500 mL /24-hour fluid restriction    Vitals:    01/20/22 0906   BP: 118/78   Pulse: 92   Resp: 18   Temp: 98.3  F (36.8  C)   SpO2: 93%   Weight: 67.4 kg (148 lb 9.6 oz)   Height: 1.626 m (5' 4\")     Body mass index is 25.51 kg/m .    EXAMINATION:   General: Pleasant, alert, and conversant middle-aged female, sitting in a wheelchair, looking much better than she did at my last visit.  Head: Normocephalic and atraumatic.   Eyes: PERRLA, sclerae clear.  Slight disconjugate gaze.  ENT: Moist oral mucosa.  She has her own teeth.  Hearing is unimpaired.  Cardiovascular: Regular rate and rhythm (previously irregular) with a 2/6 systolic ejection murmur at the left sternal border.  Respiratory: Very diminished lung sounds bilaterally but otherwise clear.  Abdomen: Soft and nontender.   Musculoskeletal/Extremities: Age-related degenerative joint disease.   1+ right, trace left lower extremity edema.  Integument: No rashes, clinically significant lesions, or skin breakdown.   Cognitive/Psychiatric: Alert and oriented ×4.  Affect is euthymic.    DIAGNOSTICS:   Last Comprehensive Metabolic Panel:  Sodium   Date Value Ref Range Status   09/07/2021 139 136 - 145 mmol/L Final   04/02/2021 138 133 - 144 mmol/L Final     Potassium   Date Value Ref Range Status   09/07/2021 4.0 3.5 - 5.0 mmol/L Final   04/02/2021 4.7 3.4 - 5.3 mmol/L Final     Chloride   Date Value Ref Range Status   09/07/2021 101 98 - 107 mmol/L Final   04/02/2021 " 104 94 - 109 mmol/L Final     Carbon Dioxide   Date Value Ref Range Status   04/02/2021 28 20 - 32 mmol/L Final     Carbon Dioxide (CO2)   Date Value Ref Range Status   09/07/2021 26 22 - 31 mmol/L Final     Anion Gap   Date Value Ref Range Status   09/07/2021 12 5 - 18 mmol/L Final   04/02/2021 6 3 - 14 mmol/L Final     Glucose   Date Value Ref Range Status   09/07/2021 115 70 - 125 mg/dL Final   04/02/2021 92 70 - 99 mg/dL Final     Urea Nitrogen   Date Value Ref Range Status   09/07/2021 16 8 - 28 mg/dL Final   04/02/2021 40 (H) 7 - 30 mg/dL Final     Creatinine   Date Value Ref Range Status   09/07/2021 3.17 (H) 0.60 - 1.10 mg/dL Final   04/02/2021 5.08 (H) 0.52 - 1.04 mg/dL Final     GFR Estimate   Date Value Ref Range Status   09/07/2021 14 (L) >60 mL/min/1.73m2 Final     Comment:     As of July 11, 2021, eGFR is calculated by the CKD-EPI creatinine equation, without race adjustment. eGFR can be influenced by muscle mass, exercise, and diet. The reported eGFR is an estimation only and is only applicable if the renal function is stable.   07/10/2021 11 (L) >60 mL/min/1.73m2 Final   04/02/2021 8 (L) >60 mL/min/[1.73_m2] Final     Comment:     Non  GFR Calc  Starting 12/18/2018, serum creatinine based estimated GFR (eGFR) will be   calculated using the Chronic Kidney Disease Epidemiology Collaboration   (CKD-EPI) equation.       Calcium   Date Value Ref Range Status   09/07/2021 8.9 8.5 - 10.5 mg/dL Final   04/02/2021 8.8 8.5 - 10.1 mg/dL Final     Bilirubin Total   Date Value Ref Range Status   07/11/2021 0.4 0.0 - 1.0 mg/dL Final   06/29/2014 0.5 0.2 - 1.3 mg/dL Final     Alkaline Phosphatase   Date Value Ref Range Status   07/11/2021 88 45 - 120 U/L Final   06/29/2014 110 40 - 150 U/L Final     ALT   Date Value Ref Range Status   07/11/2021 9 0 - 45 U/L Final   06/29/2014 23 0 - 50 U/L Final     AST   Date Value Ref Range Status   07/11/2021 12 0 - 40 U/L Final   06/29/2014 20 0 - 45 U/L Final      Lab Results   Component Value Date    WBC 3.7 08/20/2021    WBC 4.9 04/02/2021     Lab Results   Component Value Date    RBC 3.04 08/20/2021    RBC 2.92 04/02/2021     Lab Results   Component Value Date    HGB 10.0 08/20/2021    HGB 9.6 04/02/2021     Lab Results   Component Value Date    HCT 33.5 08/20/2021    HCT 32.7 04/02/2021     Lab Results   Component Value Date     08/20/2021     04/02/2021     Lab Results   Component Value Date    MCH 32.9 08/20/2021    MCH 32.9 04/02/2021     Lab Results   Component Value Date    MCHC 29.9 08/20/2021    MCHC 29.4 04/02/2021     Lab Results   Component Value Date    RDW 16.9 08/20/2021    RDW 14.6 04/02/2021     Lab Results   Component Value Date     08/20/2021     04/02/2021       ASSESSMENT/Plan:      ICD-10-CM    1. Hypervolemia, unspecified hypervolemia type  E87.70    2. Chronic respiratory failure with hypoxia (H)  J96.11    3. End-stage renal disease on hemodialysis (H)  N18.6     Z99.2    4. Hypotension due to hypovolemia  I95.89     E86.1    5. Chronic diastolic congestive heart failure (H)  I50.32    6. Anemia of chronic renal failure, stage 4 (severe) (H)  N18.4     D63.1    7. Neuropathy  G62.9    8. Chronic obstructive pulmonary disease, unspecified COPD type (H)  J44.9    9. Presence of Watchman left atrial appendage closure device  Z95.818    10. Chronic diastolic heart failure (H)  I50.32    11. Paroxysmal atrial fibrillation (H)  I48.0    12. Mild episode of recurrent major depressive disorder (H)  F33.0    13. Gastroesophageal reflux disease without esophagitis  K21.9        CHANGES:    Increase Lyrica from 25 mg twice daily to 50 mg twice daily.    CARE PLAN:  The care plan, medications, vital signs, orders, and nursing notes have been reviewed and all orders signed.  Changes to care plan, if any, as noted.  Otherwise, continue current plan of care.      The above has been created using voice recognition software. Please be  aware that this may unintentionally  produce inaccuracies and/or nonsensical sentences.      Electronically signed by: Louie Liriano CNP

## 2022-01-27 NOTE — LETTER
2022        RE: Belia Rodriguez  [address] [facility] [address] [facility] [address]        [facility]    Name:   Belia Rodriguez (Itzel)  : 1945  MRN:  [id number]    Facility:   [facility] ([facility]) [[id number]]  Room:  (moved from 204 due to creation of COVID Unit)  Code Status: DNR/DNI and POLST AVAILABLE - Palliative Care    DOS: 2022  Previous visit: 2022    PCP: Neil Galan MD      CHIEF COMPLAINT / REASON FOR VISIT:  Chief Complaint   Patient presents with     Clinic Care Coordination - Follow-up     Acute on chronic respiratory failure secondary to fluid overload in the setting of end-stage renal disease and inability to give a full dialysis run due to hypotension; this in the setting of chronic diastolic heart failure         [facility] from 18 until 18  [facility] from 18 until 18  [facility] from 2021 until 2021 (acute on chronic respiratory failure due to a combination of fluid overload and COPD exacerbation)  [facility] from 2021 until 2021  [facility] from 2021 until 2021 (acute on chronic respiratory failure due to a combination of fluid overload and COPD exacerbation)   [facility] from 2021 until 2021  [facility] from 2022 until 01/15/2022 (acute on chronic respiratory failure with hypoxia, hypervolemia)   [facility] from 01/15/2022 until       HPI: Belia is a 76 year old female with known ESRD (on hemodialysis x12 years), COPD (former smoker, 45-60 pack years),  chronic respiratory failure with hypoxia, obstructive sleep apnea (severe and previously intolerant of CPAP), chronic atrial fibrillation, GERD, essential hypertension, and anemia related to chronic renal failure.    She has had multiple hospitalizations for  respiratory failure due to a combination of fluid overload and COPD exacerbation.  This occurred on 01/26/2021 and again on 02/18/2021, the last admission coming within hours of her discharge from [facility].      While she continues on hemodialysis Mondays, Wednesdays, and Fridays, she did have some questions regarding hospice but was not sure she would sign on given her need for dialysis.  She did have a palliative care consult, and she strongly agreed, especially given her readmission to the hospital.    RECENT HOSPITALIZATION    She was most recently hospitalized on 01/12/2022, presenting to [facility] with dyspnea following a dialysis session where only 1 kg of fluid was removed due to hypotension.  This was felt to represent one fourth of the desired fluid removal.  He has been having difficulty with dialysis due to ongoing hypotension despite use of midodrine.  In the ED, she was hypoxemic and required up to 6 L of oxygen when her baseline is 3 L.  She was having increased wheeze/cough as well as pain in her lower legs.  Chronic pain in the lower extremities due to neuropathy which has been poorly controlled and poorly responsive to attempted treatments.  Some of her treatments, leg buprenorphine, she did not tolerate.    In the ED, she was hypotensive at 87/50.  Labs pertinent for WBC 3.3, hemoglobin 10.9 (with high MCV) and low platelet count at 77.  She historically seems pancytopenic, but there is no specific diagnosis for the cause.  Her troponin was 0.034 but no chest pain.  EKG significant for ventricular paced rhythm.  CXR consistent with volume overload.  She was admitted, and nephrology was consulted.  She was also found to have macrocytic anemia.  Hemoglobin is otherwise at goal.  UF is limited by hypotension, and she has a very limited tolerance of mild hypervolemia due to underlying COPD and heart failure.  Options are limited due to tenuous respiratory status.  The plan was to  "use albumin in the hospital to allow UF without hypotension; however, it would not be an option in the outpatient setting.    Palliative care has been consulted to readdress her goals of care.  They had seen the patient during previous hospitalizations and discussed the appropriateness of hospice; however, the patient was not ready -- and still isn't.      CURRENT/RECENT TCU ISSUES    Disposition: A very complicated patient.  She is aware that her health will not improve and has considered hospice, although she is not yet ready to commit to that.  In-house psych was ordered during her last stay.      She was sent to the /24 due to dyspnea.  They gave her some nebulizer treatments and sent her back.    She has some allergic symptoms and has been using Corcidin HBP Cold and Flu 2 tabs every 6 hours as needed.    She has also developed a \"diaper rash,\" and is receiving appropriate topical treatment.     Nephropathy: According to the patient, this was caused by her history of aortic dissection.  She now undergoes hemodialysis 3 times weekly (MWF) and typically leaves around 8:30 AM.    Neuropathy: She has a history of neuropathy in her feet (started in heels, now in the calves), and she uses several pillows at night under her legs.  Raynaud's phenomenon in the feet as well, and some constipation and low blood pressures as described below.  With uncontrolled pain in the lower extremities on a regimen of acetaminophen, gabapentin 100 mg 3 times daily, and occasional oxycodone providing insufficient relief, she was initiated on pregabalin (gabapentin discontinued).  Nephrology recommended maximum 25 mg/day; however, she tells me it is still very painful.  At my last visit, we will increased the Lyrica dose from 25 mg twice daily to 50 mg twice daily.  There has been some improvement, although we may need to increase the dose further.    Hemorrhoids/constipation: On 01/18, patient was requesting preparation H for " "hemorrhoids, and, per nursing, the patient was having some constipation.  Management had included only Senna S 1 tab bid prn.  New orders: Senna S1 tab twice daily and continue as needed dosing as is.  Preparation H rectally 3 times daily as needed.    Hypotension: She is on a 1500 mL fluid restriction.  This may be producing hypovolemia.  She does have orders for 10 or 15 mg of midodrine to take on dialysis days (Mondays, Wednesdays, and Fridays) and has additional orders for taking this when additional dialysis is needed.  On nondialysis days, if her SBP is <106, she receives this every 6 hours as needed.  We're going with 10 mg of midodrine to clarify and can go up from there if needed.  She typically has a dry weight of 70.5 kg.    Discharge planning: Still declining palliative care and has not yet made a decision on hospice.    ROS:   Positives: Her feet continue to be painful due to neuropathy but there is some improvement.  Breathing is \"iffy.\"  At my last visit she sleeps in a hospital bed due to orthopnea.    Negatives: Complaining of constipation before, things are going better now.  She denies any headaches or chest pains, coughing or congestion at the moment, dizziness, dysuria, diarrhea, difficulty chewing or swallowing, or problems with sleep (when on 150 mg of trazodone nightly).      Past Medical History:   Diagnosis Date     Arthritis      CHF (congestive heart failure) (H)      Chronic anemia 6/1/2014     Chronic kidney disease      Chronic thoracic aortic dissection (H) 10/7/2015    Descending thoracic aorta; treated medically per notes of Dragan Singh and Jennifer.     COPD (chronic obstructive pulmonary disease) (H)      CVA (cerebral infarction)      Disease of thyroid gland      Dyslipidemia      ESRD (end stage renal disease) (H) 06/03/2009    on dialysis with Dr. Mitchell     Essential hypertension 6/30/2014     Gastrointestinal hemorrhage, unspecified gastrointestinal hemorrhage type 6/5/2017 "     GI (gastrointestinal bleed)      GI bleeding 2017     Gout      L3 vertebral fracture (H) 2015     Left Atrial Appendage Occlusion (WATCHMAN) 2018    LAAO 2018 (30 mm WATCHMAN)     Obesity      ZULEIKA (obstructive sleep apnea), severe, intolerant of CPAP 10/22/2015     Oxygen dependent     3L nc     Pneumonia 2015     Right foot drop      Spinal stenosis 3/28/2016     Stroke (H) 3/24/2016              Family History   Problem Relation Age of Onset     Dementia Mother      Diabetes Mother      Arthritis Mother      Cancer Mother      Depression Mother      Heart disease Mother      Vision loss Mother      Stroke Father      Heart disease Father      Breast cancer Neg Hx      Social History     Socioeconomic History     Marital status:      Spouse name: Cayden     Number of children: 2     Years of education: Not on file     Highest education level: Not on file   Occupational History     Employer: RETIRED   Social Needs     Financial resource strain: Not on file     Food insecurity     Worry: Not on file     Inability: Not on file     Transportation needs     Medical: Not on file     Non-medical: Not on file   Tobacco Use     Smoking status: Former Smoker     Packs/day: 1.50     Years: 37.00     Pack years: 55.50     Types: Cigarettes     Quit date: 2009     Years since quittin.2     Smokeless tobacco: Never Used   Substance and Sexual Activity     Alcohol use: No     Alcohol/week: 11.7 standard drinks     Types: 14 Standard drinks or equivalent per week     Comment: 14 mixed drinks per week     Drug use: No     Sexual activity: Never     Partners: Male   Lifestyle     Physical activity     Days per week: Not on file     Minutes per session: Not on file     Stress: Not on file   Relationships     Social connections     Talks on phone: Not on file     Gets together: Not on file     Attends Christian service: Not on file     Active member of club or organization: Not on file      Attends meetings of clubs or organizations: Not on file     Relationship status: Not on file     Intimate partner violence     Fear of current or ex partner: Not on file     Emotionally abused: Not on file     Physically abused: Not on file     Forced sexual activity: Not on file   Other Topics Concern     Not on file   Social History Narrative    Lives with her . Daughter in [place] and daughter in [place].     MEDICATIONS: Reviewed from the MAR, physician orders, and earlier progress notes.    Post Discharge Medication Reconciliation Status: medication reconcilation previously completed during another office visit.    Current Outpatient Medications   Medication Sig     acetaminophen (TYLENOL) 500 MG tablet Take 1,000 mg by mouth 3 times daily as needed for mild pain      albuterol (PROVENTIL) (2.5 MG/3ML) 0.083% neb solution Take 2.5 mg by nebulization 3 times daily as needed for shortness of breath / dyspnea or wheezing     albuterol (PROVENTIL) (2.5 MG/3ML) 0.083% neb solution NEBULIZE 1 VIAL THREE TIMES DAILY     aspirin 81 MG EC tablet Take 81 mg by mouth daily (with lunch)      atorvastatin (LIPITOR) 10 MG tablet Take 10 mg by mouth At Bedtime     B Complex-C-Folic Acid (DIALYVITE) TABS Take 1 tablet by mouth daily     budesonide-formoterol (SYMBICORT) 80-4.5 MCG/ACT Inhaler Inhale 2 puffs into the lungs 2 times daily     buPROPion (WELLBUTRIN XL) 150 MG 24 hr tablet Take 150 mg by mouth twice a week Tue & Sat     cinacalcet (SENSIPAR) 30 MG tablet Take 30 mg by mouth Every Mon, Wed, Fri Morning . At dialysis.     FOLIC ACID PO Take 1 mg by mouth daily (with lunch)      gabapentin (NEURONTIN) 100 MG capsule Take 100 mg by mouth daily      Lactobacillus (CULTURELLE DIGESTIVE WOMENS PO) Take 1 tablet by mouth daily (with lunch)      lidocaine-prilocaine (EMLA) 2.5-2.5 % external cream Apply topically 3 times daily as needed for moderate pain     melatonin 3 MG tablet Take 3 mg by mouth At Bedtime  "    midodrine (PROAMATINE) 10 MG tablet Take 10 mg by mouth once a week every Tue, Thu, Sat, Sun for Dizziness; For non - dialysis days     midodrine (PROAMATINE) 10 MG tablet Take 15 mg by mouth three times a week Take once daily prior to dialysis on dialysis days Mon-Wed-Fri     oxyCODONE (ROXICODONE) 5 MG tablet Take 1 tablet (5 mg) by mouth every 6 hours as needed for pain     phenylephrine-mineral oil-petrolatum (PREPARATION H) 0.25-14-74.9 % rectal ointment Place 1 Application rectally 3 times daily as needed     polyvinyl alcohol (LIQUIFILM TEARS) 1.4 % ophthalmic solution Place 1 drop into both eyes 3 times daily      pregabalin (LYRICA) 25 MG capsule Take 50 mg by mouth 2 times daily      senna-docusate (SENOKOT-S/PERICOLACE) 8.6-50 MG tablet Take 1 tablet by mouth 2 times daily And bid prn      sevelamer carbonate (RENVELA) 800 MG tablet Take 1,600 mg by mouth 3 times daily (with meals)      sevelamer carbonate (RENVELA) 800 MG tablet Take 800 mg by mouth Take with snacks or supplements      sodium-potassium bicarbonate-citric acid (EDI-SELTZER GOLD) 7917-025-9625 MG TBEF solu-tab Take 1 tablet by mouth daily as needed for heartburn     timolol maleate (TIMOPTIC) 0.5 % ophthalmic solution Place 1 drop into both eyes 2 times daily     traZODone (DESYREL) 150 MG tablet TAKE ONE TABLET BY MOUTH EVERY NIGHT     Vitamin D, Cholecalciferol, 25 MCG (1000 UT) TABS Take 2,000 Units by mouth daily (with lunch)     No current facility-administered medications for this visit.     ALLERGIES:   Allergies   Allergen Reactions     Ace Inhibitors Cough     Atrovent [Ipratropium Bromide] Headache     Fosinopril Sodium Cough     Zolpidem      hallucinations     DIET: Dialysis diet, regular texture, thin liquids.  1500 mL /24-hour fluid restriction    Vitals:    01/24/22 1402   BP: (!) 159/108   Pulse: 73   Resp: 18   Temp: 98.6  F (37  C)   SpO2: 90%   Weight: 70.8 kg (156 lb)   Height: 1.626 m (5' 4\")     Body mass index " "is 26.78 kg/m .    EXAMINATION:   General: Pleasant, alert, and conversant middle-aged female, sitting in a wheelchair, in no apparent distress.  Head: Normocephalic and atraumatic.   Eyes: PERRLA, sclerae clear.  Slight disconjugate gaze.  ENT: Moist oral mucosa.  She has her own teeth.  No rhinorrhea or nasal discharge.  Hearing unimpaired.  Cardiovascular: Regular rate and rhythm (previously irregular) with a 2/6 systolic ejection murmur at the left sternal border.  Respiratory: Very diminished lung sounds bilaterally but otherwise clear.  Abdomen: Soft and nontender.   Musculoskeletal/Extremities: Age-related degenerative joint disease.   1+ right, trace left lower extremity edema.  Integument: \"Diaper rash.\"  Otherwise, no rashes, clinically significant lesions, or skin breakdown.   Cognitive/Psychiatric: Alert and oriented ×4.  Affect is euthymic.    DIAGNOSTICS:   Last Comprehensive Metabolic Panel:  Sodium   Date Value Ref Range Status   09/07/2021 139 136 - 145 mmol/L Final   04/02/2021 138 133 - 144 mmol/L Final     Potassium   Date Value Ref Range Status   09/07/2021 4.0 3.5 - 5.0 mmol/L Final   04/02/2021 4.7 3.4 - 5.3 mmol/L Final     Chloride   Date Value Ref Range Status   09/07/2021 101 98 - 107 mmol/L Final   04/02/2021 104 94 - 109 mmol/L Final     Carbon Dioxide   Date Value Ref Range Status   04/02/2021 28 20 - 32 mmol/L Final     Carbon Dioxide (CO2)   Date Value Ref Range Status   09/07/2021 26 22 - 31 mmol/L Final     Anion Gap   Date Value Ref Range Status   09/07/2021 12 5 - 18 mmol/L Final   04/02/2021 6 3 - 14 mmol/L Final     Glucose   Date Value Ref Range Status   09/07/2021 115 70 - 125 mg/dL Final   04/02/2021 92 70 - 99 mg/dL Final     Urea Nitrogen   Date Value Ref Range Status   09/07/2021 16 8 - 28 mg/dL Final   04/02/2021 40 (H) 7 - 30 mg/dL Final     Creatinine   Date Value Ref Range Status   09/07/2021 3.17 (H) 0.60 - 1.10 mg/dL Final   04/02/2021 5.08 (H) 0.52 - 1.04 mg/dL " Final     GFR Estimate   Date Value Ref Range Status   09/07/2021 14 (L) >60 mL/min/1.73m2 Final     Comment:     As of July 11, 2021, eGFR is calculated by the CKD-EPI creatinine equation, without race adjustment. eGFR can be influenced by muscle mass, exercise, and diet. The reported eGFR is an estimation only and is only applicable if the renal function is stable.   07/10/2021 11 (L) >60 mL/min/1.73m2 Final   04/02/2021 8 (L) >60 mL/min/[1.73_m2] Final     Comment:     Non  GFR Calc  Starting 12/18/2018, serum creatinine based estimated GFR (eGFR) will be   calculated using the Chronic Kidney Disease Epidemiology Collaboration   (CKD-EPI) equation.       Calcium   Date Value Ref Range Status   09/07/2021 8.9 8.5 - 10.5 mg/dL Final   04/02/2021 8.8 8.5 - 10.1 mg/dL Final     Bilirubin Total   Date Value Ref Range Status   07/11/2021 0.4 0.0 - 1.0 mg/dL Final   06/29/2014 0.5 0.2 - 1.3 mg/dL Final     Alkaline Phosphatase   Date Value Ref Range Status   07/11/2021 88 45 - 120 U/L Final   06/29/2014 110 40 - 150 U/L Final     ALT   Date Value Ref Range Status   07/11/2021 9 0 - 45 U/L Final   06/29/2014 23 0 - 50 U/L Final     AST   Date Value Ref Range Status   07/11/2021 12 0 - 40 U/L Final   06/29/2014 20 0 - 45 U/L Final     Lab Results   Component Value Date    WBC 3.7 08/20/2021    WBC 4.9 04/02/2021     Lab Results   Component Value Date    RBC 3.04 08/20/2021    RBC 2.92 04/02/2021     Lab Results   Component Value Date    HGB 10.0 08/20/2021    HGB 9.6 04/02/2021     Lab Results   Component Value Date    HCT 33.5 08/20/2021    HCT 32.7 04/02/2021     Lab Results   Component Value Date     08/20/2021     04/02/2021     Lab Results   Component Value Date    MCH 32.9 08/20/2021    MCH 32.9 04/02/2021     Lab Results   Component Value Date    MCHC 29.9 08/20/2021    MCHC 29.4 04/02/2021     Lab Results   Component Value Date    RDW 16.9 08/20/2021    RDW 14.6 04/02/2021     Lab  Results   Component Value Date     08/20/2021     04/02/2021       ASSESSMENT/Plan:      ICD-10-CM    1. Chronic respiratory failure with hypoxia (H)  J96.11    2. End-stage renal disease on hemodialysis (H)  N18.6     Z99.2    3. Hypotension due to hypovolemia  I95.89     E86.1    4. Hypervolemia, unspecified hypervolemia type  E87.70    5. Chronic diastolic congestive heart failure (H)  I50.32    6. Anemia of chronic renal failure, stage 4 (severe) (H)  N18.4     D63.1    7. Neuropathy  G62.9    8. Chronic obstructive pulmonary disease, unspecified COPD type (H)  J44.9    9. Paroxysmal atrial fibrillation (H)  I48.0    10. Presence of Watchman left atrial appendage closure device  Z95.818    11. Mild episode of recurrent major depressive disorder (H)  F33.0    12. Gastroesophageal reflux disease without esophagitis  K21.9        CHANGES:    Corcidin HBP Cold and Flu 2 tabs every 6 hours as needed.    CARE PLAN:  The care plan, medications, vital signs, orders, and nursing notes have been reviewed and all orders signed.  Changes to care plan, if any, as noted.  Otherwise, continue current plan of care.      The above has been created using voice recognition software. Please be aware that this may unintentionally  produce inaccuracies and/or nonsensical sentences.      Electronically signed by: Louie Liriano CNP        Sincerely,        MARK Sherman CNP

## 2022-01-31 NOTE — LETTER
2022        RE: Belia Rodriguez  [address] [facility] [address]        [facility]    Name:   Belia Rodriguez (Itzel)  : 1945  MRN:  [id number]    Facility:   [facility] ([facility]) [[id number]]  Room:   Code Status: DNR/DNI and POLST AVAILABLE - Palliative Care    DOS: 2022  Previous visit: 2022    PCP: Neil Galan MD      CHIEF COMPLAINT / REASON FOR VISIT:  Chief Complaint   Patient presents with     Clinic Care Coordination - Follow-up     Acute on chronic respiratory failure secondary to fluid overload in the setting of end-stage renal disease and inability to give a full dialysis run due to hypotension; this in the setting of chronic diastolic heart failure         [facility] from 18 until 18  [facility] from 18 until 18  [facility] from 2021 until 2021 (acute on chronic respiratory failure due to a combination of fluid overload and COPD exacerbation)  [facility] from 2021 until 2021  [facility] from 2021 until 2021 (acute on chronic respiratory failure due to a combination of fluid overload and COPD exacerbation)   [facility] from 2021 until 2021  [facility] from 2022 until 01/15/2022 (acute on chronic respiratory failure with hypoxia, hypervolemia)   [facility] from 01/15/2022 until       HPI: Belia is a 76 year old female with known ESRD (on hemodialysis x12 years), COPD (former smoker, 45-60 pack years),  chronic respiratory failure with hypoxia, obstructive sleep apnea (severe and previously intolerant of CPAP), chronic atrial fibrillation, GERD, essential hypertension, and anemia related to chronic renal failure.    She has had multiple hospitalizations for respiratory failure due to a combination of fluid  overload and COPD exacerbation.  This occurred on 01/26/2021 and again on 02/18/2021, the last admission coming within hours of her discharge from [facility].      While she continues on hemodialysis Mondays, Wednesdays, and Fridays, she did have some questions regarding hospice but was not sure she would sign on given her need for dialysis.  She did have a palliative care consult, and she strongly agreed, especially given her readmission to the hospital.    RECENT HOSPITALIZATION    She was most recently hospitalized on 01/12/2022, presenting to [facility] with dyspnea following a dialysis session where only 1 kg of fluid was removed due to hypotension.  This was felt to represent one fourth of the desired fluid removal.  He has been having difficulty with dialysis due to ongoing hypotension despite use of midodrine.  In the ED, she was hypoxemic and required up to 6 L of oxygen when her baseline is 3 L.  She was having increased wheeze/cough as well as pain in her lower legs.  Chronic pain in the lower extremities due to neuropathy which has been poorly controlled and poorly responsive to attempted treatments.  Some of her treatments, leg buprenorphine, she did not tolerate.    In the ED, she was hypotensive at 87/50.  Labs pertinent for WBC 3.3, hemoglobin 10.9 (with high MCV) and low platelet count at 77.  She historically seems pancytopenic, but there is no specific diagnosis for the cause.  Her troponin was 0.034 but no chest pain.  EKG significant for ventricular paced rhythm.  CXR consistent with volume overload.  She was admitted, and nephrology was consulted.  She was also found to have macrocytic anemia.  Hemoglobin is otherwise at goal.  UF is limited by hypotension, and she has a very limited tolerance of mild hypervolemia due to underlying COPD and heart failure.  Options are limited due to tenuous respiratory status.  The plan was to use albumin in the hospital to allow UF without  "hypotension; however, it would not be an option in the outpatient setting.    Palliative care has been consulted to readdress her goals of care.  They had seen the patient during previous hospitalizations and discussed the appropriateness of hospice; however, the patient was not ready -- and still isn't.      CURRENT/RECENT TCU ISSUES    Disposition: A very complicated patient.  She is aware that her health will not improve and has considered hospice, although she is not yet ready to commit to that.  In-house psych was ordered during her last stay.      She was sent to the ED on 01/24 due to dyspnea.  They gave her some nebulizer treatments and sent her back.    She has some allergic symptoms, and orders were written for Corcidin HBP Cold and Flu 2 tabs every 6 hours as needed.    She has also developed a \"diaper rash,\" and is receiving appropriate topical treatment.  The rash is now resolved.    He complains about being awakened at 4 AM to be changed when she doesn't need it.    Nephropathy/ESRD: According to the patient, this was caused by her history of aortic dissection.  She now undergoes hemodialysis 3 times weekly (MWF) and typically leaves around 8:30 AM.    Neuropathy: She has a history of neuropathy in her feet (started in heels, now in the calves), and she uses several pillows at night under her legs.  Raynaud's phenomenon in the feet as well, and some constipation and low blood pressures as described below.  With uncontrolled pain in the lower extremities on a regimen of acetaminophen, gabapentin 100 mg 3 times daily, and occasional oxycodone providing insufficient relief, she was initiated on pregabalin (gabapentin discontinued).  Nephrology recommended maximum 25 mg/day; however, she tells me it is still very painful.  At my last visit, we will increased the Lyrica dose from 25 mg twice daily to 50 mg twice daily.  Still significantly painful (she is \"a little better but not much\"), we will increase " the dose to 100 mg twice daily.      Hemorrhoids/constipation: On 01/18, patient was requesting preparation H for hemorrhoids, and, per nursing, the patient was having some constipation.  Management had included only Senna S 1 tab bid prn.  New orders: Senna S1 tab twice daily and continue as needed dosing as is.  Preparation H rectally 3 times daily as needed.    Hypotension: She is on a 1500 mL fluid restriction.  This may be producing hypovolemia.  She does have orders for 10 or 15 mg of midodrine to take on dialysis days (Mondays, Wednesdays, and Fridays) and has additional orders for taking this when additional dialysis is needed.  On nondialysis days, if her SBP is <106, she receives this every 6 hours as needed.  We went with 10 mg of midodrine to clarify and can go up from there if needed.  She typically has a dry weight of 70.5 kg.    Discharge planning: Still declining palliative care and has not yet made a decision on hospice.    ROS:   Positives: Her feet continue to be painful due to neuropathy but there is some improvement.  Breathing is better today.  At home, she sleeps in a hospital bed due to orthopnea.  She does the same here.    Negatives: Complaining of constipation before, things are going better now.  She denies any headaches or chest pains, coughing or congestion at the moment, dizziness, dysuria, diarrhea, difficulty chewing or swallowing, or problems with sleep (when on 150 mg of trazodone nightly).      Past Medical History:   Diagnosis Date     Arthritis      CHF (congestive heart failure) (H)      Chronic anemia 6/1/2014     Chronic kidney disease      Chronic thoracic aortic dissection (H) 10/7/2015    Descending thoracic aorta; treated medically per notes of Dragan Singh and Jennifer.     COPD (chronic obstructive pulmonary disease) (H)      CVA (cerebral infarction)      Disease of thyroid gland      Dyslipidemia      ESRD (end stage renal disease) (H) 06/03/2009    on dialysis with   Stephen     Essential hypertension 2014     Gastrointestinal hemorrhage, unspecified gastrointestinal hemorrhage type 2017     GI (gastrointestinal bleed)      GI bleeding 2017     Gout      L3 vertebral fracture (H) 2015     Left Atrial Appendage Occlusion (WATCHMAN) 2018    LAAO 2018 (30 mm WATCHMAN)     Obesity      ZULEIKA (obstructive sleep apnea), severe, intolerant of CPAP 10/22/2015     Oxygen dependent     3L nc     Pneumonia 2015     Right foot drop      Spinal stenosis 3/28/2016     Stroke (H) 3/24/2016              Family History   Problem Relation Age of Onset     Dementia Mother      Diabetes Mother      Arthritis Mother      Cancer Mother      Depression Mother      Heart disease Mother      Vision loss Mother      Stroke Father      Heart disease Father      Breast cancer Neg Hx      Social History     Socioeconomic History     Marital status:      Spouse name: Cayden     Number of children: 2     Years of education: Not on file     Highest education level: Not on file   Occupational History     Employer: RETIRED   Social Needs     Financial resource strain: Not on file     Food insecurity     Worry: Not on file     Inability: Not on file     Transportation needs     Medical: Not on file     Non-medical: Not on file   Tobacco Use     Smoking status: Former Smoker     Packs/day: 1.50     Years: 37.00     Pack years: 55.50     Types: Cigarettes     Quit date: 2009     Years since quittin.2     Smokeless tobacco: Never Used   Substance and Sexual Activity     Alcohol use: No     Alcohol/week: 11.7 standard drinks     Types: 14 Standard drinks or equivalent per week     Comment: 14 mixed drinks per week     Drug use: No     Sexual activity: Never     Partners: Male   Lifestyle     Physical activity     Days per week: Not on file     Minutes per session: Not on file     Stress: Not on file   Relationships     Social connections     Talks on phone: Not on  file     Gets together: Not on file     Attends Taoism service: Not on file     Active member of club or organization: Not on file     Attends meetings of clubs or organizations: Not on file     Relationship status: Not on file     Intimate partner violence     Fear of current or ex partner: Not on file     Emotionally abused: Not on file     Physically abused: Not on file     Forced sexual activity: Not on file   Other Topics Concern     Not on file   Social History Narrative    Lives with her . Daughter in [place] and daughter in [place].     MEDICATIONS: Reviewed from the MAR, physician orders, and earlier progress notes.    Post Discharge Medication Reconciliation Status: medication reconcilation previously completed during another office visit.  Updated by me today (01/31/2022) with an increase in Lyrica reflected below.  Current Outpatient Medications   Medication Sig     acetaminophen (TYLENOL) 500 MG tablet Take 1,000 mg by mouth 3 times daily as needed for mild pain      albuterol (PROVENTIL) (2.5 MG/3ML) 0.083% neb solution Take 2.5 mg by nebulization 3 times daily as needed for shortness of breath / dyspnea or wheezing     albuterol (PROVENTIL) (2.5 MG/3ML) 0.083% neb solution NEBULIZE 1 VIAL THREE TIMES DAILY     aspirin 81 MG EC tablet Take 81 mg by mouth daily (with lunch)      atorvastatin (LIPITOR) 10 MG tablet Take 10 mg by mouth At Bedtime     B Complex-C-Folic Acid (DIALYVITE) TABS Take 1 tablet by mouth daily     budesonide-formoterol (SYMBICORT) 80-4.5 MCG/ACT Inhaler Inhale 2 puffs into the lungs 2 times daily     buPROPion (WELLBUTRIN XL) 150 MG 24 hr tablet Take 150 mg by mouth twice a week Tue & Sat     cinacalcet (SENSIPAR) 30 MG tablet Take 30 mg by mouth Every Mon, Wed, Fri Morning . At dialysis.     FOLIC ACID PO Take 1 mg by mouth daily (with lunch)      gabapentin (NEURONTIN) 100 MG capsule Take 100 mg by mouth daily      Lactobacillus (CULTURELLE DIGESTIVE WOMENS PO) Take  1 tablet by mouth daily (with lunch)      lidocaine-prilocaine (EMLA) 2.5-2.5 % external cream Apply topically 3 times daily as needed for moderate pain     melatonin 3 MG tablet Take 3 mg by mouth At Bedtime     midodrine (PROAMATINE) 10 MG tablet Take 10 mg by mouth once a week every Tue, Thu, Sat, Sun for Dizziness; For non - dialysis days     midodrine (PROAMATINE) 10 MG tablet Take 15 mg by mouth three times a week Take once daily prior to dialysis on dialysis days Mon-Wed-Fri     oxyCODONE (ROXICODONE) 5 MG tablet Take 1 tablet (5 mg) by mouth every 6 hours as needed for pain     phenylephrine-mineral oil-petrolatum (PREPARATION H) 0.25-14-74.9 % rectal ointment Place 1 Application rectally 3 times daily as needed     polyvinyl alcohol (LIQUIFILM TEARS) 1.4 % ophthalmic solution Place 1 drop into both eyes 3 times daily      pregabalin (LYRICA) 25 MG capsule Take 100 mg by mouth 2 times daily      senna-docusate (SENOKOT-S/PERICOLACE) 8.6-50 MG tablet Take 1 tablet by mouth 2 times daily And bid prn      sevelamer carbonate (RENVELA) 800 MG tablet Take 1,600 mg by mouth 3 times daily (with meals)      sevelamer carbonate (RENVELA) 800 MG tablet Take 800 mg by mouth Take with snacks or supplements      sodium-potassium bicarbonate-citric acid (EDI-SELTZER GOLD) 8973-984-5609 MG TBEF solu-tab Take 1 tablet by mouth daily as needed for heartburn     timolol maleate (TIMOPTIC) 0.5 % ophthalmic solution Place 1 drop into both eyes 2 times daily     traZODone (DESYREL) 150 MG tablet TAKE ONE TABLET BY MOUTH EVERY NIGHT     Vitamin D, Cholecalciferol, 25 MCG (1000 UT) TABS Take 2,000 Units by mouth daily (with lunch)     No current facility-administered medications for this visit.     ALLERGIES:   Allergies   Allergen Reactions     Ace Inhibitors Cough     Atrovent [Ipratropium Bromide] Headache     Fosinopril Sodium Cough     Zolpidem      hallucinations     DIET: Dialysis diet, regular texture, thin liquids.  1500  "mL /24-hour fluid restriction    Vitals:    01/31/22 1358   BP: 106/60   Pulse: 70   Resp: 18   Temp: 98.7  F (37.1  C)   SpO2: 94%   Weight: 66.2 kg (146 lb)   Height: 1.626 m (5' 4\")     Body mass index is 25.06 kg/m .    EXAMINATION:   General: Pleasant, alert, and conversant middle-aged female, sitting in a wheelchair, in no apparent distress.  She has several pillows piled on her bed.  Some are for under her legs, although she does keep the head of the bed elevated.  Head: Normocephalic and atraumatic.   Eyes: PERRLA, sclerae clear.  Slight disconjugate gaze.  ENT: Moist oral mucosa.  She has her own teeth.  No rhinorrhea or nasal discharge.  Hearing unimpaired.  Cardiovascular: Regular rate and rhythm (occasionally irregular) with a 2/6 systolic ejection murmur at the left sternal border.  Respiratory: Very diminished lung sounds bilaterally but otherwise clear.  Abdomen: Soft and nontender.   Musculoskeletal/Extremities: Age-related degenerative joint disease.   Trace-1+ right lower extremity edema, none on the left.  Integument: \"Diaper rash.\"  Otherwise, no rashes, clinically significant lesions, or skin breakdown.   Cognitive/Psychiatric: Alert and oriented ×4.  Affect is euthymic.    DIAGNOSTICS:   Last Comprehensive Metabolic Panel:  Sodium   Date Value Ref Range Status   09/07/2021 139 136 - 145 mmol/L Final   04/02/2021 138 133 - 144 mmol/L Final     Potassium   Date Value Ref Range Status   09/07/2021 4.0 3.5 - 5.0 mmol/L Final   04/02/2021 4.7 3.4 - 5.3 mmol/L Final     Chloride   Date Value Ref Range Status   09/07/2021 101 98 - 107 mmol/L Final   04/02/2021 104 94 - 109 mmol/L Final     Carbon Dioxide   Date Value Ref Range Status   04/02/2021 28 20 - 32 mmol/L Final     Carbon Dioxide (CO2)   Date Value Ref Range Status   09/07/2021 26 22 - 31 mmol/L Final     Anion Gap   Date Value Ref Range Status   09/07/2021 12 5 - 18 mmol/L Final   04/02/2021 6 3 - 14 mmol/L Final     Glucose   Date Value Ref " Range Status   09/07/2021 115 70 - 125 mg/dL Final   04/02/2021 92 70 - 99 mg/dL Final     Urea Nitrogen   Date Value Ref Range Status   09/07/2021 16 8 - 28 mg/dL Final   04/02/2021 40 (H) 7 - 30 mg/dL Final     Creatinine   Date Value Ref Range Status   09/07/2021 3.17 (H) 0.60 - 1.10 mg/dL Final   04/02/2021 5.08 (H) 0.52 - 1.04 mg/dL Final     GFR Estimate   Date Value Ref Range Status   09/07/2021 14 (L) >60 mL/min/1.73m2 Final     Comment:     As of July 11, 2021, eGFR is calculated by the CKD-EPI creatinine equation, without race adjustment. eGFR can be influenced by muscle mass, exercise, and diet. The reported eGFR is an estimation only and is only applicable if the renal function is stable.   07/10/2021 11 (L) >60 mL/min/1.73m2 Final   04/02/2021 8 (L) >60 mL/min/[1.73_m2] Final     Comment:     Non  GFR Calc  Starting 12/18/2018, serum creatinine based estimated GFR (eGFR) will be   calculated using the Chronic Kidney Disease Epidemiology Collaboration   (CKD-EPI) equation.       Calcium   Date Value Ref Range Status   09/07/2021 8.9 8.5 - 10.5 mg/dL Final   04/02/2021 8.8 8.5 - 10.1 mg/dL Final     Bilirubin Total   Date Value Ref Range Status   07/11/2021 0.4 0.0 - 1.0 mg/dL Final   06/29/2014 0.5 0.2 - 1.3 mg/dL Final     Alkaline Phosphatase   Date Value Ref Range Status   07/11/2021 88 45 - 120 U/L Final   06/29/2014 110 40 - 150 U/L Final     ALT   Date Value Ref Range Status   07/11/2021 9 0 - 45 U/L Final   06/29/2014 23 0 - 50 U/L Final     AST   Date Value Ref Range Status   07/11/2021 12 0 - 40 U/L Final   06/29/2014 20 0 - 45 U/L Final     Lab Results   Component Value Date    WBC 3.7 08/20/2021    WBC 4.9 04/02/2021     Lab Results   Component Value Date    RBC 3.04 08/20/2021    RBC 2.92 04/02/2021     Lab Results   Component Value Date    HGB 10.0 08/20/2021    HGB 9.6 04/02/2021     Lab Results   Component Value Date    HCT 33.5 08/20/2021    HCT 32.7 04/02/2021     Lab  Results   Component Value Date     08/20/2021     04/02/2021     Lab Results   Component Value Date    MCH 32.9 08/20/2021    MCH 32.9 04/02/2021     Lab Results   Component Value Date    MCHC 29.9 08/20/2021    MCHC 29.4 04/02/2021     Lab Results   Component Value Date    RDW 16.9 08/20/2021    RDW 14.6 04/02/2021     Lab Results   Component Value Date     08/20/2021     04/02/2021       ASSESSMENT/Plan:      ICD-10-CM    1. Chronic respiratory failure with hypoxia (H)  J96.11    2. End-stage renal disease on hemodialysis (H)  N18.6     Z99.2    3. Hypotension due to hypovolemia  I95.89     E86.1    4. Hypervolemia, unspecified hypervolemia type  E87.70    5. Chronic diastolic congestive heart failure (H)  I50.32    6. Neuropathy  G62.9    7. Anemia of chronic renal failure, stage 5 (H)  N18.5     D63.1    8. Chronic obstructive pulmonary disease, unspecified COPD type (H)  J44.9    9. Paroxysmal atrial fibrillation (H)  I48.0    10. Presence of Watchman left atrial appendage closure device  Z95.818    11. Mild episode of recurrent major depressive disorder (H)  F33.0    12. Gastroesophageal reflux disease without esophagitis  K21.9        CHANGES:    Increase Lyrica from 50 mg twice daily to 100 mg twice daily.    CARE PLAN:  The care plan, medications, vital signs, orders, and nursing notes have been reviewed and all orders signed.  Changes to care plan, if any, as noted.  Otherwise, continue current plan of care.      The above has been created using voice recognition software. Please be aware that this may unintentionally  produce inaccuracies and/or nonsensical sentences.      Electronically signed by: Louie Liriano, AYAH        Sincerely,        MARK Sherman CNP

## 2022-02-01 NOTE — PROGRESS NOTES
[facility]    Name:   Belia Rodriguez (Itzel)  : 1945  MRN:  [id number]    Facility:   [facility] [[id number]]  Room:   Code Status: DNR/DNI and POLST AVAILABLE - Palliative Care    DOS: 2022  Previous visit: 2022    PCP: Neil Galan MD      CHIEF COMPLAINT / REASON FOR VISIT:  Chief Complaint   Patient presents with     Clinic Care Coordination - Follow-up     Acute on chronic respiratory failure secondary to fluid overload in the setting of end-stage renal disease and inability to give a full dialysis run due to hypotension; this in the setting of chronic diastolic heart failure         [facility] from 18 until 18  [facility] from 18 until 18  [facility] from 2021 until 2021 (acute on chronic respiratory failure due to a combination of fluid overload and COPD exacerbation)  [facility] from 2021 until 2021  [facility] from 2021 until 2021 (acute on chronic respiratory failure due to a combination of fluid overload and COPD exacerbation)   [facility] from 2021 until 2021  [facility] from 2022 until 01/15/2022 (acute on chronic respiratory failure with hypoxia, hypervolemia)   [facility] from 01/15/2022 until       HPI: Belia is a 76 year old female with known ESRD (on hemodialysis x12 years), COPD (former smoker, 45-60 pack years),  chronic respiratory failure with hypoxia, obstructive sleep apnea (severe and previously intolerant of CPAP), chronic atrial fibrillation, GERD, essential hypertension, and anemia related to chronic renal failure.    She has had multiple hospitalizations for respiratory failure due to a combination of fluid overload and COPD exacerbation.  This occurred on 2021 and again on 2021, the last  admission coming within hours of [facility]      While she continues on hemodialysis Mondays, Wednesdays, and Fridays, she did have some questions regarding hospice but was not sure she would sign on given her need for dialysis.  She did have a palliative care consult, and she strongly agreed, especially given her readmission to the hospital.    RECENT HOSPITALIZATION    She was most recently hospitalized on 01/12/2022, presenting to [place] ED with dyspnea following a dialysis session where only 1 kg of fluid was removed due to hypotension.  This was felt to represent one fourth of the desired fluid removal.  He has been having difficulty with dialysis due to ongoing hypotension despite use of midodrine.  In the ED, she was hypoxemic and required up to 6 L of oxygen when her baseline is 3 L.  She was having increased wheeze/cough as well as pain in her lower legs.  Chronic pain in the lower extremities due to neuropathy which has been poorly controlled and poorly responsive to attempted treatments.  Some of her treatments, leg buprenorphine, she did not tolerate.    In the ED, she was hypotensive at 87/50.  Labs pertinent for WBC 3.3, hemoglobin 10.9 (with high MCV) and low platelet count at 77.  She historically seems pancytopenic, but there is no specific diagnosis for the cause.  Her troponin was 0.034 but no chest pain.  EKG significant for ventricular paced rhythm.  CXR consistent with volume overload.  She was admitted, and nephrology was consulted.  She was also found to have macrocytic anemia.  Hemoglobin is otherwise at goal.  UF is limited by hypotension, and she has a very limited tolerance of mild hypervolemia due to underlying COPD and heart failure.  Options are limited due to tenuous respiratory status.  The plan was to use albumin in the hospital to allow UF without hypotension; however, it would not be an option in the outpatient setting.    Palliative care has  "been consulted to readdress her goals of care.  They had seen the patient during previous hospitalizations and discussed the appropriateness of hospice; however, the patient was not ready -- and still isn't.      CURRENT/RECENT TCU ISSUES    Disposition: A very complicated patient.  She is aware that her health will not improve and has considered hospice, although she is not yet ready to commit to that.  In-house psych was ordered during her last stay.      She was sent to the ED on 01/24 due to dyspnea.  They gave her some nebulizer treatments and sent her back.    She has some allergic symptoms, and orders were written for Corcidin HBP Cold and Flu 2 tabs every 6 hours as needed.    She has also developed a \"diaper rash,\" and is receiving appropriate topical treatment.  The rash is now resolved.    He complains about being awakened at 4 AM to be changed when she doesn't need it.    Nephropathy/ESRD: According to the patient, this was caused by her history of aortic dissection.  She now undergoes hemodialysis 3 times weekly (MWF) and typically leaves around 8:30 AM.    Neuropathy: She has a history of neuropathy in her feet (started in heels, now in the calves), and she uses several pillows at night under her legs.  Raynaud's phenomenon in the feet as well, and some constipation and low blood pressures as described below.  With uncontrolled pain in the lower extremities on a regimen of acetaminophen, gabapentin 100 mg 3 times daily, and occasional oxycodone providing insufficient relief, she was initiated on pregabalin (gabapentin discontinued).  Nephrology recommended maximum 25 mg/day; however, she tells me it is still very painful.  At my last visit, we will increased the Lyrica dose from 25 mg twice daily to 50 mg twice daily.  Still significantly painful (she is \"a little better but not much\"), we will increase the dose to 100 mg twice daily.      Hemorrhoids/constipation: On 01/18, patient was requesting " preparation H for hemorrhoids, and, per nursing, the patient was having some constipation.  Management had included only Senna S 1 tab bid prn.  New orders: Senna S1 tab twice daily and continue as needed dosing as is.  Preparation H rectally 3 times daily as needed.    Hypotension: She is on a 1500 mL fluid restriction.  This may be producing hypovolemia.  She does have orders for 10 or 15 mg of midodrine to take on dialysis days (Mondays, Wednesdays, and Fridays) and has additional orders for taking this when additional dialysis is needed.  On nondialysis days, if her SBP is <106, she receives this every 6 hours as needed.  We went with 10 mg of midodrine to clarify and can go up from there if needed.  She typically has a dry weight of 70.5 kg.    Discharge planning: Still declining palliative care and has not yet made a decision on hospice.    ROS:   Positives: Her feet continue to be painful due to neuropathy but there is some improvement.  Breathing is better today.  At home, she sleeps in a hospital bed due to orthopnea.  She does the same here.    Negatives: Complaining of constipation before, things are going better now.  She denies any headaches or chest pains, coughing or congestion at the moment, dizziness, dysuria, diarrhea, difficulty chewing or swallowing, or problems with sleep (when on 150 mg of trazodone nightly).      Past Medical History:   Diagnosis Date     Arthritis      CHF (congestive heart failure) (H)      Chronic anemia 6/1/2014     Chronic kidney disease      Chronic thoracic aortic dissection (H) 10/7/2015    Descending thoracic aorta; treated medically per notes of Dragan Singh and Jennifer.     COPD (chronic obstructive pulmonary disease) (H)      CVA (cerebral infarction)      Disease of thyroid gland      Dyslipidemia      ESRD (end stage renal disease) (H) 06/03/2009    on dialysis with Dr. Mitchell     Essential hypertension 6/30/2014     Gastrointestinal hemorrhage, unspecified  gastrointestinal hemorrhage type 2017     GI (gastrointestinal bleed)      GI bleeding 2017     Gout      L3 vertebral fracture (H) 2015     Left Atrial Appendage Occlusion (WATCHMAN) 2018    LAAO 2018 (30 mm WATCHMAN)     Obesity      ZULEIKA (obstructive sleep apnea), severe, intolerant of CPAP 10/22/2015     Oxygen dependent     3L nc     Pneumonia 2015     Right foot drop      Spinal stenosis 3/28/2016     Stroke (H) 3/24/2016              Family History   Problem Relation Age of Onset     Dementia Mother      Diabetes Mother      Arthritis Mother      Cancer Mother      Depression Mother      Heart disease Mother      Vision loss Mother      Stroke Father      Heart disease Father      Breast cancer Neg Hx      Social History     Socioeconomic History     Marital status:      Spouse name: Cayden     Number of children: 2     Years of education: Not on file     Highest education level: Not on file   Occupational History     Employer: RETIRED   Social Needs     Financial resource strain: Not on file     Food insecurity     Worry: Not on file     Inability: Not on file     Transportation needs     Medical: Not on file     Non-medical: Not on file   Tobacco Use     Smoking status: Former Smoker     Packs/day: 1.50     Years: 37.00     Pack years: 55.50     Types: Cigarettes     Quit date: 2009     Years since quittin.2     Smokeless tobacco: Never Used   Substance and Sexual Activity     Alcohol use: No     Alcohol/week: 11.7 standard drinks     Types: 14 Standard drinks or equivalent per week     Comment: 14 mixed drinks per week     Drug use: No     Sexual activity: Never     Partners: Male   Lifestyle     Physical activity     Days per week: Not on file     Minutes per session: Not on file     Stress: Not on file   Relationships     Social connections     Talks on phone: Not on file     Gets together: Not on file     Attends [employer]ism service: Not on file     Active  member of club or organization: Not on file     Attends meetings of clubs or organizations: Not on file     Relationship status: Not on file     Intimate partner violence     Fear of current or ex partner: Not on file     Emotionally abused: Not on file     Physically abused: Not on file     Forced sexual activity: Not on file   Other Topics Concern     Not on file   Social History Narrative    Lives with her . Daughter in [place] and daughter in [place].     MEDICATIONS: Reviewed from the MAR, physician orders, and earlier progress notes.    Post Discharge Medication Reconciliation Status: medication reconcilation previously completed during another office visit.  Updated by me today (01/31/2022) with an increase in Lyrica reflected below.  Current Outpatient Medications   Medication Sig     acetaminophen (TYLENOL) 500 MG tablet Take 1,000 mg by mouth 3 times daily as needed for mild pain      albuterol (PROVENTIL) (2.5 MG/3ML) 0.083% neb solution Take 2.5 mg by nebulization 3 times daily as needed for shortness of breath / dyspnea or wheezing     albuterol (PROVENTIL) (2.5 MG/3ML) 0.083% neb solution NEBULIZE 1 VIAL THREE TIMES DAILY     aspirin 81 MG EC tablet Take 81 mg by mouth daily (with lunch)      atorvastatin (LIPITOR) 10 MG tablet Take 10 mg by mouth At Bedtime     B Complex-C-Folic Acid (DIALYVITE) TABS Take 1 tablet by mouth daily     budesonide-formoterol (SYMBICORT) 80-4.5 MCG/ACT Inhaler Inhale 2 puffs into the lungs 2 times daily     buPROPion (WELLBUTRIN XL) 150 MG 24 hr tablet Take 150 mg by mouth twice a week Tue & Sat     cinacalcet (SENSIPAR) 30 MG tablet Take 30 mg by mouth Every Mon, Wed, Fri Morning . At dialysis.     FOLIC ACID PO Take 1 mg by mouth daily (with lunch)      gabapentin (NEURONTIN) 100 MG capsule Take 100 mg by mouth daily      Lactobacillus (CULTURELLE DIGESTIVE WOMENS PO) Take 1 tablet by mouth daily (with lunch)      lidocaine-prilocaine (EMLA) 2.5-2.5 % external  cream Apply topically 3 times daily as needed for moderate pain     melatonin 3 MG tablet Take 3 mg by mouth At Bedtime     midodrine (PROAMATINE) 10 MG tablet Take 10 mg by mouth once a week every Tue, Thu, Sat, Sun for Dizziness; For non - dialysis days     midodrine (PROAMATINE) 10 MG tablet Take 15 mg by mouth three times a week Take once daily prior to dialysis on dialysis days Mon-Wed-Fri     oxyCODONE (ROXICODONE) 5 MG tablet Take 1 tablet (5 mg) by mouth every 6 hours as needed for pain     phenylephrine-mineral oil-petrolatum (PREPARATION H) 0.25-14-74.9 % rectal ointment Place 1 Application rectally 3 times daily as needed     polyvinyl alcohol (LIQUIFILM TEARS) 1.4 % ophthalmic solution Place 1 drop into both eyes 3 times daily      pregabalin (LYRICA) 25 MG capsule Take 100 mg by mouth 2 times daily      senna-docusate (SENOKOT-S/PERICOLACE) 8.6-50 MG tablet Take 1 tablet by mouth 2 times daily And bid prn      sevelamer carbonate (RENVELA) 800 MG tablet Take 1,600 mg by mouth 3 times daily (with meals)      sevelamer carbonate (RENVELA) 800 MG tablet Take 800 mg by mouth Take with snacks or supplements      sodium-potassium bicarbonate-citric acid (EDI-SELTZER GOLD) [id number] MG TBEF solu-tab Take 1 tablet by mouth daily as needed for heartburn     timolol maleate (TIMOPTIC) 0.5 % ophthalmic solution Place 1 drop into both eyes 2 times daily     traZODone (DESYREL) 150 MG tablet TAKE ONE TABLET BY MOUTH EVERY NIGHT     Vitamin D, Cholecalciferol, 25 MCG (1000 UT) TABS Take 2,000 Units by mouth daily (with lunch)     No current facility-administered medications for this visit.     ALLERGIES:   Allergies   Allergen Reactions     Ace Inhibitors Cough     Atrovent [Ipratropium Bromide] Headache     Fosinopril Sodium Cough     Zolpidem      hallucinations     DIET: Dialysis diet, regular texture, thin liquids.  1500 mL /24-hour fluid restriction    Vitals:    01/31/22 1358   BP: 106/60   Pulse: 70  "  Resp: 18   Temp: 98.7  F (37.1  C)   SpO2: 94%   Weight: 66.2 kg (146 lb)   Height: 1.626 m (5' 4\")     Body mass index is 25.06 kg/m .    EXAMINATION:   General: Pleasant, alert, and conversant middle-aged female, sitting in a wheelchair, in no apparent distress.  She has several pillows piled on her bed.  Some are for under her legs, although she does keep the head of the bed elevated.  Head: Normocephalic and atraumatic.   Eyes: PERRLA, sclerae clear.  Slight disconjugate gaze.  ENT: Moist oral mucosa.  She has her own teeth.  No rhinorrhea or nasal discharge.  Hearing unimpaired.  Cardiovascular: Regular rate and rhythm (occasionally irregular) with a 2/6 systolic ejection murmur at the left sternal border.  Respiratory: Very diminished lung sounds bilaterally but otherwise clear.  Abdomen: Soft and nontender.   Musculoskeletal/Extremities: Age-related degenerative joint disease.   Trace-1+ right lower extremity edema, none on the left.  Integument: \"Diaper rash.\"  Otherwise, no rashes, clinically significant lesions, or skin breakdown.   Cognitive/Psychiatric: Alert and oriented ×4.  Affect is euthymic.    DIAGNOSTICS:   Last Comprehensive Metabolic Panel:  Sodium   Date Value Ref Range Status   09/07/2021 139 136 - 145 mmol/L Final   04/02/2021 138 133 - 144 mmol/L Final     Potassium   Date Value Ref Range Status   09/07/2021 4.0 3.5 - 5.0 mmol/L Final   04/02/2021 4.7 3.4 - 5.3 mmol/L Final     Chloride   Date Value Ref Range Status   09/07/2021 101 98 - 107 mmol/L Final   04/02/2021 104 94 - 109 mmol/L Final     Carbon Dioxide   Date Value Ref Range Status   04/02/2021 28 20 - 32 mmol/L Final     Carbon Dioxide (CO2)   Date Value Ref Range Status   09/07/2021 26 22 - 31 mmol/L Final     Anion Gap   Date Value Ref Range Status   09/07/2021 12 5 - 18 mmol/L Final   04/02/2021 6 3 - 14 mmol/L Final     Glucose   Date Value Ref Range Status   09/07/2021 115 70 - 125 mg/dL Final   04/02/2021 92 70 - 99 mg/dL " Final     Urea Nitrogen   Date Value Ref Range Status   09/07/2021 16 8 - 28 mg/dL Final   04/02/2021 40 (H) 7 - 30 mg/dL Final     Creatinine   Date Value Ref Range Status   09/07/2021 3.17 (H) 0.60 - 1.10 mg/dL Final   04/02/2021 5.08 (H) 0.52 - 1.04 mg/dL Final     GFR Estimate   Date Value Ref Range Status   09/07/2021 14 (L) >60 mL/min/1.73m2 Final     Comment:     As of July 11, 2021, eGFR is calculated by the CKD-EPI creatinine equation, without race adjustment. eGFR can be influenced by muscle mass, exercise, and diet. The reported eGFR is an estimation only and is only applicable if the renal function is stable.   07/10/2021 11 (L) >60 mL/min/1.73m2 Final   04/02/2021 8 (L) >60 mL/min/[1.73_m2] Final     Comment:     Non  GFR Calc  Starting 12/18/2018, serum creatinine based estimated GFR (eGFR) will be   calculated using the Chronic Kidney Disease Epidemiology Collaboration   (CKD-EPI) equation.       Calcium   Date Value Ref Range Status   09/07/2021 8.9 8.5 - 10.5 mg/dL Final   04/02/2021 8.8 8.5 - 10.1 mg/dL Final     Bilirubin Total   Date Value Ref Range Status   07/11/2021 0.4 0.0 - 1.0 mg/dL Final   06/29/2014 0.5 0.2 - 1.3 mg/dL Final     Alkaline Phosphatase   Date Value Ref Range Status   07/11/2021 88 45 - 120 U/L Final   06/29/2014 110 40 - 150 U/L Final     ALT   Date Value Ref Range Status   07/11/2021 9 0 - 45 U/L Final   06/29/2014 23 0 - 50 U/L Final     AST   Date Value Ref Range Status   07/11/2021 12 0 - 40 U/L Final   06/29/2014 20 0 - 45 U/L Final     Lab Results   Component Value Date    WBC 3.7 08/20/2021    WBC 4.9 04/02/2021     Lab Results   Component Value Date    RBC 3.04 08/20/2021    RBC 2.92 04/02/2021     Lab Results   Component Value Date    HGB 10.0 08/20/2021    HGB 9.6 04/02/2021     Lab Results   Component Value Date    HCT 33.5 08/20/2021    HCT 32.7 04/02/2021     Lab Results   Component Value Date     08/20/2021     04/02/2021     Lab  Results   Component Value Date    MCH 32.9 08/20/2021    MCH 32.9 04/02/2021     Lab Results   Component Value Date    MCHC 29.9 08/20/2021    MCHC 29.4 04/02/2021     Lab Results   Component Value Date    RDW 16.9 08/20/2021    RDW 14.6 04/02/2021     Lab Results   Component Value Date     08/20/2021     04/02/2021       ASSESSMENT/Plan:      ICD-10-CM    1. Chronic respiratory failure with hypoxia (H)  J96.11    2. End-stage renal disease on hemodialysis (H)  N18.6     Z99.2    3. Hypotension due to hypovolemia  I95.89     E86.1    4. Hypervolemia, unspecified hypervolemia type  E87.70    5. Chronic diastolic congestive heart failure (H)  I50.32    6. Neuropathy  G62.9    7. Anemia of chronic renal failure, stage 5 (H)  N18.5     D63.1    8. Chronic obstructive pulmonary disease, unspecified COPD type (H)  J44.9    9. Paroxysmal atrial fibrillation (H)  I48.0    10. Presence of Watchman left atrial appendage closure device  Z95.818    11. Mild episode of recurrent major depressive disorder (H)  F33.0    12. Gastroesophageal reflux disease without esophagitis  K21.9        CHANGES:    Increase Lyrica from 50 mg twice daily to 100 mg twice daily.    CARE PLAN:  The care plan, medications, vital signs, orders, and nursing notes have been reviewed and all orders signed.  Changes to care plan, if any, as noted.  Otherwise, continue current plan of care.      The above has been created using voice recognition software. Please be aware that this may unintentionally  produce inaccuracies and/or nonsensical sentences.      Electronically signed by: Louie Liriano, CNP

## 2022-02-01 NOTE — PROGRESS NOTES
[facility]    Name:   Belia Rodriguez (Itzel)  : 1945  MRN:  [id number]    Facility:   [facility] ([facility]) [[id number]]  Room:  (moved from 204 due to creation of COVID Unit)  Code Status: DNR/DNI and POLST AVAILABLE - Palliative Care    DOS: 2022  Previous visit: 2022    PCP: Neil Galan MD      CHIEF COMPLAINT / REASON FOR VISIT:  Chief Complaint   Patient presents with     Clinic Care Coordination - Follow-up     Acute on chronic respiratory failure secondary to fluid overload in the setting of end-stage renal disease and inability to give a full dialysis run due to hypotension; this in the setting of chronic diastolic heart failure         [facility] from 18 until 18  [facility] from 18 until 18  [facility] from 2021 until 2021 (acute on chronic respiratory failure due to a combination of fluid overload and COPD exacerbation)  [facility] from 2021 until 2021  [facility] from 2021 until 2021 (acute on chronic respiratory failure due to a combination of fluid overload and COPD exacerbation)   [facility] from 2021 until 2021  [facility] from 2022 until 01/15/2022 (acute on chronic respiratory failure with hypoxia, hypervolemia)   [facility] from 01/15/2022 until       HPI: Belia is a 76 year old female with known ESRD (on hemodialysis x12 years), COPD (former smoker, 45-60 pack years),  chronic respiratory failure with hypoxia, obstructive sleep apnea (severe and previously intolerant of CPAP), chronic atrial fibrillation, GERD, essential hypertension, and anemia related to chronic renal failure.    She has had multiple hospitalizations for respiratory failure due to a combination of fluid overload and COPD exacerbation.  This occurred on  01/26/2021 and again on 02/18/2021, the last admission coming within hours of her discharge from [facility].      While she continues on hemodialysis Mondays, Wednesdays, and Fridays, she did have some questions regarding hospice but was not sure she would sign on given her need for dialysis.  She did have a palliative care consult, and she strongly agreed, especially given her readmission to the hospital.    RECENT HOSPITALIZATION    She was most recently hospitalized on 01/12/2022, presenting to [facility] with dyspnea following a dialysis session where only 1 kg of fluid was removed due to hypotension.  This was felt to represent one fourth of the desired fluid removal.  He has been having difficulty with dialysis due to ongoing hypotension despite use of midodrine.  In the ED, she was hypoxemic and required up to 6 L of oxygen when her baseline is 3 L.  She was having increased wheeze/cough as well as pain in her lower legs.  Chronic pain in the lower extremities due to neuropathy which has been poorly controlled and poorly responsive to attempted treatments.  Some of her treatments, leg buprenorphine, she did not tolerate.    In the ED, she was hypotensive at 87/50.  Labs pertinent for WBC 3.3, hemoglobin 10.9 (with high MCV) and low platelet count at 77.  She historically seems pancytopenic, but there is no specific diagnosis for the cause.  Her troponin was 0.034 but no chest pain.  EKG significant for ventricular paced rhythm.  CXR consistent with volume overload.  She was admitted, and nephrology was consulted.  She was also found to have macrocytic anemia.  Hemoglobin is otherwise at goal.  UF is limited by hypotension, and she has a very limited tolerance of mild hypervolemia due to underlying COPD and heart failure.  Options are limited due to tenuous respiratory status.  The plan was to use albumin in the hospital to allow UF without hypotension; however, it would not be an option in  "the outpatient setting.    Palliative care has been consulted to readdress her goals of care.  They had seen the patient during previous hospitalizations and discussed the appropriateness of hospice; however, the patient was not ready -- and still isn't.      CURRENT/RECENT TCU ISSUES    Disposition: A very complicated patient.  She is aware that her health will not improve and has considered hospice, although she is not yet ready to commit to that.  In-house psych was ordered during her last stay.      She was sent to the /24 due to dyspnea.  They gave her some nebulizer treatments and sent her back.    She has some allergic symptoms and has been using Corcidin HBP Cold and Flu 2 tabs every 6 hours as needed.    She has also developed a \"diaper rash,\" and is receiving appropriate topical treatment.     Nephropathy: According to the patient, this was caused by her history of aortic dissection.  She now undergoes hemodialysis 3 times weekly (MWF) and typically leaves around 8:30 AM.    Neuropathy: She has a history of neuropathy in her feet (started in heels, now in the calves), and she uses several pillows at night under her legs.  Raynaud's phenomenon in the feet as well, and some constipation and low blood pressures as described below.  With uncontrolled pain in the lower extremities on a regimen of acetaminophen, gabapentin 100 mg 3 times daily, and occasional oxycodone providing insufficient relief, she was initiated on pregabalin (gabapentin discontinued).  Nephrology recommended maximum 25 mg/day; however, she tells me it is still very painful.  At my last visit, we will increased the Lyrica dose from 25 mg twice daily to 50 mg twice daily.  There has been some improvement, although we may need to increase the dose further.    Hemorrhoids/constipation: On 01/18, patient was requesting preparation H for hemorrhoids, and, per nursing, the patient was having some constipation.  Management had included only " "Senna S 1 tab bid prn.  New orders: Senna S1 tab twice daily and continue as needed dosing as is.  Preparation H rectally 3 times daily as needed.    Hypotension: She is on a 1500 mL fluid restriction.  This may be producing hypovolemia.  She does have orders for 10 or 15 mg of midodrine to take on dialysis days (Mondays, Wednesdays, and Fridays) and has additional orders for taking this when additional dialysis is needed.  On nondialysis days, if her SBP is <106, she receives this every 6 hours as needed.  We're going with 10 mg of midodrine to clarify and can go up from there if needed.  She typically has a dry weight of 70.5 kg.    Discharge planning: Still declining palliative care and has not yet made a decision on hospice.    ROS:   Positives: Her feet continue to be painful due to neuropathy but there is some improvement.  Breathing is \"iffy.\"  At my last visit she sleeps in a hospital bed due to orthopnea.    Negatives: Complaining of constipation before, things are going better now.  She denies any headaches or chest pains, coughing or congestion at the moment, dizziness, dysuria, diarrhea, difficulty chewing or swallowing, or problems with sleep (when on 150 mg of trazodone nightly).      Past Medical History:   Diagnosis Date     Arthritis      CHF (congestive heart failure) (H)      Chronic anemia 6/1/2014     Chronic kidney disease      Chronic thoracic aortic dissection (H) 10/7/2015    Descending thoracic aorta; treated medically per notes of Dragan Singh and Jennifer.     COPD (chronic obstructive pulmonary disease) (H)      CVA (cerebral infarction)      Disease of thyroid gland      Dyslipidemia      ESRD (end stage renal disease) (H) 06/03/2009    on dialysis with Dr. Mitchell     Essential hypertension 6/30/2014     Gastrointestinal hemorrhage, unspecified gastrointestinal hemorrhage type 6/5/2017     GI (gastrointestinal bleed)      GI bleeding 6/5/2017     Gout      L3 vertebral fracture (H) " 2015     Left Atrial Appendage Occlusion (WATCHMAN) 2018    LAAO 2018 (30 mm WATCHMAN)     Obesity      ZULEIKA (obstructive sleep apnea), severe, intolerant of CPAP 10/22/2015     Oxygen dependent     3L nc     Pneumonia 2015     Right foot drop      Spinal stenosis 3/28/2016     Stroke (H) 3/24/2016              Family History   Problem Relation Age of Onset     Dementia Mother      Diabetes Mother      Arthritis Mother      Cancer Mother      Depression Mother      Heart disease Mother      Vision loss Mother      Stroke Father      Heart disease Father      Breast cancer Neg Hx      Social History     Socioeconomic History     Marital status:      Spouse name: Cayden     Number of children: 2     Years of education: Not on file     Highest education level: Not on file   Occupational History     Employer: RETIRED   Social Needs     Financial resource strain: Not on file     Food insecurity     Worry: Not on file     Inability: Not on file     Transportation needs     Medical: Not on file     Non-medical: Not on file   Tobacco Use     Smoking status: Former Smoker     Packs/day: 1.50     Years: 37.00     Pack years: 55.50     Types: Cigarettes     Quit date: 2009     Years since quittin.2     Smokeless tobacco: Never Used   Substance and Sexual Activity     Alcohol use: No     Alcohol/week: 11.7 standard drinks     Types: 14 Standard drinks or equivalent per week     Comment: 14 mixed drinks per week     Drug use: No     Sexual activity: Never     Partners: Male   Lifestyle     Physical activity     Days per week: Not on file     Minutes per session: Not on file     Stress: Not on file   Relationships     Social connections     Talks on phone: Not on file     Gets together: Not on file     Attends [employer] service: Not on file     Active member of club or organization: Not on file     Attends meetings of clubs or organizations: Not on file     Relationship status: Not on file      Intimate partner violence     Fear of current or ex partner: Not on file     Emotionally abused: Not on file     Physically abused: Not on file     Forced sexual activity: Not on file   Other Topics Concern     Not on file   Social History Narrative    Lives with her . Daughter in [place] and daughter in [place].     MEDICATIONS: Reviewed from the MAR, physician orders, and earlier progress notes.    Post Discharge Medication Reconciliation Status: medication reconcilation previously completed during another office visit.    Current Outpatient Medications   Medication Sig     acetaminophen (TYLENOL) 500 MG tablet Take 1,000 mg by mouth 3 times daily as needed for mild pain      albuterol (PROVENTIL) (2.5 MG/3ML) 0.083% neb solution Take 2.5 mg by nebulization 3 times daily as needed for shortness of breath / dyspnea or wheezing     albuterol (PROVENTIL) (2.5 MG/3ML) 0.083% neb solution NEBULIZE 1 VIAL THREE TIMES DAILY     aspirin 81 MG EC tablet Take 81 mg by mouth daily (with lunch)      atorvastatin (LIPITOR) 10 MG tablet Take 10 mg by mouth At Bedtime     B Complex-C-Folic Acid (DIALYVITE) TABS Take 1 tablet by mouth daily     budesonide-formoterol (SYMBICORT) 80-4.5 MCG/ACT Inhaler Inhale 2 puffs into the lungs 2 times daily     buPROPion (WELLBUTRIN XL) 150 MG 24 hr tablet Take 150 mg by mouth twice a week Tue & Sat     cinacalcet (SENSIPAR) 30 MG tablet Take 30 mg by mouth Every Mon, Wed, Fri Morning . At dialysis.     FOLIC ACID PO Take 1 mg by mouth daily (with lunch)      gabapentin (NEURONTIN) 100 MG capsule Take 100 mg by mouth daily      Lactobacillus (CULTURELLE DIGESTIVE WOMENS PO) Take 1 tablet by mouth daily (with lunch)      lidocaine-prilocaine (EMLA) 2.5-2.5 % external cream Apply topically 3 times daily as needed for moderate pain     melatonin 3 MG tablet Take 3 mg by mouth At Bedtime     midodrine (PROAMATINE) 10 MG tablet Take 10 mg by mouth once a week every Tue, Thu, Sat, Sun  "for Dizziness; For non - dialysis days     midodrine (PROAMATINE) 10 MG tablet Take 15 mg by mouth three times a week Take once daily prior to dialysis on dialysis days Mon-Wed-Fri     oxyCODONE (ROXICODONE) 5 MG tablet Take 1 tablet (5 mg) by mouth every 6 hours as needed for pain     phenylephrine-mineral oil-petrolatum (PREPARATION H) 0.25-14-74.9 % rectal ointment Place 1 Application rectally 3 times daily as needed     polyvinyl alcohol (LIQUIFILM TEARS) 1.4 % ophthalmic solution Place 1 drop into both eyes 3 times daily      pregabalin (LYRICA) 25 MG capsule Take 50 mg by mouth 2 times daily      senna-docusate (SENOKOT-S/PERICOLACE) 8.6-50 MG tablet Take 1 tablet by mouth 2 times daily And bid prn      sevelamer carbonate (RENVELA) 800 MG tablet Take 1,600 mg by mouth 3 times daily (with meals)      sevelamer carbonate (RENVELA) 800 MG tablet Take 800 mg by mouth Take with snacks or supplements      sodium-potassium bicarbonate-citric acid (EDI-SELTZER GOLD) 3431-618-2419 MG TBEF solu-tab Take 1 tablet by mouth daily as needed for heartburn     timolol maleate (TIMOPTIC) 0.5 % ophthalmic solution Place 1 drop into both eyes 2 times daily     traZODone (DESYREL) 150 MG tablet TAKE ONE TABLET BY MOUTH EVERY NIGHT     Vitamin D, Cholecalciferol, 25 MCG (1000 UT) TABS Take 2,000 Units by mouth daily (with lunch)     No current facility-administered medications for this visit.     ALLERGIES:   Allergies   Allergen Reactions     Ace Inhibitors Cough     Atrovent [Ipratropium Bromide] Headache     Fosinopril Sodium Cough     Zolpidem      hallucinations     DIET: Dialysis diet, regular texture, thin liquids.  1500 mL /24-hour fluid restriction    Vitals:    01/24/22 1402   BP: (!) 159/108   Pulse: 73   Resp: 18   Temp: 98.6  F (37  C)   SpO2: 90%   Weight: 70.8 kg (156 lb)   Height: 1.626 m (5' 4\")     Body mass index is 26.78 kg/m .    EXAMINATION:   General: Pleasant, alert, and conversant middle-aged female, " "sitting in a wheelchair, in no apparent distress.  Head: Normocephalic and atraumatic.   Eyes: PERRLA, sclerae clear.  Slight disconjugate gaze.  ENT: Moist oral mucosa.  She has her own teeth.  No rhinorrhea or nasal discharge.  Hearing unimpaired.  Cardiovascular: Regular rate and rhythm (previously irregular) with a 2/6 systolic ejection murmur at the left sternal border.  Respiratory: Very diminished lung sounds bilaterally but otherwise clear.  Abdomen: Soft and nontender.   Musculoskeletal/Extremities: Age-related degenerative joint disease.   1+ right, trace left lower extremity edema.  Integument: \"Diaper rash.\"  Otherwise, no rashes, clinically significant lesions, or skin breakdown.   Cognitive/Psychiatric: Alert and oriented ×4.  Affect is euthymic.    DIAGNOSTICS:   Last Comprehensive Metabolic Panel:  Sodium   Date Value Ref Range Status   09/07/2021 139 136 - 145 mmol/L Final   04/02/2021 138 133 - 144 mmol/L Final     Potassium   Date Value Ref Range Status   09/07/2021 4.0 3.5 - 5.0 mmol/L Final   04/02/2021 4.7 3.4 - 5.3 mmol/L Final     Chloride   Date Value Ref Range Status   09/07/2021 101 98 - 107 mmol/L Final   04/02/2021 104 94 - 109 mmol/L Final     Carbon Dioxide   Date Value Ref Range Status   04/02/2021 28 20 - 32 mmol/L Final     Carbon Dioxide (CO2)   Date Value Ref Range Status   09/07/2021 26 22 - 31 mmol/L Final     Anion Gap   Date Value Ref Range Status   09/07/2021 12 5 - 18 mmol/L Final   04/02/2021 6 3 - 14 mmol/L Final     Glucose   Date Value Ref Range Status   09/07/2021 115 70 - 125 mg/dL Final   04/02/2021 92 70 - 99 mg/dL Final     Urea Nitrogen   Date Value Ref Range Status   09/07/2021 16 8 - 28 mg/dL Final   04/02/2021 40 (H) 7 - 30 mg/dL Final     Creatinine   Date Value Ref Range Status   09/07/2021 3.17 (H) 0.60 - 1.10 mg/dL Final   04/02/2021 5.08 (H) 0.52 - 1.04 mg/dL Final     GFR Estimate   Date Value Ref Range Status   09/07/2021 14 (L) >60 mL/min/1.73m2 Final    "  Comment:     As of July 11, 2021, eGFR is calculated by the CKD-EPI creatinine equation, without race adjustment. eGFR can be influenced by muscle mass, exercise, and diet. The reported eGFR is an estimation only and is only applicable if the renal function is stable.   07/10/2021 11 (L) >60 mL/min/1.73m2 Final   04/02/2021 8 (L) >60 mL/min/[1.73_m2] Final     Comment:     Non  GFR Calc  Starting 12/18/2018, serum creatinine based estimated GFR (eGFR) will be   calculated using the Chronic Kidney Disease Epidemiology Collaboration   (CKD-EPI) equation.       Calcium   Date Value Ref Range Status   09/07/2021 8.9 8.5 - 10.5 mg/dL Final   04/02/2021 8.8 8.5 - 10.1 mg/dL Final     Bilirubin Total   Date Value Ref Range Status   07/11/2021 0.4 0.0 - 1.0 mg/dL Final   06/29/2014 0.5 0.2 - 1.3 mg/dL Final     Alkaline Phosphatase   Date Value Ref Range Status   07/11/2021 88 45 - 120 U/L Final   06/29/2014 110 40 - 150 U/L Final     ALT   Date Value Ref Range Status   07/11/2021 9 0 - 45 U/L Final   06/29/2014 23 0 - 50 U/L Final     AST   Date Value Ref Range Status   07/11/2021 12 0 - 40 U/L Final   06/29/2014 20 0 - 45 U/L Final     Lab Results   Component Value Date    WBC 3.7 08/20/2021    WBC 4.9 04/02/2021     Lab Results   Component Value Date    RBC 3.04 08/20/2021    RBC 2.92 04/02/2021     Lab Results   Component Value Date    HGB 10.0 08/20/2021    HGB 9.6 04/02/2021     Lab Results   Component Value Date    HCT 33.5 08/20/2021    HCT 32.7 04/02/2021     Lab Results   Component Value Date     08/20/2021     04/02/2021     Lab Results   Component Value Date    MCH 32.9 08/20/2021    MCH 32.9 04/02/2021     Lab Results   Component Value Date    MCHC 29.9 08/20/2021    MCHC 29.4 04/02/2021     Lab Results   Component Value Date    RDW 16.9 08/20/2021    RDW 14.6 04/02/2021     Lab Results   Component Value Date     08/20/2021     04/02/2021       ASSESSMENT/Plan:       ICD-10-CM    1. Chronic respiratory failure with hypoxia (H)  J96.11    2. End-stage renal disease on hemodialysis (H)  N18.6     Z99.2    3. Hypotension due to hypovolemia  I95.89     E86.1    4. Hypervolemia, unspecified hypervolemia type  E87.70    5. Chronic diastolic congestive heart failure (H)  I50.32    6. Anemia of chronic renal failure, stage 4 (severe) (H)  N18.4     D63.1    7. Neuropathy  G62.9    8. Chronic obstructive pulmonary disease, unspecified COPD type (H)  J44.9    9. Paroxysmal atrial fibrillation (H)  I48.0    10. Presence of Watchman left atrial appendage closure device  Z95.818    11. Mild episode of recurrent major depressive disorder (H)  F33.0    12. Gastroesophageal reflux disease without esophagitis  K21.9        CHANGES:    Corcidin HBP Cold and Flu 2 tabs every 6 hours as needed.    CARE PLAN:  The care plan, medications, vital signs, orders, and nursing notes have been reviewed and all orders signed.  Changes to care plan, if any, as noted.  Otherwise, continue current plan of care.      The above has been created using voice recognition software. Please be aware that this may unintentionally  produce inaccuracies and/or nonsensical sentences.      Electronically signed by: Louie Liriano CNP

## 2022-02-03 NOTE — LETTER
2/3/2022        RE: Belia Rodriguez  [address] [address]        [facility]    Name:   Bleia Rodriguez (Itzel)  : 1945  MRN:  [id number]    Facility:   [facility] ([facility]) [[id number]]  Room: U 218  Code Status: DNR/DNI and POLST AVAILABLE - Palliative Care    DOS: 2022  Previous visit: 2022    PCP: Neil Galan MD      CHIEF COMPLAINT / REASON FOR VISIT:  Chief Complaint   Patient presents with     Discharge Summary Nursing Home     Hospitalized for:  Acute on chronic respiratory failure secondary to fluid overload in the setting of end-stage renal disease and inability to give a full dialysis run due to hypotension; this in the setting of chronic diastolic heart failure         [facility] from 18 until 18  [facility] from 18 until 18  [facility] from 2021 until 2021 (acute on chronic respiratory failure due to a combination of fluid overload and COPD exacerbation)  [facility] from 2021 until 2021  [facility] from 2021 until 2021 (acute on chronic respiratory failure due to a combination of fluid overload and COPD exacerbation)   [facility] from 2021 until 2021  [facility] from 2022 until 01/15/2022 (acute on chronic respiratory failure with hypoxia, hypervolemia)   [facility] from 01/15/2022 until 2022 (expected discharge date)      HPI: Belia is a 76 year old female whom I had the pleasure of working with in the past.  She has known ESRD (on hemodialysis x12 years), COPD (former smoker, 45-60 pack years),  chronic respiratory failure with hypoxia, obstructive sleep apnea (severe and previously intolerant of CPAP), chronic atrial fibrillation, GERD, essential hypertension, and anemia related to chronic renal  failure.    She has had multiple hospitalizations for respiratory failure due to a combination of fluid overload and COPD exacerbation.  This occurred on 01/26/2021 and again on 02/18/2021, the last admission coming within hours of her discharge from [facility].      While she continues on hemodialysis Mondays, Wednesdays, and Fridays, she did have some questions regarding hospice but was not sure she would sign on given her need for dialysis.  She did have a palliative care consult, and she strongly agreed, especially given her readmission to the hospital.    RECENT HOSPITALIZATION    She was most recently hospitalized on 01/12/2022, presenting to [facility] with dyspnea following a dialysis session where only 1 kg of fluid was removed due to hypotension.  This was felt to represent one fourth of the desired fluid removal.  He has been having difficulty with dialysis due to ongoing hypotension despite use of midodrine.  In the ED, she was hypoxemic and required up to 6 L of oxygen when her baseline is 3 L.  She was having increased wheeze/cough as well as pain in her lower legs.  Chronic pain in the lower extremities due to neuropathy which has been poorly controlled and poorly responsive to attempted treatments.  Some of her treatments, leg buprenorphine, she did not tolerate.    In the ED, she was hypotensive at 87/50.  Labs pertinent for WBC 3.3, hemoglobin 10.9 (with high MCV) and low platelet count at 77.  She historically seems pancytopenic, but there is no specific diagnosis for the cause.  Her troponin was 0.034 but no chest pain.  EKG significant for ventricular paced rhythm.  CXR consistent with volume overload.  She was admitted, and nephrology was consulted.  She was also found to have macrocytic anemia.  Hemoglobin is otherwise at goal.  UF is limited by hypotension, and she has a very limited tolerance of mild hypervolemia due to underlying COPD and heart failure.  Options are limited  "due to tenuous respiratory status.  The plan was to use albumin in the hospital to allow UF without hypotension; however, it would not be an option in the outpatient setting.    Palliative care has been consulted to readdress her goals of care.  They had seen the patient during previous hospitalizations and discussed the appropriateness of hospice; however, the patient was not ready -- and still isn't.      CURRENT/RECENT TCU ISSUES    Disposition: A very complicated patient.  She is aware that her health will not improve and has considered hospice, although she is not yet ready to commit to that.  In-house psych was ordered during her last stay.      She was sent to the ED on 01/24 due to dyspnea.  They gave her some nebulizer treatments and sent her back.    She has some allergic symptoms, and orders were written for Corcidin HBP Cold and Flu 2 tabs every 6 hours as needed.    She has also developed a \"diaper rash,\" and is receiving appropriate topical treatment.  The rash is now resolved.    He complains about being awakened at 4 AM to be changed when she doesn't need it.    Nephropathy/ESRD: According to the patient, this was caused by her history of aortic dissection.  She now undergoes hemodialysis 3 times weekly (MWF) and typically leaves the facility around 8:30 AM.    Neuropathy: She has a history of neuropathy in her feet (started in heels, now in the calves), and she uses several pillows at night under her legs.  Raynaud's phenomenon in the feet as well, and some constipation and low blood pressures as described below.  With uncontrolled pain in the lower extremities on a regimen of acetaminophen, gabapentin 100 mg 3 times daily, and occasional oxycodone providing insufficient relief, she was initiated on pregabalin (gabapentin discontinued).  Nephrology recommended maximum 25 mg/day; however, she tells me it is still very painful.  At my last visit, we will increased the Lyrica dose from 25 mg twice " "daily to 50 mg twice daily.  Still significantly painful (she stated she was \"a little better but not much\"), we increased the dose to 100 mg twice daily.  Again, she finds it to be \"a little bit better.\"    Hemorrhoids/constipation: On 01/18, patient was requesting preparation H for hemorrhoids, and, per nursing, the patient was having some constipation.  Management had included only Senna S 1 tab bid prn.  New orders: Senna S1 tab twice daily and continue as needed dosing as is.  Preparation H rectally 3 times daily as needed.    Hypotension: She is on a 1500 mL fluid restriction.  This may be producing hypovolemia.  She does have orders for 10 or 15 mg of midodrine to take on dialysis days (Mondays, Wednesdays, and Fridays) and has additional orders for taking this when additional dialysis is needed.  On nondialysis days, if her SBP is <106, she receives this every 6 hours as needed.  We went with 10 mg of midodrine to clarify and can go up from there if needed.  She typically has a dry weight of 70.5 kg.    Discharge planning: Still declining palliative care and has not yet made a decision on hospice.  She tells me she is \"getting done closed but not yet.\"  This is because she has more days when she feels good rather than not.  Expected discharge date is 02/05/2022, not requiring any home services.  She tells me she is just going home.  She is not particularly happy about it, as she would prefer to stay here; however, the latter is too expensive.  She is averse to going to the hospital, because she doesn't want to die there.    ROS:   Positives: Her feet continue to be painful due to neuropathy but there is some improvement.  Breathing is better today.  At home, she sleeps in a hospital bed due to orthopnea.  She does the same here.    Negatives: Complaining of constipation before, things are going better now.  She denies any headaches or chest pains, coughing or congestion at the moment, dizziness, dysuria, " diarrhea, difficulty chewing or swallowing, or problems with sleep (when on 150 mg of trazodone nightly).      Past Medical History:   Diagnosis Date     Arthritis      CHF (congestive heart failure) (H)      Chronic anemia 6/1/2014     Chronic kidney disease      Chronic thoracic aortic dissection (H) 10/7/2015    Descending thoracic aorta; treated medically per notes of Dragan Singh and Jennifer.     COPD (chronic obstructive pulmonary disease) (H)      CVA (cerebral infarction)      Disease of thyroid gland      Dyslipidemia      ESRD (end stage renal disease) (H) 06/03/2009    on dialysis with Dr. Mitchell     Essential hypertension 6/30/2014     Gastrointestinal hemorrhage, unspecified gastrointestinal hemorrhage type 6/5/2017     GI (gastrointestinal bleed)      GI bleeding 6/5/2017     Gout      L3 vertebral fracture (H) 11/16/2015     Left Atrial Appendage Occlusion (WATCHMAN) 4/5/2018    LAAO April 5, 2018 (30 mm WATCHMAN)     Obesity      ZULEIKA (obstructive sleep apnea), severe, intolerant of CPAP 10/22/2015     Oxygen dependent     3L nc     Pneumonia 9-7-2015     Right foot drop      Spinal stenosis 3/28/2016     Stroke (H) 3/24/2016              Family History   Problem Relation Age of Onset     Dementia Mother      Diabetes Mother      Arthritis Mother      Cancer Mother      Depression Mother      Heart disease Mother      Vision loss Mother      Stroke Father      Heart disease Father      Breast cancer Neg Hx      Social History     Socioeconomic History     Marital status:      Spouse name: Cayden     Number of children: 2     Years of education: Not on file     Highest education level: Not on file   Occupational History     Employer: RETIRED   Social Needs     Financial resource strain: Not on file     Food insecurity     Worry: Not on file     Inability: Not on file     Transportation needs     Medical: Not on file     Non-medical: Not on file   Tobacco Use     Smoking status: Former Smoker      Packs/day: 1.50     Years: 37.00     Pack years: 55.50     Types: Cigarettes     Quit date: 2009     Years since quittin.2     Smokeless tobacco: Never Used   Substance and Sexual Activity     Alcohol use: No     Alcohol/week: 11.7 standard drinks     Types: 14 Standard drinks or equivalent per week     Comment: 14 mixed drinks per week     Drug use: No     Sexual activity: Never     Partners: Male   Lifestyle     Physical activity     Days per week: Not on file     Minutes per session: Not on file     Stress: Not on file   Relationships     Social connections     Talks on phone: Not on file     Gets together: Not on file     Attends Islam service: Not on file     Active member of club or organization: Not on file     Attends meetings of clubs or organizations: Not on file     Relationship status: Not on file     Intimate partner violence     Fear of current or ex partner: Not on file     Emotionally abused: Not on file     Physically abused: Not on file     Forced sexual activity: Not on file   Other Topics Concern     Not on file   Social History Narrative    Lives with her . Daughter in [place] and daughter in [place].     MEDICATIONS: Reviewed from the MAR, physician orders, and earlier progress notes.    Post Discharge Medication Reconciliation Status: medication reconcilation previously completed during another office visit.    Current Outpatient Medications   Medication Sig     acetaminophen (TYLENOL) 500 MG tablet Take 1,000 mg by mouth 3 times daily as needed for mild pain      albuterol (PROVENTIL) (2.5 MG/3ML) 0.083% neb solution Take 2.5 mg by nebulization 3 times daily as needed for shortness of breath / dyspnea or wheezing     albuterol (PROVENTIL) (2.5 MG/3ML) 0.083% neb solution NEBULIZE 1 VIAL THREE TIMES DAILY     aspirin 81 MG EC tablet Take 81 mg by mouth daily (with lunch)      atorvastatin (LIPITOR) 10 MG tablet Take 10 mg by mouth At Bedtime     B Complex-C-Folic Acid  (DIALYVITE) TABS Take 1 tablet by mouth daily     budesonide-formoterol (SYMBICORT) 80-4.5 MCG/ACT Inhaler Inhale 2 puffs into the lungs 2 times daily     buPROPion (WELLBUTRIN XL) 150 MG 24 hr tablet Take 150 mg by mouth twice a week Tue & Sat     cinacalcet (SENSIPAR) 30 MG tablet Take 30 mg by mouth Every Mon, Wed, Fri Morning . At dialysis.     FOLIC ACID PO Take 1 mg by mouth daily (with lunch)      gabapentin (NEURONTIN) 100 MG capsule Take 100 mg by mouth daily      Lactobacillus (CULTURELLE DIGESTIVE WOMENS PO) Take 1 tablet by mouth daily (with lunch)      lidocaine-prilocaine (EMLA) 2.5-2.5 % external cream Apply topically 3 times daily as needed for moderate pain     melatonin 3 MG tablet Take 3 mg by mouth At Bedtime     midodrine (PROAMATINE) 10 MG tablet Take 10 mg by mouth once a week every Tue, Thu, Sat, Sun for Dizziness; For non - dialysis days     midodrine (PROAMATINE) 10 MG tablet Take 15 mg by mouth three times a week Take once daily prior to dialysis on dialysis days Mon-Wed-Fri     oxyCODONE (ROXICODONE) 5 MG tablet Take 1 tablet (5 mg) by mouth every 6 hours as needed for pain     phenylephrine-mineral oil-petrolatum (PREPARATION H) 0.25-14-74.9 % rectal ointment Place 1 Application rectally 3 times daily as needed     polyvinyl alcohol (LIQUIFILM TEARS) 1.4 % ophthalmic solution Place 1 drop into both eyes 3 times daily      pregabalin (LYRICA) 25 MG capsule Take 100 mg by mouth 2 times daily      senna-docusate (SENOKOT-S/PERICOLACE) 8.6-50 MG tablet Take 1 tablet by mouth 2 times daily And bid prn      sevelamer carbonate (RENVELA) 800 MG tablet Take 1,600 mg by mouth 3 times daily (with meals)      sevelamer carbonate (RENVELA) 800 MG tablet Take 800 mg by mouth Take with snacks or supplements      sodium-potassium bicarbonate-citric acid (EDI-SELTZER GOLD) 2568-153-0935 MG TBEF solu-tab Take 1 tablet by mouth daily as needed for heartburn     timolol maleate (TIMOPTIC) 0.5 % ophthalmic  "solution Place 1 drop into both eyes 2 times daily     traZODone (DESYREL) 150 MG tablet TAKE ONE TABLET BY MOUTH EVERY NIGHT     Vitamin D, Cholecalciferol, 25 MCG (1000 UT) TABS Take 2,000 Units by mouth daily (with lunch)     No current facility-administered medications for this visit.     ALLERGIES:   Allergies   Allergen Reactions     Ace Inhibitors Cough     Atrovent [Ipratropium Bromide] Headache     Fosinopril Sodium Cough     Zolpidem      hallucinations     DIET: Dialysis diet, regular texture, thin liquids.  1500 mL /24-hour fluid restriction    Vitals:    02/03/22 1207   BP: 108/60   Pulse: 71   Resp: 20   Temp: 98.1  F (36.7  C)   SpO2: 97%   Weight: 67 kg (147 lb 12.8 oz)   Height: 1.626 m (5' 4\")     Body mass index is 25.37 kg/m .    EXAMINATION:   General: Pleasant, alert, and conversant middle-aged female, sitting in a wheelchair, in no apparent distress.  She has several pillows piled on her bed.  Some are for under her legs, although she does keep the head of the bed elevated so she can breathe.  Head: Normocephalic and atraumatic.   Eyes: PERRLA, sclerae clear.  Slight disconjugate gaze.  ENT: Moist oral mucosa.  She has her own teeth.  No rhinorrhea or nasal discharge.  Hearing unimpaired.  Cardiovascular: Regular rate and rhythm (occasionally irregular) with a 2/6 systolic ejection murmur at the left sternal border.  Respiratory: Very diminished lung sounds bilaterally but otherwise clear.  She is currently on 4 L (typically on 3 L).  Abdomen: Soft and nontender.   Musculoskeletal/Extremities: Age-related degenerative joint disease.   Trace-1+ right lower extremity edema, none on the left.  Integument: \"Diaper rash\" is resolving with topical treatment.  Otherwise, no rashes, clinically significant lesions, or skin breakdown.   Cognitive/Psychiatric: Alert and oriented ×4.  Affect is euthymic.    DIAGNOSTICS:   Last Comprehensive Metabolic Panel:  Sodium   Date Value Ref Range Status "   09/07/2021 139 136 - 145 mmol/L Final   04/02/2021 138 133 - 144 mmol/L Final     Potassium   Date Value Ref Range Status   09/07/2021 4.0 3.5 - 5.0 mmol/L Final   04/02/2021 4.7 3.4 - 5.3 mmol/L Final     Chloride   Date Value Ref Range Status   09/07/2021 101 98 - 107 mmol/L Final   04/02/2021 104 94 - 109 mmol/L Final     Carbon Dioxide   Date Value Ref Range Status   04/02/2021 28 20 - 32 mmol/L Final     Carbon Dioxide (CO2)   Date Value Ref Range Status   09/07/2021 26 22 - 31 mmol/L Final     Anion Gap   Date Value Ref Range Status   09/07/2021 12 5 - 18 mmol/L Final   04/02/2021 6 3 - 14 mmol/L Final     Glucose   Date Value Ref Range Status   09/07/2021 115 70 - 125 mg/dL Final   04/02/2021 92 70 - 99 mg/dL Final     Urea Nitrogen   Date Value Ref Range Status   09/07/2021 16 8 - 28 mg/dL Final   04/02/2021 40 (H) 7 - 30 mg/dL Final     Creatinine   Date Value Ref Range Status   09/07/2021 3.17 (H) 0.60 - 1.10 mg/dL Final   04/02/2021 5.08 (H) 0.52 - 1.04 mg/dL Final     GFR Estimate   Date Value Ref Range Status   09/07/2021 14 (L) >60 mL/min/1.73m2 Final     Comment:     As of July 11, 2021, eGFR is calculated by the CKD-EPI creatinine equation, without race adjustment. eGFR can be influenced by muscle mass, exercise, and diet. The reported eGFR is an estimation only and is only applicable if the renal function is stable.   07/10/2021 11 (L) >60 mL/min/1.73m2 Final   04/02/2021 8 (L) >60 mL/min/[1.73_m2] Final     Comment:     Non  GFR Calc  Starting 12/18/2018, serum creatinine based estimated GFR (eGFR) will be   calculated using the Chronic Kidney Disease Epidemiology Collaboration   (CKD-EPI) equation.       Calcium   Date Value Ref Range Status   09/07/2021 8.9 8.5 - 10.5 mg/dL Final   04/02/2021 8.8 8.5 - 10.1 mg/dL Final     Bilirubin Total   Date Value Ref Range Status   07/11/2021 0.4 0.0 - 1.0 mg/dL Final   06/29/2014 0.5 0.2 - 1.3 mg/dL Final     Alkaline Phosphatase   Date  Value Ref Range Status   07/11/2021 88 45 - 120 U/L Final   06/29/2014 110 40 - 150 U/L Final     ALT   Date Value Ref Range Status   07/11/2021 9 0 - 45 U/L Final   06/29/2014 23 0 - 50 U/L Final     AST   Date Value Ref Range Status   07/11/2021 12 0 - 40 U/L Final   06/29/2014 20 0 - 45 U/L Final     Lab Results   Component Value Date    WBC 3.7 08/20/2021    WBC 4.9 04/02/2021     Lab Results   Component Value Date    RBC 3.04 08/20/2021    RBC 2.92 04/02/2021     Lab Results   Component Value Date    HGB 10.0 08/20/2021    HGB 9.6 04/02/2021     Lab Results   Component Value Date    HCT 33.5 08/20/2021    HCT 32.7 04/02/2021     Lab Results   Component Value Date     08/20/2021     04/02/2021     Lab Results   Component Value Date    MCH 32.9 08/20/2021    MCH 32.9 04/02/2021     Lab Results   Component Value Date    MCHC 29.9 08/20/2021    MCHC 29.4 04/02/2021     Lab Results   Component Value Date    RDW 16.9 08/20/2021    RDW 14.6 04/02/2021     Lab Results   Component Value Date     08/20/2021     04/02/2021       ASSESSMENT/Plan:      ICD-10-CM    1. Chronic respiratory failure with hypoxia (H)  J96.11    2. End-stage renal disease on hemodialysis (H)  N18.6     Z99.2    3. Hypotension due to hypovolemia  I95.89     E86.1    4. Hypervolemia, unspecified hypervolemia type  E87.70    5. Chronic diastolic congestive heart failure (H)  I50.32    6. Neuropathy  G62.9    7. Anemia of chronic renal failure, stage 5 (H)  N18.5     D63.1    8. Chronic obstructive pulmonary disease, unspecified COPD type (H)  J44.9    9. Paroxysmal atrial fibrillation (H)  I48.0    10. Presence of Watchman left atrial appendage closure device  Z95.818    11. Mild episode of recurrent major depressive disorder (H)  F33.0    12. Gastroesophageal reflux disease without esophagitis  K21.9    13. Thrombocytopenia (H)  D69.6    14. Hyperparathyroidism (H)  E21.3        DISCHARGE PLAN:    The patient is, or has  been, under my care and I have initiated the establishment of the plan of care. This patient will be followed by a physician who will periodically review the plan of care.  Initial follow-up should be within 7-10 days.    Approximate time spent with this patient was greater than 30 minutes with greater than 50% spent in discharge planning.    The above has been created using voice recognition software. Please be aware that this may unintentionally  produce inaccuracies and/or nonsensical sentences.      Electronically signed by: Louie Liriano CNP        Sincerely,        MARK Sherman CNP

## 2022-02-06 PROBLEM — D69.6 THROMBOCYTOPENIA (H): Status: ACTIVE | Noted: 2022-01-01

## 2022-02-06 NOTE — PROGRESS NOTES
[facility]    Name:   Belia Rodriguez (Itzel)  : 1945  MRN:  [id number]    Facility:   [facility] ([facility]) [[id number]]  Room:   Code Status: DNR/DNI and POLST AVAILABLE - Palliative Care    DOS: 2022  Previous visit: 2022    PCP: Neil Galan MD      CHIEF COMPLAINT / REASON FOR VISIT:  Chief Complaint   Patient presents with     Discharge Summary Nursing Home     Hospitalized for:  Acute on chronic respiratory failure secondary to fluid overload in the setting of end-stage renal disease and inability to give a full dialysis run due to hypotension; this in the setting of chronic diastolic heart failure         [facility] from 18 until 18  [facility] from 18 until 18  [facility] from 2021 until 2021 (acute on chronic respiratory failure due to a combination of fluid overload and COPD exacerbation)  [facility] from 2021 until 2021  [facility] from 2021 until 2021 (acute on chronic respiratory failure due to a combination of fluid overload and COPD exacerbation)   [facility] from 2021 until 2021  [facility] from 2022 until 01/15/2022 (acute on chronic respiratory failure with hypoxia, hypervolemia)   [facility] from 01/15/2022 until 2022 (expected discharge date)      HPI: Belia is a 76 year old female whom I had the pleasure of working with in the past.  She has known ESRD (on hemodialysis x12 years), COPD (former smoker, 45-60 pack years),  chronic respiratory failure with hypoxia, obstructive sleep apnea (severe and previously intolerant of CPAP), chronic atrial fibrillation, GERD, essential hypertension, and anemia related to chronic renal failure.    She has had multiple hospitalizations for respiratory failure due to a combination  of fluid overload and COPD exacerbation.  This occurred on 01/26/2021 and again on 02/18/2021, the last admission coming within hours of her discharge from [facility].      While she continues on hemodialysis Mondays, Wednesdays, and Fridays, she did have some questions regarding hospice but was not sure she would sign on given her need for dialysis.  She did have a palliative care consult, and she strongly agreed, especially given her readmission to the hospital.    RECENT HOSPITALIZATION    She was most recently hospitalized on 01/12/2022, presenting to [facility] with dyspnea following a dialysis session where only 1 kg of fluid was removed due to hypotension.  This was felt to represent one fourth of the desired fluid removal.  He has been having difficulty with dialysis due to ongoing hypotension despite use of midodrine.  In the ED, she was hypoxemic and required up to 6 L of oxygen when her baseline is 3 L.  She was having increased wheeze/cough as well as pain in her lower legs.  Chronic pain in the lower extremities due to neuropathy which has been poorly controlled and poorly responsive to attempted treatments.  Some of her treatments, leg buprenorphine, she did not tolerate.    In the ED, she was hypotensive at 87/50.  Labs pertinent for WBC 3.3, hemoglobin 10.9 (with high MCV) and low platelet count at 77.  She historically seems pancytopenic, but there is no specific diagnosis for the cause.  Her troponin was 0.034 but no chest pain.  EKG significant for ventricular paced rhythm.  CXR consistent with volume overload.  She was admitted, and nephrology was consulted.  She was also found to have macrocytic anemia.  Hemoglobin is otherwise at goal.  UF is limited by hypotension, and she has a very limited tolerance of mild hypervolemia due to underlying COPD and heart failure.  Options are limited due to tenuous respiratory status.  The plan was to use albumin in the hospital to allow UF  "without hypotension; however, it would not be an option in the outpatient setting.    Palliative care has been consulted to readdress her goals of care.  They had seen the patient during previous hospitalizations and discussed the appropriateness of hospice; however, the patient was not ready -- and still isn't.      CURRENT/RECENT TCU ISSUES    Disposition: A very complicated patient.  She is aware that her health will not improve and has considered hospice, although she is not yet ready to commit to that.  In-house psych was ordered during her last stay.      She was sent to the ED on 01/24 due to dyspnea.  They gave her some nebulizer treatments and sent her back.    She has some allergic symptoms, and orders were written for Corcidin HBP Cold and Flu 2 tabs every 6 hours as needed.    She has also developed a \"diaper rash,\" and is receiving appropriate topical treatment.  The rash is now resolved.    He complains about being awakened at 4 AM to be changed when she doesn't need it.    Nephropathy/ESRD: According to the patient, this was caused by her history of aortic dissection.  She now undergoes hemodialysis 3 times weekly (MWF) and typically leaves the facility around 8:30 AM.    Neuropathy: She has a history of neuropathy in her feet (started in heels, now in the calves), and she uses several pillows at night under her legs.  Raynaud's phenomenon in the feet as well, and some constipation and low blood pressures as described below.  With uncontrolled pain in the lower extremities on a regimen of acetaminophen, gabapentin 100 mg 3 times daily, and occasional oxycodone providing insufficient relief, she was initiated on pregabalin (gabapentin discontinued).  Nephrology recommended maximum 25 mg/day; however, she tells me it is still very painful.  At my last visit, we will increased the Lyrica dose from 25 mg twice daily to 50 mg twice daily.  Still significantly painful (she stated she was \"a little better " "but not much\"), we increased the dose to 100 mg twice daily.  Again, she finds it to be \"a little bit better.\"    Hemorrhoids/constipation: On 01/18, patient was requesting preparation H for hemorrhoids, and, per nursing, the patient was having some constipation.  Management had included only Senna S 1 tab bid prn.  New orders: Senna S1 tab twice daily and continue as needed dosing as is.  Preparation H rectally 3 times daily as needed.    Hypotension: She is on a 1500 mL fluid restriction.  This may be producing hypovolemia.  She does have orders for 10 or 15 mg of midodrine to take on dialysis days (Mondays, Wednesdays, and Fridays) and has additional orders for taking this when additional dialysis is needed.  On nondialysis days, if her SBP is <106, she receives this every 6 hours as needed.  We went with 10 mg of midodrine to clarify and can go up from there if needed.  She typically has a dry weight of 70.5 kg.    Discharge planning: Still declining palliative care and has not yet made a decision on hospice.  She tells me she is \"getting done closed but not yet.\"  This is because she has more days when she feels good rather than not.  Expected discharge date is 02/05/2022, not requiring any home services.  She tells me she is just going home.  She is not particularly happy about it, as she would prefer to stay here; however, the latter is too expensive.  She is averse to going to the hospital, because she doesn't want to die there.    ROS:   Positives: Her feet continue to be painful due to neuropathy but there is some improvement.  Breathing is better today.  At home, she sleeps in a hospital bed due to orthopnea.  She does the same here.    Negatives: Complaining of constipation before, things are going better now.  She denies any headaches or chest pains, coughing or congestion at the moment, dizziness, dysuria, diarrhea, difficulty chewing or swallowing, or problems with sleep (when on 150 mg of trazodone " nightly).      Past Medical History:   Diagnosis Date     Arthritis      CHF (congestive heart failure) (H)      Chronic anemia 6/1/2014     Chronic kidney disease      Chronic thoracic aortic dissection (H) 10/7/2015    Descending thoracic aorta; treated medically per notes of Dragan Singh and Jennifer.     COPD (chronic obstructive pulmonary disease) (H)      CVA (cerebral infarction)      Disease of thyroid gland      Dyslipidemia      ESRD (end stage renal disease) (H) 06/03/2009    on dialysis with Dr. Mitchell     Essential hypertension 6/30/2014     Gastrointestinal hemorrhage, unspecified gastrointestinal hemorrhage type 6/5/2017     GI (gastrointestinal bleed)      GI bleeding 6/5/2017     Gout      L3 vertebral fracture (H) 11/16/2015     Left Atrial Appendage Occlusion (WATCHMAN) 4/5/2018    LAAO April 5, 2018 (30 mm WATCHMAN)     Obesity      ZULEIKA (obstructive sleep apnea), severe, intolerant of CPAP 10/22/2015     Oxygen dependent     3L nc     Pneumonia 9-7-2015     Right foot drop      Spinal stenosis 3/28/2016     Stroke (H) 3/24/2016              Family History   Problem Relation Age of Onset     Dementia Mother      Diabetes Mother      Arthritis Mother      Cancer Mother      Depression Mother      Heart disease Mother      Vision loss Mother      Stroke Father      Heart disease Father      Breast cancer Neg Hx      Social History     Socioeconomic History     Marital status:      Spouse name: Cayden     Number of children: 2     Years of education: Not on file     Highest education level: Not on file   Occupational History     Employer: RETIRED   Social Needs     Financial resource strain: Not on file     Food insecurity     Worry: Not on file     Inability: Not on file     Transportation needs     Medical: Not on file     Non-medical: Not on file   Tobacco Use     Smoking status: Former Smoker     Packs/day: 1.50     Years: 37.00     Pack years: 55.50     Types: Cigarettes     Quit date:  2009     Years since quittin.2     Smokeless tobacco: Never Used   Substance and Sexual Activity     Alcohol use: No     Alcohol/week: 11.7 standard drinks     Types: 14 Standard drinks or equivalent per week     Comment: 14 mixed drinks per week     Drug use: No     Sexual activity: Never     Partners: Male   Lifestyle     Physical activity     Days per week: Not on file     Minutes per session: Not on file     Stress: Not on file   Relationships     Social connections     Talks on phone: Not on file     Gets together: Not on file     Attends Confucianist service: Not on file     Active member of club or organization: Not on file     Attends meetings of clubs or organizations: Not on file     Relationship status: Not on file     Intimate partner violence     Fear of current or ex partner: Not on file     Emotionally abused: Not on file     Physically abused: Not on file     Forced sexual activity: Not on file   Other Topics Concern     Not on file   Social History Narrative    Lives with her . Daughter in [place] and daughter in [place].     MEDICATIONS: Reviewed from the MAR, physician orders, and earlier progress notes.    Post Discharge Medication Reconciliation Status: medication reconcilation previously completed during another office visit.    Current Outpatient Medications   Medication Sig     acetaminophen (TYLENOL) 500 MG tablet Take 1,000 mg by mouth 3 times daily as needed for mild pain      albuterol (PROVENTIL) (2.5 MG/3ML) 0.083% neb solution Take 2.5 mg by nebulization 3 times daily as needed for shortness of breath / dyspnea or wheezing     albuterol (PROVENTIL) (2.5 MG/3ML) 0.083% neb solution NEBULIZE 1 VIAL THREE TIMES DAILY     aspirin 81 MG EC tablet Take 81 mg by mouth daily (with lunch)      atorvastatin (LIPITOR) 10 MG tablet Take 10 mg by mouth At Bedtime     B Complex-C-Folic Acid (DIALYVITE) TABS Take 1 tablet by mouth daily     budesonide-formoterol (SYMBICORT) 80-4.5  MCG/ACT Inhaler Inhale 2 puffs into the lungs 2 times daily     buPROPion (WELLBUTRIN XL) 150 MG 24 hr tablet Take 150 mg by mouth twice a week Tue & Sat     cinacalcet (SENSIPAR) 30 MG tablet Take 30 mg by mouth Every Mon, Wed, Fri Morning . At dialysis.     FOLIC ACID PO Take 1 mg by mouth daily (with lunch)      gabapentin (NEURONTIN) 100 MG capsule Take 100 mg by mouth daily      Lactobacillus (CULTURELLE DIGESTIVE WOMENS PO) Take 1 tablet by mouth daily (with lunch)      lidocaine-prilocaine (EMLA) 2.5-2.5 % external cream Apply topically 3 times daily as needed for moderate pain     melatonin 3 MG tablet Take 3 mg by mouth At Bedtime     midodrine (PROAMATINE) 10 MG tablet Take 10 mg by mouth once a week every Tue, Thu, Sat, Sun for Dizziness; For non - dialysis days     midodrine (PROAMATINE) 10 MG tablet Take 15 mg by mouth three times a week Take once daily prior to dialysis on dialysis days Mon-Wed-Fri     oxyCODONE (ROXICODONE) 5 MG tablet Take 1 tablet (5 mg) by mouth every 6 hours as needed for pain     phenylephrine-mineral oil-petrolatum (PREPARATION H) 0.25-14-74.9 % rectal ointment Place 1 Application rectally 3 times daily as needed     polyvinyl alcohol (LIQUIFILM TEARS) 1.4 % ophthalmic solution Place 1 drop into both eyes 3 times daily      pregabalin (LYRICA) 25 MG capsule Take 100 mg by mouth 2 times daily      senna-docusate (SENOKOT-S/PERICOLACE) 8.6-50 MG tablet Take 1 tablet by mouth 2 times daily And bid prn      sevelamer carbonate (RENVELA) 800 MG tablet Take 1,600 mg by mouth 3 times daily (with meals)      sevelamer carbonate (RENVELA) 800 MG tablet Take 800 mg by mouth Take with snacks or supplements      sodium-potassium bicarbonate-citric acid (EDI-SELTZER GOLD) 6723-901-5032 MG TBEF solu-tab Take 1 tablet by mouth daily as needed for heartburn     timolol maleate (TIMOPTIC) 0.5 % ophthalmic solution Place 1 drop into both eyes 2 times daily     traZODone (DESYREL) 150 MG tablet  "TAKE ONE TABLET BY MOUTH EVERY NIGHT     Vitamin D, Cholecalciferol, 25 MCG (1000 UT) TABS Take 2,000 Units by mouth daily (with lunch)     No current facility-administered medications for this visit.     ALLERGIES:   Allergies   Allergen Reactions     Ace Inhibitors Cough     Atrovent [Ipratropium Bromide] Headache     Fosinopril Sodium Cough     Zolpidem      hallucinations     DIET: Dialysis diet, regular texture, thin liquids.  1500 mL /24-hour fluid restriction    Vitals:    02/03/22 1207   BP: 108/60   Pulse: 71   Resp: 20   Temp: 98.1  F (36.7  C)   SpO2: 97%   Weight: 67 kg (147 lb 12.8 oz)   Height: 1.626 m (5' 4\")     Body mass index is 25.37 kg/m .    EXAMINATION:   General: Pleasant, alert, and conversant middle-aged female, sitting in a wheelchair, in no apparent distress.  She has several pillows piled on her bed.  Some are for under her legs, although she does keep the head of the bed elevated so she can breathe.  Head: Normocephalic and atraumatic.   Eyes: PERRLA, sclerae clear.  Slight disconjugate gaze.  ENT: Moist oral mucosa.  She has her own teeth.  No rhinorrhea or nasal discharge.  Hearing unimpaired.  Cardiovascular: Regular rate and rhythm (occasionally irregular) with a 2/6 systolic ejection murmur at the left sternal border.  Respiratory: Very diminished lung sounds bilaterally but otherwise clear.  She is currently on 4 L (typically on 3 L).  Abdomen: Soft and nontender.   Musculoskeletal/Extremities: Age-related degenerative joint disease.   Trace-1+ right lower extremity edema, none on the left.  Integument: \"Diaper rash\" is resolving with topical treatment.  Otherwise, no rashes, clinically significant lesions, or skin breakdown.   Cognitive/Psychiatric: Alert and oriented ×4.  Affect is euthymic.    DIAGNOSTICS:   Last Comprehensive Metabolic Panel:  Sodium   Date Value Ref Range Status   09/07/2021 139 136 - 145 mmol/L Final   04/02/2021 138 133 - 144 mmol/L Final     Potassium "   Date Value Ref Range Status   09/07/2021 4.0 3.5 - 5.0 mmol/L Final   04/02/2021 4.7 3.4 - 5.3 mmol/L Final     Chloride   Date Value Ref Range Status   09/07/2021 101 98 - 107 mmol/L Final   04/02/2021 104 94 - 109 mmol/L Final     Carbon Dioxide   Date Value Ref Range Status   04/02/2021 28 20 - 32 mmol/L Final     Carbon Dioxide (CO2)   Date Value Ref Range Status   09/07/2021 26 22 - 31 mmol/L Final     Anion Gap   Date Value Ref Range Status   09/07/2021 12 5 - 18 mmol/L Final   04/02/2021 6 3 - 14 mmol/L Final     Glucose   Date Value Ref Range Status   09/07/2021 115 70 - 125 mg/dL Final   04/02/2021 92 70 - 99 mg/dL Final     Urea Nitrogen   Date Value Ref Range Status   09/07/2021 16 8 - 28 mg/dL Final   04/02/2021 40 (H) 7 - 30 mg/dL Final     Creatinine   Date Value Ref Range Status   09/07/2021 3.17 (H) 0.60 - 1.10 mg/dL Final   04/02/2021 5.08 (H) 0.52 - 1.04 mg/dL Final     GFR Estimate   Date Value Ref Range Status   09/07/2021 14 (L) >60 mL/min/1.73m2 Final     Comment:     As of July 11, 2021, eGFR is calculated by the CKD-EPI creatinine equation, without race adjustment. eGFR can be influenced by muscle mass, exercise, and diet. The reported eGFR is an estimation only and is only applicable if the renal function is stable.   07/10/2021 11 (L) >60 mL/min/1.73m2 Final   04/02/2021 8 (L) >60 mL/min/[1.73_m2] Final     Comment:     Non  GFR Calc  Starting 12/18/2018, serum creatinine based estimated GFR (eGFR) will be   calculated using the Chronic Kidney Disease Epidemiology Collaboration   (CKD-EPI) equation.       Calcium   Date Value Ref Range Status   09/07/2021 8.9 8.5 - 10.5 mg/dL Final   04/02/2021 8.8 8.5 - 10.1 mg/dL Final     Bilirubin Total   Date Value Ref Range Status   07/11/2021 0.4 0.0 - 1.0 mg/dL Final   06/29/2014 0.5 0.2 - 1.3 mg/dL Final     Alkaline Phosphatase   Date Value Ref Range Status   07/11/2021 88 45 - 120 U/L Final   06/29/2014 110 40 - 150 U/L Final      ALT   Date Value Ref Range Status   07/11/2021 9 0 - 45 U/L Final   06/29/2014 23 0 - 50 U/L Final     AST   Date Value Ref Range Status   07/11/2021 12 0 - 40 U/L Final   06/29/2014 20 0 - 45 U/L Final     Lab Results   Component Value Date    WBC 3.7 08/20/2021    WBC 4.9 04/02/2021     Lab Results   Component Value Date    RBC 3.04 08/20/2021    RBC 2.92 04/02/2021     Lab Results   Component Value Date    HGB 10.0 08/20/2021    HGB 9.6 04/02/2021     Lab Results   Component Value Date    HCT 33.5 08/20/2021    HCT 32.7 04/02/2021     Lab Results   Component Value Date     08/20/2021     04/02/2021     Lab Results   Component Value Date    MCH 32.9 08/20/2021    MCH 32.9 04/02/2021     Lab Results   Component Value Date    MCHC 29.9 08/20/2021    MCHC 29.4 04/02/2021     Lab Results   Component Value Date    RDW 16.9 08/20/2021    RDW 14.6 04/02/2021     Lab Results   Component Value Date     08/20/2021     04/02/2021       ASSESSMENT/Plan:      ICD-10-CM    1. Chronic respiratory failure with hypoxia (H)  J96.11    2. End-stage renal disease on hemodialysis (H)  N18.6     Z99.2    3. Hypotension due to hypovolemia  I95.89     E86.1    4. Hypervolemia, unspecified hypervolemia type  E87.70    5. Chronic diastolic congestive heart failure (H)  I50.32    6. Neuropathy  G62.9    7. Anemia of chronic renal failure, stage 5 (H)  N18.5     D63.1    8. Chronic obstructive pulmonary disease, unspecified COPD type (H)  J44.9    9. Paroxysmal atrial fibrillation (H)  I48.0    10. Presence of Watchman left atrial appendage closure device  Z95.818    11. Mild episode of recurrent major depressive disorder (H)  F33.0    12. Gastroesophageal reflux disease without esophagitis  K21.9    13. Thrombocytopenia (H)  D69.6    14. Hyperparathyroidism (H)  E21.3        DISCHARGE PLAN:    The patient is, or has been, under my care and I have initiated the establishment of the plan of care. This patient  will be followed by a physician who will periodically review the plan of care.  Initial follow-up should be within 7-10 days.    Approximate time spent with this patient was greater than 30 minutes with greater than 50% spent in discharge planning.    The above has been created using voice recognition software. Please be aware that this may unintentionally  produce inaccuracies and/or nonsensical sentences.      Electronically signed by: Louie Liriano CNP

## 2022-02-10 NOTE — PROGRESS NOTES
Internal Medicine Office Visit  [facility]   Patient Name: Belia Rodriguez  Patient Age: 76 year old  YOB: 1945  MRN: [id number]    Date of Visit: 2/10/2022  Patient presents with:  Hospital F/U: 01/24 - ED for SOB  Pain: leg pain, Right leg, constant pain but states that pregabalin helps  Refill Request           Assessment / Plan / Medical Decision Making:    Problem List Items Addressed This Visit        Nervous and Auditory    Neuropathy - Primary    Relevant Medications    lidocaine-prilocaine (EMLA) 2.5-2.5 % external cream    pregabalin (LYRICA) 100 MG capsule    buPROPion (WELLBUTRIN XL) 150 MG 24 hr tablet       Respiratory    Acute on chronic respiratory failure with hypercapnia (H)    Acute respiratory failure with hypoxia and hypercarbia (H)       Urinary    End-stage renal disease on hemodialysis (H)    Relevant Orders    Commode Chair Order for DME - ONLY FOR DME    Anemia of chronic renal failure, stage 4 (severe) (H)    Relevant Orders    Commode Chair Order for DME - ONLY FOR DME       Behavioral    Mild episode of recurrent major depressive disorder (H)    Relevant Medications    buPROPion (WELLBUTRIN XL) 150 MG 24 hr tablet         - INCREASE bupropion to 150 mg 3 times per week  - High re-admission risk. She is not ready to consider hospice but does verbalize that she does not want to die in the hospital   - Continue all other current medications as prescribed   - She keeps oxycodone on hand in the event of severe lower leg pain particularly at night.  - Encouraged daily foot checks due to PAD, neuropathy  - Follow up in 3 months    I have changed Itzel Rodriguez's buPROPion. I am also having her start on pregabalin. Additionally, I am having her maintain her aspirin, acetaminophen, atorvastatin, Vitamin D (Cholecalciferol), cinacalcet, midodrine, midodrine, polyvinyl alcohol, senna-docusate, sevelamer carbonate, timolol maleate, melatonin, FOLIC ACID PO,  sevelamer carbonate, Lactobacillus (CULTURELLE DIGESTIVE WOMENS PO), Dialyvite, traZODone, albuterol, budesonide-formoterol, albuterol, sodium-potassium bicarbonate-citric acid, pregabalin, phenylephrine-mineral oil-petrolatum, oxyCODONE, B Complex-C-Folic Acid (DIALYVITE PO), Sodium Bicarbonate-Citric Acid, Chlorpheniramine-APAP, and lidocaine-prilocaine.          Orders Placed This Encounter   Procedures     Commode Chair Order for DME - ONLY FOR DME   Followup: Return in about 3 months (around 5/10/2022) for Follow up. earlier if needed.        Demetra Larose, NP, CNP        HPI:  Belia Rodriguez is a 76 year old year old who presents to the office today with her  Cayden for follow-up.  Several recent hospital and ER visits were reviewed.    Neuropathy has improved since she was prescribed Lyrica for leg pain. She still takes oxycodone about twice a week for severe pain at night in her legs.     She still feels really scared about dying and some days this makes her feel down and depressed.  She is tolerating Wellbutrin well.    Health Maintenance Review  Health Maintenance   Topic Date Due     HF ACTION PLAN  Never done     MICROALBUMIN  Never done     PARATHYROID  Never done     URINE DRUG SCREEN  Never done     ANNUAL REVIEW OF HM ORDERS  Never done     COPD ACTION PLAN  Never done     DEPRESSION ACTION PLAN  Never done     URINALYSIS  Never done     COLORECTAL CANCER SCREENING  Never done     HEPATITIS C SCREENING  Never done     HEPATITIS B IMMUNIZATION (1 of 3 - Risk Recombivax 3-dose series) Never done     ZOSTER IMMUNIZATION (1 of 2) 09/21/2009     MEDICARE ANNUAL WELLNESS VISIT  Never done     LIPID  11/19/2020     BMP  12/07/2021     MAMMO SCREENING  12/20/2021     HEMOGLOBIN  02/20/2022     FALL RISK ASSESSMENT  06/24/2022     LUNG CANCER SCREENING  07/09/2022     ALT  07/11/2022     PHQ-9  08/10/2022     CBC  08/20/2022     DEXA  07/08/2023     ADVANCE CARE PLANNING  08/24/2026     DTAP/TDAP/TD  IMMUNIZATION (4 - Td or Tdap) 10/30/2027     PHOSPHORUS  Completed     TSH W/FREE T4 REFLEX  Completed     SPIROMETRY  Completed     INFLUENZA VACCINE  Completed     Pneumococcal Vaccine: 65+ Years  Completed     ALK PHOS  Completed     COVID-19 Vaccine  Completed     IPV IMMUNIZATION  Aged Out     MENINGITIS IMMUNIZATION  Aged Out       Current Scheduled Meds:  Outpatient Encounter Medications as of 2/10/2022   Medication Sig Dispense Refill     acetaminophen (TYLENOL) 500 MG tablet Take 1,000 mg by mouth 3 times daily as needed for mild pain        albuterol (PROVENTIL) (2.5 MG/3ML) 0.083% neb solution NEBULIZE 1 VIAL THREE TIMES DAILY 90 mL 0     aspirin 81 MG EC tablet Take 81 mg by mouth daily (with lunch)        B Complex-C-Folic Acid (DIALYVITE PO) Take 1 tablet by mouth daily       B Complex-C-Folic Acid (DIALYVITE) TABS Take 1 tablet by mouth daily 90 tablet 3     buPROPion (WELLBUTRIN XL) 150 MG 24 hr tablet Take 1 tablet (150 mg) by mouth three times a week Mon, Wed, Sat 36 tablet 1     Chlorpheniramine-APAP 2-325 MG TABS Take 2 tablets by mouth as needed       cinacalcet (SENSIPAR) 30 MG tablet Take 30 mg by mouth Every Mon, Wed, Fri Morning . At dialysis.       FOLIC ACID PO Take 1 mg by mouth daily (with lunch)        Lactobacillus (CULTURELLE DIGESTIVE WOMENS PO) Take 1 tablet by mouth daily (with lunch)        lidocaine-prilocaine (EMLA) 2.5-2.5 % external cream Apply topically 3 times daily as needed for moderate pain 60 g 3     melatonin 3 MG tablet Take 3 mg by mouth At Bedtime       midodrine (PROAMATINE) 10 MG tablet Take 10 mg by mouth once a week every Tue, Thu, Sat, Sun for Dizziness; For non - dialysis days       midodrine (PROAMATINE) 10 MG tablet Take 15 mg by mouth three times a week Take once daily prior to dialysis on dialysis days Mon-Wed-Fri       oxyCODONE (ROXICODONE) 5 MG tablet Take 1 tablet (5 mg) by mouth every 6 hours as needed for pain 30 tablet 0     polyvinyl alcohol  (LIQUIFILM TEARS) 1.4 % ophthalmic solution Place 1 drop into both eyes 3 times daily        pregabalin (LYRICA) 100 MG capsule Take 1 capsule (100 mg) by mouth 2 times daily 180 capsule 1     pregabalin (LYRICA) 25 MG capsule Take 100 mg by mouth 2 times daily        senna-docusate (SENOKOT-S/PERICOLACE) 8.6-50 MG tablet Take 1 tablet by mouth 2 times daily And bid prn        sevelamer carbonate (RENVELA) 800 MG tablet Take 1,600 mg by mouth 3 times daily (with meals)        sevelamer carbonate (RENVELA) 800 MG tablet Take 800 mg by mouth Take with snacks or supplements        sodium-potassium bicarbonate-citric acid (EDI-JACKELINER GOLD) 5896-630-4830 MG TBEF solu-tab Take 1 tablet by mouth daily as needed for heartburn       timolol maleate (TIMOPTIC) 0.5 % ophthalmic solution Place 1 drop into both eyes 2 times daily       traZODone (DESYREL) 150 MG tablet TAKE ONE TABLET BY MOUTH EVERY NIGHT 90 tablet 1     albuterol (PROVENTIL) (2.5 MG/3ML) 0.083% neb solution Take 2.5 mg by nebulization 3 times daily as needed for shortness of breath / dyspnea or wheezing       atorvastatin (LIPITOR) 10 MG tablet Take 10 mg by mouth At Bedtime       budesonide-formoterol (SYMBICORT) 80-4.5 MCG/ACT Inhaler Inhale 2 puffs into the lungs 2 times daily 10.2 g 3     phenylephrine-mineral oil-petrolatum (PREPARATION H) 0.25-14-74.9 % rectal ointment Place 1 Application rectally 3 times daily as needed (Patient not taking: Reported on 2/10/2022)       Sodium Bicarbonate-Citric Acid 1650-8326 MG TBEF Take by mouth as needed (Patient not taking: Reported on 2/10/2022)       Vitamin D, Cholecalciferol, 25 MCG (1000 UT) TABS Take 2,000 Units by mouth daily (with lunch) (Patient not taking: Reported on 2/10/2022)       [DISCONTINUED] buPROPion (WELLBUTRIN SR) 150 MG 12 hr tablet        [DISCONTINUED] buPROPion (WELLBUTRIN XL) 150 MG 24 hr tablet Take 150 mg by mouth twice a week Tue & Sat       [DISCONTINUED] gabapentin (NEURONTIN) 100 MG  capsule Take 100 mg by mouth daily        [DISCONTINUED] lidocaine-prilocaine (EMLA) 2.5-2.5 % external cream Apply topically 3 times daily as needed for moderate pain       No facility-administered encounter medications on file as of 2/10/2022.       Objective / Physical Examination:  Vitals:    02/10/22 1334   BP: 96/56   BP Location: Left arm   Patient Position: Sitting   Cuff Size: Adult Regular   Pulse: 71   SpO2: 94%   Weight: 67 kg (147 lb 11.2 oz)     Wt Readings from Last 3 Encounters:   02/10/22 67 kg (147 lb 11.2 oz)   02/03/22 67 kg (147 lb 12.8 oz)   01/31/22 66.2 kg (146 lb)     Body mass index is 25.35 kg/m .     Constitutional: In no apparent distress  Respiratory:  Breath sounds distant but clear to auscultation bilaterally. Normal inspiratory and expiratory effort  Cardiovascular: Regular rate and rhythm. No edema.   Psych: Alert and oriented x3.   Extremities: Cool and violaceous in color. No wounds

## 2022-06-20 NOTE — PLAN OF CARE
How Severe Are Your Spot(S)?: mild Physical Therapy Discharge Summary    Reason for therapy discharge:    Discharged to home with home therapy.    Progress towards therapy goal(s). See goals on Care Plan in [facility] electronic health record for goal details.  Goals partially met.  Barriers to achieving goals:   weakness, fatigue.    Therapy recommendation(s):    Continued therapy is recommended.  Rationale/Recommendations:  Home PT.       What Type Of Note Output Would You Prefer (Optional)?: Bullet Format What Is The Reason For Today's Visit?: Full Body Skin Examination What Is The Reason For Today's Visit? (Being Monitored For X): concerning skin lesions on an annual basis

## 2023-07-26 NOTE — PROGRESS NOTES
[facility]    Facility:   [facility] [[id number]]   Code Status: FULL CODE       Chief Complaint   Patient presents with     Follow Up     TCU 8/8/19.       HPI:   Belia is a 73 y.o. female who was admitted to the hospital on 6/22/19 after a fall. Her hospital info is partially excerpted below.    Hospital HPI:  Belia Rodriguez is a 73 y.o. old female with history of ESRD on hemodialysis M/W/F, chronic thrombocytopenia, chronic atrial fibrillation, sick sinus syndrome, status post watchman device, status post pacemaker, diastolic CHF, hypertension, ZULEIKA, anemia of chronic kidney disease, dyslipidemia who was brought to the emergency department for evaluation after a fall.  The patient uses a walker for ambulation due to right foot drop and while at home tripped on the kitchen floor mat when she turned and fell.  She denies head trauma or loss of consciousness but complained of left hip pain.  In the ED, she was hemodynamically stable.  X-rays showed a displaced, comminuted intertrochanteric fracture of left femur.  She has been admitted for repair.     Hospital Summary:   Belia Rodriguez is a 73 y.o. female  admitted for Displaced intertrochanteric fracture of left femur, initial encounter for closed fracture (H) [S72.142A]     1.  Hip fracture, intertrochanteric (H)    she was admitted with a broken hip.  She was seen by Ortho and went to the operating room on June 23 where she underwent internal fixation using intramedullary nail.  Post procedure she slowly improved and will be discharged to a TCU     2.   End-stage renal disease.  She has chronic renal failure on dialysis.  She was seen by nephrology and underwent dialysis on her normal schedule.  She will continue dialysis at the TCU     3.   Hypertension.  Blood pressure remains stable.  No changes were made to her medication     4.   Chronic atrial fibrillation.  She remains on chronic warfarin     5.    Disposition.  She will be discharged to a TCU where she will remain for approximately 2 weeks then return home     6.  Hyperkalemia.  Potassium was occasionally greater than 6 and was treated with dialysis     7.  Anemia.  Chronic anemia was worsened with acute blood loss.  She received 1 unit of packed red blood cells     Overall stabilized and discharged to TCU on 6/27/19 for PT, OT, nursing cares, medical management and monitoring.     Today:  She is now back on warfarin due to left LE post op DVT diagnosed in the TCU. Tolerating without bleeding problems. Has known hemorrhoids and hx of GI problems. Saw GI for consult, no need to do intervention, follow up if needed. Hgb has been stable. Still with left hip pain, working with therapy. Hoping to return home soon,  installed stair lift. Her weight is up a few pounds, she will go to dialysis tomorrow. Occ has needed extra runs during the week to manage fluid. No increased shortness of breath. No fever or cough. She uses oxygen at night as she has been intolerant of CPAP. Appetite pretty good.      Past Medical History:  Past Medical History:   Diagnosis Date     Arthritis      CHF (congestive heart failure) (H)      Chronic anemia 6/1/2014     Chronic kidney disease      Chronic thoracic aortic dissection (H) 10/7/2015    Descending thoracic aorta; treated medically per notes of Dragan Singh and Jennifer.     COPD (chronic obstructive pulmonary disease) (H)      CVA (cerebral infarction)      Disease of thyroid gland      Dyslipidemia      ESRD (end stage renal disease) (H) 06/03/2009    on dialysis with Dr. Mitchell     Essential hypertension 6/30/2014     Gastrointestinal hemorrhage, unspecified gastrointestinal hemorrhage type 6/5/2017     GI (gastrointestinal bleed)      GI bleeding 6/5/2017     Gout      L3 vertebral fracture (H) 11/16/2015     Left Atrial Appendage Occlusion (WATCHMAN) 4/5/2018    LAAO April 5, 2018 (30 mm WATCHMAN)     Obesity      ZULEIKA  (obstructive sleep apnea), severe, intolerant of CPAP 10/22/2015     Pneumonia 9-7-2015     Right foot drop      Spinal stenosis 3/28/2016     Stroke (H) 3/24/2016       Medications:  Current Outpatient Medications   Medication Sig Note     acetaminophen (TYLENOL) 500 MG tablet Take 2 tablets (1,000 mg total) by mouth 3 (three) times a day.      aspirin 325 MG tablet Take 1 tablet (325 mg total) by mouth 2 (two) times a day.      atorvastatin (LIPITOR) 10 MG tablet Take 10 mg by mouth at bedtime.      B complex-vitamin C-folic acid (DIALYVITE) 100-1 mg Tab Take 1 tablet by mouth daily.      chlorpheniramine/dextromethorp (CORICIDIN HBP COUGH AND COLD ORAL) Take 1 tablet by mouth every 6 (six) hours as needed.      cholecalciferol, vitamin D3, 2,000 unit cap Take 2,000 Units by mouth daily with lunch.       cinacalcet (SENSIPAR) 30 MG tablet Take 30 mg by mouth 3 times weekly with dialysis            diphenhydrAMINE (BENADRYL) 25 mg tablet Take 50 mg by mouth at bedtime as needed for sleep.      folic acid (FOLVITE) 1 MG tablet Take 1 mg by mouth daily.      gabapentin (NEURONTIN) 100 MG capsule Take 100 mg by mouth 3 (three) times a day.      Lactobacillus rhamnosus GG (CULTURELLE) 10-15 Billion cell capsule Take 1 capsule by mouth daily with lunch.      metoprolol succinate (TOPROL-XL) 25 MG Take 25 mg by mouth daily.      midodrine HCl (MIDODRINE ORAL) Take 15 mg by mouth 3 (three) times a week. Three times weekly before dialysis.             oxyCODONE (ROXICODONE) 5 MG immediate release tablet Take 0.5-1 tablets (2.5-5 mg total) by mouth every 4 (four) hours as needed.      polyvinyl alcohol (LIQUIFILM TEARS) 1.4 % ophthalmic solution Apply 1 drop to eye as needed for dry eyes.      ranitidine (ZANTAC) 150 MG tablet Take 150 mg by mouth at bedtime.      senna-docusate (SENNOSIDES-DOCUSATE SODIUM) 8.6-50 mg tablet Take 1 tablet by mouth at bedtime.       sevelamer carbonate (RENVELA) 800 mg tablet Take 2,400 mg by  mouth 3 (three) times a day with meals.             timolol maleate (TIMOPTIC) 0.5 % ophthalmic solution Administer 1 drop to both eyes 2 (two) times a day.       traZODone (DESYREL) 50 MG tablet Take 50 mg by mouth at bedtime. 6/23/2019: Patient states she uses this almost every night.        Physical Exam:   General: Patient is alert, pale female, no distress.   Vitals: /66, Temp 97.5, Pulse 74, RR 20, O2 sat 92% on O2.  HEENT: Head is NCAT. Eyes show no injection or icterus. Nares negative. Oropharynx well hydrated.  Neck: Supple. No tenderness or adenopathy. No JVD. Dialysis access right neck.  Lungs: Clear bilaterally. No wheezes.  Cardiovascular: Regular rate and rhythm, normal S1, S2.  Back: No spinal or CVA tenderness.  Abdomen: Soft, no tenderness on exam. Bowel sounds present. No guarding rebound or rigidity.  Extremities: LE edema, feet are puffy.  Musculoskeletal: Degen changes.  Psych: Mood appears good.      Labs:  Lab Results   Component Value Date    WBC 3.9 (L) 07/02/2019    HGB 7.4 (L) 07/02/2019    HCT 24.0 (L) 07/02/2019     (H) 07/02/2019     (L) 07/02/2019     Results for orders placed or performed during the hospital encounter of 05/18/19   Basic Metabolic Panel   Result Value Ref Range    Sodium 137 136 - 145 mmol/L    Potassium 5.0 3.5 - 5.0 mmol/L    Chloride 100 98 - 107 mmol/L    CO2 26 22 - 31 mmol/L    Anion Gap, Calculation 11 5 - 18 mmol/L    Glucose 81 70 - 125 mg/dL    Calcium 9.5 8.5 - 10.5 mg/dL    BUN 29 (H) 8 - 28 mg/dL    Creatinine 4.91 (H) 0.60 - 1.10 mg/dL    GFR MDRD Af Amer 11 (L) >60 mL/min/1.73m2    GFR MDRD Non Af Amer 9 (L) >60 mL/min/1.73m2       Assessment/Plan:  1. Left femur fracture. Sustained in a fall. S/p ORIF with nail on 6/23/19. WBAT, working with therapy.   2. ESRD. On dialysis 3 x weekly.   3. Anemia. ABLA from surgery on baseline chronic anemia. Stable now.  4. HTN. Continue metoprolol though takes midodrine on dialysis days.  5. Afib.  She is s/p watchman and pacer. Rate controlled. On warfarin due to DVT.   6. DVT LLE. On warfarin.   7. COPD. ZULEIKA. She reports prn oxygen use at home at HS.  8. Chronic HF.  Monitor weights, edema, clinical status.        Electronically signed by: Saige Ott MD     Niacinamide Pregnancy And Lactation Text: These medications are considered safe during pregnancy.

## 2024-09-18 NOTE — PLAN OF CARE
Problem: Adjustment to Illness COPD (Chronic Obstructive Pulmonary Disease)  Goal: Optimal Chronic Illness Coping  Outcome: Improving     Problem: Respiratory Compromise (Heart Failure)  Goal: Effective Oxygenation and Ventilation  Outcome: Improving     Problem: Adjustment to Illness (Chronic Kidney Disease)  Goal: Optimal Coping with Chronic Illness  Outcome: Improving     Tele- NSR, no pacer spikes seen. 3 L 02 NC Sp02 at 98%., gets Short of breath with activity. Pt refused to take scheduled Heparin dose.   Will have Hemodialysis run tonight. Midodrine 5mg given before start.   
  Problem: Adult Inpatient Plan of Care  Goal: Absence of Hospital-Acquired Illness or Injury  Intervention: Identify and Manage Fall Risk  Recent Flowsheet Documentation  Taken 9/9/2021 0000 by Tamie Vasquez RN  Safety Promotion/Fall Prevention:    bed alarm on    nonskid shoes/slippers when out of bed  Taken 9/8/2021 2017 by Tamie Vasquez RN  Safety Promotion/Fall Prevention:    bed alarm on    chair alarm on  Intervention: Prevent Skin Injury  Recent Flowsheet Documentation  Taken 9/9/2021 0000 by Tamie Vasquez RN  Body Position: position changed independently   Pt is alert and oriented x 4. Complained of neuropathic pain on Bilateral LE which is relief by prn oxycodone. Pt is presently on 3 litters of oxygen. BP at the 2303  90/53.pt complained of feeling dizzy. Oxygen at this time was 88%. Pt oxygen was temporally increased to 4 litters. Pt reported feeling ok after his oxygen sats went up above 90%. Then,  it was titrated back to 3 litters.   
  Problem: Adult Inpatient Plan of Care  Goal: Plan of Care Review  Outcome: Adequate for Discharge  Pt is doing well. Currently receiving hemodialysis in the room. End of run will be at 1540. Plan is for patient to discharge after dialysis.      
  Problem: Adult Inpatient Plan of Care  Goal: Plan of Care Review  Outcome: Improving     Pt received oxycodone for bilateral leg pain with good relief.   Dialysis completed around 0300 in pt room. Pt requested Midodrine to be given during dialysis to prevent low BP.   Vitals:    09/08/21 0000 09/08/21 0015 09/08/21 0223 09/08/21 0330   BP: 138/74 113/77  101/50   BP Location:    Left arm   Pulse: 70 70  70   Resp: 18 18 20 18   Temp:   97.7  F (36.5  C) 98.9  F (37.2  C)   TempSrc:   Oral Oral   SpO2: 96% 93%  93%   Weight:    68.3 kg (150 lb 8 oz)   Height:          
  Problem: Respiratory Compromise (Heart Failure)  Goal: Effective Oxygenation and Ventilation  Intervention: Promote Airway Secretion Clearance  Recent Flowsheet Documentation  Taken 9/9/2021 8335 by Belia Montejo RN  Cough And Deep Breathing: done independently per patient     Problem: Risk for Delirium  Goal: Improved Behavioral Control  Outcome: Improving   Telemetry rhythm is 100% V paced with BBB, without pacer spikes. She had nausea after the dialysis run which ended 30 minutes early, zofran given and had good relief.  Oxycodone given for head ache and feet pain.  MD planning for discharge tomorrow.    
  Problem: Respiratory Compromise COPD (Chronic Obstructive Pulmonary Disease)  Goal: Effective Oxygenation and Ventilation  Intervention: Promote Airway Secretion Clearance  Recent Flowsheet Documentation  Taken 9/10/2021 0042 by Nuris Garcia RN  Activity Management: activity adjusted per tolerance  Intervention: Optimize Oxygenation and Ventilation  Recent Flowsheet Documentation  Taken 9/10/2021 0423 by Nuris Garcia RN  Head of Bed (HOB) Positioning: HOB at 30-45 degrees  Problem: Functional Ability Impaired (Heart Failure)  Goal: Optimal Functional Ability  Intervention: Optimize Functional Ability  Recent Flowsheet Documentation  Taken 9/10/2021 0042 by Nuris Garcia RN  Activity Management: activity adjusted per tolerance    Pt comfortable over noc. Pain well controlled with current regimen. Denies nausea/headache. Falls precautions in place.     
"PRIMARY DIAGNOSIS: \"GENERIC\" NURSING  OUTPATIENT/OBSERVATION GOALS TO BE MET BEFORE DISCHARGE:  ADLs back to baseline: Yes    Activity and level of assistance: Up with standby assistance.    Pain status: Improved-controlled with oral pain medications.    Return to near baseline physical activity: Yes     Discharge Planner Nurse   Safe discharge environment identified: Yes  Barriers to discharge: Yes       Entered by: Valarie Zambrano 09/08/2021 11:49 AM     Please review provider order for any additional goals.   Nurse to notify provider when observation goals have been met and patient is ready for discharge.    Patient refusing dialysis, states she is too tired from run last night into the morning, feels she is too tired to discharge   "
"PRIMARY DIAGNOSIS: \"GENERIC\" NURSING  OUTPATIENT/OBSERVATION GOALS TO BE MET BEFORE DISCHARGE:  ADLs back to baseline: Yes    Activity and level of assistance: Up with standby assistance.    Pain status: Improved-controlled with oral pain medications.    Return to near baseline physical activity: Yes     Discharge Planner Nurse   Safe discharge environment identified: Yes  Barriers to discharge: Yes       Entered by: Valarie Zambrano 09/08/2021 3:49 PM     Please review provider order for any additional goals.   Nurse to notify provider when observation goals have been met and patient is ready for discharge.  "
"PRIMARY DIAGNOSIS: \"GENERIC\" NURSING  OUTPATIENT/OBSERVATION GOALS TO BE MET BEFORE DISCHARGE:  ADLs back to baseline: Yes    Activity and level of assistance: Up with standby assistance.    Pain status: Pain free.    Return to near baseline physical activity: Yes     Discharge Planner Nurse   Safe discharge environment identified: No  Barriers to discharge: Yes       Entered by: Valarie Zambrano 09/08/2021 6:40 PM     Please review provider order for any additional goals.   Nurse to notify provider when observation goals have been met and patient is ready for discharge.    Up with SBA, does not feel she is strong enough to discharge today because she had a late dialysis that lasted until the morning   "
PRIMARY DIAGNOSIS: ACUTE PAIN  OUTPATIENT/OBSERVATION GOALS TO BE MET BEFORE DISCHARGE:  1. Pain Status: Improved-controlled with oral pain medications.    2. Return to near baseline physical activity: No    3. Cleared for discharge by consultants (if involved): No    Discharge Planner Nurse   Safe discharge environment identified: No ( still pending TCU placement  Barriers to discharge: No ( No medical barriers but pt is still pending TCU placement, paint is manageable with po narcotics.) Constant complain of feeling tired as well, and  chronic neuropathic pain on bilateral lower extremities.        Entered by: Tamie Vasquez 09/09/2021 7:03 AM     Please review provider order for any additional goals.   Nurse to notify provider when observation goals have been met and patient is ready for discharge.  
PRIMARY DIAGNOSIS: CHEST PAIN  OUTPATIENT/OBSERVATION GOALS TO BE MET BEFORE DISCHARGE:  1. Ruled out acute coronary syndrome (negative or stable Troponin):  No  2. Pain Status: Pain free.  3. Appropriate provocative testing performed: No  - Stress Test Procedure: NA  - Interpretation of cardiac rhythm per telemetry tech: NSR    4. Cleared by Consultants (if applicable):No  5. Return to near baseline physical activity: Yes  Discharge Planner Nurse   Safe discharge environment identified: No  Barriers to discharge: No       Entered by: Valarie Zambrano 09/08/2021 10:24 AM     Please review provider order for any additional goals.   Nurse to notify provider when observation goals have been met and patient is ready for discharge.  C/o SOB    
PRIMARY DIAGNOSIS: CONGESTIVE HEART FAILURE (Fluid Overload and COPD Exacerbation)  OUTPATIENT/OBSERVATION GOALS TO BE MET BEFORE DISCHARGE:  Dyspnea improved and O2 sats >88% at RA or at prior home O2 therapy level: No, Pt is on 3L 02 NC, Sp02 98%. Gets Short of breath with activity.         SpO2: 97 %, O2 Device: Nasal cannula  Vitals:    09/07/21 0021 09/07/21 2035   Weight: 67.5 kg (148 lb 13 oz) 75 kg (165 lb 6.4 oz)        ECHO and other diagnostic testing complete (if applicable): No    Return to near baseline physical activity: No    Discharge Planner Nurse   Safe discharge environment identified: No  Barriers to discharge: Yes, Will have Hemodialysis run tonight for Fluid Overload and Renal Disease.        Entered by: Maryellen Vann 09/07/2021 11:40 PM     Please review provider order for any additional goals.   Nurse to notify provider when observation goals have been met and patient is ready for discharge.  
Pt has remained alert and oriented x4 this shift, tolerated dialysis with no issues today.  Remains at baseline O2 of 3L.  Denies any SOB or chest pains.  Orders to discharge patient.    
Statement Selected

## 2024-10-01 NOTE — PROGRESS NOTES
[facility]    Name:   Belia Rodriguez (Itzel)  : 1945  Facility:   [facility] [[id number]]   Room:   Code Status: DNR/DNI and POLST AVAILABLE -   Fac type:   SNF (Skilled Nursing Facility, TCU) -     CHIEF COMPLAINT / REASON FOR VISIT:  Chief Complaint   Patient presents with     Follow-up     TCU follow-up: Hospitalization for acute on chronic respiratory failure due to a combination of fluid overload and COPD exacerbation.     Problem Visit     Hypotension due to hypovolemia.     [facility] from 18 until 18  [facility] from 18 until 18  [facility] from 2021 until 2021 (acute on chronic respiratory failure due to a combination of fluid overload and COPD exacerbation)  [facility]  [facility] from 2021 until 2021 (acute on chronic respiratory failure due to a combination of fluid overload and COPD exacerbation)       HPI: Belia is a 75 y.o. female with known end-stage renal disease (on dialysis), COPD (former smoker, 45-60 pack years),  chronic respiratory failure with hypoxia, obstructive sleep apnea (severe and previously intolerant of CPAP), chronic atrial fibrillation, GERD, essential hypertension, and anemia related to chronic renal failure.    She has had multiple hospitalizations for respiratory failure due to a combination of fluid overload and COPD exacerbation.  This occurred on 2021 and again on 2021.  Her last admission came within hours of her discharge from [facility].  When last in the TCU at [facility], she was a full code, but she indicated during her last hospitalization that she wants no further BiPAP and is DNR/DNI.  This did improve somewhat with steroids and doxycycline.    While she continues on hemodialysis ,  Wednesdays, and Fridays, she did have some questions regarding hospice but was not sure she would sign on given her need for dialysis.  She did have a palliative care consult, and she strongly agreed, especially given her readmission to the hospital.      CURRENT/RECENT TCU ISSUES    Disposition: Today, the patient is interested in talking about her depression and realization that her health will not improve.  I had received a request by  that I give an okay for an in-house psych consult.  My initial thought was that the patient has been doing quite well and accepting her situation, and there would be nothing gained from such a consult.  We spent a good 20 minutes talking about her feelings with regard to hospice, dying, and other related issues, including depression.  She did tell me that she thought she might benefit from a consult, and we have given the okay.    She was having no particular issues until about 6 weeks ago, not needing oxygen but now expecting a chronic continuous need.  She tells me she plans to go on hospice after discharge.  She wants to go to [facility].  She notes that she will be stopping dialysis and would expect to survive no longer than 1 week.  She has been presented with this suggestion about 6 months ago.  She believes that she is now ready.  Her main concern is not about dying but is getting her paperwork done for her  of 44 years.     She tells me that she is doing much better than she was just a few days ago.  She complains of neuropathy in her feet, Raynaud's syndrome in the feet as well, and some constipation.    Hypotension: She is on a 1500 mL fluid restriction.  This may be producing hypovolemia.  She does have orders for 20 mg of midodrine to take on dialysis days (Mondays, Wednesdays, and Fridays) and has additional orders for taking this when additional dialysis is needed.  However, she has been running significantly low blood pressures.  In fact, this  [de-identified] : Levi Shelley is a 45 yo male who presents for evaluation of left aural fullness for the past five weeks. He notes slightly diminished hearing from the left ear and intermittent tinnitus. He denies vertigo, otalgia, otorrhea. Denies history of recurrent ear infections. No fevers. "morning, it was 86/53.  She has not yet gone to dialysis.  103/01/21, and other nurse practitioner ordered 10 mg of midodrine to be given on nondialysis days when the SBP is <106.  I will review this with the pharmacist to see if it is possible to go beyond 20 mg/day of midodrine.    Medication changes: At my last visit, considering her plans for hospice, we talked about her medications.  It was agreed that atorvastatin was no longer needed, and so we discontinued it.  At the time, she stated, \"anything you can take away from me won't break my heart at all.\"    Discharge planning: She told her  and daughter that she wanted to stay here, but her  became angry.  He has been the one to take her to dialysis and suggest that she would need to arrange her own transportation if she decided to stay.  He also told her he would not be doing housework.  The end result is that the patient will be staying 2 more weeks.      ROS:   Positives: Her feet continue to be painful due to neuropathy.  She has found that lying in bed with her knees up in the air helps. She is on chronic oxygen, and breathing is no better and no worse.      Negatives: Complaining of constipation before, things are going better now.  She denies any headaches or chest pains, coughing or congestion at the moment, dizziness, dysuria, diarrhea, difficulty chewing or swallowing, or problems with sleep (when on 150 mg of trazodone nightly).    Past Medical History:   Diagnosis Date     Arthritis      CHF (congestive heart failure) (H)      Chronic anemia 6/1/2014     Chronic kidney disease      Chronic thoracic aortic dissection (H) 10/7/2015    Descending thoracic aorta; treated medically per notes of Dragan Singh and Jennifer.     COPD (chronic obstructive pulmonary disease) (H)      CVA (cerebral infarction)      Disease of thyroid gland      Dyslipidemia      ESRD (end stage renal disease) (H) 06/03/2009    on dialysis with Dr. Mitchell     " Essential hypertension 2014     Gastrointestinal hemorrhage, unspecified gastrointestinal hemorrhage type 2017     GI (gastrointestinal bleed)      GI bleeding 2017     Gout      L3 vertebral fracture (H) 2015     Left Atrial Appendage Occlusion (WATCHMAN) 2018    LAAO 2018 (30 mm WATCHMAN)     Obesity      ZULEIKA (obstructive sleep apnea), severe, intolerant of CPAP 10/22/2015     Oxygen dependent     3L nc     Pneumonia 2015     Right foot drop      Spinal stenosis 3/28/2016     Stroke (H) 3/24/2016              Family History   Problem Relation Age of Onset     Dementia Mother      Diabetes Mother      Arthritis Mother      Cancer Mother      Depression Mother      Heart disease Mother      Vision loss Mother      Stroke Father      Heart disease Father      Breast cancer Neg Hx      Social History     Socioeconomic History     Marital status:      Spouse name: Cayden     Number of children: 2     Years of education: Not on file     Highest education level: Not on file   Occupational History     Employer: RETIRED   Social Needs     Financial resource strain: Not on file     Food insecurity     Worry: Not on file     Inability: Not on file     Transportation needs     Medical: Not on file     Non-medical: Not on file   Tobacco Use     Smoking status: Former Smoker     Packs/day: 1.50     Years: 37.00     Pack years: 55.50     Types: Cigarettes     Quit date: 2009     Years since quittin.1     Smokeless tobacco: Never Used   Substance and Sexual Activity     Alcohol use: No     Alcohol/week: 11.7 standard drinks     Types: 14 Standard drinks or equivalent per week     Comment: 14 mixed drinks per week     Drug use: No     Sexual activity: Never     Partners: Male   Lifestyle     Physical activity     Days per week: Not on file     Minutes per session: Not on file     Stress: Not on file   Relationships     Social connections     Talks on phone: Not on file     Gets  together: Not on file     Attends Voodoo service: Not on file     Active member of club or organization: Not on file     Attends meetings of clubs or organizations: Not on file     Relationship status: Not on file     Intimate partner violence     Fear of current or ex partner: Not on file     Emotionally abused: Not on file     Physically abused: Not on file     Forced sexual activity: Not on file   Other Topics Concern     Not on file   Social History Narrative    Lives with her . Daughter in [place] and daughter in [place].     MEDICATIONS: Reviewed from the MAR, physician orders, and earlier progress notes.  Post Discharge Medication Reconciliation Status: medication reconciliation previously completed during another office visit.  Updated by me today (03/08/2021) with the change in midodrine orders plus the addition of low sodium Edi-[place] Gold (per patient request) reflected below.    Current Outpatient Medications   Medication Sig     pot bicarb/sod bicarb/cit ac (EDI-SELTZER GOLD ORAL) Take by mouth daily.     acetaminophen (TYLENOL) 500 MG tablet Take 2 tablets (1,000 mg total) by mouth every 6 (six) hours as needed.     albuterol (PROVENTIL) 2.5 mg /3 mL (0.083 %) nebulizer solution Take 3 mL (2.5 mg total) by nebulization every 4 (four) hours as needed for wheezing.     aspirin 81 MG EC tablet Take 1 tablet (81 mg total) by mouth daily.     atorvastatin (LIPITOR) 10 MG tablet Take 10 mg by mouth at bedtime.     B complex 11-folic-C-biot-zinc (DIALYVITE) [phone] mg-mg-mcg-mg Tab Take 1 tablet by mouth daily with supper. (Dialyvite)      buPROPion (WELLBUTRIN XL) 150 MG 24 hr tablet Take 1 tablet (150 mg total) by mouth 2 (two) times a week. Tuesday and saturday (Patient taking differently: Take 150 mg by mouth 2 (two) times a week. Tuesday and saturday)     cholecalciferol, vitamin D3, 1,000 unit (25 mcg) tablet Take 2,000 Units by mouth daily.      cinacalcet (SENSIPAR) 30 MG tablet  Take 30 mg by mouth see administration instructions. Take three times weekly with dialysis (on Mondays, Wednesdays, and Fridays).     famotidine (PEPCID) 20 MG tablet Take 1 tablet (20 mg total) by mouth at bedtime.     folic acid (FOLVITE) 1 MG tablet TAKE ONE TABLET BY MOUTH ONCE DAILY (Patient taking differently: Take 1 mg by mouth daily. )     gabapentin (NEURONTIN) 100 MG capsule Take 100 mg by mouth 2 (two) times a day. Leg pain     Lactobacillus rhamnosus GG (CULTURELLE) 10-15 Billion cell capsule Take 1 capsule by mouth daily with lunch.      metoprolol succinate (TOPROL-XL) 25 MG Take 1 tablet (25 mg total) by mouth daily with lunch. Hold for SBP<110 or hr<60     midodrine (PROAMATINE) 10 MG tablet Take 2 tablets (20 mg total) by mouth 3 (three) times a week. Patient takes prior to dialysis qMWF and PRN if additional dialysis treatments.  Patient reports that she takes 20mg prior to dialysis (Patient taking differently: Take 20 mg by mouth 3 (three) times a week. Patient takes prior to dialysis qMWF and PRN if additional dialysis treatments.  Patient reports taking 20mg prior to dialysis    Also, on nondialysis days, give 10 mg daily for SBP <106.)     midodrine (PROAMATINE) 10 MG tablet Take 10 mg by mouth daily as needed. Take on non dialysis days. Has an order for dialysis days.     midodrine (PROAMATINE) 5 MG tablet Take 10 mg by mouth 4 (four) times a week. On non dialysis days if SBP < 106     oxyCODONE (ROXICODONE) 5 MG immediate release tablet TAKE 1/2 TO 1 TABLET (2.5-5MG) BY MOUTH TWICE DAILY AS NEEDED FOR PAIN     polyvinyl alcohol (LIQUIFILM TEARS) 1.4 % ophthalmic solution Administer 1 drop to both eyes as needed for dry eyes.     senna-docusate (SENNOSIDES-DOCUSATE SODIUM) 8.6-50 mg tablet Take 1 tablet by mouth at bedtime as needed for constipation.      sevelamer carbonate (RENVELA) 800 mg tablet Take 1,600 mg by mouth 2 (two) times a day as needed (snacks).     sevelamer HCL (RENAGEL) 800  MG tablet Take 1,600 mg by mouth 3 (three) times a day with meals.      timolol maleate (TIMOPTIC) 0.5 % ophthalmic solution Administer 1 drop to both eyes 2 (two) times a day.      traZODone (DESYREL) 150 MG tablet Take 150 mg by mouth at bedtime.      ALLERGIES:   Allergies   Allergen Reactions     Ace Inhibitors Cough     Atrovent [Ipratropium Bromide] Headache     Fosinopril Sodium Cough     Zolpidem      hallucinations     DIET: Dialysis diet, regular texture, thin liquids.  1500 mL /24-hour fluid restriction    Vitals:    03/08/21 1343   BP: 117/73   Pulse: 76   Resp: 16   Temp: 98.2  F (36.8  C)   SpO2: 97%   Weight: 157 lb (71.2 kg)     Body mass index is 25.34 kg/m .    EXAMINATION:   General: Pleasant, alert, and conversant middle-aged female, sitting in her wheelchair, in no apparent distress.  Head: Normocephalic and atraumatic.   Eyes: PERRLA, sclerae clear.  Slight disconjugate gaze.  ENT: Moist oral mucosa.  She has her own teeth.  No rhinorrhea or nasal discharge.  Hearing is unimpaired.  Cardiovascular: Irregular rhythm with a 2/6 systolic ejection murmur at the left sternal border.  Respiratory: Diminished lung sounds bilaterally but otherwise clear.  Abdomen: Soft and nontender.   Musculoskeletal/Extremities: Age-related degenerative joint disease.  Right 3+ pitting pedal and pretibial edema, 2+ on the left.  Integument: No rashes, clinically significant lesions, or skin breakdown.   Cognitive/Psychiatric: Alert and oriented ×4.  Affect is euthymic.    DIAGNOSTICS:   Results for orders placed or performed during the hospital encounter of 02/18/21   Basic Metabolic Panel   Result Value Ref Range    Sodium 136 136 - 145 mmol/L    Potassium 3.7 3.5 - 5.0 mmol/L    Chloride 97 (L) 98 - 107 mmol/L    CO2 29 22 - 31 mmol/L    Anion Gap, Calculation 10 5 - 18 mmol/L    Glucose 95 70 - 125 mg/dL    Calcium 8.7 8.5 - 10.5 mg/dL    BUN 28 8 - 28 mg/dL    Creatinine 3.01 (H) 0.60 - 1.10 mg/dL    GFR MDRD Af  Amer 18 (L) >60 mL/min/1.73m2    GFR MDRD Non Af Amer 15 (L) >60 mL/min/1.73m2     Lab Results   Component Value Date    WBC 3.9 (L) 02/20/2021    HGB 10.1 (L) 02/20/2021    HCT 31.8 (L) 02/20/2021     (H) 02/20/2021    PLT 91 (L) 02/20/2021     CrCl cannot be calculated (Patient's most recent lab result is older than the maximum 10 days allowed.).  Lab Results   Component Value Date     (H) 02/18/2021     ASSESSMENT/Plan:      ICD-10-CM    1. Chronic respiratory failure with hypoxia (H)  J96.11    2. ESRD on hemodialysis (H), MWF  N18.6     Z99.2    3. Chronic diastolic heart failure (H)  I50.32    4. Hypotension due to hypovolemia  I95.89     E86.1    5. Chronic atrial fibrillation (H)  I48.20    6. Anemia of chronic renal failure, stage 5 (H)  N18.5     D63.1    7. Pulmonary emphysema, unspecified emphysema type (H)  J43.9    8. Chronic acquired lymphedema  I89.0    9. Gastroesophageal reflux disease without esophagitis  K21.9      CHANGES:    Consultation with pharmacy will be required for the possibility of increasing her midodrine dose.    CARE PLAN:    The care plan has been reviewed and all orders signed. Changes to care plan, if any, as noted. Otherwise, continue care plan of care.      The above has been created using voice recognition software. Please be aware that this may unintentionally  produce inaccuracies and/or nonsensical sentences.      Electronically signed by: Louie Liriano, CNP

## 2025-04-07 NOTE — PROGRESS NOTES
[facility]      Facility:    [facility] [[id number]]  Code Status: FULL CODE      Chief Complaint/Reason for Visit:  Chief Complaint   Patient presents with     H & P       HPI:   Belia is a 73 y.o. female who was readmitted to the hospital for shortness of breath and was discovered to be in congestive heart failure again.  She had recently been admitted to the hospital due to shortness of breath which was pulmonary edema with acute on chronic congestive heart failure as well as sepsis with infected dialysis catheter.  She was diagnosed with enterococcus bacteremia.  The tunneled dialysis catheter was exchanged the day prior to discharge from the hospital.  She does have underlying sleep apnea but is not able to tolerate the BiPAP.  She has chronic atrial fibrillation and episodic rapid ventricular rate but she has undergone the left atrial appendage occlusion procedure (watchman) so that she does not require anticoagulation anymore.  She does have end-stage renal disease requiring chronic hemodialysis.  She does have chronic respiratory failure with hypoxia.  She also has a thoracic aortic aneurysm measuring 4.3 cm which was rechecked during this hospitalization and it is stable.  Her BNP in the hospital was 1972.  She was tachycardic on arrival to the emergency room and this improved with IV diltiazem 10 mg.    Upon current review of systems, she is not feeling any fevers or chills or sore throat or nasal congestion.  She does not have cough or shortness of breath beyond her baseline.  She does not have chest pain or palpitations of the heart.  She has been experiencing nausea.  No diarrhea and no abdominal pain.  She would like to have a foot rest for leg edema that occurs for her.  She has been using oxygen at home on a as needed basis during the day but then uses it at nighttime on a regular basis.    Past Medical History:  Past Medical History:   Diagnosis Date     Arthritis   [facility] [phone]    [facility] CARE VIDEO VISIT PROGRESS NOTE    CHIEF COMPLAINT  Chief Complaint   Patient presents with    Video Visit    Cough       SUBJECTIVE  HISTORY OF PRESENT ILLNESS:   Rodrigo Nation III is a 6 year old/male who's Mom, , is requesting a Video Visit (V-Visit) for evaluation for upper respiratory illness concerns and is acting as an independent historian for patient.     Rodrigo reports the following symptoms: low grade temperature, headache, congestion, and cough. Symptom onset: last night.   The patient has tried albuterol for their current symptoms, which has been somewhat effective.  The patient denies ill contacts.   Reports did have one post tussive emesis today     Intake/Output: Normal solid and liquid intake; Normal urinary output  Child's Weight: 40 lbs   Medication Preference:  child unable to swallow pills and will need liquid/oral suspension.     Denies chest pain, shortness of breath, palpitations, uncontrolled fever, inability to tolerate fluids, abdominal pain or rash.    ALLERGIES  ALLERGIES:  No Known Allergies     MEDICATIONS  Current Outpatient Medications   Medication Sig    albuterol 108 (90 Base) MCG/ACT inhaler Inhale 2 puffs into the lungs every 4 hours as needed for Shortness of Breath, Wheezing or Other (coughing).    albuterol (VENTOLIN) (2.5 MG/3ML) 0.083% nebulizer solution Take 3 mLs by nebulization every 6 hours as needed for Wheezing or Shortness of Breath.    cetirizine (ZyrTEC) 5 MG/5ML solution Take 2.5 mLs by mouth in the morning and 2.5 mLs in the evening.    Respiratory Therapy Supplies (Nebulizer/Pediatric Mask) Kit Use nebulizer + pediatric mask kit as directed on packaging. Pharmacist to provide teaching.    albuterol 108 (90 Base) MCG/ACT inhaler Inhale 4 puffs into the lungs every 4 hours as needed for Wheezing.    Hydrocortisone 2 % Lotion Apply topically as needed. (Patient not taking: Reported on 3/7/2024)         CHF (congestive heart failure) (H)      Chronic anemia 6/1/2014     Chronic kidney disease      Chronic thoracic aortic dissection (H) 10/7/2015    Descending thoracic aorta; treated medically per notes of Dragan Singh and Jennifer.     COPD (chronic obstructive pulmonary disease) (H)      CVA (cerebral infarction)      Disease of thyroid gland      Dyslipidemia      ESRD (end stage renal disease) (H) 06/03/2009    on dialysis with Dr. Mitchell     Essential hypertension 6/30/2014     GI (gastrointestinal bleed)      GI bleeding 6/5/2017     Gout      L3 vertebral fracture (H) 11/16/2015     Left Atrial Appendage Occlusion (WATCHMAN) 4/5/2018    LAAO April 5, 2018 (30 mm WATCHMAN)     Obesity      ZULEIKA (obstructive sleep apnea), severe, intolerant of CPAP 10/22/2015     Pneumonia 9-7-2015     Right foot drop      Spinal stenosis 3/28/2016     Stroke (H) 3/24/2016           Surgical History:  Past Surgical History:   Procedure Laterality Date     BACK SURGERY      [facility]     COLONOSCOPY N/A 3/23/2016    Procedure: COLONOSCOPY;  Surgeon: Ruddy Tejada MD;  Location: [facility];  Service:      DILATION AND CURETTAGE OF UTERUS       EP NEGRA CLOSURE N/A 4/5/2018    Procedure: EP NEGRA Closure;  Surgeon: Derick Duarte MD;  Location: [facility];  Service:      EYE SURGERY       HERNIA REPAIR       WA COLSC FLEXIBLE W/CONTROL BLEEDING ANY METHOD N/A 6/7/2017    Procedure: COLONOSCOPY;  Surgeon: Luis Mckeon MD;  Location: [facility];  Service: Gastroenterology     TONSILLECTOMY         Family History:   Family History   Problem Relation Age of Onset     Dementia Mother      Diabetes Mother      Arthritis Mother      Cancer Mother      Depression Mother      Heart disease Mother      Vision loss Mother      Stroke Father      Heart disease Father      Breast cancer Neg Hx        Social History:    Social History     Social History     Marital status:      Spouse name: Cayden Marmolejo  Cholecalciferol 10 mcg (400 units)/mL oral liquid Take 1 mL by mouth daily. (Patient not taking: Reported on 3/7/2024)     No current facility-administered medications for this visit.        OBJECTIVE    Information acquired with patient assistance, demonstration, and feedback due to two-way video visit method of visit. Portions of assessment may be difficult to visualize precisely given nature of technology and limitations of assessment with virtual platform.     There were no vitals filed for this visit.    GENERAL: Awake, alert, oriented and in no acute distress.  SKIN: Appears pink and dry.   HENT: Normocephalic, atraumatic. Pupils are equal, round. Conjunctivae are not injected.   Mouth/Throat: Lips appear moist. no \"hot potato voice\" and no drooling or inability to control secretions .     RESPIRATORY:  Breathing effort is normal. Able to speak in full sentences. No evidence of respiratory distress.  PSYCHIATRIC: The patient was able to demonstrate good judgement and reason without abnormal affect or abnormal behaviors during the examination.     ASSESSMENT/PLAN   Upper respiratory tract infection, unspecified type    Acute cough  - albuterol 108 (90 Base) MCG/ACT inhaler; Inhale 2 puffs into the lungs every 4 hours as needed for Shortness of Breath, Wheezing or Other (coughing).  Dispense: 1 each; Refill: 0  - albuterol (VENTOLIN) (2.5 MG/3ML) 0.083% nebulizer solution; Take 3 mLs by nebulization every 6 hours as needed for Wheezing or Shortness of Breath.  Dispense: 75 mL; Refill: 0    Refilled all albuterol for cough  Advised tylenol and ibuprofen for pain/fever as directed on packaging   Encouraged H2O  Discussed supportive care     After evaluating the patient, the presentation seems to be suggestive of an upper respiratory illness, including but not limited to the common cold, viral sinusitis, strep throat, influenza, or COVID-19.   Symptomatic management recommended and included in patient instructions.        FOLLOW-UP   No follow-ups on file.  If symptoms are no better after 10 days, they can return to [facility] for a follow up visit and discussion for further treatment. If symptoms worsen, they should be seen in Urgent Care, Immediate Care, or the Emergency Department.      PATIENT INSTRUCTIONS  Attached in After Visit Summary  The patient verbalizes understanding of the diagnosis and plan of care. There were no further questions or concerns.   They were advised to contact the [facility] RN with any questions at [phone].    CONSENT:  This visit is being performed virtually via This visit is being performed virtually via This visit is being performed virtually via Video visit using [employer].     Clinical Location: [facility] Telehealth Visit - Home office  Patient is located at home in the [facility]    Kiera Marvin CNP  4/7/2025  12:49 PM             of children: 2     Years of education: N/A     Occupational History      Retired     Social History Main Topics     Smoking status: Former Smoker     Packs/day: 1.50     Years: 37.00     Types: Cigarettes     Quit date: 1/1/2009     Smokeless tobacco: Never Used     Alcohol use No      Comment: 14 mixed drinks per week     Drug use: No     Sexual activity: No     Other Topics Concern     Not on file     Social History Narrative    Lives with her . Daughter in [place] and daughter in [place].          Review of Systems   All other systems reviewed and are negative.      Vitals:    09/26/18 2124   BP: 129/75   Pulse: 87   Resp: 16   Temp: 97.2  F (36.2  C)   SpO2: 92%       Physical Exam   Constitutional: No distress.   HENT:   Mouth/Throat: Oropharynx is clear and moist.   Eyes: Right eye exhibits no discharge. Left eye exhibits no discharge.   Neck: No JVD present. No thyromegaly present.   Cardiovascular: Normal heart sounds.    Pulmonary/Chest: Breath sounds normal. No respiratory distress.   Abdominal: Soft. Bowel sounds are normal. She exhibits no distension. There is no tenderness.   Musculoskeletal:   Trace edema bilaterally for lower extremities   Lymphadenopathy:     She has no cervical adenopathy.   Neurological: She is alert.   Skin: Skin is warm and dry.   Psychiatric: She has a normal mood and affect.   Nursing note and vitals reviewed.      Medication List:  Current Outpatient Prescriptions   Medication Sig     ondansetron (ZOFRAN) 4 MG tablet Take 4 mg by mouth every 8 (eight) hours as needed for nausea.     acetaminophen (TYLENOL) 500 MG tablet Take 500 mg by mouth every 6 (six) hours as needed for pain.     albuterol (ACCUNEB) 0.63 mg/3 mL nebulizer solution Take 1 ampule by nebulization every 4 (four) hours as needed for wheezing.     albuterol (PROAIR HFA;PROVENTIL HFA;VENTOLIN HFA) 90 mcg/actuation inhaler Inhale 2 puffs every 6 (six) hours as needed for wheezing.     allopurinol  (ZYLOPRIM) 100 MG tablet Take 100 mg by mouth daily.     aspirin 81 MG EC tablet Take 81 mg by mouth daily with lunch.      atorvastatin (LIPITOR) 10 MG tablet Take 10 mg by mouth at bedtime.     CHLORPHENIRAMINE/DEXTROMETHORP (CORICIDIN HBP COUGH AND COLD ORAL) Take 1 tablet by mouth daily as needed.      cholecalciferol, vitamin D3, 2,000 unit cap Take 2,000 Units by mouth daily with lunch.      cinacalcet (SENSIPAR) 30 MG tablet Take 30 mg by mouth daily with lunch.      clopidogrel (PLAVIX) 75 mg tablet Take 1 tablet (75 mg total) by mouth daily.     DIALYVITE 100-1 mg Tab TAKE ONE TABLET BY MOUTH DAILY IN THE EVENING     digoxin (LANOXIN) 125 mcg tablet TAKE 1 TABLET ORALLY 3 TIMES A WEEK. (Patient taking differently: TAKE 1 TABLET ORALLY 3 TIMES A WEEK. Monday, Wednesday, Friday)     diltiazem (CARDIZEM SR) 120 MG 12 hr capsule Take 1 capsule (120 mg total) by mouth 2 (two) times a day.     diphenhydrAMINE (BENADRYL) 25 mg tablet Take 2 tablets (50 mg total) by mouth at bedtime.     folic acid (FOLVITE) 1 MG tablet TAKE ONE TABLET BY MOUTH ONCE DAILY     gabapentin (NEURONTIN) 100 MG capsule TAKE 1 CAPSULE BY MOUTH THREE TIMES DAILY     HYDROcodone-acetaminophen 5-325 mg per tablet Take 1 tablet by mouth 4 (four) times a day as needed.     Lactobacillus rhamnosus GG (CULTURELLE) 10-15 Billion cell capsule Take 1 capsule by mouth daily with lunch.     metoprolol tartrate (LOPRESSOR) 50 MG tablet Take 0.5 tablets (25 mg total) by mouth 2 (two) times a day.     midodrine (PROAMATINE) 5 MG tablet Take 3 tablets (15 mg total) by mouth 3 (three) times a week. Take every Monday, Wednesday, and Friday in the morning.     polyvinyl alcohol (LIQUIFILM TEARS) 1.4 % ophthalmic solution Apply 1 drop to eye as needed for dry eyes.     ranitidine (ZANTAC) 150 MG tablet Take 150 mg by mouth at bedtime.     senna-docusate (SENNOSIDES-DOCUSATE SODIUM) 8.6-50 mg tablet Take 1 tablet by mouth at bedtime.      sevelamer carbonate  (RENVELA) 800 mg tablet TAKE 2 TABLETS BY MOUTH 3 TIMES A DAY WITH MEALS AND 2 TABS WITH SNACKS 2 TIMES A DAY     timolol maleate (TIMOPTIC) 0.5 % ophthalmic solution Administer 1 drop to both eyes 2 (two) times a day.      traZODone (DESYREL) 50 MG tablet Take 0.5 tablets (25 mg total) by mouth at bedtime as needed for sleep (give with melatonin).       Labs:  No new laboratory testing    Assessment:    ICD-10-CM    1. Acute on chronic diastolic congestive heart failure (H) I50.33    2. ESRD on dialysis (H) N18.6     Z99.2    3. Atrial fibrillation with RVR (H) I48.91    4. Pulmonary emphysema, unspecified emphysema type (H) J43.9        Plan:  Start Zofran 4 mg 1 p.o. every 8 hours as needed nausea.  Foot rest available.  Continue with therapies for strengthening and gait.  Continue to monitor multiple medical conditions.      Electronically signed by: Farhan Kiran MD

## (undated) RX ORDER — ALBUTEROL SULFATE 0.83 MG/ML
SOLUTION RESPIRATORY (INHALATION)
Status: DISPENSED
Start: 2021-01-01